# Patient Record
Sex: MALE | Race: WHITE | Employment: OTHER | URBAN - NONMETROPOLITAN AREA
[De-identification: names, ages, dates, MRNs, and addresses within clinical notes are randomized per-mention and may not be internally consistent; named-entity substitution may affect disease eponyms.]

---

## 2017-11-06 ENCOUNTER — HOSPITAL ENCOUNTER (OUTPATIENT)
Dept: CT IMAGING | Age: 67
Discharge: HOME | End: 2017-11-06
Payer: MEDICARE

## 2017-11-06 ENCOUNTER — HOSPITAL ENCOUNTER (OUTPATIENT)
Dept: GENERAL RADIOLOGY | Age: 67
Discharge: HOME | End: 2017-11-06
Payer: MEDICARE

## 2017-11-06 VITALS
TEMPERATURE: 98.1 F | HEIGHT: 65 IN | OXYGEN SATURATION: 97 % | DIASTOLIC BLOOD PRESSURE: 68 MMHG | RESPIRATION RATE: 17 BRPM | WEIGHT: 185 LBS | BODY MASS INDEX: 30.82 KG/M2 | HEART RATE: 59 BPM | SYSTOLIC BLOOD PRESSURE: 121 MMHG

## 2017-11-06 DIAGNOSIS — Z98.890 STATUS POST MYELOGRAM: ICD-10-CM

## 2017-11-06 LAB
APTT: 29 SEC (ref 26–36.2)
INR BLD: 0.99 (ref 0.88–1.18)
PLATELET # BLD: 293 K/UL (ref 130–400)
PROTHROMBIN TIME: 13 SEC (ref 12–14.6)

## 2017-11-06 PROCEDURE — 6360000004 HC RX CONTRAST MEDICATION

## 2017-11-06 PROCEDURE — 72270 MYELOGPHY 2/> SPINE REGIONS: CPT

## 2017-11-06 PROCEDURE — 72131 CT LUMBAR SPINE W/O DYE: CPT

## 2017-11-06 PROCEDURE — 72128 CT CHEST SPINE W/O DYE: CPT

## 2017-11-06 PROCEDURE — 36415 COLL VENOUS BLD VENIPUNCTURE: CPT

## 2017-11-06 PROCEDURE — 85730 THROMBOPLASTIN TIME PARTIAL: CPT

## 2017-11-06 PROCEDURE — 85049 AUTOMATED PLATELET COUNT: CPT

## 2017-11-06 PROCEDURE — 85610 PROTHROMBIN TIME: CPT

## 2017-11-06 RX ORDER — METHADONE HYDROCHLORIDE 10 MG/1
20 TABLET ORAL EVERY 8 HOURS PRN
Status: ON HOLD | COMMUNITY
End: 2022-01-01

## 2017-11-06 RX ORDER — BISOPROLOL FUMARATE 5 MG/1
5 TABLET ORAL DAILY
COMMUNITY
End: 2020-10-27 | Stop reason: SDUPTHER

## 2017-11-06 RX ADMIN — IOPAMIDOL 15 ML: 612 INJECTION, SOLUTION INTRATHECAL at 14:12

## 2017-11-06 ASSESSMENT — PAIN DESCRIPTION - DESCRIPTORS: DESCRIPTORS: CONSTANT;DISCOMFORT

## 2017-11-06 ASSESSMENT — PAIN - FUNCTIONAL ASSESSMENT: PAIN_FUNCTIONAL_ASSESSMENT: 0-10

## 2017-11-06 NOTE — PROGRESS NOTES
Pt was brought back to [facility] 2 while I was attending to another pt, pt gowned. When I arrived to room, POC reviewed and questions answered, pt oriented to area. Lab to draw specimen. Pt denies anticoag/antiplatelets. Meds/history/assessmnet reviewed and noted. Called orthop inst. For order clarification as does not specify areas for myelogram.  Office notes reference thoracic, pt states it is his lower back. Notified pt of possible delay.

## 2017-11-15 ENCOUNTER — TELEPHONE (OUTPATIENT)
Dept: NEUROSURGERY | Age: 67
End: 2017-11-15

## 2017-12-07 ENCOUNTER — OFFICE VISIT (OUTPATIENT)
Dept: NEUROSURGERY | Age: 67
End: 2017-12-07
Payer: MEDICARE

## 2017-12-07 ENCOUNTER — HOSPITAL ENCOUNTER (OUTPATIENT)
Dept: GENERAL RADIOLOGY | Age: 67
Discharge: HOME | End: 2017-12-07
Payer: MEDICARE

## 2017-12-07 VITALS
HEIGHT: 64 IN | BODY MASS INDEX: 31.58 KG/M2 | SYSTOLIC BLOOD PRESSURE: 129 MMHG | WEIGHT: 185 LBS | OXYGEN SATURATION: 95 % | HEART RATE: 63 BPM | DIASTOLIC BLOOD PRESSURE: 78 MMHG

## 2017-12-07 DIAGNOSIS — R20.0 RIGHT LEG NUMBNESS: ICD-10-CM

## 2017-12-07 DIAGNOSIS — M48.061 SPINAL STENOSIS OF LUMBAR REGION WITHOUT NEUROGENIC CLAUDICATION: Primary | ICD-10-CM

## 2017-12-07 DIAGNOSIS — M79.604 RIGHT LEG PAIN: ICD-10-CM

## 2017-12-07 DIAGNOSIS — M48.061 SPINAL STENOSIS OF LUMBAR REGION WITHOUT NEUROGENIC CLAUDICATION: ICD-10-CM

## 2017-12-07 DIAGNOSIS — Z98.1 S/P LUMBAR FUSION: ICD-10-CM

## 2017-12-07 DIAGNOSIS — Z98.1 S/P CERVICAL SPINAL FUSION: ICD-10-CM

## 2017-12-07 PROCEDURE — G8417 CALC BMI ABV UP PARAM F/U: HCPCS | Performed by: NEUROLOGICAL SURGERY

## 2017-12-07 PROCEDURE — 1123F ACP DISCUSS/DSCN MKR DOCD: CPT | Performed by: NEUROLOGICAL SURGERY

## 2017-12-07 PROCEDURE — G8427 DOCREV CUR MEDS BY ELIG CLIN: HCPCS | Performed by: NEUROLOGICAL SURGERY

## 2017-12-07 PROCEDURE — 1036F TOBACCO NON-USER: CPT | Performed by: NEUROLOGICAL SURGERY

## 2017-12-07 PROCEDURE — 3017F COLORECTAL CA SCREEN DOC REV: CPT | Performed by: NEUROLOGICAL SURGERY

## 2017-12-07 PROCEDURE — 4040F PNEUMOC VAC/ADMIN/RCVD: CPT | Performed by: NEUROLOGICAL SURGERY

## 2017-12-07 PROCEDURE — G8484 FLU IMMUNIZE NO ADMIN: HCPCS | Performed by: NEUROLOGICAL SURGERY

## 2017-12-07 PROCEDURE — 99204 OFFICE O/P NEW MOD 45 MIN: CPT | Performed by: NEUROLOGICAL SURGERY

## 2017-12-07 PROCEDURE — 72110 X-RAY EXAM L-2 SPINE 4/>VWS: CPT

## 2017-12-07 RX ORDER — AZITHROMYCIN 250 MG/1
TABLET, FILM COATED ORAL
Refills: 0 | Status: ON HOLD | COMMUNITY
Start: 2017-09-07 | End: 2018-10-09 | Stop reason: ALTCHOICE

## 2017-12-07 RX ORDER — CYCLOBENZAPRINE HCL 10 MG
TABLET ORAL
COMMUNITY
Start: 2017-11-20 | End: 2017-12-27

## 2017-12-07 ASSESSMENT — ENCOUNTER SYMPTOMS
BACK PAIN: 1
RESPIRATORY NEGATIVE: 1
EYES NEGATIVE: 1
GASTROINTESTINAL NEGATIVE: 1

## 2017-12-07 NOTE — PROGRESS NOTES
[facility] Neurosurgery  Office Visit    Patient:   Jackie Agee  MR#:    [id number]      YOB: 1950  Date of Visit:   12/7/2017  Time of Note:                          4:33 PM  Primary/Referring Physician:  Jesus Dwyer MD   Note Author:   Kartik Guzmán DO    Chief Complaint   Patient presents with    Lower Back Pain    Leg Pain     Patient states he is having right Leg pain    Numbness    Tingling       HISTORY OF PRESENT ILLNESS:      Jackie Agee is a 74 y.o. male with an extensive surgical spine history who presents with right leg pain and low back pain for about 3 months. The pain does radiate into right buttock, anterolateral thigh, lateral shin and knee, top of foot. His pain is mostly located in the right leg. The patient complains of an intermittent numbness of the right foot. Surgical spine history:  - Corpectomy C4 and C6 with ACDF of C2-3 by Dr. Geoffrey El in [address] [place] [address] on 5/3/2006; he could not hold his head up very well and he had bilateral hand numbness. - He also had a posterior decompression by Dr. Hoa Gonsales in 2007  - Lumbar laminectomy 2007?  - Fusion of L5-S1 in 2001  Ablation of medial branch nerves at left L3-4, L4-5 on 8/30/2017  Ablation of medial branch nerves at right L3-4, L4-5 on 8/16/2017    His pain is not changed when going from a seated to standing position. His pain is not changed with walking. His pain is not changed when lying flat. Overall, indicative that the patient does not have a mechanical nature to their pain. He states that 20% of his pain is located in the back and 80% is leg pain. The patient states that he can no longer play with his grandchildren or walk up steps which has dramatically affected his quality of life.      The patient has underwent a non-operative treatment course that has included:  NSAIDs  Muscle Relaxers (flexeril)  Gabapentin  Mathodone  Opiates  Oral Steroids  Physical Therapy with core strengthening  Epidural Steroid Neurological: Positive for tingling. Negative for dizziness, tremors, sensory change, speech change, focal weakness, seizures, loss of consciousness, weakness and headaches. Endo/Heme/Allergies: Negative. Psychiatric/Behavioral: Negative. PHYSICAL EXAM:  Vitals:    12/07/17 1045   BP: 129/78   Pulse: 63   SpO2: 95%     Constitutional: appears well-developed and well-nourished. Eyes  conjunctiva normal.  Pupils react to light  Ear, nose, throat -hearing intact to finger rub, No scars, masses, or lesions over external nose or ears, no atrophy of tongue  Neck-symmetric, no masses noted, no jugular vein distension  Respiration- chest wall appears symmetric, good expansion, normal effort without use of accessory muscles  Musculoskeletal  no significant wasting of muscles noted, no bony deformities, gait no gross ataxia  Extremities-no clubbing or cyanosis, + mild RLE edema  Skin  warm, dry, and intact. No rash, erythema, or pallor. Psychiatric  mood, affect, and behavior appear normal.     Neurologic Examinaiton  Awake, Alert and oriented x 3  Normal speech pattern, following commands  Motor 5/5 all extremities  No deficits to light touch or pinprick sensation  Reflexes are 1+ and symmetric bilateral achilles  No clonus or Hoffmans sign  No myofacial tenderness to palpation  Normal Gait pattern        DATA and IMAGING:    Nursing/pcp notes, imaging, labs, and vitals reviewed.      PT,OT and/or speech notes reviewed    Lab Results   Component Value Date    WBC 3.60 (L) 10/09/2015    HGB 9.3 (L) 10/09/2015    HCT 29.4 (L) 10/09/2015    MCV 73.5 (L) 10/09/2015     11/06/2017     Lab Results   Component Value Date     10/09/2015    K 3.4 (L) 10/09/2015     10/09/2015    CO2 21 (L) 10/09/2015    BUN 5 (L) 10/09/2015    CREATININE 0.6 10/09/2015    GLUCOSE 86 10/09/2015    CALCIUM 8.1 (L) 10/09/2015    PROT 6.1 (L) 10/08/2015    LABALBU 3.6 (D) 10/08/2015    ALKPHOS 89 10/08/2015    AST 17 10/08/2015    ALT 9 10/08/2015    LABGLOM 145 10/09/2015     Lab Results   Component Value Date    INR 0.99 11/06/2017    PROTIME 13.0 11/06/2017       I have personally reviewed these images and my interpretation is: There is evidence of a previous L5-S1 open fusion and large laminectomy throughout the lumbar spine  There is retropulsion of L2 on L3  L2-3 moderate central canal stenosis, bilateral foraminal stenosis L>R      ASSESSMENT:  Ally Valdovinos is a 74 y.o. male with an extensive surgical spine history who presents with right leg pain and low back pain for about 3 months. ICD-10-CM ICD-9-CM    1. Spinal stenosis of lumbar region without neurogenic claudication M48.061 724.02 XR Lumbar Spine Ap Lateral Flexion and Extension   2. Right leg pain M79.604 729.5 XR Lumbar Spine Ap Lateral Flexion and Extension   3. Right leg numbness R20.0 782.0 XR Lumbar Spine Ap Lateral Flexion and Extension   4. S/P lumbar fusion Z98.1 V45.4 XR Lumbar Spine Ap Lateral Flexion and Extension   5. S/P cervical spinal fusion Z98.1 V45.4        PLAN:  -We discussed and reviewed the results/findings of the MRI lumbar spine with Mr. Aundrea Corona and his daughter at length. We explained very thoroughly that he has had extensive major spine surgery with no success or improvement in right leg pain. It is unlikely that an additional surgery will likely provide any dramatic improvement. He has a swelling of the right lower extremity that is somewhat concerning for a vascular issues.       Chao De Anda,

## 2017-12-13 ENCOUNTER — TELEPHONE (OUTPATIENT)
Dept: NEUROLOGY | Age: 67
End: 2017-12-13

## 2017-12-13 DIAGNOSIS — M79.89 SWELLING OF RIGHT LOWER EXTREMITY: Primary | ICD-10-CM

## 2017-12-13 DIAGNOSIS — D50.8 OTHER IRON DEFICIENCY ANEMIA: Primary | ICD-10-CM

## 2017-12-15 NOTE — TELEPHONE ENCOUNTER
Called Monika to inform her that Genny Jeronimo completed the referral today to [facility] vascular.  She was very thankful

## 2017-12-18 ENCOUNTER — APPOINTMENT (OUTPATIENT)
Dept: LAB | Facility: HOSPITAL | Age: 67
End: 2017-12-18

## 2017-12-21 DIAGNOSIS — D50.9 IRON DEFICIENCY ANEMIA, UNSPECIFIED IRON DEFICIENCY ANEMIA TYPE: Primary | ICD-10-CM

## 2017-12-26 ENCOUNTER — LAB (OUTPATIENT)
Dept: LAB | Facility: HOSPITAL | Age: 67
End: 2017-12-26

## 2017-12-26 ENCOUNTER — OFFICE VISIT (OUTPATIENT)
Dept: ONCOLOGY | Facility: CLINIC | Age: 67
End: 2017-12-26

## 2017-12-26 VITALS
RESPIRATION RATE: 16 BRPM | TEMPERATURE: 97.2 F | OXYGEN SATURATION: 97 % | DIASTOLIC BLOOD PRESSURE: 72 MMHG | SYSTOLIC BLOOD PRESSURE: 118 MMHG | WEIGHT: 196 LBS | HEART RATE: 68 BPM

## 2017-12-26 DIAGNOSIS — R79.89 OTHER SPECIFIED ABNORMAL FINDINGS OF BLOOD CHEMISTRY: ICD-10-CM

## 2017-12-26 DIAGNOSIS — D50.9 NORMOCYTIC HYPOCHROMIC ANEMIA: Primary | ICD-10-CM

## 2017-12-26 DIAGNOSIS — C20 RECTAL CANCER (HCC): Primary | ICD-10-CM

## 2017-12-26 LAB
ALBUMIN SERPL-MCNC: 4.3 G/DL (ref 3.5–5)
ALBUMIN/GLOB SERPL: 1.4 G/DL (ref 1.1–2.5)
ALP SERPL-CCNC: 74 U/L (ref 24–120)
ALT SERPL W P-5'-P-CCNC: 34 U/L (ref 0–54)
ANION GAP SERPL CALCULATED.3IONS-SCNC: 9 MMOL/L (ref 4–13)
AST SERPL-CCNC: 17 U/L (ref 7–45)
AUTO MIXED CELLS #: 0.5 10*3/MM3 (ref 0.1–2.6)
AUTO MIXED CELLS %: 7 % (ref 0.1–24)
BILIRUB SERPL-MCNC: 0.4 MG/DL (ref 0.1–1)
BUN BLD-MCNC: 13 MG/DL (ref 5–21)
BUN/CREAT SERPL: 13.4
CALCIUM SPEC-SCNC: 9.4 MG/DL (ref 8.4–10.4)
CEA SERPL-MCNC: 1.46 NG/ML (ref 0–5)
CHLORIDE SERPL-SCNC: 100 MMOL/L (ref 98–110)
CO2 SERPL-SCNC: 25 MMOL/L (ref 24–31)
CREAT BLD-MCNC: 0.97 MG/DL (ref 0.5–1.4)
ERYTHROCYTE [DISTWIDTH] IN BLOOD BY AUTOMATED COUNT: 12.6 % (ref 12–15)
FERRITIN SERPL-MCNC: 15.4 NG/ML (ref 17.9–464)
GFR SERPL CREATININE-BSD FRML MDRD: 78 ML/MIN/1.73
GLOBULIN UR ELPH-MCNC: 3.1 GM/DL
GLUCOSE BLD-MCNC: 98 MG/DL (ref 70–100)
HCT VFR BLD AUTO: 35.6 % (ref 40–52)
HGB BLD-MCNC: 12 G/DL (ref 14–18)
HOLD SPECIMEN: NORMAL
IRON 24H UR-MRATE: 84 MCG/DL (ref 42–180)
IRON SATN MFR SERPL: 21 % (ref 20–45)
LYMPHOCYTES # BLD AUTO: 1.1 10*3/MM3 (ref 0.8–7)
LYMPHOCYTES NFR BLD AUTO: 15.8 % (ref 15–45)
MCH RBC QN AUTO: 27.1 PG (ref 28–32)
MCHC RBC AUTO-ENTMCNC: 33.7 G/DL (ref 33–36)
MCV RBC AUTO: 80.4 FL (ref 82–95)
NEUTROPHILS # BLD AUTO: 5.1 10*3/MM3 (ref 1.5–8.3)
NEUTROPHILS NFR BLD AUTO: 77.2 % (ref 39–78)
PLATELET # BLD AUTO: 236 10*3/MM3 (ref 130–400)
PMV BLD AUTO: 9.1 FL (ref 6–12)
POTASSIUM BLD-SCNC: 4.2 MMOL/L (ref 3.5–5.3)
PROT SERPL-MCNC: 7.4 G/DL (ref 6.3–8.7)
RBC # BLD AUTO: 4.43 10*6/MM3 (ref 4.2–5.4)
SODIUM BLD-SCNC: 134 MMOL/L (ref 135–145)
TIBC SERPL-MCNC: 407 MCG/DL (ref 225–420)
WBC NRBC COR # BLD: 6.7 10*3/MM3 (ref 4.8–10.8)

## 2017-12-26 PROCEDURE — 83540 ASSAY OF IRON: CPT | Performed by: INTERNAL MEDICINE

## 2017-12-26 PROCEDURE — 82378 CARCINOEMBRYONIC ANTIGEN: CPT | Performed by: INTERNAL MEDICINE

## 2017-12-26 PROCEDURE — 99213 OFFICE O/P EST LOW 20 MIN: CPT | Performed by: INTERNAL MEDICINE

## 2017-12-26 PROCEDURE — 83550 IRON BINDING TEST: CPT | Performed by: INTERNAL MEDICINE

## 2017-12-26 PROCEDURE — 82728 ASSAY OF FERRITIN: CPT | Performed by: INTERNAL MEDICINE

## 2017-12-26 PROCEDURE — 36415 COLL VENOUS BLD VENIPUNCTURE: CPT

## 2017-12-26 PROCEDURE — 80053 COMPREHEN METABOLIC PANEL: CPT | Performed by: INTERNAL MEDICINE

## 2017-12-26 PROCEDURE — 85025 COMPLETE CBC W/AUTO DIFF WBC: CPT | Performed by: INTERNAL MEDICINE

## 2017-12-26 RX ORDER — OMEPRAZOLE 20 MG/1
20 CAPSULE, DELAYED RELEASE ORAL DAILY
COMMUNITY
Start: 2016-02-05

## 2017-12-26 RX ORDER — INFLUENZA A VIRUS A/MICHIGAN/45/2015 X-275 (H1N1) ANTIGEN (FORMALDEHYDE INACTIVATED), INFLUENZA A VIRUS A/SINGAPORE/INFIMH-16-0019/2016 IVR-186 (H3N2) ANTIGEN (FORMALDEHYDE INACTIVATED), AND INFLUENZA B VIRUS B/MARYLAND/15/2016 BX-69A (A B/COLORADO/6/2017-LIKE VIRUS) ANTIGEN (FORMALDEHYDE INACTIVATED) 60; 60; 60 UG/.5ML; UG/.5ML; UG/.5ML
INJECTION, SUSPENSION INTRAMUSCULAR
Refills: 0 | COMMUNITY
Start: 2017-09-28 | End: 2020-10-05

## 2017-12-26 RX ORDER — BISOPROLOL FUMARATE 5 MG/1
5 TABLET, FILM COATED ORAL DAILY
COMMUNITY
Start: 2017-10-15

## 2017-12-26 RX ORDER — DULOXETIN HYDROCHLORIDE 30 MG/1
30 CAPSULE, DELAYED RELEASE ORAL DAILY
COMMUNITY
Start: 2016-01-25

## 2017-12-26 RX ORDER — CYCLOBENZAPRINE HCL 10 MG
10 TABLET ORAL 3 TIMES DAILY PRN
COMMUNITY
Start: 2017-11-20

## 2017-12-26 RX ORDER — CYCLOBENZAPRINE HCL 10 MG
TABLET ORAL
Refills: 1 | COMMUNITY
Start: 2017-12-12 | End: 2019-01-10 | Stop reason: SDUPTHER

## 2017-12-26 RX ORDER — DULOXETIN HYDROCHLORIDE 30 MG/1
CAPSULE, DELAYED RELEASE ORAL
COMMUNITY
Start: 2017-10-15 | End: 2019-01-10 | Stop reason: SDUPTHER

## 2017-12-26 RX ORDER — GABAPENTIN 800 MG/1
800 TABLET ORAL
COMMUNITY
Start: 2016-02-16 | End: 2019-01-10 | Stop reason: SDUPTHER

## 2017-12-26 RX ORDER — OMEPRAZOLE 20 MG/1
CAPSULE, DELAYED RELEASE ORAL
COMMUNITY
Start: 2017-10-23 | End: 2019-01-10 | Stop reason: SDUPTHER

## 2017-12-26 RX ORDER — GABAPENTIN 800 MG/1
800 TABLET ORAL 4 TIMES DAILY
COMMUNITY
Start: 2017-12-09

## 2017-12-26 RX ORDER — METHADONE HYDROCHLORIDE 10 MG/1
20 TABLET ORAL 3 TIMES DAILY
COMMUNITY

## 2017-12-26 NOTE — PROGRESS NOTES
[facility]  HEMATOLOGY & ONCOLOGY        Subjective     VISIT DIAGNOSIS: No diagnosis found.    REASON FOR VISIT:   No chief complaint on file.       HEMATOLOGY / ONCOLOGY HISTORY: TUMOR HISTORY: Mr. Angel is a 64yearold  male who presents to the office today for evaluation of microcytic hyperchromic anemia.  His history is significant for a partial colectomy in 1991 with approximately 12 inches of his left colon removed with associated  ileostomy. Pathology on this resected specimen is not available. The patient apparently developed rectal carcinoma and had a low  anterior resection with the formation of a pouch with associated diverting ileostomy on 7/15/2009. On 9/9/2009 he underwent ileostomy  takedown. 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 He is on suppression of chronic inflammation in this pouch and has episodes of recurrent need for  antibiotics. The last notes that I have from [place] are on 1/31/2011 after a pouch washout and placement on antibiotics.  3/26/2012 he underwent an upper GI endoscopy showing hiatal hernia but normal esophagus, stomach and duodenum. This was  followed with a colonoscopy which showed the patent surgical anastomosis and no obvious source of blood loss. The patient was noted  to have a J pouch ileoanal anastomosis and the anastomotic line was acceptable. There was healthy appearing mucosa noted.  Hemoglobin, iron studies were performed on 3/20/2012 showing a serum iron of 11, iron saturation of 2%, ferritin of 3, B12 normal at  1330 and folic acid normal. Reticulocyte count was 2.2% which is inappropriately low for his hemoglobin. Repeat blood count on  3/23/2012 showed a hemoglobin of 10.1, hematocrit 31.7, MCV of 69.1, MCHC of 31.9. Platelet and white count were normal.  INTERVAL HISTORY:  Mr Angel is a 63 year old gentleman with history of iron  deficiency anemia. He is here today for follow up and with complaints of  increased of fatigue and weakness.He denies any acute illness or bleeding. 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 His CBC results today are as follows:  WBC=5.5K/uL, RBC=4.22M/UL, HGB=9.4g/dL, HCT=31.1%, MCV=73.74fL, MCHC=30.2g/dL, RDW=16.0%, Mqfb=954Q/uL, MPV=9.1,  Gran#=3.8K/uL, Lymph#=1.2, MONO#=0.5, Gran%=69.3%, Lymph%=21.9%, MONO%=8.8%,MCHC=22.3pg. His last iron studies were  obtained on 03/29/2012 with an iron saturation 7.8% and a Ferritin of 4.6, will repeat these studies today for revaluation and possible  consideration for INFed infusions   No history exists.     [No treatment plan]  Cancer Staging Information:  No matching staging information was found for the patient.      INTERVAL HISTORY  Patient ID: Jelani Angel is a 67 y.o. year old male         Review of Systems         Medications:    No current outpatient prescriptions on file.     No current facility-administered medications for this visit.        ALLERGIES:  Allergies not on file    Objective      @VITALS    No flowsheet data found.    General Appearance: Patient is awake, alert, oriented and in no acute distress. Patient is welldeveloped, wellnourished, and appears stated age.  HEENT: Normocephalic. Sclerae clear, conjunctiva pink, extraocular movements intact, pupils, round, reactive to light and  accommodation. Mouth and throat are clear with moist oral mucosa.  NECK: Supple, no jugular venous distention, thyroid not enlarged.  LYMPH: No cervical, supraclavicular, axillary, or inguinal lymphadenopathy.  CHEST: Equal bilateral expansion, AP  diameter normal, resonant percussion note  LUNGS: Good air movement, no rales, rhonchi, rubs or wheezes with auscultation  CARDIO: Regular sinus rhythm, no murmurs, gallops or rubs.  ABDOMEN: Nondistended, soft, No  tenderness, no guarding, no rebound, No hepatosplenomegaly. No abdominal masses. Bowel sounds positive. No hernia  GENITALIA: Not examined.  BREASTS: Not examined.  MUSKEL: No joint swelling, decreased motion, or inflammation  EXTREMS: No edema, clubbing, cyanosis, No varicose veins.  NEURO: Grossly nonfocal, Gait is coordinated and smooth, Cognition is preserved.  SKIN: No rashes, no ecchymoses, no petechia.  PSYCH: Oriented to time, place and person. Memory is preserved. Mood and affect appear normal      RECENT LABS:  Orders Only on 12/21/2017   Component Date Value Ref Range Status   • WBC 12/26/2017 6.70  4.80 - 10.80 10*3/mm3 Final   • RBC 12/26/2017 4.43  4.20 - 5.40 10*6/mm3 Final   • Hemoglobin 12/26/2017 12.0* 14.0 - 18.0 g/dL Final   • Hematocrit 12/26/2017 35.6* 40.0 - 52.0 % Final   • MCV 12/26/2017 80.4* 82.0 - 95.0 fL Final   • MCH 12/26/2017 27.1* 28.0 - 32.0 pg Final   • MCHC 12/26/2017 33.7  33.0 - 36.0 g/dL Final   • RDW 12/26/2017 12.6  12.0 - 15.0 % Final   • MPV 12/26/2017 9.1  6.0 - 12.0 fL Final   • Platelets 12/26/2017 236  130 - 400 10*3/mm3 Final   • Neutrophil % 12/26/2017 77.2  39.0 - 78.0 % Final   • Lymphocyte % 12/26/2017 15.8  15.0 - 45.0 % Final   • Auto Mixed Cells % 12/26/2017 7.0  0.1 - 24.0 % Final   • Neutrophils, Absolute 12/26/2017 5.10  1.50 - 8.30 10*3/mm3 Final   • Lymphocytes, Absolute 12/26/2017 1.10  0.80 - 7.00 10*3/mm3 Final   • Auto Mixed Cells # 12/26/2017 0.50  0.10 - 2.60 10*3/mm3 Final       RADIOLOGY:  No results found.         Assessment/Plan      Rectal cancer 2009, s/o Chava adjuvant 5FU CIV + XRT thereafter resction APR and pathological complete CR.  NO imaging studies done since Dx. ELIAS on clinical exam.  Anemia stable Hgb 12gm%, Asymptomatic.  Cont OBSERVATION.                Larry Benjamin MD    12/26/2017    11:05 AM

## 2017-12-27 ENCOUNTER — HOSPITAL ENCOUNTER (OUTPATIENT)
Dept: VASCULAR LAB | Age: 67
Discharge: HOME | End: 2017-12-27
Payer: MEDICARE

## 2017-12-27 ENCOUNTER — OFFICE VISIT (OUTPATIENT)
Dept: VASCULAR SURGERY | Age: 67
End: 2017-12-27
Payer: MEDICARE

## 2017-12-27 VITALS
SYSTOLIC BLOOD PRESSURE: 126 MMHG | TEMPERATURE: 98.6 F | RESPIRATION RATE: 18 BRPM | DIASTOLIC BLOOD PRESSURE: 82 MMHG | HEART RATE: 72 BPM

## 2017-12-27 DIAGNOSIS — M79.89 LEG SWELLING: ICD-10-CM

## 2017-12-27 DIAGNOSIS — M79.604 LEG PAIN, RIGHT: Primary | ICD-10-CM

## 2017-12-27 DIAGNOSIS — M79.604 LEG PAIN, RIGHT: ICD-10-CM

## 2017-12-27 PROCEDURE — 1123F ACP DISCUSS/DSCN MKR DOCD: CPT | Performed by: NURSE PRACTITIONER

## 2017-12-27 PROCEDURE — 1036F TOBACCO NON-USER: CPT | Performed by: NURSE PRACTITIONER

## 2017-12-27 PROCEDURE — 3017F COLORECTAL CA SCREEN DOC REV: CPT | Performed by: NURSE PRACTITIONER

## 2017-12-27 PROCEDURE — G8427 DOCREV CUR MEDS BY ELIG CLIN: HCPCS | Performed by: NURSE PRACTITIONER

## 2017-12-27 PROCEDURE — 4040F PNEUMOC VAC/ADMIN/RCVD: CPT | Performed by: NURSE PRACTITIONER

## 2017-12-27 PROCEDURE — G8417 CALC BMI ABV UP PARAM F/U: HCPCS | Performed by: NURSE PRACTITIONER

## 2017-12-27 PROCEDURE — 99202 OFFICE O/P NEW SF 15 MIN: CPT | Performed by: NURSE PRACTITIONER

## 2017-12-27 PROCEDURE — G8484 FLU IMMUNIZE NO ADMIN: HCPCS | Performed by: NURSE PRACTITIONER

## 2017-12-27 PROCEDURE — 93971 EXTREMITY STUDY: CPT

## 2017-12-27 NOTE — PROGRESS NOTES
to palpation bilaterally without bruit. Extremities - Radial and brachial pulses are 2+ to palpation bilaterally. Right femoral pulse: present 2+; Right popliteal pulse: absent Right DP: absent; Right PT absent; Left femoral pulse: present 2+; Left popliteal pulse: absent; Left DP: absent; Left PT: absent  No cyanosis, clubbing, minimal edema in area of right calf. No signs atheroembolic event. Few spider veins  Pulmonary - effort appears normal.  No respiratory distress. Lungs - Breath sounds normal. No wheezes or rales. GI - Abdomen - soft, non tender, bowel sounds X 4 quadrants. No guarding or rebound tenderness. No distension or palpable mass. Genitourinary - deferred. Musculoskeletal - ROM appears normal.  Neurologic - alert and oriented X 3. Physiologic. Skin - warm, dry, and intact. No rash, erythema, or pallor. Psychiatric - mood, affect, and behavior appear normal.  Judgment and thought processes appear normal.    Options have been discussed with the patient including continued medical management. Patient has opted to proceed with continued medical management. Assessment    1. Leg pain, right    2.  Leg swelling          Plan    Support hose  20-30 mmHg compression  Start/Continue ASA EC 81 mg daily  Leg elevation  Good moisturization  Good skin care  Venous scan - negative - no abnormality seen  Recommend follow up with Dr. Dangelo Velasquez

## 2017-12-28 ENCOUNTER — TELEPHONE (OUTPATIENT)
Dept: VASCULAR SURGERY | Age: 67
End: 2017-12-28

## 2018-01-03 ENCOUNTER — HOSPITAL ENCOUNTER (OUTPATIENT)
Dept: CT IMAGING | Age: 68
Discharge: HOME | End: 2018-01-03
Payer: MEDICARE

## 2018-01-03 DIAGNOSIS — R22.41 LOWER LEG MASS, RIGHT: ICD-10-CM

## 2018-01-03 LAB
GFR NON-AFRICAN AMERICAN: >60
PERFORMED ON: NORMAL
POC CREATININE: 1.1 MG/DL (ref 0.3–1.3)
POC SAMPLE TYPE: NORMAL

## 2018-01-03 PROCEDURE — 73701 CT LOWER EXTREMITY W/DYE: CPT

## 2018-01-03 PROCEDURE — 82565 ASSAY OF CREATININE: CPT

## 2018-01-03 PROCEDURE — 6360000004 HC RX CONTRAST MEDICATION: Performed by: INTERNAL MEDICINE

## 2018-01-03 RX ADMIN — IOPAMIDOL 90 ML: 755 INJECTION, SOLUTION INTRAVENOUS at 09:46

## 2018-01-17 ENCOUNTER — OFFICE VISIT (OUTPATIENT)
Dept: VASCULAR SURGERY | Age: 68
End: 2018-01-17
Payer: MEDICARE

## 2018-01-17 VITALS
DIASTOLIC BLOOD PRESSURE: 82 MMHG | HEART RATE: 70 BPM | SYSTOLIC BLOOD PRESSURE: 138 MMHG | RESPIRATION RATE: 18 BRPM | OXYGEN SATURATION: 98 %

## 2018-01-17 DIAGNOSIS — M79.89 LEG SWELLING: ICD-10-CM

## 2018-01-17 PROCEDURE — G8427 DOCREV CUR MEDS BY ELIG CLIN: HCPCS | Performed by: SURGERY

## 2018-01-17 PROCEDURE — G8484 FLU IMMUNIZE NO ADMIN: HCPCS | Performed by: SURGERY

## 2018-01-17 PROCEDURE — 1123F ACP DISCUSS/DSCN MKR DOCD: CPT | Performed by: SURGERY

## 2018-01-17 PROCEDURE — G8417 CALC BMI ABV UP PARAM F/U: HCPCS | Performed by: SURGERY

## 2018-01-17 PROCEDURE — 99212 OFFICE O/P EST SF 10 MIN: CPT | Performed by: SURGERY

## 2018-01-17 PROCEDURE — 1101F PT FALLS ASSESS-DOCD LE1/YR: CPT | Performed by: SURGERY

## 2018-01-17 PROCEDURE — 4040F PNEUMOC VAC/ADMIN/RCVD: CPT | Performed by: SURGERY

## 2018-01-17 PROCEDURE — 1036F TOBACCO NON-USER: CPT | Performed by: SURGERY

## 2018-01-17 PROCEDURE — 3017F COLORECTAL CA SCREEN DOC REV: CPT | Performed by: SURGERY

## 2018-09-15 PROBLEM — M79.89 LEG SWELLING: Status: ACTIVE | Noted: 2018-09-15

## 2018-09-15 NOTE — PROGRESS NOTES
Progress Notes  Encounter Date: 1/17/18       Expand All Collapse All    []Manual[]Template  []Copied        Patient Care Team:  Genesis Malone MD as PCP - General (Cardiology)        He reports that 1 year ago he developed a lump on his right leg. This got better with support hose. He developed pain in this area about 6-8 weeks ago. He has had some spider veins. He has no varicose veins.       Differential diagnosis for leg pain includes but is not limited to: varicose veins, peripheral vascular disease, arthritic pain, DVT, lumbar disc disease, and neurogenic pain.   Seen in f/u- CTS negative       Malena Conteh is a 74 y.o. male with the following history reviewed and recorded in [facility]:       Patient Active Problem List     Diagnosis Date Noted    Primary osteoarthritis of left hip 10/28/2016    Thoracic facet joint syndrome 06/03/2016      Current Facility-Administered Medications          Current Outpatient Prescriptions   Medication Sig Dispense Refill    bisoprolol (ZEBETA) 5 MG tablet Take 5 mg by mouth daily        Diclofenac (ZORVOLEX) 18 MG CAPS Take 18 mg by mouth 3 times daily        methadone (DOLOPHINE) 10 MG tablet Take 10 mg by mouth every 6 hours as needed for Pain .        DULoxetine (CYMBALTA) 30 MG capsule 30 mg daily         gabapentin (NEURONTIN) 800 MG tablet 800 mg 4 times daily         omeprazole (PRILOSEC) 20 MG capsule 20 mg 2 times daily         azithromycin (ZITHROMAX) 250 MG tablet     0      No current facility-administered medications for this visit.          Allergies: Morphine  Past Medical History        Past Medical History:   Diagnosis Date    Arthritis      Chronic back pain      Hypertension           Past Surgical History   Past Surgical History:   Procedure Laterality Date    ABDOMEN SURGERY        BACK SURGERY         two lumbar    HC INJECT OTHER PERPHRL NERV Left 10/28/2016     FLURO GUIDED HIP INJECITON performed by Abel Brush MD at [address] OR    NECK SURGERY         times 2... all levels         Family History         Family History   Problem Relation Age of Onset    High Blood Pressure Mother      High Blood Pressure Father      Colon Cancer Father      Diabetes Father                Social History   Substance Use Topics    Smoking status: Former Smoker    Smokeless tobacco: Never Used    Alcohol use No         Old records obtained from the referring provider. These records have been reviewed and summarized.        Review of Systems     Constitutional  no significant activity change, appetite change, or unexpected weight change. No fever or chills. No diaphoresis or significant fatigue. HENT  no significant rhinorrhea or epistaxis. No tinnitus or significant hearing loss. Eyes  no sudden vision change or amaurosis. Respiratory  no significant shortness of breath, wheezing, or stridor. No apnea, cough, or chest tightness associated with shortness of breath. Cardiovascular  no chest pain, syncope, or significant dizziness. No palpitations. minimal leg swelling. No claudication. Gastrointestinal  no abdominal swelling or pain. No blood in stool. No severe constipation, diarrhea, nausea, or vomiting. Genitourinary  No difficulty urinating, dysuria, frequency, or urgency. No flank pain or hematuria. Musculoskeletal  no back pain, gait disturbance, or myalgia. Skin  no color change, rash, pallor, or new wound. Neurologic  no dizziness, facial asymmetry, or light headedness. No seizures. No speech difficulty or lateralizing weakness. Hematologic  no easy bruising or excessive bleeding. Psychiatric  no severe anxiety or nervousness. No confusion. All other review of systems are negative.     Physical Exam     /82 Comment: right  Pulse 72   Temp 98.6 °F (37 °C)   Resp 18      Constitutional  well developed, well nourished. No diaphoresis or acute distress. HENT  head normocephalic.   Right external ear canal appears normal.  Left external ear canal appears normal.  Septum appears midline. Eyes  conjunctiva normal.  EOMS normal.  No exudate. No icterus. Neck- ROM appears normal, no tracheal deviation. Cardiovascular  Regular rate and rhythm. Heart sounds are normal.  No murmur, rub, or gallop. Carotid pulses are 2+ to palpation bilaterally without bruit. Extremities - Radial and brachial pulses are 2+ to palpation bilaterally. Right femoral pulse: present 2+; Right popliteal pulse: absent Right DP: absent; Right PT absent; Left femoral pulse: present 2+; Left popliteal pulse: absent; Left DP: absent; Left PT: absent  No cyanosis, clubbing, minimal edema in area of right calf. No signs atheroembolic event. Few spider veins  Pulmonary  effort appears normal.  No respiratory distress. Lungs - Breath sounds normal. No wheezes or rales. GI - Abdomen  soft, non tender, bowel sounds X 4 quadrants. No guarding or rebound tenderness. No distension or palpable mass. Genitourinary  deferred. Musculoskeletal  ROM appears normal.  Neurologic  alert and oriented X 3. Physiologic. Skin  warm, dry, and intact. No rash, erythema, or pallor. Psychiatric  mood, affect, and behavior appear normal.  Judgment and thought processes appear normal.     Options have been discussed with the patient including continued medical management. Patient has opted to proceed with continued medical management.     Assessment     1. Leg pain, right    2.  Leg swelling             Plan     Support hose  20-30 mmHg compression  Start/Continue ASA EC 81 mg daily  Leg elevation  Good moisturization  Good skin care  Venous scan - negative - no abnormality seen  Recommend follow up with Dr. Francisco Bucio negative- cont as above- return as needed                               Office Visit on 12/27/2017            Detailed Report           Note shared with patient   Progress Notes Info     Author Note Status

## 2018-10-03 ENCOUNTER — HOSPITAL ENCOUNTER (OUTPATIENT)
Dept: HOSPITAL 58 - CAR | Age: 68
Discharge: HOME | End: 2018-10-03
Attending: INTERNAL MEDICINE
Payer: COMMERCIAL

## 2018-10-03 DIAGNOSIS — I10: ICD-10-CM

## 2018-10-03 DIAGNOSIS — R06.02: ICD-10-CM

## 2018-10-03 DIAGNOSIS — Z01.810: Primary | ICD-10-CM

## 2018-10-03 DIAGNOSIS — E78.5: ICD-10-CM

## 2018-10-03 PROCEDURE — 93010 ELECTROCARDIOGRAM REPORT: CPT

## 2018-10-03 PROCEDURE — 93005 ELECTROCARDIOGRAM TRACING: CPT

## 2018-10-03 NOTE — DI
EXAM:  Chest two view, frontal and lateral views. 

  

HISTORY:  Hypertension.  Preoperative evaluation. 

  

COMPARISON:  03/20/2012. 

  

FINDINGS:  The heart size is normal.  Atherosclerotic calcifications are present within a tortuous th
oracic aorta.  There is no pulmonary vascular congestion.  The lungs are clear.  No pleural effusion 
or pneumothorax is seen.  No acute osseous abnormality identified.  There has been previous ACDF.  De
generative changes noted throughout the spine with old compression deformity of an upper lumbar verte
bral body. Since the prior study, there has been no significant interval change. 

  

--------------------------- 

IMPRESSION: 

No acute cardiopulmonary process.

## 2018-10-03 NOTE — ECHO2D
Date of Exam: 10/3/18   Ordering Physician: DR. CLEOPATRA GALVEZ             Room #:
 OP

Reason for Echo: HTN, SURGICAL CLEARANCE







M-Mode  Normal Adult Results LV Dimensions Normal Adult Results

 

AoV Opening excursions       >1.6  >1.6 LVEDD-base-    3.5-5.8  5.1

 

Ao root dimensions     2.0-3.7  3.6 LVESD-base-    3.1-4.6  

 

L. Atrium dimensions     1.9-3.8  3.6 Post. Wall thickness    0.8-1.1  1.1

 

IV septum (thickness)     0.7-1.2  1.2 Post. Wall excursion    0.72-1.3  NORMAL

 

Septal motion   0.7 Systolic motion   

 

R. Ventricular cavity    1.5-2.0  NORMAL LVEF      60%  51%

 

Paradoxical septal wall motion   NORMAL    



2-D : 2-D M Mode Echocardiogram was performed using apical four chamber and 
left parasternal long and short axis views.  Mitral, tricuspid and aortic 
valves appear to be normal.  Contractility of the left ventricle seems to be 
normal, so is the cavity size.  Left atrial cavity size and aortic root appear 
to be normal.  There is no pericardial effusion.  There is no thrombus noted in 
the left ventricular or left aortic cavity.  No mitral valve prolapse noted. 
Mildly hypokinetic septum.



M-MODE:

MV:  NORMAL

AV:  NORMAL

TV:  NORMAL

PV:  

CHAMBER SIZE:  NORMAL

WALL MOTION:  MILDLY HYPOKINETIC SEPTUM

PERICARDIUM:  NORMAL



INTERPRETATION:  

1. BORDERLINE LEFT VENTRICULAR HYPERTROPHY

2. MILDLY HYPOKINETIC SEPTUM LEFT VENTRICULAR EJECTION FRACTION 50%

3. NORMAL VALVES

MTDD

## 2018-10-05 ENCOUNTER — HOSPITAL ENCOUNTER (OUTPATIENT)
Dept: HOSPITAL 58 - CAR | Age: 68
Discharge: HOME | End: 2018-10-05
Attending: INTERNAL MEDICINE

## 2018-10-05 DIAGNOSIS — R06.02: ICD-10-CM

## 2018-10-05 DIAGNOSIS — Z01.810: Primary | ICD-10-CM

## 2018-10-05 DIAGNOSIS — E78.5: ICD-10-CM

## 2018-10-05 DIAGNOSIS — I10: ICD-10-CM

## 2018-10-05 RX ADMIN — DOBUTAMINE IN DEXTROSE ONE MLS/HR: 200 INJECTION, SOLUTION INTRAVENOUS at 08:06

## 2018-10-05 RX ADMIN — ATROPINE SULFATE ONE: 0.1 INJECTION, SOLUTION ENDOTRACHEAL; INTRAMUSCULAR; INTRAVENOUS; SUBCUTANEOUS at 08:24

## 2018-10-05 NOTE — NM
EXAM: Myocardial perfusion imaging 

  

HISTORY: Presurgical evaluation, hypertension and shortness of breath 

  

COMPARISON: None. 

  

TECHNIQUE: Patient was injected 3.6 mCi of thallium 201 chloride intravenously while at rest.  SPECT 
imaging of the heart was performed.  The patient was stressed using dobutamine protocol and injected 
25.5 mCi of Tc99m Sestamibi intravenously.  Another SPECT imaging of the heart was performed.  Gated 
cardiac study was also acquired. 

  

FINDINGS: Post stress images show normal left ventricular cavity size.  There is a focal area of redu
morena perfusion involving the anterior septal segment.  There is additional focal area of reduced perfu
jessi noted involving inferior apical segment of the left ventricle.  These areas show reperfusion on 
delayed imaging consistent with reversible ischemia.  No other perfusion defect is noted.  Left ventr
icular ejection fraction is 66%.  Wall motion could not be evaluated. 

  

IMPRESSION: 

1.  SPECT myocardial imaging demonstrates foci of  reversible ischemia involving the anteroseptal and
 inferior apical segment of the left ventricle. 

2.  Normal left ventricular ejection fraction of 66%.

## 2018-10-08 NOTE — DOBSTECHST
Ordering Physician: DR. CLEOPATRA GALVEZ    Date of Test: 10/5/18      

Reason for Examination: HYPERTENSION, SOB SURGICAL CLEARANCE   Smoking History: 
NONE   Height: 65" Weight: 180 LBS

Current Medications: DULOXETINE, GABAPENTIN, BISOPROLOL, ZORVOLEX, OMEPRAZOLE, 
METHADONE

Target Heart Rate: 130/154

                                                                           S-T 
Segment                           





Stage

 Time     HR BPM  BP MMHG  Rhythm +/-    Elevation    Depression   Comments/
Symptoms

 

Control Sitting  65 124/84 SR X   NONE

 

Dobutamine 250mg/D5W        

 

5cmg/KG/mn        

 

10cmg/KG/mn 3:00 75 122/80 SR X   NONE

 

15cmg/KG/mn 2:00 85  SR X   NONE

 

20cmg/KG/mn 2:00 90 130/72 SR X   NONE

 

25cmg/KG/mn 2:00 102 122/62 SR X   NONE

 

30cmg/KG/mn 2:00 110  SR X   NONE

 

35cmg/KG/mn 2:00 116 112/74 SR X   NONE

 

40cmg/KG/mn 1:22 123 108/68 SR X   NONE

 

5     MIN POST INFUSION z  101 138/72 SR X   NO COMMENTS

 

10     MIN POST INFUSION

 z  82 130/80 SR X   NO COMMENTS



 

DURATION OF INFUSION 14:22    MAXIMUM HEART RATE REACHED 123 BPM

98% OXYGEN SATURATION ON ROOM AIR WITH DOBUTAMINE INFUSION

Interpretation:  

1. NO EVIDENCE OF ISCHEMIA BY ST-T WAVE (RESTING HEART RATE 65 BPM  BPM 
WITH DOBUTAMINE INFUSION)

2. NO CHEST PAIN OR DISCOMFORT

3. NORMAL LEFT VENTRICULAR CONTRACTILITY--RESTING AND WITH DOBUTAMINE INFUSION

SESTAMIBI TO FOLLOW



MTDD

## 2018-10-08 NOTE — ECHOSTRESS
Date of Exam: 10/5/18   Ordering Physician: DR. CLEOPATRA GALVEZ

Reason for Echo: HYPERTENSION, SOB, SURGICAL CLEARANCE, DOBUTAMINE STRESS--NO 
ISCHEMIA







M-Mode  Normal Adult Results LV Dimensions Normal Adult Results

 

 AoV Opening excursions       >1.6   LVEDD-base-    3.5-5.8  

 

Ao root dimensions     2.0-3.7   LVESD-base-    3.1-4.6  

 

L. Atrium dimensions     1.9-3.8   Post. Wall thickness    0.8-1.1  

 

IV septum (thickness)     0.7-1.2   Post. Wall excursion    0.72-1.3  

 

 Septal motion    Systolic motion   

 

 R. Ventricular cavity    1.5-2.0    LVEF      60%  

 

Paradoxical septal wall motion       



2-D: NORMAL LEFT VENTRICULAR CONTRACTILITY--RESTING AND WITH DOBUTAMINE INFUSION
 





M-MODE:

MV: 

AV:  

TV:  

PV:  

CHAMBER SIZE: 

WALL MOTION: NORMAL LEFT VENTRICULAR CONTRACTILITY--RESTING AND WITH DOBUTAMINE 
INFUSION 



PERICARDIUM:  

_______________________________________________________

INTERPRETATION:  

1.  NORMAL LEFT VENTRICULAR CONTRACTILITY--RESTING AND WITH DOBUTAMINE INFUSION 

MTDD

## 2018-10-09 ENCOUNTER — APPOINTMENT (OUTPATIENT)
Dept: GENERAL RADIOLOGY | Age: 68
End: 2018-10-09
Attending: INTERNAL MEDICINE
Payer: MEDICARE

## 2018-10-09 ENCOUNTER — HOSPITAL ENCOUNTER (OUTPATIENT)
Dept: CARDIAC CATH/INVASIVE PROCEDURES | Age: 68
Setting detail: OBSERVATION
Discharge: HOME | End: 2018-10-10
Attending: INTERNAL MEDICINE | Admitting: INTERNAL MEDICINE
Payer: MEDICARE

## 2018-10-09 PROBLEM — R93.1 ABNORMAL NUCLEAR CARDIAC IMAGING TEST: Status: ACTIVE | Noted: 2018-10-09

## 2018-10-09 LAB
ANION GAP SERPL CALCULATED.3IONS-SCNC: 10 MMOL/L (ref 7–19)
BUN BLDV-MCNC: 12 MG/DL (ref 8–23)
CALCIUM SERPL-MCNC: 9.7 MG/DL (ref 8.8–10.2)
CHLORIDE BLD-SCNC: 100 MMOL/L (ref 98–111)
CHOLESTEROL, TOTAL: 191 MG/DL (ref 160–199)
CO2: 28 MMOL/L (ref 22–29)
CREAT SERPL-MCNC: 1.1 MG/DL (ref 0.5–1.2)
EKG P AXIS: 41 DEGREES
EKG P-R INTERVAL: 194 MS
EKG Q-T INTERVAL: 428 MS
EKG QRS DURATION: 98 MS
EKG QTC CALCULATION (BAZETT): 427 MS
EKG T AXIS: 34 DEGREES
GFR NON-AFRICAN AMERICAN: >60
GLUCOSE BLD-MCNC: 94 MG/DL (ref 74–109)
HCT VFR BLD CALC: 36.9 % (ref 42–52)
HDLC SERPL-MCNC: 48 MG/DL (ref 55–121)
HEMOGLOBIN: 11.8 G/DL (ref 14–18)
LDL CHOLESTEROL CALCULATED: 114 MG/DL
MCH RBC QN AUTO: 27.2 PG (ref 27–31)
MCHC RBC AUTO-ENTMCNC: 32 G/DL (ref 33–37)
MCV RBC AUTO: 85 FL (ref 80–94)
PDW BLD-RTO: 12.6 % (ref 11.5–14.5)
PLATELET # BLD: 257 K/UL (ref 130–400)
PMV BLD AUTO: 10.2 FL (ref 9.4–12.4)
POTASSIUM REFLEX MAGNESIUM: 5 MMOL/L (ref 3.5–5)
RBC # BLD: 4.34 M/UL (ref 4.7–6.1)
SODIUM BLD-SCNC: 138 MMOL/L (ref 136–145)
TRIGL SERPL-MCNC: 145 MG/DL (ref 0–149)
TROPONIN: <0.01 NG/ML (ref 0–0.03)
WBC # BLD: 5.6 K/UL (ref 4.8–10.8)

## 2018-10-09 PROCEDURE — 93458 L HRT ARTERY/VENTRICLE ANGIO: CPT | Performed by: INTERNAL MEDICINE

## 2018-10-09 PROCEDURE — C1887 CATHETER, GUIDING: HCPCS

## 2018-10-09 PROCEDURE — 6370000000 HC RX 637 (ALT 250 FOR IP): Performed by: INTERNAL MEDICINE

## 2018-10-09 PROCEDURE — C1760 CLOSURE DEV, VASC: HCPCS

## 2018-10-09 PROCEDURE — 80061 LIPID PANEL: CPT

## 2018-10-09 PROCEDURE — 2580000003 HC RX 258: Performed by: INTERNAL MEDICINE

## 2018-10-09 PROCEDURE — 36415 COLL VENOUS BLD VENIPUNCTURE: CPT

## 2018-10-09 PROCEDURE — 6370000000 HC RX 637 (ALT 250 FOR IP)

## 2018-10-09 PROCEDURE — 92928 PRQ TCAT PLMT NTRAC ST 1 LES: CPT | Performed by: INTERNAL MEDICINE

## 2018-10-09 PROCEDURE — 85027 COMPLETE CBC AUTOMATED: CPT

## 2018-10-09 PROCEDURE — 84484 ASSAY OF TROPONIN QUANT: CPT

## 2018-10-09 PROCEDURE — 71046 X-RAY EXAM CHEST 2 VIEWS: CPT

## 2018-10-09 PROCEDURE — 2500000003 HC RX 250 WO HCPCS

## 2018-10-09 PROCEDURE — 80048 BASIC METABOLIC PNL TOTAL CA: CPT

## 2018-10-09 PROCEDURE — C1894 INTRO/SHEATH, NON-LASER: HCPCS

## 2018-10-09 PROCEDURE — 93005 ELECTROCARDIOGRAM TRACING: CPT

## 2018-10-09 PROCEDURE — 2709999900 HC NON-CHARGEABLE SUPPLY

## 2018-10-09 PROCEDURE — C1876 STENT, NON-COA/NON-COV W/DEL: HCPCS

## 2018-10-09 PROCEDURE — G0378 HOSPITAL OBSERVATION PER HR: HCPCS

## 2018-10-09 PROCEDURE — 99999 PR OFFICE/OUTPT VISIT,PROCEDURE ONLY: CPT | Performed by: INTERNAL MEDICINE

## 2018-10-09 PROCEDURE — 6360000002 HC RX W HCPCS

## 2018-10-09 PROCEDURE — 6360000002 HC RX W HCPCS: Performed by: INTERNAL MEDICINE

## 2018-10-09 PROCEDURE — C1769 GUIDE WIRE: HCPCS

## 2018-10-09 RX ORDER — METAXALONE 800 MG/1
800 TABLET ORAL 3 TIMES DAILY
Status: DISCONTINUED | OUTPATIENT
Start: 2018-10-09 | End: 2018-10-10 | Stop reason: HOSPADM

## 2018-10-09 RX ORDER — ACETAMINOPHEN 325 MG/1
650 TABLET ORAL EVERY 4 HOURS PRN
Status: DISCONTINUED | OUTPATIENT
Start: 2018-10-09 | End: 2018-10-10 | Stop reason: HOSPADM

## 2018-10-09 RX ORDER — METOPROLOL TARTRATE 50 MG/1
25 TABLET, FILM COATED ORAL 2 TIMES DAILY
Status: DISCONTINUED | OUTPATIENT
Start: 2018-10-09 | End: 2018-10-10 | Stop reason: HOSPADM

## 2018-10-09 RX ORDER — PREGABALIN 150 MG/1
150 CAPSULE ORAL 3 TIMES DAILY PRN
COMMUNITY
Start: 2018-07-24 | End: 2019-09-03

## 2018-10-09 RX ORDER — METAXALONE 800 MG/1
800 TABLET ORAL 3 TIMES DAILY PRN
COMMUNITY
Start: 2018-07-31 | End: 2019-09-03

## 2018-10-09 RX ORDER — ATORVASTATIN CALCIUM 40 MG/1
40 TABLET, FILM COATED ORAL NIGHTLY
Status: DISCONTINUED | OUTPATIENT
Start: 2018-10-09 | End: 2018-10-10 | Stop reason: HOSPADM

## 2018-10-09 RX ORDER — PREGABALIN 150 MG/1
150 CAPSULE ORAL 3 TIMES DAILY
Status: DISCONTINUED | OUTPATIENT
Start: 2018-10-09 | End: 2018-10-10 | Stop reason: HOSPADM

## 2018-10-09 RX ORDER — SODIUM CHLORIDE 9 MG/ML
INJECTION, SOLUTION INTRAVENOUS CONTINUOUS
Status: DISCONTINUED | OUTPATIENT
Start: 2018-10-09 | End: 2018-10-10 | Stop reason: HOSPADM

## 2018-10-09 RX ORDER — SODIUM CHLORIDE 0.9 % (FLUSH) 0.9 %
10 SYRINGE (ML) INJECTION EVERY 12 HOURS SCHEDULED
Status: DISCONTINUED | OUTPATIENT
Start: 2018-10-09 | End: 2018-10-10 | Stop reason: HOSPADM

## 2018-10-09 RX ORDER — METHADONE HYDROCHLORIDE 10 MG/1
10 TABLET ORAL EVERY 6 HOURS PRN
Status: DISCONTINUED | OUTPATIENT
Start: 2018-10-09 | End: 2018-10-10 | Stop reason: HOSPADM

## 2018-10-09 RX ORDER — NORTRIPTYLINE HYDROCHLORIDE 25 MG/1
25 CAPSULE ORAL DAILY
COMMUNITY
Start: 2018-07-31 | End: 2020-02-11

## 2018-10-09 RX ORDER — GABAPENTIN 400 MG/1
800 CAPSULE ORAL 4 TIMES DAILY
Status: DISCONTINUED | OUTPATIENT
Start: 2018-10-09 | End: 2018-10-10 | Stop reason: HOSPADM

## 2018-10-09 RX ORDER — SODIUM CHLORIDE 0.9 % (FLUSH) 0.9 %
10 SYRINGE (ML) INJECTION PRN
Status: DISCONTINUED | OUTPATIENT
Start: 2018-10-09 | End: 2018-10-10 | Stop reason: HOSPADM

## 2018-10-09 RX ORDER — NORTRIPTYLINE HYDROCHLORIDE 25 MG/1
25 CAPSULE ORAL DAILY
Status: DISCONTINUED | OUTPATIENT
Start: 2018-10-10 | End: 2018-10-10 | Stop reason: HOSPADM

## 2018-10-09 RX ORDER — ONDANSETRON 2 MG/ML
4 INJECTION INTRAMUSCULAR; INTRAVENOUS EVERY 6 HOURS PRN
Status: DISCONTINUED | OUTPATIENT
Start: 2018-10-09 | End: 2018-10-10 | Stop reason: HOSPADM

## 2018-10-09 RX ORDER — ASPIRIN 81 MG/1
81 TABLET, CHEWABLE ORAL DAILY
Status: DISCONTINUED | OUTPATIENT
Start: 2018-10-10 | End: 2018-10-10 | Stop reason: HOSPADM

## 2018-10-09 RX ORDER — DULOXETIN HYDROCHLORIDE 30 MG/1
30 CAPSULE, DELAYED RELEASE ORAL DAILY
Status: DISCONTINUED | OUTPATIENT
Start: 2018-10-10 | End: 2018-10-10 | Stop reason: HOSPADM

## 2018-10-09 RX ORDER — PRASUGREL 10 MG/1
10 TABLET, FILM COATED ORAL DAILY
Status: DISCONTINUED | OUTPATIENT
Start: 2018-10-09 | End: 2018-10-10 | Stop reason: HOSPADM

## 2018-10-09 RX ADMIN — METAXALONE 800 MG: 800 TABLET ORAL at 13:29

## 2018-10-09 RX ADMIN — PREGABALIN 150 MG: 150 CAPSULE ORAL at 20:47

## 2018-10-09 RX ADMIN — Medication 10 ML: at 20:47

## 2018-10-09 RX ADMIN — METHADONE HYDROCHLORIDE 10 MG: 10 TABLET ORAL at 13:37

## 2018-10-09 RX ADMIN — GABAPENTIN 800 MG: 400 CAPSULE ORAL at 16:21

## 2018-10-09 RX ADMIN — BIVALIRUDIN 1 MG/KG/HR: 250 INJECTION, POWDER, LYOPHILIZED, FOR SOLUTION INTRAVENOUS at 14:19

## 2018-10-09 RX ADMIN — METOPROLOL TARTRATE 25 MG: 50 TABLET ORAL at 20:46

## 2018-10-09 RX ADMIN — GABAPENTIN 800 MG: 400 CAPSULE ORAL at 13:29

## 2018-10-09 RX ADMIN — ATORVASTATIN CALCIUM 40 MG: 40 TABLET, FILM COATED ORAL at 20:47

## 2018-10-09 RX ADMIN — Medication 10 ML: at 13:30

## 2018-10-09 RX ADMIN — METAXALONE 800 MG: 800 TABLET ORAL at 20:46

## 2018-10-09 RX ADMIN — GABAPENTIN 800 MG: 400 CAPSULE ORAL at 20:46

## 2018-10-09 RX ADMIN — METHADONE HYDROCHLORIDE 10 MG: 10 TABLET ORAL at 20:58

## 2018-10-09 RX ADMIN — PREGABALIN 150 MG: 150 CAPSULE ORAL at 13:29

## 2018-10-09 ASSESSMENT — PAIN SCALES - GENERAL
PAINLEVEL_OUTOF10: 3
PAINLEVEL_OUTOF10: 0
PAINLEVEL_OUTOF10: 2
PAINLEVEL_OUTOF10: 3
PAINLEVEL_OUTOF10: 8
PAINLEVEL_OUTOF10: 5

## 2018-10-09 NOTE — LETTER
[address]  Cardiac Rehab Department  [address] [place] [address], [place] [address]  [phone]  Toll Free [phone]              October 10, 2018    Dear Alex Jaime,    We apologize that a member of the Cardiac Rehab staff was unable to see you prior to your discharge from [facility]. Please find this informational packet that has been put together for you on heart disease and the guidelines you are to follow concerning your present cardiac condition and immediate recovery. We kindly ask that you call us to confirm your receipt of this letter and to discuss any questions that you may presently have. Due to your recent hospitalization and/or intervention your cardiologist, Dr. Heather Barnett, has referred you to Phase II Outpatient Cardiac Rehab. Since you live outside of the immediate [place] area, when you call we will provide contact information for a Cardiac Rehab facility nearer your residence. Furthermore, feel free to reach out to us at anytime and our staff will be more than pleased to assist you. Thank you.     To Your Health,        Cardiac Rehab Staff
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
## 2018-10-09 NOTE — CARE COORDINATION
Groin site checked from heart cath, only a small amount of blood noted on gauze bandage. Will continue to monitor. Tissue is soft without tenderness or notable bruising.

## 2018-10-10 VITALS
WEIGHT: 190.4 LBS | OXYGEN SATURATION: 97 % | HEART RATE: 62 BPM | DIASTOLIC BLOOD PRESSURE: 63 MMHG | SYSTOLIC BLOOD PRESSURE: 104 MMHG | TEMPERATURE: 97.8 F | BODY MASS INDEX: 31.72 KG/M2 | RESPIRATION RATE: 16 BRPM | HEIGHT: 65 IN

## 2018-10-10 LAB
HCT VFR BLD CALC: 32.8 % (ref 42–52)
HEMOGLOBIN: 10.7 G/DL (ref 14–18)
MCH RBC QN AUTO: 27.2 PG (ref 27–31)
MCHC RBC AUTO-ENTMCNC: 32.6 G/DL (ref 33–37)
MCV RBC AUTO: 83.5 FL (ref 80–94)
PDW BLD-RTO: 12.9 % (ref 11.5–14.5)
PLATELET # BLD: 231 K/UL (ref 130–400)
PMV BLD AUTO: 10.3 FL (ref 9.4–12.4)
RBC # BLD: 3.93 M/UL (ref 4.7–6.1)
WBC # BLD: 6.5 K/UL (ref 4.8–10.8)

## 2018-10-10 PROCEDURE — 99217 PR OBSERVATION CARE DISCHARGE MANAGEMENT: CPT | Performed by: INTERNAL MEDICINE

## 2018-10-10 PROCEDURE — 6370000000 HC RX 637 (ALT 250 FOR IP): Performed by: INTERNAL MEDICINE

## 2018-10-10 PROCEDURE — 85027 COMPLETE CBC AUTOMATED: CPT

## 2018-10-10 PROCEDURE — 2580000003 HC RX 258: Performed by: INTERNAL MEDICINE

## 2018-10-10 PROCEDURE — 93005 ELECTROCARDIOGRAM TRACING: CPT

## 2018-10-10 PROCEDURE — G0378 HOSPITAL OBSERVATION PER HR: HCPCS

## 2018-10-10 PROCEDURE — 36415 COLL VENOUS BLD VENIPUNCTURE: CPT

## 2018-10-10 RX ORDER — NITROGLYCERIN 0.4 MG/1
0.4 TABLET SUBLINGUAL EVERY 5 MIN PRN
Status: DISCONTINUED | OUTPATIENT
Start: 2018-10-10 | End: 2018-10-10 | Stop reason: HOSPADM

## 2018-10-10 RX ORDER — ASPIRIN 81 MG/1
81 TABLET, CHEWABLE ORAL DAILY
Qty: 30 TABLET | Refills: 3 | COMMUNITY
Start: 2018-10-11 | End: 2019-08-15

## 2018-10-10 RX ORDER — ATORVASTATIN CALCIUM 40 MG/1
40 TABLET, FILM COATED ORAL NIGHTLY
Qty: 30 TABLET | Refills: 5 | Status: SHIPPED | OUTPATIENT
Start: 2018-10-10 | End: 2019-03-25 | Stop reason: SDUPTHER

## 2018-10-10 RX ORDER — PRASUGREL 10 MG/1
10 TABLET, FILM COATED ORAL DAILY
Qty: 30 TABLET | Refills: 1 | Status: SHIPPED | OUTPATIENT
Start: 2018-10-11 | End: 2019-02-06 | Stop reason: ALTCHOICE

## 2018-10-10 RX ORDER — NITROGLYCERIN 0.4 MG/1
TABLET SUBLINGUAL
Qty: 25 TABLET | Refills: 5 | Status: SHIPPED | OUTPATIENT
Start: 2018-10-10 | End: 2020-02-11

## 2018-10-10 RX ADMIN — PRASUGREL 10 MG: 10 TABLET, FILM COATED ORAL at 08:34

## 2018-10-10 RX ADMIN — METOPROLOL TARTRATE 25 MG: 50 TABLET ORAL at 08:34

## 2018-10-10 RX ADMIN — GABAPENTIN 800 MG: 400 CAPSULE ORAL at 08:34

## 2018-10-10 RX ADMIN — PREGABALIN 150 MG: 150 CAPSULE ORAL at 08:34

## 2018-10-10 RX ADMIN — DULOXETINE HYDROCHLORIDE 30 MG: 30 CAPSULE, DELAYED RELEASE ORAL at 08:34

## 2018-10-10 RX ADMIN — METAXALONE 800 MG: 800 TABLET ORAL at 08:34

## 2018-10-10 RX ADMIN — NORTRIPTYLINE HYDROCHLORIDE 25 MG: 25 CAPSULE ORAL at 08:34

## 2018-10-10 RX ADMIN — ASPIRIN 81 MG 81 MG: 81 TABLET ORAL at 08:34

## 2018-10-10 RX ADMIN — Medication 10 ML: at 08:38

## 2018-10-10 RX ADMIN — METHADONE HYDROCHLORIDE 10 MG: 10 TABLET ORAL at 07:40

## 2018-10-10 ASSESSMENT — PAIN SCALES - GENERAL
PAINLEVEL_OUTOF10: 0
PAINLEVEL_OUTOF10: 0
PAINLEVEL_OUTOF10: 3
PAINLEVEL_OUTOF10: 5
PAINLEVEL_OUTOF10: 3
PAINLEVEL_OUTOF10: 0

## 2018-10-10 ASSESSMENT — PAIN DESCRIPTION - LOCATION
LOCATION: BACK
LOCATION: BACK

## 2018-10-10 ASSESSMENT — PAIN DESCRIPTION - ONSET
ONSET: ON-GOING
ONSET: ON-GOING

## 2018-10-10 ASSESSMENT — PAIN DESCRIPTION - ORIENTATION
ORIENTATION: LOWER
ORIENTATION: LOWER

## 2018-10-10 ASSESSMENT — PAIN DESCRIPTION - PAIN TYPE
TYPE: CHRONIC PAIN
TYPE: CHRONIC PAIN

## 2018-10-10 ASSESSMENT — PAIN DESCRIPTION - FREQUENCY
FREQUENCY: CONTINUOUS
FREQUENCY: CONTINUOUS

## 2018-10-10 NOTE — CONSULTS
Patient was discharged prior to MI/PTCA/STENT teaching being conducted. Educational packet and cover letter sent to the patient's mailing address on record. Information encouraging Cardiac Rehab Phase II included. Patient instructed to call to confirm receipt of mailing and to have any and all questions answered to his satisfaction.

## 2018-10-11 LAB
EKG P AXIS: 4 DEGREES
EKG P-R INTERVAL: 182 MS
EKG Q-T INTERVAL: 438 MS
EKG QRS DURATION: 104 MS
EKG QTC CALCULATION (BAZETT): 439 MS
EKG T AXIS: 4 DEGREES

## 2018-10-31 ENCOUNTER — OFFICE VISIT (OUTPATIENT)
Dept: CARDIOLOGY | Age: 68
End: 2018-10-31
Payer: MEDICARE

## 2018-10-31 ENCOUNTER — TELEPHONE (OUTPATIENT)
Dept: CARDIOLOGY | Age: 68
End: 2018-10-31

## 2018-10-31 VITALS
HEART RATE: 71 BPM | WEIGHT: 195 LBS | HEIGHT: 65 IN | BODY MASS INDEX: 32.49 KG/M2 | SYSTOLIC BLOOD PRESSURE: 122 MMHG | DIASTOLIC BLOOD PRESSURE: 72 MMHG

## 2018-10-31 DIAGNOSIS — Z95.5 S/P BARE METAL CORONARY ARTERY STENT: ICD-10-CM

## 2018-10-31 DIAGNOSIS — I25.10 CORONARY ARTERY DISEASE INVOLVING NATIVE CORONARY ARTERY OF NATIVE HEART WITHOUT ANGINA PECTORIS: Primary | ICD-10-CM

## 2018-10-31 DIAGNOSIS — E78.2 MIXED HYPERLIPIDEMIA: ICD-10-CM

## 2018-10-31 DIAGNOSIS — E78.5 DYSLIPIDEMIA: ICD-10-CM

## 2018-10-31 PROCEDURE — 1101F PT FALLS ASSESS-DOCD LE1/YR: CPT | Performed by: NURSE PRACTITIONER

## 2018-10-31 PROCEDURE — G8484 FLU IMMUNIZE NO ADMIN: HCPCS | Performed by: NURSE PRACTITIONER

## 2018-10-31 PROCEDURE — G8427 DOCREV CUR MEDS BY ELIG CLIN: HCPCS | Performed by: NURSE PRACTITIONER

## 2018-10-31 PROCEDURE — 99214 OFFICE O/P EST MOD 30 MIN: CPT | Performed by: NURSE PRACTITIONER

## 2018-10-31 PROCEDURE — G8417 CALC BMI ABV UP PARAM F/U: HCPCS | Performed by: NURSE PRACTITIONER

## 2018-10-31 PROCEDURE — 3017F COLORECTAL CA SCREEN DOC REV: CPT | Performed by: NURSE PRACTITIONER

## 2018-10-31 NOTE — PROGRESS NOTES
1.2 mg/dL    GFR Non-African American >60 >60    Calcium 9.7 8.8 - 10.2 mg/dL   Troponin    Collection Time: 10/09/18  6:49 AM   Result Value Ref Range    Troponin <0.01 0.00 - 0.03 ng/mL   Lipid panel    Collection Time: 10/09/18  6:49 AM   Result Value Ref Range    Cholesterol, Total 191 160 - 199 mg/dL    Triglycerides 145 0 - 149 mg/dL    HDL 48 (L) 55 - 121 mg/dL    LDL Calculated 114 <100 mg/dL   Electrocardiogram, 12-lead    Collection Time: 10/09/18  7:08 AM   Result Value Ref Range    P-R Interval 194 ms    QRS Duration 98 ms    Q-T Interval 428 ms    QTc Calculation (Bazett) 427 ms    P Axis 41 degrees    T Axis 34 degrees   Troponin    Collection Time: 10/09/18  3:45 PM   Result Value Ref Range    Troponin <0.01 0.00 - 0.03 ng/mL   Troponin    Collection Time: 10/09/18  9:55 PM   Result Value Ref Range    Troponin <0.01 0.00 - 0.03 ng/mL   CBC    Collection Time: 10/10/18  3:27 AM   Result Value Ref Range    WBC 6.5 4.8 - 10.8 K/uL    RBC 3.93 (L) 4.70 - 6.10 M/uL    Hemoglobin 10.7 (L) 14.0 - 18.0 g/dL    Hematocrit 32.8 (L) 42.0 - 52.0 %    MCV 83.5 80.0 - 94.0 fL    MCH 27.2 27.0 - 31.0 pg    MCHC 32.6 (L) 33.0 - 37.0 g/dL    RDW 12.9 11.5 - 14.5 %    Platelets 708 293 - 946 K/uL    MPV 10.3 9.4 - 12.4 fL   EKG 12 lead    Collection Time: 10/10/18  7:15 AM   Result Value Ref Range    P-R Interval 182 ms    QRS Duration 104 ms    Q-T Interval 438 ms    QTc Calculation (Bazett) 439 ms    P Axis 4 degrees    T Axis 4 degrees     Plan  Previous cardiac history and records reviewed. Continue current medications as prescribed. Continue to follow up with primary care provider for non cardiac medical problems. Call the office with any problems, questions or concerns at [phone]. Follow up as scheduled with your cardiologis - Dr. Ramona Alves 2 months. The following educational material has been included in this after visit summary for your review: Life simple 7.   Heart health.     Additional for feeling good and staying healthy for life. 6)  Lose weight - when you shed extra fat an unnecessary pounds, you reduce the burden on your hear, lungs, blood vessels and skeleton. You give yourself the gift of active living, you lower your blood pressure and help yourself feel better. 7) Stop smoking - cigarette smokers have a higher risk of developing cardiovascular disease. If  You smoke, quitting is the best thing you can do for your health. Check American Heart Association on line for more information on Life's Simple 7 and tips for healthy living.      OLGA Mojica

## 2018-10-31 NOTE — PATIENT INSTRUCTIONS
home?  Diet    · Use less salt when you cook and eat. This helps lower your blood pressure. Taste food before salting. Add only a little salt when you think you need it. With time, your taste buds will adjust to less salt.     · Eat fewer snack items, fast foods, canned soups, and other high-salt, high-fat, processed foods.     · Read food labels and try to avoid saturated and trans fats. They increase your risk of heart disease by raising cholesterol levels.     · Limit the amount of solid fat-butter, margarine, and shortening-you eat. Use olive, peanut, or canola oil when you cook. Bake, broil, and steam foods instead of frying them.     · Eating fish can lower your risk for heart disease. Eat at least 2 servings of fish a week. Wallace, mackerel, herring, sardines, and chunk light tuna are very good choices. These fish contain omega-3 fatty acids.     · Eat a variety of fruit and vegetables every day. Dark green, deep orange, red, or yellow fruits and vegetables are especially good for you. Examples include spinach, carrots, peaches, and berries.     · Foods high in fiber can reduce your cholesterol and provide important vitamins and minerals. High-fiber foods include whole-grain cereals and breads, oatmeal, beans, brown rice, citrus fruits, and apples.     · Limit drinks and foods with added sugar. These include candy, desserts, and soda pop.    Lifestyle changes    · If your doctor recommends it, get more exercise. Walking is a good choice. Bit by bit, increase the amount you walk every day. Try for at least 30 minutes on most days of the week. You also may want to swim, bike, or do other activities.     · Do not smoke. If you need help quitting, talk to your doctor about stop-smoking programs and medicines. These can increase your chances of quitting for good. Quitting smoking may be the most important step you can take to protect your heart. It is never too late to quit.  You will get health benefits right

## 2018-12-04 ENCOUNTER — TELEPHONE (OUTPATIENT)
Dept: CARDIOLOGY | Age: 68
End: 2018-12-04

## 2018-12-04 NOTE — TELEPHONE ENCOUNTER
pt having surgery 12/11, needs to resched appt and wants to discuss if they should keep it with Dr. Petar Gant sooner than [facility] available with him or if they should see the nurse, call daughter Tata Linton at [phone]

## 2018-12-26 ENCOUNTER — APPOINTMENT (OUTPATIENT)
Dept: LAB | Facility: HOSPITAL | Age: 68
End: 2018-12-26

## 2019-01-08 DIAGNOSIS — C20 RECTAL CANCER (HCC): Primary | ICD-10-CM

## 2019-01-10 ENCOUNTER — OFFICE VISIT (OUTPATIENT)
Dept: ONCOLOGY | Facility: CLINIC | Age: 69
End: 2019-01-10

## 2019-01-10 ENCOUNTER — LAB (OUTPATIENT)
Dept: LAB | Facility: HOSPITAL | Age: 69
End: 2019-01-10

## 2019-01-10 VITALS
BODY MASS INDEX: 33.62 KG/M2 | SYSTOLIC BLOOD PRESSURE: 110 MMHG | OXYGEN SATURATION: 97 % | WEIGHT: 201.8 LBS | HEIGHT: 65 IN | HEART RATE: 60 BPM | RESPIRATION RATE: 16 BRPM | DIASTOLIC BLOOD PRESSURE: 68 MMHG | TEMPERATURE: 98.1 F

## 2019-01-10 DIAGNOSIS — C20 MALIGNANT NEOPLASM OF RECTUM (HCC): Primary | ICD-10-CM

## 2019-01-10 DIAGNOSIS — D64.9 ANEMIA, UNSPECIFIED TYPE: ICD-10-CM

## 2019-01-10 DIAGNOSIS — D64.9 ANEMIA, UNSPECIFIED TYPE: Primary | ICD-10-CM

## 2019-01-10 DIAGNOSIS — C20 RECTAL CANCER (HCC): ICD-10-CM

## 2019-01-10 LAB
ALBUMIN SERPL-MCNC: 3.9 G/DL (ref 3.5–5)
ALBUMIN/GLOB SERPL: 1.4 G/DL (ref 1.1–2.5)
ALP SERPL-CCNC: 88 U/L (ref 24–120)
ALT SERPL W P-5'-P-CCNC: 21 U/L (ref 0–54)
ANION GAP SERPL CALCULATED.3IONS-SCNC: 12 MMOL/L (ref 4–13)
AST SERPL-CCNC: 22 U/L (ref 7–45)
BASOPHILS # BLD AUTO: 0.06 10*3/MM3 (ref 0–0.2)
BASOPHILS NFR BLD AUTO: 1.1 % (ref 0–2)
BILIRUB SERPL-MCNC: 0.4 MG/DL (ref 0.1–1)
BUN BLD-MCNC: 9 MG/DL (ref 5–21)
BUN/CREAT SERPL: 8.9 (ref 7–25)
CALCIUM SPEC-SCNC: 9 MG/DL (ref 8.4–10.4)
CHLORIDE SERPL-SCNC: 100 MMOL/L (ref 98–110)
CO2 SERPL-SCNC: 27 MMOL/L (ref 24–31)
CREAT BLD-MCNC: 1.01 MG/DL (ref 0.5–1.4)
DEPRECATED RDW RBC AUTO: 38 FL (ref 40–54)
EOSINOPHIL # BLD AUTO: 0.38 10*3/MM3 (ref 0–0.7)
EOSINOPHIL NFR BLD AUTO: 7.1 % (ref 0–4)
ERYTHROCYTE [DISTWIDTH] IN BLOOD BY AUTOMATED COUNT: 12.5 % (ref 12–15)
FERRITIN SERPL-MCNC: 42.2 NG/ML (ref 17.9–464)
FOLATE SERPL-MCNC: >20 NG/ML
GFR SERPL CREATININE-BSD FRML MDRD: 74 ML/MIN/1.73
GLOBULIN UR ELPH-MCNC: 2.8 GM/DL
GLUCOSE BLD-MCNC: 92 MG/DL (ref 70–100)
HCT VFR BLD AUTO: 31.7 % (ref 40–52)
HGB BLD-MCNC: 10.4 G/DL (ref 14–18)
HOLD SPECIMEN: NORMAL
HOLD SPECIMEN: NORMAL
IMM GRANULOCYTES # BLD AUTO: 0.04 10*3/MM3 (ref 0–0.03)
IMM GRANULOCYTES NFR BLD AUTO: 0.7 % (ref 0–5)
IRON 24H UR-MRATE: 77 MCG/DL (ref 42–180)
IRON SATN MFR SERPL: 20 % (ref 20–45)
LYMPHOCYTES # BLD AUTO: 0.81 10*3/MM3 (ref 0.72–4.86)
LYMPHOCYTES NFR BLD AUTO: 15.2 % (ref 15–45)
MCH RBC QN AUTO: 27.2 PG (ref 28–32)
MCHC RBC AUTO-ENTMCNC: 32.8 G/DL (ref 33–36)
MCV RBC AUTO: 82.8 FL (ref 82–95)
MONOCYTES # BLD AUTO: 0.43 10*3/MM3 (ref 0.19–1.3)
MONOCYTES NFR BLD AUTO: 8.1 % (ref 4–12)
NEUTROPHILS # BLD AUTO: 3.62 10*3/MM3 (ref 1.87–8.4)
NEUTROPHILS NFR BLD AUTO: 67.8 % (ref 39–78)
NRBC BLD AUTO-RTO: 0 /100 WBC (ref 0–0)
PLATELET # BLD AUTO: 236 10*3/MM3 (ref 130–400)
PMV BLD AUTO: 10.8 FL (ref 6–12)
POTASSIUM BLD-SCNC: 4.6 MMOL/L (ref 3.5–5.3)
PROT SERPL-MCNC: 6.7 G/DL (ref 6.3–8.7)
RBC # BLD AUTO: 3.83 10*6/MM3 (ref 4.8–5.9)
SODIUM BLD-SCNC: 139 MMOL/L (ref 135–145)
TIBC SERPL-MCNC: 378 MCG/DL (ref 225–420)
VIT B12 BLD-MCNC: 473 PG/ML (ref 239–931)
WBC NRBC COR # BLD: 5.34 10*3/MM3 (ref 4.8–10.8)

## 2019-01-10 PROCEDURE — 83550 IRON BINDING TEST: CPT | Performed by: INTERNAL MEDICINE

## 2019-01-10 PROCEDURE — 82607 VITAMIN B-12: CPT

## 2019-01-10 PROCEDURE — 99214 OFFICE O/P EST MOD 30 MIN: CPT | Performed by: INTERNAL MEDICINE

## 2019-01-10 PROCEDURE — 83540 ASSAY OF IRON: CPT | Performed by: INTERNAL MEDICINE

## 2019-01-10 PROCEDURE — 36415 COLL VENOUS BLD VENIPUNCTURE: CPT

## 2019-01-10 PROCEDURE — 85025 COMPLETE CBC W/AUTO DIFF WBC: CPT

## 2019-01-10 PROCEDURE — 82728 ASSAY OF FERRITIN: CPT | Performed by: INTERNAL MEDICINE

## 2019-01-10 PROCEDURE — 82746 ASSAY OF FOLIC ACID SERUM: CPT

## 2019-01-10 PROCEDURE — 80053 COMPREHEN METABOLIC PANEL: CPT

## 2019-01-10 RX ORDER — PREGABALIN 100 MG/1
100 CAPSULE ORAL 3 TIMES DAILY
COMMUNITY
End: 2020-10-05

## 2019-01-10 NOTE — PROGRESS NOTES
[facility]  HEMATOLOGY & ONCOLOGY    Cancer Staging Information:  Cancer Staging  No matching staging information was found for the patient.      Subjective     VISIT DIAGNOSIS: No diagnosis found.    REASON FOR VISIT:     Chief Complaint   Patient presents with   • Rectal Bleeding     follow up        HEMATOLOGY / ONCOLOGY HISTORY:    No history exists.           INTERVAL HISTORY  Patient ID: Jelani Angel is a 68 y.o. year old male   With h/o rectal cancer in remission since 2009. He is here for f/u he ad back sx in dec 2018. denies brbpr, melena, hematuria, nose bleed, hemoptysis. Denies sob/cp/n/v/abdominal pain. He has chronic diarrhhea since his sx and has an ostomy.    Past Medical History: History reviewed. No pertinent past medical history.  Past Surgical History: No past surgical history on file.  Social History:   Social History     Socioeconomic History   • Marital status:      Spouse name: Not on file   • Number of children: Not on file   • Years of education: Not on file   • Highest education level: Not on file   Social Needs   • Financial resource strain: Not on file   • Food insecurity - worry: Not on file   • Food insecurity - inability: Not on file   • Transportation needs - medical: Not on file   • Transportation needs - non-medical: Not on file   Occupational History   • Not on file   Tobacco Use   • Smoking status: Never Smoker   • Smokeless tobacco: Never Used   Substance and Sexual Activity   • Alcohol use: No   • Drug use: No   • Sexual activity: Defer   Other Topics Concern   • Not on file   Social History Narrative   • Not on file     Family History: History reviewed. No pertinent family history.    Review of Systems   Constitutional: Negative.    HENT: Negative.    Eyes: Negative.    Respiratory: Negative.    Cardiovascular: Negative.    Gastrointestinal: Positive for diarrhea.   Endocrine: Negative.    Genitourinary: Negative.    Musculoskeletal: Positive for  "arthralgias and back pain.   Skin: Negative.    Neurological: Negative.    Hematological: Negative.    Psychiatric/Behavioral: Negative.         Performance Status:  Asymptomatic    Medications:    Current Outpatient Medications   Medication Sig Dispense Refill   • bisoprolol (ZEBeta) 5 MG tablet      • Diclofenac 18 MG capsule Take 18 mg by mouth.     • DULoxetine (CYMBALTA) 30 MG capsule 30 mg.     • FLUZONE HIGH-DOSE 0.5 ML suspension prefilled syringe injection ADM 0.5ML IM UTD  0   • gabapentin (NEURONTIN) 800 MG tablet      • methadone (DOLOPHINE) 10 MG tablet Take 10 mg by mouth,     • omeprazole (priLOSEC) 20 MG capsule 20 mg.     • pregabalin (LYRICA) 100 MG capsule Take 100 mg by mouth 3 (Three) Times a Day.     • cyclobenzaprine (FLEXERIL) 10 MG tablet        No current facility-administered medications for this visit.        ALLERGIES:    Allergies   Allergen Reactions   • Morphine Anxiety       Objective      Vitals:    01/10/19 0958   BP: 110/68   Pulse: 60   Resp: 16   Temp: 98.1 °F (36.7 °C)   TempSrc: Oral   SpO2: 97%   Weight: 91.5 kg (201 lb 12.8 oz)   Height: 165.1 cm (65\")         Current Status 1/10/2019   ECOG score 0         Physical Exam  General Appearance: walks with a cane. Patient is awake, alert, oriented and in no acute distress. Patient is welldeveloped, wellnourished, and appears stated age.  HEENT: Normocephalic. Sclerae clear, conjunctiva pink, extraocular movements intact, pupils, round, reactive to light and  accommodation. Mouth and throat are clear with moist oral mucosa.  NECK: Supple, no jugular venous distention, thyroid not enlarged.  LYMPH: No cervical, supraclavicular, axillary, or inguinal lymphadenopathy.  CHEST: Equal bilateral expansion, AP  diameter normal, resonant percussion note  LUNGS: Good air movement, no rales, rhonchi, rubs or wheezes with auscultation  CARDIO: Regular sinus rhythm, no murmurs, gallops or rubs.  ABDOMEN: Nondistended, soft, No tenderness, no " guarding, no rebound, No hepatosplenomegaly. No abdominal masses. Bowel sounds positive. No hernia  GENITALIA: Not examined.  BREASTS: Not examined.  MUSKEL: No joint swelling, decreased motion, or inflammation  EXTREMS: No edema, clubbing, cyanosis, No varicose veins.  NEURO: Grossly nonfocal, Gait is coordinated and smooth, Cognition is preserved.  SKIN: No rashes, no ecchymoses, no petechia.  PSYCH: Oriented to time, place and person. Memory is preserved. Mood and affect appear normal  RECENT LABS:  Lab on 01/10/2019   Component Date Value Ref Range Status   • WBC 01/10/2019 5.34  4.80 - 10.80 10*3/mm3 Final   • RBC 01/10/2019 3.83* 4.80 - 5.90 10*6/mm3 Final   • Hemoglobin 01/10/2019 10.4* 14.0 - 18.0 g/dL Final   • Hematocrit 01/10/2019 31.7* 40.0 - 52.0 % Final   • MCV 01/10/2019 82.8  82.0 - 95.0 fL Final   • MCH 01/10/2019 27.2* 28.0 - 32.0 pg Final   • MCHC 01/10/2019 32.8* 33.0 - 36.0 g/dL Final   • RDW 01/10/2019 12.5  12.0 - 15.0 % Final   • RDW-SD 01/10/2019 38.0* 40.0 - 54.0 fl Final   • MPV 01/10/2019 10.8  6.0 - 12.0 fL Final   • Platelets 01/10/2019 236  130 - 400 10*3/mm3 Final   • Neutrophil % 01/10/2019 67.8  39.0 - 78.0 % Final   • Lymphocyte % 01/10/2019 15.2  15.0 - 45.0 % Final   • Monocyte % 01/10/2019 8.1  4.0 - 12.0 % Final   • Eosinophil % 01/10/2019 7.1* 0.0 - 4.0 % Final   • Basophil % 01/10/2019 1.1  0.0 - 2.0 % Final   • Immature Grans % 01/10/2019 0.7  0.0 - 5.0 % Final   • Neutrophils, Absolute 01/10/2019 3.62  1.87 - 8.40 10*3/mm3 Final   • Lymphocytes, Absolute 01/10/2019 0.81  0.72 - 4.86 10*3/mm3 Final   • Monocytes, Absolute 01/10/2019 0.43  0.19 - 1.30 10*3/mm3 Final   • Eosinophils, Absolute 01/10/2019 0.38  0.00 - 0.70 10*3/mm3 Final   • Basophils, Absolute 01/10/2019 0.06  0.00 - 0.20 10*3/mm3 Final   • Immature Grans, Absolute 01/10/2019 0.04* 0.00 - 0.03 10*3/mm3 Final   • nRBC 01/10/2019 0.0  0.0 - 0.0 /100 WBC Final       RADIOLOGY:  No results found.          Assessment/Plan  Jelani Angel is a 68 y.o. year old male with h/o of rectal cancer in remission    Patient Active Problem List   Diagnosis   • Iron deficiency anemia   • Rectal cancer (CMS/HCC)          1.  Rectal cancer 2009, s/o Chava adjuvant 5FU CIV + XRT thereafter resction APR and pathological complete CR  --cea pending    2.Anemia: denies brbpr, melena, hematuria, nose bleed, hemoptysis. He had back sx in dec, thinks perhaps he lost some blood. Also has chronic diarrhhea since his colon sx and might not absorb  --check iron profile, ferritin, b12, folate    3. HTN: on bisoprolol    4. 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 Chronic back pain from spinal stenosis: s/p multiple back sx. On methadone, flexeril  6. Gerd: on omeprazole         Raul Mijares MD    1/10/2019    10:13 AM

## 2019-01-28 ENCOUNTER — HOSPITAL ENCOUNTER (OUTPATIENT)
Dept: NON INVASIVE DIAGNOSTICS | Age: 69
Discharge: HOME | End: 2019-01-28
Payer: MEDICARE

## 2019-01-28 PROCEDURE — 93971 EXTREMITY STUDY: CPT

## 2019-02-06 ENCOUNTER — OFFICE VISIT (OUTPATIENT)
Dept: CARDIOLOGY | Age: 69
End: 2019-02-06
Payer: MEDICARE

## 2019-02-06 VITALS
HEIGHT: 65 IN | SYSTOLIC BLOOD PRESSURE: 118 MMHG | HEART RATE: 82 BPM | DIASTOLIC BLOOD PRESSURE: 72 MMHG | WEIGHT: 205 LBS | BODY MASS INDEX: 34.16 KG/M2

## 2019-02-06 DIAGNOSIS — I10 ESSENTIAL HYPERTENSION: Primary | ICD-10-CM

## 2019-02-06 DIAGNOSIS — E78.5 HYPERLIPIDEMIA, UNSPECIFIED HYPERLIPIDEMIA TYPE: ICD-10-CM

## 2019-02-06 DIAGNOSIS — I25.10 CORONARY ARTERY DISEASE INVOLVING NATIVE CORONARY ARTERY OF NATIVE HEART WITHOUT ANGINA PECTORIS: ICD-10-CM

## 2019-02-06 PROCEDURE — G8484 FLU IMMUNIZE NO ADMIN: HCPCS | Performed by: NURSE PRACTITIONER

## 2019-02-06 PROCEDURE — 99213 OFFICE O/P EST LOW 20 MIN: CPT | Performed by: NURSE PRACTITIONER

## 2019-02-06 PROCEDURE — 1101F PT FALLS ASSESS-DOCD LE1/YR: CPT | Performed by: NURSE PRACTITIONER

## 2019-02-06 PROCEDURE — 4040F PNEUMOC VAC/ADMIN/RCVD: CPT | Performed by: NURSE PRACTITIONER

## 2019-02-06 PROCEDURE — G8417 CALC BMI ABV UP PARAM F/U: HCPCS | Performed by: NURSE PRACTITIONER

## 2019-02-06 PROCEDURE — 1123F ACP DISCUSS/DSCN MKR DOCD: CPT | Performed by: NURSE PRACTITIONER

## 2019-02-06 PROCEDURE — G8427 DOCREV CUR MEDS BY ELIG CLIN: HCPCS | Performed by: NURSE PRACTITIONER

## 2019-02-06 PROCEDURE — G8598 ASA/ANTIPLAT THER USED: HCPCS | Performed by: NURSE PRACTITIONER

## 2019-02-06 PROCEDURE — 1036F TOBACCO NON-USER: CPT | Performed by: NURSE PRACTITIONER

## 2019-02-06 PROCEDURE — 3017F COLORECTAL CA SCREEN DOC REV: CPT | Performed by: NURSE PRACTITIONER

## 2019-02-07 ENCOUNTER — TELEPHONE (OUTPATIENT)
Dept: ONCOLOGY | Facility: CLINIC | Age: 69
End: 2019-02-07

## 2019-02-07 DIAGNOSIS — D50.9 IRON DEFICIENCY ANEMIA, UNSPECIFIED IRON DEFICIENCY ANEMIA TYPE: Primary | ICD-10-CM

## 2019-02-07 NOTE — TELEPHONE ENCOUNTER
Had received call from Arlet concerning her dad needing iron infusions.  Labs reviewed with Arlet and iron sat 20.  Not low enough for iron infusion.  Discussed labs with Dr. Mijares and order given to recheck iron studies.  Notified Arlet of this.  Scheduled for 2/11/19 at 10.

## 2019-02-11 ENCOUNTER — LAB (OUTPATIENT)
Dept: LAB | Facility: HOSPITAL | Age: 69
End: 2019-02-11

## 2019-02-11 DIAGNOSIS — D50.9 IRON DEFICIENCY ANEMIA, UNSPECIFIED IRON DEFICIENCY ANEMIA TYPE: ICD-10-CM

## 2019-02-11 DIAGNOSIS — D50.9 IRON DEFICIENCY ANEMIA, UNSPECIFIED IRON DEFICIENCY ANEMIA TYPE: Primary | ICD-10-CM

## 2019-02-11 LAB
ALBUMIN SERPL-MCNC: 4 G/DL (ref 3.5–5)
ALBUMIN/GLOB SERPL: 1.5 G/DL (ref 1.1–2.5)
ALP SERPL-CCNC: 78 U/L (ref 24–120)
ALT SERPL W P-5'-P-CCNC: <15 U/L (ref 0–54)
ANION GAP SERPL CALCULATED.3IONS-SCNC: 13 MMOL/L (ref 4–13)
AST SERPL-CCNC: 19 U/L (ref 7–45)
BASOPHILS # BLD AUTO: 0.07 10*3/MM3 (ref 0–0.2)
BASOPHILS NFR BLD AUTO: 1.4 % (ref 0–2)
BILIRUB SERPL-MCNC: 0.5 MG/DL (ref 0.1–1)
BUN BLD-MCNC: 9 MG/DL (ref 5–21)
BUN/CREAT SERPL: 8.6 (ref 7–25)
CALCIUM SPEC-SCNC: 9.3 MG/DL (ref 8.4–10.4)
CHLORIDE SERPL-SCNC: 101 MMOL/L (ref 98–110)
CO2 SERPL-SCNC: 23 MMOL/L (ref 24–31)
CREAT BLD-MCNC: 1.05 MG/DL (ref 0.5–1.4)
DEPRECATED RDW RBC AUTO: 37.1 FL (ref 40–54)
EOSINOPHIL # BLD AUTO: 0.31 10*3/MM3 (ref 0–0.7)
EOSINOPHIL NFR BLD AUTO: 6.3 % (ref 0–4)
ERYTHROCYTE [DISTWIDTH] IN BLOOD BY AUTOMATED COUNT: 12.7 % (ref 12–15)
FERRITIN SERPL-MCNC: 51.7 NG/ML (ref 17.9–464)
GFR SERPL CREATININE-BSD FRML MDRD: 71 ML/MIN/1.73
GLOBULIN UR ELPH-MCNC: 2.6 GM/DL
GLUCOSE BLD-MCNC: 90 MG/DL (ref 70–100)
HCT VFR BLD AUTO: 30.2 % (ref 40–52)
HGB BLD-MCNC: 9.8 G/DL (ref 14–18)
IMM GRANULOCYTES # BLD AUTO: 0.02 10*3/MM3 (ref 0–0.05)
IMM GRANULOCYTES NFR BLD AUTO: 0.4 % (ref 0–5)
IRON 24H UR-MRATE: 94 MCG/DL (ref 42–180)
IRON SATN MFR SERPL: 24 % (ref 20–45)
LYMPHOCYTES # BLD AUTO: 0.56 10*3/MM3 (ref 0.72–4.86)
LYMPHOCYTES NFR BLD AUTO: 11.4 % (ref 15–45)
MCH RBC QN AUTO: 26.3 PG (ref 28–32)
MCHC RBC AUTO-ENTMCNC: 32.5 G/DL (ref 33–36)
MCV RBC AUTO: 81.2 FL (ref 82–95)
MONOCYTES # BLD AUTO: 0.28 10*3/MM3 (ref 0.19–1.3)
MONOCYTES NFR BLD AUTO: 5.7 % (ref 4–12)
NEUTROPHILS # BLD AUTO: 3.69 10*3/MM3 (ref 1.87–8.4)
NEUTROPHILS NFR BLD AUTO: 74.8 % (ref 39–78)
NRBC BLD AUTO-RTO: 0 /100 WBC (ref 0–0)
PLATELET # BLD AUTO: 309 10*3/MM3 (ref 130–400)
PMV BLD AUTO: 10.2 FL (ref 6–12)
POTASSIUM BLD-SCNC: 4.2 MMOL/L (ref 3.5–5.3)
PROT SERPL-MCNC: 6.6 G/DL (ref 6.3–8.7)
RBC # BLD AUTO: 3.72 10*6/MM3 (ref 4.8–5.9)
SODIUM BLD-SCNC: 137 MMOL/L (ref 135–145)
TIBC SERPL-MCNC: 399 MCG/DL (ref 225–420)
WBC NRBC COR # BLD: 4.93 10*3/MM3 (ref 4.8–10.8)

## 2019-02-11 PROCEDURE — 82728 ASSAY OF FERRITIN: CPT

## 2019-02-11 PROCEDURE — 82525 ASSAY OF COPPER: CPT | Performed by: INTERNAL MEDICINE

## 2019-02-11 PROCEDURE — 80053 COMPREHEN METABOLIC PANEL: CPT | Performed by: INTERNAL MEDICINE

## 2019-02-11 PROCEDURE — 36415 COLL VENOUS BLD VENIPUNCTURE: CPT | Performed by: INTERNAL MEDICINE

## 2019-02-11 PROCEDURE — 83550 IRON BINDING TEST: CPT

## 2019-02-11 PROCEDURE — 85025 COMPLETE CBC W/AUTO DIFF WBC: CPT

## 2019-02-11 PROCEDURE — 83540 ASSAY OF IRON: CPT

## 2019-02-13 LAB — COPPER SERPL-MCNC: 107 UG/DL (ref 72–166)

## 2019-03-25 RX ORDER — ATORVASTATIN CALCIUM 40 MG/1
40 TABLET, FILM COATED ORAL NIGHTLY
Qty: 30 TABLET | Refills: 0 | Status: SHIPPED | OUTPATIENT
Start: 2019-03-25 | End: 2019-05-29 | Stop reason: SDUPTHER

## 2019-04-09 DIAGNOSIS — D50.8 OTHER IRON DEFICIENCY ANEMIA: Primary | ICD-10-CM

## 2019-04-25 ENCOUNTER — TRANSCRIBE ORDERS (OUTPATIENT)
Dept: PHYSICAL THERAPY | Facility: HOSPITAL | Age: 69
End: 2019-04-25

## 2019-04-25 DIAGNOSIS — I89.0 LYMPHEDEMA: Primary | ICD-10-CM

## 2019-04-26 ENCOUNTER — HOSPITAL ENCOUNTER (OUTPATIENT)
Dept: PHYSICAL THERAPY | Facility: HOSPITAL | Age: 69
Setting detail: THERAPIES SERIES
Discharge: HOME | End: 2019-04-26

## 2019-04-26 DIAGNOSIS — I89.0 LYMPHEDEMA OF RIGHT LOWER EXTREMITY: Primary | ICD-10-CM

## 2019-04-26 PROCEDURE — 97163 PT EVAL HIGH COMPLEX 45 MIN: CPT | Performed by: PHYSICAL THERAPIST

## 2019-04-26 NOTE — THERAPY EVALUATION
Physical Therapy Lymphedema Initial Evaluation   [place]     Patient Name: Jelani Angel  : 1950  MRN: [id number]  Today's Date: 2019      Visit Date: 2019    Visit Dx:    ICD-10-CM ICD-9-CM   1. Lymphedema of right lower extremity I89.0 457.1       Patient Active Problem List   Diagnosis   • Iron deficiency anemia   • Rectal cancer (CMS/HCC)        Past Medical History:   Diagnosis Date   • Arthritis    • Cancer (CMS/HCC)     rectal   • Coronary artery disease    • Hyperlipidemia    • Hypertension    • Ileostomy, has currently (CMS/HCC)     total colectomy         Past Surgical History:   Procedure Laterality Date   • ABDOMINAL SURGERY      multiple due to rectal cancer and adhesions   • BACK SURGERY  2018    had 2 previous lumbar fusions. Recent surgery    • CARDIAC CATHETERIZATION  2018    stent placement 2018   • COLON SURGERY     • NECK SURGERY      has had 2 neck surgeries       Visit Dx:    ICD-10-CM ICD-9-CM   1. Lymphedema of right lower extremity I89.0 457.1       Patient History     Row Name 19 0900             History    Chief Complaint  Swelling;Pain  -HR      Type of Pain  Knee pain;Lower Extremity / Leg  -HR      Date Current Problem(s) Began  19  -HR      Brief Description of Current Complaint  His RLE swelled after a fall in January and the knee and thigh continue to stay swollen all the time.   -HR      Patient/Caregiver Goals  Decrease swelling;Relieve pain;Know what to do to help the symptoms  -HR      Hand Dominance  right-handed  -HR      Occupation/sports/leisure activities  retired   -HR         Pain     Pain Location  Knee;Leg  -HR      Pain at Present  3  -HR      Pain at Best  2  -HR      Pain at Worst  7  -HR      Pain Description  Burning;Sharp;Aching;Sore  -HR      Is medication used to assist with sleep?  Yes  -HR      What position do you sleep in?  Supine  -HR         Fall Risk Assessment    Any falls in the past  year:  Yes  -HR      Number of falls reported in the last 12 months  1  -HR      Factors that contributed to the fall:  Fatigue;Uneven surface RLE buckled on step  -HR      Does patient have a fear of falling  Yes (comment)  -HR         Services    Prior Rehab/Home Health Experiences  No  -HR      Are you currently receiving Home Health services  No  -HR      Do you plan to receive Home Health services in the near future  No  -HR         Daily Activities    Primary Language  English  -HR      How does patient learn best?  Listening;Reading  -HR      Teaching needs identified  Management of Condition  -HR      Barriers to learning  None  -HR         Safety    Are you being hurt, hit, or frightened by anyone at home or in your life?  No  -HR      Are you being neglected by a caregiver  No  -HR        User Key  (r) = Recorded By, (t) = Taken By, (c) = Cosigned By    Initials Name Provider Type    HR Marialuisa Ortiz, PT, DPT, CLT-GRECIA Physical Therapist          Lymphedema     Row Name 04/26/19 0900             Subjective Pain    Able to rate subjective pain?  yes  -HR      Pre-Treatment Pain Level  3  -HR      Subjective Pain Comment  His pain gets up to about 7-8 at the worst.   -HR         Subjective Comments    Subjective Comments  His leg started swelling after he fell on it in January.  -HR         Lymphedema Assessment    Lymphedema Classification  RLE:;secondary;stage 1 (Spontaneously Reversible)  -HR      Lymphedema Cancer Related Sx  other (comment) colectomy with ileostomy  -HR      Lymphedema Surgery Comments  2009  -HR      Cancer Comments  He had rectal cancer. Total colectomy with ileostomy was performed. He had neoadjuvant chemo and post-op radiation. Unsure if any nodes were actually removed.   -HR      Chemo Received  yes  -HR      Radiation Therapy Received  yes  -HR      Adverse Radiation Reactions/Complication  diagnosed with radiation proctitis.   -HR      Infections or Cellulitis?  no  -HR       Lymphedema Assessment Comments  Onset was multi-factorial. He was at risk due to treatment for rectal cancer with surgery and radiation. He then developed myelopathy due to spinal stenosis which reduced the muscle pump activation to assist in lymphatic drainage. He had lumbar surgery in December and then had a fall on the R leg in January.   -HR         Posture/Observations    Posture/Observations Comments  He walks slowly with a cane with deliberate lifting of R foot due to weakness.  -HR         General ROM    GENERAL ROM COMMENTS  He has multiple fusions in cervical spine and lumbar which restrict motion. He is not able to actively move the RLE through full range.  -HR         MMT (Manual Muscle Testing)    Rt Lower Ext  Rt Hip Flexion;Rt Hip ABduction & External Rotation;Rt Hip ABduction;Rt Knee Extension;Rt Knee Flexion;Rt Ankle Dorsiflexion;Rt Ankle Plantarflexion  -HR         MMT Right Lower Ext    Rt Hip Flexion MMT, Gross Movement  (2/5) poor  -HR      Rt Hip Flexion ABduction & Ext Rotation MMT, Gross Movement  (2/5) poor  -HR      Rt Hip ABduction MMT, Gross Movement  (2-/5) poor minus  -HR      Rt Knee Extension MMT, Gross Movement  (3-/5) fair minus  -HR      Rt Knee Flexion MMT, Gross Movement  (3-/5) fair minus  -HR      Rt Ankle Plantarflexion MMT, Gross Movement  (3/5) fair  -HR      Rt Ankle Dorsiflexion MMT, Gross Movement  (3/5) fair  -HR      Rt Lower Extremity Comments   RLE weakness due to lumbar stenosis. Surgery in December. Slowly gaining some strength. Has had foot drop as most affected weakness.   -HR         Skin Changes/Observations    Location/Assessment  Lower Extremity  -HR      Lower Extremity Conditions  right:;dry;intact  -HR      Lower Extremity Color/Pigment  right:;hyperpigmented  -HR      Lower Extremity Skin Details  medial ankle shows patch of flaky, darkened skin from being stretched and relaxed  -HR         Lymphedema Sensation    Lymphedema Sensation Reports  numbness   -HR      Lymphedema Sensation Comments  decreased sensation to light touch along the anterior knee.   -HR         Lymphedema Measurements    Measurement Type(s)  Circumferential  -HR      Circumferential Areas  Lower extremities  -HR         BLE Circumferential (cm)    Measurement Location 1  BOT  -HR      Left 1  24.7 cm  -HR      Right 1  24.9 cm  -HR      Measurement Location 2  +10  -HR      Left 2  28.5 cm  -HR      Right 2  26.5 cm  -HR      Measurement Location 3  +10  -HR      Left 3  24.4 cm  -HR      Right 3  26.1 cm  -HR      Measurement Location 4  +10  -HR      Left 4  33.7 cm  -HR      Right 4  36.5 cm  -HR      Measurement Location 5  +10  -HR      Left 5  40.4 cm  -HR      Right 5  40.6 cm  -HR      Measurement Location 6  +10  -HR      Left 6  42.2 cm  -HR      Right 6  44.5 cm  -HR      Measurement Location 7  +10  -HR      Left 7  46 cm  -HR      Right 7  47.2 cm  -HR      Measurement Location 8  +10  -HR      Left 8  53.6 cm  -HR      Right 8  52.5 cm  -HR      LLE Circumferential Total  293.5 cm  -HR      RLE Circumferential Total  298.8 cm  -HR         Manual Lymphatic Drainage    Manual Lymphatic Drainage Comments  Discussed the technique  -HR         Compression/Skin Care    Compression/Skin Care Comments  Discussed using a good, unscented lotion daily and getting compression pants that cross the knee.  -HR        User Key  (r) = Recorded By, (t) = Taken By, (c) = Cosigned By    Initials Name Provider Type    HR Marialuisa Ortiz, PT, DPT, CLT-GRECIA Physical Therapist                          Therapy Education  Education Details: Lymphedema education and CDT  Given: Edema management  Program: New  How Provided: Verbal, Written, Demonstration  Provided to: Patient  Level of Understanding: Verbalized      Exercises     Row Name 04/26/19 0900             Subjective Comments    Subjective Comments  His leg started swelling after he fell on it in January.  -HR         Subjective Pain    Able to  rate subjective pain?  yes  -HR      Pre-Treatment Pain Level  3  -HR      Subjective Pain Comment  His pain gets up to about 7-8 at the worst.   -HR        User Key  (r) = Recorded By, (t) = Taken By, (c) = Cosigned By    Initials Name Provider Type    HR Marialuisa Ortiz, PT, DPT, CLT-GRECIA Physical Therapist                      PT OP Goals     Row Name 04/26/19 0900          PT Short Term Goals    STG Date to Achieve  05/10/19  -HR     STG 1  Patient will have a good basic understanding of the condition of lymphedema and its care.   -HR     STG 1 Progress  New  -HR     STG 2  Patient will be independent with self MLD for RLE.  -HR     STG 2 Progress  New  -HR     STG 3  Patient will be independent with remedial HEP as much as strength will allow.   -HR     STG 3 Progress  New  -HR        Long Term Goals    LTG 1  Patient will have appropriate compression garments that he can use for maintenance of the RLE lymphedema.  -HR     LTG 1 Progress  New  -HR     LTG 2  Patient will have no s/s infection or skin irritation.   -HR     LTG 2 Progress  New  -HR     LTG 3  Patient will report less difficulty progressing the RLE with gait due to reduction in RLE edema.   -HR     LTG 3 Progress  New  -HR        Time Calculation    PT Goal Re-Cert Due Date  05/26/19  -HR       User Key  (r) = Recorded By, (t) = Taken By, (c) = Cosigned By    Initials Name Provider Type    HR Marialuisa Ortiz, PT, DPT, CLT-GRECIA Physical Therapist          PT Assessment/Plan     Row Name 04/26/19 0900          PT Assessment    Functional Limitations  Decreased safety during functional activities;Limitations in functional capacity and performance;Impaired gait  -HR     Impairments  Impaired lymphatic circulation;Edema;Pain;Peripheral nerve integrity;Impaired muscle endurance;Gait  -HR     Assessment Comments  Mr. Angel has developed lymphedema in the RLE over the past 4 months. He has had lumbar stenosis that has caused decreased  strength and coordination in the RLE. The additional weight of the edema is making it even more difficult for him to ambulate and he is at a risk of having another fall. We will use CDT for the RLE lymphedema. He is going to get some compression leggings for the upper leg and knee and we will assess the needs for compression to the lower leg. We will teach him and any family that wants to be trained on the MLD to perform at home. As he improves in the use of the RLE, the muscle pump assistance should continue to improrve lymphatic drainage. We discussed that this may be a lifelong issue that he will need compression for. We will have to see how he progresses. Thank you for the referral.   -HR     Rehab Potential  Excellent  -HR     Patient/caregiver participated in establishment of treatment plan and goals  Yes  -HR     Patient would benefit from skilled therapy intervention  Yes  -HR        PT Plan    PT Frequency  2x/week  -HR     Predicted Duration of Therapy Intervention (Therapy Eval)  6 weeks  -HR     Planned CPT's?  PT THER PROC EA 15 MIN: 82775;PT MANUAL THERAPY EA 15 MIN: 92621  -HR     PT Plan Comments  PT to see 2X/wk X 4-6 weeks for CDT to RLE.  -HR       User Key  (r) = Recorded By, (t) = Taken By, (c) = Cosigned By    Initials Name Provider Type    HR Marialuisa Ortiz PT, FRANKY, BLAKE Physical Therapist                       Time Calculation:       Therapy Charges for Today     Code Description Service Date Service Provider Modifiers Qty    88777510617 HC PT EVAL HIGH COMPLEXITY 4 4/26/2019 Marialuisa Ortiz PT, DPT, BLAKE GP 1                    Marialuisa Ortiz PT, DPDENISE, BLAKE  4/26/2019

## 2019-04-30 ENCOUNTER — HOSPITAL ENCOUNTER (OUTPATIENT)
Dept: PHYSICAL THERAPY | Facility: HOSPITAL | Age: 69
Setting detail: THERAPIES SERIES
Discharge: HOME | End: 2019-04-30

## 2019-04-30 DIAGNOSIS — I89.0 LYMPHEDEMA OF RIGHT LOWER EXTREMITY: Primary | ICD-10-CM

## 2019-04-30 PROCEDURE — 97140 MANUAL THERAPY 1/> REGIONS: CPT | Performed by: PHYSICAL THERAPIST

## 2019-05-02 ENCOUNTER — APPOINTMENT (OUTPATIENT)
Dept: PHYSICAL THERAPY | Facility: HOSPITAL | Age: 69
End: 2019-05-02

## 2019-05-06 ENCOUNTER — HOSPITAL ENCOUNTER (OUTPATIENT)
Dept: PHYSICAL THERAPY | Facility: HOSPITAL | Age: 69
Setting detail: THERAPIES SERIES
Discharge: HOME | End: 2019-05-06

## 2019-05-06 DIAGNOSIS — I89.0 LYMPHEDEMA OF RIGHT LOWER EXTREMITY: Primary | ICD-10-CM

## 2019-05-06 PROCEDURE — 97140 MANUAL THERAPY 1/> REGIONS: CPT | Performed by: PHYSICAL THERAPIST

## 2019-05-06 NOTE — THERAPY TREATMENT NOTE
Outpatient Physical Therapy Lymphedema Treatment Note   [place]     Patient Name: Jelani Angel  : 1950  MRN: [id number]  Today's Date: 2019        Visit Date: 2019    Visit Dx:    ICD-10-CM ICD-9-CM   1. Lymphedema of right lower extremity I89.0 457.1       Patient Active Problem List   Diagnosis   • Iron deficiency anemia   • Rectal cancer (CMS/HCC)        Lymphedema     Row Name 19 0900             Subjective Pain    Able to rate subjective pain?  yes  -HR         Subjective Comments    Subjective Comments  He has been wearing the socks and compression shorts since he was here last week. They did well until last night, his knee swelled more and he had to take them off.   -HR         BLE Circumferential (cm)    Measurement Location 1  BOT  -HR      Right 1  25 cm  -HR      Measurement Location 2  +10  -HR      Right 2  28.4 cm  -HR      Measurement Location 3  +10  -HR      Right 3  25 cm  -HR      Measurement Location 4  +10  -HR      Right 4  34.1 cm  -HR      Measurement Location 5  +10  -HR      Right 5  40.3 cm  -HR      Right 6  43 cm  -HR      Right 7  46.5 cm  -HR      Right 8  52.5 cm  -HR      RLE Circumferential Total  294.8 cm  -HR         Manual Lymphatic Drainage    Manual Lymphatic Drainage  initial sequence;opened regional lymph nodes;opened anastamoses;extremity treatment  -HR      Initial Sequence  short neck;shoulder collectors;diaphragmatic breathing  -HR      Opened Regional Lymph Nodes  axillary;inguinal  -HR      Axillary  right  -HR      Inguinal  left  -HR      Opened Anastamoses  inguino-axillary;anterior inguino-inguinal  -HR      Anterior Inguino-Inguinal  3-4 passes along inferior abdominal avoiding much work on abdomen due to ileostomy bag.   -HR      Extremity Treatment  MLD to full limb  -HR      MLD to Full Limb  RLE  -HR      Manual Lymphatic Drainage Comments  May try to video some of the MLD for his daughter as she has been sick and hadn't been  able to attend any visits with him.  -HR         Compression/Skin Care    Compression/Skin Care Comments  He is wearing the socks and compression rachell length pants. Discussed potentially needing to wear his knee support that he already has to the knee if the knee pain doesn't get any better.   -HR        User Key  (r) = Recorded By, (t) = Taken By, (c) = Cosigned By    Initials Name Provider Type    HR Marialuisa Ortiz, PT, DPT, CLT-GRECIA Physical Therapist                        PT Assessment/Plan     Row Name 05/06/19 0900          PT Assessment    Assessment Comments  He has had a reduction of 5 cm. He has been wearing the compression socks and pants and doing pretty well with it. He was not very active yesterday and by about 7:30 it was digging in at the knee so he had to take them off. This didn't happen any of the other days. His daughter has not been able to attend to watch the MLD sequence so we may try to video some of the technique so she can help him. He is pleased that the measurements are down. We will need to monitor the knee pain he states is worse at night now since wearing the compression and decide if we need to give some reinforcement of compression to the knee.   -HR        PT Plan    PT Plan Comments  Cont POC. May video part of MLD.   -HR       User Key  (r) = Recorded By, (t) = Taken By, (c) = Cosigned By    Initials Name Provider Type    HR Marialuisa Ortiz, PT, DPT, CLSERGIOGRECIA Physical Therapist             Exercises     Row Name 05/06/19 0900             Subjective Comments    Subjective Comments  He has been wearing the socks and compression shorts since he was here last week. They did well until last night, his knee swelled more and he had to take them off.   -HR         Subjective Pain    Able to rate subjective pain?  yes  -HR        User Key  (r) = Recorded By, (t) = Taken By, (c) = Cosigned By    Initials Name Provider Type    Marialuisa Abrams, PT, DPT, CLDENISE-GRECIA  Physical Therapist                      PT OP Goals     Row Name 05/06/19 0900          PT Short Term Goals    STG Date to Achieve  05/10/19  -HR     STG 1  Patient will have a good basic understanding of the condition of lymphedema and its care.   -HR     STG 1 Progress  Ongoing  -HR     STG 2  Patient will be independent with self MLD for RLE.  -HR     STG 2 Progress  Ongoing  -HR     STG 3  Patient will be independent with remedial HEP as much as strength will allow.   -HR     STG 3 Progress  New  -HR        Long Term Goals    LTG 1  Patient will have appropriate compression garments that he can use for maintenance of the RLE lymphedema.  -HR     LTG 1 Progress  Ongoing  -HR     LTG 2  Patient will have no s/s infection or skin irritation.   -HR     LTG 2 Progress  Ongoing  -HR     LTG 2 Progress Comments  no skin concerns  -HR     LTG 3  Patient will report less difficulty progressing the RLE with gait due to reduction in RLE edema.   -HR     LTG 3 Progress  New  -HR        Time Calculation    PT Goal Re-Cert Due Date  05/30/19  -HR       User Key  (r) = Recorded By, (t) = Taken By, (c) = Cosigned By    Initials Name Provider Type    HR Marialuisa Ortiz, PT, DPT, CLPAULETTE Physical Therapist          Therapy Education  Education Details: cont wearing compression. work on MLD.   Given: Edema management  Program: Progressed  How Provided: Verbal  Provided to: Patient  Level of Understanding: Verbalized              Time Calculation:       Therapy Charges for Today     Code Description Service Date Service Provider Modifiers Qty    14085110228 HC PT MANUAL THERAPY EA 15 MIN 5/6/2019 Marialuisa Ortiz PT, DPT, CLPAULETTE GP 4                    Marialuisa Ortiz PT, DPT, BLAKE  5/6/2019

## 2019-05-07 ENCOUNTER — HOSPITAL ENCOUNTER (OUTPATIENT)
Dept: PHYSICAL THERAPY | Facility: HOSPITAL | Age: 69
Setting detail: THERAPIES SERIES
Discharge: HOME | End: 2019-05-07

## 2019-05-07 DIAGNOSIS — I89.0 LYMPHEDEMA OF RIGHT LOWER EXTREMITY: Primary | ICD-10-CM

## 2019-05-07 PROCEDURE — 97140 MANUAL THERAPY 1/> REGIONS: CPT

## 2019-05-10 ENCOUNTER — APPOINTMENT (OUTPATIENT)
Dept: PHYSICAL THERAPY | Facility: HOSPITAL | Age: 69
End: 2019-05-10

## 2019-05-13 ENCOUNTER — APPOINTMENT (OUTPATIENT)
Dept: PHYSICAL THERAPY | Facility: HOSPITAL | Age: 69
End: 2019-05-13

## 2019-05-14 ENCOUNTER — APPOINTMENT (OUTPATIENT)
Dept: PHYSICAL THERAPY | Facility: HOSPITAL | Age: 69
End: 2019-05-14

## 2019-05-14 NOTE — THERAPY TREATMENT NOTE
Outpatient Physical Therapy Lymphedema Treatment Note   [place]     Patient Name: Jelani Angel  : 1950  MRN: [id number]  Today's Date: 2019        Visit Date: 2019    Visit Dx:    ICD-10-CM ICD-9-CM   1. Lymphedema of right lower extremity I89.0 457.1       Patient Active Problem List   Diagnosis   • Iron deficiency anemia   • Rectal cancer (CMS/HCC)                  19 1047   Subjective Pain   Able to rate subjective pain? yes   Pre-Treatment Pain Level 5   Subjective Pain Comment Right shin   Subjective Comments   Subjective Comments He is wearing the compression, he feels like this is helping.     Manual Lymphatic Drainage   Manual Lymphatic Drainage initial sequence;opened regional lymph nodes;opened anastamoses;extremity treatment   Initial Sequence short neck;shoulder collectors;diaphragmatic breathing   Diaphragmatic Breathing X5   Opened Regional Lymph Nodes axillary;inguinal   Axillary right   Inguinal left   Opened Anastamoses inguino-axillary;anterior inguino-inguinal   Anterior Inguino-Inguinal 3-4 passes along inferior abdominal avoiding much work on abdomen due to ileostomy bag.    Extremity Treatment MLD to full limb   MLD to Full Limb RLE   Manual Lymphatic Drainage Comments Instructed on self MLD, also had paitent video me while I was doing the MLD.     Compression/Skin Care   Compression/Skin Care Comments He will wear his compression when he gets home.           19 1047   PT Short Term Goals   STG Date to Achieve 05/10/19   STG 1 Patient will have a good basic understanding of the condition of lymphedema and its care.    STG 1 Progress Ongoing   STG 1 Progress Comments Continue to educate   STG 2 Patient will be independent with self MLD for RLE.   STG 2 Progress Ongoing   STG 2 Progress Comments Instructed on self MLD   STG 3 Patient will be independent with remedial HEP as much as strength will allow.    STG 3 Progress New   Long Term Goals   LTG 1 Patient  will have appropriate compression garments that he can use for maintenance of the RLE lymphedema.   LTG 1 Progress Ongoing   LTG 1 Progress Comments He has compression for both LE's   LTG 2 Patient will have no s/s infection or skin irritation.    LTG 2 Progress Ongoing   LTG 2 Progress Comments No s/s of infection.   LTG 3 Patient will report less difficulty progressing the RLE with gait due to reduction in RLE edema.    LTG 3 Progress New               05/07/19 1047   PT Assessment   Assessment Comments Patient was instructed on the self MLD, he and his family with work on this at home.  He wears his compression when he is home.  He is going to work towards his home maintenance program.  May decrease frequency of treatment is he is doing well and comfortable with the MLD.   PT Plan   PT Plan Comments Continue with CDT.                                  Therapy Education  Education Details: Work on MLD and wear compression.  Given: Edema management  Program: Reinforced  How Provided: Verbal, Demonstration, Other (comment)(Video)  Provided to: Patient  Level of Understanding: Verbalized, Teach back education performed, Demonstrated              Time Calculation:   Start Time: 1047  Stop Time: 1157  Time Calculation (min): 70 min  Total Timed Code Minutes- PT: 70 minute(s)                 Leeann Coffey, PTA, T-GRECIA  5/14/2019

## 2019-05-16 ENCOUNTER — APPOINTMENT (OUTPATIENT)
Dept: PHYSICAL THERAPY | Facility: HOSPITAL | Age: 69
End: 2019-05-16

## 2019-05-20 ENCOUNTER — HOSPITAL ENCOUNTER (OUTPATIENT)
Dept: PHYSICAL THERAPY | Facility: HOSPITAL | Age: 69
Setting detail: THERAPIES SERIES
Discharge: HOME | End: 2019-05-20

## 2019-05-20 DIAGNOSIS — I89.0 LYMPHEDEMA OF RIGHT LOWER EXTREMITY: Primary | ICD-10-CM

## 2019-05-20 PROCEDURE — 97140 MANUAL THERAPY 1/> REGIONS: CPT

## 2019-05-20 NOTE — THERAPY TREATMENT NOTE
Outpatient Physical Therapy Lymphedema Treatment Note  [facility]     Patient Name: Jelani Angel  : 1950  MRN: [id number]  Today's Date: 2019        Visit Date: 2019    Visit Dx:    ICD-10-CM ICD-9-CM   1. Lymphedema of right lower extremity I89.0 457.1       Patient Active Problem List   Diagnosis   • Iron deficiency anemia   • Rectal cancer (CMS/HCC)        Lymphedema     Row Name 19 1040             Subjective Pain    Able to rate subjective pain?  yes  -AL      Pre-Treatment Pain Level  0  -AL         Subjective Comments    Subjective Comments  He has fallen three times since his last appointment.  He wore his compression and this helped.  He slipped going down a couple of steps and he does not have a hand rail yet and he fell forward.  He also fell backwards when getting into the truck.  He has a larger area on the R knee and several small areas on the R shin and foot, as well as on the L shin.    -AL         BLE Circumferential (cm)    Measurement Location 1  BOT  -AL      Right 1  24.7 cm  -AL      Measurement Location 2  +10  -AL      Right 2  31.2 cm  -AL      Measurement Location 3  +10  -AL      Right 3  26.5 cm  -AL      Measurement Location 4  +10  -AL      Right 4  35.2 cm  -AL      Measurement Location 5  +10  -AL      Right 5  41.9 cm  -AL      Measurement Location 6  +10  -AL      Right 6  44 cm  -AL      Measurement Location 7  +10  -AL      Right 7  48.7 cm  -AL      Right 8  53.2 cm  -AL      RLE Circumferential Total  305.4 cm  -AL         Manual Lymphatic Drainage    Manual Lymphatic Drainage  initial sequence;opened regional lymph nodes;opened anastamoses;extremity treatment  -AL      Initial Sequence  short neck;shoulder collectors;diaphragmatic breathing  -AL      Diaphragmatic Breathing  X5  -AL      Opened Regional Lymph Nodes  axillary;inguinal  -AL      Axillary  right  -AL      Inguinal  left  -AL      Opened Anastamoses  inguino-axillary;anterior  inguino-inguinal  -AL      Anterior Inguino-Inguinal  3-4 passes along inferior abdominal avoiding much work on abdomen due to ileostomy bag.   -AL      Extremity Treatment  MLD to full limb  -AL      MLD to Full Limb  RLE  -AL      Manual Lymphatic Drainage Comments  Reviewed self MLD  -AL         Compression/Skin Care    Compression/Skin Care Comments  He will wear his compression and also elevate the R LE.   -AL        User Key  (r) = Recorded By, (t) = Taken By, (c) = Cosigned By    Initials Name Provider Type    Leeann Da Silva PTA, CLT-LANA Physical Therapy Assistant                        PT Assessment/Plan     Row Name 05/20/19 1040          PT Assessment    Assessment Comments  Patient has had an increase in size of the R LE.  He has had three falls recently and has several open areas.  After the first fall his leg started to swell.  He is working on the MLD and HEP.  Will see patient a few more times to work on decreasing the size of the R LE.   -AL        PT Plan    PT Plan Comments  Conitnue with POC.   -AL       User Key  (r) = Recorded By, (t) = Taken By, (c) = Cosigned By    Initials Name Provider Type    Leeann Da Silva PTA, CLT-LANA Physical Therapy Assistant             Exercises     Row Name 05/20/19 1040             Subjective Comments    Subjective Comments  He has fallen three times since his last appointment.  He wore his compression and this helped.  He slipped going down a couple of steps and he does not have a hand rail yet and he fell forward.  He also fell backwards when getting into the truck.  He has a larger area on the R knee and several small areas on the R shin and foot, as well as on the L shin.    -AL         Subjective Pain    Able to rate subjective pain?  yes  -AL      Pre-Treatment Pain Level  0  -AL        User Key  (r) = Recorded By, (t) = Taken By, (c) = Cosigned By    Initials Name Provider Type    Leeann Da Silva PTA, CLT-LANA Physical Therapy Assistant                       PT OP Goals     Row Name 05/20/19 1040          PT Short Term Goals    STG Date to Achieve  05/10/19  -AL     STG 1  Patient will have a good basic understanding of the condition of lymphedema and its care.   -AL     STG 1 Progress  Ongoing  -AL     STG 1 Progress Comments  Continue to educate  -AL     STG 2  Patient will be independent with self MLD for RLE.  -AL     STG 2 Progress  Ongoing  -AL     STG 2 Progress Comments  He is working on the MLD  -AL     STG 3  Patient will be independent with remedial HEP as much as strength will allow.   -AL     STG 3 Progress  New  -AL        Long Term Goals    LTG 1  Patient will have appropriate compression garments that he can use for maintenance of the RLE lymphedema.  -AL     LTG 1 Progress  Ongoing  -AL     LTG 1 Progress Comments  He is wearing his compression   -AL     LTG 2  Patient will have no s/s infection or skin irritation.   -AL     LTG 2 Progress  Ongoing  -AL     LTG 2 Progress Comments  He has several open areas on both   -AL     LTG 3  Patient will report less difficulty progressing the RLE with gait due to reduction in RLE edema.   -AL     LTG 3 Progress  Ongoing  -AL     LTG 3 Progress Comments  He has had 3 falls since his last treatment.   -AL        Time Calculation    PT Goal Re-Cert Due Date  05/30/19  -AL       User Key  (r) = Recorded By, (t) = Taken By, (c) = Cosigned By    Initials Name Provider Type    Leeann Da Silva, RUDY, BLAKE Physical Therapy Assistant          Therapy Education  Education Details: Work on MLD and wear compression  Given: Edema management  Program: Reinforced  How Provided: Verbal  Provided to: Patient  Level of Understanding: Verbalized              Time Calculation:   Start Time: 1040  Stop Time: 1155  Time Calculation (min): 75 min  Total Timed Code Minutes- PT: 75 minute(s)   Therapy Charges for Today     Code Description Service Date Service Provider Modifiers Qty    44772503438  PT MANUAL  THERAPY EA 15 MIN 5/20/2019 Leeann Coffey PTA, BLAKE GP 5                    Leeann RODRIGUEZ. RUDY Coffey CLT-LANA  5/20/2019

## 2019-05-24 ENCOUNTER — HOSPITAL ENCOUNTER (OUTPATIENT)
Dept: PHYSICAL THERAPY | Facility: HOSPITAL | Age: 69
Setting detail: THERAPIES SERIES
Discharge: HOME | End: 2019-05-24

## 2019-05-24 DIAGNOSIS — I89.0 LYMPHEDEMA OF RIGHT LOWER EXTREMITY: Primary | ICD-10-CM

## 2019-05-24 PROCEDURE — 97140 MANUAL THERAPY 1/> REGIONS: CPT

## 2019-05-24 NOTE — THERAPY TREATMENT NOTE
Outpatient Physical Therapy Lymphedema Treatment Note  [facility]     Patient Name: Jelani Angel  : 1950  MRN: [id number]  Today's Date: 2019        Visit Date: 2019    Visit Dx:    ICD-10-CM ICD-9-CM   1. Lymphedema of right lower extremity I89.0 457.1       Patient Active Problem List   Diagnosis   • Iron deficiency anemia   • Rectal cancer (CMS/HCC)        Lymphedema     Row Name 19 1035             Subjective Pain    Able to rate subjective pain?  yes  -AL      Pre-Treatment Pain Level  0  -AL         Subjective Comments    Subjective Comments  On Tuesday he wore his compression and also elevated his leg higher than his heart.  He started having pain so he took the compression off becuase he had pain.  He walked more, and now his leg is better.   -AL         BLE Circumferential (cm)    Measurement Location 1  BOT  -AL      Right 1  24.3 cm  -AL      Measurement Location 2  +10  -AL      Right 2  30.6 cm  -AL      Measurement Location 3  +10  -AL      Right 3  26.3 cm  -AL      Measurement Location 4  +10  -AL      Right 4  35.1 cm  -AL      Measurement Location 5  +10  -AL      Right 5  41.1 cm  -AL      Measurement Location 6  +10  -AL      Right 6  43.7 cm  -AL      Measurement Location 7  +10  -AL      Right 7  47 cm  -AL      Right 8  53.3 cm  -AL      RLE Circumferential Total  301.4 cm  -AL         Manual Lymphatic Drainage    Manual Lymphatic Drainage  initial sequence;opened regional lymph nodes;opened anastamoses;extremity treatment  -AL      Initial Sequence  short neck;shoulder collectors;diaphragmatic breathing  -AL      Diaphragmatic Breathing  X5  -AL      Opened Regional Lymph Nodes  axillary;inguinal  -AL      Axillary  right  -AL      Inguinal  left  -AL      Opened Anastamoses  inguino-axillary;anterior inguino-inguinal  -AL      Anterior Inguino-Inguinal  3-4 passes along inferior abdominal avoiding much work on abdomen due to ileostomy bag.   -AL      Extremity  Treatment  MLD to full limb  -AL      MLD to Full Limb  RLE  -AL         Compression/Skin Care    Compression/Skin Care Comments  Instructed patient to wear the compression.  If he does not have the compresion on, he can elevate the R LE major than the heart.   -AL        User Key  (r) = Recorded By, (t) = Taken By, (c) = Cosigned By    Initials Name Provider Type    Leeann Da Silva PTA, CLPAULETTE Physical Therapy Assistant                        PT Assessment/Plan     Row Name 05/24/19 1035          PT Assessment    Assessment Comments  Patient's R LE has reduced since his last tretment.  Clarified that if he is wearing compression, he should not elevate the R leg above his heart.  If no compression on, he can elevate the R LE above the heart.  He will continue to work on the HEP and MLD.  -AL        PT Plan    PT Plan Comments  Will continue with POC.  -AL       User Key  (r) = Recorded By, (t) = Taken By, (c) = Cosigned By    Initials Name Provider Type    Leeann Da Silva PTA, CLDENISE-GRECIA Physical Therapy Assistant             Exercises     Row Name 05/24/19 1035             Subjective Comments    Subjective Comments  On Tuesday he wore his compression and also elevated his leg higher than his heart.  He started having pain so he took the compression off becuase he had pain.  He walked more, and now his leg is better.   -AL         Subjective Pain    Able to rate subjective pain?  yes  -AL      Pre-Treatment Pain Level  0  -AL        User Key  (r) = Recorded By, (t) = Taken By, (c) = Cosigned By    Initials Name Provider Type    Leeann Da Silva PTA, CLT-GRECIA Physical Therapy Assistant                      PT OP Goals     Row Name 05/24/19 1035          PT Short Term Goals    STG Date to Achieve  05/10/19  -AL     STG 1  Patient will have a good basic understanding of the condition of lymphedema and its care.   -AL     STG 1 Progress  Ongoing  -AL     STG 1 Progress Comments  Continue to educate  -AL     STG  2  Patient will be independent with self MLD for RLE.  -AL     STG 2 Progress  Ongoing  -AL     STG 2 Progress Comments  He is working on the MLD  -AL     STG 3  Patient will be independent with remedial HEP as much as strength will allow.   -AL     STG 3 Progress  Ongoing  -AL     STG 3 Progress Comments  He is working on the HEP  -AL        Long Term Goals    LTG 1  Patient will have appropriate compression garments that he can use for maintenance of the RLE lymphedema.  -AL     LTG 1 Progress  Ongoing  -AL     LTG 1 Progress Comments  He has compression leggings and socks  -AL     LTG 2  Patient will have no s/s infection or skin irritation.   -AL     LTG 2 Progress  Ongoing  -AL     LTG 2 Progress Comments  The small oem areas on the legs are healing.  -AL     LTG 3  Patient will report less difficulty progressing the RLE with gait due to reduction in RLE edema.   -AL     LTG 3 Progress  Ongoing  -AL        Time Calculation    PT Goal Re-Cert Due Date  05/30/19  -AL       User Key  (r) = Recorded By, (t) = Taken By, (c) = Cosigned By    Initials Name Provider Type    Leeann Da Silva PTA, CLT-LANA Physical Therapy Assistant          Therapy Education  Education Details: Work on MLD, HEP, and wear compression.  Given: Edema management  Program: Reinforced  How Provided: Verbal  Provided to: Patient  Level of Understanding: Verbalized              Time Calculation:   Start Time: 1035  Stop Time: 1150  Time Calculation (min): 75 min  Total Timed Code Minutes- PT: 75 minute(s)   Therapy Charges for Today     Code Description Service Date Service Provider Modifiers Qty    24148581716 HC PT MANUAL THERAPY EA 15 MIN 5/24/2019 Leeann Coffey PTA, CLT-LANA GP 5                    Leeann Coffey PTA, CLT-LANA  5/24/2019

## 2019-05-28 ENCOUNTER — HOSPITAL ENCOUNTER (OUTPATIENT)
Dept: PHYSICAL THERAPY | Facility: HOSPITAL | Age: 69
Setting detail: THERAPIES SERIES
Discharge: HOME | End: 2019-05-28

## 2019-05-28 DIAGNOSIS — I89.0 LYMPHEDEMA OF RIGHT LOWER EXTREMITY: Primary | ICD-10-CM

## 2019-05-28 PROCEDURE — 97140 MANUAL THERAPY 1/> REGIONS: CPT | Performed by: PHYSICAL THERAPIST

## 2019-05-28 NOTE — THERAPY PROGRESS REPORT/RE-CERT
"    Outpatient Physical Therapy Lymphedema Progress Note   [facility]     Patient Name: Jelani Angel  : 1950  MRN: [id number]  Today's Date: 2019        Visit Date: 2019    Visit Dx:    ICD-10-CM ICD-9-CM   1. Lymphedema of right lower extremity I89.0 457.1       Patient Active Problem List   Diagnosis   • Iron deficiency anemia   • Rectal cancer (CMS/HCC)        Lymphedema     Row Name 19 1000             Subjective Pain    Able to rate subjective pain?  yes  -HR      Pre-Treatment Pain Level  7  -HR      Subjective Pain Comment  R knee  -HR         Subjective Comments    Subjective Comments  He ate watermelon with salt on it and his leg really swelled up and made his knee hurt so bad.   -HR         BLE Circumferential (cm)    Measurement Location 1  BOT  -HR      Right 1  25 cm  -HR      Measurement Location 2  +10  -HR      Right 2  29.9 cm  -HR      Measurement Location 3  +10  -HR      Right 3  28.2 cm  -HR      Measurement Location 4  +10  -HR      Right 4  37.1 cm  -HR      Measurement Location 5  +10  -HR      Right 5  40.4 cm  -HR      Measurement Location 6  +10  -HR      Right 6  44 cm  -HR      Measurement Location 7  +10  -HR      Right 7  48.2 cm  -HR      Right 8  54.3 cm  -HR      RLE Circumferential Total  307.1 cm  -HR         Manual Lymphatic Drainage    Manual Lymphatic Drainage  initial sequence;opened regional lymph nodes;opened anastamoses;extremity treatment  -HR      Initial Sequence  short neck;shoulder collectors;diaphragmatic breathing  -HR      Opened Regional Lymph Nodes  axillary;inguinal  -HR      Axillary  right  -HR      Inguinal  left  -HR      Opened Anastamoses  inguino-axillary;anterior inguino-inguinal  -HR      Extremity Treatment  MLD to full limb  -HR      MLD to Full Limb  RLE  -HR      Manual Therapy  I placed a \"Y\" strip for quad assist on the RLE and then an \"I\" strip for superior lift on the patella.   -HR         Compression/Skin Care    " Compression/Skin Care Comments  He has been wearing the compression as many hours per day as he can tolerate.   -HR        User Key  (r) = Recorded By, (t) = Taken By, (c) = Cosigned By    Initials Name Provider Type    Marialuisa Abrams, PT, DPT, CLT-GRECIA Physical Therapist                        PT Assessment/Plan     Row Name 05/28/19 1000          PT Assessment    Assessment Comments  He had an increase in the size of the LE today which he reports has caused severe pain in his knee again. He believes this was caused by salt he had eaten on watermelon over the weekend. He had a good decrease in pain after treatment. He walked out with little to no pain in the knee. He is continuing to wear his compression daily, but at times when the knee hurts so badly he has to take it off for a while.    -HR        PT Plan    PT Plan Comments  Cont POC. He has 1 more visit scheduled, but may schedule him a few more over the next few weeks.   -HR       User Key  (r) = Recorded By, (t) = Taken By, (c) = Cosigned By    Initials Name Provider Type    HR Marialuisa Ortiz, PT, DPT, CLT-GRECIA Physical Therapist             Exercises     Row Name 05/28/19 1000             Subjective Comments    Subjective Comments  He ate watermelon with salt on it and his leg really swelled up and made his knee hurt so bad.   -HR         Subjective Pain    Able to rate subjective pain?  yes  -HR      Pre-Treatment Pain Level  7  -HR      Subjective Pain Comment  R knee  -HR        User Key  (r) = Recorded By, (t) = Taken By, (c) = Cosigned By    Initials Name Provider Type    Marialuisa Abrams, PT, DPT, CLT-GRECIA Physical Therapist                      PT OP Goals     Row Name 05/28/19 1000          PT Short Term Goals    STG Date to Achieve  05/10/19  -HR     STG 1  Patient will have a good basic understanding of the condition of lymphedema and its care.   -HR     STG 1 Progress  Ongoing  -HR     STG 2  Patient will be independent  with self MLD for RLE.  -HR     STG 2 Progress  Ongoing  -HR     STG 3  Patient will be independent with remedial HEP as much as strength will allow.   -HR     STG 3 Progress  Ongoing  -HR        Long Term Goals    LTG 1  Patient will have appropriate compression garments that he can use for maintenance of the RLE lymphedema.  -HR     LTG 1 Progress  Ongoing  -HR     LTG 2  Patient will have no s/s infection or skin irritation.   -HR     LTG 2 Progress  Ongoing  -HR     LTG 3  Patient will report less difficulty progressing the RLE with gait due to reduction in RLE edema.   -HR     LTG 3 Progress  Ongoing  -HR        Time Calculation    PT Goal Re-Cert Due Date  06/27/19  -HR       User Key  (r) = Recorded By, (t) = Taken By, (c) = Cosigned By    Initials Name Provider Type    HR Marialuisa Ortiz, PT, DPT, BLAKE Physical Therapist          Therapy Education  Education Details: Cont work on MLD, wear compression, HEP  Given: Edema management  Program: Reinforced  How Provided: Verbal  Provided to: Patient  Level of Understanding: Verbalized              Time Calculation:       Therapy Charges for Today     Code Description Service Date Service Provider Modifiers Qty    78451036342  PT MANUAL THERAPY EA 15 MIN 5/28/2019 Marialuisa Ortiz, PT, DPT, CLPAULETTE GP 5                    Marialuisa Ortiz PT, DPT, BLAKE  5/28/2019

## 2019-05-29 RX ORDER — ATORVASTATIN CALCIUM 40 MG/1
40 TABLET, FILM COATED ORAL NIGHTLY
Qty: 30 TABLET | Refills: 5 | Status: SHIPPED | OUTPATIENT
Start: 2019-05-29 | End: 2019-09-20 | Stop reason: SDUPTHER

## 2019-05-31 ENCOUNTER — HOSPITAL ENCOUNTER (OUTPATIENT)
Dept: PHYSICAL THERAPY | Facility: HOSPITAL | Age: 69
Setting detail: THERAPIES SERIES
Discharge: HOME | End: 2019-05-31

## 2019-05-31 DIAGNOSIS — I89.0 LYMPHEDEMA OF RIGHT LOWER EXTREMITY: Primary | ICD-10-CM

## 2019-05-31 PROCEDURE — 97140 MANUAL THERAPY 1/> REGIONS: CPT

## 2019-07-03 ENCOUNTER — TELEPHONE (OUTPATIENT)
Dept: CARDIOLOGY | Age: 69
End: 2019-07-03

## 2019-07-03 NOTE — TELEPHONE ENCOUNTER
Okay to do letter for risk stratification. I would recommend continuing ASA during the perioperative period if bleeding risks are acceptable.

## 2019-08-15 ENCOUNTER — APPOINTMENT (OUTPATIENT)
Dept: GENERAL RADIOLOGY | Age: 69
DRG: 660 | End: 2019-08-15
Payer: MEDICARE

## 2019-08-15 ENCOUNTER — HOSPITAL ENCOUNTER (INPATIENT)
Age: 69
LOS: 3 days | Discharge: HOME | DRG: 660 | End: 2019-08-20
Attending: EMERGENCY MEDICINE | Admitting: INTERNAL MEDICINE
Payer: MEDICARE

## 2019-08-15 DIAGNOSIS — D72.829 LEUKOCYTOSIS, UNSPECIFIED TYPE: ICD-10-CM

## 2019-08-15 DIAGNOSIS — R60.0 PEDAL EDEMA: ICD-10-CM

## 2019-08-15 DIAGNOSIS — N17.9 AKI (ACUTE KIDNEY INJURY) (HCC): Primary | ICD-10-CM

## 2019-08-15 LAB
ALBUMIN SERPL-MCNC: 4.7 G/DL (ref 3.5–5.2)
ALP BLD-CCNC: 119 U/L (ref 40–130)
ALT SERPL-CCNC: 12 U/L (ref 5–41)
ANION GAP SERPL CALCULATED.3IONS-SCNC: 17 MMOL/L (ref 7–19)
AST SERPL-CCNC: 31 U/L (ref 5–40)
BACTERIA: ABNORMAL /HPF
BASOPHILS ABSOLUTE: 0 K/UL (ref 0–0.2)
BASOPHILS RELATIVE PERCENT: 0.2 % (ref 0–1)
BILIRUB SERPL-MCNC: 0.4 MG/DL (ref 0.2–1.2)
BILIRUBIN URINE: NEGATIVE
BLOOD, URINE: ABNORMAL
BUN BLDV-MCNC: 25 MG/DL (ref 8–23)
CALCIUM SERPL-MCNC: 9.8 MG/DL (ref 8.8–10.2)
CHLORIDE BLD-SCNC: 97 MMOL/L (ref 98–111)
CLARITY: CLEAR
CO2: 20 MMOL/L (ref 22–29)
COLOR: YELLOW
CREAT SERPL-MCNC: 1.7 MG/DL (ref 0.5–1.2)
CREATININE URINE: 34 MG/DL (ref 4.2–622)
EOSINOPHIL,URINE: NORMAL
EOSINOPHILS ABSOLUTE: 0 K/UL (ref 0–0.6)
EOSINOPHILS RELATIVE PERCENT: 0.1 % (ref 0–5)
EPITHELIAL CELLS, UA: 1 /HPF (ref 0–5)
GFR NON-AFRICAN AMERICAN: 40
GLUCOSE BLD-MCNC: 91 MG/DL (ref 74–109)
GLUCOSE URINE: NEGATIVE MG/DL
HCT VFR BLD CALC: 36.1 % (ref 42–52)
HEMOGLOBIN: 11.6 G/DL (ref 14–18)
HYALINE CASTS: 0 /HPF (ref 0–8)
KETONES, URINE: NEGATIVE MG/DL
LACTIC ACID: 1.8 MMOL/L (ref 0.5–1.9)
LEUKOCYTE ESTERASE, URINE: NEGATIVE
LYMPHOCYTES ABSOLUTE: 0.8 K/UL (ref 1.1–4.5)
LYMPHOCYTES RELATIVE PERCENT: 4.5 % (ref 20–40)
MCH RBC QN AUTO: 27.4 PG (ref 27–31)
MCHC RBC AUTO-ENTMCNC: 32.1 G/DL (ref 33–37)
MCV RBC AUTO: 85.3 FL (ref 80–94)
MICROALBUMIN UR-MCNC: 5.3 MG/DL (ref 0–19)
MICROALBUMIN/CREAT UR-RTO: 155.9 MG/G
MONOCYTES ABSOLUTE: 1.2 K/UL (ref 0–0.9)
MONOCYTES RELATIVE PERCENT: 7 % (ref 0–10)
NEUTROPHILS ABSOLUTE: 14.8 K/UL (ref 1.5–7.5)
NEUTROPHILS RELATIVE PERCENT: 87.7 % (ref 50–65)
NITRITE, URINE: NEGATIVE
PDW BLD-RTO: 12.8 % (ref 11.5–14.5)
PH UA: 5.5 (ref 5–8)
PLATELET # BLD: 336 K/UL (ref 130–400)
PMV BLD AUTO: 10.9 FL (ref 9.4–12.4)
POTASSIUM SERPL-SCNC: 4.6 MMOL/L (ref 3.5–5)
PRO-BNP: 302 PG/ML (ref 0–900)
PROTEIN UA: NEGATIVE MG/DL
RBC # BLD: 4.23 M/UL (ref 4.7–6.1)
RBC UA: 12 /HPF (ref 0–4)
SODIUM BLD-SCNC: 134 MMOL/L (ref 136–145)
SODIUM URINE: 26 MMOL/L
SPECIFIC GRAVITY UA: 1.01 (ref 1–1.03)
TOTAL PROTEIN: 8 G/DL (ref 6.6–8.7)
UREA NITROGEN, UR: 144 MG/DL
URINE REFLEX TO CULTURE: ABNORMAL
UROBILINOGEN, URINE: 0.2 E.U./DL
WBC # BLD: 16.8 K/UL (ref 4.8–10.8)
WBC UA: 12 /HPF (ref 0–5)

## 2019-08-15 PROCEDURE — G0378 HOSPITAL OBSERVATION PER HR: HCPCS

## 2019-08-15 PROCEDURE — 96376 TX/PRO/DX INJ SAME DRUG ADON: CPT

## 2019-08-15 PROCEDURE — 71045 X-RAY EXAM CHEST 1 VIEW: CPT

## 2019-08-15 PROCEDURE — 85025 COMPLETE CBC W/AUTO DIFF WBC: CPT

## 2019-08-15 PROCEDURE — 96372 THER/PROPH/DIAG INJ SC/IM: CPT

## 2019-08-15 PROCEDURE — 84300 ASSAY OF URINE SODIUM: CPT

## 2019-08-15 PROCEDURE — 36415 COLL VENOUS BLD VENIPUNCTURE: CPT

## 2019-08-15 PROCEDURE — 83605 ASSAY OF LACTIC ACID: CPT

## 2019-08-15 PROCEDURE — 81001 URINALYSIS AUTO W/SCOPE: CPT

## 2019-08-15 PROCEDURE — 93971 EXTREMITY STUDY: CPT

## 2019-08-15 PROCEDURE — 84540 ASSAY OF URINE/UREA-N: CPT

## 2019-08-15 PROCEDURE — 6360000002 HC RX W HCPCS: Performed by: EMERGENCY MEDICINE

## 2019-08-15 PROCEDURE — 83880 ASSAY OF NATRIURETIC PEPTIDE: CPT

## 2019-08-15 PROCEDURE — 2580000003 HC RX 258: Performed by: INTERNAL MEDICINE

## 2019-08-15 PROCEDURE — 80053 COMPREHEN METABOLIC PANEL: CPT

## 2019-08-15 PROCEDURE — 73590 X-RAY EXAM OF LOWER LEG: CPT

## 2019-08-15 PROCEDURE — 82570 ASSAY OF URINE CREATININE: CPT

## 2019-08-15 PROCEDURE — 87040 BLOOD CULTURE FOR BACTERIA: CPT

## 2019-08-15 PROCEDURE — 6370000000 HC RX 637 (ALT 250 FOR IP): Performed by: INTERNAL MEDICINE

## 2019-08-15 PROCEDURE — 6360000002 HC RX W HCPCS: Performed by: INTERNAL MEDICINE

## 2019-08-15 PROCEDURE — 96374 THER/PROPH/DIAG INJ IV PUSH: CPT

## 2019-08-15 PROCEDURE — 96375 TX/PRO/DX INJ NEW DRUG ADDON: CPT

## 2019-08-15 PROCEDURE — 82043 UR ALBUMIN QUANTITATIVE: CPT

## 2019-08-15 PROCEDURE — 87205 SMEAR GRAM STAIN: CPT

## 2019-08-15 PROCEDURE — 99285 EMERGENCY DEPT VISIT HI MDM: CPT

## 2019-08-15 RX ORDER — NORTRIPTYLINE HYDROCHLORIDE 25 MG/1
25 CAPSULE ORAL DAILY
Status: DISCONTINUED | OUTPATIENT
Start: 2019-08-15 | End: 2019-08-20 | Stop reason: HOSPADM

## 2019-08-15 RX ORDER — SODIUM CHLORIDE 0.9 % (FLUSH) 0.9 %
10 SYRINGE (ML) INJECTION EVERY 12 HOURS SCHEDULED
Status: DISCONTINUED | OUTPATIENT
Start: 2019-08-15 | End: 2019-08-20 | Stop reason: HOSPADM

## 2019-08-15 RX ORDER — HEPARIN SODIUM 5000 [USP'U]/ML
5000 INJECTION, SOLUTION INTRAVENOUS; SUBCUTANEOUS EVERY 8 HOURS SCHEDULED
Status: DISCONTINUED | OUTPATIENT
Start: 2019-08-15 | End: 2019-08-20 | Stop reason: HOSPADM

## 2019-08-15 RX ORDER — GABAPENTIN 400 MG/1
800 CAPSULE ORAL 4 TIMES DAILY
Status: DISCONTINUED | OUTPATIENT
Start: 2019-08-15 | End: 2019-08-20 | Stop reason: HOSPADM

## 2019-08-15 RX ORDER — DULOXETIN HYDROCHLORIDE 30 MG/1
30 CAPSULE, DELAYED RELEASE ORAL DAILY
Status: DISCONTINUED | OUTPATIENT
Start: 2019-08-16 | End: 2019-08-20 | Stop reason: HOSPADM

## 2019-08-15 RX ORDER — SODIUM CHLORIDE 0.9 % (FLUSH) 0.9 %
10 SYRINGE (ML) INJECTION PRN
Status: DISCONTINUED | OUTPATIENT
Start: 2019-08-15 | End: 2019-08-20 | Stop reason: HOSPADM

## 2019-08-15 RX ORDER — METAXALONE 800 MG/1
800 TABLET ORAL 3 TIMES DAILY PRN
Status: DISCONTINUED | OUTPATIENT
Start: 2019-08-15 | End: 2019-08-20 | Stop reason: HOSPADM

## 2019-08-15 RX ORDER — MEMANTINE HYDROCHLORIDE 5 MG/1
10 TABLET ORAL 2 TIMES DAILY
Status: DISCONTINUED | OUTPATIENT
Start: 2019-08-15 | End: 2019-08-20 | Stop reason: HOSPADM

## 2019-08-15 RX ORDER — ATORVASTATIN CALCIUM 40 MG/1
40 TABLET, FILM COATED ORAL NIGHTLY
Status: DISCONTINUED | OUTPATIENT
Start: 2019-08-15 | End: 2019-08-20 | Stop reason: HOSPADM

## 2019-08-15 RX ORDER — MEMANTINE HYDROCHLORIDE 10 MG/1
10 TABLET ORAL 2 TIMES DAILY
COMMUNITY
End: 2019-09-03

## 2019-08-15 RX ORDER — DOCUSATE SODIUM 100 MG/1
100 CAPSULE, LIQUID FILLED ORAL 2 TIMES DAILY
Status: DISCONTINUED | OUTPATIENT
Start: 2019-08-15 | End: 2019-08-20 | Stop reason: HOSPADM

## 2019-08-15 RX ORDER — PREGABALIN 150 MG/1
150 CAPSULE ORAL 3 TIMES DAILY PRN
Status: DISCONTINUED | OUTPATIENT
Start: 2019-08-15 | End: 2019-08-20 | Stop reason: HOSPADM

## 2019-08-15 RX ORDER — METHADONE HYDROCHLORIDE 10 MG/1
10 TABLET ORAL EVERY 6 HOURS PRN
Status: DISCONTINUED | OUTPATIENT
Start: 2019-08-15 | End: 2019-08-20 | Stop reason: HOSPADM

## 2019-08-15 RX ORDER — FUROSEMIDE 10 MG/ML
40 INJECTION INTRAMUSCULAR; INTRAVENOUS ONCE
Status: COMPLETED | OUTPATIENT
Start: 2019-08-15 | End: 2019-08-15

## 2019-08-15 RX ORDER — ONDANSETRON 2 MG/ML
4 INJECTION INTRAMUSCULAR; INTRAVENOUS EVERY 6 HOURS PRN
Status: DISCONTINUED | OUTPATIENT
Start: 2019-08-15 | End: 2019-08-20 | Stop reason: HOSPADM

## 2019-08-15 RX ORDER — ALPRAZOLAM 0.25 MG/1
0.25 TABLET ORAL NIGHTLY PRN
COMMUNITY
End: 2020-02-11 | Stop reason: ALTCHOICE

## 2019-08-15 RX ORDER — ACETAMINOPHEN 325 MG/1
650 TABLET ORAL EVERY 8 HOURS PRN
Status: DISCONTINUED | OUTPATIENT
Start: 2019-08-15 | End: 2019-08-20 | Stop reason: HOSPADM

## 2019-08-15 RX ADMIN — NORTRIPTYLINE HYDROCHLORIDE 25 MG: 25 CAPSULE ORAL at 20:47

## 2019-08-15 RX ADMIN — HYDROMORPHONE HYDROCHLORIDE 0.5 MG: 1 INJECTION, SOLUTION INTRAMUSCULAR; INTRAVENOUS; SUBCUTANEOUS at 20:48

## 2019-08-15 RX ADMIN — Medication 10 ML: at 20:48

## 2019-08-15 RX ADMIN — HYDROMORPHONE HYDROCHLORIDE 1 MG: 1 INJECTION, SOLUTION INTRAMUSCULAR; INTRAVENOUS; SUBCUTANEOUS at 15:14

## 2019-08-15 RX ADMIN — HYDROMORPHONE HYDROCHLORIDE 0.5 MG: 1 INJECTION, SOLUTION INTRAMUSCULAR; INTRAVENOUS; SUBCUTANEOUS at 18:26

## 2019-08-15 RX ADMIN — FUROSEMIDE 40 MG: 10 INJECTION, SOLUTION INTRAMUSCULAR; INTRAVENOUS at 14:06

## 2019-08-15 RX ADMIN — METAXALONE 800 MG: 800 TABLET ORAL at 22:00

## 2019-08-15 RX ADMIN — ATORVASTATIN CALCIUM 40 MG: 40 TABLET, FILM COATED ORAL at 20:48

## 2019-08-15 RX ADMIN — GABAPENTIN 800 MG: 400 CAPSULE ORAL at 20:48

## 2019-08-15 RX ADMIN — HYDROMORPHONE HYDROCHLORIDE 1 MG: 1 INJECTION, SOLUTION INTRAMUSCULAR; INTRAVENOUS; SUBCUTANEOUS at 14:44

## 2019-08-15 RX ADMIN — HEPARIN SODIUM 5000 UNITS: 5000 INJECTION INTRAVENOUS; SUBCUTANEOUS at 20:48

## 2019-08-15 RX ADMIN — HYDROMORPHONE HYDROCHLORIDE 0.5 MG: 1 INJECTION, SOLUTION INTRAMUSCULAR; INTRAVENOUS; SUBCUTANEOUS at 16:17

## 2019-08-15 RX ADMIN — DOCUSATE SODIUM 100 MG: 100 CAPSULE, LIQUID FILLED ORAL at 20:48

## 2019-08-15 RX ADMIN — METOPROLOL TARTRATE 25 MG: 25 TABLET ORAL at 20:47

## 2019-08-15 RX ADMIN — METHADONE HYDROCHLORIDE 10 MG: 10 TABLET ORAL at 22:00

## 2019-08-15 RX ADMIN — MEMANTINE HYDROCHLORIDE 10 MG: 5 TABLET ORAL at 20:47

## 2019-08-15 RX ADMIN — HYDROMORPHONE HYDROCHLORIDE 1 MG: 1 INJECTION, SOLUTION INTRAMUSCULAR; INTRAVENOUS; SUBCUTANEOUS at 14:05

## 2019-08-15 ASSESSMENT — ENCOUNTER SYMPTOMS
SORE THROAT: 0
ABDOMINAL PAIN: 0
VOMITING: 0
BACK PAIN: 0
DIARRHEA: 0
RHINORRHEA: 0
NAUSEA: 0
SHORTNESS OF BREATH: 0

## 2019-08-15 ASSESSMENT — PAIN DESCRIPTION - FREQUENCY
FREQUENCY: CONTINUOUS
FREQUENCY: CONTINUOUS

## 2019-08-15 ASSESSMENT — PAIN DESCRIPTION - PAIN TYPE
TYPE: ACUTE PAIN;CHRONIC PAIN

## 2019-08-15 ASSESSMENT — PAIN DESCRIPTION - ORIENTATION
ORIENTATION: RIGHT

## 2019-08-15 ASSESSMENT — PAIN DESCRIPTION - LOCATION
LOCATION: LEG

## 2019-08-15 ASSESSMENT — PAIN - FUNCTIONAL ASSESSMENT
PAIN_FUNCTIONAL_ASSESSMENT: PREVENTS OR INTERFERES SOME ACTIVE ACTIVITIES AND ADLS
PAIN_FUNCTIONAL_ASSESSMENT: PREVENTS OR INTERFERES WITH MANY ACTIVE NOT PASSIVE ACTIVITIES
PAIN_FUNCTIONAL_ASSESSMENT: PREVENTS OR INTERFERES SOME ACTIVE ACTIVITIES AND ADLS

## 2019-08-15 ASSESSMENT — PAIN DESCRIPTION - PROGRESSION
CLINICAL_PROGRESSION: GRADUALLY IMPROVING
CLINICAL_PROGRESSION: GRADUALLY IMPROVING

## 2019-08-15 ASSESSMENT — PAIN DESCRIPTION - DESCRIPTORS
DESCRIPTORS: ACHING
DESCRIPTORS: ACHING

## 2019-08-15 ASSESSMENT — PAIN SCALES - GENERAL
PAINLEVEL_OUTOF10: 8
PAINLEVEL_OUTOF10: 4
PAINLEVEL_OUTOF10: 5
PAINLEVEL_OUTOF10: 7
PAINLEVEL_OUTOF10: 3
PAINLEVEL_OUTOF10: 3
PAINLEVEL_OUTOF10: 4
PAINLEVEL_OUTOF10: 7
PAINLEVEL_OUTOF10: 5
PAINLEVEL_OUTOF10: 4
PAINLEVEL_OUTOF10: 10
PAINLEVEL_OUTOF10: 6
PAINLEVEL_OUTOF10: 10

## 2019-08-15 ASSESSMENT — PAIN DESCRIPTION - ONSET
ONSET: ON-GOING
ONSET: ON-GOING

## 2019-08-15 ASSESSMENT — PAIN DESCRIPTION - DIRECTION
RADIATING_TOWARDS: FOOT
RADIATING_TOWARDS: FOOT

## 2019-08-15 NOTE — PROGRESS NOTES
4 Eyes Skin Assessment    Mauricio Aaron is being assessed upon: Admission    I agree that I, Amber Avalos, along with Colleen Conroy RN (either 2 RN's or 1 LPN and 1 RN) have performed a thorough Head to Toe Skin Assessment on the patient. ALL assessment sites listed below have been assessed. Areas assessed by both nurses:     [x]   Head, Face, and Ears   [x]   Shoulders, Back, and Chest  [x]   Arms, Elbows, and Hands   [x]   Coccyx, Sacrum, and Ischium  [x]   Legs, Feet, and Heels    Does the Patient have Skin Breakdown?  No    Nhan Prevention initiated: Yes  Wound Care Orders initiated: NA    [facility] nurse consulted for Pressure Injury (Stage 3,4, Unstageable, DTI, NWPT, and Complex wounds) and New or Established Ostomies: NA        Primary Nurse eSignature: Amber Avalos RN on 8/15/2019 at 4:03 PM      Co-Signer eSignature: Electronically signed by Colleen Conroy RN on 8/15/19 at 4:07 PM
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
## 2019-08-15 NOTE — H&P
home statin, beta-blocker    #Right lower extremity swelling  -We will check venous ultrasound      Hosea Mendoza MD  Admitting Hospitalist

## 2019-08-16 ENCOUNTER — APPOINTMENT (OUTPATIENT)
Dept: ULTRASOUND IMAGING | Age: 69
DRG: 660 | End: 2019-08-16
Payer: MEDICARE

## 2019-08-16 PROBLEM — G90.521 COMPLEX REGIONAL PAIN SYNDROME TYPE 1 OF RIGHT LOWER EXTREMITY: Status: ACTIVE | Noted: 2019-08-16

## 2019-08-16 LAB
ALBUMIN SERPL-MCNC: 4.6 G/DL (ref 3.5–5.2)
ALP BLD-CCNC: 125 U/L (ref 40–130)
ALT SERPL-CCNC: 14 U/L (ref 5–41)
ANION GAP SERPL CALCULATED.3IONS-SCNC: 14 MMOL/L (ref 7–19)
AST SERPL-CCNC: 38 U/L (ref 5–40)
BASOPHILS ABSOLUTE: 0.1 K/UL (ref 0–0.2)
BASOPHILS RELATIVE PERCENT: 0.7 % (ref 0–1)
BILIRUB SERPL-MCNC: 0.3 MG/DL (ref 0.2–1.2)
BUN BLDV-MCNC: 25 MG/DL (ref 8–23)
CALCIUM SERPL-MCNC: 9.7 MG/DL (ref 8.8–10.2)
CHLORIDE BLD-SCNC: 97 MMOL/L (ref 98–111)
CO2: 22 MMOL/L (ref 22–29)
CREAT SERPL-MCNC: 1.7 MG/DL (ref 0.5–1.2)
EOSINOPHILS ABSOLUTE: 0.3 K/UL (ref 0–0.6)
EOSINOPHILS RELATIVE PERCENT: 3.7 % (ref 0–5)
GFR NON-AFRICAN AMERICAN: 40
GLUCOSE BLD-MCNC: 135 MG/DL (ref 74–109)
HCT VFR BLD CALC: 34.2 % (ref 42–52)
HEMOGLOBIN: 11.3 G/DL (ref 14–18)
IMMATURE GRANULOCYTES #: 0.1 K/UL
INR BLD: 1.1 (ref 0.88–1.18)
LV EF: 58 %
LVEF MODALITY: NORMAL
LYMPHOCYTES ABSOLUTE: 1.1 K/UL (ref 1.1–4.5)
LYMPHOCYTES RELATIVE PERCENT: 13.6 % (ref 20–40)
MAGNESIUM: 1.8 MG/DL (ref 1.6–2.4)
MCH RBC QN AUTO: 27.3 PG (ref 27–31)
MCHC RBC AUTO-ENTMCNC: 33 G/DL (ref 33–37)
MCV RBC AUTO: 82.6 FL (ref 80–94)
MONOCYTES ABSOLUTE: 0.6 K/UL (ref 0–0.9)
MONOCYTES RELATIVE PERCENT: 7.6 % (ref 0–10)
NEUTROPHILS ABSOLUTE: 6.2 K/UL (ref 1.5–7.5)
NEUTROPHILS RELATIVE PERCENT: 73.8 % (ref 50–65)
PDW BLD-RTO: 12.7 % (ref 11.5–14.5)
PLATELET # BLD: 274 K/UL (ref 130–400)
PMV BLD AUTO: 11.3 FL (ref 9.4–12.4)
POTASSIUM SERPL-SCNC: 4 MMOL/L (ref 3.5–5)
PROTHROMBIN TIME: 13.6 SEC (ref 12–14.6)
RBC # BLD: 4.14 M/UL (ref 4.7–6.1)
SODIUM BLD-SCNC: 133 MMOL/L (ref 136–145)
TOTAL CK: 784 U/L (ref 39–308)
TOTAL PROTEIN: 7.7 G/DL (ref 6.6–8.7)
WBC # BLD: 8.4 K/UL (ref 4.8–10.8)

## 2019-08-16 PROCEDURE — 6360000002 HC RX W HCPCS: Performed by: INTERNAL MEDICINE

## 2019-08-16 PROCEDURE — 85025 COMPLETE CBC W/AUTO DIFF WBC: CPT

## 2019-08-16 PROCEDURE — 6370000000 HC RX 637 (ALT 250 FOR IP): Performed by: INTERNAL MEDICINE

## 2019-08-16 PROCEDURE — 83735 ASSAY OF MAGNESIUM: CPT

## 2019-08-16 PROCEDURE — 96372 THER/PROPH/DIAG INJ SC/IM: CPT

## 2019-08-16 PROCEDURE — 93306 TTE W/DOPPLER COMPLETE: CPT

## 2019-08-16 PROCEDURE — 96376 TX/PRO/DX INJ SAME DRUG ADON: CPT

## 2019-08-16 PROCEDURE — G0378 HOSPITAL OBSERVATION PER HR: HCPCS

## 2019-08-16 PROCEDURE — 2580000003 HC RX 258: Performed by: INTERNAL MEDICINE

## 2019-08-16 PROCEDURE — 82550 ASSAY OF CK (CPK): CPT

## 2019-08-16 PROCEDURE — 80053 COMPREHEN METABOLIC PANEL: CPT

## 2019-08-16 PROCEDURE — 94762 N-INVAS EAR/PLS OXIMTRY CONT: CPT

## 2019-08-16 PROCEDURE — 85610 PROTHROMBIN TIME: CPT

## 2019-08-16 PROCEDURE — 76770 US EXAM ABDO BACK WALL COMP: CPT

## 2019-08-16 RX ORDER — LOPERAMIDE HYDROCHLORIDE 2 MG/1
2 CAPSULE ORAL 4 TIMES DAILY
Status: DISCONTINUED | OUTPATIENT
Start: 2019-08-16 | End: 2019-08-20 | Stop reason: HOSPADM

## 2019-08-16 RX ORDER — SODIUM CHLORIDE 9 MG/ML
INJECTION, SOLUTION INTRAVENOUS CONTINUOUS
Status: DISCONTINUED | OUTPATIENT
Start: 2019-08-16 | End: 2019-08-17

## 2019-08-16 RX ORDER — FUROSEMIDE 10 MG/ML
40 INJECTION INTRAMUSCULAR; INTRAVENOUS 2 TIMES DAILY
Status: DISCONTINUED | OUTPATIENT
Start: 2019-08-16 | End: 2019-08-18

## 2019-08-16 RX ADMIN — METOPROLOL TARTRATE 25 MG: 25 TABLET ORAL at 20:15

## 2019-08-16 RX ADMIN — Medication 10 ML: at 09:00

## 2019-08-16 RX ADMIN — METHADONE HYDROCHLORIDE 10 MG: 10 TABLET ORAL at 20:15

## 2019-08-16 RX ADMIN — FUROSEMIDE 40 MG: 10 INJECTION, SOLUTION INTRAMUSCULAR; INTRAVENOUS at 17:50

## 2019-08-16 RX ADMIN — HEPARIN SODIUM 5000 UNITS: 5000 INJECTION INTRAVENOUS; SUBCUTANEOUS at 14:47

## 2019-08-16 RX ADMIN — Medication 10 ML: at 20:16

## 2019-08-16 RX ADMIN — GABAPENTIN 800 MG: 400 CAPSULE ORAL at 12:32

## 2019-08-16 RX ADMIN — HYDROMORPHONE HYDROCHLORIDE 0.5 MG: 1 INJECTION, SOLUTION INTRAMUSCULAR; INTRAVENOUS; SUBCUTANEOUS at 06:12

## 2019-08-16 RX ADMIN — DULOXETINE 30 MG: 30 CAPSULE, DELAYED RELEASE ORAL at 08:59

## 2019-08-16 RX ADMIN — HEPARIN SODIUM 5000 UNITS: 5000 INJECTION INTRAVENOUS; SUBCUTANEOUS at 06:12

## 2019-08-16 RX ADMIN — GABAPENTIN 800 MG: 400 CAPSULE ORAL at 20:15

## 2019-08-16 RX ADMIN — HYDROMORPHONE HYDROCHLORIDE 0.5 MG: 1 INJECTION, SOLUTION INTRAMUSCULAR; INTRAVENOUS; SUBCUTANEOUS at 01:18

## 2019-08-16 RX ADMIN — HYDROMORPHONE HYDROCHLORIDE 0.5 MG: 1 INJECTION, SOLUTION INTRAMUSCULAR; INTRAVENOUS; SUBCUTANEOUS at 16:38

## 2019-08-16 RX ADMIN — MEMANTINE HYDROCHLORIDE 10 MG: 5 TABLET ORAL at 20:15

## 2019-08-16 RX ADMIN — GABAPENTIN 800 MG: 400 CAPSULE ORAL at 09:00

## 2019-08-16 RX ADMIN — DOCUSATE SODIUM 100 MG: 100 CAPSULE, LIQUID FILLED ORAL at 08:59

## 2019-08-16 RX ADMIN — HYDROMORPHONE HYDROCHLORIDE 0.5 MG: 1 INJECTION, SOLUTION INTRAMUSCULAR; INTRAVENOUS; SUBCUTANEOUS at 03:18

## 2019-08-16 RX ADMIN — LOPERAMIDE HYDROCHLORIDE 2 MG: 2 CAPSULE ORAL at 16:38

## 2019-08-16 RX ADMIN — HEPARIN SODIUM 5000 UNITS: 5000 INJECTION INTRAVENOUS; SUBCUTANEOUS at 22:00

## 2019-08-16 RX ADMIN — METHADONE HYDROCHLORIDE 10 MG: 10 TABLET ORAL at 05:05

## 2019-08-16 RX ADMIN — HYDROMORPHONE HYDROCHLORIDE 0.5 MG: 1 INJECTION, SOLUTION INTRAMUSCULAR; INTRAVENOUS; SUBCUTANEOUS at 14:47

## 2019-08-16 RX ADMIN — METOPROLOL TARTRATE 25 MG: 25 TABLET ORAL at 08:59

## 2019-08-16 RX ADMIN — HYDROMORPHONE HYDROCHLORIDE 0.5 MG: 1 INJECTION, SOLUTION INTRAMUSCULAR; INTRAVENOUS; SUBCUTANEOUS at 18:54

## 2019-08-16 RX ADMIN — METAXALONE 800 MG: 800 TABLET ORAL at 14:47

## 2019-08-16 RX ADMIN — METHADONE HYDROCHLORIDE 10 MG: 10 TABLET ORAL at 12:32

## 2019-08-16 RX ADMIN — ATORVASTATIN CALCIUM 40 MG: 40 TABLET, FILM COATED ORAL at 20:15

## 2019-08-16 RX ADMIN — SODIUM CHLORIDE: 9 INJECTION, SOLUTION INTRAVENOUS at 16:39

## 2019-08-16 RX ADMIN — METAXALONE 800 MG: 800 TABLET ORAL at 23:15

## 2019-08-16 RX ADMIN — LOPERAMIDE HYDROCHLORIDE 2 MG: 2 CAPSULE ORAL at 20:15

## 2019-08-16 RX ADMIN — MEMANTINE HYDROCHLORIDE 10 MG: 5 TABLET ORAL at 08:59

## 2019-08-16 RX ADMIN — HYDROMORPHONE HYDROCHLORIDE 0.5 MG: 1 INJECTION, SOLUTION INTRAMUSCULAR; INTRAVENOUS; SUBCUTANEOUS at 08:59

## 2019-08-16 RX ADMIN — NORTRIPTYLINE HYDROCHLORIDE 25 MG: 25 CAPSULE ORAL at 20:15

## 2019-08-16 RX ADMIN — PREGABALIN 150 MG: 150 CAPSULE ORAL at 23:15

## 2019-08-16 RX ADMIN — GABAPENTIN 800 MG: 400 CAPSULE ORAL at 16:38

## 2019-08-16 ASSESSMENT — PAIN SCALES - GENERAL
PAINLEVEL_OUTOF10: 4
PAINLEVEL_OUTOF10: 2
PAINLEVEL_OUTOF10: 5
PAINLEVEL_OUTOF10: 4
PAINLEVEL_OUTOF10: 5
PAINLEVEL_OUTOF10: 6
PAINLEVEL_OUTOF10: 6
PAINLEVEL_OUTOF10: 2
PAINLEVEL_OUTOF10: 3
PAINLEVEL_OUTOF10: 3
PAINLEVEL_OUTOF10: 7
PAINLEVEL_OUTOF10: 5
PAINLEVEL_OUTOF10: 5
PAINLEVEL_OUTOF10: 7
PAINLEVEL_OUTOF10: 10
PAINLEVEL_OUTOF10: 9
PAINLEVEL_OUTOF10: 7
PAINLEVEL_OUTOF10: 7
PAINLEVEL_OUTOF10: 4
PAINLEVEL_OUTOF10: 7

## 2019-08-16 ASSESSMENT — PAIN DESCRIPTION - LOCATION
LOCATION: LEG

## 2019-08-16 ASSESSMENT — PAIN DESCRIPTION - PAIN TYPE
TYPE: ACUTE PAIN;CHRONIC PAIN
TYPE: ACUTE PAIN;CHRONIC PAIN
TYPE: CHRONIC PAIN

## 2019-08-16 ASSESSMENT — PAIN DESCRIPTION - PROGRESSION
CLINICAL_PROGRESSION: GRADUALLY IMPROVING
CLINICAL_PROGRESSION: NOT CHANGED
CLINICAL_PROGRESSION: GRADUALLY IMPROVING

## 2019-08-16 ASSESSMENT — PAIN DESCRIPTION - DIRECTION
RADIATING_TOWARDS: FOOT
RADIATING_TOWARDS: FOOT

## 2019-08-16 ASSESSMENT — PAIN DESCRIPTION - ORIENTATION
ORIENTATION: RIGHT

## 2019-08-16 ASSESSMENT — PAIN DESCRIPTION - DESCRIPTORS
DESCRIPTORS: ACHING
DESCRIPTORS: ACHING
DESCRIPTORS: ACHING;CONSTANT;DISCOMFORT

## 2019-08-16 ASSESSMENT — PAIN DESCRIPTION - ONSET
ONSET: ON-GOING

## 2019-08-16 ASSESSMENT — PAIN DESCRIPTION - FREQUENCY
FREQUENCY: CONTINUOUS

## 2019-08-16 ASSESSMENT — PAIN - FUNCTIONAL ASSESSMENT
PAIN_FUNCTIONAL_ASSESSMENT: PREVENTS OR INTERFERES SOME ACTIVE ACTIVITIES AND ADLS
PAIN_FUNCTIONAL_ASSESSMENT: PREVENTS OR INTERFERES WITH ALL ACTIVE AND SOME PASSIVE ACTIVITIES
PAIN_FUNCTIONAL_ASSESSMENT: PREVENTS OR INTERFERES SOME ACTIVE ACTIVITIES AND ADLS
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
## 2019-08-16 NOTE — PROGRESS NOTES
(LOPRESSOR) tablet 25 mg  25 mg Oral BID Blanca Nicole MD   25 mg at 08/15/19 2047    DULoxetine (CYMBALTA) extended release capsule 30 mg  30 mg Oral Daily Blanca Nicole MD        gabapentin (NEURONTIN) capsule 800 mg  800 mg Oral 4x daily Blanca Nicole MD   800 mg at 08/15/19 2048    memantine (NAMENDA) tablet 10 mg  10 mg Oral BID Blanca Nicole MD   10 mg at 08/15/19 2047    metaxalone (SKELAXIN) tablet 800 mg  800 mg Oral TID PRN Blanca Nicole MD   800 mg at 08/15/19 2200    methadone (DOLOPHINE) tablet 10 mg  10 mg Oral Q6H PRN Blanca Nicole MD   10 mg at 08/16/19 0505    nortriptyline (PAMELOR) capsule 25 mg  25 mg Oral Daily Blanca Nicole MD   25 mg at 08/15/19 2047    pregabalin (LYRICA) capsule 150 mg  150 mg Oral TID PRN Blanca Nicole MD            Labs:     Recent Labs     08/15/19  1357   WBC 16.8*   RBC 4.23*   HGB 11.6*   HCT 36.1*   MCV 85.3   MCH 27.4   MCHC 32.1*        Recent Labs     08/15/19  1357   *   K 4.6   ANIONGAP 17   CL 97*   CO2 20*   BUN 25*   CREATININE 1.7*   GLUCOSE 91   CALCIUM 9.8       Recent Labs     08/15/19  1357   AST 31   ALT 12   BILITOT 0.4   ALKPHOS 119         Objective:   Vitals: /77   Pulse 65   Temp 97.6 °F (36.4 °C) (Temporal)   Resp 18   Ht 5' 5\" (1.651 m)   Wt 200 lb (90.7 kg)   SpO2 97%   BMI 33.28 kg/m²   24HR INTAKE/OUTPUT:      Intake/Output Summary (Last 24 hours) at 8/16/2019 0842  Last data filed at 8/16/2019 0500  Gross per 24 hour   Intake 10 ml   Output 2700 ml   Net -2690 ml     General appearance: NAD, alert and cooperative with exam  HEENT: atraumatic, PERRLA, EOMI, anicteric, trachea midline  Lungs: no increased work of breathing, CTAB, no wheezing/rhonchi/rales  Heart: RRR, +s1/s2, no rub  Abdomen: soft, nt/nd, +BS, no rebound/gaurding  Extremities: b/l LE edema, R>L.    Neurologic: AAOx3, no focal deficits  Skin: chronic stasis changes b/l LE  Psych: Normal affect      Assessment and Plan:   Principal Problem:    COLLEEN (acute kidney injury) ([facility] - w/ recent NSAID use. Did have good response to diuretic but slightly elevated ck as well. Check ESR/crp. Suspect pt may need fluids. Check 2d echo and renal u/s. Consult to nephrology for assistance. No NSAIDS. Renal adjust meds. Active Problems:    Mixed hyperlipidemia - statin      Coronary artery disease involving native coronary artery of native heart without angina pectoris - had stent over a year ago, no issues, no active chest pain      Complex regional pain syndrome type 1 of right lower extremity - follows with pain management. He is on neurontin, pamelor, skelaxin, methadone. May need to scale back Neurontin dose if renal function worsens.        Advance Directive: Full Code          Electronically signed by Justyna Tellez DO on 8/16/2019 at 8:42 AM

## 2019-08-17 ENCOUNTER — APPOINTMENT (OUTPATIENT)
Dept: CT IMAGING | Age: 69
DRG: 660 | End: 2019-08-17
Payer: MEDICARE

## 2019-08-17 ENCOUNTER — PREP FOR PROCEDURE (OUTPATIENT)
Dept: UROLOGY | Age: 69
End: 2019-08-17

## 2019-08-17 PROBLEM — N13.4 HYDROURETER ON RIGHT: Status: ACTIVE | Noted: 2019-08-17

## 2019-08-17 PROBLEM — N13.30 HYDRONEPHROSIS OF RIGHT KIDNEY: Status: ACTIVE | Noted: 2019-08-17

## 2019-08-17 LAB
ANION GAP SERPL CALCULATED.3IONS-SCNC: 14 MMOL/L (ref 7–19)
BUN BLDV-MCNC: 25 MG/DL (ref 8–23)
C-REACTIVE PROTEIN: 2.65 MG/DL (ref 0–0.5)
CA 19-9: 33 U/ML (ref 0–35)
CALCIUM SERPL-MCNC: 9.2 MG/DL (ref 8.8–10.2)
CEA: 18.1 NG/ML (ref 0–4.7)
CHLORIDE BLD-SCNC: 98 MMOL/L (ref 98–111)
CO2: 24 MMOL/L (ref 22–29)
CREAT SERPL-MCNC: 1.6 MG/DL (ref 0.5–1.2)
GFR NON-AFRICAN AMERICAN: 43
GLUCOSE BLD-MCNC: 99 MG/DL (ref 74–109)
POTASSIUM SERPL-SCNC: 3.7 MMOL/L (ref 3.5–5)
SEDIMENTATION RATE, ERYTHROCYTE: 25 MM/HR (ref 0–15)
SODIUM BLD-SCNC: 136 MMOL/L (ref 136–145)
TOTAL CK: 516 U/L (ref 39–308)
URIC ACID, SERUM: 8.5 MG/DL (ref 3.4–7)

## 2019-08-17 PROCEDURE — 51702 INSERT TEMP BLADDER CATH: CPT

## 2019-08-17 PROCEDURE — 6360000002 HC RX W HCPCS: Performed by: INTERNAL MEDICINE

## 2019-08-17 PROCEDURE — 82378 CARCINOEMBRYONIC ANTIGEN: CPT

## 2019-08-17 PROCEDURE — 74150 CT ABDOMEN W/O CONTRAST: CPT

## 2019-08-17 PROCEDURE — 96376 TX/PRO/DX INJ SAME DRUG ADON: CPT

## 2019-08-17 PROCEDURE — 84550 ASSAY OF BLOOD/URIC ACID: CPT

## 2019-08-17 PROCEDURE — 85652 RBC SED RATE AUTOMATED: CPT

## 2019-08-17 PROCEDURE — 6370000000 HC RX 637 (ALT 250 FOR IP): Performed by: INTERNAL MEDICINE

## 2019-08-17 PROCEDURE — 1210000000 HC MED SURG R&B

## 2019-08-17 PROCEDURE — 36415 COLL VENOUS BLD VENIPUNCTURE: CPT

## 2019-08-17 PROCEDURE — 99222 1ST HOSP IP/OBS MODERATE 55: CPT | Performed by: PHYSICIAN ASSISTANT

## 2019-08-17 PROCEDURE — 82550 ASSAY OF CK (CPK): CPT

## 2019-08-17 PROCEDURE — 84153 ASSAY OF PSA TOTAL: CPT

## 2019-08-17 PROCEDURE — 96372 THER/PROPH/DIAG INJ SC/IM: CPT

## 2019-08-17 PROCEDURE — 86301 IMMUNOASSAY TUMOR CA 19-9: CPT

## 2019-08-17 PROCEDURE — 84154 ASSAY OF PSA FREE: CPT

## 2019-08-17 PROCEDURE — 94762 N-INVAS EAR/PLS OXIMTRY CONT: CPT

## 2019-08-17 PROCEDURE — 2580000003 HC RX 258: Performed by: INTERNAL MEDICINE

## 2019-08-17 PROCEDURE — 80048 BASIC METABOLIC PNL TOTAL CA: CPT

## 2019-08-17 PROCEDURE — 86140 C-REACTIVE PROTEIN: CPT

## 2019-08-17 RX ORDER — ALPRAZOLAM 0.25 MG/1
0.25 TABLET ORAL ONCE
Status: COMPLETED | OUTPATIENT
Start: 2019-08-17 | End: 2019-08-17

## 2019-08-17 RX ADMIN — HEPARIN SODIUM 5000 UNITS: 5000 INJECTION INTRAVENOUS; SUBCUTANEOUS at 06:07

## 2019-08-17 RX ADMIN — METOPROLOL TARTRATE 25 MG: 25 TABLET ORAL at 08:42

## 2019-08-17 RX ADMIN — HYDROMORPHONE HYDROCHLORIDE 0.5 MG: 1 INJECTION, SOLUTION INTRAMUSCULAR; INTRAVENOUS; SUBCUTANEOUS at 22:06

## 2019-08-17 RX ADMIN — MEMANTINE HYDROCHLORIDE 10 MG: 5 TABLET ORAL at 20:02

## 2019-08-17 RX ADMIN — LOPERAMIDE HYDROCHLORIDE 2 MG: 2 CAPSULE ORAL at 16:31

## 2019-08-17 RX ADMIN — GABAPENTIN 800 MG: 400 CAPSULE ORAL at 08:42

## 2019-08-17 RX ADMIN — HYDROMORPHONE HYDROCHLORIDE 0.5 MG: 1 INJECTION, SOLUTION INTRAMUSCULAR; INTRAVENOUS; SUBCUTANEOUS at 01:04

## 2019-08-17 RX ADMIN — ATORVASTATIN CALCIUM 40 MG: 40 TABLET, FILM COATED ORAL at 20:02

## 2019-08-17 RX ADMIN — LOPERAMIDE HYDROCHLORIDE 2 MG: 2 CAPSULE ORAL at 20:03

## 2019-08-17 RX ADMIN — METHADONE HYDROCHLORIDE 10 MG: 10 TABLET ORAL at 08:42

## 2019-08-17 RX ADMIN — LOPERAMIDE HYDROCHLORIDE 2 MG: 2 CAPSULE ORAL at 12:03

## 2019-08-17 RX ADMIN — HYDROMORPHONE HYDROCHLORIDE 0.5 MG: 1 INJECTION, SOLUTION INTRAMUSCULAR; INTRAVENOUS; SUBCUTANEOUS at 12:03

## 2019-08-17 RX ADMIN — FUROSEMIDE 40 MG: 10 INJECTION, SOLUTION INTRAMUSCULAR; INTRAVENOUS at 17:45

## 2019-08-17 RX ADMIN — FUROSEMIDE 40 MG: 10 INJECTION, SOLUTION INTRAMUSCULAR; INTRAVENOUS at 08:42

## 2019-08-17 RX ADMIN — DULOXETINE 30 MG: 30 CAPSULE, DELAYED RELEASE ORAL at 08:42

## 2019-08-17 RX ADMIN — Medication 10 ML: at 20:05

## 2019-08-17 RX ADMIN — GABAPENTIN 800 MG: 400 CAPSULE ORAL at 20:02

## 2019-08-17 RX ADMIN — ALPRAZOLAM 0.25 MG: 0.25 TABLET ORAL at 03:54

## 2019-08-17 RX ADMIN — LOPERAMIDE HYDROCHLORIDE 2 MG: 2 CAPSULE ORAL at 08:41

## 2019-08-17 RX ADMIN — HYDROMORPHONE HYDROCHLORIDE 0.5 MG: 1 INJECTION, SOLUTION INTRAMUSCULAR; INTRAVENOUS; SUBCUTANEOUS at 20:03

## 2019-08-17 RX ADMIN — HYDROMORPHONE HYDROCHLORIDE 0.5 MG: 1 INJECTION, SOLUTION INTRAMUSCULAR; INTRAVENOUS; SUBCUTANEOUS at 06:07

## 2019-08-17 RX ADMIN — METHADONE HYDROCHLORIDE 10 MG: 10 TABLET ORAL at 02:07

## 2019-08-17 RX ADMIN — GABAPENTIN 800 MG: 400 CAPSULE ORAL at 16:31

## 2019-08-17 RX ADMIN — HYDROMORPHONE HYDROCHLORIDE 0.5 MG: 1 INJECTION, SOLUTION INTRAMUSCULAR; INTRAVENOUS; SUBCUTANEOUS at 03:55

## 2019-08-17 RX ADMIN — MEMANTINE HYDROCHLORIDE 10 MG: 5 TABLET ORAL at 08:41

## 2019-08-17 RX ADMIN — Medication 10 ML: at 08:42

## 2019-08-17 RX ADMIN — HYDROMORPHONE HYDROCHLORIDE 0.5 MG: 1 INJECTION, SOLUTION INTRAMUSCULAR; INTRAVENOUS; SUBCUTANEOUS at 16:31

## 2019-08-17 RX ADMIN — HEPARIN SODIUM 5000 UNITS: 5000 INJECTION INTRAVENOUS; SUBCUTANEOUS at 22:07

## 2019-08-17 RX ADMIN — DOCUSATE SODIUM 100 MG: 100 CAPSULE, LIQUID FILLED ORAL at 08:42

## 2019-08-17 RX ADMIN — GABAPENTIN 800 MG: 400 CAPSULE ORAL at 12:03

## 2019-08-17 RX ADMIN — NORTRIPTYLINE HYDROCHLORIDE 25 MG: 25 CAPSULE ORAL at 20:03

## 2019-08-17 RX ADMIN — METHADONE HYDROCHLORIDE 10 MG: 10 TABLET ORAL at 17:48

## 2019-08-17 ASSESSMENT — PAIN DESCRIPTION - FREQUENCY
FREQUENCY: CONTINUOUS
FREQUENCY: CONTINUOUS

## 2019-08-17 ASSESSMENT — PAIN SCALES - GENERAL
PAINLEVEL_OUTOF10: 7
PAINLEVEL_OUTOF10: 5
PAINLEVEL_OUTOF10: 6
PAINLEVEL_OUTOF10: 5
PAINLEVEL_OUTOF10: 2
PAINLEVEL_OUTOF10: 4
PAINLEVEL_OUTOF10: 7
PAINLEVEL_OUTOF10: 5
PAINLEVEL_OUTOF10: 7
PAINLEVEL_OUTOF10: 7
PAINLEVEL_OUTOF10: 4
PAINLEVEL_OUTOF10: 4
PAINLEVEL_OUTOF10: 8
PAINLEVEL_OUTOF10: 5
PAINLEVEL_OUTOF10: 4
PAINLEVEL_OUTOF10: 7

## 2019-08-17 ASSESSMENT — PAIN DESCRIPTION - LOCATION
LOCATION: LEG
LOCATION: LEG

## 2019-08-17 ASSESSMENT — PAIN DESCRIPTION - PROGRESSION
CLINICAL_PROGRESSION: NOT CHANGED
CLINICAL_PROGRESSION: NOT CHANGED

## 2019-08-17 ASSESSMENT — PAIN DESCRIPTION - DESCRIPTORS
DESCRIPTORS: ACHING;CONSTANT;THROBBING
DESCRIPTORS: ACHING;CONSTANT

## 2019-08-17 ASSESSMENT — PAIN DESCRIPTION - ONSET
ONSET: ON-GOING
ONSET: ON-GOING

## 2019-08-17 ASSESSMENT — PAIN DESCRIPTION - ORIENTATION
ORIENTATION: LEFT;RIGHT
ORIENTATION: RIGHT

## 2019-08-17 ASSESSMENT — PAIN DESCRIPTION - PAIN TYPE
TYPE: CHRONIC PAIN
TYPE: CHRONIC PAIN

## 2019-08-17 ASSESSMENT — PAIN - FUNCTIONAL ASSESSMENT
PAIN_FUNCTIONAL_ASSESSMENT: PREVENTS OR INTERFERES SOME ACTIVE ACTIVITIES AND ADLS
PAIN_FUNCTIONAL_ASSESSMENT: PREVENTS OR INTERFERES SOME ACTIVE ACTIVITIES AND ADLS

## 2019-08-17 ASSESSMENT — PAIN DESCRIPTION - DIRECTION: RADIATING_TOWARDS: FEET

## 2019-08-17 NOTE — PROGRESS NOTES
Nephrology ([facility]) Progress Note    Patient:  Nino Guajardo  YOB: 1950  Date of Service: 8/17/2019  MRN: [id number]   Acct: [de-identified]   Primary Care Physician: Rayo Gonzalez MD  Advance Directive: Full Code  Admit Date: 8/15/2019       Hospital Day: 0  Referring Provider: Orlando Yu DO    Patient Seen, Chart, Consults notes, Labs, Radiology studies reviewed. Subjective:     Patient is a 82 yo pleasant man who has had back issues along with LE peripheral neuropathy. He is s/p 5 back surgeries in the past. He continues to suffer from chronic back pain and neuropathic leg pain. He is currently under the care of pain management in [place] and is managed with methadone. He admitted also taking 8 Aleve per day. He has had leg edema in the past (mostly affecting the right leg and that improve with leg elevation). Patient also has a history of colectomy and ileostomy after suffering trauma to his colon and then colon cancer. He is now admitted for severe leg pain. He was found with worsening leg edema (bilaterally) and abnormal renal function. His creatinine was 1.7. He is receiving diuretics. Patient continues to have severe leg pain. Renal service was called to manage his COLLEEN. He denied nausea, vomiting, increasing ileostomy output, recent weight change and dysuria. His Aleve was held. Today, he offered no new complaints. His renal US has shown right moderate to severe hydronephrosis. He is going for right ureteral stent on 8/18.      Allergies:  Morphine    Medicines:  Current Facility-Administered Medications   Medication Dose Route Frequency Provider Last Rate Last Dose    [START ON 8/18/2019] cefTRIAXone (ROCEPHIN) 1 g in sodium chloride (PF) 10 mL IV syringe  1 g Intravenous On Call to Hi Stephenson MD        loperamide (IMODIUM) capsule 2 mg  2 mg Oral 4x daily Dash De Leon DO   2 mg at 08/17/19 0841    0.9 % sodium chloride infusion Relationships    Social connections:     Talks on phone: Not on file     Gets together: Not on file     Attends Sabianist service: Not on file     Active member of club or organization: Not on file     Attends meetings of clubs or organizations: Not on file     Relationship status: Not on file    Intimate partner violence:     Fear of current or ex partner: Not on file     Emotionally abused: Not on file     Physically abused: Not on file     Forced sexual activity: Not on file   Other Topics Concern    Not on file   Social History Narrative    Not on file         Review of Systems:  History obtained from chart review and the patient  General ROS: No fever or chills  Respiratory ROS: No cough, shortness of breath, wheezing  Cardiovascular ROS: no chest pain or dyspnea on exertion  Gastrointestinal ROS: No abdominal pain or melena  Genito-Urinary ROS: No dysuria or hematuria  Musculoskeletal ROS: + joint pain and leg swelling         Objective:  Blood pressure 117/68, pulse 67, temperature 97.8 °F (36.6 °C), temperature source Temporal, resp. rate 16, height 5' 5\" (1.651 m), weight 200 lb (90.7 kg), SpO2 100 %.     Intake/Output Summary (Last 24 hours) at 8/17/2019 1125  Last data filed at 8/17/2019 0926  Gross per 24 hour   Intake 1282.6 ml   Output 3050 ml   Net -1767.4 ml     General: alert and oriented x3   Chest:  clear to auscultation bilaterally without respiratory distress  CVS: regular rate and rhythm  Abdominal: soft, nontender, normal bowel sounds  Extremities: no cyanosis but 1+ leg edema  Skin: warm and dry without rash    Labs:  BMP:   Recent Labs     08/15/19  1357 08/16/19  0940 08/17/19  0451   * 133* 136   K 4.6 4.0 3.7   CL 97* 97* 98   CO2 20* 22 24   BUN 25* 25* 25*   CREATININE 1.7* 1.7* 1.6*   CALCIUM 9.8 9.7 9.2     CBC:   Recent Labs     08/15/19  1357 08/16/19  0940   WBC 16.8* 8.4   HGB 11.6* 11.3*   HCT 36.1* 34.2*   MCV 85.3 82.6    274     LIVER PROFILE:   Recent Labs 08/15/19  1357 08/16/19  0940   AST 31 38   ALT 12 14   BILITOT 0.4 0.3   ALKPHOS 119 125     PT/INR:   Recent Labs     08/16/19  0940   PROTIME 13.6   INR 1.10     APTT: No results for input(s): APTT in the last 72 hours. BNP:  No results for input(s): BNP in the last 72 hours. Ionized Calcium:No results for input(s): IONCA in the last 72 hours. Magnesium:  Recent Labs     08/16/19  0940   MG 1.8     Phosphorus:No results for input(s): PHOS in the last 72 hours. HgbA1C: No results for input(s): LABA1C in the last 72 hours. Hepatic:   Recent Labs     08/15/19  1357 08/16/19  0940   ALKPHOS 119 125   ALT 12 14   AST 31 38   PROT 8.0 7.7   BILITOT 0.4 0.3   LABALBU 4.7 4.6     Lactic Acid:   Recent Labs     08/15/19  2132   LACTA 1.8     Troponin:   Recent Labs     08/16/19  0940 08/17/19  0451   CKTOTAL 784* 516*     ABGs: No results for input(s): PH, PCO2, PO2, HCO3, O2SAT in the last 72 hours. CRP:    Recent Labs     08/17/19  0451   CRP 2.65*     Sed Rate:    Recent Labs     08/17/19  0451   SEDRATE 25*       Cultures:   No results for input(s): CULTURE in the last 72 hours. Radiology reports as per the Radiologist  Radiology: Xr Tibia Fibula Right (2 Views)    Result Date: 8/15/2019  EXAMINATION:  XR TIBIA FIBULA RIGHT (2 VIEWS)  8/15/2019 2:48 PM HISTORY: Right leg swelling. COMPARISON: No comparison study. TECHNIQUE: AP and lateral views were obtained of the tibia and fibula. FINDINGS:  The tibia and fibula are intact. No fracture or bone destruction is seen. There is soft tissue edema throughout the lower leg. There is mild to moderate narrowing of the medial compartment of the right knee. 1. No acute bony abnormality. 2. Soft tissue edema throughout the lower leg.  Signed by Dr Lisandra Granados on 8/15/2019 2:49 PM    Us Renal Complete    Result Date: 8/16/2019  EXAMINATION: US RENAL COMPLETE 8/16/2019 3:51 PM HISTORY: Acute kidney injury COMPARISON: None FINDINGS: Transabdominal sonographic evaluation of the kidneys and urinary bladder was performed in the transverse and longitudinal projections. Right kidney measures 12.3 x 4.9 x 5.1 cm in size. There is severe right hydronephrosis and proximal hydroureter. Renal echogenicity appears normal. No solid renal mass is demonstrated. The left kidney appears normal in size and echogenicity, measuring 11.5 x 5.7 x 5.2 cm in size. The urinary bladder is completely decompressed and therefore not well evaluated. Severe right hydronephrosis and proximal hydroureter. Signed by Dr Chasidy Franklin on 8/16/2019 3:52 PM    Xr Chest Portable    Result Date: 8/15/2019  EXAMINATION: XR CHEST PORTABLE 8/15/2019 2:45 PM HISTORY: Swelling, possible volume overload COMPARISON: 10/9/2018 FINDINGS: Heart is mildly enlarged. Aorta is tortuous. Mildly increased interstitial markings are seen diffusely there is no focal consolidation or effusion. No pneumothorax is appreciated. The pulmonary vasculature appears grossly normal. There is been previous fusion of the cervical and thoracic spine. Mild prominence of the interstitium without overt pulmonary edema. Signed by Dr Chasidy Franklin on 8/15/2019 2:50 PM    Vl Dup Lower Extremity Venous Right    Result Date: 8/15/2019  Vascular Lower Extremities DVT Study Procedure  Demographics   Patient Name     Tere Sam Age                   81   Patient Number   [id number]           Gender                Male   Visit Number     [id number]        Interpreting          Nicolette Ocampo MD                                    Physician   Date of Birth    1950       Referring Physician   Eric Brock   Accession Number [id number]        [address] [facility]  Procedure Type of Study:   Veins:Lower Extremities DVT Study, VL EXTREMITY VENOUS DUPLEX RIGHT. Indications for Study:Swelling. Allergies   - Morphine. Impression   Normal venous duplex study of the right lower extremity(ies).  There is no  evidence of deep or

## 2019-08-17 NOTE — PROGRESS NOTES
hydroureter. Renal echogenicity  appears normal. No solid renal mass is demonstrated. The left kidney appears normal in size and echogenicity, measuring  11.5 x 5.7 x 5.2 cm in size. The urinary bladder is completely decompressed and therefore not well  evaluated.     Impression:       Severe right hydronephrosis and proximal hydroureter. Signed by Dr Sonido Baker on 8/16/2019 3:52 PM     VL DUP LOWER EXTREMITY VENOUS RIGHT [[id number]] Collected: 08/15/19 1656     Order Status: Completed Updated: 08/15/19 5080     Narrative:       Vascular Lower Extremities DVT Study Procedure     Demographics      Patient Name     ARTUR PHILIPPE Age                   81      Patient Number   [id number]           Gender                Male      Visit Number     [id number]        Interpreting          Ok Lofton MD                                     Physician      Date of Birth    1950       Referring Physician   Tameka Bauman      Accession Number [id number]        Sonographer           Sasha Mera     Procedure  Type of Study:      Veins:Lower Extremities DVT Study, VL EXTREMITY VENOUS DUPLEX RIGHT.     Indications for Study:Swelling. Allergies    - Morphine.     Impression      Normal venous duplex study of the right lower extremity(ies). There is no   evidence of deep or superficial venous thrombosis.      Signature      ----------------------------------------------------------------   Electronically signed by Kishor Valero MD(Interpreting   HYXUMALXI) on 08/15/2019 05:40 PM     CT KIDNEY WO CONTRAST [[id number]] Resulted: 08/17/19 1056      Order Status: Completed Updated: 08/17/19 1058     Addenda:         Addendum: Exam is reviewed again and compared to 2015. The soft tissue changes within the presacral station and of the right pelvic wall have notably increased since 2015. Tumor recurrence is strongly favored rather than progression of fibrosis.  PET imaging might be helpful to document increased hypermetabolic uptake. These findings are the cause of the right-sided hydronephrosis. Percutaneous sampling of the soft tissue changes would be difficult as the iliac vessels are difficult to differentiate from the soft tissue changes.   Signed by Dr Onur Tarango on 8/17/2019 10:56 AM  Signed: 08/17/19 1058 by Bashir Castillo MD     Narrative:        ORIGINAL REPORT   CT abdomen and pelvis 8/17/2019 6:45 AM  HISTORY: Severe right hydronephrosis/hydroureter based on ultrasound  8/16/2019  TECHNIQUE: Axial images of the abdomen and pelvis were obtained  without IV contrast. Coronal and sagittal reformatted images are  reconstructed and reviewed. COMPARISON: 5/7/2015. DLP: 932 mGy cm Automated exposure control was utilized to minimize  patient radiation dose. FINDINGS:   There is moderate to severe right-sided hydronephrosis representing a  change from 2015. There is mild thinning of the right renal cortex. There is tortuosity of the dilated right proximal and mid ureter. Right ureter is dilated into its distal portion at the site of  presacral changes. Patient has a history of colon cancer undergoing  previous colectomy with a left sided ileostomy. There are partially  calcified soft tissue changes within the presacral region likely  representing sequelae of pelvic radiation. Increasing  scarring/fibrosis versus tumor recurrence within the presacral changes  considered compared to 2015. This likely represents the site of the  right distal ureteral obstruction. There is no left-sided hydronephrosis. There is a Mcgregor catheter  within the bladder. There is diffuse bladder wall thickening. The nonenhanced liver, spleen, pancreas, and adrenal glands are  unremarkable. The gallbladder is not visualized. There is no evidence  for small bowel obstruction. The abdominal aorta is normal in caliber. There is minimal dependent basilar atelectasis.   There is osteopenia with postoperative changes of the

## 2019-08-17 NOTE — PROGRESS NOTES
Consent for tomorrow's procedure is signed and in the patient's soft chart.      Electronically signed by Belia Olguin RN on 8/17/2019 at 11:18 AM

## 2019-08-17 NOTE — CONSULTS
Coronary artery disease involving native coronary artery of native heart without angina pectoris 10/31/2018    Drop foot gait     RIGHT    Hypertension     Mixed hyperlipidemia 10/31/2018     Past Surgical History:        Procedure Laterality Date    ABDOMEN SURGERY      ABDOMINAL EXPLORATION SURGERY      BACK SURGERY      two lumbar    COLECTOMY      x 2    EYE SURGERY Bilateral     cataract or    HC INJECT OTHER PERPHRL NERV Left 10/28/2016    FLURO GUIDED HIP INJECITON performed by Josiah Spears MD at [facility]      times 2... all levels     Current Medications:   Current Facility-Administered Medications: [START ON 8/18/2019] cefTRIAXone (ROCEPHIN) 1 g in sodium chloride (PF) 10 mL IV syringe, 1 g, Intravenous, On Call to OR  loperamide (IMODIUM) capsule 2 mg, 2 mg, Oral, 4x daily  furosemide (LASIX) injection 40 mg, 40 mg, Intravenous, BID  sodium chloride flush 0.9 % injection 10 mL, 10 mL, Intravenous, 2 times per day  sodium chloride flush 0.9 % injection 10 mL, 10 mL, Intravenous, PRN  docusate sodium (COLACE) capsule 100 mg, 100 mg, Oral, BID  ondansetron (ZOFRAN) injection 4 mg, 4 mg, Intravenous, Q6H PRN  heparin (porcine) injection 5,000 Units, 5,000 Units, Subcutaneous, 3 times per day  acetaminophen (TYLENOL) tablet 650 mg, 650 mg, Oral, Q8H PRN  HYDROmorphone (DILAUDID) injection 0.5 mg, 0.5 mg, Intravenous, Q2H PRN  atorvastatin (LIPITOR) tablet 40 mg, 40 mg, Oral, Nightly  metoprolol tartrate (LOPRESSOR) tablet 25 mg, 25 mg, Oral, BID  DULoxetine (CYMBALTA) extended release capsule 30 mg, 30 mg, Oral, Daily  gabapentin (NEURONTIN) capsule 800 mg, 800 mg, Oral, 4x daily  memantine (NAMENDA) tablet 10 mg, 10 mg, Oral, BID  metaxalone (SKELAXIN) tablet 800 mg, 800 mg, Oral, TID PRN  methadone (DOLOPHINE) tablet 10 mg, 10 mg, Oral, Q6H PRN  nortriptyline (PAMELOR) capsule 25 mg, 25 mg, Oral, Daily  pregabalin (LYRICA) capsule 150 mg, 150 mg,

## 2019-08-18 ENCOUNTER — ANESTHESIA (OUTPATIENT)
Dept: OPERATING ROOM | Age: 69
DRG: 660 | End: 2019-08-18
Payer: MEDICARE

## 2019-08-18 ENCOUNTER — ANESTHESIA EVENT (OUTPATIENT)
Dept: OPERATING ROOM | Age: 69
DRG: 660 | End: 2019-08-18
Payer: MEDICARE

## 2019-08-18 ENCOUNTER — APPOINTMENT (OUTPATIENT)
Dept: GENERAL RADIOLOGY | Age: 69
DRG: 660 | End: 2019-08-18
Payer: MEDICARE

## 2019-08-18 VITALS
RESPIRATION RATE: 12 BRPM | OXYGEN SATURATION: 100 % | DIASTOLIC BLOOD PRESSURE: 92 MMHG | SYSTOLIC BLOOD PRESSURE: 164 MMHG | TEMPERATURE: 98 F

## 2019-08-18 PROBLEM — C67.2 MALIGNANT NEOPLASM OF LATERAL WALL OF URINARY BLADDER (HCC): Status: ACTIVE | Noted: 2019-08-18

## 2019-08-18 PROBLEM — N13.5 EXTRINSIC URETERAL OBSTRUCTION: Status: ACTIVE | Noted: 2019-08-18

## 2019-08-18 LAB
ANION GAP SERPL CALCULATED.3IONS-SCNC: 15 MMOL/L (ref 7–19)
BASOPHILS ABSOLUTE: 0.1 K/UL (ref 0–0.2)
BASOPHILS RELATIVE PERCENT: 0.7 % (ref 0–1)
BUN BLDV-MCNC: 22 MG/DL (ref 8–23)
CALCIUM SERPL-MCNC: 8.9 MG/DL (ref 8.8–10.2)
CHLORIDE BLD-SCNC: 97 MMOL/L (ref 98–111)
CO2: 26 MMOL/L (ref 22–29)
CREAT SERPL-MCNC: 1.6 MG/DL (ref 0.5–1.2)
EOSINOPHILS ABSOLUTE: 0.5 K/UL (ref 0–0.6)
EOSINOPHILS RELATIVE PERCENT: 6.3 % (ref 0–5)
GFR NON-AFRICAN AMERICAN: 43
GLUCOSE BLD-MCNC: 108 MG/DL (ref 74–109)
HCT VFR BLD CALC: 33.3 % (ref 42–52)
HEMOGLOBIN: 10.8 G/DL (ref 14–18)
IMMATURE GRANULOCYTES #: 0.1 K/UL
LYMPHOCYTES ABSOLUTE: 1.1 K/UL (ref 1.1–4.5)
LYMPHOCYTES RELATIVE PERCENT: 15.3 % (ref 20–40)
MCH RBC QN AUTO: 27.3 PG (ref 27–31)
MCHC RBC AUTO-ENTMCNC: 32.4 G/DL (ref 33–37)
MCV RBC AUTO: 84.1 FL (ref 80–94)
MONOCYTES ABSOLUTE: 0.7 K/UL (ref 0–0.9)
MONOCYTES RELATIVE PERCENT: 9 % (ref 0–10)
NEUTROPHILS ABSOLUTE: 4.9 K/UL (ref 1.5–7.5)
NEUTROPHILS RELATIVE PERCENT: 68 % (ref 50–65)
PDW BLD-RTO: 12.8 % (ref 11.5–14.5)
PLATELET # BLD: 273 K/UL (ref 130–400)
PMV BLD AUTO: 10.8 FL (ref 9.4–12.4)
POTASSIUM SERPL-SCNC: 3.2 MMOL/L (ref 3.5–5)
RBC # BLD: 3.96 M/UL (ref 4.7–6.1)
SODIUM BLD-SCNC: 138 MMOL/L (ref 136–145)
TOTAL CK: 184 U/L (ref 39–308)
WBC # BLD: 7.2 K/UL (ref 4.8–10.8)

## 2019-08-18 PROCEDURE — 0T768DZ DILATION OF RIGHT URETER WITH INTRALUMINAL DEVICE, VIA NATURAL OR ARTIFICIAL OPENING ENDOSCOPIC: ICD-10-PCS | Performed by: UROLOGY

## 2019-08-18 PROCEDURE — 0T9B70Z DRAINAGE OF BLADDER WITH DRAINAGE DEVICE, VIA NATURAL OR ARTIFICIAL OPENING: ICD-10-PCS | Performed by: UROLOGY

## 2019-08-18 PROCEDURE — 3700000001 HC ADD 15 MINUTES (ANESTHESIA): Performed by: UROLOGY

## 2019-08-18 PROCEDURE — 2500000003 HC RX 250 WO HCPCS: Performed by: NURSE ANESTHETIST, CERTIFIED REGISTERED

## 2019-08-18 PROCEDURE — 74420 UROGRAPHY RTRGR +-KUB: CPT | Performed by: UROLOGY

## 2019-08-18 PROCEDURE — C1769 GUIDE WIRE: HCPCS | Performed by: UROLOGY

## 2019-08-18 PROCEDURE — 7100000000 HC PACU RECOVERY - FIRST 15 MIN: Performed by: UROLOGY

## 2019-08-18 PROCEDURE — 7100000001 HC PACU RECOVERY - ADDTL 15 MIN: Performed by: UROLOGY

## 2019-08-18 PROCEDURE — 2580000003 HC RX 258: Performed by: NURSE ANESTHETIST, CERTIFIED REGISTERED

## 2019-08-18 PROCEDURE — 3700000000 HC ANESTHESIA ATTENDED CARE: Performed by: UROLOGY

## 2019-08-18 PROCEDURE — C1758 CATHETER, URETERAL: HCPCS | Performed by: UROLOGY

## 2019-08-18 PROCEDURE — 6360000002 HC RX W HCPCS: Performed by: UROLOGY

## 2019-08-18 PROCEDURE — 6370000000 HC RX 637 (ALT 250 FOR IP): Performed by: UROLOGY

## 2019-08-18 PROCEDURE — 2580000003 HC RX 258: Performed by: UROLOGY

## 2019-08-18 PROCEDURE — 2720000010 HC SURG SUPPLY STERILE: Performed by: UROLOGY

## 2019-08-18 PROCEDURE — 0TBB8ZZ EXCISION OF BLADDER, VIA NATURAL OR ARTIFICIAL OPENING ENDOSCOPIC: ICD-10-PCS | Performed by: UROLOGY

## 2019-08-18 PROCEDURE — 6370000000 HC RX 637 (ALT 250 FOR IP): Performed by: INTERNAL MEDICINE

## 2019-08-18 PROCEDURE — 6360000002 HC RX W HCPCS: Performed by: INTERNAL MEDICINE

## 2019-08-18 PROCEDURE — 3600000004 HC SURGERY LEVEL 4 BASE: Performed by: UROLOGY

## 2019-08-18 PROCEDURE — C2617 STENT, NON-COR, TEM W/O DEL: HCPCS | Performed by: UROLOGY

## 2019-08-18 PROCEDURE — 85025 COMPLETE CBC W/AUTO DIFF WBC: CPT

## 2019-08-18 PROCEDURE — BT1D0ZZ FLUOROSCOPY OF RIGHT KIDNEY, URETER AND BLADDER USING HIGH OSMOLAR CONTRAST: ICD-10-PCS | Performed by: UROLOGY

## 2019-08-18 PROCEDURE — 3209999900 FLUORO FOR SURGICAL PROCEDURES

## 2019-08-18 PROCEDURE — 88305 TISSUE EXAM BY PATHOLOGIST: CPT

## 2019-08-18 PROCEDURE — 80048 BASIC METABOLIC PNL TOTAL CA: CPT

## 2019-08-18 PROCEDURE — 1210000000 HC MED SURG R&B

## 2019-08-18 PROCEDURE — 82550 ASSAY OF CK (CPK): CPT

## 2019-08-18 PROCEDURE — 36415 COLL VENOUS BLD VENIPUNCTURE: CPT

## 2019-08-18 PROCEDURE — 3600000014 HC SURGERY LEVEL 4 ADDTL 15MIN: Performed by: UROLOGY

## 2019-08-18 PROCEDURE — 2709999900 HC NON-CHARGEABLE SUPPLY: Performed by: UROLOGY

## 2019-08-18 PROCEDURE — 52240 CYSTOSCOPY AND TREATMENT: CPT | Performed by: UROLOGY

## 2019-08-18 PROCEDURE — 6360000002 HC RX W HCPCS: Performed by: NURSE ANESTHETIST, CERTIFIED REGISTERED

## 2019-08-18 PROCEDURE — 94762 N-INVAS EAR/PLS OXIMTRY CONT: CPT

## 2019-08-18 PROCEDURE — 52332 CYSTOSCOPY AND TREATMENT: CPT | Performed by: UROLOGY

## 2019-08-18 PROCEDURE — 74420 UROGRAPHY RTRGR +-KUB: CPT

## 2019-08-18 DEVICE — URETERAL STENT
Type: IMPLANTABLE DEVICE | Site: URETER | Status: FUNCTIONAL
Brand: PERCUFLEX™ PLUS

## 2019-08-18 RX ORDER — ENALAPRILAT 2.5 MG/2ML
1.25 INJECTION INTRAVENOUS
Status: DISCONTINUED | OUTPATIENT
Start: 2019-08-18 | End: 2019-08-18 | Stop reason: HOSPADM

## 2019-08-18 RX ORDER — SODIUM CHLORIDE 9 MG/ML
INJECTION, SOLUTION INTRAVENOUS CONTINUOUS
Status: DISCONTINUED | OUTPATIENT
Start: 2019-08-18 | End: 2019-08-20 | Stop reason: HOSPADM

## 2019-08-18 RX ORDER — DEXAMETHASONE SODIUM PHOSPHATE 10 MG/ML
INJECTION INTRAMUSCULAR; INTRAVENOUS PRN
Status: DISCONTINUED | OUTPATIENT
Start: 2019-08-18 | End: 2019-08-18 | Stop reason: SDUPTHER

## 2019-08-18 RX ORDER — FENTANYL CITRATE 50 UG/ML
INJECTION, SOLUTION INTRAMUSCULAR; INTRAVENOUS PRN
Status: DISCONTINUED | OUTPATIENT
Start: 2019-08-18 | End: 2019-08-18 | Stop reason: SDUPTHER

## 2019-08-18 RX ORDER — POTASSIUM CHLORIDE 20 MEQ/1
20 TABLET, EXTENDED RELEASE ORAL ONCE
Status: COMPLETED | OUTPATIENT
Start: 2019-08-18 | End: 2019-08-18

## 2019-08-18 RX ORDER — LABETALOL 20 MG/4 ML (5 MG/ML) INTRAVENOUS SYRINGE
5 EVERY 10 MIN PRN
Status: DISCONTINUED | OUTPATIENT
Start: 2019-08-18 | End: 2019-08-18 | Stop reason: HOSPADM

## 2019-08-18 RX ORDER — TAMSULOSIN HYDROCHLORIDE 0.4 MG/1
0.4 CAPSULE ORAL DAILY
Status: DISCONTINUED | OUTPATIENT
Start: 2019-08-18 | End: 2019-08-20 | Stop reason: HOSPADM

## 2019-08-18 RX ORDER — PROPOFOL 10 MG/ML
INJECTION, EMULSION INTRAVENOUS PRN
Status: DISCONTINUED | OUTPATIENT
Start: 2019-08-18 | End: 2019-08-18 | Stop reason: SDUPTHER

## 2019-08-18 RX ORDER — HYDRALAZINE HYDROCHLORIDE 20 MG/ML
5 INJECTION INTRAMUSCULAR; INTRAVENOUS EVERY 10 MIN PRN
Status: DISCONTINUED | OUTPATIENT
Start: 2019-08-18 | End: 2019-08-18 | Stop reason: HOSPADM

## 2019-08-18 RX ORDER — PROMETHAZINE HYDROCHLORIDE 25 MG/ML
6.25 INJECTION, SOLUTION INTRAMUSCULAR; INTRAVENOUS
Status: DISCONTINUED | OUTPATIENT
Start: 2019-08-18 | End: 2019-08-18 | Stop reason: HOSPADM

## 2019-08-18 RX ORDER — ONDANSETRON 2 MG/ML
INJECTION INTRAMUSCULAR; INTRAVENOUS PRN
Status: DISCONTINUED | OUTPATIENT
Start: 2019-08-18 | End: 2019-08-18 | Stop reason: SDUPTHER

## 2019-08-18 RX ORDER — MEPERIDINE HYDROCHLORIDE 50 MG/ML
12.5 INJECTION INTRAMUSCULAR; INTRAVENOUS; SUBCUTANEOUS EVERY 5 MIN PRN
Status: DISCONTINUED | OUTPATIENT
Start: 2019-08-18 | End: 2019-08-18 | Stop reason: HOSPADM

## 2019-08-18 RX ORDER — LIDOCAINE HYDROCHLORIDE 10 MG/ML
INJECTION, SOLUTION INFILTRATION; PERINEURAL PRN
Status: DISCONTINUED | OUTPATIENT
Start: 2019-08-18 | End: 2019-08-18 | Stop reason: SDUPTHER

## 2019-08-18 RX ORDER — SODIUM CHLORIDE, SODIUM LACTATE, POTASSIUM CHLORIDE, CALCIUM CHLORIDE 600; 310; 30; 20 MG/100ML; MG/100ML; MG/100ML; MG/100ML
INJECTION, SOLUTION INTRAVENOUS CONTINUOUS PRN
Status: DISCONTINUED | OUTPATIENT
Start: 2019-08-18 | End: 2019-08-18 | Stop reason: SDUPTHER

## 2019-08-18 RX ORDER — DIPHENHYDRAMINE HYDROCHLORIDE 50 MG/ML
12.5 INJECTION INTRAMUSCULAR; INTRAVENOUS
Status: DISCONTINUED | OUTPATIENT
Start: 2019-08-18 | End: 2019-08-18 | Stop reason: HOSPADM

## 2019-08-18 RX ORDER — ROCURONIUM BROMIDE 10 MG/ML
INJECTION, SOLUTION INTRAVENOUS PRN
Status: DISCONTINUED | OUTPATIENT
Start: 2019-08-18 | End: 2019-08-18 | Stop reason: SDUPTHER

## 2019-08-18 RX ORDER — METOCLOPRAMIDE HYDROCHLORIDE 5 MG/ML
10 INJECTION INTRAMUSCULAR; INTRAVENOUS
Status: DISCONTINUED | OUTPATIENT
Start: 2019-08-18 | End: 2019-08-18 | Stop reason: HOSPADM

## 2019-08-18 RX ORDER — FUROSEMIDE 40 MG/1
40 TABLET ORAL DAILY
Status: DISCONTINUED | OUTPATIENT
Start: 2019-08-18 | End: 2019-08-19

## 2019-08-18 RX ADMIN — ATORVASTATIN CALCIUM 40 MG: 40 TABLET, FILM COATED ORAL at 22:11

## 2019-08-18 RX ADMIN — HYDROMORPHONE HYDROCHLORIDE 0.5 MG: 1 INJECTION, SOLUTION INTRAMUSCULAR; INTRAVENOUS; SUBCUTANEOUS at 14:59

## 2019-08-18 RX ADMIN — GABAPENTIN 800 MG: 400 CAPSULE ORAL at 11:09

## 2019-08-18 RX ADMIN — ROCURONIUM BROMIDE 40 MG: 10 INJECTION INTRAVENOUS at 09:27

## 2019-08-18 RX ADMIN — MEMANTINE HYDROCHLORIDE 10 MG: 5 TABLET ORAL at 22:12

## 2019-08-18 RX ADMIN — GABAPENTIN 800 MG: 400 CAPSULE ORAL at 22:11

## 2019-08-18 RX ADMIN — HYDROMORPHONE HYDROCHLORIDE 0.5 MG: 1 INJECTION, SOLUTION INTRAMUSCULAR; INTRAVENOUS; SUBCUTANEOUS at 22:26

## 2019-08-18 RX ADMIN — GABAPENTIN 800 MG: 400 CAPSULE ORAL at 17:09

## 2019-08-18 RX ADMIN — HEPARIN SODIUM 5000 UNITS: 5000 INJECTION INTRAVENOUS; SUBCUTANEOUS at 22:13

## 2019-08-18 RX ADMIN — METHADONE HYDROCHLORIDE 10 MG: 10 TABLET ORAL at 11:09

## 2019-08-18 RX ADMIN — PROPOFOL 160 MG: 10 INJECTION, EMULSION INTRAVENOUS at 08:54

## 2019-08-18 RX ADMIN — SODIUM CHLORIDE 1 G: 9 INJECTION INTRAMUSCULAR; INTRAVENOUS; SUBCUTANEOUS at 09:06

## 2019-08-18 RX ADMIN — HYOSCYAMINE SULFATE 125 MCG: 0.12 TABLET ORAL; SUBLINGUAL at 12:37

## 2019-08-18 RX ADMIN — LIDOCAINE HYDROCHLORIDE 50 MG: 10 INJECTION, SOLUTION INFILTRATION; PERINEURAL at 08:54

## 2019-08-18 RX ADMIN — SUGAMMADEX 200 MG: 100 INJECTION, SOLUTION INTRAVENOUS at 09:51

## 2019-08-18 RX ADMIN — HYDROMORPHONE HYDROCHLORIDE 0.5 MG: 1 INJECTION, SOLUTION INTRAMUSCULAR; INTRAVENOUS; SUBCUTANEOUS at 10:03

## 2019-08-18 RX ADMIN — SODIUM CHLORIDE, SODIUM LACTATE, POTASSIUM CHLORIDE, AND CALCIUM CHLORIDE: 600; 310; 30; 20 INJECTION, SOLUTION INTRAVENOUS at 08:33

## 2019-08-18 RX ADMIN — POTASSIUM CHLORIDE 20 MEQ: 20 TABLET, EXTENDED RELEASE ORAL at 11:33

## 2019-08-18 RX ADMIN — SODIUM CHLORIDE: 9 INJECTION, SOLUTION INTRAVENOUS at 11:10

## 2019-08-18 RX ADMIN — HYDROMORPHONE HYDROCHLORIDE 0.5 MG: 1 INJECTION, SOLUTION INTRAMUSCULAR; INTRAVENOUS; SUBCUTANEOUS at 09:38

## 2019-08-18 RX ADMIN — TAMSULOSIN HYDROCHLORIDE 0.4 MG: 0.4 CAPSULE ORAL at 11:33

## 2019-08-18 RX ADMIN — ONDANSETRON HYDROCHLORIDE 4 MG: 2 INJECTION, SOLUTION INTRAMUSCULAR; INTRAVENOUS at 08:58

## 2019-08-18 RX ADMIN — DEXAMETHASONE SODIUM PHOSPHATE 10 MG: 10 INJECTION INTRAMUSCULAR; INTRAVENOUS at 08:58

## 2019-08-18 RX ADMIN — LOPERAMIDE HYDROCHLORIDE 2 MG: 2 CAPSULE ORAL at 11:09

## 2019-08-18 RX ADMIN — HYDROMORPHONE HYDROCHLORIDE 0.5 MG: 1 INJECTION, SOLUTION INTRAMUSCULAR; INTRAVENOUS; SUBCUTANEOUS at 05:10

## 2019-08-18 RX ADMIN — FENTANYL CITRATE 25 MCG: 50 INJECTION INTRAMUSCULAR; INTRAVENOUS at 09:12

## 2019-08-18 RX ADMIN — METOPROLOL TARTRATE 25 MG: 25 TABLET ORAL at 11:09

## 2019-08-18 RX ADMIN — MEMANTINE HYDROCHLORIDE 10 MG: 5 TABLET ORAL at 11:08

## 2019-08-18 RX ADMIN — HYOSCYAMINE SULFATE 125 MCG: 0.12 TABLET ORAL; SUBLINGUAL at 17:09

## 2019-08-18 RX ADMIN — METHADONE HYDROCHLORIDE 10 MG: 10 TABLET ORAL at 00:51

## 2019-08-18 RX ADMIN — HYDROMORPHONE HYDROCHLORIDE 0.5 MG: 1 INJECTION, SOLUTION INTRAMUSCULAR; INTRAVENOUS; SUBCUTANEOUS at 12:37

## 2019-08-18 RX ADMIN — FENTANYL CITRATE 25 MCG: 50 INJECTION INTRAMUSCULAR; INTRAVENOUS at 09:04

## 2019-08-18 RX ADMIN — NORTRIPTYLINE HYDROCHLORIDE 25 MG: 25 CAPSULE ORAL at 22:12

## 2019-08-18 RX ADMIN — LOPERAMIDE HYDROCHLORIDE 2 MG: 2 CAPSULE ORAL at 22:11

## 2019-08-18 RX ADMIN — METHADONE HYDROCHLORIDE 10 MG: 10 TABLET ORAL at 19:13

## 2019-08-18 RX ADMIN — FENTANYL CITRATE 25 MCG: 50 INJECTION INTRAMUSCULAR; INTRAVENOUS at 09:00

## 2019-08-18 RX ADMIN — LOPERAMIDE HYDROCHLORIDE 2 MG: 2 CAPSULE ORAL at 17:09

## 2019-08-18 RX ADMIN — FENTANYL CITRATE 25 MCG: 50 INJECTION INTRAMUSCULAR; INTRAVENOUS at 09:20

## 2019-08-18 RX ADMIN — DULOXETINE 30 MG: 30 CAPSULE, DELAYED RELEASE ORAL at 11:10

## 2019-08-18 RX ADMIN — FUROSEMIDE 40 MG: 40 TABLET ORAL at 11:33

## 2019-08-18 ASSESSMENT — PAIN SCALES - GENERAL
PAINLEVEL_OUTOF10: 3
PAINLEVEL_OUTOF10: 7
PAINLEVEL_OUTOF10: 5
PAINLEVEL_OUTOF10: 5
PAINLEVEL_OUTOF10: 4
PAINLEVEL_OUTOF10: 7
PAINLEVEL_OUTOF10: 2
PAINLEVEL_OUTOF10: 5
PAINLEVEL_OUTOF10: 5
PAINLEVEL_OUTOF10: 6
PAINLEVEL_OUTOF10: 7
PAINLEVEL_OUTOF10: 5
PAINLEVEL_OUTOF10: 3
PAINLEVEL_OUTOF10: 2

## 2019-08-18 NOTE — PROGRESS NOTES
[[id number]] Resulted: 08/16/19 1552      Order Status: Completed Updated: 08/16/19 1554     Narrative:       EXAMINATION: US RENAL COMPLETE 8/16/2019 3:51 PM  HISTORY: Acute kidney injury  COMPARISON: None  FINDINGS:  Transabdominal sonographic evaluation of the kidneys and urinary  bladder was performed in the transverse and longitudinal projections. Right kidney measures 12.3 x 4.9 x 5.1 cm in size. There is severe  right hydronephrosis and proximal hydroureter. Renal echogenicity  appears normal. No solid renal mass is demonstrated. The left kidney appears normal in size and echogenicity, measuring  11.5 x 5.7 x 5.2 cm in size. The urinary bladder is completely decompressed and therefore not well  evaluated.     Impression:       Severe right hydronephrosis and proximal hydroureter. Signed by Dr Sonido Baker on 8/16/2019 3:52 PM     VL DUP LOWER EXTREMITY VENOUS RIGHT [[id number]] Collected: 08/15/19 1656     Order Status: Completed Updated: 08/15/19 9260     Narrative:       Vascular Lower Extremities DVT Study Procedure     Demographics      Patient Name     ARTUR PHILIPPE Age                   81      Patient Number   [id number]           Gender                Male      Visit Number     [id number]        Interpreting          Ok Lofton MD                                     Physician      Date of Birth    1950       Referring Physician   Tameka Bauman      Accession Number [id number]        Sonographer           Sasha Mera     Procedure  Type of Study:      Veins:Lower Extremities DVT Study, VL EXTREMITY VENOUS DUPLEX RIGHT.     Indications for Study:Swelling. Allergies    - Morphine.     Impression      Normal venous duplex study of the right lower extremity(ies).  There is no   evidence of deep or superficial venous thrombosis.      Signature      ----------------------------------------------------------------   Electronically signed by Kishor Valero MD(Interpreting   physician) on affect  : Hematuria      Assessment and Plan:   Principal Problem:    COLLEEN (acute kidney injury) ([address] - w/ recent NSAID use. However on renal ultrasound patient found to have right hydronephrosis and hydroureter. A CT kidney protocol was done which showed moderate to severe hydronephrosis and hydroureter down to the pelvis with potential presacral mass concerning for recurrence of malignancy. CEA was performed and elevated. Patient went for cystoscopy today with placement of ureteral stent for extrinsic compression relief and had transurethral resection of a bladder tumor greater than 5 cm. Currently with subsequent hematuria and on bladder irrigation. Formal consult for hematology/oncology placed. Appreciate urology assistance. Repeat BMP tomorrow status post relief of obstruction. Active Problems:  Hematuria -in setting of bladder tumor removal, continue CBI per urology      Mixed hyperlipidemia - statin      Coronary artery disease involving native coronary artery of native heart without angina pectoris - had stent over a year ago, no issues, no active chest pain      Complex regional pain syndrome type 1 of right lower extremity - follows with pain management. He is on neurontin, pamelor, skelaxin, methadone. May need to scale back Neurontin dose if renal function worsens. Advance Directive: Full Code    Potential for discharge home in next 24 to 48 hours pending clinical course and specialist recommendations.       Electronically signed by Stephany Blackburn DO on 8/18/2019 at 12:26 PM

## 2019-08-19 LAB
ANION GAP SERPL CALCULATED.3IONS-SCNC: 16 MMOL/L (ref 7–19)
BASOPHILS ABSOLUTE: 0 K/UL (ref 0–0.2)
BASOPHILS RELATIVE PERCENT: 0.1 % (ref 0–1)
BUN BLDV-MCNC: 22 MG/DL (ref 8–23)
CALCIUM SERPL-MCNC: 8.8 MG/DL (ref 8.8–10.2)
CHLORIDE BLD-SCNC: 98 MMOL/L (ref 98–111)
CO2: 22 MMOL/L (ref 22–29)
CREAT SERPL-MCNC: 1.4 MG/DL (ref 0.5–1.2)
EOSINOPHILS ABSOLUTE: 0 K/UL (ref 0–0.6)
EOSINOPHILS RELATIVE PERCENT: 0.1 % (ref 0–5)
GFR NON-AFRICAN AMERICAN: 50
GLUCOSE BLD-MCNC: 133 MG/DL (ref 74–109)
HCT VFR BLD CALC: 32.3 % (ref 42–52)
HEMOGLOBIN: 10.6 G/DL (ref 14–18)
IMMATURE GRANULOCYTES #: 0.1 K/UL
LYMPHOCYTES ABSOLUTE: 0.6 K/UL (ref 1.1–4.5)
LYMPHOCYTES RELATIVE PERCENT: 8.3 % (ref 20–40)
MAGNESIUM: 1.6 MG/DL (ref 1.6–2.4)
MCH RBC QN AUTO: 27.5 PG (ref 27–31)
MCHC RBC AUTO-ENTMCNC: 32.8 G/DL (ref 33–37)
MCV RBC AUTO: 83.7 FL (ref 80–94)
MONOCYTES ABSOLUTE: 0.5 K/UL (ref 0–0.9)
MONOCYTES RELATIVE PERCENT: 7.2 % (ref 0–10)
NEUTROPHILS ABSOLUTE: 5.7 K/UL (ref 1.5–7.5)
NEUTROPHILS RELATIVE PERCENT: 83.3 % (ref 50–65)
PDW BLD-RTO: 12.7 % (ref 11.5–14.5)
PHOSPHORUS: 2.6 MG/DL (ref 2.5–4.5)
PLATELET # BLD: 286 K/UL (ref 130–400)
PMV BLD AUTO: 11 FL (ref 9.4–12.4)
POTASSIUM SERPL-SCNC: 3.7 MMOL/L (ref 3.5–5)
RBC # BLD: 3.86 M/UL (ref 4.7–6.1)
SODIUM BLD-SCNC: 136 MMOL/L (ref 136–145)
WBC # BLD: 6.8 K/UL (ref 4.8–10.8)

## 2019-08-19 PROCEDURE — 2580000003 HC RX 258: Performed by: UROLOGY

## 2019-08-19 PROCEDURE — 84100 ASSAY OF PHOSPHORUS: CPT

## 2019-08-19 PROCEDURE — 36415 COLL VENOUS BLD VENIPUNCTURE: CPT

## 2019-08-19 PROCEDURE — 80048 BASIC METABOLIC PNL TOTAL CA: CPT

## 2019-08-19 PROCEDURE — 6370000000 HC RX 637 (ALT 250 FOR IP): Performed by: UROLOGY

## 2019-08-19 PROCEDURE — 6360000002 HC RX W HCPCS: Performed by: UROLOGY

## 2019-08-19 PROCEDURE — 1210000000 HC MED SURG R&B

## 2019-08-19 PROCEDURE — 93970 EXTREMITY STUDY: CPT

## 2019-08-19 PROCEDURE — 99232 SBSQ HOSP IP/OBS MODERATE 35: CPT | Performed by: UROLOGY

## 2019-08-19 PROCEDURE — 85025 COMPLETE CBC W/AUTO DIFF WBC: CPT

## 2019-08-19 PROCEDURE — 99222 1ST HOSP IP/OBS MODERATE 55: CPT | Performed by: NURSE PRACTITIONER

## 2019-08-19 PROCEDURE — 83735 ASSAY OF MAGNESIUM: CPT

## 2019-08-19 PROCEDURE — 94762 N-INVAS EAR/PLS OXIMTRY CONT: CPT

## 2019-08-19 PROCEDURE — 99222 1ST HOSP IP/OBS MODERATE 55: CPT | Performed by: INTERNAL MEDICINE

## 2019-08-19 RX ORDER — CHLORHEXIDINE GLUCONATE 4 G/100ML
SOLUTION TOPICAL 2 TIMES DAILY
Status: DISCONTINUED | OUTPATIENT
Start: 2019-08-19 | End: 2019-08-20 | Stop reason: HOSPADM

## 2019-08-19 RX ORDER — TAMSULOSIN HYDROCHLORIDE 0.4 MG/1
0.4 CAPSULE ORAL DAILY
Qty: 30 CAPSULE | Refills: 11 | Status: SHIPPED | OUTPATIENT
Start: 2019-08-19 | End: 2020-02-28

## 2019-08-19 RX ORDER — CEPHALEXIN 500 MG/1
500 CAPSULE ORAL 2 TIMES DAILY
Qty: 14 CAPSULE | Refills: 0 | Status: SHIPPED | OUTPATIENT
Start: 2019-08-19 | End: 2019-08-26

## 2019-08-19 RX ADMIN — LOPERAMIDE HYDROCHLORIDE 2 MG: 2 CAPSULE ORAL at 16:40

## 2019-08-19 RX ADMIN — HEPARIN SODIUM 5000 UNITS: 5000 INJECTION INTRAVENOUS; SUBCUTANEOUS at 16:44

## 2019-08-19 RX ADMIN — TAMSULOSIN HYDROCHLORIDE 0.4 MG: 0.4 CAPSULE ORAL at 11:57

## 2019-08-19 RX ADMIN — ATORVASTATIN CALCIUM 40 MG: 40 TABLET, FILM COATED ORAL at 21:01

## 2019-08-19 RX ADMIN — HYDROMORPHONE HYDROCHLORIDE 0.5 MG: 1 INJECTION, SOLUTION INTRAMUSCULAR; INTRAVENOUS; SUBCUTANEOUS at 05:58

## 2019-08-19 RX ADMIN — GABAPENTIN 800 MG: 400 CAPSULE ORAL at 21:00

## 2019-08-19 RX ADMIN — METHADONE HYDROCHLORIDE 10 MG: 10 TABLET ORAL at 21:39

## 2019-08-19 RX ADMIN — METHADONE HYDROCHLORIDE 10 MG: 10 TABLET ORAL at 12:11

## 2019-08-19 RX ADMIN — HYDROMORPHONE HYDROCHLORIDE 0.5 MG: 1 INJECTION, SOLUTION INTRAMUSCULAR; INTRAVENOUS; SUBCUTANEOUS at 16:44

## 2019-08-19 RX ADMIN — MEMANTINE HYDROCHLORIDE 10 MG: 5 TABLET ORAL at 21:01

## 2019-08-19 RX ADMIN — SODIUM CHLORIDE: 9 INJECTION, SOLUTION INTRAVENOUS at 01:59

## 2019-08-19 RX ADMIN — Medication 1 G: at 11:57

## 2019-08-19 RX ADMIN — HEPARIN SODIUM 5000 UNITS: 5000 INJECTION INTRAVENOUS; SUBCUTANEOUS at 05:49

## 2019-08-19 RX ADMIN — LOPERAMIDE HYDROCHLORIDE 2 MG: 2 CAPSULE ORAL at 21:00

## 2019-08-19 RX ADMIN — LOPERAMIDE HYDROCHLORIDE 2 MG: 2 CAPSULE ORAL at 11:57

## 2019-08-19 RX ADMIN — METAXALONE 800 MG: 800 TABLET ORAL at 21:39

## 2019-08-19 RX ADMIN — METHADONE HYDROCHLORIDE 10 MG: 10 TABLET ORAL at 02:03

## 2019-08-19 RX ADMIN — DULOXETINE 30 MG: 30 CAPSULE, DELAYED RELEASE ORAL at 11:58

## 2019-08-19 RX ADMIN — GABAPENTIN 800 MG: 400 CAPSULE ORAL at 16:40

## 2019-08-19 RX ADMIN — METOPROLOL TARTRATE 25 MG: 25 TABLET ORAL at 11:58

## 2019-08-19 RX ADMIN — HEPARIN SODIUM 5000 UNITS: 5000 INJECTION INTRAVENOUS; SUBCUTANEOUS at 21:02

## 2019-08-19 RX ADMIN — GABAPENTIN 800 MG: 400 CAPSULE ORAL at 11:57

## 2019-08-19 RX ADMIN — NORTRIPTYLINE HYDROCHLORIDE 25 MG: 25 CAPSULE ORAL at 21:01

## 2019-08-19 RX ADMIN — MEMANTINE HYDROCHLORIDE 10 MG: 5 TABLET ORAL at 11:58

## 2019-08-19 ASSESSMENT — PAIN SCALES - GENERAL
PAINLEVEL_OUTOF10: 6
PAINLEVEL_OUTOF10: 2
PAINLEVEL_OUTOF10: 8
PAINLEVEL_OUTOF10: 4
PAINLEVEL_OUTOF10: 2
PAINLEVEL_OUTOF10: 2
PAINLEVEL_OUTOF10: 7
PAINLEVEL_OUTOF10: 5
PAINLEVEL_OUTOF10: 5
PAINLEVEL_OUTOF10: 4

## 2019-08-19 ASSESSMENT — ENCOUNTER SYMPTOMS
GASTROINTESTINAL NEGATIVE: 1
EYES NEGATIVE: 1
BACK PAIN: 1
ALLERGIC/IMMUNOLOGIC NEGATIVE: 1
RESPIRATORY NEGATIVE: 1

## 2019-08-19 NOTE — PROGRESS NOTES
hydronephrosis and hydroureter down to the pelvis with potential presacral mass concerning for recurrence of malignancy. CEA was performed and elevated. Patient went for cystoscopy today with placement of ureteral stent for extrinsic compression relief and had transurethral resection of a bladder tumor greater than 5 cm. CBI now off and hematuria essentially resolved. Renal function starting to improve today post stent placement. Continue IV fluids for now to help with postobstructive diuresis. Plan from heme-onc for outpatient PET scan and potential treatment. We will plan for port placement prior to discharge. Active Problems:  Hematuria -in setting of bladder tumor removal, CBI now off, resolved      Mixed hyperlipidemia - statin      Coronary artery disease involving native coronary artery of native heart without angina pectoris - had stent over a year ago, no issues, no active chest pain      Complex regional pain syndrome type 1 of right lower extremity - follows with pain management. He is on neurontin, pamelor, skelaxin, methadone. May need to scale back Neurontin dose if renal function worsens. Presacral mass -likely recurrence of colon cancer. CEA is elevated. Heme-onc on board. Plan for outpatient PET scan. Due to patient's issues with access will likely need port placement. Has had port in the past.  Will get bilateral upper extremity Dopplers today for mapping. Consult to vascular surgery for port placement. Plan for this tomorrow and then patient can be discharged home afterwards if stable. Advance Directive: Full Code    Potential for discharge home tomorrow after port if stable.       Electronically signed by Abhilash Noriega DO on 8/19/2019 at 12:22 PM

## 2019-08-19 NOTE — PROGRESS NOTES
Urology Progress Note      SUBJECTIVE: Initially had some bladder spasms but now only some mild discomfort in the bladder    OBJECTIVE:      REVIEW OF SYSTEMS:   Review of Systems   Constitutional: Negative. HENT: Negative. Eyes: Negative. Respiratory: Negative. Cardiovascular: Positive for leg swelling. Gastrointestinal: Negative. Endocrine: Negative. Genitourinary: Negative for dysuria, flank pain and hematuria. Musculoskeletal: Positive for back pain. Skin: Negative. Allergic/Immunologic: Negative. Neurological: Positive for weakness. Hematological: Negative. Psychiatric/Behavioral: Negative. Physical  VITALS:  /79   Pulse 88   Temp 98.1 °F (36.7 °C) (Temporal)   Resp 18   Ht 5' 5\" (1.651 m)   Wt 200 lb (90.7 kg)   SpO2 95%   BMI 33.28 kg/m²   TEMPERATURE:  Current - Temp: 98.1 °F (36.7 °C);  Max - Temp  Av.8 °F (36.6 °C)  Min: 97 °F (36.1 °C)  Max: 98.9 °F (37.2 °C)   24 HR I&O     Intake/Output Summary (Last 24 hours) at 2019 0747  Last data filed at 2019 6009  Gross per 24 hour   Intake 2320 ml   Output 1400 ml   Net 920 ml     BACK: No right CVA tenderness  ABD: Ileostomy soft nontender  HEART:  normal rate  CHEST: Normal effort  GENITAL/URINARY: Three-way Mcgregor catheter just slight tinged urine off CBI    Data  Surgical pathology pending     CBC:   Recent Labs     19  0940 19  0505 19  0434   WBC 8.4 7.2 6.8   HGB 11.3* 10.8* 10.6*   HCT 34.2* 33.3* 32.3*    273 286     BMP:    Recent Labs     19  0451 19  0505 19  0434    138 136   K 3.7 3.2* 3.7   CL 98 97* 98   CO2 24 26 22   BUN 25* 22 22   CREATININE 1.6* 1.6* 1.4*   GLUCOSE 99 108 133*       ASSESSMENT AND PLAN    Patient Active Problem List   Diagnosis    Thoracic facet joint syndrome    Primary osteoarthritis of left hip    Leg swelling    Abnormal nuclear cardiac imaging test    Abnormal nuclear cardiac imaging test    Dyslipidemia    Mixed hyperlipidemia    S/p bare metal coronary artery stent    Coronary artery disease involving native coronary artery of native heart without angina pectoris    COLLEEN (acute kidney injury) ([address]    Complex regional pain syndrome type 1 of right lower extremity    Hydronephrosis of right kidney    Hydroureter on right    Malignant neoplasm of lateral wall of urinary bladder ([address]    Extrinsic ureteral obstruction, right     1. Right hydronephrosis secondary to extrinsic right ureteral obstruction from pelvic mass. Status post right stent placement. Patient will need stent change in approximately 3 months. 2.  COLLEEN creatinine improved down to 1.4 after stent placement. Nephrology following    3. Incidentally found bladder tumor probably superficial non-muscle invasive bladder cancer. Do not believe this is related to the right pelvic mass. TURBT performed pathology pending. His urine is clear to slight tinged off CBI okay to dismiss today with Mcgregor catheter and return to the office in 1 week for catheter removal and to go over his path report. 4.  Right pelvic mass with right ureteral obstruction. History of colon cancer and elevated CEA concerning for recurrent colon cancer oncology consulted.     Archana Jacome MD

## 2019-08-19 NOTE — CONSULTS
[facility] Vascular Surgery  Consult Note    Mr. Liz Lock is a 81year old male, with PMHx chronic back pain, colon cancer, who presented with RLE pain and swelling. Work-up in the ER included lower extremity venous scan that was negative for DVT. He was found to have COLLEEN without known underlying CKD. CT abdomen and pelvis demonstrated moderate to severe right hydronephrosis with dilatation of the right ureter into the lower pelvis . Also noted increased soft tissue change in right pelvic wall, suspicious for tissue recurrence. Patient admitted to hospitalist service with nephrology and oncology consultation. Attempt at PICC line was unsuccessful. Patient had recent back surgery at [facility], unable to place IJ central line. Vascular surgery consulted for port placement, in anticipation patient will need chemotherapy.      Lab Results   Component Value Date    CREATININE 1.4 (H) 08/19/2019    BUN 22 08/19/2019     08/19/2019    K 3.7 08/19/2019    CL 98 08/19/2019    CO2 22 08/19/2019       Dallin Christina is a 81 y.o. male with the following history reviewed and recorded in [facility]:  Patient Active Problem List    Diagnosis Date Noted    COLLEEN (acute kidney injury) ([facility] [address] 08/15/2019     Priority: High    Abnormal nuclear cardiac imaging test 10/09/2018     Priority: High     10/5/2018  lexiscan Positive for anteroseptal and inferior apical myocardial ischemia, EF 66%, intermediate risk findings, AUC indication 15, AUC score 4  10/9/18  Cath  80% mid to distal RCA, o/w luminals, 2.5 x 15 BMS, normal LVFX        Abnormal nuclear cardiac imaging test      Priority: High    Malignant neoplasm of lateral wall of urinary bladder ([facility] [address] 08/18/2019     Priority: Medium    Coronary artery disease involving native coronary artery of native heart without angina pectoris 10/31/2018     Priority: Medium    Complex regional pain syndrome type 1 of right lower extremity 08/16/2019     Priority: Low    Mixed hyperlipidemia 10/31/2018     Priority: Low    Extrinsic ureteral obstruction, right 08/18/2019    Hydronephrosis of right kidney 08/17/2019    Hydroureter on right 08/17/2019    S/p bare metal coronary artery stent 10/31/2018     10/9/18 BMS to mid to distal RCA      Leg swelling 09/15/2018    Primary osteoarthritis of left hip 10/28/2016    Thoracic facet joint syndrome 06/03/2016     Current Facility-Administered Medications   Medication Dose Route Frequency Provider Last Rate Last Dose    tamsulosin (FLOMAX) capsule 0.4 mg  0.4 mg Oral Daily Saumya Gutierrez MD   0.4 mg at 08/19/19 1157    cefTRIAXone (ROCEPHIN) 1 g in sodium chloride (PF) 10 mL IV syringe  1 g Intravenous Q24H Saumya Gutierrez MD   1 g at 08/19/19 1157    0.9 % sodium chloride infusion   Intravenous Continuous Miryam Krishnan MD 50 mL/hr at 08/19/19 1042      hyoscyamine (LEVSIN/SL) sublingual tablet 125 mcg  125 mcg Sublingual Q4H PRN Saumya Gutierrez MD   125 mcg at 08/18/19 1709    loperamide (IMODIUM) capsule 2 mg  2 mg Oral 4x daily Saumya Gutierrez MD   2 mg at 08/19/19 1157    sodium chloride flush 0.9 % injection 10 mL  10 mL Intravenous 2 times per day Saumya Gutierrez MD   10 mL at 08/17/19 2005    sodium chloride flush 0.9 % injection 10 mL  10 mL Intravenous PRN Saumya Gutierrez MD        docusate sodium (COLACE) capsule 100 mg  100 mg Oral BID Saumya Gutierrez MD   100 mg at 08/17/19 0842    ondansetron (ZOFRAN) injection 4 mg  4 mg Intravenous Q6H PRN Saumya Gutierrez MD        heparin (porcine) injection 5,000 Units  5,000 Units Subcutaneous 3 times per day Saumya Gutierrez MD   5,000 Units at 08/19/19 0549    acetaminophen (TYLENOL) tablet 650 mg  650 mg Oral Q8H PRN Saumya Gutierrez MD        HYDROmorphone (DILAUDID) injection 0.5 mg  0.5 mg Intravenous Q2H PRN Saumya Gutierrez MD   0.5 mg at 08/19/19 0558    atorvastatin (LIPITOR) tablet 40 mg  40 mg Oral Nightly Saumya Gutierrez MD
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
## 2019-08-20 ENCOUNTER — ANESTHESIA (OUTPATIENT)
Dept: OPERATING ROOM | Age: 69
DRG: 660 | End: 2019-08-20
Payer: MEDICARE

## 2019-08-20 ENCOUNTER — APPOINTMENT (OUTPATIENT)
Dept: INTERVENTIONAL RADIOLOGY/VASCULAR | Age: 69
DRG: 660 | End: 2019-08-20
Payer: MEDICARE

## 2019-08-20 ENCOUNTER — ANESTHESIA EVENT (OUTPATIENT)
Dept: OPERATING ROOM | Age: 69
DRG: 660 | End: 2019-08-20
Payer: MEDICARE

## 2019-08-20 VITALS — OXYGEN SATURATION: 98 % | DIASTOLIC BLOOD PRESSURE: 64 MMHG | SYSTOLIC BLOOD PRESSURE: 128 MMHG

## 2019-08-20 VITALS
DIASTOLIC BLOOD PRESSURE: 76 MMHG | OXYGEN SATURATION: 96 % | HEIGHT: 65 IN | RESPIRATION RATE: 18 BRPM | SYSTOLIC BLOOD PRESSURE: 153 MMHG | WEIGHT: 200 LBS | BODY MASS INDEX: 33.32 KG/M2 | HEART RATE: 74 BPM | TEMPERATURE: 98.2 F

## 2019-08-20 LAB
ALBUMIN SERPL-MCNC: 3.6 G/DL (ref 3.5–5.2)
ALP BLD-CCNC: 96 U/L (ref 40–130)
ALT SERPL-CCNC: 10 U/L (ref 5–41)
ANION GAP SERPL CALCULATED.3IONS-SCNC: 13 MMOL/L (ref 7–19)
AST SERPL-CCNC: 14 U/L (ref 5–40)
BILIRUB SERPL-MCNC: <0.2 MG/DL (ref 0.2–1.2)
BUN BLDV-MCNC: 20 MG/DL (ref 8–23)
CALCIUM SERPL-MCNC: 8.9 MG/DL (ref 8.8–10.2)
CHLORIDE BLD-SCNC: 102 MMOL/L (ref 98–111)
CO2: 26 MMOL/L (ref 22–29)
CREAT SERPL-MCNC: 1.3 MG/DL (ref 0.5–1.2)
FERRITIN: 82.6 NG/ML (ref 30–400)
FOLATE: 15.7 NG/ML (ref 4.5–32.2)
GFR NON-AFRICAN AMERICAN: 55
GLUCOSE BLD-MCNC: 93 MG/DL (ref 74–109)
HCT VFR BLD CALC: 31.5 % (ref 42–52)
HCT VFR BLD CALC: 32 % (ref 42–52)
HEMOGLOBIN: 10 G/DL (ref 14–18)
IRON SATURATION: 29 % (ref 14–50)
IRON: 76 UG/DL (ref 59–158)
LACTATE DEHYDROGENASE: 158 U/L (ref 91–215)
MCH RBC QN AUTO: 27.9 PG (ref 27–31)
MCHC RBC AUTO-ENTMCNC: 31.7 G/DL (ref 33–37)
MCV RBC AUTO: 87.7 FL (ref 80–94)
PDW BLD-RTO: 12.6 % (ref 11.5–14.5)
PLATELET # BLD: 258 K/UL (ref 130–400)
PMV BLD AUTO: 11.1 FL (ref 9.4–12.4)
POTASSIUM SERPL-SCNC: 3.8 MMOL/L (ref 3.5–5)
PROSTATE SPECIFIC ANTIGEN FREE: 0.1 NG/ML
PROSTATE SPECIFIC ANTIGEN PERCENT FREE: 33 %
PROSTATE SPECIFIC ANTIGEN: 0.3 NG/ML (ref 0–4)
RBC # BLD: 3.59 M/UL (ref 4.7–6.1)
RETICULOCYTE ABSOLUTE COUNT: 0.05 M/UL (ref 0.03–0.12)
RETICULOCYTE COUNT PCT: 1.39 % (ref 0.5–1.5)
SODIUM BLD-SCNC: 141 MMOL/L (ref 136–145)
TOTAL IRON BINDING CAPACITY: 261 UG/DL (ref 250–400)
TOTAL PROTEIN: 6.4 G/DL (ref 6.6–8.7)
VITAMIN B-12: 737 PG/ML (ref 211–946)
WBC # BLD: 7 K/UL (ref 4.8–10.8)

## 2019-08-20 PROCEDURE — 76937 US GUIDE VASCULAR ACCESS: CPT

## 2019-08-20 PROCEDURE — 6360000002 HC RX W HCPCS: Performed by: NURSE ANESTHETIST, CERTIFIED REGISTERED

## 2019-08-20 PROCEDURE — 80053 COMPREHEN METABOLIC PANEL: CPT

## 2019-08-20 PROCEDURE — 2500000003 HC RX 250 WO HCPCS: Performed by: SURGERY

## 2019-08-20 PROCEDURE — 2580000003 HC RX 258: Performed by: SURGERY

## 2019-08-20 PROCEDURE — 83550 IRON BINDING TEST: CPT

## 2019-08-20 PROCEDURE — 3700000001 HC ADD 15 MINUTES (ANESTHESIA): Performed by: SURGERY

## 2019-08-20 PROCEDURE — 2580000003 HC RX 258: Performed by: NURSE ANESTHETIST, CERTIFIED REGISTERED

## 2019-08-20 PROCEDURE — 6370000000 HC RX 637 (ALT 250 FOR IP): Performed by: SURGERY

## 2019-08-20 PROCEDURE — C1769 GUIDE WIRE: HCPCS | Performed by: SURGERY

## 2019-08-20 PROCEDURE — 7100000001 HC PACU RECOVERY - ADDTL 15 MIN: Performed by: SURGERY

## 2019-08-20 PROCEDURE — 3700000000 HC ANESTHESIA ATTENDED CARE: Performed by: SURGERY

## 2019-08-20 PROCEDURE — 6370000000 HC RX 637 (ALT 250 FOR IP): Performed by: UROLOGY

## 2019-08-20 PROCEDURE — 2580000003 HC RX 258: Performed by: NURSE PRACTITIONER

## 2019-08-20 PROCEDURE — 83540 ASSAY OF IRON: CPT

## 2019-08-20 PROCEDURE — 82746 ASSAY OF FOLIC ACID SERUM: CPT

## 2019-08-20 PROCEDURE — 02HV33Z INSERTION OF INFUSION DEVICE INTO SUPERIOR VENA CAVA, PERCUTANEOUS APPROACH: ICD-10-PCS | Performed by: SURGERY

## 2019-08-20 PROCEDURE — 82728 ASSAY OF FERRITIN: CPT

## 2019-08-20 PROCEDURE — 2709999900 HC NON-CHARGEABLE SUPPLY: Performed by: SURGERY

## 2019-08-20 PROCEDURE — 6360000002 HC RX W HCPCS: Performed by: UROLOGY

## 2019-08-20 PROCEDURE — 83615 LACTATE (LD) (LDH) ENZYME: CPT

## 2019-08-20 PROCEDURE — 36415 COLL VENOUS BLD VENIPUNCTURE: CPT

## 2019-08-20 PROCEDURE — 7100000000 HC PACU RECOVERY - FIRST 15 MIN: Performed by: SURGERY

## 2019-08-20 PROCEDURE — 6370000000 HC RX 637 (ALT 250 FOR IP): Performed by: NURSE PRACTITIONER

## 2019-08-20 PROCEDURE — 99232 SBSQ HOSP IP/OBS MODERATE 35: CPT | Performed by: UROLOGY

## 2019-08-20 PROCEDURE — 3600000013 HC SURGERY LEVEL 3 ADDTL 15MIN: Performed by: SURGERY

## 2019-08-20 PROCEDURE — 77001 FLUOROGUIDE FOR VEIN DEVICE: CPT

## 2019-08-20 PROCEDURE — 85027 COMPLETE CBC AUTOMATED: CPT

## 2019-08-20 PROCEDURE — C1788 PORT, INDWELLING, IMP: HCPCS | Performed by: SURGERY

## 2019-08-20 PROCEDURE — 2580000003 HC RX 258: Performed by: UROLOGY

## 2019-08-20 PROCEDURE — 82607 VITAMIN B-12: CPT

## 2019-08-20 PROCEDURE — 6360000002 HC RX W HCPCS: Performed by: SURGERY

## 2019-08-20 PROCEDURE — 99231 SBSQ HOSP IP/OBS SF/LOW 25: CPT | Performed by: INTERNAL MEDICINE

## 2019-08-20 PROCEDURE — 6360000002 HC RX W HCPCS: Performed by: NURSE PRACTITIONER

## 2019-08-20 PROCEDURE — 36561 INSERT TUNNELED CV CATH: CPT | Performed by: SURGERY

## 2019-08-20 PROCEDURE — 0JH60WZ INSERTION OF TOTALLY IMPLANTABLE VASCULAR ACCESS DEVICE INTO CHEST SUBCUTANEOUS TISSUE AND FASCIA, OPEN APPROACH: ICD-10-PCS | Performed by: SURGERY

## 2019-08-20 PROCEDURE — 85045 AUTOMATED RETICULOCYTE COUNT: CPT

## 2019-08-20 PROCEDURE — 3600000003 HC SURGERY LEVEL 3 BASE: Performed by: SURGERY

## 2019-08-20 DEVICE — PORT INFUS PLAS SGL LUMN W/ 9.6FR SIL CATH AIRGUARD VLV: Type: IMPLANTABLE DEVICE | Site: CHEST | Status: FUNCTIONAL

## 2019-08-20 RX ORDER — LABETALOL 20 MG/4 ML (5 MG/ML) INTRAVENOUS SYRINGE
5 EVERY 10 MIN PRN
Status: DISCONTINUED | OUTPATIENT
Start: 2019-08-20 | End: 2019-08-20 | Stop reason: HOSPADM

## 2019-08-20 RX ORDER — METOCLOPRAMIDE HYDROCHLORIDE 5 MG/ML
10 INJECTION INTRAMUSCULAR; INTRAVENOUS
Status: DISCONTINUED | OUTPATIENT
Start: 2019-08-20 | End: 2019-08-20 | Stop reason: HOSPADM

## 2019-08-20 RX ORDER — PROMETHAZINE HYDROCHLORIDE 25 MG/ML
6.25 INJECTION, SOLUTION INTRAMUSCULAR; INTRAVENOUS
Status: DISCONTINUED | OUTPATIENT
Start: 2019-08-20 | End: 2019-08-20 | Stop reason: HOSPADM

## 2019-08-20 RX ORDER — FENTANYL CITRATE 50 UG/ML
INJECTION, SOLUTION INTRAMUSCULAR; INTRAVENOUS PRN
Status: DISCONTINUED | OUTPATIENT
Start: 2019-08-20 | End: 2019-08-20 | Stop reason: SDUPTHER

## 2019-08-20 RX ORDER — ENALAPRILAT 2.5 MG/2ML
1.25 INJECTION INTRAVENOUS
Status: DISCONTINUED | OUTPATIENT
Start: 2019-08-20 | End: 2019-08-20 | Stop reason: HOSPADM

## 2019-08-20 RX ORDER — DIPHENHYDRAMINE HYDROCHLORIDE 50 MG/ML
12.5 INJECTION INTRAMUSCULAR; INTRAVENOUS
Status: DISCONTINUED | OUTPATIENT
Start: 2019-08-20 | End: 2019-08-20 | Stop reason: HOSPADM

## 2019-08-20 RX ORDER — ASPIRIN 81 MG/1
81 TABLET, CHEWABLE ORAL DAILY
Qty: 30 TABLET | Refills: 3 | Status: SHIPPED | OUTPATIENT
Start: 2019-08-20 | End: 2019-09-06

## 2019-08-20 RX ORDER — SODIUM CHLORIDE, SODIUM LACTATE, POTASSIUM CHLORIDE, CALCIUM CHLORIDE 600; 310; 30; 20 MG/100ML; MG/100ML; MG/100ML; MG/100ML
INJECTION, SOLUTION INTRAVENOUS CONTINUOUS PRN
Status: DISCONTINUED | OUTPATIENT
Start: 2019-08-20 | End: 2019-08-20 | Stop reason: SDUPTHER

## 2019-08-20 RX ORDER — MIDAZOLAM HYDROCHLORIDE 1 MG/ML
INJECTION INTRAMUSCULAR; INTRAVENOUS PRN
Status: DISCONTINUED | OUTPATIENT
Start: 2019-08-20 | End: 2019-08-20 | Stop reason: SDUPTHER

## 2019-08-20 RX ORDER — MEPERIDINE HYDROCHLORIDE 50 MG/ML
12.5 INJECTION INTRAMUSCULAR; INTRAVENOUS; SUBCUTANEOUS EVERY 5 MIN PRN
Status: DISCONTINUED | OUTPATIENT
Start: 2019-08-20 | End: 2019-08-20 | Stop reason: HOSPADM

## 2019-08-20 RX ORDER — LIDOCAINE HYDROCHLORIDE 20 MG/ML
INJECTION, SOLUTION INFILTRATION; PERINEURAL PRN
Status: DISCONTINUED | OUTPATIENT
Start: 2019-08-20 | End: 2019-08-20 | Stop reason: HOSPADM

## 2019-08-20 RX ORDER — HYDRALAZINE HYDROCHLORIDE 20 MG/ML
5 INJECTION INTRAMUSCULAR; INTRAVENOUS EVERY 10 MIN PRN
Status: DISCONTINUED | OUTPATIENT
Start: 2019-08-20 | End: 2019-08-20 | Stop reason: HOSPADM

## 2019-08-20 RX ADMIN — DULOXETINE 30 MG: 30 CAPSULE, DELAYED RELEASE ORAL at 08:30

## 2019-08-20 RX ADMIN — MIDAZOLAM 2 MG: 1 INJECTION INTRAMUSCULAR; INTRAVENOUS at 13:19

## 2019-08-20 RX ADMIN — LOPERAMIDE HYDROCHLORIDE 2 MG: 2 CAPSULE ORAL at 16:06

## 2019-08-20 RX ADMIN — HYDROMORPHONE HYDROCHLORIDE 0.5 MG: 1 INJECTION, SOLUTION INTRAMUSCULAR; INTRAVENOUS; SUBCUTANEOUS at 02:23

## 2019-08-20 RX ADMIN — GABAPENTIN 800 MG: 400 CAPSULE ORAL at 08:30

## 2019-08-20 RX ADMIN — WATER 1 G: 1 INJECTION INTRAMUSCULAR; INTRAVENOUS; SUBCUTANEOUS at 13:28

## 2019-08-20 RX ADMIN — GABAPENTIN 800 MG: 400 CAPSULE ORAL at 16:06

## 2019-08-20 RX ADMIN — METHADONE HYDROCHLORIDE 10 MG: 10 TABLET ORAL at 16:06

## 2019-08-20 RX ADMIN — METHADONE HYDROCHLORIDE 10 MG: 10 TABLET ORAL at 06:56

## 2019-08-20 RX ADMIN — SODIUM CHLORIDE, SODIUM LACTATE, POTASSIUM CHLORIDE, AND CALCIUM CHLORIDE: 600; 310; 30; 20 INJECTION, SOLUTION INTRAVENOUS at 13:19

## 2019-08-20 RX ADMIN — TAMSULOSIN HYDROCHLORIDE 0.4 MG: 0.4 CAPSULE ORAL at 08:30

## 2019-08-20 RX ADMIN — HYDROMORPHONE HYDROCHLORIDE 0.5 MG: 1 INJECTION, SOLUTION INTRAMUSCULAR; INTRAVENOUS; SUBCUTANEOUS at 10:30

## 2019-08-20 RX ADMIN — Medication 1 G: at 10:31

## 2019-08-20 RX ADMIN — FENTANYL CITRATE 25 MCG: 50 INJECTION INTRAMUSCULAR; INTRAVENOUS at 13:25

## 2019-08-20 RX ADMIN — CHLORHEXIDINE GLUCONATE: 213 SOLUTION TOPICAL at 09:00

## 2019-08-20 RX ADMIN — LOPERAMIDE HYDROCHLORIDE 2 MG: 2 CAPSULE ORAL at 08:30

## 2019-08-20 RX ADMIN — MEMANTINE HYDROCHLORIDE 10 MG: 5 TABLET ORAL at 08:30

## 2019-08-20 RX ADMIN — METOPROLOL TARTRATE 25 MG: 25 TABLET ORAL at 08:30

## 2019-08-20 ASSESSMENT — PAIN SCALES - GENERAL
PAINLEVEL_OUTOF10: 7
PAINLEVEL_OUTOF10: 6
PAINLEVEL_OUTOF10: 0

## 2019-08-20 ASSESSMENT — ENCOUNTER SYMPTOMS
EYE DISCHARGE: 0
VOMITING: 0
SHORTNESS OF BREATH: 0
GASTROINTESTINAL NEGATIVE: 1
BACK PAIN: 0
COUGH: 0
NAUSEA: 0
DIARRHEA: 0
EYE PAIN: 0
CONSTIPATION: 0
BLOOD IN STOOL: 0
RESPIRATORY NEGATIVE: 1
ALLERGIC/IMMUNOLOGIC NEGATIVE: 1
EYE REDNESS: 0
BACK PAIN: 1
WHEEZING: 0
ABDOMINAL PAIN: 0
SORE THROAT: 0
EYES NEGATIVE: 1

## 2019-08-20 NOTE — PROGRESS NOTES
08/18/19  0505 08/19/19  0434 08/20/19  0517 08/20/19  0836   WBC 7.2 6.8 7.0  --    HGB 10.8* 10.6* 10.0*  --    HCT 33.3* 32.3* 31.5* 32.0*   MCV 84.1 83.7 87.7  --     286 258  --      LIVER PROFILE:   Recent Labs     08/20/19  0517   AST 14   ALT 10   BILITOT <0.2   ALKPHOS 96     PT/INR:   No results for input(s): PROTIME, INR in the last 72 hours. APTT: No results for input(s): APTT in the last 72 hours. BNP:  No results for input(s): BNP in the last 72 hours. Ionized Calcium:No results for input(s): IONCA in the last 72 hours. Magnesium:  Recent Labs     08/19/19  0434   MG 1.6     Phosphorus:  Recent Labs     08/19/19  0434   PHOS 2.6     HgbA1C: No results for input(s): LABA1C in the last 72 hours. Hepatic:   Recent Labs     08/20/19  0517   ALKPHOS 96   ALT 10   AST 14   PROT 6.4*   BILITOT <0.2   LABALBU 3.6     Lactic Acid:   No results for input(s): LACTA in the last 72 hours. Troponin:   Recent Labs     08/18/19  0505   CKTOTAL 184     ABGs: No results for input(s): PH, PCO2, PO2, HCO3, O2SAT in the last 72 hours. CRP:    No results for input(s): CRP in the last 72 hours. Sed Rate:    No results for input(s): SEDRATE in the last 72 hours. Cultures:   No results for input(s): CULTURE in the last 72 hours. Radiology reports as per the Radiologist  Radiology: Xr Tibia Fibula Right (2 Views)    Result Date: 8/15/2019  EXAMINATION:  XR TIBIA FIBULA RIGHT (2 VIEWS)  8/15/2019 2:48 PM HISTORY: Right leg swelling. COMPARISON: No comparison study. TECHNIQUE: AP and lateral views were obtained of the tibia and fibula. FINDINGS:  The tibia and fibula are intact. No fracture or bone destruction is seen. There is soft tissue edema throughout the lower leg. There is mild to moderate narrowing of the medial compartment of the right knee. 1. No acute bony abnormality. 2. Soft tissue edema throughout the lower leg.  Signed by Dr Raven Read on 8/15/2019 2:49 PM    Us Renal Complete    Result Study:Swelling. Allergies   - Morphine. Impression   Normal venous duplex study of the right lower extremity(ies). There is no  evidence of deep or superficial venous thrombosis. Signature   ----------------------------------------------------------------  Electronically signed by Telma Swanson MD(Interpreting  physician) on 08/15/2019 05:40 PM  ----------------------------------------------------------------  Velocities are measured in cm/s ; Diameters are measured in mm Right Lower Extremities DVT Study Measurements Right 2D Measurements +------------------------------------+----------+---------------+----------+ ! Location                            ! Visualized! Compressibility! Thrombosis! +------------------------------------+----------+---------------+----------+ ! Sapheno Femoral Junction            ! Yes       ! Yes            ! None      ! +------------------------------------+----------+---------------+----------+ ! Common Femoral                      !Yes       ! Yes            ! None      ! +------------------------------------+----------+---------------+----------+ ! Prox Femoral                        !Yes       ! Yes            ! None      ! +------------------------------------+----------+---------------+----------+ ! Mid Femoral                         !Yes       ! Yes            ! None      ! +------------------------------------+----------+---------------+----------+ ! Dist Femoral                        !Yes       ! Yes            ! None      ! +------------------------------------+----------+---------------+----------+ ! Deep Femoral                        !Yes       ! Yes            ! None      ! +------------------------------------+----------+---------------+----------+ ! Popliteal                           !Yes       ! Yes            ! None      ! +------------------------------------+----------+---------------+----------+ ! SSV                                 ! Yes       ! Yes            ! None      !

## 2019-08-20 NOTE — PROGRESS NOTES
Patient name: Liss Amato  Patient : 1950  2019  7:07 AM  ROOM 316  Patient Active Problem List   Diagnosis Code    Thoracic facet joint syndrome M47.814    Primary osteoarthritis of left hip M16.12    Leg swelling M79.89    Abnormal nuclear cardiac imaging test R93.1    Abnormal nuclear cardiac imaging test R93.1    Mixed hyperlipidemia E78.2    S/p bare metal coronary artery stent Z95.5    Coronary artery disease involving native coronary artery of native heart without angina pectoris I25.10    COLLEEN (acute kidney injury) ([address] N17.9    Complex regional pain syndrome type 1 of right lower extremity G90.521    Hydronephrosis of right kidney N13.30    Hydroureter on right N13.4    Malignant neoplasm of lateral wall of urinary bladder ([address] C67.2    Extrinsic ureteral obstruction, right N13.5       Subjective:     HISTORY OF PRESENT ILLNESS: Mr. Isadora Barrera is a pleasant 28-year-old gentleman with a known history of moderately differentiated rectal carcinoma, diagnosed in . He received neoadjuvant chemotherapy and surgical intervention, denies receiving adjuvant treatment. He is presently admitted with right lower extremity pain and was noted to have acute kidney injury secondary to severe right hydronephrosis. Review of Systems   Constitutional: Positive for fatigue. Negative for chills, diaphoresis and fever. HENT: Negative. Negative for congestion, ear pain, hearing loss, nosebleeds, sore throat and tinnitus. Eyes: Negative. Negative for pain, discharge and redness. Respiratory: Negative. Negative for cough, shortness of breath and wheezing. Cardiovascular: Positive for leg swelling. Negative for chest pain and palpitations. Gastrointestinal: Negative. Negative for abdominal pain, blood in stool, constipation, diarrhea, nausea and vomiting. Endocrine: Negative for polydipsia.    Genitourinary: Negative for dysuria, flank pain, frequency, Cardiovascular: Normal rate, regular rhythm, normal heart sounds and intact distal pulses. Exam reveals no gallop and no friction rub. No murmur heard. Pulmonary/Chest: Effort normal and breath sounds normal. No respiratory distress. He has no wheezes. He has no rales. He exhibits no tenderness. Abdominal: Soft. Bowel sounds are normal. He exhibits no distension and no mass. There is no tenderness. There is no rebound and no guarding. No hernia. Colostomy appliance intact   Genitourinary:   Genitourinary Comments: Mcgregor catheter in place   Musculoskeletal: He exhibits edema (Lower extremities). He exhibits no tenderness or deformity. Range of motion within normal limits x4 extremities   Neurological: He is alert and oriented to person, place, and time. No cranial nerve deficit. Coordination normal.   Skin: Skin is warm. No rash noted. He is not diaphoretic. No erythema. No pallor. Psychiatric: He has a normal mood and affect. His behavior is normal. Thought content normal.   Vitals reviewed. BMP:   Recent Labs     08/19/19 0434 08/20/19  0517    141   K 3.7 3.8   CL 98 102   CO2 22 26   BUN 22 20   CREATININE 1.4* 1.3*   GLUCOSE 133* 93   CALCIUM 8.8 8.9     CBC:   Recent Labs     08/19/19 0434 08/20/19  0517   WBC 6.8 7.0   HGB 10.6* 10.0*   HCT 32.3* 31.5*   MCV 83.7 87.7    258     CMP:    Recent Labs     08/19/19 0434 08/20/19 0517    141   K 3.7 3.8   CL 98 102   CO2 22 26   BUN 22 20   CREATININE 1.4* 1.3*   LABGLOM 50* 55*   GLUCOSE 133* 93   PROT  --  6.4*   LABALBU  --  3.6   CALCIUM 8.8 8.9   BILITOT  --  <0.2   ALKPHOS  --  96   AST  --  14   ALT  --  10       30Day lookback of cultures:    Blood Culture Recent:   Recent Labs     08/15/19  2132   BC No Growth to date. Any change in status will be called. Gram Stain Recent: No results for input(s): LABGRAM in the last 720 hours.   Resp Culture Recent: No results for input(s): CULTRESP in the last 720 hours.  Body Fluid Recent : No results for input(s): BFCX in the last 720 hours. MRSA Recent : No results for input(s): [address] in the last 720 hours. Urine Culture Recent : No results for input(s): LABURIN in the last 720 hours. Organism Recent : No results for input(s): ORG in the last 720 hours. Radiology:   Xr Tibia Fibula Right (2 Views)    Result Date: 8/15/2019  EXAMINATION:  XR TIBIA FIBULA RIGHT (2 VIEWS)  8/15/2019 2:48 PM HISTORY: Right leg swelling. COMPARISON: No comparison study. TECHNIQUE: AP and lateral views were obtained of the tibia and fibula. FINDINGS:  The tibia and fibula are intact. No fracture or bone destruction is seen. There is soft tissue edema throughout the lower leg. There is mild to moderate narrowing of the medial compartment of the right knee. 1. No acute bony abnormality. 2. Soft tissue edema throughout the lower leg. Signed by Dr Tita Louise on 8/15/2019 2:49 PM    Us Renal Complete    Result Date: 8/16/2019  EXAMINATION: US RENAL COMPLETE 8/16/2019 3:51 PM HISTORY: Acute kidney injury COMPARISON: None FINDINGS: Transabdominal sonographic evaluation of the kidneys and urinary bladder was performed in the transverse and longitudinal projections. Right kidney measures 12.3 x 4.9 x 5.1 cm in size. There is severe right hydronephrosis and proximal hydroureter. Renal echogenicity appears normal. No solid renal mass is demonstrated. The left kidney appears normal in size and echogenicity, measuring 11.5 x 5.7 x 5.2 cm in size. The urinary bladder is completely decompressed and therefore not well evaluated. Severe right hydronephrosis and proximal hydroureter. Signed by Dr Kareem De La O on 8/16/2019 3:52 PM    Fl Retrograde Pyelogram W Wo Kub    Result Date: 8/18/2019  History: Right hydronephrosis Retrograde pyelogram: 6 spot views right renal collecting system are obtained intraoperatively. DOSE- 1.9674 mGym2 There is cannulation of the right distal ureter.  A catheter +---------------+-----------------+----------------------+-----------------+ ! Axillary       ! Yes              ! Yes                   ! None             ! +---------------+-----------------+----------------------+-----------------+ Left UE Vein Measurements 2D Measurements +---------------+-----------------+----------------------+-----------------+ ! Location       ! Visualized       ! Compressibility       ! Thrombosis       ! +---------------+-----------------+----------------------+-----------------+ ! IJV            ! Yes              ! Yes                   ! None             ! +---------------+-----------------+----------------------+-----------------+ ! SCV            ! Yes              ! Yes                   ! None             ! +---------------+-----------------+----------------------+-----------------+ ! Axillary       ! Yes              ! Yes                   ! None             ! +---------------+-----------------+----------------------+-----------------+ Velocities are measured in cm/s ; Diameters are measured in mm Cephalic Mapping +--------++----------+-------------+-----++----------+-------------+-------+ ! ! !Right     ! ! Left !!          !             !       ! +--------++----------+-------------+-----++----------+-------------+-------+ ! Location! !AP Diam.  ! Trans Diam   !Depth! !AP Diam.  ! Trans Diam   !Depth  ! +--------++----------+-------------+-----++----------+-------------+-------+ ! IJV     !!          !10.9         !     !!          !11.4         !       ! +--------++----------+-------------+-----++----------+-------------+-------+ ! SCV     !!          !10.4         !     !!          !9.8          !       ! +--------++----------+-------------+-----++----------+-------------+-------+ Basilic Mapping +--------++--------+--------------+-----++---------+--------------+--------+ ! ! !Right   ! ! Left !!         !              !        ! +--------++--------+--------------+-----++---------+--------------+--------+ ! Location! !AP Diam !Trans Diam    !Depth! !AP Diam  !Trans Diam    !Depth   ! +--------++--------+--------------+-----++---------+--------------+--------+ ! Axillary!!        !7.5           !     !!         !8             !        ! +--------++--------+--------------+-----++---------+--------------+--------+    Vl Dup Lower Extremity Venous Right    Result Date: 8/15/2019  Vascular Lower Extremities DVT Study Procedure  Demographics   Patient Name     Tere Sam Age                   81   Patient Number   [id number]           Gender                Male   Visit Number     [id number]        Interpreting          Nicolette Ocampo MD                                    Physician   Date of Birth    1950       Referring Physician   Eric Brock   Accession Number [id number]        [address] [place] [address]  Procedure Type of Study:   Veins:Lower Extremities DVT Study, VL EXTREMITY VENOUS DUPLEX RIGHT. Indications for Study:Swelling. Allergies   - Morphine. Impression   Normal venous duplex study of the right lower extremity(ies). There is no  evidence of deep or superficial venous thrombosis. Signature   ----------------------------------------------------------------  Electronically signed by Nicolette Ocampo MD(Interpreting  physician) on 08/15/2019 05:40 PM  ----------------------------------------------------------------  Velocities are measured in cm/s ; Diameters are measured in mm Right Lower Extremities DVT Study Measurements Right 2D Measurements +------------------------------------+----------+---------------+----------+ ! Location                            ! Visualized! Compressibility! Thrombosis! +------------------------------------+----------+---------------+----------+ ! Sapheno Femoral Junction            ! Yes       ! Yes            ! None      ! normocytic anemia   · Hemoglobin 10  · MCV 87.7      Studies requested 8/20/2019  Ferritin  Iron  Iron saturation  TIBC  Vitamin B12  Folate  Erythropoietin  Reticulocyte count  LDH    Chronic radiation proctitis  · Managed by PCP      Anticipating Port-A-Cath placement today. Anticipating PET scan, CT scan of chest, abdomen and pelvis with contrast as outpatient. Will request medical records from Dr. Delfina Garcia and attempt to obtain medical records from Dr. Ambrosio Gordon  Follow-up appointment on 8/30/2019 at 11:15 AM  Okay from oncology standpoint to discharge when appropriate with others.       OLGA Roque    08/20/19  7:07 AM

## 2019-08-20 NOTE — PROGRESS NOTES
Report called. Port placement complete to right chest dermabond dressing. Mcgregor remains in place. Urine bloody and CBI restarted prior to transfer back to unit. Dr. Ovidio Ferguson is aware. On arrival back patient AO denies any needs at present. Primary RN [facility] notified of patient's return.   VS taken and posted

## 2019-08-20 NOTE — PROGRESS NOTES
Vascular Note:    Patient seen on rounds this am with . Discussed plan for port placement today, Jeana Joseph will use site rite to scan veins while he is in the OR Holding area. Patient stating that he previously had a port in his left chest, it would not draw but did flush and run IV fluids. Patient has been NPO since midnight, has had a hibiclens bath as prep.

## 2019-08-21 LAB
BLOOD CULTURE, ROUTINE: NORMAL
CULTURE, BLOOD 2: NORMAL
SOURCE: NORMAL

## 2019-08-22 ENCOUNTER — TELEPHONE (OUTPATIENT)
Dept: UROLOGY | Age: 69
End: 2019-08-22

## 2019-08-22 DIAGNOSIS — C20 RECTAL CANCER (HCC): Primary | ICD-10-CM

## 2019-08-23 ENCOUNTER — TELEPHONE (OUTPATIENT)
Dept: HEMATOLOGY | Age: 69
End: 2019-08-23

## 2019-08-27 ENCOUNTER — HOSPITAL ENCOUNTER (OUTPATIENT)
Dept: CT IMAGING | Age: 69
Discharge: HOME | End: 2019-08-27
Payer: MEDICARE

## 2019-08-27 ENCOUNTER — HOSPITAL ENCOUNTER (OUTPATIENT)
Dept: NUCLEAR MEDICINE | Age: 69
Discharge: HOME | End: 2019-08-29
Payer: MEDICARE

## 2019-08-27 DIAGNOSIS — C20 RECTAL CANCER (HCC): ICD-10-CM

## 2019-08-27 LAB
GLUCOSE BLD-MCNC: 72 MG/DL (ref 70–99)
PERFORMED ON: NORMAL

## 2019-08-27 PROCEDURE — 71260 CT THORAX DX C+: CPT

## 2019-08-27 PROCEDURE — 78813 PET IMAGE FULL BODY: CPT

## 2019-08-27 PROCEDURE — 3430000000 HC RX DIAGNOSTIC RADIOPHARMACEUTICAL: Performed by: NURSE PRACTITIONER

## 2019-08-27 PROCEDURE — 74177 CT ABD & PELVIS W/CONTRAST: CPT

## 2019-08-27 PROCEDURE — 82948 REAGENT STRIP/BLOOD GLUCOSE: CPT

## 2019-08-27 PROCEDURE — A9552 F18 FDG: HCPCS | Performed by: NURSE PRACTITIONER

## 2019-08-27 PROCEDURE — 6360000004 HC RX CONTRAST MEDICATION: Performed by: NURSE PRACTITIONER

## 2019-08-27 RX ORDER — FLUDEOXYGLUCOSE F 18 200 MCI/ML
10 INJECTION, SOLUTION INTRAVENOUS
Status: COMPLETED | OUTPATIENT
Start: 2019-08-27 | End: 2019-08-27

## 2019-08-27 RX ADMIN — IOPAMIDOL 90 ML: 755 INJECTION, SOLUTION INTRAVENOUS at 14:05

## 2019-08-27 RX ADMIN — FLUDEOXYGLUCOSE F 18 10 MILLICURIE: 200 INJECTION, SOLUTION INTRAVENOUS at 13:41

## 2019-08-29 ENCOUNTER — APPOINTMENT (OUTPATIENT)
Dept: GENERAL RADIOLOGY | Age: 69
End: 2019-08-29
Attending: UROLOGY
Payer: MEDICARE

## 2019-08-29 ENCOUNTER — OFFICE VISIT (OUTPATIENT)
Dept: UROLOGY | Age: 69
End: 2019-08-29
Payer: MEDICARE

## 2019-08-29 ENCOUNTER — ANESTHESIA EVENT (OUTPATIENT)
Dept: OPERATING ROOM | Age: 69
End: 2019-08-29
Payer: MEDICARE

## 2019-08-29 ENCOUNTER — ANESTHESIA (OUTPATIENT)
Dept: OPERATING ROOM | Age: 69
End: 2019-08-29
Payer: MEDICARE

## 2019-08-29 ENCOUNTER — HOSPITAL ENCOUNTER (OUTPATIENT)
Age: 69
Setting detail: OUTPATIENT SURGERY
Discharge: HOME | End: 2019-08-29
Attending: UROLOGY | Admitting: UROLOGY
Payer: MEDICARE

## 2019-08-29 VITALS
OXYGEN SATURATION: 99 % | RESPIRATION RATE: 16 BRPM | TEMPERATURE: 98 F | SYSTOLIC BLOOD PRESSURE: 140 MMHG | HEART RATE: 79 BPM | DIASTOLIC BLOOD PRESSURE: 85 MMHG

## 2019-08-29 VITALS — WEIGHT: 203 LBS | TEMPERATURE: 97.7 F | BODY MASS INDEX: 32.62 KG/M2 | HEIGHT: 66 IN

## 2019-08-29 VITALS
SYSTOLIC BLOOD PRESSURE: 82 MMHG | OXYGEN SATURATION: 100 % | RESPIRATION RATE: 9 BRPM | DIASTOLIC BLOOD PRESSURE: 51 MMHG

## 2019-08-29 DIAGNOSIS — C67.9 MALIGNANT NEOPLASM OF URINARY BLADDER, UNSPECIFIED SITE (HCC): ICD-10-CM

## 2019-08-29 DIAGNOSIS — N13.30 HYDRONEPHROSIS OF LEFT KIDNEY: ICD-10-CM

## 2019-08-29 DIAGNOSIS — R32 URINARY INCONTINENCE, UNSPECIFIED TYPE: Primary | ICD-10-CM

## 2019-08-29 DIAGNOSIS — N13.30 HYDRONEPHROSIS OF RIGHT KIDNEY: ICD-10-CM

## 2019-08-29 DIAGNOSIS — N13.5 EXTRINSIC URETERAL OBSTRUCTION: ICD-10-CM

## 2019-08-29 LAB
ANION GAP SERPL CALCULATED.3IONS-SCNC: 12 MMOL/L (ref 7–19)
BACTERIA URINE, POC: ABNORMAL
BILIRUBIN URINE: 0 MG/DL
BLOOD, URINE: POSITIVE
BUN BLDV-MCNC: 16 MG/DL (ref 8–23)
CALCIUM SERPL-MCNC: 8.8 MG/DL (ref 8.8–10.2)
CASTS URINE, POC: ABNORMAL
CHLORIDE BLD-SCNC: 101 MMOL/L (ref 98–111)
CLARITY: ABNORMAL
CO2: 22 MMOL/L (ref 22–29)
COLOR: YELLOW
CREAT SERPL-MCNC: 1.6 MG/DL (ref 0.5–1.2)
CRYSTALS URINE, POC: ABNORMAL
EPI CELLS URINE, POC: ABNORMAL
GFR NON-AFRICAN AMERICAN: 43
GLUCOSE BLD-MCNC: 84 MG/DL (ref 74–109)
GLUCOSE URINE: ABNORMAL
HCT VFR BLD CALC: 29.4 % (ref 42–52)
HEMOGLOBIN: 9.5 G/DL (ref 14–18)
KETONES, URINE: NEGATIVE
LEUKOCYTE EST, POC: ABNORMAL
MCH RBC QN AUTO: 27.9 PG (ref 27–31)
MCHC RBC AUTO-ENTMCNC: 32.3 G/DL (ref 33–37)
MCV RBC AUTO: 86.2 FL (ref 80–94)
NITRITE, URINE: NEGATIVE
PDW BLD-RTO: 12.6 % (ref 11.5–14.5)
PH UA: 5.5 (ref 4.5–8)
PLATELET # BLD: 249 K/UL (ref 130–400)
PMV BLD AUTO: 10.3 FL (ref 9.4–12.4)
POTASSIUM SERPL-SCNC: 4.4 MMOL/L (ref 3.5–4.9)
PROTEIN UA: POSITIVE
RBC # BLD: 3.41 M/UL (ref 4.7–6.1)
RBC URINE, POC: ABNORMAL
SODIUM BLD-SCNC: 135 MMOL/L (ref 136–145)
SPECIFIC GRAVITY UA: 1.02 (ref 1–1.03)
UROBILINOGEN, URINE: NORMAL
WBC # BLD: 7.1 K/UL (ref 4.8–10.8)
WBC URINE, POC: ABNORMAL
YEAST URINE, POC: ABNORMAL

## 2019-08-29 PROCEDURE — 2580000003 HC RX 258: Performed by: ANESTHESIOLOGY

## 2019-08-29 PROCEDURE — C2617 STENT, NON-COR, TEM W/O DEL: HCPCS | Performed by: UROLOGY

## 2019-08-29 PROCEDURE — 74420 UROGRAPHY RTRGR +-KUB: CPT

## 2019-08-29 PROCEDURE — 6360000002 HC RX W HCPCS: Performed by: UROLOGY

## 2019-08-29 PROCEDURE — 51798 US URINE CAPACITY MEASURE: CPT | Performed by: UROLOGY

## 2019-08-29 PROCEDURE — 52332 CYSTOSCOPY AND TREATMENT: CPT | Performed by: UROLOGY

## 2019-08-29 PROCEDURE — 81001 URINALYSIS AUTO W/SCOPE: CPT | Performed by: UROLOGY

## 2019-08-29 PROCEDURE — 2500000003 HC RX 250 WO HCPCS: Performed by: NURSE ANESTHETIST, CERTIFIED REGISTERED

## 2019-08-29 PROCEDURE — G8598 ASA/ANTIPLAT THER USED: HCPCS | Performed by: UROLOGY

## 2019-08-29 PROCEDURE — 85027 COMPLETE CBC AUTOMATED: CPT

## 2019-08-29 PROCEDURE — 99215 OFFICE O/P EST HI 40 MIN: CPT | Performed by: UROLOGY

## 2019-08-29 PROCEDURE — 1036F TOBACCO NON-USER: CPT | Performed by: UROLOGY

## 2019-08-29 PROCEDURE — C1758 CATHETER, URETERAL: HCPCS | Performed by: UROLOGY

## 2019-08-29 PROCEDURE — C1769 GUIDE WIRE: HCPCS | Performed by: UROLOGY

## 2019-08-29 PROCEDURE — 80048 BASIC METABOLIC PNL TOTAL CA: CPT

## 2019-08-29 PROCEDURE — 4040F PNEUMOC VAC/ADMIN/RCVD: CPT | Performed by: UROLOGY

## 2019-08-29 PROCEDURE — 3700000000 HC ANESTHESIA ATTENDED CARE: Performed by: UROLOGY

## 2019-08-29 PROCEDURE — 7100000001 HC PACU RECOVERY - ADDTL 15 MIN: Performed by: UROLOGY

## 2019-08-29 PROCEDURE — 3700000001 HC ADD 15 MINUTES (ANESTHESIA): Performed by: UROLOGY

## 2019-08-29 PROCEDURE — 6360000002 HC RX W HCPCS: Performed by: NURSE ANESTHETIST, CERTIFIED REGISTERED

## 2019-08-29 PROCEDURE — 1111F DSCHRG MED/CURRENT MED MERGE: CPT | Performed by: UROLOGY

## 2019-08-29 PROCEDURE — 7100000010 HC PHASE II RECOVERY - FIRST 15 MIN: Performed by: UROLOGY

## 2019-08-29 PROCEDURE — 2709999900 HC NON-CHARGEABLE SUPPLY: Performed by: UROLOGY

## 2019-08-29 PROCEDURE — 7100000000 HC PACU RECOVERY - FIRST 15 MIN: Performed by: UROLOGY

## 2019-08-29 PROCEDURE — 74420 UROGRAPHY RTRGR +-KUB: CPT | Performed by: UROLOGY

## 2019-08-29 PROCEDURE — 7100000011 HC PHASE II RECOVERY - ADDTL 15 MIN: Performed by: UROLOGY

## 2019-08-29 PROCEDURE — 3600000014 HC SURGERY LEVEL 4 ADDTL 15MIN: Performed by: UROLOGY

## 2019-08-29 PROCEDURE — 3017F COLORECTAL CA SCREEN DOC REV: CPT | Performed by: UROLOGY

## 2019-08-29 PROCEDURE — 36415 COLL VENOUS BLD VENIPUNCTURE: CPT

## 2019-08-29 PROCEDURE — 1123F ACP DISCUSS/DSCN MKR DOCD: CPT | Performed by: UROLOGY

## 2019-08-29 PROCEDURE — 3600000004 HC SURGERY LEVEL 4 BASE: Performed by: UROLOGY

## 2019-08-29 PROCEDURE — 2580000003 HC RX 258: Performed by: UROLOGY

## 2019-08-29 PROCEDURE — G8427 DOCREV CUR MEDS BY ELIG CLIN: HCPCS | Performed by: UROLOGY

## 2019-08-29 PROCEDURE — G8417 CALC BMI ABV UP PARAM F/U: HCPCS | Performed by: UROLOGY

## 2019-08-29 DEVICE — URETERAL STENT
Type: IMPLANTABLE DEVICE | Site: URETER | Status: FUNCTIONAL
Brand: POLARIS™ ULTRA

## 2019-08-29 RX ORDER — LABETALOL 20 MG/4 ML (5 MG/ML) INTRAVENOUS SYRINGE
5 EVERY 10 MIN PRN
Status: DISCONTINUED | OUTPATIENT
Start: 2019-08-29 | End: 2019-08-29 | Stop reason: HOSPADM

## 2019-08-29 RX ORDER — FENTANYL CITRATE 50 UG/ML
INJECTION, SOLUTION INTRAMUSCULAR; INTRAVENOUS PRN
Status: DISCONTINUED | OUTPATIENT
Start: 2019-08-29 | End: 2019-08-29 | Stop reason: SDUPTHER

## 2019-08-29 RX ORDER — SODIUM CHLORIDE, SODIUM LACTATE, POTASSIUM CHLORIDE, CALCIUM CHLORIDE 600; 310; 30; 20 MG/100ML; MG/100ML; MG/100ML; MG/100ML
INJECTION, SOLUTION INTRAVENOUS CONTINUOUS
Status: DISCONTINUED | OUTPATIENT
Start: 2019-08-29 | End: 2019-08-29 | Stop reason: HOSPADM

## 2019-08-29 RX ORDER — FENTANYL CITRATE 50 UG/ML
50 INJECTION, SOLUTION INTRAMUSCULAR; INTRAVENOUS
Status: DISCONTINUED | OUTPATIENT
Start: 2019-08-29 | End: 2019-08-29 | Stop reason: HOSPADM

## 2019-08-29 RX ORDER — METOCLOPRAMIDE HYDROCHLORIDE 5 MG/ML
10 INJECTION INTRAMUSCULAR; INTRAVENOUS
Status: DISCONTINUED | OUTPATIENT
Start: 2019-08-29 | End: 2019-08-29 | Stop reason: HOSPADM

## 2019-08-29 RX ORDER — CIPROFLOXACIN 500 MG/1
500 TABLET, FILM COATED ORAL DAILY
Qty: 3 TABLET | Refills: 0 | Status: SHIPPED | OUTPATIENT
Start: 2019-08-29 | End: 2019-09-01

## 2019-08-29 RX ORDER — OXYCODONE HYDROCHLORIDE AND ACETAMINOPHEN 5; 325 MG/1; MG/1
2 TABLET ORAL EVERY 4 HOURS PRN
Status: DISCONTINUED | OUTPATIENT
Start: 2019-08-29 | End: 2019-08-29 | Stop reason: HOSPADM

## 2019-08-29 RX ORDER — MIDAZOLAM HYDROCHLORIDE 1 MG/ML
2 INJECTION INTRAMUSCULAR; INTRAVENOUS
Status: DISCONTINUED | OUTPATIENT
Start: 2019-08-29 | End: 2019-08-29 | Stop reason: HOSPADM

## 2019-08-29 RX ORDER — CEPHALEXIN 500 MG/1
500 CAPSULE ORAL 2 TIMES DAILY
Status: ON HOLD | COMMUNITY
End: 2019-08-29 | Stop reason: HOSPADM

## 2019-08-29 RX ORDER — DEXAMETHASONE SODIUM PHOSPHATE 10 MG/ML
INJECTION INTRAMUSCULAR; INTRAVENOUS PRN
Status: DISCONTINUED | OUTPATIENT
Start: 2019-08-29 | End: 2019-08-29 | Stop reason: SDUPTHER

## 2019-08-29 RX ORDER — EPHEDRINE SULFATE 50 MG/ML
INJECTION, SOLUTION INTRAVENOUS PRN
Status: DISCONTINUED | OUTPATIENT
Start: 2019-08-29 | End: 2019-08-29 | Stop reason: SDUPTHER

## 2019-08-29 RX ORDER — MEPERIDINE HYDROCHLORIDE 50 MG/ML
12.5 INJECTION INTRAMUSCULAR; INTRAVENOUS; SUBCUTANEOUS EVERY 5 MIN PRN
Status: DISCONTINUED | OUTPATIENT
Start: 2019-08-29 | End: 2019-08-29 | Stop reason: HOSPADM

## 2019-08-29 RX ORDER — FENTANYL CITRATE 50 UG/ML
25 INJECTION, SOLUTION INTRAMUSCULAR; INTRAVENOUS
Status: DISCONTINUED | OUTPATIENT
Start: 2019-08-29 | End: 2019-08-29 | Stop reason: HOSPADM

## 2019-08-29 RX ORDER — LIDOCAINE HYDROCHLORIDE 10 MG/ML
INJECTION, SOLUTION INFILTRATION; PERINEURAL PRN
Status: DISCONTINUED | OUTPATIENT
Start: 2019-08-29 | End: 2019-08-29 | Stop reason: SDUPTHER

## 2019-08-29 RX ORDER — HEPARIN SODIUM (PORCINE) LOCK FLUSH IV SOLN 100 UNIT/ML 100 UNIT/ML
300 SOLUTION INTRAVENOUS PRN
Status: DISCONTINUED | OUTPATIENT
Start: 2019-08-29 | End: 2019-08-29 | Stop reason: HOSPADM

## 2019-08-29 RX ORDER — SODIUM CHLORIDE 0.9 % (FLUSH) 0.9 %
10 SYRINGE (ML) INJECTION PRN
Status: DISCONTINUED | OUTPATIENT
Start: 2019-08-29 | End: 2019-08-29 | Stop reason: HOSPADM

## 2019-08-29 RX ORDER — PROPOFOL 10 MG/ML
INJECTION, EMULSION INTRAVENOUS PRN
Status: DISCONTINUED | OUTPATIENT
Start: 2019-08-29 | End: 2019-08-29 | Stop reason: SDUPTHER

## 2019-08-29 RX ORDER — LIDOCAINE HYDROCHLORIDE 10 MG/ML
1 INJECTION, SOLUTION EPIDURAL; INFILTRATION; INTRACAUDAL; PERINEURAL
Status: DISCONTINUED | OUTPATIENT
Start: 2019-08-29 | End: 2019-08-29 | Stop reason: HOSPADM

## 2019-08-29 RX ORDER — ONDANSETRON 2 MG/ML
INJECTION INTRAMUSCULAR; INTRAVENOUS PRN
Status: DISCONTINUED | OUTPATIENT
Start: 2019-08-29 | End: 2019-08-29 | Stop reason: SDUPTHER

## 2019-08-29 RX ORDER — ENALAPRILAT 2.5 MG/2ML
1.25 INJECTION INTRAVENOUS
Status: DISCONTINUED | OUTPATIENT
Start: 2019-08-29 | End: 2019-08-29 | Stop reason: HOSPADM

## 2019-08-29 RX ORDER — HYDRALAZINE HYDROCHLORIDE 20 MG/ML
5 INJECTION INTRAMUSCULAR; INTRAVENOUS EVERY 10 MIN PRN
Status: DISCONTINUED | OUTPATIENT
Start: 2019-08-29 | End: 2019-08-29 | Stop reason: HOSPADM

## 2019-08-29 RX ORDER — PROMETHAZINE HYDROCHLORIDE 25 MG/ML
6.25 INJECTION, SOLUTION INTRAMUSCULAR; INTRAVENOUS
Status: DISCONTINUED | OUTPATIENT
Start: 2019-08-29 | End: 2019-08-29 | Stop reason: HOSPADM

## 2019-08-29 RX ORDER — SODIUM CHLORIDE 0.9 % (FLUSH) 0.9 %
10 SYRINGE (ML) INJECTION EVERY 12 HOURS SCHEDULED
Status: DISCONTINUED | OUTPATIENT
Start: 2019-08-29 | End: 2019-08-29 | Stop reason: HOSPADM

## 2019-08-29 RX ORDER — SODIUM CHLORIDE 9 MG/ML
INJECTION, SOLUTION INTRAVENOUS CONTINUOUS
Status: DISCONTINUED | OUTPATIENT
Start: 2019-08-29 | End: 2019-08-29 | Stop reason: HOSPADM

## 2019-08-29 RX ORDER — DIPHENHYDRAMINE HYDROCHLORIDE 50 MG/ML
12.5 INJECTION INTRAMUSCULAR; INTRAVENOUS
Status: DISCONTINUED | OUTPATIENT
Start: 2019-08-29 | End: 2019-08-29 | Stop reason: HOSPADM

## 2019-08-29 RX ORDER — ONDANSETRON 2 MG/ML
4 INJECTION INTRAMUSCULAR; INTRAVENOUS EVERY 4 HOURS PRN
Status: DISCONTINUED | OUTPATIENT
Start: 2019-08-29 | End: 2019-08-29 | Stop reason: HOSPADM

## 2019-08-29 RX ADMIN — CEFTRIAXONE 1 G: 1 INJECTION, POWDER, FOR SOLUTION INTRAMUSCULAR; INTRAVENOUS at 19:18

## 2019-08-29 RX ADMIN — FENTANYL CITRATE 50 MCG: 50 INJECTION INTRAMUSCULAR; INTRAVENOUS at 19:13

## 2019-08-29 RX ADMIN — ONDANSETRON HYDROCHLORIDE 4 MG: 2 INJECTION, SOLUTION INTRAMUSCULAR; INTRAVENOUS at 19:25

## 2019-08-29 RX ADMIN — EPHEDRINE SULFATE 10 MG: 50 INJECTION INTRAMUSCULAR; INTRAVENOUS; SUBCUTANEOUS at 19:24

## 2019-08-29 RX ADMIN — EPHEDRINE SULFATE 10 MG: 50 INJECTION INTRAMUSCULAR; INTRAVENOUS; SUBCUTANEOUS at 19:30

## 2019-08-29 RX ADMIN — LIDOCAINE HYDROCHLORIDE 5 ML: 10 INJECTION, SOLUTION INFILTRATION; PERINEURAL at 19:13

## 2019-08-29 RX ADMIN — SODIUM CHLORIDE, SODIUM LACTATE, POTASSIUM CHLORIDE, AND CALCIUM CHLORIDE: 600; 310; 30; 20 INJECTION, SOLUTION INTRAVENOUS at 16:08

## 2019-08-29 RX ADMIN — PROPOFOL 180 MG: 10 INJECTION, EMULSION INTRAVENOUS at 19:13

## 2019-08-29 RX ADMIN — DEXAMETHASONE SODIUM PHOSPHATE 8 MG: 10 INJECTION INTRAMUSCULAR; INTRAVENOUS at 19:20

## 2019-08-29 ASSESSMENT — PAIN SCALES - GENERAL
PAINLEVEL_OUTOF10: 0
PAINLEVEL_OUTOF10: 0

## 2019-08-29 ASSESSMENT — ENCOUNTER SYMPTOMS
ABDOMINAL PAIN: 0
ABDOMINAL DISTENTION: 0
RHINORRHEA: 0
SORE THROAT: 0
BLOOD IN STOOL: 0
FACIAL SWELLING: 0
COUGH: 0
COLOR CHANGE: 0
SINUS PRESSURE: 0
EYE PAIN: 0
VOMITING: 0
NAUSEA: 0
BACK PAIN: 0
DIARRHEA: 0
WHEEZING: 0
SHORTNESS OF BREATH: 0
CONSTIPATION: 0
EYE DISCHARGE: 0
EYE REDNESS: 0

## 2019-08-29 NOTE — PROGRESS NOTES
Jeffry Russell is a 81 y.o. male who presents today   Chief Complaint   Patient presents with    New Patient     This is my first time to be in the office. Dr Jacobo Ross while I was in the hospital and he put in a stent and did a bladder biopsy     Hydronephrosis:  Patient is here today for hydronephrosis which was first noted on 8/17/2019. Location: right, Severity: severe  This was associated with new right-sided pelvic mass thought to be recurrent lung cancer with elevated CEA. At the time the left side was without hydronephrosis serum creatinine was 1.7. This was managed with placement of right ureteral stent on 8/18/2019 and his creatinine came down to 1.3. Because of the concern for recurrent colorectal cancer he had a PET scan done with associated CT scan of the chest and abdomen and pelvis 8/27/2019 and this shows new onset moderate left hydronephrosis down to a transition point down into the pelvis. This is concerning for new extrinsic obstruction of the left ureter as well. Flank pain? Yes, bilateral mild but he also has chronic back pain  Abdominal pain? None  Hematuria? microscopic    Bladder cancer:  Patient was initially diagnosed with bladder cancer on 8/18/2019 incidentally when he had his right ureteral stent placed. Patient now comes in to discuss management of his bladder cancer. Patient's  Bladder cancer is characterized as Superficial non-muscle invasive. Bladder cancer stage 0a - Ta, N0, M0, high-grade without lymphovascular invasion detrusor muscle seen in the specimen no evidence of detrusor muscle invasion. Prior  treatment Staging TURBT done 8/18/2019. He comes in today discuss pathology management. He is seen a medical oncologist in regards to his colon cancer as well. Last upper tract imaging was CT scan done on August 17, 2019 and retrograde done on 8/18/2019.   His recent CT of the abdomen and pelvis done for the PET scan on 8/27/2019 shows new onset left hydronephrosis as well-developed and well-nourished. No distress. HENT:   Head: Normocephalic and atraumatic. Nose: Nose normal.   Eyes: Pupils are equal, round, and reactive to light. Conjunctivae and EOM are normal. No scleral icterus. Neck: Normal range of motion. Neck supple. No tracheal deviation present. Cardiovascular: Normal rate, regular rhythm and intact distal pulses. Pulmonary/Chest: Effort normal and breath sounds normal. No stridor. He exhibits no tenderness. Abdominal: Soft. Bowel sounds are normal. He exhibits no distension and no mass. There is no tenderness. Musculoskeletal: Normal range of motion. He exhibits no edema or tenderness. Lymphadenopathy:     He has no cervical adenopathy. Neurological: He is alert and oriented to person, place, and time. Skin: Skin is warm and dry. No erythema. Psychiatric: He has a normal mood and affect.  His behavior is normal. Judgment normal.           DATA:  CBC:   Lab Results   Component Value Date    WBC 7.0 08/20/2019    RBC 3.59 08/20/2019    HGB 10.0 08/20/2019    HCT 32.0 08/20/2019    MCV 87.7 08/20/2019    MCH 27.9 08/20/2019    MCHC 31.7 08/20/2019    RDW 12.6 08/20/2019     08/20/2019    MPV 11.1 08/20/2019     CMP:    Lab Results   Component Value Date     08/20/2019    K 3.8 08/20/2019    K 5.0 10/09/2018     08/20/2019    CO2 26 08/20/2019    BUN 20 08/20/2019    CREATININE 1.3 08/20/2019    LABGLOM 55 08/20/2019    GLUCOSE 93 08/20/2019    PROT 6.4 08/20/2019    LABALBU 3.6 08/20/2019    CALCIUM 8.9 08/20/2019    BILITOT <0.2 08/20/2019    ALKPHOS 96 08/20/2019    AST 14 08/20/2019    ALT 10 08/20/2019     Results for orders placed or performed in visit on 08/29/19   POCT Urinalysis Dipstick w/ Micro (Auto)   Result Value Ref Range    Color, UA Yellow     Clarity, UA Cloudy (A) Clear    Glucose, Ur neg     Bilirubin Urine 0 mg/dL    Ketones, Urine Negative     Specific Gravity, UA 1.020 1.005 - 1.030    Blood, Urine Positive RESIDUAL VOLUME BY US,NON-IMAGING     BLADDER SCAN 0        Return for PT to be scheduled for Surgery. EMR Dragon/transcription disclaimer: Much of this documentt is electronic  transcription/translation of spoken language to printed text. The  electronic translation of spoken language may be erroneous, or at times,  nonsensical words or phrases may be inadvertently transcribed.  Although I  have reviewed the document for such errors, some may still exist.
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
## 2019-08-30 ENCOUNTER — HOSPITAL ENCOUNTER (OUTPATIENT)
Dept: INFUSION THERAPY | Age: 69
Discharge: HOME | End: 2019-08-30
Attending: UROLOGY
Payer: MEDICARE

## 2019-08-30 ENCOUNTER — OFFICE VISIT (OUTPATIENT)
Dept: HEMATOLOGY | Age: 69
End: 2019-08-30
Payer: MEDICARE

## 2019-08-30 ENCOUNTER — HOSPITAL ENCOUNTER (OUTPATIENT)
Dept: INFUSION THERAPY | Age: 69
Discharge: HOME | End: 2019-08-30
Payer: MEDICARE

## 2019-08-30 VITALS
HEART RATE: 82 BPM | SYSTOLIC BLOOD PRESSURE: 139 MMHG | OXYGEN SATURATION: 96 % | WEIGHT: 205.2 LBS | BODY MASS INDEX: 32.98 KG/M2 | DIASTOLIC BLOOD PRESSURE: 83 MMHG | HEIGHT: 66 IN

## 2019-08-30 DIAGNOSIS — C68.0 URETHRAL CANCER (HCC): ICD-10-CM

## 2019-08-30 DIAGNOSIS — R91.8 LUNG NODULES: ICD-10-CM

## 2019-08-30 DIAGNOSIS — D49.9: ICD-10-CM

## 2019-08-30 DIAGNOSIS — D63.8 ANEMIA OF CHRONIC DISEASE: ICD-10-CM

## 2019-08-30 DIAGNOSIS — Z85.048 HISTORY OF RECTAL CANCER: ICD-10-CM

## 2019-08-30 DIAGNOSIS — G55: ICD-10-CM

## 2019-08-30 DIAGNOSIS — R19.00 PELVIC MASS: Primary | ICD-10-CM

## 2019-08-30 DIAGNOSIS — C20 RECTAL CANCER (HCC): ICD-10-CM

## 2019-08-30 PROCEDURE — 36415 COLL VENOUS BLD VENIPUNCTURE: CPT

## 2019-08-30 PROCEDURE — 80053 COMPREHEN METABOLIC PANEL: CPT

## 2019-08-30 PROCEDURE — 85025 COMPLETE CBC W/AUTO DIFF WBC: CPT

## 2019-08-30 PROCEDURE — 99214 OFFICE O/P EST MOD 30 MIN: CPT | Performed by: NURSE PRACTITIONER

## 2019-08-30 PROCEDURE — 99211 OFF/OP EST MAY X REQ PHY/QHP: CPT

## 2019-08-30 PROCEDURE — 82378 CARCINOEMBRYONIC ANTIGEN: CPT

## 2019-08-30 NOTE — OP NOTE
[id number]
[id number]
tomorrow. He will follow up to  see me in 2 months, at which point we will schedule routine bilateral  stent changes and bladder surveillance for his bladder cancer.         Reza Garcia MD    D: 08/29/2019 20:47:19      T: 08/29/2019 23:03:56     [email or web address]  Job#: [id number]     Doc#: [id number]    CC:

## 2019-09-03 ENCOUNTER — HOSPITAL ENCOUNTER (OUTPATIENT)
Dept: CT IMAGING | Age: 69
Discharge: HOME | End: 2019-09-03
Payer: MEDICARE

## 2019-09-03 VITALS
BODY MASS INDEX: 33.32 KG/M2 | OXYGEN SATURATION: 98 % | HEIGHT: 65 IN | WEIGHT: 200 LBS | DIASTOLIC BLOOD PRESSURE: 72 MMHG | SYSTOLIC BLOOD PRESSURE: 131 MMHG | HEART RATE: 64 BPM | RESPIRATION RATE: 17 BRPM | TEMPERATURE: 98.5 F

## 2019-09-03 DIAGNOSIS — C20 RECTAL CANCER (HCC): ICD-10-CM

## 2019-09-03 PROCEDURE — 88334 PATH CONSLTJ SURG CYTO XM EA: CPT

## 2019-09-03 PROCEDURE — 2709999900 CT GUIDED NEEDLE PLACEMENT

## 2019-09-03 PROCEDURE — 88329 PATH CONSLTJ DRG SURG: CPT

## 2019-09-03 PROCEDURE — 2580000003 HC RX 258: Performed by: NURSE PRACTITIONER

## 2019-09-03 PROCEDURE — 6360000002 HC RX W HCPCS: Performed by: INTERNAL MEDICINE

## 2019-09-03 PROCEDURE — 88333 PATH CONSLTJ SURG CYTO XM 1: CPT

## 2019-09-03 PROCEDURE — 88305 TISSUE EXAM BY PATHOLOGIST: CPT

## 2019-09-03 PROCEDURE — 6360000002 HC RX W HCPCS: Performed by: RADIOLOGY

## 2019-09-03 RX ORDER — MIDAZOLAM HYDROCHLORIDE 1 MG/ML
INJECTION INTRAMUSCULAR; INTRAVENOUS
Status: COMPLETED | OUTPATIENT
Start: 2019-09-03 | End: 2019-09-03

## 2019-09-03 RX ORDER — OMEPRAZOLE 20 MG/1
20 CAPSULE, DELAYED RELEASE ORAL 2 TIMES DAILY
COMMUNITY
End: 2020-10-27 | Stop reason: SDUPTHER

## 2019-09-03 RX ORDER — SODIUM CHLORIDE 0.9 % (FLUSH) 0.9 %
10 SYRINGE (ML) INJECTION 2 TIMES DAILY
Status: DISCONTINUED | OUTPATIENT
Start: 2019-09-03 | End: 2019-09-03

## 2019-09-03 RX ORDER — MIDAZOLAM HYDROCHLORIDE 1 MG/ML
INJECTION INTRAMUSCULAR; INTRAVENOUS
Status: DISCONTINUED
Start: 2019-09-03 | End: 2019-09-04 | Stop reason: HOSPADM

## 2019-09-03 RX ORDER — FENTANYL CITRATE 50 UG/ML
INJECTION, SOLUTION INTRAMUSCULAR; INTRAVENOUS
Status: COMPLETED | OUTPATIENT
Start: 2019-09-03 | End: 2019-09-03

## 2019-09-03 RX ORDER — FENTANYL CITRATE 50 UG/ML
INJECTION, SOLUTION INTRAMUSCULAR; INTRAVENOUS
Status: DISCONTINUED
Start: 2019-09-03 | End: 2019-09-04 | Stop reason: HOSPADM

## 2019-09-03 RX ORDER — SODIUM CHLORIDE 0.9 % (FLUSH) 0.9 %
20 SYRINGE (ML) INJECTION 2 TIMES DAILY
Status: DISCONTINUED | OUTPATIENT
Start: 2019-09-03 | End: 2019-09-05 | Stop reason: HOSPADM

## 2019-09-03 RX ORDER — NALOXONE HYDROCHLORIDE 0.4 MG/ML
INJECTION, SOLUTION INTRAMUSCULAR; INTRAVENOUS; SUBCUTANEOUS
Status: DISCONTINUED
Start: 2019-09-03 | End: 2019-09-03 | Stop reason: WASHOUT

## 2019-09-03 RX ORDER — LIDOCAINE HYDROCHLORIDE 20 MG/ML
INJECTION, SOLUTION EPIDURAL; INFILTRATION; INTRACAUDAL; PERINEURAL
Status: DISCONTINUED
Start: 2019-09-03 | End: 2019-09-04 | Stop reason: HOSPADM

## 2019-09-03 RX ORDER — HEPARIN SODIUM (PORCINE) LOCK FLUSH IV SOLN 100 UNIT/ML 100 UNIT/ML
500 SOLUTION INTRAVENOUS ONCE
Status: COMPLETED | OUTPATIENT
Start: 2019-09-03 | End: 2019-09-03

## 2019-09-03 RX ADMIN — Medication 25 MCG: at 13:29

## 2019-09-03 RX ADMIN — Medication 25 MCG: at 13:33

## 2019-09-03 RX ADMIN — Medication 20 ML: at 16:05

## 2019-09-03 RX ADMIN — MIDAZOLAM HYDROCHLORIDE 1 MG: 1 INJECTION, SOLUTION INTRAMUSCULAR; INTRAVENOUS at 13:29

## 2019-09-03 RX ADMIN — SODIUM CHLORIDE, PRESERVATIVE FREE 500 UNITS: 5 INJECTION INTRAVENOUS at 16:05

## 2019-09-03 NOTE — FLOWSHEET NOTE
Pt has recovered well, site is soft, Band-Aid changed once-dry now. Pt denies acute pain, no distress noted, no complaints voiced. Pt dressed with assist of daughter, dc instructions reviewed and questions answered. Medi port de-accessed per protocol. Escorted pt and daughter to waiting room and dc home.

## 2019-09-06 ENCOUNTER — OFFICE VISIT (OUTPATIENT)
Dept: CARDIOLOGY | Age: 69
End: 2019-09-06
Payer: MEDICARE

## 2019-09-06 VITALS
DIASTOLIC BLOOD PRESSURE: 58 MMHG | WEIGHT: 201 LBS | SYSTOLIC BLOOD PRESSURE: 118 MMHG | HEIGHT: 65 IN | BODY MASS INDEX: 33.49 KG/M2 | HEART RATE: 87 BPM

## 2019-09-06 DIAGNOSIS — I25.10 CORONARY ARTERY DISEASE INVOLVING NATIVE CORONARY ARTERY OF NATIVE HEART WITHOUT ANGINA PECTORIS: Primary | ICD-10-CM

## 2019-09-06 DIAGNOSIS — Z95.5 S/P BARE METAL CORONARY ARTERY STENT: ICD-10-CM

## 2019-09-06 DIAGNOSIS — E78.2 MIXED HYPERLIPIDEMIA: ICD-10-CM

## 2019-09-06 PROCEDURE — 1111F DSCHRG MED/CURRENT MED MERGE: CPT | Performed by: NURSE PRACTITIONER

## 2019-09-06 PROCEDURE — G8427 DOCREV CUR MEDS BY ELIG CLIN: HCPCS | Performed by: NURSE PRACTITIONER

## 2019-09-06 PROCEDURE — 4040F PNEUMOC VAC/ADMIN/RCVD: CPT | Performed by: NURSE PRACTITIONER

## 2019-09-06 PROCEDURE — G8598 ASA/ANTIPLAT THER USED: HCPCS | Performed by: NURSE PRACTITIONER

## 2019-09-06 PROCEDURE — 1036F TOBACCO NON-USER: CPT | Performed by: NURSE PRACTITIONER

## 2019-09-06 PROCEDURE — 99212 OFFICE O/P EST SF 10 MIN: CPT | Performed by: NURSE PRACTITIONER

## 2019-09-06 PROCEDURE — 3017F COLORECTAL CA SCREEN DOC REV: CPT | Performed by: NURSE PRACTITIONER

## 2019-09-06 PROCEDURE — 1123F ACP DISCUSS/DSCN MKR DOCD: CPT | Performed by: NURSE PRACTITIONER

## 2019-09-06 PROCEDURE — G8417 CALC BMI ABV UP PARAM F/U: HCPCS | Performed by: NURSE PRACTITIONER

## 2019-09-09 PROBLEM — D63.8 ANEMIA OF CHRONIC DISEASE: Status: ACTIVE | Noted: 2019-09-09

## 2019-09-09 PROBLEM — Z85.048 HISTORY OF RECTAL CANCER: Status: ACTIVE | Noted: 2019-09-09

## 2019-09-09 PROBLEM — C68.0 URETHRAL CANCER (HCC): Status: ACTIVE | Noted: 2019-09-09

## 2019-09-09 PROBLEM — D49.9: Status: ACTIVE | Noted: 2019-09-09

## 2019-09-09 PROBLEM — G55: Status: ACTIVE | Noted: 2019-09-09

## 2019-09-09 PROBLEM — R19.00 PELVIC MASS: Status: ACTIVE | Noted: 2019-09-09

## 2019-09-09 PROBLEM — R91.8 LUNG NODULES: Status: ACTIVE | Noted: 2019-09-09

## 2019-09-09 ASSESSMENT — ENCOUNTER SYMPTOMS
VOMITING: 0
WHEEZING: 0
RESPIRATORY NEGATIVE: 1
DIARRHEA: 0
BACK PAIN: 0
ABDOMINAL PAIN: 0
SHORTNESS OF BREATH: 0
NAUSEA: 0
BLOOD IN STOOL: 0
EYE PAIN: 0
GASTROINTESTINAL NEGATIVE: 1
COUGH: 0
EYE REDNESS: 0
CONSTIPATION: 0
EYES NEGATIVE: 1
EYE DISCHARGE: 0
SORE THROAT: 0
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
## 2019-09-12 NOTE — PROGRESS NOTES
400 mg/m² IV day 1 and 5-FU 2400 mg/m² IV continuous infusion over 46 to 48 hours) and then to add Avastin 5 mg/kg IV day 1 with cycle #2. The regimen will be given every 14 days. I reviewed the pathology results and treatment recommendations with Ann Nogueira and his daughter. He is in agreement with current plan of care and is eager to move forward with therapy. I discussed potential side effects to include but not limited to peripheral neuropathy, nausea, vomiting, diarrhea, constipation, increased fatigue, bone marrow suppression, increased risk for infection, and cold sensitivity. Ann Nogueira was also provided with written information on for chemotherapy medications that would be given. His CBC today is stable he has anemia of chronic disease need to monitor his counts closely. ONCOLOGIC HISTORY #3  Ann Nogueira was seen in initial oncology consultation on 8/19/2019 during his hospitalization at [address] [place] [address] after a large pelvic mass was identified which raised concern for recurrent disease.     · 8/17/2019- CEA 18.1  · 8/17/2019- CT scan of the kidney with contrast documented moderate to severe right hydronephrosis with dilation of the right ureter into the lower pelvis the site of the parasacral soft tissue changes. Partially calcified soft tissue changes within the janes-sacral region likely representing sequelae of pelvic radiation. Increasing scarring/fibrosis versus tumor recurrence within the presacral changes, likely represents a site of right distal ureter obstruction. No left-sided hydronephrosis. · 8/18/2019 -Double-J ureter stent placement for right hydronephrosis secondary to extrinsic compression by pelvic mass. · 8/27/2019-CT scan of the chest with contrast documented numerous pulmonary nodules that appear new compared to 11/12/2017, RUL nodule measuring 7 mm and LLL nodule measuring 5 mm. Soft tissue nodule at the left ventral abdominal wall.   Slight increased size of a probable lymph node anterior to the aorta measuring 0.9 cm compared to 0.7 cm. Similar presacral, right pelvic sidewall and right abductor muscular nodular soft tissue density. · 8/27/2019 CT scan of the abdomen and pelvis with contrast identified new moderate left hydronephrosis with moderate right hydronephrosis. Mild stranding around the urinary bladder and thickening of the bilateral ureteral wall. Numerous pulmonary nodules. Soft tissue of the left ventral abdominal wall. Slightly increased size of probable lymph node anterior to the aorta measuring 0.9 cm compared to 0.7 cm. · 8/27/2019-PET scan did not identify any FDG avid pulmonary nodules or airspace opacities. Abnormal increased metabolic activity within the right pelvic wall soft tissue showing SUV of 5.4. Abnormal soft tissue metabolic activity in the right abductor muscle with SUV of 6.4. Focally increased activity to the right of the inferior L5 vertebrae body posterior with SUV of 7.9 with associated sclerotic changes. · 8/29/2019-  Dr. Estephanie Mason completed a cystoscopy with double-J ureter stent in the left ureter for left hydronephrosis  · 9/3/2019- CT-guided right abductor muscle biopsy on 9/3/2019 with pathology identifying metastatic adenocarcinoma consistent with colorectal origin.   Molecular panel has been requested from tissue, unfortunately there is limited tissue so the order of request was KRAS, IHC MMR and then BRAF.       HEMATOLOGY HISTORY #1  Samy Valencia has a significant history of chronic normocytic anemia with iron deficiency dating back 2/2009.       CBC on 8/15/2019 documented a hemoglobin of 11.6 with an MCV of 85.3  CBC on 8/20/2019 documented a hemoglobin of 10 with an MCV of 87.7     Serology studies on 8/20/2019;  ·   · Vitamin B12 737  · Iron 76  · TIBC 261  · Iron saturation 29%  · Absolute reticulocyte count 0.0509  · Folate 15.7  · Ferritin 82.6     ONCOLOGIC HISTORY #1:  Samy Valencia has a history of moderately exhibits no distension and no mass. There is no tenderness. There is no rebound and no guarding. No hernia. Left ileostomy in place   Musculoskeletal: He exhibits edema (2+ pitting RLE). He exhibits no tenderness or deformity. Range of motion within normal limits x4 extremities   Neurological: He is alert and oriented to person, place, and time. No cranial nerve deficit. Coordination normal.   Skin: Skin is warm. No rash noted. He is not diaphoretic. No erythema. No pallor. Psychiatric: He has a normal mood and affect. His behavior is normal. Thought content normal.   Vitals reviewed. Labs:  CBC today: WBC 7.22, ANC 5.4, hemoglobin 10.2, MCV 90.6 and a platelet count of 045,338    CMP on 8/30/2019 documented a creatinine of 1.4, GFR 7  CEA of 14.0 on 8/30/2019    ASSESSMENT/PLAN:      1. Metastatic colorectal adenocarcinoma confirmed in right abductor muscle. Right pelvic wall soft tissue and right inferior L5 vertebrae with metabolic activity on PET scan. Anticipating initiating palliative systemic chemotherapy with modified FOLFOX 7 and the addition of Avastin with cycle #2. CEA 14 on 8/30/2019.    - Miscellaneous Sendout 1; Future Gardent 360 4 molecular studies  - Molecular studies in process from biopsy on 9/3/2019, limited tissue pathology department  - Build treatment regimen    2. Anemia of chronic disease iron deficiency. Iron studies on 8/20/2019 were within acceptable limits. Hemoglobin 10.2 with an MCV of 90.6 stable. - CBC Auto Differential; Future    3. Subcentimeter lung nodules per CT scan of the chest on 8/27/2019 PET scan on 8/27/2019 did not suggest any metabolic activity of pulmonary nodules, suspected due to subcentimeter size.  -We will follow and repeat CT scan of the chest in 3 to 4 months    4. 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 5. Urethral cancer, 8/18/2019.

## 2019-09-13 ENCOUNTER — OFFICE VISIT (OUTPATIENT)
Dept: HEMATOLOGY | Age: 69
End: 2019-09-13
Payer: MEDICARE

## 2019-09-13 ENCOUNTER — HOSPITAL ENCOUNTER (OUTPATIENT)
Dept: INFUSION THERAPY | Age: 69
Discharge: HOME | End: 2019-09-13
Payer: MEDICARE

## 2019-09-13 VITALS
WEIGHT: 201.7 LBS | OXYGEN SATURATION: 96 % | BODY MASS INDEX: 33.61 KG/M2 | SYSTOLIC BLOOD PRESSURE: 120 MMHG | DIASTOLIC BLOOD PRESSURE: 70 MMHG | HEIGHT: 65 IN | HEART RATE: 77 BPM

## 2019-09-13 DIAGNOSIS — C18.9 METASTASIS FROM COLON CANCER (HCC): Primary | ICD-10-CM

## 2019-09-13 DIAGNOSIS — C18.9 METASTASIS FROM COLON CANCER (HCC): ICD-10-CM

## 2019-09-13 DIAGNOSIS — G55: ICD-10-CM

## 2019-09-13 DIAGNOSIS — R91.8 LUNG NODULES: ICD-10-CM

## 2019-09-13 DIAGNOSIS — D49.9: ICD-10-CM

## 2019-09-13 DIAGNOSIS — D63.8 ANEMIA OF CHRONIC DISEASE: ICD-10-CM

## 2019-09-13 DIAGNOSIS — N32.89 BLADDER SPASM: Primary | ICD-10-CM

## 2019-09-13 DIAGNOSIS — C79.9 METASTASIS FROM COLON CANCER (HCC): ICD-10-CM

## 2019-09-13 DIAGNOSIS — C79.9 METASTASIS FROM COLON CANCER (HCC): Primary | ICD-10-CM

## 2019-09-13 DIAGNOSIS — R19.00 PELVIC MASS: ICD-10-CM

## 2019-09-13 PROBLEM — C19 COLORECTAL CANCER (HCC): Status: ACTIVE | Noted: 2019-09-13

## 2019-09-13 PROCEDURE — 1123F ACP DISCUSS/DSCN MKR DOCD: CPT | Performed by: NURSE PRACTITIONER

## 2019-09-13 PROCEDURE — 3017F COLORECTAL CA SCREEN DOC REV: CPT | Performed by: NURSE PRACTITIONER

## 2019-09-13 PROCEDURE — 99214 OFFICE O/P EST MOD 30 MIN: CPT | Performed by: NURSE PRACTITIONER

## 2019-09-13 PROCEDURE — 1036F TOBACCO NON-USER: CPT | Performed by: NURSE PRACTITIONER

## 2019-09-13 PROCEDURE — G8598 ASA/ANTIPLAT THER USED: HCPCS | Performed by: NURSE PRACTITIONER

## 2019-09-13 PROCEDURE — G8417 CALC BMI ABV UP PARAM F/U: HCPCS | Performed by: NURSE PRACTITIONER

## 2019-09-13 PROCEDURE — 1111F DSCHRG MED/CURRENT MED MERGE: CPT | Performed by: NURSE PRACTITIONER

## 2019-09-13 PROCEDURE — 4040F PNEUMOC VAC/ADMIN/RCVD: CPT | Performed by: NURSE PRACTITIONER

## 2019-09-13 PROCEDURE — G8427 DOCREV CUR MEDS BY ELIG CLIN: HCPCS | Performed by: NURSE PRACTITIONER

## 2019-09-13 PROCEDURE — 85025 COMPLETE CBC W/AUTO DIFF WBC: CPT

## 2019-09-13 RX ORDER — ONDANSETRON 4 MG/1
8 TABLET, FILM COATED ORAL EVERY 8 HOURS PRN
Qty: 30 TABLET | Refills: 2 | Status: SHIPPED | OUTPATIENT
Start: 2019-09-13 | End: 2019-12-02 | Stop reason: SDUPTHER

## 2019-09-13 RX ORDER — OXYBUTYNIN CHLORIDE 10 MG/1
10 TABLET, EXTENDED RELEASE ORAL DAILY
Qty: 30 TABLET | Refills: 2 | Status: ON HOLD | OUTPATIENT
Start: 2019-09-13 | End: 2020-02-26

## 2019-09-18 ENCOUNTER — HOSPITAL ENCOUNTER (OUTPATIENT)
Dept: INFUSION THERAPY | Age: 69
Setting detail: INFUSION SERIES
Discharge: HOME | End: 2019-09-18
Payer: MEDICARE

## 2019-09-18 VITALS
DIASTOLIC BLOOD PRESSURE: 70 MMHG | BODY MASS INDEX: 33.49 KG/M2 | WEIGHT: 201 LBS | HEIGHT: 65 IN | RESPIRATION RATE: 17 BRPM | SYSTOLIC BLOOD PRESSURE: 132 MMHG | TEMPERATURE: 98.4 F | HEART RATE: 82 BPM

## 2019-09-18 DIAGNOSIS — C19 COLORECTAL CANCER (HCC): Primary | ICD-10-CM

## 2019-09-18 PROCEDURE — 6360000002 HC RX W HCPCS: Performed by: INTERNAL MEDICINE

## 2019-09-18 PROCEDURE — 96366 THER/PROPH/DIAG IV INF ADDON: CPT

## 2019-09-18 PROCEDURE — 96413 CHEMO IV INFUSION 1 HR: CPT

## 2019-09-18 PROCEDURE — G0498 CHEMO EXTEND IV INFUS W/PUMP: HCPCS

## 2019-09-18 PROCEDURE — 2580000003 HC RX 258: Performed by: INTERNAL MEDICINE

## 2019-09-18 PROCEDURE — 96415 CHEMO IV INFUSION ADDL HR: CPT

## 2019-09-18 RX ORDER — DEXTROSE MONOHYDRATE 50 MG/ML
1000 INJECTION, SOLUTION INTRAVENOUS CONTINUOUS
Status: ACTIVE | OUTPATIENT
Start: 2019-09-18 | End: 2019-09-18

## 2019-09-18 RX ORDER — PALONOSETRON 0.05 MG/ML
0.25 INJECTION, SOLUTION INTRAVENOUS ONCE
Status: COMPLETED | OUTPATIENT
Start: 2019-09-18 | End: 2019-09-18

## 2019-09-18 RX ORDER — METHYLPREDNISOLONE SODIUM SUCCINATE 125 MG/2ML
125 INJECTION, POWDER, LYOPHILIZED, FOR SOLUTION INTRAMUSCULAR; INTRAVENOUS PRN
Status: DISCONTINUED | OUTPATIENT
Start: 2019-09-18 | End: 2019-09-20 | Stop reason: HOSPADM

## 2019-09-18 RX ORDER — DEXAMETHASONE SODIUM PHOSPHATE 10 MG/ML
10 INJECTION, SOLUTION INTRAMUSCULAR; INTRAVENOUS ONCE
Status: COMPLETED | OUTPATIENT
Start: 2019-09-18 | End: 2019-09-18

## 2019-09-18 RX ORDER — DIPHENHYDRAMINE HYDROCHLORIDE 50 MG/ML
50 INJECTION INTRAMUSCULAR; INTRAVENOUS PRN
Status: DISCONTINUED | OUTPATIENT
Start: 2019-09-18 | End: 2019-09-20 | Stop reason: HOSPADM

## 2019-09-18 RX ORDER — EPINEPHRINE 1 MG/ML
0.3 INJECTION, SOLUTION, CONCENTRATE INTRAVENOUS PRN
Status: DISCONTINUED | OUTPATIENT
Start: 2019-09-18 | End: 2019-09-20 | Stop reason: HOSPADM

## 2019-09-18 RX ADMIN — DEXAMETHASONE SODIUM PHOSPHATE 10 MG: 10 INJECTION, SOLUTION INTRAMUSCULAR; INTRAVENOUS at 08:39

## 2019-09-18 RX ADMIN — OXALIPLATIN 170 MG: 5 INJECTION, SOLUTION, CONCENTRATE INTRAVENOUS at 09:25

## 2019-09-18 RX ADMIN — LEUCOVORIN CALCIUM 792 MG: 350 INJECTION, POWDER, LYOPHILIZED, FOR SUSPENSION INTRAMUSCULAR; INTRAVENOUS at 11:32

## 2019-09-18 RX ADMIN — FLUOROURACIL 4750 MG: 50 INJECTION, SOLUTION INTRAVENOUS at 13:56

## 2019-09-18 RX ADMIN — DEXTROSE MONOHYDRATE 500 ML: 50 INJECTION, SOLUTION INTRAVENOUS at 08:39

## 2019-09-18 RX ADMIN — PALONOSETRON HYDROCHLORIDE 0.25 MG: 0.25 INJECTION INTRAVENOUS at 08:39

## 2019-09-20 ENCOUNTER — HOSPITAL ENCOUNTER (OUTPATIENT)
Dept: INFUSION THERAPY | Age: 69
Setting detail: INFUSION SERIES
Discharge: HOME | End: 2019-09-20
Payer: MEDICARE

## 2019-09-20 VITALS
TEMPERATURE: 98.1 F | RESPIRATION RATE: 17 BRPM | SYSTOLIC BLOOD PRESSURE: 86 MMHG | DIASTOLIC BLOOD PRESSURE: 47 MMHG | OXYGEN SATURATION: 95 % | HEART RATE: 72 BPM

## 2019-09-20 DIAGNOSIS — E78.2 MIXED HYPERLIPIDEMIA: Primary | ICD-10-CM

## 2019-09-20 PROBLEM — C79.9 METASTASIS FROM COLON CANCER (HCC): Status: ACTIVE | Noted: 2019-09-20

## 2019-09-20 PROBLEM — C18.9 METASTASIS FROM COLON CANCER (HCC): Status: ACTIVE | Noted: 2019-09-20

## 2019-09-20 PROCEDURE — 6360000002 HC RX W HCPCS

## 2019-09-20 PROCEDURE — 2580000003 HC RX 258: Performed by: INTERNAL MEDICINE

## 2019-09-20 PROCEDURE — 96523 IRRIG DRUG DELIVERY DEVICE: CPT

## 2019-09-20 RX ORDER — ATORVASTATIN CALCIUM 40 MG/1
TABLET, FILM COATED ORAL
Qty: 30 TABLET | Refills: 0 | Status: ON HOLD | OUTPATIENT
Start: 2019-09-20 | End: 2021-01-05 | Stop reason: ALTCHOICE

## 2019-09-20 RX ORDER — SODIUM CHLORIDE 0.9 % (FLUSH) 0.9 %
20 SYRINGE (ML) INJECTION PRN
Status: DISCONTINUED | OUTPATIENT
Start: 2019-09-20 | End: 2019-09-22 | Stop reason: HOSPADM

## 2019-09-20 RX ADMIN — HEPARIN 300 UNITS: 100 SYRINGE at 12:39

## 2019-09-20 RX ADMIN — Medication 20 ML: at 12:39

## 2019-09-20 ASSESSMENT — ENCOUNTER SYMPTOMS
CONSTIPATION: 0
NAUSEA: 0
EYE PAIN: 0
EYES NEGATIVE: 1
ABDOMINAL PAIN: 0
DIARRHEA: 0
SHORTNESS OF BREATH: 0
WHEEZING: 0
RESPIRATORY NEGATIVE: 1
VOMITING: 0
EYE DISCHARGE: 0
GASTROINTESTINAL NEGATIVE: 1
BACK PAIN: 1
BLOOD IN STOOL: 0
COUGH: 0
SORE THROAT: 0
EYE REDNESS: 0

## 2019-10-01 ENCOUNTER — OFFICE VISIT (OUTPATIENT)
Dept: HEMATOLOGY | Age: 69
End: 2019-10-01
Payer: MEDICARE

## 2019-10-01 ENCOUNTER — HOSPITAL ENCOUNTER (OUTPATIENT)
Dept: INFUSION THERAPY | Age: 69
Discharge: HOME | End: 2019-10-01
Payer: MEDICARE

## 2019-10-01 VITALS
DIASTOLIC BLOOD PRESSURE: 78 MMHG | SYSTOLIC BLOOD PRESSURE: 118 MMHG | BODY MASS INDEX: 33.42 KG/M2 | HEIGHT: 65 IN | WEIGHT: 200.6 LBS | HEART RATE: 64 BPM | OXYGEN SATURATION: 94 %

## 2019-10-01 DIAGNOSIS — C18.9 METASTASIS FROM COLON CANCER (HCC): Primary | ICD-10-CM

## 2019-10-01 DIAGNOSIS — C79.9 METASTASIS FROM COLON CANCER (HCC): ICD-10-CM

## 2019-10-01 DIAGNOSIS — G55: ICD-10-CM

## 2019-10-01 DIAGNOSIS — R91.8 LUNG NODULES: ICD-10-CM

## 2019-10-01 DIAGNOSIS — C68.0 URETHRAL CANCER (HCC): ICD-10-CM

## 2019-10-01 DIAGNOSIS — R80.9 PROTEINURIA, UNSPECIFIED TYPE: ICD-10-CM

## 2019-10-01 DIAGNOSIS — D49.9: ICD-10-CM

## 2019-10-01 DIAGNOSIS — C18.9 METASTASIS FROM COLON CANCER (HCC): ICD-10-CM

## 2019-10-01 DIAGNOSIS — C79.9 METASTASIS FROM COLON CANCER (HCC): Primary | ICD-10-CM

## 2019-10-01 DIAGNOSIS — D63.8 ANEMIA OF CHRONIC DISEASE: ICD-10-CM

## 2019-10-01 PROCEDURE — G8484 FLU IMMUNIZE NO ADMIN: HCPCS | Performed by: NURSE PRACTITIONER

## 2019-10-01 PROCEDURE — 99214 OFFICE O/P EST MOD 30 MIN: CPT | Performed by: NURSE PRACTITIONER

## 2019-10-01 PROCEDURE — G8598 ASA/ANTIPLAT THER USED: HCPCS | Performed by: NURSE PRACTITIONER

## 2019-10-01 PROCEDURE — 4040F PNEUMOC VAC/ADMIN/RCVD: CPT | Performed by: NURSE PRACTITIONER

## 2019-10-01 PROCEDURE — 85025 COMPLETE CBC W/AUTO DIFF WBC: CPT

## 2019-10-01 PROCEDURE — 1036F TOBACCO NON-USER: CPT | Performed by: NURSE PRACTITIONER

## 2019-10-01 PROCEDURE — 1123F ACP DISCUSS/DSCN MKR DOCD: CPT | Performed by: NURSE PRACTITIONER

## 2019-10-01 PROCEDURE — G8417 CALC BMI ABV UP PARAM F/U: HCPCS | Performed by: NURSE PRACTITIONER

## 2019-10-01 PROCEDURE — 3017F COLORECTAL CA SCREEN DOC REV: CPT | Performed by: NURSE PRACTITIONER

## 2019-10-01 PROCEDURE — G8427 DOCREV CUR MEDS BY ELIG CLIN: HCPCS | Performed by: NURSE PRACTITIONER

## 2019-10-02 ENCOUNTER — HOSPITAL ENCOUNTER (OUTPATIENT)
Dept: INFUSION THERAPY | Age: 69
Setting detail: INFUSION SERIES
Discharge: HOME | End: 2019-10-02
Payer: MEDICARE

## 2019-10-02 VITALS
SYSTOLIC BLOOD PRESSURE: 97 MMHG | OXYGEN SATURATION: 96 % | DIASTOLIC BLOOD PRESSURE: 62 MMHG | HEART RATE: 73 BPM | TEMPERATURE: 98.2 F | RESPIRATION RATE: 20 BRPM

## 2019-10-02 DIAGNOSIS — C19 COLORECTAL CANCER (HCC): Primary | ICD-10-CM

## 2019-10-02 LAB
ALBUMIN SERPL-MCNC: 4 G/DL (ref 3.5–5.2)
ALP BLD-CCNC: 122 U/L (ref 40–130)
ALT SERPL-CCNC: 16 U/L (ref 5–41)
ANION GAP SERPL CALCULATED.3IONS-SCNC: 16 MMOL/L (ref 7–19)
AST SERPL-CCNC: 24 U/L (ref 5–40)
BILIRUB SERPL-MCNC: 0.3 MG/DL (ref 0.2–1.2)
BUN BLDV-MCNC: 14 MG/DL (ref 8–23)
CALCIUM SERPL-MCNC: 9 MG/DL (ref 8.8–10.2)
CHLORIDE BLD-SCNC: 102 MMOL/L (ref 98–111)
CO2: 21 MMOL/L (ref 22–29)
CREAT SERPL-MCNC: 1.5 MG/DL (ref 0.5–1.2)
GFR NON-AFRICAN AMERICAN: 47
GLUCOSE BLD-MCNC: 130 MG/DL (ref 74–109)
POTASSIUM SERPL-SCNC: 4.1 MMOL/L (ref 3.5–5)
SODIUM BLD-SCNC: 139 MMOL/L (ref 136–145)
TOTAL PROTEIN: 6.7 G/DL (ref 6.6–8.7)

## 2019-10-02 PROCEDURE — 96413 CHEMO IV INFUSION 1 HR: CPT

## 2019-10-02 PROCEDURE — 6360000002 HC RX W HCPCS: Performed by: NURSE PRACTITIONER

## 2019-10-02 PROCEDURE — 2580000003 HC RX 258: Performed by: NURSE PRACTITIONER

## 2019-10-02 PROCEDURE — 96417 CHEMO IV INFUS EACH ADDL SEQ: CPT

## 2019-10-02 PROCEDURE — 36591 DRAW BLOOD OFF VENOUS DEVICE: CPT

## 2019-10-02 PROCEDURE — G0498 CHEMO EXTEND IV INFUS W/PUMP: HCPCS

## 2019-10-02 PROCEDURE — 96415 CHEMO IV INFUSION ADDL HR: CPT

## 2019-10-02 PROCEDURE — 80053 COMPREHEN METABOLIC PANEL: CPT

## 2019-10-02 RX ORDER — DEXAMETHASONE SODIUM PHOSPHATE 10 MG/ML
10 INJECTION, SOLUTION INTRAMUSCULAR; INTRAVENOUS ONCE
Status: COMPLETED | OUTPATIENT
Start: 2019-10-02 | End: 2019-10-02

## 2019-10-02 RX ORDER — SODIUM CHLORIDE 0.9 % (FLUSH) 0.9 %
20 SYRINGE (ML) INJECTION PRN
Status: DISCONTINUED | OUTPATIENT
Start: 2019-10-02 | End: 2019-10-04 | Stop reason: HOSPADM

## 2019-10-02 RX ORDER — PALONOSETRON 0.05 MG/ML
0.25 INJECTION, SOLUTION INTRAVENOUS ONCE
Status: COMPLETED | OUTPATIENT
Start: 2019-10-02 | End: 2019-10-02

## 2019-10-02 RX ORDER — EPINEPHRINE 1 MG/ML
0.3 INJECTION, SOLUTION, CONCENTRATE INTRAVENOUS PRN
Status: DISCONTINUED | OUTPATIENT
Start: 2019-10-02 | End: 2019-10-04 | Stop reason: HOSPADM

## 2019-10-02 RX ORDER — DIPHENHYDRAMINE HYDROCHLORIDE 50 MG/ML
50 INJECTION INTRAMUSCULAR; INTRAVENOUS PRN
Status: DISCONTINUED | OUTPATIENT
Start: 2019-10-02 | End: 2019-10-04 | Stop reason: HOSPADM

## 2019-10-02 RX ORDER — METHYLPREDNISOLONE SODIUM SUCCINATE 125 MG/2ML
125 INJECTION, POWDER, LYOPHILIZED, FOR SOLUTION INTRAMUSCULAR; INTRAVENOUS PRN
Status: DISCONTINUED | OUTPATIENT
Start: 2019-10-02 | End: 2019-10-04 | Stop reason: HOSPADM

## 2019-10-02 RX ORDER — HEPARIN SODIUM (PORCINE) LOCK FLUSH IV SOLN 100 UNIT/ML 100 UNIT/ML
300 SOLUTION INTRAVENOUS PRN
Status: DISCONTINUED | OUTPATIENT
Start: 2019-10-02 | End: 2019-10-04 | Stop reason: HOSPADM

## 2019-10-02 RX ADMIN — OXALIPLATIN 170 MG: 5 INJECTION, SOLUTION INTRAVENOUS at 08:36

## 2019-10-02 RX ADMIN — FLUOROURACIL 4750 MG: 50 INJECTION, SOLUTION INTRAVENOUS at 11:39

## 2019-10-02 RX ADMIN — DEXAMETHASONE SODIUM PHOSPHATE 10 MG: 10 INJECTION, SOLUTION INTRAMUSCULAR; INTRAVENOUS at 08:13

## 2019-10-02 RX ADMIN — PALONOSETRON HYDROCHLORIDE 0.25 MG: 0.25 INJECTION INTRAVENOUS at 08:13

## 2019-10-02 RX ADMIN — LEUCOVORIN CALCIUM 792 MG: 350 INJECTION, POWDER, LYOPHILIZED, FOR SUSPENSION INTRAMUSCULAR; INTRAVENOUS at 08:36

## 2019-10-03 ENCOUNTER — TELEPHONE (OUTPATIENT)
Dept: HEMATOLOGY | Age: 69
End: 2019-10-03

## 2019-10-03 RX ORDER — AMOXICILLIN 875 MG/1
875 TABLET, COATED ORAL 2 TIMES DAILY
Qty: 20 TABLET | Refills: 0 | Status: SHIPPED | OUTPATIENT
Start: 2019-10-03 | End: 2019-10-13

## 2019-10-04 ENCOUNTER — HOSPITAL ENCOUNTER (OUTPATIENT)
Dept: INFUSION THERAPY | Age: 69
Setting detail: INFUSION SERIES
Discharge: HOME | End: 2019-10-04
Payer: MEDICARE

## 2019-10-04 PROCEDURE — 2580000003 HC RX 258: Performed by: INTERNAL MEDICINE

## 2019-10-04 PROCEDURE — 96523 IRRIG DRUG DELIVERY DEVICE: CPT

## 2019-10-04 PROCEDURE — 6360000002 HC RX W HCPCS

## 2019-10-04 RX ORDER — SODIUM CHLORIDE 0.9 % (FLUSH) 0.9 %
20 SYRINGE (ML) INJECTION PRN
Status: DISCONTINUED | OUTPATIENT
Start: 2019-10-04 | End: 2019-10-06 | Stop reason: HOSPADM

## 2019-10-04 RX ADMIN — Medication 20 ML: at 12:53

## 2019-10-04 RX ADMIN — HEPARIN 300 UNITS: 100 SYRINGE at 12:46

## 2019-10-08 RX ORDER — ATORVASTATIN CALCIUM 40 MG/1
TABLET, FILM COATED ORAL
Qty: 90 TABLET | Refills: 3 | Status: SHIPPED | OUTPATIENT
Start: 2019-10-08 | End: 2019-10-31 | Stop reason: SDUPTHER

## 2019-10-15 ENCOUNTER — HOSPITAL ENCOUNTER (OUTPATIENT)
Dept: INFUSION THERAPY | Age: 69
Discharge: HOME | End: 2019-10-15
Payer: MEDICARE

## 2019-10-15 ENCOUNTER — OFFICE VISIT (OUTPATIENT)
Dept: HEMATOLOGY | Age: 69
End: 2019-10-15
Payer: MEDICARE

## 2019-10-15 VITALS
DIASTOLIC BLOOD PRESSURE: 76 MMHG | BODY MASS INDEX: 33.37 KG/M2 | HEART RATE: 79 BPM | WEIGHT: 200.3 LBS | OXYGEN SATURATION: 93 % | HEIGHT: 65 IN | SYSTOLIC BLOOD PRESSURE: 120 MMHG

## 2019-10-15 DIAGNOSIS — N30.00 ACUTE CYSTITIS WITHOUT HEMATURIA: ICD-10-CM

## 2019-10-15 DIAGNOSIS — C18.9 METASTASIS FROM COLON CANCER (HCC): Primary | ICD-10-CM

## 2019-10-15 DIAGNOSIS — R91.8 LUNG NODULES: ICD-10-CM

## 2019-10-15 DIAGNOSIS — Z85.048 HISTORY OF RECTAL CANCER: ICD-10-CM

## 2019-10-15 DIAGNOSIS — C79.9 METASTASIS FROM COLON CANCER (HCC): ICD-10-CM

## 2019-10-15 DIAGNOSIS — G55: ICD-10-CM

## 2019-10-15 DIAGNOSIS — D63.8 ANEMIA OF CHRONIC DISEASE: ICD-10-CM

## 2019-10-15 DIAGNOSIS — D49.9: ICD-10-CM

## 2019-10-15 DIAGNOSIS — C18.9 METASTASIS FROM COLON CANCER (HCC): ICD-10-CM

## 2019-10-15 DIAGNOSIS — C68.0 URETHRAL CANCER (HCC): ICD-10-CM

## 2019-10-15 DIAGNOSIS — R80.9 PROTEINURIA, UNSPECIFIED TYPE: ICD-10-CM

## 2019-10-15 DIAGNOSIS — C79.9 METASTASIS FROM COLON CANCER (HCC): Primary | ICD-10-CM

## 2019-10-15 PROCEDURE — G8427 DOCREV CUR MEDS BY ELIG CLIN: HCPCS | Performed by: NURSE PRACTITIONER

## 2019-10-15 PROCEDURE — G8417 CALC BMI ABV UP PARAM F/U: HCPCS | Performed by: NURSE PRACTITIONER

## 2019-10-15 PROCEDURE — 1036F TOBACCO NON-USER: CPT | Performed by: NURSE PRACTITIONER

## 2019-10-15 PROCEDURE — 1123F ACP DISCUSS/DSCN MKR DOCD: CPT | Performed by: NURSE PRACTITIONER

## 2019-10-15 PROCEDURE — 99214 OFFICE O/P EST MOD 30 MIN: CPT | Performed by: NURSE PRACTITIONER

## 2019-10-15 PROCEDURE — G8484 FLU IMMUNIZE NO ADMIN: HCPCS | Performed by: NURSE PRACTITIONER

## 2019-10-15 PROCEDURE — 85025 COMPLETE CBC W/AUTO DIFF WBC: CPT

## 2019-10-15 PROCEDURE — 3017F COLORECTAL CA SCREEN DOC REV: CPT | Performed by: NURSE PRACTITIONER

## 2019-10-15 PROCEDURE — 4040F PNEUMOC VAC/ADMIN/RCVD: CPT | Performed by: NURSE PRACTITIONER

## 2019-10-15 PROCEDURE — G8598 ASA/ANTIPLAT THER USED: HCPCS | Performed by: NURSE PRACTITIONER

## 2019-10-16 ENCOUNTER — HOSPITAL ENCOUNTER (OUTPATIENT)
Dept: INFUSION THERAPY | Age: 69
Setting detail: INFUSION SERIES
Discharge: HOME | End: 2019-10-16
Payer: MEDICARE

## 2019-10-16 ENCOUNTER — TELEPHONE (OUTPATIENT)
Dept: HEMATOLOGY | Age: 69
End: 2019-10-16

## 2019-10-16 VITALS
TEMPERATURE: 98 F | RESPIRATION RATE: 20 BRPM | OXYGEN SATURATION: 100 % | HEART RATE: 87 BPM | DIASTOLIC BLOOD PRESSURE: 66 MMHG | SYSTOLIC BLOOD PRESSURE: 127 MMHG

## 2019-10-16 DIAGNOSIS — C19 COLORECTAL CANCER (HCC): Primary | ICD-10-CM

## 2019-10-16 LAB
ALBUMIN SERPL-MCNC: 3.7 G/DL (ref 3.5–5.2)
ALP BLD-CCNC: 120 U/L (ref 40–130)
ALT SERPL-CCNC: 15 U/L (ref 5–41)
ANION GAP SERPL CALCULATED.3IONS-SCNC: 16 MMOL/L (ref 7–19)
AST SERPL-CCNC: 25 U/L (ref 5–40)
BILIRUB SERPL-MCNC: 0.4 MG/DL (ref 0.2–1.2)
BUN BLDV-MCNC: 12 MG/DL (ref 8–23)
CALCIUM SERPL-MCNC: 8.9 MG/DL (ref 8.8–10.2)
CHLORIDE BLD-SCNC: 95 MMOL/L (ref 98–111)
CO2: 17 MMOL/L (ref 22–29)
CREAT SERPL-MCNC: 1.6 MG/DL (ref 0.5–1.2)
GFR NON-AFRICAN AMERICAN: 43
GLUCOSE BLD-MCNC: 105 MG/DL (ref 74–109)
POTASSIUM SERPL-SCNC: 3.9 MMOL/L (ref 3.5–5)
SODIUM BLD-SCNC: 128 MMOL/L (ref 136–145)
TOTAL PROTEIN: 6.8 G/DL (ref 6.6–8.7)

## 2019-10-16 PROCEDURE — 96413 CHEMO IV INFUSION 1 HR: CPT

## 2019-10-16 PROCEDURE — 96365 THER/PROPH/DIAG IV INF INIT: CPT

## 2019-10-16 PROCEDURE — 2580000003 HC RX 258: Performed by: NURSE PRACTITIONER

## 2019-10-16 PROCEDURE — 96366 THER/PROPH/DIAG IV INF ADDON: CPT

## 2019-10-16 PROCEDURE — 96415 CHEMO IV INFUSION ADDL HR: CPT

## 2019-10-16 PROCEDURE — 96417 CHEMO IV INFUS EACH ADDL SEQ: CPT

## 2019-10-16 PROCEDURE — 80053 COMPREHEN METABOLIC PANEL: CPT

## 2019-10-16 PROCEDURE — 6360000002 HC RX W HCPCS: Performed by: NURSE PRACTITIONER

## 2019-10-16 PROCEDURE — 36591 DRAW BLOOD OFF VENOUS DEVICE: CPT

## 2019-10-16 PROCEDURE — 96523 IRRIG DRUG DELIVERY DEVICE: CPT

## 2019-10-16 RX ORDER — EPINEPHRINE 1 MG/ML
0.3 INJECTION, SOLUTION, CONCENTRATE INTRAVENOUS PRN
Status: DISCONTINUED | OUTPATIENT
Start: 2019-10-16 | End: 2019-10-18 | Stop reason: HOSPADM

## 2019-10-16 RX ORDER — PALONOSETRON 0.05 MG/ML
0.25 INJECTION, SOLUTION INTRAVENOUS ONCE
Status: COMPLETED | OUTPATIENT
Start: 2019-10-16 | End: 2019-10-16

## 2019-10-16 RX ORDER — DEXAMETHASONE SODIUM PHOSPHATE 10 MG/ML
10 INJECTION, SOLUTION INTRAMUSCULAR; INTRAVENOUS ONCE
Status: COMPLETED | OUTPATIENT
Start: 2019-10-16 | End: 2019-10-16

## 2019-10-16 RX ORDER — METHYLPREDNISOLONE SODIUM SUCCINATE 125 MG/2ML
125 INJECTION, POWDER, LYOPHILIZED, FOR SOLUTION INTRAMUSCULAR; INTRAVENOUS PRN
Status: DISCONTINUED | OUTPATIENT
Start: 2019-10-16 | End: 2019-10-18 | Stop reason: HOSPADM

## 2019-10-16 RX ORDER — DIPHENHYDRAMINE HYDROCHLORIDE 50 MG/ML
50 INJECTION INTRAMUSCULAR; INTRAVENOUS PRN
Status: DISCONTINUED | OUTPATIENT
Start: 2019-10-16 | End: 2019-10-18 | Stop reason: HOSPADM

## 2019-10-16 RX ADMIN — FLUOROURACIL 4750 MG: 50 INJECTION, SOLUTION INTRAVENOUS at 11:39

## 2019-10-16 RX ADMIN — LEUCOVORIN CALCIUM 792 MG: 350 INJECTION, POWDER, LYOPHILIZED, FOR SUSPENSION INTRAMUSCULAR; INTRAVENOUS at 08:45

## 2019-10-16 RX ADMIN — PALONOSETRON 0.25 MG: 0.05 INJECTION, SOLUTION INTRAVENOUS at 08:19

## 2019-10-16 RX ADMIN — DEXAMETHASONE SODIUM PHOSPHATE 10 MG: 10 INJECTION, SOLUTION INTRAMUSCULAR; INTRAVENOUS at 08:19

## 2019-10-16 RX ADMIN — OXALIPLATIN 170 MG: 5 INJECTION, SOLUTION INTRAVENOUS at 08:43

## 2019-10-18 ENCOUNTER — HOSPITAL ENCOUNTER (OUTPATIENT)
Dept: INFUSION THERAPY | Age: 69
Setting detail: INFUSION SERIES
Discharge: HOME | End: 2019-10-18
Payer: MEDICARE

## 2019-10-18 VITALS
HEART RATE: 66 BPM | DIASTOLIC BLOOD PRESSURE: 60 MMHG | TEMPERATURE: 97.2 F | RESPIRATION RATE: 17 BRPM | SYSTOLIC BLOOD PRESSURE: 107 MMHG

## 2019-10-18 PROCEDURE — 96523 IRRIG DRUG DELIVERY DEVICE: CPT

## 2019-10-18 PROCEDURE — 2580000003 HC RX 258: Performed by: INTERNAL MEDICINE

## 2019-10-18 PROCEDURE — 6360000002 HC RX W HCPCS: Performed by: INTERNAL MEDICINE

## 2019-10-18 RX ORDER — SODIUM CHLORIDE 0.9 % (FLUSH) 0.9 %
20 SYRINGE (ML) INJECTION PRN
Status: DISCONTINUED | OUTPATIENT
Start: 2019-10-18 | End: 2019-10-20 | Stop reason: HOSPADM

## 2019-10-18 RX ADMIN — HEPARIN 300 UNITS: 100 SYRINGE at 10:44

## 2019-10-18 RX ADMIN — Medication 20 ML: at 10:44

## 2019-10-19 PROBLEM — R80.9 PROTEINURIA: Status: ACTIVE | Noted: 2019-10-19

## 2019-10-29 ENCOUNTER — HOSPITAL ENCOUNTER (OUTPATIENT)
Dept: INFUSION THERAPY | Age: 69
Discharge: HOME | End: 2019-10-29
Payer: MEDICARE

## 2019-10-29 VITALS
SYSTOLIC BLOOD PRESSURE: 140 MMHG | HEIGHT: 65 IN | WEIGHT: 191.7 LBS | DIASTOLIC BLOOD PRESSURE: 82 MMHG | OXYGEN SATURATION: 97 % | BODY MASS INDEX: 31.94 KG/M2 | HEART RATE: 90 BPM

## 2019-10-29 DIAGNOSIS — C18.9 METASTASIS FROM COLON CANCER (HCC): ICD-10-CM

## 2019-10-29 DIAGNOSIS — C79.9 METASTASIS FROM COLON CANCER (HCC): ICD-10-CM

## 2019-10-29 DIAGNOSIS — C79.9 METASTASIS FROM COLON CANCER (HCC): Primary | ICD-10-CM

## 2019-10-29 DIAGNOSIS — C18.9 METASTASIS FROM COLON CANCER (HCC): Primary | ICD-10-CM

## 2019-10-29 PROCEDURE — 85025 COMPLETE CBC W/AUTO DIFF WBC: CPT

## 2019-10-29 NOTE — PROGRESS NOTES
Pt is here for chemo orders. He has lost 9 lbs since his last treatment 2 weeks ago and states that nothing tastes good. I have discussed small frequent meals and Boost or Ensure. He is also having gross hematuria and is seeing Dr. Alfonso Root Thursday. His 24hr urine protein level is under 2000mg, therefore he is eligible to start Avastin next week if his hematuria has resolved. We will hold chemo today and r/s him to see Ana next week for orders per Ana.

## 2019-10-31 ENCOUNTER — OFFICE VISIT (OUTPATIENT)
Dept: UROLOGY | Age: 69
End: 2019-10-31
Payer: MEDICARE

## 2019-10-31 VITALS — WEIGHT: 194 LBS | TEMPERATURE: 97.8 F | HEIGHT: 66 IN | BODY MASS INDEX: 31.18 KG/M2

## 2019-10-31 DIAGNOSIS — N13.5 BILATERAL URETERAL OBSTRUCTION: ICD-10-CM

## 2019-10-31 DIAGNOSIS — C67.9 MALIGNANT NEOPLASM OF URINARY BLADDER, UNSPECIFIED SITE (HCC): ICD-10-CM

## 2019-10-31 DIAGNOSIS — N13.30 HYDRONEPHROSIS OF LEFT KIDNEY: ICD-10-CM

## 2019-10-31 DIAGNOSIS — N13.30 HYDRONEPHROSIS OF RIGHT KIDNEY: Primary | ICD-10-CM

## 2019-10-31 LAB
BILIRUBIN, POC: NORMAL
BLOOD URINE, POC: NORMAL
CLARITY, POC: CLEAR
COLOR, POC: NORMAL
GLUCOSE URINE, POC: NORMAL
KETONES, POC: NORMAL
LEUKOCYTE EST, POC: NORMAL
NITRITE, POC: NORMAL
PH, POC: 6
PROTEIN, POC: NORMAL
SPECIFIC GRAVITY, POC: 1.02
UROBILINOGEN, POC: 1

## 2019-10-31 PROCEDURE — 1036F TOBACCO NON-USER: CPT | Performed by: UROLOGY

## 2019-10-31 PROCEDURE — G8417 CALC BMI ABV UP PARAM F/U: HCPCS | Performed by: UROLOGY

## 2019-10-31 PROCEDURE — G8484 FLU IMMUNIZE NO ADMIN: HCPCS | Performed by: UROLOGY

## 2019-10-31 PROCEDURE — G8598 ASA/ANTIPLAT THER USED: HCPCS | Performed by: UROLOGY

## 2019-10-31 PROCEDURE — 4040F PNEUMOC VAC/ADMIN/RCVD: CPT | Performed by: UROLOGY

## 2019-10-31 PROCEDURE — 1123F ACP DISCUSS/DSCN MKR DOCD: CPT | Performed by: UROLOGY

## 2019-10-31 PROCEDURE — 99214 OFFICE O/P EST MOD 30 MIN: CPT | Performed by: UROLOGY

## 2019-10-31 PROCEDURE — G8427 DOCREV CUR MEDS BY ELIG CLIN: HCPCS | Performed by: UROLOGY

## 2019-10-31 PROCEDURE — 81003 URINALYSIS AUTO W/O SCOPE: CPT | Performed by: UROLOGY

## 2019-10-31 PROCEDURE — 3017F COLORECTAL CA SCREEN DOC REV: CPT | Performed by: UROLOGY

## 2019-10-31 RX ORDER — METAXALONE 800 MG/1
800 TABLET ORAL 3 TIMES DAILY
Refills: 1 | COMMUNITY
Start: 2019-10-09 | End: 2020-06-15 | Stop reason: ALTCHOICE

## 2019-10-31 RX ORDER — DICLOFENAC 35 MG/1
35 CAPSULE ORAL 3 TIMES DAILY
Refills: 0 | COMMUNITY
Start: 2019-10-28 | End: 2020-02-11

## 2019-10-31 ASSESSMENT — ENCOUNTER SYMPTOMS
EYE REDNESS: 0
WHEEZING: 0
COUGH: 0
EYE PAIN: 0
CONSTIPATION: 0
FACIAL SWELLING: 0
RHINORRHEA: 0
SORE THROAT: 0
ABDOMINAL DISTENTION: 0
SINUS PRESSURE: 0
BLOOD IN STOOL: 0
SHORTNESS OF BREATH: 0
ABDOMINAL PAIN: 0
NAUSEA: 0
VOMITING: 0
COLOR CHANGE: 0
EYE DISCHARGE: 0
DIARRHEA: 0
BACK PAIN: 0

## 2019-11-04 RX ORDER — METHYLPREDNISOLONE SODIUM SUCCINATE 125 MG/2ML
125 INJECTION, POWDER, LYOPHILIZED, FOR SOLUTION INTRAMUSCULAR; INTRAVENOUS PRN
Status: CANCELLED | OUTPATIENT
Start: 2019-11-06

## 2019-11-04 RX ORDER — EPINEPHRINE 1 MG/ML
0.3 INJECTION, SOLUTION, CONCENTRATE INTRAVENOUS PRN
Status: CANCELLED | OUTPATIENT
Start: 2019-11-06

## 2019-11-04 RX ORDER — PALONOSETRON 0.05 MG/ML
0.25 INJECTION, SOLUTION INTRAVENOUS ONCE
Status: CANCELLED | OUTPATIENT
Start: 2019-11-06

## 2019-11-04 RX ORDER — DIPHENHYDRAMINE HYDROCHLORIDE 50 MG/ML
50 INJECTION INTRAMUSCULAR; INTRAVENOUS PRN
Status: CANCELLED | OUTPATIENT
Start: 2019-11-06

## 2019-11-05 ENCOUNTER — OFFICE VISIT (OUTPATIENT)
Dept: HEMATOLOGY | Age: 69
End: 2019-11-05
Payer: MEDICARE

## 2019-11-05 ENCOUNTER — HOSPITAL ENCOUNTER (OUTPATIENT)
Dept: INFUSION THERAPY | Age: 69
Discharge: HOME | End: 2019-11-05
Payer: MEDICARE

## 2019-11-05 VITALS
HEART RATE: 84 BPM | SYSTOLIC BLOOD PRESSURE: 122 MMHG | WEIGHT: 194.7 LBS | HEIGHT: 66 IN | OXYGEN SATURATION: 98 % | BODY MASS INDEX: 31.29 KG/M2 | DIASTOLIC BLOOD PRESSURE: 76 MMHG

## 2019-11-05 DIAGNOSIS — D49.9: ICD-10-CM

## 2019-11-05 DIAGNOSIS — C18.9 METASTASIS FROM COLON CANCER (HCC): Primary | ICD-10-CM

## 2019-11-05 DIAGNOSIS — C79.9 METASTASIS FROM COLON CANCER (HCC): Primary | ICD-10-CM

## 2019-11-05 DIAGNOSIS — R91.8 LUNG NODULES: ICD-10-CM

## 2019-11-05 DIAGNOSIS — D63.8 ANEMIA OF CHRONIC DISEASE: ICD-10-CM

## 2019-11-05 DIAGNOSIS — R80.9 PROTEINURIA, UNSPECIFIED TYPE: ICD-10-CM

## 2019-11-05 DIAGNOSIS — Z51.11 CHEMOTHERAPY MANAGEMENT, ENCOUNTER FOR: ICD-10-CM

## 2019-11-05 DIAGNOSIS — C68.0 URETHRAL CANCER (HCC): ICD-10-CM

## 2019-11-05 DIAGNOSIS — G55: ICD-10-CM

## 2019-11-05 PROCEDURE — 1036F TOBACCO NON-USER: CPT | Performed by: NURSE PRACTITIONER

## 2019-11-05 PROCEDURE — 3017F COLORECTAL CA SCREEN DOC REV: CPT | Performed by: NURSE PRACTITIONER

## 2019-11-05 PROCEDURE — G8598 ASA/ANTIPLAT THER USED: HCPCS | Performed by: NURSE PRACTITIONER

## 2019-11-05 PROCEDURE — 4040F PNEUMOC VAC/ADMIN/RCVD: CPT | Performed by: NURSE PRACTITIONER

## 2019-11-05 PROCEDURE — G8484 FLU IMMUNIZE NO ADMIN: HCPCS | Performed by: NURSE PRACTITIONER

## 2019-11-05 PROCEDURE — 99214 OFFICE O/P EST MOD 30 MIN: CPT | Performed by: NURSE PRACTITIONER

## 2019-11-05 PROCEDURE — 85025 COMPLETE CBC W/AUTO DIFF WBC: CPT

## 2019-11-05 PROCEDURE — 1123F ACP DISCUSS/DSCN MKR DOCD: CPT | Performed by: NURSE PRACTITIONER

## 2019-11-05 PROCEDURE — G8417 CALC BMI ABV UP PARAM F/U: HCPCS | Performed by: NURSE PRACTITIONER

## 2019-11-05 PROCEDURE — G8427 DOCREV CUR MEDS BY ELIG CLIN: HCPCS | Performed by: NURSE PRACTITIONER

## 2019-11-06 ENCOUNTER — HOSPITAL ENCOUNTER (OUTPATIENT)
Dept: INFUSION THERAPY | Age: 69
Setting detail: INFUSION SERIES
Discharge: HOME | End: 2019-11-06
Payer: MEDICARE

## 2019-11-06 VITALS
WEIGHT: 194 LBS | OXYGEN SATURATION: 97 % | BODY MASS INDEX: 31.18 KG/M2 | DIASTOLIC BLOOD PRESSURE: 78 MMHG | HEIGHT: 66 IN | RESPIRATION RATE: 17 BRPM | SYSTOLIC BLOOD PRESSURE: 136 MMHG | TEMPERATURE: 97.5 F | HEART RATE: 90 BPM

## 2019-11-06 DIAGNOSIS — C19 COLORECTAL CANCER (HCC): Primary | ICD-10-CM

## 2019-11-06 LAB
ALBUMIN SERPL-MCNC: 4.3 G/DL (ref 3.5–5.2)
ALP BLD-CCNC: 127 U/L (ref 40–130)
ALT SERPL-CCNC: 15 U/L (ref 5–41)
ANION GAP SERPL CALCULATED.3IONS-SCNC: 17 MMOL/L (ref 7–19)
AST SERPL-CCNC: 22 U/L (ref 5–40)
BILIRUB SERPL-MCNC: 0.3 MG/DL (ref 0.2–1.2)
BUN BLDV-MCNC: 27 MG/DL (ref 8–23)
CALCIUM SERPL-MCNC: 9.8 MG/DL (ref 8.8–10.2)
CEA: 5.6 NG/ML (ref 0–4.7)
CHLORIDE BLD-SCNC: 95 MMOL/L (ref 98–111)
CO2: 20 MMOL/L (ref 22–29)
CREAT SERPL-MCNC: 1.4 MG/DL (ref 0.5–1.2)
GFR NON-AFRICAN AMERICAN: 50
GLUCOSE BLD-MCNC: 151 MG/DL (ref 74–109)
POTASSIUM SERPL-SCNC: 4.6 MMOL/L (ref 3.5–5)
SODIUM BLD-SCNC: 132 MMOL/L (ref 136–145)
TOTAL PROTEIN: 7.7 G/DL (ref 6.6–8.7)

## 2019-11-06 PROCEDURE — 2580000003 HC RX 258: Performed by: INTERNAL MEDICINE

## 2019-11-06 PROCEDURE — 96415 CHEMO IV INFUSION ADDL HR: CPT

## 2019-11-06 PROCEDURE — 80053 COMPREHEN METABOLIC PANEL: CPT

## 2019-11-06 PROCEDURE — 82378 CARCINOEMBRYONIC ANTIGEN: CPT

## 2019-11-06 PROCEDURE — 96366 THER/PROPH/DIAG IV INF ADDON: CPT

## 2019-11-06 PROCEDURE — G0498 CHEMO EXTEND IV INFUS W/PUMP: HCPCS

## 2019-11-06 PROCEDURE — 96365 THER/PROPH/DIAG IV INF INIT: CPT

## 2019-11-06 PROCEDURE — 6360000002 HC RX W HCPCS: Performed by: NURSE PRACTITIONER

## 2019-11-06 PROCEDURE — 2580000003 HC RX 258: Performed by: NURSE PRACTITIONER

## 2019-11-06 PROCEDURE — 96413 CHEMO IV INFUSION 1 HR: CPT

## 2019-11-06 RX ORDER — DEXTROSE MONOHYDRATE 50 MG/ML
INJECTION, SOLUTION INTRAVENOUS CONTINUOUS
Status: DISCONTINUED | OUTPATIENT
Start: 2019-11-06 | End: 2019-11-08 | Stop reason: HOSPADM

## 2019-11-06 RX ORDER — PALONOSETRON 0.05 MG/ML
0.25 INJECTION, SOLUTION INTRAVENOUS ONCE
Status: COMPLETED | OUTPATIENT
Start: 2019-11-06 | End: 2019-11-06

## 2019-11-06 RX ORDER — DEXAMETHASONE SODIUM PHOSPHATE 10 MG/ML
10 INJECTION, SOLUTION INTRAMUSCULAR; INTRAVENOUS ONCE
Status: COMPLETED | OUTPATIENT
Start: 2019-11-06 | End: 2019-11-06

## 2019-11-06 RX ADMIN — DEXAMETHASONE SODIUM PHOSPHATE 10 MG: 10 INJECTION, SOLUTION INTRAMUSCULAR; INTRAVENOUS at 08:23

## 2019-11-06 RX ADMIN — PALONOSETRON HYDROCHLORIDE 0.25 MG: 0.25 INJECTION, SOLUTION INTRAVENOUS at 08:23

## 2019-11-06 RX ADMIN — DEXTROSE MONOHYDRATE: 50 INJECTION, SOLUTION INTRAVENOUS at 08:23

## 2019-11-06 RX ADMIN — OXALIPLATIN 170 MG: 5 INJECTION, SOLUTION INTRAVENOUS at 08:50

## 2019-11-06 RX ADMIN — FLUOROURACIL 4750 MG: 50 INJECTION, SOLUTION INTRAVENOUS at 11:19

## 2019-11-06 RX ADMIN — LEUCOVORIN CALCIUM 792 MG: 350 INJECTION, POWDER, LYOPHILIZED, FOR SUSPENSION INTRAMUSCULAR; INTRAVENOUS at 08:51

## 2019-11-08 ENCOUNTER — HOSPITAL ENCOUNTER (OUTPATIENT)
Dept: INFUSION THERAPY | Age: 69
Setting detail: INFUSION SERIES
Discharge: HOME | End: 2019-11-08
Payer: MEDICARE

## 2019-11-08 VITALS
HEART RATE: 89 BPM | OXYGEN SATURATION: 96 % | DIASTOLIC BLOOD PRESSURE: 70 MMHG | RESPIRATION RATE: 20 BRPM | SYSTOLIC BLOOD PRESSURE: 128 MMHG | TEMPERATURE: 98.4 F

## 2019-11-08 PROCEDURE — 96523 IRRIG DRUG DELIVERY DEVICE: CPT

## 2019-11-08 PROCEDURE — 6360000002 HC RX W HCPCS

## 2019-11-08 RX ORDER — HEPARIN SODIUM (PORCINE) LOCK FLUSH IV SOLN 100 UNIT/ML 100 UNIT/ML
300 SOLUTION INTRAVENOUS PRN
Status: DISCONTINUED | OUTPATIENT
Start: 2019-11-08 | End: 2019-11-10 | Stop reason: HOSPADM

## 2019-11-08 RX ADMIN — HEPARIN SODIUM (PORCINE) LOCK FLUSH IV SOLN 100 UNIT/ML 300 UNITS: 100 SOLUTION at 11:25

## 2019-11-08 RX ADMIN — HEPARIN 300 UNITS: 100 SYRINGE at 11:25

## 2019-11-15 ENCOUNTER — HOSPITAL ENCOUNTER (OUTPATIENT)
Dept: CT IMAGING | Age: 69
Discharge: HOME | End: 2019-11-15
Payer: MEDICARE

## 2019-11-15 DIAGNOSIS — C18.9 METASTASIS FROM COLON CANCER (HCC): ICD-10-CM

## 2019-11-15 DIAGNOSIS — C79.9 METASTASIS FROM COLON CANCER (HCC): ICD-10-CM

## 2019-11-15 LAB
GFR NON-AFRICAN AMERICAN: 50
PERFORMED ON: ABNORMAL
POC CREATININE: 1.4 MG/DL (ref 0.3–1.3)
POC SAMPLE TYPE: ABNORMAL

## 2019-11-15 PROCEDURE — 82565 ASSAY OF CREATININE: CPT

## 2019-11-15 PROCEDURE — 71260 CT THORAX DX C+: CPT

## 2019-11-15 PROCEDURE — 6360000004 HC RX CONTRAST MEDICATION: Performed by: NURSE PRACTITIONER

## 2019-11-15 PROCEDURE — 74177 CT ABD & PELVIS W/CONTRAST: CPT

## 2019-11-15 RX ADMIN — IOPAMIDOL 75 ML: 755 INJECTION, SOLUTION INTRAVENOUS at 08:34

## 2019-11-19 ENCOUNTER — HOSPITAL ENCOUNTER (OUTPATIENT)
Dept: INFUSION THERAPY | Age: 69
Discharge: HOME | End: 2019-11-19
Payer: MEDICARE

## 2019-11-19 ENCOUNTER — OFFICE VISIT (OUTPATIENT)
Dept: HEMATOLOGY | Age: 69
End: 2019-11-19
Payer: MEDICARE

## 2019-11-19 VITALS
SYSTOLIC BLOOD PRESSURE: 120 MMHG | OXYGEN SATURATION: 96 % | HEART RATE: 84 BPM | BODY MASS INDEX: 30.71 KG/M2 | HEIGHT: 66 IN | WEIGHT: 191.1 LBS | DIASTOLIC BLOOD PRESSURE: 80 MMHG

## 2019-11-19 DIAGNOSIS — D49.9: ICD-10-CM

## 2019-11-19 DIAGNOSIS — C79.9 METASTASIS FROM COLON CANCER (HCC): ICD-10-CM

## 2019-11-19 DIAGNOSIS — C67.2 MALIGNANT NEOPLASM OF LATERAL WALL OF URINARY BLADDER (HCC): Primary | ICD-10-CM

## 2019-11-19 DIAGNOSIS — T45.1X5A ANEMIA ASSOCIATED WITH CHEMOTHERAPY: ICD-10-CM

## 2019-11-19 DIAGNOSIS — R53.83 OTHER FATIGUE: ICD-10-CM

## 2019-11-19 DIAGNOSIS — C68.0 URETHRAL CANCER (HCC): ICD-10-CM

## 2019-11-19 DIAGNOSIS — G55: ICD-10-CM

## 2019-11-19 DIAGNOSIS — D64.81 ANEMIA ASSOCIATED WITH CHEMOTHERAPY: ICD-10-CM

## 2019-11-19 DIAGNOSIS — C67.2 MALIGNANT NEOPLASM OF LATERAL WALL OF URINARY BLADDER (HCC): ICD-10-CM

## 2019-11-19 DIAGNOSIS — C18.9 METASTASIS FROM COLON CANCER (HCC): ICD-10-CM

## 2019-11-19 DIAGNOSIS — Z51.11 CHEMOTHERAPY MANAGEMENT, ENCOUNTER FOR: ICD-10-CM

## 2019-11-19 DIAGNOSIS — R91.8 LUNG NODULES: ICD-10-CM

## 2019-11-19 PROCEDURE — 85025 COMPLETE CBC W/AUTO DIFF WBC: CPT

## 2019-11-19 PROCEDURE — 4040F PNEUMOC VAC/ADMIN/RCVD: CPT | Performed by: NURSE PRACTITIONER

## 2019-11-19 PROCEDURE — 3017F COLORECTAL CA SCREEN DOC REV: CPT | Performed by: NURSE PRACTITIONER

## 2019-11-19 PROCEDURE — 99214 OFFICE O/P EST MOD 30 MIN: CPT | Performed by: NURSE PRACTITIONER

## 2019-11-19 PROCEDURE — G8598 ASA/ANTIPLAT THER USED: HCPCS | Performed by: NURSE PRACTITIONER

## 2019-11-19 PROCEDURE — G8417 CALC BMI ABV UP PARAM F/U: HCPCS | Performed by: NURSE PRACTITIONER

## 2019-11-19 PROCEDURE — 1036F TOBACCO NON-USER: CPT | Performed by: NURSE PRACTITIONER

## 2019-11-19 PROCEDURE — G8484 FLU IMMUNIZE NO ADMIN: HCPCS | Performed by: NURSE PRACTITIONER

## 2019-11-19 PROCEDURE — 1123F ACP DISCUSS/DSCN MKR DOCD: CPT | Performed by: NURSE PRACTITIONER

## 2019-11-19 PROCEDURE — G8427 DOCREV CUR MEDS BY ELIG CLIN: HCPCS | Performed by: NURSE PRACTITIONER

## 2019-11-19 RX ORDER — PALONOSETRON 0.05 MG/ML
0.25 INJECTION, SOLUTION INTRAVENOUS ONCE
Status: CANCELLED | OUTPATIENT
Start: 2019-11-20

## 2019-11-19 RX ORDER — EPINEPHRINE 1 MG/ML
0.3 INJECTION, SOLUTION, CONCENTRATE INTRAVENOUS PRN
Status: CANCELLED | OUTPATIENT
Start: 2019-11-20

## 2019-11-19 RX ORDER — METHYLPREDNISOLONE SODIUM SUCCINATE 125 MG/2ML
125 INJECTION, POWDER, LYOPHILIZED, FOR SOLUTION INTRAMUSCULAR; INTRAVENOUS PRN
Status: CANCELLED | OUTPATIENT
Start: 2019-11-20

## 2019-11-19 RX ORDER — DIPHENHYDRAMINE HYDROCHLORIDE 50 MG/ML
50 INJECTION INTRAMUSCULAR; INTRAVENOUS PRN
Status: CANCELLED | OUTPATIENT
Start: 2019-11-20

## 2019-11-20 ENCOUNTER — HOSPITAL ENCOUNTER (OUTPATIENT)
Dept: INFUSION THERAPY | Age: 69
Setting detail: INFUSION SERIES
Discharge: HOME | End: 2019-11-20
Payer: MEDICARE

## 2019-11-20 VITALS
HEART RATE: 85 BPM | TEMPERATURE: 97.2 F | RESPIRATION RATE: 20 BRPM | SYSTOLIC BLOOD PRESSURE: 122 MMHG | OXYGEN SATURATION: 93 % | DIASTOLIC BLOOD PRESSURE: 70 MMHG

## 2019-11-20 DIAGNOSIS — C19 COLORECTAL CANCER (HCC): Primary | ICD-10-CM

## 2019-11-20 LAB
ALBUMIN SERPL-MCNC: 4.1 G/DL (ref 3.5–5.2)
ALP BLD-CCNC: 116 U/L (ref 40–130)
ALT SERPL-CCNC: 15 U/L (ref 5–41)
ANION GAP SERPL CALCULATED.3IONS-SCNC: 17 MMOL/L (ref 7–19)
AST SERPL-CCNC: 22 U/L (ref 5–40)
BILIRUB SERPL-MCNC: 0.3 MG/DL (ref 0.2–1.2)
BUN BLDV-MCNC: 20 MG/DL (ref 8–23)
CALCIUM SERPL-MCNC: 9.5 MG/DL (ref 8.8–10.2)
CHLORIDE BLD-SCNC: 99 MMOL/L (ref 98–111)
CO2: 18 MMOL/L (ref 22–29)
CREAT SERPL-MCNC: 1.3 MG/DL (ref 0.5–1.2)
FERRITIN: 138.1 NG/ML (ref 30–400)
FOLATE: >20 NG/ML (ref 4.5–32.2)
GFR NON-AFRICAN AMERICAN: 55
GLUCOSE BLD-MCNC: 119 MG/DL (ref 74–109)
IRON SATURATION: 14 % (ref 14–50)
IRON: 62 UG/DL (ref 59–158)
POTASSIUM SERPL-SCNC: 3.5 MMOL/L (ref 3.5–5)
SODIUM BLD-SCNC: 134 MMOL/L (ref 136–145)
TOTAL IRON BINDING CAPACITY: 428 UG/DL (ref 250–400)
TOTAL PROTEIN: 7.2 G/DL (ref 6.6–8.7)
TSH SERPL DL<=0.05 MIU/L-ACNC: 2.91 UIU/ML (ref 0.27–4.2)
VITAMIN B-12: 1086 PG/ML (ref 211–946)

## 2019-11-20 PROCEDURE — 82728 ASSAY OF FERRITIN: CPT

## 2019-11-20 PROCEDURE — 83540 ASSAY OF IRON: CPT

## 2019-11-20 PROCEDURE — 80053 COMPREHEN METABOLIC PANEL: CPT

## 2019-11-20 PROCEDURE — 82746 ASSAY OF FOLIC ACID SERUM: CPT

## 2019-11-20 PROCEDURE — 2580000003 HC RX 258: Performed by: NURSE PRACTITIONER

## 2019-11-20 PROCEDURE — 84443 ASSAY THYROID STIM HORMONE: CPT

## 2019-11-20 PROCEDURE — 96366 THER/PROPH/DIAG IV INF ADDON: CPT

## 2019-11-20 PROCEDURE — 6360000002 HC RX W HCPCS: Performed by: NURSE PRACTITIONER

## 2019-11-20 PROCEDURE — 82607 VITAMIN B-12: CPT

## 2019-11-20 PROCEDURE — 96365 THER/PROPH/DIAG IV INF INIT: CPT

## 2019-11-20 PROCEDURE — 83550 IRON BINDING TEST: CPT

## 2019-11-20 PROCEDURE — 96413 CHEMO IV INFUSION 1 HR: CPT

## 2019-11-20 PROCEDURE — 96415 CHEMO IV INFUSION ADDL HR: CPT

## 2019-11-20 RX ORDER — HEPARIN SODIUM (PORCINE) LOCK FLUSH IV SOLN 100 UNIT/ML 100 UNIT/ML
300 SOLUTION INTRAVENOUS PRN
Status: DISCONTINUED | OUTPATIENT
Start: 2019-11-20 | End: 2019-11-22 | Stop reason: HOSPADM

## 2019-11-20 RX ORDER — PALONOSETRON 0.05 MG/ML
0.25 INJECTION, SOLUTION INTRAVENOUS ONCE
Status: COMPLETED | OUTPATIENT
Start: 2019-11-20 | End: 2019-11-20

## 2019-11-20 RX ORDER — DEXAMETHASONE SODIUM PHOSPHATE 10 MG/ML
10 INJECTION, SOLUTION INTRAMUSCULAR; INTRAVENOUS ONCE
Status: COMPLETED | OUTPATIENT
Start: 2019-11-20 | End: 2019-11-20

## 2019-11-20 RX ADMIN — PALONOSETRON HYDROCHLORIDE 0.25 MG: 0.05 INJECTION, SOLUTION INTRAVENOUS at 08:13

## 2019-11-20 RX ADMIN — DEXAMETHASONE SODIUM PHOSPHATE 10 MG: 10 INJECTION, SOLUTION INTRAMUSCULAR; INTRAVENOUS at 08:13

## 2019-11-20 RX ADMIN — LEUCOVORIN CALCIUM 792 MG: 350 INJECTION, POWDER, LYOPHILIZED, FOR SUSPENSION INTRAMUSCULAR; INTRAVENOUS at 08:36

## 2019-11-20 RX ADMIN — FLUOROURACIL 4750 MG: 50 INJECTION, SOLUTION INTRAVENOUS at 11:10

## 2019-11-20 RX ADMIN — OXALIPLATIN 170 MG: 5 INJECTION, SOLUTION INTRAVENOUS at 08:36

## 2019-11-21 PROBLEM — Z51.11 CHEMOTHERAPY MANAGEMENT, ENCOUNTER FOR: Status: ACTIVE | Noted: 2019-11-21

## 2019-11-22 ENCOUNTER — HOSPITAL ENCOUNTER (OUTPATIENT)
Dept: PREADMISSION TESTING | Age: 69
Discharge: HOME | End: 2019-11-26
Payer: MEDICARE

## 2019-11-22 ENCOUNTER — HOSPITAL ENCOUNTER (OUTPATIENT)
Dept: INFUSION THERAPY | Age: 69
Setting detail: INFUSION SERIES
Discharge: HOME | End: 2019-11-22
Payer: MEDICARE

## 2019-11-22 VITALS
HEART RATE: 99 BPM | TEMPERATURE: 97.6 F | SYSTOLIC BLOOD PRESSURE: 125 MMHG | OXYGEN SATURATION: 97 % | DIASTOLIC BLOOD PRESSURE: 79 MMHG | RESPIRATION RATE: 17 BRPM

## 2019-11-22 PROBLEM — R53.83 OTHER FATIGUE: Status: ACTIVE | Noted: 2019-11-22

## 2019-11-22 PROBLEM — T45.1X5A ANEMIA ASSOCIATED WITH CHEMOTHERAPY: Status: ACTIVE | Noted: 2019-11-22

## 2019-11-22 PROBLEM — D64.81 ANEMIA ASSOCIATED WITH CHEMOTHERAPY: Status: ACTIVE | Noted: 2019-11-22

## 2019-11-22 LAB
ANION GAP SERPL CALCULATED.3IONS-SCNC: 17 MMOL/L (ref 7–19)
BASOPHILS ABSOLUTE: 0 K/UL (ref 0–0.2)
BASOPHILS RELATIVE PERCENT: 0.3 % (ref 0–1)
BUN BLDV-MCNC: 26 MG/DL (ref 8–23)
CALCIUM SERPL-MCNC: 9.1 MG/DL (ref 8.8–10.2)
CHLORIDE BLD-SCNC: 97 MMOL/L (ref 98–111)
CO2: 20 MMOL/L (ref 22–29)
CREAT SERPL-MCNC: 1.3 MG/DL (ref 0.5–1.2)
EKG P AXIS: 43 DEGREES
EKG P-R INTERVAL: 186 MS
EKG Q-T INTERVAL: 366 MS
EKG QRS DURATION: 104 MS
EKG QTC CALCULATION (BAZETT): 399 MS
EKG T AXIS: 40 DEGREES
EOSINOPHILS ABSOLUTE: 0 K/UL (ref 0–0.6)
EOSINOPHILS RELATIVE PERCENT: 0.7 % (ref 0–5)
GFR NON-AFRICAN AMERICAN: 55
GLUCOSE BLD-MCNC: 119 MG/DL (ref 74–109)
HCT VFR BLD CALC: 32.2 % (ref 42–52)
HEMOGLOBIN: 10.1 G/DL (ref 14–18)
IMMATURE GRANULOCYTES #: 0 K/UL
LYMPHOCYTES ABSOLUTE: 0.4 K/UL (ref 1.1–4.5)
LYMPHOCYTES RELATIVE PERCENT: 11.6 % (ref 20–40)
MCH RBC QN AUTO: 27.4 PG (ref 27–31)
MCHC RBC AUTO-ENTMCNC: 31.4 G/DL (ref 33–37)
MCV RBC AUTO: 87.5 FL (ref 80–94)
MONOCYTES ABSOLUTE: 0.2 K/UL (ref 0–0.9)
MONOCYTES RELATIVE PERCENT: 6.9 % (ref 0–10)
NEUTROPHILS ABSOLUTE: 2.4 K/UL (ref 1.5–7.5)
NEUTROPHILS RELATIVE PERCENT: 80.2 % (ref 50–65)
PDW BLD-RTO: 16.6 % (ref 11.5–14.5)
PLATELET # BLD: 175 K/UL (ref 130–400)
PMV BLD AUTO: 12.6 FL (ref 9.4–12.4)
POTASSIUM SERPL-SCNC: 3.6 MMOL/L (ref 3.5–5)
RBC # BLD: 3.68 M/UL (ref 4.7–6.1)
SODIUM BLD-SCNC: 134 MMOL/L (ref 136–145)
WBC # BLD: 3 K/UL (ref 4.8–10.8)

## 2019-11-22 PROCEDURE — 93010 ELECTROCARDIOGRAM REPORT: CPT | Performed by: INTERNAL MEDICINE

## 2019-11-22 PROCEDURE — 2580000003 HC RX 258: Performed by: INTERNAL MEDICINE

## 2019-11-22 PROCEDURE — 80048 BASIC METABOLIC PNL TOTAL CA: CPT

## 2019-11-22 PROCEDURE — 6360000002 HC RX W HCPCS: Performed by: INTERNAL MEDICINE

## 2019-11-22 PROCEDURE — 93005 ELECTROCARDIOGRAM TRACING: CPT | Performed by: UROLOGY

## 2019-11-22 PROCEDURE — 85025 COMPLETE CBC W/AUTO DIFF WBC: CPT

## 2019-11-22 PROCEDURE — 96523 IRRIG DRUG DELIVERY DEVICE: CPT

## 2019-11-22 RX ORDER — SODIUM CHLORIDE 0.9 % (FLUSH) 0.9 %
20 SYRINGE (ML) INJECTION PRN
Status: DISCONTINUED | OUTPATIENT
Start: 2019-11-22 | End: 2019-11-24 | Stop reason: HOSPADM

## 2019-11-22 RX ORDER — HEPARIN SODIUM (PORCINE) LOCK FLUSH IV SOLN 100 UNIT/ML 100 UNIT/ML
300 SOLUTION INTRAVENOUS PRN
Status: DISCONTINUED | OUTPATIENT
Start: 2019-11-22 | End: 2019-11-24 | Stop reason: HOSPADM

## 2019-11-22 RX ADMIN — Medication 20 ML: at 11:00

## 2019-11-22 RX ADMIN — HEPARIN 300 UNITS: 100 SYRINGE at 11:00

## 2019-12-02 RX ORDER — ONDANSETRON 4 MG/1
8 TABLET, FILM COATED ORAL EVERY 8 HOURS PRN
Qty: 30 TABLET | Refills: 2 | Status: SHIPPED | OUTPATIENT
Start: 2019-12-02 | End: 2020-04-07

## 2019-12-03 ENCOUNTER — APPOINTMENT (OUTPATIENT)
Dept: GENERAL RADIOLOGY | Age: 69
End: 2019-12-03
Attending: UROLOGY
Payer: MEDICARE

## 2019-12-03 ENCOUNTER — HOSPITAL ENCOUNTER (OUTPATIENT)
Age: 69
Setting detail: OUTPATIENT SURGERY
Discharge: HOME | End: 2019-12-03
Attending: UROLOGY | Admitting: UROLOGY
Payer: MEDICARE

## 2019-12-03 ENCOUNTER — ANESTHESIA (OUTPATIENT)
Dept: OPERATING ROOM | Age: 69
End: 2019-12-03
Payer: MEDICARE

## 2019-12-03 ENCOUNTER — ANESTHESIA EVENT (OUTPATIENT)
Dept: OPERATING ROOM | Age: 69
End: 2019-12-03
Payer: MEDICARE

## 2019-12-03 VITALS
BODY MASS INDEX: 30.16 KG/M2 | WEIGHT: 181 LBS | OXYGEN SATURATION: 93 % | HEART RATE: 79 BPM | TEMPERATURE: 97.4 F | DIASTOLIC BLOOD PRESSURE: 86 MMHG | HEIGHT: 65 IN | RESPIRATION RATE: 16 BRPM | SYSTOLIC BLOOD PRESSURE: 130 MMHG

## 2019-12-03 VITALS
OXYGEN SATURATION: 100 % | TEMPERATURE: 95.9 F | RESPIRATION RATE: 14 BRPM | DIASTOLIC BLOOD PRESSURE: 64 MMHG | SYSTOLIC BLOOD PRESSURE: 106 MMHG

## 2019-12-03 PROBLEM — C67.1 MALIGNANT NEOPLASM OF DOME OF URINARY BLADDER (HCC): Status: ACTIVE | Noted: 2019-08-18

## 2019-12-03 PROCEDURE — 74420 UROGRAPHY RTRGR +-KUB: CPT

## 2019-12-03 PROCEDURE — 52332 CYSTOSCOPY AND TREATMENT: CPT | Performed by: UROLOGY

## 2019-12-03 PROCEDURE — 2580000003 HC RX 258: Performed by: UROLOGY

## 2019-12-03 PROCEDURE — 2720000010 HC SURG SUPPLY STERILE: Performed by: UROLOGY

## 2019-12-03 PROCEDURE — 52234 CYSTOSCOPY AND TREATMENT: CPT | Performed by: UROLOGY

## 2019-12-03 PROCEDURE — 3600000004 HC SURGERY LEVEL 4 BASE: Performed by: UROLOGY

## 2019-12-03 PROCEDURE — 6360000002 HC RX W HCPCS: Performed by: ANESTHESIOLOGY

## 2019-12-03 PROCEDURE — 7100000011 HC PHASE II RECOVERY - ADDTL 15 MIN: Performed by: UROLOGY

## 2019-12-03 PROCEDURE — 7100000010 HC PHASE II RECOVERY - FIRST 15 MIN: Performed by: UROLOGY

## 2019-12-03 PROCEDURE — C1758 CATHETER, URETERAL: HCPCS | Performed by: UROLOGY

## 2019-12-03 PROCEDURE — 88305 TISSUE EXAM BY PATHOLOGIST: CPT

## 2019-12-03 PROCEDURE — 7100000000 HC PACU RECOVERY - FIRST 15 MIN: Performed by: UROLOGY

## 2019-12-03 PROCEDURE — 74420 UROGRAPHY RTRGR +-KUB: CPT | Performed by: UROLOGY

## 2019-12-03 PROCEDURE — C2617 STENT, NON-COR, TEM W/O DEL: HCPCS | Performed by: UROLOGY

## 2019-12-03 PROCEDURE — 3700000000 HC ANESTHESIA ATTENDED CARE: Performed by: UROLOGY

## 2019-12-03 PROCEDURE — 6360000002 HC RX W HCPCS: Performed by: NURSE ANESTHETIST, CERTIFIED REGISTERED

## 2019-12-03 PROCEDURE — 6370000000 HC RX 637 (ALT 250 FOR IP): Performed by: UROLOGY

## 2019-12-03 PROCEDURE — 3209999900 FLUORO FOR SURGICAL PROCEDURES

## 2019-12-03 PROCEDURE — 2500000003 HC RX 250 WO HCPCS: Performed by: NURSE ANESTHETIST, CERTIFIED REGISTERED

## 2019-12-03 PROCEDURE — 6360000002 HC RX W HCPCS: Performed by: UROLOGY

## 2019-12-03 PROCEDURE — 3600000014 HC SURGERY LEVEL 4 ADDTL 15MIN: Performed by: UROLOGY

## 2019-12-03 PROCEDURE — 2580000003 HC RX 258: Performed by: ANESTHESIOLOGY

## 2019-12-03 PROCEDURE — C1769 GUIDE WIRE: HCPCS | Performed by: UROLOGY

## 2019-12-03 PROCEDURE — 7100000001 HC PACU RECOVERY - ADDTL 15 MIN: Performed by: UROLOGY

## 2019-12-03 PROCEDURE — 2709999900 HC NON-CHARGEABLE SUPPLY: Performed by: UROLOGY

## 2019-12-03 PROCEDURE — 3700000001 HC ADD 15 MINUTES (ANESTHESIA): Performed by: UROLOGY

## 2019-12-03 DEVICE — URETERAL STENT
Type: IMPLANTABLE DEVICE | Status: FUNCTIONAL
Brand: POLARIS™ ULTRA

## 2019-12-03 RX ORDER — LIDOCAINE HYDROCHLORIDE 10 MG/ML
1 INJECTION, SOLUTION EPIDURAL; INFILTRATION; INTRACAUDAL; PERINEURAL
Status: DISCONTINUED | OUTPATIENT
Start: 2019-12-03 | End: 2019-12-03 | Stop reason: HOSPADM

## 2019-12-03 RX ORDER — HEPARIN SODIUM (PORCINE) LOCK FLUSH IV SOLN 100 UNIT/ML 100 UNIT/ML
100 SOLUTION INTRAVENOUS PRN
Status: DISCONTINUED | OUTPATIENT
Start: 2019-12-03 | End: 2019-12-03 | Stop reason: HOSPADM

## 2019-12-03 RX ORDER — MIDAZOLAM HYDROCHLORIDE 1 MG/ML
2 INJECTION INTRAMUSCULAR; INTRAVENOUS
Status: DISCONTINUED | OUTPATIENT
Start: 2019-12-03 | End: 2019-12-03 | Stop reason: HOSPADM

## 2019-12-03 RX ORDER — HYDRALAZINE HYDROCHLORIDE 20 MG/ML
5 INJECTION INTRAMUSCULAR; INTRAVENOUS EVERY 10 MIN PRN
Status: DISCONTINUED | OUTPATIENT
Start: 2019-12-03 | End: 2019-12-03 | Stop reason: HOSPADM

## 2019-12-03 RX ORDER — FENTANYL CITRATE 50 UG/ML
25 INJECTION, SOLUTION INTRAMUSCULAR; INTRAVENOUS
Status: DISCONTINUED | OUTPATIENT
Start: 2019-12-03 | End: 2019-12-03 | Stop reason: HOSPADM

## 2019-12-03 RX ORDER — SODIUM CHLORIDE 0.9 % (FLUSH) 0.9 %
10 SYRINGE (ML) INJECTION PRN
Status: DISCONTINUED | OUTPATIENT
Start: 2019-12-03 | End: 2019-12-03 | Stop reason: HOSPADM

## 2019-12-03 RX ORDER — SODIUM CHLORIDE, SODIUM LACTATE, POTASSIUM CHLORIDE, CALCIUM CHLORIDE 600; 310; 30; 20 MG/100ML; MG/100ML; MG/100ML; MG/100ML
INJECTION, SOLUTION INTRAVENOUS CONTINUOUS
Status: DISCONTINUED | OUTPATIENT
Start: 2019-12-03 | End: 2019-12-03 | Stop reason: HOSPADM

## 2019-12-03 RX ORDER — ROCURONIUM BROMIDE 10 MG/ML
INJECTION, SOLUTION INTRAVENOUS PRN
Status: DISCONTINUED | OUTPATIENT
Start: 2019-12-03 | End: 2019-12-03 | Stop reason: SDUPTHER

## 2019-12-03 RX ORDER — LABETALOL 20 MG/4 ML (5 MG/ML) INTRAVENOUS SYRINGE
5 EVERY 10 MIN PRN
Status: DISCONTINUED | OUTPATIENT
Start: 2019-12-03 | End: 2019-12-03 | Stop reason: HOSPADM

## 2019-12-03 RX ORDER — TAMSULOSIN HYDROCHLORIDE 0.4 MG/1
0.4 CAPSULE ORAL ONCE
Status: COMPLETED | OUTPATIENT
Start: 2019-12-03 | End: 2019-12-03

## 2019-12-03 RX ORDER — SODIUM CHLORIDE 9 MG/ML
INJECTION, SOLUTION INTRAVENOUS CONTINUOUS
Status: DISCONTINUED | OUTPATIENT
Start: 2019-12-03 | End: 2019-12-03 | Stop reason: HOSPADM

## 2019-12-03 RX ORDER — KETOROLAC TROMETHAMINE 30 MG/ML
15 INJECTION, SOLUTION INTRAMUSCULAR; INTRAVENOUS ONCE
Status: COMPLETED | OUTPATIENT
Start: 2019-12-03 | End: 2019-12-03

## 2019-12-03 RX ORDER — PROPOFOL 10 MG/ML
INJECTION, EMULSION INTRAVENOUS PRN
Status: DISCONTINUED | OUTPATIENT
Start: 2019-12-03 | End: 2019-12-03 | Stop reason: SDUPTHER

## 2019-12-03 RX ORDER — METOCLOPRAMIDE HYDROCHLORIDE 5 MG/ML
10 INJECTION INTRAMUSCULAR; INTRAVENOUS
Status: DISCONTINUED | OUTPATIENT
Start: 2019-12-03 | End: 2019-12-03 | Stop reason: HOSPADM

## 2019-12-03 RX ORDER — ENALAPRILAT 2.5 MG/2ML
1.25 INJECTION INTRAVENOUS
Status: DISCONTINUED | OUTPATIENT
Start: 2019-12-03 | End: 2019-12-03 | Stop reason: HOSPADM

## 2019-12-03 RX ORDER — ONDANSETRON 2 MG/ML
INJECTION INTRAMUSCULAR; INTRAVENOUS PRN
Status: DISCONTINUED | OUTPATIENT
Start: 2019-12-03 | End: 2019-12-03 | Stop reason: SDUPTHER

## 2019-12-03 RX ORDER — FENTANYL CITRATE 50 UG/ML
50 INJECTION, SOLUTION INTRAMUSCULAR; INTRAVENOUS
Status: DISCONTINUED | OUTPATIENT
Start: 2019-12-03 | End: 2019-12-03 | Stop reason: HOSPADM

## 2019-12-03 RX ORDER — OXYCODONE HYDROCHLORIDE AND ACETAMINOPHEN 5; 325 MG/1; MG/1
2 TABLET ORAL EVERY 4 HOURS PRN
Status: DISCONTINUED | OUTPATIENT
Start: 2019-12-03 | End: 2019-12-03 | Stop reason: HOSPADM

## 2019-12-03 RX ORDER — PROMETHAZINE HYDROCHLORIDE 25 MG/ML
6.25 INJECTION, SOLUTION INTRAMUSCULAR; INTRAVENOUS
Status: DISCONTINUED | OUTPATIENT
Start: 2019-12-03 | End: 2019-12-03 | Stop reason: HOSPADM

## 2019-12-03 RX ORDER — DIPHENHYDRAMINE HYDROCHLORIDE 50 MG/ML
12.5 INJECTION INTRAMUSCULAR; INTRAVENOUS
Status: DISCONTINUED | OUTPATIENT
Start: 2019-12-03 | End: 2019-12-03 | Stop reason: HOSPADM

## 2019-12-03 RX ORDER — EPHEDRINE SULFATE 50 MG/ML
INJECTION, SOLUTION INTRAVENOUS PRN
Status: DISCONTINUED | OUTPATIENT
Start: 2019-12-03 | End: 2019-12-03 | Stop reason: SDUPTHER

## 2019-12-03 RX ORDER — MEPERIDINE HYDROCHLORIDE 50 MG/ML
12.5 INJECTION INTRAMUSCULAR; INTRAVENOUS; SUBCUTANEOUS EVERY 5 MIN PRN
Status: DISCONTINUED | OUTPATIENT
Start: 2019-12-03 | End: 2019-12-03 | Stop reason: HOSPADM

## 2019-12-03 RX ORDER — LIDOCAINE HYDROCHLORIDE 10 MG/ML
INJECTION, SOLUTION EPIDURAL; INFILTRATION; INTRACAUDAL; PERINEURAL PRN
Status: DISCONTINUED | OUTPATIENT
Start: 2019-12-03 | End: 2019-12-03 | Stop reason: SDUPTHER

## 2019-12-03 RX ORDER — SODIUM CHLORIDE 0.9 % (FLUSH) 0.9 %
10 SYRINGE (ML) INJECTION EVERY 12 HOURS SCHEDULED
Status: DISCONTINUED | OUTPATIENT
Start: 2019-12-03 | End: 2019-12-03 | Stop reason: HOSPADM

## 2019-12-03 RX ORDER — FENTANYL CITRATE 50 UG/ML
INJECTION, SOLUTION INTRAMUSCULAR; INTRAVENOUS PRN
Status: DISCONTINUED | OUTPATIENT
Start: 2019-12-03 | End: 2019-12-03 | Stop reason: SDUPTHER

## 2019-12-03 RX ORDER — DEXAMETHASONE SODIUM PHOSPHATE 4 MG/ML
INJECTION, SOLUTION INTRA-ARTICULAR; INTRALESIONAL; INTRAMUSCULAR; INTRAVENOUS; SOFT TISSUE PRN
Status: DISCONTINUED | OUTPATIENT
Start: 2019-12-03 | End: 2019-12-03 | Stop reason: SDUPTHER

## 2019-12-03 RX ORDER — ONDANSETRON 2 MG/ML
4 INJECTION INTRAMUSCULAR; INTRAVENOUS EVERY 4 HOURS PRN
Status: DISCONTINUED | OUTPATIENT
Start: 2019-12-03 | End: 2019-12-03 | Stop reason: HOSPADM

## 2019-12-03 RX ADMIN — ONDANSETRON HYDROCHLORIDE 4 MG: 2 INJECTION, SOLUTION INTRAMUSCULAR; INTRAVENOUS at 14:52

## 2019-12-03 RX ADMIN — DEXAMETHASONE SODIUM PHOSPHATE 10 MG: 4 INJECTION, SOLUTION INTRAMUSCULAR; INTRAVENOUS at 14:28

## 2019-12-03 RX ADMIN — EPHEDRINE SULFATE 10 MG: 50 INJECTION INTRAMUSCULAR; INTRAVENOUS; SUBCUTANEOUS at 14:32

## 2019-12-03 RX ADMIN — LIDOCAINE HYDROCHLORIDE 50 MG: 10 INJECTION, SOLUTION EPIDURAL; INFILTRATION; INTRACAUDAL; PERINEURAL at 14:17

## 2019-12-03 RX ADMIN — TAMSULOSIN HYDROCHLORIDE 0.4 MG: 0.4 CAPSULE ORAL at 15:55

## 2019-12-03 RX ADMIN — FENTANYL CITRATE 100 MCG: 50 INJECTION INTRAMUSCULAR; INTRAVENOUS at 14:17

## 2019-12-03 RX ADMIN — PROPOFOL 10 MG: 10 INJECTION, EMULSION INTRAVENOUS at 14:17

## 2019-12-03 RX ADMIN — EPHEDRINE SULFATE 10 MG: 50 INJECTION INTRAMUSCULAR; INTRAVENOUS; SUBCUTANEOUS at 14:34

## 2019-12-03 RX ADMIN — SUGAMMADEX 160 MG: 100 INJECTION, SOLUTION INTRAVENOUS at 15:12

## 2019-12-03 RX ADMIN — EPHEDRINE SULFATE 10 MG: 50 INJECTION INTRAMUSCULAR; INTRAVENOUS; SUBCUTANEOUS at 14:28

## 2019-12-03 RX ADMIN — KETOROLAC TROMETHAMINE 15 MG: 30 INJECTION, SOLUTION INTRAMUSCULAR at 15:55

## 2019-12-03 RX ADMIN — EPHEDRINE SULFATE 10 MG: 50 INJECTION INTRAMUSCULAR; INTRAVENOUS; SUBCUTANEOUS at 14:30

## 2019-12-03 RX ADMIN — HEPARIN 300 UNITS: 100 SYRINGE at 16:42

## 2019-12-03 RX ADMIN — SODIUM CHLORIDE 1 G: 9 INJECTION INTRAMUSCULAR; INTRAVENOUS; SUBCUTANEOUS at 14:23

## 2019-12-03 RX ADMIN — EPHEDRINE SULFATE 10 MG: 50 INJECTION INTRAMUSCULAR; INTRAVENOUS; SUBCUTANEOUS at 14:38

## 2019-12-03 RX ADMIN — SODIUM CHLORIDE, SODIUM LACTATE, POTASSIUM CHLORIDE, AND CALCIUM CHLORIDE: 600; 310; 30; 20 INJECTION, SOLUTION INTRAVENOUS at 14:08

## 2019-12-03 RX ADMIN — ROCURONIUM BROMIDE 50 MG: 10 SOLUTION INTRAVENOUS at 14:17

## 2019-12-03 ASSESSMENT — PAIN SCALES - GENERAL
PAINLEVEL_OUTOF10: 2
PAINLEVEL_OUTOF10: 0

## 2019-12-03 ASSESSMENT — PAIN DESCRIPTION - DESCRIPTORS: DESCRIPTORS: DISCOMFORT

## 2019-12-03 ASSESSMENT — PAIN DESCRIPTION - PAIN TYPE: TYPE: SURGICAL PAIN

## 2019-12-10 ENCOUNTER — HOSPITAL ENCOUNTER (OUTPATIENT)
Dept: INFUSION THERAPY | Age: 69
Discharge: HOME | End: 2019-12-10
Payer: MEDICARE

## 2019-12-10 ENCOUNTER — OFFICE VISIT (OUTPATIENT)
Dept: HEMATOLOGY | Age: 69
End: 2019-12-10
Payer: MEDICARE

## 2019-12-10 VITALS
BODY MASS INDEX: 32.06 KG/M2 | HEIGHT: 65 IN | SYSTOLIC BLOOD PRESSURE: 160 MMHG | OXYGEN SATURATION: 97 % | HEART RATE: 80 BPM | DIASTOLIC BLOOD PRESSURE: 100 MMHG | WEIGHT: 192.4 LBS

## 2019-12-10 DIAGNOSIS — C67.2 MALIGNANT NEOPLASM OF LATERAL WALL OF URINARY BLADDER (HCC): Primary | ICD-10-CM

## 2019-12-10 DIAGNOSIS — R53.83 OTHER FATIGUE: ICD-10-CM

## 2019-12-10 DIAGNOSIS — C67.1 MALIGNANT NEOPLASM OF DOME OF URINARY BLADDER (HCC): ICD-10-CM

## 2019-12-10 DIAGNOSIS — D49.9: ICD-10-CM

## 2019-12-10 DIAGNOSIS — B37.0 THRUSH: ICD-10-CM

## 2019-12-10 DIAGNOSIS — C67.1 MALIGNANT NEOPLASM OF DOME OF URINARY BLADDER (HCC): Primary | ICD-10-CM

## 2019-12-10 DIAGNOSIS — T45.1X5A ANEMIA ASSOCIATED WITH CHEMOTHERAPY: ICD-10-CM

## 2019-12-10 DIAGNOSIS — D64.81 ANEMIA ASSOCIATED WITH CHEMOTHERAPY: ICD-10-CM

## 2019-12-10 DIAGNOSIS — G55: ICD-10-CM

## 2019-12-10 DIAGNOSIS — C79.9 METASTASIS FROM COLON CANCER (HCC): ICD-10-CM

## 2019-12-10 DIAGNOSIS — R91.8 LUNG NODULES: ICD-10-CM

## 2019-12-10 DIAGNOSIS — C18.9 METASTASIS FROM COLON CANCER (HCC): ICD-10-CM

## 2019-12-10 DIAGNOSIS — C68.0 URETHRAL CANCER (HCC): ICD-10-CM

## 2019-12-10 DIAGNOSIS — Z51.11 CHEMOTHERAPY MANAGEMENT, ENCOUNTER FOR: ICD-10-CM

## 2019-12-10 PROCEDURE — 85025 COMPLETE CBC W/AUTO DIFF WBC: CPT

## 2019-12-10 PROCEDURE — 99214 OFFICE O/P EST MOD 30 MIN: CPT | Performed by: NURSE PRACTITIONER

## 2019-12-10 PROCEDURE — 1123F ACP DISCUSS/DSCN MKR DOCD: CPT | Performed by: NURSE PRACTITIONER

## 2019-12-10 PROCEDURE — G8417 CALC BMI ABV UP PARAM F/U: HCPCS | Performed by: NURSE PRACTITIONER

## 2019-12-10 PROCEDURE — 1036F TOBACCO NON-USER: CPT | Performed by: NURSE PRACTITIONER

## 2019-12-10 PROCEDURE — G8598 ASA/ANTIPLAT THER USED: HCPCS | Performed by: NURSE PRACTITIONER

## 2019-12-10 PROCEDURE — 99211 OFF/OP EST MAY X REQ PHY/QHP: CPT

## 2019-12-10 PROCEDURE — 4040F PNEUMOC VAC/ADMIN/RCVD: CPT | Performed by: NURSE PRACTITIONER

## 2019-12-10 PROCEDURE — 3017F COLORECTAL CA SCREEN DOC REV: CPT | Performed by: NURSE PRACTITIONER

## 2019-12-10 PROCEDURE — G8427 DOCREV CUR MEDS BY ELIG CLIN: HCPCS | Performed by: NURSE PRACTITIONER

## 2019-12-10 PROCEDURE — G8484 FLU IMMUNIZE NO ADMIN: HCPCS | Performed by: NURSE PRACTITIONER

## 2019-12-10 RX ORDER — EPINEPHRINE 1 MG/ML
0.3 INJECTION, SOLUTION, CONCENTRATE INTRAVENOUS PRN
Status: CANCELLED | OUTPATIENT
Start: 2019-12-11

## 2019-12-10 RX ORDER — PALONOSETRON 0.05 MG/ML
0.25 INJECTION, SOLUTION INTRAVENOUS ONCE
Status: CANCELLED | OUTPATIENT
Start: 2019-12-11

## 2019-12-10 RX ORDER — DIPHENHYDRAMINE HYDROCHLORIDE 50 MG/ML
50 INJECTION INTRAMUSCULAR; INTRAVENOUS PRN
Status: CANCELLED | OUTPATIENT
Start: 2019-12-11

## 2019-12-10 RX ORDER — METHYLPREDNISOLONE SODIUM SUCCINATE 125 MG/2ML
125 INJECTION, POWDER, LYOPHILIZED, FOR SOLUTION INTRAMUSCULAR; INTRAVENOUS PRN
Status: CANCELLED | OUTPATIENT
Start: 2019-12-11

## 2019-12-11 ENCOUNTER — HOSPITAL ENCOUNTER (OUTPATIENT)
Dept: INFUSION THERAPY | Age: 69
Setting detail: INFUSION SERIES
Discharge: HOME | End: 2019-12-11
Payer: MEDICARE

## 2019-12-11 VITALS
SYSTOLIC BLOOD PRESSURE: 139 MMHG | RESPIRATION RATE: 17 BRPM | DIASTOLIC BLOOD PRESSURE: 71 MMHG | HEART RATE: 62 BPM | TEMPERATURE: 97.8 F

## 2019-12-11 DIAGNOSIS — C19 COLORECTAL CANCER (HCC): Primary | ICD-10-CM

## 2019-12-11 LAB
ALBUMIN SERPL-MCNC: 3.5 G/DL (ref 3.5–5.2)
ALP BLD-CCNC: 125 U/L (ref 40–130)
ALT SERPL-CCNC: 12 U/L (ref 5–41)
ANION GAP SERPL CALCULATED.3IONS-SCNC: 12 MMOL/L (ref 7–19)
AST SERPL-CCNC: 21 U/L (ref 5–40)
BILIRUB SERPL-MCNC: 0.3 MG/DL (ref 0.2–1.2)
BUN BLDV-MCNC: 7 MG/DL (ref 8–23)
CALCIUM SERPL-MCNC: 8.7 MG/DL (ref 8.8–10.2)
CHLORIDE BLD-SCNC: 101 MMOL/L (ref 98–111)
CO2: 22 MMOL/L (ref 22–29)
CREAT SERPL-MCNC: 1 MG/DL (ref 0.5–1.2)
GFR NON-AFRICAN AMERICAN: >60
GLUCOSE BLD-MCNC: 136 MG/DL (ref 74–109)
POTASSIUM SERPL-SCNC: 3.9 MMOL/L (ref 3.5–5)
SODIUM BLD-SCNC: 135 MMOL/L (ref 136–145)
TOTAL PROTEIN: 6.3 G/DL (ref 6.6–8.7)

## 2019-12-11 PROCEDURE — G0498 CHEMO EXTEND IV INFUS W/PUMP: HCPCS

## 2019-12-11 PROCEDURE — 96413 CHEMO IV INFUSION 1 HR: CPT

## 2019-12-11 PROCEDURE — 2580000003 HC RX 258: Performed by: NURSE PRACTITIONER

## 2019-12-11 PROCEDURE — 80053 COMPREHEN METABOLIC PANEL: CPT

## 2019-12-11 PROCEDURE — 6360000002 HC RX W HCPCS: Performed by: NURSE PRACTITIONER

## 2019-12-11 PROCEDURE — 96415 CHEMO IV INFUSION ADDL HR: CPT

## 2019-12-11 PROCEDURE — 36591 DRAW BLOOD OFF VENOUS DEVICE: CPT

## 2019-12-11 PROCEDURE — 96366 THER/PROPH/DIAG IV INF ADDON: CPT

## 2019-12-11 RX ORDER — PALONOSETRON 0.05 MG/ML
0.25 INJECTION, SOLUTION INTRAVENOUS ONCE
Status: COMPLETED | OUTPATIENT
Start: 2019-12-11 | End: 2019-12-11

## 2019-12-11 RX ORDER — DEXAMETHASONE SODIUM PHOSPHATE 10 MG/ML
10 INJECTION, SOLUTION INTRAMUSCULAR; INTRAVENOUS ONCE
Status: COMPLETED | OUTPATIENT
Start: 2019-12-11 | End: 2019-12-11

## 2019-12-11 RX ORDER — DEXTROSE MONOHYDRATE 50 MG/ML
1000 INJECTION, SOLUTION INTRAVENOUS CONTINUOUS
Status: ACTIVE | OUTPATIENT
Start: 2019-12-11 | End: 2019-12-11

## 2019-12-11 RX ADMIN — FLUOROURACIL 4750 MG: 50 INJECTION, SOLUTION INTRAVENOUS at 10:46

## 2019-12-11 RX ADMIN — OXALIPLATIN 170 MG: 5 INJECTION, SOLUTION INTRAVENOUS at 08:50

## 2019-12-11 RX ADMIN — DEXTROSE MONOHYDRATE 500 ML: 50 INJECTION, SOLUTION INTRAVENOUS at 08:35

## 2019-12-11 RX ADMIN — PALONOSETRON HYDROCHLORIDE 0.25 MG: 0.25 INJECTION, SOLUTION INTRAVENOUS at 08:34

## 2019-12-11 RX ADMIN — LEUCOVORIN CALCIUM 792 MG: 350 INJECTION, POWDER, LYOPHILIZED, FOR SUSPENSION INTRAMUSCULAR; INTRAVENOUS at 08:50

## 2019-12-11 RX ADMIN — DEXAMETHASONE SODIUM PHOSPHATE 10 MG: 10 INJECTION, SOLUTION INTRAMUSCULAR; INTRAVENOUS at 08:34

## 2019-12-13 ENCOUNTER — HOSPITAL ENCOUNTER (OUTPATIENT)
Dept: INFUSION THERAPY | Age: 69
Setting detail: INFUSION SERIES
Discharge: HOME | End: 2019-12-13
Payer: MEDICARE

## 2019-12-13 PROCEDURE — 96523 IRRIG DRUG DELIVERY DEVICE: CPT

## 2019-12-13 PROCEDURE — 6360000002 HC RX W HCPCS: Performed by: INTERNAL MEDICINE

## 2019-12-13 RX ORDER — PHENAZOPYRIDINE HYDROCHLORIDE 100 MG/1
100 TABLET, FILM COATED ORAL 3 TIMES DAILY PRN
Qty: 15 TABLET | Refills: 1 | Status: SHIPPED | OUTPATIENT
Start: 2019-12-13 | End: 2020-02-11

## 2019-12-13 RX ADMIN — HEPARIN 300 UNITS: 100 SYRINGE at 10:56

## 2019-12-21 RX ORDER — PALONOSETRON 0.05 MG/ML
0.25 INJECTION, SOLUTION INTRAVENOUS ONCE
Status: CANCELLED | OUTPATIENT
Start: 2019-12-26

## 2019-12-21 RX ORDER — DIPHENHYDRAMINE HYDROCHLORIDE 50 MG/ML
50 INJECTION INTRAMUSCULAR; INTRAVENOUS PRN
Status: CANCELLED | OUTPATIENT
Start: 2019-12-26

## 2019-12-21 RX ORDER — EPINEPHRINE 1 MG/ML
0.3 INJECTION, SOLUTION, CONCENTRATE INTRAVENOUS PRN
Status: CANCELLED | OUTPATIENT
Start: 2019-12-26

## 2019-12-21 RX ORDER — METHYLPREDNISOLONE SODIUM SUCCINATE 125 MG/2ML
125 INJECTION, POWDER, LYOPHILIZED, FOR SOLUTION INTRAMUSCULAR; INTRAVENOUS PRN
Status: CANCELLED | OUTPATIENT
Start: 2019-12-26

## 2019-12-26 ENCOUNTER — HOSPITAL ENCOUNTER (OUTPATIENT)
Dept: INFUSION THERAPY | Age: 69
Setting detail: INFUSION SERIES
Discharge: HOME | End: 2019-12-26
Payer: MEDICARE

## 2019-12-26 VITALS
SYSTOLIC BLOOD PRESSURE: 115 MMHG | TEMPERATURE: 98.1 F | OXYGEN SATURATION: 95 % | RESPIRATION RATE: 20 BRPM | WEIGHT: 180 LBS | BODY MASS INDEX: 29.95 KG/M2 | HEART RATE: 81 BPM | DIASTOLIC BLOOD PRESSURE: 67 MMHG

## 2019-12-26 DIAGNOSIS — C19 COLORECTAL CANCER (HCC): Primary | ICD-10-CM

## 2019-12-26 LAB
ALBUMIN SERPL-MCNC: 3.6 G/DL (ref 3.5–5.2)
ALP BLD-CCNC: 177 U/L (ref 40–130)
ALT SERPL-CCNC: 12 U/L (ref 5–41)
ANION GAP SERPL CALCULATED.3IONS-SCNC: 16 MMOL/L (ref 7–19)
AST SERPL-CCNC: 23 U/L (ref 5–40)
BASOPHILS ABSOLUTE: 0 K/UL (ref 0–0.2)
BASOPHILS RELATIVE PERCENT: 0.2 % (ref 0–1)
BILIRUB SERPL-MCNC: 0.5 MG/DL (ref 0.2–1.2)
BUN BLDV-MCNC: 9 MG/DL (ref 8–23)
CALCIUM SERPL-MCNC: 9.1 MG/DL (ref 8.8–10.2)
CHLORIDE BLD-SCNC: 98 MMOL/L (ref 98–111)
CO2: 19 MMOL/L (ref 22–29)
CREAT SERPL-MCNC: 1.3 MG/DL (ref 0.5–1.2)
EOSINOPHILS ABSOLUTE: 0.2 K/UL (ref 0–0.6)
EOSINOPHILS RELATIVE PERCENT: 4.2 % (ref 0–5)
GFR NON-AFRICAN AMERICAN: 55
GLUCOSE BLD-MCNC: 114 MG/DL (ref 74–109)
HCT VFR BLD CALC: 30.2 % (ref 42–52)
HEMOGLOBIN: 9.5 G/DL (ref 14–18)
IMMATURE GRANULOCYTES #: 0 K/UL
LYMPHOCYTES ABSOLUTE: 0.4 K/UL (ref 1.1–4.5)
LYMPHOCYTES RELATIVE PERCENT: 8.7 % (ref 20–40)
MCH RBC QN AUTO: 27.4 PG (ref 27–31)
MCHC RBC AUTO-ENTMCNC: 31.5 G/DL (ref 33–37)
MCV RBC AUTO: 87 FL (ref 80–94)
MONOCYTES ABSOLUTE: 0.5 K/UL (ref 0–0.9)
MONOCYTES RELATIVE PERCENT: 12.3 % (ref 0–10)
NEUTROPHILS ABSOLUTE: 3.1 K/UL (ref 1.5–7.5)
NEUTROPHILS RELATIVE PERCENT: 74.1 % (ref 50–65)
PDW BLD-RTO: 15.9 % (ref 11.5–14.5)
PLATELET # BLD: 150 K/UL (ref 130–400)
PMV BLD AUTO: 11.7 FL (ref 9.4–12.4)
POTASSIUM SERPL-SCNC: 4.1 MMOL/L (ref 3.5–5)
RBC # BLD: 3.47 M/UL (ref 4.7–6.1)
SODIUM BLD-SCNC: 133 MMOL/L (ref 136–145)
TOTAL PROTEIN: 6.9 G/DL (ref 6.6–8.7)
WBC # BLD: 4.2 K/UL (ref 4.8–10.8)

## 2019-12-26 PROCEDURE — 85025 COMPLETE CBC W/AUTO DIFF WBC: CPT

## 2019-12-26 PROCEDURE — 2580000003 HC RX 258: Performed by: INTERNAL MEDICINE

## 2019-12-26 PROCEDURE — 80053 COMPREHEN METABOLIC PANEL: CPT

## 2019-12-26 PROCEDURE — 96415 CHEMO IV INFUSION ADDL HR: CPT

## 2019-12-26 PROCEDURE — 96366 THER/PROPH/DIAG IV INF ADDON: CPT

## 2019-12-26 PROCEDURE — 96365 THER/PROPH/DIAG IV INF INIT: CPT

## 2019-12-26 PROCEDURE — 36591 DRAW BLOOD OFF VENOUS DEVICE: CPT

## 2019-12-26 PROCEDURE — 96413 CHEMO IV INFUSION 1 HR: CPT

## 2019-12-26 PROCEDURE — 6360000002 HC RX W HCPCS: Performed by: INTERNAL MEDICINE

## 2019-12-26 RX ORDER — PALONOSETRON 0.05 MG/ML
0.25 INJECTION, SOLUTION INTRAVENOUS ONCE
Status: COMPLETED | OUTPATIENT
Start: 2019-12-26 | End: 2019-12-26

## 2019-12-26 RX ORDER — DEXAMETHASONE SODIUM PHOSPHATE 10 MG/ML
10 INJECTION, SOLUTION INTRAMUSCULAR; INTRAVENOUS ONCE
Status: COMPLETED | OUTPATIENT
Start: 2019-12-26 | End: 2019-12-26

## 2019-12-26 RX ORDER — HEPARIN SODIUM (PORCINE) LOCK FLUSH IV SOLN 100 UNIT/ML 100 UNIT/ML
300 SOLUTION INTRAVENOUS PRN
Status: DISCONTINUED | OUTPATIENT
Start: 2019-12-26 | End: 2019-12-28 | Stop reason: HOSPADM

## 2019-12-26 RX ORDER — SODIUM CHLORIDE 0.9 % (FLUSH) 0.9 %
20 SYRINGE (ML) INJECTION PRN
Status: DISCONTINUED | OUTPATIENT
Start: 2019-12-26 | End: 2019-12-28 | Stop reason: HOSPADM

## 2019-12-26 RX ADMIN — LEUCOVORIN CALCIUM 792 MG: 100 INJECTION, POWDER, LYOPHILIZED, FOR SUSPENSION INTRAMUSCULAR; INTRAVENOUS at 10:07

## 2019-12-26 RX ADMIN — FLUOROURACIL 4750 MG: 5 INJECTION, SOLUTION INTRAVENOUS at 12:16

## 2019-12-26 RX ADMIN — DEXAMETHASONE SODIUM PHOSPHATE 10 MG: 10 INJECTION, SOLUTION INTRAMUSCULAR; INTRAVENOUS at 08:53

## 2019-12-26 RX ADMIN — PALONOSETRON HYDROCHLORIDE 0.25 MG: 0.25 INJECTION, SOLUTION INTRAVENOUS at 08:53

## 2019-12-26 RX ADMIN — OXALIPLATIN 170 MG: 5 INJECTION, SOLUTION INTRAVENOUS at 10:07

## 2019-12-28 PROBLEM — B37.0 THRUSH: Status: ACTIVE | Noted: 2019-12-28

## 2019-12-30 ENCOUNTER — HOSPITAL ENCOUNTER (OUTPATIENT)
Dept: INFUSION THERAPY | Age: 69
Setting detail: INFUSION SERIES
Discharge: HOME | End: 2019-12-30
Payer: MEDICARE

## 2019-12-30 PROCEDURE — 96523 IRRIG DRUG DELIVERY DEVICE: CPT

## 2019-12-30 PROCEDURE — 6360000002 HC RX W HCPCS

## 2019-12-30 RX ADMIN — HEPARIN 300 UNITS: 100 SYRINGE at 15:59

## 2019-12-31 RX ORDER — FLUCONAZOLE 100 MG/1
TABLET ORAL
Qty: 15 TABLET | Refills: 0 | Status: SHIPPED | OUTPATIENT
Start: 2019-12-31 | End: 2020-03-13 | Stop reason: ALTCHOICE

## 2020-01-02 NOTE — TELEPHONE ENCOUNTER
Patients daughter phoned to request appt for patient to have IVF. She states that his stomatits continues despite MW and Nystatin and patient has very little PO intake. Will evaluate in clinic (cbc, cmp) and IVf as indicated.

## 2020-01-03 ENCOUNTER — HOSPITAL ENCOUNTER (OUTPATIENT)
Dept: INFUSION THERAPY | Age: 70
Discharge: HOME | End: 2020-01-03
Payer: MEDICARE

## 2020-01-03 DIAGNOSIS — C67.2 MALIGNANT NEOPLASM OF LATERAL WALL OF URINARY BLADDER (HCC): ICD-10-CM

## 2020-01-03 DIAGNOSIS — C67.1 MALIGNANT NEOPLASM OF DOME OF URINARY BLADDER (HCC): ICD-10-CM

## 2020-01-03 DIAGNOSIS — E86.0 DEHYDRATION: Primary | ICD-10-CM

## 2020-01-03 PROCEDURE — 80053 COMPREHEN METABOLIC PANEL: CPT

## 2020-01-03 PROCEDURE — 96360 HYDRATION IV INFUSION INIT: CPT

## 2020-01-03 PROCEDURE — 85025 COMPLETE CBC W/AUTO DIFF WBC: CPT

## 2020-01-03 PROCEDURE — 2580000003 HC RX 258: Performed by: INTERNAL MEDICINE

## 2020-01-03 PROCEDURE — 36415 COLL VENOUS BLD VENIPUNCTURE: CPT

## 2020-01-03 RX ORDER — SUCRALFATE ORAL 1 G/10ML
1 SUSPENSION ORAL 4 TIMES DAILY
Qty: 1200 ML | Refills: 3 | Status: SHIPPED | OUTPATIENT
Start: 2020-01-03 | End: 2020-01-27 | Stop reason: ALTCHOICE

## 2020-01-03 RX ORDER — 0.9 % SODIUM CHLORIDE 0.9 %
1000 INTRAVENOUS SOLUTION INTRAVENOUS ONCE
Status: CANCELLED | OUTPATIENT
Start: 2020-01-03

## 2020-01-03 RX ORDER — SODIUM CHLORIDE 0.9 % (FLUSH) 0.9 %
5 SYRINGE (ML) INJECTION PRN
Status: CANCELLED | OUTPATIENT
Start: 2020-01-03

## 2020-01-03 RX ORDER — ACYCLOVIR 400 MG/1
400 TABLET ORAL 3 TIMES DAILY
Qty: 30 TABLET | Refills: 0 | Status: SHIPPED | OUTPATIENT
Start: 2020-01-03 | End: 2021-01-01 | Stop reason: SDUPTHER

## 2020-01-03 RX ORDER — HEPARIN SODIUM (PORCINE) LOCK FLUSH IV SOLN 100 UNIT/ML 100 UNIT/ML
500 SOLUTION INTRAVENOUS PRN
Status: CANCELLED | OUTPATIENT
Start: 2020-01-03

## 2020-01-03 RX ORDER — HEPARIN SODIUM (PORCINE) LOCK FLUSH IV SOLN 100 UNIT/ML 100 UNIT/ML
500 SOLUTION INTRAVENOUS PRN
Status: DISCONTINUED | OUTPATIENT
Start: 2020-01-03 | End: 2020-01-04 | Stop reason: HOSPADM

## 2020-01-03 RX ORDER — SODIUM CHLORIDE 0.9 % (FLUSH) 0.9 %
10 SYRINGE (ML) INJECTION PRN
Status: DISCONTINUED | OUTPATIENT
Start: 2020-01-03 | End: 2020-01-04 | Stop reason: HOSPADM

## 2020-01-03 RX ORDER — SODIUM CHLORIDE 0.9 % (FLUSH) 0.9 %
10 SYRINGE (ML) INJECTION PRN
Status: CANCELLED | OUTPATIENT
Start: 2020-01-03

## 2020-01-03 RX ORDER — 0.9 % SODIUM CHLORIDE 0.9 %
1000 INTRAVENOUS SOLUTION INTRAVENOUS ONCE
Status: COMPLETED | OUTPATIENT
Start: 2020-01-03 | End: 2020-01-03

## 2020-01-03 RX ADMIN — SODIUM CHLORIDE 1000 ML: 9 INJECTION, SOLUTION INTRAVENOUS at 11:25

## 2020-01-07 ENCOUNTER — OFFICE VISIT (OUTPATIENT)
Dept: HEMATOLOGY | Age: 70
End: 2020-01-07
Payer: MEDICARE

## 2020-01-07 ENCOUNTER — HOSPITAL ENCOUNTER (OUTPATIENT)
Dept: INFUSION THERAPY | Age: 70
Discharge: HOME | End: 2020-01-07
Payer: MEDICARE

## 2020-01-07 VITALS
HEART RATE: 91 BPM | SYSTOLIC BLOOD PRESSURE: 110 MMHG | DIASTOLIC BLOOD PRESSURE: 72 MMHG | HEIGHT: 65 IN | WEIGHT: 175.1 LBS | BODY MASS INDEX: 29.17 KG/M2 | OXYGEN SATURATION: 98 %

## 2020-01-07 DIAGNOSIS — C67.1 MALIGNANT NEOPLASM OF DOME OF URINARY BLADDER (HCC): ICD-10-CM

## 2020-01-07 DIAGNOSIS — C79.9 METASTASIS FROM COLON CANCER (HCC): ICD-10-CM

## 2020-01-07 DIAGNOSIS — C18.9 METASTASIS FROM COLON CANCER (HCC): ICD-10-CM

## 2020-01-07 PROCEDURE — 99214 OFFICE O/P EST MOD 30 MIN: CPT | Performed by: NURSE PRACTITIONER

## 2020-01-07 PROCEDURE — G8484 FLU IMMUNIZE NO ADMIN: HCPCS | Performed by: NURSE PRACTITIONER

## 2020-01-07 PROCEDURE — G8427 DOCREV CUR MEDS BY ELIG CLIN: HCPCS | Performed by: NURSE PRACTITIONER

## 2020-01-07 PROCEDURE — 4040F PNEUMOC VAC/ADMIN/RCVD: CPT | Performed by: NURSE PRACTITIONER

## 2020-01-07 PROCEDURE — 85025 COMPLETE CBC W/AUTO DIFF WBC: CPT

## 2020-01-07 PROCEDURE — 99211 OFF/OP EST MAY X REQ PHY/QHP: CPT

## 2020-01-07 PROCEDURE — 1123F ACP DISCUSS/DSCN MKR DOCD: CPT | Performed by: NURSE PRACTITIONER

## 2020-01-07 PROCEDURE — G8417 CALC BMI ABV UP PARAM F/U: HCPCS | Performed by: NURSE PRACTITIONER

## 2020-01-07 PROCEDURE — 1036F TOBACCO NON-USER: CPT | Performed by: NURSE PRACTITIONER

## 2020-01-07 PROCEDURE — 3017F COLORECTAL CA SCREEN DOC REV: CPT | Performed by: NURSE PRACTITIONER

## 2020-01-07 NOTE — PROGRESS NOTES
Progress Note      Pt Name: Kenna Taveras  YOB: 1950  MRN: [id number]    Date of evaluation: 1/7/2020  History Obtained From:  patient, electronic medical record    CHIEF COMPLAINT:    Chief Complaint   Patient presents with    Other     Malignant neoplasm of lateral wall of urinary bladder    Colon Cancer     Metastatic    Follow-up    Fatigue    Anemia    Nausea     HISTORY OF PRESENT ILLNESS:    Kenan Taveras is a 81 y.o.  male with significant PMH of moderately differentiated rectal carcinoma in 2009 and, urethral carcinoma 8/18/2019. Metastatic colorectal carcinoma was confirmed from biopsy of right abductor muscle on 9/3/2019. He is presently receiving palliative chemotherapy with modified FOLFOX 7  (Oxaliplatin 85 mg/m² IV day 1, leucovorin 400 mg/m² IV day 1 and 5-FU 2400 mg/m² IV continuous infusion over 46 to 48 hours) with the anticipation of adding Avastin 5 mg/kg day 1 every 14 days. Talisha Briones has been unable to initiate Avastin due to invasive interventions due to increased risk for hemorrhage with Avastin and proteinuria. Talisha Birones returns today in scheduled follow-up and for consideration for orders for cycle #8 of modified FOLFOX 7, he is accompanied by his son-in-law today. He presents today with severe stomatitis, poor appetite with 5 pound weight loss, complaint of increased cold sensitivity and significant fatigue. 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 He is currently using miracle mouthwash, nystatin, Carafate, Valtrex and Diflucan for his stomatitis and reports noting some improvement. Upon evaluation he has healing ulcers to his lower lip and white ulcerations noted bilaterally on inner cheeks. He has experienced difficulty swallowing which is contributing to his weight loss.      Talisha Briones continues to have chronic mild bilateral lower extremity edema that is unchanged from previous pain.  Skin: Negative. Negative for rash. Neurological: Generalized weakness  Hematological: Does not bruise/bleed easily. Psychiatric/Behavioral: Negative. The patient is not nervous/anxious. Objective   PHYSICAL EXAM:  Physical Exam   Constitutional: He is oriented to person, place, and time. No distress. Appears fatigued   ENT:   Head: Normocephalic and atraumatic. Mouth/Throat: Uvula is midline, tongue with patches of yeast, stomatitis with healing lesions to lower lip Eyes: Pupils are equal, round, and reactive to light. Conjunctivae, EOM and lids are normal. Right eye exhibits no discharge. Left eye exhibits no discharge. No scleral icterus. Neck: Trachea normal and normal range of motion. Neck supple. No JVD present. No tracheal deviation present. No thyroid mass and no thyromegaly present. Cardiovascular: Normal rate, regular rhythm, normal heart sounds and intact distal pulses. No murmur heard. Pulmonary/Chest: Respiratory effort is normal.  Lungs are clear to auscultation with diminished bases. Abdominal: Soft. Bowel sounds are normal.  No distention, tenderness or rebound guarding. Left ileostomy functioning with intact appliance   Musculoskeletal: Bilateral lower extremity edema. Range of motion within normal limits x3 extremities. Right lower extremity weakness  Neurological: He is alert and oriented to person, place, and time. No cranial nerve deficit. Coordination normal.   Skin: Skin is warm. No rash noted. He is not diaphoretic. No erythema. No pallor. Psychiatric: He has a normal mood and affect. His behavior is normal. Thought content normal.   Vitals reviewed. Labs:  CBC today: WBC 5.27, ANC 4.04, hemoglobin 10.8, MCV 87.6 and a platelet count of 880,639    ASSESSMENT/PLAN:      1. Metastatic colorectal adenocarcinoma confirmed in right abductor muscle. MSI stable and mutations for BRAF, NRAS, KRAS were not detected.   Receiving palliative systemic chemotherapy with modified FOLFOX 7, completed 7 cycles as of 12/26/2019. Avastin has remained on hold due to proteinuria and anticipating urethral stent removal.    Significant decline in performance status and overall condition we will hold cycle #8 and obtain restaging scans. -CEA  -CMP  -CT scan of the chest, abdomen and pelvis next week with contrast      2. Anemia of chronic disease iron deficiency. Hemoglobin 10.8 with an MCV of 87.6. Continues to deny hematuria or any other bleeding tendencies. Iron substrates 11/20/2019 were within acceptable limits with a TIBC of 428, iron 62 and iron saturation 14% with a ferritin of 138.1. TSH within normal limits at 2.91  - CBC Auto Differential; Future      3. Subcentimeter lung nodules per CT scan of the chest on 8/27/2019 PET scan on 8/27/2019 did not suggest any metabolic activity of pulmonary nodules, suspected due to subcentimeter size. CT scan of the chest on 11/15/2019 showed decreased size and pulmonary nodules with no new nodules identified. Reports mild shortness of air but feels a secondary to increased fatigue level  -CT scan of the chest is planned    4. 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  -Symptoms have significantly improved and are overall stable with persistent right lower extremity edema and discomfort at times. 5. Urethral cancer, 8/18/2019. Pathology revealing noninvasive high-grade papillary urethral carcinoma. Negative for evidence of detrusor muscle invasion, pTa, pNx. Repeat cystoscopy on 12/3/2019 continue to identify no evidence of muscle invasion. No need for adjuvant treatment, routine monitoring warranted at this time.      6.  Side effects of chemo:  · Diarrhea -has remained overall controlled with the use of Imodium   · Nausea -presently overall controlled continue Zofran   · Cold sensitivity secondary to Oxaliplatin-  significantly

## 2020-01-08 ENCOUNTER — HOSPITAL ENCOUNTER (OUTPATIENT)
Dept: INFUSION THERAPY | Age: 70
Setting detail: SPECIMEN
Discharge: HOME | End: 2020-01-08
Payer: MEDICARE

## 2020-01-08 VITALS
HEART RATE: 88 BPM | DIASTOLIC BLOOD PRESSURE: 72 MMHG | SYSTOLIC BLOOD PRESSURE: 115 MMHG | OXYGEN SATURATION: 97 % | TEMPERATURE: 98.1 F | RESPIRATION RATE: 17 BRPM

## 2020-01-08 PROCEDURE — 96361 HYDRATE IV INFUSION ADD-ON: CPT

## 2020-01-08 PROCEDURE — 2580000003 HC RX 258: Performed by: NURSE PRACTITIONER

## 2020-01-08 PROCEDURE — 6360000002 HC RX W HCPCS: Performed by: INTERNAL MEDICINE

## 2020-01-08 PROCEDURE — 96360 HYDRATION IV INFUSION INIT: CPT

## 2020-01-08 PROCEDURE — 2580000003 HC RX 258: Performed by: INTERNAL MEDICINE

## 2020-01-08 RX ORDER — 0.9 % SODIUM CHLORIDE 0.9 %
1000 INTRAVENOUS SOLUTION INTRAVENOUS ONCE
Status: COMPLETED | OUTPATIENT
Start: 2020-01-08 | End: 2020-01-08

## 2020-01-08 RX ORDER — HEPARIN SODIUM (PORCINE) LOCK FLUSH IV SOLN 100 UNIT/ML 100 UNIT/ML
300 SOLUTION INTRAVENOUS PRN
Status: DISCONTINUED | OUTPATIENT
Start: 2020-01-08 | End: 2020-01-10 | Stop reason: HOSPADM

## 2020-01-08 RX ORDER — SODIUM CHLORIDE 0.9 % (FLUSH) 0.9 %
20 SYRINGE (ML) INJECTION PRN
Status: DISCONTINUED | OUTPATIENT
Start: 2020-01-08 | End: 2020-01-10 | Stop reason: HOSPADM

## 2020-01-08 RX ADMIN — SODIUM CHLORIDE 1000 ML: 9 INJECTION, SOLUTION INTRAVENOUS at 14:12

## 2020-01-08 RX ADMIN — SODIUM CHLORIDE, PRESERVATIVE FREE 20 ML: 5 INJECTION INTRAVENOUS at 16:05

## 2020-01-08 RX ADMIN — HEPARIN 300 UNITS: 100 SYRINGE at 16:05

## 2020-01-09 ENCOUNTER — TELEPHONE (OUTPATIENT)
Dept: INFUSION THERAPY | Age: 70
End: 2020-01-09

## 2020-01-09 ENCOUNTER — HOSPITAL ENCOUNTER (OUTPATIENT)
Dept: INFUSION THERAPY | Age: 70
Setting detail: INFUSION SERIES
Discharge: HOME | End: 2020-01-09
Payer: MEDICARE

## 2020-01-09 VITALS
SYSTOLIC BLOOD PRESSURE: 105 MMHG | RESPIRATION RATE: 18 BRPM | DIASTOLIC BLOOD PRESSURE: 61 MMHG | OXYGEN SATURATION: 96 % | HEART RATE: 68 BPM | TEMPERATURE: 97 F

## 2020-01-09 PROCEDURE — 96523 IRRIG DRUG DELIVERY DEVICE: CPT

## 2020-01-09 PROCEDURE — 2580000003 HC RX 258: Performed by: INTERNAL MEDICINE

## 2020-01-09 PROCEDURE — 2580000003 HC RX 258: Performed by: NURSE PRACTITIONER

## 2020-01-09 PROCEDURE — 6360000002 HC RX W HCPCS: Performed by: INTERNAL MEDICINE

## 2020-01-09 RX ORDER — 0.9 % SODIUM CHLORIDE 0.9 %
500 INTRAVENOUS SOLUTION INTRAVENOUS ONCE
Status: DISCONTINUED | OUTPATIENT
Start: 2020-01-09 | End: 2020-01-09

## 2020-01-09 RX ORDER — 0.9 % SODIUM CHLORIDE 0.9 %
1000 INTRAVENOUS SOLUTION INTRAVENOUS ONCE
Status: COMPLETED | OUTPATIENT
Start: 2020-01-09 | End: 2020-01-09

## 2020-01-09 RX ORDER — SODIUM CHLORIDE 0.9 % (FLUSH) 0.9 %
20 SYRINGE (ML) INJECTION PRN
Status: DISCONTINUED | OUTPATIENT
Start: 2020-01-09 | End: 2020-01-11 | Stop reason: HOSPADM

## 2020-01-09 RX ADMIN — HEPARIN 300 UNITS: 100 SYRINGE at 10:27

## 2020-01-09 RX ADMIN — SODIUM CHLORIDE, PRESERVATIVE FREE 20 ML: 5 INJECTION INTRAVENOUS at 10:27

## 2020-01-09 RX ADMIN — SODIUM CHLORIDE 1000 ML: 9 INJECTION, SOLUTION INTRAVENOUS at 09:15

## 2020-01-10 ENCOUNTER — HOSPITAL ENCOUNTER (OUTPATIENT)
Dept: INFUSION THERAPY | Age: 70
Discharge: HOME | End: 2020-01-10
Payer: MEDICARE

## 2020-01-10 DIAGNOSIS — C67.1 MALIGNANT NEOPLASM OF DOME OF URINARY BLADDER (HCC): ICD-10-CM

## 2020-01-10 PROCEDURE — 85025 COMPLETE CBC W/AUTO DIFF WBC: CPT

## 2020-01-10 PROCEDURE — 36415 COLL VENOUS BLD VENIPUNCTURE: CPT

## 2020-01-10 PROCEDURE — 96367 TX/PROPH/DG ADDL SEQ IV INF: CPT

## 2020-01-10 PROCEDURE — 96365 THER/PROPH/DIAG IV INF INIT: CPT

## 2020-01-10 PROCEDURE — 6360000002 HC RX W HCPCS: Performed by: INTERNAL MEDICINE

## 2020-01-10 PROCEDURE — 96360 HYDRATION IV INFUSION INIT: CPT

## 2020-01-10 PROCEDURE — 2580000003 HC RX 258: Performed by: INTERNAL MEDICINE

## 2020-01-10 PROCEDURE — 80053 COMPREHEN METABOLIC PANEL: CPT

## 2020-01-10 PROCEDURE — 96361 HYDRATE IV INFUSION ADD-ON: CPT

## 2020-01-10 RX ORDER — SODIUM CHLORIDE 9 MG/ML
INJECTION, SOLUTION INTRAVENOUS CONTINUOUS
Status: DISCONTINUED | OUTPATIENT
Start: 2020-01-10 | End: 2020-01-12 | Stop reason: HOSPADM

## 2020-01-10 RX ADMIN — ONDANSETRON 16 MG: 2 INJECTION INTRAMUSCULAR; INTRAVENOUS at 11:03

## 2020-01-10 RX ADMIN — SODIUM CHLORIDE: 9 INJECTION, SOLUTION INTRAVENOUS at 10:56

## 2020-01-13 ENCOUNTER — HOSPITAL ENCOUNTER (OUTPATIENT)
Dept: CT IMAGING | Age: 70
Discharge: HOME | End: 2020-01-13
Payer: MEDICARE

## 2020-01-13 LAB
GFR NON-AFRICAN AMERICAN: 40
PERFORMED ON: ABNORMAL
POC CREATININE: 1.7 MG/DL (ref 0.3–1.3)
POC SAMPLE TYPE: ABNORMAL

## 2020-01-13 PROCEDURE — 6360000004 HC RX CONTRAST MEDICATION: Performed by: NURSE PRACTITIONER

## 2020-01-13 PROCEDURE — 71260 CT THORAX DX C+: CPT

## 2020-01-13 PROCEDURE — 82565 ASSAY OF CREATININE: CPT

## 2020-01-13 PROCEDURE — 74177 CT ABD & PELVIS W/CONTRAST: CPT

## 2020-01-13 RX ADMIN — IOPAMIDOL 60 ML: 755 INJECTION, SOLUTION INTRAVENOUS at 13:56

## 2020-01-15 LAB
ANION GAP SERPL CALCULATED.3IONS-SCNC: 18 MMOL/L (ref 7–19)
BUN BLDV-MCNC: 9 MG/DL (ref 8–23)
CALCIUM SERPL-MCNC: 9.4 MG/DL (ref 8.8–10.2)
CHLORIDE BLD-SCNC: 88 MMOL/L (ref 98–111)
CO2: 18 MMOL/L (ref 22–29)
CREAT SERPL-MCNC: 1.5 MG/DL (ref 0.5–1.2)
GFR NON-AFRICAN AMERICAN: 47
GLUCOSE BLD-MCNC: 95 MG/DL (ref 74–109)
POTASSIUM SERPL-SCNC: 4.1 MMOL/L (ref 3.5–5)
SODIUM BLD-SCNC: 124 MMOL/L (ref 136–145)

## 2020-01-17 ENCOUNTER — OFFICE VISIT (OUTPATIENT)
Dept: HEMATOLOGY | Age: 70
End: 2020-01-17
Payer: MEDICARE

## 2020-01-17 ENCOUNTER — HOSPITAL ENCOUNTER (OUTPATIENT)
Dept: INFUSION THERAPY | Age: 70
Discharge: HOME | End: 2020-01-17
Payer: MEDICARE

## 2020-01-17 VITALS
SYSTOLIC BLOOD PRESSURE: 110 MMHG | HEART RATE: 91 BPM | DIASTOLIC BLOOD PRESSURE: 70 MMHG | OXYGEN SATURATION: 97 % | HEIGHT: 65 IN | BODY MASS INDEX: 29.52 KG/M2 | WEIGHT: 177.2 LBS

## 2020-01-17 DIAGNOSIS — C67.1 MALIGNANT NEOPLASM OF DOME OF URINARY BLADDER (HCC): ICD-10-CM

## 2020-01-17 PROCEDURE — 3017F COLORECTAL CA SCREEN DOC REV: CPT | Performed by: NURSE PRACTITIONER

## 2020-01-17 PROCEDURE — G8427 DOCREV CUR MEDS BY ELIG CLIN: HCPCS | Performed by: NURSE PRACTITIONER

## 2020-01-17 PROCEDURE — G8417 CALC BMI ABV UP PARAM F/U: HCPCS | Performed by: NURSE PRACTITIONER

## 2020-01-17 PROCEDURE — 99214 OFFICE O/P EST MOD 30 MIN: CPT | Performed by: NURSE PRACTITIONER

## 2020-01-17 PROCEDURE — 4040F PNEUMOC VAC/ADMIN/RCVD: CPT | Performed by: NURSE PRACTITIONER

## 2020-01-17 PROCEDURE — 1123F ACP DISCUSS/DSCN MKR DOCD: CPT | Performed by: NURSE PRACTITIONER

## 2020-01-17 PROCEDURE — 85025 COMPLETE CBC W/AUTO DIFF WBC: CPT

## 2020-01-17 PROCEDURE — 1036F TOBACCO NON-USER: CPT | Performed by: NURSE PRACTITIONER

## 2020-01-17 PROCEDURE — G8484 FLU IMMUNIZE NO ADMIN: HCPCS | Performed by: NURSE PRACTITIONER

## 2020-01-17 PROCEDURE — 99211 OFF/OP EST MAY X REQ PHY/QHP: CPT
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
## 2020-01-19 ENCOUNTER — CLINICAL DOCUMENTATION (OUTPATIENT)
Dept: HEMATOLOGY | Age: 70
End: 2020-01-19

## 2020-01-20 NOTE — PROGRESS NOTES
to review:  Treatment regimen change to maintenance 5-FU, leucovorin and Avastin    Clinical staff:  Discontinue modified FOLFOX 7 regimen, (completed 7 of 8 cycles.)  Please build maintenance regimen: 5-FU, leucovorin and Avastin first cycle planned for 1/27/2020.      Thanks AB

## 2020-01-27 ENCOUNTER — HOSPITAL ENCOUNTER (OUTPATIENT)
Dept: INFUSION THERAPY | Age: 70
Discharge: HOME | DRG: 389 | End: 2020-01-27
Payer: MEDICARE

## 2020-01-27 ENCOUNTER — CLINICAL DOCUMENTATION (OUTPATIENT)
Dept: HEMATOLOGY | Age: 70
End: 2020-01-27

## 2020-01-27 ENCOUNTER — OFFICE VISIT (OUTPATIENT)
Dept: HEMATOLOGY | Age: 70
End: 2020-01-27
Payer: MEDICARE

## 2020-01-27 VITALS
HEART RATE: 67 BPM | OXYGEN SATURATION: 95 % | DIASTOLIC BLOOD PRESSURE: 68 MMHG | BODY MASS INDEX: 29.76 KG/M2 | HEIGHT: 65 IN | WEIGHT: 178.6 LBS | SYSTOLIC BLOOD PRESSURE: 108 MMHG

## 2020-01-27 DIAGNOSIS — R30.0 BURNING WITH URINATION: ICD-10-CM

## 2020-01-27 DIAGNOSIS — C67.1 MALIGNANT NEOPLASM OF DOME OF URINARY BLADDER (HCC): ICD-10-CM

## 2020-01-27 PROBLEM — R11.0 CHEMOTHERAPY-INDUCED NAUSEA: Status: ACTIVE | Noted: 2020-01-27

## 2020-01-27 PROBLEM — T45.1X5A CHEMOTHERAPY-INDUCED NAUSEA: Status: ACTIVE | Noted: 2020-01-27

## 2020-01-27 PROBLEM — T45.1X5A CHEMOTHERAPY-INDUCED PERIPHERAL NEUROPATHY (HCC): Status: ACTIVE | Noted: 2020-01-27

## 2020-01-27 PROBLEM — G62.0 CHEMOTHERAPY-INDUCED PERIPHERAL NEUROPATHY (HCC): Status: ACTIVE | Noted: 2020-01-27

## 2020-01-27 PROCEDURE — 3017F COLORECTAL CA SCREEN DOC REV: CPT | Performed by: NURSE PRACTITIONER

## 2020-01-27 PROCEDURE — 1036F TOBACCO NON-USER: CPT | Performed by: NURSE PRACTITIONER

## 2020-01-27 PROCEDURE — 99212 OFFICE O/P EST SF 10 MIN: CPT

## 2020-01-27 PROCEDURE — 99214 OFFICE O/P EST MOD 30 MIN: CPT | Performed by: NURSE PRACTITIONER

## 2020-01-27 PROCEDURE — G8484 FLU IMMUNIZE NO ADMIN: HCPCS | Performed by: NURSE PRACTITIONER

## 2020-01-27 PROCEDURE — 4040F PNEUMOC VAC/ADMIN/RCVD: CPT | Performed by: NURSE PRACTITIONER

## 2020-01-27 PROCEDURE — G8417 CALC BMI ABV UP PARAM F/U: HCPCS | Performed by: NURSE PRACTITIONER

## 2020-01-27 PROCEDURE — 85025 COMPLETE CBC W/AUTO DIFF WBC: CPT

## 2020-01-27 PROCEDURE — G8427 DOCREV CUR MEDS BY ELIG CLIN: HCPCS | Performed by: NURSE PRACTITIONER

## 2020-01-27 PROCEDURE — 1123F ACP DISCUSS/DSCN MKR DOCD: CPT | Performed by: NURSE PRACTITIONER

## 2020-01-27 RX ORDER — EPINEPHRINE 1 MG/ML
0.3 INJECTION, SOLUTION, CONCENTRATE INTRAVENOUS PRN
Status: CANCELLED | OUTPATIENT
Start: 2020-01-28

## 2020-01-27 RX ORDER — SODIUM CHLORIDE 0.9 % (FLUSH) 0.9 %
5 SYRINGE (ML) INJECTION PRN
Status: CANCELLED | OUTPATIENT
Start: 2020-01-28

## 2020-01-27 RX ORDER — METHYLPREDNISOLONE SODIUM SUCCINATE 125 MG/2ML
125 INJECTION, POWDER, LYOPHILIZED, FOR SOLUTION INTRAMUSCULAR; INTRAVENOUS ONCE
Status: CANCELLED | OUTPATIENT
Start: 2020-01-28

## 2020-01-27 RX ORDER — FLUOROURACIL 50 MG/ML
400 INJECTION, SOLUTION INTRAVENOUS ONCE
Status: CANCELLED | OUTPATIENT
Start: 2020-01-28

## 2020-01-27 RX ORDER — HEPARIN SODIUM (PORCINE) LOCK FLUSH IV SOLN 100 UNIT/ML 100 UNIT/ML
500 SOLUTION INTRAVENOUS PRN
Status: CANCELLED | OUTPATIENT
Start: 2020-01-28

## 2020-01-27 RX ORDER — SODIUM CHLORIDE 0.9 % (FLUSH) 0.9 %
10 SYRINGE (ML) INJECTION PRN
Status: CANCELLED | OUTPATIENT
Start: 2020-01-28

## 2020-01-27 RX ORDER — DIPHENHYDRAMINE HYDROCHLORIDE 50 MG/ML
50 INJECTION INTRAMUSCULAR; INTRAVENOUS ONCE
Status: CANCELLED | OUTPATIENT
Start: 2020-01-28

## 2020-01-27 RX ORDER — SODIUM CHLORIDE 9 MG/ML
INJECTION, SOLUTION INTRAVENOUS ONCE
Status: CANCELLED | OUTPATIENT
Start: 2020-01-28

## 2020-01-27 RX ORDER — SODIUM CHLORIDE 9 MG/ML
INJECTION, SOLUTION INTRAVENOUS CONTINUOUS
Status: CANCELLED | OUTPATIENT
Start: 2020-01-28

## 2020-01-27 NOTE — PROGRESS NOTES
Discontinue Avastin for maintenance regimen due to every 3 month cystoscopies and stent replacements.     Thanks AB

## 2020-01-27 NOTE — PROGRESS NOTES
Progress Note      Pt Name: Jasmina Wolff  YOB: 1950  MRN: [id number]    Date of evaluation: 1/27/2020  History Obtained From:  patient, electronic medical record    CHIEF COMPLAINT:    Chief Complaint   Patient presents with    Colon Cancer     Metastasis from colon cancer     Treatment    Follow-up    Fatigue    Other     Burning with urination     HISTORY OF PRESENT ILLNESS:    Jasmina Wolff is a 81 y.o.  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 Duong Ceballos completed 7 cycles of palliative chemotherapy with modified FOLFOX 7  (Oxaliplatin 85 mg/m² IV day 1, leucovorin 400 mg/m² IV day 1 and 5-FU 2400 mg/m² IV continuous infusion over 46 to 48 hours) as of 12/26/2019. Avastin was initially anticipated although due to multiple/frequency of invasive interventions and increased risk for hemorrhage with Avastin and proteinuria, Avastin was not given. Restaging CT scans on 1/13/2020 indicated a positive response to treatment with no new findings.  recommended moving forward with palliative maintenance therapy with 5-FU and leucovorin every 2 weeks. Duong Ceballos returns today in follow-up for evaluation of overall condition and consideration to initiate palliative maintenance treatment. He is accompanied by his daughter today. Duong Ceballos presents today indicating that he is feeling much better although he has a new complaint of burning with urination and a sense of urgency. He denies reporting the symptoms to Dr. Mayur Galeana or Dr. Tavo De León. I will obtain a urinalysis with sensitivity and culture today. He also has complaints of increased belching, denies any symptoms of acid reflux, is presently taking Prevacid at bedtime, recommend to take twice a day and see if symptoms improve.   He reports he will follow-up with Dr. Kevin Walter if symptoms continue. He denies any febrile illness or other infectious sequelae. At the last appointment Ramo Oreilly had been experiencing severe stomatitis, poor appetite with weight loss, increased cold sensitivity and severe fatigue. He reports that all of the symptoms have significantly improved although he continues to have fatigue and peripheral neuropathy grade 1 which is secondary to the Oxaliplatin. BMP on 1/15/2020 documented significant improvement creatinine of 1.5 although now hyponatremia with a sodium of 124. Will repeat CMP in the morning with treatment. Ramo Oreilly will go ahead and move forward with maintenance therapy, anticipate cycle 1 to be delivered tomorrow. Reviewed potential side effects to include but not limited to diarrhea, nausea, vomiting, fatigue, and poor appetite. Ramo Oreilly had a cystoscopy on 12/3/2019 with removal and replacement of double-J ureteral stent. Pathology from dome of the bladder/tumor revealed high-grade papillary urethral carcinoma, noninvasive. No muscularis propria present. ONCOLOGIC HISTORY:  1. Moderately differentiated rectal carcinoma, T3N0Mx, diagnosed in 3/9/2009  2. Noninvasive high-grade papillary urethral carcinoma. Negative for evidence of detrusor muscle invasion, pTa, pNx on 8/18/2019   3. Metastatic colorectal carcinoma, 9/3/2019  · MSI stable and mutations for BRAF, NRAS, KRAS were not detected. TREATMENT SUMMARIES:  · 4/9/2009-5/27/2009-received neoadjuvant chemotherapy with 5-FU CIV along with radiation therapy for a total of 5400 cG  · 7/15/2009-rectum resection revealed no residual malignancy, complete pathological response.   · 8/18/2019- transurethral resection of bladder tumor (TURBT)   · 9/18/2019-12/26/2019 palliative chemotherapy with modified FOLFOX 7  (Oxaliplatin 85 mg/m² IV day 1, leucovorin 400 mg/m² IV day 1 and 5-FU 2400 mg/m² IV continuous infusion over 46 to 48 hours for a total of 7 cycles. · 1/28/2020 -palliative maintenance therapy with leucovorin 400 mg/m² IV over 2 hours on day 1, followed by 5-FU bolus 400 mg/m² and then 1200 mg/m²/day x2 days (total 2400 mg meter squared over 46 to 48 hours) continuous infusion. Repeat every 2 weeks. ONCOLOGIC HISTORY #3  Fuentes Lau was seen in initial oncology consultation on 8/19/2019 during his hospitalization at [facility] after a large pelvic mass was identified which raised concern for recurrent disease.     · 8/17/2019- CEA 18.1  · 8/17/2019- CT scan of the kidney with contrast documented moderate to severe right hydronephrosis with dilation of the right ureter into the lower pelvis the site of the parasacral soft tissue changes. Partially calcified soft tissue changes within the janes-sacral region likely representing sequelae of pelvic radiation. Increasing scarring/fibrosis versus tumor recurrence within the presacral changes, likely represents a site of right distal ureter obstruction. No left-sided hydronephrosis. · 8/18/2019 -Double-J ureter stent placement for right hydronephrosis secondary to extrinsic compression by pelvic mass. · 8/27/2019-CT scan of the chest with contrast documented numerous pulmonary nodules that appear new compared to 11/12/2017, RUL nodule measuring 7 mm and LLL nodule measuring 5 mm. Soft tissue nodule at the left ventral abdominal wall. Slight increased size of a probable lymph node anterior to the aorta measuring 0.9 cm compared to 0.7 cm. Similar presacral, right pelvic sidewall and right abductor muscular nodular soft tissue density. · 8/27/2019 CT scan of the abdomen and pelvis with contrast identified new moderate left hydronephrosis with moderate right hydronephrosis. Mild stranding around the urinary bladder and thickening of the bilateral ureteral wall. Numerous pulmonary nodules. Soft tissue of the left ventral abdominal wall.   Slightly increased size of probable lymph node measures 3.4 mm compared to 5.9 mm, VIC nodule measures 4 mm compared to 6 mm. A cluster of small nodules in the RUL anteriorly are barely visible on this study. There is a decrease in size of mediastinal lymph nodes compared to previous exam, right distal paratracheal lymph node measuring 4.5 mm compared to 8.3 mm and lower right peritracheal node measuring 4.5 mm compared to 8.6 mm. · 1/13/2020- CT scan of the abdomen and pelvis with contrast indicated improvement in the right-sided hydronephrosis with a chronic inflammatory process of the ureters suspected due to the moderate thickening, also present on previous study. The small poorly enhancing nodules in the right abductor muscles have decreased in the partially calcified presacral mass and right lateral pelvic wall nodules are stable compared to previous study. Resolution of the subcutaneous abdominal wall nodules. A prominent retroperitoneal lymph node adjacent and anterior to the left common artery is redefined and measures 6 mm, no change from previous exam.   · 1/13/2020 - CT scan of the chest documented a right lower lobe nodule measures 4.3 cm and is unchanged. A right lower lobe nodule measures 2.8 mm compared to 3.4 mm. Nodule in the right upper lobe is barely visible and measures 2.4 mm. Nodule in the left lower lobe measures 4.8 mm and is unchanged. Nodule in left lower lobe posterior measures 2.8 mm and previously measured 4.7 mm. A right lower lobe posterior medially nodule is barely visible measuring 0.2 mm and previously measured 4.5 mm. No new nodules identified. No change in the size of the mediastinal lymph nodes.       HEMATOLOGY HISTORY #1  Anita Contreras has a significant history of chronic normocytic anemia with iron deficiency dating back 2/2009.       CBC on 8/15/2019 documented a hemoglobin of 11.6 with an MCV of 85.3  CBC on 8/20/2019 documented a hemoglobin of 10 with an MCV of 87.7     Serology studies on 8/20/2019;  · LDH 158  · Vitamin B12 737  · Iron 76  · TIBC 261  · Iron saturation 29%  · Absolute reticulocyte count 0.0509  · Folate 15.7  · Ferritin 82.6     ONCOLOGIC HISTORY #1:  nAisha Sykes has a history of moderately differentiated rectal carcinoma, T3N0Mx, diagnosed in 3/9/2009. He received neoadjuvant chemotherapy with radiation and resection with J-pouch. He has been routinely followed at [facility] and was last seen by Dr. Romeo Ortiz on 1/10/2019. The following are pertinent findings related to his diagnosis and treatment. · 3/9/2009- Esophagogastroduodenoscopy with biopsy by Dr. Jazmyne Lane that revealed rectal mass at 8 cm with pathology being consistent with moderately differentiated adenocarcinoma. · 3/18/2019-Dr.Elizabeth Leopold Soles at [facility] performed a flexible sigmoidoscopy and rectal endoscopic ultrasound that revealed the tumor extending 6-7 mm deep through the muscularis propria layer and into the serosa, T3 lesion. · 4/9/2009-5/27/2009-received neoadjuvant chemotherapy with 5-FU CIV along with radiation therapy for a total of 5400 cGy under the direction of Dr. Moe Rueda. · 7/15/2009-rectum resection revealed no residual malignancy. 22 regional nodes were negative for malignancy. The rectum distal doughnut was negative for malignancy. He was documented to have a complete pathological response. · 9/9/2009- Ileostomy excision pathology revealed small bowel wall with changes consistent with ileostomy site. Pathology negative for malignancy     ONCOLOGIC HISTORY #2   Anisha Sykes was diagnosed with noninvasive urethral carcinoma, pTa, pNx on 8/18/2019. Ta tumors are papillary lesions that tend to recur but are relatively benign and generally do not invade the bladder. Adjuvant treatment is not warranted at this time and will be monitored closely.   Biopsy and transurethral resection of bladder tumor (TURBT) on 8/18/2019 by Dr. Kael Chris with pathology revealing noninvasive high-grade REPLACEMENT VENOUS ACCESS CATHETER Right 8/20/2019    INSERTION OF RIGHT INTERNAL JUGULAR SINGLE LUMEN POWER PORT performed by Alli Diego DO at [address]      times 2... all levels    TUNNELED VENOUS PORT PLACEMENT         Current Medications:    Current Outpatient Medications   Medication Sig Dispense Refill    fluconazole (DIFLUCAN) 100 MG tablet Take 2 pills on day 1 and then one pill daily for 14 days total 15 tablet 0    phenazopyridine (PYRIDIUM) 100 MG tablet Take 1 tablet by mouth 3 times daily as needed for Pain 15 tablet 1    ondansetron (ZOFRAN) 4 MG tablet Take 2 tablets by mouth every 8 hours as needed for Nausea or Vomiting 30 tablet 2    ZORVOLEX 35 MG CAPS Take 35 mg by mouth 3 times daily   0    metaxalone (SKELAXIN) 800 MG tablet Take 800 mg by mouth 3 times daily   1    atorvastatin (LIPITOR) 40 MG tablet TAKE 1 TABLET BY MOUTH EVERY NIGHT (Patient taking differently: Take 40 mg by mouth nightly ) 30 tablet 0    oxybutynin (DITROPAN XL) 10 MG extended release tablet Take 1 tablet by mouth daily As needed for bladder spasms 30 tablet 2    omeprazole (PRILOSEC) 20 MG delayed release capsule Take 20 mg by mouth daily      tamsulosin (FLOMAX) 0.4 MG capsule Take 1 capsule by mouth daily 30 capsule 11    ALPRAZolam (XANAX) 0.25 MG tablet Take 0.25 mg by mouth nightly as needed for Sleep.  nitroGLYCERIN (NITROSTAT) 0.4 MG SL tablet up to max of 3 total doses. If no relief after 1 dose, call 911. 25 tablet 5    nortriptyline (PAMELOR) 25 MG capsule Take 25 mg by mouth daily      bisoprolol (ZEBETA) 5 MG tablet Take 5 mg by mouth daily      methadone (DOLOPHINE) 10 MG tablet Take 10 mg by mouth every 6 hours as needed for Pain. q 5 hours      gabapentin (NEURONTIN) 800 MG tablet Take 800 mg by mouth 4 times daily. No current facility-administered medications for this visit. Allergies:    Allergies   Allergen Reactions    Morphine Anxiety Social History:    Social History     Tobacco Use    Smoking status: Former Smoker    Smokeless tobacco: Never Used   Substance Use Topics    Alcohol use: No    Drug use: No       Family History:   Family History   Problem Relation Age of Onset    High Blood Pressure Mother     High Blood Pressure Father     Colon Cancer Father     Diabetes Father        Vitals:  Vitals:    01/27/20 1334   BP: 108/68   Pulse: 67   SpO2: 95%   Weight: 178 lb 9.6 oz (81 kg)   Height: 5' 5\" (1.651 m)        Subjective   REVIEW OF SYSTEMS:   Review of Systems   Constitutional: Fatigue grade 1. Reports cold sensitivity has resolved. HENT: Negative. Negative for congestion, ear pain, hearing loss, nosebleeds, sore throat and tinnitus. Eyes: Negative. Negative for pain, discharge and redness. Respiratory: Negative. Chronic shortness of air with exertion. Cardiovascular:   Denies chest pain or palpitations. Chronic bilateral lower extremity edema, stable with right being greater than left. Gastrointestinal: Negative for abdominal pain, blood in stool, or constipation. Appetite improved and occasional nausea overall controlled with Zofran. Endocrine: Negative for polydipsia. Genitourinary: Complaints of burning with urination and urgency  Musculoskeletal: Chronic back and hip pain. Chronic bilateral lower extremity weakness. Skin: Negative. Negative for rash. Neurological: Generalized weakness  Hematological: Does not bruise/bleed easily. Psychiatric/Behavioral: Negative. The patient is not nervous/anxious. Objective   PHYSICAL EXAM:  Physical Exam   Constitutional: He is oriented to person, place, and time. No distress. ENT:   Head: Normocephalic and atraumatic. Mouth/Throat: Uvula is midline, no oral lesions noted  Eyes: Pupils are equal, round, and reactive to light. Conjunctivae, EOM and lids are normal. Right eye exhibits no discharge. Left eye exhibits no discharge.  No scleral time of 25 minutes in face to face encounter with the patient, out of which more than 50% of the time was spent in counseling patient  and coordination of care. Counseling included but was not limited to time spent reviewing labs, imaging studies/ treatment plan and answering questions.         Electronically signed by OLGA Avila on 1/27/2020 at 6:56 PM

## 2020-01-27 NOTE — PROGRESS NOTES
Clinical staff:  Please build the maintenance regimen of 5-FU/leucovorin:    Leucovorin 400 mg/m² IV over 2 hours on day 1, followed by 5-FU bolus 400 mg/m² and then 1200 mg/m²/day x2 days (total 2400 mg meter squared over 46 to 48 hours) continuous infusion. Repeat every 2 weeks.     Thanks AB

## 2020-01-28 ENCOUNTER — HOSPITAL ENCOUNTER (OUTPATIENT)
Dept: INFUSION THERAPY | Age: 70
Setting detail: INFUSION SERIES
Discharge: HOME | DRG: 389 | End: 2020-01-28
Payer: MEDICARE

## 2020-01-28 VITALS
SYSTOLIC BLOOD PRESSURE: 109 MMHG | DIASTOLIC BLOOD PRESSURE: 59 MMHG | RESPIRATION RATE: 17 BRPM | TEMPERATURE: 98 F | HEART RATE: 63 BPM

## 2020-01-28 DIAGNOSIS — C19 COLORECTAL CANCER (HCC): Primary | ICD-10-CM

## 2020-01-28 LAB
ALBUMIN SERPL-MCNC: 3.8 G/DL (ref 3.5–5.2)
ALP BLD-CCNC: 147 U/L (ref 40–130)
ALT SERPL-CCNC: 10 U/L (ref 5–41)
ANION GAP SERPL CALCULATED.3IONS-SCNC: 15 MMOL/L (ref 7–19)
AST SERPL-CCNC: 23 U/L (ref 5–40)
BILIRUB SERPL-MCNC: 0.4 MG/DL (ref 0.2–1.2)
BUN BLDV-MCNC: 9 MG/DL (ref 8–23)
CALCIUM SERPL-MCNC: 8.8 MG/DL (ref 8.8–10.2)
CHLORIDE BLD-SCNC: 96 MMOL/L (ref 98–111)
CO2: 21 MMOL/L (ref 22–29)
CREAT SERPL-MCNC: 1.4 MG/DL (ref 0.5–1.2)
GFR NON-AFRICAN AMERICAN: 50
GLUCOSE BLD-MCNC: 104 MG/DL (ref 74–109)
POTASSIUM SERPL-SCNC: 4 MMOL/L (ref 3.5–5)
SODIUM BLD-SCNC: 132 MMOL/L (ref 136–145)
TOTAL PROTEIN: 7 G/DL (ref 6.6–8.7)

## 2020-01-28 PROCEDURE — 96409 CHEMO IV PUSH SNGL DRUG: CPT

## 2020-01-28 PROCEDURE — 6360000002 HC RX W HCPCS: Performed by: NURSE PRACTITIONER

## 2020-01-28 PROCEDURE — 2580000003 HC RX 258: Performed by: NURSE PRACTITIONER

## 2020-01-28 PROCEDURE — 80053 COMPREHEN METABOLIC PANEL: CPT

## 2020-01-28 PROCEDURE — G0498 CHEMO EXTEND IV INFUS W/PUMP: HCPCS

## 2020-01-28 PROCEDURE — 96366 THER/PROPH/DIAG IV INF ADDON: CPT

## 2020-01-28 RX ORDER — SODIUM CHLORIDE 0.9 % (FLUSH) 0.9 %
10 SYRINGE (ML) INJECTION PRN
Status: DISCONTINUED | OUTPATIENT
Start: 2020-01-28 | End: 2020-01-29 | Stop reason: HOSPADM

## 2020-01-28 RX ORDER — HEPARIN SODIUM (PORCINE) LOCK FLUSH IV SOLN 100 UNIT/ML 100 UNIT/ML
500 SOLUTION INTRAVENOUS PRN
Status: DISCONTINUED | OUTPATIENT
Start: 2020-01-28 | End: 2020-01-29 | Stop reason: HOSPADM

## 2020-01-28 RX ORDER — EPINEPHRINE 1 MG/ML
0.3 INJECTION, SOLUTION, CONCENTRATE INTRAVENOUS PRN
Status: DISCONTINUED | OUTPATIENT
Start: 2020-01-28 | End: 2020-01-30 | Stop reason: HOSPADM

## 2020-01-28 RX ORDER — SODIUM CHLORIDE 9 MG/ML
INJECTION, SOLUTION INTRAVENOUS ONCE
Status: DISCONTINUED | OUTPATIENT
Start: 2020-01-28 | End: 2020-01-30 | Stop reason: HOSPADM

## 2020-01-28 RX ORDER — DIPHENHYDRAMINE HYDROCHLORIDE 50 MG/ML
50 INJECTION INTRAMUSCULAR; INTRAVENOUS ONCE
Status: DISCONTINUED | OUTPATIENT
Start: 2020-01-28 | End: 2020-01-30 | Stop reason: HOSPADM

## 2020-01-28 RX ORDER — SODIUM CHLORIDE 9 MG/ML
INJECTION, SOLUTION INTRAVENOUS CONTINUOUS
Status: DISCONTINUED | OUTPATIENT
Start: 2020-01-28 | End: 2020-01-30 | Stop reason: HOSPADM

## 2020-01-28 RX ORDER — DEXAMETHASONE SODIUM PHOSPHATE 10 MG/ML
10 INJECTION, SOLUTION INTRAMUSCULAR; INTRAVENOUS ONCE
Status: COMPLETED | OUTPATIENT
Start: 2020-01-28 | End: 2020-01-28

## 2020-01-28 RX ORDER — FLUOROURACIL 50 MG/ML
400 INJECTION, SOLUTION INTRAVENOUS ONCE
Status: COMPLETED | OUTPATIENT
Start: 2020-01-28 | End: 2020-01-28

## 2020-01-28 RX ORDER — SODIUM CHLORIDE 0.9 % (FLUSH) 0.9 %
5 SYRINGE (ML) INJECTION PRN
Status: DISCONTINUED | OUTPATIENT
Start: 2020-01-28 | End: 2020-01-29 | Stop reason: HOSPADM

## 2020-01-28 RX ORDER — AMOXICILLIN 875 MG/1
875 TABLET, COATED ORAL 2 TIMES DAILY
Qty: 20 TABLET | Refills: 0 | Status: SHIPPED | OUTPATIENT
Start: 2020-01-28 | End: 2020-02-07

## 2020-01-28 RX ORDER — METHYLPREDNISOLONE SODIUM SUCCINATE 125 MG/2ML
125 INJECTION, POWDER, LYOPHILIZED, FOR SOLUTION INTRAMUSCULAR; INTRAVENOUS ONCE
Status: DISCONTINUED | OUTPATIENT
Start: 2020-01-28 | End: 2020-01-30 | Stop reason: HOSPADM

## 2020-01-28 RX ADMIN — FLUOROURACIL 770 MG: 50 INJECTION, SOLUTION INTRAVENOUS at 11:37

## 2020-01-28 RX ADMIN — SODIUM CHLORIDE: 9 INJECTION, SOLUTION INTRAVENOUS at 08:40

## 2020-01-28 RX ADMIN — FLUOROURACIL 4630 MG: 50 INJECTION, SOLUTION INTRAVENOUS at 11:37

## 2020-01-28 RX ADMIN — LEUCOVORIN CALCIUM 750 MG: 350 INJECTION, POWDER, LYOPHILIZED, FOR SOLUTION INTRAMUSCULAR; INTRAVENOUS at 09:29

## 2020-01-28 RX ADMIN — ONDANSETRON 16 MG: 2 INJECTION INTRAMUSCULAR; INTRAVENOUS at 08:40

## 2020-01-28 RX ADMIN — DEXAMETHASONE SODIUM PHOSPHATE 10 MG: 10 INJECTION, SOLUTION INTRAMUSCULAR; INTRAVENOUS at 09:20

## 2020-01-30 ENCOUNTER — HOSPITAL ENCOUNTER (OUTPATIENT)
Dept: INFUSION THERAPY | Age: 70
Setting detail: INFUSION SERIES
Discharge: HOME | DRG: 389 | End: 2020-01-30
Payer: MEDICARE

## 2020-01-30 ENCOUNTER — HOSPITAL ENCOUNTER (OUTPATIENT)
Dept: GENERAL RADIOLOGY | Age: 70
Discharge: HOME | DRG: 389 | End: 2020-01-30
Payer: MEDICARE

## 2020-01-30 ENCOUNTER — APPOINTMENT (OUTPATIENT)
Dept: CT IMAGING | Age: 70
DRG: 389 | End: 2020-01-30
Payer: MEDICARE

## 2020-01-30 ENCOUNTER — HOSPITAL ENCOUNTER (INPATIENT)
Age: 70
LOS: 2 days | Discharge: HOME | DRG: 389 | End: 2020-02-01
Attending: EMERGENCY MEDICINE | Admitting: SURGERY
Payer: MEDICARE

## 2020-01-30 PROBLEM — K56.609 SBO (SMALL BOWEL OBSTRUCTION) (HCC): Status: ACTIVE | Noted: 2020-01-30

## 2020-01-30 LAB
ALBUMIN SERPL-MCNC: 3.6 G/DL (ref 3.5–5.2)
ALP BLD-CCNC: 117 U/L (ref 40–130)
ALT SERPL-CCNC: 12 U/L (ref 5–41)
ANION GAP SERPL CALCULATED.3IONS-SCNC: 16 MMOL/L (ref 7–19)
APTT: 25.4 SEC (ref 26–36.2)
AST SERPL-CCNC: 21 U/L (ref 5–40)
BASOPHILS ABSOLUTE: 0 K/UL (ref 0–0.2)
BASOPHILS RELATIVE PERCENT: 0.3 % (ref 0–1)
BILIRUB SERPL-MCNC: 0.3 MG/DL (ref 0.2–1.2)
BUN BLDV-MCNC: 18 MG/DL (ref 8–23)
CALCIUM SERPL-MCNC: 7.9 MG/DL (ref 8.8–10.2)
CHLORIDE BLD-SCNC: 98 MMOL/L (ref 98–111)
CO2: 18 MMOL/L (ref 22–29)
CREAT SERPL-MCNC: 1.3 MG/DL (ref 0.5–1.2)
EOSINOPHILS ABSOLUTE: 0 K/UL (ref 0–0.6)
EOSINOPHILS RELATIVE PERCENT: 0.1 % (ref 0–5)
GFR NON-AFRICAN AMERICAN: 55
GLUCOSE BLD-MCNC: 97 MG/DL (ref 74–109)
HCT VFR BLD CALC: 33.9 % (ref 42–52)
HEMOGLOBIN: 10.5 G/DL (ref 14–18)
IMMATURE GRANULOCYTES #: 0 K/UL
INR BLD: 1.1 (ref 0.88–1.18)
LIPASE: 17 U/L (ref 13–60)
LYMPHOCYTES ABSOLUTE: 0.6 K/UL (ref 1.1–4.5)
LYMPHOCYTES RELATIVE PERCENT: 7.1 % (ref 20–40)
MAGNESIUM: 1 MG/DL (ref 1.6–2.4)
MCH RBC QN AUTO: 28.5 PG (ref 27–31)
MCHC RBC AUTO-ENTMCNC: 31 G/DL (ref 33–37)
MCV RBC AUTO: 91.9 FL (ref 80–94)
MONOCYTES ABSOLUTE: 0.4 K/UL (ref 0–0.9)
MONOCYTES RELATIVE PERCENT: 4.9 % (ref 0–10)
NEUTROPHILS ABSOLUTE: 6.8 K/UL (ref 1.5–7.5)
NEUTROPHILS RELATIVE PERCENT: 87.2 % (ref 50–65)
PDW BLD-RTO: 15.7 % (ref 11.5–14.5)
PLATELET # BLD: 225 K/UL (ref 130–400)
PMV BLD AUTO: 11.1 FL (ref 9.4–12.4)
POTASSIUM REFLEX MAGNESIUM: 3.5 MMOL/L (ref 3.5–5)
PROTHROMBIN TIME: 13.6 SEC (ref 12–14.6)
RBC # BLD: 3.69 M/UL (ref 4.7–6.1)
SODIUM BLD-SCNC: 132 MMOL/L (ref 136–145)
TOTAL PROTEIN: 6.6 G/DL (ref 6.6–8.7)
WBC # BLD: 7.7 K/UL (ref 4.8–10.8)

## 2020-01-30 PROCEDURE — 2580000003 HC RX 258: Performed by: SURGERY

## 2020-01-30 PROCEDURE — 2580000003 HC RX 258: Performed by: INTERNAL MEDICINE

## 2020-01-30 PROCEDURE — 96375 TX/PRO/DX INJ NEW DRUG ADDON: CPT

## 2020-01-30 PROCEDURE — 99285 EMERGENCY DEPT VISIT HI MDM: CPT

## 2020-01-30 PROCEDURE — 85610 PROTHROMBIN TIME: CPT

## 2020-01-30 PROCEDURE — 6360000002 HC RX W HCPCS

## 2020-01-30 PROCEDURE — 74177 CT ABD & PELVIS W/CONTRAST: CPT

## 2020-01-30 PROCEDURE — 1210000000 HC MED SURG R&B

## 2020-01-30 PROCEDURE — 6360000002 HC RX W HCPCS: Performed by: EMERGENCY MEDICINE

## 2020-01-30 PROCEDURE — 36415 COLL VENOUS BLD VENIPUNCTURE: CPT

## 2020-01-30 PROCEDURE — 83690 ASSAY OF LIPASE: CPT

## 2020-01-30 PROCEDURE — 96523 IRRIG DRUG DELIVERY DEVICE: CPT

## 2020-01-30 PROCEDURE — 2580000003 HC RX 258: Performed by: EMERGENCY MEDICINE

## 2020-01-30 PROCEDURE — 83735 ASSAY OF MAGNESIUM: CPT

## 2020-01-30 PROCEDURE — 85025 COMPLETE CBC W/AUTO DIFF WBC: CPT

## 2020-01-30 PROCEDURE — 6360000004 HC RX CONTRAST MEDICATION: Performed by: EMERGENCY MEDICINE

## 2020-01-30 PROCEDURE — 74022 RADEX COMPL AQT ABD SERIES: CPT

## 2020-01-30 PROCEDURE — 6360000002 HC RX W HCPCS: Performed by: SURGERY

## 2020-01-30 PROCEDURE — 80053 COMPREHEN METABOLIC PANEL: CPT

## 2020-01-30 PROCEDURE — 85730 THROMBOPLASTIN TIME PARTIAL: CPT

## 2020-01-30 PROCEDURE — 96365 THER/PROPH/DIAG IV INF INIT: CPT

## 2020-01-30 RX ORDER — MAGNESIUM SULFATE IN WATER 40 MG/ML
2 INJECTION, SOLUTION INTRAVENOUS ONCE
Status: COMPLETED | OUTPATIENT
Start: 2020-01-30 | End: 2020-01-30

## 2020-01-30 RX ORDER — SODIUM CHLORIDE 0.9 % (FLUSH) 0.9 %
10 SYRINGE (ML) INJECTION PRN
Status: DISCONTINUED | OUTPATIENT
Start: 2020-01-30 | End: 2020-02-01 | Stop reason: HOSPADM

## 2020-01-30 RX ORDER — SODIUM CHLORIDE, SODIUM LACTATE, POTASSIUM CHLORIDE, CALCIUM CHLORIDE 600; 310; 30; 20 MG/100ML; MG/100ML; MG/100ML; MG/100ML
1000 INJECTION, SOLUTION INTRAVENOUS ONCE
Status: COMPLETED | OUTPATIENT
Start: 2020-01-30 | End: 2020-01-30

## 2020-01-30 RX ORDER — ONDANSETRON 2 MG/ML
4 INJECTION INTRAMUSCULAR; INTRAVENOUS EVERY 8 HOURS PRN
Status: DISCONTINUED | OUTPATIENT
Start: 2020-01-30 | End: 2020-02-01 | Stop reason: HOSPADM

## 2020-01-30 RX ORDER — SODIUM CHLORIDE 0.9 % (FLUSH) 0.9 %
10 SYRINGE (ML) INJECTION EVERY 12 HOURS SCHEDULED
Status: DISCONTINUED | OUTPATIENT
Start: 2020-01-30 | End: 2020-02-01 | Stop reason: HOSPADM

## 2020-01-30 RX ORDER — FENTANYL CITRATE 50 UG/ML
25 INJECTION, SOLUTION INTRAMUSCULAR; INTRAVENOUS
Status: DISCONTINUED | OUTPATIENT
Start: 2020-01-30 | End: 2020-02-01 | Stop reason: HOSPADM

## 2020-01-30 RX ORDER — ACETAMINOPHEN 325 MG/1
650 TABLET ORAL EVERY 4 HOURS PRN
Status: DISCONTINUED | OUTPATIENT
Start: 2020-01-30 | End: 2020-02-01 | Stop reason: HOSPADM

## 2020-01-30 RX ORDER — FENTANYL CITRATE 50 UG/ML
INJECTION, SOLUTION INTRAMUSCULAR; INTRAVENOUS
Status: COMPLETED
Start: 2020-01-30 | End: 2020-01-30

## 2020-01-30 RX ORDER — SODIUM CHLORIDE 0.9 % (FLUSH) 0.9 %
20 SYRINGE (ML) INJECTION PRN
Status: DISCONTINUED | OUTPATIENT
Start: 2020-01-30 | End: 2020-01-31 | Stop reason: HOSPADM

## 2020-01-30 RX ORDER — HEPARIN SODIUM (PORCINE) LOCK FLUSH IV SOLN 100 UNIT/ML 100 UNIT/ML
300 SOLUTION INTRAVENOUS PRN
Status: DISCONTINUED | OUTPATIENT
Start: 2020-01-30 | End: 2020-01-31 | Stop reason: HOSPADM

## 2020-01-30 RX ORDER — SODIUM CHLORIDE 9 MG/ML
INJECTION, SOLUTION INTRAVENOUS CONTINUOUS
Status: DISCONTINUED | OUTPATIENT
Start: 2020-01-30 | End: 2020-02-01 | Stop reason: HOSPADM

## 2020-01-30 RX ADMIN — SODIUM CHLORIDE, POTASSIUM CHLORIDE, SODIUM LACTATE AND CALCIUM CHLORIDE 1000 ML: 600; 310; 30; 20 INJECTION, SOLUTION INTRAVENOUS at 14:55

## 2020-01-30 RX ADMIN — FENTANYL CITRATE 25 MCG: 50 INJECTION INTRAMUSCULAR; INTRAVENOUS at 14:54

## 2020-01-30 RX ADMIN — ENOXAPARIN SODIUM 40 MG: 40 INJECTION, SOLUTION INTRAVENOUS; SUBCUTANEOUS at 21:50

## 2020-01-30 RX ADMIN — SODIUM CHLORIDE: 9 INJECTION, SOLUTION INTRAVENOUS at 20:10

## 2020-01-30 RX ADMIN — FENTANYL CITRATE 25 MCG: 50 INJECTION, SOLUTION INTRAMUSCULAR; INTRAVENOUS at 14:54

## 2020-01-30 RX ADMIN — SODIUM CHLORIDE, PRESERVATIVE FREE 10 ML: 5 INJECTION INTRAVENOUS at 21:03

## 2020-01-30 RX ADMIN — MAGNESIUM SULFATE HEPTAHYDRATE 2 G: 40 INJECTION, SOLUTION INTRAVENOUS at 17:58

## 2020-01-30 RX ADMIN — IOPAMIDOL 90 ML: 755 INJECTION, SOLUTION INTRAVENOUS at 16:25

## 2020-01-30 RX ADMIN — SODIUM CHLORIDE, PRESERVATIVE FREE 20 ML: 5 INJECTION INTRAVENOUS at 14:25

## 2020-01-30 ASSESSMENT — ENCOUNTER SYMPTOMS
NAUSEA: 0
COUGH: 0
CONSTIPATION: 0
ABDOMINAL DISTENTION: 1
SHORTNESS OF BREATH: 0
ABDOMINAL PAIN: 1
BACK PAIN: 0
RHINORRHEA: 0
TROUBLE SWALLOWING: 0
SORE THROAT: 0
DIARRHEA: 0
BLOOD IN STOOL: 0
VOMITING: 0
CHEST TIGHTNESS: 0

## 2020-01-30 ASSESSMENT — PAIN SCALES - GENERAL
PAINLEVEL_OUTOF10: 7
PAINLEVEL_OUTOF10: 4
PAINLEVEL_OUTOF10: 3
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
## 2020-01-31 ENCOUNTER — APPOINTMENT (OUTPATIENT)
Dept: GENERAL RADIOLOGY | Age: 70
DRG: 389 | End: 2020-01-31
Payer: MEDICARE

## 2020-01-31 LAB
ALBUMIN SERPL-MCNC: 3.2 G/DL (ref 3.5–5.2)
ANION GAP SERPL CALCULATED.3IONS-SCNC: 17 MMOL/L (ref 7–19)
BUN BLDV-MCNC: 15 MG/DL (ref 8–23)
CALCIUM SERPL-MCNC: 8.5 MG/DL (ref 8.8–10.2)
CHLORIDE BLD-SCNC: 100 MMOL/L (ref 98–111)
CO2: 18 MMOL/L (ref 22–29)
CREAT SERPL-MCNC: 1.1 MG/DL (ref 0.5–1.2)
GFR NON-AFRICAN AMERICAN: >60
GLUCOSE BLD-MCNC: 89 MG/DL (ref 74–109)
MAGNESIUM: 1.8 MG/DL (ref 1.6–2.4)
PHOSPHORUS: 4.2 MG/DL (ref 2.5–4.5)
POTASSIUM SERPL-SCNC: 4.5 MMOL/L (ref 3.5–5)
SODIUM BLD-SCNC: 135 MMOL/L (ref 136–145)

## 2020-01-31 PROCEDURE — 6360000002 HC RX W HCPCS: Performed by: SURGERY

## 2020-01-31 PROCEDURE — 74018 RADEX ABDOMEN 1 VIEW: CPT

## 2020-01-31 PROCEDURE — 83735 ASSAY OF MAGNESIUM: CPT

## 2020-01-31 PROCEDURE — 80069 RENAL FUNCTION PANEL: CPT

## 2020-01-31 PROCEDURE — 36415 COLL VENOUS BLD VENIPUNCTURE: CPT

## 2020-01-31 PROCEDURE — 6360000002 HC RX W HCPCS: Performed by: EMERGENCY MEDICINE

## 2020-01-31 PROCEDURE — 2580000003 HC RX 258: Performed by: SURGERY

## 2020-01-31 PROCEDURE — 1210000000 HC MED SURG R&B

## 2020-01-31 PROCEDURE — 99222 1ST HOSP IP/OBS MODERATE 55: CPT | Performed by: SURGERY

## 2020-01-31 PROCEDURE — 6370000000 HC RX 637 (ALT 250 FOR IP): Performed by: SURGERY

## 2020-01-31 RX ORDER — DICLOFENAC 35 MG/1
35 CAPSULE ORAL 3 TIMES DAILY
Status: DISCONTINUED | OUTPATIENT
Start: 2020-01-31 | End: 2020-01-31

## 2020-01-31 RX ORDER — ATORVASTATIN CALCIUM 40 MG/1
40 TABLET, FILM COATED ORAL NIGHTLY
Status: DISCONTINUED | OUTPATIENT
Start: 2020-01-31 | End: 2020-02-01 | Stop reason: HOSPADM

## 2020-01-31 RX ORDER — TAMSULOSIN HYDROCHLORIDE 0.4 MG/1
0.4 CAPSULE ORAL DAILY
Status: DISCONTINUED | OUTPATIENT
Start: 2020-01-31 | End: 2020-02-01 | Stop reason: HOSPADM

## 2020-01-31 RX ORDER — PANTOPRAZOLE SODIUM 40 MG/1
40 TABLET, DELAYED RELEASE ORAL
Status: DISCONTINUED | OUTPATIENT
Start: 2020-02-01 | End: 2020-02-01 | Stop reason: HOSPADM

## 2020-01-31 RX ORDER — ALPRAZOLAM 0.25 MG/1
0.25 TABLET ORAL NIGHTLY PRN
Status: DISCONTINUED | OUTPATIENT
Start: 2020-01-31 | End: 2020-02-01 | Stop reason: HOSPADM

## 2020-01-31 RX ORDER — METAXALONE 800 MG/1
800 TABLET ORAL 3 TIMES DAILY
Status: DISCONTINUED | OUTPATIENT
Start: 2020-01-31 | End: 2020-02-01 | Stop reason: HOSPADM

## 2020-01-31 RX ORDER — MAGNESIUM SULFATE 1 G/100ML
1 INJECTION INTRAVENOUS PRN
Status: DISCONTINUED | OUTPATIENT
Start: 2020-01-31 | End: 2020-02-01 | Stop reason: HOSPADM

## 2020-01-31 RX ORDER — FLUCONAZOLE 100 MG/1
100 TABLET ORAL DAILY
Status: DISCONTINUED | OUTPATIENT
Start: 2020-01-31 | End: 2020-02-01 | Stop reason: HOSPADM

## 2020-01-31 RX ORDER — METHADONE HYDROCHLORIDE 10 MG/1
10 TABLET ORAL EVERY 6 HOURS PRN
Status: DISCONTINUED | OUTPATIENT
Start: 2020-01-31 | End: 2020-02-01 | Stop reason: HOSPADM

## 2020-01-31 RX ORDER — IBUPROFEN 400 MG/1
400 TABLET ORAL
Status: DISCONTINUED | OUTPATIENT
Start: 2020-01-31 | End: 2020-02-01 | Stop reason: HOSPADM

## 2020-01-31 RX ORDER — OXYBUTYNIN CHLORIDE 5 MG/1
10 TABLET, EXTENDED RELEASE ORAL DAILY
Status: DISCONTINUED | OUTPATIENT
Start: 2020-01-31 | End: 2020-02-01 | Stop reason: HOSPADM

## 2020-01-31 RX ORDER — NITROGLYCERIN 0.4 MG/1
0.4 TABLET SUBLINGUAL EVERY 5 MIN PRN
Status: DISCONTINUED | OUTPATIENT
Start: 2020-01-31 | End: 2020-02-01 | Stop reason: HOSPADM

## 2020-01-31 RX ORDER — GABAPENTIN 400 MG/1
800 CAPSULE ORAL 4 TIMES DAILY
Status: DISCONTINUED | OUTPATIENT
Start: 2020-01-31 | End: 2020-02-01 | Stop reason: HOSPADM

## 2020-01-31 RX ORDER — ONDANSETRON 4 MG/1
8 TABLET, FILM COATED ORAL EVERY 8 HOURS PRN
Status: DISCONTINUED | OUTPATIENT
Start: 2020-01-31 | End: 2020-02-01 | Stop reason: HOSPADM

## 2020-01-31 RX ORDER — NORTRIPTYLINE HYDROCHLORIDE 25 MG/1
25 CAPSULE ORAL DAILY
Status: DISCONTINUED | OUTPATIENT
Start: 2020-01-31 | End: 2020-01-31

## 2020-01-31 RX ORDER — PHENAZOPYRIDINE HYDROCHLORIDE 100 MG/1
100 TABLET, FILM COATED ORAL
Status: DISCONTINUED | OUTPATIENT
Start: 2020-01-31 | End: 2020-02-01 | Stop reason: HOSPADM

## 2020-01-31 RX ADMIN — TAMSULOSIN HYDROCHLORIDE 0.4 MG: 0.4 CAPSULE ORAL at 16:00

## 2020-01-31 RX ADMIN — METHADONE HYDROCHLORIDE 10 MG: 10 TABLET ORAL at 16:55

## 2020-01-31 RX ADMIN — METAXALONE 800 MG: 800 TABLET ORAL at 22:02

## 2020-01-31 RX ADMIN — METAXALONE 800 MG: 800 TABLET ORAL at 16:00

## 2020-01-31 RX ADMIN — SODIUM CHLORIDE: 9 INJECTION, SOLUTION INTRAVENOUS at 15:02

## 2020-01-31 RX ADMIN — GABAPENTIN 800 MG: 400 CAPSULE ORAL at 16:01

## 2020-01-31 RX ADMIN — FLUCONAZOLE 100 MG: 100 TABLET ORAL at 16:01

## 2020-01-31 RX ADMIN — GABAPENTIN 800 MG: 400 CAPSULE ORAL at 22:02

## 2020-01-31 RX ADMIN — OXYBUTYNIN CHLORIDE 10 MG: 5 TABLET, EXTENDED RELEASE ORAL at 16:01

## 2020-01-31 RX ADMIN — ENOXAPARIN SODIUM 40 MG: 40 INJECTION, SOLUTION INTRAVENOUS; SUBCUTANEOUS at 22:01

## 2020-01-31 RX ADMIN — PHENOL 1 SPRAY: 1.5 LIQUID ORAL at 10:40

## 2020-01-31 RX ADMIN — METOPROLOL TARTRATE 25 MG: 25 TABLET ORAL at 22:02

## 2020-01-31 RX ADMIN — ATORVASTATIN CALCIUM 40 MG: 40 TABLET, FILM COATED ORAL at 22:02

## 2020-01-31 RX ADMIN — FENTANYL CITRATE 25 MCG: 50 INJECTION INTRAMUSCULAR; INTRAVENOUS at 03:59

## 2020-01-31 RX ADMIN — FENTANYL CITRATE 25 MCG: 50 INJECTION INTRAMUSCULAR; INTRAVENOUS at 10:40

## 2020-01-31 RX ADMIN — IBUPROFEN 400 MG: 400 TABLET, FILM COATED ORAL at 16:55

## 2020-01-31 RX ADMIN — METHADONE HYDROCHLORIDE 10 MG: 10 TABLET ORAL at 22:24

## 2020-01-31 RX ADMIN — SODIUM CHLORIDE, PRESERVATIVE FREE 10 ML: 5 INJECTION INTRAVENOUS at 22:02

## 2020-01-31 ASSESSMENT — ENCOUNTER SYMPTOMS
NAUSEA: 1
ABDOMINAL DISTENTION: 1
BACK PAIN: 0
ABDOMINAL PAIN: 1
SHORTNESS OF BREATH: 0
COUGH: 0
SORE THROAT: 1
EYE PAIN: 0
CONSTIPATION: 1

## 2020-01-31 ASSESSMENT — PAIN DESCRIPTION - DESCRIPTORS
DESCRIPTORS: ACHING
DESCRIPTORS: NUMBNESS;BURNING

## 2020-01-31 ASSESSMENT — PAIN DESCRIPTION - ORIENTATION: ORIENTATION: RIGHT;LEFT

## 2020-01-31 ASSESSMENT — PAIN DESCRIPTION - FREQUENCY
FREQUENCY: INTERMITTENT
FREQUENCY: CONTINUOUS

## 2020-01-31 ASSESSMENT — PAIN DESCRIPTION - PAIN TYPE
TYPE: ACUTE PAIN
TYPE: ACUTE PAIN

## 2020-01-31 ASSESSMENT — PAIN DESCRIPTION - LOCATION
LOCATION: OTHER (COMMENT)
LOCATION: ABDOMEN

## 2020-01-31 ASSESSMENT — PAIN SCALES - GENERAL
PAINLEVEL_OUTOF10: 6
PAINLEVEL_OUTOF10: 0
PAINLEVEL_OUTOF10: 8
PAINLEVEL_OUTOF10: 7
PAINLEVEL_OUTOF10: 6

## 2020-01-31 ASSESSMENT — PAIN DESCRIPTION - PROGRESSION
CLINICAL_PROGRESSION: GRADUALLY WORSENING
CLINICAL_PROGRESSION: GRADUALLY WORSENING

## 2020-01-31 ASSESSMENT — PAIN DESCRIPTION - ONSET
ONSET: ON-GOING
ONSET: SUDDEN

## 2020-01-31 ASSESSMENT — PAIN - FUNCTIONAL ASSESSMENT
PAIN_FUNCTIONAL_ASSESSMENT: ACTIVITIES ARE NOT PREVENTED
PAIN_FUNCTIONAL_ASSESSMENT: PREVENTS OR INTERFERES SOME ACTIVE ACTIVITIES AND ADLS
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
## 2020-02-01 VITALS
TEMPERATURE: 97.6 F | HEART RATE: 69 BPM | HEIGHT: 65 IN | RESPIRATION RATE: 18 BRPM | DIASTOLIC BLOOD PRESSURE: 73 MMHG | BODY MASS INDEX: 29.76 KG/M2 | SYSTOLIC BLOOD PRESSURE: 136 MMHG | OXYGEN SATURATION: 95 % | WEIGHT: 178.6 LBS

## 2020-02-01 PROCEDURE — 6360000002 HC RX W HCPCS: Performed by: SURGERY

## 2020-02-01 PROCEDURE — 6370000000 HC RX 637 (ALT 250 FOR IP): Performed by: SURGERY

## 2020-02-01 PROCEDURE — 99238 HOSP IP/OBS DSCHRG MGMT 30/<: CPT | Performed by: SURGERY

## 2020-02-01 RX ORDER — HEPARIN SODIUM,PORCINE 10 UNIT/ML
3 VIAL (ML) INTRAVENOUS PRN
Status: DISCONTINUED | OUTPATIENT
Start: 2020-02-01 | End: 2020-02-01 | Stop reason: CLARIF

## 2020-02-01 RX ORDER — HEPARIN SODIUM (PORCINE) LOCK FLUSH IV SOLN 100 UNIT/ML 100 UNIT/ML
100 SOLUTION INTRAVENOUS PRN
Status: DISCONTINUED | OUTPATIENT
Start: 2020-02-01 | End: 2020-02-01 | Stop reason: HOSPADM

## 2020-02-01 RX ADMIN — METHADONE HYDROCHLORIDE 10 MG: 10 TABLET ORAL at 06:36

## 2020-02-01 RX ADMIN — GABAPENTIN 800 MG: 400 CAPSULE ORAL at 08:37

## 2020-02-01 RX ADMIN — IBUPROFEN 400 MG: 400 TABLET, FILM COATED ORAL at 08:37

## 2020-02-01 RX ADMIN — METAXALONE 800 MG: 800 TABLET ORAL at 08:37

## 2020-02-01 RX ADMIN — TAMSULOSIN HYDROCHLORIDE 0.4 MG: 0.4 CAPSULE ORAL at 08:37

## 2020-02-01 RX ADMIN — PANTOPRAZOLE SODIUM 40 MG: 40 TABLET, DELAYED RELEASE ORAL at 06:36

## 2020-02-01 RX ADMIN — OXYBUTYNIN CHLORIDE 10 MG: 5 TABLET, EXTENDED RELEASE ORAL at 08:37

## 2020-02-01 RX ADMIN — HEPARIN 100 UNITS: 100 SYRINGE at 11:47

## 2020-02-01 RX ADMIN — IBUPROFEN 400 MG: 400 TABLET, FILM COATED ORAL at 11:47

## 2020-02-01 RX ADMIN — FLUCONAZOLE 100 MG: 100 TABLET ORAL at 08:37

## 2020-02-01 RX ADMIN — GABAPENTIN 800 MG: 400 CAPSULE ORAL at 11:46

## 2020-02-01 ASSESSMENT — PAIN DESCRIPTION - LOCATION: LOCATION: OTHER (COMMENT)

## 2020-02-01 ASSESSMENT — PAIN - FUNCTIONAL ASSESSMENT: PAIN_FUNCTIONAL_ASSESSMENT: ACTIVITIES ARE NOT PREVENTED

## 2020-02-01 ASSESSMENT — PAIN DESCRIPTION - DESCRIPTORS: DESCRIPTORS: NUMBNESS;BURNING

## 2020-02-01 ASSESSMENT — PAIN DESCRIPTION - ONSET: ONSET: ON-GOING

## 2020-02-01 ASSESSMENT — PAIN DESCRIPTION - ORIENTATION: ORIENTATION: RIGHT;LEFT

## 2020-02-01 ASSESSMENT — PAIN SCALES - GENERAL
PAINLEVEL_OUTOF10: 3
PAINLEVEL_OUTOF10: 3
PAINLEVEL_OUTOF10: 6

## 2020-02-01 ASSESSMENT — PAIN DESCRIPTION - PAIN TYPE: TYPE: ACUTE PAIN

## 2020-02-01 ASSESSMENT — PAIN DESCRIPTION - PROGRESSION: CLINICAL_PROGRESSION: GRADUALLY IMPROVING

## 2020-02-01 ASSESSMENT — PAIN DESCRIPTION - FREQUENCY: FREQUENCY: CONTINUOUS

## 2020-02-01 NOTE — DISCHARGE SUMMARY
Physician Discharge Summary     Patient ID:  Orly Stephenson  [id number]  33 y.o. Admit date: 1/30/2020    Discharge date and time: No discharge date for patient encounter. Admitting Physician: Maria Luisa Leon MD     Admission Diagnoses: SBO (small bowel obstruction) ([facility] [address] [K56.609]    Discharge Diagnoses:   Patient Active Problem List   Diagnosis    Thoracic facet joint syndrome    Primary osteoarthritis of left hip    Leg swelling    Abnormal nuclear cardiac imaging test    Abnormal nuclear cardiac imaging test    Mixed hyperlipidemia    S/p bare metal coronary artery stent    Coronary artery disease involving native coronary artery of native heart without angina pectoris    COLLEEN (acute kidney injury) ([facility] [address]    Complex regional pain syndrome type 1 of right lower extremity    Hydronephrosis, bilateral    Hydroureter on right    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)    Extrinsic ureteral obstruction, bilateral    Hydronephrosis of left kidney    History of rectal cancer    Anemia of chronic disease    Pelvic mass    Lung nodules    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 Jesu Jones is a 80 yo male who presented to the ed with decreased ileostomy output. He was imaged and found to have small bowel obstruction. This was treated conservatively with NGT decompression and has resolved. His NGT is removed and he is tolerating full liquid diet with ostomy function at this time. He denies abdominal pain.      Consults: none    Significant Diagnostic Studies: labs:    CBC:  Recent Labs     01/30/20  1450   WBC 7.7   RBC 3.69*   HGB 10.5*   HCT 33.9*   MCV 91.9   MCH 28.5   MCHC 31.0*   RDW 15.7*      MPV 11.1        Imaging as per medical record. Disposition: home    Patient Instructions: Activity: activity as tolerated  Diet: Full liquid diet, advance at home as tolerated. Wound Care: none needed    Follow-up with PCP in 1 week.     Bhargav Plummer DO  7/6/2426  10:04 AM

## 2020-02-01 NOTE — PROGRESS NOTES
[facility] General Surgery Progress Note    Chief Complaint:   Chief Complaint   Patient presents with    Abdominal Pain       SUBJECTIVE:  Mr. Merlin Cox is a 81 y.o. male is seen and examined at bedside. He notes he has ostomy function and is feeling back to his normal self. He is tolerating full liquid diet without any issues. He would like to go home. He denies fever, chills, chest pain, SOB, n/v/d/c.      OBJECTIVE:  /64   Pulse 70   Temp 98.3 °F (36.8 °C) (Temporal)   Resp 18   Ht 5' 5\" (1.651 m)   Wt 178 lb 9.6 oz (81 kg)   SpO2 95%   BMI 29.72 kg/m²   CONSTITUTIONAL: Alert, appropriate, no acute distress  SKIN: warm, dry with no rashes or lesions  HEENT: NCAT, Non icteric, PERR. Trachea Midline. CHEST/LUNGS: CTA bilaterally. Normal respiratory effort. Right port in place and accessed. CARDIOVASCULAR: RRR, No edema. ABDOMEN: soft, ND, appropriately TTP, +BS, ostomy is pink, patent and productive. NEUROLOGIC: CN II-XI grossly intact, no motor or sensory deficits   MUSCULOSKELETAL: No clubbing or cyanosis. PSYCHIATRIC:  Normal Mood and Affect. Alert and Oriented. Labs:  CBC:   Recent Labs     01/30/20  1450   WBC 7.7   HGB 10.5*        BMP:    Recent Labs     01/30/20  1450 01/31/20  0911   * 135*   K 3.5 4.5   CL 98 100   CO2 18* 18*   BUN 18 15   CREATININE 1.3* 1.1   GLUCOSE 97 89       ASSESSMENT:  SBO (small bowel obstruction)--resolved. PLAN:  Will dc to home. He will resume and continue his antibiotic for his urine that he was on prior to his admission. He will follow with his PCP and touch base with his surgeons in [address] [place] [address] as well.        [employer], DO   Electronically Signed on 2/1/2020 at 9:59 AM

## 2020-02-11 ENCOUNTER — OFFICE VISIT (OUTPATIENT)
Dept: HEMATOLOGY | Age: 70
End: 2020-02-11
Payer: MEDICARE

## 2020-02-11 ENCOUNTER — HOSPITAL ENCOUNTER (OUTPATIENT)
Dept: INFUSION THERAPY | Age: 70
Discharge: HOME | End: 2020-02-11
Payer: MEDICARE

## 2020-02-11 VITALS — WEIGHT: 171.8 LBS | HEIGHT: 65 IN | OXYGEN SATURATION: 96 % | HEART RATE: 66 BPM | BODY MASS INDEX: 28.62 KG/M2

## 2020-02-11 DIAGNOSIS — C18.9 METASTASIS FROM COLON CANCER (HCC): ICD-10-CM

## 2020-02-11 DIAGNOSIS — E86.0 DEHYDRATION: Primary | ICD-10-CM

## 2020-02-11 DIAGNOSIS — C79.9 METASTASIS FROM COLON CANCER (HCC): ICD-10-CM

## 2020-02-11 DIAGNOSIS — N30.01 ACUTE CYSTITIS WITH HEMATURIA: ICD-10-CM

## 2020-02-11 DIAGNOSIS — C67.1 MALIGNANT NEOPLASM OF DOME OF URINARY BLADDER (HCC): ICD-10-CM

## 2020-02-11 PROCEDURE — 4040F PNEUMOC VAC/ADMIN/RCVD: CPT | Performed by: NURSE PRACTITIONER

## 2020-02-11 PROCEDURE — 99214 OFFICE O/P EST MOD 30 MIN: CPT | Performed by: NURSE PRACTITIONER

## 2020-02-11 PROCEDURE — 1111F DSCHRG MED/CURRENT MED MERGE: CPT | Performed by: NURSE PRACTITIONER

## 2020-02-11 PROCEDURE — 1036F TOBACCO NON-USER: CPT | Performed by: NURSE PRACTITIONER

## 2020-02-11 PROCEDURE — 96360 HYDRATION IV INFUSION INIT: CPT

## 2020-02-11 PROCEDURE — G8417 CALC BMI ABV UP PARAM F/U: HCPCS | Performed by: NURSE PRACTITIONER

## 2020-02-11 PROCEDURE — 85025 COMPLETE CBC W/AUTO DIFF WBC: CPT

## 2020-02-11 PROCEDURE — 96361 HYDRATE IV INFUSION ADD-ON: CPT

## 2020-02-11 PROCEDURE — G8484 FLU IMMUNIZE NO ADMIN: HCPCS | Performed by: NURSE PRACTITIONER

## 2020-02-11 PROCEDURE — 1123F ACP DISCUSS/DSCN MKR DOCD: CPT | Performed by: NURSE PRACTITIONER

## 2020-02-11 PROCEDURE — G8427 DOCREV CUR MEDS BY ELIG CLIN: HCPCS | Performed by: NURSE PRACTITIONER

## 2020-02-11 PROCEDURE — 2580000003 HC RX 258: Performed by: NURSE PRACTITIONER

## 2020-02-11 PROCEDURE — 3017F COLORECTAL CA SCREEN DOC REV: CPT | Performed by: NURSE PRACTITIONER

## 2020-02-11 RX ORDER — SODIUM CHLORIDE 0.9 % (FLUSH) 0.9 %
5 SYRINGE (ML) INJECTION PRN
Status: CANCELLED | OUTPATIENT
Start: 2020-02-11

## 2020-02-11 RX ORDER — SODIUM CHLORIDE 0.9 % (FLUSH) 0.9 %
5 SYRINGE (ML) INJECTION PRN
Status: DISCONTINUED | OUTPATIENT
Start: 2020-02-11 | End: 2020-02-12 | Stop reason: HOSPADM

## 2020-02-11 RX ORDER — 0.9 % SODIUM CHLORIDE 0.9 %
1000 INTRAVENOUS SOLUTION INTRAVENOUS ONCE
Status: COMPLETED | OUTPATIENT
Start: 2020-02-11 | End: 2020-02-11

## 2020-02-11 RX ADMIN — SODIUM CHLORIDE 1000 ML: 900 INJECTION, SOLUTION INTRAVENOUS at 12:35

## 2020-02-11 NOTE — PROGRESS NOTES
Progress Note      Pt Name: Lynne Tomlinson  YOB: 1950  MRN: [id number]    Date of evaluation: 2/11/2020  History Obtained From:  patient, electronic medical record    CHIEF COMPLAINT:    Chief Complaint   Patient presents with    Colon Cancer     Metastasis from colon cancer ([address]    Fatigue    Anemia    Follow-up    Treatment     HISTORY OF PRESENT ILLNESS:    Lynne Tomlinson is a 81 y.o.  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 Anisha Sykes completed 7 cycles of palliative chemotherapy with modified FOLFOX 7  (Oxaliplatin 85 mg/m² IV day 1, leucovorin 400 mg/m² IV day 1 and 5-FU 2400 mg/m² IV continuous infusion over 46 to 48 hours) as of 12/26/2019. Avastin was initially anticipated although due to multiple/frequency of invasive interventions and increased risk for hemorrhage with Avastin and proteinuria, Avastin was not given. Restaging CT scans on 1/13/2020 indicated a positive response to treatment with no new findings.  recommended moving forward with palliative maintenance therapy with 5-FU and leucovorin every 2 weeks, cycle #1 was delivered on 1/28/2020. Anisha Sykes returns today in scheduled follow-up for monitoring of side effects from chemotherapy, consideration for orders for cycle #2 and evaluation. Anisha Sykes was recently hospitalized at [facility] from 1/30/2020-2/1/2020 for a small bowel obstruction. CT scan of the abdomen and pelvis on 1/30/2020 indicated high-grade small bowel obstruction with transition point in the midline posterior pelvis where a small bowel loop is tethered to a partially calcified presacral soft tissue mass. He was treated conservatively with NG tube decompression and obstruction resolved.   Anisha Sykes indicates that he was seen by Dr. Miranda Kelley in [facility] [place] on 2/13/2020 for second opinion and surgical consideration. He is anticipating returning on 3/10/2020 for exploratory lap, small bowel resection versus bypass. Tia Quinn presents today indicating that he feels dehydrated, oral cavity is dry and he has skin demonstrates tenting in his hands. He continues to have significant fatigue. He reports his bowels are moving and he is tolerating oral intake. He also has complaints of urinary urgency, burning and feeling as though he is not emptying completely. He denies any febrile illness. Since Tia Quinn is potentially scheduled for surgical intervention on 3/10/2020 will need to hold palliative chemotherapy. Will obtain a CEA, CMP, and urinalysis with culture for further evaluation of urinary symptoms. CMP on 1/31/2020 revealed a creatinine of 1.1. In relation to his history of high-grade papillary urethral carcinoma, noninvasive, Tia Quinn continues to follow Dr. Tootie Pack. He is scheduled for a follow-up on 2/24/2020. He last had a cystoscopy on 12/3/2019 with removal and replacement of double-J ureteral stent. ONCOLOGIC HISTORY:    Diagnosis:  1. Moderately differentiated rectal carcinoma, T3N0Mx, diagnosed in 3/9/2009  2. Noninvasive high-grade papillary urethral carcinoma. Negative for evidence of detrusor muscle invasion, pTa, pNx on 8/18/2019   3. Metastatic colorectal carcinoma, 9/3/2019  · MSI stable and mutations for BRAF, NRAS, KRAS were not detected. TREATMENT SUMMARIES:  · 4/9/2009-5/27/2009-received neoadjuvant chemotherapy with 5-FU CIV along with radiation therapy for a total of 5400 cG  · 7/15/2009-rectum resection revealed no residual malignancy, complete pathological response.   · 8/18/2019- transurethral resection of bladder tumor (TURBT)   · 9/18/2019-12/26/2019 palliative chemotherapy with modified FOLFOX 7  (Oxaliplatin 85 mg/m² IV day 1, leucovorin 400 mg/m² IV day 1 and 5-FU 2400 mg/m² IV continuous infusion over 46 to 48 hours for a total of 7 cycles. · 1/28/2020 -palliative maintenance therapy with leucovorin 400 mg/m² IV over 2 hours on day 1, followed by 5-FU bolus 400 mg/m² and then 1200 mg/m²/day x2 days (total 2400 mg meter squared over 46 to 48 hours) continuous infusion. Repeat every 2 weeks. ONCOLOGIC HISTORY #3  Jomar Jakcson was seen in initial oncology consultation on 8/19/2019 during his hospitalization at [address] [place] [address] after a large pelvic mass was identified which raised concern for recurrent disease.     · 8/17/2019- CEA 18.1  · 8/17/2019- CT scan of the kidney with contrast documented moderate to severe right hydronephrosis with dilation of the right ureter into the lower pelvis the site of the parasacral soft tissue changes. Partially calcified soft tissue changes within the janes-sacral region likely representing sequelae of pelvic radiation. Increasing scarring/fibrosis versus tumor recurrence within the presacral changes, likely represents a site of right distal ureter obstruction. No left-sided hydronephrosis. · 8/18/2019 -Double-J ureter stent placement for right hydronephrosis secondary to extrinsic compression by pelvic mass. · 8/27/2019-CT scan of the chest with contrast documented numerous pulmonary nodules that appear new compared to 11/12/2017, RUL nodule measuring 7 mm and LLL nodule measuring 5 mm. Soft tissue nodule at the left ventral abdominal wall. Slight increased size of a probable lymph node anterior to the aorta measuring 0.9 cm compared to 0.7 cm. Similar presacral, right pelvic sidewall and right abductor muscular nodular soft tissue density. · 8/27/2019 CT scan of the abdomen and pelvis with contrast identified new moderate left hydronephrosis with moderate right hydronephrosis. Mild stranding around the urinary bladder and thickening of the bilateral ureteral wall. Numerous pulmonary nodules. Soft tissue of the left ventral abdominal wall.   Slightly increased size of probable lymph node anterior to the aorta measuring 0.9 cm compared to 0.7 cm. · 8/27/2019-PET scan did not identify any FDG avid pulmonary nodules or airspace opacities. Abnormal increased metabolic activity within the right pelvic wall soft tissue showing SUV of 5.4. Abnormal soft tissue metabolic activity in the right abductor muscle with SUV of 6.4. Focally increased activity to the right of the inferior L5 vertebrae body posterior with SUV of 7.9 with associated sclerotic changes. · 8/29/2019-  Dr. Brian Mackay completed a cystoscopy with double-J ureter stent in the left ureter for left hydronephrosis  · 9/3/2019- CT-guided right abductor muscle biopsy on 9/3/2019 with pathology identifying metastatic adenocarcinoma consistent with colorectal origin. Molecular panel from biopsy tissue revealed MSI stable and mutations for BRAF, NRAS, KRAS were not detected. · 9/18/2019 - Palliative chemotherapy with modified FOLFOX 7  (Oxaliplatin 85 mg/m² IV day 1, leucovorin 400 mg/m² IV day 1 and 5-FU 2400 mg/m² IV continuous infusion over 46 to 48 hours) with the anticipation of adding Avastin 5 mg/kg day 1 every 14 days  · 10/15/2019- 24-hour urine for protein with a total protein of 1785 mg per 24-hour. Musa Lane has been evaluated by Dr. Marie Stevenson and he reports no significant concerns related to the protein. · CEA of 5.6 on 11/6/2019 significantly improved compared to CEA of 14.0 on 8/30/2019. · 11/15/2019 -CT scan of the abdomen and pelvis documented no evidence of disease progression with significant decrease in the size of enhancing nodules in the right pelvic abductor musculature, a previous 1.8 cm nodule now measures 5 mm. No new or enlarging retroperitoneal, mesenteric, pelvic or inguinal lymph nodes. Calcified presacral mass unchanged measuring 5 x 3.7 cm.   · 11/15/2019 -CT scan of the chest documented multiple small pulmonary nodules reidentified, largest nodule in the RUL measures 5 mm compared to 7.5 mm, RLL nodule measures 3.4 mm compared to 5.9 mm, VIC nodule measures 4 mm compared to 6 mm. A cluster of small nodules in the RUL anteriorly are barely visible on this study. There is a decrease in size of mediastinal lymph nodes compared to previous exam, right distal paratracheal lymph node measuring 4.5 mm compared to 8.3 mm and lower right peritracheal node measuring 4.5 mm compared to 8.6 mm. · 1/13/2020- CT scan of the abdomen and pelvis with contrast indicated improvement in the right-sided hydronephrosis with a chronic inflammatory process of the ureters suspected due to the moderate thickening, also present on previous study. The small poorly enhancing nodules in the right abductor muscles have decreased in the partially calcified presacral mass and right lateral pelvic wall nodules are stable compared to previous study. Resolution of the subcutaneous abdominal wall nodules. A prominent retroperitoneal lymph node adjacent and anterior to the left common artery is redefined and measures 6 mm, no change from previous exam.   · 1/13/2020 - CT scan of the chest documented a right lower lobe nodule measures 4.3 cm and is unchanged. A right lower lobe nodule measures 2.8 mm compared to 3.4 mm. Nodule in the right upper lobe is barely visible and measures 2.4 mm. Nodule in the left lower lobe measures 4.8 mm and is unchanged. Nodule in left lower lobe posterior measures 2.8 mm and previously measured 4.7 mm. A right lower lobe posterior medially nodule is barely visible measuring 0.2 mm and previously measured 4.5 mm. No new nodules identified. No change in the size of the mediastinal lymph nodes. · 1/30/2020 - CT scan of the abdomen and pelvis indicated high-grade small bowel obstruction with transition point in the midline posterior pelvis where a small bowel loop is tethered to a partially calcified presacral soft tissue mass.       HEMATOLOGY HISTORY #1  Forest Kerns has a significant history of chronic normocytic anemia with iron deficiency dating back 2/2009.       CBC on 8/15/2019 documented a hemoglobin of 11.6 with an MCV of 85.3  CBC on 8/20/2019 documented a hemoglobin of 10 with an MCV of 87.7     Serology studies on 8/20/2019;  ·   · Vitamin B12 737  · Iron 76  · TIBC 261  · Iron saturation 29%  · Absolute reticulocyte count 0.0509  · Folate 15.7  · Ferritin 82.6     ONCOLOGIC HISTORY #1:  Chris Hatch has a history of moderately differentiated rectal carcinoma, T3N0Mx, diagnosed in 3/9/2009. He received neoadjuvant chemotherapy with radiation and resection with J-pouch. He has been routinely followed at [facility] and was last seen by Dr. Amairani Vickers on 1/10/2019. The following are pertinent findings related to his diagnosis and treatment. · 3/9/2009- Esophagogastroduodenoscopy with biopsy by Dr. Walter Henao that revealed rectal mass at 8 cm with pathology being consistent with moderately differentiated adenocarcinoma. · 3/18/2019-Dr.Elizabeth Xavier Ko at [facility] performed a flexible sigmoidoscopy and rectal endoscopic ultrasound that revealed the tumor extending 6-7 mm deep through the muscularis propria layer and into the serosa, T3 lesion. · 4/9/2009-5/27/2009-received neoadjuvant chemotherapy with 5-FU CIV along with radiation therapy for a total of 5400 cGy under the direction of Dr. Rossi Rincon. · 7/15/2009-rectum resection revealed no residual malignancy. 22 regional nodes were negative for malignancy. The rectum distal doughnut was negative for malignancy. He was documented to have a complete pathological response. · 9/9/2009- Ileostomy excision pathology revealed small bowel wall with changes consistent with ileostomy site. Pathology negative for malignancy     ONCOLOGIC HISTORY #2   Chris Hatch was diagnosed with noninvasive urethral carcinoma, pTa, pNx on 8/18/2019.   Ta tumors are papillary lesions that tend to recur but are relatively benign and generally do Carmella Marion MD at [address] Bilateral     cataract or    [facility] INJECT OTHER PERPHRL NERV Left 10/28/2016    FLURO GUIDED HIP INJECITON performed by Elizabeth Taylor MD at [address]  / REMOVAL / [address] Right 8/20/2019    INSERTION OF RIGHT INTERNAL JUGULAR SINGLE LUMEN POWER PORT performed by Chad Low DO at [facility]      times 2... all levels    TUNNELED VENOUS PORT PLACEMENT         Current Medications:    Current Outpatient Medications   Medication Sig Dispense Refill    ondansetron (ZOFRAN) 4 MG tablet Take 2 tablets by mouth every 8 hours as needed for Nausea or Vomiting 30 tablet 2    metaxalone (SKELAXIN) 800 MG tablet Take 800 mg by mouth 3 times daily   1    atorvastatin (LIPITOR) 40 MG tablet TAKE 1 TABLET BY MOUTH EVERY NIGHT (Patient taking differently: Take 40 mg by mouth nightly ) 30 tablet 0    oxybutynin (DITROPAN XL) 10 MG extended release tablet Take 1 tablet by mouth daily As needed for bladder spasms 30 tablet 2    omeprazole (PRILOSEC) 20 MG delayed release capsule Take 20 mg by mouth daily      tamsulosin (FLOMAX) 0.4 MG capsule Take 1 capsule by mouth daily 30 capsule 11    bisoprolol (ZEBETA) 5 MG tablet Take 5 mg by mouth daily      methadone (DOLOPHINE) 10 MG tablet Take 10 mg by mouth every 6 hours as needed for Pain. q 5 hours      gabapentin (NEURONTIN) 800 MG tablet Take 800 mg by mouth 4 times daily.  fluconazole (DIFLUCAN) 100 MG tablet Take 2 pills on day 1 and then one pill daily for 14 days total (Patient not taking: Reported on 2/11/2020) 15 tablet 0     No current facility-administered medications for this visit. Allergies:    Allergies   Allergen Reactions    Morphine Anxiety       Social History:    Social History     Tobacco Use    Smoking status: Former Smoker    Smokeless tobacco: Never Used   Substance Use Topics    Alcohol use: No    Drug use: No       Family History:   Family History   Problem Relation Age of Onset    High Blood Pressure Mother     High Blood Pressure Father     Colon Cancer Father     Diabetes Father        Vitals:  Vitals:    02/11/20 1116   Pulse: 66   SpO2: 96%   Weight: 171 lb 12.8 oz (77.9 kg)   Height: 5' 5\" (1.651 m)        Subjective   REVIEW OF SYSTEMS:   Review of Systems   Constitutional: Continues to have fatigue, grade 1  HENT: Negative. Negative for congestion, ear pain, hearing loss, nosebleeds, sore throat and tinnitus. Eyes: Negative. Negative for pain, discharge and redness. Respiratory: Negative. Chronic shortness of air with exertion, stable. Cardiovascular:   Denies chest pain or palpitations. Chronic bilateral lower extremity edema, stable Gastrointestinal: Negative for abdominal pain, blood in stool, or constipation. Tolerating oral diet   Endocrine: Negative for polydipsia. Genitourinary: Complaints of burning with urination and urgency  Musculoskeletal: Chronic bilateral lower extremity weakness, stable. Chronic back pain, stable. Skin: Negative. Negative for rash. Neurological: generalized weakness  Hematological: Does not bruise/bleed easily. Psychiatric/Behavioral: Negative. The patient is not nervous/anxious. Objective   PHYSICAL EXAM:  Physical Exam   Constitutional: He is oriented to person, place, and time. No distress. ENT: Head: Normocephalic and atraumatic. Mouth/Throat: Uvula is midline, no oral lesions noted  Eyes: Pupils are equal, round, and reactive to light. Conjunctivae, EOM and lids are normal. Right eye exhibits no discharge. Left eye exhibits no discharge. No scleral icterus. Neck: Trachea normal and normal range of motion. Neck supple. No JVD present. No tracheal deviation present. No thyroid mass and no thyromegaly present. Cardiovascular: Normal rate, regular rhythm, normal heart sounds and intact distal pulses. No murmur heard.   Pulmonary/Chest: Iron substrates 11/20/2019 were within acceptable limits with a TIBC of 428, iron 62 and iron saturation 14% with a ferritin of 138.1.      - CBC Auto Differential; Future      3. Subcentimeter lung nodules-CT scan of the chest on 1/6/2020 suggest a positive response to treatment with decrease in size of some nodules, resolution of some nodules and no new nodules. No new respiratory complaints, continues to have shortness of air with exertion.  -Anticipate repeating CT scan of the chest in April 2020    4. 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  -Symptoms have significantly improved and are overall stable with persistent right lower extremity edema and weakness. 5. Urethral cancer, 8/18/2019. Pathology revealing noninvasive high-grade papillary urethral carcinoma. Negative for evidence of detrusor muscle invasion, pTa, pNx. Repeat cystoscopy on 12/3/2019 continue to identify no evidence of muscle invasion. No need for adjuvant treatment, routine monitoring warranted at this time. 6.  Side effects of chemo:  · Diarrhea -presently resolved. .  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 Burning with urination and a sense of urgency  -UA with sensitivity and culture  - Follow-up with Dr. Pedrito Blankenship if symptoms do not resolve       FOLLOW UP:  1. Follow-up appointment given for 4/1/2020, sooner if needed  2. Keep appointment on 3/10/2020 for surgical intervention  3. Continue to follow-up with other medical providers as recommended    Over 50% of the total visit time of 25 minutes in face to face encounter with the patient, out of which more than 50% of the time was spent in counseling patient  and coordination of care. Counseling included but was not limited to time spent reviewing labs, imaging studies/ treatment plan and answering questions. This does not include charting.     Electronically signed by OLGA Keith on 2/13/2020 at 1:50 PM

## 2020-02-13 ENCOUNTER — CLINICAL DOCUMENTATION (OUTPATIENT)
Dept: HEMATOLOGY | Age: 70
End: 2020-02-13

## 2020-02-13 PROBLEM — Z87.19 HISTORY OF SMALL BOWEL OBSTRUCTION: Status: ACTIVE | Noted: 2020-02-13

## 2020-02-13 PROBLEM — R30.0 BURNING WITH URINATION: Status: ACTIVE | Noted: 2020-02-13

## 2020-02-17 ENCOUNTER — OFFICE VISIT (OUTPATIENT)
Dept: UROLOGY | Age: 70
End: 2020-02-17
Payer: MEDICARE

## 2020-02-17 VITALS
HEART RATE: 84 BPM | BODY MASS INDEX: 27.99 KG/M2 | HEIGHT: 65 IN | SYSTOLIC BLOOD PRESSURE: 108 MMHG | DIASTOLIC BLOOD PRESSURE: 57 MMHG | TEMPERATURE: 98.4 F | WEIGHT: 168 LBS

## 2020-02-17 DIAGNOSIS — N13.30 HYDRONEPHROSIS OF RIGHT KIDNEY: ICD-10-CM

## 2020-02-17 LAB
ANION GAP SERPL CALCULATED.3IONS-SCNC: 17 MMOL/L (ref 7–19)
BACTERIA URINE, POC: 0
BILIRUBIN URINE: 1 MG/DL
BLOOD, URINE: POSITIVE
BUN BLDV-MCNC: 13 MG/DL (ref 8–23)
CALCIUM SERPL-MCNC: 8.6 MG/DL (ref 8.8–10.2)
CASTS URINE, POC: 0
CHLORIDE BLD-SCNC: 95 MMOL/L (ref 98–111)
CLARITY: CLEAR
CO2: 18 MMOL/L (ref 22–29)
COLOR: YELLOW
CREAT SERPL-MCNC: 1.4 MG/DL (ref 0.5–1.2)
CRYSTALS URINE, POC: 0
EPI CELLS URINE, POC: 0
GFR NON-AFRICAN AMERICAN: 50
GLUCOSE BLD-MCNC: 119 MG/DL (ref 74–109)
GLUCOSE URINE: ABNORMAL
KETONES, URINE: NEGATIVE
LEUKOCYTE EST, POC: ABNORMAL
NITRITE, URINE: NEGATIVE
PH UA: 6 (ref 4.5–8)
POTASSIUM SERPL-SCNC: 4.7 MMOL/L (ref 3.5–5)
PROTEIN UA: POSITIVE
RBC URINE, POC: 10
SODIUM BLD-SCNC: 130 MMOL/L (ref 136–145)
SPECIFIC GRAVITY UA: 1.03 (ref 1–1.03)
UROBILINOGEN, URINE: NORMAL
WBC URINE, POC: 10
YEAST URINE, POC: 0

## 2020-02-17 PROCEDURE — 81001 URINALYSIS AUTO W/SCOPE: CPT | Performed by: UROLOGY

## 2020-02-17 PROCEDURE — G8484 FLU IMMUNIZE NO ADMIN: HCPCS | Performed by: UROLOGY

## 2020-02-17 PROCEDURE — 1036F TOBACCO NON-USER: CPT | Performed by: UROLOGY

## 2020-02-17 PROCEDURE — 99214 OFFICE O/P EST MOD 30 MIN: CPT | Performed by: UROLOGY

## 2020-02-17 PROCEDURE — G8417 CALC BMI ABV UP PARAM F/U: HCPCS | Performed by: UROLOGY

## 2020-02-17 PROCEDURE — G8427 DOCREV CUR MEDS BY ELIG CLIN: HCPCS | Performed by: UROLOGY

## 2020-02-17 PROCEDURE — 1123F ACP DISCUSS/DSCN MKR DOCD: CPT | Performed by: UROLOGY

## 2020-02-17 PROCEDURE — 4040F PNEUMOC VAC/ADMIN/RCVD: CPT | Performed by: UROLOGY

## 2020-02-17 PROCEDURE — 1111F DSCHRG MED/CURRENT MED MERGE: CPT | Performed by: UROLOGY

## 2020-02-17 PROCEDURE — 3017F COLORECTAL CA SCREEN DOC REV: CPT | Performed by: UROLOGY

## 2020-02-17 RX ORDER — DULOXETIN HYDROCHLORIDE 30 MG/1
CAPSULE, DELAYED RELEASE ORAL
COMMUNITY
Start: 2020-02-06 | End: 2020-04-30

## 2020-02-17 RX ORDER — LOPERAMIDE HYDROCHLORIDE 2 MG/1
6 TABLET ORAL
Status: ON HOLD | COMMUNITY
End: 2022-01-01 | Stop reason: HOSPADM

## 2020-02-17 RX ORDER — ASPIRIN 81 MG/1
81 TABLET, CHEWABLE ORAL
Status: ON HOLD | COMMUNITY
Start: 2018-10-11 | End: 2020-02-26

## 2020-02-17 ASSESSMENT — ENCOUNTER SYMPTOMS
SHORTNESS OF BREATH: 0
BACK PAIN: 0
ABDOMINAL PAIN: 0
EYE DISCHARGE: 0
WHEEZING: 0
EYE REDNESS: 0
COUGH: 0
CONSTIPATION: 0
DIARRHEA: 0
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
## 2020-02-24 ENCOUNTER — HOSPITAL ENCOUNTER (OUTPATIENT)
Dept: INFUSION THERAPY | Age: 70
Discharge: HOME | End: 2020-02-24
Payer: MEDICARE

## 2020-02-24 VITALS
SYSTOLIC BLOOD PRESSURE: 138 MMHG | HEART RATE: 90 BPM | OXYGEN SATURATION: 97 % | HEIGHT: 65 IN | WEIGHT: 169.2 LBS | TEMPERATURE: 98.1 F | BODY MASS INDEX: 28.19 KG/M2 | DIASTOLIC BLOOD PRESSURE: 82 MMHG

## 2020-02-24 DIAGNOSIS — E86.0 DEHYDRATION: Primary | ICD-10-CM

## 2020-02-24 DIAGNOSIS — C67.1 MALIGNANT NEOPLASM OF DOME OF URINARY BLADDER (HCC): ICD-10-CM

## 2020-02-24 PROCEDURE — 2580000003 HC RX 258: Performed by: NURSE PRACTITIONER

## 2020-02-24 PROCEDURE — 96360 HYDRATION IV INFUSION INIT: CPT

## 2020-02-24 PROCEDURE — 96361 HYDRATE IV INFUSION ADD-ON: CPT

## 2020-02-24 PROCEDURE — 85025 COMPLETE CBC W/AUTO DIFF WBC: CPT

## 2020-02-24 PROCEDURE — 36415 COLL VENOUS BLD VENIPUNCTURE: CPT

## 2020-02-24 PROCEDURE — 80053 COMPREHEN METABOLIC PANEL: CPT

## 2020-02-24 RX ORDER — SODIUM CHLORIDE 9 MG/ML
INJECTION, SOLUTION INTRAVENOUS CONTINUOUS
Status: CANCELLED
Start: 2020-02-24

## 2020-02-24 RX ORDER — SODIUM CHLORIDE 9 MG/ML
INJECTION, SOLUTION INTRAVENOUS CONTINUOUS
Status: DISCONTINUED | OUTPATIENT
Start: 2020-02-24 | End: 2020-02-26 | Stop reason: HOSPADM

## 2020-02-24 RX ADMIN — SODIUM CHLORIDE: 9 INJECTION, SOLUTION INTRAVENOUS at 13:38

## 2020-02-26 ENCOUNTER — HOSPITAL ENCOUNTER (OUTPATIENT)
Age: 70
Setting detail: OUTPATIENT SURGERY
Discharge: HOME | End: 2020-02-26
Attending: UROLOGY | Admitting: UROLOGY
Payer: MEDICARE

## 2020-02-26 ENCOUNTER — ANESTHESIA (OUTPATIENT)
Dept: OPERATING ROOM | Age: 70
End: 2020-02-26
Payer: MEDICARE

## 2020-02-26 ENCOUNTER — ANESTHESIA EVENT (OUTPATIENT)
Dept: OPERATING ROOM | Age: 70
End: 2020-02-26
Payer: MEDICARE

## 2020-02-26 ENCOUNTER — APPOINTMENT (OUTPATIENT)
Dept: GENERAL RADIOLOGY | Age: 70
End: 2020-02-26
Attending: UROLOGY
Payer: MEDICARE

## 2020-02-26 VITALS
SYSTOLIC BLOOD PRESSURE: 118 MMHG | BODY MASS INDEX: 27.49 KG/M2 | OXYGEN SATURATION: 98 % | RESPIRATION RATE: 18 BRPM | WEIGHT: 165 LBS | HEIGHT: 65 IN | TEMPERATURE: 97.6 F | HEART RATE: 71 BPM | DIASTOLIC BLOOD PRESSURE: 71 MMHG

## 2020-02-26 VITALS
DIASTOLIC BLOOD PRESSURE: 57 MMHG | OXYGEN SATURATION: 99 % | SYSTOLIC BLOOD PRESSURE: 99 MMHG | RESPIRATION RATE: 10 BRPM | TEMPERATURE: 97.2 F

## 2020-02-26 PROBLEM — C67.8 MALIGNANT NEOPLASM OF OVERLAPPING SITES OF BLADDER (HCC): Status: ACTIVE | Noted: 2019-08-18

## 2020-02-26 PROBLEM — N17.9 AKI (ACUTE KIDNEY INJURY) (HCC): Status: RESOLVED | Noted: 2019-08-15 | Resolved: 2020-02-26

## 2020-02-26 LAB
ANION GAP SERPL CALCULATED.3IONS-SCNC: 16 MMOL/L (ref 7–19)
BUN BLDV-MCNC: 23 MG/DL (ref 8–23)
CALCIUM SERPL-MCNC: 8.7 MG/DL (ref 8.8–10.2)
CHLORIDE BLD-SCNC: 100 MMOL/L (ref 98–111)
CO2: 18 MMOL/L (ref 22–29)
CREAT SERPL-MCNC: 1 MG/DL (ref 0.5–1.2)
GFR NON-AFRICAN AMERICAN: >60
GLUCOSE BLD-MCNC: 89 MG/DL (ref 74–109)
HCT VFR BLD CALC: 34.3 % (ref 42–52)
HEMOGLOBIN: 11.2 G/DL (ref 14–18)
MCH RBC QN AUTO: 28.1 PG (ref 27–31)
MCHC RBC AUTO-ENTMCNC: 32.7 G/DL (ref 33–37)
MCV RBC AUTO: 86.2 FL (ref 80–94)
PDW BLD-RTO: 14.9 % (ref 11.5–14.5)
PLATELET # BLD: 262 K/UL (ref 130–400)
PMV BLD AUTO: 11.2 FL (ref 9.4–12.4)
POTASSIUM SERPL-SCNC: 3.9 MMOL/L (ref 3.5–4.9)
RBC # BLD: 3.98 M/UL (ref 4.7–6.1)
REASON FOR REJECTION: NORMAL
REJECTED TEST: NORMAL
SODIUM BLD-SCNC: 134 MMOL/L (ref 136–145)
WBC # BLD: 6.7 K/UL (ref 4.8–10.8)

## 2020-02-26 PROCEDURE — 52234 CYSTOSCOPY AND TREATMENT: CPT | Performed by: UROLOGY

## 2020-02-26 PROCEDURE — 52351 CYSTOURETERO & OR PYELOSCOPE: CPT | Performed by: UROLOGY

## 2020-02-26 PROCEDURE — 7100000001 HC PACU RECOVERY - ADDTL 15 MIN: Performed by: UROLOGY

## 2020-02-26 PROCEDURE — 6360000002 HC RX W HCPCS

## 2020-02-26 PROCEDURE — 6370000000 HC RX 637 (ALT 250 FOR IP): Performed by: UROLOGY

## 2020-02-26 PROCEDURE — C1758 CATHETER, URETERAL: HCPCS | Performed by: UROLOGY

## 2020-02-26 PROCEDURE — 74420 UROGRAPHY RTRGR +-KUB: CPT

## 2020-02-26 PROCEDURE — 7100000000 HC PACU RECOVERY - FIRST 15 MIN: Performed by: UROLOGY

## 2020-02-26 PROCEDURE — 2580000003 HC RX 258: Performed by: UROLOGY

## 2020-02-26 PROCEDURE — 74420 UROGRAPHY RTRGR +-KUB: CPT | Performed by: UROLOGY

## 2020-02-26 PROCEDURE — 88305 TISSUE EXAM BY PATHOLOGIST: CPT

## 2020-02-26 PROCEDURE — 7100000010 HC PHASE II RECOVERY - FIRST 15 MIN: Performed by: UROLOGY

## 2020-02-26 PROCEDURE — 36415 COLL VENOUS BLD VENIPUNCTURE: CPT

## 2020-02-26 PROCEDURE — 2580000003 HC RX 258: Performed by: ANESTHESIOLOGY

## 2020-02-26 PROCEDURE — 52332 CYSTOSCOPY AND TREATMENT: CPT | Performed by: UROLOGY

## 2020-02-26 PROCEDURE — 6360000002 HC RX W HCPCS: Performed by: UROLOGY

## 2020-02-26 PROCEDURE — 80048 BASIC METABOLIC PNL TOTAL CA: CPT

## 2020-02-26 PROCEDURE — 2709999900 HC NON-CHARGEABLE SUPPLY: Performed by: UROLOGY

## 2020-02-26 PROCEDURE — 3209999900 FLUORO FOR SURGICAL PROCEDURES

## 2020-02-26 PROCEDURE — 3600000004 HC SURGERY LEVEL 4 BASE: Performed by: UROLOGY

## 2020-02-26 PROCEDURE — C2617 STENT, NON-COR, TEM W/O DEL: HCPCS | Performed by: UROLOGY

## 2020-02-26 PROCEDURE — 3600000014 HC SURGERY LEVEL 4 ADDTL 15MIN: Performed by: UROLOGY

## 2020-02-26 PROCEDURE — 3700000001 HC ADD 15 MINUTES (ANESTHESIA): Performed by: UROLOGY

## 2020-02-26 PROCEDURE — C1769 GUIDE WIRE: HCPCS | Performed by: UROLOGY

## 2020-02-26 PROCEDURE — 6370000000 HC RX 637 (ALT 250 FOR IP): Performed by: ANESTHESIOLOGY

## 2020-02-26 PROCEDURE — 85027 COMPLETE CBC AUTOMATED: CPT

## 2020-02-26 PROCEDURE — 3700000000 HC ANESTHESIA ATTENDED CARE: Performed by: UROLOGY

## 2020-02-26 PROCEDURE — 2500000003 HC RX 250 WO HCPCS

## 2020-02-26 DEVICE — URETERAL STENT
Type: IMPLANTABLE DEVICE | Status: FUNCTIONAL
Brand: POLARIS™ ULTRA

## 2020-02-26 RX ORDER — DIPHENHYDRAMINE HYDROCHLORIDE 50 MG/ML
12.5 INJECTION INTRAMUSCULAR; INTRAVENOUS
Status: DISCONTINUED | OUTPATIENT
Start: 2020-02-26 | End: 2020-02-26 | Stop reason: HOSPADM

## 2020-02-26 RX ORDER — CIPROFLOXACIN 500 MG/1
500 TABLET, FILM COATED ORAL 2 TIMES DAILY
Qty: 6 TABLET | Refills: 0 | Status: SHIPPED | OUTPATIENT
Start: 2020-02-26 | End: 2020-02-29

## 2020-02-26 RX ORDER — SODIUM CHLORIDE 0.9 % (FLUSH) 0.9 %
10 SYRINGE (ML) INJECTION PRN
Status: DISCONTINUED | OUTPATIENT
Start: 2020-02-26 | End: 2020-02-26 | Stop reason: HOSPADM

## 2020-02-26 RX ORDER — PROMETHAZINE HYDROCHLORIDE 25 MG/ML
6.25 INJECTION, SOLUTION INTRAMUSCULAR; INTRAVENOUS
Status: DISCONTINUED | OUTPATIENT
Start: 2020-02-26 | End: 2020-02-26 | Stop reason: HOSPADM

## 2020-02-26 RX ORDER — SODIUM CHLORIDE, SODIUM LACTATE, POTASSIUM CHLORIDE, CALCIUM CHLORIDE 600; 310; 30; 20 MG/100ML; MG/100ML; MG/100ML; MG/100ML
INJECTION, SOLUTION INTRAVENOUS CONTINUOUS
Status: DISCONTINUED | OUTPATIENT
Start: 2020-02-26 | End: 2020-02-26 | Stop reason: HOSPADM

## 2020-02-26 RX ORDER — HEPARIN SODIUM (PORCINE) LOCK FLUSH IV SOLN 100 UNIT/ML 100 UNIT/ML
300 SOLUTION INTRAVENOUS ONCE
Status: DISCONTINUED | OUTPATIENT
Start: 2020-02-26 | End: 2020-02-26 | Stop reason: HOSPADM

## 2020-02-26 RX ORDER — FENTANYL CITRATE 50 UG/ML
50 INJECTION, SOLUTION INTRAMUSCULAR; INTRAVENOUS
Status: DISCONTINUED | OUTPATIENT
Start: 2020-02-26 | End: 2020-02-26 | Stop reason: HOSPADM

## 2020-02-26 RX ORDER — MIDAZOLAM HYDROCHLORIDE 1 MG/ML
2 INJECTION INTRAMUSCULAR; INTRAVENOUS
Status: DISCONTINUED | OUTPATIENT
Start: 2020-02-26 | End: 2020-02-26 | Stop reason: HOSPADM

## 2020-02-26 RX ORDER — SCOLOPAMINE TRANSDERMAL SYSTEM 1 MG/1
1 PATCH, EXTENDED RELEASE TRANSDERMAL ONCE
Status: DISCONTINUED | OUTPATIENT
Start: 2020-02-26 | End: 2020-02-26 | Stop reason: HOSPADM

## 2020-02-26 RX ORDER — SODIUM CHLORIDE 0.9 % (FLUSH) 0.9 %
10 SYRINGE (ML) INJECTION EVERY 12 HOURS SCHEDULED
Status: DISCONTINUED | OUTPATIENT
Start: 2020-02-26 | End: 2020-02-26 | Stop reason: HOSPADM

## 2020-02-26 RX ORDER — MEPERIDINE HYDROCHLORIDE 50 MG/ML
12.5 INJECTION INTRAMUSCULAR; INTRAVENOUS; SUBCUTANEOUS EVERY 5 MIN PRN
Status: DISCONTINUED | OUTPATIENT
Start: 2020-02-26 | End: 2020-02-26 | Stop reason: HOSPADM

## 2020-02-26 RX ORDER — ONDANSETRON 2 MG/ML
4 INJECTION INTRAMUSCULAR; INTRAVENOUS EVERY 4 HOURS PRN
Status: DISCONTINUED | OUTPATIENT
Start: 2020-02-26 | End: 2020-02-26 | Stop reason: HOSPADM

## 2020-02-26 RX ORDER — FENTANYL CITRATE 50 UG/ML
INJECTION, SOLUTION INTRAMUSCULAR; INTRAVENOUS PRN
Status: DISCONTINUED | OUTPATIENT
Start: 2020-02-26 | End: 2020-02-26 | Stop reason: SDUPTHER

## 2020-02-26 RX ORDER — LIDOCAINE HYDROCHLORIDE 10 MG/ML
1 INJECTION, SOLUTION EPIDURAL; INFILTRATION; INTRACAUDAL; PERINEURAL
Status: DISCONTINUED | OUTPATIENT
Start: 2020-02-26 | End: 2020-02-26 | Stop reason: HOSPADM

## 2020-02-26 RX ORDER — HYDRALAZINE HYDROCHLORIDE 20 MG/ML
5 INJECTION INTRAMUSCULAR; INTRAVENOUS EVERY 10 MIN PRN
Status: DISCONTINUED | OUTPATIENT
Start: 2020-02-26 | End: 2020-02-26 | Stop reason: HOSPADM

## 2020-02-26 RX ORDER — DEXAMETHASONE SODIUM PHOSPHATE 10 MG/ML
INJECTION, SOLUTION INTRAMUSCULAR; INTRAVENOUS PRN
Status: DISCONTINUED | OUTPATIENT
Start: 2020-02-26 | End: 2020-02-26 | Stop reason: SDUPTHER

## 2020-02-26 RX ORDER — LABETALOL 20 MG/4 ML (5 MG/ML) INTRAVENOUS SYRINGE
5 EVERY 10 MIN PRN
Status: DISCONTINUED | OUTPATIENT
Start: 2020-02-26 | End: 2020-02-26 | Stop reason: HOSPADM

## 2020-02-26 RX ORDER — LIDOCAINE HYDROCHLORIDE 10 MG/ML
INJECTION, SOLUTION EPIDURAL; INFILTRATION; INTRACAUDAL; PERINEURAL PRN
Status: DISCONTINUED | OUTPATIENT
Start: 2020-02-26 | End: 2020-02-26 | Stop reason: SDUPTHER

## 2020-02-26 RX ORDER — PROPOFOL 10 MG/ML
INJECTION, EMULSION INTRAVENOUS PRN
Status: DISCONTINUED | OUTPATIENT
Start: 2020-02-26 | End: 2020-02-26 | Stop reason: SDUPTHER

## 2020-02-26 RX ORDER — ROCURONIUM BROMIDE 10 MG/ML
INJECTION, SOLUTION INTRAVENOUS PRN
Status: DISCONTINUED | OUTPATIENT
Start: 2020-02-26 | End: 2020-02-26 | Stop reason: SDUPTHER

## 2020-02-26 RX ORDER — TAMSULOSIN HYDROCHLORIDE 0.4 MG/1
0.4 CAPSULE ORAL ONCE
Status: COMPLETED | OUTPATIENT
Start: 2020-02-26 | End: 2020-02-26

## 2020-02-26 RX ORDER — MEPERIDINE HYDROCHLORIDE 50 MG/ML
50 INJECTION INTRAMUSCULAR; INTRAVENOUS; SUBCUTANEOUS EVERY 4 HOURS PRN
Status: DISCONTINUED | OUTPATIENT
Start: 2020-02-26 | End: 2020-02-26 | Stop reason: HOSPADM

## 2020-02-26 RX ORDER — ONDANSETRON 2 MG/ML
INJECTION INTRAMUSCULAR; INTRAVENOUS PRN
Status: DISCONTINUED | OUTPATIENT
Start: 2020-02-26 | End: 2020-02-26 | Stop reason: SDUPTHER

## 2020-02-26 RX ORDER — METOCLOPRAMIDE HYDROCHLORIDE 5 MG/ML
10 INJECTION INTRAMUSCULAR; INTRAVENOUS
Status: DISCONTINUED | OUTPATIENT
Start: 2020-02-26 | End: 2020-02-26 | Stop reason: HOSPADM

## 2020-02-26 RX ORDER — SODIUM CHLORIDE 9 MG/ML
INJECTION, SOLUTION INTRAVENOUS CONTINUOUS
Status: DISCONTINUED | OUTPATIENT
Start: 2020-02-26 | End: 2020-02-26 | Stop reason: HOSPADM

## 2020-02-26 RX ORDER — HEPARIN SODIUM (PORCINE) LOCK FLUSH IV SOLN 100 UNIT/ML 100 UNIT/ML
SOLUTION INTRAVENOUS
Status: COMPLETED
Start: 2020-02-26 | End: 2020-02-26

## 2020-02-26 RX ORDER — SUCCINYLCHOLINE CHLORIDE 20 MG/ML
INJECTION INTRAMUSCULAR; INTRAVENOUS PRN
Status: DISCONTINUED | OUTPATIENT
Start: 2020-02-26 | End: 2020-02-26 | Stop reason: SDUPTHER

## 2020-02-26 RX ADMIN — FENTANYL CITRATE 50 MCG: 50 INJECTION INTRAMUSCULAR; INTRAVENOUS at 15:40

## 2020-02-26 RX ADMIN — SUCCINYLCHOLINE CHLORIDE 140 MG: 20 INJECTION, SOLUTION INTRAMUSCULAR; INTRAVENOUS at 15:40

## 2020-02-26 RX ADMIN — LIDOCAINE HYDROCHLORIDE 50 MG: 10 INJECTION, SOLUTION EPIDURAL; INFILTRATION; INTRACAUDAL; PERINEURAL at 15:40

## 2020-02-26 RX ADMIN — TAMSULOSIN HYDROCHLORIDE 0.4 MG: 0.4 CAPSULE ORAL at 17:49

## 2020-02-26 RX ADMIN — DEXAMETHASONE SODIUM PHOSPHATE 10 MG: 10 INJECTION, SOLUTION INTRAMUSCULAR; INTRAVENOUS at 15:48

## 2020-02-26 RX ADMIN — SODIUM CHLORIDE, SODIUM LACTATE, POTASSIUM CHLORIDE, AND CALCIUM CHLORIDE: 600; 310; 30; 20 INJECTION, SOLUTION INTRAVENOUS at 13:38

## 2020-02-26 RX ADMIN — HEPARIN 300 UNITS: 100 SYRINGE at 18:16

## 2020-02-26 RX ADMIN — ROCURONIUM BROMIDE 10 MG: 10 SOLUTION INTRAVENOUS at 15:40

## 2020-02-26 RX ADMIN — ONDANSETRON HYDROCHLORIDE 4 MG: 2 INJECTION, SOLUTION INTRAMUSCULAR; INTRAVENOUS at 15:48

## 2020-02-26 RX ADMIN — SODIUM CHLORIDE 1 G: 9 INJECTION INTRAMUSCULAR; INTRAVENOUS; SUBCUTANEOUS at 15:48

## 2020-02-26 RX ADMIN — SODIUM CHLORIDE, SODIUM LACTATE, POTASSIUM CHLORIDE, AND CALCIUM CHLORIDE: 600; 310; 30; 20 INJECTION, SOLUTION INTRAVENOUS at 16:24

## 2020-02-26 RX ADMIN — PROPOFOL 140 MG: 10 INJECTION, EMULSION INTRAVENOUS at 15:40

## 2020-02-26 RX ADMIN — FENTANYL CITRATE 50 MCG: 50 INJECTION INTRAMUSCULAR; INTRAVENOUS at 16:25

## 2020-02-26 ASSESSMENT — LIFESTYLE VARIABLES: SMOKING_STATUS: 0

## 2020-02-26 ASSESSMENT — PAIN SCALES - GENERAL: PAINLEVEL_OUTOF10: 3

## 2020-02-26 ASSESSMENT — PAIN DESCRIPTION - PAIN TYPE: TYPE: SURGICAL PAIN

## 2020-02-26 ASSESSMENT — ENCOUNTER SYMPTOMS: SHORTNESS OF BREATH: 0
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
## 2020-02-26 NOTE — INTERVAL H&P NOTE
H&P Update    Patient's History and Physical from February 17, 2020 was reviewed. Patient examined. There has been no change.     Electronically signed by Maru Solitario MD on 2/26/20 at 3:00 PM

## 2020-02-27 NOTE — OP NOTE
[facility] [address]                                OPERATIVE REPORT    PATIENT NAME: Kellee Causey                   :        1950  MED REC NO:   [id number]                              ROOM:  ACCOUNT NO:   [de-identified]                           ADMIT DATE: 2020  PROVIDER:     Janet Urias MD    DATE OF PROCEDURE:  2020    TITLE OF OPERATION:  1. Cystoscopy, bilateral ureteral stent removal.  2.  Bilateral ureteral stent replacement, 6 [place] x 22 cm. 3.  Right retrograde pyelogram.  4.  Right ureteroscopy. 5.  Biopsy and fulguration of bladder tumor, 0.5 to 2 cm. PREOPERATIVE DIAGNOSES:  1.  Bilateral ureteral extrinsic obstruction from recurrent colorectal  cancer. 2.  Bilateral hydronephrosis. 3.  Malignant neoplasm at the dome of the urinary bladder. POSTOPERATIVE DIAGNOSES:  1.  Bilateral ureteral extrinsic obstruction from recurrent colorectal  cancer. 2.  Bilateral hydronephrosis. 3.  Malignant neoplasm at the dome of the urinary bladder. 4.  Abnormal-appearing proximal right ureter, on retrograde. ANESTHETIC:  General anesthetic. ATTENDING SURGEON:  Janet Urias MD    HISTORY:  The patient is a 28-year-old gentleman who has recurrent  colorectal cancer. He has developed distal uretal obstruction where the  ureters go over the sacrum. He is under treatment and has done well  with his treatment with improved markers and PET scan; however, he  continues to have bilateral ureteral extrinsic obstruction with  hydronephrosis. These are managed with chronic bilateral indwelling  stents. These were last changed on 2019, and he now presents to  undergo routine bilateral ureteral stent changes. In addition, he also  was found to have superficial bladder cancer, non-muscle invasive.   This  was diagnosed on 2019 and at his last stent change on 2019,  he was found to have procedure was  terminated. He was awakened from his anesthetic and taken to the  recovery room in stable condition.     EBL = Marly Puente MD    D: 02/26/2020 18:25:46      T: 02/26/2020 23:54:45     [email or web address]  Job#: [id number]     Doc#: [id number]    CC:

## 2020-02-28 RX ORDER — TAMSULOSIN HYDROCHLORIDE 0.4 MG/1
0.4 CAPSULE ORAL 2 TIMES DAILY
Qty: 180 CAPSULE | Refills: 3 | Status: SHIPPED | OUTPATIENT
Start: 2020-02-28 | End: 2020-03-03 | Stop reason: SDUPTHER

## 2020-02-28 NOTE — TELEPHONE ENCOUNTER
She was called with the his bladder biopsy pathology report.   We will asked that I renew his prescription at [place] [address] for tamsulosin 0.4 mg twice a day

## 2020-03-03 RX ORDER — TAMSULOSIN HYDROCHLORIDE 0.4 MG/1
0.4 CAPSULE ORAL 2 TIMES DAILY
Qty: 180 CAPSULE | Refills: 3 | Status: SHIPPED | OUTPATIENT
Start: 2020-03-03 | End: 2020-06-15 | Stop reason: ALTCHOICE

## 2020-03-13 ENCOUNTER — APPOINTMENT (OUTPATIENT)
Dept: CT IMAGING | Age: 70
End: 2020-03-13
Payer: MEDICARE

## 2020-03-13 ENCOUNTER — APPOINTMENT (OUTPATIENT)
Dept: GENERAL RADIOLOGY | Age: 70
End: 2020-03-13
Payer: MEDICARE

## 2020-03-13 ENCOUNTER — HOSPITAL ENCOUNTER (EMERGENCY)
Age: 70
Discharge: HOME | End: 2020-03-13
Attending: EMERGENCY MEDICINE
Payer: MEDICARE

## 2020-03-13 VITALS
DIASTOLIC BLOOD PRESSURE: 79 MMHG | WEIGHT: 165 LBS | RESPIRATION RATE: 20 BRPM | BODY MASS INDEX: 27.49 KG/M2 | TEMPERATURE: 98.7 F | OXYGEN SATURATION: 99 % | HEIGHT: 65 IN | HEART RATE: 108 BPM | SYSTOLIC BLOOD PRESSURE: 135 MMHG

## 2020-03-13 LAB
ALBUMIN SERPL-MCNC: 4.2 G/DL (ref 3.5–5.2)
ALP BLD-CCNC: 125 U/L (ref 40–130)
ALT SERPL-CCNC: 12 U/L (ref 5–41)
ANION GAP SERPL CALCULATED.3IONS-SCNC: 16 MMOL/L (ref 7–19)
AST SERPL-CCNC: 23 U/L (ref 5–40)
BILIRUB SERPL-MCNC: 0.3 MG/DL (ref 0.2–1.2)
BUN BLDV-MCNC: 8 MG/DL (ref 8–23)
CALCIUM SERPL-MCNC: 8.6 MG/DL (ref 8.8–10.2)
CHLORIDE BLD-SCNC: 94 MMOL/L (ref 98–111)
CO2: 19 MMOL/L (ref 22–29)
CREAT SERPL-MCNC: 1.1 MG/DL (ref 0.5–1.2)
GFR NON-AFRICAN AMERICAN: >60
GLUCOSE BLD-MCNC: 114 MG/DL (ref 74–109)
HCT VFR BLD CALC: 29.8 % (ref 42–52)
HEMOGLOBIN: 9.5 G/DL (ref 14–18)
MCH RBC QN AUTO: 27.8 PG (ref 27–31)
MCHC RBC AUTO-ENTMCNC: 31.9 G/DL (ref 33–37)
MCV RBC AUTO: 87.1 FL (ref 80–94)
PDW BLD-RTO: 14.2 % (ref 11.5–14.5)
PLATELET # BLD: 312 K/UL (ref 130–400)
PMV BLD AUTO: 10.1 FL (ref 9.4–12.4)
POTASSIUM SERPL-SCNC: 3.9 MMOL/L (ref 3.5–5)
RBC # BLD: 3.42 M/UL (ref 4.7–6.1)
SODIUM BLD-SCNC: 129 MMOL/L (ref 136–145)
TOTAL PROTEIN: 7.5 G/DL (ref 6.6–8.7)
WBC # BLD: 6.1 K/UL (ref 4.8–10.8)

## 2020-03-13 PROCEDURE — 72128 CT CHEST SPINE W/O DYE: CPT

## 2020-03-13 PROCEDURE — 71046 X-RAY EXAM CHEST 2 VIEWS: CPT

## 2020-03-13 PROCEDURE — 80053 COMPREHEN METABOLIC PANEL: CPT

## 2020-03-13 PROCEDURE — 70450 CT HEAD/BRAIN W/O DYE: CPT

## 2020-03-13 PROCEDURE — 85027 COMPLETE CBC AUTOMATED: CPT

## 2020-03-13 PROCEDURE — 96374 THER/PROPH/DIAG INJ IV PUSH: CPT

## 2020-03-13 PROCEDURE — 93005 ELECTROCARDIOGRAM TRACING: CPT | Performed by: EMERGENCY MEDICINE

## 2020-03-13 PROCEDURE — 72131 CT LUMBAR SPINE W/O DYE: CPT

## 2020-03-13 PROCEDURE — 36415 COLL VENOUS BLD VENIPUNCTURE: CPT

## 2020-03-13 PROCEDURE — 6360000002 HC RX W HCPCS: Performed by: EMERGENCY MEDICINE

## 2020-03-13 PROCEDURE — 99284 EMERGENCY DEPT VISIT MOD MDM: CPT

## 2020-03-13 PROCEDURE — 72125 CT NECK SPINE W/O DYE: CPT

## 2020-03-13 RX ADMIN — HYDROMORPHONE HYDROCHLORIDE 0.5 MG: 1 INJECTION, SOLUTION INTRAMUSCULAR; INTRAVENOUS; SUBCUTANEOUS at 11:34

## 2020-03-13 ASSESSMENT — ENCOUNTER SYMPTOMS
BACK PAIN: 1
SHORTNESS OF BREATH: 0
VOMITING: 0
ABDOMINAL PAIN: 0
EYE PAIN: 0
DIARRHEA: 0

## 2020-03-13 ASSESSMENT — PAIN SCALES - GENERAL
PAINLEVEL_OUTOF10: 8
PAINLEVEL_OUTOF10: 6
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
## 2020-03-15 LAB
EKG P AXIS: 35 DEGREES
EKG P-R INTERVAL: 174 MS
EKG Q-T INTERVAL: 352 MS
EKG QRS DURATION: 94 MS
EKG QTC CALCULATION (BAZETT): 413 MS
EKG T AXIS: 24 DEGREES

## 2020-04-07 ENCOUNTER — HOSPITAL ENCOUNTER (OUTPATIENT)
Dept: INFUSION THERAPY | Age: 70
Discharge: HOME | End: 2020-04-07
Payer: MEDICARE

## 2020-04-07 ENCOUNTER — TELEPHONE (OUTPATIENT)
Dept: HEMATOLOGY | Age: 70
End: 2020-04-07

## 2020-04-07 ENCOUNTER — CLINICAL DOCUMENTATION (OUTPATIENT)
Dept: HEMATOLOGY | Age: 70
End: 2020-04-07

## 2020-04-07 ENCOUNTER — OFFICE VISIT (OUTPATIENT)
Dept: HEMATOLOGY | Age: 70
End: 2020-04-07
Payer: MEDICARE

## 2020-04-07 VITALS
HEIGHT: 65 IN | BODY MASS INDEX: 28.84 KG/M2 | WEIGHT: 173.1 LBS | SYSTOLIC BLOOD PRESSURE: 122 MMHG | HEART RATE: 74 BPM | OXYGEN SATURATION: 96 % | DIASTOLIC BLOOD PRESSURE: 64 MMHG | TEMPERATURE: 97.7 F

## 2020-04-07 DIAGNOSIS — Z45.2 ENCOUNTER FOR CENTRAL LINE CARE: Primary | ICD-10-CM

## 2020-04-07 DIAGNOSIS — C67.1 MALIGNANT NEOPLASM OF DOME OF URINARY BLADDER (HCC): ICD-10-CM

## 2020-04-07 DIAGNOSIS — D64.81 ANEMIA ASSOCIATED WITH CHEMOTHERAPY: ICD-10-CM

## 2020-04-07 DIAGNOSIS — C18.9 METASTASIS FROM COLON CANCER (HCC): ICD-10-CM

## 2020-04-07 DIAGNOSIS — C79.9 METASTASIS FROM COLON CANCER (HCC): ICD-10-CM

## 2020-04-07 DIAGNOSIS — T45.1X5A ANEMIA ASSOCIATED WITH CHEMOTHERAPY: ICD-10-CM

## 2020-04-07 PROBLEM — C68.0 URETHRAL CANCER (HCC): Status: ACTIVE | Noted: 2020-04-07

## 2020-04-07 PROCEDURE — 3017F COLORECTAL CA SCREEN DOC REV: CPT | Performed by: NURSE PRACTITIONER

## 2020-04-07 PROCEDURE — 82607 VITAMIN B-12: CPT

## 2020-04-07 PROCEDURE — G8427 DOCREV CUR MEDS BY ELIG CLIN: HCPCS | Performed by: NURSE PRACTITIONER

## 2020-04-07 PROCEDURE — 4040F PNEUMOC VAC/ADMIN/RCVD: CPT | Performed by: NURSE PRACTITIONER

## 2020-04-07 PROCEDURE — 99214 OFFICE O/P EST MOD 30 MIN: CPT | Performed by: NURSE PRACTITIONER

## 2020-04-07 PROCEDURE — 2580000003 HC RX 258: Performed by: NURSE PRACTITIONER

## 2020-04-07 PROCEDURE — 82378 CARCINOEMBRYONIC ANTIGEN: CPT

## 2020-04-07 PROCEDURE — 83550 IRON BINDING TEST: CPT

## 2020-04-07 PROCEDURE — 36591 DRAW BLOOD OFF VENOUS DEVICE: CPT

## 2020-04-07 PROCEDURE — 82728 ASSAY OF FERRITIN: CPT

## 2020-04-07 PROCEDURE — 96523 IRRIG DRUG DELIVERY DEVICE: CPT

## 2020-04-07 PROCEDURE — 1036F TOBACCO NON-USER: CPT | Performed by: NURSE PRACTITIONER

## 2020-04-07 PROCEDURE — 80053 COMPREHEN METABOLIC PANEL: CPT

## 2020-04-07 PROCEDURE — G8417 CALC BMI ABV UP PARAM F/U: HCPCS | Performed by: NURSE PRACTITIONER

## 2020-04-07 PROCEDURE — 82746 ASSAY OF FOLIC ACID SERUM: CPT

## 2020-04-07 PROCEDURE — 85025 COMPLETE CBC W/AUTO DIFF WBC: CPT

## 2020-04-07 PROCEDURE — 83540 ASSAY OF IRON: CPT

## 2020-04-07 PROCEDURE — 6360000002 HC RX W HCPCS: Performed by: NURSE PRACTITIONER

## 2020-04-07 PROCEDURE — 1123F ACP DISCUSS/DSCN MKR DOCD: CPT | Performed by: NURSE PRACTITIONER

## 2020-04-07 RX ORDER — HEPARIN SODIUM (PORCINE) LOCK FLUSH IV SOLN 100 UNIT/ML 100 UNIT/ML
500 SOLUTION INTRAVENOUS PRN
Status: CANCELLED | OUTPATIENT
Start: 2020-04-07

## 2020-04-07 RX ORDER — SODIUM CHLORIDE 0.9 % (FLUSH) 0.9 %
10 SYRINGE (ML) INJECTION PRN
Status: DISCONTINUED | OUTPATIENT
Start: 2020-04-07 | End: 2020-04-08 | Stop reason: HOSPADM

## 2020-04-07 RX ORDER — ONDANSETRON 4 MG/1
8 TABLET, FILM COATED ORAL EVERY 8 HOURS PRN
Qty: 30 TABLET | Refills: 2 | Status: SHIPPED | OUTPATIENT
Start: 2020-04-07 | End: 2020-10-21 | Stop reason: SDUPTHER

## 2020-04-07 RX ORDER — FERROUS SULFATE 325(65) MG
325 TABLET ORAL 2 TIMES DAILY
Qty: 180 TABLET | Refills: 1 | Status: SHIPPED | OUTPATIENT
Start: 2020-04-07 | End: 2020-10-09 | Stop reason: SDUPTHER

## 2020-04-07 RX ORDER — HEPARIN SODIUM (PORCINE) LOCK FLUSH IV SOLN 100 UNIT/ML 100 UNIT/ML
500 SOLUTION INTRAVENOUS PRN
Status: DISCONTINUED | OUTPATIENT
Start: 2020-04-07 | End: 2020-04-08 | Stop reason: HOSPADM

## 2020-04-07 RX ORDER — SODIUM CHLORIDE 0.9 % (FLUSH) 0.9 %
20 SYRINGE (ML) INJECTION PRN
Status: CANCELLED | OUTPATIENT
Start: 2020-04-07

## 2020-04-07 RX ORDER — SODIUM CHLORIDE 0.9 % (FLUSH) 0.9 %
10 SYRINGE (ML) INJECTION PRN
Status: CANCELLED | OUTPATIENT
Start: 2020-04-07

## 2020-04-07 RX ADMIN — SODIUM CHLORIDE, PRESERVATIVE FREE 10 ML: 5 INJECTION INTRAVENOUS at 11:02

## 2020-04-07 RX ADMIN — HEPARIN 500 UNITS: 100 SYRINGE at 11:02

## 2020-04-07 ASSESSMENT — ENCOUNTER SYMPTOMS
BLOOD IN STOOL: 0
SHORTNESS OF BREATH: 0
EYES NEGATIVE: 1
GASTROINTESTINAL NEGATIVE: 1
BACK PAIN: 1
VOMITING: 0
CONSTIPATION: 0
EYE PAIN: 0
SORE THROAT: 0
EYE REDNESS: 0
DIARRHEA: 0
COUGH: 0
WHEEZING: 0
ABDOMINAL PAIN: 0
NAUSEA: 0
EYE DISCHARGE: 0
RESPIRATORY NEGATIVE: 1

## 2020-04-08 ENCOUNTER — APPOINTMENT (OUTPATIENT)
Dept: INFUSION THERAPY | Age: 70
End: 2020-04-08
Payer: MEDICARE

## 2020-04-08 PROBLEM — E61.1 IRON DEFICIENCY: Status: ACTIVE | Noted: 2020-04-08

## 2020-04-15 ENCOUNTER — HOSPITAL ENCOUNTER (OUTPATIENT)
Dept: CT IMAGING | Age: 70
Discharge: HOME | End: 2020-04-15
Payer: MEDICARE

## 2020-04-15 ENCOUNTER — CLINICAL DOCUMENTATION (OUTPATIENT)
Dept: HEMATOLOGY | Age: 70
End: 2020-04-15

## 2020-04-15 LAB
GFR NON-AFRICAN AMERICAN: 47
PERFORMED ON: ABNORMAL
POC CREATININE: 1.5 MG/DL (ref 0.3–1.3)
POC SAMPLE TYPE: ABNORMAL

## 2020-04-15 PROCEDURE — 71260 CT THORAX DX C+: CPT

## 2020-04-15 PROCEDURE — 74177 CT ABD & PELVIS W/CONTRAST: CPT

## 2020-04-15 PROCEDURE — 82565 ASSAY OF CREATININE: CPT

## 2020-04-15 PROCEDURE — 6360000004 HC RX CONTRAST MEDICATION: Performed by: NURSE PRACTITIONER

## 2020-04-15 RX ADMIN — IOPAMIDOL 75 ML: 755 INJECTION, SOLUTION INTRAVENOUS at 10:11

## 2020-04-15 NOTE — PROGRESS NOTES
Spoke with Dr Akbar Briones related to CT scan of the chest results indicating new, unstable, horizontal fracture through the T6 vertebral body. New posterior 11th and 12th rib fractures are also noted. Todd Ortega with Mr. Jacquie Gonzalez related to the CT scan results and the new fracture at T6. He is having back pain and right lower extremity numbness. He reports that he fell in the bathtub earlier this week. Mr. Jacquie Gonzalez request to be seen by neurosurgeon at [place], a referral has been made to Dr. El Guillory.

## 2020-04-16 ENCOUNTER — OFFICE VISIT (OUTPATIENT)
Dept: NEUROSURGERY | Age: 70
End: 2020-04-16
Payer: MEDICARE

## 2020-04-16 VITALS
BODY MASS INDEX: 28.32 KG/M2 | OXYGEN SATURATION: 99 % | HEART RATE: 83 BPM | WEIGHT: 170 LBS | DIASTOLIC BLOOD PRESSURE: 70 MMHG | SYSTOLIC BLOOD PRESSURE: 123 MMHG | HEIGHT: 65 IN

## 2020-04-16 PROCEDURE — G8417 CALC BMI ABV UP PARAM F/U: HCPCS | Performed by: NEUROLOGICAL SURGERY

## 2020-04-16 PROCEDURE — G8428 CUR MEDS NOT DOCUMENT: HCPCS | Performed by: NEUROLOGICAL SURGERY

## 2020-04-16 PROCEDURE — 99214 OFFICE O/P EST MOD 30 MIN: CPT | Performed by: NEUROLOGICAL SURGERY

## 2020-04-16 PROCEDURE — 3017F COLORECTAL CA SCREEN DOC REV: CPT | Performed by: NEUROLOGICAL SURGERY

## 2020-04-16 PROCEDURE — 1036F TOBACCO NON-USER: CPT | Performed by: NEUROLOGICAL SURGERY

## 2020-04-16 PROCEDURE — 1123F ACP DISCUSS/DSCN MKR DOCD: CPT | Performed by: NEUROLOGICAL SURGERY

## 2020-04-16 PROCEDURE — 4040F PNEUMOC VAC/ADMIN/RCVD: CPT | Performed by: NEUROLOGICAL SURGERY

## 2020-04-16 ASSESSMENT — ENCOUNTER SYMPTOMS
GASTROINTESTINAL NEGATIVE: 1
RESPIRATORY NEGATIVE: 1
EYES NEGATIVE: 1
BACK PAIN: 1

## 2020-04-16 NOTE — PROGRESS NOTES
Gastrointestinal: Negative. Genitourinary: Negative. Musculoskeletal: Positive for back pain. Skin: Negative. Neurological: Negative. Endo/Heme/Allergies: Negative. Psychiatric/Behavioral: Negative. PHYSICAL EXAM:  Vitals:    04/16/20 1138   BP: 123/70   Pulse: 83   SpO2: 99%     Constitutional: appears well-developed and well-nourished. Eyes - conjunctiva normal.  Pupils react to light  Ear, nose, throat - hearing intact to finger rub, No scars, masses, or lesions over external nose or ears, no atrophy oftongue  Neck- symmetric, no masses noted, no jugular vein distension  Respiration- chest wall appears symmetric, good expansion, normal effort without use of accessory muscles  Musculoskeletal - no significant wasting of muscles noted, no bony deformities, gait no gross ataxia  Extremities- no clubbing, cyanosis oredema  Skin - warm, dry, and intact. No rash, erythema, or pallor. Psychiatric - mood, affect, and behavior appear normal.     Neurologic Examination  Awake, Alert and oriented x 4  Normal speech pattern, following commands    Motor:  RIGHT:     iliopsoas 1/5    knee flexor 5/5    knee extension 5/5    EHL/dorsiflexion 5/5    plantar flexion 5/5    LEFT:      iliopsoas 5/5    knee flexor 5/5    knee extension 5/5    EHL/dorsiflexion 5/5    plantar flexion 5/5    No deficits to light touch or pinprick sensation  Reflexes are 2+ and symmetric  No myofacial tenderness to palpation  Ambulating with walker        DATA and IMAGING:    Nursing/pcp notes, imaging, labs, and vitals reviewed.      PT,OT and/or speech notes reviewed    Lab Results   Component Value Date    WBC 6.1 03/13/2020    HGB 9.5 (L) 03/13/2020    HCT 29.8 (L) 03/13/2020    MCV 87.1 03/13/2020     03/13/2020     Lab Results   Component Value Date     (L) 03/13/2020    K 3.9 03/13/2020    CL 94 (L) 03/13/2020    CO2 19 (L) 03/13/2020    BUN 8 03/13/2020    CREATININE 1.5 (H) 04/15/2020    GLUCOSE 114 (H) 03/13/2020    CALCIUM 8.6 (L) 03/13/2020    PROT 7.5 03/13/2020    LABALBU 4.2 03/13/2020    BILITOT 0.3 03/13/2020    ALKPHOS 125 03/13/2020    AST 23 03/13/2020    ALT 12 03/13/2020    LABGLOM 47 (A) 04/15/2020    GFRAA 80 02/11/2020    AGRATIO 1.6 02/11/2020    GLOB 2.1 02/11/2020     Lab Results   Component Value Date    INR 1.10 01/30/2020    INR 1.10 08/16/2019    INR 0.99 11/06/2017    PROTIME 13.6 01/30/2020    PROTIME 13.6 08/16/2019    PROTIME 13.0 11/06/2017     Narrative   CT CERVICAL SPINE WO CONTRAST 3/13/2020 10:30 AM   HISTORY: Fall, head injury   COMPARISON: CT scan dated 10/7/2015    DOSE LENGTH PRODUCT: 579 mGy cm   TECHNIQUE: Serial helical tomographic images of the cervical spine   were obtained without the use of intravenous contrast. Additionally,   sagittal and coronal reformatted images were also provided for review. Automated exposure control was also utilized to decrease patient   radiation dose. FINDINGS:    Nondisplaced fracture of the posterior C1 arch just behind the lateral   mass. Fracture line is somewhat irregular, appearing corticated on the   sagittal images. No second fracture identified involving the C1 ring. The cervical spine is fused from C2 through C7 and there is been prior   laminectomy at all of these levels. I do not see an obvious acute   fracture in the cervical spine. Overall alignment is stable. The   paraspinal soft tissues are unremarkable. Lung apices are clear. Partially imaged right IJ catheter. Thyroid gland is homogeneous.       Impression   1. There is a solitary nondisplaced fracture involving the posterior   C1 ring just behind the left lateral mass. This is new from 1 but   is likely to be chronic. The fracture line is nonlinear and is   corticated, which would suggest against an acute fracture. 2. Prior anterior spinal fusion with posterior canal decompression. Overall alignment is stable.    Signed by Dr Rajeev Vickers on 3/13/2020 12:27 PM

## 2020-04-21 NOTE — PROGRESS NOTES
bone metastasis were documented.      In relation to his history of high-grade papillary urothelial carcinoma, noninvasive, Zurdo continues to follow Dr. Patrica Cavanaugh  He had a cystoscopy and bilateral ureteral stent removal/replacement on 2/26/2020 . The operative note by Dr. Jorge Luis Bowers documented bilateral hydronephrosis and obtained biopsy of the bladder in the mid dome and left anterior lateral wall x2. Pathology documented high-grade papillary urethral carcinoma, noninvasive, stage pTaNx.          The patient presents today for further discussion. He had a CT chest abdomen pelvis that showed no gross evidence of metastatic disease likely consistent with grade treatment response. He is here today for further decision regarding reinitiating his maintenance therapy. The patient was also seen by orthopedic surgery with plans for back surgery in the near future. ONCOLOGIC HISTORY:     Diagnosis:  1. Moderately differentiated rectal carcinoma, T3N0Mx, diagnosed in 3/9/2009  2. Noninvasive high-grade papillary urethral carcinoma.  Negative for evidence of detrusor muscle invasion, pTa, pNx on 8/18/2019   3. Metastatic colorectal carcinoma, 9/3/2019  ? MSI stable and mutations for BRAF, NRAS, KRAS were not detected.          TREATMENT SUMMARIES:  · 4/9/2009-5/27/2009-received neoadjuvant chemotherapy with 5-FU CIV along with radiation therapy for a total of 5400 cG  · 7/15/2009-rectum resection revealed no residual malignancy, complete pathological response. · 8/18/2019- transurethral resection of bladder tumor (TURBT)   · 9/18/2019-12/26/2019 palliative chemotherapy with modified FOLFOX 7  (Oxaliplatin 85 mg/m² IV day 1, leucovorin 400 mg/m² IV day 1 and 5-FU 2400 mg/m² IV continuous infusion over 46 to 48 hours for a total of 7 cycles.    · 1/28/2020 -palliative maintenance therapy with leucovorin 400 mg/m² IV over 2 hours on day 1, followed by 5-FU bolus 400 mg/m² and then 1200 mg/m²/day x2 days (total 2400 mg activity within the right pelvic wall soft tissue showing SUV of 5.4.  Abnormal soft tissue metabolic activity in the right abductor muscle with SUV of 6.4.  Focally increased activity to the right of the inferior L5 vertebrae body posterior with SUV of 7.9 with associated sclerotic changes. · 8/29/2019-  Dr. Stella Swain completed a cystoscopy with double-J ureter stent in the left ureter for left hydronephrosis  · 9/3/2019- CT-guided right abductor muscle biopsy on 9/3/2019 with pathology identifying metastatic adenocarcinoma consistent with colorectal origin.  Molecular panel from biopsy tissue revealed MSI stable and mutations for BRAF, NRAS, KRAS were not detected.    · 9/18/2019 - Palliative chemotherapy with modified FOLFOX 7  (Oxaliplatin 85 mg/m² IV day 1, leucovorin 400 mg/m² IV day 1 and 5-FU 2400 mg/m² IV continuous infusion over 46 to 48 hours) with the anticipation of adding Avastin 5 mg/kg day 1 every 14 days  · 10/15/2019- 24-hour urine for protein with a total protein of 1785 mg per 24-hour.  Zurdo has been evaluated by Dr. An Phelps and he reports no significant concerns related to the protein. · CEA of 5.6 on 11/6/2019 significantly improved compared to CEA of 14.0 on 8/30/2019. · 11/15/2019 -CT scan of the abdomen and pelvis documented no evidence of disease progression with significant decrease in the size of enhancing nodules in the right pelvic abductor musculature, a previous 1.8 cm nodule now measures 5 mm.  No new or enlarging retroperitoneal, mesenteric, pelvic or inguinal lymph nodes.  Calcified presacral mass unchanged measuring 5 x 3.7 cm.   · 11/15/2019 -CT scan of the chest documented multiple small pulmonary nodules reidentified, largest nodule in the RUL measures 5 mm compared to 7.5 mm, RLL nodule measures 3.4 mm compared to 5.9 mm, VIC nodule measures 4 mm compared to 6 mm.  A cluster of small nodules in the RUL anteriorly are barely visible on this study. Willim Edu is a decrease in CARDIOVASCULAR: RRR, no murmurs. No lower extremity edema   ABDOMEN: soft non-tender, active bowel sounds, no hepatosplenomegaly. No palpable masses. EXTREMITIES: warm, Full ROM of all fours extremities. No focal weakness. SKIN: warm, dry with no rashes or lesions  LYMPH: No cervical, clavicular, axillary, or inguinal lymphadenopathy  NEUROLOGIC: follows commands, non focal.   PSYCH: mood and affect appropriate. Alert and oriented to time and place and person. Relevant Lab findings/reviewed by me:  JDG:3/34/4030  WBC-5.21  HGB-8.3  PLT-223,000  Neut-3.61    4/7/2020:  Sodium-133  Calcium-7.5  ALTV-8.00  Iron-45  TIBC-288  Sat%-15.6  Ferritin-13.50  Folate-18.00    CEA-20 (our lab), repeated CEA with the hospital lab CEA = 0.9    Relevant Imaging studies/reviewed by me:  Ct Chest W Contrast    Result Date: 4/15/2020  1. Minimal interval increase in size of subcentimeter pulmonary nodules. The largest now measures 6 mm in the medial right lower lobe on axial image 80. 2. There is a new, unstable, horizontal fracture through the T6 vertebral body. Additionally, there are new fractures through the posterior 11th and 12th right ribs. The bones are moderately osteopenic. The finding of an unstable fracture through the T6 vertebral body was discussed with Ana Mercedes    Ct Abdomen Pelvis W Iv Contrast     Result Date: 4/15/2020  1. Patient has undergone interval resection of the distal small bowel, and there is a 2.8 cm fluid collection in the presacral operative bed. This contains a tiny focus of air. This may postoperative or due to infection. Please correlate with the patient's clinical symptoms and laboratory markers. 2. Improved hydronephrosis and hydroureter. 3. Diffuse osteoporosis. 4. Findings in the lower chest are described in a separate dictation.          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 Edema has significantly improved. Although he continues to have significant weakness to his right lower extremity.       Sonya Watt reports that he is been experiencing multiple falls due to weakness in his right lower extremity. He was evaluated in the emergency room at [facility]) on 3/13/2020, CT scan of the cervical, thoracic and lumbar  regions were completed no acute fractures or suggestion of bone metastasis were documented.      Sonya Watt reports that since his fall on 3/13/2020 he has had new numbness and tingling in his left foot, I encouraged Sonya Watt to follow-up with pain management and Dr. Alex Plummer to see if an evaluation by neurosurgery is warranted.     5. Other fatigue, chronic. Grade 1 and stable.      6. Non invasive Urothelial Bladder Cancer ([address] 8/18/2019. Repeat cystoscopy and bilateral ureteral stent removal/replacement on 2/26/2020 . The operative note by Dr. Stella Swain documented bilateral hydronephrosis and obtained biopsy of the bladder in the mid dome and left anterior lateral wall x2. Pathology documented high-grade papillary urethral carcinoma, noninvasive, stage pTaNx. As per Urology       7. History of small bowel obstruction  Sonya Watt underwent exploratory laparotomy, removal of adhesions, small bowel resection with primary anastomosis and partial thickness small bowel repair on 3/5/2020 by Dr. Yaya Diggs at [facility]. In the operative note Dr. Cherie Santiago reported no evidence of carcinomatosis within the abdomen and the liver was unable to be examined due to extent of right upper quadrant adhesions. Pathology from small intestine documented no evidence of malignancy. 8. T6 fracture- as per orthopedic surgery. Planned surgery        FOLLOW UP:  1. Repeat scans Sep 2009.   2. CMP an CEA today  3. RTC 4 weeks  4.  Follow-up with other medical providers as

## 2020-04-22 ENCOUNTER — APPOINTMENT (OUTPATIENT)
Dept: INFUSION THERAPY | Age: 70
End: 2020-04-22
Payer: MEDICARE

## 2020-04-22 ENCOUNTER — HOSPITAL ENCOUNTER (OUTPATIENT)
Dept: INFUSION THERAPY | Age: 70
Discharge: HOME | End: 2020-04-22
Payer: MEDICARE

## 2020-04-22 ENCOUNTER — OFFICE VISIT (OUTPATIENT)
Dept: HEMATOLOGY | Age: 70
End: 2020-04-22
Payer: MEDICARE

## 2020-04-22 ENCOUNTER — HOSPITAL ENCOUNTER (OUTPATIENT)
Dept: INFUSION THERAPY | Age: 70
Setting detail: INFUSION SERIES
Discharge: HOME | End: 2020-04-22
Payer: MEDICARE

## 2020-04-22 VITALS
TEMPERATURE: 98.6 F | OXYGEN SATURATION: 95 % | HEIGHT: 65 IN | WEIGHT: 182.4 LBS | HEART RATE: 70 BPM | SYSTOLIC BLOOD PRESSURE: 122 MMHG | BODY MASS INDEX: 30.39 KG/M2 | DIASTOLIC BLOOD PRESSURE: 80 MMHG

## 2020-04-22 VITALS — RESPIRATION RATE: 17 BRPM | HEART RATE: 66 BPM | SYSTOLIC BLOOD PRESSURE: 123 MMHG | DIASTOLIC BLOOD PRESSURE: 63 MMHG

## 2020-04-22 DIAGNOSIS — C67.1 MALIGNANT NEOPLASM OF DOME OF URINARY BLADDER (HCC): ICD-10-CM

## 2020-04-22 DIAGNOSIS — C18.9 METASTASIS FROM COLON CANCER (HCC): Primary | ICD-10-CM

## 2020-04-22 DIAGNOSIS — E61.1 IRON DEFICIENCY: ICD-10-CM

## 2020-04-22 DIAGNOSIS — C19 COLORECTAL CANCER (HCC): ICD-10-CM

## 2020-04-22 DIAGNOSIS — C79.9 METASTASIS FROM COLON CANCER (HCC): Primary | ICD-10-CM

## 2020-04-22 LAB — CEA: 0.9 NG/ML (ref 0–4.7)

## 2020-04-22 PROCEDURE — 96366 THER/PROPH/DIAG IV INF ADDON: CPT

## 2020-04-22 PROCEDURE — 96409 CHEMO IV PUSH SNGL DRUG: CPT

## 2020-04-22 PROCEDURE — 1036F TOBACCO NON-USER: CPT | Performed by: INTERNAL MEDICINE

## 2020-04-22 PROCEDURE — 99211 OFF/OP EST MAY X REQ PHY/QHP: CPT

## 2020-04-22 PROCEDURE — 96367 TX/PROPH/DG ADDL SEQ IV INF: CPT

## 2020-04-22 PROCEDURE — 85025 COMPLETE CBC W/AUTO DIFF WBC: CPT

## 2020-04-22 PROCEDURE — 6360000002 HC RX W HCPCS: Performed by: INTERNAL MEDICINE

## 2020-04-22 PROCEDURE — 3017F COLORECTAL CA SCREEN DOC REV: CPT | Performed by: INTERNAL MEDICINE

## 2020-04-22 PROCEDURE — 82378 CARCINOEMBRYONIC ANTIGEN: CPT

## 2020-04-22 PROCEDURE — 2580000003 HC RX 258: Performed by: INTERNAL MEDICINE

## 2020-04-22 PROCEDURE — G8427 DOCREV CUR MEDS BY ELIG CLIN: HCPCS | Performed by: INTERNAL MEDICINE

## 2020-04-22 PROCEDURE — 99214 OFFICE O/P EST MOD 30 MIN: CPT | Performed by: INTERNAL MEDICINE

## 2020-04-22 PROCEDURE — 36591 DRAW BLOOD OFF VENOUS DEVICE: CPT

## 2020-04-22 PROCEDURE — G8417 CALC BMI ABV UP PARAM F/U: HCPCS | Performed by: INTERNAL MEDICINE

## 2020-04-22 PROCEDURE — 1123F ACP DISCUSS/DSCN MKR DOCD: CPT | Performed by: INTERNAL MEDICINE

## 2020-04-22 PROCEDURE — G0498 CHEMO EXTEND IV INFUS W/PUMP: HCPCS

## 2020-04-22 PROCEDURE — 4040F PNEUMOC VAC/ADMIN/RCVD: CPT | Performed by: INTERNAL MEDICINE

## 2020-04-22 RX ORDER — HEPARIN SODIUM (PORCINE) LOCK FLUSH IV SOLN 100 UNIT/ML 100 UNIT/ML
500 SOLUTION INTRAVENOUS PRN
Status: CANCELLED | OUTPATIENT
Start: 2020-04-22

## 2020-04-22 RX ORDER — FLUOROURACIL 50 MG/ML
400 INJECTION, SOLUTION INTRAVENOUS ONCE
Status: COMPLETED | OUTPATIENT
Start: 2020-04-22 | End: 2020-04-22

## 2020-04-22 RX ORDER — DIPHENHYDRAMINE HYDROCHLORIDE 50 MG/ML
50 INJECTION INTRAMUSCULAR; INTRAVENOUS ONCE
Status: CANCELLED | OUTPATIENT
Start: 2020-04-29

## 2020-04-22 RX ORDER — SODIUM CHLORIDE 0.9 % (FLUSH) 0.9 %
5 SYRINGE (ML) INJECTION PRN
Status: CANCELLED | OUTPATIENT
Start: 2020-04-22

## 2020-04-22 RX ORDER — SODIUM CHLORIDE 9 MG/ML
INJECTION, SOLUTION INTRAVENOUS CONTINUOUS
Status: CANCELLED | OUTPATIENT
Start: 2020-04-22

## 2020-04-22 RX ORDER — METHYLPREDNISOLONE SODIUM SUCCINATE 125 MG/2ML
125 INJECTION, POWDER, LYOPHILIZED, FOR SOLUTION INTRAMUSCULAR; INTRAVENOUS ONCE
Status: CANCELLED | OUTPATIENT
Start: 2020-04-29

## 2020-04-22 RX ORDER — METHYLPREDNISOLONE SODIUM SUCCINATE 125 MG/2ML
125 INJECTION, POWDER, LYOPHILIZED, FOR SOLUTION INTRAMUSCULAR; INTRAVENOUS ONCE
Status: CANCELLED | OUTPATIENT
Start: 2020-04-22

## 2020-04-22 RX ORDER — DIPHENHYDRAMINE HYDROCHLORIDE 50 MG/ML
50 INJECTION INTRAMUSCULAR; INTRAVENOUS ONCE
Status: CANCELLED | OUTPATIENT
Start: 2020-04-22

## 2020-04-22 RX ORDER — SODIUM CHLORIDE 0.9 % (FLUSH) 0.9 %
10 SYRINGE (ML) INJECTION PRN
Status: CANCELLED | OUTPATIENT
Start: 2020-04-29

## 2020-04-22 RX ORDER — SODIUM CHLORIDE 0.9 % (FLUSH) 0.9 %
10 SYRINGE (ML) INJECTION PRN
Status: CANCELLED | OUTPATIENT
Start: 2020-04-22

## 2020-04-22 RX ORDER — SODIUM CHLORIDE 9 MG/ML
INJECTION, SOLUTION INTRAVENOUS ONCE
Status: CANCELLED | OUTPATIENT
Start: 2020-04-22

## 2020-04-22 RX ORDER — SODIUM CHLORIDE 9 MG/ML
INJECTION, SOLUTION INTRAVENOUS ONCE
Status: COMPLETED | OUTPATIENT
Start: 2020-04-22 | End: 2020-04-22

## 2020-04-22 RX ORDER — HEPARIN SODIUM (PORCINE) LOCK FLUSH IV SOLN 100 UNIT/ML 100 UNIT/ML
500 SOLUTION INTRAVENOUS PRN
Status: DISCONTINUED | OUTPATIENT
Start: 2020-04-22 | End: 2020-04-23 | Stop reason: HOSPADM

## 2020-04-22 RX ORDER — DEXAMETHASONE SODIUM PHOSPHATE 10 MG/ML
10 INJECTION, SOLUTION INTRAMUSCULAR; INTRAVENOUS ONCE
Status: COMPLETED | OUTPATIENT
Start: 2020-04-22 | End: 2020-04-22

## 2020-04-22 RX ORDER — EPINEPHRINE 1 MG/ML
0.3 INJECTION, SOLUTION, CONCENTRATE INTRAVENOUS PRN
Status: CANCELLED | OUTPATIENT
Start: 2020-04-29

## 2020-04-22 RX ORDER — HEPARIN SODIUM (PORCINE) LOCK FLUSH IV SOLN 100 UNIT/ML 100 UNIT/ML
500 SOLUTION INTRAVENOUS PRN
Status: CANCELLED | OUTPATIENT
Start: 2020-04-29

## 2020-04-22 RX ORDER — SODIUM CHLORIDE 9 MG/ML
INJECTION, SOLUTION INTRAVENOUS CONTINUOUS
Status: CANCELLED | OUTPATIENT
Start: 2020-04-29

## 2020-04-22 RX ORDER — SODIUM CHLORIDE 0.9 % (FLUSH) 0.9 %
5 SYRINGE (ML) INJECTION PRN
Status: CANCELLED | OUTPATIENT
Start: 2020-04-29

## 2020-04-22 RX ORDER — SODIUM CHLORIDE 9 MG/ML
INJECTION, SOLUTION INTRAVENOUS CONTINUOUS
Status: ACTIVE | OUTPATIENT
Start: 2020-04-22 | End: 2020-04-22

## 2020-04-22 RX ORDER — EPINEPHRINE 1 MG/ML
0.3 INJECTION, SOLUTION, CONCENTRATE INTRAVENOUS PRN
Status: CANCELLED | OUTPATIENT
Start: 2020-04-22

## 2020-04-22 RX ORDER — SODIUM CHLORIDE 0.9 % (FLUSH) 0.9 %
10 SYRINGE (ML) INJECTION PRN
Status: DISCONTINUED | OUTPATIENT
Start: 2020-04-22 | End: 2020-04-23 | Stop reason: HOSPADM

## 2020-04-22 RX ORDER — FLUOROURACIL 50 MG/ML
400 INJECTION, SOLUTION INTRAVENOUS ONCE
Status: CANCELLED | OUTPATIENT
Start: 2020-04-22

## 2020-04-22 RX ADMIN — SODIUM CHLORIDE: 9 INJECTION, SOLUTION INTRAVENOUS at 12:23

## 2020-04-22 RX ADMIN — SODIUM CHLORIDE: 9 INJECTION, SOLUTION INTRAVENOUS at 12:22

## 2020-04-22 RX ADMIN — DEXAMETHASONE SODIUM PHOSPHATE 10 MG: 10 INJECTION, SOLUTION INTRAMUSCULAR; INTRAVENOUS at 12:22

## 2020-04-22 RX ADMIN — FERUMOXYTOL 510 MG: 510 INJECTION INTRAVENOUS at 11:31

## 2020-04-22 RX ADMIN — FLUOROURACIL 4630 MG: 50 INJECTION, SOLUTION INTRAVENOUS at 14:59

## 2020-04-22 RX ADMIN — FLUOROURACIL 770 MG: 50 INJECTION, SOLUTION INTRAVENOUS at 14:59

## 2020-04-22 RX ADMIN — ONDANSETRON 16 MG: 2 INJECTION INTRAMUSCULAR; INTRAVENOUS at 11:54

## 2020-04-22 RX ADMIN — LEUCOVORIN CALCIUM 750 MG: 350 INJECTION, POWDER, LYOPHILIZED, FOR SUSPENSION INTRAMUSCULAR; INTRAVENOUS at 12:28

## 2020-04-24 ENCOUNTER — HOSPITAL ENCOUNTER (OUTPATIENT)
Dept: INFUSION THERAPY | Age: 70
Setting detail: INFUSION SERIES
Discharge: HOME | End: 2020-04-24
Payer: MEDICARE

## 2020-04-24 DIAGNOSIS — Z45.2 ENCOUNTER FOR CENTRAL LINE CARE: Primary | ICD-10-CM

## 2020-04-24 PROCEDURE — 96523 IRRIG DRUG DELIVERY DEVICE: CPT

## 2020-04-24 PROCEDURE — 2580000003 HC RX 258: Performed by: NURSE PRACTITIONER

## 2020-04-24 PROCEDURE — 6360000002 HC RX W HCPCS: Performed by: NURSE PRACTITIONER

## 2020-04-24 RX ORDER — SODIUM CHLORIDE 0.9 % (FLUSH) 0.9 %
10 SYRINGE (ML) INJECTION PRN
Status: DISCONTINUED | OUTPATIENT
Start: 2020-04-24 | End: 2020-04-25 | Stop reason: HOSPADM

## 2020-04-24 RX ORDER — SODIUM CHLORIDE 0.9 % (FLUSH) 0.9 %
10 SYRINGE (ML) INJECTION PRN
Status: CANCELLED | OUTPATIENT
Start: 2020-04-24

## 2020-04-24 RX ORDER — HEPARIN SODIUM (PORCINE) LOCK FLUSH IV SOLN 100 UNIT/ML 100 UNIT/ML
500 SOLUTION INTRAVENOUS PRN
Status: DISCONTINUED | OUTPATIENT
Start: 2020-04-24 | End: 2020-04-25 | Stop reason: HOSPADM

## 2020-04-24 RX ORDER — HEPARIN SODIUM (PORCINE) LOCK FLUSH IV SOLN 100 UNIT/ML 100 UNIT/ML
500 SOLUTION INTRAVENOUS PRN
Status: CANCELLED | OUTPATIENT
Start: 2020-04-24

## 2020-04-24 RX ORDER — SODIUM CHLORIDE 0.9 % (FLUSH) 0.9 %
20 SYRINGE (ML) INJECTION PRN
Status: CANCELLED | OUTPATIENT
Start: 2020-04-24

## 2020-04-24 RX ADMIN — SODIUM CHLORIDE, PRESERVATIVE FREE 10 ML: 5 INJECTION INTRAVENOUS at 13:34

## 2020-04-24 RX ADMIN — HEPARIN 300 UNITS: 100 SYRINGE at 13:34

## 2020-04-30 ENCOUNTER — HOSPITAL ENCOUNTER (OUTPATIENT)
Dept: GENERAL RADIOLOGY | Age: 70
Discharge: HOME | End: 2020-04-30
Payer: MEDICARE

## 2020-04-30 ENCOUNTER — HOSPITAL ENCOUNTER (OUTPATIENT)
Dept: PREADMISSION TESTING | Age: 70
Discharge: HOME | End: 2020-05-04
Payer: MEDICARE

## 2020-04-30 VITALS — TEMPERATURE: 98.8 F | HEIGHT: 65 IN | WEIGHT: 171 LBS | BODY MASS INDEX: 28.49 KG/M2

## 2020-04-30 LAB
ABO/RH: NORMAL
ALBUMIN SERPL-MCNC: 4.1 G/DL (ref 3.5–5.2)
ALP BLD-CCNC: 131 U/L (ref 40–130)
ALT SERPL-CCNC: 8 U/L (ref 5–41)
ANION GAP SERPL CALCULATED.3IONS-SCNC: 14 MMOL/L (ref 7–19)
ANTIBODY SCREEN: NORMAL
ANTIBODY SCREEN: NORMAL
APTT: 32 SEC (ref 26–36.2)
AST SERPL-CCNC: 13 U/L (ref 5–40)
BACTERIA: NEGATIVE /HPF
BASOPHILS ABSOLUTE: 0 K/UL (ref 0–0.2)
BASOPHILS RELATIVE PERCENT: 0.7 % (ref 0–1)
BILIRUB SERPL-MCNC: 0.5 MG/DL (ref 0.2–1.2)
BILIRUBIN URINE: NEGATIVE
BLOOD, URINE: NEGATIVE
BUN BLDV-MCNC: 9 MG/DL (ref 8–23)
CALCIUM SERPL-MCNC: 9.1 MG/DL (ref 8.8–10.2)
CHLORIDE BLD-SCNC: 96 MMOL/L (ref 98–111)
CLARITY: CLEAR
CO2: 25 MMOL/L (ref 22–29)
COLOR: YELLOW
CREAT SERPL-MCNC: 1.1 MG/DL (ref 0.5–1.2)
EKG P AXIS: 10 DEGREES
EKG P-R INTERVAL: 164 MS
EKG Q-T INTERVAL: 446 MS
EKG QRS DURATION: 100 MS
EKG QTC CALCULATION (BAZETT): 454 MS
EKG T AXIS: 26 DEGREES
EOSINOPHILS ABSOLUTE: 0.2 K/UL (ref 0–0.6)
EOSINOPHILS RELATIVE PERCENT: 4.8 % (ref 0–5)
EPITHELIAL CELLS, UA: 3 /HPF (ref 0–5)
GFR NON-AFRICAN AMERICAN: >60
GLUCOSE BLD-MCNC: 103 MG/DL (ref 74–109)
GLUCOSE URINE: NEGATIVE MG/DL
HCT VFR BLD CALC: 29 % (ref 42–52)
HEMOGLOBIN: 9 G/DL (ref 14–18)
HYALINE CASTS: 1 /HPF (ref 0–8)
IMMATURE GRANULOCYTES #: 0.1 K/UL
INR BLD: 1.09 (ref 0.88–1.18)
KETONES, URINE: NEGATIVE MG/DL
LEUKOCYTE ESTERASE, URINE: ABNORMAL
LYMPHOCYTES ABSOLUTE: 0.5 K/UL (ref 1.1–4.5)
LYMPHOCYTES RELATIVE PERCENT: 11.5 % (ref 20–40)
MCH RBC QN AUTO: 26.9 PG (ref 27–31)
MCHC RBC AUTO-ENTMCNC: 31 G/DL (ref 33–37)
MCV RBC AUTO: 86.6 FL (ref 80–94)
MONOCYTES ABSOLUTE: 0.2 K/UL (ref 0–0.9)
MONOCYTES RELATIVE PERCENT: 5.3 % (ref 0–10)
NEUTROPHILS ABSOLUTE: 3.3 K/UL (ref 1.5–7.5)
NEUTROPHILS RELATIVE PERCENT: 76.5 % (ref 50–65)
NITRITE, URINE: NEGATIVE
PDW BLD-RTO: 14.4 % (ref 11.5–14.5)
PH UA: 6 (ref 5–8)
PLATELET # BLD: 217 K/UL (ref 130–400)
PMV BLD AUTO: 11.3 FL (ref 9.4–12.4)
POTASSIUM SERPL-SCNC: 3.7 MMOL/L (ref 3.5–5)
PROTEIN UA: ABNORMAL MG/DL
PROTHROMBIN TIME: 14.1 SEC (ref 12–14.6)
RBC # BLD: 3.35 M/UL (ref 4.7–6.1)
RBC UA: 0 /HPF (ref 0–4)
SODIUM BLD-SCNC: 135 MMOL/L (ref 136–145)
SPECIFIC GRAVITY UA: 1.01 (ref 1–1.03)
TOTAL PROTEIN: 6.9 G/DL (ref 6.6–8.7)
URINE REFLEX TO CULTURE: YES
UROBILINOGEN, URINE: 0.2 E.U./DL
WBC # BLD: 4.3 K/UL (ref 4.8–10.8)
WBC UA: 26 /HPF (ref 0–5)

## 2020-04-30 PROCEDURE — 80053 COMPREHEN METABOLIC PANEL: CPT

## 2020-04-30 PROCEDURE — 81001 URINALYSIS AUTO W/SCOPE: CPT

## 2020-04-30 PROCEDURE — 85730 THROMBOPLASTIN TIME PARTIAL: CPT

## 2020-04-30 PROCEDURE — 71046 X-RAY EXAM CHEST 2 VIEWS: CPT

## 2020-04-30 PROCEDURE — 86901 BLOOD TYPING SEROLOGIC RH(D): CPT

## 2020-04-30 PROCEDURE — 93005 ELECTROCARDIOGRAM TRACING: CPT | Performed by: ORTHOPAEDIC SURGERY

## 2020-04-30 PROCEDURE — 85025 COMPLETE CBC W/AUTO DIFF WBC: CPT

## 2020-04-30 PROCEDURE — 86850 RBC ANTIBODY SCREEN: CPT

## 2020-04-30 PROCEDURE — 86900 BLOOD TYPING SEROLOGIC ABO: CPT

## 2020-04-30 PROCEDURE — 85610 PROTHROMBIN TIME: CPT

## 2020-04-30 PROCEDURE — 87086 URINE CULTURE/COLONY COUNT: CPT

## 2020-04-30 RX ORDER — DULOXETIN HYDROCHLORIDE 30 MG/1
30 CAPSULE, DELAYED RELEASE ORAL DAILY
COMMUNITY
End: 2020-10-27 | Stop reason: SDUPTHER

## 2020-05-01 ENCOUNTER — OFFICE VISIT (OUTPATIENT)
Dept: UROLOGY | Age: 70
End: 2020-05-01
Payer: MEDICARE

## 2020-05-01 ENCOUNTER — OFFICE VISIT (OUTPATIENT)
Age: 70
End: 2020-05-01

## 2020-05-01 VITALS — TEMPERATURE: 97.5 F | OXYGEN SATURATION: 96 %

## 2020-05-01 VITALS
WEIGHT: 177 LBS | HEART RATE: 73 BPM | BODY MASS INDEX: 29.49 KG/M2 | DIASTOLIC BLOOD PRESSURE: 60 MMHG | SYSTOLIC BLOOD PRESSURE: 120 MMHG | TEMPERATURE: 97.7 F | HEIGHT: 65 IN

## 2020-05-01 PROBLEM — Z85.51 HISTORY OF BLADDER CANCER: Status: ACTIVE | Noted: 2020-05-01

## 2020-05-01 PROBLEM — N13.30 HYDRONEPHROSIS OF RIGHT KIDNEY: Status: ACTIVE | Noted: 2020-05-01

## 2020-05-01 PROBLEM — N13.30 HYDRONEPHROSIS OF LEFT KIDNEY: Status: ACTIVE | Noted: 2020-05-01

## 2020-05-01 PROBLEM — N13.5 BILATERAL URETERAL OBSTRUCTION: Status: ACTIVE | Noted: 2020-05-01

## 2020-05-01 LAB
BACTERIA URINE, POC: 0
BILIRUBIN URINE: 0 MG/DL
BLOOD, URINE: NEGATIVE
CASTS URINE, POC: 0
CLARITY: CLEAR
COLOR: YELLOW
CRYSTALS URINE, POC: 0
EPI CELLS URINE, POC: 0
GLUCOSE URINE: ABNORMAL
KETONES, URINE: NEGATIVE
LEUKOCYTE EST, POC: ABNORMAL
NITRITE, URINE: NEGATIVE
PH UA: 6.5 (ref 4.5–8)
PROTEIN UA: POSITIVE
RBC URINE, POC: 0
SPECIFIC GRAVITY UA: 1.01 (ref 1–1.03)
UROBILINOGEN, URINE: NORMAL
WBC URINE, POC: ABNORMAL
YEAST URINE, POC: 0

## 2020-05-01 PROCEDURE — 1123F ACP DISCUSS/DSCN MKR DOCD: CPT | Performed by: UROLOGY

## 2020-05-01 PROCEDURE — G8417 CALC BMI ABV UP PARAM F/U: HCPCS | Performed by: UROLOGY

## 2020-05-01 PROCEDURE — 99214 OFFICE O/P EST MOD 30 MIN: CPT | Performed by: UROLOGY

## 2020-05-01 PROCEDURE — G8427 DOCREV CUR MEDS BY ELIG CLIN: HCPCS | Performed by: UROLOGY

## 2020-05-01 PROCEDURE — 3017F COLORECTAL CA SCREEN DOC REV: CPT | Performed by: UROLOGY

## 2020-05-01 PROCEDURE — 1036F TOBACCO NON-USER: CPT | Performed by: UROLOGY

## 2020-05-01 PROCEDURE — 4040F PNEUMOC VAC/ADMIN/RCVD: CPT | Performed by: UROLOGY

## 2020-05-01 PROCEDURE — 81001 URINALYSIS AUTO W/SCOPE: CPT | Performed by: UROLOGY

## 2020-05-01 PROCEDURE — 99999 PR OFFICE/OUTPT VISIT,PROCEDURE ONLY: CPT | Performed by: NURSE PRACTITIONER

## 2020-05-01 ASSESSMENT — ENCOUNTER SYMPTOMS
VOMITING: 0
FACIAL SWELLING: 0
BLOOD IN STOOL: 0
NAUSEA: 0
BACK PAIN: 0
COLOR CHANGE: 0
SINUS PRESSURE: 0
SORE THROAT: 0
WHEEZING: 0
CONSTIPATION: 0
COUGH: 0
DIARRHEA: 0
EYE PAIN: 0
RHINORRHEA: 0
EYE DISCHARGE: 0
ABDOMINAL PAIN: 0
EYE REDNESS: 0
ABDOMINAL DISTENTION: 0
SHORTNESS OF BREATH: 0

## 2020-05-01 NOTE — PROGRESS NOTES
Rebecca García is a 78 y.o. male who presents today   Chief Complaint   Patient presents with    Follow-up     I'm following up for a stent change     Hydronephrosis:  Patient is here today for hydronephrosis which was first noted approximately 9 month(s) ago. Location: bilateral, Severity: moderate  Patient has bilateral ureteral obstruction secondary to recurrent colorectal carcinoma. This is managed with chronic indwelling stents. His stents were last changed on February 26, 2020. In the interval he has had wrist surgery with bowel resection at [facility]. He was having quite a bit of urinary frequency and urgency but this has improved since his surgery the surgeon had told him that his bladder had some adhesions which he was able to take down. Since then his urgency has improved. He is on Flomax for some difficulty emptying he has backed off on this to 0.4 mg he occasionally has some urgency advised him today that he could stop the Flomax if he was emptying okay if he started having troubles he could restart it. He is now do routine stent change. Today he denies any dysuria or signs of infection  Flank pain? No, bilateral  Abdominal pain? None  Hematuria? microscopic    Bladder cancer:  Patient was initially diagnosed with bladder cancer approximately 9 month(s) ago August 18, 2019. This was found incidentally at the time of a stent placement. Patient's  Bladder cancer is characterized as Superficial. Bladder cancer stage 0a - Ta, N0, M0, high-grade prior  treatment Cycsto-TURBT or bladder biopsy Last recurence was 2/26/2020. Biopsy showed high-grade papillary urothelial carcinoma noninvasive. Current symptoms include urgency or frequency. Most recent surveillance cystoscopy on very 26 2020 he is now due surveillance cystoscopy. We plan to do this at the time of his stent changes under anesthetic with the idea if we find any thing will be treated at that time. , and upper tracts on 2/26/2020 was [facility] OR    NECK SURGERY      times 2... all levels    TUNNELED VENOUS PORT PLACEMENT         Current Outpatient Medications   Medication Sig Dispense Refill    DULoxetine (CYMBALTA) 30 MG extended release capsule Take 30 mg by mouth daily      ondansetron (ZOFRAN) 4 MG tablet Take 2 tablets by mouth every 8 hours as needed for Nausea or Vomiting 30 tablet 2    ferrous sulfate (IRON 325) 325 (65 Fe) MG tablet Take 1 tablet by mouth 2 times daily 180 tablet 1    tamsulosin (FLOMAX) 0.4 MG capsule Take 1 capsule by mouth 2 times daily (Patient taking differently: Take 0.4 mg by mouth daily ) 180 capsule 3    loperamide (IMODIUM A-D) 2 MG tablet Take 4 mg by mouth      metaxalone (SKELAXIN) 800 MG tablet Take 800 mg by mouth 3 times daily   1    atorvastatin (LIPITOR) 40 MG tablet TAKE 1 TABLET BY MOUTH EVERY NIGHT (Patient taking differently: Take 40 mg by mouth nightly ) 30 tablet 0    omeprazole (PRILOSEC) 20 MG delayed release capsule Take 20 mg by mouth 2 times daily       bisoprolol (ZEBETA) 5 MG tablet Take 5 mg by mouth daily      methadone (DOLOPHINE) 10 MG tablet Take 10 mg by mouth every 6 hours as needed for Pain. q 5 hours      gabapentin (NEURONTIN) 800 MG tablet Take 800 mg by mouth 3 times daily. No current facility-administered medications for this visit.         Allergies   Allergen Reactions    Morphine Anxiety       Social History     Socioeconomic History    Marital status:      Spouse name: None    Number of children: None    Years of education: None    Highest education level: None   Occupational History    None   Social Needs    Financial resource strain: None    Food insecurity     Worry: None     Inability: None    Transportation needs     Medical: None     Non-medical: None   Tobacco Use    Smoking status: Former Smoker     Types: Cigarettes     Last attempt to quit: 1986     Years since quittin.0    Smokeless tobacco: Never Used   Substance and Sexual Activity    Alcohol use: No    Drug use: No    Sexual activity: Yes     Partners: Female   Lifestyle    Physical activity     Days per week: None     Minutes per session: None    Stress: None   Relationships    Social connections     Talks on phone: None     Gets together: None     Attends Holiness service: None     Active member of club or organization: None     Attends meetings of clubs or organizations: None     Relationship status: None    Intimate partner violence     Fear of current or ex partner: None     Emotionally abused: None     Physically abused: None     Forced sexual activity: None   Other Topics Concern    None   Social History Narrative    None       Family History   Problem Relation Age of Onset    High Blood Pressure Mother     High Blood Pressure Father     Colon Cancer Father     Diabetes Father        REVIEW OF SYSTEMS:  Review of Systems   Constitutional: Negative for activity change, chills, fatigue and fever. HENT: Negative for congestion, ear discharge, ear pain, facial swelling, mouth sores, rhinorrhea, sinus pressure and sore throat. Eyes: Negative for pain, discharge and redness. Respiratory: Negative for cough, shortness of breath and wheezing. Cardiovascular: Negative for chest pain, palpitations and leg swelling. Gastrointestinal: Negative for abdominal distention, abdominal pain, blood in stool, constipation, diarrhea, nausea and vomiting. Endocrine: Negative for polydipsia, polyphagia and polyuria. Genitourinary: Negative for decreased urine volume, difficulty urinating, dysuria, enuresis, flank pain, frequency, genital sores, hematuria and urgency. Patient states he's had a little bit of leakage   Musculoskeletal: Negative for back pain, gait problem, joint swelling, neck pain and neck stiffness. Skin: Negative for color change, rash and wound. Allergic/Immunologic: Negative for environmental allergies and immunocompromised state. Neurological: Negative for dizziness, syncope, weakness, light-headedness, numbness and headaches. Psychiatric/Behavioral: Negative for agitation, confusion, dysphoric mood, self-injury, sleep disturbance and suicidal ideas. The patient is not hyperactive. PHYSICAL EXAM:  /60   Pulse 73   Temp 97.7 °F (36.5 °C) (Temporal)   Ht 5' 5\" (1.651 m)   Wt 177 lb (80.3 kg)   BMI 29.45 kg/m²   Physical Exam  Constitutional:       General: He is not in acute distress. Appearance: Normal appearance. He is well-developed. HENT:      Head: Normocephalic and atraumatic. Nose: Nose normal.   Eyes:      General: No scleral icterus. Conjunctiva/sclera: Conjunctivae normal.      Pupils: Pupils are equal, round, and reactive to light. Neck:      Musculoskeletal: Normal range of motion and neck supple. Trachea: No tracheal deviation. Cardiovascular:      Rate and Rhythm: Normal rate and regular rhythm. Pulmonary:      Effort: Pulmonary effort is normal. No respiratory distress. Breath sounds: No stridor. Abdominal:      General: There is no distension. Palpations: Abdomen is soft. There is no mass. Tenderness: There is no abdominal tenderness. Comments: Colostomy. Abdominal laparotomy scars   Musculoskeletal: Normal range of motion. General: No tenderness. Lymphadenopathy:      Cervical: No cervical adenopathy. Skin:     General: Skin is warm and dry. Findings: No erythema. Neurological:      Mental Status: He is alert and oriented to person, place, and time.    Psychiatric:         Behavior: Behavior normal.         Judgment: Judgment normal.             DATA:  CBC:   Lab Results   Component Value Date    WBC 4.3 04/30/2020    RBC 3.35 04/30/2020    HGB 9.0 04/30/2020    HCT 29.0 04/30/2020    MCV 86.6 04/30/2020    MCH 26.9 04/30/2020    MCHC 31.0 04/30/2020    RDW 14.4 04/30/2020     04/30/2020    MPV 11.3 04/30/2020     BMP:    Lab Results   Component Value Date     04/30/2020    K 3.7 04/30/2020    K 3.5 01/30/2020    CL 96 04/30/2020    CO2 25 04/30/2020    BUN 9 04/30/2020    LABALBU 4.1 04/30/2020    CREATININE 1.1 04/30/2020    CALCIUM 9.1 04/30/2020    GFRAA 80 02/11/2020    LABGLOM >60 04/30/2020    GLUCOSE 103 04/30/2020     Results for orders placed or performed in visit on 05/01/20   POCT Urinalysis Dipstick w/ Micro (Auto)   Result Value Ref Range    Color, UA Yellow     Clarity, UA Clear Clear    Glucose, Ur neg     Bilirubin Urine 0 mg/dL    Ketones, Urine Negative     Specific Gravity, UA 1.010 1.005 - 1.030    Blood, Urine Negative     pH, UA 6.5 4.5 - 8.0    Protein, UA Positive (A) Negative    Nitrite, Urine Negative     Leukocytes, UA small     Urobilinogen, Urine Normal     rbc urine, poc 0     wbc urine, poc 2-4     bacteria urine, poc 0     yeast urine, poc 0     casts urine, poc 0     epi cells urine, poc 0     crystals urine, poc 0      Lab Results   Component Value Date    PSA 0.3 08/17/2019     Lab Results   Component Value Date    PSAFREEPCT 33 08/17/2019             IMAGING:  CT scan of the abdomen was reviewed done on 4/15/2020 this shows stents in good position. There is still moderate hydronephrosis on the right side but is reported as improved. In the pelvis there is some presacral changes can extend over to the right side involving the right distal ureter more than the left. Otherwise the stents appear to be in good position    1. Hydronephrosis of right kidney  This is improved but still present moderate severity on the right side he has chronic right indwelling stent. The right side tends to be more involved in this area of the presacral region.  - POCT Urinalysis Dipstick w/ Micro (Auto)    2. Hydronephrosis of left kidney  Improved with chronic indwelling stent. 3. Bilateral ureteral obstruction  Managed with bilateral chronic indwelling stents he is now due bilateral ureteral stent change. Since we do bladder surveillance every 3 months we will continue the polymer plastic stents and changes every 3 months. 4. History of bladder cancer  He is due surveillance cystoscopy will plan is at the time of stent change should we find any lesions we will go ahead and perform biopsy fulguration as indicated. Orders Placed This Encounter   Procedures    POCT Urinalysis Dipstick w/ Micro (Auto)        Return for PT to be scheduled for Surgery. All information inputted into the note by the MA to include chief complaint, past medical history, past surgical history, medications, allergies, social and family history and review of systems has been reviewed and updated as needed by me. EMR Dragon/transcription disclaimer: Much of this documentt is electronic  transcription/translation of spoken language to printed text. The  electronic translation of spoken language may be erroneous, or at times,  nonsensical words or phrases may be inadvertently transcribed.  Although I  have reviewed the document for such errors, some may still exist.

## 2020-05-01 NOTE — PATIENT INSTRUCTIONS
Preventing the Spread of Coronavirus Disease 2019 in Homes and Residential Communities   For the most recent information go to [email or web address]    Prevention steps for People with confirmed or suspected COVID-19 (including persons under investigation) who do not need to be hospitalized  and   People with confirmed COVID-19 who were hospitalized and determined to be medically stable to go home    Your healthcare provider and public health staff will evaluate whether you can be cared for at home. If it is determined that you do not need to be hospitalized and can be isolated at home, you will be monitored by staff from your local or state health department. You should follow the prevention steps below until a healthcare provider or local or state health department says you can return to your normal activities. Stay home except to get medical care  People who are mildly ill with COVID-19 are able to isolate at home during their illness. You should restrict activities outside your home, except for getting medical care. Do not go to work, school, or public areas. Avoid using public transportation, ride-sharing, or taxis. Separate yourself from other people and animals in your home  People: As much as possible, you should stay in a specific room and away from other people in your home. Also, you should use a separate bathroom, if available. Animals: You should restrict contact with pets and other animals while you are sick with COVID-19, just like you would around other people. Although there have not been reports of pets or other animals becoming sick with COVID-19, it is still recommended that people sick with COVID-19 limit contact with animals until more information is known about the virus. When possible, have another member of your household care for your animals while you are sick.  If you are sick with COVID-19, avoid contact with your pet, including be washed thoroughly with soap and water. Clean all high-touch surfaces everyday  High touch surfaces include counters, tabletops, doorknobs, bathroom fixtures, toilets, phones, keyboards, tablets, and bedside tables. Also, clean any surfaces that may have blood, stool, or body fluids on them. Use a household cleaning spray or wipe, according to the label instructions. Labels contain instructions for safe and effective use of the cleaning product including precautions you should take when applying the product, such as wearing gloves and making sure you have good ventilation during use of the product. Monitor your symptoms  Seek prompt medical attention if your illness is worsening (e.g., difficulty breathing). Before seeking care, call your healthcare provider and tell them that you have, or are being evaluated for, COVID-19. Put on a facemask before you enter the facility. These steps will help the healthcare providers office to keep other people in the office or waiting room from getting infected or exposed. Ask your healthcare provider to call the local or [facility] health department. Persons who are placed under active monitoring or facilitated self-monitoring should follow instructions provided by their local health department or occupational health professionals, as appropriate. When working with your local health department check their available hours. If you have a medical emergency and need to call 911, notify the dispatch personnel that you have, or are being evaluated for COVID-19. If possible, put on a facemask before emergency medical services arrive. Discontinuing home isolation  Patients with confirmed COVID-19 should remain under home isolation precautions until the risk of secondary transmission to others is thought to be low.  The decision to discontinue home isolation precautions should be made on a case-by-case basis, in consultation with healthcare providers and state and [facility] health

## 2020-05-02 LAB — URINE CULTURE, ROUTINE: NORMAL

## 2020-05-05 ENCOUNTER — TELEPHONE (OUTPATIENT)
Age: 70
End: 2020-05-05

## 2020-05-05 ENCOUNTER — ANESTHESIA EVENT (OUTPATIENT)
Dept: OPERATING ROOM | Age: 70
DRG: 460 | End: 2020-05-05
Payer: MEDICARE

## 2020-05-05 LAB
REPORT: NORMAL
SARS-COV-2: NOT DETECTED
THIS TEST SENT TO: NORMAL

## 2020-05-06 ENCOUNTER — ANESTHESIA (OUTPATIENT)
Dept: OPERATING ROOM | Age: 70
DRG: 460 | End: 2020-05-06
Payer: MEDICARE

## 2020-05-06 ENCOUNTER — HOSPITAL ENCOUNTER (INPATIENT)
Age: 70
LOS: 1 days | Discharge: HOME | DRG: 460 | End: 2020-05-07
Attending: ORTHOPAEDIC SURGERY | Admitting: ORTHOPAEDIC SURGERY
Payer: MEDICARE

## 2020-05-06 ENCOUNTER — APPOINTMENT (OUTPATIENT)
Dept: GENERAL RADIOLOGY | Age: 70
DRG: 460 | End: 2020-05-06
Attending: ORTHOPAEDIC SURGERY
Payer: MEDICARE

## 2020-05-06 VITALS
DIASTOLIC BLOOD PRESSURE: 74 MMHG | OXYGEN SATURATION: 100 % | TEMPERATURE: 94.8 F | RESPIRATION RATE: 12 BRPM | SYSTOLIC BLOOD PRESSURE: 126 MMHG

## 2020-05-06 PROBLEM — M54.50 LOW BACK PAIN: Status: ACTIVE | Noted: 2020-05-06

## 2020-05-06 LAB
ABO/RH: NORMAL
ANTIBODY SCREEN: NORMAL
ANTIBODY SCREEN: NORMAL
C (LITTLE) ANTIGEN: NORMAL
E (BIG) ANTIGEN: NORMAL

## 2020-05-06 PROCEDURE — 6360000002 HC RX W HCPCS: Performed by: ORTHOPAEDIC SURGERY

## 2020-05-06 PROCEDURE — 97162 PT EVAL MOD COMPLEX 30 MIN: CPT

## 2020-05-06 PROCEDURE — 2580000003 HC RX 258: Performed by: ORTHOPAEDIC SURGERY

## 2020-05-06 PROCEDURE — 0SG10K1 FUSION OF 2 OR MORE LUMBAR VERTEBRAL JOINTS WITH NONAUTOLOGOUS TISSUE SUBSTITUTE, POSTERIOR APPROACH, POSTERIOR COLUMN, OPEN APPROACH: ICD-10-PCS | Performed by: ORTHOPAEDIC SURGERY

## 2020-05-06 PROCEDURE — 0QP004Z REMOVAL OF INTERNAL FIXATION DEVICE FROM LUMBAR VERTEBRA, OPEN APPROACH: ICD-10-PCS | Performed by: ORTHOPAEDIC SURGERY

## 2020-05-06 PROCEDURE — 3600000014 HC SURGERY LEVEL 4 ADDTL 15MIN: Performed by: ORTHOPAEDIC SURGERY

## 2020-05-06 PROCEDURE — 97165 OT EVAL LOW COMPLEX 30 MIN: CPT

## 2020-05-06 PROCEDURE — 1210000000 HC MED SURG R&B

## 2020-05-06 PROCEDURE — 7100000000 HC PACU RECOVERY - FIRST 15 MIN: Performed by: ORTHOPAEDIC SURGERY

## 2020-05-06 PROCEDURE — C9290 INJ, BUPIVACAINE LIPOSOME: HCPCS | Performed by: ORTHOPAEDIC SURGERY

## 2020-05-06 PROCEDURE — C1762 CONN TISS, HUMAN(INC FASCIA): HCPCS | Performed by: ORTHOPAEDIC SURGERY

## 2020-05-06 PROCEDURE — C1713 ANCHOR/SCREW BN/BN,TIS/BN: HCPCS | Performed by: ORTHOPAEDIC SURGERY

## 2020-05-06 PROCEDURE — 2709999900 HC NON-CHARGEABLE SUPPLY: Performed by: ORTHOPAEDIC SURGERY

## 2020-05-06 PROCEDURE — 2500000003 HC RX 250 WO HCPCS: Performed by: ORTHOPAEDIC SURGERY

## 2020-05-06 PROCEDURE — 86901 BLOOD TYPING SEROLOGIC RH(D): CPT

## 2020-05-06 PROCEDURE — 36415 COLL VENOUS BLD VENIPUNCTURE: CPT

## 2020-05-06 PROCEDURE — 7100000001 HC PACU RECOVERY - ADDTL 15 MIN: Performed by: ORTHOPAEDIC SURGERY

## 2020-05-06 PROCEDURE — 3700000000 HC ANESTHESIA ATTENDED CARE: Performed by: ORTHOPAEDIC SURGERY

## 2020-05-06 PROCEDURE — 3209999900 FLUORO FOR SURGICAL PROCEDURES

## 2020-05-06 PROCEDURE — 86905 BLOOD TYPING RBC ANTIGENS: CPT

## 2020-05-06 PROCEDURE — 6370000000 HC RX 637 (ALT 250 FOR IP): Performed by: ORTHOPAEDIC SURGERY

## 2020-05-06 PROCEDURE — 97116 GAIT TRAINING THERAPY: CPT

## 2020-05-06 PROCEDURE — 2780000010 HC IMPLANT OTHER: Performed by: ORTHOPAEDIC SURGERY

## 2020-05-06 PROCEDURE — 6370000000 HC RX 637 (ALT 250 FOR IP): Performed by: PHYSICIAN ASSISTANT

## 2020-05-06 PROCEDURE — 2720000010 HC SURG SUPPLY STERILE: Performed by: ORTHOPAEDIC SURGERY

## 2020-05-06 PROCEDURE — 97535 SELF CARE MNGMENT TRAINING: CPT

## 2020-05-06 PROCEDURE — 2500000003 HC RX 250 WO HCPCS: Performed by: NURSE ANESTHETIST, CERTIFIED REGISTERED

## 2020-05-06 PROCEDURE — 86850 RBC ANTIBODY SCREEN: CPT

## 2020-05-06 PROCEDURE — 6360000002 HC RX W HCPCS: Performed by: NURSE ANESTHETIST, CERTIFIED REGISTERED

## 2020-05-06 PROCEDURE — 3700000001 HC ADD 15 MINUTES (ANESTHESIA): Performed by: ORTHOPAEDIC SURGERY

## 2020-05-06 PROCEDURE — 3600000004 HC SURGERY LEVEL 4 BASE: Performed by: ORTHOPAEDIC SURGERY

## 2020-05-06 PROCEDURE — 86922 COMPATIBILITY TEST ANTIGLOB: CPT

## 2020-05-06 PROCEDURE — 86900 BLOOD TYPING SEROLOGIC ABO: CPT

## 2020-05-06 DEVICE — AGENT HEMSTAT 8ML FLX TIP MTRX + DISP SURGIFLO: Type: IMPLANTABLE DEVICE | Site: SPINE LUMBAR | Status: FUNCTIONAL

## 2020-05-06 DEVICE — GRAFT BONE VOID FIL 20CC NANOSS: Type: IMPLANTABLE DEVICE | Site: SPINE LUMBAR | Status: FUNCTIONAL

## 2020-05-06 DEVICE — GRAFT SOFT TISSUE BURST FLUID BIOLGIC 1 ML: Type: IMPLANTABLE DEVICE | Site: SPINE LUMBAR | Status: FUNCTIONAL

## 2020-05-06 RX ORDER — DIPHENHYDRAMINE HYDROCHLORIDE 50 MG/ML
12.5 INJECTION INTRAMUSCULAR; INTRAVENOUS
Status: DISCONTINUED | OUTPATIENT
Start: 2020-05-06 | End: 2020-05-06 | Stop reason: HOSPADM

## 2020-05-06 RX ORDER — METAXALONE 800 MG/1
800 TABLET ORAL 3 TIMES DAILY
Status: DISCONTINUED | OUTPATIENT
Start: 2020-05-06 | End: 2020-05-07 | Stop reason: HOSPADM

## 2020-05-06 RX ORDER — BUPIVACAINE HYDROCHLORIDE AND EPINEPHRINE 2.5; 5 MG/ML; UG/ML
INJECTION, SOLUTION INFILTRATION; PERINEURAL PRN
Status: DISCONTINUED | OUTPATIENT
Start: 2020-05-06 | End: 2020-05-06 | Stop reason: ALTCHOICE

## 2020-05-06 RX ORDER — GABAPENTIN 400 MG/1
800 CAPSULE ORAL 3 TIMES DAILY
Status: DISCONTINUED | OUTPATIENT
Start: 2020-05-06 | End: 2020-05-07 | Stop reason: HOSPADM

## 2020-05-06 RX ORDER — ONDANSETRON 2 MG/ML
INJECTION INTRAMUSCULAR; INTRAVENOUS PRN
Status: DISCONTINUED | OUTPATIENT
Start: 2020-05-06 | End: 2020-05-06 | Stop reason: SDUPTHER

## 2020-05-06 RX ORDER — SODIUM CHLORIDE 9 MG/ML
INJECTION, SOLUTION INTRAVENOUS CONTINUOUS
Status: DISCONTINUED | OUTPATIENT
Start: 2020-05-06 | End: 2020-05-07 | Stop reason: HOSPADM

## 2020-05-06 RX ORDER — FENTANYL CITRATE 50 UG/ML
50 INJECTION, SOLUTION INTRAMUSCULAR; INTRAVENOUS
Status: DISCONTINUED | OUTPATIENT
Start: 2020-05-06 | End: 2020-05-06 | Stop reason: HOSPADM

## 2020-05-06 RX ORDER — METOCLOPRAMIDE HYDROCHLORIDE 5 MG/ML
10 INJECTION INTRAMUSCULAR; INTRAVENOUS
Status: DISCONTINUED | OUTPATIENT
Start: 2020-05-06 | End: 2020-05-06 | Stop reason: HOSPADM

## 2020-05-06 RX ORDER — LIDOCAINE HYDROCHLORIDE 10 MG/ML
1 INJECTION, SOLUTION EPIDURAL; INFILTRATION; INTRACAUDAL; PERINEURAL
Status: DISCONTINUED | OUTPATIENT
Start: 2020-05-06 | End: 2020-05-06 | Stop reason: HOSPADM

## 2020-05-06 RX ORDER — ERYTHROMYCIN 5 MG/G
OINTMENT OPHTHALMIC 4 TIMES DAILY
Status: DISCONTINUED | OUTPATIENT
Start: 2020-05-06 | End: 2020-05-07 | Stop reason: HOSPADM

## 2020-05-06 RX ORDER — HYDROMORPHONE HYDROCHLORIDE 1 MG/ML
0.25 INJECTION, SOLUTION INTRAMUSCULAR; INTRAVENOUS; SUBCUTANEOUS EVERY 5 MIN PRN
Status: DISCONTINUED | OUTPATIENT
Start: 2020-05-06 | End: 2020-05-06 | Stop reason: HOSPADM

## 2020-05-06 RX ORDER — ONDANSETRON 4 MG/1
8 TABLET, FILM COATED ORAL EVERY 8 HOURS PRN
Status: DISCONTINUED | OUTPATIENT
Start: 2020-05-06 | End: 2020-05-07 | Stop reason: HOSPADM

## 2020-05-06 RX ORDER — PROMETHAZINE HYDROCHLORIDE 25 MG/ML
6.25 INJECTION, SOLUTION INTRAMUSCULAR; INTRAVENOUS
Status: DISCONTINUED | OUTPATIENT
Start: 2020-05-06 | End: 2020-05-06 | Stop reason: HOSPADM

## 2020-05-06 RX ORDER — KETAMINE HYDROCHLORIDE 50 MG/ML
INJECTION, SOLUTION, CONCENTRATE INTRAMUSCULAR; INTRAVENOUS PRN
Status: DISCONTINUED | OUTPATIENT
Start: 2020-05-06 | End: 2020-05-06 | Stop reason: SDUPTHER

## 2020-05-06 RX ORDER — DULOXETIN HYDROCHLORIDE 30 MG/1
30 CAPSULE, DELAYED RELEASE ORAL DAILY
Status: DISCONTINUED | OUTPATIENT
Start: 2020-05-06 | End: 2020-05-07 | Stop reason: HOSPADM

## 2020-05-06 RX ORDER — HYDROMORPHONE HYDROCHLORIDE 1 MG/ML
0.5 INJECTION, SOLUTION INTRAMUSCULAR; INTRAVENOUS; SUBCUTANEOUS EVERY 5 MIN PRN
Status: DISCONTINUED | OUTPATIENT
Start: 2020-05-06 | End: 2020-05-06 | Stop reason: HOSPADM

## 2020-05-06 RX ORDER — SODIUM CHLORIDE 0.9 % (FLUSH) 0.9 %
10 SYRINGE (ML) INJECTION EVERY 12 HOURS SCHEDULED
Status: DISCONTINUED | OUTPATIENT
Start: 2020-05-06 | End: 2020-05-06 | Stop reason: HOSPADM

## 2020-05-06 RX ORDER — ROCURONIUM BROMIDE 10 MG/ML
INJECTION, SOLUTION INTRAVENOUS PRN
Status: DISCONTINUED | OUTPATIENT
Start: 2020-05-06 | End: 2020-05-06 | Stop reason: SDUPTHER

## 2020-05-06 RX ORDER — TAMSULOSIN HYDROCHLORIDE 0.4 MG/1
0.4 CAPSULE ORAL DAILY
Status: DISCONTINUED | OUTPATIENT
Start: 2020-05-06 | End: 2020-05-07 | Stop reason: HOSPADM

## 2020-05-06 RX ORDER — HYDRALAZINE HYDROCHLORIDE 20 MG/ML
5 INJECTION INTRAMUSCULAR; INTRAVENOUS EVERY 10 MIN PRN
Status: DISCONTINUED | OUTPATIENT
Start: 2020-05-06 | End: 2020-05-06 | Stop reason: HOSPADM

## 2020-05-06 RX ORDER — ERYTHROMYCIN 5 MG/G
OINTMENT OPHTHALMIC NIGHTLY
Status: DISCONTINUED | OUTPATIENT
Start: 2020-05-06 | End: 2020-05-06

## 2020-05-06 RX ORDER — LOPERAMIDE HYDROCHLORIDE 2 MG/1
4 CAPSULE ORAL 3 TIMES DAILY PRN
Status: DISCONTINUED | OUTPATIENT
Start: 2020-05-06 | End: 2020-05-07 | Stop reason: HOSPADM

## 2020-05-06 RX ORDER — LIDOCAINE HYDROCHLORIDE 10 MG/ML
INJECTION, SOLUTION INFILTRATION; PERINEURAL PRN
Status: DISCONTINUED | OUTPATIENT
Start: 2020-05-06 | End: 2020-05-06 | Stop reason: SDUPTHER

## 2020-05-06 RX ORDER — PROPOFOL 10 MG/ML
INJECTION, EMULSION INTRAVENOUS PRN
Status: DISCONTINUED | OUTPATIENT
Start: 2020-05-06 | End: 2020-05-06 | Stop reason: SDUPTHER

## 2020-05-06 RX ORDER — KETOROLAC TROMETHAMINE 30 MG/ML
INJECTION, SOLUTION INTRAMUSCULAR; INTRAVENOUS PRN
Status: DISCONTINUED | OUTPATIENT
Start: 2020-05-06 | End: 2020-05-06 | Stop reason: SDUPTHER

## 2020-05-06 RX ORDER — SODIUM CHLORIDE 0.9 % (FLUSH) 0.9 %
10 SYRINGE (ML) INJECTION PRN
Status: DISCONTINUED | OUTPATIENT
Start: 2020-05-06 | End: 2020-05-07 | Stop reason: HOSPADM

## 2020-05-06 RX ORDER — MIDAZOLAM HYDROCHLORIDE 1 MG/ML
2 INJECTION INTRAMUSCULAR; INTRAVENOUS
Status: DISCONTINUED | OUTPATIENT
Start: 2020-05-06 | End: 2020-05-06 | Stop reason: HOSPADM

## 2020-05-06 RX ORDER — SODIUM CHLORIDE 0.9 % (FLUSH) 0.9 %
10 SYRINGE (ML) INJECTION EVERY 12 HOURS SCHEDULED
Status: DISCONTINUED | OUTPATIENT
Start: 2020-05-06 | End: 2020-05-07 | Stop reason: HOSPADM

## 2020-05-06 RX ORDER — SUFENTANIL CITRATE 50 UG/ML
INJECTION EPIDURAL; INTRAVENOUS PRN
Status: DISCONTINUED | OUTPATIENT
Start: 2020-05-06 | End: 2020-05-06 | Stop reason: SDUPTHER

## 2020-05-06 RX ORDER — LABETALOL 20 MG/4 ML (5 MG/ML) INTRAVENOUS SYRINGE
5 EVERY 10 MIN PRN
Status: DISCONTINUED | OUTPATIENT
Start: 2020-05-06 | End: 2020-05-06 | Stop reason: HOSPADM

## 2020-05-06 RX ORDER — OXYCODONE AND ACETAMINOPHEN 10; 325 MG/1; MG/1
1 TABLET ORAL EVERY 4 HOURS PRN
Status: DISCONTINUED | OUTPATIENT
Start: 2020-05-06 | End: 2020-05-07 | Stop reason: HOSPADM

## 2020-05-06 RX ORDER — VANCOMYCIN HYDROCHLORIDE 1 G/20ML
INJECTION, POWDER, LYOPHILIZED, FOR SOLUTION INTRAVENOUS PRN
Status: DISCONTINUED | OUTPATIENT
Start: 2020-05-06 | End: 2020-05-06 | Stop reason: ALTCHOICE

## 2020-05-06 RX ORDER — CARBOXYMETHYLCELLULOSE SODIUM 10 MG/ML
1 GEL OPHTHALMIC 3 TIMES DAILY
Status: DISCONTINUED | OUTPATIENT
Start: 2020-05-06 | End: 2020-05-07 | Stop reason: HOSPADM

## 2020-05-06 RX ORDER — SODIUM CHLORIDE 0.9 % (FLUSH) 0.9 %
10 SYRINGE (ML) INJECTION PRN
Status: DISCONTINUED | OUTPATIENT
Start: 2020-05-06 | End: 2020-05-06 | Stop reason: HOSPADM

## 2020-05-06 RX ORDER — SODIUM CHLORIDE, SODIUM LACTATE, POTASSIUM CHLORIDE, CALCIUM CHLORIDE 600; 310; 30; 20 MG/100ML; MG/100ML; MG/100ML; MG/100ML
INJECTION, SOLUTION INTRAVENOUS CONTINUOUS
Status: DISCONTINUED | OUTPATIENT
Start: 2020-05-06 | End: 2020-05-06

## 2020-05-06 RX ORDER — FERROUS SULFATE 325(65) MG
325 TABLET ORAL 2 TIMES DAILY
Status: DISCONTINUED | OUTPATIENT
Start: 2020-05-06 | End: 2020-05-07 | Stop reason: HOSPADM

## 2020-05-06 RX ORDER — METHADONE HYDROCHLORIDE 10 MG/1
10 TABLET ORAL EVERY 6 HOURS PRN
Status: DISCONTINUED | OUTPATIENT
Start: 2020-05-06 | End: 2020-05-07 | Stop reason: HOSPADM

## 2020-05-06 RX ORDER — MEPERIDINE HYDROCHLORIDE 50 MG/ML
12.5 INJECTION INTRAMUSCULAR; INTRAVENOUS; SUBCUTANEOUS EVERY 5 MIN PRN
Status: DISCONTINUED | OUTPATIENT
Start: 2020-05-06 | End: 2020-05-06 | Stop reason: HOSPADM

## 2020-05-06 RX ORDER — SCOLOPAMINE TRANSDERMAL SYSTEM 1 MG/1
1 PATCH, EXTENDED RELEASE TRANSDERMAL ONCE
Status: DISCONTINUED | OUTPATIENT
Start: 2020-05-06 | End: 2020-05-06 | Stop reason: HOSPADM

## 2020-05-06 RX ORDER — HYDROMORPHONE HYDROCHLORIDE 1 MG/ML
1 INJECTION, SOLUTION INTRAMUSCULAR; INTRAVENOUS; SUBCUTANEOUS EVERY 4 HOURS PRN
Status: DISCONTINUED | OUTPATIENT
Start: 2020-05-06 | End: 2020-05-07 | Stop reason: HOSPADM

## 2020-05-06 RX ORDER — DEXAMETHASONE SODIUM PHOSPHATE 10 MG/ML
INJECTION, SOLUTION INTRAMUSCULAR; INTRAVENOUS PRN
Status: DISCONTINUED | OUTPATIENT
Start: 2020-05-06 | End: 2020-05-06 | Stop reason: SDUPTHER

## 2020-05-06 RX ORDER — ATORVASTATIN CALCIUM 40 MG/1
40 TABLET, FILM COATED ORAL NIGHTLY
Status: DISCONTINUED | OUTPATIENT
Start: 2020-05-06 | End: 2020-05-07 | Stop reason: HOSPADM

## 2020-05-06 RX ADMIN — SODIUM CHLORIDE, SODIUM LACTATE, POTASSIUM CHLORIDE, AND CALCIUM CHLORIDE: 600; 310; 30; 20 INJECTION, SOLUTION INTRAVENOUS at 06:46

## 2020-05-06 RX ADMIN — ROCURONIUM BROMIDE 50 MG: 10 INJECTION, SOLUTION INTRAVENOUS at 07:12

## 2020-05-06 RX ADMIN — SUFENTANIL CITRATE 20 MCG: 50 INJECTION EPIDURAL; INTRAVENOUS at 08:03

## 2020-05-06 RX ADMIN — GABAPENTIN 800 MG: 400 CAPSULE ORAL at 20:19

## 2020-05-06 RX ADMIN — METAXALONE 800 MG: 800 TABLET ORAL at 20:19

## 2020-05-06 RX ADMIN — Medication 40 MG: at 07:55

## 2020-05-06 RX ADMIN — SODIUM CHLORIDE, SODIUM LACTATE, POTASSIUM CHLORIDE, AND CALCIUM CHLORIDE: 600; 310; 30; 20 INJECTION, SOLUTION INTRAVENOUS at 08:54

## 2020-05-06 RX ADMIN — KETOROLAC TROMETHAMINE 30 MG: 30 INJECTION, SOLUTION INTRAMUSCULAR; INTRAVENOUS at 09:15

## 2020-05-06 RX ADMIN — SUFENTANIL CITRATE 10 MCG: 50 INJECTION EPIDURAL; INTRAVENOUS at 07:12

## 2020-05-06 RX ADMIN — METOPROLOL TARTRATE 25 MG: 25 TABLET, FILM COATED ORAL at 20:19

## 2020-05-06 RX ADMIN — Medication 2 G: at 22:29

## 2020-05-06 RX ADMIN — PHENYLEPHRINE HYDROCHLORIDE 100 MCG: 10 INJECTION INTRAVENOUS at 08:05

## 2020-05-06 RX ADMIN — CARBOXYMETHYLCELLULOSE SODIUM 1 DROP: 10 GEL OPHTHALMIC at 22:11

## 2020-05-06 RX ADMIN — Medication 2 G: at 16:05

## 2020-05-06 RX ADMIN — PHENYLEPHRINE HYDROCHLORIDE 100 MCG: 10 INJECTION INTRAVENOUS at 07:17

## 2020-05-06 RX ADMIN — LOPERAMIDE HYDROCHLORIDE 4 MG: 2 CAPSULE ORAL at 13:46

## 2020-05-06 RX ADMIN — CARBOXYMETHYLCELLULOSE SODIUM 1 DROP: 10 GEL OPHTHALMIC at 17:25

## 2020-05-06 RX ADMIN — ONDANSETRON HYDROCHLORIDE 4 MG: 2 INJECTION, SOLUTION INTRAMUSCULAR; INTRAVENOUS at 09:15

## 2020-05-06 RX ADMIN — HYDROMORPHONE HYDROCHLORIDE 0.5 MG: 1 INJECTION, SOLUTION INTRAMUSCULAR; INTRAVENOUS; SUBCUTANEOUS at 09:52

## 2020-05-06 RX ADMIN — METHADONE HYDROCHLORIDE 10 MG: 10 TABLET ORAL at 20:19

## 2020-05-06 RX ADMIN — GABAPENTIN 800 MG: 400 CAPSULE ORAL at 13:39

## 2020-05-06 RX ADMIN — FERROUS SULFATE TAB 325 MG (65 MG ELEMENTAL FE) 325 MG: 325 (65 FE) TAB at 20:19

## 2020-05-06 RX ADMIN — METAXALONE 800 MG: 800 TABLET ORAL at 17:29

## 2020-05-06 RX ADMIN — WATER 2 G: 1 INJECTION INTRAMUSCULAR; INTRAVENOUS; SUBCUTANEOUS at 07:16

## 2020-05-06 RX ADMIN — SUFENTANIL CITRATE 5 MCG: 50 INJECTION EPIDURAL; INTRAVENOUS at 08:55

## 2020-05-06 RX ADMIN — PROPOFOL 120 MG: 10 INJECTION, EMULSION INTRAVENOUS at 07:12

## 2020-05-06 RX ADMIN — ATORVASTATIN CALCIUM 40 MG: 40 TABLET, FILM COATED ORAL at 20:19

## 2020-05-06 RX ADMIN — HYDROMORPHONE HYDROCHLORIDE 0.5 MG: 1 INJECTION, SOLUTION INTRAMUSCULAR; INTRAVENOUS; SUBCUTANEOUS at 10:03

## 2020-05-06 RX ADMIN — ERYTHROMYCIN: 5 OINTMENT OPHTHALMIC at 17:25

## 2020-05-06 RX ADMIN — METHADONE HYDROCHLORIDE 10 MG: 10 TABLET ORAL at 13:45

## 2020-05-06 RX ADMIN — SODIUM CHLORIDE: 9 INJECTION, SOLUTION INTRAVENOUS at 13:42

## 2020-05-06 RX ADMIN — PHENYLEPHRINE HYDROCHLORIDE 100 MCG: 10 INJECTION INTRAVENOUS at 07:54

## 2020-05-06 RX ADMIN — SUGAMMADEX 160 MG: 100 INJECTION, SOLUTION INTRAVENOUS at 09:16

## 2020-05-06 RX ADMIN — LIDOCAINE HYDROCHLORIDE 50 MG: 10 INJECTION, SOLUTION INFILTRATION; PERINEURAL at 07:12

## 2020-05-06 RX ADMIN — ROCURONIUM BROMIDE 20 MG: 10 INJECTION, SOLUTION INTRAVENOUS at 08:05

## 2020-05-06 RX ADMIN — SUFENTANIL CITRATE 5 MCG: 50 INJECTION EPIDURAL; INTRAVENOUS at 09:15

## 2020-05-06 RX ADMIN — Medication 10 MG: at 08:54

## 2020-05-06 RX ADMIN — DEXAMETHASONE SODIUM PHOSPHATE 10 MG: 10 INJECTION, SOLUTION INTRAMUSCULAR; INTRAVENOUS at 07:54

## 2020-05-06 ASSESSMENT — PAIN SCALES - GENERAL
PAINLEVEL_OUTOF10: 4
PAINLEVEL_OUTOF10: 0
PAINLEVEL_OUTOF10: 5
PAINLEVEL_OUTOF10: 5

## 2020-05-06 ASSESSMENT — PAIN DESCRIPTION - DESCRIPTORS: DESCRIPTORS: ACHING

## 2020-05-06 ASSESSMENT — LIFESTYLE VARIABLES: SMOKING_STATUS: 0

## 2020-05-06 ASSESSMENT — ENCOUNTER SYMPTOMS: SHORTNESS OF BREATH: 0

## 2020-05-06 ASSESSMENT — PAIN DESCRIPTION - LOCATION: LOCATION: EYE;BACK

## 2020-05-06 ASSESSMENT — PAIN - FUNCTIONAL ASSESSMENT: PAIN_FUNCTIONAL_ASSESSMENT: 0-10

## 2020-05-06 NOTE — OP NOTE
with the midline incision. The right sided instrumentation however was way off of the midline off to the right side. The previous midline incision spanning the instrumentation from L1-L3 was reopened using a 10 blade scalpel. Dissection was carried through subcutaneous tissues using Bovie cautery. The fascia was divided in line with the incision basically directly over the left-sided instrumentation. The instrumentation was exposed using Bovie cautery. And antitorque wrench in the appropriate star  screwdriver was used to remove the set screws from the left-sided instrumentation. Scar tissue was removed from the pedicle screw saddles. The appropriate pedicle screwdriver was then used to remove the pedicle screws. All screws were loose as expected at L1, L2, and L3 on the left. The pedicle tracts were filled with FloSeal to assist with hemostasis. This wound was then packed while attention was turned to the right sided instrumentation    Given the position of the instrumentation far off to the right, I decided to use the same midline incision but I created a subcutaneous plane over to the fascia. The right L1 screw was easily palpable. I divided the fascia in line with this screw and was able to dissect through the paraspinous musculature down to the screw itself. The same plane was used to expose the right-sided screws at L2 and L3. Again the antitorque wrench and screwdriver were used to remove the set screws. The andres was taken out in a similar fashion and scar tissue was removed from the pedicle screws. The L1 and L2 screws were taken out without difficulty. The L3 screw which was markedly malpositioned medially was then carefully backed out slowly. I did not see any active CSF leakage. I did use a ball-tipped feeler and again saw no obvious CSF leak. After thoroughly irrigating the wound I filled the pedicle track for the L3 screw with Tisseel fibrin glue.     I then thoroughly

## 2020-05-06 NOTE — ANESTHESIA PRE PROCEDURE
Route Frequency Provider Last Rate Last Dose    lactated ringers infusion   Intravenous Continuous ZOHREH Figueroa MD        ceFAZolin (ANCEF) 1 g in sterile water 10 mL IV syringe  1 g Intravenous Once ZOHREH Figueroa MD           Allergies:     Allergies   Allergen Reactions    Morphine Anxiety       Problem List:    Patient Active Problem List   Diagnosis Code    Thoracic facet joint syndrome M47.814    Primary osteoarthritis of left hip M16.12    Leg swelling M79.89    Abnormal nuclear cardiac imaging test R93.1    Abnormal nuclear cardiac imaging test R93.1    Mixed hyperlipidemia E78.2    S/p bare metal coronary artery stent Z95.5    Coronary artery disease involving native coronary artery of native heart without angina pectoris I25.10    Complex regional pain syndrome type 1 of right lower extremity G90.521    Hydronephrosis, bilateral N13.30    Malignant neoplasm of overlapping sites of bladder ([facility] [address] C67.8    Extrinsic ureteral obstruction, bilateral N13.5    History of rectal cancer Z85.048    Anemia of chronic disease D63.8    Pelvic mass R19.00    Lung nodules R91.8    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 G54.9    Colorectal cancer ([facility] [address] C19    Metastasis from colon cancer (HCC) C79.9, C18.9    Proteinuria R80.9    Chemotherapy management, encounter for Z51.11    Anemia associated with chemotherapy D64.81, T45.1X5A    Other fatigue R53.83    Thrush B37.0    Dehydration E86.0    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

## 2020-05-07 VITALS
DIASTOLIC BLOOD PRESSURE: 59 MMHG | RESPIRATION RATE: 15 BRPM | WEIGHT: 170 LBS | TEMPERATURE: 98.2 F | BODY MASS INDEX: 28.32 KG/M2 | HEART RATE: 72 BPM | SYSTOLIC BLOOD PRESSURE: 102 MMHG | OXYGEN SATURATION: 93 % | HEIGHT: 65 IN

## 2020-05-07 LAB
ANION GAP SERPL CALCULATED.3IONS-SCNC: 13 MMOL/L (ref 7–19)
BASOPHILS ABSOLUTE: 0 K/UL (ref 0–0.2)
BASOPHILS RELATIVE PERCENT: 0 % (ref 0–1)
BUN BLDV-MCNC: 22 MG/DL (ref 8–23)
CALCIUM SERPL-MCNC: 8.3 MG/DL (ref 8.8–10.2)
CHLORIDE BLD-SCNC: 100 MMOL/L (ref 98–111)
CO2: 19 MMOL/L (ref 22–29)
CREAT SERPL-MCNC: 1.2 MG/DL (ref 0.5–1.2)
EOSINOPHILS ABSOLUTE: 0 K/UL (ref 0–0.6)
EOSINOPHILS RELATIVE PERCENT: 0 % (ref 0–5)
GFR NON-AFRICAN AMERICAN: >60
GLUCOSE BLD-MCNC: 129 MG/DL (ref 74–109)
HCT VFR BLD CALC: 25 % (ref 42–52)
HEMOGLOBIN: 7.7 G/DL (ref 14–18)
IMMATURE GRANULOCYTES #: 0 K/UL
LYMPHOCYTES ABSOLUTE: 0.4 K/UL (ref 1.1–4.5)
LYMPHOCYTES RELATIVE PERCENT: 6.7 % (ref 20–40)
MCH RBC QN AUTO: 26.7 PG (ref 27–31)
MCHC RBC AUTO-ENTMCNC: 30.8 G/DL (ref 33–37)
MCV RBC AUTO: 86.8 FL (ref 80–94)
MONOCYTES ABSOLUTE: 0.4 K/UL (ref 0–0.9)
MONOCYTES RELATIVE PERCENT: 6.6 % (ref 0–10)
NEUTROPHILS ABSOLUTE: 4.9 K/UL (ref 1.5–7.5)
NEUTROPHILS RELATIVE PERCENT: 86.2 % (ref 50–65)
PDW BLD-RTO: 15.1 % (ref 11.5–14.5)
PLATELET # BLD: 170 K/UL (ref 130–400)
PMV BLD AUTO: 11 FL (ref 9.4–12.4)
POTASSIUM REFLEX MAGNESIUM: 4.8 MMOL/L (ref 3.5–5)
RBC # BLD: 2.88 M/UL (ref 4.7–6.1)
SODIUM BLD-SCNC: 132 MMOL/L (ref 136–145)
WBC # BLD: 5.6 K/UL (ref 4.8–10.8)

## 2020-05-07 PROCEDURE — 97116 GAIT TRAINING THERAPY: CPT

## 2020-05-07 PROCEDURE — 85025 COMPLETE CBC W/AUTO DIFF WBC: CPT

## 2020-05-07 PROCEDURE — 6370000000 HC RX 637 (ALT 250 FOR IP): Performed by: ORTHOPAEDIC SURGERY

## 2020-05-07 PROCEDURE — 97530 THERAPEUTIC ACTIVITIES: CPT

## 2020-05-07 PROCEDURE — 97535 SELF CARE MNGMENT TRAINING: CPT

## 2020-05-07 PROCEDURE — 80048 BASIC METABOLIC PNL TOTAL CA: CPT

## 2020-05-07 PROCEDURE — 36591 DRAW BLOOD OFF VENOUS DEVICE: CPT

## 2020-05-07 PROCEDURE — 6360000002 HC RX W HCPCS: Performed by: ORTHOPAEDIC SURGERY

## 2020-05-07 RX ORDER — HEPARIN SODIUM (PORCINE) LOCK FLUSH IV SOLN 100 UNIT/ML 100 UNIT/ML
300 SOLUTION INTRAVENOUS PRN
Status: DISCONTINUED | OUTPATIENT
Start: 2020-05-07 | End: 2020-05-07 | Stop reason: HOSPADM

## 2020-05-07 RX ADMIN — FERROUS SULFATE TAB 325 MG (65 MG ELEMENTAL FE) 325 MG: 325 (65 FE) TAB at 09:03

## 2020-05-07 RX ADMIN — CARBOXYMETHYLCELLULOSE SODIUM 1 DROP: 10 GEL OPHTHALMIC at 09:03

## 2020-05-07 RX ADMIN — METAXALONE 800 MG: 800 TABLET ORAL at 09:03

## 2020-05-07 RX ADMIN — TAMSULOSIN HYDROCHLORIDE 0.4 MG: 0.4 CAPSULE ORAL at 09:03

## 2020-05-07 RX ADMIN — DULOXETINE 30 MG: 30 CAPSULE, DELAYED RELEASE ORAL at 09:03

## 2020-05-07 RX ADMIN — GABAPENTIN 800 MG: 400 CAPSULE ORAL at 09:03

## 2020-05-07 RX ADMIN — METHADONE HYDROCHLORIDE 10 MG: 10 TABLET ORAL at 02:18

## 2020-05-07 RX ADMIN — HEPARIN 300 UNITS: 100 SYRINGE at 13:08

## 2020-05-07 RX ADMIN — ERYTHROMYCIN: 5 OINTMENT OPHTHALMIC at 09:03

## 2020-05-07 ASSESSMENT — PAIN SCALES - GENERAL
PAINLEVEL_OUTOF10: 4
PAINLEVEL_OUTOF10: 0

## 2020-05-07 NOTE — CARE COORDINATION
Spoke to patient regarding MD orders for home health services. Patient does not want home health. States his wife is an \"agoraphoibic\"? She cannot stand people in her home. I makes her physically sick when outsiders come into their home. He feels it will be too much stress on his wife. He states this is his 6th back surgery and he is aware of post op instructions. His daughter is a nurse anesthetist and his son in law is an ER MD.  He will have plenty of medical people to look at his incision and he knows the post procedure protocol for his activity. States he will be fine and does not want to upset his wife. His nurse was in a patient's room so informed the charge nurse.   Electronically signed by Val Coffman RN on 5/7/20 at 10:09 AM LIANAT

## 2020-05-07 NOTE — PROGRESS NOTES
24 hour chart check completed
Occupational Therapy  Facility/Department: [facility] SURG SERVICES  Daily Treatment Note  NAME: Ahmet Espinal  : 1950  MRN: [id number]    Date of Service: 2020    Discharge Recommendations:  Home with assist PRN       Assessment   Assessment: ADL training. Right knee not buckling--patient controlling factors better. Reports increased AROM and increased sensation  Treatment Diagnosis: Removal of instrumentation, exploration of fusion L1-3, revision PSF, L1-3  Activity Tolerance  Activity Tolerance: Patient Tolerated treatment well  Safety Devices  Safety Devices in place: Not Applicable(assisted to transport chair for discharge)         Patient Diagnosis(es): There were no encounter diagnoses. has a past medical history of COLLEEN (acute kidney injury) ([facility] [address] Arthritis, Burn, Cancer ([facility] [address] Chronic back pain, Complex regional pain syndrome type 1 of right lower extremity, Coronary artery disease involving native coronary artery of native heart without angina pectoris, Drop foot gait, History of blood transfusion, Hypertension, and Mixed hyperlipidemia. has a past surgical history that includes Neck surgery; Abdomen surgery; hc inject other perphrl nerv (Left, 10/28/2016); back surgery; ileostomy or jejunostomy; colectomy; Abdominal exploration surgery; eye surgery (Bilateral); CYSTOSCOPY INSERTION / REMOVAL STENT / STONE (Right, 2019); INSERTION / REMOVAL / REPLACEMENT VENOUS ACCESS CATHETER (Right, 2019); Cystoscopy (Left, 2019); Cystoscopy (Left, 2019); Tunneled venous port placement; Cystoscopy (Bilateral, 12/3/2019); cystoscopy w biopsy of bladder (N/A, 12/3/2019); Cystoscopy (Bilateral, 2020); and lumbar fusion (N/A, 2020).     Restrictions  Restrictions/Precautions  Restrictions/Precautions: Fall Risk  Required Braces or Orthoses?: Yes  Required Braces or Orthoses  Spinal: Lumbar Corset  Spinal Other: LSO  Position Activity Restriction  Spinal Precautions: No Bending, No
cystoscopy w biopsy of bladder (N/A, 12/3/2019); Cystoscopy (Bilateral, 2/26/2020); and lumbar fusion (N/A, 5/6/2020). Restrictions  Restrictions/Precautions  Restrictions/Precautions: Fall Risk  Required Braces or Orthoses?: Yes  Required Braces or Orthoses  Spinal: Lumbar Corset  Spinal Other: LSO  Position Activity Restriction  Spinal Precautions: No Bending, No Lifting, No Twisting  Other position/activity restrictions: HAS AN OSTOMY  Vision/Hearing        Subjective  General  Patient assessed for rehabilitation services?: Yes  Diagnosis: REVISION OF POST FUSION L1-3  Follows Commands: Within Functional Limits  General Comment  Comments: LSO NOT IN ROOM. pt NOTED TO BE UP IN BR WITH PCA TO URINATE. ASSISTED pt BTB  Subjective  Subjective: Pt STATES HE IS HAVING VERY LITTLE PAIN FOLLOWING SX  Pain Screening  Patient Currently in Pain: Yes          Orientation  Orientation  Overall Orientation Status: Within Functional Limits  Social/Functional History  Social/Functional History  Lives With: Spouse  Home Access: Stairs to enter with rails  Home Equipment: Rolling walker  ADL Assistance: Independent  Ambulation Assistance: Independent(USED CANE AT TIMES OF RW)  Transfer Assistance: Independent  Additional Comments: 1.5 year history for RLE dysfunction which progressively became more painful, back surgery, later complicated by colorectal CA with some pain relief from radiation, now with this revision  Cognition        Objective          PROM RLE (degrees)  RLE PROM: WFL  AROM LLE (degrees)  LLE AROM : WFL  Strength RLE  Comment: ACTIVE PF/DF AGAINST GRAVITY BUT NO ACTIVE QUADS OR MOVEMENT OF R HIP.  STATES THIS HAS BEEN PRESENT FOR OVER A YEAR AND HAS CAUSED HIM TO FALL     Sensation  Overall Sensation Status: Impaired(DIMINISHED IN R LE)  Bed mobility  Sit to Supine: Minimal assistance(ASSISTED WITH R LE)  Transfers  Sit to Stand: Contact guard assistance  Stand to sit: Contact guard
Transfers  Toilet Transfer: Contact guard assistance;Minimal assistance  ADL  Feeding: Independent  Grooming: Independent;Setup  UE Bathing: Minimal assistance  LE Bathing: Minimal assistance  UE Dressing: Independent(excludes donning LSO)  LE Dressing: Minimal assistance  Toileting: Minimal assistance;Contact guard assistance        Bed mobility  Supine to Sit: Contact guard assistance  Sit to Supine: Contact guard assistance;Minimal assistance  Transfers  Stand Step Transfers: Contact guard assistance;Minimal assistance     Cognition  Overall Cognitive Status: WNL        Sensation  Overall Sensation Status: Impaired(DIMINISHED IN R LE)        LUE PROM (degrees)  LUE PROM: WNL  LUE AROM (degrees)  LUE AROM : WNL  RUE PROM (degrees)  RUE PROM: WNL  RUE AROM (degrees)  RUE AROM : WNL                   Tx initiated:   Mobility strategies, back protocols (10 mins)     Plan   Plan  Times per week: 4-8x weekly    G-Code     OutComes Score                                                  AM-PAC Score             Goals  Short term goals  Time Frame for Short term goals: 1-2 weeks  Short term goal 1: Modified independent with seated dressing techniques with AE  Short term goal 2: Supervision to modified independent with standing aspects of clothing management for dressing and toileting  Short term goal 3: Supervision to modified independent with transfers   Short term goal 4: Supervision for light ambulatory ADL   Short term goal 5: Consistently utilize safe compensatory strategies for RLE weakness during ADL and mobility       Therapy Time   Individual Concurrent Group Co-treatment   Time In           Time Out           Minutes                   Alvaro Butts OT Electronically signed by Alvaro Butts OT on 5/6/2020 at 4:02 PM

## 2020-05-08 LAB
BLOOD BANK DISPENSE STATUS: NORMAL
BLOOD BANK DISPENSE STATUS: NORMAL
BLOOD BANK PRODUCT CODE: NORMAL
BLOOD BANK PRODUCT CODE: NORMAL
BPU ID: NORMAL
BPU ID: NORMAL
DESCRIPTION BLOOD BANK: NORMAL
DESCRIPTION BLOOD BANK: NORMAL

## 2020-05-15 ENCOUNTER — HOSPITAL ENCOUNTER (OUTPATIENT)
Dept: PREADMISSION TESTING | Age: 70
Discharge: HOME | End: 2020-05-19
Payer: MEDICARE

## 2020-05-15 ENCOUNTER — HOSPITAL ENCOUNTER (OUTPATIENT)
Dept: INFUSION THERAPY | Age: 70
Setting detail: INFUSION SERIES
Discharge: HOME | End: 2020-05-15
Payer: MEDICARE

## 2020-05-15 VITALS
SYSTOLIC BLOOD PRESSURE: 100 MMHG | TEMPERATURE: 98 F | HEART RATE: 65 BPM | OXYGEN SATURATION: 98 % | DIASTOLIC BLOOD PRESSURE: 57 MMHG | RESPIRATION RATE: 20 BRPM

## 2020-05-15 DIAGNOSIS — E61.1 IRON DEFICIENCY: Primary | ICD-10-CM

## 2020-05-15 LAB
ANION GAP SERPL CALCULATED.3IONS-SCNC: 11 MMOL/L (ref 7–19)
BASOPHILS ABSOLUTE: 0.1 K/UL (ref 0–0.2)
BASOPHILS RELATIVE PERCENT: 1.3 % (ref 0–1)
BUN BLDV-MCNC: 10 MG/DL (ref 8–23)
CALCIUM SERPL-MCNC: 9.2 MG/DL (ref 8.8–10.2)
CHLORIDE BLD-SCNC: 96 MMOL/L (ref 98–111)
CO2: 27 MMOL/L (ref 22–29)
CREAT SERPL-MCNC: 1.3 MG/DL (ref 0.5–1.2)
EOSINOPHILS ABSOLUTE: 0.2 K/UL (ref 0–0.6)
EOSINOPHILS RELATIVE PERCENT: 4.4 % (ref 0–5)
GFR NON-AFRICAN AMERICAN: 55
GLUCOSE BLD-MCNC: 96 MG/DL (ref 74–109)
HCT VFR BLD CALC: 30 % (ref 42–52)
HEMOGLOBIN: 9.5 G/DL (ref 14–18)
IMMATURE GRANULOCYTES #: 0.1 K/UL
LYMPHOCYTES ABSOLUTE: 0.7 K/UL (ref 1.1–4.5)
LYMPHOCYTES RELATIVE PERCENT: 13.1 % (ref 20–40)
MCH RBC QN AUTO: 27.6 PG (ref 27–31)
MCHC RBC AUTO-ENTMCNC: 31.7 G/DL (ref 33–37)
MCV RBC AUTO: 87.2 FL (ref 80–94)
MONOCYTES ABSOLUTE: 0.7 K/UL (ref 0–0.9)
MONOCYTES RELATIVE PERCENT: 12.3 % (ref 0–10)
NEUTROPHILS ABSOLUTE: 3.5 K/UL (ref 1.5–7.5)
NEUTROPHILS RELATIVE PERCENT: 67 % (ref 50–65)
PDW BLD-RTO: 14.9 % (ref 11.5–14.5)
PLATELET # BLD: 295 K/UL (ref 130–400)
PMV BLD AUTO: 10.8 FL (ref 9.4–12.4)
POTASSIUM SERPL-SCNC: 4.8 MMOL/L (ref 3.5–5)
RBC # BLD: 3.44 M/UL (ref 4.7–6.1)
SODIUM BLD-SCNC: 134 MMOL/L (ref 136–145)
WBC # BLD: 5.3 K/UL (ref 4.8–10.8)

## 2020-05-15 PROCEDURE — 80048 BASIC METABOLIC PNL TOTAL CA: CPT

## 2020-05-15 PROCEDURE — 2580000003 HC RX 258: Performed by: INTERNAL MEDICINE

## 2020-05-15 PROCEDURE — 85025 COMPLETE CBC W/AUTO DIFF WBC: CPT

## 2020-05-15 PROCEDURE — 6360000002 HC RX W HCPCS: Performed by: INTERNAL MEDICINE

## 2020-05-15 PROCEDURE — 96365 THER/PROPH/DIAG IV INF INIT: CPT

## 2020-05-15 RX ORDER — SODIUM CHLORIDE 9 MG/ML
INJECTION, SOLUTION INTRAVENOUS CONTINUOUS
Status: CANCELLED | OUTPATIENT
Start: 2020-05-15

## 2020-05-15 RX ORDER — SODIUM CHLORIDE 0.9 % (FLUSH) 0.9 %
10 SYRINGE (ML) INJECTION PRN
Status: DISCONTINUED | OUTPATIENT
Start: 2020-05-15 | End: 2020-05-16 | Stop reason: HOSPADM

## 2020-05-15 RX ORDER — DIPHENHYDRAMINE HYDROCHLORIDE 50 MG/ML
50 INJECTION INTRAMUSCULAR; INTRAVENOUS ONCE
Status: CANCELLED | OUTPATIENT
Start: 2020-05-15

## 2020-05-15 RX ORDER — SODIUM CHLORIDE 0.9 % (FLUSH) 0.9 %
10 SYRINGE (ML) INJECTION PRN
Status: CANCELLED | OUTPATIENT
Start: 2020-05-15

## 2020-05-15 RX ORDER — SODIUM CHLORIDE 0.9 % (FLUSH) 0.9 %
5 SYRINGE (ML) INJECTION PRN
Status: CANCELLED | OUTPATIENT
Start: 2020-05-15

## 2020-05-15 RX ORDER — HEPARIN SODIUM (PORCINE) LOCK FLUSH IV SOLN 100 UNIT/ML 100 UNIT/ML
500 SOLUTION INTRAVENOUS PRN
Status: DISCONTINUED | OUTPATIENT
Start: 2020-05-15 | End: 2020-05-16 | Stop reason: HOSPADM

## 2020-05-15 RX ORDER — CIPROFLOXACIN 2 MG/ML
400 INJECTION, SOLUTION INTRAVENOUS ONCE
Status: CANCELLED | OUTPATIENT
Start: 2020-05-28

## 2020-05-15 RX ORDER — METHYLPREDNISOLONE SODIUM SUCCINATE 125 MG/2ML
125 INJECTION, POWDER, LYOPHILIZED, FOR SOLUTION INTRAMUSCULAR; INTRAVENOUS ONCE
Status: CANCELLED | OUTPATIENT
Start: 2020-05-15

## 2020-05-15 RX ORDER — SODIUM CHLORIDE 9 MG/ML
INJECTION, SOLUTION INTRAVENOUS CONTINUOUS
Status: DISCONTINUED | OUTPATIENT
Start: 2020-05-15 | End: 2020-05-16 | Stop reason: HOSPADM

## 2020-05-15 RX ORDER — EPINEPHRINE 1 MG/ML
0.3 INJECTION, SOLUTION, CONCENTRATE INTRAVENOUS PRN
Status: CANCELLED | OUTPATIENT
Start: 2020-05-15

## 2020-05-15 RX ORDER — HEPARIN SODIUM (PORCINE) LOCK FLUSH IV SOLN 100 UNIT/ML 100 UNIT/ML
500 SOLUTION INTRAVENOUS PRN
Status: CANCELLED | OUTPATIENT
Start: 2020-05-15

## 2020-05-15 RX ADMIN — HEPARIN 300 UNITS: 100 SYRINGE at 14:13

## 2020-05-15 RX ADMIN — FERUMOXYTOL 510 MG: 510 INJECTION INTRAVENOUS at 13:24

## 2020-05-15 RX ADMIN — SODIUM CHLORIDE, PRESERVATIVE FREE 10 ML: 5 INJECTION INTRAVENOUS at 14:12

## 2020-05-19 NOTE — PROGRESS NOTES
on day 1, followed by 5-FU bolus 400 mg/m² and then 1200 mg/m²/day x2 days (total 2400 mg meter squared over 46 to 48 hours) continuous infusion.  Repeat every 2 weeks.     ONCOLOGIC HISTORY #3  Scott Olmedo was seen in initial oncology consultation on 8/19/2019 during his hospitalization at [facility] after a large pelvic mass was identified which raised concern for recurrent disease.     · 8/17/2019- CEA 18.1  · 8/17/2019- CT scan of the kidney with contrast documented moderate to severe right hydronephrosis with dilation of the right ureter into the lower pelvis the site of the parasacral soft tissue changes.  Partially calcified soft tissue changes within the janes-sacral region likely representing sequelae of pelvic radiation.  Increasing scarring/fibrosis versus tumor recurrence within the presacral changes, likely represents a site of right distal ureter obstruction.  No left-sided hydronephrosis. · 8/18/2019 -Double-J ureter stent placement for right hydronephrosis secondary to extrinsic compression by pelvic mass.    · 8/27/2019-CT scan of the chest with contrast documented numerous pulmonary nodules that appear new compared to 11/12/2017, RUL nodule measuring 7 mm and LLL nodule measuring 5 mm.  Soft tissue nodule at the left ventral abdominal wall.  Slight increased size of a probable lymph node anterior to the aorta measuring 0.9 cm compared to 0.7 cm.  Similar presacral, right pelvic sidewall and right abductor muscular nodular soft tissue density. · 8/27/2019 CT scan of the abdomen and pelvis with contrast identified new moderate left hydronephrosis with moderate right hydronephrosis.  Mild stranding around the urinary bladder and thickening of the bilateral ureteral wall.  Numerous pulmonary nodules.  Soft tissue of the left ventral abdominal wall.  Slightly increased size of probable lymph node anterior to the aorta measuring 0.9 cm compared to 0.7 cm.   · 8/27/2019-PET scan did not identify nodules in the RUL anteriorly are barely visible on this study. Marine Leer is a decrease in size of mediastinal lymph nodes compared to previous exam, right distal paratracheal lymph node measuring 4.5 mm compared to 8.3 mm and lower right peritracheal node measuring 4.5 mm compared to 8.6 mm.    · 1/13/2020- CT scan of the abdomen and pelvis with contrast indicated improvement in the right-sided hydronephrosis with a chronic inflammatory process of the ureters suspected due to the moderate thickening, also present on previous study.  The small poorly enhancing nodules in the right abductor muscles have decreased in the partially calcified presacral mass and right lateral pelvic wall nodules are stable compared to previous study.  Resolution of the subcutaneous abdominal wall nodules.  A prominent retroperitoneal lymph node adjacent and anterior to the left common artery is redefined and measures 6 mm, no change from previous exam.   · 1/13/2020 - CT scan of the chest documented a right lower lobe nodule measures 4.3 cm and is unchanged.  A right lower lobe nodule measures 2.8 mm compared to 3.4 mm.  Nodule in the right upper lobe is barely visible and measures 2.4 mm.  Nodule in the left lower lobe measures 4.8 mm and is unchanged.  Nodule in left lower lobe posterior measures 2.8 mm and previously measured 4.7 mm.  A right lower lobe posterior medially nodule is barely visible measuring 0.2 mm and previously. measured 4.5 mm.  No new nodules identified.  No change in the size of the mediastinal lymph nodes. · 1/28/2020 -palliative maintenance therapy with leucovorin 400 mg/m² IV over 2 hours on day 1, followed by 5-FU bolus 400 mg/m² and then 1200 mg/m²/day x2 days (total 2400 mg meter squared over 46 to 48 hours) continuous infusion.  Repeat every 2 weeks. Only received 1 cycle, further treatment held due to small bowel obstruction.   · 1/30/2020 - CT scan of the abdomen and pelvis indicated high-grade small bowel

## 2020-05-20 ENCOUNTER — OFFICE VISIT (OUTPATIENT)
Dept: HEMATOLOGY | Age: 70
End: 2020-05-20
Payer: MEDICARE

## 2020-05-20 ENCOUNTER — CLINICAL DOCUMENTATION (OUTPATIENT)
Dept: HEMATOLOGY | Age: 70
End: 2020-05-20

## 2020-05-20 ENCOUNTER — HOSPITAL ENCOUNTER (OUTPATIENT)
Dept: INFUSION THERAPY | Age: 70
Discharge: HOME | End: 2020-05-20
Payer: MEDICARE

## 2020-05-20 ENCOUNTER — HOSPITAL ENCOUNTER (OUTPATIENT)
Dept: INFUSION THERAPY | Age: 70
Setting detail: INFUSION SERIES
End: 2020-05-20
Payer: MEDICARE

## 2020-05-20 VITALS
SYSTOLIC BLOOD PRESSURE: 128 MMHG | TEMPERATURE: 97.6 F | OXYGEN SATURATION: 95 % | HEIGHT: 65 IN | WEIGHT: 174 LBS | DIASTOLIC BLOOD PRESSURE: 60 MMHG | HEART RATE: 77 BPM | BODY MASS INDEX: 28.99 KG/M2

## 2020-05-20 DIAGNOSIS — C67.1 MALIGNANT NEOPLASM OF DOME OF URINARY BLADDER (HCC): ICD-10-CM

## 2020-05-20 LAB
BASOPHILS ABSOLUTE: 0.09 K/UL (ref 0.01–0.08)
BASOPHILS RELATIVE PERCENT: 1 % (ref 0.1–1.2)
EOSINOPHILS ABSOLUTE: 0.21 K/UL (ref 0.04–0.54)
EOSINOPHILS RELATIVE PERCENT: 2.4 % (ref 0.7–7)
HCT VFR BLD CALC: 30.4 % (ref 40.1–51)
HEMOGLOBIN: 10.5 G/DL (ref 13.7–17.5)
LYMPHOCYTES ABSOLUTE: 1.2 K/UL (ref 1.18–3.74)
LYMPHOCYTES RELATIVE PERCENT: 13.5 % (ref 19.3–53.1)
MCH RBC QN AUTO: 33.7 PG (ref 25.7–32.2)
MCHC RBC AUTO-ENTMCNC: 34.5 G/DL (ref 32.3–36.5)
MCV RBC AUTO: 97.4 FL (ref 79–92.2)
MONOCYTES ABSOLUTE: 0.56 K/UL (ref 0.24–0.82)
MONOCYTES RELATIVE PERCENT: 6.3 % (ref 4.7–12.5)
NEUTROPHILS ABSOLUTE: 6.83 K/UL (ref 1.56–6.13)
NEUTROPHILS RELATIVE PERCENT: 76.8 % (ref 34–71.1)
PDW BLD-RTO: 25.9 % (ref 11.6–14.4)
PLATELET # BLD: 63 K/UL (ref 163–337)
PMV BLD AUTO: ABNORMAL FL (ref 7.4–10.4)
RBC # BLD: 3.12 M/UL (ref 4.63–6.08)
WBC # BLD: 8.89 K/UL (ref 4.23–9.07)

## 2020-05-20 PROCEDURE — 4040F PNEUMOC VAC/ADMIN/RCVD: CPT | Performed by: INTERNAL MEDICINE

## 2020-05-20 PROCEDURE — 99214 OFFICE O/P EST MOD 30 MIN: CPT | Performed by: INTERNAL MEDICINE

## 2020-05-20 PROCEDURE — 3017F COLORECTAL CA SCREEN DOC REV: CPT | Performed by: INTERNAL MEDICINE

## 2020-05-20 PROCEDURE — G8417 CALC BMI ABV UP PARAM F/U: HCPCS | Performed by: INTERNAL MEDICINE

## 2020-05-20 PROCEDURE — 36415 COLL VENOUS BLD VENIPUNCTURE: CPT

## 2020-05-20 PROCEDURE — 1036F TOBACCO NON-USER: CPT | Performed by: INTERNAL MEDICINE

## 2020-05-20 PROCEDURE — 99212 OFFICE O/P EST SF 10 MIN: CPT

## 2020-05-20 PROCEDURE — 1123F ACP DISCUSS/DSCN MKR DOCD: CPT | Performed by: INTERNAL MEDICINE

## 2020-05-20 PROCEDURE — 1111F DSCHRG MED/CURRENT MED MERGE: CPT | Performed by: INTERNAL MEDICINE

## 2020-05-20 PROCEDURE — G8427 DOCREV CUR MEDS BY ELIG CLIN: HCPCS | Performed by: INTERNAL MEDICINE

## 2020-05-20 PROCEDURE — 85025 COMPLETE CBC W/AUTO DIFF WBC: CPT

## 2020-05-20 RX ORDER — IBANDRONATE SODIUM 150 MG/1
150 TABLET, FILM COATED ORAL
Qty: 30 TABLET | Refills: 6 | Status: SHIPPED | OUTPATIENT
Start: 2020-05-20 | End: 2020-08-19 | Stop reason: SDUPTHER

## 2020-05-20 NOTE — PROGRESS NOTES
Add cetuximab to 5-FU/leucovorin regimen:        IV: 500 mg/m2 every 2 weeks (initial dose infused over 120 minutes, subsequent doses infused over 60 minutes) in combination with 5FU/leucovorin

## 2020-05-22 ENCOUNTER — TELEPHONE (OUTPATIENT)
Dept: HEMATOLOGY | Age: 70
End: 2020-05-22

## 2020-05-26 ENCOUNTER — OFFICE VISIT (OUTPATIENT)
Age: 70
End: 2020-05-26

## 2020-05-26 VITALS — OXYGEN SATURATION: 94 % | TEMPERATURE: 98 F

## 2020-05-26 NOTE — PATIENT INSTRUCTIONS
departments. [employer] allows you to send messages to your doctor, view your test results, renew your prescriptions, schedule appointments, view visit notes, and more. How Do I Sign Up? 1. In your Internet browser, go to [email or web address]  2. Click on the Sign Up Now link in the Sign In box. You will see the New Member Sign Up page. 3. Enter your [employer] Access Code exactly as it appears below. You will not need to use this code after youve completed the sign-up process. If you do not sign up before the expiration date, you must request a new code. [employer] Access Code: Activation code not generated  Current [employer] Status: Active    4. Enter your Social Security Number ([id number]) and Date of Birth (mm/dd/yyyy) as indicated and click Submit. You will be taken to the next sign-up page. 5. Create a [employer] ID. This will be your [employer] login ID and cannot be changed, so think of one that is secure and easy to remember. 6. Create a [employer] password. You can change your password at any time. 7. Enter your Password Reset Question and Answer. This can be used at a later time if you forget your password. 8. Enter your e-mail address. You will receive e-mail notification when new information is available in [address] 9. Click Sign Up. You can now view your medical record. Additional Information  If you have questions, please contact the physician practice where you receive care. Remember, [employer] is NOT to be used for urgent needs. For medical emergencies, dial 911. For questions regarding your [employer] account call [phone]. If you have a clinical question, please call your doctor's office.

## 2020-05-27 LAB
REPORT: NORMAL
SARS-COV-2: NOT DETECTED
THIS TEST SENT TO: NORMAL

## 2020-05-28 ENCOUNTER — ANESTHESIA (OUTPATIENT)
Dept: OPERATING ROOM | Age: 70
End: 2020-05-28
Payer: MEDICARE

## 2020-05-28 ENCOUNTER — APPOINTMENT (OUTPATIENT)
Dept: GENERAL RADIOLOGY | Age: 70
End: 2020-05-28
Attending: UROLOGY
Payer: MEDICARE

## 2020-05-28 ENCOUNTER — ANESTHESIA EVENT (OUTPATIENT)
Dept: OPERATING ROOM | Age: 70
End: 2020-05-28
Payer: MEDICARE

## 2020-05-28 ENCOUNTER — HOSPITAL ENCOUNTER (OUTPATIENT)
Age: 70
Setting detail: OUTPATIENT SURGERY
Discharge: HOME | End: 2020-05-28
Attending: UROLOGY | Admitting: UROLOGY
Payer: MEDICARE

## 2020-05-28 VITALS
OXYGEN SATURATION: 100 % | SYSTOLIC BLOOD PRESSURE: 122 MMHG | TEMPERATURE: 94.5 F | RESPIRATION RATE: 22 BRPM | DIASTOLIC BLOOD PRESSURE: 69 MMHG

## 2020-05-28 VITALS
HEIGHT: 65 IN | TEMPERATURE: 97.1 F | RESPIRATION RATE: 16 BRPM | WEIGHT: 165 LBS | DIASTOLIC BLOOD PRESSURE: 73 MMHG | OXYGEN SATURATION: 94 % | SYSTOLIC BLOOD PRESSURE: 145 MMHG | HEART RATE: 68 BPM | BODY MASS INDEX: 27.49 KG/M2

## 2020-05-28 LAB
ANION GAP SERPL CALCULATED.3IONS-SCNC: 11 MMOL/L (ref 7–19)
BASOPHILS ABSOLUTE: 0.1 K/UL (ref 0–0.2)
BASOPHILS RELATIVE PERCENT: 1.1 % (ref 0–1)
BUN BLDV-MCNC: 10 MG/DL (ref 8–23)
CALCIUM SERPL-MCNC: 9.5 MG/DL (ref 8.8–10.2)
CHLORIDE BLD-SCNC: 100 MMOL/L (ref 98–111)
CO2: 26 MMOL/L (ref 22–29)
CREAT SERPL-MCNC: 1 MG/DL (ref 0.5–1.2)
EOSINOPHILS ABSOLUTE: 0.1 K/UL (ref 0–0.6)
EOSINOPHILS RELATIVE PERCENT: 2.3 % (ref 0–5)
GFR NON-AFRICAN AMERICAN: >60
GLUCOSE BLD-MCNC: 105 MG/DL (ref 74–109)
HCT VFR BLD CALC: 31.7 % (ref 42–52)
HEMOGLOBIN: 10.2 G/DL (ref 14–18)
IMMATURE GRANULOCYTES #: 0 K/UL
LYMPHOCYTES ABSOLUTE: 0.6 K/UL (ref 1.1–4.5)
LYMPHOCYTES RELATIVE PERCENT: 10.7 % (ref 20–40)
MCH RBC QN AUTO: 27.6 PG (ref 27–31)
MCHC RBC AUTO-ENTMCNC: 32.2 G/DL (ref 33–37)
MCV RBC AUTO: 85.9 FL (ref 80–94)
MONOCYTES ABSOLUTE: 0.5 K/UL (ref 0–0.9)
MONOCYTES RELATIVE PERCENT: 8.6 % (ref 0–10)
NEUTROPHILS ABSOLUTE: 4 K/UL (ref 1.5–7.5)
NEUTROPHILS RELATIVE PERCENT: 76.7 % (ref 50–65)
PDW BLD-RTO: 15.2 % (ref 11.5–14.5)
PLATELET # BLD: 219 K/UL (ref 130–400)
PMV BLD AUTO: 10.7 FL (ref 9.4–12.4)
POTASSIUM SERPL-SCNC: 4.2 MMOL/L (ref 3.5–4.9)
RBC # BLD: 3.69 M/UL (ref 4.7–6.1)
SODIUM BLD-SCNC: 137 MMOL/L (ref 136–145)
WBC # BLD: 5.2 K/UL (ref 4.8–10.8)

## 2020-05-28 PROCEDURE — 52234 CYSTOSCOPY AND TREATMENT: CPT | Performed by: UROLOGY

## 2020-05-28 PROCEDURE — 7100000001 HC PACU RECOVERY - ADDTL 15 MIN: Performed by: UROLOGY

## 2020-05-28 PROCEDURE — 52332 CYSTOSCOPY AND TREATMENT: CPT | Performed by: UROLOGY

## 2020-05-28 PROCEDURE — 3209999900 FLUORO FOR SURGICAL PROCEDURES

## 2020-05-28 PROCEDURE — 6360000002 HC RX W HCPCS: Performed by: UROLOGY

## 2020-05-28 PROCEDURE — 2500000003 HC RX 250 WO HCPCS: Performed by: NURSE ANESTHETIST, CERTIFIED REGISTERED

## 2020-05-28 PROCEDURE — 88305 TISSUE EXAM BY PATHOLOGIST: CPT

## 2020-05-28 PROCEDURE — 80048 BASIC METABOLIC PNL TOTAL CA: CPT

## 2020-05-28 PROCEDURE — 2580000003 HC RX 258: Performed by: NURSE ANESTHETIST, CERTIFIED REGISTERED

## 2020-05-28 PROCEDURE — 6360000002 HC RX W HCPCS: Performed by: NURSE ANESTHETIST, CERTIFIED REGISTERED

## 2020-05-28 PROCEDURE — C1758 CATHETER, URETERAL: HCPCS | Performed by: UROLOGY

## 2020-05-28 PROCEDURE — 7100000011 HC PHASE II RECOVERY - ADDTL 15 MIN: Performed by: UROLOGY

## 2020-05-28 PROCEDURE — 2709999900 HC NON-CHARGEABLE SUPPLY: Performed by: UROLOGY

## 2020-05-28 PROCEDURE — 3600000004 HC SURGERY LEVEL 4 BASE: Performed by: UROLOGY

## 2020-05-28 PROCEDURE — 6370000000 HC RX 637 (ALT 250 FOR IP): Performed by: UROLOGY

## 2020-05-28 PROCEDURE — C2617 STENT, NON-COR, TEM W/O DEL: HCPCS | Performed by: UROLOGY

## 2020-05-28 PROCEDURE — 7100000010 HC PHASE II RECOVERY - FIRST 15 MIN: Performed by: UROLOGY

## 2020-05-28 PROCEDURE — 3700000001 HC ADD 15 MINUTES (ANESTHESIA): Performed by: UROLOGY

## 2020-05-28 PROCEDURE — 7100000000 HC PACU RECOVERY - FIRST 15 MIN: Performed by: UROLOGY

## 2020-05-28 PROCEDURE — 74420 UROGRAPHY RTRGR +-KUB: CPT | Performed by: UROLOGY

## 2020-05-28 PROCEDURE — 3600000014 HC SURGERY LEVEL 4 ADDTL 15MIN: Performed by: UROLOGY

## 2020-05-28 PROCEDURE — C1769 GUIDE WIRE: HCPCS | Performed by: UROLOGY

## 2020-05-28 PROCEDURE — 74420 UROGRAPHY RTRGR +-KUB: CPT

## 2020-05-28 PROCEDURE — 85025 COMPLETE CBC W/AUTO DIFF WBC: CPT

## 2020-05-28 PROCEDURE — 3700000000 HC ANESTHESIA ATTENDED CARE: Performed by: UROLOGY

## 2020-05-28 PROCEDURE — 36415 COLL VENOUS BLD VENIPUNCTURE: CPT

## 2020-05-28 DEVICE — URETERAL STENT
Type: IMPLANTABLE DEVICE | Site: URETER | Status: FUNCTIONAL
Brand: POLARIS™ ULTRA

## 2020-05-28 RX ORDER — HYDROMORPHONE HYDROCHLORIDE 1 MG/ML
0.5 INJECTION, SOLUTION INTRAMUSCULAR; INTRAVENOUS; SUBCUTANEOUS
Status: DISCONTINUED | OUTPATIENT
Start: 2020-05-28 | End: 2020-05-28 | Stop reason: HOSPADM

## 2020-05-28 RX ORDER — DEXAMETHASONE SODIUM PHOSPHATE 4 MG/ML
INJECTION, SOLUTION INTRA-ARTICULAR; INTRALESIONAL; INTRAMUSCULAR; INTRAVENOUS; SOFT TISSUE PRN
Status: DISCONTINUED | OUTPATIENT
Start: 2020-05-28 | End: 2020-05-28 | Stop reason: SDUPTHER

## 2020-05-28 RX ORDER — OXYCODONE HYDROCHLORIDE AND ACETAMINOPHEN 5; 325 MG/1; MG/1
2 TABLET ORAL EVERY 4 HOURS PRN
Status: DISCONTINUED | OUTPATIENT
Start: 2020-05-28 | End: 2020-05-28 | Stop reason: HOSPADM

## 2020-05-28 RX ORDER — HYDROCODONE BITARTRATE AND ACETAMINOPHEN 5; 325 MG/1; MG/1
1 TABLET ORAL EVERY 6 HOURS PRN
Qty: 12 TABLET | Refills: 0 | Status: SHIPPED | OUTPATIENT
Start: 2020-05-28 | End: 2020-05-31

## 2020-05-28 RX ORDER — HEPARIN SODIUM (PORCINE) LOCK FLUSH IV SOLN 100 UNIT/ML 100 UNIT/ML
300 SOLUTION INTRAVENOUS PRN
Status: DISCONTINUED | OUTPATIENT
Start: 2020-05-28 | End: 2020-05-28 | Stop reason: HOSPADM

## 2020-05-28 RX ORDER — MEPERIDINE HYDROCHLORIDE 50 MG/ML
12.5 INJECTION INTRAMUSCULAR; INTRAVENOUS; SUBCUTANEOUS EVERY 5 MIN PRN
Status: DISCONTINUED | OUTPATIENT
Start: 2020-05-28 | End: 2020-05-28 | Stop reason: HOSPADM

## 2020-05-28 RX ORDER — MORPHINE SULFATE 4 MG/ML
2 INJECTION, SOLUTION INTRAMUSCULAR; INTRAVENOUS EVERY 5 MIN PRN
Status: DISCONTINUED | OUTPATIENT
Start: 2020-05-28 | End: 2020-05-28 | Stop reason: HOSPADM

## 2020-05-28 RX ORDER — TAMSULOSIN HYDROCHLORIDE 0.4 MG/1
0.4 CAPSULE ORAL ONCE
Status: COMPLETED | OUTPATIENT
Start: 2020-05-28 | End: 2020-05-28

## 2020-05-28 RX ORDER — CIPROFLOXACIN 2 MG/ML
400 INJECTION, SOLUTION INTRAVENOUS ONCE
Status: COMPLETED | OUTPATIENT
Start: 2020-05-28 | End: 2020-05-28

## 2020-05-28 RX ORDER — ONDANSETRON 2 MG/ML
4 INJECTION INTRAMUSCULAR; INTRAVENOUS EVERY 4 HOURS PRN
Status: DISCONTINUED | OUTPATIENT
Start: 2020-05-28 | End: 2020-05-28 | Stop reason: HOSPADM

## 2020-05-28 RX ORDER — SODIUM CHLORIDE 9 MG/ML
INJECTION, SOLUTION INTRAVENOUS CONTINUOUS
Status: DISCONTINUED | OUTPATIENT
Start: 2020-05-28 | End: 2020-05-28 | Stop reason: HOSPADM

## 2020-05-28 RX ORDER — ONDANSETRON 2 MG/ML
INJECTION INTRAMUSCULAR; INTRAVENOUS PRN
Status: DISCONTINUED | OUTPATIENT
Start: 2020-05-28 | End: 2020-05-28 | Stop reason: SDUPTHER

## 2020-05-28 RX ORDER — LIDOCAINE HYDROCHLORIDE 10 MG/ML
INJECTION, SOLUTION EPIDURAL; INFILTRATION; INTRACAUDAL; PERINEURAL PRN
Status: DISCONTINUED | OUTPATIENT
Start: 2020-05-28 | End: 2020-05-28 | Stop reason: SDUPTHER

## 2020-05-28 RX ORDER — HYDRALAZINE HYDROCHLORIDE 20 MG/ML
5 INJECTION INTRAMUSCULAR; INTRAVENOUS EVERY 10 MIN PRN
Status: DISCONTINUED | OUTPATIENT
Start: 2020-05-28 | End: 2020-05-28 | Stop reason: HOSPADM

## 2020-05-28 RX ORDER — DIPHENHYDRAMINE HYDROCHLORIDE 50 MG/ML
12.5 INJECTION INTRAMUSCULAR; INTRAVENOUS
Status: DISCONTINUED | OUTPATIENT
Start: 2020-05-28 | End: 2020-05-28 | Stop reason: HOSPADM

## 2020-05-28 RX ORDER — SODIUM CHLORIDE, SODIUM LACTATE, POTASSIUM CHLORIDE, CALCIUM CHLORIDE 600; 310; 30; 20 MG/100ML; MG/100ML; MG/100ML; MG/100ML
INJECTION, SOLUTION INTRAVENOUS CONTINUOUS PRN
Status: DISCONTINUED | OUTPATIENT
Start: 2020-05-28 | End: 2020-05-28 | Stop reason: SDUPTHER

## 2020-05-28 RX ORDER — CIPROFLOXACIN 500 MG/1
500 TABLET, FILM COATED ORAL 2 TIMES DAILY
Qty: 6 TABLET | Refills: 0 | Status: SHIPPED | OUTPATIENT
Start: 2020-05-28 | End: 2020-05-31

## 2020-05-28 RX ORDER — LABETALOL HYDROCHLORIDE 5 MG/ML
5 INJECTION, SOLUTION INTRAVENOUS EVERY 10 MIN PRN
Status: DISCONTINUED | OUTPATIENT
Start: 2020-05-28 | End: 2020-05-28 | Stop reason: HOSPADM

## 2020-05-28 RX ORDER — HYDROMORPHONE HYDROCHLORIDE 1 MG/ML
0.25 INJECTION, SOLUTION INTRAMUSCULAR; INTRAVENOUS; SUBCUTANEOUS EVERY 5 MIN PRN
Status: DISCONTINUED | OUTPATIENT
Start: 2020-05-28 | End: 2020-05-28 | Stop reason: HOSPADM

## 2020-05-28 RX ORDER — METOCLOPRAMIDE HYDROCHLORIDE 5 MG/ML
10 INJECTION INTRAMUSCULAR; INTRAVENOUS
Status: DISCONTINUED | OUTPATIENT
Start: 2020-05-28 | End: 2020-05-28 | Stop reason: HOSPADM

## 2020-05-28 RX ORDER — ROCURONIUM BROMIDE 10 MG/ML
INJECTION, SOLUTION INTRAVENOUS PRN
Status: DISCONTINUED | OUTPATIENT
Start: 2020-05-28 | End: 2020-05-28 | Stop reason: SDUPTHER

## 2020-05-28 RX ORDER — PROPOFOL 10 MG/ML
INJECTION, EMULSION INTRAVENOUS PRN
Status: DISCONTINUED | OUTPATIENT
Start: 2020-05-28 | End: 2020-05-28 | Stop reason: SDUPTHER

## 2020-05-28 RX ORDER — PROMETHAZINE HYDROCHLORIDE 25 MG/ML
6.25 INJECTION, SOLUTION INTRAMUSCULAR; INTRAVENOUS
Status: DISCONTINUED | OUTPATIENT
Start: 2020-05-28 | End: 2020-05-28 | Stop reason: HOSPADM

## 2020-05-28 RX ORDER — HYDROMORPHONE HYDROCHLORIDE 1 MG/ML
0.5 INJECTION, SOLUTION INTRAMUSCULAR; INTRAVENOUS; SUBCUTANEOUS EVERY 5 MIN PRN
Status: DISCONTINUED | OUTPATIENT
Start: 2020-05-28 | End: 2020-05-28 | Stop reason: HOSPADM

## 2020-05-28 RX ORDER — ENALAPRILAT 2.5 MG/2ML
1.25 INJECTION INTRAVENOUS
Status: DISCONTINUED | OUTPATIENT
Start: 2020-05-28 | End: 2020-05-28 | Stop reason: HOSPADM

## 2020-05-28 RX ORDER — MORPHINE SULFATE 4 MG/ML
4 INJECTION, SOLUTION INTRAMUSCULAR; INTRAVENOUS EVERY 5 MIN PRN
Status: DISCONTINUED | OUTPATIENT
Start: 2020-05-28 | End: 2020-05-28 | Stop reason: HOSPADM

## 2020-05-28 RX ORDER — FENTANYL CITRATE 50 UG/ML
INJECTION, SOLUTION INTRAMUSCULAR; INTRAVENOUS PRN
Status: DISCONTINUED | OUTPATIENT
Start: 2020-05-28 | End: 2020-05-28 | Stop reason: SDUPTHER

## 2020-05-28 RX ORDER — KETOROLAC TROMETHAMINE 30 MG/ML
15 INJECTION, SOLUTION INTRAMUSCULAR; INTRAVENOUS ONCE
Status: COMPLETED | OUTPATIENT
Start: 2020-05-28 | End: 2020-05-28

## 2020-05-28 RX ORDER — EPHEDRINE SULFATE 50 MG/ML
INJECTION, SOLUTION INTRAVENOUS PRN
Status: DISCONTINUED | OUTPATIENT
Start: 2020-05-28 | End: 2020-05-28 | Stop reason: SDUPTHER

## 2020-05-28 RX ADMIN — SUGAMMADEX 150 MG: 100 INJECTION, SOLUTION INTRAVENOUS at 14:23

## 2020-05-28 RX ADMIN — EPHEDRINE SULFATE 10 MG: 50 INJECTION INTRAMUSCULAR; INTRAVENOUS; SUBCUTANEOUS at 13:42

## 2020-05-28 RX ADMIN — CIPROFLOXACIN 400 MG: 2 INJECTION, SOLUTION INTRAVENOUS at 13:18

## 2020-05-28 RX ADMIN — KETOROLAC TROMETHAMINE 15 MG: 30 INJECTION, SOLUTION INTRAMUSCULAR at 15:25

## 2020-05-28 RX ADMIN — SODIUM CHLORIDE, SODIUM LACTATE, POTASSIUM CHLORIDE, AND CALCIUM CHLORIDE: 600; 310; 30; 20 INJECTION, SOLUTION INTRAVENOUS at 13:09

## 2020-05-28 RX ADMIN — ROCURONIUM BROMIDE 75 MG: 10 INJECTION, SOLUTION INTRAVENOUS at 13:20

## 2020-05-28 RX ADMIN — FENTANYL CITRATE 100 MCG: 50 INJECTION INTRAMUSCULAR; INTRAVENOUS at 13:20

## 2020-05-28 RX ADMIN — EPHEDRINE SULFATE 10 MG: 50 INJECTION INTRAMUSCULAR; INTRAVENOUS; SUBCUTANEOUS at 13:36

## 2020-05-28 RX ADMIN — EPHEDRINE SULFATE 10 MG: 50 INJECTION INTRAMUSCULAR; INTRAVENOUS; SUBCUTANEOUS at 13:30

## 2020-05-28 RX ADMIN — EPHEDRINE SULFATE 10 MG: 50 INJECTION INTRAMUSCULAR; INTRAVENOUS; SUBCUTANEOUS at 13:39

## 2020-05-28 RX ADMIN — PROPOFOL 100 MG: 10 INJECTION, EMULSION INTRAVENOUS at 13:20

## 2020-05-28 RX ADMIN — ONDANSETRON HYDROCHLORIDE 4 MG: 2 INJECTION, SOLUTION INTRAMUSCULAR; INTRAVENOUS at 14:23

## 2020-05-28 RX ADMIN — LIDOCAINE HYDROCHLORIDE 50 MG: 10 INJECTION, SOLUTION EPIDURAL; INFILTRATION; INTRACAUDAL; PERINEURAL at 13:20

## 2020-05-28 RX ADMIN — TAMSULOSIN HYDROCHLORIDE 0.4 MG: 0.4 CAPSULE ORAL at 15:26

## 2020-05-28 RX ADMIN — EPHEDRINE SULFATE 10 MG: 50 INJECTION INTRAMUSCULAR; INTRAVENOUS; SUBCUTANEOUS at 13:33

## 2020-05-28 RX ADMIN — DEXAMETHASONE SODIUM PHOSPHATE 10 MG: 4 INJECTION, SOLUTION INTRAMUSCULAR; INTRAVENOUS at 13:30

## 2020-05-28 ASSESSMENT — ENCOUNTER SYMPTOMS: SHORTNESS OF BREATH: 0

## 2020-05-28 ASSESSMENT — PAIN SCALES - GENERAL
PAINLEVEL_OUTOF10: 0
PAINLEVEL_OUTOF10: 0

## 2020-05-28 ASSESSMENT — PAIN - FUNCTIONAL ASSESSMENT
PAIN_FUNCTIONAL_ASSESSMENT: PREVENTS OR INTERFERES SOME ACTIVE ACTIVITIES AND ADLS
PAIN_FUNCTIONAL_ASSESSMENT: 0-10

## 2020-05-28 ASSESSMENT — PAIN DESCRIPTION - DESCRIPTORS: DESCRIPTORS: ACHING;DULL

## 2020-05-28 ASSESSMENT — LIFESTYLE VARIABLES: SMOKING_STATUS: 0

## 2020-05-28 NOTE — H&P
POWER PORT performed by J Carlos Chung DO at [facility]      times 2... all levels    TUNNELED VENOUS PORT PLACEMENT       Current Facility-Administered Medications          Current Outpatient Medications   Medication Sig Dispense Refill    DULoxetine (CYMBALTA) 30 MG extended release capsule Take 30 mg by mouth daily      ondansetron (ZOFRAN) 4 MG tablet Take 2 tablets by mouth every 8 hours as needed for Nausea or Vomiting 30 tablet 2    ferrous sulfate (IRON 325) 325 (65 Fe) MG tablet Take 1 tablet by mouth 2 times daily 180 tablet 1    tamsulosin (FLOMAX) 0.4 MG capsule Take 1 capsule by mouth 2 times daily (Patient taking differently: Take 0.4 mg by mouth daily ) 180 capsule 3    loperamide (IMODIUM A-D) 2 MG tablet Take 4 mg by mouth      metaxalone (SKELAXIN) 800 MG tablet Take 800 mg by mouth 3 times daily   1    atorvastatin (LIPITOR) 40 MG tablet TAKE 1 TABLET BY MOUTH EVERY NIGHT (Patient taking differently: Take 40 mg by mouth nightly ) 30 tablet 0    omeprazole (PRILOSEC) 20 MG delayed release capsule Take 20 mg by mouth 2 times daily       bisoprolol (ZEBETA) 5 MG tablet Take 5 mg by mouth daily      methadone (DOLOPHINE) 10 MG tablet Take 10 mg by mouth every 6 hours as needed for Pain. q 5 hours      gabapentin (NEURONTIN) 800 MG tablet Take 800 mg by mouth 3 times daily. No current facility-administered medications for this visit.             Allergies   Allergen Reactions    Morphine Anxiety     Social History   Social History         Socioeconomic History    Marital status:      Spouse name: None    Number of children: None    Years of education: None    Highest education level: None   Occupational History    None   Social Needs    Financial resource strain: None    Food insecurity     Worry: None     Inability: None    Transportation needs     Medical: None     Non-medical: None   Tobacco Use    Smoking status: Former Smoker     Types: Cigarettes indwelling stents he is now due bilateral ureteral stent change. Since we do bladder surveillance every 3 months we will continue the polymer plastic stents and changes every 3 months. 4. History of bladder cancer   He is due surveillance cystoscopy will plan is at the time of stent change should we find any lesions we will go ahead and perform biopsy fulguration as indicated. Orders Placed This Encounter   Procedures    POCT Urinalysis Dipstick w/ Micro (Auto)     Return for PT to be scheduled for Surgery. All information inputted into the note by the MA to include chief complaint, past medical history, past surgical history, medications, allergies, social and family history and review of systems has been reviewed and updated as needed by me. EMR Dragon/transcription disclaimer: Much of this documentt is electronic  transcription/translation of spoken language to printed text. The  electronic translation of spoken language may be erroneous, or at times,  nonsensical words or phrases may be inadvertently transcribed.  Although I  have reviewed the document for such errors, some may still exist.
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
## 2020-05-28 NOTE — ANESTHESIA POSTPROCEDURE EVALUATION
Department of Anesthesiology  Postprocedure Note    Patient: Marley Banerjee  MRN: [id number]  YOB: 1950  Date of evaluation: 5/28/2020  Time:  2:44 PM     Procedure Summary     Date:  05/28/20 Room / Location:  [facility] OR [facility]    Anesthesia Start:  5641 Anesthesia Stop:  8895    Procedures:       CYSTOSCOPY, BILATERAL RETROGRADE PYELOGRAMS, BILATERAL URETERAL STENT CHANGES (Bilateral )      BIOPSIES WITH FULGURATION OF BLADDER TUMORS (N/A ) Diagnosis:  (BILATERAL HYDRONEPHOSIS, CHRONIC INDWELLING STENTS, HISTORY OF BLADDER CANCER)    Surgeon:  Jean Pierre Hernandez MD Responsible Provider:  OLGA Kwong CRNA    Anesthesia Type:  general ASA Status:  3          Anesthesia Type: general    Abad Phase I: Abad Score: 10    Abad Phase II:      Last vitals: Reviewed and per EMR flowsheets.        Anesthesia Post Evaluation    Patient location during evaluation: PACU  Patient participation: complete - patient participated  Level of consciousness: responsive to verbal stimuli and sleepy but conscious  Pain score: 0  Airway patency: patent  Nausea & Vomiting: no vomiting and no nausea  Complications: no  Cardiovascular status: hemodynamically stable  Respiratory status: room air and acceptable  Hydration status: stable  Comments: T 97

## 2020-05-29 NOTE — OP NOTE
The risks and complications of the  procedure were discussed with him and his daughter including the need  for stent replacement, bladder perforation, intolerance to the stents,  etc.  He understands and agrees to proceed. DESCRIPTION OF PROCEDURE:  The patient was brought to the operating room  and underwent general anesthetic. He was placed in the lithotomy  position. His genitalia was prepped and draped per routine sterile  fashion. He had a time-out performed. He received a preoperative  antibiotic. A 22-New Zealander cystoscope was inserted into the meatus. This  was advanced under direct vision. The penile and bulbar urethra  appeared to be normal.  On entering into the prostatic urethra, this  also appeared to be normal.  He had some mild prostatic enlargement, no  median lobe, but nonobstructing. When I entered into the patient's  bladder, stents could be seen protruding from both the right and left  ureteral orifice. The left ureteral stent appeared to be a little bit  longer protruding in the bladder at the bladder neck. Using alligator graspers, I then grasped the stent on the right side and  this was extracted, and a guidewire was placed. I then passed a 5 mm  balloon dilator through the working channel of the cystoscope and then  backloaded the guidewire through this. This was then advanced as a unit  and the cystoscope was advanced into the patient's bladder. The  5-New Zealander catheter was then inserted up into the right renal pelvis and a  pullback retrograde was performed by injecting contrast as I pulled back  on the 5-[place] Zealander catheter. This showed some moderate-to-severe right  hydronephrosis, really unchanged from previously. He had a tortuous  proximal ureter. There was some irregularity to the lining of the  ureter, which looked more like stent related, but the ureter was dilated  down to about the distal sacrum in the pelvis.   It was almost like a  string sign where the ureter was
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
## 2020-06-03 ENCOUNTER — OFFICE VISIT (OUTPATIENT)
Dept: HEMATOLOGY | Age: 70
End: 2020-06-03
Payer: MEDICARE

## 2020-06-03 ENCOUNTER — HOSPITAL ENCOUNTER (OUTPATIENT)
Dept: INFUSION THERAPY | Age: 70
Discharge: HOME | End: 2020-06-03
Payer: MEDICARE

## 2020-06-03 ENCOUNTER — HOSPITAL ENCOUNTER (OUTPATIENT)
Dept: INFUSION THERAPY | Age: 70
Setting detail: INFUSION SERIES
Discharge: HOME | End: 2020-06-03
Payer: MEDICARE

## 2020-06-03 VITALS
WEIGHT: 175 LBS | OXYGEN SATURATION: 98 % | HEART RATE: 60 BPM | SYSTOLIC BLOOD PRESSURE: 144 MMHG | RESPIRATION RATE: 17 BRPM | BODY MASS INDEX: 29.16 KG/M2 | HEIGHT: 65 IN | TEMPERATURE: 98.2 F | DIASTOLIC BLOOD PRESSURE: 73 MMHG

## 2020-06-03 VITALS
HEART RATE: 66 BPM | WEIGHT: 174.9 LBS | HEIGHT: 65 IN | TEMPERATURE: 97.7 F | DIASTOLIC BLOOD PRESSURE: 68 MMHG | OXYGEN SATURATION: 97 % | BODY MASS INDEX: 29.14 KG/M2 | SYSTOLIC BLOOD PRESSURE: 122 MMHG

## 2020-06-03 DIAGNOSIS — C19 COLORECTAL CANCER (HCC): Primary | ICD-10-CM

## 2020-06-03 DIAGNOSIS — C18.9 METASTASIS FROM COLON CANCER (HCC): ICD-10-CM

## 2020-06-03 DIAGNOSIS — C79.9 METASTASIS FROM COLON CANCER (HCC): ICD-10-CM

## 2020-06-03 LAB
ALBUMIN SERPL-MCNC: 3.7 G/DL (ref 3.5–5.2)
ALP BLD-CCNC: 109 U/L (ref 40–130)
ALT SERPL-CCNC: 7 U/L (ref 5–41)
ANION GAP SERPL CALCULATED.3IONS-SCNC: 9 MMOL/L (ref 7–19)
AST SERPL-CCNC: 14 U/L (ref 5–40)
BASOPHILS ABSOLUTE: 0.05 K/UL (ref 0.01–0.08)
BASOPHILS RELATIVE PERCENT: 0.7 % (ref 0.1–1.2)
BILIRUB SERPL-MCNC: 0.3 MG/DL (ref 0.2–1.2)
BUN BLDV-MCNC: 12 MG/DL (ref 8–23)
CALCIUM SERPL-MCNC: 9.2 MG/DL (ref 8.8–10.2)
CHLORIDE BLD-SCNC: 99 MMOL/L (ref 98–111)
CO2: 26 MMOL/L (ref 22–29)
CREAT SERPL-MCNC: 1.1 MG/DL (ref 0.5–1.2)
EOSINOPHILS ABSOLUTE: 0.53 K/UL (ref 0.04–0.54)
EOSINOPHILS RELATIVE PERCENT: 7.5 % (ref 0.7–7)
GFR NON-AFRICAN AMERICAN: >60
GLUCOSE BLD-MCNC: 110 MG/DL (ref 74–109)
HCT VFR BLD CALC: 36 % (ref 40.1–51)
HEMOGLOBIN: 11.1 G/DL (ref 13.7–17.5)
LYMPHOCYTES ABSOLUTE: 0.81 K/UL (ref 1.18–3.74)
LYMPHOCYTES RELATIVE PERCENT: 11.5 % (ref 19.3–53.1)
MCH RBC QN AUTO: 28.2 PG (ref 25.7–32.2)
MCHC RBC AUTO-ENTMCNC: 30.8 G/DL (ref 32.3–36.5)
MCV RBC AUTO: 91.6 FL (ref 79–92.2)
MONOCYTES ABSOLUTE: 0.56 K/UL (ref 0.24–0.82)
MONOCYTES RELATIVE PERCENT: 8 % (ref 4.7–12.5)
NEUTROPHILS ABSOLUTE: 5.08 K/UL (ref 1.56–6.13)
NEUTROPHILS RELATIVE PERCENT: 72.3 % (ref 34–71.1)
PDW BLD-RTO: 14.9 % (ref 11.6–14.4)
PLATELET # BLD: 210 K/UL (ref 163–337)
PMV BLD AUTO: 11 FL (ref 7.4–10.4)
POTASSIUM SERPL-SCNC: 4.4 MMOL/L (ref 3.5–5)
RBC # BLD: 3.93 M/UL (ref 4.63–6.08)
SODIUM BLD-SCNC: 134 MMOL/L (ref 136–145)
TOTAL PROTEIN: 6.5 G/DL (ref 6.6–8.7)
WBC # BLD: 7.03 K/UL (ref 4.23–9.07)

## 2020-06-03 PROCEDURE — 80053 COMPREHEN METABOLIC PANEL: CPT

## 2020-06-03 PROCEDURE — 1036F TOBACCO NON-USER: CPT | Performed by: INTERNAL MEDICINE

## 2020-06-03 PROCEDURE — 96365 THER/PROPH/DIAG IV INF INIT: CPT

## 2020-06-03 PROCEDURE — 6370000000 HC RX 637 (ALT 250 FOR IP): Performed by: INTERNAL MEDICINE

## 2020-06-03 PROCEDURE — 96409 CHEMO IV PUSH SNGL DRUG: CPT

## 2020-06-03 PROCEDURE — G8427 DOCREV CUR MEDS BY ELIG CLIN: HCPCS | Performed by: INTERNAL MEDICINE

## 2020-06-03 PROCEDURE — 2580000003 HC RX 258: Performed by: INTERNAL MEDICINE

## 2020-06-03 PROCEDURE — 99212 OFFICE O/P EST SF 10 MIN: CPT

## 2020-06-03 PROCEDURE — G0498 CHEMO EXTEND IV INFUS W/PUMP: HCPCS

## 2020-06-03 PROCEDURE — 6360000002 HC RX W HCPCS: Performed by: INTERNAL MEDICINE

## 2020-06-03 PROCEDURE — 1111F DSCHRG MED/CURRENT MED MERGE: CPT | Performed by: INTERNAL MEDICINE

## 2020-06-03 PROCEDURE — 4040F PNEUMOC VAC/ADMIN/RCVD: CPT | Performed by: INTERNAL MEDICINE

## 2020-06-03 PROCEDURE — 85025 COMPLETE CBC W/AUTO DIFF WBC: CPT

## 2020-06-03 PROCEDURE — 96366 THER/PROPH/DIAG IV INF ADDON: CPT

## 2020-06-03 PROCEDURE — 96413 CHEMO IV INFUSION 1 HR: CPT

## 2020-06-03 PROCEDURE — G8417 CALC BMI ABV UP PARAM F/U: HCPCS | Performed by: INTERNAL MEDICINE

## 2020-06-03 PROCEDURE — 3017F COLORECTAL CA SCREEN DOC REV: CPT | Performed by: INTERNAL MEDICINE

## 2020-06-03 PROCEDURE — 99214 OFFICE O/P EST MOD 30 MIN: CPT | Performed by: INTERNAL MEDICINE

## 2020-06-03 PROCEDURE — 1123F ACP DISCUSS/DSCN MKR DOCD: CPT | Performed by: INTERNAL MEDICINE

## 2020-06-03 RX ORDER — DIPHENHYDRAMINE HYDROCHLORIDE 50 MG/ML
50 INJECTION INTRAMUSCULAR; INTRAVENOUS ONCE
Status: CANCELLED
Start: 2020-06-03

## 2020-06-03 RX ORDER — SODIUM CHLORIDE 0.9 % (FLUSH) 0.9 %
10 SYRINGE (ML) INJECTION PRN
Status: DISCONTINUED | OUTPATIENT
Start: 2020-06-03 | End: 2020-06-04 | Stop reason: HOSPADM

## 2020-06-03 RX ORDER — DIPHENHYDRAMINE HYDROCHLORIDE 50 MG/ML
50 INJECTION INTRAMUSCULAR; INTRAVENOUS ONCE
Status: CANCELLED | OUTPATIENT
Start: 2020-06-03

## 2020-06-03 RX ORDER — FLUOROURACIL 50 MG/ML
400 INJECTION, SOLUTION INTRAVENOUS ONCE
Status: COMPLETED | OUTPATIENT
Start: 2020-06-03 | End: 2020-06-03

## 2020-06-03 RX ORDER — SODIUM CHLORIDE 9 MG/ML
INJECTION, SOLUTION INTRAVENOUS ONCE
Status: COMPLETED | OUTPATIENT
Start: 2020-06-03 | End: 2020-06-03

## 2020-06-03 RX ORDER — SODIUM CHLORIDE 0.9 % (FLUSH) 0.9 %
10 SYRINGE (ML) INJECTION PRN
Status: CANCELLED | OUTPATIENT
Start: 2020-06-03

## 2020-06-03 RX ORDER — SODIUM CHLORIDE 9 MG/ML
INJECTION, SOLUTION INTRAVENOUS ONCE
Status: CANCELLED | OUTPATIENT
Start: 2020-06-03

## 2020-06-03 RX ORDER — DEXAMETHASONE SODIUM PHOSPHATE 10 MG/ML
10 INJECTION, SOLUTION INTRAMUSCULAR; INTRAVENOUS ONCE
Status: COMPLETED | OUTPATIENT
Start: 2020-06-03 | End: 2020-06-03

## 2020-06-03 RX ORDER — FLUOROURACIL 50 MG/ML
400 INJECTION, SOLUTION INTRAVENOUS ONCE
Status: CANCELLED | OUTPATIENT
Start: 2020-06-03

## 2020-06-03 RX ORDER — HEPARIN SODIUM (PORCINE) LOCK FLUSH IV SOLN 100 UNIT/ML 100 UNIT/ML
500 SOLUTION INTRAVENOUS PRN
Status: CANCELLED | OUTPATIENT
Start: 2020-06-03

## 2020-06-03 RX ORDER — ACETAMINOPHEN 325 MG/1
1000 TABLET ORAL ONCE
Status: CANCELLED
Start: 2020-06-03

## 2020-06-03 RX ORDER — SODIUM CHLORIDE 0.9 % (FLUSH) 0.9 %
5 SYRINGE (ML) INJECTION PRN
Status: CANCELLED | OUTPATIENT
Start: 2020-06-03

## 2020-06-03 RX ORDER — ACETAMINOPHEN 500 MG
1000 TABLET ORAL ONCE
Status: COMPLETED | OUTPATIENT
Start: 2020-06-03 | End: 2020-06-03

## 2020-06-03 RX ORDER — SODIUM CHLORIDE 9 MG/ML
INJECTION, SOLUTION INTRAVENOUS CONTINUOUS
Status: CANCELLED | OUTPATIENT
Start: 2020-06-03

## 2020-06-03 RX ORDER — EPINEPHRINE 1 MG/ML
0.3 INJECTION, SOLUTION, CONCENTRATE INTRAVENOUS PRN
Status: CANCELLED | OUTPATIENT
Start: 2020-06-03

## 2020-06-03 RX ORDER — DIPHENHYDRAMINE HYDROCHLORIDE 50 MG/ML
50 INJECTION INTRAMUSCULAR; INTRAVENOUS ONCE
Status: COMPLETED | OUTPATIENT
Start: 2020-06-03 | End: 2020-06-03

## 2020-06-03 RX ORDER — METHYLPREDNISOLONE SODIUM SUCCINATE 125 MG/2ML
125 INJECTION, POWDER, LYOPHILIZED, FOR SOLUTION INTRAMUSCULAR; INTRAVENOUS ONCE
Status: CANCELLED | OUTPATIENT
Start: 2020-06-03

## 2020-06-03 RX ORDER — HEPARIN SODIUM (PORCINE) LOCK FLUSH IV SOLN 100 UNIT/ML 100 UNIT/ML
500 SOLUTION INTRAVENOUS PRN
Status: DISCONTINUED | OUTPATIENT
Start: 2020-06-03 | End: 2020-06-04 | Stop reason: HOSPADM

## 2020-06-03 RX ADMIN — ACETAMINOPHEN 1000 MG: 500 TABLET, FILM COATED ORAL at 11:09

## 2020-06-03 RX ADMIN — DIPHENHYDRAMINE HYDROCHLORIDE 50 MG: 50 INJECTION, SOLUTION INTRAMUSCULAR; INTRAVENOUS at 11:09

## 2020-06-03 RX ADMIN — DEXAMETHASONE SODIUM PHOSPHATE 10 MG: 10 INJECTION, SOLUTION INTRAMUSCULAR; INTRAVENOUS at 11:31

## 2020-06-03 RX ADMIN — FLUOROURACIL 775 MG: 50 INJECTION, SOLUTION INTRAVENOUS at 14:50

## 2020-06-03 RX ADMIN — ONDANSETRON 16 MG: 2 INJECTION INTRAMUSCULAR; INTRAVENOUS at 11:31

## 2020-06-03 RX ADMIN — SODIUM CHLORIDE: 9 INJECTION, SOLUTION INTRAVENOUS at 11:09

## 2020-06-03 RX ADMIN — FLUOROURACIL 4625 MG: 50 INJECTION, SOLUTION INTRAVENOUS at 14:55

## 2020-06-03 RX ADMIN — LEUCOVORIN CALCIUM 750 MG: 350 INJECTION, POWDER, LYOPHILIZED, FOR SUSPENSION INTRAMUSCULAR; INTRAVENOUS at 13:10

## 2020-06-03 RX ADMIN — PANITUMUMAB 486 MG: 400 SOLUTION INTRAVENOUS at 11:58

## 2020-06-03 ASSESSMENT — PAIN SCALES - GENERAL: PAINLEVEL_OUTOF10: 0

## 2020-06-05 ENCOUNTER — HOSPITAL ENCOUNTER (OUTPATIENT)
Dept: INFUSION THERAPY | Age: 70
Setting detail: INFUSION SERIES
Discharge: HOME | End: 2020-06-05
Payer: MEDICARE

## 2020-06-05 PROCEDURE — 96523 IRRIG DRUG DELIVERY DEVICE: CPT

## 2020-06-05 PROCEDURE — 6360000002 HC RX W HCPCS: Performed by: INTERNAL MEDICINE

## 2020-06-05 RX ORDER — HEPARIN SODIUM (PORCINE) LOCK FLUSH IV SOLN 100 UNIT/ML 100 UNIT/ML
500 SOLUTION INTRAVENOUS PRN
Status: DISCONTINUED | OUTPATIENT
Start: 2020-06-05 | End: 2020-06-07 | Stop reason: HOSPADM

## 2020-06-05 RX ADMIN — HEPARIN 500 UNITS: 100 SYRINGE at 14:10

## 2020-06-09 RX ORDER — CLINDAMYCIN PHOSPHATE 10 MG/G
GEL TOPICAL
Qty: 1 TUBE | Refills: 1 | Status: SHIPPED | OUTPATIENT
Start: 2020-06-09 | End: 2020-06-16

## 2020-06-15 ENCOUNTER — OFFICE VISIT (OUTPATIENT)
Dept: UROLOGY | Age: 70
End: 2020-06-15
Payer: MEDICARE

## 2020-06-15 VITALS — WEIGHT: 170 LBS | BODY MASS INDEX: 28.32 KG/M2 | TEMPERATURE: 96.8 F | HEIGHT: 65 IN

## 2020-06-15 LAB
BACTERIA URINE, POC: 0
BILIRUBIN URINE: 0 MG/DL
BLOOD, URINE: POSITIVE
CASTS URINE, POC: 0
CLARITY: ABNORMAL
COLOR: YELLOW
CRYSTALS URINE, POC: 0
EPI CELLS URINE, POC: 0
GLUCOSE URINE: ABNORMAL
KETONES, URINE: NEGATIVE
LEUKOCYTE EST, POC: ABNORMAL
NITRITE, URINE: NEGATIVE
PH UA: 6.5 (ref 4.5–8)
PROTEIN UA: ABNORMAL
RBC URINE, POC: 2
SPECIFIC GRAVITY UA: 1.01 (ref 1–1.03)
UROBILINOGEN, URINE: NORMAL
WBC URINE, POC: 2
YEAST URINE, POC: 0

## 2020-06-15 PROCEDURE — 1123F ACP DISCUSS/DSCN MKR DOCD: CPT | Performed by: UROLOGY

## 2020-06-15 PROCEDURE — 4040F PNEUMOC VAC/ADMIN/RCVD: CPT | Performed by: UROLOGY

## 2020-06-15 PROCEDURE — 81001 URINALYSIS AUTO W/SCOPE: CPT | Performed by: UROLOGY

## 2020-06-15 PROCEDURE — G8427 DOCREV CUR MEDS BY ELIG CLIN: HCPCS | Performed by: UROLOGY

## 2020-06-15 PROCEDURE — 99214 OFFICE O/P EST MOD 30 MIN: CPT | Performed by: UROLOGY

## 2020-06-15 PROCEDURE — 3017F COLORECTAL CA SCREEN DOC REV: CPT | Performed by: UROLOGY

## 2020-06-15 PROCEDURE — G8417 CALC BMI ABV UP PARAM F/U: HCPCS | Performed by: UROLOGY

## 2020-06-15 PROCEDURE — 1036F TOBACCO NON-USER: CPT | Performed by: UROLOGY

## 2020-06-15 RX ORDER — CYCLOBENZAPRINE HCL 10 MG
TABLET ORAL
COMMUNITY
Start: 2020-06-10 | End: 2020-10-12

## 2020-06-15 ASSESSMENT — ENCOUNTER SYMPTOMS
COUGH: 0
SINUS PRESSURE: 0
EYE REDNESS: 0
VOMITING: 0
BACK PAIN: 0
DIARRHEA: 0
SORE THROAT: 0
EYE PAIN: 0
WHEEZING: 0
ABDOMINAL PAIN: 0
SHORTNESS OF BREATH: 0
CONSTIPATION: 0
RHINORRHEA: 0
FACIAL SWELLING: 0
EYE DISCHARGE: 0
COLOR CHANGE: 0
NAUSEA: 0
BLOOD IN STOOL: 0
ABDOMINAL DISTENTION: 0

## 2020-06-15 NOTE — PROGRESS NOTES
Received:      05/29/2020  Attend Phys: Tonny Yadira             Completed:     06/01/2020  Perform Phys: Shaka Mcfadden    FINAL DIAGNOSIS:    Urinary bladder, multiple tumor sites, biopsies:  Noninvasive, high-grade papillary urothelial carcinoma  Muscularis propria is not identified   CLR/CLR          1. Malignant neoplasm of overlapping sites of bladder [facility])  Pathology report again shows high-grade papillary urothelial carcinoma. He has had recurrence every time of scoped him since his initial diagnosis therefore I think he would be acceptable candidate for intravesical treatment. We discussed several options told him the standard would be BCG x6 I am 6 weekly instillations. Briefly discussed the risks particularly given the fact that he is on chemotherapy and he also has stents up of him developing secondary infection or primary BCG doses. We discussed the warning signs including fever lasting more than 48 hours. He does understand that he may experience flulike symptoms after each instillation. I think this point the benefits outweigh the risks I think we should go ahead and proceed I will touch base with Dr. Kenyetta Navarrete make sure he is okay with this. The alternative would be gemcitabine installations but not think this would be as effective. He wants to delay this until they go on family vacation in [place] at the mid to last part of July. Therefore he will return once they get back from their trip to begin BCG installations. Otherwise we will plan for his next surveillance cystoscopy to be scheduled at his next stent change somewhere about 4 to 6 weeks after completion of his BCG    2. Hydronephrosis of right kidney  Status post stent change 5/28/2020. On the right retrograde there was some irregularity of the wall of the ureter this may just be related to the stent but the son need to follow-up retrograde at the neck stent change. - POCT Urinalysis Dipstick w/ Micro (Auto)    3.

## 2020-06-16 NOTE — PROGRESS NOTES
pelvic wall soft tissue showing SUV of 5.4.  Abnormal soft tissue metabolic activity in the right abductor muscle with SUV of 6.4.  Focally increased activity to the right of the inferior L5 vertebrae body posterior with SUV of 7.9 with associated sclerotic changes. · 8/29/2019-  Dr. Lake Fernandes completed a cystoscopy with double-J ureter stent in the left ureter for left hydronephrosis  · 9/3/2019- CT-guided right abductor muscle biopsy on 9/3/2019 with pathology identifying metastatic adenocarcinoma consistent with colorectal origin.  Molecular panel from biopsy tissue revealed MSI stable and mutations for BRAF, NRAS, KRAS were not detected.    · 9/18/2019 - Palliative chemotherapy with modified FOLFOX 7  (Oxaliplatin 85 mg/m² IV day 1, leucovorin 400 mg/m² IV day 1 and 5-FU 2400 mg/m² IV continuous infusion over 46 to 48 hours) with the anticipation of adding Avastin 5 mg/kg day 1 every 14 days  · 10/15/2019- 24-hour urine for protein with a total protein of 1785 mg per 24-hour.  Zurdo has been evaluated by Dr. Lana Galloway and he reports no significant concerns related to the protein. · CEA of 5.6 on 11/6/2019 significantly improved compared to CEA of 14.0 on 8/30/2019. · 11/15/2019 -CT scan of the abdomen and pelvis documented no evidence of disease progression with significant decrease in the size of enhancing nodules in the right pelvic abductor musculature, a previous 1.8 cm nodule now measures 5 mm.  No new or enlarging retroperitoneal, mesenteric, pelvic or inguinal lymph nodes.  Calcified presacral mass unchanged measuring 5 x 3.7 cm.   · 11/15/2019 -CT scan of the chest documented multiple small pulmonary nodules reidentified, largest nodule in the RUL measures 5 mm compared to 7.5 mm, RLL nodule measures 3.4 mm compared to 5.9 mm, VIC nodule measures 4 mm compared to 6 mm.  A cluster of small nodules in the RUL anteriorly are barely visible on this study.  There is a decrease in size of mediastinal lymph Former Smoker     Types: Cigarettes     Last attempt to quit: 1986     Years since quittin.1    Smokeless tobacco: Never Used   Substance and Sexual Activity    Alcohol use: No    Drug use: No    Sexual activity: Yes     Partners: Female   Lifestyle    Physical activity     Days per week: None     Minutes per session: None    Stress: None   Relationships    Social connections     Talks on phone: None     Gets together: None     Attends Jainism service: None     Active member of club or organization: None     Attends meetings of clubs or organizations: None     Relationship status: None    Intimate partner violence     Fear of current or ex partner: None     Emotionally abused: None     Physically abused: None     Forced sexual activity: None   Other Topics Concern    None   Social History Narrative    None       FAMILY HISTORY:  Family History   Problem Relation Age of Onset    High Blood Pressure Mother     High Blood Pressure Father     Colon Cancer Father     Diabetes Father         Current Outpatient Medications   Medication Sig Dispense Refill    cyclobenzaprine (FLEXERIL) 10 MG tablet TK 1 T PO TID      ibandronate (BONIVA) 150 MG tablet Take 1 tablet by mouth every 30 days Take one (1) tablet once per month in the morning with a full glass of water, on an empty stomach, and do not take anything else by mouth or lie down for the next 30 minutes.  30 tablet 6    DULoxetine (CYMBALTA) 30 MG extended release capsule Take 30 mg by mouth daily      ondansetron (ZOFRAN) 4 MG tablet Take 2 tablets by mouth every 8 hours as needed for Nausea or Vomiting 30 tablet 2    ferrous sulfate (IRON 325) 325 (65 Fe) MG tablet Take 1 tablet by mouth 2 times daily 180 tablet 1    loperamide (IMODIUM A-D) 2 MG tablet Take 4 mg by mouth      atorvastatin (LIPITOR) 40 MG tablet TAKE 1 TABLET BY MOUTH EVERY NIGHT (Patient taking differently: Take 40 mg by mouth nightly ) 30 tablet 0    omeprazole edema   ABDOMEN: soft non-tender, active bowel sounds, no hepatosplenomegaly. No palpable masses. EXTREMITIES: warm, Full ROM of all fours extremities. No focal weakness. SKIN: warm, dry with no or lesions, generalized pruritis and mild rash  LYMPH: No cervical, clavicular, axillary, or inguinal lymphadenopathy  NEUROLOGIC: follows commands, non focal.   PSYCH: mood and affect appropriate. Alert and oriented to time and place and person. Relevant Lab findings/reviewed by me:  MIRACLE:4/39/9115  WBC-5.72  HGB-10.8  PLT-190,000  Neut-4.06    6/3/2020  Sodium-134  Total Protein-6.5      Relevant Imaging studies/reviewed by me:      ASSESSMENT    No orders of the defined types were placed in this encounter. Jenifer Moyer was seen today for colon cancer. Diagnoses and all orders for this visit:    Colorectal cancer [facility])    Care plan discussed with patient    Adverse effect of chemotherapy, subsequent encounter    Chemotherapy management, encounter for    Drug-induced skin rash         PLAN:    Metastasis colorectal adenocarcinoma confirmed in right abductor muscle KRAS wild type. . MSI stable and mutations for BRAF, NRAS, KRAS wild type.   Maintenance therapy with 5-FU and leucovorin was initiated on 1/28/2020, anticipate addition of Avastin (held due to urethral stent placement/replacement). Only received 1 cycle of maintenance therapy, treatment has been on hold for surgical interventions (Exploratory laparotomy, removal of adhesions, small bowel resection with primary anastomosis, partial thickness small bowel repair, bilateral urethral stent removal/replacement and cystoscopy). Will repeat restaging scans for evaluation of disease process and then consider resuming maintenance therapy with 5-FU, leucovorin and potentially Avastin. Resume chemotherapy with 5-FU/leucovorin infusional.  We added Panitumumab to last cycle. Patient has a K-sandrine wild-type.     Patient received 1 cycle Panitumumab with complaints of mild facial skin rash. He would like to hold on this until he is back from vacation in [place] next month. He will continue 5-FU/leucovorin only. CEA on 4/22/2020 = 0.9 CEA 6/17/2020-pending    Anemia-combination of anemia of chronic disease to include iron deficiency, chronic kidney disease and chemotherapy.      Injectafer x3 2 doses. First dose on 4/20/2020  Hemoglobin 10.8     Subcentimeter lung nodules -CT scan of the chest on 1/6/2020 suggested positive response to treatment with decrease in the size of some nodules, resolution of some nodules and no new nodules. Denies any respiratory complaints or significant shortness of air.    -Stable CT chest.  Minimal interval increase in size of subcentimeter pulmonary nodules. No intervention at this time. Largest nodule measuring 6 mm.       Non invasive Urothelial Bladder Cancer ([facility] [address] 8/18/2019. Repeat cystoscopy and bilateral ureteral stent removal/replacement on 2/26/2020 . The operative note by Dr. Jorge Luis Bowers documented bilateral hydronephrosis and obtained biopsy of the bladder in the mid dome and left anterior lateral wall x2. Pathology documented high-grade papillary urethral carcinoma, noninvasive, stage pTaNx. As per Urology    5/28/2020-the patient underwent a cystoscopy and resection of bladder tumor on 05/28/2020 with findings consistent with noninvasive, high-grade papillary urothelial carcinoma. Muscularis propria was not identified. T6 fracture- as per orthopedic surgery. S/o surgery    Osteoporosis-Osteoporosis BMD -3.6 April 2020. Continue Vit D, Boniva and Calcium. Follow-up  FOLLOW UP:  1. Repeat CT chest abdomen pelvis August 2020  2. CMP an CEA today  3. RTC 4 weeks MD  4. Follow-up with other medical providers as recommended  5. Boniva monthly  6.  Calcium vitamin D        Over 50% of the total visit time of 25 minutes in face to face encounter with the patient, out of which more than 50% of the time was spent in counseling

## 2020-06-17 ENCOUNTER — HOSPITAL ENCOUNTER (OUTPATIENT)
Dept: INFUSION THERAPY | Age: 70
Setting detail: INFUSION SERIES
Discharge: HOME | End: 2020-06-17
Payer: MEDICARE

## 2020-06-17 ENCOUNTER — OFFICE VISIT (OUTPATIENT)
Dept: HEMATOLOGY | Age: 70
End: 2020-06-17
Payer: MEDICARE

## 2020-06-17 ENCOUNTER — HOSPITAL ENCOUNTER (OUTPATIENT)
Dept: INFUSION THERAPY | Age: 70
Discharge: HOME | End: 2020-06-17
Payer: MEDICARE

## 2020-06-17 VITALS
DIASTOLIC BLOOD PRESSURE: 60 MMHG | BODY MASS INDEX: 29.32 KG/M2 | OXYGEN SATURATION: 94 % | TEMPERATURE: 97.2 F | HEIGHT: 65 IN | HEART RATE: 63 BPM | RESPIRATION RATE: 20 BRPM | SYSTOLIC BLOOD PRESSURE: 117 MMHG | WEIGHT: 176 LBS

## 2020-06-17 VITALS
TEMPERATURE: 99.3 F | BODY MASS INDEX: 28.32 KG/M2 | WEIGHT: 170 LBS | HEIGHT: 65 IN | SYSTOLIC BLOOD PRESSURE: 120 MMHG | OXYGEN SATURATION: 97 % | DIASTOLIC BLOOD PRESSURE: 68 MMHG | HEART RATE: 67 BPM

## 2020-06-17 DIAGNOSIS — C18.9 METASTASIS FROM COLON CANCER (HCC): ICD-10-CM

## 2020-06-17 DIAGNOSIS — C79.9 METASTASIS FROM COLON CANCER (HCC): ICD-10-CM

## 2020-06-17 DIAGNOSIS — C19 COLORECTAL CANCER (HCC): Primary | ICD-10-CM

## 2020-06-17 LAB
ALBUMIN SERPL-MCNC: 3.8 G/DL (ref 3.5–5.2)
ALP BLD-CCNC: 103 U/L (ref 40–130)
ALT SERPL-CCNC: 11 U/L (ref 21–72)
ANION GAP SERPL CALCULATED.3IONS-SCNC: 7 MMOL/L (ref 7–19)
AST SERPL-CCNC: 40 U/L (ref 17–59)
BASOPHILS ABSOLUTE: 0.07 K/UL (ref 0.01–0.08)
BASOPHILS RELATIVE PERCENT: 1.2 % (ref 0.1–1.2)
BILIRUB SERPL-MCNC: 0.5 MG/DL (ref 0.2–1.3)
BUN BLDV-MCNC: 15 MG/DL (ref 9–20)
CALCIUM SERPL-MCNC: 8.4 MG/DL (ref 8.4–10.2)
CEA: 0.9 NG/ML (ref 0–3)
CHLORIDE BLD-SCNC: 99 MMOL/L (ref 98–111)
CO2: 27 MMOL/L (ref 22–29)
CREAT SERPL-MCNC: 1 MG/DL (ref 0.6–1.2)
EOSINOPHILS ABSOLUTE: 0.32 K/UL (ref 0.04–0.54)
EOSINOPHILS RELATIVE PERCENT: 5.6 % (ref 0.7–7)
GFR NON-AFRICAN AMERICAN: >60
GLOBULIN: 2.7 G/DL
GLUCOSE BLD-MCNC: 111 MG/DL (ref 74–106)
HCT VFR BLD CALC: 33.9 % (ref 40.1–51)
HEMOGLOBIN: 10.8 G/DL (ref 13.7–17.5)
LYMPHOCYTES ABSOLUTE: 0.75 K/UL (ref 1.18–3.74)
LYMPHOCYTES RELATIVE PERCENT: 13.1 % (ref 19.3–53.1)
MCH RBC QN AUTO: 28.3 PG (ref 25.7–32.2)
MCHC RBC AUTO-ENTMCNC: 31.9 G/DL (ref 32.3–36.5)
MCV RBC AUTO: 88.7 FL (ref 79–92.2)
MONOCYTES ABSOLUTE: 0.52 K/UL (ref 0.24–0.82)
MONOCYTES RELATIVE PERCENT: 9.1 % (ref 4.7–12.5)
NEUTROPHILS ABSOLUTE: 4.06 K/UL (ref 1.56–6.13)
NEUTROPHILS RELATIVE PERCENT: 71 % (ref 34–71.1)
PDW BLD-RTO: 14.9 % (ref 11.6–14.4)
PLATELET # BLD: 190 K/UL (ref 163–337)
PMV BLD AUTO: 10.7 FL (ref 7.4–10.4)
POTASSIUM SERPL-SCNC: 4.5 MMOL/L (ref 3.5–5.1)
RBC # BLD: 3.82 M/UL (ref 4.63–6.08)
SODIUM BLD-SCNC: 133 MMOL/L (ref 137–145)
TOTAL PROTEIN: 6.5 G/DL (ref 6.3–8.2)
WBC # BLD: 5.72 K/UL (ref 4.23–9.07)

## 2020-06-17 PROCEDURE — 96365 THER/PROPH/DIAG IV INF INIT: CPT

## 2020-06-17 PROCEDURE — 6360000002 HC RX W HCPCS: Performed by: INTERNAL MEDICINE

## 2020-06-17 PROCEDURE — 80053 COMPREHEN METABOLIC PANEL: CPT

## 2020-06-17 PROCEDURE — 3017F COLORECTAL CA SCREEN DOC REV: CPT | Performed by: INTERNAL MEDICINE

## 2020-06-17 PROCEDURE — 6370000000 HC RX 637 (ALT 250 FOR IP): Performed by: INTERNAL MEDICINE

## 2020-06-17 PROCEDURE — 99211 OFF/OP EST MAY X REQ PHY/QHP: CPT

## 2020-06-17 PROCEDURE — 96409 CHEMO IV PUSH SNGL DRUG: CPT

## 2020-06-17 PROCEDURE — G8417 CALC BMI ABV UP PARAM F/U: HCPCS | Performed by: INTERNAL MEDICINE

## 2020-06-17 PROCEDURE — 4040F PNEUMOC VAC/ADMIN/RCVD: CPT | Performed by: INTERNAL MEDICINE

## 2020-06-17 PROCEDURE — G8427 DOCREV CUR MEDS BY ELIG CLIN: HCPCS | Performed by: INTERNAL MEDICINE

## 2020-06-17 PROCEDURE — 82378 CARCINOEMBRYONIC ANTIGEN: CPT

## 2020-06-17 PROCEDURE — 96366 THER/PROPH/DIAG IV INF ADDON: CPT

## 2020-06-17 PROCEDURE — 99214 OFFICE O/P EST MOD 30 MIN: CPT | Performed by: INTERNAL MEDICINE

## 2020-06-17 PROCEDURE — 36415 COLL VENOUS BLD VENIPUNCTURE: CPT | Performed by: INTERNAL MEDICINE

## 2020-06-17 PROCEDURE — 1123F ACP DISCUSS/DSCN MKR DOCD: CPT | Performed by: INTERNAL MEDICINE

## 2020-06-17 PROCEDURE — 2580000003 HC RX 258: Performed by: INTERNAL MEDICINE

## 2020-06-17 PROCEDURE — G0498 CHEMO EXTEND IV INFUS W/PUMP: HCPCS

## 2020-06-17 PROCEDURE — 1036F TOBACCO NON-USER: CPT | Performed by: INTERNAL MEDICINE

## 2020-06-17 PROCEDURE — 85025 COMPLETE CBC W/AUTO DIFF WBC: CPT

## 2020-06-17 RX ORDER — DIPHENHYDRAMINE HYDROCHLORIDE 50 MG/ML
50 INJECTION INTRAMUSCULAR; INTRAVENOUS ONCE
Status: CANCELLED
Start: 2020-06-17

## 2020-06-17 RX ORDER — METHYLPREDNISOLONE SODIUM SUCCINATE 125 MG/2ML
125 INJECTION, POWDER, LYOPHILIZED, FOR SOLUTION INTRAMUSCULAR; INTRAVENOUS ONCE
Status: CANCELLED | OUTPATIENT
Start: 2020-06-17

## 2020-06-17 RX ORDER — FLUOROURACIL 50 MG/ML
400 INJECTION, SOLUTION INTRAVENOUS ONCE
Status: COMPLETED | OUTPATIENT
Start: 2020-06-17 | End: 2020-06-17

## 2020-06-17 RX ORDER — SODIUM CHLORIDE 0.9 % (FLUSH) 0.9 %
5 SYRINGE (ML) INJECTION PRN
Status: CANCELLED | OUTPATIENT
Start: 2020-06-17

## 2020-06-17 RX ORDER — FLUOROURACIL 50 MG/ML
400 INJECTION, SOLUTION INTRAVENOUS ONCE
Status: CANCELLED | OUTPATIENT
Start: 2020-06-17

## 2020-06-17 RX ORDER — ACETAMINOPHEN 325 MG/1
1000 TABLET ORAL ONCE
Status: CANCELLED
Start: 2020-06-17

## 2020-06-17 RX ORDER — SODIUM CHLORIDE 0.9 % (FLUSH) 0.9 %
10 SYRINGE (ML) INJECTION PRN
Status: CANCELLED | OUTPATIENT
Start: 2020-06-17

## 2020-06-17 RX ORDER — HEPARIN SODIUM (PORCINE) LOCK FLUSH IV SOLN 100 UNIT/ML 100 UNIT/ML
500 SOLUTION INTRAVENOUS PRN
Status: CANCELLED | OUTPATIENT
Start: 2020-06-17

## 2020-06-17 RX ORDER — SODIUM CHLORIDE 9 MG/ML
INJECTION, SOLUTION INTRAVENOUS CONTINUOUS
Status: CANCELLED | OUTPATIENT
Start: 2020-06-17

## 2020-06-17 RX ORDER — DIPHENHYDRAMINE HYDROCHLORIDE 50 MG/ML
50 INJECTION INTRAMUSCULAR; INTRAVENOUS ONCE
Status: COMPLETED | OUTPATIENT
Start: 2020-06-17 | End: 2020-06-17

## 2020-06-17 RX ORDER — SODIUM CHLORIDE 9 MG/ML
INJECTION, SOLUTION INTRAVENOUS ONCE
Status: CANCELLED | OUTPATIENT
Start: 2020-06-17

## 2020-06-17 RX ORDER — ACETAMINOPHEN 500 MG
1000 TABLET ORAL ONCE
Status: COMPLETED | OUTPATIENT
Start: 2020-06-17 | End: 2020-06-17

## 2020-06-17 RX ORDER — EPINEPHRINE 1 MG/ML
0.3 INJECTION, SOLUTION, CONCENTRATE INTRAVENOUS PRN
Status: CANCELLED | OUTPATIENT
Start: 2020-06-17

## 2020-06-17 RX ORDER — DEXAMETHASONE SODIUM PHOSPHATE 10 MG/ML
10 INJECTION, SOLUTION INTRAMUSCULAR; INTRAVENOUS ONCE
Status: COMPLETED | OUTPATIENT
Start: 2020-06-17 | End: 2020-06-17

## 2020-06-17 RX ORDER — SODIUM CHLORIDE 0.9 % (FLUSH) 0.9 %
10 SYRINGE (ML) INJECTION PRN
Status: DISCONTINUED | OUTPATIENT
Start: 2020-06-17 | End: 2020-06-18 | Stop reason: HOSPADM

## 2020-06-17 RX ORDER — DIPHENHYDRAMINE HYDROCHLORIDE 50 MG/ML
50 INJECTION INTRAMUSCULAR; INTRAVENOUS ONCE
Status: CANCELLED | OUTPATIENT
Start: 2020-06-17

## 2020-06-17 RX ORDER — SODIUM CHLORIDE 9 MG/ML
INJECTION, SOLUTION INTRAVENOUS ONCE
Status: COMPLETED | OUTPATIENT
Start: 2020-06-17 | End: 2020-06-17

## 2020-06-17 RX ADMIN — SODIUM CHLORIDE: 9 INJECTION, SOLUTION INTRAVENOUS at 11:17

## 2020-06-17 RX ADMIN — DEXAMETHASONE SODIUM PHOSPHATE 10 MG: 10 INJECTION, SOLUTION INTRAMUSCULAR; INTRAVENOUS at 11:17

## 2020-06-17 RX ADMIN — DIPHENHYDRAMINE HYDROCHLORIDE 50 MG: 50 INJECTION, SOLUTION INTRAMUSCULAR; INTRAVENOUS at 11:18

## 2020-06-17 RX ADMIN — ONDANSETRON 16 MG: 2 INJECTION INTRAMUSCULAR; INTRAVENOUS at 11:17

## 2020-06-17 RX ADMIN — FLUOROURACIL 4625 MG: 50 INJECTION, SOLUTION INTRAVENOUS at 13:47

## 2020-06-17 RX ADMIN — ACETAMINOPHEN 1000 MG: 500 TABLET, FILM COATED ORAL at 11:17

## 2020-06-17 RX ADMIN — LEUCOVORIN CALCIUM 750 MG: 350 INJECTION, POWDER, LYOPHILIZED, FOR SUSPENSION INTRAMUSCULAR; INTRAVENOUS at 11:59

## 2020-06-17 RX ADMIN — FLUOROURACIL 775 MG: 50 INJECTION, SOLUTION INTRAVENOUS at 13:42

## 2020-06-17 ASSESSMENT — PAIN SCALES - GENERAL: PAINLEVEL_OUTOF10: 0

## 2020-06-19 ENCOUNTER — HOSPITAL ENCOUNTER (OUTPATIENT)
Dept: INFUSION THERAPY | Age: 70
Setting detail: INFUSION SERIES
Discharge: HOME | End: 2020-06-19
Payer: MEDICARE

## 2020-06-19 PROCEDURE — 6360000002 HC RX W HCPCS: Performed by: NURSE PRACTITIONER

## 2020-06-19 PROCEDURE — 96523 IRRIG DRUG DELIVERY DEVICE: CPT

## 2020-06-19 RX ORDER — HEPARIN SODIUM (PORCINE) LOCK FLUSH IV SOLN 100 UNIT/ML 100 UNIT/ML
500 SOLUTION INTRAVENOUS PRN
Status: DISCONTINUED | OUTPATIENT
Start: 2020-06-19 | End: 2020-06-21 | Stop reason: HOSPADM

## 2020-06-19 RX ADMIN — HEPARIN 300 UNITS: 100 SYRINGE at 13:25

## 2020-07-06 ENCOUNTER — OFFICE VISIT (OUTPATIENT)
Dept: UROLOGY | Age: 70
End: 2020-07-06
Payer: MEDICARE

## 2020-07-06 ENCOUNTER — HOSPITAL ENCOUNTER (OUTPATIENT)
Dept: CT IMAGING | Age: 70
Discharge: HOME | End: 2020-07-06
Payer: MEDICARE

## 2020-07-06 VITALS
SYSTOLIC BLOOD PRESSURE: 133 MMHG | TEMPERATURE: 97.7 F | WEIGHT: 168 LBS | HEIGHT: 65 IN | BODY MASS INDEX: 27.99 KG/M2 | DIASTOLIC BLOOD PRESSURE: 75 MMHG

## 2020-07-06 DIAGNOSIS — N13.5 BILATERAL URETERAL OBSTRUCTION: ICD-10-CM

## 2020-07-06 LAB
BACTERIA URINE, POC: 0
BILIRUBIN URINE: 0 MG/DL
BLOOD, URINE: POSITIVE
CASTS URINE, POC: 0
CLARITY: ABNORMAL
COLOR: YELLOW
CRYSTALS URINE, POC: 0
EPI CELLS URINE, POC: 0
GLUCOSE URINE: ABNORMAL
KETONES, URINE: NEGATIVE
LEUKOCYTE EST, POC: ABNORMAL
NITRITE, URINE: NEGATIVE
PH UA: 5.5 (ref 4.5–8)
PROTEIN UA: POSITIVE
RBC URINE, POC: 200
SPECIFIC GRAVITY UA: 1.01 (ref 1–1.03)
UROBILINOGEN, URINE: NORMAL
WBC URINE, POC: 20
YEAST URINE, POC: 0

## 2020-07-06 PROCEDURE — G8427 DOCREV CUR MEDS BY ELIG CLIN: HCPCS | Performed by: UROLOGY

## 2020-07-06 PROCEDURE — 1123F ACP DISCUSS/DSCN MKR DOCD: CPT | Performed by: UROLOGY

## 2020-07-06 PROCEDURE — 81001 URINALYSIS AUTO W/SCOPE: CPT | Performed by: UROLOGY

## 2020-07-06 PROCEDURE — 99214 OFFICE O/P EST MOD 30 MIN: CPT | Performed by: UROLOGY

## 2020-07-06 PROCEDURE — 6360000004 HC RX CONTRAST MEDICATION: Performed by: UROLOGY

## 2020-07-06 PROCEDURE — 4040F PNEUMOC VAC/ADMIN/RCVD: CPT | Performed by: UROLOGY

## 2020-07-06 PROCEDURE — G8417 CALC BMI ABV UP PARAM F/U: HCPCS | Performed by: UROLOGY

## 2020-07-06 PROCEDURE — 3017F COLORECTAL CA SCREEN DOC REV: CPT | Performed by: UROLOGY

## 2020-07-06 PROCEDURE — 1036F TOBACCO NON-USER: CPT | Performed by: UROLOGY

## 2020-07-06 PROCEDURE — 74178 CT ABD&PLV WO CNTR FLWD CNTR: CPT

## 2020-07-06 RX ORDER — OXYCODONE AND ACETAMINOPHEN 7.5; 325 MG/1; MG/1
TABLET ORAL
COMMUNITY
Start: 2020-06-22 | End: 2020-09-28

## 2020-07-06 RX ORDER — DESONIDE 0.5 MG/G
CREAM TOPICAL
COMMUNITY
Start: 2020-07-03 | End: 2020-10-12

## 2020-07-06 RX ADMIN — IOPAMIDOL 75 ML: 755 INJECTION, SOLUTION INTRAVENOUS at 11:28

## 2020-07-06 ASSESSMENT — ENCOUNTER SYMPTOMS
CONSTIPATION: 0
BACK PAIN: 0
ABDOMINAL PAIN: 0
EYE DISCHARGE: 0
COUGH: 0
EYE REDNESS: 0
SHORTNESS OF BREATH: 0
DIARRHEA: 0
WHEEZING: 0

## 2020-07-06 NOTE — PROGRESS NOTES
STENT / STONE Right 8/18/2019    CYSTOSCOPY RETROGRADE PYELOGRAM RIGHT URETERAL  STENT INSERTION FULGERATION OF BLADDER TUMOR performed by Ion Russo MD at [facility] 83 N/A 12/3/2019    BLADDER BIOPSY AND FULGURATION performed by Ion Russo MD at [facility] 83 N/A 5/28/2020    BIOPSIES WITH FULGURATION OF BLADDER TUMORS performed by Ion Russo MD at [address] Bilateral     cataract or    HC INJECT OTHER PERPHRL NERV Left 10/28/2016    FLURO GUIDED HIP INJECITON performed by Faiza Michael MD at [address] [place] [address] / REMOVAL / REPLACEMENT VENOUS ACCESS CATHETER Right 8/20/2019    INSERTION OF RIGHT INTERNAL JUGULAR SINGLE LUMEN POWER PORT performed by Sabrina Muhammad DO at [facility] [address] N/A 5/6/2020    REMOVAL OF INSTRUMENTATION, EXPLORATION OF FUSION L1-3, REVISION UNINSTRUMENTED POSTERIOR SPINAL FUSION L1-3 performed by Gissel Desai MD at [facility] 86      times 2... all levels    TUNNELED VENOUS PORT PLACEMENT         Current Outpatient Medications   Medication Sig Dispense Refill    oxyCODONE-acetaminophen (PERCOCET) 7.5-325 MG per tablet TK 1 T PO TID PRN P      desonide (DESOWEN) 0.05 % cream       cyclobenzaprine (FLEXERIL) 10 MG tablet TK 1 T PO TID      ibandronate (BONIVA) 150 MG tablet Take 1 tablet by mouth every 30 days Take one (1) tablet once per month in the morning with a full glass of water, on an empty stomach, and do not take anything else by mouth or lie down for the next 30 minutes.  30 tablet 6    DULoxetine (CYMBALTA) 30 MG extended release capsule Take 30 mg by mouth daily      ondansetron (ZOFRAN) 4 MG tablet Take 2 tablets by mouth every 8 hours as needed for Nausea or Vomiting 30 tablet 2    ferrous sulfate (IRON 325) 325 (65 Fe) MG tablet Take 1 tablet by mouth 2 times daily 180 tablet 1    loperamide (IMODIUM A-D) 2 MG tablet Take 4 mg by mouth      atorvastatin (LIPITOR) 40 MG tablet TAKE 1 TABLET BY MOUTH EVERY NIGHT (Patient taking differently: Take 40 mg by mouth nightly ) 30 tablet 0    omeprazole (PRILOSEC) 20 MG delayed release capsule Take 20 mg by mouth 2 times daily       bisoprolol (ZEBETA) 5 MG tablet Take 5 mg by mouth daily      methadone (DOLOPHINE) 10 MG tablet Take 10 mg by mouth every 6 hours as needed for Pain. q 5 hours      gabapentin (NEURONTIN) 800 MG tablet Take 800 mg by mouth 3 times daily. No current facility-administered medications for this visit.         Allergies   Allergen Reactions    Morphine Anxiety       Social History     Socioeconomic History    Marital status:      Spouse name: None    Number of children: None    Years of education: None    Highest education level: None   Occupational History    None   Social Needs    Financial resource strain: None    Food insecurity     Worry: None     Inability: None    Transportation needs     Medical: None     Non-medical: None   Tobacco Use    Smoking status: Former Smoker     Types: Cigarettes     Last attempt to quit: 1986     Years since quittin.2    Smokeless tobacco: Never Used   Substance and Sexual Activity    Alcohol use: No    Drug use: No    Sexual activity: Yes     Partners: Female   Lifestyle    Physical activity     Days per week: None     Minutes per session: None    Stress: None   Relationships    Social connections     Talks on phone: None     Gets together: None     Attends Baptism service: None     Active member of club or organization: None     Attends meetings of clubs or organizations: None     Relationship status: None    Intimate partner violence     Fear of current or ex partner: None     Emotionally abused: None     Physically abused: None     Forced sexual activity: None   Other Topics Concern    None   Social History Narrative    None       Family History   Problem Relation Age of Onset    High Blood Pressure Mother     High Blood Pressure Father     Colon Cancer Father     Diabetes Father        REVIEW OF SYSTEMS:  Review of Systems   Constitutional: Negative for chills and fever. HENT: Negative for congestion and hearing loss. Eyes: Negative for discharge and redness. Respiratory: Negative for cough, shortness of breath and wheezing. Cardiovascular: Negative for leg swelling. Gastrointestinal: Negative for abdominal pain, constipation and diarrhea. Endocrine: Negative for cold intolerance and heat intolerance. Genitourinary: Negative for decreased urine volume, difficulty urinating, dysuria, enuresis, flank pain, frequency, genital sores, hematuria and urgency. Musculoskeletal: Negative for back pain and gait problem. Skin: Negative for rash. Allergic/Immunologic: Negative for environmental allergies. Neurological: Negative for dizziness, weakness and headaches. Hematological: Does not bruise/bleed easily. Psychiatric/Behavioral: Negative for behavioral problems, confusion and sleep disturbance. PHYSICAL EXAM:  /75   Temp 97.7 °F (36.5 °C) (Temporal)   Ht 5' 5\" (1.651 m)   Wt 168 lb (76.2 kg)   BMI 27.96 kg/m²   Physical Exam  Constitutional:       General: He is not in acute distress. Appearance: Normal appearance. He is well-developed. HENT:      Head: Normocephalic and atraumatic. Nose: Nose normal.   Eyes:      General: No scleral icterus. Conjunctiva/sclera: Conjunctivae normal.      Pupils: Pupils are equal, round, and reactive to light. Neck:      Musculoskeletal: Normal range of motion and neck supple. Trachea: No tracheal deviation. Cardiovascular:      Rate and Rhythm: Normal rate and regular rhythm. Pulmonary:      Effort: Pulmonary effort is normal. No respiratory distress. Breath sounds: No stridor. Abdominal:      General: There is no distension. Palpations: Abdomen is soft.  There is no mass.      Tenderness: There is no abdominal tenderness. There is right CVA tenderness. Musculoskeletal: Normal range of motion. General: Tenderness (Right paraspinous muscle) present. Lymphadenopathy:      Cervical: No cervical adenopathy. Skin:     General: Skin is warm and dry. Findings: No erythema. Neurological:      Mental Status: He is alert and oriented to person, place, and time. Psychiatric:         Behavior: Behavior normal.         Judgment: Judgment normal.             DATA:  BMP:    Lab Results   Component Value Date     06/17/2020    K 4.5 06/17/2020    K 4.8 05/07/2020    CL 99 06/17/2020    CO2 27 06/17/2020    BUN 15 06/17/2020    LABALBU 3.8 06/17/2020    CREATININE 1.0 06/17/2020    CALCIUM 8.4 06/17/2020    GFRAA 80 02/11/2020    LABGLOM >60 06/17/2020    GLUCOSE 111 06/17/2020     Results for orders placed or performed in visit on 07/06/20   POCT Urinalysis Dipstick w/ Micro (Auto)   Result Value Ref Range    Color, UA Yellow     Clarity, UA Cloudy (A) Clear    Glucose, Ur neg     Bilirubin Urine 0 mg/dL    Ketones, Urine Negative     Specific Gravity, UA 1.010 1.005 - 1.030    Blood, Urine Positive     pH, UA 5.5 4.5 - 8.0    Protein, UA Positive (A) Negative    Nitrite, Urine Negative     Leukocytes, UA moderate     Urobilinogen, Urine Normal     rbc urine, poc 200     wbc urine, poc 20     bacteria urine, poc 0     yeast urine, poc 0     casts urine, poc 0     epi cells urine, poc 0     crystals urine, poc 0      Lab Results   Component Value Date    PSA 0.3 08/17/2019     Lab Results   Component Value Date    PSAFREEPCT 33 08/17/2019       IMAGING:  CT urogram shows moderate to severe right and moderate left hydronephrosis with bilateral ureteral stents in place the stents are in good position. The hydronephrosis may be slightly more severe than it was on the previous CT scan back in April.   However compared to the most recent retrogrades this looks about stable. 1. Right flank pain  Unclear whether this is from his kidney or whether it is from his back as he recently had an injection at pain management. Be some slight increase hydro-though he says his pain is little better today. I suggested that we just watch this for now. - POCT Urinalysis Dipstick w/ Micro (Auto)    2. Gross hematuria  Multiple possible etiologies this has resolved but he still has some microscopic blood no signs of infection today no bacteria this could just be stent irritation he also recently had procedure done for his bladder cancer and this could have been related to sloughing off tissue from his prior fulguration site. - POCT Urinalysis Dipstick w/ Micro (Auto)    3. Hydronephrosis of right kidney  May be slightly increased but this can vary depending on bladder emptying as well. Still making urine I did offer to change his stents even though he does change him about a 6 weeks ago. 4. Hydronephrosis of left kidney  As above    5. Bilateral ureteral obstruction  We will go ahead and check a serum creatinine to make sure his creatinine is not compromised certainly if this is elevated and I think we have to consider whether we need to change his stent sooner than later  - Basic Metabolic Panel; Future    I will have him follow-up to see me in about 3 weeks prior to starting his BCG. Orders Placed This Encounter   Procedures    Basic Metabolic Panel     Standing Status:   Future     Standing Expiration Date:   7/6/2021    POCT Urinalysis Dipstick w/ Micro (Auto)        Return in about 3 weeks (around 7/27/2020). All information inputted into the note by the MA to include chief complaint, past medical history, past surgical history, medications, allergies, social and family history and review of systems has been reviewed and updated as needed by me.     EMR Dragon/transcription disclaimer: Much of this documentt is electronic  transcription/translation of spoken language to printed text. The  electronic translation of spoken language may be erroneous, or at times,  nonsensical words or phrases may be inadvertently transcribed.  Although I  have reviewed the document for such errors, some may still exist.

## 2020-07-07 ENCOUNTER — HOSPITAL ENCOUNTER (OUTPATIENT)
Dept: INFUSION THERAPY | Age: 70
Setting detail: INFUSION SERIES
Discharge: HOME | End: 2020-07-07
Payer: MEDICARE

## 2020-07-07 VITALS
SYSTOLIC BLOOD PRESSURE: 111 MMHG | RESPIRATION RATE: 17 BRPM | TEMPERATURE: 98.6 F | HEART RATE: 63 BPM | OXYGEN SATURATION: 97 % | HEIGHT: 65 IN | WEIGHT: 168 LBS | BODY MASS INDEX: 27.99 KG/M2 | DIASTOLIC BLOOD PRESSURE: 68 MMHG

## 2020-07-07 DIAGNOSIS — C19 COLORECTAL CANCER (HCC): Primary | ICD-10-CM

## 2020-07-07 LAB
ALBUMIN SERPL-MCNC: 4.1 G/DL (ref 3.5–5.2)
ALP BLD-CCNC: 134 U/L (ref 40–130)
ALT SERPL-CCNC: 10 U/L (ref 5–41)
ANION GAP SERPL CALCULATED.3IONS-SCNC: 11 MMOL/L (ref 7–19)
AST SERPL-CCNC: 16 U/L (ref 5–40)
BASOPHILS ABSOLUTE: 0.1 K/UL (ref 0–0.2)
BASOPHILS RELATIVE PERCENT: 1.1 % (ref 0–1)
BILIRUB SERPL-MCNC: 0.3 MG/DL (ref 0.2–1.2)
BUN BLDV-MCNC: 18 MG/DL (ref 8–23)
CALCIUM SERPL-MCNC: 8.5 MG/DL (ref 8.8–10.2)
CHLORIDE BLD-SCNC: 100 MMOL/L (ref 98–111)
CO2: 21 MMOL/L (ref 22–29)
CREAT SERPL-MCNC: 1.2 MG/DL (ref 0.5–1.2)
EOSINOPHILS ABSOLUTE: 0.2 K/UL (ref 0–0.6)
EOSINOPHILS RELATIVE PERCENT: 4.3 % (ref 0–5)
GFR NON-AFRICAN AMERICAN: >60
GLUCOSE BLD-MCNC: 108 MG/DL (ref 74–109)
HCT VFR BLD CALC: 36.7 % (ref 42–52)
HEMOGLOBIN: 11.4 G/DL (ref 14–18)
IMMATURE GRANULOCYTES #: 0.1 K/UL
LYMPHOCYTES ABSOLUTE: 0.7 K/UL (ref 1.1–4.5)
LYMPHOCYTES RELATIVE PERCENT: 12.2 % (ref 20–40)
MCH RBC QN AUTO: 27.8 PG (ref 27–31)
MCHC RBC AUTO-ENTMCNC: 31.1 G/DL (ref 33–37)
MCV RBC AUTO: 89.5 FL (ref 80–94)
MONOCYTES ABSOLUTE: 0.5 K/UL (ref 0–0.9)
MONOCYTES RELATIVE PERCENT: 9.6 % (ref 0–10)
NEUTROPHILS ABSOLUTE: 3.8 K/UL (ref 1.5–7.5)
NEUTROPHILS RELATIVE PERCENT: 71.7 % (ref 50–65)
PDW BLD-RTO: 16.6 % (ref 11.5–14.5)
PLATELET # BLD: 267 K/UL (ref 130–400)
PMV BLD AUTO: 10.7 FL (ref 9.4–12.4)
POTASSIUM SERPL-SCNC: 4.5 MMOL/L (ref 3.5–5)
RBC # BLD: 4.1 M/UL (ref 4.7–6.1)
SODIUM BLD-SCNC: 132 MMOL/L (ref 136–145)
TOTAL PROTEIN: 6.6 G/DL (ref 6.6–8.7)
WBC # BLD: 5.3 K/UL (ref 4.8–10.8)

## 2020-07-07 PROCEDURE — 96366 THER/PROPH/DIAG IV INF ADDON: CPT

## 2020-07-07 PROCEDURE — 96409 CHEMO IV PUSH SNGL DRUG: CPT

## 2020-07-07 PROCEDURE — 6370000000 HC RX 637 (ALT 250 FOR IP): Performed by: NURSE PRACTITIONER

## 2020-07-07 PROCEDURE — 96365 THER/PROPH/DIAG IV INF INIT: CPT

## 2020-07-07 PROCEDURE — 6360000002 HC RX W HCPCS: Performed by: NURSE PRACTITIONER

## 2020-07-07 PROCEDURE — 85025 COMPLETE CBC W/AUTO DIFF WBC: CPT

## 2020-07-07 PROCEDURE — 80053 COMPREHEN METABOLIC PANEL: CPT

## 2020-07-07 PROCEDURE — 36591 DRAW BLOOD OFF VENOUS DEVICE: CPT

## 2020-07-07 PROCEDURE — 2580000003 HC RX 258: Performed by: NURSE PRACTITIONER

## 2020-07-07 PROCEDURE — G0498 CHEMO EXTEND IV INFUS W/PUMP: HCPCS

## 2020-07-07 RX ORDER — DIPHENHYDRAMINE HYDROCHLORIDE 50 MG/ML
50 INJECTION INTRAMUSCULAR; INTRAVENOUS ONCE
Status: COMPLETED | OUTPATIENT
Start: 2020-07-07 | End: 2020-07-07

## 2020-07-07 RX ORDER — FLUOROURACIL 50 MG/ML
400 INJECTION, SOLUTION INTRAVENOUS ONCE
Status: COMPLETED | OUTPATIENT
Start: 2020-07-07 | End: 2020-07-07

## 2020-07-07 RX ORDER — SODIUM CHLORIDE 9 MG/ML
INJECTION, SOLUTION INTRAVENOUS ONCE
Status: COMPLETED | OUTPATIENT
Start: 2020-07-07 | End: 2020-07-07

## 2020-07-07 RX ORDER — ACETAMINOPHEN 325 MG/1
1000 TABLET ORAL ONCE
Status: CANCELLED
Start: 2020-07-07

## 2020-07-07 RX ORDER — SODIUM CHLORIDE 9 MG/ML
INJECTION, SOLUTION INTRAVENOUS CONTINUOUS
Status: CANCELLED | OUTPATIENT
Start: 2020-07-07

## 2020-07-07 RX ORDER — DIPHENHYDRAMINE HYDROCHLORIDE 50 MG/ML
50 INJECTION INTRAMUSCULAR; INTRAVENOUS ONCE
Status: CANCELLED
Start: 2020-07-07

## 2020-07-07 RX ORDER — HEPARIN SODIUM (PORCINE) LOCK FLUSH IV SOLN 100 UNIT/ML 100 UNIT/ML
500 SOLUTION INTRAVENOUS PRN
Status: CANCELLED | OUTPATIENT
Start: 2020-07-07

## 2020-07-07 RX ORDER — FLUOROURACIL 50 MG/ML
400 INJECTION, SOLUTION INTRAVENOUS ONCE
Status: CANCELLED | OUTPATIENT
Start: 2020-07-07

## 2020-07-07 RX ORDER — ACETAMINOPHEN 500 MG
1000 TABLET ORAL ONCE
Status: COMPLETED | OUTPATIENT
Start: 2020-07-07 | End: 2020-07-07

## 2020-07-07 RX ORDER — DEXAMETHASONE SODIUM PHOSPHATE 10 MG/ML
10 INJECTION, SOLUTION INTRAMUSCULAR; INTRAVENOUS ONCE
Status: COMPLETED | OUTPATIENT
Start: 2020-07-07 | End: 2020-07-07

## 2020-07-07 RX ORDER — SODIUM CHLORIDE 0.9 % (FLUSH) 0.9 %
5 SYRINGE (ML) INJECTION PRN
Status: CANCELLED | OUTPATIENT
Start: 2020-07-07

## 2020-07-07 RX ORDER — METHYLPREDNISOLONE SODIUM SUCCINATE 125 MG/2ML
125 INJECTION, POWDER, LYOPHILIZED, FOR SOLUTION INTRAMUSCULAR; INTRAVENOUS ONCE
Status: CANCELLED | OUTPATIENT
Start: 2020-07-07

## 2020-07-07 RX ORDER — DIPHENHYDRAMINE HYDROCHLORIDE 50 MG/ML
50 INJECTION INTRAMUSCULAR; INTRAVENOUS ONCE
Status: CANCELLED | OUTPATIENT
Start: 2020-07-07

## 2020-07-07 RX ORDER — EPINEPHRINE 1 MG/ML
0.3 INJECTION, SOLUTION, CONCENTRATE INTRAVENOUS PRN
Status: CANCELLED | OUTPATIENT
Start: 2020-07-07

## 2020-07-07 RX ORDER — SODIUM CHLORIDE 9 MG/ML
INJECTION, SOLUTION INTRAVENOUS ONCE
Status: CANCELLED | OUTPATIENT
Start: 2020-07-07

## 2020-07-07 RX ORDER — SODIUM CHLORIDE 0.9 % (FLUSH) 0.9 %
10 SYRINGE (ML) INJECTION PRN
Status: CANCELLED | OUTPATIENT
Start: 2020-07-07

## 2020-07-07 RX ADMIN — FLUOROURACIL 775 MG: 50 INJECTION, SOLUTION INTRAVENOUS at 14:48

## 2020-07-07 RX ADMIN — DEXAMETHASONE SODIUM PHOSPHATE 10 MG: 10 INJECTION, SOLUTION INTRAMUSCULAR; INTRAVENOUS at 12:12

## 2020-07-07 RX ADMIN — SODIUM CHLORIDE: 9 INJECTION, SOLUTION INTRAVENOUS at 12:04

## 2020-07-07 RX ADMIN — LEUCOVORIN CALCIUM 750 MG: 350 INJECTION, POWDER, LYOPHILIZED, FOR SOLUTION INTRAMUSCULAR; INTRAVENOUS at 13:05

## 2020-07-07 RX ADMIN — DIPHENHYDRAMINE HYDROCHLORIDE 50 MG: 50 INJECTION, SOLUTION INTRAMUSCULAR; INTRAVENOUS at 12:04

## 2020-07-07 RX ADMIN — ONDANSETRON 16 MG: 2 INJECTION INTRAMUSCULAR; INTRAVENOUS at 12:12

## 2020-07-07 RX ADMIN — FLUOROURACIL 4630 MG: 50 INJECTION, SOLUTION INTRAVENOUS at 14:48

## 2020-07-07 RX ADMIN — ACETAMINOPHEN 1000 MG: 500 TABLET, FILM COATED ORAL at 12:04

## 2020-07-07 ASSESSMENT — PAIN SCALES - GENERAL: PAINLEVEL_OUTOF10: 0

## 2020-07-09 ENCOUNTER — HOSPITAL ENCOUNTER (OUTPATIENT)
Dept: INFUSION THERAPY | Age: 70
Setting detail: INFUSION SERIES
Discharge: HOME | End: 2020-07-09
Payer: MEDICARE

## 2020-07-09 DIAGNOSIS — Z45.2 ENCOUNTER FOR CENTRAL LINE CARE: Primary | ICD-10-CM

## 2020-07-09 PROCEDURE — 6360000002 HC RX W HCPCS: Performed by: NURSE PRACTITIONER

## 2020-07-09 PROCEDURE — 96523 IRRIG DRUG DELIVERY DEVICE: CPT

## 2020-07-09 RX ORDER — SODIUM CHLORIDE 0.9 % (FLUSH) 0.9 %
20 SYRINGE (ML) INJECTION PRN
Status: CANCELLED | OUTPATIENT
Start: 2020-07-09

## 2020-07-09 RX ORDER — HEPARIN SODIUM (PORCINE) LOCK FLUSH IV SOLN 100 UNIT/ML 100 UNIT/ML
500 SOLUTION INTRAVENOUS PRN
Status: CANCELLED | OUTPATIENT
Start: 2020-07-09

## 2020-07-09 RX ORDER — SODIUM CHLORIDE 0.9 % (FLUSH) 0.9 %
10 SYRINGE (ML) INJECTION PRN
Status: CANCELLED | OUTPATIENT
Start: 2020-07-09

## 2020-07-09 RX ORDER — HEPARIN SODIUM (PORCINE) LOCK FLUSH IV SOLN 100 UNIT/ML 100 UNIT/ML
500 SOLUTION INTRAVENOUS PRN
Status: DISCONTINUED | OUTPATIENT
Start: 2020-07-09 | End: 2020-07-10 | Stop reason: HOSPADM

## 2020-07-09 RX ORDER — SODIUM CHLORIDE 0.9 % (FLUSH) 0.9 %
20 SYRINGE (ML) INJECTION PRN
Status: DISCONTINUED | OUTPATIENT
Start: 2020-07-09 | End: 2020-07-10 | Stop reason: HOSPADM

## 2020-07-09 RX ADMIN — HEPARIN 500 UNITS: 100 SYRINGE at 13:58

## 2020-07-23 ENCOUNTER — TELEPHONE (OUTPATIENT)
Dept: CARDIOLOGY | Age: 70
End: 2020-07-23

## 2020-07-23 NOTE — TELEPHONE ENCOUNTER
Called to reschedule JDT 08/25 apt, left message to return call & reschedule.   CAN ONLY BE RESCHEDULED WITH A DOCTOR

## 2020-07-27 ENCOUNTER — OFFICE VISIT (OUTPATIENT)
Dept: UROLOGY | Age: 70
End: 2020-07-27
Payer: MEDICARE

## 2020-07-27 VITALS — TEMPERATURE: 97.6 F | HEIGHT: 66 IN | BODY MASS INDEX: 27 KG/M2 | WEIGHT: 168 LBS

## 2020-07-27 LAB
BACTERIA URINE, POC: 0
BILIRUBIN URINE: 0 MG/DL
BLOOD, URINE: POSITIVE
CASTS URINE, POC: 0
CLARITY: CLEAR
COLOR: YELLOW
CRYSTALS URINE, POC: 0
EPI CELLS URINE, POC: 0
GLUCOSE URINE: ABNORMAL
KETONES, URINE: NEGATIVE
LEUKOCYTE EST, POC: ABNORMAL
NITRITE, URINE: NEGATIVE
PH UA: 6 (ref 4.5–8)
PROTEIN UA: POSITIVE
RBC URINE, POC: 2
SPECIFIC GRAVITY UA: 1.01 (ref 1–1.03)
UROBILINOGEN, URINE: NORMAL
WBC URINE, POC: 4
YEAST URINE, POC: 0

## 2020-07-27 PROCEDURE — 4040F PNEUMOC VAC/ADMIN/RCVD: CPT | Performed by: UROLOGY

## 2020-07-27 PROCEDURE — 81001 URINALYSIS AUTO W/SCOPE: CPT | Performed by: UROLOGY

## 2020-07-27 PROCEDURE — 3017F COLORECTAL CA SCREEN DOC REV: CPT | Performed by: UROLOGY

## 2020-07-27 PROCEDURE — 99214 OFFICE O/P EST MOD 30 MIN: CPT | Performed by: UROLOGY

## 2020-07-27 PROCEDURE — G8417 CALC BMI ABV UP PARAM F/U: HCPCS | Performed by: UROLOGY

## 2020-07-27 PROCEDURE — 1123F ACP DISCUSS/DSCN MKR DOCD: CPT | Performed by: UROLOGY

## 2020-07-27 PROCEDURE — G8427 DOCREV CUR MEDS BY ELIG CLIN: HCPCS | Performed by: UROLOGY

## 2020-07-27 PROCEDURE — 1036F TOBACCO NON-USER: CPT | Performed by: UROLOGY

## 2020-07-27 ASSESSMENT — ENCOUNTER SYMPTOMS
VOMITING: 0
SHORTNESS OF BREATH: 0
COUGH: 0
WHEEZING: 0
NAUSEA: 0
BLOOD IN STOOL: 0
COLOR CHANGE: 0
SINUS PRESSURE: 0
RHINORRHEA: 0
DIARRHEA: 0
ABDOMINAL PAIN: 0
EYE REDNESS: 0
CONSTIPATION: 0
FACIAL SWELLING: 0
SORE THROAT: 0
EYE DISCHARGE: 0
EYE PAIN: 0
ABDOMINAL DISTENTION: 0
BACK PAIN: 1
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
## 2020-07-28 ENCOUNTER — HOSPITAL ENCOUNTER (OUTPATIENT)
Dept: INFUSION THERAPY | Age: 70
End: 2020-07-28
Payer: MEDICARE

## 2020-08-04 ENCOUNTER — NURSE ONLY (OUTPATIENT)
Dept: UROLOGY | Age: 70
End: 2020-08-04
Payer: MEDICARE

## 2020-08-04 VITALS — TEMPERATURE: 98.1 F | HEIGHT: 65 IN | WEIGHT: 170 LBS | BODY MASS INDEX: 28.32 KG/M2

## 2020-08-04 LAB
BACTERIA URINE, POC: 0
BILIRUBIN URINE: 0 MG/DL
BLOOD, URINE: POSITIVE
CASTS URINE, POC: 0
CLARITY: CLEAR
COLOR: YELLOW
CRYSTALS URINE, POC: 0
EPI CELLS URINE, POC: 0
GLUCOSE URINE: ABNORMAL
KETONES, URINE: NEGATIVE
LEUKOCYTE EST, POC: ABNORMAL
NITRITE, URINE: NEGATIVE
PH UA: 6 (ref 4.5–8)
PROTEIN UA: POSITIVE
RBC URINE, POC: ABNORMAL
SPECIFIC GRAVITY UA: 1.02 (ref 1–1.03)
UROBILINOGEN, URINE: NORMAL
WBC URINE, POC: ABNORMAL
YEAST URINE, POC: 0

## 2020-08-04 PROCEDURE — 81001 URINALYSIS AUTO W/SCOPE: CPT | Performed by: UROLOGY

## 2020-08-04 PROCEDURE — 51720 TREATMENT OF BLADDER LESION: CPT | Performed by: UROLOGY

## 2020-08-04 NOTE — PROGRESS NOTES
BLADDER CANCER  Patient was first diagnosed with bladder cancer 12 month(s) ago. Last Recurrence: 5/28/2020  Stage of bladder cancer at last recurrence: 0a - Ta, N0, M0, Grade: high grade  Hematuria? microscopic  Symptoms since last treatment: none   Fever: absent    The patient is here today for an intravesical BCG treatment. BCG Treatment # 1 of 6:  Patient was prepped and draped in the supine position. Using sterile technique, xylocaine jelly was introduced into the urethra. Under sterile conditions, a #16 Fr. Catheter was gently introduced into the urethra and bladder. All urine was drained from the bladder. A full strength solution of BYRON BCG  Lot # [id number], Exp Date [id number] mixed in 50 mL of sterile, preservative free 0.9% NaCl solution was then instilled under gravity feed through the catheter into the bladder. The catheter was then removed. Post BCG Treatment Instructions:  I discussed the side effects of BCG including burning with urination, increase urinary urgency and frequency, blood in urine, fatigue, fever or chills. The patient was given instruction to hold the BCG in the bladder for at least one hour but no longer than 2 hours after instillation. The patient should use the same toilet for the 1st 6 hours after instillation and clean the toilet with Chlorox bleach each time the toilet is used. The patient should call our office immediately or go to the Emergency Room if he/she experiences fever over 101 and/or has shaking chills. Patient shows no symptoms today. His urine today was positive for moderate blood and small leukocytes. Manjit Rivera examined urine under microscope and found 5-6 rbc, 3-4 wbc and no bacteria.  He gave nursing staff approval to proceed with treatment

## 2020-08-05 ENCOUNTER — HOSPITAL ENCOUNTER (OUTPATIENT)
Dept: INFUSION THERAPY | Age: 70
Discharge: HOME | End: 2020-08-05
Payer: MEDICARE

## 2020-08-05 ENCOUNTER — APPOINTMENT (OUTPATIENT)
Dept: INFUSION THERAPY | Age: 70
End: 2020-08-05
Payer: MEDICARE

## 2020-08-05 ENCOUNTER — HOSPITAL ENCOUNTER (OUTPATIENT)
Dept: INFUSION THERAPY | Age: 70
Setting detail: INFUSION SERIES
Discharge: HOME | End: 2020-08-05
Payer: MEDICARE

## 2020-08-05 VITALS
HEART RATE: 61 BPM | OXYGEN SATURATION: 98 % | DIASTOLIC BLOOD PRESSURE: 64 MMHG | RESPIRATION RATE: 17 BRPM | SYSTOLIC BLOOD PRESSURE: 119 MMHG | TEMPERATURE: 98.5 F

## 2020-08-05 DIAGNOSIS — C18.9 METASTASIS FROM COLON CANCER (HCC): ICD-10-CM

## 2020-08-05 DIAGNOSIS — C79.9 METASTASIS FROM COLON CANCER (HCC): ICD-10-CM

## 2020-08-05 DIAGNOSIS — C19 COLORECTAL CANCER (HCC): Primary | ICD-10-CM

## 2020-08-05 LAB
ALBUMIN SERPL-MCNC: 4 G/DL (ref 3.5–5.2)
ALP BLD-CCNC: 135 U/L (ref 40–130)
ALT SERPL-CCNC: 8 U/L (ref 5–41)
ANION GAP SERPL CALCULATED.3IONS-SCNC: 11 MMOL/L (ref 7–19)
AST SERPL-CCNC: 16 U/L (ref 5–40)
BASOPHILS ABSOLUTE: 0.09 K/UL (ref 0.01–0.08)
BASOPHILS RELATIVE PERCENT: 1.4 % (ref 0.1–1.2)
BILIRUB SERPL-MCNC: 0.4 MG/DL (ref 0.2–1.2)
BUN BLDV-MCNC: 13 MG/DL (ref 8–23)
CALCIUM SERPL-MCNC: 8.9 MG/DL (ref 8.8–10.2)
CHLORIDE BLD-SCNC: 98 MMOL/L (ref 98–111)
CO2: 27 MMOL/L (ref 22–29)
CREAT SERPL-MCNC: 1.4 MG/DL (ref 0.5–1.2)
EOSINOPHILS ABSOLUTE: 0.26 K/UL (ref 0.04–0.54)
EOSINOPHILS RELATIVE PERCENT: 4 % (ref 0.7–7)
GFR AFRICAN AMERICAN: >59
GFR NON-AFRICAN AMERICAN: 50
GLUCOSE BLD-MCNC: 92 MG/DL (ref 74–109)
HCT VFR BLD CALC: 34.7 % (ref 40.1–51)
HEMOGLOBIN: 11.2 G/DL (ref 13.7–17.5)
LYMPHOCYTES ABSOLUTE: 0.73 K/UL (ref 1.18–3.74)
LYMPHOCYTES RELATIVE PERCENT: 11.1 % (ref 19.3–53.1)
MCH RBC QN AUTO: 29.7 PG (ref 25.7–32.2)
MCHC RBC AUTO-ENTMCNC: 32.3 G/DL (ref 32.3–36.5)
MCV RBC AUTO: 92 FL (ref 79–92.2)
MONOCYTES ABSOLUTE: 0.49 K/UL (ref 0.24–0.82)
MONOCYTES RELATIVE PERCENT: 7.4 % (ref 4.7–12.5)
NEUTROPHILS ABSOLUTE: 5.01 K/UL (ref 1.56–6.13)
NEUTROPHILS RELATIVE PERCENT: 76.1 % (ref 34–71.1)
PDW BLD-RTO: 15.4 % (ref 11.6–14.4)
PLATELET # BLD: 180 K/UL (ref 163–337)
PMV BLD AUTO: 10.7 FL (ref 7.4–10.4)
POTASSIUM SERPL-SCNC: 5.2 MMOL/L (ref 3.5–5)
RBC # BLD: 3.77 M/UL (ref 4.63–6.08)
SODIUM BLD-SCNC: 136 MMOL/L (ref 136–145)
TOTAL PROTEIN: 6.8 G/DL (ref 6.6–8.7)
WBC # BLD: 6.58 K/UL (ref 4.23–9.07)

## 2020-08-05 PROCEDURE — 6370000000 HC RX 637 (ALT 250 FOR IP): Performed by: INTERNAL MEDICINE

## 2020-08-05 PROCEDURE — 85025 COMPLETE CBC W/AUTO DIFF WBC: CPT

## 2020-08-05 PROCEDURE — 96366 THER/PROPH/DIAG IV INF ADDON: CPT

## 2020-08-05 PROCEDURE — 6360000002 HC RX W HCPCS: Performed by: INTERNAL MEDICINE

## 2020-08-05 PROCEDURE — 96409 CHEMO IV PUSH SNGL DRUG: CPT

## 2020-08-05 PROCEDURE — 80053 COMPREHEN METABOLIC PANEL: CPT

## 2020-08-05 PROCEDURE — 2580000003 HC RX 258: Performed by: INTERNAL MEDICINE

## 2020-08-05 PROCEDURE — G0498 CHEMO EXTEND IV INFUS W/PUMP: HCPCS

## 2020-08-05 RX ORDER — DIPHENHYDRAMINE HYDROCHLORIDE 50 MG/ML
50 INJECTION INTRAMUSCULAR; INTRAVENOUS ONCE
Status: CANCELLED
Start: 2020-08-05

## 2020-08-05 RX ORDER — ACETAMINOPHEN 500 MG
1000 TABLET ORAL ONCE
Status: COMPLETED | OUTPATIENT
Start: 2020-08-05 | End: 2020-08-05

## 2020-08-05 RX ORDER — HEPARIN SODIUM (PORCINE) LOCK FLUSH IV SOLN 100 UNIT/ML 100 UNIT/ML
500 SOLUTION INTRAVENOUS PRN
Status: CANCELLED | OUTPATIENT
Start: 2020-08-05

## 2020-08-05 RX ORDER — SODIUM CHLORIDE 9 MG/ML
INJECTION, SOLUTION INTRAVENOUS CONTINUOUS
Status: CANCELLED | OUTPATIENT
Start: 2020-08-05

## 2020-08-05 RX ORDER — FLUOROURACIL 50 MG/ML
400 INJECTION, SOLUTION INTRAVENOUS ONCE
Status: CANCELLED | OUTPATIENT
Start: 2020-08-05

## 2020-08-05 RX ORDER — EPINEPHRINE 1 MG/ML
0.3 INJECTION, SOLUTION, CONCENTRATE INTRAVENOUS PRN
Status: CANCELLED | OUTPATIENT
Start: 2020-08-05

## 2020-08-05 RX ORDER — FLUOROURACIL 50 MG/ML
400 INJECTION, SOLUTION INTRAVENOUS ONCE
Status: COMPLETED | OUTPATIENT
Start: 2020-08-05 | End: 2020-08-05

## 2020-08-05 RX ORDER — ACETAMINOPHEN 325 MG/1
1000 TABLET ORAL ONCE
Status: CANCELLED
Start: 2020-08-05

## 2020-08-05 RX ORDER — SODIUM CHLORIDE 0.9 % (FLUSH) 0.9 %
5 SYRINGE (ML) INJECTION PRN
Status: CANCELLED | OUTPATIENT
Start: 2020-08-05

## 2020-08-05 RX ORDER — SODIUM CHLORIDE 9 MG/ML
INJECTION, SOLUTION INTRAVENOUS ONCE
Status: CANCELLED | OUTPATIENT
Start: 2020-08-05

## 2020-08-05 RX ORDER — DIPHENHYDRAMINE HYDROCHLORIDE 50 MG/ML
50 INJECTION INTRAMUSCULAR; INTRAVENOUS ONCE
Status: CANCELLED | OUTPATIENT
Start: 2020-08-05

## 2020-08-05 RX ORDER — SODIUM CHLORIDE 0.9 % (FLUSH) 0.9 %
10 SYRINGE (ML) INJECTION PRN
Status: CANCELLED | OUTPATIENT
Start: 2020-08-05

## 2020-08-05 RX ORDER — SODIUM CHLORIDE 9 MG/ML
INJECTION, SOLUTION INTRAVENOUS ONCE
Status: COMPLETED | OUTPATIENT
Start: 2020-08-05 | End: 2020-08-05

## 2020-08-05 RX ORDER — DIPHENHYDRAMINE HYDROCHLORIDE 50 MG/ML
50 INJECTION INTRAMUSCULAR; INTRAVENOUS ONCE
Status: COMPLETED | OUTPATIENT
Start: 2020-08-05 | End: 2020-08-05

## 2020-08-05 RX ORDER — DEXAMETHASONE SODIUM PHOSPHATE 10 MG/ML
10 INJECTION, SOLUTION INTRAMUSCULAR; INTRAVENOUS ONCE
Status: COMPLETED | OUTPATIENT
Start: 2020-08-05 | End: 2020-08-05

## 2020-08-05 RX ORDER — METHYLPREDNISOLONE SODIUM SUCCINATE 125 MG/2ML
125 INJECTION, POWDER, LYOPHILIZED, FOR SOLUTION INTRAMUSCULAR; INTRAVENOUS ONCE
Status: CANCELLED | OUTPATIENT
Start: 2020-08-05

## 2020-08-05 RX ORDER — HEPARIN SODIUM (PORCINE) LOCK FLUSH IV SOLN 100 UNIT/ML 100 UNIT/ML
500 SOLUTION INTRAVENOUS PRN
Status: DISCONTINUED | OUTPATIENT
Start: 2020-08-05 | End: 2020-08-06 | Stop reason: HOSPADM

## 2020-08-05 RX ADMIN — FLUOROURACIL 4625 MG: 50 INJECTION, SOLUTION INTRAVENOUS at 14:54

## 2020-08-05 RX ADMIN — LEUCOVORIN CALCIUM 750 MG: 200 INJECTION, POWDER, LYOPHILIZED, FOR SUSPENSION INTRAMUSCULAR; INTRAVENOUS at 12:48

## 2020-08-05 RX ADMIN — SODIUM CHLORIDE: 9 INJECTION, SOLUTION INTRAVENOUS at 12:07

## 2020-08-05 RX ADMIN — ACETAMINOPHEN 1000 MG: 500 TABLET, FILM COATED ORAL at 12:26

## 2020-08-05 RX ADMIN — DEXAMETHASONE SODIUM PHOSPHATE 10 MG: 10 INJECTION, SOLUTION INTRAMUSCULAR; INTRAVENOUS at 12:07

## 2020-08-05 RX ADMIN — ONDANSETRON 16 MG: 2 INJECTION INTRAMUSCULAR; INTRAVENOUS at 12:07

## 2020-08-05 RX ADMIN — FLUOROURACIL 775 MG: 50 INJECTION, SOLUTION INTRAVENOUS at 14:53

## 2020-08-05 RX ADMIN — DIPHENHYDRAMINE HYDROCHLORIDE 50 MG: 50 INJECTION, SOLUTION INTRAMUSCULAR; INTRAVENOUS at 12:44

## 2020-08-05 ASSESSMENT — PAIN SCALES - GENERAL: PAINLEVEL_OUTOF10: 0

## 2020-08-07 ENCOUNTER — HOSPITAL ENCOUNTER (OUTPATIENT)
Dept: INFUSION THERAPY | Age: 70
Setting detail: INFUSION SERIES
Discharge: HOME | End: 2020-08-07
Payer: MEDICARE

## 2020-08-07 PROCEDURE — 6360000002 HC RX W HCPCS: Performed by: INTERNAL MEDICINE

## 2020-08-07 PROCEDURE — 2580000003 HC RX 258: Performed by: INTERNAL MEDICINE

## 2020-08-07 PROCEDURE — 96523 IRRIG DRUG DELIVERY DEVICE: CPT

## 2020-08-07 RX ORDER — SODIUM CHLORIDE 0.9 % (FLUSH) 0.9 %
20 SYRINGE (ML) INJECTION PRN
Status: DISCONTINUED | OUTPATIENT
Start: 2020-08-07 | End: 2020-08-09 | Stop reason: HOSPADM

## 2020-08-07 RX ORDER — HEPARIN SODIUM (PORCINE) LOCK FLUSH IV SOLN 100 UNIT/ML 100 UNIT/ML
300 SOLUTION INTRAVENOUS PRN
Status: DISCONTINUED | OUTPATIENT
Start: 2020-08-07 | End: 2020-08-09 | Stop reason: HOSPADM

## 2020-08-07 RX ADMIN — SODIUM CHLORIDE, PRESERVATIVE FREE 20 ML: 5 INJECTION INTRAVENOUS at 13:30

## 2020-08-07 RX ADMIN — HEPARIN 300 UNITS: 100 SYRINGE at 13:30

## 2020-08-11 ENCOUNTER — NURSE ONLY (OUTPATIENT)
Dept: UROLOGY | Age: 70
End: 2020-08-11
Payer: MEDICARE

## 2020-08-11 VITALS — TEMPERATURE: 98.2 F | WEIGHT: 166 LBS | HEIGHT: 65 IN | BODY MASS INDEX: 27.66 KG/M2

## 2020-08-11 PROCEDURE — 81001 URINALYSIS AUTO W/SCOPE: CPT | Performed by: PHYSICIAN ASSISTANT

## 2020-08-11 PROCEDURE — 51720 TREATMENT OF BLADDER LESION: CPT | Performed by: UROLOGY

## 2020-08-19 ENCOUNTER — HOSPITAL ENCOUNTER (OUTPATIENT)
Dept: INFUSION THERAPY | Age: 70
Discharge: HOME | End: 2020-08-19
Payer: MEDICARE

## 2020-08-19 ENCOUNTER — OFFICE VISIT (OUTPATIENT)
Dept: HEMATOLOGY | Age: 70
End: 2020-08-19
Payer: MEDICARE

## 2020-08-19 ENCOUNTER — HOSPITAL ENCOUNTER (OUTPATIENT)
Dept: INFUSION THERAPY | Age: 70
Setting detail: INFUSION SERIES
Discharge: HOME | End: 2020-08-19
Payer: MEDICARE

## 2020-08-19 VITALS
BODY MASS INDEX: 27.66 KG/M2 | DIASTOLIC BLOOD PRESSURE: 62 MMHG | TEMPERATURE: 98.2 F | HEART RATE: 68 BPM | HEIGHT: 65 IN | WEIGHT: 166 LBS | OXYGEN SATURATION: 95 % | RESPIRATION RATE: 17 BRPM | SYSTOLIC BLOOD PRESSURE: 120 MMHG

## 2020-08-19 VITALS
SYSTOLIC BLOOD PRESSURE: 120 MMHG | HEIGHT: 65 IN | BODY MASS INDEX: 27.67 KG/M2 | WEIGHT: 166.1 LBS | TEMPERATURE: 98.2 F | HEART RATE: 72 BPM | OXYGEN SATURATION: 96 % | DIASTOLIC BLOOD PRESSURE: 70 MMHG

## 2020-08-19 DIAGNOSIS — C19 COLORECTAL CANCER (HCC): Primary | ICD-10-CM

## 2020-08-19 DIAGNOSIS — C79.9 METASTASIS FROM COLON CANCER (HCC): ICD-10-CM

## 2020-08-19 DIAGNOSIS — C67.2 MALIGNANT NEOPLASM OF LATERAL WALL OF URINARY BLADDER (HCC): ICD-10-CM

## 2020-08-19 DIAGNOSIS — C18.9 METASTASIS FROM COLON CANCER (HCC): ICD-10-CM

## 2020-08-19 DIAGNOSIS — C19 COLORECTAL CANCER (HCC): ICD-10-CM

## 2020-08-19 LAB
ALBUMIN SERPL-MCNC: 3.8 G/DL (ref 3.5–5.2)
ALP BLD-CCNC: 121 U/L (ref 40–130)
ALT SERPL-CCNC: 12 U/L (ref 21–72)
ANION GAP SERPL CALCULATED.3IONS-SCNC: 13 MMOL/L (ref 7–19)
AST SERPL-CCNC: 20 U/L (ref 17–59)
BASOPHILS ABSOLUTE: 0.08 K/UL (ref 0.01–0.08)
BASOPHILS RELATIVE PERCENT: 1.8 % (ref 0.1–1.2)
BILIRUB SERPL-MCNC: 0.8 MG/DL (ref 0.2–1.3)
BUN BLDV-MCNC: 16 MG/DL (ref 9–20)
CALCIUM SERPL-MCNC: 9 MG/DL (ref 8.4–10.2)
CEA: 1.1 NG/ML (ref 0–3)
CHLORIDE BLD-SCNC: 98 MMOL/L (ref 98–111)
CO2: 25 MMOL/L (ref 22–29)
CREAT SERPL-MCNC: 1.4 MG/DL (ref 0.6–1.2)
EOSINOPHILS ABSOLUTE: 0.28 K/UL (ref 0.04–0.54)
EOSINOPHILS RELATIVE PERCENT: 6.3 % (ref 0.7–7)
GFR NON-AFRICAN AMERICAN: 50
GLUCOSE BLD-MCNC: 107 MG/DL (ref 74–106)
HCT VFR BLD CALC: 32.4 % (ref 40.1–51)
HEMOGLOBIN: 10.9 G/DL (ref 13.7–17.5)
LYMPHOCYTES ABSOLUTE: 0.53 K/UL (ref 1.18–3.74)
LYMPHOCYTES RELATIVE PERCENT: 11.9 % (ref 19.3–53.1)
MCH RBC QN AUTO: 29.9 PG (ref 25.7–32.2)
MCHC RBC AUTO-ENTMCNC: 33.6 G/DL (ref 32.3–36.5)
MCV RBC AUTO: 89 FL (ref 79–92.2)
MONOCYTES ABSOLUTE: 0.46 K/UL (ref 0.24–0.82)
MONOCYTES RELATIVE PERCENT: 10.4 % (ref 4.7–12.5)
NEUTROPHILS ABSOLUTE: 3.09 K/UL (ref 1.56–6.13)
NEUTROPHILS RELATIVE PERCENT: 69.6 % (ref 34–71.1)
PDW BLD-RTO: 14.9 % (ref 11.6–14.4)
PLATELET # BLD: 209 K/UL (ref 163–337)
PMV BLD AUTO: 9.4 FL (ref 7.4–10.4)
POTASSIUM SERPL-SCNC: 4.7 MMOL/L (ref 3.5–5.1)
RBC # BLD: 3.64 M/UL (ref 4.63–6.08)
SODIUM BLD-SCNC: 136 MMOL/L (ref 137–145)
TOTAL PROTEIN: 6.2 G/DL (ref 6.3–8.2)
WBC # BLD: 4.44 K/UL (ref 4.23–9.07)

## 2020-08-19 PROCEDURE — 96365 THER/PROPH/DIAG IV INF INIT: CPT

## 2020-08-19 PROCEDURE — 1123F ACP DISCUSS/DSCN MKR DOCD: CPT | Performed by: INTERNAL MEDICINE

## 2020-08-19 PROCEDURE — 4040F PNEUMOC VAC/ADMIN/RCVD: CPT | Performed by: INTERNAL MEDICINE

## 2020-08-19 PROCEDURE — 99214 OFFICE O/P EST MOD 30 MIN: CPT | Performed by: INTERNAL MEDICINE

## 2020-08-19 PROCEDURE — 2580000003 HC RX 258: Performed by: INTERNAL MEDICINE

## 2020-08-19 PROCEDURE — 96366 THER/PROPH/DIAG IV INF ADDON: CPT

## 2020-08-19 PROCEDURE — G0498 CHEMO EXTEND IV INFUS W/PUMP: HCPCS

## 2020-08-19 PROCEDURE — 1036F TOBACCO NON-USER: CPT | Performed by: INTERNAL MEDICINE

## 2020-08-19 PROCEDURE — 99211 OFF/OP EST MAY X REQ PHY/QHP: CPT

## 2020-08-19 PROCEDURE — 82378 CARCINOEMBRYONIC ANTIGEN: CPT

## 2020-08-19 PROCEDURE — G8428 CUR MEDS NOT DOCUMENT: HCPCS | Performed by: INTERNAL MEDICINE

## 2020-08-19 PROCEDURE — 96409 CHEMO IV PUSH SNGL DRUG: CPT

## 2020-08-19 PROCEDURE — 85025 COMPLETE CBC W/AUTO DIFF WBC: CPT

## 2020-08-19 PROCEDURE — 6370000000 HC RX 637 (ALT 250 FOR IP): Performed by: INTERNAL MEDICINE

## 2020-08-19 PROCEDURE — 3017F COLORECTAL CA SCREEN DOC REV: CPT | Performed by: INTERNAL MEDICINE

## 2020-08-19 PROCEDURE — G8417 CALC BMI ABV UP PARAM F/U: HCPCS | Performed by: INTERNAL MEDICINE

## 2020-08-19 PROCEDURE — 80053 COMPREHEN METABOLIC PANEL: CPT

## 2020-08-19 PROCEDURE — 6360000002 HC RX W HCPCS: Performed by: INTERNAL MEDICINE

## 2020-08-19 RX ORDER — HEPARIN SODIUM (PORCINE) LOCK FLUSH IV SOLN 100 UNIT/ML 100 UNIT/ML
500 SOLUTION INTRAVENOUS PRN
Status: CANCELLED | OUTPATIENT
Start: 2020-08-19

## 2020-08-19 RX ORDER — ACETAMINOPHEN 500 MG
1000 TABLET ORAL ONCE
Status: COMPLETED | OUTPATIENT
Start: 2020-08-19 | End: 2020-08-19

## 2020-08-19 RX ORDER — SODIUM CHLORIDE 9 MG/ML
INJECTION, SOLUTION INTRAVENOUS ONCE
Status: COMPLETED | OUTPATIENT
Start: 2020-08-19 | End: 2020-08-19

## 2020-08-19 RX ORDER — SODIUM CHLORIDE 9 MG/ML
INJECTION, SOLUTION INTRAVENOUS ONCE
Status: CANCELLED | OUTPATIENT
Start: 2020-08-19

## 2020-08-19 RX ORDER — DIPHENHYDRAMINE HYDROCHLORIDE 50 MG/ML
50 INJECTION INTRAMUSCULAR; INTRAVENOUS ONCE
Status: CANCELLED
Start: 2020-08-19

## 2020-08-19 RX ORDER — METHYLPREDNISOLONE SODIUM SUCCINATE 125 MG/2ML
125 INJECTION, POWDER, LYOPHILIZED, FOR SOLUTION INTRAMUSCULAR; INTRAVENOUS ONCE
Status: CANCELLED | OUTPATIENT
Start: 2020-08-19

## 2020-08-19 RX ORDER — DIPHENHYDRAMINE HYDROCHLORIDE 50 MG/ML
50 INJECTION INTRAMUSCULAR; INTRAVENOUS ONCE
Status: COMPLETED | OUTPATIENT
Start: 2020-08-19 | End: 2020-08-19

## 2020-08-19 RX ORDER — SODIUM CHLORIDE 9 MG/ML
INJECTION, SOLUTION INTRAVENOUS CONTINUOUS
Status: CANCELLED | OUTPATIENT
Start: 2020-08-19

## 2020-08-19 RX ORDER — DEXAMETHASONE SODIUM PHOSPHATE 10 MG/ML
10 INJECTION, SOLUTION INTRAMUSCULAR; INTRAVENOUS ONCE
Status: COMPLETED | OUTPATIENT
Start: 2020-08-19 | End: 2020-08-19

## 2020-08-19 RX ORDER — EPINEPHRINE 1 MG/ML
0.3 INJECTION, SOLUTION, CONCENTRATE INTRAVENOUS PRN
Status: CANCELLED | OUTPATIENT
Start: 2020-08-19

## 2020-08-19 RX ORDER — SODIUM CHLORIDE 0.9 % (FLUSH) 0.9 %
5 SYRINGE (ML) INJECTION PRN
Status: CANCELLED | OUTPATIENT
Start: 2020-08-19

## 2020-08-19 RX ORDER — FLUOROURACIL 50 MG/ML
400 INJECTION, SOLUTION INTRAVENOUS ONCE
Status: CANCELLED | OUTPATIENT
Start: 2020-08-19

## 2020-08-19 RX ORDER — IBANDRONATE SODIUM 150 MG/1
150 TABLET, FILM COATED ORAL
Qty: 30 TABLET | Refills: 6 | Status: SHIPPED | OUTPATIENT
Start: 2020-08-19 | End: 2020-12-16 | Stop reason: SDUPTHER

## 2020-08-19 RX ORDER — SODIUM CHLORIDE 0.9 % (FLUSH) 0.9 %
10 SYRINGE (ML) INJECTION PRN
Status: CANCELLED | OUTPATIENT
Start: 2020-08-19

## 2020-08-19 RX ORDER — ACETAMINOPHEN 325 MG/1
1000 TABLET ORAL ONCE
Status: CANCELLED
Start: 2020-08-19

## 2020-08-19 RX ORDER — FLUOROURACIL 50 MG/ML
400 INJECTION, SOLUTION INTRAVENOUS ONCE
Status: COMPLETED | OUTPATIENT
Start: 2020-08-19 | End: 2020-08-19

## 2020-08-19 RX ORDER — DIPHENHYDRAMINE HYDROCHLORIDE 50 MG/ML
50 INJECTION INTRAMUSCULAR; INTRAVENOUS ONCE
Status: CANCELLED | OUTPATIENT
Start: 2020-08-19

## 2020-08-19 RX ADMIN — FLUOROURACIL 770 MG: 50 INJECTION, SOLUTION INTRAVENOUS at 14:20

## 2020-08-19 RX ADMIN — FLUOROURACIL 4625 MG: 50 INJECTION, SOLUTION INTRAVENOUS at 14:26

## 2020-08-19 RX ADMIN — ACETAMINOPHEN 1000 MG: 500 TABLET, FILM COATED ORAL at 11:08

## 2020-08-19 RX ADMIN — SODIUM CHLORIDE: 9 INJECTION, SOLUTION INTRAVENOUS at 11:08

## 2020-08-19 RX ADMIN — ONDANSETRON 16 MG: 2 INJECTION INTRAMUSCULAR; INTRAVENOUS at 11:35

## 2020-08-19 RX ADMIN — LEUCOVORIN CALCIUM 750 MG: 350 INJECTION, POWDER, LYOPHILIZED, FOR SOLUTION INTRAMUSCULAR; INTRAVENOUS at 12:00

## 2020-08-19 RX ADMIN — DIPHENHYDRAMINE HYDROCHLORIDE 50 MG: 50 INJECTION, SOLUTION INTRAMUSCULAR; INTRAVENOUS at 11:08

## 2020-08-19 RX ADMIN — DEXAMETHASONE SODIUM PHOSPHATE 10 MG: 10 INJECTION INTRAMUSCULAR; INTRAVENOUS at 11:35

## 2020-08-19 ASSESSMENT — PAIN SCALES - GENERAL: PAINLEVEL_OUTOF10: 0
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
## 2020-08-20 ENCOUNTER — NURSE ONLY (OUTPATIENT)
Dept: UROLOGY | Age: 70
End: 2020-08-20
Payer: MEDICARE

## 2020-08-20 VITALS — BODY MASS INDEX: 27.49 KG/M2 | TEMPERATURE: 98 F | HEIGHT: 65 IN | WEIGHT: 165 LBS

## 2020-08-20 LAB
BACTERIA URINE, POC: 0
BILIRUBIN URINE: 0 MG/DL
BLOOD, URINE: POSITIVE
CASTS URINE, POC: 0
CLARITY: ABNORMAL
COLOR: YELLOW
CRYSTALS URINE, POC: 0
EPI CELLS URINE, POC: 0
GLUCOSE URINE: ABNORMAL
KETONES, URINE: NEGATIVE
LEUKOCYTE EST, POC: ABNORMAL
NITRITE, URINE: NEGATIVE
PH UA: 6 (ref 4.5–8)
PROTEIN UA: POSITIVE
RBC URINE, POC: 10
SPECIFIC GRAVITY UA: 1.01 (ref 1–1.03)
UROBILINOGEN, URINE: NORMAL
WBC URINE, POC: 4
YEAST URINE, POC: 0

## 2020-08-20 PROCEDURE — 51720 TREATMENT OF BLADDER LESION: CPT | Performed by: UROLOGY

## 2020-08-20 PROCEDURE — 81001 URINALYSIS AUTO W/SCOPE: CPT | Performed by: UROLOGY

## 2020-08-20 NOTE — PROGRESS NOTES
BLADDER CANCER   Patient was first diagnosed with bladder cancer 12 month(s) ago. Last Recurrence: 5/28/2020  Stage of bladder cancer at last recurrence: 0a - Ta, N0, M0, Grade: high grade  Hematuria? microscopic  Symptoms since last treatment: none   Fever: absent    The patient is here today for an intravesical BCG treatment. BCG Treatment # 3 of 6:  Patient was prepped and draped in the supine position. Using sterile technique, xylocaine jelly was introduced into the urethra. Under sterile conditions, a #16 Fr. Catheter was gently introduced into the urethra and bladder. All urine was drained from the bladder. A full strength solution of BYRON BCG  Lot # [id number], Exp Date April 29, 2021 mixed in 50 mL of sterile, preservative free 0.9% NaCl solution was then instilled under gravity feed through the catheter into the bladder. The catheter was then removed. Post BCG Treatment Instructions:  I discussed the side effects of BCG including burning with urination, increase urinary urgency and frequency, blood in urine, fatigue, fever or chills. The patient was given instruction to hold the BCG in the bladder for at least one hour but no longer than 2 hours after instillation. The patient should use the same toilet for the 1st 6 hours after instillation and clean the toilet with Chlorox bleach each time the toilet is used. The patient should call our office immediately or go to the Emergency Room if he/she experiences fever over 101 and/or has shaking chills. The patient's urine was examined today under the microscope by Dr. Julieth Song due to urine testing positive for hematuria and leukocytes. Dr. Julieth Song gave the approval to the nursing staff to give the patient his treatment today.

## 2020-08-21 ENCOUNTER — HOSPITAL ENCOUNTER (OUTPATIENT)
Dept: INFUSION THERAPY | Age: 70
Setting detail: INFUSION SERIES
Discharge: HOME | End: 2020-08-21
Payer: MEDICARE

## 2020-08-21 DIAGNOSIS — Z45.2 ENCOUNTER FOR CENTRAL LINE CARE: Primary | ICD-10-CM

## 2020-08-21 PROCEDURE — 6360000002 HC RX W HCPCS: Performed by: NURSE PRACTITIONER

## 2020-08-21 PROCEDURE — 96523 IRRIG DRUG DELIVERY DEVICE: CPT

## 2020-08-21 PROCEDURE — 2580000003 HC RX 258: Performed by: NURSE PRACTITIONER

## 2020-08-21 RX ORDER — SODIUM CHLORIDE 0.9 % (FLUSH) 0.9 %
10 SYRINGE (ML) INJECTION PRN
Status: CANCELLED | OUTPATIENT
Start: 2020-09-02 | End: 2020-09-03

## 2020-08-21 RX ORDER — HEPARIN SODIUM (PORCINE) LOCK FLUSH IV SOLN 100 UNIT/ML 100 UNIT/ML
500 SOLUTION INTRAVENOUS PRN
Status: CANCELLED | OUTPATIENT
Start: 2020-09-02 | End: 2020-09-03

## 2020-08-21 RX ORDER — SODIUM CHLORIDE 0.9 % (FLUSH) 0.9 %
20 SYRINGE (ML) INJECTION PRN
Status: CANCELLED | OUTPATIENT
Start: 2020-09-02 | End: 2020-09-03

## 2020-08-21 RX ORDER — SODIUM CHLORIDE 0.9 % (FLUSH) 0.9 %
10 SYRINGE (ML) INJECTION PRN
Status: DISCONTINUED | OUTPATIENT
Start: 2020-08-21 | End: 2020-08-22 | Stop reason: HOSPADM

## 2020-08-21 RX ORDER — HEPARIN SODIUM (PORCINE) LOCK FLUSH IV SOLN 100 UNIT/ML 100 UNIT/ML
500 SOLUTION INTRAVENOUS PRN
Status: DISCONTINUED | OUTPATIENT
Start: 2020-08-21 | End: 2020-08-22 | Stop reason: HOSPADM

## 2020-08-21 RX ADMIN — SODIUM CHLORIDE, PRESERVATIVE FREE 10 ML: 5 INJECTION INTRAVENOUS at 13:05

## 2020-08-21 RX ADMIN — HEPARIN 500 UNITS: 100 SYRINGE at 13:05

## 2020-08-24 ENCOUNTER — OFFICE VISIT (OUTPATIENT)
Dept: CARDIOLOGY | Age: 70
End: 2020-08-24
Payer: MEDICARE

## 2020-08-24 VITALS
HEIGHT: 65 IN | DIASTOLIC BLOOD PRESSURE: 68 MMHG | BODY MASS INDEX: 27.49 KG/M2 | WEIGHT: 165 LBS | HEART RATE: 71 BPM | SYSTOLIC BLOOD PRESSURE: 118 MMHG

## 2020-08-24 PROCEDURE — 99213 OFFICE O/P EST LOW 20 MIN: CPT | Performed by: INTERNAL MEDICINE
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
## 2020-08-27 ENCOUNTER — NURSE ONLY (OUTPATIENT)
Dept: UROLOGY | Age: 70
End: 2020-08-27
Payer: MEDICARE

## 2020-08-27 VITALS — TEMPERATURE: 97.9 F

## 2020-08-27 LAB
BACTERIA URINE, POC: 0
BILIRUBIN URINE: 0 MG/DL
BLOOD, URINE: POSITIVE
CASTS URINE, POC: 0
CLARITY: CLEAR
COLOR: YELLOW
CRYSTALS URINE, POC: 0
EPI CELLS URINE, POC: 0
GLUCOSE URINE: ABNORMAL
KETONES, URINE: NEGATIVE
LEUKOCYTE EST, POC: ABNORMAL
NITRITE, URINE: NEGATIVE
PH UA: 7 (ref 4.5–8)
PROTEIN UA: POSITIVE
RBC URINE, POC: ABNORMAL
SPECIFIC GRAVITY UA: 1.01 (ref 1–1.03)
UROBILINOGEN, URINE: NORMAL
WBC URINE, POC: ABNORMAL
YEAST URINE, POC: 0

## 2020-08-27 PROCEDURE — 51720 TREATMENT OF BLADDER LESION: CPT | Performed by: UROLOGY

## 2020-08-27 PROCEDURE — 81001 URINALYSIS AUTO W/SCOPE: CPT | Performed by: UROLOGY

## 2020-08-27 NOTE — PROGRESS NOTES
BLADDER CANCER   Patient was first diagnosed with bladder cancer 12 month(s) ago. Last Recurrence: 5/28/2020  Stage of bladder cancer at last recurrence: 0a - Ta, N0, M0, Grade: high grade  Hematuria? microscopic  Symptoms since last treatment: none   Fever: absent    The patient is here today for an intravesical BCG treatment. BCG Treatment # 4 of 6:  Patient was prepped and draped in the supine position. Using sterile technique, xylocaine jelly was introduced into the urethra. Under sterile conditions, a #16 Fr. Catheter was gently introduced into the urethra and bladder. All urine was drained from the bladder. A full strength solution of BYRON BCG  Lot # [id number], Exp Date May 22, 2021 mixed in 50 mL of sterile, preservative free 0.9% NaCl solution was then instilled under gravity feed through the catheter into the bladder. The catheter was then removed. Post BCG Treatment Instructions:  I discussed the side effects of BCG including burning with urination, increase urinary urgency and frequency, blood in urine, fatigue, fever or chills. The patient was given instruction to hold the BCG in the bladder for at least one hour but no longer than 2 hours after instillation. The patient should use the same toilet for the 1st 6 hours after instillation and clean the toilet with Chlorox bleach each time the toilet is used. The patient should call our office immediately or go to the Emergency Room if he/she experiences fever over 101 and/or has shaking chills. The patient's urine was examined today under the microscope by Tony Mccarty PA-C due to urine testing positive for hematuria and leukocytes. Tony Mccarty found no bacteria and gave the approval to the nursing staff to give the patient his treatment today.

## 2020-09-01 ENCOUNTER — NURSE ONLY (OUTPATIENT)
Dept: UROLOGY | Age: 70
End: 2020-09-01
Payer: MEDICARE

## 2020-09-01 VITALS — TEMPERATURE: 98 F

## 2020-09-01 LAB
BACTERIA URINE, POC: 0
BILIRUBIN URINE: 0 MG/DL
BLOOD, URINE: POSITIVE
CASTS URINE, POC: 0
CLARITY: CLEAR
COLOR: YELLOW
CRYSTALS URINE, POC: 0
EPI CELLS URINE, POC: 0
GLUCOSE URINE: ABNORMAL
KETONES, URINE: NEGATIVE
LEUKOCYTE EST, POC: ABNORMAL
NITRITE, URINE: NEGATIVE
PH UA: 6.5 (ref 4.5–8)
PROTEIN UA: POSITIVE
RBC URINE, POC: ABNORMAL
SPECIFIC GRAVITY UA: 1.02 (ref 1–1.03)
UROBILINOGEN, URINE: NORMAL
WBC URINE, POC: ABNORMAL
YEAST URINE, POC: 0

## 2020-09-01 PROCEDURE — 51720 TREATMENT OF BLADDER LESION: CPT | Performed by: UROLOGY

## 2020-09-01 PROCEDURE — 81001 URINALYSIS AUTO W/SCOPE: CPT | Performed by: UROLOGY

## 2020-09-01 NOTE — PROGRESS NOTES
BLADDER CANCER  Patient was first diagnosed with bladder cancer 12 month(s) ago. Last Recurrence: 5/28/2020  Stage of bladder cancer at last recurrence: 0a- Ta,N0, M0 Grade: high grade  Hematuria? microscopic  Symptoms since last treatment: none   Fever: absent    The patient is here today for an intravesical BCG treatment. BCG Treatment # 5 of 6:  Patient was prepped and draped in the supine position. Using sterile technique, xylocaine jelly was introduced into the urethra. Under sterile conditions, a #16 Fr. Catheter was gently introduced into the urethra and bladder. All urine was drained from the bladder. A full strength solution of BYRON BCG  Lot # [id number], Exp Date [id number] mixed in 50 mL of sterile, preservative free 0.9% NaCl solution was then instilled under gravity feed through the catheter into the bladder. The catheter was then removed. Post BCG Treatment Instructions:  I discussed the side effects of BCG including burning with urination, increase urinary urgency and frequency, blood in urine, fatigue, fever or chills. The patient was given instruction to hold the BCG in the bladder for at least one hour but no longer than 2 hours after instillation. The patient should use the same toilet for the 1st 6 hours after instillation and clean the toilet with Chlorox bleach each time the toilet is used. The patient should call our office immediately or go to the Emergency Room if he/she experiences fever over 101 and/or has shaking chills. Jim Maher examined urine today under the microscope after urine was positive for small blood and small leukocytes. There was 3-5 RBC, 6-8 WBC and 0 bacteria. Poonam Menchaca gave orders to proceed with patient treatment today.

## 2020-09-02 ENCOUNTER — HOSPITAL ENCOUNTER (OUTPATIENT)
Dept: INFUSION THERAPY | Age: 70
Discharge: HOME | End: 2020-09-02
Payer: MEDICARE

## 2020-09-02 ENCOUNTER — HOSPITAL ENCOUNTER (OUTPATIENT)
Dept: INFUSION THERAPY | Age: 70
Setting detail: INFUSION SERIES
Discharge: HOME | End: 2020-09-02
Payer: MEDICARE

## 2020-09-02 VITALS
RESPIRATION RATE: 17 BRPM | WEIGHT: 163.2 LBS | BODY MASS INDEX: 27.19 KG/M2 | DIASTOLIC BLOOD PRESSURE: 58 MMHG | TEMPERATURE: 98 F | HEART RATE: 70 BPM | OXYGEN SATURATION: 99 % | SYSTOLIC BLOOD PRESSURE: 108 MMHG | HEIGHT: 65 IN

## 2020-09-02 VITALS
HEIGHT: 65 IN | BODY MASS INDEX: 27.19 KG/M2 | WEIGHT: 163.2 LBS | HEART RATE: 78 BPM | SYSTOLIC BLOOD PRESSURE: 122 MMHG | DIASTOLIC BLOOD PRESSURE: 70 MMHG | TEMPERATURE: 97.3 F | OXYGEN SATURATION: 96 %

## 2020-09-02 DIAGNOSIS — C18.9 METASTASIS FROM COLON CANCER (HCC): ICD-10-CM

## 2020-09-02 DIAGNOSIS — C79.9 METASTASIS FROM COLON CANCER (HCC): ICD-10-CM

## 2020-09-02 DIAGNOSIS — E86.0 DEHYDRATION: ICD-10-CM

## 2020-09-02 DIAGNOSIS — C19 COLORECTAL CANCER (HCC): Primary | ICD-10-CM

## 2020-09-02 LAB
ALBUMIN SERPL-MCNC: 4.2 G/DL (ref 3.5–5.2)
ALP BLD-CCNC: 132 U/L (ref 40–130)
ALT SERPL-CCNC: 20 U/L (ref 21–72)
ANION GAP SERPL CALCULATED.3IONS-SCNC: 14 MMOL/L (ref 7–19)
AST SERPL-CCNC: 30 U/L (ref 17–59)
BASOPHILS ABSOLUTE: 0.09 K/UL (ref 0.01–0.08)
BASOPHILS RELATIVE PERCENT: 1.1 % (ref 0.1–1.2)
BILIRUB SERPL-MCNC: 1.2 MG/DL (ref 0.2–1.3)
BUN BLDV-MCNC: 12 MG/DL (ref 9–20)
CALCIUM SERPL-MCNC: 9.5 MG/DL (ref 8.4–10.2)
CHLORIDE BLD-SCNC: 94 MMOL/L (ref 98–111)
CO2: 25 MMOL/L (ref 22–29)
CREAT SERPL-MCNC: 1.6 MG/DL (ref 0.6–1.2)
EOSINOPHILS ABSOLUTE: 0.3 K/UL (ref 0.04–0.54)
EOSINOPHILS RELATIVE PERCENT: 3.7 % (ref 0.7–7)
GFR NON-AFRICAN AMERICAN: 43
GLOBULIN: 3.2 G/DL
GLUCOSE BLD-MCNC: 97 MG/DL (ref 74–106)
HCT VFR BLD CALC: 32.4 % (ref 40.1–51)
HEMOGLOBIN: 11.4 G/DL (ref 13.7–17.5)
LYMPHOCYTES ABSOLUTE: 0.66 K/UL (ref 1.18–3.74)
LYMPHOCYTES RELATIVE PERCENT: 8.2 % (ref 19.3–53.1)
MCH RBC QN AUTO: 30.4 PG (ref 25.7–32.2)
MCHC RBC AUTO-ENTMCNC: 35.2 G/DL (ref 32.3–36.5)
MCV RBC AUTO: 86.4 FL (ref 79–92.2)
MONOCYTES ABSOLUTE: 0.7 K/UL (ref 0.24–0.82)
MONOCYTES RELATIVE PERCENT: 8.7 % (ref 4.7–12.5)
NEUTROPHILS ABSOLUTE: 6.26 K/UL (ref 1.56–6.13)
NEUTROPHILS RELATIVE PERCENT: 78.3 % (ref 34–71.1)
PDW BLD-RTO: 14.7 % (ref 11.6–14.4)
PLATELET # BLD: 222 K/UL (ref 163–337)
PMV BLD AUTO: 9.5 FL (ref 7.4–10.4)
POTASSIUM SERPL-SCNC: 4.7 MMOL/L (ref 3.5–5.1)
RBC # BLD: 3.75 M/UL (ref 4.63–6.08)
SODIUM BLD-SCNC: 133 MMOL/L (ref 137–145)
TOTAL PROTEIN: 7.3 G/DL (ref 6.3–8.2)
WBC # BLD: 8.01 K/UL (ref 4.23–9.07)

## 2020-09-02 PROCEDURE — 96361 HYDRATE IV INFUSION ADD-ON: CPT

## 2020-09-02 PROCEDURE — 6370000000 HC RX 637 (ALT 250 FOR IP): Performed by: INTERNAL MEDICINE

## 2020-09-02 PROCEDURE — 96366 THER/PROPH/DIAG IV INF ADDON: CPT

## 2020-09-02 PROCEDURE — 2580000003 HC RX 258: Performed by: NURSE PRACTITIONER

## 2020-09-02 PROCEDURE — 96413 CHEMO IV INFUSION 1 HR: CPT

## 2020-09-02 PROCEDURE — 80053 COMPREHEN METABOLIC PANEL: CPT

## 2020-09-02 PROCEDURE — 96365 THER/PROPH/DIAG IV INF INIT: CPT

## 2020-09-02 PROCEDURE — 2580000003 HC RX 258: Performed by: INTERNAL MEDICINE

## 2020-09-02 PROCEDURE — 85025 COMPLETE CBC W/AUTO DIFF WBC: CPT

## 2020-09-02 PROCEDURE — G0498 CHEMO EXTEND IV INFUS W/PUMP: HCPCS

## 2020-09-02 PROCEDURE — 6360000002 HC RX W HCPCS: Performed by: INTERNAL MEDICINE

## 2020-09-02 PROCEDURE — 96360 HYDRATION IV INFUSION INIT: CPT

## 2020-09-02 PROCEDURE — 96409 CHEMO IV PUSH SNGL DRUG: CPT

## 2020-09-02 RX ORDER — ACETAMINOPHEN 500 MG
1000 TABLET ORAL ONCE
Status: COMPLETED | OUTPATIENT
Start: 2020-09-02 | End: 2020-09-02

## 2020-09-02 RX ORDER — FLUOROURACIL 50 MG/ML
400 INJECTION, SOLUTION INTRAVENOUS ONCE
Status: CANCELLED | OUTPATIENT
Start: 2020-09-02

## 2020-09-02 RX ORDER — HEPARIN SODIUM (PORCINE) LOCK FLUSH IV SOLN 100 UNIT/ML 100 UNIT/ML
500 SOLUTION INTRAVENOUS PRN
Status: CANCELLED | OUTPATIENT
Start: 2020-09-02

## 2020-09-02 RX ORDER — FLUOROURACIL 50 MG/ML
400 INJECTION, SOLUTION INTRAVENOUS ONCE
Status: COMPLETED | OUTPATIENT
Start: 2020-09-02 | End: 2020-09-02

## 2020-09-02 RX ORDER — SODIUM CHLORIDE 0.9 % (FLUSH) 0.9 %
10 SYRINGE (ML) INJECTION PRN
Status: CANCELLED | OUTPATIENT
Start: 2020-09-02

## 2020-09-02 RX ORDER — SODIUM CHLORIDE 9 MG/ML
INJECTION, SOLUTION INTRAVENOUS CONTINUOUS
Status: DISCONTINUED | OUTPATIENT
Start: 2020-09-02 | End: 2020-09-04 | Stop reason: HOSPADM

## 2020-09-02 RX ORDER — DIPHENHYDRAMINE HYDROCHLORIDE 50 MG/ML
50 INJECTION INTRAMUSCULAR; INTRAVENOUS ONCE
Status: COMPLETED | OUTPATIENT
Start: 2020-09-02 | End: 2020-09-02

## 2020-09-02 RX ORDER — METHYLPREDNISOLONE SODIUM SUCCINATE 125 MG/2ML
125 INJECTION, POWDER, LYOPHILIZED, FOR SOLUTION INTRAMUSCULAR; INTRAVENOUS ONCE
Status: CANCELLED | OUTPATIENT
Start: 2020-09-02

## 2020-09-02 RX ORDER — ACETAMINOPHEN 325 MG/1
1000 TABLET ORAL ONCE
Status: CANCELLED
Start: 2020-09-02

## 2020-09-02 RX ORDER — SODIUM CHLORIDE 9 MG/ML
INJECTION, SOLUTION INTRAVENOUS ONCE
Status: CANCELLED | OUTPATIENT
Start: 2020-09-02

## 2020-09-02 RX ORDER — EPINEPHRINE 1 MG/ML
0.3 INJECTION, SOLUTION, CONCENTRATE INTRAVENOUS PRN
Status: CANCELLED | OUTPATIENT
Start: 2020-09-02

## 2020-09-02 RX ORDER — SODIUM CHLORIDE 9 MG/ML
INJECTION, SOLUTION INTRAVENOUS CONTINUOUS
Status: CANCELLED
Start: 2020-09-16

## 2020-09-02 RX ORDER — DIPHENHYDRAMINE HYDROCHLORIDE 50 MG/ML
50 INJECTION INTRAMUSCULAR; INTRAVENOUS ONCE
Status: CANCELLED
Start: 2020-09-02

## 2020-09-02 RX ORDER — SODIUM CHLORIDE 9 MG/ML
INJECTION, SOLUTION INTRAVENOUS ONCE
Status: COMPLETED | OUTPATIENT
Start: 2020-09-02 | End: 2020-09-02

## 2020-09-02 RX ORDER — DEXAMETHASONE SODIUM PHOSPHATE 10 MG/ML
10 INJECTION, SOLUTION INTRAMUSCULAR; INTRAVENOUS ONCE
Status: DISCONTINUED | OUTPATIENT
Start: 2020-09-02 | End: 2020-09-02 | Stop reason: SDUPTHER

## 2020-09-02 RX ORDER — SODIUM CHLORIDE 0.9 % (FLUSH) 0.9 %
5 SYRINGE (ML) INJECTION PRN
Status: CANCELLED | OUTPATIENT
Start: 2020-09-02

## 2020-09-02 RX ORDER — DIPHENHYDRAMINE HYDROCHLORIDE 50 MG/ML
50 INJECTION INTRAMUSCULAR; INTRAVENOUS ONCE
Status: CANCELLED | OUTPATIENT
Start: 2020-09-02

## 2020-09-02 RX ORDER — SODIUM CHLORIDE 9 MG/ML
INJECTION, SOLUTION INTRAVENOUS CONTINUOUS
Status: CANCELLED | OUTPATIENT
Start: 2020-09-02

## 2020-09-02 RX ADMIN — SODIUM CHLORIDE: 9 INJECTION, SOLUTION INTRAVENOUS at 12:26

## 2020-09-02 RX ADMIN — FLUOROURACIL 4630 MG: 50 INJECTION, SOLUTION INTRAVENOUS at 14:28

## 2020-09-02 RX ADMIN — DEXAMETHASONE SODIUM PHOSPHATE: 10 INJECTION, SOLUTION INTRAMUSCULAR; INTRAVENOUS at 10:11

## 2020-09-02 RX ADMIN — FLUOROURACIL 770 MG: 50 INJECTION, SOLUTION INTRAVENOUS at 14:19

## 2020-09-02 RX ADMIN — ACETAMINOPHEN 1000 MG: 500 TABLET, FILM COATED ORAL at 10:11

## 2020-09-02 RX ADMIN — SODIUM CHLORIDE: 9 INJECTION, SOLUTION INTRAVENOUS at 09:51

## 2020-09-02 RX ADMIN — PANITUMUMAB 486 MG: 400 SOLUTION INTRAVENOUS at 10:45

## 2020-09-02 RX ADMIN — DIPHENHYDRAMINE HYDROCHLORIDE 50 MG: 50 INJECTION, SOLUTION INTRAMUSCULAR; INTRAVENOUS at 10:11

## 2020-09-02 RX ADMIN — LEUCOVORIN CALCIUM 750 MG: 350 INJECTION, POWDER, LYOPHILIZED, FOR SOLUTION INTRAMUSCULAR; INTRAVENOUS at 12:03

## 2020-09-02 ASSESSMENT — PAIN SCALES - GENERAL: PAINLEVEL_OUTOF10: 0

## 2020-09-02 NOTE — PROGRESS NOTES
Patient presents for labs/orders and evaluation for C8 5FU/Leucovorin/Vectibix. He is without complaint. Denies having any unmanageable side effects. CMP reveals Creat 1.6. Will add 1 L IVF to tx today and patient encouraged to increase PO intake. He verbalized understanding. He will f/u with Dr Mendoza Pederson with next cycle and plans for reimaging in November.

## 2020-09-04 ENCOUNTER — HOSPITAL ENCOUNTER (OUTPATIENT)
Dept: INFUSION THERAPY | Age: 70
Setting detail: INFUSION SERIES
Discharge: HOME | End: 2020-09-04
Payer: MEDICARE

## 2020-09-04 PROCEDURE — 96523 IRRIG DRUG DELIVERY DEVICE: CPT

## 2020-09-04 PROCEDURE — 2580000003 HC RX 258: Performed by: INTERNAL MEDICINE

## 2020-09-04 PROCEDURE — 6360000002 HC RX W HCPCS: Performed by: INTERNAL MEDICINE

## 2020-09-04 RX ORDER — HEPARIN SODIUM (PORCINE) LOCK FLUSH IV SOLN 100 UNIT/ML 100 UNIT/ML
300 SOLUTION INTRAVENOUS PRN
Status: DISCONTINUED | OUTPATIENT
Start: 2020-09-04 | End: 2020-09-06 | Stop reason: HOSPADM

## 2020-09-04 RX ORDER — SODIUM CHLORIDE 0.9 % (FLUSH) 0.9 %
20 SYRINGE (ML) INJECTION 2 TIMES DAILY
Status: DISCONTINUED | OUTPATIENT
Start: 2020-09-04 | End: 2020-09-06 | Stop reason: HOSPADM

## 2020-09-04 RX ADMIN — HEPARIN 300 UNITS: 100 SYRINGE at 13:02

## 2020-09-04 RX ADMIN — SODIUM CHLORIDE, PRESERVATIVE FREE 20 ML: 5 INJECTION INTRAVENOUS at 13:02

## 2020-09-08 ENCOUNTER — NURSE ONLY (OUTPATIENT)
Dept: UROLOGY | Age: 70
End: 2020-09-08
Payer: MEDICARE

## 2020-09-08 VITALS — BODY MASS INDEX: 26.66 KG/M2 | WEIGHT: 160 LBS | HEIGHT: 65 IN | TEMPERATURE: 97.4 F

## 2020-09-08 LAB
BACTERIA URINE, POC: 0
BILIRUBIN URINE: 0 MG/DL
BLOOD, URINE: POSITIVE
CASTS URINE, POC: 0
CLARITY: CLEAR
COLOR: YELLOW
CRYSTALS URINE, POC: 0
EPI CELLS URINE, POC: 0
GLUCOSE URINE: ABNORMAL
KETONES, URINE: NEGATIVE
LEUKOCYTE EST, POC: ABNORMAL
NITRITE, URINE: NEGATIVE
PH UA: 6.5 (ref 4.5–8)
PROTEIN UA: POSITIVE
RBC URINE, POC: ABNORMAL
SPECIFIC GRAVITY UA: 1.02 (ref 1–1.03)
UROBILINOGEN, URINE: NORMAL
WBC URINE, POC: 0
YEAST URINE, POC: 0

## 2020-09-08 PROCEDURE — 99999 PR OFFICE/OUTPT VISIT,PROCEDURE ONLY: CPT | Performed by: UROLOGY

## 2020-09-08 PROCEDURE — 51720 TREATMENT OF BLADDER LESION: CPT | Performed by: UROLOGY

## 2020-09-08 PROCEDURE — 81001 URINALYSIS AUTO W/SCOPE: CPT | Performed by: UROLOGY

## 2020-09-08 NOTE — PROGRESS NOTES
BLADDER CANCER  Patient was first diagnosed with bladder cancer 12 month(s) ago. Last Recurrence: 5/28/2020  Stage of bladder cancer at last recurrence: 0a- Ta,N0, M0 Grade: high grade  Hematuria? microscopic  Symptoms since last treatment: none   Fever: absent    The patient is here today for an intravesical BCG treatment. BCG Treatment # 6 of 6:  Patient was prepped and draped in the supine position. Using sterile technique, xylocaine jelly was introduced into the urethra. Under sterile conditions, a #16 Fr. Catheter was gently introduced into the urethra and bladder. All urine was drained from the bladder. A full strength solution of BYRON BCG  Lot # [id number], Exp Date [id number] mixed in 50 mL of sterile, preservative free 0.9% NaCl solution was then instilled under gravity feed through the catheter into the bladder. The catheter was then removed. Post BCG Treatment Instructions:  I discussed the side effects of BCG including burning with urination, increase urinary urgency and frequency, blood in urine, fatigue, fever or chills. The patient was given instruction to hold the BCG in the bladder for at least one hour but no longer than 2 hours after instillation. The patient should use the same toilet for the 1st 6 hours after instillation and clean the toilet with Chlorox bleach each time the toilet is used. The patient should call our office immediately or go to the Emergency Room if he/she experiences fever over 101 and/or has shaking chills. The patient's urine today resulted positive for hematuria and leukocytes. The patient does not complain of any symptoms. Marcelo Arce PA-C, was consulted and examined his urine microscopically. He found 8-10 RBC and no bacteria or WBC. Marcelo Arce gave the nursing staff the approval to continue with the patient's treatment.

## 2020-09-16 ENCOUNTER — HOSPITAL ENCOUNTER (OUTPATIENT)
Dept: INFUSION THERAPY | Age: 70
End: 2020-09-16
Payer: MEDICARE

## 2020-09-16 ENCOUNTER — HOSPITAL ENCOUNTER (OUTPATIENT)
Dept: INFUSION THERAPY | Age: 70
Setting detail: INFUSION SERIES
Discharge: HOME | End: 2020-09-16
Payer: MEDICARE
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
## 2020-09-23 ENCOUNTER — HOSPITAL ENCOUNTER (OUTPATIENT)
Dept: INFUSION THERAPY | Age: 70
Setting detail: INFUSION SERIES
Discharge: HOME | End: 2020-09-23
Payer: MEDICARE

## 2020-09-23 ENCOUNTER — OFFICE VISIT (OUTPATIENT)
Dept: HEMATOLOGY | Age: 70
End: 2020-09-23
Payer: MEDICARE

## 2020-09-23 ENCOUNTER — HOSPITAL ENCOUNTER (OUTPATIENT)
Dept: INFUSION THERAPY | Age: 70
Discharge: HOME | End: 2020-09-23
Payer: MEDICARE

## 2020-09-23 VITALS
DIASTOLIC BLOOD PRESSURE: 70 MMHG | HEIGHT: 65 IN | WEIGHT: 159.6 LBS | SYSTOLIC BLOOD PRESSURE: 130 MMHG | TEMPERATURE: 97.7 F | HEART RATE: 89 BPM | OXYGEN SATURATION: 93 % | BODY MASS INDEX: 26.59 KG/M2

## 2020-09-23 VITALS
SYSTOLIC BLOOD PRESSURE: 110 MMHG | OXYGEN SATURATION: 94 % | RESPIRATION RATE: 17 BRPM | HEART RATE: 78 BPM | HEIGHT: 65 IN | BODY MASS INDEX: 26.66 KG/M2 | DIASTOLIC BLOOD PRESSURE: 63 MMHG | WEIGHT: 160 LBS | TEMPERATURE: 97.7 F

## 2020-09-23 DIAGNOSIS — C79.9 METASTASIS FROM COLON CANCER (HCC): ICD-10-CM

## 2020-09-23 DIAGNOSIS — C18.9 METASTASIS FROM COLON CANCER (HCC): ICD-10-CM

## 2020-09-23 DIAGNOSIS — C19 COLORECTAL CANCER (HCC): Primary | ICD-10-CM

## 2020-09-23 LAB
ALBUMIN SERPL-MCNC: 4.3 G/DL (ref 3.5–5.2)
ALP BLD-CCNC: 131 U/L (ref 40–130)
ALT SERPL-CCNC: 18 U/L (ref 21–72)
ANION GAP SERPL CALCULATED.3IONS-SCNC: 13 MMOL/L (ref 7–19)
AST SERPL-CCNC: 25 U/L (ref 17–59)
BASOPHILS ABSOLUTE: 0.15 K/UL (ref 0.01–0.08)
BASOPHILS RELATIVE PERCENT: 2.3 % (ref 0.1–1.2)
BILIRUB SERPL-MCNC: 0.9 MG/DL (ref 0.2–1.3)
BUN BLDV-MCNC: 16 MG/DL (ref 9–20)
CALCIUM SERPL-MCNC: 9.9 MG/DL (ref 8.4–10.2)
CHLORIDE BLD-SCNC: 99 MMOL/L (ref 98–111)
CO2: 23 MMOL/L (ref 22–29)
CREAT SERPL-MCNC: 1.4 MG/DL (ref 0.6–1.2)
EOSINOPHILS ABSOLUTE: 0.42 K/UL (ref 0.04–0.54)
EOSINOPHILS RELATIVE PERCENT: 6.5 % (ref 0.7–7)
GFR NON-AFRICAN AMERICAN: 50
GLOBULIN: 2.5 G/DL
GLUCOSE BLD-MCNC: 98 MG/DL (ref 74–106)
HCT VFR BLD CALC: 34.5 % (ref 40.1–51)
HEMOGLOBIN: 11.8 G/DL (ref 13.7–17.5)
LYMPHOCYTES ABSOLUTE: 0.76 K/UL (ref 1.18–3.74)
LYMPHOCYTES RELATIVE PERCENT: 11.7 % (ref 19.3–53.1)
MCH RBC QN AUTO: 30.4 PG (ref 25.7–32.2)
MCHC RBC AUTO-ENTMCNC: 34.2 G/DL (ref 32.3–36.5)
MCV RBC AUTO: 88.9 FL (ref 79–92.2)
MONOCYTES ABSOLUTE: 0.73 K/UL (ref 0.24–0.82)
MONOCYTES RELATIVE PERCENT: 11.2 % (ref 4.7–12.5)
NEUTROPHILS ABSOLUTE: 4.45 K/UL (ref 1.56–6.13)
NEUTROPHILS RELATIVE PERCENT: 68.3 % (ref 34–71.1)
PDW BLD-RTO: 14.8 % (ref 11.6–14.4)
PLATELET # BLD: 294 K/UL (ref 163–337)
PMV BLD AUTO: 10.5 FL (ref 7.4–10.4)
POTASSIUM SERPL-SCNC: 4.6 MMOL/L (ref 3.5–5.1)
RBC # BLD: 3.88 M/UL (ref 4.63–6.08)
SODIUM BLD-SCNC: 135 MMOL/L (ref 137–145)
TOTAL PROTEIN: 6.7 G/DL (ref 6.3–8.2)
WBC # BLD: 6.51 K/UL (ref 4.23–9.07)

## 2020-09-23 PROCEDURE — 1036F TOBACCO NON-USER: CPT | Performed by: INTERNAL MEDICINE

## 2020-09-23 PROCEDURE — 96413 CHEMO IV INFUSION 1 HR: CPT

## 2020-09-23 PROCEDURE — 99214 OFFICE O/P EST MOD 30 MIN: CPT | Performed by: INTERNAL MEDICINE

## 2020-09-23 PROCEDURE — 4040F PNEUMOC VAC/ADMIN/RCVD: CPT | Performed by: INTERNAL MEDICINE

## 2020-09-23 PROCEDURE — 3017F COLORECTAL CA SCREEN DOC REV: CPT | Performed by: INTERNAL MEDICINE

## 2020-09-23 PROCEDURE — 96409 CHEMO IV PUSH SNGL DRUG: CPT

## 2020-09-23 PROCEDURE — 99211 OFF/OP EST MAY X REQ PHY/QHP: CPT

## 2020-09-23 PROCEDURE — G8427 DOCREV CUR MEDS BY ELIG CLIN: HCPCS | Performed by: INTERNAL MEDICINE

## 2020-09-23 PROCEDURE — 6370000000 HC RX 637 (ALT 250 FOR IP): Performed by: INTERNAL MEDICINE

## 2020-09-23 PROCEDURE — 85025 COMPLETE CBC W/AUTO DIFF WBC: CPT

## 2020-09-23 PROCEDURE — 1123F ACP DISCUSS/DSCN MKR DOCD: CPT | Performed by: INTERNAL MEDICINE

## 2020-09-23 PROCEDURE — 2580000003 HC RX 258: Performed by: INTERNAL MEDICINE

## 2020-09-23 PROCEDURE — G0498 CHEMO EXTEND IV INFUS W/PUMP: HCPCS

## 2020-09-23 PROCEDURE — 96365 THER/PROPH/DIAG IV INF INIT: CPT

## 2020-09-23 PROCEDURE — 6360000002 HC RX W HCPCS: Performed by: INTERNAL MEDICINE

## 2020-09-23 PROCEDURE — 80053 COMPREHEN METABOLIC PANEL: CPT

## 2020-09-23 PROCEDURE — G8417 CALC BMI ABV UP PARAM F/U: HCPCS | Performed by: INTERNAL MEDICINE

## 2020-09-23 PROCEDURE — 96366 THER/PROPH/DIAG IV INF ADDON: CPT

## 2020-09-23 RX ORDER — DIPHENHYDRAMINE HYDROCHLORIDE 50 MG/ML
50 INJECTION INTRAMUSCULAR; INTRAVENOUS ONCE
Status: CANCELLED
Start: 2020-09-23

## 2020-09-23 RX ORDER — METHYLPREDNISOLONE SODIUM SUCCINATE 125 MG/2ML
125 INJECTION, POWDER, LYOPHILIZED, FOR SOLUTION INTRAMUSCULAR; INTRAVENOUS ONCE
Status: CANCELLED | OUTPATIENT
Start: 2020-09-23

## 2020-09-23 RX ORDER — SODIUM CHLORIDE 9 MG/ML
INJECTION, SOLUTION INTRAVENOUS ONCE
Status: COMPLETED | OUTPATIENT
Start: 2020-09-23 | End: 2020-09-23

## 2020-09-23 RX ORDER — SODIUM CHLORIDE 9 MG/ML
INJECTION, SOLUTION INTRAVENOUS ONCE
Status: CANCELLED | OUTPATIENT
Start: 2020-09-23

## 2020-09-23 RX ORDER — DIPHENHYDRAMINE HYDROCHLORIDE 50 MG/ML
50 INJECTION INTRAMUSCULAR; INTRAVENOUS ONCE
Status: CANCELLED | OUTPATIENT
Start: 2020-09-23

## 2020-09-23 RX ORDER — HEPARIN SODIUM (PORCINE) LOCK FLUSH IV SOLN 100 UNIT/ML 100 UNIT/ML
500 SOLUTION INTRAVENOUS PRN
Status: CANCELLED | OUTPATIENT
Start: 2020-09-23

## 2020-09-23 RX ORDER — DIPHENHYDRAMINE HYDROCHLORIDE 50 MG/ML
50 INJECTION INTRAMUSCULAR; INTRAVENOUS ONCE
Status: COMPLETED | OUTPATIENT
Start: 2020-09-23 | End: 2020-09-23

## 2020-09-23 RX ORDER — SODIUM CHLORIDE 9 MG/ML
INJECTION, SOLUTION INTRAVENOUS CONTINUOUS
Status: CANCELLED | OUTPATIENT
Start: 2020-09-23

## 2020-09-23 RX ORDER — MEGESTROL ACETATE 40 MG/ML
400 SUSPENSION ORAL DAILY
Qty: 240 ML | Refills: 5 | Status: ON HOLD | OUTPATIENT
Start: 2020-09-23 | End: 2020-10-15

## 2020-09-23 RX ORDER — ACETAMINOPHEN 500 MG
1000 TABLET ORAL ONCE
Status: COMPLETED | OUTPATIENT
Start: 2020-09-23 | End: 2020-09-23

## 2020-09-23 RX ORDER — EPINEPHRINE 1 MG/ML
0.3 INJECTION, SOLUTION, CONCENTRATE INTRAVENOUS PRN
Status: CANCELLED | OUTPATIENT
Start: 2020-09-23

## 2020-09-23 RX ORDER — ACETAMINOPHEN 325 MG/1
1000 TABLET ORAL ONCE
Status: CANCELLED
Start: 2020-09-23

## 2020-09-23 RX ORDER — DEXAMETHASONE SODIUM PHOSPHATE 10 MG/ML
10 INJECTION, SOLUTION INTRAMUSCULAR; INTRAVENOUS ONCE
Status: COMPLETED | OUTPATIENT
Start: 2020-09-23 | End: 2020-09-23

## 2020-09-23 RX ORDER — SODIUM CHLORIDE 0.9 % (FLUSH) 0.9 %
10 SYRINGE (ML) INJECTION PRN
Status: CANCELLED | OUTPATIENT
Start: 2020-09-23

## 2020-09-23 RX ORDER — CLINDAMYCIN PHOSPHATE 10 MG/G
GEL TOPICAL
Qty: 60 G | Refills: 3 | Status: SHIPPED | OUTPATIENT
Start: 2020-09-23 | End: 2021-01-01

## 2020-09-23 RX ORDER — FLUOROURACIL 50 MG/ML
400 INJECTION, SOLUTION INTRAVENOUS ONCE
Status: COMPLETED | OUTPATIENT
Start: 2020-09-23 | End: 2020-09-23

## 2020-09-23 RX ORDER — FLUOROURACIL 50 MG/ML
400 INJECTION, SOLUTION INTRAVENOUS ONCE
Status: CANCELLED | OUTPATIENT
Start: 2020-09-23

## 2020-09-23 RX ORDER — SODIUM CHLORIDE 0.9 % (FLUSH) 0.9 %
5 SYRINGE (ML) INJECTION PRN
Status: CANCELLED | OUTPATIENT
Start: 2020-09-23

## 2020-09-23 RX ADMIN — ONDANSETRON 16 MG: 2 INJECTION INTRAMUSCULAR; INTRAVENOUS at 11:17

## 2020-09-23 RX ADMIN — LEUCOVORIN CALCIUM 750 MG: 350 INJECTION, POWDER, LYOPHILIZED, FOR SUSPENSION INTRAMUSCULAR; INTRAVENOUS at 12:52

## 2020-09-23 RX ADMIN — PANITUMUMAB 436 MG: 400 SOLUTION INTRAVENOUS at 11:46

## 2020-09-23 RX ADMIN — SODIUM CHLORIDE: 9 INJECTION, SOLUTION INTRAVENOUS at 10:59

## 2020-09-23 RX ADMIN — FLUOROURACIL 775 MG: 50 INJECTION, SOLUTION INTRAVENOUS at 14:30

## 2020-09-23 RX ADMIN — FLUOROURACIL 4625 MG: 50 INJECTION, SOLUTION INTRAVENOUS at 14:35

## 2020-09-23 RX ADMIN — DEXAMETHASONE SODIUM PHOSPHATE 10 MG: 10 INJECTION, SOLUTION INTRAMUSCULAR; INTRAVENOUS at 11:17

## 2020-09-23 RX ADMIN — ACETAMINOPHEN 1000 MG: 500 TABLET, FILM COATED ORAL at 10:59

## 2020-09-23 RX ADMIN — DIPHENHYDRAMINE HYDROCHLORIDE 50 MG: 50 INJECTION, SOLUTION INTRAMUSCULAR; INTRAVENOUS at 10:59

## 2020-09-23 ASSESSMENT — PAIN SCALES - GENERAL: PAINLEVEL_OUTOF10: 0

## 2020-09-24 ENCOUNTER — OFFICE VISIT (OUTPATIENT)
Dept: NEUROSURGERY | Facility: CLINIC | Age: 70
End: 2020-09-24

## 2020-09-24 ENCOUNTER — PREP FOR SURGERY (OUTPATIENT)
Dept: OTHER | Facility: HOSPITAL | Age: 70
End: 2020-09-24

## 2020-09-24 VITALS
HEIGHT: 65 IN | DIASTOLIC BLOOD PRESSURE: 68 MMHG | WEIGHT: 152 LBS | SYSTOLIC BLOOD PRESSURE: 120 MMHG | BODY MASS INDEX: 25.33 KG/M2

## 2020-09-24 DIAGNOSIS — S32.010A CLOSED COMPRESSION FRACTURE OF L1 LUMBAR VERTEBRA, INITIAL ENCOUNTER (HCC): ICD-10-CM

## 2020-09-24 DIAGNOSIS — S32.040A CLOSED COMPRESSION FRACTURE OF L4 LUMBAR VERTEBRA, INITIAL ENCOUNTER (HCC): ICD-10-CM

## 2020-09-24 DIAGNOSIS — S32.020A CLOSED COMPRESSION FRACTURE OF L2 LUMBAR VERTEBRA, INITIAL ENCOUNTER (HCC): Primary | ICD-10-CM

## 2020-09-24 DIAGNOSIS — S22.080A T12 COMPRESSION FRACTURE, INITIAL ENCOUNTER (HCC): ICD-10-CM

## 2020-09-24 DIAGNOSIS — Z78.9 NON-SMOKER: ICD-10-CM

## 2020-09-24 PROCEDURE — 99204 OFFICE O/P NEW MOD 45 MIN: CPT | Performed by: NURSE PRACTITIONER

## 2020-09-24 RX ORDER — BUPIVACAINE HCL/0.9 % NACL/PF 0.1 %
2 PLASTIC BAG, INJECTION (ML) EPIDURAL ONCE
Status: CANCELLED | OUTPATIENT
Start: 2020-09-24 | End: 2020-09-24

## 2020-09-24 NOTE — PROGRESS NOTES
Chief complaint:   Chief Complaint   Patient presents with   • Back Pain     Jelani has been referred here today from Dr. Carrasco with an MRI of the lumbar from the [facility] for follow up for severe back pain, he has been told he has a compression fracture.        Subjective     HPI: This is a 70-year-old male gentleman who was referred to us by Dr. Edwin Carrasco for evaluation of a compression fracture.  He is here in follow-up today.  The patient does have a significant history of colon cancer that was diagnosed in 2008 and underwent radiation and chemotherapy.  The patient was doing well up until August 2019 when he had a recurrence.  Currently he is on a two-week chemo routine.  He was told that he has had metastasis to the lymph nodes and a small area in the lung and concerned about a spine however and a PET scan that was done in the spring there was no evidence of any malignancy in the spine.  He also does have bladder cancer that was diagnosed in 2008 and does have bilateral ureter stents that are changed every 3 months.    The patient does have a longstanding history of back issues.  In 1996 he did have surgery on his back for disc herniations that was causing back and right lower extremity pain.  He then had another surgery and 2004 by Dr. Jorje Mroan where his L5-S1 was fused.  In 2018 the patient was having right lower extremity pain and ended up going for an L 1 through 3 posterior pedicle screw instrumentation and interbody device placed at L1-2.  He did not make any meaningful improvement from that surgery and was having weakness in his right lower extremity as well.  It was found that he had an enlarged lymph node that was responsible for his pain and once he was started back on the chemotherapy and the lymph node shrunk he did have improvement in his leg pain however he still had weakness and loss of sensation.  He went to the operating room in June 2019 for removal of the posterior hardware  which did offer him improvement in his leg sensation but the weakness has not improved.  This last surgery was done in June 2020 by Dr. Edwin Carrasco.  2 weeks after his surgery the patient did sustain a fall and has had worsening back pain since that time.  Currently the pain in his back is worse with activity and better with lying down.  He is not complaining of any lower extremity radicular pain but does have pain radiating into his right groin going into his testicle.  He says this is worse with walking and better with laying down.  The patient does have some difficulty with urinary incontinence however it is felt this may be related more towards his bladder issues.  He does have an ileostomy in place.  Rates his pain on a scale 0-10 at an 8.  He says it does interfere with his actives of daily living.  He is right-hand dominant.  He is currently on methadone for his pain.  He is retired.  He is .  Denies any tobacco, alcohol, or illicit drug use..  He is walking with a walker    Review of Systems   Constitutional: Positive for activity change.   HENT: Positive for hearing loss.    Musculoskeletal: Positive for arthralgias, back pain, gait problem and myalgias.   Allergic/Immunologic: Positive for immunocompromised state.   Neurological: Positive for weakness and numbness.   Psychiatric/Behavioral: Positive for decreased concentration.   All other systems reviewed and are negative.       Past Medical History:   Diagnosis Date   • Arthritis    • Cancer (CMS/HCC) 2009    rectal   • Coronary artery disease    • Hyperlipidemia    • Hypertension    • Ileostomy, has currently (CMS/[facility])     total colectomy      Past Surgical History:   Procedure Laterality Date   • ABDOMINAL SURGERY  2009    multiple due to rectal cancer and adhesions   • BACK SURGERY  12/2018    had 2 previous lumbar fusions. Recent surgery 12/18   • CARDIAC CATHETERIZATION  12/2018    stent placement 12/2018   • COLON SURGERY     • NECK  "SURGERY      has had 2 neck surgeries     Family History   Problem Relation Age of Onset   • Heart disease Mother    • Hypertension Mother    • Cancer Father    • Diabetes Father    • Hypertension Father    • Cancer Sister      Social History     Tobacco Use   • Smoking status: Never Smoker   • Smokeless tobacco: Never Used   Substance Use Topics   • Alcohol use: No   • Drug use: No     (Not in a hospital admission)    Allergies:  Morphine    Objective      Vital Signs  /68 (BP Location: Right arm, Patient Position: Sitting)   Ht 165.1 cm (65\")   Wt 68.9 kg (152 lb)   BMI 25.29 kg/m²     Physical Exam  Constitutional:       Appearance: Normal appearance. He is well-developed.   HENT:      Head: Normocephalic.   Eyes:      General: Lids are normal.      Extraocular Movements: EOM normal.      Conjunctiva/sclera: Conjunctivae normal.      Pupils: Pupils are equal, round, and reactive to light.   Neck:      Musculoskeletal: Normal range of motion.   Cardiovascular:      Rate and Rhythm: Normal rate and regular rhythm.   Pulmonary:      Effort: Pulmonary effort is normal.      Breath sounds: Normal breath sounds.   Musculoskeletal: Normal range of motion.      Lumbar back: He exhibits pain.   Skin:     General: Skin is warm.   Neurological:      Mental Status: He is alert and oriented to person, place, and time.      GCS: GCS eye subscore is 4. GCS verbal subscore is 5. GCS motor subscore is 6.      Cranial Nerves: No cranial nerve deficit.      Sensory: No sensory deficit.      Motor: Weakness present.      Gait: Gait abnormal.      Deep Tendon Reflexes: Reflexes are normal and symmetric. Reflexes normal.   Psychiatric:         Speech: Speech normal.         Behavior: Behavior normal.         Thought Content: Thought content normal.         Neurologic Exam     Mental Status   Oriented to person, place, and time.   Attention: normal. Concentration: normal.   Speech: speech is normal   Level of consciousness: " alert  Normal comprehension.     Cranial Nerves     CN II   Visual fields full to confrontation.     CN III, IV, VI   Pupils are equal, round, and reactive to light.  Extraocular motions are normal.     CN V   Facial sensation intact.     CN VII   Facial expression full, symmetric.     CN VIII   CN VIII normal.     CN IX, X   CN IX normal.   CN X normal.     CN XI   CN XI normal.     CN XII   CN XII normal.     Motor Exam   Muscle bulk: normal    Strength   Strength 5/5 except as noted. Right psoas 2 out of 5    Right quadricep 0 out of     Sensory Exam   Light touch normal.     Gait, Coordination, and Reflexes     Reflexes   Reflexes 2+ except as noted. Abnormal gait due to the weakness in the right psoas and quadriceps muscle, however he is independent       Results Review: MRI of the lumbar spine that was done on August 25, 2020 at [facility] shows the patient shows patient does have a fusion of  L5-S1.  There is also been previous surgery done at L1-2 however it does appear the posterior hardware has been removed.  There is a compression deformity noted at 1,2,904.  Patient does have a kyphotic deformity along with the L1 compression deformity.  There is concern at L1 and L2 that tumor may be involved in the vertebral bodies however the MRI was not done with contrast.        CT scans was also reviewed and does not show a L1 compression fracture in March 2020 but does show an L1 compression fracture post hardware removal in July 2020    Assessment/Plan: Dr. Velasquez did review the imaging and did come in and discussed this with the patient.  We are going to repeat the MRI of the thoracic and lumbar spine with and without contrast in order to get a better evaluation of the patient's spine and to see if there is any further concerns for metastasis.  We will also schedule the patient for an L1 kyphoplasty and biopsy to help treat his pain.  Dr. Velasquez to go over the risks and benefits of the procedure with the patient.   Their questions and concerns were addressed.  The patient will need preoperative clearance from his primary care doctor.  He will also have to undergo COVID testing and if he does test positive this will delay his surgery.  He acknowledged understanding of it.  Their questions and concerns were addressed.  Please see Dr. Velasquez's addendum to this patient  Patient is a nonsmoker  The patient's BMI is Body mass index is 25.29 kg/m². today which shows the patient is Overweight: 25.0-29.9kg/m2 . BMI chart was given to the patient.   Advance Care Planning   ACP discussion was held with the patient during this visit. Patient does not have an advance directive, information provided.      Jelani was seen today for back pain.    Diagnoses and all orders for this visit:    Closed compression fracture of L2 lumbar vertebra, initial encounter (CMS/[facility])  -     MRI Lumbar Spine With & Without Contrast; Future  -     Ambulatory Referral to Family Practice    Closed compression fracture of L4 lumbar vertebra, initial encounter (CMS/[facility])  -     MRI Lumbar Spine With & Without Contrast; Future  -     Ambulatory Referral to Family Practice    Closed compression fracture of L1 lumbar vertebra, initial encounter (CMS/[facility])  -     MRI Lumbar Spine With & Without Contrast; Future  -     Ambulatory Referral to Family Practice    T12 compression fracture, initial encounter (CMS/[facility])  -     MRI Thoracic Spine With & Without Contrast; Future  -     Ambulatory Referral to Family Practice    Non-smoker    Body mass index (BMI) of 25.0 to 25.9 in adult          I discussed the patients findings and my recommendations with patient    Hugo DAREN López, APRN  09/24/20  11:43 CDT  Attending addendum: 70-year-old gentleman with a complicated past medical history including colon cancer and bladder cancer.  He presented after a fall in the summer with worsening low back pain.  He does have a history of chronic low back pain and multiple previous  spine surgery.  He is very clear though that after this fall his back pain was worse.  After examining the imaging available to us since January 2020 including the most recent MRI it appears that he does have an acute progressive compression fracture at L1.  This could be related to his cancer diagnosis would be a pathologic metastatic fracture.  The quality of the MRI is poor and with done without contrast.  I think the logical next up would be MRI of the thoracic and lumbar spines with and without contrast to assess for bony metastatic disease to the spine in addition I think he would benefit from a single level kyphoplasty and biopsy at this level.  We discussed the relative risks and benefits which include but were not limited to infection, bleeding, paralysis, spinal fluid leak, bowel bladder continence, stroke, coma, and death.  He acknowledged understanding of this.  His questions concerns were addressed.

## 2020-09-24 NOTE — PATIENT INSTRUCTIONS
"BMI for Adults    Body mass index (BMI) is a number that is calculated from a person's weight and height. BMI may help to estimate how much of a person's weight is composed of fat. BMI can help identify those who may be at higher risk for certain medical problems.  How is BMI used with adults?  BMI is used as a screening tool to identify possible weight problems. It is used to check whether a person is obese, overweight, healthy weight, or underweight.  How is BMI calculated?  BMI measures your weight and compares it to your height. This can be done either in English (U[place] or metric measurements. Note that charts are available to help you find your BMI quickly and easily without having to do these calculations yourself.  To calculate your BMI in English ([place] measurements, your health care provider will:  1. Measure your weight in pounds (lb).  2. Multiply the number of pounds by 703.  ? For example, for a person who weighs 180 lb, multiply that number by 703, which equals 126,540.  3. Measure your height in inches (in). Then multiply that number by itself to get a measurement called \"inches squared.\"  ? For example, for a person who is 70 in tall, the \"inches squared\" measurement is 70 in x 70 in, which equals 4900 inches squared.  4. Divide the total from Step 2 (number of lb x 703) by the total from Step 3 (inches squared): 126,540 ÷ 4900 = 25.8. This is your BMI.  To calculate your BMI in metric measurements, your health care provider will:  1. Measure your weight in kilograms (kg).  2. Measure your height in meters (m). Then multiply that number by itself to get a measurement called \"meters squared.\"  ? For example, for a person who is 1.75 m tall, the \"meters squared\" measurement is 1.75 m x 1.75 m, which is equal to 3.1 meters squared.  3. Divide the number of kilograms (your weight) by the meters squared number. In this example: 70 ÷ 3.1 = 22.6. This is your BMI.  How is BMI interpreted?  To interpret your " results, your health care provider will use BMI charts to identify whether you are underweight, normal weight, overweight, or obese. The following guidelines will be used:  · Underweight: BMI less than 18.5.  · Normal weight: BMI between 18.5 and 24.9.  · Overweight: BMI between 25 and 29.9.  · Obese: BMI of 30 and above.  Please note:  · Weight includes both fat and muscle, so someone with a muscular build, such as an athlete, may have a BMI that is higher than 24.9. In cases like these, BMI is not an accurate measure of body fat.  · To determine if excess body fat is the cause of a BMI of 25 or higher, further assessments may need to be done by a health care provider.  · BMI is usually interpreted in the same way for men and women.  Why is BMI a useful tool?  BMI is useful in two ways:  · Identifying a weight problem that may be related to a medical condition, or that may increase the risk for medical problems.  · Promoting lifestyle and diet changes in order to reach a healthy weight.  Summary  · Body mass index (BMI) is a number that is calculated from a person's weight and height.  · BMI may help to estimate how much of a person's weight is composed of fat. BMI can help identify those who may be at higher risk for certain medical problems.  · BMI can be measured using English measurements or metric measurements.  · To interpret your results, your health care provider will use BMI charts to identify whether you are underweight, normal weight, overweight, or obese.  This information is not intended to replace advice given to you by your health care provider. Make sure you discuss any questions you have with your health care provider.  Document Released: 08/29/2005 Document Revised: 11/30/2018 Document Reviewed: 10/31/2018  Elsevier Patient Education © 2020 [employer]      Advance Care Planning and Advance Directives     You make decisions on a daily basis - decisions about where you want to live, your career,  your home, your life. Perhaps one of the most important decisions you face is your choice for future medical care. Take time to talk with your family and your healthcare team and start planning today.  Advance Care Planning is a process that can help you:  · Understand possible future healthcare decisions in light of your own experiences  · Reflect on those decision in light of your goals and values  · Discuss your decisions with those closest to you and the healthcare professionals that care for you  · Make a plan by creating a document that reflects your wishes    Surrogate Decision Maker  In the event of a medical emergency, which has left you unable to communicate or to make your own decisions, you would need someone to make decisions for you.  It is important to discuss your preferences for medical treatment with this person while you are in good health.     Qualities of a surrogate decision maker:  • Willing to take on this role and responsibility  • Knows what you want for future medical care  • Willing to follow your wishes even if they don't agree with them  • Able to make difficult medical decisions under stressful circumstances    Advance Directives  These are legal documents you can create that will guide your healthcare team and decision maker(s) when needed. These documents can be stored in the electronic medical record.    · Living Will - a legal document to guide your care if you have a terminal condition or a serious illness and are unable to communicate. States vary by statute in document names/types, but most forms may include one or more of the following:        -  Directions regarding life-prolonging treatments        -  Directions regarding artificially provided nutrition/hydration        -  Choosing a healthcare decision maker        -  Direction regarding organ/tissue donation    · Durable Power of  for Healthcare - this document names an -in-fact to make medical decisions for  you, but it may also allow this person to make personal and financial decisions for you. Please seek the advice of an  if you need this type of document.    **Advance Directives are not required and no one may discriminate against you if you do not sign one.    Medical Orders  Many states allow specific forms/orders signed by your physician to record your wishes for medical treatment in your current state of health. This form, signed in personal communication with your physician, addresses resuscitation and other medical interventions that you may or may not want.      For more information or to schedule a time with a [facility] Advance Care Planning Facilitator contact: [facility].[email or web address] or call [phone] and someone will contact you directly.

## 2020-09-25 ENCOUNTER — HOSPITAL ENCOUNTER (OUTPATIENT)
Dept: INFUSION THERAPY | Age: 70
Setting detail: INFUSION SERIES
Discharge: HOME | End: 2020-09-25
Payer: MEDICARE

## 2020-09-25 PROCEDURE — 96523 IRRIG DRUG DELIVERY DEVICE: CPT

## 2020-09-25 RX ORDER — HEPARIN SODIUM (PORCINE) LOCK FLUSH IV SOLN 100 UNIT/ML 100 UNIT/ML
500 SOLUTION INTRAVENOUS PRN
Status: DISCONTINUED | OUTPATIENT
Start: 2020-09-25 | End: 2020-09-27 | Stop reason: HOSPADM

## 2020-09-28 ENCOUNTER — HOSPITAL ENCOUNTER (OUTPATIENT)
Dept: MRI IMAGING | Facility: HOSPITAL | Age: 70
Discharge: HOME | End: 2020-09-28

## 2020-09-28 ENCOUNTER — OFFICE VISIT (OUTPATIENT)
Dept: UROLOGY | Age: 70
End: 2020-09-28
Payer: MEDICARE

## 2020-09-28 VITALS
WEIGHT: 159 LBS | HEART RATE: 81 BPM | SYSTOLIC BLOOD PRESSURE: 131 MMHG | TEMPERATURE: 97.7 F | HEIGHT: 65 IN | BODY MASS INDEX: 26.49 KG/M2 | DIASTOLIC BLOOD PRESSURE: 74 MMHG

## 2020-09-28 DIAGNOSIS — S22.080A T12 COMPRESSION FRACTURE, INITIAL ENCOUNTER (HCC): ICD-10-CM

## 2020-09-28 DIAGNOSIS — S32.020A CLOSED COMPRESSION FRACTURE OF L2 LUMBAR VERTEBRA, INITIAL ENCOUNTER (HCC): ICD-10-CM

## 2020-09-28 DIAGNOSIS — S32.040A CLOSED COMPRESSION FRACTURE OF L4 LUMBAR VERTEBRA, INITIAL ENCOUNTER (HCC): ICD-10-CM

## 2020-09-28 DIAGNOSIS — S32.010A CLOSED COMPRESSION FRACTURE OF L1 LUMBAR VERTEBRA, INITIAL ENCOUNTER (HCC): ICD-10-CM

## 2020-09-28 LAB
BACTERIA URINE, POC: 0
BILIRUBIN URINE: 0 MG/DL
BLOOD, URINE: POSITIVE
CASTS URINE, POC: 0
CLARITY: CLEAR
COLOR: YELLOW
CRYSTALS URINE, POC: 0
EPI CELLS URINE, POC: 0
GLUCOSE URINE: ABNORMAL
KETONES, URINE: NEGATIVE
LEUKOCYTE EST, POC: ABNORMAL
NITRITE, URINE: NEGATIVE
PH UA: 6 (ref 4.5–8)
PROTEIN UA: POSITIVE
RBC URINE, POC: 25
SPECIFIC GRAVITY UA: 1.02 (ref 1–1.03)
UROBILINOGEN, URINE: NORMAL
WBC URINE, POC: 6
YEAST URINE, POC: 0

## 2020-09-28 PROCEDURE — G8417 CALC BMI ABV UP PARAM F/U: HCPCS | Performed by: UROLOGY

## 2020-09-28 PROCEDURE — G8427 DOCREV CUR MEDS BY ELIG CLIN: HCPCS | Performed by: UROLOGY

## 2020-09-28 PROCEDURE — 72158 MRI LUMBAR SPINE W/O & W/DYE: CPT

## 2020-09-28 PROCEDURE — 1036F TOBACCO NON-USER: CPT | Performed by: UROLOGY

## 2020-09-28 PROCEDURE — 3017F COLORECTAL CA SCREEN DOC REV: CPT | Performed by: UROLOGY

## 2020-09-28 PROCEDURE — 4040F PNEUMOC VAC/ADMIN/RCVD: CPT | Performed by: UROLOGY

## 2020-09-28 PROCEDURE — 1123F ACP DISCUSS/DSCN MKR DOCD: CPT | Performed by: UROLOGY

## 2020-09-28 PROCEDURE — 99214 OFFICE O/P EST MOD 30 MIN: CPT | Performed by: UROLOGY

## 2020-09-28 PROCEDURE — A9577 INJ MULTIHANCE: HCPCS | Performed by: NURSE PRACTITIONER

## 2020-09-28 PROCEDURE — 0 GADOBENATE DIMEGLUMINE 529 MG/ML SOLUTION: Performed by: NURSE PRACTITIONER

## 2020-09-28 PROCEDURE — 81001 URINALYSIS AUTO W/SCOPE: CPT | Performed by: UROLOGY

## 2020-09-28 PROCEDURE — 72157 MRI CHEST SPINE W/O & W/DYE: CPT

## 2020-09-28 RX ADMIN — GADOBENATE DIMEGLUMINE 15 ML: 529 INJECTION, SOLUTION INTRAVENOUS at 17:30

## 2020-09-28 ASSESSMENT — ENCOUNTER SYMPTOMS
SHORTNESS OF BREATH: 0
DIARRHEA: 0
COUGH: 0
BACK PAIN: 0
EYE REDNESS: 0
ABDOMINAL PAIN: 0
EYE DISCHARGE: 0
WHEEZING: 0
CONSTIPATION: 0
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
## 2020-09-29 ENCOUNTER — TELEPHONE (OUTPATIENT)
Dept: NEUROSURGERY | Facility: CLINIC | Age: 70
End: 2020-09-29

## 2020-09-29 PROBLEM — C67.8 MALIGNANT NEOPLASM OF OVERLAPPING SITES OF BLADDER (HCC): Status: RESOLVED | Noted: 2019-08-18 | Resolved: 2020-09-29

## 2020-09-29 NOTE — TELEPHONE ENCOUNTER
Call patient given the results of the MRI.  I did explain to the patient that after review Dr. Velasquez felt it was best to add T12 for a kyphoplasty and biopsy to L1.  He acknowledged understanding of this.  They were told to call us if there are any further problems or questions.

## 2020-09-29 NOTE — TELEPHONE ENCOUNTER
Jelani has returned Hugo's call, he states he missed a call about the results of his MRI.    Please return his call with MRI results.

## 2020-10-05 ENCOUNTER — APPOINTMENT (OUTPATIENT)
Dept: PREADMISSION TESTING | Facility: HOSPITAL | Age: 70
End: 2020-10-05

## 2020-10-05 VITALS
RESPIRATION RATE: 20 BRPM | SYSTOLIC BLOOD PRESSURE: 112 MMHG | HEART RATE: 76 BPM | BODY MASS INDEX: 26.36 KG/M2 | HEIGHT: 65 IN | DIASTOLIC BLOOD PRESSURE: 80 MMHG | WEIGHT: 158.2 LBS | OXYGEN SATURATION: 96 %

## 2020-10-05 DIAGNOSIS — S32.040A CLOSED COMPRESSION FRACTURE OF L4 LUMBAR VERTEBRA, INITIAL ENCOUNTER (HCC): ICD-10-CM

## 2020-10-05 DIAGNOSIS — S32.010A CLOSED COMPRESSION FRACTURE OF L1 LUMBAR VERTEBRA, INITIAL ENCOUNTER (HCC): ICD-10-CM

## 2020-10-05 DIAGNOSIS — S32.020A CLOSED COMPRESSION FRACTURE OF L2 LUMBAR VERTEBRA, INITIAL ENCOUNTER (HCC): ICD-10-CM

## 2020-10-05 DIAGNOSIS — S22.080A T12 COMPRESSION FRACTURE, INITIAL ENCOUNTER (HCC): ICD-10-CM

## 2020-10-05 LAB
ALBUMIN SERPL-MCNC: 4.1 G/DL (ref 3.5–5.2)
ALBUMIN/GLOB SERPL: 1.6 G/DL
ALP SERPL-CCNC: 124 U/L (ref 39–117)
ALT SERPL W P-5'-P-CCNC: 13 U/L (ref 1–41)
ANION GAP SERPL CALCULATED.3IONS-SCNC: 13 MMOL/L (ref 5–15)
APTT PPP: 22 SECONDS (ref 24.1–35)
AST SERPL-CCNC: 19 U/L (ref 1–40)
BACTERIA UR QL AUTO: ABNORMAL /HPF
BASOPHILS # BLD AUTO: 0.04 10*3/MM3 (ref 0–0.2)
BASOPHILS NFR BLD AUTO: 0.8 % (ref 0–1.5)
BILIRUB SERPL-MCNC: 0.7 MG/DL (ref 0–1.2)
BILIRUB UR QL STRIP: NEGATIVE
BUN SERPL-MCNC: 20 MG/DL (ref 8–23)
BUN/CREAT SERPL: 15.2 (ref 7–25)
CALCIUM SPEC-SCNC: 8.9 MG/DL (ref 8.6–10.5)
CHLORIDE SERPL-SCNC: 100 MMOL/L (ref 98–107)
CLARITY UR: ABNORMAL
CO2 SERPL-SCNC: 18 MMOL/L (ref 22–29)
COLOR UR: YELLOW
CREAT SERPL-MCNC: 1.32 MG/DL (ref 0.76–1.27)
DEPRECATED RDW RBC AUTO: 43.4 FL (ref 37–54)
EOSINOPHIL # BLD AUTO: 0.29 10*3/MM3 (ref 0–0.4)
EOSINOPHIL NFR BLD AUTO: 5.8 % (ref 0.3–6.2)
ERYTHROCYTE [DISTWIDTH] IN BLOOD BY AUTOMATED COUNT: 14.3 % (ref 12.3–15.4)
GFR SERPL CREATININE-BSD FRML MDRD: 54 ML/MIN/1.73
GLOBULIN UR ELPH-MCNC: 2.6 GM/DL
GLUCOSE SERPL-MCNC: 106 MG/DL (ref 65–99)
GLUCOSE UR STRIP-MCNC: NEGATIVE MG/DL
HCT VFR BLD AUTO: 34.1 % (ref 37.5–51)
HGB BLD-MCNC: 12 G/DL (ref 13–17.7)
HGB UR QL STRIP.AUTO: ABNORMAL
HYALINE CASTS UR QL AUTO: ABNORMAL /LPF
IMM GRANULOCYTES # BLD AUTO: 0.02 10*3/MM3 (ref 0–0.05)
IMM GRANULOCYTES NFR BLD AUTO: 0.4 % (ref 0–0.5)
INR PPP: 1.14 (ref 0.91–1.09)
KETONES UR QL STRIP: NEGATIVE
LEUKOCYTE ESTERASE UR QL STRIP.AUTO: ABNORMAL
LYMPHOCYTES # BLD AUTO: 0.54 10*3/MM3 (ref 0.7–3.1)
LYMPHOCYTES NFR BLD AUTO: 10.8 % (ref 19.6–45.3)
MCH RBC QN AUTO: 30.2 PG (ref 26.6–33)
MCHC RBC AUTO-ENTMCNC: 35.2 G/DL (ref 31.5–35.7)
MCV RBC AUTO: 85.9 FL (ref 79–97)
MONOCYTES # BLD AUTO: 0.45 10*3/MM3 (ref 0.1–0.9)
MONOCYTES NFR BLD AUTO: 9 % (ref 5–12)
NEUTROPHILS NFR BLD AUTO: 3.65 10*3/MM3 (ref 1.7–7)
NEUTROPHILS NFR BLD AUTO: 73.2 % (ref 42.7–76)
NITRITE UR QL STRIP: NEGATIVE
NRBC BLD AUTO-RTO: 0 /100 WBC (ref 0–0.2)
PH UR STRIP.AUTO: 6 [PH] (ref 5–8)
PLATELET # BLD AUTO: 204 10*3/MM3 (ref 140–450)
PMV BLD AUTO: 11.3 FL (ref 6–12)
POTASSIUM SERPL-SCNC: 4.4 MMOL/L (ref 3.5–5.2)
PROT SERPL-MCNC: 6.7 G/DL (ref 6–8.5)
PROT UR QL STRIP: ABNORMAL
PROTHROMBIN TIME: 14.2 SECONDS (ref 11.9–14.6)
RBC # BLD AUTO: 3.97 10*6/MM3 (ref 4.14–5.8)
RBC # UR: ABNORMAL /HPF
REF LAB TEST METHOD: ABNORMAL
SODIUM SERPL-SCNC: 131 MMOL/L (ref 136–145)
SP GR UR STRIP: 1.02 (ref 1–1.03)
SQUAMOUS #/AREA URNS HPF: ABNORMAL /HPF
UROBILINOGEN UR QL STRIP: ABNORMAL
WBC # BLD AUTO: 4.99 10*3/MM3 (ref 3.4–10.8)
WBC UR QL AUTO: ABNORMAL /HPF

## 2020-10-05 PROCEDURE — 36415 COLL VENOUS BLD VENIPUNCTURE: CPT

## 2020-10-05 PROCEDURE — 81001 URINALYSIS AUTO W/SCOPE: CPT | Performed by: NURSE PRACTITIONER

## 2020-10-05 PROCEDURE — 85730 THROMBOPLASTIN TIME PARTIAL: CPT | Performed by: NURSE PRACTITIONER

## 2020-10-05 PROCEDURE — 87086 URINE CULTURE/COLONY COUNT: CPT | Performed by: NURSE PRACTITIONER

## 2020-10-05 PROCEDURE — 80053 COMPREHEN METABOLIC PANEL: CPT | Performed by: NURSE PRACTITIONER

## 2020-10-05 PROCEDURE — 85025 COMPLETE CBC W/AUTO DIFF WBC: CPT | Performed by: NURSE PRACTITIONER

## 2020-10-05 PROCEDURE — 85610 PROTHROMBIN TIME: CPT | Performed by: NURSE PRACTITIONER

## 2020-10-05 NOTE — DISCHARGE INSTRUCTIONS
DAY OF SURGERY INSTRUCTIONS        YOUR SURGEON: Adrian Velasquez     PROCEDURE: Thoracic 12 and Lumbar 1 Kyphoplasty and Biopsy with 2 C-Arms    DATE OF SURGERY: October 14, 2020     ARRIVAL TIME: AS DIRECTED BY OFFICE    YOU MAY TAKE THE FOLLOWING MEDICATION(S) THE MORNING OF SURGERY WITH A SIP OF WATER: bisoprolol, gabapentin, methadone       ALL OTHER HOME MEDICATION CHECK WITH YOUR PHYSICIAN      DO NOT TAKE ANY ERECTILE DYSFUNCTION MEDICATIONS (EX: CIALIS, VIAGRA) 24 HOURS PRIOR TO SURGERY                      MANAGING PAIN AFTER SURGERY    We know you are probably wondering what your pain will be like after surgery.  Following surgery it is unrealistic to expect you will not have pain.   Pain is how our bodies let us know that something is wrong or cautions us to be careful.  That said, our goal is to make your pain tolerable.    Methods we may use to treat your pain include (oral or IV medications, PCAs, epidurals, nerve blocks, etc.)   While some procedures require IV pain medications for a short time after surgery, transitioning to pain medications by mouth allows for better management of pain.   Your nurse will encourage you to take oral pain medications whenever possible.  IV medications work almost immediately, but only last a short while.  Taking medications by mouth allows for a more constant level of medication in your blood stream for a longer period of time.      Once your pain is out of control it is harder to get back under control.  It is important you are aware when your next dose of pain medication is due.  If you are admitted, your nurse may write the time of your next dose on the white board in your room to help you remember.      We are interested in your pain and encourage you to inform us about aggravating factors during your visit.   Many times a simple repositioning every few hours can make a big difference.    If your physician says it is okay, do not let your pain prevent you from  getting out of bed. Be sure to call your nurse for assistance prior to getting up so you do not fall.      Before surgery, please decide your tolerable pain goal.  These faces help describe the pain ratings we use on a 0-10 scale.   Be prepared to tell us your goal and whether or not you take pain or anxiety medications at home.          BEFORE YOU COME TO THE HOSPITAL  (Pre-op instructions)  • Do not eat, drink, smoke or chew gum after midnight the night before surgery.  This also includes no mints.  • Morning of surgery take only the medicines you have been instructed with a sip of water unless otherwise instructed  by your physician.  • Do not shave, wear makeup or dark nail polish.  • Remove all jewelry including rings.  • Leave anything you consider valuable at home.  • Leave your suitcase in the car until after your surgery.  • Bring the following with you if applicable:  o Picture ID and insurance, Medicare or Medicaid cards  o Co-pay/deductible required by insurance (cash, check, credit card)  o Copy of advance directive, living will or power-of- documents if not brought to PAT  o CPAP or BIPAP mask and tubing  o Relaxation aids ( book, magazine), etc.  o Hearing aids                        ON THE DAY OF SURGERY  · On the day of surgery check in at registration located at the main entrance of the hospital.   ? You will be registered and given a beeper with instructions where to wait in the main lobby.  ? When your beeper lights up and vibrates a member of the Outpatient Surgery staff will meet you at the double doors under the stair steps and escort you to your preoperative room.   · You may have cloth compression devices placed on your legs. These help to prevent blood clots and reduce swelling in your legs.  · An IV may be inserted into one of your veins.  · In the operating room, you may be given one or more of the following:  ? A medicine to help you relax (sedative).  ? A medicine to numb the  "area (local anesthetic).  ? A medicine to make you fall asleep (general anesthetic).  ? A medicine that is injected into an area of your body to numb everything below the injection site (regional anesthetic).  · Your surgical site will be marked or identified.  · You may be given an antibiotic through your IV to help prevent infection.  Contact a health care provider if you:  · Develop a fever of more than 100.4°F (38°C) or other feelings of illness during the 48 hours before your surgery.  · Have symptoms that get worse.  Have questions or concerns about your surgery    General Anesthesia/Surgery, Adult  General anesthesia is the use of medicines to make a person \"go to sleep\" (unconscious) for a medical procedure. General anesthesia must be used for certain procedures, and is often recommended for procedures that:  · Last a long time.  · Require you to be still or in an unusual position.  · Are major and can cause blood loss.  The medicines used for general anesthesia are called general anesthetics. As well as making you unconscious for a certain amount of time, these medicines:  · Prevent pain.  · Control your blood pressure.  · Relax your muscles.  Tell a health care provider about:  · Any allergies you have.  · All medicines you are taking, including vitamins, herbs, eye drops, creams, and over-the-counter medicines.  · Any problems you or family members have had with anesthetic medicines.  · Types of anesthetics you have had in the past.  · Any blood disorders you have.  · Any surgeries you have had.  · Any medical conditions you have.  · Any recent upper respiratory, chest, or ear infections.  · Any history of:  ? Heart or lung conditions, such as heart failure, sleep apnea, asthma, or chronic obstructive pulmonary disease (COPD).  ?  service.  ? Depression or anxiety.  · Any tobacco or drug use, including marijuana or alcohol use.  · Whether you are pregnant or may be pregnant.  What are the " risks?  Generally, this is a safe procedure. However, problems may occur, including:  · Allergic reaction.  · Lung and heart problems.  · Inhaling food or liquid from the stomach into the lungs (aspiration).  · Nerve injury.  · Air in the bloodstream, which can lead to stroke.  · Extreme agitation or confusion (delirium) when you wake up from the anesthetic.  · Waking up during your procedure and being unable to move. This is rare.  These problems are more likely to develop if you are having a major surgery or if you have an advanced or serious medical condition. You can prevent some of these complications by answering all of your health care provider's questions thoroughly and by following all instructions before your procedure.  General anesthesia can cause side effects, including:  · Nausea or vomiting.  · A sore throat from the breathing tube.  · Hoarseness.  · Wheezing or coughing.  · Shaking chills.  · Tiredness.  · Body aches.  · Anxiety.  · Sleepiness or drowsiness.  · Confusion or agitation.  RISKS AND COMPLICATIONS OF SURGERY  Your health care provider will discuss possible risks and complications with you before surgery. Common risks and complications include:    · Problems due to the use of anesthetics.  · Blood loss and replacement (does not apply to minor surgical procedures).  · Temporary increase in pain due to surgery.  · Uncorrected pain or problems that the surgery was meant to correct.  · Infection.  · New damage.    What happens before the procedure?    Medicines  Ask your health care provider about:  · Changing or stopping your regular medicines. This is especially important if you are taking diabetes medicines or blood thinners.  · Taking medicines such as aspirin and ibuprofen. These medicines can thin your blood. Do not take these medicines unless your health care provider tells you to take them.  · Taking over-the-counter medicines, vitamins, herbs, and supplements. Do not take these during  the week before your procedure unless your health care provider approves them.  General instructions  · Starting 3-6 weeks before the procedure, do not use any products that contain nicotine or tobacco, such as cigarettes and e-cigarettes. If you need help quitting, ask your health care provider.  · If you brush your teeth on the morning of the procedure, make sure to spit out all of the toothpaste.  · Tell your health care provider if you become ill or develop a cold, cough, or fever.  · If instructed by your health care provider, bring your sleep apnea device with you on the day of your surgery (if applicable).  · Ask your health care provider if you will be going home the same day, the following day, or after a longer hospital stay.  ? Plan to have someone take you home from the hospital or clinic.  ? Plan to have a responsible adult care for you for at least 24 hours after you leave the hospital or clinic. This is important.  What happens during the procedure?  · You will be given anesthetics through both of the following:  ? A mask placed over your nose and mouth.  ? An IV in one of your veins.  · You may receive a medicine to help you relax (sedative).  · After you are unconscious, a breathing tube may be inserted down your throat to help you breathe. This will be removed before you wake up.  · An anesthesia specialist will stay with you throughout your procedure. He or she will:  ? Keep you comfortable and safe by continuing to give you medicines and adjusting the amount of medicine that you get.  ? Monitor your blood pressure, pulse, and oxygen levels to make sure that the anesthetics do not cause any problems.  The procedure may vary among health care providers and hospitals.  What happens after the procedure?  · Your blood pressure, temperature, heart rate, breathing rate, and blood oxygen level will be monitored until the medicines you were given have worn off.  · You will wake up in a recovery area. You  may wake up slowly.  · If you feel anxious or agitated, you may be given medicine to help you calm down.  · If you will be going home the same day, your health care provider may check to make sure you can walk, drink, and urinate.  · Your health care provider will treat any pain or side effects you have before you go home.  · Do not drive for 24 hours if you were given a sedative.  Summary  · General anesthesia is used to keep you still and prevent pain during a procedure.  · It is important to tell your healthcare provider about your medical history and any surgeries you have had, and previous experience with anesthesia.  · Follow your healthcare provider’s instructions about when to stop eating, drinking, or taking certain medicines before your procedure.  · Plan to have someone take you home from the hospital or clinic.  This information is not intended to replace advice given to you by your health care provider. Make sure you discuss any questions you have with your health care provider.  Document Released: 03/26/2009 Document Revised: 08/03/2018 Document Reviewed: 08/03/2018  [employer] Interactive Patient Education © 2019 [employer]       Fall Prevention in Hospitals, Adult  As a hospital patient, your condition and the treatments you receive can increase your risk for falls. Some additional risk factors for falls in a hospital include:  · Being in an unfamiliar environment.  · Being on bed rest.  · Your surgery.  · Taking certain medicines.  · Your tubing requirements, such as intravenous (IV) therapy or catheters.  It is important that you learn how to decrease fall risks while at the hospital. Below are important tips that can help prevent falls.  SAFETY TIPS FOR PREVENTING FALLS  Talk about your risk of falling.  · Ask your health care provider why you are at risk for falling. Is it your medicine, illness, tubing placement, or something else?  · Make a plan with your health care provider to keep you safe  from falls.  · Ask your health care provider or pharmacist about side effects of your medicines. Some medicines can make you dizzy or affect your coordination.  Ask for help.  · Ask for help before getting out of bed. You may need to press your call button.  · Ask for assistance in getting safely to the toilet.  · Ask for a walker or cane to be put at your bedside. Ask that most of the side rails on your bed be placed up before your health care provider leaves the room.  · Ask family or friends to sit with you.  · Ask for things that are out of your reach, such as your glasses, hearing aids, telephone, bedside table, or call button.  Follow these tips to avoid falling:  · Stay lying or seated, rather than standing, while waiting for help.  · Wear rubber-soled slippers or shoes whenever you walk in the hospital.  · Avoid quick, sudden movements.  ¨ Change positions slowly.  ¨ Sit on the side of your bed before standing.  ¨ Stand up slowly and wait before you start to walk.  · Let your health care provider know if there is a spill on the floor.  · Pay careful attention to the medical equipment, electrical cords, and tubes around you.  · When you need help, use your call button by your bed or in the bathroom. Wait for one of your health care providers to help you.  · If you feel dizzy or unsure of your footing, return to bed and wait for assistance.  · Avoid being distracted by the TV, telephone, or another person in your room.  · Do not lean or support yourself on rolling objects, such as IV poles or bedside tables.     This information is not intended to replace advice given to you by your health care provider. Make sure you discuss any questions you have with your health care provider.     Document Released: 12/15/2001 Document Revised: 01/08/2016 Document Reviewed: 08/25/2013  [employer] Interactive Patient Education ©2016 [employer]       [facility] Patient Instruction Sheet    Chlorhexidine  Before Surgery  Chlorhexidine gluconate (CHG) is a germ-killing (antiseptic) solution that is used to clean the skin. It gets rid of the bacteria that normally live on the skin. Cleaning your skin with CHG before surgery helps lower the risk for infection after surgery.    How to use CHG solution  · You will take 2 showers, one shower the night before surgery, the second shower the morning of surgery before coming to the hospital.  · Use CHG only as told by your health care provider, and follow the instructions on the label.  · Use CHG solution while taking a shower. Follow these steps when using CHG solution (unless your health care provider gives you different instructions):  1. Start the shower.  2. Use your normal soap and shampoo to wash your face and hair.  3. Turn off the shower or move out of the shower stream.  4. Pour the CHG onto a clean washcloth. Do not use any type of brush or rough-edged sponge.  5. Starting at your neck, lather your body down to your toes. Make sure you:  6. Pay special attention to the part of your body where you will be having surgery. Scrub this area for at least 1 minute.  7. Use the full amount of CHG as directed. Usually, this is one half bottle for each shower.  8. Do not use CHG on your head or face. If the solution gets into your ears or eyes, rinse them well with water.  9. Avoid your genital area.  10. Avoid any areas of skin that have broken skin, cuts, or scrapes.  11. Scrub your back and under your arms. Make sure to wash skin folds.  12. Let the lather sit on your skin for 1-2 minutes or as long as told by your health care  provider.  13. Thoroughly rinse your entire body in the shower. Make sure that all body creases and crevices are rinsed well.  14. Dry off with a clean towel. Do not put any substances on your body afterward, such as powder, lotion, or perfume.  15. Put on clean clothes or pajamas.  16. If it is the night before your surgery, sleep in clean  sheets.    What are the risks?  Risks of using CHG include:  · A skin reaction.  · Hearing loss, if CHG gets in your ears.  · Eye injury, if CHG gets in your eyes and is not rinsed out.  · The CHG product catching fire.  Make sure that you avoid smoking and flames after applying CHG to your skin.  Do not use CHG:  · If you have a chlorhexidine allergy or have previously reacted to chlorhexidine.  · On babies younger than 2 months of age.      On the day of surgery, when you are taken to your room in Outpatient Surgery you will be given a CHG prepackaged cloth to wipe the site for your surgery.  How to use CHG prepackaged cloths  · Follow the instructions on the label.  · Use the CHG cloth on clean, dry skin. Follow these steps when using a CHG cloth (unless your health care provider gives you different instructions):  1. Using the CHG cloth, vigorously scrub the part of your body where you will be having surgery. Scrub using a back-and-forth motion for 3 minutes. The area on your body should be completely wet with CHG when you are finished scrubbing.  2. Do not rinse. Discard the cloth and let the area air-dry for 1 minute. Do not put any substances on your body afterward, such as powder, lotion, or perfume.  Contact a health care provider if:  · Your skin gets irritated after scrubbing.  · You have questions about using your solution or cloth.  Get help right away if:  · Your eyes become very red or swollen.  · Your eyes itch badly.  · Your skin itches badly and is red or swollen.  · Your hearing changes.  · You have trouble seeing.  · You have swelling or tingling in your mouth or throat.  · You have trouble breathing.  · You swallow any chlorhexidine.  Summary  · Chlorhexidine gluconate (CHG) is a germ-killing (antiseptic) solution that is used to clean the skin. Cleaning your skin with CHG before surgery helps lower the risk for infection after surgery.  · You may be given CHG to use at home. It may be in a  bottle or in a prepackaged cloth to use on your skin. Carefully follow your health care provider's instructions and the instructions on the product label.  · Do not use CHG if you have a chlorhexidine allergy.  · Contact your health care provider if your skin gets irritated after scrubbing.  This information is not intended to replace advice given to you by your health care provider. Make sure you discuss any questions you have with your health care provider.  Document Released: 09/11/2013 Document Revised: 11/15/2018 Document Reviewed: 11/15/2018  [employer] Interactive Patient Education © 2019 [employer]          PATIENT/FAMILY/RESPONSIBLE PARTY VERBALIZES UNDERSTANDING OF ABOVE EDUCATION.  COPY OF PAIN SCALE GIVEN AND REVIEWED WITH VERBALIZED UNDERSTANDING.

## 2020-10-06 LAB — BACTERIA SPEC AEROBE CULT: NO GROWTH

## 2020-10-07 ENCOUNTER — APPOINTMENT (OUTPATIENT)
Dept: INFUSION THERAPY | Age: 70
End: 2020-10-07
Payer: MEDICARE

## 2020-10-07 ENCOUNTER — APPOINTMENT (OUTPATIENT)
Dept: MRI IMAGING | Facility: HOSPITAL | Age: 70
End: 2020-10-07

## 2020-10-08 ENCOUNTER — TRANSCRIBE ORDERS (OUTPATIENT)
Dept: ADMINISTRATIVE | Facility: HOSPITAL | Age: 70
End: 2020-10-08

## 2020-10-08 DIAGNOSIS — Z01.818 PREOPERATIVE TESTING: Primary | ICD-10-CM

## 2020-10-09 ENCOUNTER — TELEPHONE (OUTPATIENT)
Dept: CARDIOLOGY | Age: 70
End: 2020-10-09

## 2020-10-09 RX ORDER — FERROUS SULFATE 325(65) MG
325 TABLET ORAL 2 TIMES DAILY
Qty: 180 TABLET | Refills: 1 | Status: SHIPPED | OUTPATIENT
Start: 2020-10-09 | End: 2021-04-05

## 2020-10-09 NOTE — TELEPHONE ENCOUNTER
Dr. Екатерина Gonzalez called regarding his father in law (patient). He states Dr. Kymberly Kruse is doing a kyphoplasty on Wednesday. They had sent the clearance to PCP. Advised that our policy is that surgeon sends the clearance to our office for risk stratification. He will contact their office. He voiced understanding.

## 2020-10-12 ENCOUNTER — HOSPITAL ENCOUNTER (OUTPATIENT)
Dept: PREADMISSION TESTING | Age: 70
Discharge: HOME | End: 2020-10-16
Payer: MEDICARE

## 2020-10-12 ENCOUNTER — LAB (OUTPATIENT)
Dept: LAB | Facility: HOSPITAL | Age: 70
End: 2020-10-12

## 2020-10-12 VITALS — BODY MASS INDEX: 25.66 KG/M2 | HEIGHT: 65 IN | WEIGHT: 154 LBS

## 2020-10-12 DIAGNOSIS — Z01.818 PREOPERATIVE TESTING: ICD-10-CM

## 2020-10-12 PROCEDURE — C9803 HOPD COVID-19 SPEC COLLECT: HCPCS

## 2020-10-12 PROCEDURE — 87635 SARS-COV-2 COVID-19 AMP PRB: CPT

## 2020-10-12 RX ORDER — CYCLOBENZAPRINE HCL 10 MG
10 TABLET ORAL 3 TIMES DAILY PRN
COMMUNITY
End: 2020-10-27 | Stop reason: SDUPTHER

## 2020-10-13 ENCOUNTER — TELEPHONE (OUTPATIENT)
Dept: NEUROSURGERY | Facility: CLINIC | Age: 70
End: 2020-10-13

## 2020-10-13 LAB — SARS-COV-2 N GENE RESP QL NAA+PROBE: NOT DETECTED

## 2020-10-13 NOTE — TELEPHONE ENCOUNTER
SPOKE WITH REI DUBON WITH [facility] C[facility] TO CHECK STATUS OF CARDIAC CLEARANCE REQUEST.     SHE HAS DOUBLE CHECKED WITH [facility] AND THEY HAVE SENT DR SMITH A REMINDER THAT WE NEED CLEARANCE FOR TOMORROW'S SURGERY WITH DR SMITH (10/14).      UPDATED ROSE MARIE HERRERA FOR DR SMITH THAT WE ARE STILL WAITING ON INFORMATION.

## 2020-10-14 ENCOUNTER — HOSPITAL ENCOUNTER (OUTPATIENT)
Facility: HOSPITAL | Age: 70
Setting detail: HOSPITAL OUTPATIENT SURGERY
Discharge: HOME | End: 2020-10-14
Attending: NEUROLOGICAL SURGERY | Admitting: NEUROLOGICAL SURGERY

## 2020-10-14 ENCOUNTER — ANESTHESIA (OUTPATIENT)
Dept: PERIOP | Facility: HOSPITAL | Age: 70
End: 2020-10-14

## 2020-10-14 ENCOUNTER — ANESTHESIA EVENT (OUTPATIENT)
Dept: PERIOP | Facility: HOSPITAL | Age: 70
End: 2020-10-14

## 2020-10-14 ENCOUNTER — APPOINTMENT (OUTPATIENT)
Dept: GENERAL RADIOLOGY | Facility: HOSPITAL | Age: 70
End: 2020-10-14

## 2020-10-14 VITALS
OXYGEN SATURATION: 95 % | TEMPERATURE: 97 F | HEART RATE: 74 BPM | DIASTOLIC BLOOD PRESSURE: 66 MMHG | SYSTOLIC BLOOD PRESSURE: 102 MMHG | RESPIRATION RATE: 16 BRPM

## 2020-10-14 DIAGNOSIS — S32.020A CLOSED COMPRESSION FRACTURE OF L2 LUMBAR VERTEBRA, INITIAL ENCOUNTER (HCC): ICD-10-CM

## 2020-10-14 DIAGNOSIS — S22.080A T12 COMPRESSION FRACTURE, INITIAL ENCOUNTER (HCC): ICD-10-CM

## 2020-10-14 DIAGNOSIS — S32.040A CLOSED COMPRESSION FRACTURE OF L4 LUMBAR VERTEBRA, INITIAL ENCOUNTER (HCC): ICD-10-CM

## 2020-10-14 DIAGNOSIS — S32.010A CLOSED COMPRESSION FRACTURE OF L1 LUMBAR VERTEBRA, INITIAL ENCOUNTER (HCC): ICD-10-CM

## 2020-10-14 PROCEDURE — 87176 TISSUE HOMOGENIZATION CULTR: CPT | Performed by: NEUROLOGICAL SURGERY

## 2020-10-14 PROCEDURE — 22515 PERQ VERTEBRAL AUGMENTATION: CPT | Performed by: NEUROLOGICAL SURGERY

## 2020-10-14 PROCEDURE — 88311 DECALCIFY TISSUE: CPT | Performed by: NEUROLOGICAL SURGERY

## 2020-10-14 PROCEDURE — 25010000002 ONDANSETRON PER 1 MG: Performed by: ANESTHESIOLOGY

## 2020-10-14 PROCEDURE — 25010000002 IOPAMIDOL 61 % SOLUTION: Performed by: NEUROLOGICAL SURGERY

## 2020-10-14 PROCEDURE — 25010000002 PROPOFOL 10 MG/ML EMULSION: Performed by: NURSE ANESTHETIST, CERTIFIED REGISTERED

## 2020-10-14 PROCEDURE — 88307 TISSUE EXAM BY PATHOLOGIST: CPT | Performed by: NEUROLOGICAL SURGERY

## 2020-10-14 PROCEDURE — 25010000002 PHENYLEPHRINE HCL 0.8 MG/10ML SOLUTION PREFILLED SYRINGE: Performed by: NURSE ANESTHETIST, CERTIFIED REGISTERED

## 2020-10-14 PROCEDURE — 76000 FLUOROSCOPY <1 HR PHYS/QHP: CPT

## 2020-10-14 PROCEDURE — C1713 ANCHOR/SCREW BN/BN,TIS/BN: HCPCS | Performed by: NEUROLOGICAL SURGERY

## 2020-10-14 PROCEDURE — 87070 CULTURE OTHR SPECIMN AEROBIC: CPT | Performed by: NEUROLOGICAL SURGERY

## 2020-10-14 PROCEDURE — 87205 SMEAR GRAM STAIN: CPT | Performed by: NEUROLOGICAL SURGERY

## 2020-10-14 PROCEDURE — 72100 X-RAY EXAM L-S SPINE 2/3 VWS: CPT

## 2020-10-14 PROCEDURE — 22513 PERQ VERTEBRAL AUGMENTATION: CPT | Performed by: NEUROLOGICAL SURGERY

## 2020-10-14 DEVICE — BONE CEMENT C01B HV-R WITH MIXER  US
Type: IMPLANTABLE DEVICE | Site: SPINE LUMBAR | Status: FUNCTIONAL
Brand: KYPHON® HV-R® BONE CEMENT AND KYPHON® MIXER PACK

## 2020-10-14 RX ORDER — FLUMAZENIL 0.1 MG/ML
0.2 INJECTION INTRAVENOUS AS NEEDED
Status: DISCONTINUED | OUTPATIENT
Start: 2020-10-14 | End: 2020-10-14 | Stop reason: HOSPADM

## 2020-10-14 RX ORDER — OXYCODONE AND ACETAMINOPHEN 10; 325 MG/1; MG/1
1 TABLET ORAL ONCE AS NEEDED
Status: COMPLETED | OUTPATIENT
Start: 2020-10-14 | End: 2020-10-14

## 2020-10-14 RX ORDER — SODIUM CHLORIDE, SODIUM LACTATE, POTASSIUM CHLORIDE, CALCIUM CHLORIDE 600; 310; 30; 20 MG/100ML; MG/100ML; MG/100ML; MG/100ML
1000 INJECTION, SOLUTION INTRAVENOUS CONTINUOUS
Status: DISCONTINUED | OUTPATIENT
Start: 2020-10-14 | End: 2020-10-14 | Stop reason: HOSPADM

## 2020-10-14 RX ORDER — SODIUM CHLORIDE 0.9 % (FLUSH) 0.9 %
3-10 SYRINGE (ML) INJECTION AS NEEDED
Status: DISCONTINUED | OUTPATIENT
Start: 2020-10-14 | End: 2020-10-14 | Stop reason: HOSPADM

## 2020-10-14 RX ORDER — OXYCODONE AND ACETAMINOPHEN 7.5; 325 MG/1; MG/1
2 TABLET ORAL EVERY 4 HOURS PRN
Status: DISCONTINUED | OUTPATIENT
Start: 2020-10-14 | End: 2020-10-14 | Stop reason: HOSPADM

## 2020-10-14 RX ORDER — NEOSTIGMINE METHYLSULFATE 5 MG/5 ML
SYRINGE (ML) INTRAVENOUS AS NEEDED
Status: DISCONTINUED | OUTPATIENT
Start: 2020-10-14 | End: 2020-10-14 | Stop reason: SURG

## 2020-10-14 RX ORDER — LABETALOL HYDROCHLORIDE 5 MG/ML
5 INJECTION, SOLUTION INTRAVENOUS
Status: DISCONTINUED | OUTPATIENT
Start: 2020-10-14 | End: 2020-10-14 | Stop reason: HOSPADM

## 2020-10-14 RX ORDER — EPHEDRINE SULFATE 50 MG/ML
INJECTION, SOLUTION INTRAVENOUS AS NEEDED
Status: DISCONTINUED | OUTPATIENT
Start: 2020-10-14 | End: 2020-10-14 | Stop reason: SURG

## 2020-10-14 RX ORDER — IBUPROFEN 600 MG/1
600 TABLET ORAL ONCE AS NEEDED
Status: DISCONTINUED | OUTPATIENT
Start: 2020-10-14 | End: 2020-10-14 | Stop reason: HOSPADM

## 2020-10-14 RX ORDER — FENTANYL CITRATE 50 UG/ML
25 INJECTION, SOLUTION INTRAMUSCULAR; INTRAVENOUS
Status: DISCONTINUED | OUTPATIENT
Start: 2020-10-14 | End: 2020-10-14 | Stop reason: HOSPADM

## 2020-10-14 RX ORDER — ROCURONIUM BROMIDE 10 MG/ML
INJECTION, SOLUTION INTRAVENOUS AS NEEDED
Status: DISCONTINUED | OUTPATIENT
Start: 2020-10-14 | End: 2020-10-14 | Stop reason: SURG

## 2020-10-14 RX ORDER — PROPOFOL 10 MG/ML
VIAL (ML) INTRAVENOUS AS NEEDED
Status: DISCONTINUED | OUTPATIENT
Start: 2020-10-14 | End: 2020-10-14 | Stop reason: SURG

## 2020-10-14 RX ORDER — SODIUM CHLORIDE 0.9 % (FLUSH) 0.9 %
3 SYRINGE (ML) INJECTION AS NEEDED
Status: DISCONTINUED | OUTPATIENT
Start: 2020-10-14 | End: 2020-10-14 | Stop reason: HOSPADM

## 2020-10-14 RX ORDER — LIDOCAINE HYDROCHLORIDE 10 MG/ML
0.5 INJECTION, SOLUTION EPIDURAL; INFILTRATION; INTRACAUDAL; PERINEURAL ONCE AS NEEDED
Status: DISCONTINUED | OUTPATIENT
Start: 2020-10-14 | End: 2020-10-14 | Stop reason: HOSPADM

## 2020-10-14 RX ORDER — MIDAZOLAM HYDROCHLORIDE 1 MG/ML
1 INJECTION INTRAMUSCULAR; INTRAVENOUS
Status: DISCONTINUED | OUTPATIENT
Start: 2020-10-14 | End: 2020-10-14 | Stop reason: HOSPADM

## 2020-10-14 RX ORDER — PHENYLEPHRINE HCL IN 0.9% NACL 0.8MG/10ML
SYRINGE (ML) INTRAVENOUS AS NEEDED
Status: DISCONTINUED | OUTPATIENT
Start: 2020-10-14 | End: 2020-10-14 | Stop reason: SURG

## 2020-10-14 RX ORDER — ONDANSETRON 2 MG/ML
4 INJECTION INTRAMUSCULAR; INTRAVENOUS ONCE AS NEEDED
Status: COMPLETED | OUTPATIENT
Start: 2020-10-14 | End: 2020-10-14

## 2020-10-14 RX ORDER — BUPIVACAINE HCL/0.9 % NACL/PF 0.1 %
2 PLASTIC BAG, INJECTION (ML) EPIDURAL ONCE
Status: COMPLETED | OUTPATIENT
Start: 2020-10-14 | End: 2020-10-14

## 2020-10-14 RX ORDER — FAMOTIDINE 10 MG/ML
20 INJECTION, SOLUTION INTRAVENOUS
Status: COMPLETED | OUTPATIENT
Start: 2020-10-14 | End: 2020-10-14

## 2020-10-14 RX ORDER — NALOXONE HCL 0.4 MG/ML
0.4 VIAL (ML) INJECTION AS NEEDED
Status: DISCONTINUED | OUTPATIENT
Start: 2020-10-14 | End: 2020-10-14 | Stop reason: HOSPADM

## 2020-10-14 RX ORDER — SODIUM CHLORIDE, SODIUM LACTATE, POTASSIUM CHLORIDE, CALCIUM CHLORIDE 600; 310; 30; 20 MG/100ML; MG/100ML; MG/100ML; MG/100ML
100 INJECTION, SOLUTION INTRAVENOUS CONTINUOUS
Status: DISCONTINUED | OUTPATIENT
Start: 2020-10-14 | End: 2020-10-14 | Stop reason: HOSPADM

## 2020-10-14 RX ORDER — ACETAMINOPHEN 500 MG
1000 TABLET ORAL ONCE
Status: COMPLETED | OUTPATIENT
Start: 2020-10-14 | End: 2020-10-14

## 2020-10-14 RX ORDER — SODIUM CHLORIDE 0.9 % (FLUSH) 0.9 %
3 SYRINGE (ML) INJECTION EVERY 12 HOURS SCHEDULED
Status: DISCONTINUED | OUTPATIENT
Start: 2020-10-14 | End: 2020-10-14 | Stop reason: HOSPADM

## 2020-10-14 RX ADMIN — Medication 3 MG: at 15:30

## 2020-10-14 RX ADMIN — SODIUM CHLORIDE, POTASSIUM CHLORIDE, SODIUM LACTATE AND CALCIUM CHLORIDE 100 ML/HR: 600; 310; 30; 20 INJECTION, SOLUTION INTRAVENOUS at 13:34

## 2020-10-14 RX ADMIN — Medication 2 G: at 14:44

## 2020-10-14 RX ADMIN — LIDOCAINE HYDROCHLORIDE 60 MG: 20 INJECTION, SOLUTION INTRAVENOUS at 14:36

## 2020-10-14 RX ADMIN — ONDANSETRON HYDROCHLORIDE 4 MG: 2 SOLUTION INTRAMUSCULAR; INTRAVENOUS at 16:14

## 2020-10-14 RX ADMIN — Medication 160 MCG: at 14:52

## 2020-10-14 RX ADMIN — FAMOTIDINE 20 MG: 10 INJECTION INTRAVENOUS at 13:52

## 2020-10-14 RX ADMIN — ROCURONIUM BROMIDE 25 MG: 10 INJECTION INTRAVENOUS at 14:36

## 2020-10-14 RX ADMIN — ACETAMINOPHEN 1000 MG: 500 TABLET, FILM COATED ORAL at 13:52

## 2020-10-14 RX ADMIN — PROPOFOL 120 MG: 10 INJECTION, EMULSION INTRAVENOUS at 14:36

## 2020-10-14 RX ADMIN — GLYCOPYRROLATE 0.4 MG: 0.2 INJECTION, SOLUTION INTRAMUSCULAR; INTRAVENOUS at 15:30

## 2020-10-14 RX ADMIN — VASOPRESSIN 0.5 ML: 20 INJECTION INTRAVENOUS at 14:57

## 2020-10-14 RX ADMIN — Medication 80 MCG: at 14:43

## 2020-10-14 RX ADMIN — OXYCODONE HYDROCHLORIDE AND ACETAMINOPHEN 1 TABLET: 10; 325 TABLET ORAL at 16:15

## 2020-10-14 RX ADMIN — EPHEDRINE SULFATE 20 MG: 50 INJECTION INTRAVENOUS at 14:50

## 2020-10-14 NOTE — ANESTHESIA PROCEDURE NOTES
Airway  Urgency: elective    Date/Time: 10/14/2020 2:40 PM  Airway not difficult    General Information and Staff    Patient location during procedure: OR  CRNA: Hakan Rhodes CRNA    Indications and Patient Condition  Indications for airway management: airway protection    Preoxygenated: yes  Mask difficulty assessment: 1 - vent by mask    Final Airway Details  Final airway type: endotracheal airway      Successful airway: ETT  Cuffed: yes   Successful intubation technique: direct laryngoscopy  Facilitating devices/methods: intubating stylet  Endotracheal tube insertion site: oral  Blade: Arroyo  Blade size: 3  ETT size (mm): 7.0  Cormack-Lehane Classification: grade IIa - partial view of glottis  Placement verified by: capnometry   Measured from: lips  Number of attempts at approach: 1  Assessment: lips, teeth, and gum same as pre-op and atraumatic intubation

## 2020-10-14 NOTE — OP NOTE
.   Procedure Note  Preop Diagnosis: Closed compression fracture of L2 lumbar vertebra, initial encounter (CMS/[facility]) [S32.020A]  Closed compression fracture of L4 lumbar vertebra, initial encounter (CMS/[facility]) [S32.040A]  Closed compression fracture of L1 lumbar vertebra, initial encounter (CMS/[facility]) [S32.010A]  T12 compression fracture, initial encounter (CMS/[facility]) [S22.080A]    Post-Op Diagnosis Codes:     * Closed compression fracture of L2 lumbar vertebra, initial encounter (CMS/[facility]) [S32.020A]     * Closed compression fracture of L4 lumbar vertebra, initial encounter (CMS/[facility]) [S32.040A]     * Closed compression fracture of L1 lumbar vertebra, initial encounter (CMS/[facility]) [S32.010A]     * T12 compression fracture, initial encounter (CMS/[facility]) [S22.080A]     Procedure Name: T12 and L1 Kyphoplasty and vertebral body biopsy    Indications:  A MRI of the T12 and L1 revealed findings of compression fracture of T12 and L1.  The patient now presents for T12 and L1 kyphoplasty and vertebral biopsy after discussing therapeutic alternatives.          Surgeon: Adrian Velasquez MD     Assistants: None    Anesthesia: General endotracheal anesthesia    ASA Class: 3    Procedure Details   After obtaining informed consent, having the risks and benefits of the procedure explained including but not limited to infection, bleeding, paralysis, spinal fluid leak, bowel bladder incontinence, extravasation of kyphoplasty cement resulting in neurocompression, pulmonary embolism, stroke, coma, and death, the patient was brought to the operating room.  The patient was given general anesthesia via an endotracheal tube. The patient was flipped prone onto a Jonathan axis table.  Portable fluoroscopy was used to localize level of T12 and L1 . Preplanned incisions of the left and right of midline were then marked with an indelible marker.  The patient was then prepped and draped in a standard sterile fashion.  The preplanned incisions were  infiltrated with Marcaine and epinephrine.  A 10 blade scalpel was used to make an incision at the dermis and epidermis.  Jamshidi needles were then advanced to the pedicles at the identified levels on the left and the right under direct fluoroscopic guidance.  The inner cannula was removed.  Biopsy samples were obtained from the left and the right at each level under direct fluoroscopic guidance. Hand drill was then used to drill channel through the fractured vertebral bodies from the left and the right under direct fluoroscopic guidance.  Kyphoplasty balloons were then inserted from the left side and the right side under direct fluoroscopic guidance into the vertebral bodies.  The balloons were inflated and deflated and then removed.  And then several syringes of kyphoplasty cement were injected into the fractured vertebral bodies under direct fluoroscopic guidance from the left and the right.  The cement was allowed to harden.  The Jamshidi needles were removed.  The wounds were then irrigated with an antibiotic solution and closed with Mastisol and Steri-Strips.  All sponge, needle and instrument counts were correct at the end of the procedure.  The patient was extubated in stable condition and returned to recovery room with about 50 mL of blood loss.        Findings:  Pathologic  compression fractures        Estimated Blood Loss:  50           Drains: none           Total IV Fluids: ml           Specimens:            Implants:   Implant Name Type Inv. Item Serial No.  Lot No. LRB No. Used Action   KT MIXER KYPHON W/CMT BONE KYPHX HV R - [id number] Implant KT MIXER KYPHON W/CMT BONE KYPHX HV R  [employer] [id number] N/A 1 Implanted              Complications: None           Disposition: PACU - hemodynamically stable.           Condition: stable        Adrian Velasquez MD

## 2020-10-14 NOTE — ANESTHESIA PREPROCEDURE EVALUATION
Anesthesia Evaluation     Patient summary reviewed and Nursing notes reviewed   no history of anesthetic complications:  NPO Solid Status: > 8 hours  NPO Liquid Status: > 8 hours           Airway   Mallampati: II  TM distance: >3 FB  Neck ROM: full  No difficulty expected  Dental          Pulmonary    (+) a smoker (quit 1985) Former,   Cardiovascular   Exercise tolerance: poor (<4 METS) (Walks with cane)    Patient on routine beta blocker and Beta blocker given within 24 hours of surgery    (+) hypertension, CAD, cardiac stents (1 stent placed 2018) more than 12 months ago hyperlipidemia,       Neuro/Psych  (-) seizures, TIA, CVA  GI/Hepatic/Renal/Endo    (+)  GERD,  renal disease CRI,     Musculoskeletal     Abdominal    Substance History      OB/GYN          Other   arthritis,    history of cancer (rectal cancer s/p total colectomy) active      Other Comment: H/o rectal cancer s/o surgery and chemo, now with possible pathologic compression fracture, presents for kyphoplasty                Anesthesia Plan    ASA 3     general     intravenous induction     Anesthetic plan, all risks, benefits, and alternatives have been provided, discussed and informed consent has been obtained with: patient.

## 2020-10-14 NOTE — DISCHARGE INSTRUCTIONS

## 2020-10-15 ENCOUNTER — ANESTHESIA (OUTPATIENT)
Dept: OPERATING ROOM | Age: 70
End: 2020-10-15
Payer: MEDICARE

## 2020-10-15 ENCOUNTER — HOSPITAL ENCOUNTER (OUTPATIENT)
Age: 70
Setting detail: OUTPATIENT SURGERY
Discharge: HOME | End: 2020-10-15
Attending: UROLOGY | Admitting: UROLOGY
Payer: MEDICARE

## 2020-10-15 ENCOUNTER — ANESTHESIA EVENT (OUTPATIENT)
Dept: OPERATING ROOM | Age: 70
End: 2020-10-15
Payer: MEDICARE

## 2020-10-15 ENCOUNTER — APPOINTMENT (OUTPATIENT)
Dept: GENERAL RADIOLOGY | Age: 70
End: 2020-10-15
Attending: UROLOGY
Payer: MEDICARE

## 2020-10-15 VITALS
DIASTOLIC BLOOD PRESSURE: 81 MMHG | RESPIRATION RATE: 15 BRPM | SYSTOLIC BLOOD PRESSURE: 134 MMHG | TEMPERATURE: 97.2 F | OXYGEN SATURATION: 100 %

## 2020-10-15 VITALS
OXYGEN SATURATION: 100 % | WEIGHT: 153 LBS | HEIGHT: 65 IN | SYSTOLIC BLOOD PRESSURE: 133 MMHG | RESPIRATION RATE: 18 BRPM | BODY MASS INDEX: 25.49 KG/M2 | HEART RATE: 78 BPM | DIASTOLIC BLOOD PRESSURE: 79 MMHG | TEMPERATURE: 97.3 F

## 2020-10-15 PROCEDURE — 6360000002 HC RX W HCPCS: Performed by: UROLOGY

## 2020-10-15 PROCEDURE — 6360000002 HC RX W HCPCS: Performed by: ANESTHESIOLOGY

## 2020-10-15 PROCEDURE — 7100000001 HC PACU RECOVERY - ADDTL 15 MIN: Performed by: UROLOGY

## 2020-10-15 PROCEDURE — 7100000000 HC PACU RECOVERY - FIRST 15 MIN: Performed by: UROLOGY

## 2020-10-15 PROCEDURE — 2580000003 HC RX 258: Performed by: ANESTHESIOLOGY

## 2020-10-15 PROCEDURE — 2500000003 HC RX 250 WO HCPCS: Performed by: NURSE ANESTHETIST, CERTIFIED REGISTERED

## 2020-10-15 PROCEDURE — 2709999900 HC NON-CHARGEABLE SUPPLY: Performed by: UROLOGY

## 2020-10-15 PROCEDURE — 52224 CYSTOSCOPY AND TREATMENT: CPT | Performed by: UROLOGY

## 2020-10-15 PROCEDURE — 52332 CYSTOSCOPY AND TREATMENT: CPT | Performed by: UROLOGY

## 2020-10-15 PROCEDURE — C1758 CATHETER, URETERAL: HCPCS | Performed by: UROLOGY

## 2020-10-15 PROCEDURE — 6360000002 HC RX W HCPCS: Performed by: NURSE ANESTHETIST, CERTIFIED REGISTERED

## 2020-10-15 PROCEDURE — C2617 STENT, NON-COR, TEM W/O DEL: HCPCS | Performed by: UROLOGY

## 2020-10-15 PROCEDURE — 3600000014 HC SURGERY LEVEL 4 ADDTL 15MIN: Performed by: UROLOGY

## 2020-10-15 PROCEDURE — 7100000010 HC PHASE II RECOVERY - FIRST 15 MIN: Performed by: UROLOGY

## 2020-10-15 PROCEDURE — 2580000003 HC RX 258: Performed by: UROLOGY

## 2020-10-15 PROCEDURE — C1769 GUIDE WIRE: HCPCS | Performed by: UROLOGY

## 2020-10-15 PROCEDURE — 3600000004 HC SURGERY LEVEL 4 BASE: Performed by: UROLOGY

## 2020-10-15 PROCEDURE — 3700000000 HC ANESTHESIA ATTENDED CARE: Performed by: UROLOGY

## 2020-10-15 PROCEDURE — 88305 TISSUE EXAM BY PATHOLOGIST: CPT

## 2020-10-15 PROCEDURE — 3700000001 HC ADD 15 MINUTES (ANESTHESIA): Performed by: UROLOGY

## 2020-10-15 PROCEDURE — 3209999900 FLUORO FOR SURGICAL PROCEDURES

## 2020-10-15 PROCEDURE — 6370000000 HC RX 637 (ALT 250 FOR IP): Performed by: UROLOGY

## 2020-10-15 DEVICE — URETERAL STENT
Type: IMPLANTABLE DEVICE | Site: URETER | Status: FUNCTIONAL
Brand: POLARIS™ ULTRA

## 2020-10-15 RX ORDER — HEPARIN SODIUM (PORCINE) LOCK FLUSH IV SOLN 100 UNIT/ML 100 UNIT/ML
300 SOLUTION INTRAVENOUS PRN
Status: DISCONTINUED | OUTPATIENT
Start: 2020-10-15 | End: 2020-10-15 | Stop reason: HOSPADM

## 2020-10-15 RX ORDER — HYDROMORPHONE HYDROCHLORIDE 1 MG/ML
0.25 INJECTION, SOLUTION INTRAMUSCULAR; INTRAVENOUS; SUBCUTANEOUS EVERY 5 MIN PRN
Status: DISCONTINUED | OUTPATIENT
Start: 2020-10-15 | End: 2020-10-15 | Stop reason: HOSPADM

## 2020-10-15 RX ORDER — LIDOCAINE HYDROCHLORIDE 10 MG/ML
INJECTION, SOLUTION INFILTRATION; PERINEURAL PRN
Status: DISCONTINUED | OUTPATIENT
Start: 2020-10-15 | End: 2020-10-15 | Stop reason: SDUPTHER

## 2020-10-15 RX ORDER — FENTANYL CITRATE 50 UG/ML
INJECTION, SOLUTION INTRAMUSCULAR; INTRAVENOUS PRN
Status: DISCONTINUED | OUTPATIENT
Start: 2020-10-15 | End: 2020-10-15 | Stop reason: SDUPTHER

## 2020-10-15 RX ORDER — MEPERIDINE HYDROCHLORIDE 50 MG/ML
12.5 INJECTION INTRAMUSCULAR; INTRAVENOUS; SUBCUTANEOUS EVERY 5 MIN PRN
Status: DISCONTINUED | OUTPATIENT
Start: 2020-10-15 | End: 2020-10-15 | Stop reason: HOSPADM

## 2020-10-15 RX ORDER — HYDROMORPHONE HYDROCHLORIDE 1 MG/ML
0.5 INJECTION, SOLUTION INTRAMUSCULAR; INTRAVENOUS; SUBCUTANEOUS EVERY 5 MIN PRN
Status: DISCONTINUED | OUTPATIENT
Start: 2020-10-15 | End: 2020-10-15 | Stop reason: HOSPADM

## 2020-10-15 RX ORDER — ENALAPRILAT 2.5 MG/2ML
1.25 INJECTION INTRAVENOUS
Status: DISCONTINUED | OUTPATIENT
Start: 2020-10-15 | End: 2020-10-15 | Stop reason: HOSPADM

## 2020-10-15 RX ORDER — HYDRALAZINE HYDROCHLORIDE 20 MG/ML
5 INJECTION INTRAMUSCULAR; INTRAVENOUS EVERY 10 MIN PRN
Status: DISCONTINUED | OUTPATIENT
Start: 2020-10-15 | End: 2020-10-15 | Stop reason: HOSPADM

## 2020-10-15 RX ORDER — ATROPA BELLADONNA AND OPIUM 16.2; 6 MG/1; MG/1
SUPPOSITORY RECTAL PRN
Status: DISCONTINUED | OUTPATIENT
Start: 2020-10-15 | End: 2020-10-15 | Stop reason: ALTCHOICE

## 2020-10-15 RX ORDER — DEXAMETHASONE SODIUM PHOSPHATE 10 MG/ML
INJECTION, SOLUTION INTRAMUSCULAR; INTRAVENOUS PRN
Status: DISCONTINUED | OUTPATIENT
Start: 2020-10-15 | End: 2020-10-15 | Stop reason: SDUPTHER

## 2020-10-15 RX ORDER — METOCLOPRAMIDE HYDROCHLORIDE 5 MG/ML
10 INJECTION INTRAMUSCULAR; INTRAVENOUS
Status: DISCONTINUED | OUTPATIENT
Start: 2020-10-15 | End: 2020-10-15 | Stop reason: HOSPADM

## 2020-10-15 RX ORDER — OXYCODONE HYDROCHLORIDE AND ACETAMINOPHEN 5; 325 MG/1; MG/1
2 TABLET ORAL EVERY 4 HOURS PRN
Status: DISCONTINUED | OUTPATIENT
Start: 2020-10-15 | End: 2020-10-15 | Stop reason: HOSPADM

## 2020-10-15 RX ORDER — ROCURONIUM BROMIDE 10 MG/ML
INJECTION, SOLUTION INTRAVENOUS PRN
Status: DISCONTINUED | OUTPATIENT
Start: 2020-10-15 | End: 2020-10-15 | Stop reason: SDUPTHER

## 2020-10-15 RX ORDER — ONDANSETRON 2 MG/ML
INJECTION INTRAMUSCULAR; INTRAVENOUS PRN
Status: DISCONTINUED | OUTPATIENT
Start: 2020-10-15 | End: 2020-10-15 | Stop reason: SDUPTHER

## 2020-10-15 RX ORDER — PROPOFOL 10 MG/ML
INJECTION, EMULSION INTRAVENOUS PRN
Status: DISCONTINUED | OUTPATIENT
Start: 2020-10-15 | End: 2020-10-15 | Stop reason: SDUPTHER

## 2020-10-15 RX ORDER — HYDROMORPHONE HYDROCHLORIDE 1 MG/ML
0.5 INJECTION, SOLUTION INTRAMUSCULAR; INTRAVENOUS; SUBCUTANEOUS
Status: DISCONTINUED | OUTPATIENT
Start: 2020-10-15 | End: 2020-10-15 | Stop reason: HOSPADM

## 2020-10-15 RX ORDER — DIPHENHYDRAMINE HYDROCHLORIDE 50 MG/ML
12.5 INJECTION INTRAMUSCULAR; INTRAVENOUS
Status: DISCONTINUED | OUTPATIENT
Start: 2020-10-15 | End: 2020-10-15 | Stop reason: HOSPADM

## 2020-10-15 RX ORDER — ONDANSETRON 2 MG/ML
4 INJECTION INTRAMUSCULAR; INTRAVENOUS EVERY 4 HOURS PRN
Status: DISCONTINUED | OUTPATIENT
Start: 2020-10-15 | End: 2020-10-15 | Stop reason: HOSPADM

## 2020-10-15 RX ORDER — PROMETHAZINE HYDROCHLORIDE 25 MG/ML
6.25 INJECTION, SOLUTION INTRAMUSCULAR; INTRAVENOUS
Status: DISCONTINUED | OUTPATIENT
Start: 2020-10-15 | End: 2020-10-15 | Stop reason: HOSPADM

## 2020-10-15 RX ORDER — LABETALOL HYDROCHLORIDE 5 MG/ML
5 INJECTION, SOLUTION INTRAVENOUS EVERY 10 MIN PRN
Status: DISCONTINUED | OUTPATIENT
Start: 2020-10-15 | End: 2020-10-15 | Stop reason: HOSPADM

## 2020-10-15 RX ORDER — SODIUM CHLORIDE 9 MG/ML
INJECTION, SOLUTION INTRAVENOUS CONTINUOUS
Status: DISCONTINUED | OUTPATIENT
Start: 2020-10-15 | End: 2020-10-15 | Stop reason: HOSPADM

## 2020-10-15 RX ORDER — SODIUM CHLORIDE, SODIUM LACTATE, POTASSIUM CHLORIDE, CALCIUM CHLORIDE 600; 310; 30; 20 MG/100ML; MG/100ML; MG/100ML; MG/100ML
INJECTION, SOLUTION INTRAVENOUS CONTINUOUS
Status: DISCONTINUED | OUTPATIENT
Start: 2020-10-15 | End: 2020-10-15 | Stop reason: HOSPADM

## 2020-10-15 RX ORDER — CIPROFLOXACIN 500 MG/1
500 TABLET, FILM COATED ORAL 2 TIMES DAILY
Qty: 6 TABLET | Refills: 0 | Status: SHIPPED | OUTPATIENT
Start: 2020-10-15 | End: 2020-10-18

## 2020-10-15 RX ADMIN — ONDANSETRON HYDROCHLORIDE 4 MG: 2 INJECTION, SOLUTION INTRAMUSCULAR; INTRAVENOUS at 15:34

## 2020-10-15 RX ADMIN — SUGAMMADEX 150 MG: 100 INJECTION, SOLUTION INTRAVENOUS at 15:33

## 2020-10-15 RX ADMIN — SODIUM CHLORIDE 1 G: 9 INJECTION INTRAMUSCULAR; INTRAVENOUS; SUBCUTANEOUS at 14:45

## 2020-10-15 RX ADMIN — LIDOCAINE HYDROCHLORIDE 50 MG: 10 INJECTION, SOLUTION INFILTRATION; PERINEURAL at 14:45

## 2020-10-15 RX ADMIN — PROPOFOL 150 MG: 10 INJECTION, EMULSION INTRAVENOUS at 14:45

## 2020-10-15 RX ADMIN — SODIUM CHLORIDE, SODIUM LACTATE, POTASSIUM CHLORIDE, AND CALCIUM CHLORIDE: 600; 310; 30; 20 INJECTION, SOLUTION INTRAVENOUS at 13:38

## 2020-10-15 RX ADMIN — HEPARIN 300 UNITS: 100 SYRINGE at 16:42

## 2020-10-15 RX ADMIN — ROCURONIUM BROMIDE 50 MG: 10 INJECTION, SOLUTION INTRAVENOUS at 14:45

## 2020-10-15 RX ADMIN — SODIUM CHLORIDE, SODIUM LACTATE, POTASSIUM CHLORIDE, AND CALCIUM CHLORIDE: 600; 310; 30; 20 INJECTION, SOLUTION INTRAVENOUS at 14:37

## 2020-10-15 RX ADMIN — FENTANYL CITRATE 50 MCG: 50 INJECTION INTRAMUSCULAR; INTRAVENOUS at 14:45

## 2020-10-15 RX ADMIN — DEXAMETHASONE SODIUM PHOSPHATE 10 MG: 10 INJECTION, SOLUTION INTRAMUSCULAR; INTRAVENOUS at 14:49

## 2020-10-15 ASSESSMENT — PAIN SCALES - GENERAL
PAINLEVEL_OUTOF10: 0

## 2020-10-15 ASSESSMENT — ENCOUNTER SYMPTOMS: SHORTNESS OF BREATH: 0

## 2020-10-15 ASSESSMENT — LIFESTYLE VARIABLES: SMOKING_STATUS: 0

## 2020-10-15 NOTE — ADDENDUM NOTE
Addendum  created 10/15/20 1546 by OLGA Kellogg CRNA    Intraprocedure Meds edited, Orders acknowledged in Narrator
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
## 2020-10-15 NOTE — INTERVAL H&P NOTE
Update History & Physical    The patient's History and Physical of September 28, 2020 was reviewed with the patient and I examined the patient. There was no change. The surgical site was confirmed by the patient and me. Plan: The risks, benefits, expected outcome, and alternative to the recommended procedure have been discussed with the patient. Patient understands and wants to proceed with the procedure.      Electronically signed by Keagan Joshi MD on 10/15/2020 at 1:38 PM

## 2020-10-15 NOTE — ANESTHESIA POSTPROCEDURE EVALUATION
Department of Anesthesiology  Postprocedure Note    Patient: Madhav Mendoza  MRN: [id number]  YOB: 1950  Date of evaluation: 10/15/2020  Time:  3:44 PM     Procedure Summary     Date:  10/15/20 Room / Location:  [facility] OR [facility]    Anesthesia Start:  1256 Anesthesia Stop:  1981    Procedures:       CYSTOSCOPY, BILATERAL URETERAL STENT CHANGES (Bilateral )      POSSIBLE BIOPSY FULGURATION/ TURBT  BLADDER TUMOR (N/A ) Diagnosis:  (HYDRONEPHOSIS, HISTORY OF BLADDER CANCER)    Surgeon:  Philippe Jack MD Responsible Provider:  OLGA Garza CRNA    Anesthesia Type:  general ASA Status:  3          Anesthesia Type: general    Abad Phase I:      Abad Phase II:      Last vitals: Reviewed and per EMR flowsheets. Anesthesia Post Evaluation    Patient location during evaluation: PACU  Patient participation: complete - patient participated  Level of consciousness: awake and alert  Pain score: 0  Airway patency: patent  Nausea & Vomiting: no nausea and no vomiting  Complications: no  Cardiovascular status: hemodynamically stable and blood pressure returned to baseline  Respiratory status: acceptable and nasal cannula  Hydration status: stable  Comments: Vital Signs Stable. Exchanging well. PACU RN received care.

## 2020-10-15 NOTE — PROGRESS NOTES
DE CANNULATED PORT FOLLOWING 20 ML NS FLUSH  UNITS HEPARIN FLUSH, NEEDLE INTACT, DRESSING APPLIED, GAUZE AND TEGADERM, SITE WNL, TOLERATED WELL

## 2020-10-16 NOTE — OP NOTE
[facility] [address], [address] [facility]                                OPERATIVE REPORT    PATIENT NAME: Franny Luo                   :        1950  MED REC NO:   [id number]                              ROOM:  ACCOUNT NO:   [de-identified]                           ADMIT DATE: 10/15/2020  PROVIDER:     Ping Kruse MD    DATE OF PROCEDURE:  10/15/2020    TITLE OF OPERATION:  1. Cystoscopy, biopsy and fulguration of bladder lesion, less than 0.5  cm.  2.  Bilateral ureteral stent removal and stent replacement with stent  changes with a 6 Armenian x 22 cm stent placed on the right and a 6 Armenian  x 20 cm placed on the left. PREOPERATIVE DIAGNOSES:  1. History of bladder cancer. 2.  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 POSTOPERATIVE DIAGNOSES:  1. History of bladder cancer. 2.  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 ANESTHETIC:  General anesthetic. ATTENDING SURGEON:  Ping Kruse MD    HISTORY:  The patient is a 71-year-old gentleman who has a history of  bladder cancer. He is due now for a routine surveillance cystoscopy. He just completed induction BCG because he has had multiple recurrences  of high-grade bladder cancer. This was completed on 2020. He  also has bilateral hydronephrosis secondary to bilateral ureteral  extrinsic obstruction from recurrent colorectal cancer and prior pelvic  radiation. This has been managed with chronic indwelling bilateral  ureteral stents. He will do his bilateral stent changes today. The  risks and complications of the procedure were discussed with him and he  agrees to proceed.     DESCRIPTION OF PROCEDURE:  The patient was brought to the operating  room, underwent general anesthetic. He was placed in the lithotomy  position. His genitalia was prepped and draped per routine sterile  fashion. He received a preoperative antibiotic. The time-out was  performed. A 22-Tanzanian cystoscope was inserted into the meatus and this  was advanced under direct vision. Penile and bulbar urethra appeared to  be normal.  Entering the prostatic urethra, he had really no significant  prostatic enlargement, just a small prostate. Bladder neck was widely  open. The stents were seen protruding from the bilateral ureteral  orifices. There was really no significant mucosal edema or irritation  from the stents. I then inspected the bladder with a 30 and a 70-degree  lens. There appeared to be no papillary tumor seen. There was an area  on the dome that showed some sort of raised mucosa. It looked more  inflammatory, maybe BCG change than any sort of papillary-appearing  tumor, but I felt we should go ahead and proceed with biopsy. So, using  cold cup biopsies, I took two small biopsies from this area and this was  then fulgurated with a Bugbee. These were sent for pathologic  examination. The rest of the bladder otherwise showed some  trabeculation and prior scarring from his previous biopsies, but again,  no discrete or obvious papillary tumors were seen. I then turned my attention to the stents. The left ureteral stent was  then grasped with alligator grasper and then extracted out the meatus. I placed a guidewire, a 0.035 through the stent and this was advanced up  into the left kidney. I then backloaded the guidewire through the  cystoscope and this was then advanced into the patient's bladder. A  5-Tanzanian catheter was advanced up into the left renal pelvis and I did  pull back retrograde. This showed moderate left hydronephrosis down to  about the pelvic brim and at this point, there was abrupt cutoff of the  left ureter.   There were no filling defects. The guidewire was  replaced. Over this guidewire, I initially placed a 6 Bahraini x 22 cm  stent because this was what he had in previously, but it was too long. So, I went ahead and removed the stent with a grasper, replaced the  guidewire, and then backloaded this through the scope again, which was  then advanced into the patient's bladder and then I placed a 6 Bahraini x  20 cm stent and this was positioned much better. It was coiled in the  renal pelvis in good position and coiled on the trigone in good  position. Previously, the longer stent was coiled in the prostatic  urethra. I then turned my attention to the right side. The right stent was then  grasped and this was extracted out the meatus. A 0.035 guidewire was  advanced up to the lumbar ureter and the guidewire was sort of deviated  medially and was somewhat tortuous and I was concerned that this was,  maybe, not falling in the course of the ureter, maybe false pass. So, I  went ahead and just left the guidewire in place. I backloaded the  catheter over the guidewire. I advanced the scope over the guidewire  and catheter into the bladder. I then advanced a 5-Bahraini open-ended  ureteral catheter using cystoscopic guidance over the guidewire up to  the ureter. The guidewire was removed and I injected contrast.   Basically, when I had pulled the stent out, the ureter sprung into  basically a tortuous corkscrew-type ureter and deviated laterally from  the medial course related to the stricture in the pelvis. So, this is  why the guidewire appeared to be not following the typical course of the  ureter. I replaced the catheter over the guidewire. Then with  manipulation, I was eventually able to get the guidewire to go up into  the renal pelvis and the catheter was then advanced over the renal  pelvis. Once I had done this, this basically straightened out the  course.   I removed the guidewire and got a nice hydronephrotic drip  confirming I was indeed within the right position and within the lumen. The wire was replaced, the catheter was removed. Over this guidewire,  then a 6 Icelandic x 22 cm right double-J ureteral stent was advanced  without difficulty, coiled in the renal pelvis and coiled in the bladder  in good position. At this point, the procedure was terminated. The  bladder was emptied and the scope was removed. Estimated blood loss was  0. The patient was awakened from his anesthetic and taken to the  recovery room in stable condition. He will need a followup in 2 months  to arrange for his next stent change. We will contact him by phone with  the results of the biopsy.         Mae Valiente MD    D: 10/15/2020 17:08:58      T: 10/15/2020 18:25:18     [email or web address]  Job#: [id number]     Doc#: [id number]    CC:

## 2020-10-17 LAB
BACTERIA SPEC AEROBE CULT: NORMAL
GRAM STN SPEC: NORMAL
GRAM STN SPEC: NORMAL

## 2020-10-20 NOTE — PROGRESS NOTES
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 face to face encounter with the patient, out of which more than 50% of the time was spent in counseling patient or family and coordination of care. Counseling included but was not limited to time spent reviewing labs, imaging studies/ treatment plan and answering questions.

## 2020-10-21 ENCOUNTER — HOSPITAL ENCOUNTER (OUTPATIENT)
Dept: INFUSION THERAPY | Age: 70
Discharge: HOME | End: 2020-10-21
Payer: MEDICARE

## 2020-10-21 ENCOUNTER — HOSPITAL ENCOUNTER (OUTPATIENT)
Dept: INFUSION THERAPY | Age: 70
Setting detail: INFUSION SERIES
Discharge: HOME | End: 2020-10-21
Payer: MEDICARE

## 2020-10-21 ENCOUNTER — OFFICE VISIT (OUTPATIENT)
Dept: HEMATOLOGY | Age: 70
End: 2020-10-21
Payer: MEDICARE

## 2020-10-21 VITALS
WEIGHT: 158 LBS | DIASTOLIC BLOOD PRESSURE: 58 MMHG | HEIGHT: 65 IN | SYSTOLIC BLOOD PRESSURE: 122 MMHG | HEART RATE: 41 BPM | OXYGEN SATURATION: 99 % | BODY MASS INDEX: 26.33 KG/M2 | TEMPERATURE: 97.3 F

## 2020-10-21 VITALS
TEMPERATURE: 97.4 F | SYSTOLIC BLOOD PRESSURE: 94 MMHG | OXYGEN SATURATION: 97 % | RESPIRATION RATE: 18 BRPM | DIASTOLIC BLOOD PRESSURE: 51 MMHG | HEART RATE: 61 BPM

## 2020-10-21 DIAGNOSIS — C18.9 METASTASIS FROM COLON CANCER (HCC): ICD-10-CM

## 2020-10-21 DIAGNOSIS — C79.9 METASTASIS FROM COLON CANCER (HCC): ICD-10-CM

## 2020-10-21 DIAGNOSIS — C19 COLORECTAL CANCER (HCC): ICD-10-CM

## 2020-10-21 DIAGNOSIS — C19 COLORECTAL CANCER (HCC): Primary | ICD-10-CM

## 2020-10-21 LAB
ALBUMIN SERPL-MCNC: 4 G/DL (ref 3.5–5.2)
ALP BLD-CCNC: 138 U/L (ref 40–130)
ALT SERPL-CCNC: 15 U/L (ref 21–72)
ANION GAP SERPL CALCULATED.3IONS-SCNC: 8 MMOL/L (ref 7–19)
AST SERPL-CCNC: 24 U/L (ref 17–59)
BASOPHILS ABSOLUTE: 0.03 K/UL (ref 0.01–0.08)
BASOPHILS RELATIVE PERCENT: 0.4 % (ref 0.1–1.2)
BILIRUB SERPL-MCNC: 0.4 MG/DL (ref 0.2–1.3)
BUN BLDV-MCNC: 14 MG/DL (ref 9–20)
CALCIUM SERPL-MCNC: 9 MG/DL (ref 8.4–10.2)
CEA: 2 NG/ML (ref 0–4.7)
CHLORIDE BLD-SCNC: 104 MMOL/L (ref 98–111)
CO2: 24 MMOL/L (ref 22–29)
CREAT SERPL-MCNC: 1.3 MG/DL (ref 0.6–1.2)
CYTO UR: NORMAL
EOSINOPHILS ABSOLUTE: 0.34 K/UL (ref 0.04–0.54)
EOSINOPHILS RELATIVE PERCENT: 4.6 % (ref 0.7–7)
GFR NON-AFRICAN AMERICAN: 55
GLOBULIN: 2.7 G/DL
GLUCOSE BLD-MCNC: 118 MG/DL (ref 74–106)
HCT VFR BLD CALC: 34.1 % (ref 40.1–51)
HEMOGLOBIN: 11.7 G/DL (ref 13.7–17.5)
LAB AP CASE REPORT: NORMAL
LAB AP CLINICAL INFORMATION: NORMAL
LYMPHOCYTES ABSOLUTE: 0.72 K/UL (ref 1.18–3.74)
LYMPHOCYTES RELATIVE PERCENT: 9.7 % (ref 19.3–53.1)
MCH RBC QN AUTO: 30.4 PG (ref 25.7–32.2)
MCHC RBC AUTO-ENTMCNC: 34.3 G/DL (ref 32.3–36.5)
MCV RBC AUTO: 88.6 FL (ref 79–92.2)
MONOCYTES ABSOLUTE: 0.66 K/UL (ref 0.24–0.82)
MONOCYTES RELATIVE PERCENT: 8.9 % (ref 4.7–12.5)
NEUTROPHILS ABSOLUTE: 5.67 K/UL (ref 1.56–6.13)
NEUTROPHILS RELATIVE PERCENT: 76.4 % (ref 34–71.1)
PATH REPORT.FINAL DX SPEC: NORMAL
PATH REPORT.GROSS SPEC: NORMAL
PDW BLD-RTO: 13.3 % (ref 11.6–14.4)
PLATELET # BLD: 287 K/UL (ref 163–337)
PMV BLD AUTO: 10 FL (ref 7.4–10.4)
POTASSIUM SERPL-SCNC: 4.2 MMOL/L (ref 3.5–5.1)
RBC # BLD: 3.85 M/UL (ref 4.63–6.08)
SODIUM BLD-SCNC: 136 MMOL/L (ref 137–145)
TOTAL PROTEIN: 6.7 G/DL (ref 6.3–8.2)
WBC # BLD: 7.42 K/UL (ref 4.23–9.07)

## 2020-10-21 PROCEDURE — G8417 CALC BMI ABV UP PARAM F/U: HCPCS | Performed by: INTERNAL MEDICINE

## 2020-10-21 PROCEDURE — 2580000003 HC RX 258: Performed by: INTERNAL MEDICINE

## 2020-10-21 PROCEDURE — 6360000002 HC RX W HCPCS: Performed by: INTERNAL MEDICINE

## 2020-10-21 PROCEDURE — 1123F ACP DISCUSS/DSCN MKR DOCD: CPT | Performed by: INTERNAL MEDICINE

## 2020-10-21 PROCEDURE — 36415 COLL VENOUS BLD VENIPUNCTURE: CPT | Performed by: INTERNAL MEDICINE

## 2020-10-21 PROCEDURE — 96413 CHEMO IV INFUSION 1 HR: CPT

## 2020-10-21 PROCEDURE — 96411 CHEMO IV PUSH ADDL DRUG: CPT

## 2020-10-21 PROCEDURE — G8427 DOCREV CUR MEDS BY ELIG CLIN: HCPCS | Performed by: INTERNAL MEDICINE

## 2020-10-21 PROCEDURE — 3017F COLORECTAL CA SCREEN DOC REV: CPT | Performed by: INTERNAL MEDICINE

## 2020-10-21 PROCEDURE — 82378 CARCINOEMBRYONIC ANTIGEN: CPT

## 2020-10-21 PROCEDURE — 1036F TOBACCO NON-USER: CPT | Performed by: INTERNAL MEDICINE

## 2020-10-21 PROCEDURE — 96366 THER/PROPH/DIAG IV INF ADDON: CPT

## 2020-10-21 PROCEDURE — G0498 CHEMO EXTEND IV INFUS W/PUMP: HCPCS

## 2020-10-21 PROCEDURE — 6370000000 HC RX 637 (ALT 250 FOR IP): Performed by: INTERNAL MEDICINE

## 2020-10-21 PROCEDURE — 85025 COMPLETE CBC W/AUTO DIFF WBC: CPT

## 2020-10-21 PROCEDURE — G8484 FLU IMMUNIZE NO ADMIN: HCPCS | Performed by: INTERNAL MEDICINE

## 2020-10-21 PROCEDURE — 80053 COMPREHEN METABOLIC PANEL: CPT

## 2020-10-21 PROCEDURE — 4040F PNEUMOC VAC/ADMIN/RCVD: CPT | Performed by: INTERNAL MEDICINE

## 2020-10-21 PROCEDURE — 99214 OFFICE O/P EST MOD 30 MIN: CPT | Performed by: INTERNAL MEDICINE

## 2020-10-21 RX ORDER — SODIUM CHLORIDE 0.9 % (FLUSH) 0.9 %
10 SYRINGE (ML) INJECTION PRN
Status: CANCELLED | OUTPATIENT
Start: 2020-10-21

## 2020-10-21 RX ORDER — SODIUM CHLORIDE 9 MG/ML
INJECTION, SOLUTION INTRAVENOUS ONCE
Status: CANCELLED | OUTPATIENT
Start: 2020-10-21

## 2020-10-21 RX ORDER — ACETAMINOPHEN 500 MG
1000 TABLET ORAL ONCE
Status: COMPLETED | OUTPATIENT
Start: 2020-10-21 | End: 2020-10-21

## 2020-10-21 RX ORDER — SODIUM CHLORIDE 0.9 % (FLUSH) 0.9 %
5 SYRINGE (ML) INJECTION PRN
Status: CANCELLED | OUTPATIENT
Start: 2020-10-21

## 2020-10-21 RX ORDER — DIPHENHYDRAMINE HYDROCHLORIDE 50 MG/ML
50 INJECTION INTRAMUSCULAR; INTRAVENOUS ONCE
Status: CANCELLED | OUTPATIENT
Start: 2020-10-21

## 2020-10-21 RX ORDER — FLUOROURACIL 50 MG/ML
400 INJECTION, SOLUTION INTRAVENOUS ONCE
Status: CANCELLED | OUTPATIENT
Start: 2020-10-21

## 2020-10-21 RX ORDER — DEXAMETHASONE SODIUM PHOSPHATE 10 MG/ML
10 INJECTION, SOLUTION INTRAMUSCULAR; INTRAVENOUS ONCE
Status: COMPLETED | OUTPATIENT
Start: 2020-10-21 | End: 2020-10-21

## 2020-10-21 RX ORDER — ACETAMINOPHEN 325 MG/1
1000 TABLET ORAL ONCE
Status: CANCELLED
Start: 2020-10-21

## 2020-10-21 RX ORDER — DIPHENHYDRAMINE HYDROCHLORIDE 50 MG/ML
50 INJECTION INTRAMUSCULAR; INTRAVENOUS ONCE
Status: CANCELLED
Start: 2020-10-21

## 2020-10-21 RX ORDER — HEPARIN SODIUM (PORCINE) LOCK FLUSH IV SOLN 100 UNIT/ML 100 UNIT/ML
500 SOLUTION INTRAVENOUS PRN
Status: CANCELLED | OUTPATIENT
Start: 2020-10-21

## 2020-10-21 RX ORDER — SODIUM CHLORIDE 9 MG/ML
INJECTION, SOLUTION INTRAVENOUS CONTINUOUS
Status: CANCELLED | OUTPATIENT
Start: 2020-10-21

## 2020-10-21 RX ORDER — FLUOROURACIL 50 MG/ML
400 INJECTION, SOLUTION INTRAVENOUS ONCE
Status: COMPLETED | OUTPATIENT
Start: 2020-10-21 | End: 2020-10-21

## 2020-10-21 RX ORDER — DIPHENHYDRAMINE HYDROCHLORIDE 50 MG/ML
50 INJECTION INTRAMUSCULAR; INTRAVENOUS ONCE
Status: COMPLETED | OUTPATIENT
Start: 2020-10-21 | End: 2020-10-21

## 2020-10-21 RX ORDER — EPINEPHRINE 1 MG/ML
0.3 INJECTION, SOLUTION, CONCENTRATE INTRAVENOUS PRN
Status: CANCELLED | OUTPATIENT
Start: 2020-10-21

## 2020-10-21 RX ORDER — METHYLPREDNISOLONE SODIUM SUCCINATE 125 MG/2ML
125 INJECTION, POWDER, LYOPHILIZED, FOR SOLUTION INTRAMUSCULAR; INTRAVENOUS ONCE
Status: CANCELLED | OUTPATIENT
Start: 2020-10-21

## 2020-10-21 RX ORDER — SODIUM CHLORIDE 9 MG/ML
INJECTION, SOLUTION INTRAVENOUS ONCE
Status: COMPLETED | OUTPATIENT
Start: 2020-10-21 | End: 2020-10-21

## 2020-10-21 RX ORDER — ONDANSETRON 4 MG/1
8 TABLET, FILM COATED ORAL EVERY 8 HOURS PRN
Qty: 30 TABLET | Refills: 2 | Status: SHIPPED | OUTPATIENT
Start: 2020-10-21 | End: 2020-11-18 | Stop reason: SDUPTHER

## 2020-10-21 RX ADMIN — DIPHENHYDRAMINE HYDROCHLORIDE 50 MG: 50 INJECTION, SOLUTION INTRAMUSCULAR; INTRAVENOUS at 12:11

## 2020-10-21 RX ADMIN — ACETAMINOPHEN 1000 MG: 500 TABLET, FILM COATED ORAL at 12:10

## 2020-10-21 RX ADMIN — FLUOROURACIL 4340 MG: 50 INJECTION, SOLUTION INTRAVENOUS at 15:25

## 2020-10-21 RX ADMIN — DEXAMETHASONE SODIUM PHOSPHATE 10 MG: 10 INJECTION, SOLUTION INTRAMUSCULAR; INTRAVENOUS at 12:10

## 2020-10-21 RX ADMIN — LEUCOVORIN CALCIUM 700 MG: 350 INJECTION, POWDER, LYOPHILIZED, FOR SOLUTION INTRAMUSCULAR; INTRAVENOUS at 13:39

## 2020-10-21 RX ADMIN — PANITUMUMAB 430 MG: 400 SOLUTION INTRAVENOUS at 12:28

## 2020-10-21 RX ADMIN — ONDANSETRON 16 MG: 2 INJECTION INTRAMUSCULAR; INTRAVENOUS at 11:38

## 2020-10-21 RX ADMIN — SODIUM CHLORIDE 250 ML: 9 INJECTION, SOLUTION INTRAVENOUS at 11:33

## 2020-10-21 RX ADMIN — FLUOROURACIL 720 MG: 50 INJECTION, SOLUTION INTRAVENOUS at 15:25

## 2020-10-21 ASSESSMENT — PAIN SCALES - GENERAL: PAINLEVEL_OUTOF10: 0

## 2020-10-23 ENCOUNTER — TELEPHONE (OUTPATIENT)
Dept: UROLOGY | Age: 70
End: 2020-10-23

## 2020-10-23 ENCOUNTER — HOSPITAL ENCOUNTER (OUTPATIENT)
Dept: INFUSION THERAPY | Age: 70
Setting detail: INFUSION SERIES
Discharge: HOME | End: 2020-10-23
Payer: MEDICARE

## 2020-10-23 PROCEDURE — 96523 IRRIG DRUG DELIVERY DEVICE: CPT

## 2020-10-23 PROCEDURE — 6360000002 HC RX W HCPCS: Performed by: NURSE PRACTITIONER

## 2020-10-23 RX ORDER — HEPARIN SODIUM (PORCINE) LOCK FLUSH IV SOLN 100 UNIT/ML 100 UNIT/ML
500 SOLUTION INTRAVENOUS PRN
Status: DISCONTINUED | OUTPATIENT
Start: 2020-10-23 | End: 2020-10-25 | Stop reason: HOSPADM

## 2020-10-23 RX ADMIN — HEPARIN 500 UNITS: 100 SYRINGE at 13:49

## 2020-10-23 NOTE — TELEPHONE ENCOUNTER
PT is questioning his stent change appt in December. Is that correct? He thinks it should be in January.

## 2020-10-27 ENCOUNTER — OFFICE VISIT (OUTPATIENT)
Dept: PRIMARY CARE CLINIC | Age: 70
End: 2020-10-27
Payer: MEDICARE

## 2020-10-27 VITALS
SYSTOLIC BLOOD PRESSURE: 102 MMHG | DIASTOLIC BLOOD PRESSURE: 68 MMHG | WEIGHT: 153 LBS | BODY MASS INDEX: 25.49 KG/M2 | RESPIRATION RATE: 20 BRPM | OXYGEN SATURATION: 98 % | HEIGHT: 65 IN | HEART RATE: 122 BPM | TEMPERATURE: 98.6 F

## 2020-10-27 PROCEDURE — G0009 ADMIN PNEUMOCOCCAL VACCINE: HCPCS | Performed by: STUDENT IN AN ORGANIZED HEALTH CARE EDUCATION/TRAINING PROGRAM

## 2020-10-27 PROCEDURE — 90670 PCV13 VACCINE IM: CPT | Performed by: STUDENT IN AN ORGANIZED HEALTH CARE EDUCATION/TRAINING PROGRAM

## 2020-10-27 PROCEDURE — 99203 OFFICE O/P NEW LOW 30 MIN: CPT | Performed by: STUDENT IN AN ORGANIZED HEALTH CARE EDUCATION/TRAINING PROGRAM

## 2020-10-27 RX ORDER — DULOXETIN HYDROCHLORIDE 30 MG/1
30 CAPSULE, DELAYED RELEASE ORAL DAILY
Qty: 30 CAPSULE | Refills: 3 | Status: SHIPPED | OUTPATIENT
Start: 2020-10-27 | End: 2021-03-10

## 2020-10-27 RX ORDER — BISOPROLOL FUMARATE 5 MG/1
5 TABLET ORAL DAILY
Qty: 90 TABLET | Refills: 2 | Status: SHIPPED | OUTPATIENT
Start: 2020-10-27 | End: 2021-07-13

## 2020-10-27 RX ORDER — CYCLOBENZAPRINE HCL 10 MG
10 TABLET ORAL 3 TIMES DAILY PRN
Qty: 90 TABLET | Refills: 2 | Status: SHIPPED | OUTPATIENT
Start: 2020-10-27

## 2020-10-27 RX ORDER — OMEPRAZOLE 20 MG/1
20 CAPSULE, DELAYED RELEASE ORAL 2 TIMES DAILY
Qty: 90 CAPSULE | Refills: 3 | Status: SHIPPED | OUTPATIENT
Start: 2020-10-27 | End: 2020-11-18 | Stop reason: ALTCHOICE

## 2020-10-27 RX ORDER — GABAPENTIN 800 MG/1
800 TABLET ORAL 3 TIMES DAILY
Qty: 180 TABLET | Refills: 2 | Status: SHIPPED | OUTPATIENT
Start: 2020-10-27 | End: 2021-05-19

## 2020-10-27 RX ORDER — ZOSTER VACCINE RECOMBINANT, ADJUVANTED 50 MCG/0.5
0.5 KIT INTRAMUSCULAR SEE ADMIN INSTRUCTIONS
Qty: 0.5 ML | Refills: 0 | Status: SHIPPED | OUTPATIENT
Start: 2020-10-27 | End: 2021-04-25

## 2020-10-27 ASSESSMENT — PATIENT HEALTH QUESTIONNAIRE - PHQ9
SUM OF ALL RESPONSES TO PHQ9 QUESTIONS 1 & 2: 2
2. FEELING DOWN, DEPRESSED OR HOPELESS: 1
DEPRESSION UNABLE TO ASSESS: FUNCTIONAL CAPACITY MOTIVATION LIMITS ACCURACY
SUM OF ALL RESPONSES TO PHQ QUESTIONS 1-9: 2
SUM OF ALL RESPONSES TO PHQ QUESTIONS 1-9: 2
1. LITTLE INTEREST OR PLEASURE IN DOING THINGS: 1
SUM OF ALL RESPONSES TO PHQ QUESTIONS 1-9: 2
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
## 2020-10-28 ASSESSMENT — ENCOUNTER SYMPTOMS
DIARRHEA: 0
EYE PAIN: 0
EYE DISCHARGE: 0
SORE THROAT: 0
NAUSEA: 0
COUGH: 0
SHORTNESS OF BREATH: 0
RHINORRHEA: 0
ABDOMINAL PAIN: 0

## 2020-10-29 NOTE — TELEPHONE ENCOUNTER
I spoke with patient and let him know that per Dr Vivian Keyes Note he was to follow up in 2 months to schedule his stent change.  Patient voiced understanding

## 2020-11-04 ENCOUNTER — HOSPITAL ENCOUNTER (OUTPATIENT)
Dept: INFUSION THERAPY | Age: 70
End: 2020-11-04
Payer: MEDICARE

## 2020-11-06 ENCOUNTER — APPOINTMENT (OUTPATIENT)
Dept: INFUSION THERAPY | Age: 70
End: 2020-11-06
Payer: MEDICARE

## 2020-11-10 ENCOUNTER — OFFICE VISIT (OUTPATIENT)
Dept: NEUROSURGERY | Facility: CLINIC | Age: 70
End: 2020-11-10

## 2020-11-10 ENCOUNTER — TELEPHONE (OUTPATIENT)
Dept: NEUROSURGERY | Facility: CLINIC | Age: 70
End: 2020-11-10

## 2020-11-10 ENCOUNTER — HOSPITAL ENCOUNTER (OUTPATIENT)
Dept: GENERAL RADIOLOGY | Facility: HOSPITAL | Age: 70
Discharge: HOME | End: 2020-11-10
Admitting: NURSE PRACTITIONER

## 2020-11-10 VITALS
HEIGHT: 65 IN | BODY MASS INDEX: 24.66 KG/M2 | DIASTOLIC BLOOD PRESSURE: 68 MMHG | WEIGHT: 148 LBS | SYSTOLIC BLOOD PRESSURE: 90 MMHG

## 2020-11-10 DIAGNOSIS — Z78.9 NON-SMOKER: ICD-10-CM

## 2020-11-10 DIAGNOSIS — S32.010G CLOSED WEDGE COMPRESSION FRACTURE OF L1 VERTEBRA WITH DELAYED HEALING: ICD-10-CM

## 2020-11-10 DIAGNOSIS — S22.080G CLOSED WEDGE COMPRESSION FRACTURE OF T12 VERTEBRA WITH DELAYED HEALING, SUBSEQUENT ENCOUNTER: ICD-10-CM

## 2020-11-10 DIAGNOSIS — S32.040G: ICD-10-CM

## 2020-11-10 DIAGNOSIS — S32.020G: ICD-10-CM

## 2020-11-10 DIAGNOSIS — S32.020G: Primary | ICD-10-CM

## 2020-11-10 PROCEDURE — 72100 X-RAY EXAM L-S SPINE 2/3 VWS: CPT

## 2020-11-10 PROCEDURE — 99213 OFFICE O/P EST LOW 20 MIN: CPT | Performed by: NURSE PRACTITIONER

## 2020-11-10 PROCEDURE — 72070 X-RAY EXAM THORAC SPINE 2VWS: CPT

## 2020-11-10 RX ORDER — FLUCONAZOLE 100 MG/1
100 TABLET ORAL DAILY
Qty: 14 TABLET | Refills: 0 | Status: SHIPPED | OUTPATIENT
Start: 2020-11-10 | End: 2020-11-18

## 2020-11-10 NOTE — PATIENT INSTRUCTIONS
Advance Care Planning and Advance Directives     You make decisions on a daily basis - decisions about where you want to live, your career, your home, your life. Perhaps one of the most important decisions you face is your choice for future medical care. Take time to talk with your family and your healthcare team and start planning today.  Advance Care Planning is a process that can help you:  · Understand possible future healthcare decisions in light of your own experiences  · Reflect on those decision in light of your goals and values  · Discuss your decisions with those closest to you and the healthcare professionals that care for you  · Make a plan by creating a document that reflects your wishes    Surrogate Decision Maker  In the event of a medical emergency, which has left you unable to communicate or to make your own decisions, you would need someone to make decisions for you.  It is important to discuss your preferences for medical treatment with this person while you are in good health.     Qualities of a surrogate decision maker:  • Willing to take on this role and responsibility  • Knows what you want for future medical care  • Willing to follow your wishes even if they don't agree with them  • Able to make difficult medical decisions under stressful circumstances    Advance Directives  These are legal documents you can create that will guide your healthcare team and decision maker(s) when needed. These documents can be stored in the electronic medical record.    · Living Will - a legal document to guide your care if you have a terminal condition or a serious illness and are unable to communicate. States vary by statute in document names/types, but most forms may include one or more of the following:        -  Directions regarding life-prolonging treatments        -  Directions regarding artificially provided nutrition/hydration        -  Choosing a healthcare decision maker        -  Direction  regarding organ/tissue donation    · Durable Power of  for Healthcare - this document names an -in-fact to make medical decisions for you, but it may also allow this person to make personal and financial decisions for you. Please seek the advice of an  if you need this type of document.    **Advance Directives are not required and no one may discriminate against you if you do not sign one.    Medical Orders  Many states allow specific forms/orders signed by your physician to record your wishes for medical treatment in your current state of health. This form, signed in personal communication with your physician, addresses resuscitation and other medical interventions that you may or may not want.      For more information or to schedule a time with a [facility] Advance Care Planning Facilitator contact: [email or web address] or call [phone] and someone will contact you directly.

## 2020-11-10 NOTE — PROGRESS NOTES
Diflucan po ordered for empiric treatment of candida esophagitis. (patient has hx of this, noted thrush and symptoms).

## 2020-11-10 NOTE — PROGRESS NOTES
"  Chief complaint:   Chief Complaint   Patient presents with   • Back Pain     Jelani is returning after a Kyphoplasty for a compression fracture, he did well for a couple weeks and now feels like his pain has returned.         Subjective     HPI: This is a 70 y.o. male patient who went to the operating room on 10/14/2020 for a T12 and L1 kyphoplasty and biopsy with 2 c-arms. The patient is here in follow up today for postoperative visit.  The patient says that he was doing really good for the first couple of weeks but over the last week he has noticed increased pain in his low back.  He says that he is still doing better than what he was prior to the surgery but he does notice whenever he is walking that he has increased pain in his low back.  Denies any lower extremity pain.  He does still have some numbness and tingling in his right lower extremity but this has been going on for quite some time now.  Rates his pain on a scale 0-10 at a 6.  He is using a cane to help get around and using a wheelchair for long distances        Review of Systems   Musculoskeletal: Positive for back pain.   Neurological: Negative.          Objective      Vital Signs  BP 90/68 (BP Location: Right arm, Patient Position: Sitting)   Ht 165.1 cm (65\")   Wt 67.1 kg (148 lb)   BMI 24.63 kg/m²     Physical Exam  Constitutional:       Appearance: Normal appearance. He is well-developed.   HENT:      Head: Normocephalic.   Eyes:      General: Lids are normal.      Conjunctiva/sclera: Conjunctivae normal.      Pupils: Pupils are equal, round, and reactive to light.   Neck:      Musculoskeletal: Normal range of motion.   Cardiovascular:      Rate and Rhythm: Normal rate and regular rhythm.   Pulmonary:      Effort: Pulmonary effort is normal.      Breath sounds: Normal breath sounds.   Musculoskeletal: Normal range of motion.      Lumbar back: He exhibits pain.   Skin:     General: Skin is warm.   Neurological:      Mental Status: He is " alert and oriented to person, place, and time.      GCS: GCS eye subscore is 4. GCS verbal subscore is 5. GCS motor subscore is 6.      Cranial Nerves: No cranial nerve deficit.      Sensory: No sensory deficit.      Motor: Weakness present.      Gait: Gait abnormal.      Deep Tendon Reflexes: Reflexes are normal and symmetric. Reflexes normal.      Comments: Walks independently with a cane with preference to the right lower extremity   Psychiatric:         Speech: Speech normal.         Behavior: Behavior normal.         Thought Content: Thought content normal.       Incisions clean dry and intact    Neurologic Exam     Mental Status   Oriented to person, place, and time.   Speech: speech is normal     Cranial Nerves     CN III, IV, VI   Pupils are equal, round, and reactive to light.      Results Review: No new imaging          Assessment/Plan: I am going to send the patient for x-rays of the thoracic and lumbar spine to see if there is any new fracture that may have developed after the kyphoplasty.  We will follow-up with him after imaging is completed.  He will see Dr. Velasquez in 8 to 10 weeks unless we need to see him back sooner.    Patient is a nonsmoker  The patient's Body mass index is 24.63 kg/m².. BMI is within normal parameters. No follow-up required.   Advance Care Planning   ACP discussion was held with the patient during this visit. Patient does not have an advance directive, information provided.      Diagnoses and all orders for this visit:    1. Closed wedge compression fracture of L2 vertebra with delayed healing (Primary)  -     XR Spine Thoracic 2 View; Future  -     XR Spine Lumbar AP & Lateral; Future    2. Closed wedge compression fracture of L1 vertebra with delayed healing  -     XR Spine Thoracic 2 View; Future  -     XR Spine Lumbar AP & Lateral; Future    3. Closed wedge compression fracture of L4 vertebra with delayed healing  -     XR Spine Thoracic 2 View; Future  -     XR Spine Lumbar  AP & Lateral; Future    4. Closed wedge compression fracture of T12 vertebra with delayed healing, subsequent encounter  -     XR Spine Thoracic 2 View; Future  -     XR Spine Lumbar AP & Lateral; Future    5. Non-smoker    6. Body mass index (BMI) of 24.0 to 24.9 in adult        I discussed the patients findings and my recommendations with patient  Hugo López, APRN  11/10/20  10:41 CST

## 2020-11-10 NOTE — TELEPHONE ENCOUNTER
There is no new compression fracture.  I can do another MRI to make absolutely sure that there is no other issues going on and make sure that there is no new compression fractures because sometimes x-rays do not show fractures.  If he wants me to order an MRI I will be happy to     Hugo reviewed Jelani's x-ray after his visit today and no new fractures were found, patient was called and I explained that we may order an MRI for him and he does want to go ahead and order the MRI.    I explained we will put the order in and scheduling will call him with an appointment.

## 2020-11-11 ENCOUNTER — HOSPITAL ENCOUNTER (OUTPATIENT)
Dept: CT IMAGING | Age: 70
Discharge: HOME | End: 2020-11-11
Payer: MEDICARE

## 2020-11-11 PROCEDURE — 6360000004 HC RX CONTRAST MEDICATION: Performed by: INTERNAL MEDICINE

## 2020-11-11 PROCEDURE — 74177 CT ABD & PELVIS W/CONTRAST: CPT

## 2020-11-11 PROCEDURE — 71260 CT THORAX DX C+: CPT

## 2020-11-11 RX ADMIN — IOPAMIDOL 90 ML: 755 INJECTION, SOLUTION INTRAVENOUS at 08:46

## 2020-11-17 NOTE — PROGRESS NOTES
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 nodules. Denies any respiratory complaints or significant shortness of air. Non invasive Urothelial Bladder Cancer ([facility] [address] 8/18/2019. Repeat cystoscopy and bilateral ureteral stent removal/replacement on 2/26/2020 . The operative note by Dr. Virginia Haji documented bilateral hydronephrosis and obtained biopsy of the bladder in the mid dome and left anterior lateral wall x2. Pathology documented high-grade papillary urethral carcinoma, noninvasive, stage pTaNx. As per Urology    5/28/2020-the patient underwent a cystoscopy and resection of bladder tumor on 05/28/2020 with findings consistent with noninvasive, high-grade papillary urothelial carcinoma. Muscularis propria was not identified. Currently receiving intravesical BCG. Osteoporosis-Osteoporosis BMD -3.6 April 2020. Continue Vit D, Boniva and Calcium. 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 Also recommended SPF factor XV above. FOLLOW UP:  1. CT chest abdomen pelvis January 2021.  2. CMP, CEA today   3. RTC 6 weeks  4.  Follow-up with other medical

## 2020-11-18 ENCOUNTER — OFFICE VISIT (OUTPATIENT)
Dept: HEMATOLOGY | Age: 70
End: 2020-11-18
Payer: MEDICARE

## 2020-11-18 ENCOUNTER — HOSPITAL ENCOUNTER (OUTPATIENT)
Dept: INFUSION THERAPY | Age: 70
Discharge: HOME | End: 2020-11-18
Payer: MEDICARE

## 2020-11-18 ENCOUNTER — HOSPITAL ENCOUNTER (OUTPATIENT)
Dept: INFUSION THERAPY | Age: 70
Setting detail: INFUSION SERIES
Discharge: HOME | End: 2020-11-18
Payer: MEDICARE

## 2020-11-18 VITALS
WEIGHT: 153 LBS | OXYGEN SATURATION: 96 % | HEART RATE: 92 BPM | TEMPERATURE: 96.9 F | HEIGHT: 65 IN | BODY MASS INDEX: 25.49 KG/M2 | DIASTOLIC BLOOD PRESSURE: 60 MMHG | SYSTOLIC BLOOD PRESSURE: 110 MMHG

## 2020-11-18 VITALS
BODY MASS INDEX: 25.49 KG/M2 | HEART RATE: 63 BPM | RESPIRATION RATE: 17 BRPM | SYSTOLIC BLOOD PRESSURE: 109 MMHG | OXYGEN SATURATION: 99 % | TEMPERATURE: 97.2 F | HEIGHT: 65 IN | WEIGHT: 153 LBS | DIASTOLIC BLOOD PRESSURE: 48 MMHG

## 2020-11-18 DIAGNOSIS — C19 COLORECTAL CANCER (HCC): Primary | ICD-10-CM

## 2020-11-18 DIAGNOSIS — C19 COLORECTAL CANCER (HCC): ICD-10-CM

## 2020-11-18 DIAGNOSIS — C18.9 METASTASIS FROM COLON CANCER (HCC): ICD-10-CM

## 2020-11-18 DIAGNOSIS — C79.9 METASTASIS FROM COLON CANCER (HCC): ICD-10-CM

## 2020-11-18 LAB
ALBUMIN SERPL-MCNC: 4.3 G/DL (ref 3.5–5.2)
ALP BLD-CCNC: 107 U/L (ref 40–130)
ALT SERPL-CCNC: 16 U/L (ref 21–72)
ANION GAP SERPL CALCULATED.3IONS-SCNC: 14 MMOL/L (ref 7–19)
AST SERPL-CCNC: 33 U/L (ref 17–59)
BASOPHILS ABSOLUTE: 0.1 K/UL (ref 0.01–0.08)
BASOPHILS RELATIVE PERCENT: 0.8 % (ref 0.1–1.2)
BILIRUB SERPL-MCNC: <0.2 MG/DL (ref 0.2–1.3)
BUN BLDV-MCNC: 26 MG/DL (ref 9–20)
CALCIUM SERPL-MCNC: 9.6 MG/DL (ref 8.4–10.2)
CEA: 2 NG/ML (ref 0–3)
CHLORIDE BLD-SCNC: 92 MMOL/L (ref 98–111)
CO2: 24 MMOL/L (ref 22–29)
CREAT SERPL-MCNC: 1.8 MG/DL (ref 0.6–1.2)
EOSINOPHILS ABSOLUTE: 0.17 K/UL (ref 0.04–0.54)
EOSINOPHILS RELATIVE PERCENT: 1.3 % (ref 0.7–7)
GFR NON-AFRICAN AMERICAN: 38
GLUCOSE BLD-MCNC: 97 MG/DL (ref 74–106)
HCT VFR BLD CALC: 37.9 % (ref 40.1–51)
HEMOGLOBIN: 12.6 G/DL (ref 13.7–17.5)
LYMPHOCYTES ABSOLUTE: 1.1 K/UL (ref 1.18–3.74)
LYMPHOCYTES RELATIVE PERCENT: 8.3 % (ref 19.3–53.1)
MCH RBC QN AUTO: 29.8 PG (ref 25.7–32.2)
MCHC RBC AUTO-ENTMCNC: 33.2 G/DL (ref 32.3–36.5)
MCV RBC AUTO: 89.6 FL (ref 79–92.2)
MONOCYTES ABSOLUTE: 0.89 K/UL (ref 0.24–0.82)
MONOCYTES RELATIVE PERCENT: 6.7 % (ref 4.7–12.5)
NEUTROPHILS ABSOLUTE: 10.99 K/UL (ref 1.56–6.13)
NEUTROPHILS RELATIVE PERCENT: 82.9 % (ref 34–71.1)
PDW BLD-RTO: 13.4 % (ref 11.6–14.4)
PLATELET # BLD: 292 K/UL (ref 163–337)
PMV BLD AUTO: 10.8 FL (ref 7.4–10.4)
POTASSIUM SERPL-SCNC: 4.9 MMOL/L (ref 3.5–5.1)
RBC # BLD: 4.23 M/UL (ref 4.63–6.08)
SODIUM BLD-SCNC: 130 MMOL/L (ref 137–145)
TOTAL PROTEIN: 7.2 G/DL (ref 6.3–8.2)
WBC # BLD: 13.25 K/UL (ref 4.23–9.07)

## 2020-11-18 PROCEDURE — 99214 OFFICE O/P EST MOD 30 MIN: CPT | Performed by: INTERNAL MEDICINE

## 2020-11-18 PROCEDURE — 96365 THER/PROPH/DIAG IV INF INIT: CPT

## 2020-11-18 PROCEDURE — 36415 COLL VENOUS BLD VENIPUNCTURE: CPT

## 2020-11-18 PROCEDURE — 96416 CHEMO PROLONG INFUSE W/PUMP: CPT

## 2020-11-18 PROCEDURE — 96366 THER/PROPH/DIAG IV INF ADDON: CPT

## 2020-11-18 PROCEDURE — 85025 COMPLETE CBC W/AUTO DIFF WBC: CPT

## 2020-11-18 PROCEDURE — G8427 DOCREV CUR MEDS BY ELIG CLIN: HCPCS | Performed by: INTERNAL MEDICINE

## 2020-11-18 PROCEDURE — 3017F COLORECTAL CA SCREEN DOC REV: CPT | Performed by: INTERNAL MEDICINE

## 2020-11-18 PROCEDURE — 96413 CHEMO IV INFUSION 1 HR: CPT

## 2020-11-18 PROCEDURE — 96409 CHEMO IV PUSH SNGL DRUG: CPT

## 2020-11-18 PROCEDURE — 80053 COMPREHEN METABOLIC PANEL: CPT

## 2020-11-18 PROCEDURE — 82378 CARCINOEMBRYONIC ANTIGEN: CPT

## 2020-11-18 PROCEDURE — 6360000002 HC RX W HCPCS: Performed by: INTERNAL MEDICINE

## 2020-11-18 PROCEDURE — 4040F PNEUMOC VAC/ADMIN/RCVD: CPT | Performed by: INTERNAL MEDICINE

## 2020-11-18 PROCEDURE — 2580000003 HC RX 258: Performed by: INTERNAL MEDICINE

## 2020-11-18 PROCEDURE — G0498 CHEMO EXTEND IV INFUS W/PUMP: HCPCS

## 2020-11-18 PROCEDURE — 99211 OFF/OP EST MAY X REQ PHY/QHP: CPT

## 2020-11-18 PROCEDURE — 96367 TX/PROPH/DG ADDL SEQ IV INF: CPT

## 2020-11-18 PROCEDURE — 96375 TX/PRO/DX INJ NEW DRUG ADDON: CPT

## 2020-11-18 PROCEDURE — 6370000000 HC RX 637 (ALT 250 FOR IP): Performed by: INTERNAL MEDICINE

## 2020-11-18 PROCEDURE — 1123F ACP DISCUSS/DSCN MKR DOCD: CPT | Performed by: INTERNAL MEDICINE

## 2020-11-18 RX ORDER — FLUOROURACIL 50 MG/ML
400 INJECTION, SOLUTION INTRAVENOUS ONCE
Status: COMPLETED | OUTPATIENT
Start: 2020-11-18 | End: 2020-11-18

## 2020-11-18 RX ORDER — DIPHENHYDRAMINE HYDROCHLORIDE 50 MG/ML
50 INJECTION INTRAMUSCULAR; INTRAVENOUS ONCE
Status: CANCELLED | OUTPATIENT
Start: 2020-11-18

## 2020-11-18 RX ORDER — DIPHENHYDRAMINE HYDROCHLORIDE 50 MG/ML
50 INJECTION INTRAMUSCULAR; INTRAVENOUS ONCE
Status: COMPLETED | OUTPATIENT
Start: 2020-11-18 | End: 2020-11-18

## 2020-11-18 RX ORDER — ONDANSETRON 4 MG/1
8 TABLET, FILM COATED ORAL EVERY 8 HOURS PRN
Qty: 30 TABLET | Refills: 2 | Status: SHIPPED | OUTPATIENT
Start: 2020-11-18 | End: 2021-01-01

## 2020-11-18 RX ORDER — NALOXONE HYDROCHLORIDE 4 MG/.1ML
SPRAY NASAL
COMMUNITY
Start: 2020-10-12 | End: 2020-11-18 | Stop reason: ALTCHOICE

## 2020-11-18 RX ORDER — DIPHENHYDRAMINE HYDROCHLORIDE 50 MG/ML
50 INJECTION INTRAMUSCULAR; INTRAVENOUS ONCE
Status: CANCELLED
Start: 2020-11-18

## 2020-11-18 RX ORDER — FLUOROURACIL 50 MG/ML
400 INJECTION, SOLUTION INTRAVENOUS ONCE
Status: CANCELLED | OUTPATIENT
Start: 2020-11-18

## 2020-11-18 RX ORDER — DEXAMETHASONE SODIUM PHOSPHATE 10 MG/ML
10 INJECTION, SOLUTION INTRAMUSCULAR; INTRAVENOUS ONCE
Status: COMPLETED | OUTPATIENT
Start: 2020-11-18 | End: 2020-11-18

## 2020-11-18 RX ORDER — ACETAMINOPHEN 325 MG/1
1000 TABLET ORAL ONCE
Status: CANCELLED
Start: 2020-11-18

## 2020-11-18 RX ORDER — SODIUM CHLORIDE 0.9 % (FLUSH) 0.9 %
10 SYRINGE (ML) INJECTION PRN
Status: CANCELLED | OUTPATIENT
Start: 2020-11-18

## 2020-11-18 RX ORDER — SODIUM CHLORIDE 9 MG/ML
INJECTION, SOLUTION INTRAVENOUS ONCE
Status: CANCELLED | OUTPATIENT
Start: 2020-11-18

## 2020-11-18 RX ORDER — SODIUM CHLORIDE 0.9 % (FLUSH) 0.9 %
5 SYRINGE (ML) INJECTION PRN
Status: CANCELLED | OUTPATIENT
Start: 2020-11-18

## 2020-11-18 RX ORDER — METHYLPREDNISOLONE SODIUM SUCCINATE 125 MG/2ML
125 INJECTION, POWDER, LYOPHILIZED, FOR SOLUTION INTRAMUSCULAR; INTRAVENOUS ONCE
Status: CANCELLED | OUTPATIENT
Start: 2020-11-18

## 2020-11-18 RX ORDER — EPINEPHRINE 1 MG/ML
0.3 INJECTION, SOLUTION, CONCENTRATE INTRAVENOUS PRN
Status: CANCELLED | OUTPATIENT
Start: 2020-11-18

## 2020-11-18 RX ORDER — ACETAMINOPHEN 500 MG
1000 TABLET ORAL ONCE
Status: COMPLETED | OUTPATIENT
Start: 2020-11-18 | End: 2020-11-18

## 2020-11-18 RX ORDER — SODIUM CHLORIDE 9 MG/ML
INJECTION, SOLUTION INTRAVENOUS ONCE
Status: COMPLETED | OUTPATIENT
Start: 2020-11-18 | End: 2020-11-18

## 2020-11-18 RX ORDER — SODIUM CHLORIDE 9 MG/ML
INJECTION, SOLUTION INTRAVENOUS CONTINUOUS
Status: CANCELLED | OUTPATIENT
Start: 2020-11-18

## 2020-11-18 RX ORDER — HEPARIN SODIUM (PORCINE) LOCK FLUSH IV SOLN 100 UNIT/ML 100 UNIT/ML
500 SOLUTION INTRAVENOUS PRN
Status: CANCELLED | OUTPATIENT
Start: 2020-11-18

## 2020-11-18 RX ADMIN — ONDANSETRON 16 MG: 2 INJECTION INTRAMUSCULAR; INTRAVENOUS at 12:33

## 2020-11-18 RX ADMIN — LEUCOVORIN CALCIUM 700 MG: 350 INJECTION, POWDER, LYOPHILIZED, FOR SOLUTION INTRAMUSCULAR; INTRAVENOUS at 14:20

## 2020-11-18 RX ADMIN — DEXAMETHASONE SODIUM PHOSPHATE 10 MG: 10 INJECTION, SOLUTION INTRAMUSCULAR; INTRAVENOUS at 12:33

## 2020-11-18 RX ADMIN — DIPHENHYDRAMINE HYDROCHLORIDE 50 MG: 50 INJECTION, SOLUTION INTRAMUSCULAR; INTRAVENOUS at 12:23

## 2020-11-18 RX ADMIN — SODIUM CHLORIDE: 9 INJECTION, SOLUTION INTRAVENOUS at 12:23

## 2020-11-18 RX ADMIN — FLUOROURACIL 4340 MG: 50 INJECTION, SOLUTION INTRAVENOUS at 16:06

## 2020-11-18 RX ADMIN — PANITUMUMAB 430.2 MG: 400 SOLUTION INTRAVENOUS at 13:08

## 2020-11-18 RX ADMIN — FLUOROURACIL 720 MG: 50 INJECTION, SOLUTION INTRAVENOUS at 16:02

## 2020-11-18 RX ADMIN — ACETAMINOPHEN 1000 MG: 500 TABLET, FILM COATED ORAL at 12:23

## 2020-11-18 ASSESSMENT — PAIN SCALES - GENERAL: PAINLEVEL_OUTOF10: 0

## 2020-11-20 ENCOUNTER — HOSPITAL ENCOUNTER (OUTPATIENT)
Dept: INFUSION THERAPY | Age: 70
Setting detail: INFUSION SERIES
Discharge: HOME | End: 2020-11-20
Payer: MEDICARE

## 2020-11-20 PROCEDURE — 2580000003 HC RX 258: Performed by: INTERNAL MEDICINE

## 2020-11-20 PROCEDURE — 96523 IRRIG DRUG DELIVERY DEVICE: CPT

## 2020-11-20 PROCEDURE — 6360000002 HC RX W HCPCS: Performed by: INTERNAL MEDICINE

## 2020-11-20 RX ORDER — HEPARIN SODIUM (PORCINE) LOCK FLUSH IV SOLN 100 UNIT/ML 100 UNIT/ML
300 SOLUTION INTRAVENOUS PRN
Status: DISCONTINUED | OUTPATIENT
Start: 2020-11-20 | End: 2020-11-22 | Stop reason: HOSPADM

## 2020-11-20 RX ORDER — SODIUM CHLORIDE 0.9 % (FLUSH) 0.9 %
20 SYRINGE (ML) INJECTION PRN
Status: DISCONTINUED | OUTPATIENT
Start: 2020-11-20 | End: 2020-11-22 | Stop reason: HOSPADM

## 2020-11-20 RX ADMIN — SODIUM CHLORIDE, PRESERVATIVE FREE 20 ML: 5 INJECTION INTRAVENOUS at 14:35

## 2020-11-20 RX ADMIN — HEPARIN 300 UNITS: 100 SYRINGE at 14:35

## 2020-11-30 ENCOUNTER — TELEPHONE (OUTPATIENT)
Dept: INFUSION THERAPY | Age: 70
End: 2020-11-30

## 2020-11-30 NOTE — TELEPHONE ENCOUNTER
Pt's daughter, Mila Lackeys called and states that Mr. Romario Christie is having increased hematuria. He was put on Keflex last week after the removal an ingrown toenail, but the blood seems to be getting worse. He is supposed to have chemo Wednesday, but he will come tomorrow for a CBC, CMP and UA.

## 2020-12-01 ENCOUNTER — HOSPITAL ENCOUNTER (OUTPATIENT)
Dept: INFUSION THERAPY | Age: 70
Discharge: HOME | End: 2020-12-01
Payer: MEDICARE

## 2020-12-01 VITALS
DIASTOLIC BLOOD PRESSURE: 82 MMHG | BODY MASS INDEX: 25.72 KG/M2 | HEART RATE: 82 BPM | OXYGEN SATURATION: 95 % | HEIGHT: 65 IN | TEMPERATURE: 97.7 F | SYSTOLIC BLOOD PRESSURE: 126 MMHG | WEIGHT: 154.4 LBS

## 2020-12-01 DIAGNOSIS — C79.9 METASTASIS FROM COLON CANCER (HCC): ICD-10-CM

## 2020-12-01 DIAGNOSIS — C18.9 METASTASIS FROM COLON CANCER (HCC): ICD-10-CM

## 2020-12-01 DIAGNOSIS — C19 COLORECTAL CANCER (HCC): ICD-10-CM

## 2020-12-01 LAB
ALBUMIN SERPL-MCNC: 4.3 G/DL (ref 3.5–5.2)
ALP BLD-CCNC: 97 U/L (ref 40–130)
ALT SERPL-CCNC: 16 U/L (ref 21–72)
ANION GAP SERPL CALCULATED.3IONS-SCNC: 16 MMOL/L (ref 7–19)
AST SERPL-CCNC: 25 U/L (ref 17–59)
BASOPHILS ABSOLUTE: 0.1 K/UL (ref 0.01–0.08)
BASOPHILS RELATIVE PERCENT: 1.8 % (ref 0.1–1.2)
BILIRUB SERPL-MCNC: 0.4 MG/DL (ref 0.2–1.3)
BUN BLDV-MCNC: 22 MG/DL (ref 9–20)
CALCIUM SERPL-MCNC: 9.7 MG/DL (ref 8.4–10.2)
CHLORIDE BLD-SCNC: 97 MMOL/L (ref 98–111)
CO2: 22 MMOL/L (ref 22–29)
CREAT SERPL-MCNC: 1.8 MG/DL (ref 0.6–1.2)
EOSINOPHILS ABSOLUTE: 0.3 K/UL (ref 0.04–0.54)
EOSINOPHILS RELATIVE PERCENT: 5.3 % (ref 0.7–7)
GFR NON-AFRICAN AMERICAN: 38
GLOBULIN: 3 G/DL
GLUCOSE BLD-MCNC: 99 MG/DL (ref 74–106)
HCT VFR BLD CALC: 33.8 % (ref 40.1–51)
HEMOGLOBIN: 11.8 G/DL (ref 13.7–17.5)
LYMPHOCYTES ABSOLUTE: 0.83 K/UL (ref 1.18–3.74)
LYMPHOCYTES RELATIVE PERCENT: 14.7 % (ref 19.3–53.1)
MCH RBC QN AUTO: 30.4 PG (ref 25.7–32.2)
MCHC RBC AUTO-ENTMCNC: 34.9 G/DL (ref 32.3–36.5)
MCV RBC AUTO: 87.1 FL (ref 79–92.2)
MONOCYTES ABSOLUTE: 0.47 K/UL (ref 0.24–0.82)
MONOCYTES RELATIVE PERCENT: 8.3 % (ref 4.7–12.5)
NEUTROPHILS ABSOLUTE: 3.93 K/UL (ref 1.56–6.13)
NEUTROPHILS RELATIVE PERCENT: 69.9 % (ref 34–71.1)
PDW BLD-RTO: 13.6 % (ref 11.6–14.4)
PLATELET # BLD: 255 K/UL (ref 163–337)
PMV BLD AUTO: 9.3 FL (ref 7.4–10.4)
POTASSIUM SERPL-SCNC: 4.8 MMOL/L (ref 3.5–5.1)
RBC # BLD: 3.88 M/UL (ref 4.63–6.08)
SODIUM BLD-SCNC: 135 MMOL/L (ref 137–145)
TOTAL PROTEIN: 7.3 G/DL (ref 6.3–8.2)
WBC # BLD: 5.63 K/UL (ref 4.23–9.07)

## 2020-12-01 PROCEDURE — 85025 COMPLETE CBC W/AUTO DIFF WBC: CPT

## 2020-12-01 PROCEDURE — 80053 COMPREHEN METABOLIC PANEL: CPT

## 2020-12-01 PROCEDURE — 36415 COLL VENOUS BLD VENIPUNCTURE: CPT

## 2020-12-02 ENCOUNTER — TELEPHONE (OUTPATIENT)
Dept: INFUSION THERAPY | Age: 70
End: 2020-12-02

## 2020-12-02 NOTE — TELEPHONE ENCOUNTER
Received a call from Marcelino Cuadra. He wanted to wait on treatment until 12/16/2020. He stated that mouth sores are just now starting to feel better. Dr. Masoud Lopez notified.

## 2020-12-07 ENCOUNTER — HOSPITAL ENCOUNTER (OUTPATIENT)
Dept: MRI IMAGING | Facility: HOSPITAL | Age: 70
Discharge: HOME | End: 2020-12-07
Admitting: NURSE PRACTITIONER

## 2020-12-07 DIAGNOSIS — S32.010G CLOSED WEDGE COMPRESSION FRACTURE OF L1 VERTEBRA WITH DELAYED HEALING: ICD-10-CM

## 2020-12-07 DIAGNOSIS — S22.080G CLOSED WEDGE COMPRESSION FRACTURE OF T12 VERTEBRA WITH DELAYED HEALING, SUBSEQUENT ENCOUNTER: ICD-10-CM

## 2020-12-07 DIAGNOSIS — S32.040G: ICD-10-CM

## 2020-12-07 DIAGNOSIS — S32.020G: ICD-10-CM

## 2020-12-07 PROCEDURE — 72148 MRI LUMBAR SPINE W/O DYE: CPT

## 2020-12-09 ENCOUNTER — TELEPHONE (OUTPATIENT)
Dept: NEUROSURGERY | Facility: CLINIC | Age: 70
End: 2020-12-09

## 2020-12-09 NOTE — TELEPHONE ENCOUNTER
Called patient with results of his MRI after having Dr. Velasquez review it.  At this point there is not a surgery that is recommended for the patient.  I did discuss with the patient about an implantable pain control device and he is going to talk to Dr. Christy about this and see what the next step would be for him to have more pain relief
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
## 2020-12-16 ENCOUNTER — HOSPITAL ENCOUNTER (OUTPATIENT)
Dept: INFUSION THERAPY | Age: 70
Discharge: HOME | End: 2020-12-16
Payer: MEDICARE

## 2020-12-16 ENCOUNTER — HOSPITAL ENCOUNTER (OUTPATIENT)
Dept: INFUSION THERAPY | Age: 70
Setting detail: INFUSION SERIES
Discharge: HOME | End: 2020-12-16
Payer: MEDICARE

## 2020-12-16 ENCOUNTER — OFFICE VISIT (OUTPATIENT)
Dept: HEMATOLOGY | Age: 70
End: 2020-12-16
Payer: MEDICARE

## 2020-12-16 VITALS
SYSTOLIC BLOOD PRESSURE: 122 MMHG | TEMPERATURE: 97.8 F | OXYGEN SATURATION: 94 % | BODY MASS INDEX: 25.96 KG/M2 | WEIGHT: 155.8 LBS | DIASTOLIC BLOOD PRESSURE: 80 MMHG | HEART RATE: 84 BPM | HEIGHT: 65 IN

## 2020-12-16 VITALS
OXYGEN SATURATION: 97 % | HEART RATE: 67 BPM | SYSTOLIC BLOOD PRESSURE: 101 MMHG | DIASTOLIC BLOOD PRESSURE: 58 MMHG | WEIGHT: 155.8 LBS | TEMPERATURE: 97.4 F | RESPIRATION RATE: 18 BRPM | BODY MASS INDEX: 25.93 KG/M2

## 2020-12-16 DIAGNOSIS — I25.10 CORONARY ARTERY DISEASE INVOLVING NATIVE CORONARY ARTERY OF NATIVE HEART WITHOUT ANGINA PECTORIS: ICD-10-CM

## 2020-12-16 DIAGNOSIS — C19 COLORECTAL CANCER (HCC): Primary | ICD-10-CM

## 2020-12-16 DIAGNOSIS — C79.9 METASTASIS FROM COLON CANCER (HCC): ICD-10-CM

## 2020-12-16 DIAGNOSIS — C18.9 METASTASIS FROM COLON CANCER (HCC): ICD-10-CM

## 2020-12-16 DIAGNOSIS — C19 COLORECTAL CANCER (HCC): ICD-10-CM

## 2020-12-16 LAB
ALBUMIN SERPL-MCNC: 3.5 G/DL (ref 3.5–5.2)
ALP BLD-CCNC: 82 U/L (ref 40–130)
ALT SERPL-CCNC: 8 U/L (ref 21–72)
ANION GAP SERPL CALCULATED.3IONS-SCNC: 10 MMOL/L (ref 7–19)
AST SERPL-CCNC: 24 U/L (ref 17–59)
BASOPHILS ABSOLUTE: 0.1 K/UL (ref 0.01–0.08)
BASOPHILS RELATIVE PERCENT: 1.7 % (ref 0.1–1.2)
BILIRUB SERPL-MCNC: 0.5 MG/DL (ref 0.2–1.3)
BUN BLDV-MCNC: 17 MG/DL (ref 9–20)
CALCIUM SERPL-MCNC: 8.7 MG/DL (ref 8.4–10.2)
CEA: 1.8 NG/ML (ref 0–3)
CHLORIDE BLD-SCNC: 104 MMOL/L (ref 98–111)
CO2: 24 MMOL/L (ref 22–29)
CREAT SERPL-MCNC: 1.5 MG/DL (ref 0.6–1.2)
EOSINOPHILS ABSOLUTE: 0.16 K/UL (ref 0.04–0.54)
EOSINOPHILS RELATIVE PERCENT: 2.7 % (ref 0.7–7)
GFR NON-AFRICAN AMERICAN: 46
GLOBULIN: 2.8 G/DL
GLUCOSE BLD-MCNC: 101 MG/DL (ref 74–106)
HCT VFR BLD CALC: 33.9 % (ref 40.1–51)
HEMOGLOBIN: 11.4 G/DL (ref 13.7–17.5)
LYMPHOCYTES ABSOLUTE: 0.56 K/UL (ref 1.18–3.74)
LYMPHOCYTES RELATIVE PERCENT: 9.4 % (ref 19.3–53.1)
MCH RBC QN AUTO: 31.2 PG (ref 25.7–32.2)
MCHC RBC AUTO-ENTMCNC: 33.6 G/DL (ref 32.3–36.5)
MCV RBC AUTO: 92.9 FL (ref 79–92.2)
MONOCYTES ABSOLUTE: 0.57 K/UL (ref 0.24–0.82)
MONOCYTES RELATIVE PERCENT: 9.5 % (ref 4.7–12.5)
NEUTROPHILS ABSOLUTE: 4.58 K/UL (ref 1.56–6.13)
NEUTROPHILS RELATIVE PERCENT: 76.7 % (ref 34–71.1)
PDW BLD-RTO: 14.7 % (ref 11.6–14.4)
PLATELET # BLD: 225 K/UL (ref 163–337)
PMV BLD AUTO: 11 FL (ref 7.4–10.4)
POTASSIUM SERPL-SCNC: 4.5 MMOL/L (ref 3.5–5.1)
RBC # BLD: 3.65 M/UL (ref 4.63–6.08)
SODIUM BLD-SCNC: 138 MMOL/L (ref 137–145)
TOTAL PROTEIN: 6.3 G/DL (ref 6.3–8.2)
WBC # BLD: 5.97 K/UL (ref 4.23–9.07)

## 2020-12-16 PROCEDURE — 1123F ACP DISCUSS/DSCN MKR DOCD: CPT | Performed by: INTERNAL MEDICINE

## 2020-12-16 PROCEDURE — 96365 THER/PROPH/DIAG IV INF INIT: CPT

## 2020-12-16 PROCEDURE — 1036F TOBACCO NON-USER: CPT | Performed by: INTERNAL MEDICINE

## 2020-12-16 PROCEDURE — 2580000003 HC RX 258: Performed by: INTERNAL MEDICINE

## 2020-12-16 PROCEDURE — 96413 CHEMO IV INFUSION 1 HR: CPT

## 2020-12-16 PROCEDURE — 96415 CHEMO IV INFUSION ADDL HR: CPT

## 2020-12-16 PROCEDURE — 96417 CHEMO IV INFUS EACH ADDL SEQ: CPT

## 2020-12-16 PROCEDURE — 6360000002 HC RX W HCPCS: Performed by: INTERNAL MEDICINE

## 2020-12-16 PROCEDURE — G0498 CHEMO EXTEND IV INFUS W/PUMP: HCPCS

## 2020-12-16 PROCEDURE — 4040F PNEUMOC VAC/ADMIN/RCVD: CPT | Performed by: INTERNAL MEDICINE

## 2020-12-16 PROCEDURE — G8427 DOCREV CUR MEDS BY ELIG CLIN: HCPCS | Performed by: INTERNAL MEDICINE

## 2020-12-16 PROCEDURE — 82378 CARCINOEMBRYONIC ANTIGEN: CPT

## 2020-12-16 PROCEDURE — 96374 THER/PROPH/DIAG INJ IV PUSH: CPT

## 2020-12-16 PROCEDURE — 6370000000 HC RX 637 (ALT 250 FOR IP): Performed by: INTERNAL MEDICINE

## 2020-12-16 PROCEDURE — G8417 CALC BMI ABV UP PARAM F/U: HCPCS | Performed by: INTERNAL MEDICINE

## 2020-12-16 PROCEDURE — 99214 OFFICE O/P EST MOD 30 MIN: CPT | Performed by: INTERNAL MEDICINE

## 2020-12-16 PROCEDURE — 80053 COMPREHEN METABOLIC PANEL: CPT

## 2020-12-16 PROCEDURE — 3017F COLORECTAL CA SCREEN DOC REV: CPT | Performed by: INTERNAL MEDICINE

## 2020-12-16 PROCEDURE — 96375 TX/PRO/DX INJ NEW DRUG ADDON: CPT

## 2020-12-16 PROCEDURE — G8484 FLU IMMUNIZE NO ADMIN: HCPCS | Performed by: INTERNAL MEDICINE

## 2020-12-16 PROCEDURE — 85025 COMPLETE CBC W/AUTO DIFF WBC: CPT

## 2020-12-16 RX ORDER — SODIUM CHLORIDE 9 MG/ML
INJECTION, SOLUTION INTRAVENOUS CONTINUOUS
Status: CANCELLED | OUTPATIENT
Start: 2020-12-16

## 2020-12-16 RX ORDER — DIPHENHYDRAMINE HYDROCHLORIDE 50 MG/ML
50 INJECTION INTRAMUSCULAR; INTRAVENOUS ONCE
Status: CANCELLED
Start: 2020-12-16 | End: 2020-12-16

## 2020-12-16 RX ORDER — METHYLPREDNISOLONE SODIUM SUCCINATE 125 MG/2ML
125 INJECTION, POWDER, LYOPHILIZED, FOR SOLUTION INTRAMUSCULAR; INTRAVENOUS ONCE
Status: CANCELLED | OUTPATIENT
Start: 2020-12-16 | End: 2020-12-16

## 2020-12-16 RX ORDER — SODIUM CHLORIDE 0.9 % (FLUSH) 0.9 %
10 SYRINGE (ML) INJECTION PRN
Status: CANCELLED | OUTPATIENT
Start: 2020-12-16

## 2020-12-16 RX ORDER — SODIUM CHLORIDE 9 MG/ML
INJECTION, SOLUTION INTRAVENOUS ONCE
Status: COMPLETED | OUTPATIENT
Start: 2020-12-16 | End: 2020-12-17

## 2020-12-16 RX ORDER — HEPARIN SODIUM (PORCINE) LOCK FLUSH IV SOLN 100 UNIT/ML 100 UNIT/ML
500 SOLUTION INTRAVENOUS PRN
Status: CANCELLED | OUTPATIENT
Start: 2020-12-16

## 2020-12-16 RX ORDER — HEPARIN SODIUM (PORCINE) LOCK FLUSH IV SOLN 100 UNIT/ML 100 UNIT/ML
500 SOLUTION INTRAVENOUS PRN
Status: DISCONTINUED | OUTPATIENT
Start: 2020-12-16 | End: 2020-12-17 | Stop reason: HOSPADM

## 2020-12-16 RX ORDER — SODIUM CHLORIDE 0.9 % (FLUSH) 0.9 %
10 SYRINGE (ML) INJECTION PRN
Status: DISCONTINUED | OUTPATIENT
Start: 2020-12-16 | End: 2020-12-17 | Stop reason: HOSPADM

## 2020-12-16 RX ORDER — IBANDRONATE SODIUM 150 MG/1
150 TABLET, FILM COATED ORAL
Qty: 30 TABLET | Refills: 6 | Status: SHIPPED | OUTPATIENT
Start: 2020-12-16 | End: 2021-05-12 | Stop reason: SDUPTHER

## 2020-12-16 RX ORDER — ACETAMINOPHEN 325 MG/1
1000 TABLET ORAL ONCE
Status: CANCELLED
Start: 2020-12-16 | End: 2020-12-16

## 2020-12-16 RX ORDER — DIPHENHYDRAMINE HYDROCHLORIDE 50 MG/ML
50 INJECTION INTRAMUSCULAR; INTRAVENOUS ONCE
Status: COMPLETED | OUTPATIENT
Start: 2020-12-16 | End: 2020-12-16

## 2020-12-16 RX ORDER — ACETAMINOPHEN 500 MG
1000 TABLET ORAL ONCE
Status: COMPLETED | OUTPATIENT
Start: 2020-12-16 | End: 2020-12-16

## 2020-12-16 RX ORDER — DEXAMETHASONE SODIUM PHOSPHATE 10 MG/ML
10 INJECTION, SOLUTION INTRAMUSCULAR; INTRAVENOUS ONCE
Status: COMPLETED | OUTPATIENT
Start: 2020-12-16 | End: 2020-12-16

## 2020-12-16 RX ORDER — DIPHENHYDRAMINE HYDROCHLORIDE 50 MG/ML
50 INJECTION INTRAMUSCULAR; INTRAVENOUS ONCE
Status: CANCELLED | OUTPATIENT
Start: 2020-12-16 | End: 2020-12-16

## 2020-12-16 RX ORDER — SODIUM CHLORIDE 9 MG/ML
INJECTION, SOLUTION INTRAVENOUS ONCE
Status: CANCELLED | OUTPATIENT
Start: 2020-12-16 | End: 2020-12-16

## 2020-12-16 RX ORDER — EPINEPHRINE 1 MG/ML
0.3 INJECTION, SOLUTION, CONCENTRATE INTRAVENOUS PRN
Status: CANCELLED | OUTPATIENT
Start: 2020-12-16

## 2020-12-16 RX ORDER — SODIUM CHLORIDE 0.9 % (FLUSH) 0.9 %
5 SYRINGE (ML) INJECTION PRN
Status: CANCELLED | OUTPATIENT
Start: 2020-12-16

## 2020-12-16 RX ADMIN — ACETAMINOPHEN 1000 MG: 500 TABLET, FILM COATED ORAL at 10:18

## 2020-12-16 RX ADMIN — PANITUMUMAB 430 MG: 400 SOLUTION INTRAVENOUS at 13:21

## 2020-12-16 RX ADMIN — SODIUM CHLORIDE: 9 INJECTION, SOLUTION INTRAVENOUS at 13:22

## 2020-12-16 RX ADMIN — DEXAMETHASONE SODIUM PHOSPHATE 10 MG: 10 INJECTION, SOLUTION INTRAMUSCULAR; INTRAVENOUS at 10:19

## 2020-12-16 RX ADMIN — LEUCOVORIN CALCIUM 700 MG: 350 INJECTION, POWDER, LYOPHILIZED, FOR SUSPENSION INTRAMUSCULAR; INTRAVENOUS at 10:46

## 2020-12-16 RX ADMIN — ONDANSETRON 16 MG: 2 INJECTION INTRAMUSCULAR; INTRAVENOUS at 10:33

## 2020-12-16 RX ADMIN — DIPHENHYDRAMINE HYDROCHLORIDE 50 MG: 50 INJECTION, SOLUTION INTRAMUSCULAR; INTRAVENOUS at 10:18

## 2020-12-16 RX ADMIN — FLUOROURACIL 4340 MG: 50 INJECTION, SOLUTION INTRAVENOUS at 14:33

## 2020-12-16 ASSESSMENT — PAIN SCALES - GENERAL: PAINLEVEL_OUTOF10: 0

## 2020-12-18 ENCOUNTER — HOSPITAL ENCOUNTER (OUTPATIENT)
Dept: INFUSION THERAPY | Age: 70
Setting detail: INFUSION SERIES
Discharge: HOME | End: 2020-12-18
Payer: MEDICARE

## 2020-12-18 ENCOUNTER — OFFICE VISIT (OUTPATIENT)
Dept: UROLOGY | Age: 70
End: 2020-12-18
Payer: MEDICARE

## 2020-12-18 VITALS
BODY MASS INDEX: 25.99 KG/M2 | TEMPERATURE: 97.5 F | SYSTOLIC BLOOD PRESSURE: 113 MMHG | HEIGHT: 65 IN | WEIGHT: 156 LBS | HEART RATE: 82 BPM | DIASTOLIC BLOOD PRESSURE: 67 MMHG

## 2020-12-18 LAB
BACTERIA URINE, POC: 0
BILIRUBIN URINE: 0 MG/DL
BLOOD, URINE: POSITIVE
CASTS URINE, POC: ABNORMAL
CLARITY: ABNORMAL
COLOR: ABNORMAL
CRYSTALS URINE, POC: ABNORMAL
EPI CELLS URINE, POC: ABNORMAL
GLUCOSE URINE: ABNORMAL
KETONES, URINE: NEGATIVE
LEUKOCYTE EST, POC: POSITIVE
NITRITE, URINE: NEGATIVE
PH UA: 6.5 (ref 4.5–8)
PROTEIN UA: POSITIVE
RBC URINE, POC: ABNORMAL
SPECIFIC GRAVITY UA: 1.02 (ref 1–1.03)
UROBILINOGEN, URINE: NORMAL
WBC URINE, POC: ABNORMAL
YEAST URINE, POC: ABNORMAL

## 2020-12-18 PROCEDURE — 3017F COLORECTAL CA SCREEN DOC REV: CPT | Performed by: UROLOGY

## 2020-12-18 PROCEDURE — 99214 OFFICE O/P EST MOD 30 MIN: CPT | Performed by: UROLOGY

## 2020-12-18 PROCEDURE — 2580000003 HC RX 258: Performed by: INTERNAL MEDICINE

## 2020-12-18 PROCEDURE — G8484 FLU IMMUNIZE NO ADMIN: HCPCS | Performed by: UROLOGY

## 2020-12-18 PROCEDURE — 6360000002 HC RX W HCPCS: Performed by: INTERNAL MEDICINE

## 2020-12-18 PROCEDURE — G8427 DOCREV CUR MEDS BY ELIG CLIN: HCPCS | Performed by: UROLOGY

## 2020-12-18 PROCEDURE — 96523 IRRIG DRUG DELIVERY DEVICE: CPT

## 2020-12-18 PROCEDURE — G8417 CALC BMI ABV UP PARAM F/U: HCPCS | Performed by: UROLOGY

## 2020-12-18 PROCEDURE — 1036F TOBACCO NON-USER: CPT | Performed by: UROLOGY

## 2020-12-18 PROCEDURE — 1123F ACP DISCUSS/DSCN MKR DOCD: CPT | Performed by: UROLOGY

## 2020-12-18 PROCEDURE — 4040F PNEUMOC VAC/ADMIN/RCVD: CPT | Performed by: UROLOGY

## 2020-12-18 PROCEDURE — 81000 URINALYSIS NONAUTO W/SCOPE: CPT | Performed by: UROLOGY

## 2020-12-18 RX ORDER — SODIUM CHLORIDE 0.9 % (FLUSH) 0.9 %
20 SYRINGE (ML) INJECTION ONCE
Status: COMPLETED | OUTPATIENT
Start: 2020-12-18 | End: 2020-12-18

## 2020-12-18 RX ORDER — HEPARIN SODIUM (PORCINE) LOCK FLUSH IV SOLN 100 UNIT/ML 100 UNIT/ML
500 SOLUTION INTRAVENOUS PRN
Status: DISCONTINUED | OUTPATIENT
Start: 2020-12-18 | End: 2020-12-20 | Stop reason: HOSPADM

## 2020-12-18 RX ADMIN — HEPARIN 500 UNITS: 100 SYRINGE at 12:16

## 2020-12-18 RX ADMIN — SODIUM CHLORIDE, PRESERVATIVE FREE 20 ML: 5 INJECTION INTRAVENOUS at 12:16

## 2020-12-18 ASSESSMENT — ENCOUNTER SYMPTOMS
VOMITING: 0
WHEEZING: 0
COUGH: 0
EYE PAIN: 0
NAUSEA: 0
BACK PAIN: 0
SORE THROAT: 0
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
## 2020-12-20 LAB — URINE CULTURE, ROUTINE: NORMAL

## 2020-12-23 ENCOUNTER — TELEPHONE (OUTPATIENT)
Dept: UROLOGY | Age: 70
End: 2020-12-23

## 2020-12-23 NOTE — TELEPHONE ENCOUNTER
Spoke to pt's wife letting her know results were negative.  She also asked when to arrive for his surgery on Tuesday and I let her know that they would call her the day prior to let them know.     ----- Message from Dulce Maria Juan sent at 12/21/2020  8:51 AM CST -----  Please call and let him know his urine culture came back negative for infection  ----- Message -----  From: Bal Marcial Incoming Lab Results From [facility]  Sent: 12/19/2020  11:55 AM CST  To: Wayne Duke MD

## 2021-01-01 ENCOUNTER — HOSPITAL ENCOUNTER (OUTPATIENT)
Dept: NUCLEAR MEDICINE | Age: 71
Discharge: HOME | End: 2021-12-29
Payer: MEDICARE

## 2021-01-01 ENCOUNTER — HOSPITAL ENCOUNTER (OUTPATIENT)
Dept: INFUSION THERAPY | Age: 71
Setting detail: INFUSION SERIES
Discharge: HOME | End: 2021-11-22
Payer: MEDICARE

## 2021-01-01 ENCOUNTER — HOSPITAL ENCOUNTER (OUTPATIENT)
Dept: CT IMAGING | Age: 71
Discharge: HOME | End: 2021-09-03
Payer: MEDICARE

## 2021-01-01 ENCOUNTER — HOSPITAL ENCOUNTER (OUTPATIENT)
Dept: INFUSION THERAPY | Age: 71
Setting detail: INFUSION SERIES
Discharge: HOME | End: 2021-09-03
Payer: MEDICARE

## 2021-01-01 ENCOUNTER — APPOINTMENT (OUTPATIENT)
Dept: GENERAL RADIOLOGY | Age: 71
End: 2021-01-01
Payer: MEDICARE

## 2021-01-01 ENCOUNTER — ANESTHESIA EVENT (OUTPATIENT)
Dept: OPERATING ROOM | Age: 71
End: 2021-01-01
Payer: MEDICARE

## 2021-01-01 ENCOUNTER — HOSPITAL ENCOUNTER (OUTPATIENT)
Dept: PREADMISSION TESTING | Age: 71
Discharge: HOME | End: 2021-09-19
Payer: MEDICARE

## 2021-01-01 ENCOUNTER — HOSPITAL ENCOUNTER (OUTPATIENT)
Dept: INFUSION THERAPY | Age: 71
Setting detail: INFUSION SERIES
Discharge: HOME | End: 2021-11-29
Payer: MEDICARE

## 2021-01-01 ENCOUNTER — HOSPITAL ENCOUNTER (OUTPATIENT)
Age: 71
Setting detail: OUTPATIENT SURGERY
Discharge: HOME | End: 2021-09-17
Attending: UROLOGY | Admitting: UROLOGY
Payer: MEDICARE

## 2021-01-01 ENCOUNTER — HOSPITAL ENCOUNTER (OUTPATIENT)
Dept: INFUSION THERAPY | Age: 71
Setting detail: INFUSION SERIES
Discharge: HOME | End: 2021-11-03
Payer: MEDICARE

## 2021-01-01 ENCOUNTER — HOSPITAL ENCOUNTER (OUTPATIENT)
Dept: INFUSION THERAPY | Age: 71
Setting detail: INFUSION SERIES
Discharge: HOME | End: 2021-11-05
Payer: MEDICARE

## 2021-01-01 ENCOUNTER — ANESTHESIA (OUTPATIENT)
Dept: OPERATING ROOM | Age: 71
End: 2021-01-01
Payer: MEDICARE

## 2021-01-01 ENCOUNTER — HOSPITAL ENCOUNTER (OUTPATIENT)
Dept: INFUSION THERAPY | Age: 71
Setting detail: INFUSION SERIES
Discharge: HOME | End: 2021-09-22
Payer: MEDICARE

## 2021-01-01 ENCOUNTER — APPOINTMENT (OUTPATIENT)
Dept: GENERAL RADIOLOGY | Age: 71
End: 2021-01-01
Attending: UROLOGY
Payer: MEDICARE

## 2021-01-01 ENCOUNTER — OFFICE VISIT (OUTPATIENT)
Dept: HEMATOLOGY | Age: 71
End: 2021-01-01
Payer: MEDICARE

## 2021-01-01 ENCOUNTER — HOSPITAL ENCOUNTER (OUTPATIENT)
Dept: INFUSION THERAPY | Age: 71
Discharge: HOME | End: 2021-10-19
Payer: MEDICARE

## 2021-01-01 ENCOUNTER — HOSPITAL ENCOUNTER (OUTPATIENT)
Dept: INFUSION THERAPY | Age: 71
Setting detail: INFUSION SERIES
Discharge: HOME | End: 2021-09-24
Payer: MEDICARE

## 2021-01-01 ENCOUNTER — HOSPITAL ENCOUNTER (OUTPATIENT)
Dept: INFUSION THERAPY | Age: 71
Discharge: HOME | End: 2021-09-03
Payer: MEDICARE

## 2021-01-01 ENCOUNTER — OFFICE VISIT (OUTPATIENT)
Dept: CARDIOLOGY CLINIC | Age: 71
End: 2021-01-01
Payer: MEDICARE

## 2021-01-01 ENCOUNTER — OFFICE VISIT (OUTPATIENT)
Age: 71
End: 2021-01-01

## 2021-01-01 ENCOUNTER — HOSPITAL ENCOUNTER (OUTPATIENT)
Dept: INFUSION THERAPY | Age: 71
Setting detail: INFUSION SERIES
Discharge: HOME | End: 2021-08-11
Payer: MEDICARE

## 2021-01-01 ENCOUNTER — PROCEDURE VISIT (OUTPATIENT)
Dept: UROLOGY | Age: 71
End: 2021-01-01
Payer: MEDICARE

## 2021-01-01 ENCOUNTER — HOSPITAL ENCOUNTER (OUTPATIENT)
Dept: INFUSION THERAPY | Age: 71
Setting detail: INFUSION SERIES
Discharge: HOME | End: 2021-09-01
Payer: MEDICARE

## 2021-01-01 ENCOUNTER — HOSPITAL ENCOUNTER (OUTPATIENT)
Dept: INFUSION THERAPY | Age: 71
Discharge: HOME | End: 2021-09-22
Payer: MEDICARE

## 2021-01-01 ENCOUNTER — HOSPITAL ENCOUNTER (EMERGENCY)
Age: 71
Discharge: HOME | End: 2021-11-09
Attending: EMERGENCY MEDICINE
Payer: MEDICARE

## 2021-01-01 ENCOUNTER — HOSPITAL ENCOUNTER (EMERGENCY)
Age: 71
Discharge: HOME | End: 2021-10-27
Payer: MEDICARE

## 2021-01-01 ENCOUNTER — HOSPITAL ENCOUNTER (OUTPATIENT)
Dept: INFUSION THERAPY | Age: 71
Setting detail: INFUSION SERIES
Discharge: HOME | End: 2021-10-15
Payer: MEDICARE

## 2021-01-01 ENCOUNTER — HOSPITAL ENCOUNTER (OUTPATIENT)
Dept: INFUSION THERAPY | Age: 71
Discharge: HOME | End: 2021-11-24
Payer: MEDICARE

## 2021-01-01 ENCOUNTER — APPOINTMENT (OUTPATIENT)
Dept: CT IMAGING | Age: 71
End: 2021-01-01
Payer: MEDICARE

## 2021-01-01 ENCOUNTER — HOSPITAL ENCOUNTER (OUTPATIENT)
Dept: INFUSION THERAPY | Age: 71
Discharge: HOME | End: 2021-11-03
Payer: MEDICARE

## 2021-01-01 ENCOUNTER — TELEPHONE (OUTPATIENT)
Dept: NEUROSURGERY | Facility: CLINIC | Age: 71
End: 2021-01-01

## 2021-01-01 ENCOUNTER — HOSPITAL ENCOUNTER (OUTPATIENT)
Dept: INFUSION THERAPY | Age: 71
Setting detail: INFUSION SERIES
Discharge: HOME | End: 2021-08-13
Payer: MEDICARE

## 2021-01-01 ENCOUNTER — HOSPITAL ENCOUNTER (OUTPATIENT)
Dept: INFUSION THERAPY | Age: 71
Setting detail: INFUSION SERIES
Discharge: HOME | End: 2021-10-13
Payer: MEDICARE

## 2021-01-01 ENCOUNTER — HOSPITAL ENCOUNTER (OUTPATIENT)
Dept: INFUSION THERAPY | Age: 71
Setting detail: INFUSION SERIES
Discharge: HOME | End: 2021-12-01
Payer: MEDICARE

## 2021-01-01 VITALS
DIASTOLIC BLOOD PRESSURE: 63 MMHG | RESPIRATION RATE: 17 BRPM | HEART RATE: 70 BPM | WEIGHT: 186 LBS | TEMPERATURE: 97.4 F | OXYGEN SATURATION: 97 % | BODY MASS INDEX: 30.95 KG/M2 | SYSTOLIC BLOOD PRESSURE: 117 MMHG

## 2021-01-01 VITALS
SYSTOLIC BLOOD PRESSURE: 144 MMHG | OXYGEN SATURATION: 97 % | HEIGHT: 65 IN | RESPIRATION RATE: 14 BRPM | WEIGHT: 176 LBS | TEMPERATURE: 97.7 F | HEART RATE: 68 BPM | DIASTOLIC BLOOD PRESSURE: 83 MMHG | BODY MASS INDEX: 29.32 KG/M2

## 2021-01-01 VITALS
OXYGEN SATURATION: 98 % | BODY MASS INDEX: 30.79 KG/M2 | SYSTOLIC BLOOD PRESSURE: 120 MMHG | RESPIRATION RATE: 20 BRPM | TEMPERATURE: 98.9 F | WEIGHT: 185 LBS | DIASTOLIC BLOOD PRESSURE: 79 MMHG | HEART RATE: 69 BPM

## 2021-01-01 VITALS — HEIGHT: 65 IN | BODY MASS INDEX: 29.32 KG/M2 | WEIGHT: 176 LBS

## 2021-01-01 VITALS
OXYGEN SATURATION: 97 % | TEMPERATURE: 98.5 F | HEIGHT: 65 IN | SYSTOLIC BLOOD PRESSURE: 120 MMHG | HEART RATE: 76 BPM | WEIGHT: 180 LBS | RESPIRATION RATE: 18 BRPM | DIASTOLIC BLOOD PRESSURE: 60 MMHG | BODY MASS INDEX: 29.99 KG/M2

## 2021-01-01 VITALS
OXYGEN SATURATION: 94 % | TEMPERATURE: 98 F | BODY MASS INDEX: 30.79 KG/M2 | WEIGHT: 185 LBS | DIASTOLIC BLOOD PRESSURE: 81 MMHG | RESPIRATION RATE: 22 BRPM | SYSTOLIC BLOOD PRESSURE: 146 MMHG | HEART RATE: 68 BPM

## 2021-01-01 VITALS
SYSTOLIC BLOOD PRESSURE: 147 MMHG | BODY MASS INDEX: 30.21 KG/M2 | DIASTOLIC BLOOD PRESSURE: 74 MMHG | RESPIRATION RATE: 17 BRPM | HEART RATE: 63 BPM | WEIGHT: 187.17 LBS | TEMPERATURE: 97.9 F

## 2021-01-01 VITALS
HEART RATE: 83 BPM | HEIGHT: 66 IN | WEIGHT: 187 LBS | DIASTOLIC BLOOD PRESSURE: 68 MMHG | BODY MASS INDEX: 30.05 KG/M2 | SYSTOLIC BLOOD PRESSURE: 128 MMHG | OXYGEN SATURATION: 99 % | TEMPERATURE: 97.8 F | RESPIRATION RATE: 18 BRPM

## 2021-01-01 VITALS
DIASTOLIC BLOOD PRESSURE: 50 MMHG | TEMPERATURE: 97.2 F | WEIGHT: 170 LBS | SYSTOLIC BLOOD PRESSURE: 108 MMHG | OXYGEN SATURATION: 94 % | HEIGHT: 65 IN | BODY MASS INDEX: 28.32 KG/M2 | HEART RATE: 63 BPM | RESPIRATION RATE: 17 BRPM

## 2021-01-01 VITALS
SYSTOLIC BLOOD PRESSURE: 112 MMHG | OXYGEN SATURATION: 91 % | WEIGHT: 187 LBS | HEART RATE: 80 BPM | DIASTOLIC BLOOD PRESSURE: 60 MMHG | BODY MASS INDEX: 30.05 KG/M2 | HEIGHT: 66 IN

## 2021-01-01 VITALS
HEART RATE: 97 BPM | OXYGEN SATURATION: 98 % | DIASTOLIC BLOOD PRESSURE: 70 MMHG | HEIGHT: 65 IN | SYSTOLIC BLOOD PRESSURE: 136 MMHG | BODY MASS INDEX: 29.49 KG/M2 | TEMPERATURE: 98.3 F | WEIGHT: 177 LBS | RESPIRATION RATE: 17 BRPM

## 2021-01-01 VITALS
SYSTOLIC BLOOD PRESSURE: 124 MMHG | BODY MASS INDEX: 30.91 KG/M2 | HEIGHT: 65 IN | WEIGHT: 185.5 LBS | OXYGEN SATURATION: 97 % | DIASTOLIC BLOOD PRESSURE: 70 MMHG | HEART RATE: 72 BPM

## 2021-01-01 VITALS
DIASTOLIC BLOOD PRESSURE: 84 MMHG | SYSTOLIC BLOOD PRESSURE: 120 MMHG | BODY MASS INDEX: 28.32 KG/M2 | HEIGHT: 65 IN | HEART RATE: 80 BPM | WEIGHT: 170 LBS | OXYGEN SATURATION: 92 %

## 2021-01-01 VITALS
SYSTOLIC BLOOD PRESSURE: 115 MMHG | HEART RATE: 76 BPM | RESPIRATION RATE: 16 BRPM | DIASTOLIC BLOOD PRESSURE: 65 MMHG | OXYGEN SATURATION: 96 % | TEMPERATURE: 98.2 F

## 2021-01-01 VITALS
SYSTOLIC BLOOD PRESSURE: 128 MMHG | BODY MASS INDEX: 30.08 KG/M2 | HEART RATE: 73 BPM | DIASTOLIC BLOOD PRESSURE: 71 MMHG | RESPIRATION RATE: 18 BRPM | HEIGHT: 66 IN | WEIGHT: 187.2 LBS

## 2021-01-01 VITALS — OXYGEN SATURATION: 94 % | TEMPERATURE: 95 F | SYSTOLIC BLOOD PRESSURE: 129 MMHG | DIASTOLIC BLOOD PRESSURE: 70 MMHG

## 2021-01-01 DIAGNOSIS — C19 COLORECTAL CANCER (HCC): Primary | ICD-10-CM

## 2021-01-01 DIAGNOSIS — C79.9 METASTASIS FROM COLON CANCER (HCC): ICD-10-CM

## 2021-01-01 DIAGNOSIS — C79.9 METASTASIS FROM COLON CANCER (HCC): Primary | ICD-10-CM

## 2021-01-01 DIAGNOSIS — G62.0 CHEMOTHERAPY-INDUCED PERIPHERAL NEUROPATHY (HCC): ICD-10-CM

## 2021-01-01 DIAGNOSIS — C18.9 METASTASIS FROM COLON CANCER (HCC): ICD-10-CM

## 2021-01-01 DIAGNOSIS — C19 COLORECTAL CANCER (HCC): ICD-10-CM

## 2021-01-01 DIAGNOSIS — T45.1X5A CHEMOTHERAPY-INDUCED PERIPHERAL NEUROPATHY (HCC): ICD-10-CM

## 2021-01-01 DIAGNOSIS — Z85.048 HISTORY OF RECTAL CANCER: Primary | ICD-10-CM

## 2021-01-01 DIAGNOSIS — M53.3 SACRAL PAIN: Primary | ICD-10-CM

## 2021-01-01 DIAGNOSIS — Z45.2 ENCOUNTER FOR CENTRAL LINE CARE: Primary | ICD-10-CM

## 2021-01-01 DIAGNOSIS — M53.3 SACRAL PAIN: ICD-10-CM

## 2021-01-01 DIAGNOSIS — Z51.11 CHEMOTHERAPY MANAGEMENT, ENCOUNTER FOR: ICD-10-CM

## 2021-01-01 DIAGNOSIS — Z85.048 HISTORY OF RECTAL CANCER: ICD-10-CM

## 2021-01-01 DIAGNOSIS — Z85.51 HISTORY OF BLADDER CANCER: ICD-10-CM

## 2021-01-01 DIAGNOSIS — K12.30 MUCOSITIS: ICD-10-CM

## 2021-01-01 DIAGNOSIS — C67.9 MALIGNANT NEOPLASM OF URINARY BLADDER, UNSPECIFIED SITE (HCC): ICD-10-CM

## 2021-01-01 DIAGNOSIS — C18.9 METASTASIS FROM COLON CANCER (HCC): Primary | ICD-10-CM

## 2021-01-01 DIAGNOSIS — S81.011A LACERATION OF RIGHT KNEE, INITIAL ENCOUNTER: Primary | ICD-10-CM

## 2021-01-01 DIAGNOSIS — F41.9 ANXIETY: ICD-10-CM

## 2021-01-01 DIAGNOSIS — D64.9 ANEMIA, UNSPECIFIED TYPE: ICD-10-CM

## 2021-01-01 DIAGNOSIS — T45.1X5D ADVERSE EFFECT OF CHEMOTHERAPY, SUBSEQUENT ENCOUNTER: ICD-10-CM

## 2021-01-01 DIAGNOSIS — R31.0 GROSS HEMATURIA: ICD-10-CM

## 2021-01-01 DIAGNOSIS — Z51.11 CHEMOTHERAPY MANAGEMENT, ENCOUNTER FOR: Primary | ICD-10-CM

## 2021-01-01 DIAGNOSIS — Z51.12 ENCOUNTER FOR ANTINEOPLASTIC IMMUNOTHERAPY: ICD-10-CM

## 2021-01-01 DIAGNOSIS — T45.1X5A CHEMOTHERAPY-INDUCED NAUSEA: ICD-10-CM

## 2021-01-01 DIAGNOSIS — Z71.89 CARE PLAN DISCUSSED WITH PATIENT: ICD-10-CM

## 2021-01-01 DIAGNOSIS — R31.9 HEMATURIA, UNSPECIFIED TYPE: Primary | ICD-10-CM

## 2021-01-01 DIAGNOSIS — C66.9 MALIGNANT NEOPLASM OF URETER, UNSPECIFIED LATERALITY (HCC): ICD-10-CM

## 2021-01-01 DIAGNOSIS — M79.89 LEG SWELLING: Primary | ICD-10-CM

## 2021-01-01 DIAGNOSIS — C66.1 UROTHELIAL CARCINOMA OF RIGHT DISTAL URETER (HCC): ICD-10-CM

## 2021-01-01 DIAGNOSIS — R11.0 CHEMOTHERAPY-INDUCED NAUSEA: ICD-10-CM

## 2021-01-01 DIAGNOSIS — N13.5 EXTRINSIC URETERAL OBSTRUCTION: ICD-10-CM

## 2021-01-01 DIAGNOSIS — I25.10 CORONARY ARTERY DISEASE INVOLVING NATIVE CORONARY ARTERY OF NATIVE HEART WITHOUT ANGINA PECTORIS: Primary | ICD-10-CM

## 2021-01-01 DIAGNOSIS — D63.8 ANEMIA OF CHRONIC DISEASE: ICD-10-CM

## 2021-01-01 DIAGNOSIS — C79.9 METASTATIC MALIGNANT NEOPLASM, UNSPECIFIED SITE (HCC): ICD-10-CM

## 2021-01-01 DIAGNOSIS — Z11.59 SCREENING FOR VIRAL DISEASE: Primary | ICD-10-CM
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
## 2021-01-01 DIAGNOSIS — D49.59 NEOPLASM OF RIGHT URETER: Primary | ICD-10-CM

## 2021-01-01 DIAGNOSIS — C66.1 CANCER OF RIGHT URETER (HCC): Primary | ICD-10-CM

## 2021-01-01 DIAGNOSIS — R60.0 FLUID RETENTION IN LEGS: Primary | ICD-10-CM

## 2021-01-01 DIAGNOSIS — M25.461 KNEE EFFUSION, RIGHT: ICD-10-CM

## 2021-01-01 LAB
ALBUMIN SERPL-MCNC: 3.2 G/DL (ref 3.5–5.2)
ALBUMIN SERPL-MCNC: 3.7 G/DL (ref 3.5–5.2)
ALBUMIN SERPL-MCNC: 3.8 G/DL (ref 3.5–5.2)
ALBUMIN SERPL-MCNC: 3.9 G/DL (ref 3.5–5.2)
ALBUMIN SERPL-MCNC: 3.9 G/DL (ref 3.5–5.2)
ALBUMIN SERPL-MCNC: 4 G/DL (ref 3.5–5.2)
ALP BLD-CCNC: 101 U/L (ref 40–130)
ALP BLD-CCNC: 104 U/L (ref 40–130)
ALP BLD-CCNC: 108 U/L (ref 40–130)
ALP BLD-CCNC: 111 U/L (ref 40–130)
ALP BLD-CCNC: 111 U/L (ref 40–130)
ALP BLD-CCNC: 132 U/L (ref 40–130)
ALP BLD-CCNC: 98 U/L (ref 40–130)
ALT SERPL-CCNC: 10 U/L (ref 5–41)
ALT SERPL-CCNC: 11 U/L (ref 21–72)
ALT SERPL-CCNC: 14 U/L (ref 5–41)
ALT SERPL-CCNC: 15 U/L (ref 21–72)
ALT SERPL-CCNC: 18 U/L (ref 5–41)
ALT SERPL-CCNC: 24 U/L (ref 21–72)
ALT SERPL-CCNC: 6 U/L (ref 5–41)
ALT SERPL-CCNC: 8 U/L (ref 5–41)
ALT SERPL-CCNC: 9 U/L (ref 5–41)
ANION GAP SERPL CALCULATED.3IONS-SCNC: 10 MMOL/L (ref 7–19)
ANION GAP SERPL CALCULATED.3IONS-SCNC: 11 MMOL/L (ref 7–19)
ANION GAP SERPL CALCULATED.3IONS-SCNC: 11 MMOL/L (ref 7–19)
ANION GAP SERPL CALCULATED.3IONS-SCNC: 12 MMOL/L (ref 7–19)
ANION GAP SERPL CALCULATED.3IONS-SCNC: 6 MMOL/L (ref 7–19)
ANION GAP SERPL CALCULATED.3IONS-SCNC: 7 MMOL/L (ref 7–19)
ANION GAP SERPL CALCULATED.3IONS-SCNC: 9 MMOL/L (ref 7–19)
AST SERPL-CCNC: 17 U/L (ref 5–40)
AST SERPL-CCNC: 18 U/L (ref 5–40)
AST SERPL-CCNC: 19 U/L (ref 5–40)
AST SERPL-CCNC: 20 U/L (ref 5–40)
AST SERPL-CCNC: 24 U/L (ref 17–59)
AST SERPL-CCNC: 27 U/L (ref 17–59)
AST SERPL-CCNC: 46 U/L (ref 17–59)
ATYPICAL LYMPHOCYTE RELATIVE PERCENT: 5 % (ref 0–8)
BACTERIA URINE, POC: 0
BACTERIA: NEGATIVE /HPF
BASOPHILS ABSOLUTE: 0 K/UL (ref 0–0.2)
BASOPHILS ABSOLUTE: 0.04 K/UL (ref 0.01–0.08)
BASOPHILS ABSOLUTE: 0.05 K/UL (ref 0.01–0.08)
BASOPHILS ABSOLUTE: 0.06 K/UL (ref 0.01–0.08)
BASOPHILS ABSOLUTE: 0.06 K/UL (ref 0.01–0.08)
BASOPHILS ABSOLUTE: 0.1 K/UL (ref 0–0.2)
BASOPHILS ABSOLUTE: 0.1 K/UL (ref 0–0.2)
BASOPHILS RELATIVE PERCENT: 0.6 % (ref 0–1)
BASOPHILS RELATIVE PERCENT: 0.7 % (ref 0.1–1.2)
BASOPHILS RELATIVE PERCENT: 0.7 % (ref 0–1)
BASOPHILS RELATIVE PERCENT: 0.7 % (ref 0–1)
BASOPHILS RELATIVE PERCENT: 1 % (ref 0–1)
BASOPHILS RELATIVE PERCENT: 1.1 % (ref 0–1)
BASOPHILS RELATIVE PERCENT: 1.1 % (ref 0–1)
BASOPHILS RELATIVE PERCENT: 1.4 % (ref 0.1–1.2)
BASOPHILS RELATIVE PERCENT: 1.4 % (ref 0.1–1.2)
BASOPHILS RELATIVE PERCENT: 4.1 % (ref 0.1–1.2)
BILIRUB SERPL-MCNC: 0.3 MG/DL (ref 0.2–1.2)
BILIRUB SERPL-MCNC: 0.5 MG/DL (ref 0.2–1.2)
BILIRUB SERPL-MCNC: 0.5 MG/DL (ref 0.2–1.3)
BILIRUB SERPL-MCNC: 0.7 MG/DL (ref 0.2–1.3)
BILIRUB SERPL-MCNC: 1 MG/DL (ref 0.2–1.3)
BILIRUB SERPL-MCNC: <0.2 MG/DL (ref 0.2–1.2)
BILIRUB SERPL-MCNC: <0.2 MG/DL (ref 0.2–1.2)
BILIRUBIN URINE: 0 MG/DL
BILIRUBIN URINE: ABNORMAL
BLOOD, URINE: ABNORMAL
BLOOD, URINE: POSITIVE
BUN BLDV-MCNC: 10 MG/DL (ref 8–23)
BUN BLDV-MCNC: 11 MG/DL (ref 8–23)
BUN BLDV-MCNC: 11 MG/DL (ref 8–23)
BUN BLDV-MCNC: 12 MG/DL (ref 8–23)
BUN BLDV-MCNC: 12 MG/DL (ref 8–23)
BUN BLDV-MCNC: 15 MG/DL (ref 9–20)
BUN BLDV-MCNC: 16 MG/DL (ref 9–20)
BUN BLDV-MCNC: 21 MG/DL (ref 9–20)
BUN BLDV-MCNC: 22 MG/DL (ref 8–23)
CALCIUM SERPL-MCNC: 8.1 MG/DL (ref 8.4–10.2)
CALCIUM SERPL-MCNC: 8.7 MG/DL (ref 8.8–10.2)
CALCIUM SERPL-MCNC: 8.7 MG/DL (ref 8.8–10.2)
CALCIUM SERPL-MCNC: 8.9 MG/DL (ref 8.8–10.2)
CALCIUM SERPL-MCNC: 9 MG/DL (ref 8.4–10.2)
CALCIUM SERPL-MCNC: 9.1 MG/DL (ref 8.8–10.2)
CALCIUM SERPL-MCNC: 9.2 MG/DL (ref 8.4–10.2)
CALCIUM SERPL-MCNC: 9.3 MG/DL (ref 8.8–10.2)
CALCIUM SERPL-MCNC: 9.4 MG/DL (ref 8.8–10.2)
CASTS URINE, POC: ABNORMAL
CEA: 0.6 NG/ML (ref 0–3)
CHLORIDE BLD-SCNC: 100 MMOL/L (ref 98–111)
CHLORIDE BLD-SCNC: 101 MMOL/L (ref 98–111)
CHLORIDE BLD-SCNC: 102 MMOL/L (ref 98–111)
CHLORIDE BLD-SCNC: 104 MMOL/L (ref 98–111)
CHLORIDE BLD-SCNC: 105 MMOL/L (ref 98–111)
CLARITY: ABNORMAL
CLARITY: CLEAR
CO2: 23 MMOL/L (ref 22–29)
CO2: 24 MMOL/L (ref 22–29)
CO2: 24 MMOL/L (ref 22–29)
CO2: 25 MMOL/L (ref 22–29)
CO2: 26 MMOL/L (ref 22–29)
CO2: 26 MMOL/L (ref 22–29)
CO2: 27 MMOL/L (ref 22–29)
COLOR: ABNORMAL
COLOR: YELLOW
CREAT SERPL-MCNC: 1.2 MG/DL (ref 0.5–1.2)
CREAT SERPL-MCNC: 1.2 MG/DL (ref 0.6–1.2)
CREAT SERPL-MCNC: 1.2 MG/DL (ref 0.6–1.2)
CREAT SERPL-MCNC: 1.3 MG/DL (ref 0.5–1.2)
CREAT SERPL-MCNC: 1.3 MG/DL (ref 0.5–1.2)
CREAT SERPL-MCNC: 1.4 MG/DL (ref 0.5–1.2)
CREAT SERPL-MCNC: 1.5 MG/DL (ref 0.5–1.2)
CREAT SERPL-MCNC: 1.5 MG/DL (ref 0.5–1.2)
CREAT SERPL-MCNC: 1.5 MG/DL (ref 0.6–1.2)
CRYSTALS URINE, POC: ABNORMAL
CRYSTALS, UA: ABNORMAL /HPF
EKG P AXIS: 10 DEGREES
EKG P-R INTERVAL: 178 MS
EKG Q-T INTERVAL: 402 MS
EKG QRS DURATION: 92 MS
EKG QTC CALCULATION (BAZETT): 409 MS
EKG T AXIS: 11 DEGREES
EOSINOPHILS ABSOLUTE: 0 K/UL (ref 0–0.6)
EOSINOPHILS ABSOLUTE: 0.04 K/UL (ref 0.04–0.54)
EOSINOPHILS ABSOLUTE: 0.08 K/UL (ref 0–0.6)
EOSINOPHILS ABSOLUTE: 0.13 K/UL (ref 0.04–0.54)
EOSINOPHILS ABSOLUTE: 0.16 K/UL (ref 0.04–0.54)
EOSINOPHILS ABSOLUTE: 0.2 K/UL (ref 0–0.6)
EOSINOPHILS ABSOLUTE: 0.3 K/UL (ref 0–0.6)
EOSINOPHILS ABSOLUTE: 0.36 K/UL (ref 0.04–0.54)
EOSINOPHILS ABSOLUTE: 0.4 K/UL (ref 0–0.6)
EOSINOPHILS ABSOLUTE: 0.4 K/UL (ref 0–0.6)
EOSINOPHILS RELATIVE PERCENT: 1.3 % (ref 0–5)
EOSINOPHILS RELATIVE PERCENT: 2.3 % (ref 0.7–7)
EOSINOPHILS RELATIVE PERCENT: 2.7 % (ref 0.7–7)
EOSINOPHILS RELATIVE PERCENT: 4 % (ref 0–5)
EOSINOPHILS RELATIVE PERCENT: 4.7 % (ref 0.7–7)
EOSINOPHILS RELATIVE PERCENT: 5 % (ref 0–5)
EOSINOPHILS RELATIVE PERCENT: 6.1 % (ref 0–5)
EOSINOPHILS RELATIVE PERCENT: 6.9 % (ref 0–5)
EOSINOPHILS RELATIVE PERCENT: 7.8 % (ref 0–5)
EOSINOPHILS RELATIVE PERCENT: 8.1 % (ref 0.7–7)
EPI CELLS URINE, POC: ABNORMAL
EPITHELIAL CELLS, UA: 3 /HPF (ref 0–5)
GFR AFRICAN AMERICAN: 56
GFR AFRICAN AMERICAN: 56
GFR AFRICAN AMERICAN: >59
GFR NON-AFRICAN AMERICAN: 46
GFR NON-AFRICAN AMERICAN: 50
GFR NON-AFRICAN AMERICAN: 55
GFR NON-AFRICAN AMERICAN: 55
GFR NON-AFRICAN AMERICAN: 60
GLOBULIN: 2.4 G/DL
GLOBULIN: 2.5 G/DL
GLOBULIN: 2.6 G/DL
GLUCOSE BLD-MCNC: 102 MG/DL (ref 74–109)
GLUCOSE BLD-MCNC: 103 MG/DL (ref 74–109)
GLUCOSE BLD-MCNC: 107 MG/DL (ref 74–109)
GLUCOSE BLD-MCNC: 110 MG/DL (ref 74–109)
GLUCOSE BLD-MCNC: 111 MG/DL (ref 74–106)
GLUCOSE BLD-MCNC: 114 MG/DL (ref 74–106)
GLUCOSE BLD-MCNC: 119 MG/DL (ref 74–106)
GLUCOSE BLD-MCNC: 131 MG/DL (ref 74–109)
GLUCOSE BLD-MCNC: 90 MG/DL (ref 74–109)
GLUCOSE URINE: ABNORMAL
GLUCOSE URINE: NEGATIVE MG/DL
HCT VFR BLD CALC: 27.4 % (ref 42–52)
HCT VFR BLD CALC: 27.7 % (ref 40.1–51)
HCT VFR BLD CALC: 28.4 % (ref 42–52)
HCT VFR BLD CALC: 30 % (ref 40.1–51)
HCT VFR BLD CALC: 31.7 % (ref 40.1–51)
HCT VFR BLD CALC: 31.7 % (ref 40.1–51)
HCT VFR BLD CALC: 31.7 % (ref 42–52)
HCT VFR BLD CALC: 32.2 % (ref 42–52)
HCT VFR BLD CALC: 32.3 % (ref 42–52)
HCT VFR BLD CALC: 34 % (ref 42–52)
HEMOGLOBIN: 10.2 G/DL (ref 14–18)
HEMOGLOBIN: 10.6 G/DL (ref 14–18)
HEMOGLOBIN: 10.7 G/DL (ref 13.7–17.5)
HEMOGLOBIN: 10.7 G/DL (ref 13.7–17.5)
HEMOGLOBIN: 8.2 G/DL (ref 14–18)
HEMOGLOBIN: 9 G/DL (ref 14–18)
HEMOGLOBIN: 9.2 G/DL (ref 13.7–17.5)
HEMOGLOBIN: 9.7 G/DL (ref 13.7–17.5)
HEMOGLOBIN: 9.8 G/DL (ref 14–18)
HEMOGLOBIN: 9.8 G/DL (ref 14–18)
HYALINE CASTS: 0 /HPF (ref 0–8)
HYPOCHROMIA: ABNORMAL
IMMATURE GRANULOCYTES #: 0 K/UL
IMMATURE GRANULOCYTES #: 0.1 K/UL
IMMATURE GRANULOCYTES #: 0.1 K/UL
INR BLD: 1.04 (ref 0.88–1.18)
KETONES, URINE: NEGATIVE
KETONES, URINE: NEGATIVE MG/DL
LEUKOCYTE EST, POC: ABNORMAL
LEUKOCYTE ESTERASE, URINE: ABNORMAL
LIPASE: 13 U/L (ref 13–60)
LYMPHOCYTES ABSOLUTE: 0.23 K/UL (ref 1.18–3.74)
LYMPHOCYTES ABSOLUTE: 0.38 K/UL (ref 1.18–3.74)
LYMPHOCYTES ABSOLUTE: 0.4 K/UL (ref 1.1–4.5)
LYMPHOCYTES ABSOLUTE: 0.5 K/UL (ref 1.18–3.74)
LYMPHOCYTES ABSOLUTE: 0.5 K/UL (ref 1.1–4.5)
LYMPHOCYTES ABSOLUTE: 0.59 K/UL (ref 1.18–3.74)
LYMPHOCYTES ABSOLUTE: 0.6 K/UL (ref 1.1–4.5)
LYMPHOCYTES ABSOLUTE: 0.8 K/UL (ref 1.1–4.5)
LYMPHOCYTES RELATIVE PERCENT: 11.4 % (ref 20–40)
LYMPHOCYTES RELATIVE PERCENT: 12.1 % (ref 20–40)
LYMPHOCYTES RELATIVE PERCENT: 12.3 % (ref 20–40)
LYMPHOCYTES RELATIVE PERCENT: 13.3 % (ref 19.3–53.1)
LYMPHOCYTES RELATIVE PERCENT: 14.5 % (ref 20–40)
LYMPHOCYTES RELATIVE PERCENT: 20 % (ref 20–40)
LYMPHOCYTES RELATIVE PERCENT: 20.8 % (ref 20–40)
LYMPHOCYTES RELATIVE PERCENT: 26 % (ref 19.3–53.1)
LYMPHOCYTES RELATIVE PERCENT: 7.3 % (ref 19.3–53.1)
LYMPHOCYTES RELATIVE PERCENT: 8.3 % (ref 19.3–53.1)
MCH RBC QN AUTO: 27.5 PG (ref 27–31)
MCH RBC QN AUTO: 27.8 PG (ref 27–31)
MCH RBC QN AUTO: 28 PG (ref 27–31)
MCH RBC QN AUTO: 28.2 PG (ref 27–31)
MCH RBC QN AUTO: 28.7 PG (ref 27–31)
MCH RBC QN AUTO: 28.7 PG (ref 27–31)
MCH RBC QN AUTO: 28.8 PG (ref 25.7–32.2)
MCH RBC QN AUTO: 28.9 PG (ref 25.7–32.2)
MCH RBC QN AUTO: 29.2 PG (ref 25.7–32.2)
MCH RBC QN AUTO: 29.3 PG (ref 25.7–32.2)
MCHC RBC AUTO-ENTMCNC: 29.9 G/DL (ref 33–37)
MCHC RBC AUTO-ENTMCNC: 30.4 G/DL (ref 33–37)
MCHC RBC AUTO-ENTMCNC: 30.9 G/DL (ref 33–37)
MCHC RBC AUTO-ENTMCNC: 31.2 G/DL (ref 33–37)
MCHC RBC AUTO-ENTMCNC: 31.6 G/DL (ref 33–37)
MCHC RBC AUTO-ENTMCNC: 31.7 G/DL (ref 33–37)
MCHC RBC AUTO-ENTMCNC: 32.3 G/DL (ref 32.3–36.5)
MCHC RBC AUTO-ENTMCNC: 33.2 G/DL (ref 32.3–36.5)
MCHC RBC AUTO-ENTMCNC: 33.8 G/DL (ref 32.3–36.5)
MCHC RBC AUTO-ENTMCNC: 33.8 G/DL (ref 32.3–36.5)
MCV RBC AUTO: 86.4 FL (ref 79–92.2)
MCV RBC AUTO: 86.8 FL (ref 79–92.2)
MCV RBC AUTO: 86.8 FL (ref 79–92.2)
MCV RBC AUTO: 89.3 FL (ref 79–92.2)
MCV RBC AUTO: 90.4 FL (ref 80–94)
MCV RBC AUTO: 90.6 FL (ref 80–94)
MCV RBC AUTO: 90.7 FL (ref 80–94)
MCV RBC AUTO: 92.9 FL (ref 80–94)
MONOCYTES ABSOLUTE: 0.1 K/UL (ref 0–0.9)
MONOCYTES ABSOLUTE: 0.2 K/UL (ref 0.24–0.82)
MONOCYTES ABSOLUTE: 0.2 K/UL (ref 0–0.9)
MONOCYTES ABSOLUTE: 0.29 K/UL (ref 0.24–0.82)
MONOCYTES ABSOLUTE: 0.39 K/UL (ref 0.24–0.82)
MONOCYTES ABSOLUTE: 0.4 K/UL (ref 0–0.9)
MONOCYTES ABSOLUTE: 0.41 K/UL (ref 0.24–0.82)
MONOCYTES ABSOLUTE: 0.6 K/UL (ref 0–0.9)
MONOCYTES RELATIVE PERCENT: 10 % (ref 0–10)
MONOCYTES RELATIVE PERCENT: 12.5 % (ref 0–10)
MONOCYTES RELATIVE PERCENT: 26.7 % (ref 4.7–12.5)
MONOCYTES RELATIVE PERCENT: 3 % (ref 0–10)
MONOCYTES RELATIVE PERCENT: 4.3 % (ref 4.7–12.5)
MONOCYTES RELATIVE PERCENT: 7.1 % (ref 0–10)
MONOCYTES RELATIVE PERCENT: 7.2 % (ref 4.7–12.5)
MONOCYTES RELATIVE PERCENT: 8.1 % (ref 0–10)
MONOCYTES RELATIVE PERCENT: 9.3 % (ref 4.7–12.5)
MONOCYTES RELATIVE PERCENT: 9.6 % (ref 0–10)
NEUTROPHILS ABSOLUTE: 0.59 K/UL (ref 1.56–6.13)
NEUTROPHILS ABSOLUTE: 0.9 K/UL (ref 1.5–7.5)
NEUTROPHILS ABSOLUTE: 2.18 K/UL (ref 1.56–6.13)
NEUTROPHILS ABSOLUTE: 2.2 K/UL (ref 1.5–7.5)
NEUTROPHILS ABSOLUTE: 3.01 K/UL (ref 1.56–6.13)
NEUTROPHILS ABSOLUTE: 3.1 K/UL (ref 1.5–7.5)
NEUTROPHILS ABSOLUTE: 3.4 K/UL (ref 1.5–7.5)
NEUTROPHILS ABSOLUTE: 3.4 K/UL (ref 1.5–7.5)
NEUTROPHILS ABSOLUTE: 4 K/UL (ref 1.5–7.5)
NEUTROPHILS ABSOLUTE: 5.82 K/UL (ref 1.56–6.13)
NEUTROPHILS RELATIVE PERCENT: 40.5 % (ref 34–71.1)
NEUTROPHILS RELATIVE PERCENT: 59 % (ref 50–65)
NEUTROPHILS RELATIVE PERCENT: 67.9 % (ref 34–71.1)
NEUTROPHILS RELATIVE PERCENT: 68.8 % (ref 50–65)
NEUTROPHILS RELATIVE PERCENT: 68.9 % (ref 50–65)
NEUTROPHILS RELATIVE PERCENT: 70 % (ref 50–65)
NEUTROPHILS RELATIVE PERCENT: 71.8 % (ref 50–65)
NEUTROPHILS RELATIVE PERCENT: 73.2 % (ref 50–65)
NEUTROPHILS RELATIVE PERCENT: 78.4 % (ref 34–71.1)
NEUTROPHILS RELATIVE PERCENT: 85.4 % (ref 34–71.1)
NITRITE, URINE: NEGATIVE
NITRITE, URINE: POSITIVE
PDW BLD-RTO: 13.2 % (ref 11.6–14.4)
PDW BLD-RTO: 13.5 % (ref 11.5–14.5)
PDW BLD-RTO: 13.5 % (ref 11.6–14.4)
PDW BLD-RTO: 13.7 % (ref 11.5–14.5)
PDW BLD-RTO: 13.7 % (ref 11.5–14.5)
PDW BLD-RTO: 13.8 % (ref 11.5–14.5)
PDW BLD-RTO: 13.8 % (ref 11.6–14.4)
PDW BLD-RTO: 14.4 % (ref 11.5–14.5)
PDW BLD-RTO: 14.8 % (ref 11.6–14.4)
PDW BLD-RTO: 15.3 % (ref 11.5–14.5)
PH UA: 5 (ref 5–8)
PH UA: 7 (ref 4.5–8)
PLATELET # BLD: 160 K/UL (ref 130–400)
PLATELET # BLD: 218 K/UL (ref 163–337)
PLATELET # BLD: 238 K/UL (ref 163–337)
PLATELET # BLD: 239 K/UL (ref 130–400)
PLATELET # BLD: 249 K/UL (ref 130–400)
PLATELET # BLD: 255 K/UL (ref 130–400)
PLATELET # BLD: 260 K/UL (ref 163–337)
PLATELET # BLD: 324 K/UL (ref 130–400)
PLATELET # BLD: 398 K/UL (ref 163–337)
PLATELET # BLD: 405 K/UL (ref 130–400)
PLATELET SLIDE REVIEW: ABNORMAL
PMV BLD AUTO: 10.1 FL (ref 9.4–12.4)
PMV BLD AUTO: 10.2 FL (ref 9.4–12.4)
PMV BLD AUTO: 10.3 FL (ref 9.4–12.4)
PMV BLD AUTO: 10.3 FL (ref 9.4–12.4)
PMV BLD AUTO: 11.1 FL (ref 9.4–12.4)
PMV BLD AUTO: 9.3 FL (ref 7.4–10.4)
PMV BLD AUTO: 9.5 FL (ref 7.4–10.4)
PMV BLD AUTO: 9.8 FL (ref 7.4–10.4)
PMV BLD AUTO: 9.8 FL (ref 9.4–12.4)
PMV BLD AUTO: 9.9 FL (ref 7.4–10.4)
POTASSIUM SERPL-SCNC: 4.4 MMOL/L (ref 3.5–5)
POTASSIUM SERPL-SCNC: 4.5 MMOL/L (ref 3.5–5.1)
POTASSIUM SERPL-SCNC: 4.5 MMOL/L (ref 3.5–5.1)
POTASSIUM SERPL-SCNC: 4.6 MMOL/L (ref 3.5–5.1)
POTASSIUM SERPL-SCNC: 4.7 MMOL/L (ref 3.5–4.9)
POTASSIUM SERPL-SCNC: 4.7 MMOL/L (ref 3.5–5)
POTASSIUM SERPL-SCNC: 5 MMOL/L (ref 3.5–5)
POTASSIUM SERPL-SCNC: 5.2 MMOL/L (ref 3.5–5)
POTASSIUM SERPL-SCNC: 5.4 MMOL/L (ref 3.5–5)
POTASSIUM SERPL-SCNC: 5.7 MMOL/L (ref 3.5–5)
PROTEIN UA: 100 MG/DL
PROTEIN UA: POSITIVE
PROTHROMBIN TIME: 13.8 SEC (ref 12–14.6)
RBC # BLD: 2.95 M/UL (ref 4.7–6.1)
RBC # BLD: 3.14 M/UL (ref 4.7–6.1)
RBC # BLD: 3.19 M/UL (ref 4.63–6.08)
RBC # BLD: 3.36 M/UL (ref 4.63–6.08)
RBC # BLD: 3.5 M/UL (ref 4.7–6.1)
RBC # BLD: 3.56 M/UL (ref 4.7–6.1)
RBC # BLD: 3.56 M/UL (ref 4.7–6.1)
RBC # BLD: 3.65 M/UL (ref 4.63–6.08)
RBC # BLD: 3.67 M/UL (ref 4.63–6.08)
RBC # BLD: 3.76 M/UL (ref 4.7–6.1)
RBC UA: >900 /HPF (ref 0–4)
RBC URINE, POC: 50
SARS-COV-2, PCR: NOT DETECTED
SARS-COV-2, PCR: NOT DETECTED
SODIUM BLD-SCNC: 132 MMOL/L (ref 137–145)
SODIUM BLD-SCNC: 133 MMOL/L (ref 137–145)
SODIUM BLD-SCNC: 134 MMOL/L (ref 136–145)
SODIUM BLD-SCNC: 136 MMOL/L (ref 136–145)
SODIUM BLD-SCNC: 136 MMOL/L (ref 136–145)
SODIUM BLD-SCNC: 136 MMOL/L (ref 137–145)
SODIUM BLD-SCNC: 138 MMOL/L (ref 136–145)
SPECIFIC GRAVITY UA: 1.02 (ref 1–1.03)
SPECIFIC GRAVITY UA: 1.02 (ref 1–1.03)
TOTAL PROTEIN: 5.8 G/DL (ref 6.6–8.7)
TOTAL PROTEIN: 5.9 G/DL (ref 6.6–8.7)
TOTAL PROTEIN: 6.2 G/DL (ref 6.3–8.2)
TOTAL PROTEIN: 6.3 G/DL (ref 6.6–8.7)
TOTAL PROTEIN: 6.5 G/DL (ref 6.3–8.2)
TOTAL PROTEIN: 6.5 G/DL (ref 6.3–8.2)
TOTAL PROTEIN: 6.5 G/DL (ref 6.6–8.7)
TOTAL PROTEIN: 6.5 G/DL (ref 6.6–8.7)
TOTAL PROTEIN: 6.6 G/DL (ref 6.6–8.7)
URINE CULTURE, ROUTINE: NORMAL
UROBILINOGEN, URINE: 0.2 E.U./DL
UROBILINOGEN, URINE: ABNORMAL
WBC # BLD: 1.46 K/UL (ref 4.23–9.07)
WBC # BLD: 1.5 K/UL (ref 4.8–10.8)
WBC # BLD: 2.78 K/UL (ref 4.23–9.07)
WBC # BLD: 3 K/UL (ref 4.8–10.8)
WBC # BLD: 4.43 K/UL (ref 4.23–9.07)
WBC # BLD: 4.5 K/UL (ref 4.8–10.8)
WBC # BLD: 4.7 K/UL (ref 4.8–10.8)
WBC # BLD: 5 K/UL (ref 4.8–10.8)
WBC # BLD: 5.7 K/UL (ref 4.8–10.8)
WBC # BLD: 6.82 K/UL (ref 4.23–9.07)
WBC UA: 56 /HPF (ref 0–5)
WBC URINE, POC: 4
YEAST URINE, POC: ABNORMAL

## 2021-01-01 PROCEDURE — 2709999900 HC NON-CHARGEABLE SUPPLY: Performed by: UROLOGY

## 2021-01-01 PROCEDURE — 80053 COMPREHEN METABOLIC PANEL: CPT

## 2021-01-01 PROCEDURE — 6360000004 HC RX CONTRAST MEDICATION: Performed by: INTERNAL MEDICINE

## 2021-01-01 PROCEDURE — 6360000002 HC RX W HCPCS: Performed by: NURSE PRACTITIONER

## 2021-01-01 PROCEDURE — 3600000004 HC SURGERY LEVEL 4 BASE: Performed by: UROLOGY

## 2021-01-01 PROCEDURE — 1036F TOBACCO NON-USER: CPT | Performed by: INTERNAL MEDICINE

## 2021-01-01 PROCEDURE — 6370000000 HC RX 637 (ALT 250 FOR IP): Performed by: INTERNAL MEDICINE

## 2021-01-01 PROCEDURE — 2500000003 HC RX 250 WO HCPCS: Performed by: EMERGENCY MEDICINE

## 2021-01-01 PROCEDURE — C2617 STENT, NON-COR, TEM W/O DEL: HCPCS | Performed by: UROLOGY

## 2021-01-01 PROCEDURE — 4040F PNEUMOC VAC/ADMIN/RCVD: CPT | Performed by: INTERNAL MEDICINE

## 2021-01-01 PROCEDURE — G8427 DOCREV CUR MEDS BY ELIG CLIN: HCPCS | Performed by: INTERNAL MEDICINE

## 2021-01-01 PROCEDURE — 81001 URINALYSIS AUTO W/SCOPE: CPT | Performed by: UROLOGY

## 2021-01-01 PROCEDURE — 96365 THER/PROPH/DIAG IV INF INIT: CPT

## 2021-01-01 PROCEDURE — 74420 UROGRAPHY RTRGR +-KUB: CPT | Performed by: UROLOGY

## 2021-01-01 PROCEDURE — 96523 IRRIG DRUG DELIVERY DEVICE: CPT

## 2021-01-01 PROCEDURE — 96366 THER/PROPH/DIAG IV INF ADDON: CPT

## 2021-01-01 PROCEDURE — 6360000002 HC RX W HCPCS: Performed by: EMERGENCY MEDICINE

## 2021-01-01 PROCEDURE — 6360000002 HC RX W HCPCS: Performed by: UROLOGY

## 2021-01-01 PROCEDURE — 99283 EMERGENCY DEPT VISIT LOW MDM: CPT

## 2021-01-01 PROCEDURE — 96375 TX/PRO/DX INJ NEW DRUG ADDON: CPT

## 2021-01-01 PROCEDURE — G8417 CALC BMI ABV UP PARAM F/U: HCPCS | Performed by: UROLOGY

## 2021-01-01 PROCEDURE — 81001 URINALYSIS AUTO W/SCOPE: CPT

## 2021-01-01 PROCEDURE — 96416 CHEMO PROLONG INFUSE W/PUMP: CPT

## 2021-01-01 PROCEDURE — 96376 TX/PRO/DX INJ SAME DRUG ADON: CPT

## 2021-01-01 PROCEDURE — 99214 OFFICE O/P EST MOD 30 MIN: CPT | Performed by: INTERNAL MEDICINE

## 2021-01-01 PROCEDURE — 85025 COMPLETE CBC W/AUTO DIFF WBC: CPT

## 2021-01-01 PROCEDURE — 2580000003 HC RX 258: Performed by: NURSE PRACTITIONER

## 2021-01-01 PROCEDURE — 36415 COLL VENOUS BLD VENIPUNCTURE: CPT

## 2021-01-01 PROCEDURE — 71260 CT THORAX DX C+: CPT

## 2021-01-01 PROCEDURE — 1123F ACP DISCUSS/DSCN MKR DOCD: CPT | Performed by: INTERNAL MEDICINE

## 2021-01-01 PROCEDURE — 99214 OFFICE O/P EST MOD 30 MIN: CPT | Performed by: UROLOGY

## 2021-01-01 PROCEDURE — 96374 THER/PROPH/DIAG INJ IV PUSH: CPT

## 2021-01-01 PROCEDURE — 52000 CYSTOURETHROSCOPY: CPT | Performed by: UROLOGY

## 2021-01-01 PROCEDURE — 84132 ASSAY OF SERUM POTASSIUM: CPT

## 2021-01-01 PROCEDURE — 52332 CYSTOSCOPY AND TREATMENT: CPT | Performed by: UROLOGY

## 2021-01-01 PROCEDURE — 3017F COLORECTAL CA SCREEN DOC REV: CPT | Performed by: UROLOGY

## 2021-01-01 PROCEDURE — 74176 CT ABD & PELVIS W/O CONTRAST: CPT

## 2021-01-01 PROCEDURE — 7100000011 HC PHASE II RECOVERY - ADDTL 15 MIN: Performed by: UROLOGY

## 2021-01-01 PROCEDURE — 6360000002 HC RX W HCPCS: Performed by: NURSE ANESTHETIST, CERTIFIED REGISTERED

## 2021-01-01 PROCEDURE — 2580000003 HC RX 258: Performed by: ANESTHESIOLOGY

## 2021-01-01 PROCEDURE — G8417 CALC BMI ABV UP PARAM F/U: HCPCS | Performed by: INTERNAL MEDICINE

## 2021-01-01 PROCEDURE — 4040F PNEUMOC VAC/ADMIN/RCVD: CPT | Performed by: UROLOGY

## 2021-01-01 PROCEDURE — 1036F TOBACCO NON-USER: CPT | Performed by: UROLOGY

## 2021-01-01 PROCEDURE — 6360000002 HC RX W HCPCS: Performed by: INTERNAL MEDICINE

## 2021-01-01 PROCEDURE — A9561 TC99M OXIDRONATE: HCPCS | Performed by: INTERNAL MEDICINE

## 2021-01-01 PROCEDURE — G8427 DOCREV CUR MEDS BY ELIG CLIN: HCPCS | Performed by: UROLOGY

## 2021-01-01 PROCEDURE — 2500000003 HC RX 250 WO HCPCS: Performed by: NURSE PRACTITIONER

## 2021-01-01 PROCEDURE — 74420 UROGRAPHY RTRGR +-KUB: CPT

## 2021-01-01 PROCEDURE — 3017F COLORECTAL CA SCREEN DOC REV: CPT | Performed by: INTERNAL MEDICINE

## 2021-01-01 PROCEDURE — 7100000001 HC PACU RECOVERY - ADDTL 15 MIN: Performed by: UROLOGY

## 2021-01-01 PROCEDURE — 85610 PROTHROMBIN TIME: CPT

## 2021-01-01 PROCEDURE — 2580000003 HC RX 258: Performed by: INTERNAL MEDICINE

## 2021-01-01 PROCEDURE — C1758 CATHETER, URETERAL: HCPCS | Performed by: UROLOGY

## 2021-01-01 PROCEDURE — 99212 OFFICE O/P EST SF 10 MIN: CPT

## 2021-01-01 PROCEDURE — U0003 INFECTIOUS AGENT DETECTION BY NUCLEIC ACID (DNA OR RNA); SEVERE ACUTE RESPIRATORY SYNDROME CORONAVIRUS 2 (SARS-COV-2) (CORONAVIRUS DISEASE [COVID-19]), AMPLIFIED PROBE TECHNIQUE, MAKING USE OF HIGH THROUGHPUT TECHNOLOGIES AS DESCRIBED BY CMS-2020-01-R: HCPCS

## 2021-01-01 PROCEDURE — 52351 CYSTOURETERO & OR PYELOSCOPE: CPT | Performed by: UROLOGY

## 2021-01-01 PROCEDURE — 96413 CHEMO IV INFUSION 1 HR: CPT

## 2021-01-01 PROCEDURE — 3430000000 HC RX DIAGNOSTIC RADIOPHARMACEUTICAL: Performed by: INTERNAL MEDICINE

## 2021-01-01 PROCEDURE — 74177 CT ABD & PELVIS W/CONTRAST: CPT

## 2021-01-01 PROCEDURE — G8484 FLU IMMUNIZE NO ADMIN: HCPCS | Performed by: INTERNAL MEDICINE

## 2021-01-01 PROCEDURE — 90715 TDAP VACCINE 7 YRS/> IM: CPT | Performed by: NURSE PRACTITIONER

## 2021-01-01 PROCEDURE — 87086 URINE CULTURE/COLONY COUNT: CPT

## 2021-01-01 PROCEDURE — 90471 IMMUNIZATION ADMIN: CPT | Performed by: NURSE PRACTITIONER

## 2021-01-01 PROCEDURE — 3600000014 HC SURGERY LEVEL 4 ADDTL 15MIN: Performed by: UROLOGY

## 2021-01-01 PROCEDURE — 73560 X-RAY EXAM OF KNEE 1 OR 2: CPT

## 2021-01-01 PROCEDURE — 99282 EMERGENCY DEPT VISIT SF MDM: CPT

## 2021-01-01 PROCEDURE — 99999 PR OFFICE/OUTPT VISIT,PROCEDURE ONLY: CPT | Performed by: NURSE PRACTITIONER

## 2021-01-01 PROCEDURE — 1123F ACP DISCUSS/DSCN MKR DOCD: CPT | Performed by: UROLOGY

## 2021-01-01 PROCEDURE — 7100000000 HC PACU RECOVERY - FIRST 15 MIN: Performed by: UROLOGY

## 2021-01-01 PROCEDURE — 78306 BONE IMAGING WHOLE BODY: CPT

## 2021-01-01 PROCEDURE — 2580000003 HC RX 258: Performed by: UROLOGY

## 2021-01-01 PROCEDURE — 82378 CARCINOEMBRYONIC ANTIGEN: CPT

## 2021-01-01 PROCEDURE — C1769 GUIDE WIRE: HCPCS | Performed by: UROLOGY

## 2021-01-01 PROCEDURE — 96417 CHEMO IV INFUS EACH ADDL SEQ: CPT

## 2021-01-01 PROCEDURE — 7100000010 HC PHASE II RECOVERY - FIRST 15 MIN: Performed by: UROLOGY

## 2021-01-01 PROCEDURE — 83690 ASSAY OF LIPASE: CPT

## 2021-01-01 PROCEDURE — 6370000000 HC RX 637 (ALT 250 FOR IP): Performed by: NURSE PRACTITIONER

## 2021-01-01 PROCEDURE — 2580000003 HC RX 258: Performed by: EMERGENCY MEDICINE

## 2021-01-01 PROCEDURE — U0005 INFEC AGEN DETEC AMPLI PROBE: HCPCS

## 2021-01-01 PROCEDURE — 3700000001 HC ADD 15 MINUTES (ANESTHESIA): Performed by: UROLOGY

## 2021-01-01 PROCEDURE — 93005 ELECTROCARDIOGRAM TRACING: CPT | Performed by: ANESTHESIOLOGY

## 2021-01-01 PROCEDURE — 2500000003 HC RX 250 WO HCPCS: Performed by: NURSE ANESTHETIST, CERTIFIED REGISTERED

## 2021-01-01 PROCEDURE — 12002 RPR S/N/AX/GEN/TRNK2.6-7.5CM: CPT

## 2021-01-01 PROCEDURE — 3700000000 HC ANESTHESIA ATTENDED CARE: Performed by: UROLOGY

## 2021-01-01 DEVICE — URETERAL STENT
Type: IMPLANTABLE DEVICE | Site: URETER | Status: FUNCTIONAL
Brand: POLARIS™ ULTRA

## 2021-01-01 RX ORDER — DIPHENHYDRAMINE HYDROCHLORIDE 50 MG/ML
50 INJECTION INTRAMUSCULAR; INTRAVENOUS ONCE
Status: CANCELLED
Start: 2021-01-01 | End: 2021-01-01

## 2021-01-01 RX ORDER — DIPHENHYDRAMINE HYDROCHLORIDE 50 MG/ML
50 INJECTION INTRAMUSCULAR; INTRAVENOUS ONCE
Status: CANCELLED | OUTPATIENT
Start: 2021-01-01 | End: 2021-01-01

## 2021-01-01 RX ORDER — ONDANSETRON 2 MG/ML
4 INJECTION INTRAMUSCULAR; INTRAVENOUS EVERY 4 HOURS PRN
Status: DISCONTINUED | OUTPATIENT
Start: 2021-01-01 | End: 2021-01-01 | Stop reason: HOSPADM

## 2021-01-01 RX ORDER — HEPARIN SODIUM (PORCINE) LOCK FLUSH IV SOLN 100 UNIT/ML 100 UNIT/ML
500 SOLUTION INTRAVENOUS PRN
Status: DISCONTINUED | OUTPATIENT
Start: 2021-01-01 | End: 2021-01-01 | Stop reason: HOSPADM

## 2021-01-01 RX ORDER — SODIUM CHLORIDE 9 MG/ML
INJECTION, SOLUTION INTRAVENOUS ONCE
Status: COMPLETED | OUTPATIENT
Start: 2021-01-01 | End: 2021-01-01

## 2021-01-01 RX ORDER — METOCLOPRAMIDE HYDROCHLORIDE 5 MG/ML
10 INJECTION INTRAMUSCULAR; INTRAVENOUS
Status: DISCONTINUED | OUTPATIENT
Start: 2021-01-01 | End: 2021-01-01 | Stop reason: HOSPADM

## 2021-01-01 RX ORDER — METHYLPREDNISOLONE SODIUM SUCCINATE 125 MG/2ML
125 INJECTION, POWDER, LYOPHILIZED, FOR SOLUTION INTRAMUSCULAR; INTRAVENOUS ONCE
Status: CANCELLED | OUTPATIENT
Start: 2021-01-01 | End: 2021-01-01

## 2021-01-01 RX ORDER — ONDANSETRON 2 MG/ML
INJECTION INTRAMUSCULAR; INTRAVENOUS PRN
Status: DISCONTINUED | OUTPATIENT
Start: 2021-01-01 | End: 2021-01-01 | Stop reason: SDUPTHER

## 2021-01-01 RX ORDER — SODIUM CHLORIDE 9 MG/ML
INJECTION, SOLUTION INTRAVENOUS CONTINUOUS
Status: CANCELLED | OUTPATIENT
Start: 2021-01-01

## 2021-01-01 RX ORDER — SODIUM CHLORIDE 0.9 % (FLUSH) 0.9 %
20 SYRINGE (ML) INJECTION PRN
Status: CANCELLED | OUTPATIENT
Start: 2021-01-01

## 2021-01-01 RX ORDER — EPINEPHRINE 1 MG/ML
0.3 INJECTION, SOLUTION, CONCENTRATE INTRAVENOUS PRN
Status: CANCELLED | OUTPATIENT
Start: 2021-01-01

## 2021-01-01 RX ORDER — SODIUM CHLORIDE 0.9 % (FLUSH) 0.9 %
10 SYRINGE (ML) INJECTION PRN
Status: DISCONTINUED | OUTPATIENT
Start: 2021-01-01 | End: 2021-01-01 | Stop reason: HOSPADM

## 2021-01-01 RX ORDER — SODIUM CHLORIDE 0.9 % (FLUSH) 0.9 %
10 SYRINGE (ML) INJECTION PRN
Status: CANCELLED | OUTPATIENT
Start: 2021-01-01

## 2021-01-01 RX ORDER — SODIUM CHLORIDE, SODIUM LACTATE, POTASSIUM CHLORIDE, CALCIUM CHLORIDE 600; 310; 30; 20 MG/100ML; MG/100ML; MG/100ML; MG/100ML
INJECTION, SOLUTION INTRAVENOUS CONTINUOUS
Status: DISCONTINUED | OUTPATIENT
Start: 2021-01-01 | End: 2021-01-01 | Stop reason: HOSPADM

## 2021-01-01 RX ORDER — NYSTATIN 100000 [USP'U]/G
POWDER TOPICAL
Qty: 1 EACH | Refills: 1 | Status: ON HOLD | OUTPATIENT
Start: 2021-01-01 | End: 2022-01-01 | Stop reason: HOSPADM

## 2021-01-01 RX ORDER — SODIUM CHLORIDE 0.9 % (FLUSH) 0.9 %
5-40 SYRINGE (ML) INJECTION EVERY 12 HOURS SCHEDULED
Status: DISCONTINUED | OUTPATIENT
Start: 2021-01-01 | End: 2021-01-01 | Stop reason: HOSPADM

## 2021-01-01 RX ORDER — SODIUM CHLORIDE 0.9 % (FLUSH) 0.9 %
20 SYRINGE (ML) INJECTION ONCE
Status: COMPLETED | OUTPATIENT
Start: 2021-01-01 | End: 2021-01-01

## 2021-01-01 RX ORDER — ACETAMINOPHEN 500 MG
1000 TABLET ORAL ONCE
Status: DISCONTINUED | OUTPATIENT
Start: 2021-01-01 | End: 2021-01-01 | Stop reason: HOSPADM

## 2021-01-01 RX ORDER — DIPHENHYDRAMINE HYDROCHLORIDE 50 MG/ML
12.5 INJECTION INTRAMUSCULAR; INTRAVENOUS
Status: DISCONTINUED | OUTPATIENT
Start: 2021-01-01 | End: 2021-01-01 | Stop reason: HOSPADM

## 2021-01-01 RX ORDER — FENTANYL CITRATE 50 UG/ML
50 INJECTION, SOLUTION INTRAMUSCULAR; INTRAVENOUS
Status: DISCONTINUED | OUTPATIENT
Start: 2021-01-01 | End: 2021-01-01 | Stop reason: HOSPADM

## 2021-01-01 RX ORDER — OMEPRAZOLE 20 MG/1
20 CAPSULE, DELAYED RELEASE ORAL DAILY
Qty: 30 CAPSULE | Refills: 0 | Status: ON HOLD | OUTPATIENT
Start: 2021-01-01 | End: 2022-01-01 | Stop reason: SDUPTHER

## 2021-01-01 RX ORDER — SODIUM CHLORIDE 9 MG/ML
INJECTION, SOLUTION INTRAVENOUS ONCE
Status: CANCELLED | OUTPATIENT
Start: 2021-01-01 | End: 2021-01-01

## 2021-01-01 RX ORDER — HEPARIN SODIUM (PORCINE) LOCK FLUSH IV SOLN 100 UNIT/ML 100 UNIT/ML
500 SOLUTION INTRAVENOUS PRN
Status: CANCELLED | OUTPATIENT
Start: 2021-01-01

## 2021-01-01 RX ORDER — EPINEPHRINE 1 MG/ML
0.3 INJECTION, SOLUTION, CONCENTRATE INTRAVENOUS PRN
Status: DISCONTINUED | OUTPATIENT
Start: 2021-01-01 | End: 2021-01-01 | Stop reason: HOSPADM

## 2021-01-01 RX ORDER — FLUCONAZOLE 100 MG/1
100 TABLET ORAL DAILY
Qty: 14 TABLET | Refills: 0 | Status: SHIPPED | OUTPATIENT
Start: 2021-01-01 | End: 2021-01-01

## 2021-01-01 RX ORDER — HYDROMORPHONE HYDROCHLORIDE 1 MG/ML
1 INJECTION, SOLUTION INTRAMUSCULAR; INTRAVENOUS; SUBCUTANEOUS ONCE
Status: COMPLETED | OUTPATIENT
Start: 2021-01-01 | End: 2021-01-01

## 2021-01-01 RX ORDER — ONDANSETRON 2 MG/ML
4 INJECTION INTRAMUSCULAR; INTRAVENOUS ONCE
Status: COMPLETED | OUTPATIENT
Start: 2021-01-01 | End: 2021-01-01

## 2021-01-01 RX ORDER — LIDOCAINE HYDROCHLORIDE 10 MG/ML
1 INJECTION, SOLUTION EPIDURAL; INFILTRATION; INTRACAUDAL; PERINEURAL
Status: DISCONTINUED | OUTPATIENT
Start: 2021-01-01 | End: 2021-01-01 | Stop reason: HOSPADM

## 2021-01-01 RX ORDER — SCOLOPAMINE TRANSDERMAL SYSTEM 1 MG/1
1 PATCH, EXTENDED RELEASE TRANSDERMAL ONCE
Status: DISCONTINUED | OUTPATIENT
Start: 2021-01-01 | End: 2021-01-01 | Stop reason: HOSPADM

## 2021-01-01 RX ORDER — ACETAMINOPHEN 500 MG
1000 TABLET ORAL ONCE
Status: COMPLETED | OUTPATIENT
Start: 2021-01-01 | End: 2021-01-01

## 2021-01-01 RX ORDER — ACETAMINOPHEN 325 MG/1
1000 TABLET ORAL ONCE
Status: CANCELLED
Start: 2021-01-01 | End: 2021-01-01

## 2021-01-01 RX ORDER — EPHEDRINE SULFATE 50 MG/ML
INJECTION, SOLUTION INTRAVENOUS PRN
Status: DISCONTINUED | OUTPATIENT
Start: 2021-01-01 | End: 2021-01-01 | Stop reason: SDUPTHER

## 2021-01-01 RX ORDER — SODIUM CHLORIDE 0.9 % (FLUSH) 0.9 %
5 SYRINGE (ML) INJECTION PRN
Status: CANCELLED | OUTPATIENT
Start: 2021-01-01

## 2021-01-01 RX ORDER — CEPHALEXIN 500 MG/1
500 CAPSULE ORAL 3 TIMES DAILY
Qty: 30 CAPSULE | Refills: 0 | Status: SHIPPED | OUTPATIENT
Start: 2021-01-01 | End: 2021-01-01

## 2021-01-01 RX ORDER — METHYLPREDNISOLONE SODIUM SUCCINATE 125 MG/2ML
125 INJECTION, POWDER, LYOPHILIZED, FOR SOLUTION INTRAMUSCULAR; INTRAVENOUS ONCE
Status: DISCONTINUED | OUTPATIENT
Start: 2021-01-01 | End: 2021-01-01 | Stop reason: HOSPADM

## 2021-01-01 RX ORDER — SODIUM CHLORIDE 9 MG/ML
25 INJECTION, SOLUTION INTRAVENOUS PRN
Status: DISCONTINUED | OUTPATIENT
Start: 2021-01-01 | End: 2021-01-01 | Stop reason: HOSPADM

## 2021-01-01 RX ORDER — DEXAMETHASONE SODIUM PHOSPHATE 10 MG/ML
10 INJECTION, SOLUTION INTRAMUSCULAR; INTRAVENOUS ONCE
Status: COMPLETED | OUTPATIENT
Start: 2021-01-01 | End: 2021-01-01

## 2021-01-01 RX ORDER — ONDANSETRON 4 MG/1
8 TABLET, FILM COATED ORAL EVERY 8 HOURS PRN
Qty: 30 TABLET | Refills: 2 | Status: SHIPPED | OUTPATIENT
Start: 2021-01-01 | End: 2022-01-01

## 2021-01-01 RX ORDER — PROPOFOL 10 MG/ML
INJECTION, EMULSION INTRAVENOUS PRN
Status: DISCONTINUED | OUTPATIENT
Start: 2021-01-01 | End: 2021-01-01 | Stop reason: SDUPTHER

## 2021-01-01 RX ORDER — SODIUM CHLORIDE 0.9 % (FLUSH) 0.9 %
20 SYRINGE (ML) INJECTION PRN
Status: DISCONTINUED | OUTPATIENT
Start: 2021-01-01 | End: 2021-01-01 | Stop reason: HOSPADM

## 2021-01-01 RX ORDER — CIPROFLOXACIN 500 MG/1
500 TABLET, FILM COATED ORAL DAILY
Qty: 10 TABLET | Refills: 0 | Status: SHIPPED | OUTPATIENT
Start: 2021-01-01 | End: 2021-01-01

## 2021-01-01 RX ORDER — ACYCLOVIR 400 MG/1
400 TABLET ORAL 3 TIMES DAILY
Qty: 30 TABLET | Refills: 0 | Status: SHIPPED | OUTPATIENT
Start: 2021-01-01 | End: 2021-01-01

## 2021-01-01 RX ORDER — SODIUM CHLORIDE 9 MG/ML
INJECTION, SOLUTION INTRAVENOUS CONTINUOUS
Status: DISCONTINUED | OUTPATIENT
Start: 2021-01-01 | End: 2021-01-01 | Stop reason: HOSPADM

## 2021-01-01 RX ORDER — DIPHENHYDRAMINE HYDROCHLORIDE 50 MG/ML
50 INJECTION INTRAMUSCULAR; INTRAVENOUS ONCE
Status: COMPLETED | OUTPATIENT
Start: 2021-01-01 | End: 2021-01-01

## 2021-01-01 RX ORDER — HYDROMORPHONE HYDROCHLORIDE 1 MG/ML
0.5 INJECTION, SOLUTION INTRAMUSCULAR; INTRAVENOUS; SUBCUTANEOUS
Status: DISCONTINUED | OUTPATIENT
Start: 2021-01-01 | End: 2021-01-01 | Stop reason: HOSPADM

## 2021-01-01 RX ORDER — ALPRAZOLAM 0.25 MG/1
TABLET ORAL
Qty: 30 TABLET | OUTPATIENT
Start: 2021-01-01

## 2021-01-01 RX ORDER — FENTANYL CITRATE 50 UG/ML
INJECTION, SOLUTION INTRAMUSCULAR; INTRAVENOUS PRN
Status: DISCONTINUED | OUTPATIENT
Start: 2021-01-01 | End: 2021-01-01 | Stop reason: SDUPTHER

## 2021-01-01 RX ORDER — HYDRALAZINE HYDROCHLORIDE 20 MG/ML
5 INJECTION INTRAMUSCULAR; INTRAVENOUS EVERY 10 MIN PRN
Status: DISCONTINUED | OUTPATIENT
Start: 2021-01-01 | End: 2021-01-01 | Stop reason: HOSPADM

## 2021-01-01 RX ORDER — OXYCODONE AND ACETAMINOPHEN 10; 325 MG/1; MG/1
1 TABLET ORAL EVERY 6 HOURS PRN
Qty: 12 TABLET | Refills: 0 | Status: SHIPPED | OUTPATIENT
Start: 2021-01-01 | End: 2021-01-01

## 2021-01-01 RX ORDER — OXYCODONE HYDROCHLORIDE AND ACETAMINOPHEN 5; 325 MG/1; MG/1
2 TABLET ORAL EVERY 4 HOURS PRN
Status: DISCONTINUED | OUTPATIENT
Start: 2021-01-01 | End: 2021-01-01 | Stop reason: HOSPADM

## 2021-01-01 RX ORDER — DULOXETIN HYDROCHLORIDE 30 MG/1
30 CAPSULE, DELAYED RELEASE ORAL DAILY
Qty: 30 CAPSULE | Refills: 3 | Status: SHIPPED | OUTPATIENT
Start: 2021-01-01 | End: 2022-01-01

## 2021-01-01 RX ORDER — FERROUS SULFATE 325(65) MG
TABLET ORAL
Qty: 180 TABLET | Refills: 1 | Status: SHIPPED | OUTPATIENT
Start: 2021-01-01

## 2021-01-01 RX ORDER — MIDAZOLAM HYDROCHLORIDE 1 MG/ML
2 INJECTION INTRAMUSCULAR; INTRAVENOUS
Status: DISCONTINUED | OUTPATIENT
Start: 2021-01-01 | End: 2021-01-01 | Stop reason: HOSPADM

## 2021-01-01 RX ORDER — LEVOFLOXACIN 750 MG/1
750 TABLET ORAL DAILY
Qty: 7 TABLET | Refills: 0 | Status: SHIPPED | OUTPATIENT
Start: 2021-01-01 | End: 2021-01-01

## 2021-01-01 RX ORDER — BUPIVACAINE HYDROCHLORIDE 5 MG/ML
30 INJECTION, SOLUTION EPIDURAL; INTRACAUDAL ONCE
Status: COMPLETED | OUTPATIENT
Start: 2021-01-01 | End: 2021-01-01

## 2021-01-01 RX ORDER — DEXAMETHASONE SODIUM PHOSPHATE 10 MG/ML
INJECTION, SOLUTION INTRAMUSCULAR; INTRAVENOUS PRN
Status: DISCONTINUED | OUTPATIENT
Start: 2021-01-01 | End: 2021-01-01 | Stop reason: SDUPTHER

## 2021-01-01 RX ORDER — HEPARIN SODIUM (PORCINE) LOCK FLUSH IV SOLN 100 UNIT/ML 100 UNIT/ML
100 SOLUTION INTRAVENOUS ONCE
Status: COMPLETED | OUTPATIENT
Start: 2021-01-01 | End: 2021-01-01

## 2021-01-01 RX ORDER — LIDOCAINE HYDROCHLORIDE 10 MG/ML
INJECTION, SOLUTION INFILTRATION; PERINEURAL PRN
Status: DISCONTINUED | OUTPATIENT
Start: 2021-01-01 | End: 2021-01-01 | Stop reason: SDUPTHER

## 2021-01-01 RX ORDER — DIPHENHYDRAMINE HYDROCHLORIDE 50 MG/ML
50 INJECTION INTRAMUSCULAR; INTRAVENOUS ONCE
Status: DISCONTINUED | OUTPATIENT
Start: 2021-01-01 | End: 2021-01-01 | Stop reason: HOSPADM

## 2021-01-01 RX ORDER — CEFDINIR 300 MG/1
300 CAPSULE ORAL 2 TIMES DAILY
Qty: 20 CAPSULE | Refills: 0 | Status: SHIPPED | OUTPATIENT
Start: 2021-01-01 | End: 2021-01-01

## 2021-01-01 RX ORDER — GABAPENTIN 800 MG/1
TABLET ORAL
Qty: 180 TABLET | Refills: 3 | Status: ON HOLD | OUTPATIENT
Start: 2021-01-01 | End: 2022-01-01 | Stop reason: HOSPADM

## 2021-01-01 RX ORDER — ALPRAZOLAM 0.25 MG/1
0.25 TABLET ORAL NIGHTLY PRN
Qty: 30 TABLET | Refills: 0 | Status: SHIPPED | OUTPATIENT
Start: 2021-01-01 | End: 2022-01-01

## 2021-01-01 RX ORDER — DEXAMETHASONE SODIUM PHOSPHATE 10 MG/ML
10 INJECTION, SOLUTION INTRAMUSCULAR; INTRAVENOUS ONCE
Status: DISCONTINUED | OUTPATIENT
Start: 2021-01-01 | End: 2021-01-01

## 2021-01-01 RX ORDER — ENALAPRILAT 2.5 MG/2ML
1.25 INJECTION INTRAVENOUS
Status: DISCONTINUED | OUTPATIENT
Start: 2021-01-01 | End: 2021-01-01 | Stop reason: HOSPADM

## 2021-01-01 RX ORDER — SODIUM CHLORIDE 0.9 % (FLUSH) 0.9 %
5-40 SYRINGE (ML) INJECTION PRN
Status: DISCONTINUED | OUTPATIENT
Start: 2021-01-01 | End: 2021-01-01 | Stop reason: HOSPADM

## 2021-01-01 RX ORDER — HYDROMORPHONE HYDROCHLORIDE 1 MG/ML
0.5 INJECTION, SOLUTION INTRAMUSCULAR; INTRAVENOUS; SUBCUTANEOUS EVERY 5 MIN PRN
Status: DISCONTINUED | OUTPATIENT
Start: 2021-01-01 | End: 2021-01-01 | Stop reason: HOSPADM

## 2021-01-01 RX ORDER — MEPERIDINE HYDROCHLORIDE 25 MG/ML
12.5 INJECTION INTRAMUSCULAR; INTRAVENOUS; SUBCUTANEOUS EVERY 5 MIN PRN
Status: DISCONTINUED | OUTPATIENT
Start: 2021-01-01 | End: 2021-01-01 | Stop reason: HOSPADM

## 2021-01-01 RX ORDER — FUROSEMIDE 20 MG/1
20 TABLET ORAL DAILY PRN
Qty: 30 TABLET | Refills: 5 | Status: ON HOLD | OUTPATIENT
Start: 2021-01-01 | End: 2022-01-01 | Stop reason: HOSPADM

## 2021-01-01 RX ORDER — FUROSEMIDE 20 MG/1
20 TABLET ORAL DAILY
Qty: 60 TABLET | Refills: 1 | Status: ON HOLD | OUTPATIENT
Start: 2021-01-01 | End: 2022-01-01 | Stop reason: HOSPADM

## 2021-01-01 RX ORDER — PROMETHAZINE HYDROCHLORIDE 25 MG/ML
6.25 INJECTION, SOLUTION INTRAMUSCULAR; INTRAVENOUS
Status: DISCONTINUED | OUTPATIENT
Start: 2021-01-01 | End: 2021-01-01 | Stop reason: HOSPADM

## 2021-01-01 RX ORDER — ROCURONIUM BROMIDE 10 MG/ML
INJECTION, SOLUTION INTRAVENOUS PRN
Status: DISCONTINUED | OUTPATIENT
Start: 2021-01-01 | End: 2021-01-01 | Stop reason: SDUPTHER

## 2021-01-01 RX ORDER — CLINDAMYCIN PHOSPHATE 10 MG/G
GEL TOPICAL
Qty: 60 G | Refills: 3 | Status: ON HOLD | OUTPATIENT
Start: 2021-01-01 | End: 2022-01-01 | Stop reason: HOSPADM

## 2021-01-01 RX ORDER — LABETALOL HYDROCHLORIDE 5 MG/ML
5 INJECTION, SOLUTION INTRAVENOUS EVERY 10 MIN PRN
Status: DISCONTINUED | OUTPATIENT
Start: 2021-01-01 | End: 2021-01-01 | Stop reason: HOSPADM

## 2021-01-01 RX ORDER — HYDROMORPHONE HYDROCHLORIDE 1 MG/ML
0.25 INJECTION, SOLUTION INTRAMUSCULAR; INTRAVENOUS; SUBCUTANEOUS EVERY 5 MIN PRN
Status: DISCONTINUED | OUTPATIENT
Start: 2021-01-01 | End: 2021-01-01 | Stop reason: HOSPADM

## 2021-01-01 RX ADMIN — DEXAMETHASONE SODIUM PHOSPHATE 10 MG: 10 INJECTION, SOLUTION INTRAMUSCULAR; INTRAVENOUS at 12:23

## 2021-01-01 RX ADMIN — DEXAMETHASONE SODIUM PHOSPHATE 10 MG: 10 INJECTION, SOLUTION INTRAMUSCULAR; INTRAVENOUS at 11:04

## 2021-01-01 RX ADMIN — HYDROMORPHONE HYDROCHLORIDE 1 MG: 1 INJECTION, SOLUTION INTRAMUSCULAR; INTRAVENOUS; SUBCUTANEOUS at 16:20

## 2021-01-01 RX ADMIN — SODIUM CHLORIDE, PRESERVATIVE FREE 20 ML: 5 INJECTION INTRAVENOUS at 13:50

## 2021-01-01 RX ADMIN — LEUCOVORIN CALCIUM 700 MG: 350 INJECTION, POWDER, LYOPHILIZED, FOR SUSPENSION INTRAMUSCULAR; INTRAVENOUS at 12:45

## 2021-01-01 RX ADMIN — CEFTRIAXONE 1000 MG: 1 INJECTION, POWDER, FOR SOLUTION INTRAMUSCULAR; INTRAVENOUS at 16:43

## 2021-01-01 RX ADMIN — HYDROMORPHONE HYDROCHLORIDE 1 MG: 1 INJECTION, SOLUTION INTRAMUSCULAR; INTRAVENOUS; SUBCUTANEOUS at 15:15

## 2021-01-01 RX ADMIN — DEXAMETHASONE SODIUM PHOSPHATE 10 MG: 10 INJECTION, SOLUTION INTRAMUSCULAR; INTRAVENOUS at 09:55

## 2021-01-01 RX ADMIN — HEPARIN 500 UNITS: 100 SYRINGE at 12:45

## 2021-01-01 RX ADMIN — PANITUMUMAB 482 MG: 400 SOLUTION INTRAVENOUS at 11:30

## 2021-01-01 RX ADMIN — ONDANSETRON 16 MG: 2 INJECTION INTRAMUSCULAR; INTRAVENOUS at 12:23

## 2021-01-01 RX ADMIN — DEXAMETHASONE SODIUM PHOSPHATE 10 MG: 10 INJECTION, SOLUTION INTRAMUSCULAR; INTRAVENOUS at 10:53

## 2021-01-01 RX ADMIN — SODIUM CHLORIDE: 9 INJECTION, SOLUTION INTRAVENOUS at 10:20

## 2021-01-01 RX ADMIN — BUPIVACAINE HYDROCHLORIDE 150 MG: 5 INJECTION, SOLUTION EPIDURAL; INTRACAUDAL; PERINEURAL at 19:50

## 2021-01-01 RX ADMIN — EPHEDRINE SULFATE 10 MG: 50 INJECTION INTRAMUSCULAR; INTRAVENOUS; SUBCUTANEOUS at 10:14

## 2021-01-01 RX ADMIN — ONDANSETRON 4 MG: 2 INJECTION INTRAMUSCULAR; INTRAVENOUS at 15:15

## 2021-01-01 RX ADMIN — SODIUM CHLORIDE 1000 MG: 9 INJECTION INTRAMUSCULAR; INTRAVENOUS; SUBCUTANEOUS at 09:55

## 2021-01-01 RX ADMIN — FLUOROURACIL 4350 MG: 50 INJECTION, SOLUTION INTRAVENOUS at 12:22

## 2021-01-01 RX ADMIN — SODIUM CHLORIDE: 9 INJECTION, SOLUTION INTRAVENOUS at 12:23

## 2021-01-01 RX ADMIN — LIDOCAINE HYDROCHLORIDE 50 MG: 10 INJECTION, SOLUTION INFILTRATION; PERINEURAL at 09:50

## 2021-01-01 RX ADMIN — ONDANSETRON 16 MG: 2 INJECTION INTRAMUSCULAR; INTRAVENOUS at 10:54

## 2021-01-01 RX ADMIN — TETANUS TOXOID, REDUCED DIPHTHERIA TOXOID AND ACELLULAR PERTUSSIS VACCINE, ADSORBED 0.5 ML: 5; 2.5; 8; 8; 2.5 SUSPENSION INTRAMUSCULAR at 19:46

## 2021-01-01 RX ADMIN — SODIUM CHLORIDE: 9 INJECTION, SOLUTION INTRAVENOUS at 09:52

## 2021-01-01 RX ADMIN — DIPHENHYDRAMINE HYDROCHLORIDE 50 MG: 50 INJECTION, SOLUTION INTRAMUSCULAR; INTRAVENOUS at 10:20

## 2021-01-01 RX ADMIN — HEPARIN 500 UNITS: 100 SYRINGE at 14:30

## 2021-01-01 RX ADMIN — ACETAMINOPHEN 1000 MG: 500 TABLET ORAL at 11:52

## 2021-01-01 RX ADMIN — SUGAMMADEX 200 MG: 100 INJECTION, SOLUTION INTRAVENOUS at 10:54

## 2021-01-01 RX ADMIN — SODIUM CHLORIDE, PRESERVATIVE FREE 10 ML: 5 INJECTION INTRAVENOUS at 11:08

## 2021-01-01 RX ADMIN — SODIUM CHLORIDE, PRESERVATIVE FREE 20 ML: 5 INJECTION INTRAVENOUS at 12:45

## 2021-01-01 RX ADMIN — SODIUM CHLORIDE: 9 INJECTION, SOLUTION INTRAVENOUS at 10:46

## 2021-01-01 RX ADMIN — DIPHENHYDRAMINE HYDROCHLORIDE 50 MG: 50 INJECTION, SOLUTION INTRAMUSCULAR; INTRAVENOUS at 10:31

## 2021-01-01 RX ADMIN — LEUCOVORIN CALCIUM 700 MG: 350 INJECTION, POWDER, LYOPHILIZED, FOR SOLUTION INTRAMUSCULAR; INTRAVENOUS at 11:26

## 2021-01-01 RX ADMIN — HEPARIN 500 UNITS: 100 SYRINGE at 12:00

## 2021-01-01 RX ADMIN — HEPARIN 500 UNITS: 100 SYRINGE at 15:50

## 2021-01-01 RX ADMIN — FLUOROURACIL 4350 MG: 50 INJECTION, SOLUTION INTRAVENOUS at 12:34

## 2021-01-01 RX ADMIN — LEUCOVORIN CALCIUM 700 MG: 350 INJECTION, POWDER, LYOPHILIZED, FOR SOLUTION INTRAMUSCULAR; INTRAVENOUS at 11:13

## 2021-01-01 RX ADMIN — DIPHENHYDRAMINE HYDROCHLORIDE 50 MG: 50 INJECTION, SOLUTION INTRAMUSCULAR; INTRAVENOUS at 11:52

## 2021-01-01 RX ADMIN — ROCURONIUM BROMIDE 50 MG: 10 INJECTION, SOLUTION INTRAVENOUS at 09:50

## 2021-01-01 RX ADMIN — IOPAMIDOL 75 ML: 755 INJECTION, SOLUTION INTRAVENOUS at 15:10

## 2021-01-01 RX ADMIN — LEUCOVORIN CALCIUM 700 MG: 350 INJECTION, POWDER, LYOPHILIZED, FOR SUSPENSION INTRAMUSCULAR; INTRAVENOUS at 12:51

## 2021-01-01 RX ADMIN — FENTANYL CITRATE 50 MCG: 50 INJECTION, SOLUTION INTRAMUSCULAR; INTRAVENOUS at 10:10

## 2021-01-01 RX ADMIN — MICONAZOLE NITRATE: 2 POWDER TOPICAL at 16:40

## 2021-01-01 RX ADMIN — ONDANSETRON 16 MG: 2 INJECTION INTRAMUSCULAR; INTRAVENOUS at 11:04

## 2021-01-01 RX ADMIN — HEPARIN 100 UNITS: 100 SYRINGE at 16:47

## 2021-01-01 RX ADMIN — ACETAMINOPHEN 1000 MG: 500 TABLET ORAL at 10:20

## 2021-01-01 RX ADMIN — DEXAMETHASONE SODIUM PHOSPHATE: 10 INJECTION, SOLUTION INTRAMUSCULAR; INTRAVENOUS at 10:44

## 2021-01-01 RX ADMIN — PANITUMUMAB 430 MG: 400 SOLUTION INTRAVENOUS at 12:37

## 2021-01-01 RX ADMIN — SODIUM CHLORIDE: 9 INJECTION, SOLUTION INTRAVENOUS at 10:31

## 2021-01-01 RX ADMIN — DEXAMETHASONE SODIUM PHOSPHATE 10 MG: 10 INJECTION, SOLUTION INTRAMUSCULAR; INTRAVENOUS at 12:03

## 2021-01-01 RX ADMIN — PROPOFOL 130 MG: 10 INJECTION, EMULSION INTRAVENOUS at 09:50

## 2021-01-01 RX ADMIN — LIDOCAINE HYDROCHLORIDE 10 ML: 10 INJECTION, SOLUTION EPIDURAL; INFILTRATION; INTRACAUDAL; PERINEURAL at 19:58

## 2021-01-01 RX ADMIN — FLUOROURACIL 4350 MG: 50 INJECTION, SOLUTION INTRAVENOUS at 12:39

## 2021-01-01 RX ADMIN — ONDANSETRON HYDROCHLORIDE 4 MG: 2 INJECTION, SOLUTION INTRAMUSCULAR; INTRAVENOUS at 09:58

## 2021-01-01 RX ADMIN — SODIUM CHLORIDE: 9 INJECTION, SOLUTION INTRAVENOUS at 10:55

## 2021-01-01 RX ADMIN — TECHNETIUM TC 99M OXIDRONATE 20 MILLICURIE: 3.15 INJECTION, POWDER, LYOPHILIZED, FOR SOLUTION INTRAVENOUS at 13:06

## 2021-01-01 RX ADMIN — LEUCOVORIN CALCIUM 700 MG: 350 INJECTION, POWDER, LYOPHILIZED, FOR SUSPENSION INTRAMUSCULAR; INTRAVENOUS at 13:49

## 2021-01-01 RX ADMIN — LEUCOVORIN CALCIUM 700 MG: 350 INJECTION, POWDER, LYOPHILIZED, FOR SUSPENSION INTRAMUSCULAR; INTRAVENOUS at 11:20

## 2021-01-01 RX ADMIN — SODIUM CHLORIDE: 9 INJECTION, SOLUTION INTRAVENOUS at 11:52

## 2021-01-01 RX ADMIN — HEPARIN 500 UNITS: 100 SYRINGE at 11:08

## 2021-01-01 RX ADMIN — FLUOROURACIL 4350 MG: 50 INJECTION, SOLUTION INTRAVENOUS at 15:22

## 2021-01-01 RX ADMIN — FLUOROURACIL 4350 MG: 50 INJECTION, SOLUTION INTRAVENOUS at 14:25

## 2021-01-01 RX ADMIN — ONDANSETRON 16 MG: 2 INJECTION INTRAMUSCULAR; INTRAVENOUS at 12:03

## 2021-01-01 RX ADMIN — Medication 20 ML: at 15:50

## 2021-01-01 RX ADMIN — SODIUM CHLORIDE, SODIUM LACTATE, POTASSIUM CHLORIDE, AND CALCIUM CHLORIDE: 600; 310; 30; 20 INJECTION, SOLUTION INTRAVENOUS at 09:47

## 2021-01-01 RX ADMIN — Medication 20 ML: at 14:30

## 2021-01-01 RX ADMIN — FENTANYL CITRATE 50 MCG: 50 INJECTION, SOLUTION INTRAMUSCULAR; INTRAVENOUS at 09:50

## 2021-01-01 ASSESSMENT — PAIN SCALES - GENERAL
PAINLEVEL_OUTOF10: 0
PAINLEVEL_OUTOF10: 0
PAINLEVEL_OUTOF10: 10
PAINLEVEL_OUTOF10: 2
PAINLEVEL_OUTOF10: 0
PAINLEVEL_OUTOF10: 0
PAINLEVEL_OUTOF10: 8
PAINLEVEL_OUTOF10: 0
PAINLEVEL_OUTOF10: 4

## 2021-01-01 ASSESSMENT — ENCOUNTER SYMPTOMS
BACK PAIN: 1
SHORTNESS OF BREATH: 0
FACIAL SWELLING: 0
CHEST TIGHTNESS: 0
VOMITING: 0
SHORTNESS OF BREATH: 0
ABDOMINAL PAIN: 0
TROUBLE SWALLOWING: 0
COLOR CHANGE: 0
NAUSEA: 0
EYE DISCHARGE: 0
VOMITING: 0
COUGH: 0
EYE REDNESS: 0

## 2021-01-01 ASSESSMENT — LIFESTYLE VARIABLES: SMOKING_STATUS: 0

## 2021-01-04 ENCOUNTER — HOSPITAL ENCOUNTER (OUTPATIENT)
Dept: PREADMISSION TESTING | Age: 71
Discharge: HOME | End: 2021-01-08
Payer: MEDICARE

## 2021-01-04 ENCOUNTER — OFFICE VISIT (OUTPATIENT)
Dept: HEMATOLOGY | Age: 71
End: 2021-01-04
Payer: MEDICARE

## 2021-01-04 DIAGNOSIS — Z71.89 CARE PLAN DISCUSSED WITH PATIENT: ICD-10-CM

## 2021-01-04 DIAGNOSIS — L27.0 ACNEIFORM DRUG ERUPTION: ICD-10-CM

## 2021-01-04 DIAGNOSIS — C79.9 METASTASIS FROM COLON CANCER (HCC): ICD-10-CM

## 2021-01-04 DIAGNOSIS — C18.9 METASTASIS FROM COLON CANCER (HCC): ICD-10-CM

## 2021-01-04 DIAGNOSIS — C19 COLORECTAL CANCER (HCC): Primary | ICD-10-CM

## 2021-01-04 DIAGNOSIS — Z51.11 CHEMOTHERAPY MANAGEMENT, ENCOUNTER FOR: ICD-10-CM

## 2021-01-04 DIAGNOSIS — T45.1X5D ADVERSE EFFECT OF CHEMOTHERAPY, SUBSEQUENT ENCOUNTER: ICD-10-CM

## 2021-01-04 LAB
ANION GAP SERPL CALCULATED.3IONS-SCNC: 12 MMOL/L (ref 7–19)
APTT: 31.2 SEC (ref 26–36.2)
BASOPHILS ABSOLUTE: 0.1 K/UL (ref 0–0.2)
BASOPHILS RELATIVE PERCENT: 1.3 % (ref 0–1)
BUN BLDV-MCNC: 17 MG/DL (ref 8–23)
CALCIUM SERPL-MCNC: 9.1 MG/DL (ref 8.8–10.2)
CHLORIDE BLD-SCNC: 105 MMOL/L (ref 98–111)
CO2: 22 MMOL/L (ref 22–29)
CREAT SERPL-MCNC: 1.5 MG/DL (ref 0.5–1.2)
EKG P AXIS: 31 DEGREES
EKG P-R INTERVAL: 176 MS
EKG Q-T INTERVAL: 414 MS
EKG QRS DURATION: 96 MS
EKG QTC CALCULATION (BAZETT): 435 MS
EKG T AXIS: 38 DEGREES
EOSINOPHILS ABSOLUTE: 0.3 K/UL (ref 0–0.6)
EOSINOPHILS RELATIVE PERCENT: 5.6 % (ref 0–5)
GFR AFRICAN AMERICAN: 56
GFR NON-AFRICAN AMERICAN: 46
GLUCOSE BLD-MCNC: 113 MG/DL (ref 74–109)
HCT VFR BLD CALC: 36.9 % (ref 42–52)
HEMOGLOBIN: 11.5 G/DL (ref 14–18)
IMMATURE GRANULOCYTES #: 0.1 K/UL
INR BLD: 1.04 (ref 0.88–1.18)
LYMPHOCYTES ABSOLUTE: 0.8 K/UL (ref 1.1–4.5)
LYMPHOCYTES RELATIVE PERCENT: 14.8 % (ref 20–40)
MCH RBC QN AUTO: 29 PG (ref 27–31)
MCHC RBC AUTO-ENTMCNC: 31.2 G/DL (ref 33–37)
MCV RBC AUTO: 92.9 FL (ref 80–94)
MONOCYTES ABSOLUTE: 0.6 K/UL (ref 0–0.9)
MONOCYTES RELATIVE PERCENT: 10.3 % (ref 0–10)
NEUTROPHILS ABSOLUTE: 3.6 K/UL (ref 1.5–7.5)
NEUTROPHILS RELATIVE PERCENT: 67.1 % (ref 50–65)
PDW BLD-RTO: 14.2 % (ref 11.5–14.5)
PLATELET # BLD: 333 K/UL (ref 130–400)
PMV BLD AUTO: 11 FL (ref 9.4–12.4)
POTASSIUM SERPL-SCNC: 4.7 MMOL/L (ref 3.5–5)
PROTHROMBIN TIME: 13.5 SEC (ref 12–14.6)
RBC # BLD: 3.97 M/UL (ref 4.7–6.1)
SARS-COV-2, PCR: NOT DETECTED
SODIUM BLD-SCNC: 139 MMOL/L (ref 136–145)
WBC # BLD: 5.4 K/UL (ref 4.8–10.8)

## 2021-01-04 PROCEDURE — 3017F COLORECTAL CA SCREEN DOC REV: CPT | Performed by: INTERNAL MEDICINE

## 2021-01-04 PROCEDURE — G8428 CUR MEDS NOT DOCUMENT: HCPCS | Performed by: INTERNAL MEDICINE

## 2021-01-04 PROCEDURE — G8484 FLU IMMUNIZE NO ADMIN: HCPCS | Performed by: INTERNAL MEDICINE

## 2021-01-04 PROCEDURE — 80048 BASIC METABOLIC PNL TOTAL CA: CPT

## 2021-01-04 PROCEDURE — 93010 ELECTROCARDIOGRAM REPORT: CPT | Performed by: INTERNAL MEDICINE

## 2021-01-04 PROCEDURE — 36415 COLL VENOUS BLD VENIPUNCTURE: CPT | Performed by: INTERNAL MEDICINE

## 2021-01-04 PROCEDURE — 85025 COMPLETE CBC W/AUTO DIFF WBC: CPT

## 2021-01-04 PROCEDURE — 1036F TOBACCO NON-USER: CPT | Performed by: INTERNAL MEDICINE

## 2021-01-04 PROCEDURE — U0003 INFECTIOUS AGENT DETECTION BY NUCLEIC ACID (DNA OR RNA); SEVERE ACUTE RESPIRATORY SYNDROME CORONAVIRUS 2 (SARS-COV-2) (CORONAVIRUS DISEASE [COVID-19]), AMPLIFIED PROBE TECHNIQUE, MAKING USE OF HIGH THROUGHPUT TECHNOLOGIES AS DESCRIBED BY CMS-2020-01-R: HCPCS

## 2021-01-04 PROCEDURE — 93005 ELECTROCARDIOGRAM TRACING: CPT | Performed by: UROLOGY

## 2021-01-04 PROCEDURE — 1123F ACP DISCUSS/DSCN MKR DOCD: CPT | Performed by: INTERNAL MEDICINE

## 2021-01-04 PROCEDURE — G8417 CALC BMI ABV UP PARAM F/U: HCPCS | Performed by: INTERNAL MEDICINE

## 2021-01-04 PROCEDURE — 85610 PROTHROMBIN TIME: CPT

## 2021-01-04 PROCEDURE — 99214 OFFICE O/P EST MOD 30 MIN: CPT | Performed by: INTERNAL MEDICINE

## 2021-01-04 PROCEDURE — 85730 THROMBOPLASTIN TIME PARTIAL: CPT

## 2021-01-04 PROCEDURE — 4040F PNEUMOC VAC/ADMIN/RCVD: CPT | Performed by: INTERNAL MEDICINE
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
## 2021-01-05 ENCOUNTER — ANESTHESIA (OUTPATIENT)
Dept: OPERATING ROOM | Age: 71
End: 2021-01-05
Payer: MEDICARE

## 2021-01-05 ENCOUNTER — ANESTHESIA EVENT (OUTPATIENT)
Dept: OPERATING ROOM | Age: 71
End: 2021-01-05
Payer: MEDICARE

## 2021-01-05 ENCOUNTER — APPOINTMENT (OUTPATIENT)
Dept: GENERAL RADIOLOGY | Age: 71
End: 2021-01-05
Attending: UROLOGY
Payer: MEDICARE

## 2021-01-05 ENCOUNTER — HOSPITAL ENCOUNTER (OUTPATIENT)
Age: 71
Setting detail: OUTPATIENT SURGERY
Discharge: HOME | End: 2021-01-05
Attending: UROLOGY | Admitting: UROLOGY
Payer: MEDICARE

## 2021-01-05 VITALS
DIASTOLIC BLOOD PRESSURE: 66 MMHG | SYSTOLIC BLOOD PRESSURE: 127 MMHG | OXYGEN SATURATION: 100 % | TEMPERATURE: 95.9 F | RESPIRATION RATE: 1 BRPM

## 2021-01-05 VITALS
RESPIRATION RATE: 14 BRPM | HEIGHT: 65 IN | TEMPERATURE: 97.1 F | SYSTOLIC BLOOD PRESSURE: 124 MMHG | WEIGHT: 155 LBS | OXYGEN SATURATION: 100 % | HEART RATE: 72 BPM | BODY MASS INDEX: 25.83 KG/M2 | DIASTOLIC BLOOD PRESSURE: 72 MMHG

## 2021-01-05 PROCEDURE — 7100000001 HC PACU RECOVERY - ADDTL 15 MIN: Performed by: UROLOGY

## 2021-01-05 PROCEDURE — 3600000004 HC SURGERY LEVEL 4 BASE: Performed by: UROLOGY

## 2021-01-05 PROCEDURE — 3700000000 HC ANESTHESIA ATTENDED CARE: Performed by: UROLOGY

## 2021-01-05 PROCEDURE — C2617 STENT, NON-COR, TEM W/O DEL: HCPCS | Performed by: UROLOGY

## 2021-01-05 PROCEDURE — 7100000000 HC PACU RECOVERY - FIRST 15 MIN: Performed by: UROLOGY

## 2021-01-05 PROCEDURE — 2580000003 HC RX 258

## 2021-01-05 PROCEDURE — 6360000002 HC RX W HCPCS: Performed by: ANESTHESIOLOGY

## 2021-01-05 PROCEDURE — 2580000003 HC RX 258: Performed by: ANESTHESIOLOGY

## 2021-01-05 PROCEDURE — 7100000010 HC PHASE II RECOVERY - FIRST 15 MIN: Performed by: UROLOGY

## 2021-01-05 PROCEDURE — 2500000003 HC RX 250 WO HCPCS: Performed by: ANESTHESIOLOGY

## 2021-01-05 PROCEDURE — 2709999900 HC NON-CHARGEABLE SUPPLY: Performed by: UROLOGY

## 2021-01-05 PROCEDURE — 3700000001 HC ADD 15 MINUTES (ANESTHESIA): Performed by: UROLOGY

## 2021-01-05 PROCEDURE — 2580000003 HC RX 258: Performed by: UROLOGY

## 2021-01-05 PROCEDURE — 6360000002 HC RX W HCPCS: Performed by: UROLOGY

## 2021-01-05 PROCEDURE — 7100000011 HC PHASE II RECOVERY - ADDTL 15 MIN: Performed by: UROLOGY

## 2021-01-05 PROCEDURE — 2500000003 HC RX 250 WO HCPCS

## 2021-01-05 PROCEDURE — 3600000014 HC SURGERY LEVEL 4 ADDTL 15MIN: Performed by: UROLOGY

## 2021-01-05 PROCEDURE — 74420 UROGRAPHY RTRGR +-KUB: CPT | Performed by: UROLOGY

## 2021-01-05 PROCEDURE — 74420 UROGRAPHY RTRGR +-KUB: CPT

## 2021-01-05 PROCEDURE — C1758 CATHETER, URETERAL: HCPCS | Performed by: UROLOGY

## 2021-01-05 PROCEDURE — 6360000002 HC RX W HCPCS

## 2021-01-05 PROCEDURE — 52332 CYSTOSCOPY AND TREATMENT: CPT | Performed by: UROLOGY

## 2021-01-05 PROCEDURE — C1769 GUIDE WIRE: HCPCS | Performed by: UROLOGY

## 2021-01-05 DEVICE — URETERAL STENT
Type: IMPLANTABLE DEVICE | Status: FUNCTIONAL
Brand: POLARIS™ ULTRA

## 2021-01-05 RX ORDER — MEPERIDINE HYDROCHLORIDE 50 MG/ML
12.5 INJECTION INTRAMUSCULAR; INTRAVENOUS; SUBCUTANEOUS EVERY 5 MIN PRN
Status: DISCONTINUED | OUTPATIENT
Start: 2021-01-05 | End: 2021-01-05 | Stop reason: HOSPADM

## 2021-01-05 RX ORDER — PROMETHAZINE HYDROCHLORIDE 25 MG/ML
6.25 INJECTION, SOLUTION INTRAMUSCULAR; INTRAVENOUS
Status: DISCONTINUED | OUTPATIENT
Start: 2021-01-05 | End: 2021-01-05 | Stop reason: HOSPADM

## 2021-01-05 RX ORDER — HYDRALAZINE HYDROCHLORIDE 20 MG/ML
5 INJECTION INTRAMUSCULAR; INTRAVENOUS EVERY 10 MIN PRN
Status: DISCONTINUED | OUTPATIENT
Start: 2021-01-05 | End: 2021-01-05 | Stop reason: HOSPADM

## 2021-01-05 RX ORDER — OXYCODONE HYDROCHLORIDE AND ACETAMINOPHEN 5; 325 MG/1; MG/1
2 TABLET ORAL EVERY 4 HOURS PRN
Status: DISCONTINUED | OUTPATIENT
Start: 2021-01-05 | End: 2021-01-05 | Stop reason: HOSPADM

## 2021-01-05 RX ORDER — ONDANSETRON 2 MG/ML
4 INJECTION INTRAMUSCULAR; INTRAVENOUS
Status: DISCONTINUED | OUTPATIENT
Start: 2021-01-05 | End: 2021-01-05 | Stop reason: HOSPADM

## 2021-01-05 RX ORDER — DIPHENHYDRAMINE HYDROCHLORIDE 50 MG/ML
12.5 INJECTION INTRAMUSCULAR; INTRAVENOUS
Status: DISCONTINUED | OUTPATIENT
Start: 2021-01-05 | End: 2021-01-05 | Stop reason: HOSPADM

## 2021-01-05 RX ORDER — LABETALOL HYDROCHLORIDE 5 MG/ML
5 INJECTION, SOLUTION INTRAVENOUS EVERY 10 MIN PRN
Status: DISCONTINUED | OUTPATIENT
Start: 2021-01-05 | End: 2021-01-05 | Stop reason: HOSPADM

## 2021-01-05 RX ORDER — FENTANYL CITRATE 50 UG/ML
50 INJECTION, SOLUTION INTRAMUSCULAR; INTRAVENOUS EVERY 5 MIN PRN
Status: DISCONTINUED | OUTPATIENT
Start: 2021-01-05 | End: 2021-01-05 | Stop reason: HOSPADM

## 2021-01-05 RX ORDER — SODIUM CHLORIDE 0.9 % (FLUSH) 0.9 %
10 SYRINGE (ML) INJECTION PRN
Status: DISCONTINUED | OUTPATIENT
Start: 2021-01-05 | End: 2021-01-05 | Stop reason: HOSPADM

## 2021-01-05 RX ORDER — HYDROMORPHONE HYDROCHLORIDE 1 MG/ML
0.25 INJECTION, SOLUTION INTRAMUSCULAR; INTRAVENOUS; SUBCUTANEOUS EVERY 5 MIN PRN
Status: DISCONTINUED | OUTPATIENT
Start: 2021-01-05 | End: 2021-01-05 | Stop reason: HOSPADM

## 2021-01-05 RX ORDER — SODIUM CHLORIDE, SODIUM LACTATE, POTASSIUM CHLORIDE, CALCIUM CHLORIDE 600; 310; 30; 20 MG/100ML; MG/100ML; MG/100ML; MG/100ML
INJECTION, SOLUTION INTRAVENOUS CONTINUOUS PRN
Status: DISCONTINUED | OUTPATIENT
Start: 2021-01-05 | End: 2021-01-05 | Stop reason: SDUPTHER

## 2021-01-05 RX ORDER — SODIUM CHLORIDE 9 MG/ML
INJECTION, SOLUTION INTRAVENOUS CONTINUOUS
Status: DISCONTINUED | OUTPATIENT
Start: 2021-01-05 | End: 2021-01-05 | Stop reason: HOSPADM

## 2021-01-05 RX ORDER — HYDROMORPHONE HYDROCHLORIDE 1 MG/ML
0.5 INJECTION, SOLUTION INTRAMUSCULAR; INTRAVENOUS; SUBCUTANEOUS EVERY 5 MIN PRN
Status: DISCONTINUED | OUTPATIENT
Start: 2021-01-05 | End: 2021-01-05 | Stop reason: HOSPADM

## 2021-01-05 RX ORDER — ENALAPRILAT 2.5 MG/2ML
1.25 INJECTION INTRAVENOUS
Status: DISCONTINUED | OUTPATIENT
Start: 2021-01-05 | End: 2021-01-05 | Stop reason: HOSPADM

## 2021-01-05 RX ORDER — ROCURONIUM BROMIDE 10 MG/ML
INJECTION, SOLUTION INTRAVENOUS PRN
Status: DISCONTINUED | OUTPATIENT
Start: 2021-01-05 | End: 2021-01-05 | Stop reason: SDUPTHER

## 2021-01-05 RX ORDER — FENTANYL CITRATE 50 UG/ML
INJECTION, SOLUTION INTRAMUSCULAR; INTRAVENOUS PRN
Status: DISCONTINUED | OUTPATIENT
Start: 2021-01-05 | End: 2021-01-05 | Stop reason: SDUPTHER

## 2021-01-05 RX ORDER — DEXAMETHASONE SODIUM PHOSPHATE 4 MG/ML
4 INJECTION, SOLUTION INTRA-ARTICULAR; INTRALESIONAL; INTRAMUSCULAR; INTRAVENOUS; SOFT TISSUE ONCE
Status: COMPLETED | OUTPATIENT
Start: 2021-01-05 | End: 2021-01-05

## 2021-01-05 RX ORDER — SODIUM CHLORIDE, SODIUM LACTATE, POTASSIUM CHLORIDE, CALCIUM CHLORIDE 600; 310; 30; 20 MG/100ML; MG/100ML; MG/100ML; MG/100ML
INJECTION, SOLUTION INTRAVENOUS CONTINUOUS
Status: DISCONTINUED | OUTPATIENT
Start: 2021-01-05 | End: 2021-01-05 | Stop reason: HOSPADM

## 2021-01-05 RX ORDER — LIDOCAINE HYDROCHLORIDE 10 MG/ML
INJECTION, SOLUTION EPIDURAL; INFILTRATION; INTRACAUDAL; PERINEURAL PRN
Status: DISCONTINUED | OUTPATIENT
Start: 2021-01-05 | End: 2021-01-05

## 2021-01-05 RX ORDER — HYDROMORPHONE HYDROCHLORIDE 1 MG/ML
0.5 INJECTION, SOLUTION INTRAMUSCULAR; INTRAVENOUS; SUBCUTANEOUS
Status: DISCONTINUED | OUTPATIENT
Start: 2021-01-05 | End: 2021-01-05 | Stop reason: HOSPADM

## 2021-01-05 RX ORDER — SODIUM CHLORIDE 0.9 % (FLUSH) 0.9 %
10 SYRINGE (ML) INJECTION EVERY 12 HOURS SCHEDULED
Status: DISCONTINUED | OUTPATIENT
Start: 2021-01-05 | End: 2021-01-05 | Stop reason: HOSPADM

## 2021-01-05 RX ORDER — ONDANSETRON 2 MG/ML
4 INJECTION INTRAMUSCULAR; INTRAVENOUS EVERY 4 HOURS PRN
Status: DISCONTINUED | OUTPATIENT
Start: 2021-01-05 | End: 2021-01-05 | Stop reason: HOSPADM

## 2021-01-05 RX ORDER — HEPARIN SODIUM (PORCINE) LOCK FLUSH IV SOLN 100 UNIT/ML 100 UNIT/ML
100 SOLUTION INTRAVENOUS PRN
Status: DISCONTINUED | OUTPATIENT
Start: 2021-01-05 | End: 2021-01-05 | Stop reason: HOSPADM

## 2021-01-05 RX ORDER — PROPOFOL 10 MG/ML
INJECTION, EMULSION INTRAVENOUS PRN
Status: DISCONTINUED | OUTPATIENT
Start: 2021-01-05 | End: 2021-01-05 | Stop reason: SDUPTHER

## 2021-01-05 RX ORDER — METOCLOPRAMIDE HYDROCHLORIDE 5 MG/ML
10 INJECTION INTRAMUSCULAR; INTRAVENOUS
Status: DISCONTINUED | OUTPATIENT
Start: 2021-01-05 | End: 2021-01-05 | Stop reason: HOSPADM

## 2021-01-05 RX ORDER — ONDANSETRON 2 MG/ML
INJECTION INTRAMUSCULAR; INTRAVENOUS PRN
Status: DISCONTINUED | OUTPATIENT
Start: 2021-01-05 | End: 2021-01-05 | Stop reason: SDUPTHER

## 2021-01-05 RX ADMIN — FAMOTIDINE 20 MG: 10 INJECTION, SOLUTION INTRAVENOUS at 07:19

## 2021-01-05 RX ADMIN — PHENYLEPHRINE HYDROCHLORIDE 100 MCG: 10 INJECTION INTRAVENOUS at 08:05

## 2021-01-05 RX ADMIN — ROCURONIUM BROMIDE 50 MG: 10 INJECTION, SOLUTION INTRAVENOUS at 07:51

## 2021-01-05 RX ADMIN — ROCURONIUM BROMIDE 20 MG: 10 INJECTION, SOLUTION INTRAVENOUS at 08:11

## 2021-01-05 RX ADMIN — FENTANYL CITRATE 100 MCG: 50 INJECTION, SOLUTION INTRAMUSCULAR; INTRAVENOUS at 07:51

## 2021-01-05 RX ADMIN — PHENYLEPHRINE HYDROCHLORIDE 100 MCG: 10 INJECTION INTRAVENOUS at 07:58

## 2021-01-05 RX ADMIN — PHENYLEPHRINE HYDROCHLORIDE 100 MCG/MIN: 10 INJECTION INTRAVENOUS at 08:15

## 2021-01-05 RX ADMIN — PROPOFOL 150 MG: 10 INJECTION, EMULSION INTRAVENOUS at 07:51

## 2021-01-05 RX ADMIN — SODIUM CHLORIDE, SODIUM LACTATE, POTASSIUM CHLORIDE, AND CALCIUM CHLORIDE: 600; 310; 30; 20 INJECTION, SOLUTION INTRAVENOUS at 06:47

## 2021-01-05 RX ADMIN — SODIUM CHLORIDE, SODIUM LACTATE, POTASSIUM CHLORIDE, AND CALCIUM CHLORIDE: 600; 310; 30; 20 INJECTION, SOLUTION INTRAVENOUS at 07:48

## 2021-01-05 RX ADMIN — DEXAMETHASONE SODIUM PHOSPHATE 4 MG: 4 INJECTION, SOLUTION INTRAMUSCULAR; INTRAVENOUS at 07:19

## 2021-01-05 RX ADMIN — SODIUM CHLORIDE, PRESERVATIVE FREE 100 UNITS: 5 INJECTION INTRAVENOUS at 10:44

## 2021-01-05 RX ADMIN — PHENYLEPHRINE HYDROCHLORIDE 100 MCG: 10 INJECTION INTRAVENOUS at 08:07

## 2021-01-05 RX ADMIN — ONDANSETRON HYDROCHLORIDE 4 MG: 2 INJECTION, SOLUTION INTRAMUSCULAR; INTRAVENOUS at 08:36

## 2021-01-05 RX ADMIN — SODIUM CHLORIDE 1 G: 9 INJECTION INTRAMUSCULAR; INTRAVENOUS; SUBCUTANEOUS at 08:03

## 2021-01-05 RX ADMIN — SUGAMMADEX 300 MG: 100 INJECTION, SOLUTION INTRAVENOUS at 08:36

## 2021-01-05 ASSESSMENT — PAIN SCALES - GENERAL
PAINLEVEL_OUTOF10: 0
PAINLEVEL_OUTOF10: 0

## 2021-01-05 ASSESSMENT — LIFESTYLE VARIABLES: SMOKING_STATUS: 0
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
## 2021-01-05 NOTE — OP NOTE
Brief operative Note      Patient: Janine Graec  YOB: 1950  MRN: [id number]    Date of Procedure: 1/5/2021    Pre-Op Diagnosis: 1. BILATERAL HYDRONEPHOSIS,  2.  bilateral ureteral obstruction,   3. History of BLADDER CANCER    Post-Op Diagnosis: Same       Procedure(s):  CYSTOSCOPY  BILARTERAL URETERAL STENT REMOVAL AND REPLACEMENT BILATERAL BILATERAL URETERAL CATHERIZATION BILATERAL RETROGRADE PYLEOGRAM    Surgeon(s):  Torri Zheng MD    Assistant:   * No surgical staff found *    Anesthesia: General    Estimated Blood Loss (mL): 0    Complications: None    Specimens:   * No specimens in log *    Implants:  Implant Name Type Inv. Item Serial No.  Lot No. LRB No. Used Action   STENT URET L20CM DIA6FR HYDR+ PERCFLX TAPR TIP DBL PGTL  STENT URET L20CM DIA6FR HYDR+ PERCFLX TAPR TIP DBL PGTL  [employer]- [id number] Left 1 Implanted   STENT URET L20CM DIA6FR HYDR+ PERCFLX TAPR TIP DBL PGTL  STENT URET L20CM DIA6FR HYDR+ PERCFLX TAPR TIP DBL PGTL  [employer]- [id number] Right 1 Implanted         Drains:   Ileostomy LLQ (Active)       Findings: cystoscopy  showed no bladder mucosal lesions or no recurrent bladder tumors today. Left retrograde pyelogram shows moderate left hydronephrosis with dilated ureter down to the pelvis with ureteral extrinsic obstruction of distal left ureter as seen previously no significant changes.   Right shows right moderate hydronephrosis proximal right ureter is irregular and tortuous with dilation of the mid ureter down to the pelvis again with extrinsic ureteral obstruction of distal right ureter as seen previous no significant changes    Detailed Description of Procedure:   See dictated report: [id number]    Electronically signed by Dudley Rosen MD on 1/5/2021 at 8:41 AM

## 2021-01-05 NOTE — ANESTHESIA POSTPROCEDURE EVALUATION
Department of Anesthesiology  Postprocedure Note    Patient: Janine Martinez  MRN: [id number]  YOB: 1950  Date of evaluation: 1/5/2021  Time:  8:59 AM     Procedure Summary     Date: 01/05/21 Room / Location: [facility] OR [facility]    Anesthesia Start: 1493 Anesthesia Stop: 3065    Procedure: CYSTOSCOPY  BILARTERAL URETERAL STENT REMOVAL AND REPLACEMENT BILATERAL BILATERAL URETERAL CATHERIZATION BILATERAL RETROGRADE PYLEOGRAM (Bilateral ) Diagnosis:       (BILATERAL HYDRONEPHOSIS)      (BLADDER CANCER)    Surgeons: Sophie Connolly MD Responsible Provider: OLGA Llanos    Anesthesia Type: general ASA Status: 3          Anesthesia Type: general    Abad Phase I: Abad Score: 10    Abad Phase II:      Last vitals: Reviewed and per EMR flowsheets.        Anesthesia Post Evaluation    Patient location during evaluation: PACU  Patient participation: complete - patient participated  Level of consciousness: awake and alert  Pain score: 0  Airway patency: patent  Nausea & Vomiting: no nausea and no vomiting  Complications: no  Cardiovascular status: hemodynamically stable  Respiratory status: acceptable  Hydration status: euvolemic

## 2021-01-05 NOTE — OP NOTE
[place] [employer] [facility] [address] [facility]                                OPERATIVE REPORT    PATIENT NAME: Lana Glass                   :        1950  MED REC NO:   [id number]                              ROOM:  ACCOUNT NO:   [de-identified]                           ADMIT DATE: 2021  PROVIDER:     Santy Pope MD      DATE OF PROCEDURE:  2021    RADIOGRAPHIC INTERPRETATION OF BILATERAL RETROGRADE PYELOGRAMS    1. Left retrograde pyelogram.    INTERPRETATION:  The left ureter is cystoscopically intubated with a  left ureteral catheter. A pullback retrograde is performed as the  catheter is pulled back distally on multiple images. This shows left  moderate-to-severe hydronephrosis with a dilated ureter down to the  sacrum with abrupt cutoff with string sign, distal left ureter  consistent with extrinsic ureteral obstruction from his prior history of  colorectal carcinoma and pelvic radiation. This appears to be unchanged  from previous. 2.  Right retrograde pyelogram.    INTERPRETATION:  The right ureter is cystoscopically intubated with  ureteral catheter. Contrast injected in the ureteral catheter, which  appeared to be in the mid to proximal ureter at the level of L4-L5. There is tortuosity of the lumbar ureter with lateral deviation. There  are several irregularities of the ureter itself. The more proximal  ureter above this level is dilated with a very dilated renal pelvis with  moderate-to-severe hydronephrosis. The ureter distal to this is more  normal in its course but dilated down to again about the level of the  sacrum where there is abrupt change in caliber with a small string sign  distal consistent with right distal ureteral extrinsic obstruction from  his colorectal cancer and pelvic radiation. This again appears  unchanged.         Arlyn Panchal MD D: 01/05/2021 10:01:10      T: 01/05/2021 12:12:44     [email or web address]  Job#: [id number]     Doc#: [id number]    CC:
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
## 2021-01-05 NOTE — OP NOTE
ELLIE Sandglaz OF [facility] [address] [facility]                                OPERATIVE REPORT    PATIENT NAME: Michelle Fontaine                   :        1950  MED REC NO:   [id number]                              ROOM:  ACCOUNT NO:   [de-identified]                           ADMIT DATE: 2021  PROVIDER:     Compa Bennett MD    DATE OF PROCEDURE:  2021    TITLE OF OPERATION:  1. Cystoscopy, bilateral ureteral stent removal.  2.  Bilateral ureteral catheterization. 3.  Bilateral retrograde pyelogram.  4.  Bilateral ureteral stent replacement, 6-Palest[place] x 20 cm. PREOPERATIVE DIAGNOSES:  1.  Bilateral hydronephrosis managed with chronic indwelling ureteral  stents. 2.  Bilateral ureteral obstruction from history of colon cancer and  pelvic radiation. 3.  History of bladder cancer. POSTOPERATIVE DIAGNOSES:  1.  Bilateral hydronephrosis managed with chronic indwelling ureteral  stents. 2.  Bilateral ureteral obstruction from history of colon cancer and  pelvic radiation. 3.  History of bladder cancer. ANESTHESIA:  General anesthetic. ATTENDING SURGEON:  Dr. Compa Bennett    HISTORY:  The patient is a 81-year-old gentleman who has a history of  colorectal carcinoma. He has had a diverting ostomy. He has had pelvic  radiation in the past.  He had a diagnosis with recurrent cancer, but it  has responded to treatment. However, at the time he was found,  approximately two years ago, to have bilateral hydronephrosis secondary  to extrinsic ureteral obstruction from the process in his pelvis. This  has been managed with chronic indwelling ureteral stents. They were  last changed on 10/15/2020. He is now due bilateral ureteral stent  removal and replacement with stent changes. His serum creatinine has  remained stable at 1.5 with a GFR of 46.   He also has a history of superficial bladder cancer that was diagnosed at the time of his first  cystoscopy and stent placement, again approximately 2 years ago. His  last recurrence was on 05/28/2020, he has had multiple recurrences. He  did have a biopsy done on 10/15/2020. This showed benign urothelium  with underlying granulomatous inflammation consistent with prior BCG  treatment. Because he had multiple recurrences with high-grade disease,  he did receive induction BCG. He has not had any maintenance BCG  treatments. His stage has been Ta, noninvasive. He is now to undergo  cystoscopy, bilateral ureteral stent removal and bilateral ureteral  stent replacement. We will plan for a cystoscopy and if we find any  bladder lesions, we will have biopsy fulguration and possible TURBT. This was discussed with him previous, and he understands and agrees to  proceed. The risk and complications have also been discussed. DESCRIPTION OF PROCEDURE:  The patient is brought to the operating room,  underwent general anesthetic. He was placed in the lithotomy position. His genitalia was prepped and draped per routine sterile fashion. Time-out was performed, he received a preoperative antibiotic. A  22-Sami cystoscope was inserted into the meatus, this was advanced  under direct vision. The penile and bulbar and prostatic urethra, all  appeared completely normal.  No significant prostatic enlargement. Entered the patient's bladder, you could see stents protruding from the  right and left ureteral orifices. There was minimal mucosal reaction  related to the stents on the trigone area. The bladder was inspected  throughout with a 30- and a 70-degree lens. There was some  trabeculation. There were some mild inflammatory patches that were  small, less than a centimeter, but I saw no papillary mucosal changes,  no papillary tumors and felt that his bladder mucosa was free of  recurrent bladder cancer at this time. removed the guidewire, injected contrast.  This basically showed that  the right ureter was very tortuous and irregular here. This was a  similar finding as was seen previously on retrograde studies. I  injected the catheter to opacify the ureter and the ureter sort of  deviated laterally and after this tortuosity, the more proximal ureter  was dilated with a dilated renal pelvis, moderate to severe. Contrast  did go around the catheter distally down to dilated ureter again down to  the sacrum and then abrupt caliber change with a small-caliber distal  right ureter consistent with distal right ureteral extrinsic  obstruction. I felt these changes were all secondary from the  obstruction and having the stent in place. With manipulation, I was  able then with the aid of the catheter to get the guidewire to advance  up into the renal pelvis using fluoroscopic guidance. I then advanced  5-[place] open-ended ureteral catheter up into the renal pelvis to make  sure I measured correctly for the stent and I removed the guidewire, got  a nice hydronephrotic drip. The guidewire was replaced and the catheter  was then removed. Over the guidewire then a 6-[place] x 20 cm right double-J ureteral stent  was advanced using cystoscopic and fluoroscopic guidance. This was  coiled in the renal pelvis in good position and coiled in the bladder in  good position. The bladder was inspected one last time with 30-degree  lens, again saw no lesions and then the scope was removed after  decompressing the bladder. Procedure was then terminated. He was  awakened from the anesthetic and taken to the recovery room in stable  condition. He will follow up with me in two months when we will arrange  his next surveillance cystoscopy and his next ureteral stent change.     EBL = Lin Alicia MD    D: 01/05/2021 10:01:10      T: 01/05/2021 12:10:46     [email or web address]  Job#: [id number]     Doc#: [id number]    CC:

## 2021-01-05 NOTE — INTERVAL H&P NOTE
Update History & Physical    The patient's History and Physical of December 12, 2020 was reviewed with the patient and I examined the patient. There was no change. The surgical site was confirmed by the patient and me. Plan: The risks, benefits, expected outcome, and alternative to the recommended procedure have been discussed with the patient. Patient understands and wants to proceed with the procedure.      Electronically signed by Willis Strong MD on 1/5/2021 at 7:37 AM

## 2021-01-06 ENCOUNTER — TELEPHONE (OUTPATIENT)
Dept: NEUROSURGERY | Facility: CLINIC | Age: 71
End: 2021-01-06

## 2021-01-06 ENCOUNTER — HOSPITAL ENCOUNTER (OUTPATIENT)
Dept: INFUSION THERAPY | Age: 71
Setting detail: INFUSION SERIES
Discharge: HOME | End: 2021-01-06
Payer: MEDICARE

## 2021-01-06 VITALS
HEART RATE: 83 BPM | RESPIRATION RATE: 17 BRPM | DIASTOLIC BLOOD PRESSURE: 67 MMHG | SYSTOLIC BLOOD PRESSURE: 132 MMHG | TEMPERATURE: 98.1 F

## 2021-01-06 DIAGNOSIS — C19 COLORECTAL CANCER (HCC): Primary | ICD-10-CM

## 2021-01-06 PROCEDURE — 6370000000 HC RX 637 (ALT 250 FOR IP): Performed by: INTERNAL MEDICINE

## 2021-01-06 PROCEDURE — 96366 THER/PROPH/DIAG IV INF ADDON: CPT

## 2021-01-06 PROCEDURE — G0498 CHEMO EXTEND IV INFUS W/PUMP: HCPCS

## 2021-01-06 PROCEDURE — 2580000003 HC RX 258: Performed by: INTERNAL MEDICINE

## 2021-01-06 PROCEDURE — 96413 CHEMO IV INFUSION 1 HR: CPT

## 2021-01-06 PROCEDURE — 6360000002 HC RX W HCPCS: Performed by: INTERNAL MEDICINE

## 2021-01-06 RX ORDER — DIPHENHYDRAMINE HYDROCHLORIDE 50 MG/ML
50 INJECTION INTRAMUSCULAR; INTRAVENOUS ONCE
Status: CANCELLED | OUTPATIENT
Start: 2021-01-06 | End: 2021-01-06

## 2021-01-06 RX ORDER — EPINEPHRINE 1 MG/ML
0.3 INJECTION, SOLUTION, CONCENTRATE INTRAVENOUS PRN
Status: CANCELLED | OUTPATIENT
Start: 2021-01-06

## 2021-01-06 RX ORDER — DEXAMETHASONE SODIUM PHOSPHATE 10 MG/ML
10 INJECTION, SOLUTION INTRAMUSCULAR; INTRAVENOUS ONCE
Status: DISCONTINUED | OUTPATIENT
Start: 2021-01-06 | End: 2021-01-06 | Stop reason: SDUPTHER

## 2021-01-06 RX ORDER — SODIUM CHLORIDE 9 MG/ML
INJECTION, SOLUTION INTRAVENOUS ONCE
Status: COMPLETED | OUTPATIENT
Start: 2021-01-06 | End: 2021-01-07

## 2021-01-06 RX ORDER — ACETAMINOPHEN 500 MG
1000 TABLET ORAL ONCE
Status: CANCELLED
Start: 2021-01-06 | End: 2021-01-06

## 2021-01-06 RX ORDER — SODIUM CHLORIDE 9 MG/ML
INJECTION, SOLUTION INTRAVENOUS CONTINUOUS
Status: CANCELLED | OUTPATIENT
Start: 2021-01-06

## 2021-01-06 RX ORDER — DIPHENHYDRAMINE HYDROCHLORIDE 50 MG/ML
50 INJECTION INTRAMUSCULAR; INTRAVENOUS ONCE
Status: CANCELLED
Start: 2021-01-06 | End: 2021-01-06

## 2021-01-06 RX ORDER — SODIUM CHLORIDE 0.9 % (FLUSH) 0.9 %
5 SYRINGE (ML) INJECTION PRN
Status: CANCELLED | OUTPATIENT
Start: 2021-01-06

## 2021-01-06 RX ORDER — DIPHENHYDRAMINE HYDROCHLORIDE 50 MG/ML
50 INJECTION INTRAMUSCULAR; INTRAVENOUS ONCE
Status: COMPLETED | OUTPATIENT
Start: 2021-01-06 | End: 2021-01-06

## 2021-01-06 RX ORDER — SODIUM CHLORIDE 9 MG/ML
INJECTION, SOLUTION INTRAVENOUS ONCE
Status: CANCELLED | OUTPATIENT
Start: 2021-01-06 | End: 2021-01-06

## 2021-01-06 RX ORDER — HEPARIN SODIUM (PORCINE) LOCK FLUSH IV SOLN 100 UNIT/ML 100 UNIT/ML
500 SOLUTION INTRAVENOUS PRN
Status: CANCELLED | OUTPATIENT
Start: 2021-01-06

## 2021-01-06 RX ORDER — DEXAMETHASONE SODIUM PHOSPHATE 10 MG/ML
10 INJECTION, SOLUTION INTRAMUSCULAR; INTRAVENOUS ONCE
Status: CANCELLED | OUTPATIENT
Start: 2021-01-06

## 2021-01-06 RX ORDER — ACETAMINOPHEN 500 MG
1000 TABLET ORAL ONCE
Status: COMPLETED | OUTPATIENT
Start: 2021-01-06 | End: 2021-01-06

## 2021-01-06 RX ORDER — SODIUM CHLORIDE 0.9 % (FLUSH) 0.9 %
10 SYRINGE (ML) INJECTION PRN
Status: CANCELLED | OUTPATIENT
Start: 2021-01-06

## 2021-01-06 RX ORDER — METHYLPREDNISOLONE SODIUM SUCCINATE 125 MG/2ML
125 INJECTION, POWDER, LYOPHILIZED, FOR SOLUTION INTRAMUSCULAR; INTRAVENOUS ONCE
Status: CANCELLED | OUTPATIENT
Start: 2021-01-06 | End: 2021-01-06

## 2021-01-06 RX ADMIN — SODIUM CHLORIDE: 9 INJECTION, SOLUTION INTRAVENOUS at 09:23

## 2021-01-06 RX ADMIN — FLUOROURACIL 4340 MG: 50 INJECTION, SOLUTION INTRAVENOUS at 12:52

## 2021-01-06 RX ADMIN — ACETAMINOPHEN 1000 MG: 500 TABLET, FILM COATED ORAL at 09:46

## 2021-01-06 RX ADMIN — PANITUMUMAB 430 MG: 400 SOLUTION INTRAVENOUS at 10:33

## 2021-01-06 RX ADMIN — DEXAMETHASONE SODIUM PHOSPHATE: 10 INJECTION, SOLUTION INTRAMUSCULAR; INTRAVENOUS at 09:23

## 2021-01-06 RX ADMIN — DIPHENHYDRAMINE HYDROCHLORIDE 50 MG: 50 INJECTION, SOLUTION INTRAMUSCULAR; INTRAVENOUS at 09:46

## 2021-01-06 RX ADMIN — LEUCOVORIN CALCIUM 700 MG: 350 INJECTION, POWDER, LYOPHILIZED, FOR SUSPENSION INTRAMUSCULAR; INTRAVENOUS at 11:29

## 2021-01-06 ASSESSMENT — PAIN SCALES - GENERAL: PAINLEVEL_OUTOF10: 0

## 2021-01-06 NOTE — TELEPHONE ENCOUNTER
I called the patient to confirm his appointment for tomorrow but he states Hugo called him & told him the MRI didn't show anything surgical.  He is waiting on a call back from Dr Franklin's office to set up an appointment to discuss pain pump trial.  He will call us back once that has been completed and if he needs to have a pump put in.    tiara leon CMA    appt cx'd

## 2021-01-08 ENCOUNTER — HOSPITAL ENCOUNTER (OUTPATIENT)
Dept: INFUSION THERAPY | Age: 71
Setting detail: INFUSION SERIES
Discharge: HOME | End: 2021-01-08
Payer: MEDICARE

## 2021-01-08 VITALS
OXYGEN SATURATION: 100 % | SYSTOLIC BLOOD PRESSURE: 108 MMHG | RESPIRATION RATE: 20 BRPM | TEMPERATURE: 97.5 F | DIASTOLIC BLOOD PRESSURE: 66 MMHG | HEART RATE: 78 BPM

## 2021-01-08 DIAGNOSIS — Z45.2 ENCOUNTER FOR CENTRAL LINE CARE: Primary | ICD-10-CM

## 2021-01-08 PROCEDURE — 2580000003 HC RX 258: Performed by: NURSE PRACTITIONER

## 2021-01-08 PROCEDURE — 6360000002 HC RX W HCPCS: Performed by: NURSE PRACTITIONER

## 2021-01-08 PROCEDURE — 96523 IRRIG DRUG DELIVERY DEVICE: CPT

## 2021-01-08 RX ORDER — SODIUM CHLORIDE 0.9 % (FLUSH) 0.9 %
20 SYRINGE (ML) INJECTION PRN
Status: DISCONTINUED | OUTPATIENT
Start: 2021-01-08 | End: 2021-01-09 | Stop reason: HOSPADM

## 2021-01-08 RX ORDER — SODIUM CHLORIDE 0.9 % (FLUSH) 0.9 %
10 SYRINGE (ML) INJECTION PRN
Status: CANCELLED | OUTPATIENT
Start: 2021-01-08

## 2021-01-08 RX ORDER — HEPARIN SODIUM (PORCINE) LOCK FLUSH IV SOLN 100 UNIT/ML 100 UNIT/ML
500 SOLUTION INTRAVENOUS PRN
Status: CANCELLED | OUTPATIENT
Start: 2021-01-08

## 2021-01-08 RX ORDER — SODIUM CHLORIDE 0.9 % (FLUSH) 0.9 %
20 SYRINGE (ML) INJECTION PRN
Status: CANCELLED | OUTPATIENT
Start: 2021-01-08

## 2021-01-08 RX ORDER — HEPARIN SODIUM (PORCINE) LOCK FLUSH IV SOLN 100 UNIT/ML 100 UNIT/ML
500 SOLUTION INTRAVENOUS PRN
Status: DISCONTINUED | OUTPATIENT
Start: 2021-01-08 | End: 2021-01-09 | Stop reason: HOSPADM

## 2021-01-08 RX ADMIN — SODIUM CHLORIDE, PRESERVATIVE FREE 20 ML: 5 INJECTION INTRAVENOUS at 12:12

## 2021-01-08 RX ADMIN — HEPARIN 500 UNITS: 100 SYRINGE at 12:12

## 2021-01-19 ENCOUNTER — TELEPHONE (OUTPATIENT)
Dept: INFUSION THERAPY | Age: 71
End: 2021-01-19

## 2021-01-20 ENCOUNTER — HOSPITAL ENCOUNTER (OUTPATIENT)
Dept: INFUSION THERAPY | Age: 71
Setting detail: INFUSION SERIES
Discharge: HOME | End: 2021-01-20
Payer: MEDICARE

## 2021-01-20 ENCOUNTER — IMMUNIZATION (OUTPATIENT)
Age: 71
End: 2021-01-20
Payer: MEDICARE

## 2021-01-20 VITALS
OXYGEN SATURATION: 96 % | RESPIRATION RATE: 18 BRPM | TEMPERATURE: 98 F | HEART RATE: 75 BPM | DIASTOLIC BLOOD PRESSURE: 65 MMHG | SYSTOLIC BLOOD PRESSURE: 111 MMHG

## 2021-01-20 DIAGNOSIS — C19 COLORECTAL CANCER (HCC): Primary | ICD-10-CM

## 2021-01-20 LAB
ALBUMIN SERPL-MCNC: 4 G/DL (ref 3.5–5.2)
ALP BLD-CCNC: 107 U/L (ref 40–130)
ALT SERPL-CCNC: 10 U/L (ref 5–41)
ANION GAP SERPL CALCULATED.3IONS-SCNC: 10 MMOL/L (ref 7–19)
AST SERPL-CCNC: 18 U/L (ref 5–40)
BASOPHILS ABSOLUTE: 0.1 K/UL (ref 0–0.2)
BASOPHILS RELATIVE PERCENT: 1 % (ref 0–1)
BILIRUB SERPL-MCNC: 0.4 MG/DL (ref 0.2–1.2)
BUN BLDV-MCNC: 11 MG/DL (ref 8–23)
CALCIUM SERPL-MCNC: 8.6 MG/DL (ref 8.8–10.2)
CHLORIDE BLD-SCNC: 102 MMOL/L (ref 98–111)
CO2: 25 MMOL/L (ref 22–29)
CREAT SERPL-MCNC: 1.5 MG/DL (ref 0.5–1.2)
EOSINOPHILS ABSOLUTE: 0.2 K/UL (ref 0–0.6)
EOSINOPHILS RELATIVE PERCENT: 3.5 % (ref 0–5)
GFR AFRICAN AMERICAN: 56
GFR NON-AFRICAN AMERICAN: 46
GLUCOSE BLD-MCNC: 102 MG/DL (ref 74–109)
HCT VFR BLD CALC: 35.5 % (ref 42–52)
HEMOGLOBIN: 11.5 G/DL (ref 14–18)
IMMATURE GRANULOCYTES #: 0 K/UL
LYMPHOCYTES ABSOLUTE: 0.5 K/UL (ref 1.1–4.5)
LYMPHOCYTES RELATIVE PERCENT: 8.5 % (ref 20–40)
MCH RBC QN AUTO: 29.1 PG (ref 27–31)
MCHC RBC AUTO-ENTMCNC: 32.4 G/DL (ref 33–37)
MCV RBC AUTO: 89.9 FL (ref 80–94)
MONOCYTES ABSOLUTE: 0.5 K/UL (ref 0–0.9)
MONOCYTES RELATIVE PERCENT: 9 % (ref 0–10)
NEUTROPHILS ABSOLUTE: 4.6 K/UL (ref 1.5–7.5)
NEUTROPHILS RELATIVE PERCENT: 77.5 % (ref 50–65)
PDW BLD-RTO: 14.5 % (ref 11.5–14.5)
PLATELET # BLD: 227 K/UL (ref 130–400)
PMV BLD AUTO: 10.7 FL (ref 9.4–12.4)
POTASSIUM SERPL-SCNC: 4.9 MMOL/L (ref 3.5–5)
RBC # BLD: 3.95 M/UL (ref 4.7–6.1)
SODIUM BLD-SCNC: 137 MMOL/L (ref 136–145)
TOTAL PROTEIN: 5.9 G/DL (ref 6.6–8.7)
WBC # BLD: 6 K/UL (ref 4.8–10.8)

## 2021-01-20 PROCEDURE — 6360000002 HC RX W HCPCS: Performed by: NURSE PRACTITIONER

## 2021-01-20 PROCEDURE — 91300 COVID-19, PFIZER VACCINE 30MCG/0.3ML DOSE: CPT | Performed by: FAMILY MEDICINE

## 2021-01-20 PROCEDURE — 2580000003 HC RX 258: Performed by: NURSE PRACTITIONER

## 2021-01-20 PROCEDURE — 96413 CHEMO IV INFUSION 1 HR: CPT

## 2021-01-20 PROCEDURE — 96415 CHEMO IV INFUSION ADDL HR: CPT

## 2021-01-20 PROCEDURE — 0001A COVID-19, PFIZER VACCINE 30MCG/0.3ML DOSE: CPT | Performed by: FAMILY MEDICINE

## 2021-01-20 PROCEDURE — 80053 COMPREHEN METABOLIC PANEL: CPT

## 2021-01-20 PROCEDURE — 85025 COMPLETE CBC W/AUTO DIFF WBC: CPT

## 2021-01-20 PROCEDURE — 6370000000 HC RX 637 (ALT 250 FOR IP): Performed by: NURSE PRACTITIONER

## 2021-01-20 RX ORDER — HEPARIN SODIUM (PORCINE) LOCK FLUSH IV SOLN 100 UNIT/ML 100 UNIT/ML
500 SOLUTION INTRAVENOUS PRN
Status: DISCONTINUED | OUTPATIENT
Start: 2021-01-20 | End: 2021-01-21 | Stop reason: HOSPADM

## 2021-01-20 RX ORDER — DIPHENHYDRAMINE HYDROCHLORIDE 50 MG/ML
50 INJECTION INTRAMUSCULAR; INTRAVENOUS ONCE
Status: CANCELLED
Start: 2021-01-20 | End: 2021-01-20

## 2021-01-20 RX ORDER — HEPARIN SODIUM (PORCINE) LOCK FLUSH IV SOLN 100 UNIT/ML 100 UNIT/ML
500 SOLUTION INTRAVENOUS PRN
Status: CANCELLED | OUTPATIENT
Start: 2021-01-20

## 2021-01-20 RX ORDER — SODIUM CHLORIDE 9 MG/ML
INJECTION, SOLUTION INTRAVENOUS CONTINUOUS
Status: CANCELLED | OUTPATIENT
Start: 2021-01-20

## 2021-01-20 RX ORDER — SODIUM CHLORIDE 0.9 % (FLUSH) 0.9 %
10 SYRINGE (ML) INJECTION PRN
Status: CANCELLED | OUTPATIENT
Start: 2021-01-20

## 2021-01-20 RX ORDER — DIPHENHYDRAMINE HYDROCHLORIDE 50 MG/ML
50 INJECTION INTRAMUSCULAR; INTRAVENOUS ONCE
Status: CANCELLED | OUTPATIENT
Start: 2021-01-20 | End: 2021-01-20

## 2021-01-20 RX ORDER — DEXAMETHASONE SODIUM PHOSPHATE 10 MG/ML
10 INJECTION, SOLUTION INTRAMUSCULAR; INTRAVENOUS ONCE
Status: COMPLETED | OUTPATIENT
Start: 2021-01-20 | End: 2021-01-20

## 2021-01-20 RX ORDER — SODIUM CHLORIDE 9 MG/ML
INJECTION, SOLUTION INTRAVENOUS ONCE
Status: CANCELLED | OUTPATIENT
Start: 2021-01-20 | End: 2021-01-20

## 2021-01-20 RX ORDER — METHYLPREDNISOLONE SODIUM SUCCINATE 125 MG/2ML
125 INJECTION, POWDER, LYOPHILIZED, FOR SOLUTION INTRAMUSCULAR; INTRAVENOUS ONCE
Status: CANCELLED | OUTPATIENT
Start: 2021-01-20 | End: 2021-01-20

## 2021-01-20 RX ORDER — ACETAMINOPHEN 325 MG/1
1000 TABLET ORAL ONCE
Status: CANCELLED
Start: 2021-01-20 | End: 2021-01-20

## 2021-01-20 RX ORDER — EPINEPHRINE 1 MG/ML
0.3 INJECTION, SOLUTION, CONCENTRATE INTRAVENOUS PRN
Status: CANCELLED | OUTPATIENT
Start: 2021-01-20

## 2021-01-20 RX ORDER — SODIUM CHLORIDE 9 MG/ML
INJECTION, SOLUTION INTRAVENOUS ONCE
Status: COMPLETED | OUTPATIENT
Start: 2021-01-20 | End: 2021-01-21

## 2021-01-20 RX ORDER — DIPHENHYDRAMINE HYDROCHLORIDE 50 MG/ML
50 INJECTION INTRAMUSCULAR; INTRAVENOUS ONCE
Status: COMPLETED | OUTPATIENT
Start: 2021-01-20 | End: 2021-01-20

## 2021-01-20 RX ORDER — ACETAMINOPHEN 500 MG
1000 TABLET ORAL ONCE
Status: COMPLETED | OUTPATIENT
Start: 2021-01-20 | End: 2021-01-20

## 2021-01-20 RX ORDER — SODIUM CHLORIDE 0.9 % (FLUSH) 0.9 %
5 SYRINGE (ML) INJECTION PRN
Status: CANCELLED | OUTPATIENT
Start: 2021-01-20

## 2021-01-20 RX ADMIN — FLUOROURACIL 4350 MG: 50 INJECTION, SOLUTION INTRAVENOUS at 14:01

## 2021-01-20 RX ADMIN — DEXAMETHASONE SODIUM PHOSPHATE 10 MG: 10 INJECTION, SOLUTION INTRAMUSCULAR; INTRAVENOUS at 11:12

## 2021-01-20 RX ADMIN — DIPHENHYDRAMINE HYDROCHLORIDE 50 MG: 50 INJECTION, SOLUTION INTRAMUSCULAR; INTRAVENOUS at 11:11

## 2021-01-20 RX ADMIN — LEUCOVORIN CALCIUM 700 MG: 350 INJECTION, POWDER, LYOPHILIZED, FOR SUSPENSION INTRAMUSCULAR; INTRAVENOUS at 12:16

## 2021-01-20 RX ADMIN — SODIUM CHLORIDE: 9 INJECTION, SOLUTION INTRAVENOUS at 11:28

## 2021-01-20 RX ADMIN — ONDANSETRON 16 MG: 2 INJECTION INTRAMUSCULAR; INTRAVENOUS at 11:12

## 2021-01-20 RX ADMIN — ACETAMINOPHEN 1000 MG: 500 TABLET, FILM COATED ORAL at 11:11

## 2021-01-20 ASSESSMENT — PAIN SCALES - GENERAL: PAINLEVEL_OUTOF10: 0

## 2021-01-22 ENCOUNTER — HOSPITAL ENCOUNTER (OUTPATIENT)
Dept: INFUSION THERAPY | Age: 71
Setting detail: INFUSION SERIES
Discharge: HOME | End: 2021-01-22
Payer: MEDICARE

## 2021-01-22 DIAGNOSIS — Z45.2 ENCOUNTER FOR CENTRAL LINE CARE: Primary | ICD-10-CM

## 2021-01-22 PROCEDURE — 6360000002 HC RX W HCPCS: Performed by: NURSE PRACTITIONER

## 2021-01-22 PROCEDURE — 96523 IRRIG DRUG DELIVERY DEVICE: CPT

## 2021-01-22 PROCEDURE — 2580000003 HC RX 258: Performed by: NURSE PRACTITIONER

## 2021-01-22 RX ORDER — SODIUM CHLORIDE 0.9 % (FLUSH) 0.9 %
20 SYRINGE (ML) INJECTION PRN
Status: CANCELLED | OUTPATIENT
Start: 2021-01-22

## 2021-01-22 RX ORDER — SODIUM CHLORIDE 0.9 % (FLUSH) 0.9 %
20 SYRINGE (ML) INJECTION PRN
Status: DISCONTINUED | OUTPATIENT
Start: 2021-01-22 | End: 2021-01-23 | Stop reason: HOSPADM

## 2021-01-22 RX ORDER — SODIUM CHLORIDE 0.9 % (FLUSH) 0.9 %
10 SYRINGE (ML) INJECTION PRN
Status: CANCELLED | OUTPATIENT
Start: 2021-01-22

## 2021-01-22 RX ORDER — HEPARIN SODIUM (PORCINE) LOCK FLUSH IV SOLN 100 UNIT/ML 100 UNIT/ML
500 SOLUTION INTRAVENOUS PRN
Status: DISCONTINUED | OUTPATIENT
Start: 2021-01-22 | End: 2021-01-23 | Stop reason: HOSPADM

## 2021-01-22 RX ORDER — HEPARIN SODIUM (PORCINE) LOCK FLUSH IV SOLN 100 UNIT/ML 100 UNIT/ML
500 SOLUTION INTRAVENOUS PRN
Status: CANCELLED | OUTPATIENT
Start: 2021-01-22

## 2021-01-22 RX ADMIN — SODIUM CHLORIDE, PRESERVATIVE FREE 20 ML: 5 INJECTION INTRAVENOUS at 12:27

## 2021-01-22 RX ADMIN — HEPARIN 500 UNITS: 100 SYRINGE at 12:27

## 2021-02-03 ENCOUNTER — HOSPITAL ENCOUNTER (OUTPATIENT)
Dept: INFUSION THERAPY | Age: 71
Setting detail: INFUSION SERIES
Discharge: HOME | End: 2021-02-03
Payer: MEDICARE

## 2021-02-03 VITALS
TEMPERATURE: 98.8 F | HEART RATE: 78 BPM | BODY MASS INDEX: 25.79 KG/M2 | OXYGEN SATURATION: 97 % | SYSTOLIC BLOOD PRESSURE: 104 MMHG | WEIGHT: 155 LBS | DIASTOLIC BLOOD PRESSURE: 59 MMHG | RESPIRATION RATE: 17 BRPM

## 2021-02-03 DIAGNOSIS — C19 COLORECTAL CANCER (HCC): Primary | ICD-10-CM

## 2021-02-03 LAB
ALBUMIN SERPL-MCNC: 3.7 G/DL (ref 3.5–5.2)
ALP BLD-CCNC: 94 U/L (ref 40–130)
ALT SERPL-CCNC: 6 U/L (ref 5–41)
ANION GAP SERPL CALCULATED.3IONS-SCNC: 12 MMOL/L (ref 7–19)
AST SERPL-CCNC: 15 U/L (ref 5–40)
BASOPHILS ABSOLUTE: 0.1 K/UL (ref 0–0.2)
BASOPHILS RELATIVE PERCENT: 0.8 % (ref 0–1)
BILIRUB SERPL-MCNC: 0.3 MG/DL (ref 0.2–1.2)
BUN BLDV-MCNC: 15 MG/DL (ref 8–23)
CALCIUM SERPL-MCNC: 8.5 MG/DL (ref 8.8–10.2)
CHLORIDE BLD-SCNC: 101 MMOL/L (ref 98–111)
CO2: 21 MMOL/L (ref 22–29)
CREAT SERPL-MCNC: 1.7 MG/DL (ref 0.5–1.2)
EOSINOPHILS ABSOLUTE: 0.2 K/UL (ref 0–0.6)
EOSINOPHILS RELATIVE PERCENT: 3.6 % (ref 0–5)
GFR AFRICAN AMERICAN: 49
GFR NON-AFRICAN AMERICAN: 40
GLUCOSE BLD-MCNC: 101 MG/DL (ref 74–109)
HCT VFR BLD CALC: 33.2 % (ref 42–52)
HEMOGLOBIN: 10.9 G/DL (ref 14–18)
IMMATURE GRANULOCYTES #: 0 K/UL
LYMPHOCYTES ABSOLUTE: 0.7 K/UL (ref 1.1–4.5)
LYMPHOCYTES RELATIVE PERCENT: 10.2 % (ref 20–40)
MCH RBC QN AUTO: 29.8 PG (ref 27–31)
MCHC RBC AUTO-ENTMCNC: 32.8 G/DL (ref 33–37)
MCV RBC AUTO: 90.7 FL (ref 80–94)
MONOCYTES ABSOLUTE: 0.5 K/UL (ref 0–0.9)
MONOCYTES RELATIVE PERCENT: 7.9 % (ref 0–10)
NEUTROPHILS ABSOLUTE: 5.1 K/UL (ref 1.5–7.5)
NEUTROPHILS RELATIVE PERCENT: 76.9 % (ref 50–65)
PDW BLD-RTO: 14.3 % (ref 11.5–14.5)
PLATELET # BLD: 215 K/UL (ref 130–400)
PMV BLD AUTO: 10.5 FL (ref 9.4–12.4)
POTASSIUM SERPL-SCNC: 4.9 MMOL/L (ref 3.5–5)
RBC # BLD: 3.66 M/UL (ref 4.7–6.1)
SODIUM BLD-SCNC: 134 MMOL/L (ref 136–145)
TOTAL PROTEIN: 6.3 G/DL (ref 6.6–8.7)
WBC # BLD: 6.6 K/UL (ref 4.8–10.8)

## 2021-02-03 PROCEDURE — 2580000003 HC RX 258: Performed by: NURSE PRACTITIONER

## 2021-02-03 PROCEDURE — 85025 COMPLETE CBC W/AUTO DIFF WBC: CPT

## 2021-02-03 PROCEDURE — 96366 THER/PROPH/DIAG IV INF ADDON: CPT

## 2021-02-03 PROCEDURE — 6360000002 HC RX W HCPCS: Performed by: NURSE PRACTITIONER

## 2021-02-03 PROCEDURE — 80053 COMPREHEN METABOLIC PANEL: CPT

## 2021-02-03 PROCEDURE — 6370000000 HC RX 637 (ALT 250 FOR IP): Performed by: NURSE PRACTITIONER

## 2021-02-03 PROCEDURE — 96413 CHEMO IV INFUSION 1 HR: CPT

## 2021-02-03 PROCEDURE — G0498 CHEMO EXTEND IV INFUS W/PUMP: HCPCS

## 2021-02-03 RX ORDER — DIPHENHYDRAMINE HYDROCHLORIDE 50 MG/ML
50 INJECTION INTRAMUSCULAR; INTRAVENOUS ONCE
Status: COMPLETED | OUTPATIENT
Start: 2021-02-03 | End: 2021-02-03

## 2021-02-03 RX ORDER — SODIUM CHLORIDE 9 MG/ML
INJECTION, SOLUTION INTRAVENOUS CONTINUOUS
Status: CANCELLED | OUTPATIENT
Start: 2021-02-03

## 2021-02-03 RX ORDER — DEXAMETHASONE SODIUM PHOSPHATE 10 MG/ML
10 INJECTION, SOLUTION INTRAMUSCULAR; INTRAVENOUS ONCE
Status: COMPLETED | OUTPATIENT
Start: 2021-02-03 | End: 2021-02-03

## 2021-02-03 RX ORDER — SODIUM CHLORIDE 0.9 % (FLUSH) 0.9 %
10 SYRINGE (ML) INJECTION PRN
Status: CANCELLED | OUTPATIENT
Start: 2021-02-03

## 2021-02-03 RX ORDER — SODIUM CHLORIDE 9 MG/ML
INJECTION, SOLUTION INTRAVENOUS ONCE
Status: COMPLETED | OUTPATIENT
Start: 2021-02-03 | End: 2021-02-03

## 2021-02-03 RX ORDER — HEPARIN SODIUM (PORCINE) LOCK FLUSH IV SOLN 100 UNIT/ML 100 UNIT/ML
500 SOLUTION INTRAVENOUS PRN
Status: CANCELLED | OUTPATIENT
Start: 2021-02-03

## 2021-02-03 RX ORDER — EPINEPHRINE 1 MG/ML
0.3 INJECTION, SOLUTION, CONCENTRATE INTRAVENOUS PRN
Status: CANCELLED | OUTPATIENT
Start: 2021-02-03

## 2021-02-03 RX ORDER — SODIUM CHLORIDE 9 MG/ML
INJECTION, SOLUTION INTRAVENOUS ONCE
Status: CANCELLED | OUTPATIENT
Start: 2021-02-03 | End: 2021-02-03

## 2021-02-03 RX ORDER — DIPHENHYDRAMINE HYDROCHLORIDE 50 MG/ML
50 INJECTION INTRAMUSCULAR; INTRAVENOUS ONCE
Status: CANCELLED | OUTPATIENT
Start: 2021-02-03 | End: 2021-02-03

## 2021-02-03 RX ORDER — DIPHENHYDRAMINE HYDROCHLORIDE 50 MG/ML
50 INJECTION INTRAMUSCULAR; INTRAVENOUS ONCE
Status: CANCELLED
Start: 2021-02-03 | End: 2021-02-03

## 2021-02-03 RX ORDER — METHYLPREDNISOLONE SODIUM SUCCINATE 125 MG/2ML
125 INJECTION, POWDER, LYOPHILIZED, FOR SOLUTION INTRAMUSCULAR; INTRAVENOUS ONCE
Status: CANCELLED | OUTPATIENT
Start: 2021-02-03 | End: 2021-02-03

## 2021-02-03 RX ORDER — ACETAMINOPHEN 500 MG
1000 TABLET ORAL ONCE
Status: COMPLETED | OUTPATIENT
Start: 2021-02-03 | End: 2021-02-03

## 2021-02-03 RX ORDER — SODIUM CHLORIDE 0.9 % (FLUSH) 0.9 %
5 SYRINGE (ML) INJECTION PRN
Status: CANCELLED | OUTPATIENT
Start: 2021-02-03

## 2021-02-03 RX ORDER — ACETAMINOPHEN 325 MG/1
1000 TABLET ORAL ONCE
Status: CANCELLED
Start: 2021-02-03 | End: 2021-02-03

## 2021-02-03 RX ADMIN — LEUCOVORIN CALCIUM 700 MG: 350 INJECTION, POWDER, LYOPHILIZED, FOR SUSPENSION INTRAMUSCULAR; INTRAVENOUS at 12:45

## 2021-02-03 RX ADMIN — PANITUMUMAB 430 MG: 400 SOLUTION INTRAVENOUS at 11:33

## 2021-02-03 RX ADMIN — FLUOROURACIL 4350 MG: 50 INJECTION, SOLUTION INTRAVENOUS at 14:06

## 2021-02-03 RX ADMIN — DIPHENHYDRAMINE HYDROCHLORIDE 50 MG: 50 INJECTION, SOLUTION INTRAMUSCULAR; INTRAVENOUS at 11:17

## 2021-02-03 RX ADMIN — SODIUM CHLORIDE: 9 INJECTION, SOLUTION INTRAVENOUS at 10:34

## 2021-02-03 RX ADMIN — DEXAMETHASONE SODIUM PHOSPHATE 10 MG: 10 INJECTION, SOLUTION INTRAMUSCULAR; INTRAVENOUS at 11:11

## 2021-02-03 RX ADMIN — ONDANSETRON 16 MG: 2 INJECTION INTRAMUSCULAR; INTRAVENOUS at 10:34

## 2021-02-03 RX ADMIN — ACETAMINOPHEN 1000 MG: 500 TABLET, FILM COATED ORAL at 11:17

## 2021-02-05 ENCOUNTER — HOSPITAL ENCOUNTER (OUTPATIENT)
Dept: INFUSION THERAPY | Age: 71
Setting detail: INFUSION SERIES
Discharge: HOME | End: 2021-02-05
Payer: MEDICARE

## 2021-02-05 PROCEDURE — 96374 THER/PROPH/DIAG INJ IV PUSH: CPT

## 2021-02-05 PROCEDURE — 6360000002 HC RX W HCPCS: Performed by: INTERNAL MEDICINE

## 2021-02-05 RX ORDER — HEPARIN SODIUM (PORCINE) LOCK FLUSH IV SOLN 100 UNIT/ML 100 UNIT/ML
500 SOLUTION INTRAVENOUS PRN
Status: DISCONTINUED | OUTPATIENT
Start: 2021-02-05 | End: 2021-02-07 | Stop reason: HOSPADM

## 2021-02-05 RX ADMIN — HEPARIN 500 UNITS: 100 SYRINGE at 13:10

## 2021-02-09 ENCOUNTER — HOSPITAL ENCOUNTER (OUTPATIENT)
Dept: CT IMAGING | Age: 71
Discharge: HOME | End: 2021-02-09
Payer: MEDICARE

## 2021-02-09 DIAGNOSIS — C79.9 METASTASIS FROM COLON CANCER (HCC): ICD-10-CM

## 2021-02-09 DIAGNOSIS — C18.9 METASTASIS FROM COLON CANCER (HCC): ICD-10-CM

## 2021-02-09 PROCEDURE — 6360000004 HC RX CONTRAST MEDICATION: Performed by: INTERNAL MEDICINE

## 2021-02-09 PROCEDURE — 74177 CT ABD & PELVIS W/CONTRAST: CPT

## 2021-02-09 PROCEDURE — 71260 CT THORAX DX C+: CPT

## 2021-02-09 RX ADMIN — IOPAMIDOL 50 ML: 755 INJECTION, SOLUTION INTRAVENOUS at 10:39

## 2021-02-15 NOTE — PROGRESS NOTES
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 performed the services described in this documentation as scribed by Teresa Brown, RN in my presence and is both accurate and complete. I have seen, examined and reviewed this patient medication list, appropriate labs and imaging studies. I reviewed relevant medical records and others physicians notes. I discussed the plans of care with the patient. I answered all the questions to the patients satisfaction. I have also reviewed the chief complaint (CC) and part of the history (History of Present Illness (HPI), Past Family Social History [facility]), or Review of Systems (ROS) and made changes when appropriated.        (Please note that portions of this note were completed with a voice recognition program. Efforts were made to edit the dictations but occasionally words are mis-transcribed.)

## 2021-02-17 ENCOUNTER — OFFICE VISIT (OUTPATIENT)
Dept: HEMATOLOGY | Age: 71
End: 2021-02-17
Payer: MEDICARE

## 2021-02-17 ENCOUNTER — HOSPITAL ENCOUNTER (OUTPATIENT)
Dept: INFUSION THERAPY | Age: 71
Setting detail: INFUSION SERIES
Discharge: HOME | End: 2021-02-17
Payer: MEDICARE

## 2021-02-17 ENCOUNTER — HOSPITAL ENCOUNTER (OUTPATIENT)
Dept: INFUSION THERAPY | Age: 71
Discharge: HOME | End: 2021-02-17
Payer: MEDICARE

## 2021-02-17 VITALS
WEIGHT: 159.7 LBS | OXYGEN SATURATION: 97 % | HEART RATE: 85 BPM | TEMPERATURE: 97.8 F | BODY MASS INDEX: 26.58 KG/M2 | DIASTOLIC BLOOD PRESSURE: 65 MMHG | RESPIRATION RATE: 17 BRPM | SYSTOLIC BLOOD PRESSURE: 113 MMHG

## 2021-02-17 VITALS
HEART RATE: 82 BPM | OXYGEN SATURATION: 96 % | BODY MASS INDEX: 26.58 KG/M2 | WEIGHT: 159.7 LBS | SYSTOLIC BLOOD PRESSURE: 130 MMHG | TEMPERATURE: 96.8 F | DIASTOLIC BLOOD PRESSURE: 82 MMHG

## 2021-02-17 DIAGNOSIS — C79.9 METASTASIS FROM COLON CANCER (HCC): Primary | ICD-10-CM

## 2021-02-17 DIAGNOSIS — C79.9 METASTASIS FROM COLON CANCER (HCC): ICD-10-CM

## 2021-02-17 DIAGNOSIS — T45.1X5D ADVERSE EFFECT OF CHEMOTHERAPY, SUBSEQUENT ENCOUNTER: ICD-10-CM

## 2021-02-17 DIAGNOSIS — C18.9 METASTASIS FROM COLON CANCER (HCC): ICD-10-CM

## 2021-02-17 DIAGNOSIS — R21 SKIN RASH: ICD-10-CM

## 2021-02-17 DIAGNOSIS — C19 COLORECTAL CANCER (HCC): Primary | ICD-10-CM

## 2021-02-17 DIAGNOSIS — Z51.11 CHEMOTHERAPY MANAGEMENT, ENCOUNTER FOR: ICD-10-CM

## 2021-02-17 DIAGNOSIS — Z71.89 CARE PLAN DISCUSSED WITH PATIENT: ICD-10-CM

## 2021-02-17 DIAGNOSIS — C19 COLORECTAL CANCER (HCC): ICD-10-CM

## 2021-02-17 DIAGNOSIS — Z51.12 ENCOUNTER FOR ANTINEOPLASTIC IMMUNOTHERAPY: ICD-10-CM

## 2021-02-17 DIAGNOSIS — C18.9 METASTASIS FROM COLON CANCER (HCC): Primary | ICD-10-CM

## 2021-02-17 LAB
ALBUMIN SERPL-MCNC: 3.8 G/DL (ref 3.5–5.2)
ALP BLD-CCNC: 83 U/L (ref 40–130)
ALT SERPL-CCNC: 11 U/L (ref 21–72)
ANION GAP SERPL CALCULATED.3IONS-SCNC: 8 MMOL/L (ref 7–19)
AST SERPL-CCNC: 21 U/L (ref 17–59)
BASOPHILS ABSOLUTE: 0.13 K/UL (ref 0.01–0.08)
BASOPHILS RELATIVE PERCENT: 1.4 % (ref 0.1–1.2)
BILIRUB SERPL-MCNC: 0.4 MG/DL (ref 0.2–1.3)
BUN BLDV-MCNC: 14 MG/DL (ref 9–20)
CALCIUM SERPL-MCNC: 9.3 MG/DL (ref 8.4–10.2)
CEA: 1 NG/ML (ref 0–3)
CHLORIDE BLD-SCNC: 103 MMOL/L (ref 98–111)
CO2: 23 MMOL/L (ref 22–29)
CREAT SERPL-MCNC: 1.5 MG/DL (ref 0.6–1.2)
EOSINOPHILS ABSOLUTE: 0.26 K/UL (ref 0.04–0.54)
EOSINOPHILS RELATIVE PERCENT: 2.9 % (ref 0.7–7)
GFR NON-AFRICAN AMERICAN: 46
GLOBULIN: 2.5 G/DL
GLUCOSE BLD-MCNC: 109 MG/DL (ref 74–106)
HCT VFR BLD CALC: 38 % (ref 40.1–51)
HEMOGLOBIN: 12.5 G/DL (ref 13.7–17.5)
LYMPHOCYTES ABSOLUTE: 0.94 K/UL (ref 1.18–3.74)
LYMPHOCYTES RELATIVE PERCENT: 10.4 % (ref 19.3–53.1)
MCH RBC QN AUTO: 30.3 PG (ref 25.7–32.2)
MCHC RBC AUTO-ENTMCNC: 32.9 G/DL (ref 32.3–36.5)
MCV RBC AUTO: 92.2 FL (ref 79–92.2)
MONOCYTES ABSOLUTE: 0.6 K/UL (ref 0.24–0.82)
MONOCYTES RELATIVE PERCENT: 6.6 % (ref 4.7–12.5)
NEUTROPHILS ABSOLUTE: 7.13 K/UL (ref 1.56–6.13)
NEUTROPHILS RELATIVE PERCENT: 78.7 % (ref 34–71.1)
PDW BLD-RTO: 14.2 % (ref 11.6–14.4)
PLATELET # BLD: 234 K/UL (ref 163–337)
PMV BLD AUTO: 10.9 FL (ref 7.4–10.4)
POTASSIUM SERPL-SCNC: 5.1 MMOL/L (ref 3.5–5.1)
RBC # BLD: 4.12 M/UL (ref 4.63–6.08)
SODIUM BLD-SCNC: 134 MMOL/L (ref 137–145)
TOTAL PROTEIN: 6.3 G/DL (ref 6.3–8.2)
WBC # BLD: 9.06 K/UL (ref 4.23–9.07)

## 2021-02-17 PROCEDURE — 80053 COMPREHEN METABOLIC PANEL: CPT

## 2021-02-17 PROCEDURE — G8417 CALC BMI ABV UP PARAM F/U: HCPCS | Performed by: INTERNAL MEDICINE

## 2021-02-17 PROCEDURE — G8484 FLU IMMUNIZE NO ADMIN: HCPCS | Performed by: INTERNAL MEDICINE

## 2021-02-17 PROCEDURE — 6360000002 HC RX W HCPCS: Performed by: INTERNAL MEDICINE

## 2021-02-17 PROCEDURE — 1123F ACP DISCUSS/DSCN MKR DOCD: CPT | Performed by: INTERNAL MEDICINE

## 2021-02-17 PROCEDURE — 96413 CHEMO IV INFUSION 1 HR: CPT

## 2021-02-17 PROCEDURE — G0498 CHEMO EXTEND IV INFUS W/PUMP: HCPCS

## 2021-02-17 PROCEDURE — 96415 CHEMO IV INFUSION ADDL HR: CPT

## 2021-02-17 PROCEDURE — 96417 CHEMO IV INFUS EACH ADDL SEQ: CPT

## 2021-02-17 PROCEDURE — 96367 TX/PROPH/DG ADDL SEQ IV INF: CPT

## 2021-02-17 PROCEDURE — 96375 TX/PRO/DX INJ NEW DRUG ADDON: CPT

## 2021-02-17 PROCEDURE — 1036F TOBACCO NON-USER: CPT | Performed by: INTERNAL MEDICINE

## 2021-02-17 PROCEDURE — 82378 CARCINOEMBRYONIC ANTIGEN: CPT

## 2021-02-17 PROCEDURE — 4040F PNEUMOC VAC/ADMIN/RCVD: CPT | Performed by: INTERNAL MEDICINE

## 2021-02-17 PROCEDURE — 3017F COLORECTAL CA SCREEN DOC REV: CPT | Performed by: INTERNAL MEDICINE

## 2021-02-17 PROCEDURE — 85025 COMPLETE CBC W/AUTO DIFF WBC: CPT

## 2021-02-17 PROCEDURE — 6370000000 HC RX 637 (ALT 250 FOR IP): Performed by: INTERNAL MEDICINE

## 2021-02-17 PROCEDURE — G8427 DOCREV CUR MEDS BY ELIG CLIN: HCPCS | Performed by: INTERNAL MEDICINE

## 2021-02-17 PROCEDURE — 99215 OFFICE O/P EST HI 40 MIN: CPT | Performed by: INTERNAL MEDICINE

## 2021-02-17 PROCEDURE — 2580000003 HC RX 258: Performed by: INTERNAL MEDICINE

## 2021-02-17 RX ORDER — SODIUM CHLORIDE 9 MG/ML
INJECTION, SOLUTION INTRAVENOUS CONTINUOUS
Status: CANCELLED | OUTPATIENT
Start: 2021-02-17

## 2021-02-17 RX ORDER — HEPARIN SODIUM (PORCINE) LOCK FLUSH IV SOLN 100 UNIT/ML 100 UNIT/ML
500 SOLUTION INTRAVENOUS PRN
Status: CANCELLED | OUTPATIENT
Start: 2021-02-17

## 2021-02-17 RX ORDER — SODIUM CHLORIDE 9 MG/ML
INJECTION, SOLUTION INTRAVENOUS ONCE
Status: COMPLETED | OUTPATIENT
Start: 2021-02-17 | End: 2021-02-18

## 2021-02-17 RX ORDER — DEXAMETHASONE SODIUM PHOSPHATE 10 MG/ML
10 INJECTION, SOLUTION INTRAMUSCULAR; INTRAVENOUS ONCE
Status: COMPLETED | OUTPATIENT
Start: 2021-02-17 | End: 2021-02-17

## 2021-02-17 RX ORDER — SODIUM CHLORIDE 0.9 % (FLUSH) 0.9 %
10 SYRINGE (ML) INJECTION PRN
Status: DISCONTINUED | OUTPATIENT
Start: 2021-02-17 | End: 2021-02-18 | Stop reason: HOSPADM

## 2021-02-17 RX ORDER — SODIUM CHLORIDE 0.9 % (FLUSH) 0.9 %
10 SYRINGE (ML) INJECTION PRN
Status: CANCELLED | OUTPATIENT
Start: 2021-02-17

## 2021-02-17 RX ORDER — ACETAMINOPHEN 325 MG/1
1000 TABLET ORAL ONCE
Status: CANCELLED
Start: 2021-02-17 | End: 2021-02-17

## 2021-02-17 RX ORDER — METHYLPREDNISOLONE SODIUM SUCCINATE 125 MG/2ML
125 INJECTION, POWDER, LYOPHILIZED, FOR SOLUTION INTRAMUSCULAR; INTRAVENOUS ONCE
Status: CANCELLED | OUTPATIENT
Start: 2021-02-17 | End: 2021-02-17

## 2021-02-17 RX ORDER — HEPARIN SODIUM (PORCINE) LOCK FLUSH IV SOLN 100 UNIT/ML 100 UNIT/ML
500 SOLUTION INTRAVENOUS PRN
Status: DISCONTINUED | OUTPATIENT
Start: 2021-02-17 | End: 2021-02-18 | Stop reason: HOSPADM

## 2021-02-17 RX ORDER — DIPHENHYDRAMINE HYDROCHLORIDE 50 MG/ML
50 INJECTION INTRAMUSCULAR; INTRAVENOUS ONCE
Status: COMPLETED | OUTPATIENT
Start: 2021-02-17 | End: 2021-02-17

## 2021-02-17 RX ORDER — SODIUM CHLORIDE 9 MG/ML
INJECTION, SOLUTION INTRAVENOUS ONCE
Status: CANCELLED | OUTPATIENT
Start: 2021-02-17 | End: 2021-02-17

## 2021-02-17 RX ORDER — DIPHENHYDRAMINE HYDROCHLORIDE 50 MG/ML
50 INJECTION INTRAMUSCULAR; INTRAVENOUS ONCE
Status: CANCELLED
Start: 2021-02-17 | End: 2021-02-17

## 2021-02-17 RX ORDER — EPINEPHRINE 1 MG/ML
0.3 INJECTION, SOLUTION, CONCENTRATE INTRAVENOUS PRN
Status: CANCELLED | OUTPATIENT
Start: 2021-02-17

## 2021-02-17 RX ORDER — SODIUM CHLORIDE 0.9 % (FLUSH) 0.9 %
5 SYRINGE (ML) INJECTION PRN
Status: CANCELLED | OUTPATIENT
Start: 2021-02-17

## 2021-02-17 RX ORDER — DIPHENHYDRAMINE HYDROCHLORIDE 50 MG/ML
50 INJECTION INTRAMUSCULAR; INTRAVENOUS ONCE
Status: CANCELLED | OUTPATIENT
Start: 2021-02-17 | End: 2021-02-17

## 2021-02-17 RX ORDER — ACETAMINOPHEN 500 MG
1000 TABLET ORAL ONCE
Status: COMPLETED | OUTPATIENT
Start: 2021-02-17 | End: 2021-02-17

## 2021-02-17 RX ADMIN — ACETAMINOPHEN 1000 MG: 500 TABLET, COATED ORAL at 11:06

## 2021-02-17 RX ADMIN — SODIUM CHLORIDE: 9 INJECTION, SOLUTION INTRAVENOUS at 11:06

## 2021-02-17 RX ADMIN — DEXAMETHASONE SODIUM PHOSPHATE 10 MG: 10 INJECTION, SOLUTION INTRAMUSCULAR; INTRAVENOUS at 11:06

## 2021-02-17 RX ADMIN — FLUOROURACIL 4340 MG: 50 INJECTION, SOLUTION INTRAVENOUS at 14:31

## 2021-02-17 RX ADMIN — LEUCOVORIN CALCIUM 700 MG: 350 INJECTION, POWDER, LYOPHILIZED, FOR SUSPENSION INTRAMUSCULAR; INTRAVENOUS at 12:40

## 2021-02-17 RX ADMIN — ONDANSETRON 16 MG: 2 INJECTION INTRAMUSCULAR; INTRAVENOUS at 11:06

## 2021-02-17 RX ADMIN — DIPHENHYDRAMINE HYDROCHLORIDE 50 MG: 50 INJECTION, SOLUTION INTRAMUSCULAR; INTRAVENOUS at 11:06

## 2021-02-17 RX ADMIN — PANITUMUMAB 430.2 MG: 400 SOLUTION INTRAVENOUS at 11:36

## 2021-02-17 ASSESSMENT — PAIN SCALES - GENERAL: PAINLEVEL_OUTOF10: 0

## 2021-02-19 ENCOUNTER — HOSPITAL ENCOUNTER (OUTPATIENT)
Dept: INFUSION THERAPY | Age: 71
Setting detail: INFUSION SERIES
Discharge: HOME | End: 2021-02-19
Payer: MEDICARE

## 2021-02-19 DIAGNOSIS — Z45.2 ENCOUNTER FOR CENTRAL LINE CARE: Primary | ICD-10-CM

## 2021-02-19 PROCEDURE — 2580000003 HC RX 258: Performed by: NURSE PRACTITIONER

## 2021-02-19 PROCEDURE — 6360000002 HC RX W HCPCS: Performed by: NURSE PRACTITIONER

## 2021-02-19 PROCEDURE — 96523 IRRIG DRUG DELIVERY DEVICE: CPT

## 2021-02-19 RX ORDER — SODIUM CHLORIDE 0.9 % (FLUSH) 0.9 %
20 SYRINGE (ML) INJECTION PRN
Status: CANCELLED | OUTPATIENT
Start: 2021-02-19

## 2021-02-19 RX ORDER — HEPARIN SODIUM (PORCINE) LOCK FLUSH IV SOLN 100 UNIT/ML 100 UNIT/ML
500 SOLUTION INTRAVENOUS PRN
Status: CANCELLED | OUTPATIENT
Start: 2021-02-19

## 2021-02-19 RX ORDER — HEPARIN SODIUM (PORCINE) LOCK FLUSH IV SOLN 100 UNIT/ML 100 UNIT/ML
500 SOLUTION INTRAVENOUS PRN
Status: DISCONTINUED | OUTPATIENT
Start: 2021-02-19 | End: 2021-02-20 | Stop reason: HOSPADM

## 2021-02-19 RX ORDER — SODIUM CHLORIDE 0.9 % (FLUSH) 0.9 %
10 SYRINGE (ML) INJECTION PRN
Status: CANCELLED | OUTPATIENT
Start: 2021-02-19

## 2021-02-19 RX ORDER — SODIUM CHLORIDE 0.9 % (FLUSH) 0.9 %
20 SYRINGE (ML) INJECTION PRN
Status: DISCONTINUED | OUTPATIENT
Start: 2021-02-19 | End: 2021-02-20 | Stop reason: HOSPADM

## 2021-02-19 RX ADMIN — HEPARIN 500 UNITS: 100 SYRINGE at 13:42

## 2021-02-19 RX ADMIN — SODIUM CHLORIDE, PRESERVATIVE FREE 20 ML: 5 INJECTION INTRAVENOUS at 13:42

## 2021-02-22 ENCOUNTER — OFFICE VISIT (OUTPATIENT)
Dept: UROLOGY | Age: 71
End: 2021-02-22
Payer: MEDICARE

## 2021-02-22 VITALS — BODY MASS INDEX: 26.82 KG/M2 | WEIGHT: 161 LBS | HEIGHT: 65 IN | TEMPERATURE: 97.8 F

## 2021-02-22 DIAGNOSIS — N13.30 HYDRONEPHROSIS OF RIGHT KIDNEY: ICD-10-CM

## 2021-02-22 DIAGNOSIS — Z85.51 HISTORY OF BLADDER CANCER: ICD-10-CM

## 2021-02-22 DIAGNOSIS — N13.5 EXTRINSIC URETERAL OBSTRUCTION: Primary | ICD-10-CM

## 2021-02-22 DIAGNOSIS — N13.30 HYDRONEPHROSIS OF LEFT KIDNEY: ICD-10-CM

## 2021-02-22 LAB
BACTERIA URINE, POC: ABNORMAL
BILIRUBIN URINE: 0 MG/DL
BLOOD, URINE: POSITIVE
CASTS URINE, POC: ABNORMAL
CLARITY: ABNORMAL
COLOR: ABNORMAL
CRYSTALS URINE, POC: ABNORMAL
EPI CELLS URINE, POC: ABNORMAL
GLUCOSE URINE: ABNORMAL
KETONES, URINE: NEGATIVE
LEUKOCYTE EST, POC: POSITIVE
NITRITE, URINE: POSITIVE
PH UA: 6.5 (ref 4.5–8)
PROTEIN UA: POSITIVE
RBC URINE, POC: 200
SPECIFIC GRAVITY UA: 1.03 (ref 1–1.03)
UROBILINOGEN, URINE: NORMAL
WBC URINE, POC: ABNORMAL
YEAST URINE, POC: ABNORMAL

## 2021-02-22 PROCEDURE — 99214 OFFICE O/P EST MOD 30 MIN: CPT | Performed by: UROLOGY

## 2021-02-22 PROCEDURE — G8427 DOCREV CUR MEDS BY ELIG CLIN: HCPCS | Performed by: UROLOGY

## 2021-02-22 PROCEDURE — G8484 FLU IMMUNIZE NO ADMIN: HCPCS | Performed by: UROLOGY

## 2021-02-22 PROCEDURE — 3017F COLORECTAL CA SCREEN DOC REV: CPT | Performed by: UROLOGY

## 2021-02-22 PROCEDURE — 4040F PNEUMOC VAC/ADMIN/RCVD: CPT | Performed by: UROLOGY

## 2021-02-22 PROCEDURE — 81000 URINALYSIS NONAUTO W/SCOPE: CPT | Performed by: UROLOGY

## 2021-02-22 PROCEDURE — G8417 CALC BMI ABV UP PARAM F/U: HCPCS | Performed by: UROLOGY

## 2021-02-22 PROCEDURE — 1123F ACP DISCUSS/DSCN MKR DOCD: CPT | Performed by: UROLOGY

## 2021-02-22 PROCEDURE — 1036F TOBACCO NON-USER: CPT | Performed by: UROLOGY

## 2021-02-22 ASSESSMENT — ENCOUNTER SYMPTOMS
NAUSEA: 0
VOMITING: 0
CHEST TIGHTNESS: 0
COLOR CHANGE: 0
SHORTNESS OF BREATH: 0
EYE REDNESS: 0
EYE DISCHARGE: 0
TROUBLE SWALLOWING: 0
FACIAL SWELLING: 0
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
## 2021-03-03 ENCOUNTER — HOSPITAL ENCOUNTER (OUTPATIENT)
Dept: INFUSION THERAPY | Age: 71
Setting detail: INFUSION SERIES
Discharge: HOME | End: 2021-03-03
Payer: MEDICARE

## 2021-03-03 VITALS
RESPIRATION RATE: 17 BRPM | SYSTOLIC BLOOD PRESSURE: 105 MMHG | HEART RATE: 67 BPM | DIASTOLIC BLOOD PRESSURE: 55 MMHG | TEMPERATURE: 97.4 F | OXYGEN SATURATION: 99 %

## 2021-03-03 DIAGNOSIS — C19 COLORECTAL CANCER (HCC): Primary | ICD-10-CM

## 2021-03-03 LAB
ALBUMIN SERPL-MCNC: 3.7 G/DL (ref 3.5–5.2)
ALP BLD-CCNC: 91 U/L (ref 40–130)
ALT SERPL-CCNC: 7 U/L (ref 5–41)
ANION GAP SERPL CALCULATED.3IONS-SCNC: 6 MMOL/L (ref 7–19)
AST SERPL-CCNC: 16 U/L (ref 5–40)
BASOPHILS ABSOLUTE: 0.1 K/UL (ref 0–0.2)
BASOPHILS RELATIVE PERCENT: 1.3 % (ref 0–1)
BILIRUB SERPL-MCNC: 0.3 MG/DL (ref 0.2–1.2)
BUN BLDV-MCNC: 16 MG/DL (ref 8–23)
CALCIUM SERPL-MCNC: 8.7 MG/DL (ref 8.8–10.2)
CHLORIDE BLD-SCNC: 99 MMOL/L (ref 98–111)
CO2: 26 MMOL/L (ref 22–29)
CREAT SERPL-MCNC: 1.5 MG/DL (ref 0.5–1.2)
EOSINOPHILS ABSOLUTE: 0.3 K/UL (ref 0–0.6)
EOSINOPHILS RELATIVE PERCENT: 6.3 % (ref 0–5)
GFR AFRICAN AMERICAN: 56
GFR NON-AFRICAN AMERICAN: 46
GLUCOSE BLD-MCNC: 100 MG/DL (ref 74–109)
HCT VFR BLD CALC: 32.2 % (ref 42–52)
HEMOGLOBIN: 10.6 G/DL (ref 14–18)
IMMATURE GRANULOCYTES #: 0 K/UL
LYMPHOCYTES ABSOLUTE: 0.7 K/UL (ref 1.1–4.5)
LYMPHOCYTES RELATIVE PERCENT: 13.1 % (ref 20–40)
MCH RBC QN AUTO: 29.6 PG (ref 27–31)
MCHC RBC AUTO-ENTMCNC: 32.9 G/DL (ref 33–37)
MCV RBC AUTO: 89.9 FL (ref 80–94)
MONOCYTES ABSOLUTE: 0.4 K/UL (ref 0–0.9)
MONOCYTES RELATIVE PERCENT: 7.3 % (ref 0–10)
NEUTROPHILS ABSOLUTE: 3.7 K/UL (ref 1.5–7.5)
NEUTROPHILS RELATIVE PERCENT: 71.4 % (ref 50–65)
PDW BLD-RTO: 14.3 % (ref 11.5–14.5)
PLATELET # BLD: 207 K/UL (ref 130–400)
PMV BLD AUTO: 10.9 FL (ref 9.4–12.4)
POTASSIUM SERPL-SCNC: 5.1 MMOL/L (ref 3.5–5)
RBC # BLD: 3.58 M/UL (ref 4.7–6.1)
SODIUM BLD-SCNC: 131 MMOL/L (ref 136–145)
TOTAL PROTEIN: 5.9 G/DL (ref 6.6–8.7)
WBC # BLD: 5.2 K/UL (ref 4.8–10.8)

## 2021-03-03 PROCEDURE — 2580000003 HC RX 258: Performed by: INTERNAL MEDICINE

## 2021-03-03 PROCEDURE — 6360000002 HC RX W HCPCS: Performed by: INTERNAL MEDICINE

## 2021-03-03 PROCEDURE — 80053 COMPREHEN METABOLIC PANEL: CPT

## 2021-03-03 PROCEDURE — 85025 COMPLETE CBC W/AUTO DIFF WBC: CPT

## 2021-03-03 PROCEDURE — G0498 CHEMO EXTEND IV INFUS W/PUMP: HCPCS

## 2021-03-03 PROCEDURE — 6370000000 HC RX 637 (ALT 250 FOR IP): Performed by: INTERNAL MEDICINE

## 2021-03-03 PROCEDURE — 96366 THER/PROPH/DIAG IV INF ADDON: CPT

## 2021-03-03 PROCEDURE — 96365 THER/PROPH/DIAG IV INF INIT: CPT

## 2021-03-03 RX ORDER — METHYLPREDNISOLONE SODIUM SUCCINATE 125 MG/2ML
125 INJECTION, POWDER, LYOPHILIZED, FOR SOLUTION INTRAMUSCULAR; INTRAVENOUS ONCE
Status: CANCELLED | OUTPATIENT
Start: 2021-03-03 | End: 2021-03-03

## 2021-03-03 RX ORDER — SODIUM CHLORIDE 9 MG/ML
INJECTION, SOLUTION INTRAVENOUS ONCE
Status: CANCELLED | OUTPATIENT
Start: 2021-03-03 | End: 2021-03-03

## 2021-03-03 RX ORDER — SODIUM CHLORIDE 9 MG/ML
INJECTION, SOLUTION INTRAVENOUS CONTINUOUS
Status: CANCELLED | OUTPATIENT
Start: 2021-03-03

## 2021-03-03 RX ORDER — ACETAMINOPHEN 325 MG/1
1000 TABLET ORAL ONCE
Status: CANCELLED
Start: 2021-03-03 | End: 2021-03-03

## 2021-03-03 RX ORDER — SODIUM CHLORIDE 0.9 % (FLUSH) 0.9 %
10 SYRINGE (ML) INJECTION PRN
Status: CANCELLED | OUTPATIENT
Start: 2021-03-03

## 2021-03-03 RX ORDER — SODIUM CHLORIDE 9 MG/ML
INJECTION, SOLUTION INTRAVENOUS ONCE
Status: COMPLETED | OUTPATIENT
Start: 2021-03-03 | End: 2021-03-03

## 2021-03-03 RX ORDER — DEXAMETHASONE SODIUM PHOSPHATE 10 MG/ML
10 INJECTION, SOLUTION INTRAMUSCULAR; INTRAVENOUS ONCE
Status: COMPLETED | OUTPATIENT
Start: 2021-03-03 | End: 2021-03-03

## 2021-03-03 RX ORDER — DIPHENHYDRAMINE HYDROCHLORIDE 50 MG/ML
50 INJECTION INTRAMUSCULAR; INTRAVENOUS ONCE
Status: CANCELLED
Start: 2021-03-03 | End: 2021-03-03

## 2021-03-03 RX ORDER — HEPARIN SODIUM (PORCINE) LOCK FLUSH IV SOLN 100 UNIT/ML 100 UNIT/ML
500 SOLUTION INTRAVENOUS PRN
Status: DISCONTINUED | OUTPATIENT
Start: 2021-03-03 | End: 2021-03-04 | Stop reason: HOSPADM

## 2021-03-03 RX ORDER — DIPHENHYDRAMINE HYDROCHLORIDE 50 MG/ML
50 INJECTION INTRAMUSCULAR; INTRAVENOUS ONCE
Status: CANCELLED | OUTPATIENT
Start: 2021-03-03 | End: 2021-03-03

## 2021-03-03 RX ORDER — HEPARIN SODIUM (PORCINE) LOCK FLUSH IV SOLN 100 UNIT/ML 100 UNIT/ML
500 SOLUTION INTRAVENOUS PRN
Status: CANCELLED | OUTPATIENT
Start: 2021-03-03

## 2021-03-03 RX ORDER — SODIUM CHLORIDE 0.9 % (FLUSH) 0.9 %
10 SYRINGE (ML) INJECTION PRN
Status: DISCONTINUED | OUTPATIENT
Start: 2021-03-03 | End: 2021-03-04 | Stop reason: HOSPADM

## 2021-03-03 RX ORDER — ACETAMINOPHEN 500 MG
1000 TABLET ORAL ONCE
Status: COMPLETED | OUTPATIENT
Start: 2021-03-03 | End: 2021-03-03

## 2021-03-03 RX ORDER — SODIUM CHLORIDE 0.9 % (FLUSH) 0.9 %
5 SYRINGE (ML) INJECTION PRN
Status: CANCELLED | OUTPATIENT
Start: 2021-03-03

## 2021-03-03 RX ORDER — EPINEPHRINE 1 MG/ML
0.3 INJECTION, SOLUTION, CONCENTRATE INTRAVENOUS PRN
Status: CANCELLED | OUTPATIENT
Start: 2021-03-03

## 2021-03-03 RX ORDER — DIPHENHYDRAMINE HYDROCHLORIDE 50 MG/ML
50 INJECTION INTRAMUSCULAR; INTRAVENOUS ONCE
Status: COMPLETED | OUTPATIENT
Start: 2021-03-03 | End: 2021-03-03

## 2021-03-03 RX ADMIN — FLUOROURACIL 4350 MG: 50 INJECTION, SOLUTION INTRAVENOUS at 14:29

## 2021-03-03 RX ADMIN — ACETAMINOPHEN 1000 MG: 500 TABLET, FILM COATED ORAL at 11:40

## 2021-03-03 RX ADMIN — SODIUM CHLORIDE: 9 INJECTION, SOLUTION INTRAVENOUS at 11:47

## 2021-03-03 RX ADMIN — ONDANSETRON 16 MG: 2 INJECTION INTRAMUSCULAR; INTRAVENOUS at 12:15

## 2021-03-03 RX ADMIN — DIPHENHYDRAMINE HYDROCHLORIDE 50 MG: 50 INJECTION, SOLUTION INTRAMUSCULAR; INTRAVENOUS at 11:41

## 2021-03-03 RX ADMIN — DEXAMETHASONE SODIUM PHOSPHATE 10 MG: 10 INJECTION, SOLUTION INTRAMUSCULAR; INTRAVENOUS at 12:14

## 2021-03-03 RX ADMIN — LEUCOVORIN CALCIUM 700 MG: 200 INJECTION, POWDER, LYOPHILIZED, FOR SUSPENSION INTRAMUSCULAR; INTRAVENOUS at 12:46

## 2021-03-03 ASSESSMENT — PAIN SCALES - GENERAL: PAINLEVEL_OUTOF10: 0

## 2021-03-05 ENCOUNTER — HOSPITAL ENCOUNTER (OUTPATIENT)
Dept: INFUSION THERAPY | Age: 71
Setting detail: INFUSION SERIES
Discharge: HOME | End: 2021-03-05
Payer: MEDICARE

## 2021-03-05 DIAGNOSIS — Z45.2 ENCOUNTER FOR CENTRAL LINE CARE: Primary | ICD-10-CM

## 2021-03-05 PROCEDURE — 6360000002 HC RX W HCPCS: Performed by: NURSE PRACTITIONER

## 2021-03-05 PROCEDURE — 2580000003 HC RX 258: Performed by: NURSE PRACTITIONER

## 2021-03-05 PROCEDURE — 96523 IRRIG DRUG DELIVERY DEVICE: CPT

## 2021-03-05 RX ORDER — SODIUM CHLORIDE 0.9 % (FLUSH) 0.9 %
20 SYRINGE (ML) INJECTION PRN
Status: CANCELLED | OUTPATIENT
Start: 2021-03-05

## 2021-03-05 RX ORDER — HEPARIN SODIUM (PORCINE) LOCK FLUSH IV SOLN 100 UNIT/ML 100 UNIT/ML
500 SOLUTION INTRAVENOUS PRN
Status: CANCELLED | OUTPATIENT
Start: 2021-03-05

## 2021-03-05 RX ORDER — SODIUM CHLORIDE 0.9 % (FLUSH) 0.9 %
20 SYRINGE (ML) INJECTION PRN
Status: DISCONTINUED | OUTPATIENT
Start: 2021-03-05 | End: 2021-03-06 | Stop reason: HOSPADM

## 2021-03-05 RX ORDER — SODIUM CHLORIDE 0.9 % (FLUSH) 0.9 %
10 SYRINGE (ML) INJECTION PRN
Status: CANCELLED | OUTPATIENT
Start: 2021-03-05

## 2021-03-05 RX ORDER — HEPARIN SODIUM (PORCINE) LOCK FLUSH IV SOLN 100 UNIT/ML 100 UNIT/ML
500 SOLUTION INTRAVENOUS PRN
Status: DISCONTINUED | OUTPATIENT
Start: 2021-03-05 | End: 2021-03-06 | Stop reason: HOSPADM

## 2021-03-05 RX ADMIN — HEPARIN 500 UNITS: 100 SYRINGE at 12:37

## 2021-03-05 RX ADMIN — SODIUM CHLORIDE, PRESERVATIVE FREE 20 ML: 5 INJECTION INTRAVENOUS at 12:37

## 2021-03-10 DIAGNOSIS — T45.1X5A CHEMOTHERAPY-INDUCED PERIPHERAL NEUROPATHY (HCC): ICD-10-CM

## 2021-03-10 DIAGNOSIS — G62.0 CHEMOTHERAPY-INDUCED PERIPHERAL NEUROPATHY (HCC): ICD-10-CM

## 2021-03-10 RX ORDER — DULOXETIN HYDROCHLORIDE 30 MG/1
30 CAPSULE, DELAYED RELEASE ORAL DAILY
Qty: 30 CAPSULE | Refills: 3 | Status: SHIPPED | OUTPATIENT
Start: 2021-03-10 | End: 2021-05-07

## 2021-03-12 ENCOUNTER — OFFICE VISIT (OUTPATIENT)
Age: 71
End: 2021-03-12

## 2021-03-12 VITALS — OXYGEN SATURATION: 96 % | HEART RATE: 93 BPM | TEMPERATURE: 98.1 F

## 2021-03-12 DIAGNOSIS — Z11.59 SCREENING FOR VIRAL DISEASE: Primary | ICD-10-CM

## 2021-03-12 LAB — SARS-COV-2, PCR: NOT DETECTED

## 2021-03-12 PROCEDURE — 99999 PR OFFICE/OUTPT VISIT,PROCEDURE ONLY: CPT | Performed by: PHYSICIAN ASSISTANT

## 2021-03-17 ENCOUNTER — HOSPITAL ENCOUNTER (OUTPATIENT)
Dept: INFUSION THERAPY | Age: 71
Setting detail: INFUSION SERIES
Discharge: HOME | End: 2021-03-17
Payer: MEDICARE

## 2021-03-17 VITALS
DIASTOLIC BLOOD PRESSURE: 58 MMHG | RESPIRATION RATE: 18 BRPM | TEMPERATURE: 98 F | OXYGEN SATURATION: 97 % | SYSTOLIC BLOOD PRESSURE: 107 MMHG | HEART RATE: 70 BPM

## 2021-03-17 DIAGNOSIS — C19 COLORECTAL CANCER (HCC): Primary | ICD-10-CM

## 2021-03-17 LAB
ALBUMIN SERPL-MCNC: 3.8 G/DL (ref 3.5–5.2)
ALP BLD-CCNC: 87 U/L (ref 40–130)
ALT SERPL-CCNC: 7 U/L (ref 5–41)
ANION GAP SERPL CALCULATED.3IONS-SCNC: 9 MMOL/L (ref 7–19)
AST SERPL-CCNC: 15 U/L (ref 5–40)
BASOPHILS ABSOLUTE: 0.1 K/UL (ref 0–0.2)
BASOPHILS RELATIVE PERCENT: 1.2 % (ref 0–1)
BILIRUB SERPL-MCNC: 0.3 MG/DL (ref 0.2–1.2)
BUN BLDV-MCNC: 15 MG/DL (ref 8–23)
CALCIUM SERPL-MCNC: 8.7 MG/DL (ref 8.8–10.2)
CHLORIDE BLD-SCNC: 101 MMOL/L (ref 98–111)
CO2: 25 MMOL/L (ref 22–29)
CREAT SERPL-MCNC: 1.7 MG/DL (ref 0.5–1.2)
EOSINOPHILS ABSOLUTE: 0.4 K/UL (ref 0–0.6)
EOSINOPHILS RELATIVE PERCENT: 6.2 % (ref 0–5)
GFR AFRICAN AMERICAN: 49
GFR NON-AFRICAN AMERICAN: 40
GLUCOSE BLD-MCNC: 93 MG/DL (ref 74–109)
HCT VFR BLD CALC: 32.8 % (ref 42–52)
HEMOGLOBIN: 10.6 G/DL (ref 14–18)
IMMATURE GRANULOCYTES #: 0 K/UL
LYMPHOCYTES ABSOLUTE: 0.7 K/UL (ref 1.1–4.5)
LYMPHOCYTES RELATIVE PERCENT: 12.1 % (ref 20–40)
MCH RBC QN AUTO: 29.8 PG (ref 27–31)
MCHC RBC AUTO-ENTMCNC: 32.3 G/DL (ref 33–37)
MCV RBC AUTO: 92.1 FL (ref 80–94)
MONOCYTES ABSOLUTE: 0.5 K/UL (ref 0–0.9)
MONOCYTES RELATIVE PERCENT: 8.3 % (ref 0–10)
NEUTROPHILS ABSOLUTE: 4.1 K/UL (ref 1.5–7.5)
NEUTROPHILS RELATIVE PERCENT: 71.5 % (ref 50–65)
PDW BLD-RTO: 14.4 % (ref 11.5–14.5)
PLATELET # BLD: 226 K/UL (ref 130–400)
PMV BLD AUTO: 10.7 FL (ref 9.4–12.4)
POTASSIUM SERPL-SCNC: 5.4 MMOL/L (ref 3.5–5)
RBC # BLD: 3.56 M/UL (ref 4.7–6.1)
SODIUM BLD-SCNC: 135 MMOL/L (ref 136–145)
TOTAL PROTEIN: 6.1 G/DL (ref 6.6–8.7)
WBC # BLD: 5.8 K/UL (ref 4.8–10.8)

## 2021-03-17 PROCEDURE — 85025 COMPLETE CBC W/AUTO DIFF WBC: CPT

## 2021-03-17 PROCEDURE — 6370000000 HC RX 637 (ALT 250 FOR IP): Performed by: NURSE PRACTITIONER

## 2021-03-17 PROCEDURE — G0498 CHEMO EXTEND IV INFUS W/PUMP: HCPCS

## 2021-03-17 PROCEDURE — 96413 CHEMO IV INFUSION 1 HR: CPT

## 2021-03-17 PROCEDURE — 6360000002 HC RX W HCPCS: Performed by: NURSE PRACTITIONER

## 2021-03-17 PROCEDURE — 96365 THER/PROPH/DIAG IV INF INIT: CPT

## 2021-03-17 PROCEDURE — 96375 TX/PRO/DX INJ NEW DRUG ADDON: CPT

## 2021-03-17 PROCEDURE — 96415 CHEMO IV INFUSION ADDL HR: CPT

## 2021-03-17 PROCEDURE — 2580000003 HC RX 258: Performed by: NURSE PRACTITIONER

## 2021-03-17 PROCEDURE — 96417 CHEMO IV INFUS EACH ADDL SEQ: CPT

## 2021-03-17 PROCEDURE — 80053 COMPREHEN METABOLIC PANEL: CPT

## 2021-03-17 PROCEDURE — 96374 THER/PROPH/DIAG INJ IV PUSH: CPT

## 2021-03-17 RX ORDER — SODIUM CHLORIDE 9 MG/ML
INJECTION, SOLUTION INTRAVENOUS ONCE
Status: COMPLETED | OUTPATIENT
Start: 2021-03-17 | End: 2021-03-17

## 2021-03-17 RX ORDER — DEXAMETHASONE SODIUM PHOSPHATE 10 MG/ML
10 INJECTION, SOLUTION INTRAMUSCULAR; INTRAVENOUS ONCE
Status: COMPLETED | OUTPATIENT
Start: 2021-03-17 | End: 2021-03-17

## 2021-03-17 RX ORDER — DIPHENHYDRAMINE HYDROCHLORIDE 50 MG/ML
50 INJECTION INTRAMUSCULAR; INTRAVENOUS ONCE
Status: CANCELLED | OUTPATIENT
Start: 2021-03-17 | End: 2021-03-17

## 2021-03-17 RX ORDER — ACETAMINOPHEN 325 MG/1
1000 TABLET ORAL ONCE
Status: CANCELLED
Start: 2021-03-17 | End: 2021-03-17

## 2021-03-17 RX ORDER — HEPARIN SODIUM (PORCINE) LOCK FLUSH IV SOLN 100 UNIT/ML 100 UNIT/ML
500 SOLUTION INTRAVENOUS PRN
Status: CANCELLED | OUTPATIENT
Start: 2021-03-17

## 2021-03-17 RX ORDER — SODIUM CHLORIDE 9 MG/ML
INJECTION, SOLUTION INTRAVENOUS CONTINUOUS
Status: CANCELLED | OUTPATIENT
Start: 2021-03-17

## 2021-03-17 RX ORDER — DIPHENHYDRAMINE HYDROCHLORIDE 50 MG/ML
50 INJECTION INTRAMUSCULAR; INTRAVENOUS ONCE
Status: CANCELLED
Start: 2021-03-17 | End: 2021-03-17

## 2021-03-17 RX ORDER — HEPARIN SODIUM (PORCINE) LOCK FLUSH IV SOLN 100 UNIT/ML 100 UNIT/ML
500 SOLUTION INTRAVENOUS PRN
Status: DISCONTINUED | OUTPATIENT
Start: 2021-03-17 | End: 2021-03-18 | Stop reason: HOSPADM

## 2021-03-17 RX ORDER — ACETAMINOPHEN 500 MG
1000 TABLET ORAL ONCE
Status: COMPLETED | OUTPATIENT
Start: 2021-03-17 | End: 2021-03-17

## 2021-03-17 RX ORDER — SODIUM CHLORIDE 0.9 % (FLUSH) 0.9 %
10 SYRINGE (ML) INJECTION PRN
Status: CANCELLED | OUTPATIENT
Start: 2021-03-17

## 2021-03-17 RX ORDER — SODIUM CHLORIDE 0.9 % (FLUSH) 0.9 %
5 SYRINGE (ML) INJECTION PRN
Status: CANCELLED | OUTPATIENT
Start: 2021-03-17

## 2021-03-17 RX ORDER — DIPHENHYDRAMINE HYDROCHLORIDE 50 MG/ML
50 INJECTION INTRAMUSCULAR; INTRAVENOUS ONCE
Status: COMPLETED | OUTPATIENT
Start: 2021-03-17 | End: 2021-03-17

## 2021-03-17 RX ORDER — EPINEPHRINE 1 MG/ML
0.3 INJECTION, SOLUTION, CONCENTRATE INTRAVENOUS PRN
Status: CANCELLED | OUTPATIENT
Start: 2021-03-17

## 2021-03-17 RX ORDER — SODIUM CHLORIDE 0.9 % (FLUSH) 0.9 %
10 SYRINGE (ML) INJECTION PRN
Status: DISCONTINUED | OUTPATIENT
Start: 2021-03-17 | End: 2021-03-18 | Stop reason: HOSPADM

## 2021-03-17 RX ORDER — SODIUM CHLORIDE 9 MG/ML
INJECTION, SOLUTION INTRAVENOUS ONCE
Status: CANCELLED | OUTPATIENT
Start: 2021-03-17 | End: 2021-03-17

## 2021-03-17 RX ORDER — METHYLPREDNISOLONE SODIUM SUCCINATE 125 MG/2ML
125 INJECTION, POWDER, LYOPHILIZED, FOR SOLUTION INTRAMUSCULAR; INTRAVENOUS ONCE
Status: CANCELLED | OUTPATIENT
Start: 2021-03-17 | End: 2021-03-17

## 2021-03-17 RX ADMIN — DIPHENHYDRAMINE HYDROCHLORIDE 50 MG: 50 INJECTION, SOLUTION INTRAMUSCULAR; INTRAVENOUS at 10:19

## 2021-03-17 RX ADMIN — DEXAMETHASONE SODIUM PHOSPHATE 10 MG: 10 INJECTION, SOLUTION INTRAMUSCULAR; INTRAVENOUS at 10:19

## 2021-03-17 RX ADMIN — LEUCOVORIN CALCIUM 700 MG: 350 INJECTION, POWDER, LYOPHILIZED, FOR SUSPENSION INTRAMUSCULAR; INTRAVENOUS at 12:13

## 2021-03-17 RX ADMIN — PANITUMUMAB 430.2 MG: 400 SOLUTION INTRAVENOUS at 10:58

## 2021-03-17 RX ADMIN — ONDANSETRON 16 MG: 2 INJECTION INTRAMUSCULAR; INTRAVENOUS at 10:30

## 2021-03-17 RX ADMIN — FLUOROURACIL 4350 MG: 50 INJECTION, SOLUTION INTRAVENOUS at 15:09

## 2021-03-17 RX ADMIN — SODIUM CHLORIDE: 9 INJECTION, SOLUTION INTRAVENOUS at 10:04

## 2021-03-17 RX ADMIN — ACETAMINOPHEN 1000 MG: 500 TABLET ORAL at 10:05

## 2021-03-17 ASSESSMENT — PAIN SCALES - GENERAL: PAINLEVEL_OUTOF10: 0

## 2021-03-19 ENCOUNTER — HOSPITAL ENCOUNTER (OUTPATIENT)
Dept: INFUSION THERAPY | Age: 71
Setting detail: INFUSION SERIES
Discharge: HOME | End: 2021-03-19
Payer: MEDICARE

## 2021-03-19 DIAGNOSIS — Z45.2 ENCOUNTER FOR CENTRAL LINE CARE: Primary | ICD-10-CM

## 2021-03-19 PROCEDURE — 2580000003 HC RX 258: Performed by: NURSE PRACTITIONER

## 2021-03-19 PROCEDURE — 96523 IRRIG DRUG DELIVERY DEVICE: CPT

## 2021-03-19 PROCEDURE — 6360000002 HC RX W HCPCS: Performed by: NURSE PRACTITIONER

## 2021-03-19 RX ORDER — HEPARIN SODIUM (PORCINE) LOCK FLUSH IV SOLN 100 UNIT/ML 100 UNIT/ML
500 SOLUTION INTRAVENOUS PRN
Status: DISCONTINUED | OUTPATIENT
Start: 2021-03-19 | End: 2021-03-20 | Stop reason: HOSPADM

## 2021-03-19 RX ORDER — SODIUM CHLORIDE 0.9 % (FLUSH) 0.9 %
20 SYRINGE (ML) INJECTION PRN
Status: CANCELLED | OUTPATIENT
Start: 2021-03-19

## 2021-03-19 RX ORDER — SODIUM CHLORIDE 0.9 % (FLUSH) 0.9 %
20 SYRINGE (ML) INJECTION PRN
Status: DISCONTINUED | OUTPATIENT
Start: 2021-03-19 | End: 2021-03-20 | Stop reason: HOSPADM

## 2021-03-19 RX ORDER — HEPARIN SODIUM (PORCINE) LOCK FLUSH IV SOLN 100 UNIT/ML 100 UNIT/ML
500 SOLUTION INTRAVENOUS PRN
Status: CANCELLED | OUTPATIENT
Start: 2021-03-19

## 2021-03-19 RX ORDER — SODIUM CHLORIDE 0.9 % (FLUSH) 0.9 %
10 SYRINGE (ML) INJECTION PRN
Status: CANCELLED | OUTPATIENT
Start: 2021-03-19

## 2021-03-19 RX ADMIN — SODIUM CHLORIDE, PRESERVATIVE FREE 20 ML: 5 INJECTION INTRAVENOUS at 14:09

## 2021-03-19 RX ADMIN — HEPARIN 500 UNITS: 100 SYRINGE at 14:09

## 2021-03-25 ENCOUNTER — HOSPITAL ENCOUNTER (EMERGENCY)
Age: 71
Discharge: HOME | End: 2021-03-25
Attending: EMERGENCY MEDICINE
Payer: MEDICARE

## 2021-03-25 ENCOUNTER — TRANSCRIBE ORDERS (OUTPATIENT)
Dept: ADMINISTRATIVE | Facility: HOSPITAL | Age: 71
End: 2021-03-25

## 2021-03-25 ENCOUNTER — APPOINTMENT (OUTPATIENT)
Dept: CT IMAGING | Age: 71
End: 2021-03-25
Payer: MEDICARE

## 2021-03-25 VITALS
TEMPERATURE: 98.2 F | RESPIRATION RATE: 18 BRPM | OXYGEN SATURATION: 97 % | SYSTOLIC BLOOD PRESSURE: 106 MMHG | DIASTOLIC BLOOD PRESSURE: 67 MMHG | HEART RATE: 66 BPM

## 2021-03-25 DIAGNOSIS — K56.609 INTERMITTENT SMALL BOWEL OBSTRUCTION (HCC): Primary | ICD-10-CM

## 2021-03-25 DIAGNOSIS — Z01.818 PREOP TESTING: Primary | ICD-10-CM

## 2021-03-25 LAB
ALBUMIN SERPL-MCNC: 3.5 G/DL (ref 3.5–5.2)
ALP BLD-CCNC: 97 U/L (ref 40–130)
ALT SERPL-CCNC: 7 U/L (ref 5–41)
ANION GAP SERPL CALCULATED.3IONS-SCNC: 11 MMOL/L (ref 7–19)
AST SERPL-CCNC: 13 U/L (ref 5–40)
BASOPHILS ABSOLUTE: 0 K/UL (ref 0–0.2)
BASOPHILS RELATIVE PERCENT: 0.9 % (ref 0–1)
BILIRUB SERPL-MCNC: 0.3 MG/DL (ref 0.2–1.2)
BUN BLDV-MCNC: 14 MG/DL (ref 8–23)
CALCIUM SERPL-MCNC: 8.9 MG/DL (ref 8.8–10.2)
CHLORIDE BLD-SCNC: 99 MMOL/L (ref 98–111)
CO2: 24 MMOL/L (ref 22–29)
CREAT SERPL-MCNC: 1.2 MG/DL (ref 0.5–1.2)
EOSINOPHILS ABSOLUTE: 0.4 K/UL (ref 0–0.6)
EOSINOPHILS RELATIVE PERCENT: 8.1 % (ref 0–5)
GFR AFRICAN AMERICAN: >59
GFR NON-AFRICAN AMERICAN: >60
GLUCOSE BLD-MCNC: 93 MG/DL (ref 74–109)
HCT VFR BLD CALC: 33 % (ref 42–52)
HEMOGLOBIN: 10.6 G/DL (ref 14–18)
IMMATURE GRANULOCYTES #: 0 K/UL
INR BLD: 1.06 (ref 0.88–1.18)
LIPASE: 13 U/L (ref 13–60)
LYMPHOCYTES ABSOLUTE: 0.7 K/UL (ref 1.1–4.5)
LYMPHOCYTES RELATIVE PERCENT: 14.6 % (ref 20–40)
MCH RBC QN AUTO: 29.9 PG (ref 27–31)
MCHC RBC AUTO-ENTMCNC: 32.1 G/DL (ref 33–37)
MCV RBC AUTO: 93 FL (ref 80–94)
MONOCYTES ABSOLUTE: 0.5 K/UL (ref 0–0.9)
MONOCYTES RELATIVE PERCENT: 9.8 % (ref 0–10)
NEUTROPHILS ABSOLUTE: 3 K/UL (ref 1.5–7.5)
NEUTROPHILS RELATIVE PERCENT: 65.7 % (ref 50–65)
PDW BLD-RTO: 14.2 % (ref 11.5–14.5)
PLATELET # BLD: 239 K/UL (ref 130–400)
PMV BLD AUTO: 10.8 FL (ref 9.4–12.4)
POTASSIUM SERPL-SCNC: 4.1 MMOL/L (ref 3.5–5)
PROTHROMBIN TIME: 13.7 SEC (ref 12–14.6)
RBC # BLD: 3.55 M/UL (ref 4.7–6.1)
SARS-COV-2, NAAT: NOT DETECTED
SODIUM BLD-SCNC: 134 MMOL/L (ref 136–145)
TOTAL PROTEIN: 6.1 G/DL (ref 6.6–8.7)
WBC # BLD: 4.6 K/UL (ref 4.8–10.8)

## 2021-03-25 PROCEDURE — 80053 COMPREHEN METABOLIC PANEL: CPT

## 2021-03-25 PROCEDURE — 85610 PROTHROMBIN TIME: CPT

## 2021-03-25 PROCEDURE — 36415 COLL VENOUS BLD VENIPUNCTURE: CPT

## 2021-03-25 PROCEDURE — 87635 SARS-COV-2 COVID-19 AMP PRB: CPT

## 2021-03-25 PROCEDURE — 74177 CT ABD & PELVIS W/CONTRAST: CPT

## 2021-03-25 PROCEDURE — 85025 COMPLETE CBC W/AUTO DIFF WBC: CPT

## 2021-03-25 PROCEDURE — 2580000003 HC RX 258: Performed by: EMERGENCY MEDICINE

## 2021-03-25 PROCEDURE — 99282 EMERGENCY DEPT VISIT SF MDM: CPT

## 2021-03-25 PROCEDURE — 6360000004 HC RX CONTRAST MEDICATION: Performed by: EMERGENCY MEDICINE

## 2021-03-25 PROCEDURE — 83690 ASSAY OF LIPASE: CPT

## 2021-03-25 RX ORDER — HEPARIN SODIUM (PORCINE) LOCK FLUSH IV SOLN 100 UNIT/ML 100 UNIT/ML
500 SOLUTION INTRAVENOUS PRN
Status: CANCELLED | OUTPATIENT
Start: 2021-03-25

## 2021-03-25 RX ORDER — 0.9 % SODIUM CHLORIDE 0.9 %
1000 INTRAVENOUS SOLUTION INTRAVENOUS ONCE
Status: COMPLETED | OUTPATIENT
Start: 2021-03-25 | End: 2021-03-25

## 2021-03-25 RX ORDER — SODIUM CHLORIDE 0.9 % (FLUSH) 0.9 %
20 SYRINGE (ML) INJECTION PRN
Status: CANCELLED | OUTPATIENT
Start: 2021-03-25

## 2021-03-25 RX ORDER — SODIUM CHLORIDE 0.9 % (FLUSH) 0.9 %
10 SYRINGE (ML) INJECTION PRN
Status: CANCELLED | OUTPATIENT
Start: 2021-03-25

## 2021-03-25 RX ADMIN — IOPAMIDOL 90 ML: 755 INJECTION, SOLUTION INTRAVENOUS at 17:04

## 2021-03-25 RX ADMIN — SODIUM CHLORIDE 1000 ML: 9 INJECTION, SOLUTION INTRAVENOUS at 16:27

## 2021-03-25 ASSESSMENT — ENCOUNTER SYMPTOMS
SORE THROAT: 0
VOICE CHANGE: 0
EYE DISCHARGE: 0
FACIAL SWELLING: 0
CHOKING: 0
VOMITING: 0
CONSTIPATION: 0
BLOOD IN STOOL: 0
NAUSEA: 0
ABDOMINAL PAIN: 0
APNEA: 0
SHORTNESS OF BREATH: 0
SINUS PRESSURE: 0
ABDOMINAL DISTENTION: 0

## 2021-03-25 ASSESSMENT — PAIN SCALES - GENERAL: PAINLEVEL_OUTOF10: 3

## 2021-03-25 NOTE — ED NOTES
Bed: 05  Expected date:   Expected time:   Means of arrival:   Comments:  Yeny Newyb RN  03/25/21 2611
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
## 2021-03-27 ENCOUNTER — LAB (OUTPATIENT)
Dept: LAB | Facility: HOSPITAL | Age: 71
End: 2021-03-27

## 2021-03-27 LAB — SARS-COV-2 ORF1AB RESP QL NAA+PROBE: NOT DETECTED

## 2021-03-27 PROCEDURE — C9803 HOPD COVID-19 SPEC COLLECT: HCPCS | Performed by: ANESTHESIOLOGY

## 2021-03-27 PROCEDURE — U0004 COV-19 TEST NON-CDC HGH THRU: HCPCS | Performed by: ANESTHESIOLOGY

## 2021-03-27 PROCEDURE — U0005 INFEC AGEN DETEC AMPLI PROBE: HCPCS | Performed by: ANESTHESIOLOGY

## 2021-03-30 ENCOUNTER — HOSPITAL ENCOUNTER (OUTPATIENT)
Dept: PREADMISSION TESTING | Age: 71
Discharge: HOME | End: 2021-04-03
Payer: MEDICARE

## 2021-03-30 VITALS — BODY MASS INDEX: 25.99 KG/M2 | WEIGHT: 156 LBS | HEIGHT: 65 IN

## 2021-03-30 LAB
ANION GAP SERPL CALCULATED.3IONS-SCNC: 10 MMOL/L (ref 7–19)
BASOPHILS ABSOLUTE: 0.1 K/UL (ref 0–0.2)
BASOPHILS RELATIVE PERCENT: 0.9 % (ref 0–1)
BUN BLDV-MCNC: 12 MG/DL (ref 8–23)
CALCIUM SERPL-MCNC: 8.7 MG/DL (ref 8.8–10.2)
CHLORIDE BLD-SCNC: 100 MMOL/L (ref 98–111)
CO2: 23 MMOL/L (ref 22–29)
CREAT SERPL-MCNC: 1.5 MG/DL (ref 0.5–1.2)
EOSINOPHILS ABSOLUTE: 0.4 K/UL (ref 0–0.6)
EOSINOPHILS RELATIVE PERCENT: 6.1 % (ref 0–5)
GFR AFRICAN AMERICAN: 56
GFR NON-AFRICAN AMERICAN: 46
GLUCOSE BLD-MCNC: 104 MG/DL (ref 74–109)
HCT VFR BLD CALC: 35.2 % (ref 42–52)
HEMOGLOBIN: 11.4 G/DL (ref 14–18)
IMMATURE GRANULOCYTES #: 0.1 K/UL
LYMPHOCYTES ABSOLUTE: 0.9 K/UL (ref 1.1–4.5)
LYMPHOCYTES RELATIVE PERCENT: 13.4 % (ref 20–40)
MCH RBC QN AUTO: 29.7 PG (ref 27–31)
MCHC RBC AUTO-ENTMCNC: 32.4 G/DL (ref 33–37)
MCV RBC AUTO: 91.7 FL (ref 80–94)
MONOCYTES ABSOLUTE: 0.6 K/UL (ref 0–0.9)
MONOCYTES RELATIVE PERCENT: 8.7 % (ref 0–10)
NEUTROPHILS ABSOLUTE: 4.6 K/UL (ref 1.5–7.5)
NEUTROPHILS RELATIVE PERCENT: 69.3 % (ref 50–65)
PDW BLD-RTO: 14.3 % (ref 11.5–14.5)
PLATELET # BLD: 265 K/UL (ref 130–400)
PMV BLD AUTO: 11.1 FL (ref 9.4–12.4)
POTASSIUM SERPL-SCNC: 4.5 MMOL/L (ref 3.5–5)
RBC # BLD: 3.84 M/UL (ref 4.7–6.1)
SARS-COV-2, PCR: NOT DETECTED
SODIUM BLD-SCNC: 133 MMOL/L (ref 136–145)
WBC # BLD: 6.7 K/UL (ref 4.8–10.8)

## 2021-03-30 PROCEDURE — 85025 COMPLETE CBC W/AUTO DIFF WBC: CPT

## 2021-03-30 PROCEDURE — U0005 INFEC AGEN DETEC AMPLI PROBE: HCPCS

## 2021-03-30 PROCEDURE — U0003 INFECTIOUS AGENT DETECTION BY NUCLEIC ACID (DNA OR RNA); SEVERE ACUTE RESPIRATORY SYNDROME CORONAVIRUS 2 (SARS-COV-2) (CORONAVIRUS DISEASE [COVID-19]), AMPLIFIED PROBE TECHNIQUE, MAKING USE OF HIGH THROUGHPUT TECHNOLOGIES AS DESCRIBED BY CMS-2020-01-R: HCPCS

## 2021-03-30 PROCEDURE — 80048 BASIC METABOLIC PNL TOTAL CA: CPT

## 2021-03-30 RX ORDER — CIPROFLOXACIN 2 MG/ML
400 INJECTION, SOLUTION INTRAVENOUS ONCE
Status: CANCELLED | OUTPATIENT
Start: 2021-04-01

## 2021-04-01 ENCOUNTER — APPOINTMENT (OUTPATIENT)
Dept: GENERAL RADIOLOGY | Age: 71
End: 2021-04-01
Attending: UROLOGY
Payer: MEDICARE

## 2021-04-01 ENCOUNTER — ANESTHESIA (OUTPATIENT)
Dept: OPERATING ROOM | Age: 71
End: 2021-04-01
Payer: MEDICARE

## 2021-04-01 ENCOUNTER — ANESTHESIA EVENT (OUTPATIENT)
Dept: OPERATING ROOM | Age: 71
End: 2021-04-01
Payer: MEDICARE

## 2021-04-01 ENCOUNTER — HOSPITAL ENCOUNTER (OUTPATIENT)
Age: 71
Setting detail: OUTPATIENT SURGERY
Discharge: HOME | End: 2021-04-01
Attending: UROLOGY | Admitting: UROLOGY
Payer: MEDICARE

## 2021-04-01 VITALS — TEMPERATURE: 95.9 F | DIASTOLIC BLOOD PRESSURE: 64 MMHG | OXYGEN SATURATION: 100 % | SYSTOLIC BLOOD PRESSURE: 109 MMHG

## 2021-04-01 VITALS
HEART RATE: 66 BPM | SYSTOLIC BLOOD PRESSURE: 124 MMHG | TEMPERATURE: 97.9 F | DIASTOLIC BLOOD PRESSURE: 80 MMHG | OXYGEN SATURATION: 98 % | RESPIRATION RATE: 14 BRPM

## 2021-04-01 DIAGNOSIS — R52 PAIN: ICD-10-CM

## 2021-04-01 PROCEDURE — 2500000003 HC RX 250 WO HCPCS: Performed by: NURSE ANESTHETIST, CERTIFIED REGISTERED

## 2021-04-01 PROCEDURE — 6360000002 HC RX W HCPCS: Performed by: UROLOGY

## 2021-04-01 PROCEDURE — 3600000014 HC SURGERY LEVEL 4 ADDTL 15MIN: Performed by: UROLOGY

## 2021-04-01 PROCEDURE — C1758 CATHETER, URETERAL: HCPCS | Performed by: UROLOGY

## 2021-04-01 PROCEDURE — 7100000011 HC PHASE II RECOVERY - ADDTL 15 MIN: Performed by: UROLOGY

## 2021-04-01 PROCEDURE — 6370000000 HC RX 637 (ALT 250 FOR IP): Performed by: UROLOGY

## 2021-04-01 PROCEDURE — 2580000003 HC RX 258: Performed by: ANESTHESIOLOGY

## 2021-04-01 PROCEDURE — C1769 GUIDE WIRE: HCPCS | Performed by: UROLOGY

## 2021-04-01 PROCEDURE — 88305 TISSUE EXAM BY PATHOLOGIST: CPT

## 2021-04-01 PROCEDURE — 3600000004 HC SURGERY LEVEL 4 BASE: Performed by: UROLOGY

## 2021-04-01 PROCEDURE — 6370000000 HC RX 637 (ALT 250 FOR IP): Performed by: ANESTHESIOLOGY

## 2021-04-01 PROCEDURE — 3700000000 HC ANESTHESIA ATTENDED CARE: Performed by: UROLOGY

## 2021-04-01 PROCEDURE — C2625 STENT, NON-COR, TEM W/DEL SY: HCPCS | Performed by: UROLOGY

## 2021-04-01 PROCEDURE — 2709999900 HC NON-CHARGEABLE SUPPLY: Performed by: UROLOGY

## 2021-04-01 PROCEDURE — 7100000000 HC PACU RECOVERY - FIRST 15 MIN: Performed by: UROLOGY

## 2021-04-01 PROCEDURE — 7100000001 HC PACU RECOVERY - ADDTL 15 MIN: Performed by: UROLOGY

## 2021-04-01 PROCEDURE — 52224 CYSTOSCOPY AND TREATMENT: CPT | Performed by: UROLOGY

## 2021-04-01 PROCEDURE — 52332 CYSTOSCOPY AND TREATMENT: CPT | Performed by: UROLOGY

## 2021-04-01 PROCEDURE — 6360000002 HC RX W HCPCS: Performed by: NURSE ANESTHETIST, CERTIFIED REGISTERED

## 2021-04-01 PROCEDURE — 3700000001 HC ADD 15 MINUTES (ANESTHESIA): Performed by: UROLOGY

## 2021-04-01 PROCEDURE — 3209999900 FLUORO FOR SURGICAL PROCEDURES

## 2021-04-01 PROCEDURE — 7100000010 HC PHASE II RECOVERY - FIRST 15 MIN: Performed by: UROLOGY

## 2021-04-01 DEVICE — RESONANCE, METALLIC URETERAL STENT AND INTRODUCER
Type: IMPLANTABLE DEVICE | Status: FUNCTIONAL
Brand: RESONANCE

## 2021-04-01 DEVICE — RESONANCE, METALLIC URETERAL STENT AND INTRODUCER
Type: IMPLANTABLE DEVICE | Site: URETER | Status: FUNCTIONAL
Brand: RESONANCE

## 2021-04-01 RX ORDER — CIPROFLOXACIN 500 MG/1
500 TABLET, FILM COATED ORAL 2 TIMES DAILY
Qty: 6 TABLET | Refills: 0 | Status: SHIPPED | OUTPATIENT
Start: 2021-04-01 | End: 2021-04-04

## 2021-04-01 RX ORDER — PROMETHAZINE HYDROCHLORIDE 25 MG/ML
6.25 INJECTION, SOLUTION INTRAMUSCULAR; INTRAVENOUS
Status: DISCONTINUED | OUTPATIENT
Start: 2021-04-01 | End: 2021-04-01 | Stop reason: HOSPADM

## 2021-04-01 RX ORDER — HYDROMORPHONE HYDROCHLORIDE 1 MG/ML
0.25 INJECTION, SOLUTION INTRAMUSCULAR; INTRAVENOUS; SUBCUTANEOUS EVERY 5 MIN PRN
Status: DISCONTINUED | OUTPATIENT
Start: 2021-04-01 | End: 2021-04-01 | Stop reason: HOSPADM

## 2021-04-01 RX ORDER — PHENAZOPYRIDINE HYDROCHLORIDE 100 MG/1
100 TABLET, FILM COATED ORAL ONCE
Status: COMPLETED | OUTPATIENT
Start: 2021-04-01 | End: 2021-04-01

## 2021-04-01 RX ORDER — SODIUM CHLORIDE 9 MG/ML
INJECTION, SOLUTION INTRAVENOUS CONTINUOUS
Status: DISCONTINUED | OUTPATIENT
Start: 2021-04-01 | End: 2021-04-01 | Stop reason: HOSPADM

## 2021-04-01 RX ORDER — SODIUM CHLORIDE, SODIUM LACTATE, POTASSIUM CHLORIDE, CALCIUM CHLORIDE 600; 310; 30; 20 MG/100ML; MG/100ML; MG/100ML; MG/100ML
INJECTION, SOLUTION INTRAVENOUS CONTINUOUS
Status: DISCONTINUED | OUTPATIENT
Start: 2021-04-01 | End: 2021-04-01 | Stop reason: HOSPADM

## 2021-04-01 RX ORDER — MEPERIDINE HYDROCHLORIDE 50 MG/ML
12.5 INJECTION INTRAMUSCULAR; INTRAVENOUS; SUBCUTANEOUS EVERY 5 MIN PRN
Status: DISCONTINUED | OUTPATIENT
Start: 2021-04-01 | End: 2021-04-01 | Stop reason: HOSPADM

## 2021-04-01 RX ORDER — PROPOFOL 10 MG/ML
INJECTION, EMULSION INTRAVENOUS PRN
Status: DISCONTINUED | OUTPATIENT
Start: 2021-04-01 | End: 2021-04-01 | Stop reason: SDUPTHER

## 2021-04-01 RX ORDER — CIPROFLOXACIN 2 MG/ML
400 INJECTION, SOLUTION INTRAVENOUS ONCE
Status: COMPLETED | OUTPATIENT
Start: 2021-04-01 | End: 2021-04-01

## 2021-04-01 RX ORDER — HYDROMORPHONE HYDROCHLORIDE 1 MG/ML
0.5 INJECTION, SOLUTION INTRAMUSCULAR; INTRAVENOUS; SUBCUTANEOUS
Status: DISCONTINUED | OUTPATIENT
Start: 2021-04-01 | End: 2021-04-01 | Stop reason: HOSPADM

## 2021-04-01 RX ORDER — EPHEDRINE SULFATE 50 MG/ML
INJECTION, SOLUTION INTRAVENOUS PRN
Status: DISCONTINUED | OUTPATIENT
Start: 2021-04-01 | End: 2021-04-01 | Stop reason: SDUPTHER

## 2021-04-01 RX ORDER — DIPHENHYDRAMINE HYDROCHLORIDE 50 MG/ML
12.5 INJECTION INTRAMUSCULAR; INTRAVENOUS
Status: DISCONTINUED | OUTPATIENT
Start: 2021-04-01 | End: 2021-04-01 | Stop reason: HOSPADM

## 2021-04-01 RX ORDER — HEPARIN SODIUM (PORCINE) LOCK FLUSH IV SOLN 100 UNIT/ML 100 UNIT/ML
300 SOLUTION INTRAVENOUS PRN
Status: DISCONTINUED | OUTPATIENT
Start: 2021-04-01 | End: 2021-04-01 | Stop reason: HOSPADM

## 2021-04-01 RX ORDER — LIDOCAINE HYDROCHLORIDE 10 MG/ML
1 INJECTION, SOLUTION EPIDURAL; INFILTRATION; INTRACAUDAL; PERINEURAL
Status: DISCONTINUED | OUTPATIENT
Start: 2021-04-01 | End: 2021-04-01 | Stop reason: HOSPADM

## 2021-04-01 RX ORDER — MIDAZOLAM HYDROCHLORIDE 1 MG/ML
2 INJECTION INTRAMUSCULAR; INTRAVENOUS
Status: DISCONTINUED | OUTPATIENT
Start: 2021-04-01 | End: 2021-04-01 | Stop reason: HOSPADM

## 2021-04-01 RX ORDER — SODIUM CHLORIDE 0.9 % (FLUSH) 0.9 %
10 SYRINGE (ML) INJECTION EVERY 12 HOURS SCHEDULED
Status: DISCONTINUED | OUTPATIENT
Start: 2021-04-01 | End: 2021-04-01 | Stop reason: HOSPADM

## 2021-04-01 RX ORDER — DEXAMETHASONE SODIUM PHOSPHATE 10 MG/ML
INJECTION, SOLUTION INTRAMUSCULAR; INTRAVENOUS PRN
Status: DISCONTINUED | OUTPATIENT
Start: 2021-04-01 | End: 2021-04-01 | Stop reason: SDUPTHER

## 2021-04-01 RX ORDER — ONDANSETRON 2 MG/ML
4 INJECTION INTRAMUSCULAR; INTRAVENOUS EVERY 4 HOURS PRN
Status: DISCONTINUED | OUTPATIENT
Start: 2021-04-01 | End: 2021-04-01 | Stop reason: HOSPADM

## 2021-04-01 RX ORDER — LABETALOL HYDROCHLORIDE 5 MG/ML
5 INJECTION, SOLUTION INTRAVENOUS EVERY 10 MIN PRN
Status: DISCONTINUED | OUTPATIENT
Start: 2021-04-01 | End: 2021-04-01 | Stop reason: HOSPADM

## 2021-04-01 RX ORDER — LIDOCAINE HYDROCHLORIDE 10 MG/ML
INJECTION, SOLUTION INFILTRATION; PERINEURAL PRN
Status: DISCONTINUED | OUTPATIENT
Start: 2021-04-01 | End: 2021-04-01 | Stop reason: SDUPTHER

## 2021-04-01 RX ORDER — OXYCODONE HYDROCHLORIDE AND ACETAMINOPHEN 5; 325 MG/1; MG/1
2 TABLET ORAL EVERY 4 HOURS PRN
Status: DISCONTINUED | OUTPATIENT
Start: 2021-04-01 | End: 2021-04-01 | Stop reason: HOSPADM

## 2021-04-01 RX ORDER — SODIUM CHLORIDE 0.9 % (FLUSH) 0.9 %
10 SYRINGE (ML) INJECTION PRN
Status: DISCONTINUED | OUTPATIENT
Start: 2021-04-01 | End: 2021-04-01 | Stop reason: HOSPADM

## 2021-04-01 RX ORDER — FENTANYL CITRATE 50 UG/ML
INJECTION, SOLUTION INTRAMUSCULAR; INTRAVENOUS PRN
Status: DISCONTINUED | OUTPATIENT
Start: 2021-04-01 | End: 2021-04-01 | Stop reason: SDUPTHER

## 2021-04-01 RX ORDER — ROCURONIUM BROMIDE 10 MG/ML
INJECTION, SOLUTION INTRAVENOUS PRN
Status: DISCONTINUED | OUTPATIENT
Start: 2021-04-01 | End: 2021-04-01 | Stop reason: SDUPTHER

## 2021-04-01 RX ORDER — HYDROMORPHONE HYDROCHLORIDE 1 MG/ML
0.5 INJECTION, SOLUTION INTRAMUSCULAR; INTRAVENOUS; SUBCUTANEOUS EVERY 5 MIN PRN
Status: DISCONTINUED | OUTPATIENT
Start: 2021-04-01 | End: 2021-04-01 | Stop reason: HOSPADM

## 2021-04-01 RX ORDER — HYDRALAZINE HYDROCHLORIDE 20 MG/ML
5 INJECTION INTRAMUSCULAR; INTRAVENOUS EVERY 10 MIN PRN
Status: DISCONTINUED | OUTPATIENT
Start: 2021-04-01 | End: 2021-04-01 | Stop reason: HOSPADM

## 2021-04-01 RX ORDER — FENTANYL CITRATE 50 UG/ML
50 INJECTION, SOLUTION INTRAMUSCULAR; INTRAVENOUS
Status: DISCONTINUED | OUTPATIENT
Start: 2021-04-01 | End: 2021-04-01 | Stop reason: HOSPADM

## 2021-04-01 RX ORDER — ONDANSETRON 2 MG/ML
INJECTION INTRAMUSCULAR; INTRAVENOUS PRN
Status: DISCONTINUED | OUTPATIENT
Start: 2021-04-01 | End: 2021-04-01 | Stop reason: SDUPTHER

## 2021-04-01 RX ORDER — SCOLOPAMINE TRANSDERMAL SYSTEM 1 MG/1
1 PATCH, EXTENDED RELEASE TRANSDERMAL ONCE
Status: DISCONTINUED | OUTPATIENT
Start: 2021-04-01 | End: 2021-04-01 | Stop reason: HOSPADM

## 2021-04-01 RX ORDER — METOCLOPRAMIDE HYDROCHLORIDE 5 MG/ML
10 INJECTION INTRAMUSCULAR; INTRAVENOUS
Status: DISCONTINUED | OUTPATIENT
Start: 2021-04-01 | End: 2021-04-01 | Stop reason: HOSPADM

## 2021-04-01 RX ADMIN — PHENYLEPHRINE HYDROCHLORIDE 80 MCG: 10 INJECTION INTRAVENOUS at 13:43

## 2021-04-01 RX ADMIN — EPHEDRINE SULFATE 10 MG: 50 INJECTION INTRAMUSCULAR; INTRAVENOUS; SUBCUTANEOUS at 13:41

## 2021-04-01 RX ADMIN — SUGAMMADEX 150 MG: 100 INJECTION, SOLUTION INTRAVENOUS at 14:18

## 2021-04-01 RX ADMIN — ONDANSETRON HYDROCHLORIDE 4 MG: 2 INJECTION, SOLUTION INTRAMUSCULAR; INTRAVENOUS at 13:38

## 2021-04-01 RX ADMIN — PROPOFOL 150 MG: 10 INJECTION, EMULSION INTRAVENOUS at 13:32

## 2021-04-01 RX ADMIN — LIDOCAINE HYDROCHLORIDE 50 MG: 10 INJECTION, SOLUTION INFILTRATION; PERINEURAL at 13:32

## 2021-04-01 RX ADMIN — FENTANYL CITRATE 100 MCG: 50 INJECTION, SOLUTION INTRAMUSCULAR; INTRAVENOUS at 13:32

## 2021-04-01 RX ADMIN — SODIUM CHLORIDE, SODIUM LACTATE, POTASSIUM CHLORIDE, AND CALCIUM CHLORIDE: 600; 310; 30; 20 INJECTION, SOLUTION INTRAVENOUS at 13:28

## 2021-04-01 RX ADMIN — ROCURONIUM BROMIDE 50 MG: 10 INJECTION, SOLUTION INTRAVENOUS at 13:32

## 2021-04-01 RX ADMIN — PHENAZOPYRIDINE HYDROCHLORIDE 100 MG: 100 TABLET ORAL at 14:52

## 2021-04-01 RX ADMIN — EPHEDRINE SULFATE 10 MG: 50 INJECTION INTRAMUSCULAR; INTRAVENOUS; SUBCUTANEOUS at 13:44

## 2021-04-01 RX ADMIN — CIPROFLOXACIN 400 MG: 2 INJECTION, SOLUTION INTRAVENOUS at 13:37

## 2021-04-01 RX ADMIN — OXYCODONE HYDROCHLORIDE AND ACETAMINOPHEN 2 TABLET: 5; 325 TABLET ORAL at 15:13

## 2021-04-01 RX ADMIN — SODIUM CHLORIDE, SODIUM LACTATE, POTASSIUM CHLORIDE, AND CALCIUM CHLORIDE: 600; 310; 30; 20 INJECTION, SOLUTION INTRAVENOUS at 12:05

## 2021-04-01 RX ADMIN — DEXAMETHASONE SODIUM PHOSPHATE 10 MG: 10 INJECTION, SOLUTION INTRAMUSCULAR; INTRAVENOUS at 13:38

## 2021-04-01 ASSESSMENT — PAIN SCALES - GENERAL: PAINLEVEL_OUTOF10: 5

## 2021-04-01 ASSESSMENT — LIFESTYLE VARIABLES: SMOKING_STATUS: 0

## 2021-04-01 NOTE — ANESTHESIA POSTPROCEDURE EVALUATION
Department of Anesthesiology  Postprocedure Note    Patient: Brayden Jefferson  MRN: [id number]  YOB: 1950  Date of evaluation: 4/1/2021  Time:  2:26 PM     Procedure Summary     Date: 04/01/21 Room / Location: [facility] OR [facility]    Anesthesia Start: 2948 Anesthesia Stop: 9197    Procedure: CYSTOSCOPY, BILATERAL URETERAL STENT REMOVAL AND REPLACEMENT AND FULGERATION OF BLADDER TUMOR AND BLADDER BIOPSY (Bilateral ) Diagnosis: (BILATERAL URETERAL OBSTRUCTION, HISTORY OF BLADDER CANCER)    Surgeons: Magdalene Allen MD Responsible Provider: OLGA Cruz CRNA    Anesthesia Type: general ASA Status: 3          Anesthesia Type: general    Abad Phase I: Abad Score: 10    Abad Phase II:      Last vitals: Reviewed and per EMR flowsheets.        Anesthesia Post Evaluation    Patient location during evaluation: PACU  Patient participation: complete - patient participated  Level of consciousness: awake and alert  Pain score: 0  Airway patency: patent  Nausea & Vomiting: no nausea and no vomiting  Complications: no  Cardiovascular status: hemodynamically stable  Respiratory status: acceptable  Hydration status: euvolemic

## 2021-04-01 NOTE — OP NOTE
Brief Operative Note      Patient: Dwight Burton  YOB: 1950  MRN: [id number]    Date of Procedure: 4/1/2021    Pre-Op Diagnosis: BILATERAL URETERAL OBSTRUCTION, HISTORY OF BLADDER CANCER    Post-Op Diagnosis: Same and Bladder cancer anterior bladder neck       Procedure(s):  CYSTOSCOPY, BILATERAL URETERAL STENT REMOVAL AND REPLACEMENT AND FULGERATION OF BLADDER TUMOR AND BLADDER BIOPSY    Surgeon(s):  Bird Weeks MD    Assistant:   * No surgical staff found *    Anesthesia: General    Estimated Blood Loss (mL): 0    Complications: None    Specimens:   ID Type Source Tests Collected by Time Destination   A : BIOPSY BLADDER NECK TUMOR Tissue Bladder SURGICAL PATHOLOGY Bird Weeks MD 4/1/2021 1356        Implants:  Implant Name Type Inv. Item Serial No.  Lot No. LRB No. Used Action   STENT URET 6FR L22CM MET W/ POS SYS RESONANCE  STENT URET 6FR L22CM MET W/ POS SYS RESONANCE  [employer]- [id number] Right 1 Implanted   STENT URETH 6FR L24CM MET W/ POS SYS RESONANCE  STENT URETH 6FR L24CM MET W/ POS SYS RESONANCE  [employer]- [id number]  1 Implanted         Drains:   Ileostomy LLQ (Active)       Findings: Small less than 0.5 cm papillary bladder tumor anterior bladder neck about 11:00 which was easily removed and fulgurated. Otherwise no other papillary tumor seen. Moderate bilateral hydronephrosis right greater than left. Metal resonance stents placed after removal of Viveros stents.   6 [place] by 22 cm on the right and 6 [place] by 24 cm on the left    Detailed Description of Procedure:   See dictated report: [id number]    Disposition to PACU and op care outpatient patient will need follow-up surveillance cystoscopy in the office in 3 months    Electronically signed by Kushal Marques MD on 4/1/2021 at 2:32 PM
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
## 2021-04-02 NOTE — OP NOTE
[facility] [address], [address] [facility]                                OPERATIVE REPORT    PATIENT NAME: Stevie Choi                   :        1950  MED REC NO:   [id number]                              ROOM:  ACCOUNT NO:   [de-identified]                           ADMIT DATE: 2021  PROVIDER:     Lieutenant Dancer, MD    DATE OF PROCEDURE:  2021    PREOPERATIVE DIAGNOSES:  1.  Bilateral ureteral obstruction from extrinsic compression from  radiation therapy from colorectal cancer. 2.  History of bladder cancer. POSTOPERATIVE DIAGNOSES:  1.  Bilateral ureteral obstruction from extrinsic compression from  radiation therapy from colorectal cancer. 2.  History of bladder cancer. 3.  Recurrent bladder cancer, anterior bladder neck. ANESTHESIA:  General anesthetic. ATTENDING SURGEON:  Lieutenant Dancer, MD    HISTORY:  The patient is a 61-year-old gentleman who has bilateral  ureteral obstruction secondary to recurrent colorectal cancer and pelvic  radiation. This is managed with chronic indwelling bilateral ureteral  stents. He is now due routine stent changes. His last stent change was  done on 2020. He typically uses a 6 [employer] [place] x 22 cm stent and we  have been doing this every 3 months in conjunction with surveillance for  bladder cancer, but now he requests to have the stent changes less  frequently and would like to switch to metal Resonance stents, so they  could be done every 6 months instead of every 3 months. He will undergo  surveillance for superficial bladder cancer that was diagnosed  approximately 20 months ago. His last recurrence was on 2020. His last biopsy was done on 10/15/2020 that showed benign urothelium and  granulomatous inflammatory changes. He had a high-grade Ta disease, so  he did have induction BCG completed in 2020.   His last upper tract  study was CT scan of the abdomen and pelvis graspers to grasp the right stent. This was  brought out the meatus. The guidewire was advanced under fluoroscopic  guidance through the stent and up into the right renal pelvis. The  sheath and introducer for the metal Resonance stent was then advanced up  into the renal pelvis. I injected contrast to opacify the renal pelvis. I then removed the introducer leaving the sheath in place and there was  maroon blood-tinged urine draining from the right renal pelvis. We  selected a 6 x 22 stent on the right. This was placed inside the sheath  and with the introducer, this was advanced until the stent was curled  proximally in the renal pelvis. When I backed out the introducer  leaving the stent up in place, the stent inadvertently got pulled back  down to the ureter, so I had to look back in with a cystoscope, remove  the stent with alligator graspers and then start over. I then looked  back in into the right ureter, this was intubated with a guidewire, the  guidewire was then advanced and I was able to get the guidewire to go up  on the right side without difficulty. I then removed the scope leaving  the guidewire in place. Again, the introducer with sheath was inserted  over the wire and this was advanced and positioned in the proximal renal  pelvis in good position. The introducer was removed leaving the sheath  in place. The stent was then placed in the sheath. I then carefully  advanced the sheath out as I gave pressure on the stent monitoring  fluoroscopically to make sure the stent did not migrate distally again. This was done without migration. The stent remained curled in good  position in the right renal pelvis. The stent was positioned and curled  in the bladder in good position. I did turn my attention to the left side. I looked in cystoscopically. I confirmed that the curl on the right was in good position and the curl  could be seen in the bladder.   The stent was then grasped with alligator  graspers on the left side and this was extracted. I placed a 0.035  sensor tip guidewire through the stent and this was advanced under  fluoroscopic guidance up into the renal pelvis and curled. I did pass  the sheath and introducer over the wire for the metal Resonance stent  and this was advanced up into the left renal pelvis. Contrast was  injected to opacify the renal pelvis. The introducer was removed. The  radiographic marker at the end of the sheath was positioned in the mid  renal pelvis. A 6 x 24 cm stent was advanced. This was because a 22  was not available in stock. The 24 was advanced and this curled in the  upper pole calyx in good position and as I noticed the sheath, the end  of the stent curled somewhat in the prostatic urethra. So, I looked in  back with the cystoscope and used the alligator graspers to push the  stent in further such that the stent was curled in the bladder. There  was still a little bit of the stent pushing up against the bladder neck. At this point, the procedure was terminated, the bladder was emptied and  the scope was removed. He was awakened from anesthetic and taken to the  recovery room in stable condition. The bladder biopsy was sent for  pathologic examination. Estimated blood loss was 0 mL. He will follow  up in 3 months for routine surveillance cystoscopy. He will need his  next stent change in approximately 6 months.         Maria Del Carmen Angeles MD    D: 04/01/2021 15:44:50      T: 04/01/2021 23:46:35     [email or web address]  Job#: [id number]     Doc#: [id number]    CC:

## 2021-04-04 DIAGNOSIS — D63.8 ANEMIA OF CHRONIC DISEASE: ICD-10-CM

## 2021-04-05 RX ORDER — FERROUS SULFATE 325(65) MG
TABLET ORAL
Qty: 180 TABLET | Refills: 1 | Status: SHIPPED | OUTPATIENT
Start: 2021-04-05 | End: 2021-01-01

## 2021-04-09 ENCOUNTER — TELEPHONE (OUTPATIENT)
Dept: UROLOGY | Age: 71
End: 2021-04-09

## 2021-04-09 NOTE — TELEPHONE ENCOUNTER
Patient was called with his pathology report.   He was instructed to keep his follow-up for surveillance cystoscopy as scheduled

## 2021-04-12 NOTE — PROGRESS NOTES
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 Electronically signed by Alberto Rice RN on 4/14/2021 at 5:37 PM CDT. I, Dr Carlos Montesinos, personally performed the services described in this documentation as scribed by Alberto Rice RN in my presence and is both accurate and complete. I have seen, examined and reviewed this patient medication list, appropriate labs and imaging studies. I reviewed relevant medical records and others physicians notes. I discussed the plans of care with the patient. I answered all the questions to the patients satisfaction. I have also reviewed the chief complaint (CC) and part of the history (History of Present Illness (HPI), Past Family Social History [facility]), or Review of Systems (ROS) and made changes when appropriated.        (Please note that portions of this note were completed with a voice recognition program. Efforts were made to edit the dictations but occasionally words are mis-transcribed.)

## 2021-04-14 ENCOUNTER — OFFICE VISIT (OUTPATIENT)
Dept: HEMATOLOGY | Age: 71
End: 2021-04-14
Payer: MEDICARE

## 2021-04-14 ENCOUNTER — HOSPITAL ENCOUNTER (OUTPATIENT)
Dept: INFUSION THERAPY | Age: 71
Setting detail: INFUSION SERIES
Discharge: HOME | End: 2021-04-14
Payer: MEDICARE

## 2021-04-14 ENCOUNTER — HOSPITAL ENCOUNTER (OUTPATIENT)
Dept: INFUSION THERAPY | Age: 71
Discharge: HOME | End: 2021-04-14
Payer: MEDICARE

## 2021-04-14 VITALS
DIASTOLIC BLOOD PRESSURE: 68 MMHG | SYSTOLIC BLOOD PRESSURE: 122 MMHG | OXYGEN SATURATION: 94 % | BODY MASS INDEX: 27.32 KG/M2 | HEART RATE: 75 BPM | TEMPERATURE: 96.9 F | WEIGHT: 164 LBS | HEIGHT: 65 IN

## 2021-04-14 VITALS
DIASTOLIC BLOOD PRESSURE: 60 MMHG | TEMPERATURE: 98.5 F | HEART RATE: 70 BPM | SYSTOLIC BLOOD PRESSURE: 115 MMHG | RESPIRATION RATE: 18 BRPM

## 2021-04-14 DIAGNOSIS — Z71.89 CARE PLAN DISCUSSED WITH PATIENT: ICD-10-CM

## 2021-04-14 DIAGNOSIS — C79.9 METASTASIS FROM COLON CANCER (HCC): ICD-10-CM

## 2021-04-14 DIAGNOSIS — C67.9 MALIGNANT NEOPLASM OF URINARY BLADDER, UNSPECIFIED SITE (HCC): ICD-10-CM

## 2021-04-14 DIAGNOSIS — M81.8 OSTEOPOROSIS DUE TO AROMATASE INHIBITOR: ICD-10-CM

## 2021-04-14 DIAGNOSIS — Z51.11 CHEMOTHERAPY MANAGEMENT, ENCOUNTER FOR: ICD-10-CM

## 2021-04-14 DIAGNOSIS — C19 COLORECTAL CANCER (HCC): Primary | ICD-10-CM

## 2021-04-14 DIAGNOSIS — Z45.2 ENCOUNTER FOR CENTRAL LINE CARE: ICD-10-CM

## 2021-04-14 DIAGNOSIS — T38.6X5A OSTEOPOROSIS DUE TO AROMATASE INHIBITOR: ICD-10-CM

## 2021-04-14 DIAGNOSIS — T45.1X5D ADVERSE EFFECT OF CHEMOTHERAPY, SUBSEQUENT ENCOUNTER: ICD-10-CM

## 2021-04-14 DIAGNOSIS — Z51.12 ENCOUNTER FOR ANTINEOPLASTIC IMMUNOTHERAPY: ICD-10-CM

## 2021-04-14 DIAGNOSIS — C79.9 METASTASIS FROM COLON CANCER (HCC): Primary | ICD-10-CM

## 2021-04-14 DIAGNOSIS — C18.9 METASTASIS FROM COLON CANCER (HCC): Primary | ICD-10-CM

## 2021-04-14 DIAGNOSIS — C18.9 METASTASIS FROM COLON CANCER (HCC): ICD-10-CM

## 2021-04-14 LAB
ALBUMIN SERPL-MCNC: 3.9 G/DL (ref 3.5–5.2)
ALP BLD-CCNC: 80 U/L (ref 40–130)
ALT SERPL-CCNC: 9 U/L (ref 21–72)
ANION GAP SERPL CALCULATED.3IONS-SCNC: 10 MMOL/L (ref 7–19)
AST SERPL-CCNC: 25 U/L (ref 17–59)
BASOPHILS ABSOLUTE: 0.1 K/UL (ref 0.01–0.08)
BASOPHILS RELATIVE PERCENT: 1.7 % (ref 0.1–1.2)
BILIRUB SERPL-MCNC: 0.5 MG/DL (ref 0.2–1.3)
BUN BLDV-MCNC: 9 MG/DL (ref 9–20)
CALCIUM SERPL-MCNC: 8.7 MG/DL (ref 8.4–10.2)
CEA: 1 NG/ML (ref 0–3)
CHLORIDE BLD-SCNC: 102 MMOL/L (ref 98–111)
CO2: 26 MMOL/L (ref 22–29)
CREAT SERPL-MCNC: 1.4 MG/DL (ref 0.6–1.2)
EOSINOPHILS ABSOLUTE: 0.32 K/UL (ref 0.04–0.54)
EOSINOPHILS RELATIVE PERCENT: 5.5 % (ref 0.7–7)
GFR NON-AFRICAN AMERICAN: 50
GLOBULIN: 2.5 G/DL
GLUCOSE BLD-MCNC: 115 MG/DL (ref 74–106)
HCT VFR BLD CALC: 35.4 % (ref 40.1–51)
HEMOGLOBIN: 11.2 G/DL (ref 13.7–17.5)
LYMPHOCYTES ABSOLUTE: 0.7 K/UL (ref 1.18–3.74)
LYMPHOCYTES RELATIVE PERCENT: 12 % (ref 19.3–53.1)
MCH RBC QN AUTO: 29.6 PG (ref 25.7–32.2)
MCHC RBC AUTO-ENTMCNC: 31.6 G/DL (ref 32.3–36.5)
MCV RBC AUTO: 93.7 FL (ref 79–92.2)
MONOCYTES ABSOLUTE: 0.45 K/UL (ref 0.24–0.82)
MONOCYTES RELATIVE PERCENT: 7.7 % (ref 4.7–12.5)
NEUTROPHILS ABSOLUTE: 4.25 K/UL (ref 1.56–6.13)
NEUTROPHILS RELATIVE PERCENT: 73.1 % (ref 34–71.1)
PDW BLD-RTO: 13.5 % (ref 11.6–14.4)
PLATELET # BLD: 196 K/UL (ref 163–337)
PMV BLD AUTO: 11.2 FL (ref 7.4–10.4)
POTASSIUM SERPL-SCNC: 5 MMOL/L (ref 3.5–5.1)
RBC # BLD: 3.78 M/UL (ref 4.63–6.08)
SODIUM BLD-SCNC: 138 MMOL/L (ref 137–145)
TOTAL PROTEIN: 6.4 G/DL (ref 6.3–8.2)
WBC # BLD: 5.82 K/UL (ref 4.23–9.07)

## 2021-04-14 PROCEDURE — 80053 COMPREHEN METABOLIC PANEL: CPT

## 2021-04-14 PROCEDURE — G8417 CALC BMI ABV UP PARAM F/U: HCPCS | Performed by: INTERNAL MEDICINE

## 2021-04-14 PROCEDURE — G8427 DOCREV CUR MEDS BY ELIG CLIN: HCPCS | Performed by: INTERNAL MEDICINE

## 2021-04-14 PROCEDURE — 82378 CARCINOEMBRYONIC ANTIGEN: CPT

## 2021-04-14 PROCEDURE — G0498 CHEMO EXTEND IV INFUS W/PUMP: HCPCS

## 2021-04-14 PROCEDURE — 6360000002 HC RX W HCPCS: Performed by: INTERNAL MEDICINE

## 2021-04-14 PROCEDURE — 4040F PNEUMOC VAC/ADMIN/RCVD: CPT | Performed by: INTERNAL MEDICINE

## 2021-04-14 PROCEDURE — 2580000003 HC RX 258: Performed by: INTERNAL MEDICINE

## 2021-04-14 PROCEDURE — 96413 CHEMO IV INFUSION 1 HR: CPT

## 2021-04-14 PROCEDURE — 96523 IRRIG DRUG DELIVERY DEVICE: CPT

## 2021-04-14 PROCEDURE — 85025 COMPLETE CBC W/AUTO DIFF WBC: CPT

## 2021-04-14 PROCEDURE — 99214 OFFICE O/P EST MOD 30 MIN: CPT | Performed by: INTERNAL MEDICINE

## 2021-04-14 PROCEDURE — 96366 THER/PROPH/DIAG IV INF ADDON: CPT

## 2021-04-14 PROCEDURE — 1123F ACP DISCUSS/DSCN MKR DOCD: CPT | Performed by: INTERNAL MEDICINE

## 2021-04-14 PROCEDURE — 1036F TOBACCO NON-USER: CPT | Performed by: INTERNAL MEDICINE

## 2021-04-14 PROCEDURE — 6370000000 HC RX 637 (ALT 250 FOR IP): Performed by: INTERNAL MEDICINE

## 2021-04-14 PROCEDURE — 3017F COLORECTAL CA SCREEN DOC REV: CPT | Performed by: INTERNAL MEDICINE

## 2021-04-14 RX ORDER — DEXAMETHASONE SODIUM PHOSPHATE 10 MG/ML
10 INJECTION, SOLUTION INTRAMUSCULAR; INTRAVENOUS ONCE
Status: COMPLETED | OUTPATIENT
Start: 2021-04-14 | End: 2021-04-14

## 2021-04-14 RX ORDER — SODIUM CHLORIDE 0.9 % (FLUSH) 0.9 %
10 SYRINGE (ML) INJECTION PRN
Status: CANCELLED | OUTPATIENT
Start: 2021-04-14

## 2021-04-14 RX ORDER — DIPHENHYDRAMINE HYDROCHLORIDE 50 MG/ML
50 INJECTION INTRAMUSCULAR; INTRAVENOUS ONCE
Status: CANCELLED
Start: 2021-04-14 | End: 2021-04-14

## 2021-04-14 RX ORDER — SODIUM CHLORIDE 0.9 % (FLUSH) 0.9 %
5 SYRINGE (ML) INJECTION PRN
Status: CANCELLED | OUTPATIENT
Start: 2021-04-14

## 2021-04-14 RX ORDER — SODIUM CHLORIDE 9 MG/ML
INJECTION, SOLUTION INTRAVENOUS ONCE
Status: COMPLETED | OUTPATIENT
Start: 2021-04-14 | End: 2021-04-14

## 2021-04-14 RX ORDER — SODIUM CHLORIDE 9 MG/ML
INJECTION, SOLUTION INTRAVENOUS ONCE
Status: CANCELLED | OUTPATIENT
Start: 2021-04-14 | End: 2021-04-14

## 2021-04-14 RX ORDER — EPINEPHRINE 1 MG/ML
0.3 INJECTION, SOLUTION, CONCENTRATE INTRAVENOUS PRN
Status: CANCELLED | OUTPATIENT
Start: 2021-04-14

## 2021-04-14 RX ORDER — DIPHENHYDRAMINE HYDROCHLORIDE 50 MG/ML
50 INJECTION INTRAMUSCULAR; INTRAVENOUS ONCE
Status: COMPLETED | OUTPATIENT
Start: 2021-04-14 | End: 2021-04-14

## 2021-04-14 RX ORDER — HEPARIN SODIUM (PORCINE) LOCK FLUSH IV SOLN 100 UNIT/ML 100 UNIT/ML
500 SOLUTION INTRAVENOUS PRN
Status: DISCONTINUED | OUTPATIENT
Start: 2021-04-14 | End: 2021-04-15 | Stop reason: HOSPADM

## 2021-04-14 RX ORDER — DIPHENHYDRAMINE HYDROCHLORIDE 50 MG/ML
50 INJECTION INTRAMUSCULAR; INTRAVENOUS ONCE
Status: CANCELLED | OUTPATIENT
Start: 2021-04-14 | End: 2021-04-14

## 2021-04-14 RX ORDER — HEPARIN SODIUM (PORCINE) LOCK FLUSH IV SOLN 100 UNIT/ML 100 UNIT/ML
500 SOLUTION INTRAVENOUS PRN
Status: CANCELLED | OUTPATIENT
Start: 2021-04-14

## 2021-04-14 RX ORDER — SODIUM CHLORIDE 9 MG/ML
INJECTION, SOLUTION INTRAVENOUS CONTINUOUS
Status: CANCELLED | OUTPATIENT
Start: 2021-04-14

## 2021-04-14 RX ORDER — ACETAMINOPHEN 325 MG/1
1000 TABLET ORAL ONCE
Status: CANCELLED
Start: 2021-04-14 | End: 2021-04-14

## 2021-04-14 RX ORDER — OMEPRAZOLE 20 MG/1
20 CAPSULE, DELAYED RELEASE ORAL DAILY
COMMUNITY
Start: 2021-02-23 | End: 2021-01-01 | Stop reason: SDUPTHER

## 2021-04-14 RX ORDER — ACETAMINOPHEN 500 MG
1000 TABLET ORAL ONCE
Status: COMPLETED | OUTPATIENT
Start: 2021-04-14 | End: 2021-04-14

## 2021-04-14 RX ORDER — METHYLPREDNISOLONE SODIUM SUCCINATE 125 MG/2ML
125 INJECTION, POWDER, LYOPHILIZED, FOR SOLUTION INTRAMUSCULAR; INTRAVENOUS ONCE
Status: CANCELLED | OUTPATIENT
Start: 2021-04-14 | End: 2021-04-14

## 2021-04-14 RX ADMIN — ACETAMINOPHEN 1000 MG: 500 TABLET ORAL at 12:22

## 2021-04-14 RX ADMIN — DEXAMETHASONE SODIUM PHOSPHATE 10 MG: 10 INJECTION, SOLUTION INTRAMUSCULAR; INTRAVENOUS at 12:22

## 2021-04-14 RX ADMIN — DIPHENHYDRAMINE HYDROCHLORIDE 50 MG: 50 INJECTION, SOLUTION INTRAMUSCULAR; INTRAVENOUS at 12:22

## 2021-04-14 RX ADMIN — DEXTROSE MONOHYDRATE 700 MG: 50 INJECTION, SOLUTION INTRAVENOUS at 14:05

## 2021-04-14 RX ADMIN — ONDANSETRON 16 MG: 2 INJECTION INTRAMUSCULAR; INTRAVENOUS at 12:08

## 2021-04-14 RX ADMIN — PANITUMUMAB 430 MG: 400 SOLUTION INTRAVENOUS at 12:56

## 2021-04-14 RX ADMIN — FLUOROURACIL 4350 MG: 50 INJECTION, SOLUTION INTRAVENOUS at 15:23

## 2021-04-14 RX ADMIN — SODIUM CHLORIDE: 9 INJECTION, SOLUTION INTRAVENOUS at 12:08

## 2021-04-16 ENCOUNTER — HOSPITAL ENCOUNTER (OUTPATIENT)
Dept: INFUSION THERAPY | Age: 71
Setting detail: INFUSION SERIES
Discharge: HOME | End: 2021-04-16
Payer: MEDICARE

## 2021-04-16 PROCEDURE — 6360000002 HC RX W HCPCS: Performed by: NURSE PRACTITIONER

## 2021-04-16 PROCEDURE — 96523 IRRIG DRUG DELIVERY DEVICE: CPT

## 2021-04-16 RX ORDER — HEPARIN SODIUM (PORCINE) LOCK FLUSH IV SOLN 100 UNIT/ML 100 UNIT/ML
500 SOLUTION INTRAVENOUS PRN
Status: DISCONTINUED | OUTPATIENT
Start: 2021-04-16 | End: 2021-04-16

## 2021-04-16 RX ORDER — HEPARIN SODIUM (PORCINE) LOCK FLUSH IV SOLN 100 UNIT/ML 100 UNIT/ML
500 SOLUTION INTRAVENOUS ONCE
Status: COMPLETED | OUTPATIENT
Start: 2021-04-16 | End: 2021-04-16

## 2021-04-16 RX ORDER — SODIUM CHLORIDE 0.9 % (FLUSH) 0.9 %
10 SYRINGE (ML) INJECTION PRN
Status: CANCELLED | OUTPATIENT
Start: 2021-04-16

## 2021-04-16 RX ORDER — HEPARIN SODIUM (PORCINE) LOCK FLUSH IV SOLN 100 UNIT/ML 100 UNIT/ML
500 SOLUTION INTRAVENOUS PRN
Status: CANCELLED | OUTPATIENT
Start: 2021-04-16

## 2021-04-16 RX ORDER — SODIUM CHLORIDE 0.9 % (FLUSH) 0.9 %
20 SYRINGE (ML) INJECTION PRN
Status: DISCONTINUED | OUTPATIENT
Start: 2021-04-16 | End: 2021-04-16

## 2021-04-16 RX ORDER — SODIUM CHLORIDE 0.9 % (FLUSH) 0.9 %
20 SYRINGE (ML) INJECTION PRN
Status: CANCELLED | OUTPATIENT
Start: 2021-04-16

## 2021-04-16 RX ADMIN — HEPARIN 500 UNITS: 100 SYRINGE at 15:14

## 2021-04-22 ENCOUNTER — OFFICE VISIT (OUTPATIENT)
Dept: NEUROSURGERY | Facility: CLINIC | Age: 71
End: 2021-04-22

## 2021-04-22 VITALS — BODY MASS INDEX: 27.29 KG/M2 | WEIGHT: 163.8 LBS | HEIGHT: 65 IN

## 2021-04-22 DIAGNOSIS — G89.4 CHRONIC PAIN SYNDROME: Primary | ICD-10-CM

## 2021-04-22 DIAGNOSIS — Z78.9 NON-SMOKER: ICD-10-CM

## 2021-04-22 PROCEDURE — 99213 OFFICE O/P EST LOW 20 MIN: CPT | Performed by: NEUROLOGICAL SURGERY

## 2021-04-22 RX ORDER — IBANDRONATE SODIUM 150 MG/1
150 TABLET, FILM COATED ORAL
COMMUNITY
Start: 2020-12-16

## 2021-04-22 RX ORDER — ONDANSETRON 4 MG/1
8 TABLET, FILM COATED ORAL EVERY 8 HOURS PRN
COMMUNITY
Start: 2020-11-18

## 2021-04-22 RX ORDER — FERROUS SULFATE 325(65) MG
TABLET ORAL
COMMUNITY
Start: 2021-04-05

## 2021-04-22 NOTE — H&P
SUBJECTIVE:  Patient ID: Jelani Angel is a 71 y.o. male is here today for follow-up.    Chief Complaint: Back pain  No chief complaint on file.      HPI  71-year-old gentleman known to the service who underwent a two-level kyphoplasty last year.  He did very well after the kyphoplasty but he has a lot of chronic back pain issues has had several lumbar spine surgeries.  He has been in pain management for several years.  He underwent intrathecal test dose with Dr. Franklin and had a very good result he is here to get an implantable pump device.  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    The following portions of the patient's history were reviewed and updated as appropriate: allergies, current medications, past family history, past medical history, past social history, past surgical history and problem list.    OBJECTIVE:    Review of Systems   Musculoskeletal: Positive for arthralgias and back pain.   All other systems reviewed and are negative.         Physical Exam  Eyes:      Extraocular Movements: EOM normal.      Pupils: Pupils are equal, round, and reactive to light.   Neurological:      Mental Status: He is oriented to person, place, and time.      Coordination: Finger-Nose-Finger Test normal.      Gait: Gait is intact.      Deep Tendon Reflexes: Strength normal.   Psychiatric:         Speech: Speech normal.         Neurologic Exam     Mental Status   Oriented to person, place, and time.   Attention: normal.   Speech: speech is normal   Level of consciousness: alert  Knowledge: good.     Cranial Nerves     CN II   Visual fields full to confrontation.     CN III, IV, VI   Pupils are equal, round, and reactive to light.  Extraocular motions are normal.     CN V   Facial sensation intact.     CN VII   Facial expression full, symmetric.     CN VIII   CN VIII normal.     CN IX, X   CN IX normal.   CN X normal.     CN XI   CN XI normal.     CN XII   CN XII normal.     Motor Exam   Muscle bulk:  normal  Overall muscle tone: normal  Right arm pronator drift: absent  Left arm pronator drift: absent    Strength   Strength 5/5 throughout.     Sensory Exam   Light touch normal.   Pinprick normal.     Gait, Coordination, and Reflexes     Gait  Gait: normal    Coordination   Finger to nose coordination: normal    Tremor   Resting tremor: absent  Intention tremor: absent  Action tremor: absent    Reflexes   Reflexes 2+ except as noted.       Independent Review of Radiographic Studies:       ASSESSMENT/PLAN:  The patient has a long history of chronic back pain and several surgeries.  I think it be very reasonable to implant a permanent intrathecal pain pump with him risks and benefits were discussed at length which included but were not limited to infection, bleeding, paralysis, spinal fluid leak, bowel bladder incontinence, stroke, coma, and death.  Patient knowledge understand this.  His questions and concerns were addressed.      No diagnosis found.    The patient's There is no height or weight on file to calculate BMI.. BMI is within normal parameters. No follow-up required.    No follow-ups on file.      Adrian Velasquez MD

## 2021-04-22 NOTE — PATIENT INSTRUCTIONS
"PATIENT TO CONTINUE TO FOLLOW UP WITH HIS PRIMARY CARE PROVIDER FOR YEARLY PHYSICAL EXAMS TO ENSURE COMPLETE HEALTH MAINTENANCE        BMI for Adults  What is BMI?  Body mass index (BMI) is a number that is calculated from a person's weight and height. BMI can help estimate how much of a person's weight is composed of fat. BMI does not measure body fat directly. Rather, it is an alternative to procedures that directly measure body fat, which can be difficult and expensive.  BMI can help identify people who may be at higher risk for certain medical problems.  What are BMI measurements used for?  BMI is used as a screening tool to identify possible weight problems. It helps determine whether a person is obese, overweight, a healthy weight, or underweight.  BMI is useful for:  · Identifying a weight problem that may be related to a medical condition or may increase the risk for medical problems.  · Promoting changes, such as changes in diet and exercise, to help reach a healthy weight. BMI screening can be repeated to see if these changes are working.  How is BMI calculated?  BMI involves measuring your weight in relation to your height. Both height and weight are measured, and the BMI is calculated from those numbers. This can be done either in English ([place] or metric measurements. Note that charts and online BMI calculators are available to help you find your BMI quickly and easily without having to do these calculations yourself.  To calculate your BMI in English ([place] measurements:    1. Measure your weight in pounds (lb).  2. Multiply the number of pounds by 703.  ? For example, for a person who weighs 180 lb, multiply that number by 703, which equals 126,540.  3. Measure your height in inches. Then multiply that number by itself to get a measurement called \"inches squared.\"  ? For example, for a person who is 70 inches tall, the \"inches squared\" measurement is 70 inches x 70 inches, which equals 4,900 inches " "squared.  4. Divide the total from step 2 (number of lb x 703) by the total from step 3 (inches squared): 126,540 ÷ 4,900 = 25.8. This is your BMI.  To calculate your BMI in metric measurements:  1. Measure your weight in kilograms (kg).  2. Measure your height in meters (m). Then multiply that number by itself to get a measurement called \"meters squared.\"  ? For example, for a person who is 1.75 m tall, the \"meters squared\" measurement is 1.75 m x 1.75 m, which is equal to 3.1 meters squared.  3. Divide the number of kilograms (your weight) by the meters squared number. In this example: 70 ÷ 3.1 = 22.6. This is your BMI.  What do the results mean?  BMI charts are used to identify whether you are underweight, normal weight, overweight, or obese. The following guidelines will be used:  · Underweight: BMI less than 18.5.  · Normal weight: BMI between 18.5 and 24.9.  · Overweight: BMI between 25 and 29.9.  · Obese: BMI of 30 or above.  Keep these notes in mind:  · Weight includes both fat and muscle, so someone with a muscular build, such as an athlete, may have a BMI that is higher than 24.9. In cases like these, BMI is not an accurate measure of body fat.  · To determine if excess body fat is the cause of a BMI of 25 or higher, further assessments may need to be done by a health care provider.  · BMI is usually interpreted in the same way for men and women.  Where to find more information  For more information about BMI, including tools to quickly calculate your BMI, go to these websites:  · Centers for Disease Control and Prevention: www.cdc.gov  · American Heart Association: www.heart.org  · National Heart, Lung, and Blood [place]: www.nhlbi.nih.gov  Summary  · Body mass index (BMI) is a number that is calculated from a person's weight and height.  · BMI may help estimate how much of a person's weight is composed of fat. BMI can help identify those who may be at higher risk for certain medical problems.  · BMI " "can be measured using English measurements or metric measurements.  · BMI charts are used to identify whether you are underweight, normal weight, overweight, or obese.  This information is not intended to replace advice given to you by your health care provider. Make sure you discuss any questions you have with your health care provider.  Document Revised: 09/09/2020 Document Reviewed: 07/17/2020  [employer] Patient Education © 2021 [employer]      https://www.nhlbi.nih.gov/files/docs/public/heart/dash_brief.pdf\">   DASH Eating Plan  DASH stands for Dietary Approaches to Stop Hypertension. The DASH eating plan is a healthy eating plan that has been shown to:  · Reduce high blood pressure (hypertension).  · Reduce your risk for type 2 diabetes, heart disease, and stroke.  · Help with weight loss.  What are tips for following this plan?  Reading food labels  · Check food labels for the amount of salt (sodium) per serving. Choose foods with less than 5 percent of the Daily Value of sodium. Generally, foods with less than 300 milligrams (mg) of sodium per serving fit into this eating plan.  · To find whole grains, look for the word \"whole\" as the first word in the ingredient list.  Shopping  · Buy products labeled as \"low-sodium\" or \"no salt added.\"  · Buy fresh foods. Avoid canned foods and pre-made or frozen meals.  Cooking  · Avoid adding salt when cooking. Use salt-free seasonings or herbs instead of table salt or sea salt. Check with your health care provider or pharmacist before using salt substitutes.  · Do not parker foods. Cook foods using healthy methods such as baking, boiling, grilling, roasting, and broiling instead.  · Cook with heart-healthy oils, such as olive, canola, avocado, soybean, or sunflower oil.  Meal planning    · Eat a balanced diet that includes:  ? 4 or more servings of fruits and 4 or more servings of vegetables each day. Try to fill one-half of your plate with fruits and vegetables.  ? 6-8 " servings of whole grains each day.  ? Less than 6 oz (170 g) of lean meat, poultry, or fish each day. A 3-oz (85-g) serving of meat is about the same size as a deck of cards. One egg equals 1 oz (28 g).  ? 2-3 servings of low-fat dairy each day. One serving is 1 cup (237 mL).  ? 1 serving of nuts, seeds, or beans 5 times each week.  ? 2-3 servings of heart-healthy fats. Healthy fats called omega-3 fatty acids are found in foods such as walnuts, flaxseeds, fortified milks, and eggs. These fats are also found in cold-water fish, such as sardines, salmon, and mackerel.  · Limit how much you eat of:  ? Canned or prepackaged foods.  ? Food that is high in trans fat, such as some fried foods.  ? Food that is high in saturated fat, such as fatty meat.  ? Desserts and other sweets, sugary drinks, and other foods with added sugar.  ? Full-fat dairy products.  · Do not salt foods before eating.  · Do not eat more than 4 egg yolks a week.  · Try to eat at least 2 vegetarian meals a week.  · Eat more home-cooked food and less restaurant, buffet, and fast food.  Lifestyle  · When eating at a restaurant, ask that your food be prepared with less salt or no salt, if possible.  · If you drink alcohol:  ? Limit how much you use to:  § 0-1 drink a day for women who are not pregnant.  § 0-2 drinks a day for men.  ? Be aware of how much alcohol is in your drink. In the [place] one drink equals one 12 oz bottle of beer (355 mL), one 5 oz glass of wine (148 mL), or one 1½ oz glass of hard liquor (44 mL).  General information  · Avoid eating more than 2,300 mg of salt a day. If you have hypertension, you may need to reduce your sodium intake to 1,500 mg a day.  · Work with your health care provider to maintain a healthy body weight or to lose weight. Ask what an ideal weight is for you.  · Get at least 30 minutes of exercise that causes your heart to beat faster (aerobic exercise) most days of the week. Activities may include walking,  swimming, or biking.  · Work with your health care provider or dietitian to adjust your eating plan to your individual calorie needs.  What foods should I eat?  Fruits  All fresh, dried, or frozen fruit. Canned fruit in natural juice (without added sugar).  Vegetables  Fresh or frozen vegetables (raw, steamed, roasted, or grilled). Low-sodium or reduced-sodium tomato and vegetable juice. Low-sodium or reduced-sodium tomato sauce and tomato paste. Low-sodium or reduced-sodium canned vegetables.  Grains  Whole-grain or whole-wheat bread. Whole-grain or whole-wheat pasta. Brown rice. Oatmeal. Quinoa. Bulgur. Whole-grain and low-sodium cereals. Annel bread. Low-fat, low-sodium crackers. Whole-wheat flour tortillas.  Meats and other proteins  Skinless chicken or turkey. Ground chicken or turkey. Pork with fat trimmed off. Fish and seafood. Egg whites. Dried beans, peas, or lentils. Unsalted nuts, nut butters, and seeds. Unsalted canned beans. Lean cuts of beef with fat trimmed off. Low-sodium, lean precooked or cured meat, such as sausages or meat loaves.  Dairy  Low-fat (1%) or fat-free (skim) milk. Reduced-fat, low-fat, or fat-free cheeses. Nonfat, low-sodium ricotta or cottage cheese. Low-fat or nonfat yogurt. Low-fat, low-sodium cheese.  Fats and oils  Soft margarine without trans fats. Vegetable oil. Reduced-fat, low-fat, or light mayonnaise and salad dressings (reduced-sodium). Canola, safflower, olive, avocado, soybean, and sunflower oils. Avocado.  Seasonings and condiments  Herbs. Spices. Seasoning mixes without salt.  Other foods  Unsalted popcorn and pretzels. Fat-free sweets.  The items listed above may not be a complete list of foods and beverages you can eat. Contact a dietitian for more information.  What foods should I avoid?  Fruits  Canned fruit in a light or heavy syrup. Fried fruit. Fruit in cream or butter sauce.  Vegetables  Creamed or fried vegetables. Vegetables in a cheese sauce. Regular canned  vegetables (not low-sodium or reduced-sodium). Regular canned tomato sauce and paste (not low-sodium or reduced-sodium). Regular tomato and vegetable juice (not low-sodium or reduced-sodium). Pickles. Olives.  Grains  Baked goods made with fat, such as croissants, muffins, or some breads. Dry pasta or rice meal packs.  Meats and other proteins  Fatty cuts of meat. Ribs. Fried meat. Bernal. Bologna, salami, and other precooked or cured meats, such as sausages or meat loaves. Fat from the back of a pig (fatback). Bratwurst. Salted nuts and seeds. Canned beans with added salt. Canned or smoked fish. Whole eggs or egg yolks. Chicken or turkey with skin.  Dairy  Whole or 2% milk, cream, and half-and-half. Whole or full-fat cream cheese. Whole-fat or sweetened yogurt. Full-fat cheese. Nondairy creamers. Whipped toppings. Processed cheese and cheese spreads.  Fats and oils  Butter. Stick margarine. Lard. Shortening. Ghee. Bernal fat. Tropical oils, such as coconut, palm kernel, or palm oil.  Seasonings and condiments  Onion salt, garlic salt, seasoned salt, table salt, and sea salt. Worcestershire sauce. Tartar sauce. Barbecue sauce. Teriyaki sauce. Soy sauce, including reduced-sodium. Steak sauce. Canned and packaged gravies. Fish sauce. Oyster sauce. Cocktail sauce. Store-bought horseradish. Ketchup. Mustard. Meat flavorings and tenderizers. Bouillon cubes. Hot sauces. Pre-made or packaged marinades. Pre-made or packaged taco seasonings. Relishes. Regular salad dressings.  Other foods  Salted popcorn and pretzels.  The items listed above may not be a complete list of foods and beverages you should avoid. Contact a dietitian for more information.  Where to find more information  · National Heart, Lung, and Blood [place]: www.nhlbi.nih.gov  · American Heart Association: www.heart.org  · Academy of Nutrition and Dietetics: www.eatright.org  · National Kidney Foundation: www.kidney.org  Summary  · The DASH eating plan is a  healthy eating plan that has been shown to reduce high blood pressure (hypertension). It may also reduce your risk for type 2 diabetes, heart disease, and stroke.  · When on the DASH eating plan, aim to eat more fresh fruits and vegetables, whole grains, lean proteins, low-fat dairy, and heart-healthy fats.  · With the DASH eating plan, you should limit salt (sodium) intake to 2,300 mg a day. If you have hypertension, you may need to reduce your sodium intake to 1,500 mg a day.  · Work with your health care provider or dietitian to adjust your eating plan to your individual calorie needs.  This information is not intended to replace advice given to you by your health care provider. Make sure you discuss any questions you have with your health care provider.  Document Revised: 11/20/2020 Document Reviewed: 11/20/2020  [employer] Patient Education © 2021 [employer]

## 2021-04-28 ENCOUNTER — HOSPITAL ENCOUNTER (OUTPATIENT)
Dept: INFUSION THERAPY | Age: 71
Setting detail: INFUSION SERIES
Discharge: HOME | End: 2021-04-28
Payer: MEDICARE

## 2021-04-28 VITALS
HEART RATE: 73 BPM | DIASTOLIC BLOOD PRESSURE: 65 MMHG | SYSTOLIC BLOOD PRESSURE: 117 MMHG | TEMPERATURE: 97.7 F | OXYGEN SATURATION: 97 %

## 2021-04-28 DIAGNOSIS — C19 COLORECTAL CANCER (HCC): Primary | ICD-10-CM

## 2021-04-28 LAB
ALBUMIN SERPL-MCNC: 3.7 G/DL (ref 3.5–5.2)
ALP BLD-CCNC: 108 U/L (ref 40–130)
ALT SERPL-CCNC: 6 U/L (ref 5–41)
ANION GAP SERPL CALCULATED.3IONS-SCNC: 10 MMOL/L (ref 7–19)
AST SERPL-CCNC: 14 U/L (ref 5–40)
BASOPHILS ABSOLUTE: 0.1 K/UL (ref 0–0.2)
BASOPHILS RELATIVE PERCENT: 0.9 % (ref 0–1)
BILIRUB SERPL-MCNC: 0.4 MG/DL (ref 0.2–1.2)
BUN BLDV-MCNC: 10 MG/DL (ref 8–23)
CALCIUM SERPL-MCNC: 8.2 MG/DL (ref 8.8–10.2)
CHLORIDE BLD-SCNC: 103 MMOL/L (ref 98–111)
CO2: 23 MMOL/L (ref 22–29)
CREAT SERPL-MCNC: 1.3 MG/DL (ref 0.5–1.2)
EOSINOPHILS ABSOLUTE: 0.3 K/UL (ref 0–0.6)
EOSINOPHILS RELATIVE PERCENT: 4.7 % (ref 0–5)
GFR AFRICAN AMERICAN: >59
GFR NON-AFRICAN AMERICAN: 55
GLUCOSE BLD-MCNC: 83 MG/DL (ref 74–109)
HCT VFR BLD CALC: 31.8 % (ref 42–52)
HEMOGLOBIN: 10.5 G/DL (ref 14–18)
IMMATURE GRANULOCYTES #: 0 K/UL
LYMPHOCYTES ABSOLUTE: 0.7 K/UL (ref 1.1–4.5)
LYMPHOCYTES RELATIVE PERCENT: 9.9 % (ref 20–40)
MCH RBC QN AUTO: 29.4 PG (ref 27–31)
MCHC RBC AUTO-ENTMCNC: 33 G/DL (ref 33–37)
MCV RBC AUTO: 89.1 FL (ref 80–94)
MONOCYTES ABSOLUTE: 0.5 K/UL (ref 0–0.9)
MONOCYTES RELATIVE PERCENT: 7.6 % (ref 0–10)
NEUTROPHILS ABSOLUTE: 5 K/UL (ref 1.5–7.5)
NEUTROPHILS RELATIVE PERCENT: 76.4 % (ref 50–65)
PDW BLD-RTO: 13.6 % (ref 11.5–14.5)
PLATELET # BLD: 213 K/UL (ref 130–400)
PMV BLD AUTO: 11.1 FL (ref 9.4–12.4)
POTASSIUM SERPL-SCNC: 4.4 MMOL/L (ref 3.5–5)
RBC # BLD: 3.57 M/UL (ref 4.7–6.1)
SODIUM BLD-SCNC: 136 MMOL/L (ref 136–145)
TOTAL PROTEIN: 5.9 G/DL (ref 6.6–8.7)
WBC # BLD: 6.5 K/UL (ref 4.8–10.8)

## 2021-04-28 PROCEDURE — 96415 CHEMO IV INFUSION ADDL HR: CPT

## 2021-04-28 PROCEDURE — 85025 COMPLETE CBC W/AUTO DIFF WBC: CPT

## 2021-04-28 PROCEDURE — 36591 DRAW BLOOD OFF VENOUS DEVICE: CPT

## 2021-04-28 PROCEDURE — 96413 CHEMO IV INFUSION 1 HR: CPT

## 2021-04-28 PROCEDURE — 6360000002 HC RX W HCPCS: Performed by: INTERNAL MEDICINE

## 2021-04-28 PROCEDURE — 2580000003 HC RX 258: Performed by: INTERNAL MEDICINE

## 2021-04-28 PROCEDURE — 80053 COMPREHEN METABOLIC PANEL: CPT

## 2021-04-28 PROCEDURE — 6370000000 HC RX 637 (ALT 250 FOR IP): Performed by: INTERNAL MEDICINE

## 2021-04-28 RX ORDER — ACETAMINOPHEN 325 MG/1
1000 TABLET ORAL ONCE
Status: CANCELLED
Start: 2021-04-28 | End: 2021-04-28

## 2021-04-28 RX ORDER — METHYLPREDNISOLONE SODIUM SUCCINATE 125 MG/2ML
125 INJECTION, POWDER, LYOPHILIZED, FOR SOLUTION INTRAMUSCULAR; INTRAVENOUS ONCE
Status: CANCELLED | OUTPATIENT
Start: 2021-04-28 | End: 2021-04-28

## 2021-04-28 RX ORDER — SODIUM CHLORIDE 9 MG/ML
INJECTION, SOLUTION INTRAVENOUS CONTINUOUS
Status: CANCELLED | OUTPATIENT
Start: 2021-04-28

## 2021-04-28 RX ORDER — EPINEPHRINE 1 MG/ML
0.3 INJECTION, SOLUTION, CONCENTRATE INTRAVENOUS PRN
Status: CANCELLED | OUTPATIENT
Start: 2021-04-28

## 2021-04-28 RX ORDER — DIPHENHYDRAMINE HYDROCHLORIDE 50 MG/ML
50 INJECTION INTRAMUSCULAR; INTRAVENOUS ONCE
Status: COMPLETED | OUTPATIENT
Start: 2021-04-28 | End: 2021-04-28

## 2021-04-28 RX ORDER — SODIUM CHLORIDE 9 MG/ML
INJECTION, SOLUTION INTRAVENOUS ONCE
Status: CANCELLED | OUTPATIENT
Start: 2021-04-28 | End: 2021-04-28

## 2021-04-28 RX ORDER — ACETAMINOPHEN 500 MG
1000 TABLET ORAL ONCE
Status: COMPLETED | OUTPATIENT
Start: 2021-04-28 | End: 2021-04-28

## 2021-04-28 RX ORDER — DEXAMETHASONE SODIUM PHOSPHATE 10 MG/ML
10 INJECTION, SOLUTION INTRAMUSCULAR; INTRAVENOUS ONCE
Status: COMPLETED | OUTPATIENT
Start: 2021-04-28 | End: 2021-04-28

## 2021-04-28 RX ORDER — SODIUM CHLORIDE 0.9 % (FLUSH) 0.9 %
5 SYRINGE (ML) INJECTION PRN
Status: CANCELLED | OUTPATIENT
Start: 2021-04-28

## 2021-04-28 RX ORDER — HEPARIN SODIUM (PORCINE) LOCK FLUSH IV SOLN 100 UNIT/ML 100 UNIT/ML
500 SOLUTION INTRAVENOUS PRN
Status: CANCELLED | OUTPATIENT
Start: 2021-04-28

## 2021-04-28 RX ORDER — SODIUM CHLORIDE 0.9 % (FLUSH) 0.9 %
10 SYRINGE (ML) INJECTION PRN
Status: CANCELLED | OUTPATIENT
Start: 2021-04-28

## 2021-04-28 RX ORDER — DIPHENHYDRAMINE HYDROCHLORIDE 50 MG/ML
50 INJECTION INTRAMUSCULAR; INTRAVENOUS ONCE
Status: CANCELLED
Start: 2021-04-28 | End: 2021-04-28

## 2021-04-28 RX ORDER — SODIUM CHLORIDE 0.9 % (FLUSH) 0.9 %
10 SYRINGE (ML) INJECTION PRN
Status: DISCONTINUED | OUTPATIENT
Start: 2021-04-28 | End: 2021-04-29 | Stop reason: HOSPADM

## 2021-04-28 RX ORDER — DIPHENHYDRAMINE HYDROCHLORIDE 50 MG/ML
50 INJECTION INTRAMUSCULAR; INTRAVENOUS ONCE
Status: CANCELLED | OUTPATIENT
Start: 2021-04-28 | End: 2021-04-28

## 2021-04-28 RX ORDER — SODIUM CHLORIDE 9 MG/ML
INJECTION, SOLUTION INTRAVENOUS ONCE
Status: COMPLETED | OUTPATIENT
Start: 2021-04-28 | End: 2021-04-29

## 2021-04-28 RX ORDER — HEPARIN SODIUM (PORCINE) LOCK FLUSH IV SOLN 100 UNIT/ML 100 UNIT/ML
500 SOLUTION INTRAVENOUS PRN
Status: DISCONTINUED | OUTPATIENT
Start: 2021-04-28 | End: 2021-04-29 | Stop reason: HOSPADM

## 2021-04-28 RX ADMIN — DIPHENHYDRAMINE HYDROCHLORIDE 50 MG: 50 INJECTION, SOLUTION INTRAMUSCULAR; INTRAVENOUS at 10:43

## 2021-04-28 RX ADMIN — SODIUM CHLORIDE: 9 INJECTION, SOLUTION INTRAVENOUS at 10:44

## 2021-04-28 RX ADMIN — DEXTROSE MONOHYDRATE 700 MG: 50 INJECTION, SOLUTION INTRAVENOUS at 12:34

## 2021-04-28 RX ADMIN — ACETAMINOPHEN 1000 MG: 500 TABLET ORAL at 10:43

## 2021-04-28 RX ADMIN — PANITUMUMAB 430 MG: 400 SOLUTION INTRAVENOUS at 11:13

## 2021-04-28 RX ADMIN — DEXAMETHASONE SODIUM PHOSPHATE 10 MG: 10 INJECTION, SOLUTION INTRAMUSCULAR; INTRAVENOUS at 10:43

## 2021-04-28 RX ADMIN — ONDANSETRON 16 MG: 2 INJECTION INTRAMUSCULAR; INTRAVENOUS at 11:06

## 2021-04-28 RX ADMIN — FLUOROURACIL 4350 MG: 50 INJECTION, SOLUTION INTRAVENOUS at 14:38

## 2021-04-28 ASSESSMENT — PAIN SCALES - GENERAL: PAINLEVEL_OUTOF10: 0

## 2021-04-30 ENCOUNTER — HOSPITAL ENCOUNTER (OUTPATIENT)
Dept: INFUSION THERAPY | Age: 71
Setting detail: INFUSION SERIES
Discharge: HOME | End: 2021-04-30
Payer: MEDICARE

## 2021-04-30 DIAGNOSIS — Z45.2 ENCOUNTER FOR CENTRAL LINE CARE: Primary | ICD-10-CM

## 2021-04-30 PROCEDURE — 6360000002 HC RX W HCPCS: Performed by: NURSE PRACTITIONER

## 2021-04-30 PROCEDURE — 2580000003 HC RX 258: Performed by: NURSE PRACTITIONER

## 2021-04-30 PROCEDURE — 96523 IRRIG DRUG DELIVERY DEVICE: CPT

## 2021-04-30 RX ORDER — SODIUM CHLORIDE 0.9 % (FLUSH) 0.9 %
20 SYRINGE (ML) INJECTION PRN
Status: CANCELLED | OUTPATIENT
Start: 2021-04-30

## 2021-04-30 RX ORDER — SODIUM CHLORIDE 0.9 % (FLUSH) 0.9 %
10 SYRINGE (ML) INJECTION PRN
Status: CANCELLED | OUTPATIENT
Start: 2021-04-30

## 2021-04-30 RX ORDER — HEPARIN SODIUM (PORCINE) LOCK FLUSH IV SOLN 100 UNIT/ML 100 UNIT/ML
500 SOLUTION INTRAVENOUS PRN
Status: CANCELLED | OUTPATIENT
Start: 2021-04-30

## 2021-04-30 RX ORDER — SODIUM CHLORIDE 0.9 % (FLUSH) 0.9 %
10 SYRINGE (ML) INJECTION PRN
Status: DISCONTINUED | OUTPATIENT
Start: 2021-04-30 | End: 2021-05-01 | Stop reason: HOSPADM

## 2021-04-30 RX ORDER — HEPARIN SODIUM (PORCINE) LOCK FLUSH IV SOLN 100 UNIT/ML 100 UNIT/ML
500 SOLUTION INTRAVENOUS PRN
Status: DISCONTINUED | OUTPATIENT
Start: 2021-04-30 | End: 2021-05-01 | Stop reason: HOSPADM

## 2021-04-30 RX ADMIN — HEPARIN 500 UNITS: 100 SYRINGE at 14:25

## 2021-04-30 RX ADMIN — SODIUM CHLORIDE, PRESERVATIVE FREE 10 ML: 5 INJECTION INTRAVENOUS at 14:25

## 2021-05-07 ENCOUNTER — OFFICE VISIT (OUTPATIENT)
Dept: PRIMARY CARE CLINIC | Age: 71
End: 2021-05-07
Payer: MEDICARE

## 2021-05-07 ENCOUNTER — OFFICE VISIT (OUTPATIENT)
Dept: CARDIOLOGY CLINIC | Age: 71
End: 2021-05-07
Payer: MEDICARE

## 2021-05-07 VITALS
SYSTOLIC BLOOD PRESSURE: 120 MMHG | HEART RATE: 66 BPM | WEIGHT: 167 LBS | BODY MASS INDEX: 27.82 KG/M2 | DIASTOLIC BLOOD PRESSURE: 60 MMHG | HEIGHT: 65 IN

## 2021-05-07 VITALS
BODY MASS INDEX: 26.82 KG/M2 | RESPIRATION RATE: 20 BRPM | HEIGHT: 65 IN | DIASTOLIC BLOOD PRESSURE: 72 MMHG | TEMPERATURE: 97.9 F | SYSTOLIC BLOOD PRESSURE: 118 MMHG | OXYGEN SATURATION: 97 % | WEIGHT: 161 LBS | HEART RATE: 68 BPM

## 2021-05-07 DIAGNOSIS — C19 COLORECTAL CANCER (HCC): ICD-10-CM

## 2021-05-07 DIAGNOSIS — G89.29 CHRONIC LOW BACK PAIN, UNSPECIFIED BACK PAIN LATERALITY, UNSPECIFIED WHETHER SCIATICA PRESENT: ICD-10-CM

## 2021-05-07 DIAGNOSIS — Z00.00 ROUTINE GENERAL MEDICAL EXAMINATION AT A HEALTH CARE FACILITY: Primary | ICD-10-CM

## 2021-05-07 DIAGNOSIS — T45.1X5A CHEMOTHERAPY-INDUCED PERIPHERAL NEUROPATHY (HCC): ICD-10-CM

## 2021-05-07 DIAGNOSIS — M54.50 CHRONIC LOW BACK PAIN, UNSPECIFIED BACK PAIN LATERALITY, UNSPECIFIED WHETHER SCIATICA PRESENT: ICD-10-CM

## 2021-05-07 DIAGNOSIS — F41.9 ANXIETY: ICD-10-CM

## 2021-05-07 DIAGNOSIS — I25.10 CORONARY ARTERY DISEASE INVOLVING NATIVE CORONARY ARTERY OF NATIVE HEART WITHOUT ANGINA PECTORIS: Primary | ICD-10-CM

## 2021-05-07 DIAGNOSIS — Z85.51 HISTORY OF BLADDER CANCER: ICD-10-CM

## 2021-05-07 DIAGNOSIS — I25.10 CORONARY ARTERY DISEASE INVOLVING NATIVE CORONARY ARTERY OF NATIVE HEART WITHOUT ANGINA PECTORIS: ICD-10-CM

## 2021-05-07 DIAGNOSIS — G62.0 CHEMOTHERAPY-INDUCED PERIPHERAL NEUROPATHY (HCC): ICD-10-CM

## 2021-05-07 PROCEDURE — 3017F COLORECTAL CA SCREEN DOC REV: CPT | Performed by: STUDENT IN AN ORGANIZED HEALTH CARE EDUCATION/TRAINING PROGRAM

## 2021-05-07 PROCEDURE — 4040F PNEUMOC VAC/ADMIN/RCVD: CPT | Performed by: CLINICAL NURSE SPECIALIST

## 2021-05-07 PROCEDURE — 4040F PNEUMOC VAC/ADMIN/RCVD: CPT | Performed by: STUDENT IN AN ORGANIZED HEALTH CARE EDUCATION/TRAINING PROGRAM

## 2021-05-07 PROCEDURE — G8427 DOCREV CUR MEDS BY ELIG CLIN: HCPCS | Performed by: CLINICAL NURSE SPECIALIST

## 2021-05-07 PROCEDURE — 1123F ACP DISCUSS/DSCN MKR DOCD: CPT | Performed by: STUDENT IN AN ORGANIZED HEALTH CARE EDUCATION/TRAINING PROGRAM

## 2021-05-07 PROCEDURE — 3017F COLORECTAL CA SCREEN DOC REV: CPT | Performed by: CLINICAL NURSE SPECIALIST

## 2021-05-07 PROCEDURE — 1036F TOBACCO NON-USER: CPT | Performed by: CLINICAL NURSE SPECIALIST

## 2021-05-07 PROCEDURE — G8417 CALC BMI ABV UP PARAM F/U: HCPCS | Performed by: CLINICAL NURSE SPECIALIST

## 2021-05-07 PROCEDURE — G0438 PPPS, INITIAL VISIT: HCPCS | Performed by: STUDENT IN AN ORGANIZED HEALTH CARE EDUCATION/TRAINING PROGRAM

## 2021-05-07 PROCEDURE — 1123F ACP DISCUSS/DSCN MKR DOCD: CPT | Performed by: CLINICAL NURSE SPECIALIST

## 2021-05-07 PROCEDURE — 99213 OFFICE O/P EST LOW 20 MIN: CPT | Performed by: CLINICAL NURSE SPECIALIST

## 2021-05-07 RX ORDER — ALPRAZOLAM 0.25 MG/1
0.25 TABLET ORAL NIGHTLY PRN
Qty: 30 TABLET | Refills: 0 | Status: SHIPPED | OUTPATIENT
Start: 2021-05-07 | End: 2021-01-01 | Stop reason: SDUPTHER

## 2021-05-07 RX ORDER — DULOXETIN HYDROCHLORIDE 60 MG/1
60 CAPSULE, DELAYED RELEASE ORAL DAILY
Qty: 30 CAPSULE | Refills: 5 | Status: SHIPPED | OUTPATIENT
Start: 2021-05-07 | End: 2022-01-01

## 2021-05-07 ASSESSMENT — PATIENT HEALTH QUESTIONNAIRE - PHQ9
SUM OF ALL RESPONSES TO PHQ QUESTIONS 1-9: 7
4. FEELING TIRED OR HAVING LITTLE ENERGY: 3
6. FEELING BAD ABOUT YOURSELF - OR THAT YOU ARE A FAILURE OR HAVE LET YOURSELF OR YOUR FAMILY DOWN: 0
10. IF YOU CHECKED OFF ANY PROBLEMS, HOW DIFFICULT HAVE THESE PROBLEMS MADE IT FOR YOU TO DO YOUR WORK, TAKE CARE OF THINGS AT HOME, OR GET ALONG WITH OTHER PEOPLE: 1
SUM OF ALL RESPONSES TO PHQ9 QUESTIONS 1 & 2: 4
6. FEELING BAD ABOUT YOURSELF - OR THAT YOU ARE A FAILURE OR HAVE LET YOURSELF OR YOUR FAMILY DOWN: 0
SUM OF ALL RESPONSES TO PHQ QUESTIONS 1-9: 7
DEPRESSION UNABLE TO ASSESS: FUNCTIONAL CAPACITY MOTIVATION LIMITS ACCURACY
7. TROUBLE CONCENTRATING ON THINGS, SUCH AS READING THE NEWSPAPER OR WATCHING TELEVISION: 0
3. TROUBLE FALLING OR STAYING ASLEEP: 0
DEPRESSION UNABLE TO ASSESS: FUNCTIONAL CAPACITY MOTIVATION LIMITS ACCURACY
3. TROUBLE FALLING OR STAYING ASLEEP: 0
10. IF YOU CHECKED OFF ANY PROBLEMS, HOW DIFFICULT HAVE THESE PROBLEMS MADE IT FOR YOU TO DO YOUR WORK, TAKE CARE OF THINGS AT HOME, OR GET ALONG WITH OTHER PEOPLE: 1
9. THOUGHTS THAT YOU WOULD BE BETTER OFF DEAD, OR OF HURTING YOURSELF: 0

## 2021-05-07 ASSESSMENT — LIFESTYLE VARIABLES: HOW OFTEN DO YOU HAVE A DRINK CONTAINING ALCOHOL: 0

## 2021-05-07 NOTE — PATIENT INSTRUCTIONS
Personalized Preventive Plan for Afshin Weinstein - 5/7/2021  Medicare offers a range of preventive health benefits. Some of the tests and screenings are paid in full while other may be subject to a deductible, co-insurance, and/or copay. Some of these benefits include a comprehensive review of your medical history including lifestyle, illnesses that may run in your family, and various assessments and screenings as appropriate. After reviewing your medical record and screening and assessments performed today your provider may have ordered immunizations, labs, imaging, and/or referrals for you. A list of these orders (if applicable) as well as your Preventive Care list are included within your After Visit Summary for your review. Other Preventive Recommendations:    · A preventive eye exam performed by an eye specialist is recommended every 1-2 years to screen for glaucoma; cataracts, macular degeneration, and other eye disorders. · A preventive dental visit is recommended every 6 months. · Try to get at least 150 minutes of exercise per week or 10,000 steps per day on a pedometer . · Order or download the FREE \"Exercise & Physical Activity: Your Everyday Guide\" from [employer]. Call 3-656.626.2057 or search [employer] online. · You need 1270-9700 mg of calcium and 9991-2058 IU of vitamin D per day. It is possible to meet your calcium requirement with diet alone, but a vitamin D supplement is usually necessary to meet this goal.  · When exposed to the sun, use a sunscreen that protects against both UVA and UVB radiation with an SPF of 30 or greater. Reapply every 2 to 3 hours or after sweating, drying off with a towel, or swimming. · Always wear a seat belt when traveling in a car. Always wear a helmet when riding a bicycle or motorcycle.

## 2021-05-07 NOTE — PATIENT INSTRUCTIONS
Follow up in 6 mos With Dr. Willa Escobedo   Call with any questions or concerns  Follow up with Kenneth López MD for non cardiac problems  Report any new problems  Cardiovascular Fitness-Exercise as tolerated. Strive for 30 minutes of exercise most days of the week. Cardiac / Healthy Diet  Continue current medications as directed  Continue plan of treatment  It is always recommended that you bring your medications bottles with you to each visit - this is for your safety!
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
## 2021-05-11 NOTE — PROGRESS NOTES
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 tablet by mouth every 30 days Take one (1) tablet once per month in the morning with a full glass of water, on an empty stomach, and do not take anything else by mouth or lie down for the next 30 minutes. 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 . MSI stable and negative mutations for BRAF, NRAS, KRAS wild type.     CEA on 4/22/2020 = 0.9   CEA 6/17/20200. 9  CEA 8/19/20=1.1  10/21/2020CEA = 2.0  11/18/2020CEA 2.0  12/16/2020CEA = 1.8  2/17/21 CEA 1.0  4/14/2021- CEA 1.0      Continue palliative intent 5-FU/leucovorin and Panitumumab. Presacral lesion measured 1.1 cm (previously 1.9 cm). Most recent CT scan 22 mm. Not a good candidate for Avastin due to frequent exchange of ureteral stents. 3/25/2001CT abdomen/pelvismild progression size soft tissue mass. However, this is questionable. We will repeat CT abdomen/pelvis.      Normocytic anemiacombination of anemia of chronic disease to include iron deficiency, chronic kidney disease and chemotherapy.      Status post Injectafer x 2 doses in the past.  Hemoglobin 10.5    Non invasive Urothelial Bladder Cancer ([facility] [address] 8/18/2019 and 4/1/2001. Repeat cystoscopy and bilateral ureteral stent removal/replacement on 2/26/2020 . The operative note by Dr. Eliza Parekh documented bilateral hydronephrosis and obtained biopsy of the bladder in the mid dome and left anterior lateral wall x2. Pathology documented high-grade papillary urethral carcinoma, noninvasive, stage pTaNx. As per Urology    5/28/2020the patient underwent a cystoscopy and resection of bladder tumor on 05/28/2020 with findings consistent with noninvasive, high-grade papillary urothelial carcinoma. Muscularis propria was not identified. Currently receiving intravesical BCG.  4/1/2021repeat cystoscopy showed a small bladder lesion. Tumor resection of bladder tumor consistent with noninvasive high-grade urothelial carcinoma. Continue cystoscopic surveillance    Osteoporosis-Osteoporosis BMD -3.6 April 2020. Continue Vit D, Boniva and Calcium. Side effects from treatmentCBC showed mild anemia. CMP showed creatinine 1.5/EGFR 46    Acneform rash secondary to EGFR therapy hydrocortisone cream 2.5% twice daily and clindamycin cream 1%. Also recommended SPF factor XV above. CKD stage IIIcreatinine 1.5/EGFR 46    FOLLOW UP:  1. CMP, CBC, CEA today  2. Follow-up with other medical providers as recommended  3. Continue Hydrocortisone 2.5% and Clindamycin 1.0% as needed  4. Recommended continue ice chips during chemotherapy. 5. Refill Boniva today  6. Repeats CT scans in 2 weeks  7. RTC with MD 3 weeks  8. Treatment today and in 3 weeks  9. Repeat BMD April 2022  10. Continue follow-up for surveillance cystoscopy with Dr. Eliza Parekh  11. Continue Boniva-refills sent     Follow Up:     Return in about 3 weeks (around 6/2/2021) for CBC, CMP , Orders, Appointment with Dr. Praneeth Villeda.    CT c/a/p in 2 weeks     IMarilou am scribing for Colonel Kiara MD. Electronically signed by Marilou Simeon RN on 5/12/2021 at 12:25 PM CDT. I, Dr Ann Rayo, personally performed the services described in this documentation as scribed by César Warner RN in my presence and is both accurate and complete. I have seen, examined and reviewed this patient medication list, appropriate labs and imaging studies. I reviewed relevant medical records and others physicians notes. I discussed the plans of care with the patient. I answered all the questions to the patients satisfaction. I have also reviewed the chief complaint (CC) and part of the history (History of Present Illness (HPI), Past Family Social History [facility]), or Review of Systems (ROS) and made changes when appropriated.        (Please note that portions of this note were completed with a voice recognition program. Efforts were made to edit the dictations but occasionally words are mis-transcribed.)

## 2021-05-12 ENCOUNTER — HOSPITAL ENCOUNTER (OUTPATIENT)
Dept: INFUSION THERAPY | Age: 71
Discharge: HOME | End: 2021-05-12
Payer: MEDICARE

## 2021-05-12 ENCOUNTER — HOSPITAL ENCOUNTER (OUTPATIENT)
Dept: INFUSION THERAPY | Age: 71
Setting detail: INFUSION SERIES
Discharge: HOME | End: 2021-05-12
Payer: MEDICARE

## 2021-05-12 ENCOUNTER — OFFICE VISIT (OUTPATIENT)
Dept: HEMATOLOGY | Age: 71
End: 2021-05-12
Payer: MEDICARE

## 2021-05-12 VITALS
OXYGEN SATURATION: 94 % | HEART RATE: 67 BPM | DIASTOLIC BLOOD PRESSURE: 76 MMHG | BODY MASS INDEX: 27.79 KG/M2 | HEIGHT: 65 IN | TEMPERATURE: 97.1 F | WEIGHT: 166.8 LBS | SYSTOLIC BLOOD PRESSURE: 112 MMHG

## 2021-05-12 VITALS
SYSTOLIC BLOOD PRESSURE: 137 MMHG | TEMPERATURE: 97.2 F | DIASTOLIC BLOOD PRESSURE: 63 MMHG | HEART RATE: 58 BPM | RESPIRATION RATE: 18 BRPM | OXYGEN SATURATION: 100 %

## 2021-05-12 DIAGNOSIS — C79.9 METASTASIS FROM COLON CANCER (HCC): ICD-10-CM

## 2021-05-12 DIAGNOSIS — L27.0 ACNEIFORM DRUG ERUPTION: ICD-10-CM

## 2021-05-12 DIAGNOSIS — C18.9 METASTASIS FROM COLON CANCER (HCC): Primary | ICD-10-CM

## 2021-05-12 DIAGNOSIS — Z51.12 ENCOUNTER FOR ANTINEOPLASTIC IMMUNOTHERAPY: ICD-10-CM

## 2021-05-12 DIAGNOSIS — R21 SKIN RASH: ICD-10-CM

## 2021-05-12 DIAGNOSIS — T45.1X5D ADVERSE EFFECT OF CHEMOTHERAPY, SUBSEQUENT ENCOUNTER: ICD-10-CM

## 2021-05-12 DIAGNOSIS — C79.9 METASTASIS FROM COLON CANCER (HCC): Primary | ICD-10-CM

## 2021-05-12 DIAGNOSIS — G62.0 CHEMOTHERAPY-INDUCED PERIPHERAL NEUROPATHY (HCC): ICD-10-CM

## 2021-05-12 DIAGNOSIS — C19 COLORECTAL CANCER (HCC): ICD-10-CM

## 2021-05-12 DIAGNOSIS — T45.1X5A CHEMOTHERAPY-INDUCED PERIPHERAL NEUROPATHY (HCC): ICD-10-CM

## 2021-05-12 DIAGNOSIS — C18.9 METASTASIS FROM COLON CANCER (HCC): ICD-10-CM

## 2021-05-12 DIAGNOSIS — R91.8 LUNG NODULES: ICD-10-CM

## 2021-05-12 DIAGNOSIS — Z51.11 CHEMOTHERAPY MANAGEMENT, ENCOUNTER FOR: ICD-10-CM

## 2021-05-12 DIAGNOSIS — C19 COLORECTAL CANCER (HCC): Primary | ICD-10-CM

## 2021-05-12 DIAGNOSIS — Z71.89 CARE PLAN DISCUSSED WITH PATIENT: ICD-10-CM

## 2021-05-12 LAB
ALBUMIN SERPL-MCNC: 3.7 G/DL (ref 3.5–5.2)
ALP BLD-CCNC: 84 U/L (ref 40–130)
ALT SERPL-CCNC: 9 U/L (ref 21–72)
ANION GAP SERPL CALCULATED.3IONS-SCNC: 9 MMOL/L (ref 7–19)
AST SERPL-CCNC: 23 U/L (ref 17–59)
BASOPHILS ABSOLUTE: 0.09 K/UL (ref 0.01–0.08)
BASOPHILS RELATIVE PERCENT: 1.5 % (ref 0.1–1.2)
BILIRUB SERPL-MCNC: 0.5 MG/DL (ref 0.2–1.3)
BUN BLDV-MCNC: 12 MG/DL (ref 9–20)
CALCIUM SERPL-MCNC: 8.6 MG/DL (ref 8.4–10.2)
CEA: 0.8 NG/ML (ref 0–3)
CHLORIDE BLD-SCNC: 103 MMOL/L (ref 98–111)
CO2: 26 MMOL/L (ref 22–29)
CREAT SERPL-MCNC: 1.3 MG/DL (ref 0.6–1.2)
EOSINOPHILS ABSOLUTE: 0.24 K/UL (ref 0.04–0.54)
EOSINOPHILS RELATIVE PERCENT: 4.1 % (ref 0.7–7)
GFR NON-AFRICAN AMERICAN: 55
GLOBULIN: 2.6 G/DL
GLUCOSE BLD-MCNC: 110 MG/DL (ref 74–106)
HCT VFR BLD CALC: 32.4 % (ref 40.1–51)
HEMOGLOBIN: 10.5 G/DL (ref 13.7–17.5)
LYMPHOCYTES ABSOLUTE: 0.65 K/UL (ref 1.18–3.74)
LYMPHOCYTES RELATIVE PERCENT: 11.1 % (ref 19.3–53.1)
MCH RBC QN AUTO: 29.2 PG (ref 25.7–32.2)
MCHC RBC AUTO-ENTMCNC: 32.4 G/DL (ref 32.3–36.5)
MCV RBC AUTO: 90 FL (ref 79–92.2)
MONOCYTES ABSOLUTE: 0.41 K/UL (ref 0.24–0.82)
MONOCYTES RELATIVE PERCENT: 7 % (ref 4.7–12.5)
NEUTROPHILS ABSOLUTE: 4.44 K/UL (ref 1.56–6.13)
NEUTROPHILS RELATIVE PERCENT: 76.3 % (ref 34–71.1)
PDW BLD-RTO: 13.7 % (ref 11.6–14.4)
PLATELET # BLD: 220 K/UL (ref 163–337)
PMV BLD AUTO: 10.3 FL (ref 7.4–10.4)
POTASSIUM SERPL-SCNC: 4.3 MMOL/L (ref 3.5–5.1)
RBC # BLD: 3.6 M/UL (ref 4.63–6.08)
SODIUM BLD-SCNC: 138 MMOL/L (ref 137–145)
TOTAL PROTEIN: 6.3 G/DL (ref 6.3–8.2)
WBC # BLD: 5.83 K/UL (ref 4.23–9.07)

## 2021-05-12 PROCEDURE — 96365 THER/PROPH/DIAG IV INF INIT: CPT

## 2021-05-12 PROCEDURE — 96375 TX/PRO/DX INJ NEW DRUG ADDON: CPT

## 2021-05-12 PROCEDURE — 82378 CARCINOEMBRYONIC ANTIGEN: CPT

## 2021-05-12 PROCEDURE — 80053 COMPREHEN METABOLIC PANEL: CPT

## 2021-05-12 PROCEDURE — 1123F ACP DISCUSS/DSCN MKR DOCD: CPT | Performed by: INTERNAL MEDICINE

## 2021-05-12 PROCEDURE — 2580000003 HC RX 258: Performed by: INTERNAL MEDICINE

## 2021-05-12 PROCEDURE — G8417 CALC BMI ABV UP PARAM F/U: HCPCS | Performed by: INTERNAL MEDICINE

## 2021-05-12 PROCEDURE — 96413 CHEMO IV INFUSION 1 HR: CPT

## 2021-05-12 PROCEDURE — 1036F TOBACCO NON-USER: CPT | Performed by: INTERNAL MEDICINE

## 2021-05-12 PROCEDURE — G0498 CHEMO EXTEND IV INFUS W/PUMP: HCPCS

## 2021-05-12 PROCEDURE — 85025 COMPLETE CBC W/AUTO DIFF WBC: CPT

## 2021-05-12 PROCEDURE — 3017F COLORECTAL CA SCREEN DOC REV: CPT | Performed by: INTERNAL MEDICINE

## 2021-05-12 PROCEDURE — 36415 COLL VENOUS BLD VENIPUNCTURE: CPT

## 2021-05-12 PROCEDURE — 96366 THER/PROPH/DIAG IV INF ADDON: CPT

## 2021-05-12 PROCEDURE — 4040F PNEUMOC VAC/ADMIN/RCVD: CPT | Performed by: INTERNAL MEDICINE

## 2021-05-12 PROCEDURE — 6360000002 HC RX W HCPCS: Performed by: INTERNAL MEDICINE

## 2021-05-12 PROCEDURE — 99214 OFFICE O/P EST MOD 30 MIN: CPT | Performed by: INTERNAL MEDICINE

## 2021-05-12 PROCEDURE — 96374 THER/PROPH/DIAG INJ IV PUSH: CPT

## 2021-05-12 PROCEDURE — G8427 DOCREV CUR MEDS BY ELIG CLIN: HCPCS | Performed by: INTERNAL MEDICINE

## 2021-05-12 PROCEDURE — 6370000000 HC RX 637 (ALT 250 FOR IP): Performed by: INTERNAL MEDICINE

## 2021-05-12 RX ORDER — DIPHENHYDRAMINE HYDROCHLORIDE 50 MG/ML
50 INJECTION INTRAMUSCULAR; INTRAVENOUS ONCE
Status: COMPLETED | OUTPATIENT
Start: 2021-05-12 | End: 2021-05-12

## 2021-05-12 RX ORDER — SODIUM CHLORIDE 0.9 % (FLUSH) 0.9 %
10 SYRINGE (ML) INJECTION PRN
Status: CANCELLED | OUTPATIENT
Start: 2021-05-12

## 2021-05-12 RX ORDER — SODIUM CHLORIDE 9 MG/ML
INJECTION, SOLUTION INTRAVENOUS ONCE
Status: COMPLETED | OUTPATIENT
Start: 2021-05-12 | End: 2021-05-12

## 2021-05-12 RX ORDER — METHYLPREDNISOLONE SODIUM SUCCINATE 125 MG/2ML
125 INJECTION, POWDER, LYOPHILIZED, FOR SOLUTION INTRAMUSCULAR; INTRAVENOUS ONCE
Status: CANCELLED | OUTPATIENT
Start: 2021-05-12 | End: 2021-05-12

## 2021-05-12 RX ORDER — DIPHENHYDRAMINE HYDROCHLORIDE 50 MG/ML
50 INJECTION INTRAMUSCULAR; INTRAVENOUS ONCE
Status: CANCELLED | OUTPATIENT
Start: 2021-05-12 | End: 2021-05-12

## 2021-05-12 RX ORDER — EPINEPHRINE 1 MG/ML
0.3 INJECTION, SOLUTION, CONCENTRATE INTRAVENOUS PRN
Status: CANCELLED | OUTPATIENT
Start: 2021-05-12

## 2021-05-12 RX ORDER — DIPHENHYDRAMINE HYDROCHLORIDE 50 MG/ML
50 INJECTION INTRAMUSCULAR; INTRAVENOUS ONCE
Status: CANCELLED
Start: 2021-05-12 | End: 2021-05-12

## 2021-05-12 RX ORDER — IBANDRONATE SODIUM 150 MG/1
150 TABLET, FILM COATED ORAL
Qty: 1 TABLET | Refills: 6 | Status: SHIPPED | OUTPATIENT
Start: 2021-05-12 | End: 2022-01-01 | Stop reason: SDUPTHER

## 2021-05-12 RX ORDER — ACETAMINOPHEN 500 MG
1000 TABLET ORAL ONCE
Status: COMPLETED | OUTPATIENT
Start: 2021-05-12 | End: 2021-05-12

## 2021-05-12 RX ORDER — SODIUM CHLORIDE 9 MG/ML
INJECTION, SOLUTION INTRAVENOUS ONCE
Status: CANCELLED | OUTPATIENT
Start: 2021-05-12 | End: 2021-05-12

## 2021-05-12 RX ORDER — ACETAMINOPHEN 325 MG/1
1000 TABLET ORAL ONCE
Status: CANCELLED
Start: 2021-05-12 | End: 2021-05-12

## 2021-05-12 RX ORDER — SODIUM CHLORIDE 9 MG/ML
INJECTION, SOLUTION INTRAVENOUS CONTINUOUS
Status: CANCELLED | OUTPATIENT
Start: 2021-05-12

## 2021-05-12 RX ORDER — SODIUM CHLORIDE 0.9 % (FLUSH) 0.9 %
5 SYRINGE (ML) INJECTION PRN
Status: CANCELLED | OUTPATIENT
Start: 2021-05-12

## 2021-05-12 RX ORDER — HEPARIN SODIUM (PORCINE) LOCK FLUSH IV SOLN 100 UNIT/ML 100 UNIT/ML
500 SOLUTION INTRAVENOUS PRN
Status: CANCELLED | OUTPATIENT
Start: 2021-05-12

## 2021-05-12 RX ORDER — DEXAMETHASONE SODIUM PHOSPHATE 10 MG/ML
10 INJECTION, SOLUTION INTRAMUSCULAR; INTRAVENOUS ONCE
Status: COMPLETED | OUTPATIENT
Start: 2021-05-12 | End: 2021-05-12

## 2021-05-12 RX ADMIN — LEUCOVORIN CALCIUM 700 MG: 350 INJECTION, POWDER, LYOPHILIZED, FOR SUSPENSION INTRAMUSCULAR; INTRAVENOUS at 12:41

## 2021-05-12 RX ADMIN — DIPHENHYDRAMINE HYDROCHLORIDE 50 MG: 50 INJECTION, SOLUTION INTRAMUSCULAR; INTRAVENOUS at 10:24

## 2021-05-12 RX ADMIN — FLUOROURACIL 4350 MG: 50 INJECTION, SOLUTION INTRAVENOUS at 14:15

## 2021-05-12 RX ADMIN — ACETAMINOPHEN 1000 MG: 500 TABLET ORAL at 10:24

## 2021-05-12 RX ADMIN — SODIUM CHLORIDE: 9 INJECTION, SOLUTION INTRAVENOUS at 10:24

## 2021-05-12 RX ADMIN — ONDANSETRON 16 MG: 2 INJECTION INTRAMUSCULAR; INTRAVENOUS at 10:55

## 2021-05-12 RX ADMIN — PANITUMUMAB 430 MG: 400 SOLUTION INTRAVENOUS at 11:28

## 2021-05-12 RX ADMIN — DEXAMETHASONE SODIUM PHOSPHATE 10 MG: 10 INJECTION, SOLUTION INTRAMUSCULAR; INTRAVENOUS at 10:55

## 2021-05-12 ASSESSMENT — PAIN SCALES - GENERAL: PAINLEVEL_OUTOF10: 0

## 2021-05-14 ENCOUNTER — HOSPITAL ENCOUNTER (OUTPATIENT)
Dept: INFUSION THERAPY | Age: 71
Setting detail: INFUSION SERIES
Discharge: HOME | End: 2021-05-14
Payer: MEDICARE

## 2021-05-14 DIAGNOSIS — Z45.2 ENCOUNTER FOR CENTRAL LINE CARE: Primary | ICD-10-CM

## 2021-05-14 PROCEDURE — 6360000002 HC RX W HCPCS: Performed by: NURSE PRACTITIONER

## 2021-05-14 PROCEDURE — 96523 IRRIG DRUG DELIVERY DEVICE: CPT

## 2021-05-14 RX ORDER — SODIUM CHLORIDE 0.9 % (FLUSH) 0.9 %
20 SYRINGE (ML) INJECTION PRN
Status: CANCELLED | OUTPATIENT
Start: 2021-05-14

## 2021-05-14 RX ORDER — HEPARIN SODIUM (PORCINE) LOCK FLUSH IV SOLN 100 UNIT/ML 100 UNIT/ML
500 SOLUTION INTRAVENOUS PRN
Status: CANCELLED | OUTPATIENT
Start: 2021-05-14

## 2021-05-14 RX ORDER — HEPARIN SODIUM (PORCINE) LOCK FLUSH IV SOLN 100 UNIT/ML 100 UNIT/ML
500 SOLUTION INTRAVENOUS PRN
Status: DISCONTINUED | OUTPATIENT
Start: 2021-05-14 | End: 2021-05-15 | Stop reason: HOSPADM

## 2021-05-14 RX ORDER — SODIUM CHLORIDE 0.9 % (FLUSH) 0.9 %
20 SYRINGE (ML) INJECTION PRN
Status: DISCONTINUED | OUTPATIENT
Start: 2021-05-14 | End: 2021-05-15 | Stop reason: HOSPADM

## 2021-05-14 RX ORDER — SODIUM CHLORIDE 0.9 % (FLUSH) 0.9 %
10 SYRINGE (ML) INJECTION PRN
Status: CANCELLED | OUTPATIENT
Start: 2021-05-14

## 2021-05-14 RX ADMIN — HEPARIN 500 UNITS: 100 SYRINGE at 14:10

## 2021-05-18 DIAGNOSIS — G62.0 CHEMOTHERAPY-INDUCED PERIPHERAL NEUROPATHY (HCC): ICD-10-CM

## 2021-05-18 DIAGNOSIS — T45.1X5A CHEMOTHERAPY-INDUCED PERIPHERAL NEUROPATHY (HCC): ICD-10-CM

## 2021-05-19 RX ORDER — GABAPENTIN 800 MG/1
800 TABLET ORAL 4 TIMES DAILY
Qty: 180 TABLET | Refills: 2 | Status: SHIPPED | OUTPATIENT
Start: 2021-05-19 | End: 2021-01-01

## 2021-05-19 NOTE — TELEPHONE ENCOUNTER
Jonna Charon called to request a refill on his medication. Last office visit : 5/7/2021   Next office visit : 6/2/2021     Last UDS:   Amphetamines   Date Value Ref Range Status   05/01/2013 NEGATIVE  Final     Barbiturates   Date Value Ref Range Status   05/01/2013 NEGATIVE  Final     Benzodiazepines   Date Value Ref Range Status   05/01/2013 NEGATIVE  Final     Opiate Scrn, Ur   Date Value Ref Range Status   04/06/2015 See Final Results Htirsz=087 ng/mL Final     Comment:     Opiate test includes Codeine, Morphine, Hydromorphone, Hydrocodone. Last Teo Irlanda: 01-05-21  Medication Contract: 0   Last Fill: 10-27-20    Requested Prescriptions     Pending Prescriptions Disp Refills    gabapentin (NEURONTIN) 800 MG tablet [Pharmacy Med Name: GABAPENTIN 800MG TABLETS] 180 tablet      Sig: TAKE 1 TABLET BY MOUTH THREE TIMES DAILY         Please approve or refuse this medication.    Lew Duarte MA

## 2021-05-26 ENCOUNTER — HOSPITAL ENCOUNTER (OUTPATIENT)
Dept: CT IMAGING | Age: 71
Discharge: HOME | End: 2021-05-26
Payer: MEDICARE

## 2021-05-26 DIAGNOSIS — R91.8 LUNG NODULES: ICD-10-CM

## 2021-05-26 DIAGNOSIS — C18.9 METASTASIS FROM COLON CANCER (HCC): ICD-10-CM

## 2021-05-26 DIAGNOSIS — C79.9 METASTASIS FROM COLON CANCER (HCC): ICD-10-CM

## 2021-05-26 PROCEDURE — 71260 CT THORAX DX C+: CPT

## 2021-05-26 PROCEDURE — 74177 CT ABD & PELVIS W/CONTRAST: CPT

## 2021-05-26 PROCEDURE — 6360000004 HC RX CONTRAST MEDICATION: Performed by: INTERNAL MEDICINE

## 2021-05-26 RX ADMIN — IOPAMIDOL 75 ML: 755 INJECTION, SOLUTION INTRAVENOUS at 08:15

## 2021-05-28 NOTE — PROGRESS NOTES
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 hepatosplenomegaly. No palpable masses. EXTREMITIES: warm, Full ROM of all fours extremities. No focal weakness. SKIN: Acneform rash face and back   LYMPH: No cervical, clavicular, axillary, or inguinal lymphadenopathy  NEUROLOGIC: follows commands, non focal.   PSYCH: mood and affect appropriate. Alert and oriented to time and place and person.     Relevant Lab findings/reviewed by me:  5/12/21 CEA 0.8  Lab Results   Component Value Date    WBC 4.86 06/01/2021    HGB 11.1 (L) 06/01/2021    HCT 33.2 (L) 06/01/2021    MCV 87.4 06/01/2021     06/01/2021     Lab Results   Component Value Date    NEUTROABS 3.40 06/01/2021     Lab Results   Component Value Date     (L) 06/01/2021    K 5.1 06/01/2021     06/01/2021    CO2 25 06/01/2021    BUN 13 06/01/2021    CREATININE 1.4 (H) 06/01/2021    GLUCOSE 102 06/01/2021    CALCIUM 8.7 06/01/2021    PROT 6.6 06/01/2021    LABALBU 3.9 06/01/2021    BILITOT 0.4 06/01/2021    ALKPHOS 97 06/01/2021    AST 23 06/01/2021    ALT 11 (L) 06/01/2021 LABGLOM 50 (A) 06/01/2021    GFRAA >59 04/28/2021    AGRATIO 1.6 02/11/2020    GLOB 2.6 05/12/2021       Relevant Imaging studies/reviewed by me:  CT CHEST W CONTRAST    Result Date: 5/26/2021  1. No convincing evidence of disease progression is identified, only one of the pulmonary nodules, in the right lower lobe, shows minimal increase in size, from 5.3 mm to 7.3 mm, though this size difference may be due to measuring technique. No new pulmonary nodules are identified. All of the pulmonary nodules are small, measuring 7.3 mm or less and are not amenable to CT-guided biopsy. Average nodule size is 3 mm. 2. No evidence of intrathoracic lymphadenopathy is identified. 3. Chronic compression deformities at the thoracolumbar junction, with previous kyphoplasty at T12 and L1, associated kyphotic deformity and there is grade 1 bordering on grade 2 spondylolisthesis at L2-3. Signed by Dr Shahida Coker. Katherine on 5/26/2021 9:35 AM    CT ABDOMEN PELVIS W IV CONTRAST Additional Contrast? Oral    Result Date: 5/26/2021  Impression: 1. No evidence of disease progression. No change in partially calcified presacral soft tissue mass. 2.  Chronic urinary bladder wall thickening with asymmetric thickening along the RIGHT lateral wall. 3.  Bilateral ureteral stents appear in good position and hydronephrosis has significantly decreased from the prior study. Bilateral urothelial thickening is likely related to chronic stent placement although infectious process is also possible. 4.  Chronic biliary ductal dilatation. Signed by Dr Zenia Waggoner on 5/26/2021 9:34 AM       My assessmentoverall stable pulmonary nodules and a stable presacral lesion. No evidence of disease progression or new metastatic sites. ASSESSMENT    No orders of the defined types were placed in this encounter. Traci Krueger was seen today for follow-up.     Diagnoses and all orders for this visit:    Metastasis from colon cancer [facility])    Care plan discussed with patient    Encounter for antineoplastic immunotherapy    Chemotherapy management, encounter for    Adverse effect of chemotherapy, subsequent encounter    Chemotherapy-induced peripheral neuropathy ([address]    Other orders  -     Magic Mouthwash (MIRACLE MOUTHWASH); Swish and spit 5 mLs 4 times daily as needed for Irritation       ASSESSMENT/PLAN:    Metastasis colorectal adenocarcinoma confirmed in right abductor muscle KRAS wild type. . MSI stable and negative mutations for BRAF, NRAS, KRAS wild type.     CEA on 4/22/2020 = 0.9   CEA 6/17/20200. 9  CEA 8/19/20=1.1  10/21/2020CEA = 2.0  11/18/2020CEA 2.0  12/16/2020CEA = 1.8  2/17/21 CEA 1.0  4/14/2021- CEA 1.0  5/12/21 CEA 0.8    Continue palliative intent 5-FU/leucovorin and Panitumumab. Presacral lesion measured 1.1 cm (previously 1.9 cm). Most recent CT scan 22 mm. Not a good candidate for Avastin due to frequent exchange of ureteral stents. 3/25/2001CT abdomen/pelvismild progression size soft tissue mass. 05/26/21- CT abdomen and pelvis  Partially calcified presacral soft tissue mass appears unchanged. Previously measured soft tissue noncalcified component is unchanged  measuring 2.2 x 3.2 cm on axial image 60      Continue treatment 5-FU with Panitumumab. Normocytic anemiacombination of anemia of chronic disease to include iron deficiency, chronic kidney disease and chemotherapy.      Status post Injectafer x 2 doses in the past.  Hemoglobin 11.1     Non invasive Urothelial Bladder Cancer ([facility] [address] 8/18/2019 and 4/1/2001. Repeat cystoscopy and bilateral ureteral stent removal/replacement on 2/26/2020 . The operative note by Dr. Antwon Mike documented bilateral hydronephrosis and obtained biopsy of the bladder in the mid dome and left anterior lateral wall x2. Pathology documented high-grade papillary urethral carcinoma, noninvasive, stage pTaNx.   As per Urology    5/28/2020the patient underwent a cystoscopy and resection of bladder tumor on 05/28/2020 with findings consistent with noninvasive, high-grade papillary urothelial carcinoma. Muscularis propria was not identified. Currently receiving intravesical BCG.  4/1/2021repeat cystoscopy showed a small bladder lesion. Tumor resection of bladder tumor consistent with noninvasive high-grade urothelial carcinoma. Continue cystoscopic surveillance    Osteoporosis-Osteoporosis BMD -3.6 April 2020. Continue Vit D, Boniva and Calcium. Side effects from treatmentCBC showed mild anemia. CMP showed creatinine 1.4/EGFR 50    Acneform rash secondary to EGFR therapy hydrocortisone cream 2.5% twice daily and clindamycin cream 1%. Also recommended SPF factor XV above. CKD stage IIIcreatinine 1.5/EGFR 46    Mucositis related to 5-FUrecommended ice chips and miracle mouthwash for symptomatic mucositis    FOLLOW UP:  1. CMP, CBC, CEA today  2. Follow-up with other medical providers as recommended  3. Continue Hydrocortisone 2.5% and Clindamycin 1.0% as needed  4. Recommended continue ice chips during chemotherapy. 5. RTC with MD 6 weeks  6. 5FU, Leucovorin, Vectibix tomorrow and in 3 weeks at [address] [facility]  7. Repeat BMD April 2022  8. Continue follow-up for surveillance cystoscopy with Dr. Stefanie Lehman  9. Repeat scans in Sept 2021  10. Miracle mouthwash as needed-script sent  11. Treatment every 3 weeks as per patient request.     Follow Up:     Return in about 6 weeks (around 7/13/2021) for CBC CMP , Orders, Appointment with Dr. Zia Ridley. [place] at [address] [place] [address] every 3 weeks     IKalyan, katia scribing for Erica Torres MD. Electronically signed by Kalyan Beckham RN on 6/1/2021 at 3:19 PM CDT. I, Dr Tete Cerna, personally performed the services described in this documentation as scribed by Kalyan Beckham RN in my presence and is both accurate and complete. I have seen, examined and reviewed this patient medication list, appropriate labs and imaging studies.  I reviewed relevant medical

## 2021-06-01 ENCOUNTER — OFFICE VISIT (OUTPATIENT)
Dept: HEMATOLOGY | Age: 71
End: 2021-06-01
Payer: MEDICARE

## 2021-06-01 ENCOUNTER — PRE-ADMISSION TESTING (OUTPATIENT)
Dept: PREADMISSION TESTING | Facility: HOSPITAL | Age: 71
End: 2021-06-01

## 2021-06-01 ENCOUNTER — OFFICE VISIT (OUTPATIENT)
Dept: PRIMARY CARE CLINIC | Age: 71
End: 2021-06-01
Payer: MEDICARE

## 2021-06-01 ENCOUNTER — HOSPITAL ENCOUNTER (OUTPATIENT)
Dept: INFUSION THERAPY | Age: 71
Discharge: HOME | End: 2021-06-01
Payer: MEDICARE

## 2021-06-01 VITALS
SYSTOLIC BLOOD PRESSURE: 118 MMHG | HEART RATE: 69 BPM | WEIGHT: 167 LBS | DIASTOLIC BLOOD PRESSURE: 72 MMHG | BODY MASS INDEX: 27.82 KG/M2 | HEIGHT: 65 IN | TEMPERATURE: 97.4 F | OXYGEN SATURATION: 99 %

## 2021-06-01 VITALS
WEIGHT: 168.4 LBS | HEIGHT: 65 IN | BODY MASS INDEX: 28.06 KG/M2 | SYSTOLIC BLOOD PRESSURE: 121 MMHG | HEART RATE: 73 BPM | DIASTOLIC BLOOD PRESSURE: 65 MMHG | OXYGEN SATURATION: 98 % | RESPIRATION RATE: 18 BRPM

## 2021-06-01 VITALS
BODY MASS INDEX: 27.67 KG/M2 | DIASTOLIC BLOOD PRESSURE: 76 MMHG | WEIGHT: 166.1 LBS | OXYGEN SATURATION: 95 % | SYSTOLIC BLOOD PRESSURE: 130 MMHG | HEART RATE: 74 BPM | HEIGHT: 65 IN

## 2021-06-01 DIAGNOSIS — G62.0 CHEMOTHERAPY-INDUCED PERIPHERAL NEUROPATHY (HCC): ICD-10-CM

## 2021-06-01 DIAGNOSIS — Z51.11 CHEMOTHERAPY MANAGEMENT, ENCOUNTER FOR: ICD-10-CM

## 2021-06-01 DIAGNOSIS — C79.9 METASTASIS FROM COLON CANCER (HCC): Primary | ICD-10-CM

## 2021-06-01 DIAGNOSIS — K12.30 MUCOSITIS: ICD-10-CM

## 2021-06-01 DIAGNOSIS — T45.1X5A CHEMOTHERAPY-INDUCED PERIPHERAL NEUROPATHY (HCC): ICD-10-CM

## 2021-06-01 DIAGNOSIS — T45.1X5D ADVERSE EFFECT OF CHEMOTHERAPY, SUBSEQUENT ENCOUNTER: ICD-10-CM

## 2021-06-01 DIAGNOSIS — C18.9 METASTASIS FROM COLON CANCER (HCC): Primary | ICD-10-CM

## 2021-06-01 DIAGNOSIS — C79.9 METASTASIS FROM COLON CANCER (HCC): ICD-10-CM

## 2021-06-01 DIAGNOSIS — Z51.12 ENCOUNTER FOR ANTINEOPLASTIC IMMUNOTHERAPY: ICD-10-CM

## 2021-06-01 DIAGNOSIS — G89.4 CHRONIC PAIN SYNDROME: ICD-10-CM

## 2021-06-01 DIAGNOSIS — C18.9 METASTASIS FROM COLON CANCER (HCC): ICD-10-CM

## 2021-06-01 DIAGNOSIS — G89.4 CHRONIC PAIN SYNDROME: Primary | ICD-10-CM

## 2021-06-01 DIAGNOSIS — Z71.89 CARE PLAN DISCUSSED WITH PATIENT: ICD-10-CM

## 2021-06-01 LAB
ALBUMIN SERPL-MCNC: 3.9 G/DL (ref 3.5–5.2)
ALBUMIN SERPL-MCNC: 4.2 G/DL (ref 3.5–5.2)
ALBUMIN/GLOB SERPL: 1.3 G/DL
ALP BLD-CCNC: 97 U/L (ref 40–130)
ALP SERPL-CCNC: 57 U/L (ref 39–117)
ALT SERPL W P-5'-P-CCNC: 11 U/L (ref 1–41)
ALT SERPL-CCNC: 11 U/L (ref 21–72)
ANION GAP SERPL CALCULATED.3IONS-SCNC: 9 MMOL/L (ref 5–15)
ANION GAP SERPL CALCULATED.3IONS-SCNC: 9 MMOL/L (ref 7–19)
AST SERPL-CCNC: 14 U/L (ref 1–40)
AST SERPL-CCNC: 23 U/L (ref 17–59)
BACTERIA UR QL AUTO: ABNORMAL /HPF
BASOPHILS ABSOLUTE: 0.08 K/UL (ref 0.01–0.08)
BASOPHILS RELATIVE PERCENT: 1.6 % (ref 0.1–1.2)
BILIRUB SERPL-MCNC: 0.2 MG/DL (ref 0–1.2)
BILIRUB SERPL-MCNC: 0.4 MG/DL (ref 0.2–1.3)
BILIRUB UR QL STRIP: NEGATIVE
BUN BLDV-MCNC: 13 MG/DL (ref 9–20)
BUN SERPL-MCNC: 21 MG/DL (ref 8–23)
BUN/CREAT SERPL: 26.6 (ref 7–25)
CALCIUM SERPL-MCNC: 8.7 MG/DL (ref 8.4–10.2)
CALCIUM SPEC-SCNC: 9.2 MG/DL (ref 8.6–10.5)
CEA: 0.9 NG/ML (ref 0–3)
CHLORIDE BLD-SCNC: 101 MMOL/L (ref 98–111)
CHLORIDE SERPL-SCNC: 106 MMOL/L (ref 98–107)
CLARITY UR: ABNORMAL
CO2 SERPL-SCNC: 26 MMOL/L (ref 22–29)
CO2: 25 MMOL/L (ref 22–29)
COLOR UR: ABNORMAL
CREAT SERPL-MCNC: 0.79 MG/DL (ref 0.76–1.27)
CREAT SERPL-MCNC: 1.4 MG/DL (ref 0.6–1.2)
EOSINOPHILS ABSOLUTE: 0.25 K/UL (ref 0.04–0.54)
EOSINOPHILS RELATIVE PERCENT: 5.1 % (ref 0.7–7)
GFR NON-AFRICAN AMERICAN: 50
GFR SERPL CREATININE-BSD FRML MDRD: 97 ML/MIN/1.73
GLOBULIN UR ELPH-MCNC: 3.2 GM/DL
GLUCOSE BLD-MCNC: 102 MG/DL (ref 74–106)
GLUCOSE SERPL-MCNC: 76 MG/DL (ref 65–99)
GLUCOSE UR STRIP-MCNC: NEGATIVE MG/DL
HCT VFR BLD CALC: 33.2 % (ref 40.1–51)
HEMOGLOBIN: 11.1 G/DL (ref 13.7–17.5)
HGB UR QL STRIP.AUTO: ABNORMAL
HYALINE CASTS UR QL AUTO: ABNORMAL /LPF
KETONES UR QL STRIP: NEGATIVE
LEUKOCYTE ESTERASE UR QL STRIP.AUTO: ABNORMAL
LYMPHOCYTES ABSOLUTE: 0.7 K/UL (ref 1.18–3.74)
LYMPHOCYTES RELATIVE PERCENT: 14.4 % (ref 19.3–53.1)
MCH RBC QN AUTO: 29.2 PG (ref 25.7–32.2)
MCHC RBC AUTO-ENTMCNC: 33.4 G/DL (ref 32.3–36.5)
MCV RBC AUTO: 87.4 FL (ref 79–92.2)
MONOCYTES ABSOLUTE: 0.43 K/UL (ref 0.24–0.82)
MONOCYTES RELATIVE PERCENT: 8.8 % (ref 4.7–12.5)
NEUTROPHILS ABSOLUTE: 3.4 K/UL (ref 1.56–6.13)
NEUTROPHILS RELATIVE PERCENT: 70.1 % (ref 34–71.1)
NITRITE UR QL STRIP: NEGATIVE
PDW BLD-RTO: 13.4 % (ref 11.6–14.4)
PH UR STRIP.AUTO: 6.5 [PH] (ref 5–8)
PLATELET # BLD: 270 K/UL (ref 163–337)
PMV BLD AUTO: 10.2 FL (ref 7.4–10.4)
POTASSIUM SERPL-SCNC: 4.1 MMOL/L (ref 3.5–5.2)
POTASSIUM SERPL-SCNC: 5.1 MMOL/L (ref 3.5–5.1)
PROT SERPL-MCNC: 7.4 G/DL (ref 6–8.5)
PROT UR QL STRIP: ABNORMAL
RBC # BLD: 3.8 M/UL (ref 4.63–6.08)
RBC # UR: ABNORMAL /HPF
REF LAB TEST METHOD: ABNORMAL
SODIUM BLD-SCNC: 135 MMOL/L (ref 137–145)
SODIUM SERPL-SCNC: 141 MMOL/L (ref 136–145)
SP GR UR STRIP: 1.01 (ref 1–1.03)
SQUAMOUS #/AREA URNS HPF: ABNORMAL /HPF
TOTAL PROTEIN: 6.6 G/DL (ref 6.3–8.2)
UROBILINOGEN UR QL STRIP: ABNORMAL
WBC # BLD: 4.86 K/UL (ref 4.23–9.07)
WBC UR QL AUTO: ABNORMAL /HPF

## 2021-06-01 PROCEDURE — 80053 COMPREHEN METABOLIC PANEL: CPT

## 2021-06-01 PROCEDURE — 99213 OFFICE O/P EST LOW 20 MIN: CPT | Performed by: STUDENT IN AN ORGANIZED HEALTH CARE EDUCATION/TRAINING PROGRAM

## 2021-06-01 PROCEDURE — 85025 COMPLETE CBC W/AUTO DIFF WBC: CPT

## 2021-06-01 PROCEDURE — 1036F TOBACCO NON-USER: CPT | Performed by: STUDENT IN AN ORGANIZED HEALTH CARE EDUCATION/TRAINING PROGRAM

## 2021-06-01 PROCEDURE — G8417 CALC BMI ABV UP PARAM F/U: HCPCS | Performed by: STUDENT IN AN ORGANIZED HEALTH CARE EDUCATION/TRAINING PROGRAM

## 2021-06-01 PROCEDURE — 99215 OFFICE O/P EST HI 40 MIN: CPT | Performed by: INTERNAL MEDICINE

## 2021-06-01 PROCEDURE — G8417 CALC BMI ABV UP PARAM F/U: HCPCS | Performed by: INTERNAL MEDICINE

## 2021-06-01 PROCEDURE — G8427 DOCREV CUR MEDS BY ELIG CLIN: HCPCS | Performed by: INTERNAL MEDICINE

## 2021-06-01 PROCEDURE — 1123F ACP DISCUSS/DSCN MKR DOCD: CPT | Performed by: INTERNAL MEDICINE

## 2021-06-01 PROCEDURE — 93005 ELECTROCARDIOGRAM TRACING: CPT

## 2021-06-01 PROCEDURE — 36415 COLL VENOUS BLD VENIPUNCTURE: CPT

## 2021-06-01 PROCEDURE — 4040F PNEUMOC VAC/ADMIN/RCVD: CPT | Performed by: STUDENT IN AN ORGANIZED HEALTH CARE EDUCATION/TRAINING PROGRAM

## 2021-06-01 PROCEDURE — 4040F PNEUMOC VAC/ADMIN/RCVD: CPT | Performed by: INTERNAL MEDICINE

## 2021-06-01 PROCEDURE — 81001 URINALYSIS AUTO W/SCOPE: CPT

## 2021-06-01 PROCEDURE — 99211 OFF/OP EST MAY X REQ PHY/QHP: CPT

## 2021-06-01 PROCEDURE — 87086 URINE CULTURE/COLONY COUNT: CPT

## 2021-06-01 PROCEDURE — 93010 ELECTROCARDIOGRAM REPORT: CPT | Performed by: INTERNAL MEDICINE

## 2021-06-01 PROCEDURE — 3017F COLORECTAL CA SCREEN DOC REV: CPT | Performed by: STUDENT IN AN ORGANIZED HEALTH CARE EDUCATION/TRAINING PROGRAM

## 2021-06-01 PROCEDURE — G8427 DOCREV CUR MEDS BY ELIG CLIN: HCPCS | Performed by: STUDENT IN AN ORGANIZED HEALTH CARE EDUCATION/TRAINING PROGRAM

## 2021-06-01 PROCEDURE — 82378 CARCINOEMBRYONIC ANTIGEN: CPT

## 2021-06-01 PROCEDURE — 1123F ACP DISCUSS/DSCN MKR DOCD: CPT | Performed by: STUDENT IN AN ORGANIZED HEALTH CARE EDUCATION/TRAINING PROGRAM

## 2021-06-01 PROCEDURE — 3017F COLORECTAL CA SCREEN DOC REV: CPT | Performed by: INTERNAL MEDICINE

## 2021-06-01 PROCEDURE — 1036F TOBACCO NON-USER: CPT | Performed by: INTERNAL MEDICINE

## 2021-06-01 RX ORDER — DIPHENHYDRAMINE HYDROCHLORIDE 50 MG/ML
50 INJECTION INTRAMUSCULAR; INTRAVENOUS ONCE
Status: CANCELLED | OUTPATIENT
Start: 2021-06-02 | End: 2021-06-01

## 2021-06-01 RX ORDER — ACETAMINOPHEN 325 MG/1
1000 TABLET ORAL ONCE
Status: CANCELLED
Start: 2021-06-02 | End: 2021-06-01

## 2021-06-01 RX ORDER — DIPHENHYDRAMINE HYDROCHLORIDE 50 MG/ML
50 INJECTION INTRAMUSCULAR; INTRAVENOUS ONCE
Status: CANCELLED
Start: 2021-06-02 | End: 2021-06-01

## 2021-06-01 RX ORDER — SODIUM CHLORIDE 9 MG/ML
INJECTION, SOLUTION INTRAVENOUS CONTINUOUS
Status: CANCELLED | OUTPATIENT
Start: 2021-06-02

## 2021-06-01 RX ORDER — HEPARIN SODIUM (PORCINE) LOCK FLUSH IV SOLN 100 UNIT/ML 100 UNIT/ML
500 SOLUTION INTRAVENOUS PRN
Status: CANCELLED | OUTPATIENT
Start: 2021-06-02

## 2021-06-01 RX ORDER — METHYLPREDNISOLONE SODIUM SUCCINATE 125 MG/2ML
125 INJECTION, POWDER, LYOPHILIZED, FOR SOLUTION INTRAMUSCULAR; INTRAVENOUS ONCE
Status: CANCELLED | OUTPATIENT
Start: 2021-06-02 | End: 2021-06-01

## 2021-06-01 RX ORDER — SODIUM CHLORIDE 0.9 % (FLUSH) 0.9 %
10 SYRINGE (ML) INJECTION PRN
Status: CANCELLED | OUTPATIENT
Start: 2021-06-02

## 2021-06-01 RX ORDER — SODIUM CHLORIDE 0.9 % (FLUSH) 0.9 %
5 SYRINGE (ML) INJECTION PRN
Status: CANCELLED | OUTPATIENT
Start: 2021-06-02

## 2021-06-01 RX ORDER — EPINEPHRINE 1 MG/ML
0.3 INJECTION, SOLUTION, CONCENTRATE INTRAVENOUS PRN
Status: CANCELLED | OUTPATIENT
Start: 2021-06-02

## 2021-06-01 RX ORDER — SODIUM CHLORIDE 9 MG/ML
INJECTION, SOLUTION INTRAVENOUS ONCE
Status: CANCELLED | OUTPATIENT
Start: 2021-06-02 | End: 2021-06-01

## 2021-06-01 SDOH — ECONOMIC STABILITY: FOOD INSECURITY: WITHIN THE PAST 12 MONTHS, THE FOOD YOU BOUGHT JUST DIDN'T LAST AND YOU DIDN'T HAVE MONEY TO GET MORE.: NEVER TRUE

## 2021-06-01 SDOH — ECONOMIC STABILITY: FOOD INSECURITY: WITHIN THE PAST 12 MONTHS, YOU WORRIED THAT YOUR FOOD WOULD RUN OUT BEFORE YOU GOT MONEY TO BUY MORE.: NEVER TRUE

## 2021-06-01 ASSESSMENT — SOCIAL DETERMINANTS OF HEALTH (SDOH): HOW HARD IS IT FOR YOU TO PAY FOR THE VERY BASICS LIKE FOOD, HOUSING, MEDICAL CARE, AND HEATING?: NOT HARD AT ALL
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
## 2021-06-01 NOTE — DISCHARGE INSTRUCTIONS
DAY OF SURGERY INSTRUCTIONS      YOUR SURGEON: Dr. Velasquez    PROCEDURE: PAIN PUMP INSERTION NEW SYSTEM RIGHT    DATE OF SURGERY: 06-    ARRIVAL TIME: AS DIRECTED BY OFFICE      YOU MAY TAKE THE FOLLOWING MEDICATION(S) THE MORNING OF SURGERY WITH A SIP OF WATER: Bisoprolol (Zebeta) and Gabapnetin (Neurontin)          ALL OTHER HOME MEDICATION CHECK WITH YOUR PHYSICIAN      DO NOT TAKE ANY ERECTILE DYSFUNCTION MEDICATIONS (EX: CIALIS, VIAGRA) 24 HOURS PRIOR TO SURGERY                      MANAGING PAIN AFTER SURGERY    We know you are probably wondering what your pain will be like after surgery.  Following surgery it is unrealistic to expect you will not have pain.   Pain is how our bodies let us know that something is wrong or cautions us to be careful.  That said, our goal is to make your pain tolerable.    Methods we may use to treat your pain include (oral or IV medications, PCAs, epidurals, nerve blocks, etc.)   While some procedures require IV pain medications for a short time after surgery, transitioning to pain medications by mouth allows for better management of pain.   Your nurse will encourage you to take oral pain medications whenever possible.  IV medications work almost immediately, but only last a short while.  Taking medications by mouth allows for a more constant level of medication in your blood stream for a longer period of time.      Once your pain is out of control it is harder to get back under control.  It is important you are aware when your next dose of pain medication is due.  If you are admitted, your nurse may write the time of your next dose on the white board in your room to help you remember.      We are interested in your pain and encourage you to inform us about aggravating factors during your visit.   Many times a simple repositioning every few hours can make a big difference.    If your physician says it is okay, do not let your pain prevent you from getting out of bed. Be  sure to call your nurse for assistance prior to getting up so you do not fall.      Before surgery, please decide your tolerable pain goal.  These faces help describe the pain ratings we use on a 0-10 scale.   Be prepared to tell us your goal and whether or not you take pain or anxiety medications at home.          BEFORE YOU COME TO THE HOSPITAL  (Pre-op instructions)  • Do not eat, drink, smoke or chew gum after midnight the night before surgery.  This also includes no mints.  • Morning of surgery take only the medicines you have been instructed with a sip of water unless otherwise instructed  by your physician.  • Do not shave, wear makeup or dark nail polish.  • Remove all jewelry including rings.  • Leave anything you consider valuable at home.  • Leave your suitcase in the car until after your surgery.  • Bring the following with you if applicable:  o Picture ID and insurance, Medicare or Medicaid cards  o Co-pay/deductible required by insurance (cash, check, credit card)  o Copy of advance directive, living will or power-of- documents if not brought to PAT  o CPAP or BIPAP mask and tubing  o Relaxation aids ( book, magazine), etc.  o Hearing aids                        ON THE DAY OF SURGERY  · On the day of surgery check in at registration located at the main entrance of the hospital.   ? You will be registered and given a beeper with instructions where to wait in the main lobby.  ? When your beeper lights up and vibrates a member of the Outpatient Surgery staff will meet you at the double doors under the stair steps and escort you to your preoperative room.   · You may have cloth compression devices placed on your legs. These help to prevent blood clots and reduce swelling in your legs.  · An IV may be inserted into one of your veins.  · In the operating room, you may be given one or more of the following:  ? A medicine to help you relax (sedative).  ? A medicine to numb the area (local  "anesthetic).  ? A medicine to make you fall asleep (general anesthetic).  ? A medicine that is injected into an area of your body to numb everything below the injection site (regional anesthetic).  · Your surgical site will be marked or identified.  · You may be given an antibiotic through your IV to help prevent infection.  Contact a health care provider if you:  · Develop a fever of more than 100.4°F (38°C) or other feelings of illness during the 48 hours before your surgery.  · Have symptoms that get worse.  Have questions or concerns about your surgery    General Anesthesia/Surgery, Adult  General anesthesia is the use of medicines to make a person \"go to sleep\" (unconscious) for a medical procedure. General anesthesia must be used for certain procedures, and is often recommended for procedures that:  · Last a long time.  · Require you to be still or in an unusual position.  · Are major and can cause blood loss.  The medicines used for general anesthesia are called general anesthetics. As well as making you unconscious for a certain amount of time, these medicines:  · Prevent pain.  · Control your blood pressure.  · Relax your muscles.  Tell a health care provider about:  · Any allergies you have.  · All medicines you are taking, including vitamins, herbs, eye drops, creams, and over-the-counter medicines.  · Any problems you or family members have had with anesthetic medicines.  · Types of anesthetics you have had in the past.  · Any blood disorders you have.  · Any surgeries you have had.  · Any medical conditions you have.  · Any recent upper respiratory, chest, or ear infections.  · Any history of:  ? Heart or lung conditions, such as heart failure, sleep apnea, asthma, or chronic obstructive pulmonary disease (COPD).  ?  service.  ? Depression or anxiety.  · Any tobacco or drug use, including marijuana or alcohol use.  · Whether you are pregnant or may be pregnant.  What are the risks?  Generally, " this is a safe procedure. However, problems may occur, including:  · Allergic reaction.  · Lung and heart problems.  · Inhaling food or liquid from the stomach into the lungs (aspiration).  · Nerve injury.  · Air in the bloodstream, which can lead to stroke.  · Extreme agitation or confusion (delirium) when you wake up from the anesthetic.  · Waking up during your procedure and being unable to move. This is rare.  These problems are more likely to develop if you are having a major surgery or if you have an advanced or serious medical condition. You can prevent some of these complications by answering all of your health care provider's questions thoroughly and by following all instructions before your procedure.  General anesthesia can cause side effects, including:  · Nausea or vomiting.  · A sore throat from the breathing tube.  · Hoarseness.  · Wheezing or coughing.  · Shaking chills.  · Tiredness.  · Body aches.  · Anxiety.  · Sleepiness or drowsiness.  · Confusion or agitation.  RISKS AND COMPLICATIONS OF SURGERY  Your health care provider will discuss possible risks and complications with you before surgery. Common risks and complications include:    · Problems due to the use of anesthetics.  · Blood loss and replacement (does not apply to minor surgical procedures).  · Temporary increase in pain due to surgery.  · Uncorrected pain or problems that the surgery was meant to correct.  · Infection.  · New damage.    What happens before the procedure?    Medicines  Ask your health care provider about:  · Changing or stopping your regular medicines. This is especially important if you are taking diabetes medicines or blood thinners.  · Taking medicines such as aspirin and ibuprofen. These medicines can thin your blood. Do not take these medicines unless your health care provider tells you to take them.  · Taking over-the-counter medicines, vitamins, herbs, and supplements. Do not take these during the week before  your procedure unless your health care provider approves them.  General instructions  · Starting 3-6 weeks before the procedure, do not use any products that contain nicotine or tobacco, such as cigarettes and e-cigarettes. If you need help quitting, ask your health care provider.  · If you brush your teeth on the morning of the procedure, make sure to spit out all of the toothpaste.  · Tell your health care provider if you become ill or develop a cold, cough, or fever.  · If instructed by your health care provider, bring your sleep apnea device with you on the day of your surgery (if applicable).  · Ask your health care provider if you will be going home the same day, the following day, or after a longer hospital stay.  ? Plan to have someone take you home from the hospital or clinic.  ? Plan to have a responsible adult care for you for at least 24 hours after you leave the hospital or clinic. This is important.  What happens during the procedure?  · You will be given anesthetics through both of the following:  ? A mask placed over your nose and mouth.  ? An IV in one of your veins.  · You may receive a medicine to help you relax (sedative).  · After you are unconscious, a breathing tube may be inserted down your throat to help you breathe. This will be removed before you wake up.  · An anesthesia specialist will stay with you throughout your procedure. He or she will:  ? Keep you comfortable and safe by continuing to give you medicines and adjusting the amount of medicine that you get.  ? Monitor your blood pressure, pulse, and oxygen levels to make sure that the anesthetics do not cause any problems.  The procedure may vary among health care providers and hospitals.  What happens after the procedure?  · Your blood pressure, temperature, heart rate, breathing rate, and blood oxygen level will be monitored until the medicines you were given have worn off.  · You will wake up in a recovery area. You may wake up  slowly.  · If you feel anxious or agitated, you may be given medicine to help you calm down.  · If you will be going home the same day, your health care provider may check to make sure you can walk, drink, and urinate.  · Your health care provider will treat any pain or side effects you have before you go home.  · Do not drive for 24 hours if you were given a sedative.  Summary  · General anesthesia is used to keep you still and prevent pain during a procedure.  · It is important to tell your healthcare provider about your medical history and any surgeries you have had, and previous experience with anesthesia.  · Follow your healthcare provider’s instructions about when to stop eating, drinking, or taking certain medicines before your procedure.  · Plan to have someone take you home from the hospital or clinic.  This information is not intended to replace advice given to you by your health care provider. Make sure you discuss any questions you have with your health care provider.  Document Released: 03/26/2009 Document Revised: 08/03/2018 Document Reviewed: 08/03/2018  [employer] Patient Education © 2019 [employer]       Fall Prevention in Hospitals, Adult  As a hospital patient, your condition and the treatments you receive can increase your risk for falls. Some additional risk factors for falls in a hospital include:  · Being in an unfamiliar environment.  · Being on bed rest.  · Your surgery.  · Taking certain medicines.  · Your tubing requirements, such as intravenous (IV) therapy or catheters.  It is important that you learn how to decrease fall risks while at the hospital. Below are important tips that can help prevent falls.  SAFETY TIPS FOR PREVENTING FALLS  Talk about your risk of falling.  · Ask your health care provider why you are at risk for falling. Is it your medicine, illness, tubing placement, or something else?  · Make a plan with your health care provider to keep you safe from  falls.  · Ask your health care provider or pharmacist about side effects of your medicines. Some medicines can make you dizzy or affect your coordination.  Ask for help.  · Ask for help before getting out of bed. You may need to press your call button.  · Ask for assistance in getting safely to the toilet.  · Ask for a walker or cane to be put at your bedside. Ask that most of the side rails on your bed be placed up before your health care provider leaves the room.  · Ask family or friends to sit with you.  · Ask for things that are out of your reach, such as your glasses, hearing aids, telephone, bedside table, or call button.  Follow these tips to avoid falling:  · Stay lying or seated, rather than standing, while waiting for help.  · Wear rubber-soled slippers or shoes whenever you walk in the hospital.  · Avoid quick, sudden movements.  ¨ Change positions slowly.  ¨ Sit on the side of your bed before standing.  ¨ Stand up slowly and wait before you start to walk.  · Let your health care provider know if there is a spill on the floor.  · Pay careful attention to the medical equipment, electrical cords, and tubes around you.  · When you need help, use your call button by your bed or in the bathroom. Wait for one of your health care providers to help you.  · If you feel dizzy or unsure of your footing, return to bed and wait for assistance.  · Avoid being distracted by the TV, telephone, or another person in your room.  · Do not lean or support yourself on rolling objects, such as IV poles or bedside tables.     This information is not intended to replace advice given to you by your health care provider. Make sure you discuss any questions you have with your health care provider.     Document Released: 12/15/2001 Document Revised: 01/08/2016 Document Reviewed: 08/25/2013  [employer] Interactive Patient Education ©2016 [employer]       [facility] 4% Patient Instruction Sheet    Chlorhexidine Before  Surgery  Chlorhexidine gluconate (CHG) is a germ-killing (antiseptic) solution that is used to clean the skin. It gets rid of the bacteria that normally live on the skin. Cleaning your skin with CHG before surgery helps lower the risk for infection after surgery.    How to use CHG solution  · You will take 2 showers, one shower the night before surgery, the second shower the morning of surgery before coming to the hospital.  · Use CHG only as told by your health care provider, and follow the instructions on the label.  · Use CHG solution while taking a shower. Follow these steps when using CHG solution (unless your health care provider gives you different instructions):  1. Start the shower.  2. Use your normal soap and shampoo to wash your face and hair.  3. Turn off the shower or move out of the shower stream.  4. Pour the CHG onto a clean washcloth. Do not use any type of brush or rough-edged sponge.  5. Starting at your neck, lather your body down to your toes. Make sure you:  6. Pay special attention to the part of your body where you will be having surgery. Scrub this area for at least 1 minute.  7. Use the full amount of CHG as directed. Usually, this is one half bottle for each shower.  8. Do not use CHG on your head or face. If the solution gets into your ears or eyes, rinse them well with water.  9. Avoid your genital area.  10. Avoid any areas of skin that have broken skin, cuts, or scrapes.  11. Scrub your back and under your arms. Make sure to wash skin folds.  12. Let the lather sit on your skin for 1-2 minutes or as long as told by your health care  provider.  13. Thoroughly rinse your entire body in the shower. Make sure that all body creases and crevices are rinsed well.  14. Dry off with a clean towel. Do not put any substances on your body afterward, such as powder, lotion, or perfume.  15. Put on clean clothes or pajamas.  16. If it is the night before your surgery, sleep in clean sheets.    What  are the risks?  Risks of using CHG include:  · A skin reaction.  · Hearing loss, if CHG gets in your ears.  · Eye injury, if CHG gets in your eyes and is not rinsed out.  · The CHG product catching fire.  Make sure that you avoid smoking and flames after applying CHG to your skin.  Do not use CHG:  · If you have a chlorhexidine allergy or have previously reacted to chlorhexidine.  · On babies younger than 2 months of age.      On the day of surgery, when you are taken to your room in Outpatient Surgery you will be given a CHG prepackaged cloth to wipe the site for your surgery.  How to use CHG prepackaged cloths  · Follow the instructions on the label.  · Use the CHG cloth on clean, dry skin. Follow these steps when using a CHG cloth (unless your health care provider gives you different instructions):  1. Using the CHG cloth, vigorously scrub the part of your body where you will be having surgery. Scrub using a back-and-forth motion for 3 minutes. The area on your body should be completely wet with CHG when you are finished scrubbing.  2. Do not rinse. Discard the cloth and let the area air-dry for 1 minute. Do not put any substances on your body afterward, such as powder, lotion, or perfume.  Contact a health care provider if:  · Your skin gets irritated after scrubbing.  · You have questions about using your solution or cloth.  Get help right away if:  · Your eyes become very red or swollen.  · Your eyes itch badly.  · Your skin itches badly and is red or swollen.  · Your hearing changes.  · You have trouble seeing.  · You have swelling or tingling in your mouth or throat.  · You have trouble breathing.  · You swallow any chlorhexidine.  Summary  · Chlorhexidine gluconate (CHG) is a germ-killing (antiseptic) solution that is used to clean the skin. Cleaning your skin with CHG before surgery helps lower the risk for infection after surgery.  · You may be given CHG to use at home. It may be in a bottle or in a  prepackaged cloth to use on your skin. Carefully follow your health care provider's instructions and the instructions on the product label.  · Do not use CHG if you have a chlorhexidine allergy.  · Contact your health care provider if your skin gets irritated after scrubbing.  This information is not intended to replace advice given to you by your health care provider. Make sure you discuss any questions you have with your health care provider.  Document Released: 09/11/2013 Document Revised: 11/15/2018 Document Reviewed: 11/15/2018  [employer] Interactive Patient Education © 2019 [employer]

## 2021-06-02 ENCOUNTER — HOSPITAL ENCOUNTER (OUTPATIENT)
Dept: INFUSION THERAPY | Age: 71
Setting detail: INFUSION SERIES
Discharge: HOME | End: 2021-06-02
Payer: MEDICARE

## 2021-06-02 VITALS
OXYGEN SATURATION: 97 % | SYSTOLIC BLOOD PRESSURE: 143 MMHG | RESPIRATION RATE: 17 BRPM | HEART RATE: 71 BPM | DIASTOLIC BLOOD PRESSURE: 81 MMHG | TEMPERATURE: 96.2 F

## 2021-06-02 DIAGNOSIS — C19 COLORECTAL CANCER (HCC): Primary | ICD-10-CM

## 2021-06-02 LAB
BACTERIA SPEC AEROBE CULT: NO GROWTH
QT INTERVAL: 408 MS
QTC INTERVAL: 424 MS

## 2021-06-02 PROCEDURE — 6370000000 HC RX 637 (ALT 250 FOR IP): Performed by: INTERNAL MEDICINE

## 2021-06-02 PROCEDURE — 96366 THER/PROPH/DIAG IV INF ADDON: CPT

## 2021-06-02 PROCEDURE — 96413 CHEMO IV INFUSION 1 HR: CPT

## 2021-06-02 PROCEDURE — 2580000003 HC RX 258: Performed by: INTERNAL MEDICINE

## 2021-06-02 PROCEDURE — 96416 CHEMO PROLONG INFUSE W/PUMP: CPT

## 2021-06-02 PROCEDURE — 6360000002 HC RX W HCPCS: Performed by: INTERNAL MEDICINE

## 2021-06-02 RX ORDER — DEXAMETHASONE SODIUM PHOSPHATE 10 MG/ML
10 INJECTION, SOLUTION INTRAMUSCULAR; INTRAVENOUS ONCE
Status: COMPLETED | OUTPATIENT
Start: 2021-06-02 | End: 2021-06-02

## 2021-06-02 RX ORDER — SODIUM CHLORIDE 9 MG/ML
INJECTION, SOLUTION INTRAVENOUS ONCE
Status: COMPLETED | OUTPATIENT
Start: 2021-06-02 | End: 2021-06-02

## 2021-06-02 RX ORDER — DIPHENHYDRAMINE HYDROCHLORIDE 50 MG/ML
50 INJECTION INTRAMUSCULAR; INTRAVENOUS ONCE
Status: COMPLETED | OUTPATIENT
Start: 2021-06-02 | End: 2021-06-02

## 2021-06-02 RX ORDER — ACETAMINOPHEN 500 MG
1000 TABLET ORAL ONCE
Status: COMPLETED | OUTPATIENT
Start: 2021-06-02 | End: 2021-06-02

## 2021-06-02 RX ADMIN — PANITUMUMAB 430 MG: 400 SOLUTION INTRAVENOUS at 11:23

## 2021-06-02 RX ADMIN — DEXAMETHASONE SODIUM PHOSPHATE 10 MG: 10 INJECTION, SOLUTION INTRAMUSCULAR; INTRAVENOUS at 10:34

## 2021-06-02 RX ADMIN — DIPHENHYDRAMINE HYDROCHLORIDE 50 MG: 50 INJECTION INTRAMUSCULAR; INTRAVENOUS at 11:15

## 2021-06-02 RX ADMIN — ONDANSETRON 16 MG: 2 INJECTION INTRAMUSCULAR; INTRAVENOUS at 10:34

## 2021-06-02 RX ADMIN — FLUOROURACIL 4350 MG: 50 INJECTION, SOLUTION INTRAVENOUS at 14:05

## 2021-06-02 RX ADMIN — ACETAMINOPHEN 1000 MG: 500 TABLET ORAL at 10:56

## 2021-06-02 RX ADMIN — LEUCOVORIN CALCIUM 700 MG: 350 INJECTION, POWDER, LYOPHILIZED, FOR SUSPENSION INTRAMUSCULAR; INTRAVENOUS at 12:28

## 2021-06-02 RX ADMIN — SODIUM CHLORIDE: 9 INJECTION, SOLUTION INTRAVENOUS at 10:34

## 2021-06-02 ASSESSMENT — PAIN SCALES - GENERAL: PAINLEVEL_OUTOF10: 0

## 2021-06-04 ENCOUNTER — HOSPITAL ENCOUNTER (OUTPATIENT)
Dept: INFUSION THERAPY | Age: 71
Setting detail: INFUSION SERIES
Discharge: HOME | End: 2021-06-04
Payer: MEDICARE

## 2021-06-04 DIAGNOSIS — Z45.2 ENCOUNTER FOR CENTRAL LINE CARE: Primary | ICD-10-CM

## 2021-06-04 PROCEDURE — 6360000002 HC RX W HCPCS: Performed by: NURSE PRACTITIONER

## 2021-06-04 RX ORDER — SODIUM CHLORIDE 0.9 % (FLUSH) 0.9 %
10 SYRINGE (ML) INJECTION PRN
Status: CANCELLED | OUTPATIENT
Start: 2021-06-04

## 2021-06-04 RX ORDER — SODIUM CHLORIDE 0.9 % (FLUSH) 0.9 %
20 SYRINGE (ML) INJECTION PRN
Status: CANCELLED | OUTPATIENT
Start: 2021-06-04

## 2021-06-04 RX ORDER — SODIUM CHLORIDE 0.9 % (FLUSH) 0.9 %
20 SYRINGE (ML) INJECTION PRN
Status: DISCONTINUED | OUTPATIENT
Start: 2021-06-04 | End: 2021-06-05 | Stop reason: HOSPADM

## 2021-06-04 RX ORDER — HEPARIN SODIUM (PORCINE) LOCK FLUSH IV SOLN 100 UNIT/ML 100 UNIT/ML
500 SOLUTION INTRAVENOUS PRN
Status: DISCONTINUED | OUTPATIENT
Start: 2021-06-04 | End: 2021-06-05 | Stop reason: HOSPADM

## 2021-06-04 RX ORDER — HEPARIN SODIUM (PORCINE) LOCK FLUSH IV SOLN 100 UNIT/ML 100 UNIT/ML
500 SOLUTION INTRAVENOUS PRN
Status: CANCELLED | OUTPATIENT
Start: 2021-06-04

## 2021-06-04 RX ADMIN — HEPARIN 500 UNITS: 100 SYRINGE at 12:35

## 2021-06-10 ENCOUNTER — TRANSCRIBE ORDERS (OUTPATIENT)
Dept: ADMINISTRATIVE | Facility: HOSPITAL | Age: 71
End: 2021-06-10

## 2021-06-10 DIAGNOSIS — Z11.59 SCREENING FOR VIRAL DISEASE: Primary | ICD-10-CM

## 2021-06-14 ENCOUNTER — LAB (OUTPATIENT)
Dept: LAB | Facility: HOSPITAL | Age: 71
End: 2021-06-14

## 2021-06-14 LAB — SARS-COV-2 ORF1AB RESP QL NAA+PROBE: NOT DETECTED

## 2021-06-14 PROCEDURE — U0004 COV-19 TEST NON-CDC HGH THRU: HCPCS | Performed by: NEUROLOGICAL SURGERY

## 2021-06-14 PROCEDURE — U0005 INFEC AGEN DETEC AMPLI PROBE: HCPCS | Performed by: NEUROLOGICAL SURGERY

## 2021-06-14 PROCEDURE — C9803 HOPD COVID-19 SPEC COLLECT: HCPCS | Performed by: NEUROLOGICAL SURGERY

## 2021-06-16 ENCOUNTER — ANESTHESIA EVENT (OUTPATIENT)
Dept: PERIOP | Facility: HOSPITAL | Age: 71
End: 2021-06-16

## 2021-06-16 ENCOUNTER — ANESTHESIA (OUTPATIENT)
Dept: PERIOP | Facility: HOSPITAL | Age: 71
End: 2021-06-16

## 2021-06-16 ENCOUNTER — HOSPITAL ENCOUNTER (OUTPATIENT)
Facility: HOSPITAL | Age: 71
Setting detail: HOSPITAL OUTPATIENT SURGERY
Discharge: HOME | End: 2021-06-16
Attending: NEUROLOGICAL SURGERY | Admitting: NEUROLOGICAL SURGERY

## 2021-06-16 VITALS
HEART RATE: 66 BPM | TEMPERATURE: 97.8 F | RESPIRATION RATE: 16 BRPM | DIASTOLIC BLOOD PRESSURE: 73 MMHG | OXYGEN SATURATION: 94 % | SYSTOLIC BLOOD PRESSURE: 127 MMHG

## 2021-06-16 DIAGNOSIS — G89.4 CHRONIC PAIN SYNDROME: ICD-10-CM

## 2021-06-16 LAB
ABO GROUP BLD: NORMAL
BLD GP AB SCN SERPL QL: NEGATIVE
RH BLD: POSITIVE
T&S EXPIRATION DATE: NORMAL

## 2021-06-16 PROCEDURE — 25010000003 CEFAZOLIN PER 500 MG: Performed by: NURSE ANESTHETIST, CERTIFIED REGISTERED

## 2021-06-16 PROCEDURE — 86922 COMPATIBILITY TEST ANTIGLOB: CPT

## 2021-06-16 PROCEDURE — 25010000003 CEFAZOLIN PER 500 MG: Performed by: NEUROLOGICAL SURGERY

## 2021-06-16 PROCEDURE — 86920 COMPATIBILITY TEST SPIN: CPT

## 2021-06-16 PROCEDURE — 86902 BLOOD TYPE ANTIGEN DONOR EA: CPT

## 2021-06-16 PROCEDURE — 86900 BLOOD TYPING SEROLOGIC ABO: CPT | Performed by: NEUROLOGICAL SURGERY

## 2021-06-16 PROCEDURE — 25010000002 PROPOFOL 10 MG/ML EMULSION: Performed by: NURSE ANESTHETIST, CERTIFIED REGISTERED

## 2021-06-16 PROCEDURE — S0260 H&P FOR SURGERY: HCPCS | Performed by: NEUROLOGICAL SURGERY

## 2021-06-16 PROCEDURE — 62350 IMPLANT SPINAL CANAL CATH: CPT | Performed by: NEUROLOGICAL SURGERY

## 2021-06-16 PROCEDURE — C1787 PATIENT PROGR, NEUROSTIM: HCPCS | Performed by: NEUROLOGICAL SURGERY

## 2021-06-16 PROCEDURE — 86850 RBC ANTIBODY SCREEN: CPT | Performed by: NEUROLOGICAL SURGERY

## 2021-06-16 PROCEDURE — C1755 CATHETER, INTRASPINAL: HCPCS | Performed by: NEUROLOGICAL SURGERY

## 2021-06-16 PROCEDURE — 86901 BLOOD TYPING SEROLOGIC RH(D): CPT | Performed by: NEUROLOGICAL SURGERY

## 2021-06-16 PROCEDURE — 62362 IMPLANT SPINE INFUSION PUMP: CPT | Performed by: NEUROLOGICAL SURGERY

## 2021-06-16 PROCEDURE — C1772 INFUSION PUMP, PROGRAMMABLE: HCPCS | Performed by: NEUROLOGICAL SURGERY

## 2021-06-16 PROCEDURE — 25010000002 ONDANSETRON PER 1 MG: Performed by: NURSE ANESTHETIST, CERTIFIED REGISTERED

## 2021-06-16 DEVICE — PUMP NEURO PROGRAM SYNCHROMED2 FLTR 20ML: Type: IMPLANTABLE DEVICE | Site: ABDOMEN | Status: FUNCTIONAL

## 2021-06-16 DEVICE — CATH INTRATHCL ASCENDA SHRT 1PC16G114.3: Type: IMPLANTABLE DEVICE | Site: BACK | Status: FUNCTIONAL

## 2021-06-16 RX ORDER — NALOXONE HCL 0.4 MG/ML
0.4 VIAL (ML) INJECTION AS NEEDED
Status: DISCONTINUED | OUTPATIENT
Start: 2021-06-16 | End: 2021-06-16 | Stop reason: HOSPADM

## 2021-06-16 RX ORDER — ONDANSETRON 2 MG/ML
4 INJECTION INTRAMUSCULAR; INTRAVENOUS ONCE AS NEEDED
Status: DISCONTINUED | OUTPATIENT
Start: 2021-06-16 | End: 2021-06-16 | Stop reason: HOSPADM

## 2021-06-16 RX ORDER — SODIUM CHLORIDE 0.9 % (FLUSH) 0.9 %
10 SYRINGE (ML) INJECTION AS NEEDED
Status: DISCONTINUED | OUTPATIENT
Start: 2021-06-16 | End: 2021-06-16 | Stop reason: HOSPADM

## 2021-06-16 RX ORDER — LIDOCAINE HYDROCHLORIDE 10 MG/ML
0.5 INJECTION, SOLUTION EPIDURAL; INFILTRATION; INTRACAUDAL; PERINEURAL ONCE AS NEEDED
Status: DISCONTINUED | OUTPATIENT
Start: 2021-06-16 | End: 2021-06-16 | Stop reason: HOSPADM

## 2021-06-16 RX ORDER — SODIUM CHLORIDE 0.9 % (FLUSH) 0.9 %
10 SYRINGE (ML) INJECTION EVERY 12 HOURS SCHEDULED
Status: DISCONTINUED | OUTPATIENT
Start: 2021-06-16 | End: 2021-06-16 | Stop reason: HOSPADM

## 2021-06-16 RX ORDER — FENTANYL CITRATE 50 UG/ML
25 INJECTION, SOLUTION INTRAMUSCULAR; INTRAVENOUS
Status: DISCONTINUED | OUTPATIENT
Start: 2021-06-16 | End: 2021-06-16 | Stop reason: HOSPADM

## 2021-06-16 RX ORDER — LIDOCAINE HYDROCHLORIDE 20 MG/ML
INJECTION, SOLUTION EPIDURAL; INFILTRATION; INTRACAUDAL; PERINEURAL AS NEEDED
Status: DISCONTINUED | OUTPATIENT
Start: 2021-06-16 | End: 2021-06-16 | Stop reason: SURG

## 2021-06-16 RX ORDER — FLUMAZENIL 0.1 MG/ML
0.2 INJECTION INTRAVENOUS AS NEEDED
Status: DISCONTINUED | OUTPATIENT
Start: 2021-06-16 | End: 2021-06-16 | Stop reason: HOSPADM

## 2021-06-16 RX ORDER — CEFAZOLIN SODIUM 1 G/3ML
INJECTION, POWDER, FOR SOLUTION INTRAMUSCULAR; INTRAVENOUS AS NEEDED
Status: DISCONTINUED | OUTPATIENT
Start: 2021-06-16 | End: 2021-06-16 | Stop reason: SURG

## 2021-06-16 RX ORDER — ROCURONIUM BROMIDE 10 MG/ML
INJECTION, SOLUTION INTRAVENOUS AS NEEDED
Status: DISCONTINUED | OUTPATIENT
Start: 2021-06-16 | End: 2021-06-16 | Stop reason: SURG

## 2021-06-16 RX ORDER — BUPIVACAINE HCL/0.9 % NACL/PF 0.1 %
2 PLASTIC BAG, INJECTION (ML) EPIDURAL ONCE
Status: DISCONTINUED | OUTPATIENT
Start: 2021-06-16 | End: 2021-06-16 | Stop reason: HOSPADM

## 2021-06-16 RX ORDER — PROPOFOL 10 MG/ML
VIAL (ML) INTRAVENOUS AS NEEDED
Status: DISCONTINUED | OUTPATIENT
Start: 2021-06-16 | End: 2021-06-16 | Stop reason: SURG

## 2021-06-16 RX ORDER — DEXTROSE MONOHYDRATE 25 G/50ML
12.5 INJECTION, SOLUTION INTRAVENOUS AS NEEDED
Status: DISCONTINUED | OUTPATIENT
Start: 2021-06-16 | End: 2021-06-16 | Stop reason: HOSPADM

## 2021-06-16 RX ORDER — MAGNESIUM HYDROXIDE 1200 MG/15ML
LIQUID ORAL AS NEEDED
Status: DISCONTINUED | OUTPATIENT
Start: 2021-06-16 | End: 2021-06-16 | Stop reason: HOSPADM

## 2021-06-16 RX ORDER — SODIUM CHLORIDE 0.9 % (FLUSH) 0.9 %
3 SYRINGE (ML) INJECTION AS NEEDED
Status: DISCONTINUED | OUTPATIENT
Start: 2021-06-16 | End: 2021-06-16 | Stop reason: HOSPADM

## 2021-06-16 RX ORDER — LABETALOL HYDROCHLORIDE 5 MG/ML
5 INJECTION, SOLUTION INTRAVENOUS
Status: DISCONTINUED | OUTPATIENT
Start: 2021-06-16 | End: 2021-06-16 | Stop reason: HOSPADM

## 2021-06-16 RX ORDER — OXYCODONE AND ACETAMINOPHEN 10; 325 MG/1; MG/1
1 TABLET ORAL ONCE AS NEEDED
Status: COMPLETED | OUTPATIENT
Start: 2021-06-16 | End: 2021-06-16

## 2021-06-16 RX ORDER — SODIUM CHLORIDE, SODIUM LACTATE, POTASSIUM CHLORIDE, CALCIUM CHLORIDE 600; 310; 30; 20 MG/100ML; MG/100ML; MG/100ML; MG/100ML
1000 INJECTION, SOLUTION INTRAVENOUS CONTINUOUS
Status: DISCONTINUED | OUTPATIENT
Start: 2021-06-16 | End: 2021-06-16 | Stop reason: HOSPADM

## 2021-06-16 RX ORDER — ONDANSETRON 2 MG/ML
INJECTION INTRAMUSCULAR; INTRAVENOUS AS NEEDED
Status: DISCONTINUED | OUTPATIENT
Start: 2021-06-16 | End: 2021-06-16 | Stop reason: SURG

## 2021-06-16 RX ORDER — IBUPROFEN 600 MG/1
600 TABLET ORAL ONCE AS NEEDED
Status: DISCONTINUED | OUTPATIENT
Start: 2021-06-16 | End: 2021-06-16 | Stop reason: HOSPADM

## 2021-06-16 RX ORDER — OXYCODONE AND ACETAMINOPHEN 7.5; 325 MG/1; MG/1
2 TABLET ORAL EVERY 4 HOURS PRN
Status: DISCONTINUED | OUTPATIENT
Start: 2021-06-16 | End: 2021-06-16 | Stop reason: HOSPADM

## 2021-06-16 RX ORDER — SODIUM CHLORIDE, SODIUM LACTATE, POTASSIUM CHLORIDE, CALCIUM CHLORIDE 600; 310; 30; 20 MG/100ML; MG/100ML; MG/100ML; MG/100ML
9 INJECTION, SOLUTION INTRAVENOUS CONTINUOUS
Status: DISCONTINUED | OUTPATIENT
Start: 2021-06-16 | End: 2021-06-16 | Stop reason: HOSPADM

## 2021-06-16 RX ADMIN — OXYCODONE AND ACETAMINOPHEN 1 TABLET: 325; 10 TABLET ORAL at 14:04

## 2021-06-16 RX ADMIN — PROPOFOL 100 MG: 10 INJECTION, EMULSION INTRAVENOUS at 11:58

## 2021-06-16 RX ADMIN — PROPOFOL 150 MG: 10 INJECTION, EMULSION INTRAVENOUS at 11:56

## 2021-06-16 RX ADMIN — PROPOFOL 100 MCG/KG/MIN: 10 INJECTION, EMULSION INTRAVENOUS at 11:55

## 2021-06-16 RX ADMIN — ONDANSETRON 4 MG: 2 INJECTION INTRAMUSCULAR; INTRAVENOUS at 13:06

## 2021-06-16 RX ADMIN — ROCURONIUM BROMIDE 30 MG: 10 INJECTION INTRAVENOUS at 11:59

## 2021-06-16 RX ADMIN — CEFAZOLIN 2 G: 330 INJECTION, POWDER, FOR SOLUTION INTRAMUSCULAR; INTRAVENOUS at 11:59

## 2021-06-16 RX ADMIN — LIDOCAINE HYDROCHLORIDE 100 MG: 20 INJECTION, SOLUTION EPIDURAL; INFILTRATION; INTRACAUDAL; PERINEURAL at 11:55

## 2021-06-16 RX ADMIN — SODIUM CHLORIDE, POTASSIUM CHLORIDE, SODIUM LACTATE AND CALCIUM CHLORIDE 1000 ML: 600; 310; 30; 20 INJECTION, SOLUTION INTRAVENOUS at 10:01

## 2021-06-16 NOTE — ANESTHESIA PREPROCEDURE EVALUATION
Anesthesia Evaluation     Patient summary reviewed   no history of anesthetic complications:  NPO Solid Status: > 8 hours             Airway   Mallampati: II  TM distance: >3 FB  Neck ROM: full  Dental      Pulmonary    (-) COPD, asthma, sleep apnea, not a smoker  Cardiovascular   Exercise tolerance: good (4-7 METS)    (+) hypertension, CAD, cardiac stents (2018)   (-) pacemaker, past MI, angina      Neuro/Psych  (-) seizures, TIA, CVA  GI/Hepatic/Renal/Endo    (+)  GERD,  renal disease CRI,   (-) liver disease, diabetes    Musculoskeletal     (+) back pain,   Abdominal    Substance History       Comment: Methadone for pain     OB/GYN          Other                        Anesthesia Plan    ASA 3     general     intravenous induction     Anesthetic plan, all risks, benefits, and alternatives have been provided, discussed and informed consent has been obtained with: patient.

## 2021-06-16 NOTE — ANESTHESIA POSTPROCEDURE EVALUATION
Patient: Jelani Angel    Procedure Summary     Date: 06/16/21 Room / Location:  [facility] OR  /  [facility]    Anesthesia Start: 1154 Anesthesia Stop: 1328    Procedure: PAIN PUMP INSERTION NEW SYSTEM (Right ) Diagnosis:       Chronic pain syndrome      (Chronic pain syndrome [G89.4])    Surgeons: Adrian Velasquez MD Provider: Ethan Goff CRNA    Anesthesia Type: general ASA Status: 3          Anesthesia Type: general    Vitals  Vitals Value Taken Time   /78 06/16/21 1407   Temp 97.8 °F (36.6 °C) 06/16/21 1405   Pulse 72 06/16/21 1410   Resp 15 06/16/21 1405   SpO2 95 % 06/16/21 1410   Vitals shown include unvalidated device data.        Post Anesthesia Care and Evaluation    PONV Status: none  Comments: Patient d/c from PACU prior to anes eval based on Emy score.  Please see RN notes for details of d/c criteria.    Blood pressure 127/73, pulse 66, temperature 97.8 °F (36.6 °C), temperature source Temporal, resp. rate 16, SpO2 94 %.

## 2021-06-16 NOTE — ANESTHESIA PROCEDURE NOTES
Airway  Airway not difficult    General Information and Staff    Patient location during procedure: OR  CRNA: Ethan Goff CRNA    Indications and Patient Condition  Indications for airway management: airway protection    Preoxygenated: yes  Mask difficulty assessment: 1 - vent by mask    Final Airway Details  Final airway type: endotracheal airway      Successful airway: ETT  Cuffed: yes   Successful intubation technique: video laryngoscopy  Blade: Fercho  Blade size: 3  ETT size (mm): 7.5  Cormack-Lehane Classification: grade IIa - partial view of glottis  Placement verified by: chest auscultation and capnometry   Cuff volume (mL): 8  Measured from: teeth  ETT/EBT  to teeth (cm): 22  Number of attempts at approach: 1  Assessment: lips, teeth, and gum same as pre-op and atraumatic intubation

## 2021-06-16 NOTE — OP NOTE
Procedure Note  Preop Diagnosis: Chronic pain syndrome [G89.4]    Post-Op Diagnosis Codes:     * Chronic pain syndrome [G89.4]     Procedure Name: Implantation of intrathecal pain pump        Indications:   A clinical exam and trial revealed findings of benefit from intrathecal pain medication. The patient now presents for implantation of pump system after discussing therapeutic alternatives.            Surgeon: Adrian Velasquez MD     Assistants: None    Anesthesia: General endotracheal anesthesia    ASA Class: 3     Procedure Details   After obtaining informed consent, having the risks and benefits of the procedure explained including but not limited to infection, bleeding, paralysis, spinal fluid leak, bowel or bladder incontinence, stroke, coma, and death, the patient was brought to the operating room.  The patient was given general anesthesia via an endotracheal tube. The patient was put into the left lateral recumbent position.  The right anterior abdomen and lumbar spine were then prepped and draped in standard sterile fashion.  The midline lumbar area was then identified using fluoroscopy.  A preplanned midline incision was marked with an indelible marker.  The preplanned midline incision was then infiltrated with Marcaine and epinephrine.  A 10 blade scalpel was used to make an incision to the dermis and epidermis.  Bovie cautery was used to extend the incision down to the subcutaneous and soft tissues to the lumbosacral fascia.  Under fluoroscopic guidance a Tuohy needle was then advanced under the lamina of L3 and into the intrathecal space.  There was spontaneous flow of spinal fluid.  An Ethibond suture was used to create a pursestring around the needle.  The intrathecal catheter was then advanced through the Tuohy needle to about the level of  T10. The stylette was removed.  There was spontaneous flow of spinal fluid through the catheter.  The Tuohy needle was then removed.  The butterfly anchoring  device was then deployed over the catheter.  The pursestring was then cinched and knotted.  Two anchoring Ethibond sutures were then used to anchor the butterfly to the lumbosacral fascia.  The left anterior abdominal incision was then infiltrated with Marcaine and epinephrine.  A 10 blade scalpel was used to make an incision at the dermis and epidermis.  Bovie cautery was used to  create a pocket in the subcutaneous tissues.  A tunneling device was then passed from the anterior incision to the lumbar incision.  The catheter was then passed subcutaneously to the right anterior abdominal incision.  The catheter was then trimmed.  It was attached to the extension tubing.  The catheter system was then attached to a new pump system.  The side port was tapped with good flow of spinal fluid.  The pump was then implanted into the pocket.  It was anchored to the surrounding fascia using 2 Ethibond sutures.  Both wounds were then copiously irrigated with antibiotic solution.  They were inspected for hemostasis.  The subcutaneous tissues of both wounds were closed using a series of inverted interrupted 2-0 Vicryl sutures.  And the skin at each wound was closed using running 4-0 Monocryl subcuticular.  All sponge, needle and instrument counts were correct at the end of the procedure.  The patient was extubated in stable condition and returned to recovery room with about 20 mL of blood loss.        Findings:  Implanted intrathecal pain pump    Estimated Blood Loss:  20           Drains: none           Total IV Fluids: ml           Specimens: none           Implants:   Implant Name Type Inv. Item Serial No.  Lot No. LRB No. Used Action   [facility] [id number] - [id number] Implant [facility] [id number]  [employer] [id number] Right 1 Implanted   PUMP NEURO PROGRAM SYNCHROMED2 FLTR 20ML - [id number] Implant PUMP NEURO PROGRAM SYNCHROMED2 FLTR 20ML  [employer] [id number] Right 1 Implanted               Complications:  none           Disposition: PACU - hemodynamically stable.           Condition: stable        Adrian Velasquez MD

## 2021-06-16 NOTE — DISCHARGE INSTRUCTIONS
YOUR NEXT PAIN MEDICATION IS DUE AT______________        Moderate Conscious Sedation, Adult, Care After  Refer to this sheet in the next few weeks. These instructions provide you with information on caring for yourself after your procedure. Your health care provider may also give you more specific instructions. Your treatment has been planned according to current medical practices, but problems sometimes occur. Call your health care provider if you have any problems or questions after your procedure.  WHAT TO EXPECT AFTER THE PROCEDURE    After your procedure:  · You may feel sleepy, clumsy, and have poor balance for several hours.  · Vomiting may occur if you eat too soon after the procedure.  HOME CARE INSTRUCTIONS  · Do not participate in any activities where you could become injured for at least 24 hours. Do not:  ¨ Drive.  ¨ Swim.  ¨ Ride a bicycle.  ¨ Operate heavy machinery.  ¨ Cook.  ¨ Use power tools.  ¨ Climb ladders.  ¨ Work from a high place.  · Do not make important decisions or sign legal documents until you are improved.  · If you vomit, drink water, juice, or soup when you can drink without vomiting. Make sure you have little or no nausea before eating solid foods.  · Only take over-the-counter or prescription medicines for pain, discomfort, or fever as directed by your health care provider.  · Make sure you and your family fully understand everything about the medicines given to you, including what side effects may occur.  · You should not drink alcohol, take sleeping pills, or take medicines that cause drowsiness for at least 24 hours.  · If you smoke, do not smoke without supervision.  · If you are feeling better, you may resume normal activities 24 hours after you were sedated.  · Keep all appointments with your health care provider.  SEEK MEDICAL CARE IF:  · Your skin is pale or bluish in color.  · You continue to feel nauseous or vomit.  · Your pain is getting worse and is not helped by  medicine.  · You have bleeding or swelling.  · You are still sleepy or feeling clumsy after 24 hours.  SEEK IMMEDIATE MEDICAL CARE IF:  · You develop a rash.  · You have difficulty breathing.  · You develop any type of allergic problem.  · You have a fever.  MAKE SURE YOU:  · Understand these instructions.  · Will watch your condition.  · Will get help right away if you are not doing well or get worse.     This information is not intended to replace advice given to you by your health care provider. Make sure you discuss any questions you have with your health care provider.     Document Released: 10/08/2014 Document Revised: 01/08/2016 Document Reviewed: 10/08/2014  [employer] Interactive Patient Education ©2016 [employer]         CALL YOUR PHYSICIAN IF YOU EXPERIENCE  INCREASED PAIN NOT HELPED BY YOUR PAIN MEDICATION.        Fall Prevention in the Home      Falls can cause injuries. They can happen to people of all ages. There are many things you can do to make your home safe and to help prevent falls.    WHAT CAN I DO ON THE OUTSIDE OF MY HOME?  · Regularly fix the edges of walkways and driveways and fix any cracks.  · Remove anything that might make you trip as you walk through a door, such as a raised step or threshold.  · Trim any bushes or trees on the path to your home.  · Use bright outdoor lighting.  · Clear any walking paths of anything that might make someone trip, such as rocks or tools.  · Regularly check to see if handrails are loose or broken. Make sure that both sides of any steps have handrails.  · Any raised decks and porches should have guardrails on the edges.  · Have any leaves, snow, or ice cleared regularly.  · Use sand or salt on walking paths during winter.  · Clean up any spills in your garage right away. This includes oil or grease spills.  WHAT CAN I DO IN THE BATHROOM?    · Use night lights.  · Install grab bars by the toilet and in the tub and shower. Do not use towel bars as grab  bars.  · Use non-skid mats or decals in the tub or shower.  · If you need to sit down in the shower, use a plastic, non-slip stool.  · Keep the floor dry. Clean up any water that spills on the floor as soon as it happens.  · Remove soap buildup in the tub or shower regularly.  · Attach bath mats securely with double-sided non-slip rug tape.  · Do not have throw rugs and other things on the floor that can make you trip.  WHAT CAN I DO IN THE BEDROOM?  · Use night lights.  · Make sure that you have a light by your bed that is easy to reach.  · Do not use any sheets or blankets that are too big for your bed. They should not hang down onto the floor.  · Have a firm chair that has side arms. You can use this for support while you get dressed.  · Do not have throw rugs and other things on the floor that can make you trip.  WHAT CAN I DO IN THE KITCHEN?  · Clean up any spills right away.  · Avoid walking on wet floors.  · Keep items that you use a lot in easy-to-reach places.  · If you need to reach something above you, use a strong step stool that has a grab bar.  · Keep electrical cords out of the way.  · Do not use floor polish or wax that makes floors slippery. If you must use wax, use non-skid floor wax.  · Do not have throw rugs and other things on the floor that can make you trip.  WHAT CAN I DO WITH MY STAIRS?  · Do not leave any items on the stairs.  · Make sure that there are handrails on both sides of the stairs and use them. Fix handrails that are broken or loose. Make sure that handrails are as long as the stairways.  · Check any carpeting to make sure that it is firmly attached to the stairs. Fix any carpet that is loose or worn.  · Avoid having throw rugs at the top or bottom of the stairs. If you do have throw rugs, attach them to the floor with carpet tape.  · Make sure that you have a light switch at the top of the stairs and the bottom of the stairs. If you do not have them, ask someone to add them for  you.  WHAT ELSE CAN I DO TO HELP PREVENT FALLS?  · Wear shoes that:  ¨ Do not have high heels.  ¨ Have rubber bottoms.  ¨ Are comfortable and fit you well.  ¨ Are closed at the toe. Do not wear sandals.  · If you use a stepladder:  ¨ Make sure that it is fully opened. Do not climb a closed stepladder.  ¨ Make sure that both sides of the stepladder are locked into place.  ¨ Ask someone to hold it for you, if possible.  · Clearly nila and make sure that you can see:  ¨ Any grab bars or handrails.  ¨ First and last steps.  ¨ Where the edge of each step is.  · Use tools that help you move around (mobility aids) if they are needed. These include:  ¨ Canes.  ¨ Walkers.  ¨ Scooters.  ¨ Crutches.  · Turn on the lights when you go into a dark area. Replace any light bulbs as soon as they burn out.  · Set up your furniture so you have a clear path. Avoid moving your furniture around.  · If any of your floors are uneven, fix them.  · If there are any pets around you, be aware of where they are.  · Review your medicines with your doctor. Some medicines can make you feel dizzy. This can increase your chance of falling.  Ask your doctor what other things that you can do to help prevent falls.     This information is not intended to replace advice given to you by your health care provider. Make sure you discuss any questions you have with your health care provider.     Document Released: 10/14/2010 Document Revised: 05/03/2016 Document Reviewed: 01/22/2016  Elsevier Interactive Patient Education ©2016 [employer]       YOUR NEXT PAIN MEDICATION IS DUE AT______________        Moderate Conscious Sedation, Adult, Care After  Refer to this sheet in the next few weeks. These instructions provide you with information on caring for yourself after your procedure. Your health care provider may also give you more specific instructions. Your treatment has been planned according to current medical practices, but problems sometimes occur.  Call your health care provider if you have any problems or questions after your procedure.  WHAT TO EXPECT AFTER THE PROCEDURE    After your procedure:  · You may feel sleepy, clumsy, and have poor balance for several hours.  · Vomiting may occur if you eat too soon after the procedure.  HOME CARE INSTRUCTIONS  · Do not participate in any activities where you could become injured for at least 24 hours. Do not:  ¨ Drive.  ¨ Swim.  ¨ Ride a bicycle.  ¨ Operate heavy machinery.  ¨ Cook.  ¨ Use power tools.  ¨ Climb ladders.  ¨ Work from a high place.  · Do not make important decisions or sign legal documents until you are improved.  · If you vomit, drink water, juice, or soup when you can drink without vomiting. Make sure you have little or no nausea before eating solid foods.  · Only take over-the-counter or prescription medicines for pain, discomfort, or fever as directed by your health care provider.  · Make sure you and your family fully understand everything about the medicines given to you, including what side effects may occur.  · You should not drink alcohol, take sleeping pills, or take medicines that cause drowsiness for at least 24 hours.  · If you smoke, do not smoke without supervision.  · If you are feeling better, you may resume normal activities 24 hours after you were sedated.  · Keep all appointments with your health care provider.  SEEK MEDICAL CARE IF:  · Your skin is pale or bluish in color.  · You continue to feel nauseous or vomit.  · Your pain is getting worse and is not helped by medicine.  · You have bleeding or swelling.  · You are still sleepy or feeling clumsy after 24 hours.  SEEK IMMEDIATE MEDICAL CARE IF:  · You develop a rash.  · You have difficulty breathing.  · You develop any type of allergic problem.  · You have a fever.  MAKE SURE YOU:  · Understand these instructions.  · Will watch your condition.  · Will get help right away if you are not doing well or get worse.     This  information is not intended to replace advice given to you by your health care provider. Make sure you discuss any questions you have with your health care provider.     Document Released: 10/08/2014 Document Revised: 01/08/2016 Document Reviewed: 10/08/2014  [employer] Interactive Patient Education ©2016 [employer]         CALL YOUR PHYSICIAN IF YOU EXPERIENCE  INCREASED PAIN NOT HELPED BY YOUR PAIN MEDICATION.        Fall Prevention in the Home      Falls can cause injuries. They can happen to people of all ages. There are many things you can do to make your home safe and to help prevent falls.    WHAT CAN I DO ON THE OUTSIDE OF MY HOME?  · Regularly fix the edges of walkways and driveways and fix any cracks.  · Remove anything that might make you trip as you walk through a door, such as a raised step or threshold.  · Trim any bushes or trees on the path to your home.  · Use bright outdoor lighting.  · Clear any walking paths of anything that might make someone trip, such as rocks or tools.  · Regularly check to see if handrails are loose or broken. Make sure that both sides of any steps have handrails.  · Any raised decks and porches should have guardrails on the edges.  · Have any leaves, snow, or ice cleared regularly.  · Use sand or salt on walking paths during winter.  · Clean up any spills in your garage right away. This includes oil or grease spills.  WHAT CAN I DO IN THE BATHROOM?    · Use night lights.  · Install grab bars by the toilet and in the tub and shower. Do not use towel bars as grab bars.  · Use non-skid mats or decals in the tub or shower.  · If you need to sit down in the shower, use a plastic, non-slip stool.  · Keep the floor dry. Clean up any water that spills on the floor as soon as it happens.  · Remove soap buildup in the tub or shower regularly.  · Attach bath mats securely with double-sided non-slip rug tape.  · Do not have throw rugs and other things on the floor that can make you  trip.  WHAT CAN I DO IN THE BEDROOM?  · Use night lights.  · Make sure that you have a light by your bed that is easy to reach.  · Do not use any sheets or blankets that are too big for your bed. They should not hang down onto the floor.  · Have a firm chair that has side arms. You can use this for support while you get dressed.  · Do not have throw rugs and other things on the floor that can make you trip.  WHAT CAN I DO IN THE KITCHEN?  · Clean up any spills right away.  · Avoid walking on wet floors.  · Keep items that you use a lot in easy-to-reach places.  · If you need to reach something above you, use a strong step stool that has a grab bar.  · Keep electrical cords out of the way.  · Do not use floor polish or wax that makes floors slippery. If you must use wax, use non-skid floor wax.  · Do not have throw rugs and other things on the floor that can make you trip.  WHAT CAN I DO WITH MY STAIRS?  · Do not leave any items on the stairs.  · Make sure that there are handrails on both sides of the stairs and use them. Fix handrails that are broken or loose. Make sure that handrails are as long as the stairways.  · Check any carpeting to make sure that it is firmly attached to the stairs. Fix any carpet that is loose or worn.  · Avoid having throw rugs at the top or bottom of the stairs. If you do have throw rugs, attach them to the floor with carpet tape.  · Make sure that you have a light switch at the top of the stairs and the bottom of the stairs. If you do not have them, ask someone to add them for you.  WHAT ELSE CAN I DO TO HELP PREVENT FALLS?  · Wear shoes that:  ¨ Do not have high heels.  ¨ Have rubber bottoms.  ¨ Are comfortable and fit you well.  ¨ Are closed at the toe. Do not wear sandals.  · If you use a stepladder:  ¨ Make sure that it is fully opened. Do not climb a closed stepladder.  ¨ Make sure that both sides of the stepladder are locked into place.  ¨ Ask someone to hold it for you, if  possible.  · Clearly nila and make sure that you can see:  ¨ Any grab bars or handrails.  ¨ First and last steps.  ¨ Where the edge of each step is.  · Use tools that help you move around (mobility aids) if they are needed. These include:  ¨ Canes.  ¨ Walkers.  ¨ Scooters.  ¨ Crutches.  · Turn on the lights when you go into a dark area. Replace any light bulbs as soon as they burn out.  · Set up your furniture so you have a clear path. Avoid moving your furniture around.  · If any of your floors are uneven, fix them.  · If there are any pets around you, be aware of where they are.  · Review your medicines with your doctor. Some medicines can make you feel dizzy. This can increase your chance of falling.  Ask your doctor what other things that you can do to help prevent falls.     This information is not intended to replace advice given to you by your health care provider. Make sure you discuss any questions you have with your health care provider.     Document Released: 10/14/2010 Document Revised: 05/03/2016 Document Reviewed: 01/22/2016  Elsevier Interactive Patient Education ©2016 [employer].                 .

## 2021-06-17 LAB

## 2021-06-30 ENCOUNTER — HOSPITAL ENCOUNTER (OUTPATIENT)
Dept: INFUSION THERAPY | Age: 71
Setting detail: INFUSION SERIES
Discharge: HOME | End: 2021-06-30
Payer: MEDICARE

## 2021-06-30 VITALS
SYSTOLIC BLOOD PRESSURE: 119 MMHG | OXYGEN SATURATION: 96 % | DIASTOLIC BLOOD PRESSURE: 67 MMHG | RESPIRATION RATE: 18 BRPM | TEMPERATURE: 97.6 F | HEART RATE: 70 BPM

## 2021-06-30 DIAGNOSIS — C19 COLORECTAL CANCER (HCC): Primary | ICD-10-CM

## 2021-06-30 LAB
ALBUMIN SERPL-MCNC: 3.8 G/DL (ref 3.5–5.2)
ALP BLD-CCNC: 128 U/L (ref 40–130)
ALT SERPL-CCNC: 9 U/L (ref 5–41)
ANION GAP SERPL CALCULATED.3IONS-SCNC: 10 MMOL/L (ref 7–19)
AST SERPL-CCNC: 20 U/L (ref 5–40)
BASOPHILS ABSOLUTE: 0.1 K/UL (ref 0–0.2)
BASOPHILS RELATIVE PERCENT: 0.9 % (ref 0–1)
BILIRUB SERPL-MCNC: 0.3 MG/DL (ref 0.2–1.2)
BUN BLDV-MCNC: 10 MG/DL (ref 8–23)
CALCIUM SERPL-MCNC: 8.8 MG/DL (ref 8.8–10.2)
CHLORIDE BLD-SCNC: 99 MMOL/L (ref 98–111)
CO2: 28 MMOL/L (ref 22–29)
CREAT SERPL-MCNC: 1.3 MG/DL (ref 0.5–1.2)
EOSINOPHILS ABSOLUTE: 0.3 K/UL (ref 0–0.6)
EOSINOPHILS RELATIVE PERCENT: 4.6 % (ref 0–5)
GFR AFRICAN AMERICAN: >59
GFR NON-AFRICAN AMERICAN: 55
GLUCOSE BLD-MCNC: 83 MG/DL (ref 74–109)
HCT VFR BLD CALC: 33.3 % (ref 42–52)
HEMOGLOBIN: 10.7 G/DL (ref 14–18)
IMMATURE GRANULOCYTES #: 0.1 K/UL
LYMPHOCYTES ABSOLUTE: 0.8 K/UL (ref 1.1–4.5)
LYMPHOCYTES RELATIVE PERCENT: 12 % (ref 20–40)
MCH RBC QN AUTO: 29.1 PG (ref 27–31)
MCHC RBC AUTO-ENTMCNC: 32.1 G/DL (ref 33–37)
MCV RBC AUTO: 90.5 FL (ref 80–94)
MONOCYTES ABSOLUTE: 0.4 K/UL (ref 0–0.9)
MONOCYTES RELATIVE PERCENT: 6.6 % (ref 0–10)
NEUTROPHILS ABSOLUTE: 4.9 K/UL (ref 1.5–7.5)
NEUTROPHILS RELATIVE PERCENT: 75 % (ref 50–65)
PDW BLD-RTO: 13.3 % (ref 11.5–14.5)
PLATELET # BLD: 260 K/UL (ref 130–400)
PMV BLD AUTO: 10.9 FL (ref 9.4–12.4)
POTASSIUM SERPL-SCNC: 4.9 MMOL/L (ref 3.5–5)
RBC # BLD: 3.68 M/UL (ref 4.7–6.1)
SODIUM BLD-SCNC: 137 MMOL/L (ref 136–145)
TOTAL PROTEIN: 6.6 G/DL (ref 6.6–8.7)
WBC # BLD: 6.5 K/UL (ref 4.8–10.8)

## 2021-06-30 PROCEDURE — 96416 CHEMO PROLONG INFUSE W/PUMP: CPT

## 2021-06-30 PROCEDURE — 96413 CHEMO IV INFUSION 1 HR: CPT

## 2021-06-30 PROCEDURE — 96366 THER/PROPH/DIAG IV INF ADDON: CPT

## 2021-06-30 PROCEDURE — 6370000000 HC RX 637 (ALT 250 FOR IP): Performed by: INTERNAL MEDICINE

## 2021-06-30 PROCEDURE — 85025 COMPLETE CBC W/AUTO DIFF WBC: CPT

## 2021-06-30 PROCEDURE — 6360000002 HC RX W HCPCS: Performed by: INTERNAL MEDICINE

## 2021-06-30 PROCEDURE — 2580000003 HC RX 258: Performed by: INTERNAL MEDICINE

## 2021-06-30 PROCEDURE — 80053 COMPREHEN METABOLIC PANEL: CPT

## 2021-06-30 RX ORDER — DEXAMETHASONE SODIUM PHOSPHATE 10 MG/ML
10 INJECTION, SOLUTION INTRAMUSCULAR; INTRAVENOUS ONCE
Status: COMPLETED | OUTPATIENT
Start: 2021-06-30 | End: 2021-06-30

## 2021-06-30 RX ORDER — DIPHENHYDRAMINE HYDROCHLORIDE 50 MG/ML
50 INJECTION INTRAMUSCULAR; INTRAVENOUS ONCE
Status: COMPLETED | OUTPATIENT
Start: 2021-06-30 | End: 2021-06-30

## 2021-06-30 RX ORDER — DIPHENHYDRAMINE HYDROCHLORIDE 50 MG/ML
50 INJECTION INTRAMUSCULAR; INTRAVENOUS ONCE
Status: CANCELLED | OUTPATIENT
Start: 2021-06-30 | End: 2021-06-30

## 2021-06-30 RX ORDER — SODIUM CHLORIDE 0.9 % (FLUSH) 0.9 %
10 SYRINGE (ML) INJECTION PRN
Status: CANCELLED | OUTPATIENT
Start: 2021-06-30

## 2021-06-30 RX ORDER — ACETAMINOPHEN 500 MG
1000 TABLET ORAL ONCE
Status: COMPLETED | OUTPATIENT
Start: 2021-06-30 | End: 2021-06-30

## 2021-06-30 RX ORDER — ACETAMINOPHEN 325 MG/1
1000 TABLET ORAL ONCE
Status: CANCELLED
Start: 2021-06-30 | End: 2021-06-30

## 2021-06-30 RX ORDER — SODIUM CHLORIDE 9 MG/ML
INJECTION, SOLUTION INTRAVENOUS CONTINUOUS
Status: CANCELLED | OUTPATIENT
Start: 2021-06-30

## 2021-06-30 RX ORDER — HEPARIN SODIUM (PORCINE) LOCK FLUSH IV SOLN 100 UNIT/ML 100 UNIT/ML
500 SOLUTION INTRAVENOUS PRN
Status: CANCELLED | OUTPATIENT
Start: 2021-06-30

## 2021-06-30 RX ORDER — EPINEPHRINE 1 MG/ML
0.3 INJECTION, SOLUTION, CONCENTRATE INTRAVENOUS PRN
Status: CANCELLED | OUTPATIENT
Start: 2021-06-30

## 2021-06-30 RX ORDER — SODIUM CHLORIDE 9 MG/ML
INJECTION, SOLUTION INTRAVENOUS ONCE
Status: CANCELLED | OUTPATIENT
Start: 2021-06-30 | End: 2021-06-30

## 2021-06-30 RX ORDER — METHYLPREDNISOLONE SODIUM SUCCINATE 125 MG/2ML
125 INJECTION, POWDER, LYOPHILIZED, FOR SOLUTION INTRAMUSCULAR; INTRAVENOUS ONCE
Status: CANCELLED | OUTPATIENT
Start: 2021-06-30 | End: 2021-06-30

## 2021-06-30 RX ORDER — DIPHENHYDRAMINE HYDROCHLORIDE 50 MG/ML
50 INJECTION INTRAMUSCULAR; INTRAVENOUS ONCE
Status: CANCELLED
Start: 2021-06-30 | End: 2021-06-30

## 2021-06-30 RX ORDER — SODIUM CHLORIDE 0.9 % (FLUSH) 0.9 %
5 SYRINGE (ML) INJECTION PRN
Status: CANCELLED | OUTPATIENT
Start: 2021-06-30

## 2021-06-30 RX ORDER — SODIUM CHLORIDE 9 MG/ML
INJECTION, SOLUTION INTRAVENOUS ONCE
Status: COMPLETED | OUTPATIENT
Start: 2021-06-30 | End: 2021-06-30

## 2021-06-30 RX ADMIN — LEUCOVORIN CALCIUM 700 MG: 350 INJECTION, POWDER, LYOPHILIZED, FOR SUSPENSION INTRAMUSCULAR; INTRAVENOUS at 13:31

## 2021-06-30 RX ADMIN — ACETAMINOPHEN 1000 MG: 500 TABLET ORAL at 11:20

## 2021-06-30 RX ADMIN — DIPHENHYDRAMINE HYDROCHLORIDE 50 MG: 50 INJECTION INTRAMUSCULAR; INTRAVENOUS at 11:20

## 2021-06-30 RX ADMIN — FLUOROURACIL 4340 MG: 50 INJECTION, SOLUTION INTRAVENOUS at 15:04

## 2021-06-30 RX ADMIN — SODIUM CHLORIDE: 9 INJECTION, SOLUTION INTRAVENOUS at 11:20

## 2021-06-30 RX ADMIN — ONDANSETRON 16 MG: 2 INJECTION INTRAMUSCULAR; INTRAVENOUS at 11:48

## 2021-06-30 RX ADMIN — PANITUMUMAB 430 MG: 400 SOLUTION INTRAVENOUS at 12:27

## 2021-06-30 RX ADMIN — DEXAMETHASONE SODIUM PHOSPHATE 10 MG: 10 INJECTION, SOLUTION INTRAMUSCULAR; INTRAVENOUS at 11:48

## 2021-06-30 ASSESSMENT — PAIN SCALES - GENERAL: PAINLEVEL_OUTOF10: 0

## 2021-07-02 ENCOUNTER — HOSPITAL ENCOUNTER (OUTPATIENT)
Dept: INFUSION THERAPY | Age: 71
Setting detail: INFUSION SERIES
Discharge: HOME | End: 2021-07-02
Payer: MEDICARE

## 2021-07-02 DIAGNOSIS — Z45.2 ENCOUNTER FOR CENTRAL LINE CARE: Primary | ICD-10-CM

## 2021-07-02 PROCEDURE — 2580000003 HC RX 258: Performed by: NURSE PRACTITIONER

## 2021-07-02 PROCEDURE — 96523 IRRIG DRUG DELIVERY DEVICE: CPT

## 2021-07-02 PROCEDURE — 6360000002 HC RX W HCPCS: Performed by: NURSE PRACTITIONER

## 2021-07-02 RX ORDER — SODIUM CHLORIDE 0.9 % (FLUSH) 0.9 %
20 SYRINGE (ML) INJECTION PRN
Status: CANCELLED | OUTPATIENT
Start: 2021-07-02

## 2021-07-02 RX ORDER — HEPARIN SODIUM (PORCINE) LOCK FLUSH IV SOLN 100 UNIT/ML 100 UNIT/ML
500 SOLUTION INTRAVENOUS PRN
Status: CANCELLED | OUTPATIENT
Start: 2021-07-02

## 2021-07-02 RX ORDER — SODIUM CHLORIDE 0.9 % (FLUSH) 0.9 %
20 SYRINGE (ML) INJECTION PRN
Status: DISCONTINUED | OUTPATIENT
Start: 2021-07-02 | End: 2021-07-03 | Stop reason: HOSPADM

## 2021-07-02 RX ORDER — HEPARIN SODIUM (PORCINE) LOCK FLUSH IV SOLN 100 UNIT/ML 100 UNIT/ML
500 SOLUTION INTRAVENOUS PRN
Status: DISCONTINUED | OUTPATIENT
Start: 2021-07-02 | End: 2021-07-03 | Stop reason: HOSPADM

## 2021-07-02 RX ORDER — SODIUM CHLORIDE 0.9 % (FLUSH) 0.9 %
10 SYRINGE (ML) INJECTION PRN
Status: CANCELLED | OUTPATIENT
Start: 2021-07-02

## 2021-07-02 RX ADMIN — SODIUM CHLORIDE, PRESERVATIVE FREE 20 ML: 5 INJECTION INTRAVENOUS at 13:47

## 2021-07-02 RX ADMIN — HEPARIN 500 UNITS: 100 SYRINGE at 13:47

## 2021-07-06 ENCOUNTER — TELEPHONE (OUTPATIENT)
Dept: UROLOGY | Age: 71
End: 2021-07-06

## 2021-07-06 NOTE — TELEPHONE ENCOUNTER
Patient daughter called and needs rescheduled patient Cystoscopy that for 7-7-21 please call daughter [phone]

## 2021-07-07 ENCOUNTER — OFFICE VISIT (OUTPATIENT)
Dept: NEUROSURGERY | Facility: CLINIC | Age: 71
End: 2021-07-07

## 2021-07-07 VITALS
HEIGHT: 65 IN | DIASTOLIC BLOOD PRESSURE: 68 MMHG | BODY MASS INDEX: 27.99 KG/M2 | WEIGHT: 168 LBS | SYSTOLIC BLOOD PRESSURE: 105 MMHG

## 2021-07-07 DIAGNOSIS — Z78.9 NON-SMOKER: ICD-10-CM

## 2021-07-07 DIAGNOSIS — E66.3 OVERWEIGHT WITH BODY MASS INDEX (BMI) OF 28 TO 28.9 IN ADULT: ICD-10-CM

## 2021-07-07 DIAGNOSIS — G89.4 CHRONIC PAIN SYNDROME: Primary | ICD-10-CM

## 2021-07-07 PROCEDURE — 99212 OFFICE O/P EST SF 10 MIN: CPT | Performed by: NURSE PRACTITIONER

## 2021-07-07 NOTE — PROGRESS NOTES
"  Chief complaint:   Chief Complaint   Patient presents with   • Post-op     Jelani if returning for a post op check for his 06/16/2021 pain pump procedure.         Subjective     HPI: This is a 71 y.o. male patient who went to the operating room on 6/16/2021 for a Pain Pump Insertion New System - Right. The patient is here in follow up today for postoperative visit.  He had previously undergone a T12 and L1 kyphoplasty in October 2020.  Patient had multiple back surgeries prior to this.  He says that he is not having any issues from the surgery at this time.  Denies any headaches.  Denies any nausea or vomiting.  He has not been to meet with pain management since the surgery and says that he is going to be making an appointment to go get medication put in the pain pump.  Overall he feels like he is doing well at this time        Review of Systems   Musculoskeletal: Positive for arthralgias, back pain and gait problem.   Neurological: Negative for numbness.   All other systems reviewed and are negative.        Objective      Vital Signs  /68   Ht 164 cm (64.57\")   Wt 76.2 kg (168 lb)   BMI 28.33 kg/m²     Physical Exam  Constitutional:       Appearance: Normal appearance. He is well-developed.   HENT:      Head: Normocephalic.   Eyes:      General: Lids are normal.      Conjunctiva/sclera: Conjunctivae normal.      Pupils: Pupils are equal, round, and reactive to light.   Cardiovascular:      Rate and Rhythm: Normal rate and regular rhythm.   Pulmonary:      Effort: Pulmonary effort is normal.      Breath sounds: Normal breath sounds.   Musculoskeletal:         General: Normal range of motion.      Cervical back: Normal range of motion.   Skin:     General: Skin is warm.   Neurological:      Mental Status: He is alert and oriented to person, place, and time.      GCS: GCS eye subscore is 4. GCS verbal subscore is 5. GCS motor subscore is 6.      Cranial Nerves: No cranial nerve deficit.      Sensory: No " sensory deficit.      Motor: Weakness present.      Gait: Gait abnormal.      Deep Tendon Reflexes: Reflexes are normal and symmetric. Reflexes normal.      Comments: Walks independently with a cane with preference to the right lower extremity   Psychiatric:         Speech: Speech normal.         Behavior: Behavior normal.         Thought Content: Thought content normal.       Incisions clean dry and intact    Neurologic Exam     Mental Status   Oriented to person, place, and time.   Speech: speech is normal     Cranial Nerves     CN III, IV, VI   Pupils are equal, round, and reactive to light.      Imaging review: No new imaging          Assessment/Plan: Patient is doing well from the surgery.  Is currently working with pain management to get his pain under better control.  He was told to call us for any further problems or concerns.  We will need to see him on an as-needed basis    Patient is a nonsmoker  The patient's Body mass index is 28.33 kg/m².. BMI is above normal parameters. Recommendations include: educational material and nutrition counseling   Advance Care Planning   ACP discussion was held with the patient during this visit. Patient does not have an advance directive, information provided.      Diagnoses and all orders for this visit:    1. Chronic pain syndrome (Primary)    2. Non-smoker    3. Overweight with body mass index (BMI) of 28 to 28.9 in adult        I discussed the patients findings and my recommendations with patient  Hugo López, VIGNESH  07/07/21  15:02 CDT

## 2021-07-07 NOTE — PATIENT INSTRUCTIONS

## 2021-07-12 ENCOUNTER — TELEPHONE (OUTPATIENT)
Dept: UROLOGY | Age: 71
End: 2021-07-12

## 2021-07-12 NOTE — TELEPHONE ENCOUNTER
Joaquin Perla called to reschedule a cysto. Please be advised that the best time to call him to accommodate their needs is Anytime. Thank you.

## 2021-07-21 ENCOUNTER — HOSPITAL ENCOUNTER (OUTPATIENT)
Dept: INFUSION THERAPY | Age: 71
Setting detail: INFUSION SERIES
Discharge: HOME | End: 2021-07-21
Payer: MEDICARE

## 2021-07-21 VITALS
HEIGHT: 65 IN | WEIGHT: 170 LBS | DIASTOLIC BLOOD PRESSURE: 73 MMHG | OXYGEN SATURATION: 97 % | TEMPERATURE: 97.9 F | SYSTOLIC BLOOD PRESSURE: 137 MMHG | RESPIRATION RATE: 17 BRPM | BODY MASS INDEX: 28.32 KG/M2 | HEART RATE: 67 BPM

## 2021-07-21 DIAGNOSIS — C19 COLORECTAL CANCER (HCC): Primary | ICD-10-CM

## 2021-07-21 DIAGNOSIS — C18.9 METASTASIS FROM COLON CANCER (HCC): ICD-10-CM

## 2021-07-21 DIAGNOSIS — C79.9 METASTASIS FROM COLON CANCER (HCC): ICD-10-CM

## 2021-07-21 LAB
ALBUMIN SERPL-MCNC: 3.9 G/DL (ref 3.5–5.2)
ALP BLD-CCNC: 116 U/L (ref 40–130)
ALT SERPL-CCNC: 9 U/L (ref 5–41)
ANION GAP SERPL CALCULATED.3IONS-SCNC: 10 MMOL/L (ref 7–19)
AST SERPL-CCNC: 19 U/L (ref 5–40)
BASOPHILS ABSOLUTE: 0.1 K/UL (ref 0–0.2)
BASOPHILS RELATIVE PERCENT: 1.3 % (ref 0–1)
BILIRUB SERPL-MCNC: 0.3 MG/DL (ref 0.2–1.2)
BUN BLDV-MCNC: 10 MG/DL (ref 8–23)
CALCIUM SERPL-MCNC: 8.6 MG/DL (ref 8.8–10.2)
CHLORIDE BLD-SCNC: 103 MMOL/L (ref 98–111)
CO2: 24 MMOL/L (ref 22–29)
CREAT SERPL-MCNC: 1.3 MG/DL (ref 0.5–1.2)
EOSINOPHILS ABSOLUTE: 0.3 K/UL (ref 0–0.6)
EOSINOPHILS RELATIVE PERCENT: 6.5 % (ref 0–5)
GFR AFRICAN AMERICAN: >59
GFR NON-AFRICAN AMERICAN: 55
GLUCOSE BLD-MCNC: 107 MG/DL (ref 74–109)
HCT VFR BLD CALC: 32 % (ref 42–52)
HEMOGLOBIN: 10.3 G/DL (ref 14–18)
IMMATURE GRANULOCYTES #: 0 K/UL
LYMPHOCYTES ABSOLUTE: 0.5 K/UL (ref 1.1–4.5)
LYMPHOCYTES RELATIVE PERCENT: 12.2 % (ref 20–40)
MCH RBC QN AUTO: 28.8 PG (ref 27–31)
MCHC RBC AUTO-ENTMCNC: 32.2 G/DL (ref 33–37)
MCV RBC AUTO: 89.4 FL (ref 80–94)
MONOCYTES ABSOLUTE: 0.3 K/UL (ref 0–0.9)
MONOCYTES RELATIVE PERCENT: 8.5 % (ref 0–10)
NEUTROPHILS ABSOLUTE: 2.7 K/UL (ref 1.5–7.5)
NEUTROPHILS RELATIVE PERCENT: 70.7 % (ref 50–65)
PDW BLD-RTO: 13.8 % (ref 11.5–14.5)
PLATELET # BLD: 254 K/UL (ref 130–400)
PMV BLD AUTO: 10.6 FL (ref 9.4–12.4)
POTASSIUM SERPL-SCNC: 4.6 MMOL/L (ref 3.5–5)
RBC # BLD: 3.58 M/UL (ref 4.7–6.1)
SODIUM BLD-SCNC: 137 MMOL/L (ref 136–145)
TOTAL PROTEIN: 6 G/DL (ref 6.6–8.7)
WBC # BLD: 3.9 K/UL (ref 4.8–10.8)

## 2021-07-21 PROCEDURE — 6370000000 HC RX 637 (ALT 250 FOR IP): Performed by: PHYSICIAN ASSISTANT

## 2021-07-21 PROCEDURE — 85025 COMPLETE CBC W/AUTO DIFF WBC: CPT

## 2021-07-21 PROCEDURE — 96365 THER/PROPH/DIAG IV INF INIT: CPT

## 2021-07-21 PROCEDURE — 96416 CHEMO PROLONG INFUSE W/PUMP: CPT

## 2021-07-21 PROCEDURE — 80053 COMPREHEN METABOLIC PANEL: CPT

## 2021-07-21 PROCEDURE — 96366 THER/PROPH/DIAG IV INF ADDON: CPT

## 2021-07-21 PROCEDURE — 96413 CHEMO IV INFUSION 1 HR: CPT

## 2021-07-21 PROCEDURE — 2580000003 HC RX 258: Performed by: PHYSICIAN ASSISTANT

## 2021-07-21 PROCEDURE — 6360000002 HC RX W HCPCS: Performed by: PHYSICIAN ASSISTANT

## 2021-07-21 RX ORDER — DEXAMETHASONE SODIUM PHOSPHATE 10 MG/ML
10 INJECTION, SOLUTION INTRAMUSCULAR; INTRAVENOUS ONCE
Status: COMPLETED | OUTPATIENT
Start: 2021-07-21 | End: 2021-07-21

## 2021-07-21 RX ORDER — DIPHENHYDRAMINE HYDROCHLORIDE 50 MG/ML
50 INJECTION INTRAMUSCULAR; INTRAVENOUS ONCE
Status: CANCELLED
Start: 2021-07-21 | End: 2021-07-21

## 2021-07-21 RX ORDER — SODIUM CHLORIDE 9 MG/ML
INJECTION, SOLUTION INTRAVENOUS CONTINUOUS
Status: CANCELLED | OUTPATIENT
Start: 2021-07-21

## 2021-07-21 RX ORDER — DIPHENHYDRAMINE HYDROCHLORIDE 50 MG/ML
50 INJECTION INTRAMUSCULAR; INTRAVENOUS ONCE
Status: CANCELLED | OUTPATIENT
Start: 2021-07-21 | End: 2021-07-21

## 2021-07-21 RX ORDER — SODIUM CHLORIDE 0.9 % (FLUSH) 0.9 %
10 SYRINGE (ML) INJECTION PRN
Status: CANCELLED | OUTPATIENT
Start: 2021-07-21

## 2021-07-21 RX ORDER — SODIUM CHLORIDE 0.9 % (FLUSH) 0.9 %
5 SYRINGE (ML) INJECTION PRN
Status: CANCELLED | OUTPATIENT
Start: 2021-07-21

## 2021-07-21 RX ORDER — SODIUM CHLORIDE 9 MG/ML
INJECTION, SOLUTION INTRAVENOUS ONCE
Status: COMPLETED | OUTPATIENT
Start: 2021-07-21 | End: 2021-07-21

## 2021-07-21 RX ORDER — HEPARIN SODIUM (PORCINE) LOCK FLUSH IV SOLN 100 UNIT/ML 100 UNIT/ML
500 SOLUTION INTRAVENOUS PRN
Status: CANCELLED | OUTPATIENT
Start: 2021-07-21

## 2021-07-21 RX ORDER — METHYLPREDNISOLONE SODIUM SUCCINATE 125 MG/2ML
125 INJECTION, POWDER, LYOPHILIZED, FOR SOLUTION INTRAMUSCULAR; INTRAVENOUS ONCE
Status: CANCELLED | OUTPATIENT
Start: 2021-07-21 | End: 2021-07-21

## 2021-07-21 RX ORDER — DIPHENHYDRAMINE HYDROCHLORIDE 50 MG/ML
50 INJECTION INTRAMUSCULAR; INTRAVENOUS ONCE
Status: COMPLETED | OUTPATIENT
Start: 2021-07-21 | End: 2021-07-21

## 2021-07-21 RX ORDER — EPINEPHRINE 1 MG/ML
0.3 INJECTION, SOLUTION, CONCENTRATE INTRAVENOUS PRN
Status: CANCELLED | OUTPATIENT
Start: 2021-07-21

## 2021-07-21 RX ORDER — ACETAMINOPHEN 325 MG/1
1000 TABLET ORAL ONCE
Status: CANCELLED
Start: 2021-07-21 | End: 2021-07-21

## 2021-07-21 RX ORDER — ACETAMINOPHEN 500 MG
1000 TABLET ORAL ONCE
Status: COMPLETED | OUTPATIENT
Start: 2021-07-21 | End: 2021-07-21

## 2021-07-21 RX ORDER — SODIUM CHLORIDE 9 MG/ML
INJECTION, SOLUTION INTRAVENOUS ONCE
Status: CANCELLED | OUTPATIENT
Start: 2021-07-21 | End: 2021-07-21

## 2021-07-21 RX ADMIN — FLUOROURACIL 4350 MG: 50 INJECTION, SOLUTION INTRAVENOUS at 14:18

## 2021-07-21 RX ADMIN — ONDANSETRON 16 MG: 2 INJECTION INTRAMUSCULAR; INTRAVENOUS at 10:26

## 2021-07-21 RX ADMIN — LEUCOVORIN CALCIUM 700 MG: 350 INJECTION, POWDER, LYOPHILIZED, FOR SOLUTION INTRAMUSCULAR; INTRAVENOUS at 12:09

## 2021-07-21 RX ADMIN — SODIUM CHLORIDE: 9 INJECTION, SOLUTION INTRAVENOUS at 10:26

## 2021-07-21 RX ADMIN — DEXAMETHASONE SODIUM PHOSPHATE 10 MG: 10 INJECTION, SOLUTION INTRAMUSCULAR; INTRAVENOUS at 10:26

## 2021-07-21 RX ADMIN — DIPHENHYDRAMINE HYDROCHLORIDE 50 MG: 50 INJECTION, SOLUTION INTRAMUSCULAR; INTRAVENOUS at 10:27

## 2021-07-21 RX ADMIN — PANITUMUMAB 430 MG: 400 SOLUTION INTRAVENOUS at 10:55

## 2021-07-21 RX ADMIN — ACETAMINOPHEN 1000 MG: 500 TABLET ORAL at 10:27

## 2021-07-21 ASSESSMENT — PAIN SCALES - GENERAL: PAINLEVEL_OUTOF10: 0

## 2021-07-23 ENCOUNTER — HOSPITAL ENCOUNTER (OUTPATIENT)
Dept: INFUSION THERAPY | Age: 71
Setting detail: INFUSION SERIES
Discharge: HOME | End: 2021-07-23
Payer: MEDICARE

## 2021-07-23 DIAGNOSIS — Z45.2 ENCOUNTER FOR CENTRAL LINE CARE: Primary | ICD-10-CM

## 2021-07-23 PROCEDURE — 2580000003 HC RX 258: Performed by: NURSE PRACTITIONER

## 2021-07-23 PROCEDURE — 96523 IRRIG DRUG DELIVERY DEVICE: CPT

## 2021-07-23 PROCEDURE — 6360000002 HC RX W HCPCS: Performed by: NURSE PRACTITIONER

## 2021-07-23 RX ORDER — HEPARIN SODIUM (PORCINE) LOCK FLUSH IV SOLN 100 UNIT/ML 100 UNIT/ML
500 SOLUTION INTRAVENOUS PRN
Status: CANCELLED | OUTPATIENT
Start: 2021-07-23

## 2021-07-23 RX ORDER — HEPARIN SODIUM (PORCINE) LOCK FLUSH IV SOLN 100 UNIT/ML 100 UNIT/ML
500 SOLUTION INTRAVENOUS PRN
Status: DISCONTINUED | OUTPATIENT
Start: 2021-07-23 | End: 2021-07-24 | Stop reason: HOSPADM

## 2021-07-23 RX ORDER — SODIUM CHLORIDE 0.9 % (FLUSH) 0.9 %
20 SYRINGE (ML) INJECTION PRN
Status: CANCELLED | OUTPATIENT
Start: 2021-07-23

## 2021-07-23 RX ORDER — SODIUM CHLORIDE 0.9 % (FLUSH) 0.9 %
20 SYRINGE (ML) INJECTION PRN
Status: DISCONTINUED | OUTPATIENT
Start: 2021-07-23 | End: 2021-07-24 | Stop reason: HOSPADM

## 2021-07-23 RX ORDER — SODIUM CHLORIDE 0.9 % (FLUSH) 0.9 %
10 SYRINGE (ML) INJECTION PRN
Status: CANCELLED | OUTPATIENT
Start: 2021-07-23

## 2021-07-23 RX ADMIN — HEPARIN 500 UNITS: 100 SYRINGE at 12:50

## 2021-07-23 RX ADMIN — SODIUM CHLORIDE, PRESERVATIVE FREE 20 ML: 5 INJECTION INTRAVENOUS at 12:50

## 2021-08-09 NOTE — PROGRESS NOTES
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 of Social Gatherings with Friends and Family:     Attends Confucianism Services:     Active Member of Clubs or Organizations:     Attends Club or Organization Meetings:     Marital Status:    Intimate Partner Violence:     Fear of Current or Ex-Partner:     Emotionally Abused:     Physically Abused:     Sexually Abused:        FAMILY HISTORY:  Family History   Problem Relation Age of Onset    High Blood Pressure Mother     High Blood Pressure Father     Colon Cancer Father     Diabetes Father         Current Outpatient Medications   Medication Sig Dispense Refill    bisoprolol (ZEBETA) 5 MG tablet TAKE 1 TABLET BY MOUTH DAILY 90 tablet 1    Magic Mouthwash (MIRACLE MOUTHWASH) Swish and spit 5 mLs 4 times daily as needed for Irritation 240 mL 5    ibandronate (BONIVA) 150 MG tablet Take 1 tablet by mouth every 30 days Take one (1) tablet once per month in the morning with a full glass of water, on an empty stomach, and do not take anything else by mouth or lie down for the next 30 minutes.  1 tablet 6    DULoxetine (CYMBALTA) 60 MG extended release capsule Take 1 capsule by mouth daily (Patient taking differently: Take 30 mg by mouth daily Patient reports decreased 2-3 weeks ago) 30 capsule 5    omeprazole (PRILOSEC) 20 MG delayed release capsule TAKE 1 CAPSULE BY MOUTH TWICE DAILY      FEROSUL 325 (65 Fe) MG tablet TAKE 1 TABLET BY MOUTH TWICE DAILY 180 tablet 1    Calcium Carb-Cholecalciferol (CALCIUM 600 + D PO) Take 800 mg by mouth 3 times daily      ondansetron (ZOFRAN) 4 MG tablet Take 2 tablets by mouth every 8 hours as needed for Nausea or Vomiting 30 tablet 2    cyclobenzaprine (FLEXERIL) 10 MG tablet Take 1 tablet by mouth 3 times daily as needed for Muscle spasms 90 tablet 2    loperamide (IMODIUM A-D) 2 MG tablet Take 4 mg by mouth 4 times daily as needed       methadone (DOLOPHINE) 10 MG tablet Take 20 mg by mouth every 8 hours as needed for Pain. Indications: filled by Dr. [facility]       gabapentin (NEURONTIN) 800 MG tablet Take 1 tablet by mouth 4 times daily for 31 days. 180 tablet 2     No current facility-administered medications for this visit. REVIEW OF SYSTEMS:    Constitutional: no fever, no night sweats, fatigue;   HEENT: no blurring of vision, no double vision, no hearing difficulty, no tinnitus,no ulceration, no dysphagia; Lungs: no cough, no shortness of breath, no wheeze;   CVS: no palpitation, no chest pain, no shortness of breath;  GI: no abdominal pain, no nausea, no vomiting, no constipation;   MICHELLE: no dysuria, frequency and urgency, no kidney stones;   Musculoskeletal: Back pain, no joint pain, swelling , stiffness;   Endocrine: no polyuria, polydypsia, no cold or heat intolerence; Hematology/lymphatic: no easy brusing or bleeding, no hx of clotting disorder; no peripheral adenopathy. Dermatology: improving Acneform rash from EGFR inhibitor face/back, no eczema,  pruritis;   Psychiatry: no depression, no anxiety,no panic attacks, no suicide ideation;    Neurology: no syncope, no seizures,  No numbness or tingling of hands, worsening numbness or tingling of feet, no paresis;     PHYSICAL EXAM:    Vitals signs:  /84   Pulse 80   Ht 5' 5\" (1.651 m)   Wt 170 lb (77.1 kg)   SpO2 92%   BMI 28.29 kg/m²     Pain scale:  Pain Score:   0 - No pain     CONSTITUTIONAL: Alert, appropriate, no acute distress,   EYES: Non icteric, EOM intact, pupils equal round and reactive to light and accommodation. ENT: Oral mucus membranes moist, no oral pharyngeal lesions. External inspection of ears and nose are normal.   NECK: Supple, no masses. No palpable thyroid mass    CHEST/LUNGS: CTA bilaterally, normal respiratory effort   CARDIOVASCULAR: RRR, no murmurs. No lower extremity edema   ABDOMEN: soft non-tender, active bowel sounds, no hepatosplenomegaly. No palpable masses. EXTREMITIES: warm, Full ROM of all fours extremities. No focal weakness. SKIN: Acneform rash face and back   LYMPH: No cervical, clavicular, axillary, or inguinal lymphadenopathy  NEUROLOGIC: follows commands, non focal.   PSYCH: mood and affect appropriate. Alert and oriented to time and place and person.     Relevant Lab findings/reviewed by me:  6/1/21 CEA 0.9  Lab Results   Component Value Date    WBC 4.04 (L) 08/11/2021    HGB 10.6 (L) 08/11/2021    HCT 32.1 (L) 08/11/2021    MCV 86.8 08/11/2021     08/11/2021     Lab Results   Component Value Date    NEUTROABS 2.46 08/11/2021     Lab Results   Component Value Date     (L) 08/11/2021    K 4.7 08/11/2021    CL 99 08/11/2021    CO2 26 08/11/2021    BUN 12 08/11/2021    CREATININE 1.6 (H) 08/11/2021    GLUCOSE 112 (H) 08/11/2021    CALCIUM 9.3 08/11/2021    PROT 6.7 08/11/2021    LABALBU 3.8 08/11/2021    BILITOT 0.8 08/11/2021    ALKPHOS 101 08/11/2021    AST 31 08/11/2021    ALT 15 (L) 08/11/2021    LABGLOM 43 (A) 08/11/2021    GFRAA >59 07/21/2021    AGRATIO 1.6 02/11/2020    GLOB 2.9 08/11/2021         Relevant Imaging studies/reviewed by me: ASSESSMENT    Orders Placed This Encounter   Procedures    CT ABDOMEN PELVIS W IV CONTRAST Additional Contrast? Oral     Standing Status:   Future     Standing Expiration Date:   8/11/2022     Scheduling Instructions:      sched 5 weeks     Order Specific Question:   Additional Contrast?     Answer:   Oral     Order Specific Question:   Reason for exam:     Answer:   4 mo f/u    CT CHEST W CONTRAST     Standing Status:   Future     Standing Expiration Date:   8/11/2022     Scheduling Instructions:      sched 5 weeks     Order Specific Question:   Reason for exam:     Answer:   4 mo f/u    CEA     Standing Status:   Future     Number of Occurrences:   1     Standing Expiration Date:   8/11/2022      Duong Hwang was seen today for follow-up. Diagnoses and all orders for this visit:    Metastasis from colon cancer ([facility] [address]  -     CEA; Future  -     CT ABDOMEN PELVIS W IV CONTRAST Additional Contrast? Oral; Future  -     CT CHEST W CONTRAST; Future    Care plan discussed with patient    Encounter for antineoplastic immunotherapy    Chemotherapy management, encounter for    Adverse effect of chemotherapy, subsequent encounter    Chemotherapy-induced peripheral neuropathy (HCC)    Mucositis       ASSESSMENT/PLAN:    Metastasis colorectal adenocarcinoma confirmed in right abductor muscle KRAS wild type. . MSI stable and negative mutations for BRAF, NRAS, KRAS wild type.     CEA on 4/22/2020 = 0.9   CEA 6/17/2020-0.9  CEA 8/19/20=1.1  10/21/2020-CEA = 2.0  11/18/2020-CEA 2.0  12/16/2020-CEA = 1.8  2/17/21 CEA 1.0  4/14/2021- CEA 1.0  5/12/21 CEA 0.8  6/1/21 CEA 0.9    Continue palliative intent 5-FU/leucovorin and Panitumumab. Not a good candidate for Avastin due to frequent exchange of ureteral stents. 3/25/2021-CT abdomen/pelvis-mild progression size soft tissue mass. 05/26/21- CT abdomen and pelvis  Partially calcified presacral soft tissue mass appears unchanged.   Previously measured soft tissue noncalcified component is unchanged  measuring 2.2 x 3.2 cm on axial image 60      Continue treatment 5-FU with Panitumumab. Normocytic anemia-combination of anemia of chronic disease to include iron deficiency, chronic kidney disease and chemotherapy.      Status post Injectafer x 2 doses in the past.  Hemoglobin 10.6     Non invasive Urothelial Bladder Cancer ([facility] [address] 8/18/2019 and 4/1/2001. Repeat cystoscopy and bilateral ureteral stent removal/replacement on 2/26/2020 . The operative note by Dr. Evelyn Howell documented bilateral hydronephrosis and obtained biopsy of the bladder in the mid dome and left anterior lateral wall x2. Pathology documented high-grade papillary urethral carcinoma, noninvasive, stage pTaNx. As per Urology    5/28/2020-the patient underwent a cystoscopy and resection of bladder tumor on 05/28/2020 with findings consistent with noninvasive, high-grade papillary urothelial carcinoma. Muscularis propria was not identified. Currently receiving intravesical BCG.  4/1/2021-repeat cystoscopy showed a small bladder lesion. Tumor resection of bladder tumor consistent with noninvasive high-grade urothelial carcinoma. Continue cystoscopic surveillance    Osteoporosis-Osteoporosis BMD -3.6 April 2020. Continue Vit D, Boniva and Calcium. Side effects from treatment-CBC showed mild anemia. CMP showed creatinine 1.6/EGFR 50    Acneform rash secondary to EGFR therapy- hydrocortisone cream 2.5% twice daily and clindamycin cream 1%. Also recommended SPF factor XV above. CKD stage III-creatinine 1.5/EGFR 46    Mucositis related to 5-FU-recommended ice chips and miracle mouthwash for symptomatic mucositis    FOLLOW UP:  · CMP, CBC, CEA today  · Follow-up with other medical providers as recommended  · Continue Hydrocortisone 2.5% and Clindamycin 1.0% as needed  · Recommended continue ice chips during chemotherapy.   · RTC with MD 6 weeks  · 5FU, Leucovorin, Vectibix tomorrow and in 3 weeks at [facility]  · Repeat BMD April 2022  · Continue follow-up for surveillance cystoscopy with Dr. Paul Tracy  · Repeat scans in Sept 2021  · Miracle mouthwash as needed  · Treatment every 3 weeks as per patient request.     Follow Up:     Return in about 6 weeks (around 9/22/2021) for CBC CMP CEA , Orders, Appointment with Dr. Eliezer Gitelman. CT c/a/p 5 weeks     I, Geri Carlton, am scribing for Samm Gonzalez MD. Electronically signed by Geri Carlton RN on 8/11/2021 at 7:32 AM CDT. I, Dr Mark Layne, personally performed the services described in this documentation as scribed by Geri Carlton RN in my presence and is both accurate and complete. I have seen, examined and reviewed this patient medication list, appropriate labs and imaging studies. I reviewed relevant medical records and others physicians notes. I discussed the plans of care with the patient. I answered all the questions to the patients satisfaction. I have also reviewed the chief complaint (CC) and part of the history (History of Present Illness (HPI), Past Family Social History [facility]), or Review of Systems (ROS) and made changes when appropriated.        (Please note that portions of this note were completed with a voice recognition program. Efforts were made to edit the dictations but occasionally words are mis-transcribed.)

## 2021-09-17 PROBLEM — C66.1 UROTHELIAL CARCINOMA OF RIGHT DISTAL URETER (HCC): Status: ACTIVE | Noted: 2021-01-01

## 2021-09-17 NOTE — PROGRESS NOTES
Referral made for pt to be seen at [facility] in [address] due to cancer in R ureter. [address] [facility] staff made aware of referral and that scheduling needs to be done asap.

## 2021-09-17 NOTE — OP NOTE
Brief operative Note      Patient: Abena Kelley  YOB: 1950  MRN: [id number]    Date of Procedure: 9/17/2021    Pre-Op Diagnosis: 1. Right ureter urothelial papillary carcinoma 2. Bilateral hydronephrosis 3. Bilateral ureteral radiation stricture 4. Retained bilateral ureteral stents 5. History of bladder cancer 6. History of colorectal carcinoma status post pelvic radiation. With recurrence    Post-Op Diagnosis: Same       Procedure(s):  CYSTOSCOPY: BILATERAL STENT REMOVAL BILATERAL URTERAL CATHERATIONIZATION; BIATERAL RETROGRADE PYELOGRAM ; RIIGHT URETEROSCOPY; BILATERAL URETERTAL STENT INSERTION REPLACEMENT [address] [place] [address] by 22 cm    Surgeon(s):  Vivi Wright MD    Assistant:   * No surgical staff found *    Anesthesia: General    Estimated Blood Loss (mL): 0    Complications: None    Specimens:   * No specimens in log *    Implants:  Implant Name Type Inv. Item Serial No.  Lot No. LRB No. Used Action   STENT URET 6FR L22CM PERCFLX HYDR+ DBL PGTL THRD 2  STENT URET 6FR L22CM PERCFLX HYDR+ DBL PGTL THRD 2  [employer] [id number] Right 1 Implanted   STENT URET 6FR L22CM PERCFLX HYDR+ DBL PGTL THRD 2  STENT URET 6FR L22CM PERCFLX HYDR+ DBL PGTL THRD 2  [employer] [id number] Left 1 Implanted         Drains:   Ileostomy LLQ (Active)       Findings: Bladder without evidence of recurrent papillary tumors. Right urothelial papillary carcinoma from the right ureteral orifice up to the sacrum mid ureter approximately 4 cm overall length. Bilateral radiation stricture at the level of the sacrum. Bilateral proximal ureteral hydronephrosis. Some irregular appearing mucosa without obvious papillary change involving more proximal right renal pelvis and right ureter cannot rule out neoplastic change versus irritative change from chronic stent. Removal and replacement of bilateral ureteral stents 6 x 22 polymer stents.     Detailed Description of Procedure:   See dictated report: [id number]    Disposition to PACU then to op care outpatient follow-up in 2 weeks    Electronically signed by Tr Masden MD on 9/17/2021 at 11:03 AM
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
## 2021-09-17 NOTE — ANESTHESIA POSTPROCEDURE EVALUATION
Department of Anesthesiology  Postprocedure Note    Patient: Nikhil Fuentes  MRN: [id number]  YOB: 1950  Date of evaluation: 9/17/2021  Time:  11:09 AM     Procedure Summary     Date: 09/17/21 Room / Location: [facility]    Anesthesia Start: 0953 Anesthesia Stop: 1109    Procedure: CYSTOSCOPY: BILATERAL STENT REMOVAL BILATERAL Read Sachs; [address] ; RIIGHT URTEROSCOPY; BILATERAL UTERTAL STENT INSERTION REPLACEMENT (N/A ) Diagnosis: (...)    Surgeons: Jeanette Moreno MD Responsible Provider: OLGA David CRNA    Anesthesia Type: general ASA Status: 3          Anesthesia Type: general    Abad Phase I:      Abad Phase II:      Last vitals: Reviewed and per EMR flowsheets.        Anesthesia Post Evaluation    Patient location during evaluation: PACU  Patient participation: complete - patient participated  Level of consciousness: awake  Pain score: 0  Airway patency: patent  Nausea & Vomiting: no nausea and no vomiting  Complications: no  Cardiovascular status: hemodynamically stable  Respiratory status: acceptable, spontaneous ventilation and room air  Hydration status: euvolemic

## 2021-09-18 NOTE — OP NOTE
[facility] [employer] [facility] [address], [facility]                                OPERATIVE REPORT    PATIENT NAME: Timur Dale                   :        1950  MED REC NO:   [id number]                              ROOM:  ACCOUNT NO:   [de-identified]                           ADMIT DATE: 2021  PROVIDER:     Tashia Mcmahon MD    DATE OF PROCEDURE:  2021    RADIOGRAPHIC INTERPRETATION OF BILATERAL RETROGRADE PYELOGRAMS    1. Right retrograde pyelogram.    INTERPRETATION:  The right ureter is narrowed with irregularity from the  orifice all the way up to the mid ureter at the sacrum. There is abrupt  change consistent with known ureteral stricture. Above this, the ureter  is dilated with moderate-to-severe right ureteral hydronephrosis and  dilation of the renal pelvis; however, there are no filling defects  proximal to this fairly smooth-walled ureter. CONCLUSION:  Irregular distal ureter from the level of the sacrum down  to the bladder with stricture formation and abrupt proximal  hydroureteronephrosis. 2.  Left retrograde pyelogram.    INTERPRETATION:  The left ureter also appears narrow from the distal  segment from the bladder up to the mid ureter at the level of the sacrum  and there is stricture formation. There is abrupt change in caliber to  a moderate-to-severe dilated left ureter with left  hydroureteronephrosis; however, there are no filling defects in the more  proximal left collecting system. CONCLUSION:  Mid left ureteral stricture with narrow distal ureter and  proximal left hydroureteronephrosis without filling defects.         Kristal Lakhani MD    D: 2021 12:32:58      T: 2021 14:30:48     [id number]  Job#: [id number]     Doc#: [id number]    CC:

## 2021-09-18 NOTE — OP NOTE
[facility] EHR OF [facility] [address], [address] [facility]                                OPERATIVE REPORT    PATIENT NAME: Mariza Durant                   :        1950  MED REC NO:   [id number]                              ROOM:  ACCOUNT NO:   [de-identified]                           ADMIT DATE: 2021  PROVIDER:     Martita Spencer MD    DATE OF PROCEDURE:  2021    TITLE OF OPERATION:  1. Cystoscopy, bilateral ureteral stent removal.  2.  Bilateral ureteral catheterization; bilateral retrograde pyelograms. 3.  Right ureteroscopy. 4.  Bilateral ureteral stent insertion/replacement, 6-Greek x 22 cm  polymer stents. PREOPERATIVE DIAGNOSES:  1. Right urothelial papillary carcinoma. 2.  Bilateral hydronephrosis. 3.  Bilateral ureteral radiation stricture, mid ureter. 4.  Retained bilateral ureteral stents. 5.  History of bladder cancer. 6.  History of colorectal carcinoma status post pelvic radiation with  recurrence. POSTOPERATIVE DIAGNOSES:  1. Right urothelial papillary carcinoma. 2.  Bilateral hydronephrosis. 3.  Bilateral ureteral radiation stricture, mid ureter. 4.  Retained bilateral ureteral stents. 5.  History of bladder cancer. 6.  History of colorectal carcinoma status post pelvic radiation with  recurrence. ANESTHESIA:  General anesthetic. ATTENDING SURGEON:  Martita Spencer MD    HISTORY:  The patient is a 53-year-old gentleman who I had followed now  for approximately 2 years. He has colorectal carcinoma. He developed a  pelvic recurrence that he continues on maintenance treatment for without  progression. He has had prior pelvic radiation therapy. He developed  bilateral hydronephrosis secondary to radiation strictures at the level  of the mid ureters where this recurrence was. This has been managed  with chronic indwelling bilateral ureteral stents.   He currently has  metal Resonance stents that were placed on 04/01/2021. At that time of  his initial stent placement, he was found to have bladder cancer and had  papillary urothelial carcinoma involving the bladder. This has been  high grade. He did receive induction BCG x6 weekly treatments. Most  recent upper tract imaging with CT scan of the abdomen and pelvis on  09/03/2021 shows no significant change or progression of his colorectal  cancer. He still has bilateral hydronephrosis with stents in place in  good position. There is no pelvic or retroperitoneal adenopathy. I saw  him in the office for surveillance cystoscopy regarding his bladder  cancer on 09/13/2021. At that time, cystoscopy revealed no evidence of  urothelial carcinoma recurrence in the bladder; however, there was  papillary tumor protruding from the right ureteral orifice around the  stent. Therefore, he now comes to the operating room for further  diagnostic evaluation to see the extent of this involvement of the  distal right ureter. At the same time, we will plan to survey his  bladder again to make sure there is no recurrences and plan to remove  and replace his stents and bilateral retrograde pyelograms. This was  discussed with him and his daughter and they understood. The risk and  complications were discussed and they agreed to proceed. DESCRIPTION OF PROCEDURE:  The patient was brought to the operating  room, underwent general anesthetic. He was placed in the lithotomy  position. His genitalia was prepped and draped per routine sterile  fashion, he received a preoperative antibiotic. A 22-Cymraes cystoscope  was inserted into the meatus and this was advanced under direct vision. The penile and bulbar urethra appeared to be normal.  Entering the  prostatic urethra, he had some mild lateral lobe enlargement, but really  had normal-appearing prostate, a wide open bladder neck and no median  lobe.   I then entered the patient's bladder and the bladder was  inspected with a I was able to advance  the ureteroscope all the way up into the renal pelvis. I did have to  aspirate some urine from the right renal pelvis because it was slightly  blood tinged to allow for better visualization. I then carefully  examined the upper, middle and lower pole calyces and the renal pelvis. No obvious papillary tumors were seen; however, the mucosa was little  irregular, almost sort of cobblestone-looking and cannot rule out  neoplastic change versus just inflammatory or irritative changes from  having the chronic stent in place. I then withdrew the ureteroscope and  examined the ureter as I was through the scope. The ureter had similar  appearances where there were areas that would look more like edema and  reactionary mucosa, not true papillary carcinoma; however, cannot rule  out any possible neoplastic change. The dilated ureter was examined  until I got down to again the distal ureter about the level of the  sacrum and again, saw obvious papillary urothelial carcinoma involving  this distal segment from the sacrum all the way down to the orifice. The ureteroscope was removed. I then went ahead and backloaded the  guidewire to the cystoscope, which was advanced over the guidewire into  the patient's bladder. A 5-Croatian catheter was then inserted and I did  sort of a pullback retrograde again showing this irregularity involving  the distal 4 cm of the right ureter up to the level of the sacrum, but  the ureter above this again was dilated, hydronephrotic, and without  obvious filling defects in the more proximal ureter above the sacrum. At this point, I elected to place a polymer stent with the idea that  this may better suit with anticipation that maybe needed a  nephroureterectomy in the near future. So, I removed the catheter over  the guidewire, then I passed a 6-Croatian x 22 cm right double-J ureteral  stent. This was advanced under fluoroscopic guidance and cystoscopic  guidance. This was coiled in the renal pelvis and coiled in the bladder  in good position. I felt that attempts at laser ablation of this distal  ureter in my hands could have been accomplished; however, given the long  segment, it is likely we would not get any effective long-term control  and I would be worried about, particularly with his stricture, upper  tract surveillance, and upper tract recurrence. Therefore, I felt most  likely nephroureterectomy would be his best choice. We will discuss  this with him and his family members postop. I then turned my attention to the left side. The left ureteral stent  was then grasped with the alligator graspers and then extracted. Guidewire was then inserted. The 5-Swiss open-ended ureteral catheter  was inserted in the distal ureter, contrast injected retrograde for  retrograde. This showed a narrowing of the distal ureter with some  slight irregularity where the stricture was. Again, mid ureter at the  level of the sacrum, above this ureter, was dilated. I passed the  guidewire up to the renal pelvis and had passed the catheter up more  proximally to opacify the upper part. Again, above the sacrum, the  ureter was dilated with moderate hydronephrosis and dilation  hydronephrosis of the renal pelvis, but there was not any irregularity  or filling defects noted. The guidewire was re-inserted, the catheter  was removed and then a 6-Swiss x 22 cm double-J ureteral stent was  advanced on the left side. This was coiled in the bladder and in the  renal pelvis in good position on the left. At this point, the procedure  was terminated. He was awakened, the bladder was emptied, the scope was  removed. Estimated blood loss was 0 mL. He was awakened from his  anesthetic and taken to recovery room in stable condition. He will  follow up to see me in 2 weeks to discuss further management options.    My recommendation would be referral to tertiary care center for right  nephroureterectomy.         Tyrone Short MD    D: 09/17/2021 12:22:26      T: 09/17/2021 13:33:08     [id number]  Job#: [id number]     Doc#: [id number]    CC:

## 2021-09-20 NOTE — PROGRESS NOTES
MEDICAL ONCOLOGY PROGRESS NOTE      Chema Gaxiola   1950  9/22/2021     Chief Complaint   Patient presents with    Follow-up     Metastasis from colon cancer ([address]     INTERVAL HISTORY/HISTORY OF PRESENT ILLNESS:  Reason for MD visit: Toxicity/disease management  The patient has stage IV colonic adenocarcinoma. He has likely a small pulmonary nodule which may represent metastatic disease and also a small pelvic soft tissue nodule. He is currently receiving palliative treatment with 5-FU, leucovorin and Panitumumab. He has been tolerating treatment well except for acneiform rash involving his face and neck that is well controlled. He has complaints of occasional hematuria after his chemotherapy. He still has a ureteral stent in place. ONCOLOGIC HISTORY:   Diagnosis:  · Moderately differentiated rectal carcinoma, T3N0Mx, diagnosed in 3/9/2009  · Noninvasive high-grade papillary urethral carcinoma.  Negative for evidence of detrusor muscle invasion, pTa, pNx on 8/18/2019. · Metastatic colorectal carcinoma, 9/3/2019  · MSI stable and mutations for BRAF, NRAS, KRAS were not detected.    · Bladder cancer- superficial         TREATMENT SUMMARIES:  · 4/9/2009-5/27/2009-received neoadjuvant chemotherapy with 5-FU CIV along with radiation therapy for a total of 5400 cG  · 7/15/2009-rectum resection revealed no residual malignancy, complete pathological response. · 8/18/2019- transurethral resection of bladder tumor (TURBT)   · 9/18/2019-12/26/2019 palliative chemotherapy with modified FOLFOX 7  (Oxaliplatin 85 mg/m² IV day 1, leucovorin 400 mg/m² IV day 1 and 5-FU 2400 mg/m² IV continuous infusion over 46 to 48 hours for a total of 7 cycles.    · 1/28/2020 -palliative maintenance therapy with leucovorin 400 mg/m² IV over 2 hours on day 1, followed by 5-FU bolus 400 mg/m² and then 1200 mg/m²/day x2 days (total 2400 mg meter squared over 46 to 48 hours) continuous infusion.  Repeat every 2 irving.     ONCOLOGIC HISTORY #1:  Maren Garcia has a history of moderately differentiated rectal carcinoma, T3N0Mx, diagnosed in 3/9/2009.  He received neoadjuvant chemotherapy with radiation and resection with Myrle Ridges has been routinely followed at [facility] and was last seen by Dr. Javier Villafuerte on 1/10/2019. Max Mejia following are pertinent findings related to his diagnosis and treatment. · 3/9/2009- Esophagogastroduodenoscopy with biopsy by Dr. Mark Gurrola that revealed rectal mass at 8 cm with pathology being consistent with moderately differentiated adenocarcinoma. · 3/18/2019-Dr.Elizabeth Tomeka Lucero at [facility] performed a flexible sigmoidoscopy and rectal endoscopic ultrasound that revealed the tumor extending 6-7 mm deep through the muscularis propria layer and into the serosa, T3 lesion. · 4/9/2009-5/27/2009-received neoadjuvant chemotherapy with 5-FU CIV along with radiation therapy for a total of 5400 cGy under the direction of Dr. Manjeet Pedroza.    · 7/15/2009-rectum resection revealed no residual malignancy.  22 regional nodes were negative for malignancy.  The rectum distal doughnut was negative for malignancy.  He was documented to have a complete pathological response. · 9/9/2009- Ileostomy excision pathology revealed small bowel wall with changes consistent with ileostomy site.  Pathology negative for malignancy  · 9/22/21 Decrease Vectibix to every other treatment     ONCOLOGIC HISTORY #2   Maren Garcia was diagnosed with noninvasive urethral carcinoma, pTa, pNx on 8/18/2019.  Ta tumors are papillary lesions that tend to recur but are relatively benign and generally do not invade the bladder.  Adjuvant treatment is not warranted at this time and will be monitored closely.   Biopsy and transurethral resection of bladder tumor (TURBT) on 8/18/2019 by Dr. Myles Dick with pathology revealing noninvasive high-grade papillary urethral carcinoma.  Negative for evidence of detrusor muscle invasion, pTa, pNx.     · 12/3/2019- cystoscopy with removal and replacement of double-J urethral stent.  Pathology from dome of the bladder/tumor revealed high-grade papillary urethral carcinoma, noninvasive.  No muscularis propria present.    · 2/26/2020 - cystoscopy and bilateral ureteral stent removal and replacement. The operative note by Dr. Yoselyn Cowan documented bilateral hydronephrosis and obtained biopsy of the bladder in the mid dome and left anterior lateral wall x2. Pathology documented high-grade papillary urethral carcinoma, noninvasive, stage pTaNx. · 5/28/2020-the patient underwent a cystoscopy and resection of bladder tumor on 05/28/2020 with findings consistent with noninvasive, high-grade papillary urothelial carcinoma. Muscularis propria was not identified. The patient will receive intravesical BCG therapy. · 7/6/2020-CT Abdomen/ Pelvis-Moderate severe right and mild left hydronephrosis with bilateral ureteral stents, which have an adequate radiographic position. Right kidney with cortical thickening and somewhat asymmetrically decreased enhancement which can be seen with obstructive uropathy. Postoperative changes of colectomy. Left lower quadrant ostomy. Slightly decreased size of presacral low density compared to4/15/2020. Similar intrahepatic and extrahepatic bile duct dilation compared to 4/15/2020. Correlate with clinical symptoms and laboratory studies if clinically indicated. Similar chronic bony findings. · 3/25/2021-CT abdomen showed severe hydronephrosis. Cystoscopy was performed by urology. · 4/1/21 Bladder neck tumor, biopsies: High-grade papillary urothelial carcinoma, noninvasive. Muscularis propria is not present. AJCC pathologic stage:  pTa Nx   · 4/14/2021-reviewed results of pathology. No evidence of invasive disease. Continue surveillance cystoscopy with urology.   · 5/26/21 CT CHEST W CONTRAST No convincing evidence of disease progression is identified, only one of the pulmonary nodules, in the right lower lobe, shows minimal increase in size, from 5.3 mm to 7.3 mm, though this size difference may be due to measuring technique. No new pulmonary nodules are identified. All of the pulmonary nodules are small, measuring 7.3 mm or less and are not amenable to CT-guided biopsy. Average nodule size is 3 mm. No evidence of intrathoracic lymphadenopathy is identified. 3. Chronic compression deformities at the thoracolumbar junction, with previous kyphoplasty at T12 and L1, associated kyphotic deformity and there is grade 1 bordering on grade 2 spondylolisthesis at L2-3.   · 5/26/21 CT ABDOMEN PELVIS W IV CONTRAST  No evidence of disease progression. No change in partially calcified presacral soft tissue mass. Chronic urinary bladder wall thickening with asymmetric thickening along the RIGHT lateral wall. Bilateral ureteral stents appear in good position and hydronephrosis has significantly decreased from the prior study. Bilateral urothelial thickening is likely related to chronic stent placement although infectious process is also possible. Chronic biliary ductal dilatation. · 06/01/23- reviewed scans. Stable disease. Continue treatment every 3 weeks as per patient request. Continue infusional 5-FU, Panitumumab and leucovorin. No 5-FU bolus due to severe mucositis. · 9/13/2021-he was seen by urology. Findings of ureteral high-grade urothelial carcinoma. Noninvasive. The patient is being referred to [facility] in [place]. ONCOLOGIC HISTORY #3  Cynthia Fuller was seen in initial oncology consultation on 8/19/2019 during his hospitalization at [facility] after a large pelvic mass was identified which raised concern for recurrent disease.   · 8/17/2019- CEA 18.1  · 8/17/2019- CT scan of the kidney with contrast documented moderate to severe right hydronephrosis with dilation of the right ureter into the lower pelvis the site of the parasacral soft tissue changes.  Exon calcified soft tissue changes within the janes-sacral region likely representing sequelae of pelvic radiation.  Increasing scarring/fibrosis versus tumor recurrence within the presacral changes, likely represents a site of right distal ureter obstruction.  No left-sided hydronephrosis. · 8/18/2019 -Double-J ureter stent placement for right hydronephrosis secondary to extrinsic compression by pelvic mass.    · 8/27/2019-CT scan of the chest with contrast documented numerous pulmonary nodules that appear new compared to 11/12/2017, RUL nodule measuring 7 mm and LLL nodule measuring 5 mm.  Soft tissue nodule at the left ventral abdominal wall.  Slight increased size of a probable lymph node anterior to the aorta measuring 0.9 cm compared to 0.7 cm.  Similar presacral, right pelvic sidewall and right abductor muscular nodular soft tissue density. · 8/27/2019 CT scan of the abdomen and pelvis with contrast identified new moderate left hydronephrosis with moderate right hydronephrosis.  Mild stranding around the urinary bladder and thickening of the bilateral ureteral wall.  Numerous pulmonary nodules.  Soft tissue of the left ventral abdominal wall.  Slightly increased size of probable lymph node anterior to the aorta measuring 0.9 cm compared to 0.7 cm. · 8/27/2019-PET scan did not identify any FDG avid pulmonary nodules or airspace opacities.  Abnormal increased metabolic activity within the right pelvic wall soft tissue showing SUV of 5.4.  Abnormal soft tissue metabolic activity in the right abductor muscle with SUV of 6.4.  Focally increased activity to the right of the inferior L5 vertebrae body posterior with SUV of 7.9 with associated sclerotic changes.   · 8/29/2019-  Dr. Fely Cherry completed a cystoscopy with double-J ureter stent in the left ureter for left hydronephrosis  · 9/3/2019- CT-guided right abductor muscle biopsy on 9/3/2019 with pathology identifying metastatic adenocarcinoma consistent with colorectal origin.  Molecular panel from biopsy tissue revealed MSI stable and mutations for BRAF, NRAS, KRAS were not detected.    · 9/18/2019 - Palliative chemotherapy with modified FOLFOX 7  (Oxaliplatin 85 mg/m² IV day 1, leucovorin 400 mg/m² IV day 1 and 5-FU 2400 mg/m² IV continuous infusion over 46 to 48 hours) with the anticipation of adding Avastin 5 mg/kg day 1 every 14 days  · 10/15/2019- 24-hour urine for protein with a total protein of 1785 mg per 24-hour.  Zurdo has been evaluated by Dr. Florinda Torres and he reports no significant concerns related to the protein. · 11/6/19 CEA 5.6 significantly improved compared to CEA of 14.0 on 8/30/2019. · 11/15/2019 -CT scan of the abdomen and pelvis documented no evidence of disease progression with significant decrease in the size of enhancing nodules in the right pelvic abductor musculature, a previous 1.8 cm nodule now measures 5 mm.  No new or enlarging retroperitoneal, mesenteric, pelvic or inguinal lymph nodes.  Calcified presacral mass unchanged measuring 5 x 3.7 cm.   · 11/15/2019 -CT scan of the chest documented multiple small pulmonary nodules reidentified, largest nodule in the RUL measures 5 mm compared to 7.5 mm, RLL nodule measures 3.4 mm compared to 5.9 mm, VIC nodule measures 4 mm compared to 6 mm.  A cluster of small nodules in the RUL anteriorly are barely visible on this study.  There is a decrease in size of mediastinal lymph nodes compared to previous exam, right distal paratracheal lymph node measuring 4.5 mm compared to 8.3 mm and lower right peritracheal node measuring 4.5 mm compared to 8.6 mm.    · 1/13/2020- CT scan of the abdomen and pelvis with contrast indicated improvement in the right-sided hydronephrosis with a chronic inflammatory process of the ureters suspected due to the moderate thickening, also present on previous study.  The small poorly enhancing nodules in the right abductor muscles have decreased in the partially calcified presacral mass and right lateral pelvic wall nodules are stable compared to previous study.  Resolution of the subcutaneous abdominal wall nodules.  A prominent retroperitoneal lymph node adjacent and anterior to the left common artery is redefined and measures 6 mm, no change from previous exam.   · 1/13/2020 - CT scan of the chest documented a right lower lobe nodule measures 4.3 cm and is unchanged.  A right lower lobe nodule measures 2.8 mm compared to 3.4 mm.  Nodule in the right upper lobe is barely visible and measures 2.4 mm.  Nodule in the left lower lobe measures 4.8 mm and is unchanged.  Nodule in left lower lobe posterior measures 2.8 mm and previously measured 4.7 mm.  A right lower lobe posterior medially nodule is barely visible measuring 0.2 mm and previously. measured 4.5 mm.  No new nodules identified.  No change in the size of the mediastinal lymph nodes. · 1/28/2020 -palliative maintenance therapy with leucovorin 400 mg/m² IV over 2 hours on day 1, followed by 5-FU bolus 400 mg/m² and then 1200 mg/m²/day x2 days (total 2400 mg meter squared over 46 to 48 hours) continuous infusion.  Repeat every 2 weeks. Only received 1 cycle, further treatment held due to small bowel obstruction. · 1/30/2020 - CT scan of the abdomen and pelvis indicated high-grade small bowel obstruction with transition point in the midline posterior pelvis where a small bowel loop is tethered to a partially calcified presacral soft tissue mass. · 2/11/2020-CEA 1.4  · 3/5/2020-  Exploratory laparotomy, removal of adhesions, small bowel resection with primary anastomosis and partial thickness small bowel repair by Dr. Bjorn Hernandez at [facility]. In the operative note Dr. Shyann Gibson reported no evidence of carcinomatosis within the abdomen and the liver was unable to be examined due to extent of right upper quadrant adhesions. Pathology from small intestine documented no evidence of malignancy.   · 4/15/2020 Ct Chest W Contrast Minimal interval increase in size of subcentimeter pulmonary nodules. The largest now measures 6 mm in the medial right lower lobe on axial image 80. There is a new, unstable, horizontal fracture through the T6 vertebral body. Additionally, there are new fractures through the posterior 11th and 12th right ribs. The bones are moderately osteopenic. The finding of an unstable fracture through the T6 vertebral body was discussed with Ana Mercedes at 10:45 AM on 4/15/2020. · 4/15/2020 Ct Abdomen Pelvis W Iv Contrast  Patient has undergone interval resection of the distal small bowel, and there is a 2.8 cm fluid collection in the presacral operative bed. This contains a tiny focus of air. This may postoperative or due to infection. Please correlate with the patient's clinical symptoms and laboratory markers. Improved hydronephrosis and hydroureter. Diffuse osteoporosis. Findings in the lower chest are described in a separate dictation. · 4/22/2020-CEA 0.9  · 6/2/2020-resumed chemotherapy with 5-FU/leucovorin and Panitumumab. Okay to do 1 today then CMP CEA   · 8/19/20 CEA-1.1  · 10/21/2020- CEA 2.0  · 11/11/2020- Ct Chest W Contrast Multiple, too numerous to count, small noncalcified lung nodules bilaterally. The referenced nodules appears to have decreased in size the previous study. No new nodules. · 11/11/2020- Ct Abdomen Pelvis W Contrast Unchanged bilateral hydronephrosis, more on the right side. Bilateral ureteral stents in place. Moderate asymmetrical thickening of the incompletely distended urinary bladder. This may partly be due to incomplete distention. Possibility of chronic cystitis and or chronic partial outlet obstruction may not be excluded. A functioning left lower abdominal ostomy. A small parastomal small bowel herniation without obstruction. A partially calcified presacral mass. The soft tissue component have increased in size in the previous study. The osseous changes are described above.  Any superimposed metastatic disease is not excluded and would be hard to evaluate due to extensive postsurgical changes. · 11/18/2020-essentially, overall stable disease. Improvement of the lung nodules with decreased in the size of the target lesions. The pelvic lesion is is likely worse by 25%. However, CEA is is still within the normal limits. Therefore likely mixed response. We will continue current treatment and repeat CT scans in about 3 months. · 12/16/2020-discontinue 5-FU bolus from his regimen. · 2/9/2021- Ct Chest W Contrast No evidence of disease progression. Stable pulmonary nodules. Stable intrahepatic and extrahepatic bile duct dilation compared to prior 11/11/2020. Postoperative, posttraumatic and degenerative changes in the spine as described above. Old right-sided rib fractures. · 2/9/2021- Ct Abdomen Pelvis W Contrast showed evidence of response to therapy including decreased presacral mass/thickening now measuring 1.1 cm, previously 1.9 cm on 11/11/2020. Stable intrahepatic and extra hepatic bile duct dilation with cholecystectomy clips. See separately dictated CT chest of the same day regarding pulmonary nodules. Bilateral ureteral stents. Decreased bilateral hydronephrosis. Urinary bladder wall is thickened, which could be seen with post treatment changes or cystitis. Correlate with symptoms. Chronic bony findings as above  · 2/17/2021-reviewed CT chest abdomen pelvis. Essentially consistent with disease response to therapy. Continue current therapy.    · 2/17/21 CEA 1.0  · 3/25/21 Ct Abdomen Pelvis W Iv Contrast  The stomach is distended, however no small bowel dilatation identified. Contrast identified within the left ileostomy bag. The distal stomach is under distended which may be secondary to peristalsis. Gastroparesis considered.  Bilateral ureteral stents remain appropriate in position, however there is new moderate to severe right-sided hydronephrosis and mild left-sided hydronephrosis when compared to the 2/9/2021 exam. Mild increase considered within the partially calcified presacral pelvic mass. Stable partially calcified right pelvic soft tissue. Similar abnormal wall thickening of the bladder most notable superiorly. Similar prominence of the intra and extra hepatic bile ducts down to the level of the ampulla. Findings may represent a reservoir effect, however correlation with liver function tests recommended. Therefore. Stable noncalcified left greater than right pulmonary nodules with asymmetrical interstitial changes of the right lung base concerning for pneumonitis. Osteopenia with postoperative changes of the lumbar spine. Chronic compression deformities of the thoracolumbar vertebra as described above. · 4/14/2021-I reviewed notes from urology and also CT abdomen/pelvis that showed mild interval increase in the size of the soft tissue nodule in the pelvis. However, this is still very small and therefore will have a short follow-up CT scan and of May 2021. I personally reviewed the CT scans. Really hard to state that there is clear-cut disease progression. Again, short follow-up recommended. Continue current treatment. · 5/12/21 CEA 0.8  · 5/26/2001-CT chest abdomen pelvis showed Partially calcified presacral soft tissue mass appears unchanged. Previously measured soft tissue noncalcified component is unchanged measuring 2.2 x 3.2 cm on axial image 60. However, this is questionable. CT chest showed a stable pulmonary nodules. Subcentimeter nodules. Largest 7.5 mm. · 6/1/21 CEA 0.9  · 06/01/23- reviewed scans. Stable disease. Continue treatment every 3 weeks as per patient request. Continue infusional 5-FU, Panitumumab and leucovorin. No 5-FU bolus due to severe mucositis. · 8/11/2021 CEA . 9  · 9/03/2021 CT Chest w/Contrast Slight interval increase in the size of pulmonary nodules, the largest in the medial right lung base.  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 Layton Colindres (pain pump)    Ureteral tumor           PAST SURGICAL HISTORY:  Past Surgical History:   Procedure Laterality Date    ABDOMEN SURGERY      ABDOMINAL EXPLORATION SURGERY      BACK SURGERY      two lumbar    BACLOFEN PUMP IMPLANTATION      Not Baclofen (Alisa Carcamo) pain mgmt    BLADDER SURGERY N/A 9/17/2021    CYSTOSCOPY: BILATERAL STENT REMOVAL BILATERAL [place]; [address] ; RIIGHT URTEROSCOPY; BILATERAL UTERTAL STENT INSERTION REPLACEMENT performed by Kolton Howell MD at [facility] 13      x 2   [facility] 24      per dr. Zenia See Left 8/29/2019    CYSTOSCOPY LEFT  RETROGRADE PYELOGRAM performed by Kolton Howell MD at [facility] Left 8/29/2019    LEFT URETERAL STENT PLACEMENT performed by Kolton Howell MD at [facility] Bilateral 12/3/2019    CYSTOSCOPY BILATERAL URETERAL STENT CHANGES performed by Kolton Howell MD at [facility] Bilateral 2/26/2020    CYSTOSCOPY BILATERAL URETERAL STENT CHANGES INDICATED PROCEDURE performed by Kolton Howell MD at [facility] Bilateral 5/28/2020    CYSTOSCOPY, BILATERAL RETROGRADE PYELOGRAMS, BILATERAL URETERAL STENT CHANGES performed by Kolton Howell MD at [facility] Bilateral 10/15/2020    CYSTOSCOPY, BILATERAL URETERAL STENT CHANGES performed by Kolton Howell MD at [address] [place] [address] 10/15/2020    POSSIBLE BIOPSY FULGURATION/ TURBT BLADDER TUMOR performed by Ritchie Mao MD at [facility] Bilateral 4/1/2021    CYSTOSCOPY, BILATERAL URETERAL STENT REMOVAL AND REPLACEMENT AND FULGERATION OF BLADDER TUMOR AND BLADDER BIOPSY performed by Ritchie Mao MD at [address] / [address] [facility] / [place] Right 8/18/2019    CYSTOSCOPY RETROGRADE PYELOGRAM RIGHT URETERAL  STENT INSERTION FULGERATION OF BLADDER TUMOR performed by Ritchie Mao MD at [facility] / [address] [facility] / [place] Bilateral 1/5/2021    CYSTOSCOPY  BILARTERAL URETERAL STENT REMOVAL AND REPLACEMENT BILATERAL BILATERAL URETERAL CATHERIZATION BILATERAL RETROGRADE PYLEOGRAM performed by Ritchie Mao MD at [address] [place] [address] N/A 12/3/2019    BLADDER BIOPSY AND FULGURATION performed by Ritchie Mao MD at [address] [place] [address] N/A 5/28/2020    BIOPSIES WITH FULGURATION OF BLADDER TUMORS performed by Ritchie Mao MD at [facility] [address] Bilateral     cataract or    HC INJECT OTHER PERPHRL NERV Left 10/28/2016    FLURO GUIDED HIP INJECITON performed by China Luz MD at [address] [place] [address] / REMOVAL / REPLACEMENT VENOUS ACCESS CATHETER Right 8/20/2019    INSERTION OF RIGHT INTERNAL JUGULAR SINGLE LUMEN POWER PORT performed by Sari Holm DO at [facility] [address] N/A 5/6/2020    REMOVAL OF INSTRUMENTATION, EXPLORATION OF FUSION L1-3, REVISION UNINSTRUMENTED POSTERIOR SPINAL FUSION L1-3 performed by Tracey Sol MD at [facility] 86      times 2... all levels    SPINE SURGERY      yesterday    TUNNELED VENOUS PORT PLACEMENT          SOCIAL HISTORY:  Social History     Socioeconomic History    Marital status:      Spouse name: None    Number of children: None    Years of education: None    Highest education level: None   Occupational History    None   Tobacco Use    Smoking status: Former Smoker     Packs/day: 2.00     Years: 15.00     Pack years: 30.00     Types: Cigarettes     Quit date: 1986     Years since quittin.4    Smokeless tobacco: Never Used   Vaping Use    Vaping Use: Never used   Substance and Sexual Activity    Alcohol use: No    Drug use: No    Sexual activity: Yes     Partners: Female   Other Topics Concern    None   Social History Narrative    None     Social Determinants of Health     Financial Resource Strain: Low Risk     Difficulty of Paying Living Expenses: Not hard at all   Food Insecurity: No Food Insecurity    Worried About Running Out of Food in the Last Year: Never true    Azam of Food in the Last Year: Never true   Transportation Needs:     Lack of Transportation (Medical):      Lack of Transportation (Non-Medical):    Physical Activity:     Days of Exercise per Week:     Minutes of Exercise per Session:    Stress:     Feeling of Stress :    Social Connections:     Frequency of Communication with Friends and Family:     Frequency of Social Gatherings with Friends and Family:     Attends Mosque Services:     Active Member of Clubs or Organizations:     Attends Club or Organization Meetings:     Marital Status:    Intimate Partner Violence:     Fear of Current or Ex-Partner:     Emotionally Abused:     Physically Abused:     Sexually Abused:        FAMILY HISTORY:  Family History   Problem Relation Age of Onset    High Blood Pressure Mother     High Blood Pressure Father     Colon Cancer Father     Diabetes Father         Current Outpatient Medications   Medication Sig Dispense Refill    ciprofloxacin (CIPRO) 500 MG tablet Take 1 tablet by mouth daily for 10 days 10 tablet 0    bisoprolol (ZEBETA) 5 MG tablet TAKE 1 TABLET BY MOUTH DAILY 90 tablet 1    Magic Mouthwash (MIRACLE MOUTHWASH) Swish and spit 5 mLs 4 times daily as needed for Irritation 240 mL 5    ibandronate (BONIVA) 150 MG tablet Take 1 tablet by mouth every 30 days Take one (1) tablet once per month in the morning with a full glass of water, on an empty stomach, and do not take anything else by mouth or lie down for the next 30 minutes. 1 tablet 6    DULoxetine (CYMBALTA) 60 MG extended release capsule Take 1 capsule by mouth daily (Patient taking differently: Take 30 mg by mouth daily Patient reports decreased 2-3 weeks ago) 30 capsule 5    omeprazole (PRILOSEC) 20 MG delayed release capsule Take 20 mg by mouth Daily       FEROSUL 325 (65 Fe) MG tablet TAKE 1 TABLET BY MOUTH TWICE DAILY (Patient taking differently: Take 325 mg by mouth 2 times daily ) 180 tablet 1    Calcium Carb-Cholecalciferol (CALCIUM 600 + D PO) Take 800 mg by mouth 3 times daily      ondansetron (ZOFRAN) 4 MG tablet Take 2 tablets by mouth every 8 hours as needed for Nausea or Vomiting 30 tablet 2    cyclobenzaprine (FLEXERIL) 10 MG tablet Take 1 tablet by mouth 3 times daily as needed for Muscle spasms 90 tablet 2    loperamide (IMODIUM A-D) 2 MG tablet Take 4 mg by mouth 4 times daily as needed       methadone (DOLOPHINE) 10 MG tablet Take 20 mg by mouth every 8 hours as needed for Pain (Dr. Brad Randolph). Indications: filled by Dr. Lauren Peck Pain mgt       gabapentin (NEURONTIN) 800 MG tablet Take 1 tablet by mouth 4 times daily for 31 days. 180 tablet 2     No current facility-administered medications for this visit. REVIEW OF SYSTEMS:    Constitutional: no fever, no night sweats, fatigue;   HEENT: no blurring of vision, no double vision, no hearing difficulty, no tinnitus,no ulceration, no dysphagia;    Lungs: no cough, no shortness of breath, no wheeze;   CVS: no palpitation, no chest pain, no shortness of breath;  GI: no abdominal pain, no nausea, no vomiting, no constipation;   MICHELLE: no dysuria, frequency and urgency, no kidney stones;   Musculoskeletal: Back pain, no joint pain, swelling , stiffness;   Endocrine: no polyuria, polydypsia, no cold or heat intolerence; Hematology/lymphatic: no easy brusing or bleeding, no hx of clotting disorder; no peripheral adenopathy. Dermatology: improving Acneform rash from EGFR inhibitor face/back, no eczema,  pruritis;   Psychiatry: no depression, no anxiety,no panic attacks, no suicide ideation; Neurology: no syncope, no seizures,  No numbness or tingling of hands, worsening numbness or tingling of feet, no paresis;     PHYSICAL EXAM:    Vitals signs:  /70   Pulse 72   Ht 5' 5\" (1.651 m)   Wt 185 lb 8 oz (84.1 kg)   SpO2 97%   BMI 30.87 kg/m²     Pain scale:        CONSTITUTIONAL: Alert, appropriate, no acute distress,   EYES: Non icteric, EOM intact, pupils equal round and reactive to light and accommodation. ENT: Oral mucus membranes moist, no oral pharyngeal lesions. External inspection of ears and nose are normal.   NECK: Supple, no masses. No palpable thyroid mass    CHEST/LUNGS: CTA bilaterally, normal respiratory effort   CARDIOVASCULAR: RRR, no murmurs. No lower extremity edema   ABDOMEN: soft non-tender, active bowel sounds, no hepatosplenomegaly. No palpable masses. EXTREMITIES: warm, Full ROM of all fours extremities. No focal weakness. SKIN: Acneform rash face and back   LYMPH: No cervical, clavicular, axillary, or inguinal lymphadenopathy  NEUROLOGIC: follows commands, non focal.   PSYCH: mood and affect appropriate. Alert and oriented to time and place and person. Relevant Lab findings/reviewed by me:  8/11/2021 CEA . 9  Lab Results   Component Value Date    WBC 4.43 09/22/2021    HGB 10.7 (L) 09/22/2021    HCT 31.7 (L) 09/22/2021    MCV 86.8 09/22/2021     09/22/2021     Lab Results   Component Value Date    NEUTROABS 3.01 09/22/2021     Lab Results   Component Value Date     (L) 09/22/2021    K 4.5 09/22/2021     09/22/2021    CO2 25 09/22/2021    BUN 16 09/22/2021    CREATININE 1.2 09/22/2021    GLUCOSE 114 (H) 09/22/2021    CALCIUM 8.1 (L) 09/22/2021    PROT 6.2 (L) 09/22/2021 LABALBU 3.8 09/22/2021    BILITOT 0.7 09/22/2021    ALKPHOS 98 09/22/2021    AST 24 09/22/2021    ALT 15 (L) 09/22/2021    LABGLOM 60 (A) 09/22/2021    GFRAA >59 09/15/2021    AGRATIO 1.6 02/11/2020    GLOB 2.4 09/22/2021       Relevant Imaging studies/reviewed by me:  9/03/2021 CT Chest w/Contrast Slight interval increase in the size of pulmonary nodules, the largest in the medial right lung base. Findings are concerning for metastatic disease. Atherosclerosis of the aorta and coronary arteries. Sclerotic rib lesions favored for osseous metastases. Old rib fractures also evident. 9/03/2021 CT Abd/Pelvis w/ IV Contrast (Oral) A persistent partially calcified mass in the presacral region with encasement of the distal ureters bilaterally and resultant bilateral hydronephrosis. Bilateral ureteral stents in place. There is increasing right-sided hydronephrosis since the previous study. Right renal atrophy similar to the previous study. Moderate asymmetrical thickening of the incompletely distended urinary bladder is similar to the previous study. This may partly be due to incomplete distention. An inflammatory process or a neoplastic process is not excluded. Moderate intrahepatic biliary dilatation is similar to the previous  study and probably due to a prior cholecystectomy. Moderate dilatation of the contrast filled small bowel loops may represent an ileus or partial distal small bowel obstruction. There is left lower abdominal ileostomy which appears patent. The stable compression fractures of the lower thoracic and proximal  lumbar vertebrae and hardware fusion similar to the previous study. ASSESSMENT    No orders of the defined types were placed in this encounter. Lambertfrances Wood was seen today for follow-up.     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 . MSI stable and negative mutations for BRAF, NRAS, KRAS wild type.     CEA on 4/22/2020 = 0.9   CEA 6/17/2020-0.9  CEA 8/19/20=1.1  10/21/2020-CEA = 2.0  11/18/2020-CEA 2.0  12/16/2020-CEA = 1.8  2/17/21 CEA 1.0  4/14/2021- CEA 1.0  5/12/21 CEA 0.8  6/1/21 CEA 0.9  8/11/21 CEA 0.9    Continue palliative intent 5-FU/leucovorin and Panitumumab. Not a good candidate for Avastin due to frequent exchange of ureteral stents. 3/25/2021-CT abdomen/pelvis-mild progression size soft tissue mass. 05/26/21- CT abdomen and pelvis  Partially calcified presacral soft tissue mass appears unchanged. Previously measured soft tissue noncalcified component is unchanged  measuring 2.2 x 3.2 cm on axial image 60      Continue treatment 5-FU with Panitumumab. Normocytic anemia-combination of anemia of chronic disease to include iron deficiency, chronic kidney disease and chemotherapy.      Status post Injectafer x 2 doses in the past.  Hemoglobin 10.6     Non invasive Urothelial Bladder Cancer ([facility] [address] 8/18/2019 and 4/1/2001. Repeat cystoscopy and bilateral ureteral stent removal/replacement on 2/26/2020 . The operative note by Dr. Armani Elliott documented bilateral hydronephrosis and obtained biopsy of the bladder in the mid dome and left anterior lateral wall x2. Pathology documented high-grade papillary urethral carcinoma, noninvasive, stage pTaNx. As per Urology    5/28/2020-the patient underwent a cystoscopy and resection of bladder tumor on 05/28/2020 with findings consistent with noninvasive, high-grade papillary urothelial carcinoma. Muscularis propria was not identified. Currently receiving intravesical BCG.  4/1/2021-repeat cystoscopy showed a small bladder lesion.   Tumor resection of bladder tumor consistent with noninvasive high-grade urothelial carcinoma. 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 Referred to [facility]    Osteoporosis-Osteoporosis BMD -3.6 April 2020. Continue Vit D, Boniva and Calcium. Side effects from treatment-CBC showed mild anemia. CMP showed creatinine 1.6/EGFR 50    Acneform rash secondary to EGFR therapy- hydrocortisone cream 2.5% twice daily and clindamycin cream 1%. Also recommended SPF factor XV above. CKD stage III-creatinine 1.5/EGFR 46    Mucositis related to 5-FU-recommended ice chips and miracle mouthwash for symptomatic mucositis    FOLLOW UP:  · CMP, CBC, CEA today  · Proceed with urology referral in [facility]  · Continue Hydrocortisone 2.5% and Clindamycin 1.0% as needed for rash  · Recommended continue ice chips during chemotherapy  · RTC with MD 6 weeks  · 5FU, Leucovorin, Vectibix every other cycle   · Will decrease Vectibix to every other treatment  · Repeat CT scans in 3 months, Dec 2021  · Repeat BMD April 2022  · Continue follow-up for surveillance cystoscopy with Dr. Jana Sprague  · Miracle mouthwash as needed  · Treatment every 3 weeks as per patient request.     Follow Up:     Return in about 6 weeks (around 11/3/2021) for CBC CMP , Orders, Appointment with Dr. Maria G Rowan. [place] every 3 weeks at [address] [place] [address], Tulio Yun am scribing for Owen Perdomo MD. Electronically signed by Tuilo Yun RN on 9/22/2021 at 7:32 AM CDT. I, Dr Juliana Diaz, personally performed the services described in this documentation as scribed by Tulio Yun RN in my presence and is both accurate and complete. I have seen, examined and reviewed this patient medication list, appropriate labs and imaging studies. I reviewed relevant medical records and others physicians notes. I discussed the plans of care with the patient. I answered all the questions to the patients satisfaction.  I have also reviewed the chief complaint (CC) and part of the history (History of Present Illness (HPI), Past Family Social History ([address] [place] [address] Nw), or Review of Systems (ROS) and made changes when appropriated.        (Please note that portions of this note were completed with a voice recognition program. Efforts were made to edit the dictations but occasionally words are mis-transcribed.)

## 2021-09-24 NOTE — PROGRESS NOTES
Chemo pump removed  Excellent blood return noted  Flushed with 20cc n/s and 500 units of heparin  No distress noted  Pt alert/cooperative

## 2021-10-13 NOTE — PROGRESS NOTES
Dr office notified of elevated K+ and creatinine and pt wanting to hold Vectibix this week and orders received.

## 2021-10-26 NOTE — TELEPHONE ENCOUNTER
Vanessawardtimo Yeni called to request a refill on his medication. Last office visit : 6/1/2021   Next office visit : 11/9/2021     Last UDS:   Amphetamines   Date Value Ref Range Status   05/01/2013 NEGATIVE  Final     Barbiturates   Date Value Ref Range Status   05/01/2013 NEGATIVE  Final     Benzodiazepines   Date Value Ref Range Status   05/01/2013 NEGATIVE  Final     Opiate Scrn, Ur   Date Value Ref Range Status   04/06/2015 See Final Results Rbjvxi=223 ng/mL Final     Comment:     Opiate test includes Codeine, Morphine, Hydromorphone, Hydrocodone. Last Fide Mort: 01-05-21  Medication Contract:     Last Fill: 05-19-21    Requested Prescriptions     Pending Prescriptions Disp Refills    gabapentin (NEURONTIN) 800 MG tablet [Pharmacy Med Name: GABAPENTIN 800MG TABLETS] 180 tablet      Sig: TAKE 1 TABLET BY MOUTH FOUR TIMES DAILY         Please approve or refuse this medication.    Cristian Grimaldo MA
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
## 2021-11-02 NOTE — PROGRESS NOTES
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 fever, no night sweats, fatigue;  HEENT: no blurring of vision, no double vision, no hearing difficulty, no tinnitus, no ulceration, no dysplasia, no epistaxis;   LUNGS: no cough, no hemoptysis, no wheeze,  no shortness of breath;  CARDIOVASCULAR: no palpitation, no chest pain, no shortness of breath;  GI: no abdominal pain, no nausea, no vomiting, no diarrhea, no constipation;  MICHELLE: no dysuria, hematuria-from procedure, no frequency or urgency, no nephrolithiasis;  MUSCULOSKELETAL: no joint pain, no swelling, no stiffness;  ENDOCRINE: no polyuria, no polydipsia, no cold or heat intolerance;  HEMATOLOGY: no easy bruising or bleeding, no history of clotting disorder;  DERMATOLOGY: no skin rash, no eczema, no pruritus;  PSYCHIATRY: no depression, no anxiety, no panic attacks, no suicidal ideation, no homicidal ideation;  NEUROLOGY: no syncope, no seizures, no numbness or tingling of hands, no numbness or tingling of feet, no paresis;      Vitals signs:  /60 Pulse 80   Ht 5' 6\" (1.676 m)   Wt 187 lb (84.8 kg)   SpO2 91%   BMI 30.18 kg/m²   Pain scale:  Pain Score:   0 - No pain     PHYSICAL EXAM:  CONSTITUTIONAL: Alert, appropriate, no acute distress,   EYES: Non icteric, EOM intact, pupils equal round and reactive to light and accommodation. ENT: Oral mucus membranes moist, no oral pharyngeal lesions. External inspection of ears and nose are normal.   NECK: Supple, no masses. No palpable thyroid mass    CHEST/LUNGS: CTA bilaterally, normal respiratory effort   CARDIOVASCULAR: RRR, no murmurs. No lower extremity edema   ABDOMEN: soft non-tender, active bowel sounds, no hepatosplenomegaly. No palpable masses. EXTREMITIES: warm, Full ROM of all fours extremities. No focal weakness. SKIN: Acneform rash face and back   LYMPH: No cervical, clavicular, axillary, or inguinal lymphadenopathy  NEUROLOGIC: follows commands, non focal.   PSYCH: mood and affect appropriate. Alert and oriented to time and place and person. Relevant Lab findings/reviewed by me:  11/3/21 CBC  WBC 2.78  HGB 9.2  HCT 27.7    Neut 2.18    Relevant Imaging studies/reviewed by me:  None    ASSESSMENT    Orders Placed This Encounter   Procedures    CT ABDOMEN PELVIS W IV CONTRAST Additional Contrast? Oral     Standing Status:   Future     Standing Expiration Date:   11/3/2022     Order Specific Question:   Additional Contrast?     Answer:   Oral     Order Specific Question:   Reason for exam:     Answer:   assess response to treatment    CT CHEST W CONTRAST     Standing Status:   Future     Standing Expiration Date:   11/3/2022     Order Specific Question:   Reason for exam:     Answer:   Assess response to treatment    NM BONE SCAN WHOLE BODY     Standing Status:   Future     Standing Expiration Date:   11/3/2022     Order Specific Question:   Reason for exam:     Answer:   Carlos Booker was seen today for follow-up.     Diagnoses and all orders for this visit:    Hematuria, unspecified type    Metastasis from colon cancer ([facility] [address]  -     Comprehensive Metabolic Panel  -     CT ABDOMEN PELVIS W IV CONTRAST Additional Contrast? Oral; Future  -     CT CHEST W CONTRAST; Future    History of rectal cancer  -     Comprehensive Metabolic Panel    Chemotherapy management, encounter for  -     Comprehensive Metabolic Panel  -     NM BONE SCAN WHOLE BODY; Future    Malignant neoplasm of urinary bladder, unspecified site [facility])    Malignant neoplasm of ureter, unspecified laterality (HCC)       ASSESSMENT/PLAN:    Metastasis colorectal adenocarcinoma confirmed in right abductor muscle KRAS wild type. . MSI stable and negative mutations for BRAF, NRAS, KRAS wild type.     09/22/21- CEA 0.6  Continue palliative intent 5-FU/leucovorin every 3 weeks as per patient request. Panitumumab on hold as per patient request (significant toxicity with the skin toxicity and mouth sores). Not a good candidate for Avastin due to frequent exchange of ureteral stents and hematuria. Repeat CT scans chest/abdomen pelvis and bone scan December 2021    Normocytic anemia-combination of anemia of chronic disease to include iron deficiency, chronic kidney disease and chemotherapy.      Status post Injectafer x 2 doses in the past.  Hemoglobin 9.2 (previously 10.7)  Continue to monitor. Non invasive Urothelial Bladder Cancer ([facility] [address] 8/18/2019 and 4/1/2001. Repeat cystoscopy and bilateral ureteral stent removal/replacement on 2/26/2020 . The operative note by Dr. Bernard Varner documented bilateral hydronephrosis and obtained biopsy of the bladder in the mid dome and left anterior lateral wall x2. Pathology documented high-grade papillary urethral carcinoma, noninvasive, stage pTaNx. As per Urology    5/28/2020-the patient underwent a cystoscopy and resection of bladder tumor on 05/28/2020 with findings consistent with noninvasive, high-grade papillary urothelial carcinoma.   Muscularis propria was not identified. Currently receiving intravesical BCG.  4/1/2021-repeat cystoscopy showed a small bladder lesion. Tumor resection of bladder tumor consistent with noninvasive high-grade urothelial carcinoma. 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 Referred to [employer]  10/14/2021-urology at [facility]. Urine cytology consistent with atypical urothelial cells. Osteoporosis-Osteoporosis BMD -3.6 April 2020. Continue Vit D, Boniva and Calcium. Concerns findings of bone metastasis in the past -biopsy of T12/L1 in the past showed no evidence of bone metastasis but evidence of osteopenia. Bone density has been consistent with osteoporosis in April 2020. Side effects from treatment-CBC showed mild anemia. CMP showed creatinine 1.6/EGFR 50    Acneform rash secondary to EGFR therapy- prn hydrocortisone cream 2.5% twice daily and clindamycin cream 1%. Also recommended SPF factor XV above. CKD stage III-creatinine 1.5/EGFR 46    Mucositis related to 5-FU-recommended ice chips and miracle mouthwash for symptomatic mucositis. Holding Panitumumab    FOLLOW UP:  · CMP, CBC, CEA today  · Continue follow-up with urology in  [facility]  · Continue Hydrocortisone 2.5% and Clindamycin 1.0% as needed for rash  · Continue ice chips during chemotherapy  · RTC with MD 6 weeks after scans  · 5FU, Leucovorin, Vectibix every other cycle   · Will decrease Vectibix to every other treatment  · Repeat CT scans in 12/10/2021  · Recommend Bone Scan 12/10/2021  · Continue follow-up for surveillance cystoscopy with Dr. Chris Fontaine  · Miracle mouthwash as needed  · Treatment every 3 weeks as per patient request.      Follow Up:     Return in 6 weeks (on 12/15/2021) for CBC CMP , Orders, Appointment with Dr. Mariaelena Monreal.    Schedule CT c/a/p 12/10/2021  Bone Scan 12/10/2021     Tosin SAL am scribing for Vivian Valdez MD. Electronically signed by Tosni Rosenberg RN on 11/3/2021 at 10:41 AM CDT. I, Dr Kushal Worthy, personally performed the services described in this documentation as scribed by Laura Angeles RN in my presence and is both accurate and complete. I have seen, examined and reviewed this patient medication list, appropriate labs and imaging studies. I reviewed relevant medical records and others physicians notes. I discussed the plans of care with the patient. I answered all the questions to the patients satisfaction. I have also reviewed the chief complaint (CC) and part of the history (History of Present Illness (HPI), Past Family Social History [facility]), or Review of Systems (ROS) and made changes when appropriated.        (Please note that portions of this note were completed with a voice recognition program. Efforts were made to edit the dictations but occasionally words are mis-transcribed.)    Electronically signed by Armani Mcfadden MD on 11/3/2021 at 4:26 PM

## 2021-11-09 NOTE — TELEPHONE ENCOUNTER
We had scheduled Jelani for an appointment with Hugo on 11/11/2021 for a possible compression fracture follow up, I had requested his CT of the abdomen images from  for review.  Then patient left message requesting to cancel the appointment, I followed up with him to see why, no answer and I have left message for a call back.
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
## 2021-11-09 NOTE — ED NOTES
Bed: 02-A  Expected date:   Expected time:   Means of arrival:   Comments:     Juan Armando RN  11/09/21 [id number]

## 2021-11-09 NOTE — TELEPHONE ENCOUNTER
Madelyn Ritter called to request a refill on his medication. Last office visit : 6/1/2021   Next office visit : 11/9/2021     Last UDS:   Amphetamines   Date Value Ref Range Status   05/01/2013 NEGATIVE  Final     Barbiturates   Date Value Ref Range Status   05/01/2013 NEGATIVE  Final     Benzodiazepines   Date Value Ref Range Status   05/01/2013 NEGATIVE  Final     Opiate Scrn, Ur   Date Value Ref Range Status   04/06/2015 See Final Results Xvrzvw=272 ng/mL Final     Comment:     Opiate test includes Codeine, Morphine, Hydromorphone, Hydrocodone. Last Odalys Platts: 11-  Medication Contract: does not have one on file   Last Fill: 05-    Requested Prescriptions     Pending Prescriptions Disp Refills    ALPRAZolam (XANAX) 0.25 MG tablet 30 tablet 0     Sig: Take 1 tablet by mouth nightly as needed for Anxiety for up to 30 days. Please approve or refuse this medication.    Shama Anderson MA

## 2021-11-10 NOTE — TELEPHONE ENCOUNTER
Tried to reach out to patient again today, still no answer, have left another message for call back.  I have cancelled patient's appointment per his request, not sure of the reason.
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
## 2021-11-22 NOTE — PROGRESS NOTES
Received call from [facility] with Dr Juanito Serrano office, orders to Hold treatment today.

## 2022-01-01 ENCOUNTER — HOSPITAL ENCOUNTER (OUTPATIENT)
Dept: INFUSION THERAPY | Age: 72
Setting detail: INFUSION SERIES
Discharge: HOME | End: 2022-06-03
Payer: MEDICARE

## 2022-01-01 ENCOUNTER — HOSPITAL ENCOUNTER (OUTPATIENT)
Dept: INFUSION THERAPY | Age: 72
Setting detail: INFUSION SERIES
End: 2022-01-01

## 2022-01-01 ENCOUNTER — CLINICAL DOCUMENTATION (OUTPATIENT)
Dept: HEMATOLOGY | Age: 72
End: 2022-01-01

## 2022-01-01 ENCOUNTER — HOSPITAL ENCOUNTER (INPATIENT)
Age: 72
LOS: 1 days | Discharge: HOME | DRG: 394 | End: 2022-06-30
Attending: EMERGENCY MEDICINE | Admitting: INTERNAL MEDICINE
Payer: MEDICARE

## 2022-01-01 ENCOUNTER — TELEPHONE (OUTPATIENT)
Dept: INTERNAL MEDICINE CLINIC | Age: 72
End: 2022-01-01

## 2022-01-01 ENCOUNTER — APPOINTMENT (OUTPATIENT)
Dept: MRI IMAGING | Age: 72
DRG: 543 | End: 2022-01-01
Payer: MEDICARE

## 2022-01-01 ENCOUNTER — TELEPHONE (OUTPATIENT)
Dept: INTERNAL MEDICINE | Age: 72
End: 2022-01-01

## 2022-01-01 ENCOUNTER — HOSPITAL ENCOUNTER (OUTPATIENT)
Dept: INFUSION THERAPY | Age: 72
Setting detail: INFUSION SERIES
Discharge: HOME | DRG: 314 | End: 2022-07-05
Payer: MEDICARE

## 2022-01-01 ENCOUNTER — TELEPHONE (OUTPATIENT)
Dept: INFUSION THERAPY | Age: 72
End: 2022-01-01

## 2022-01-01 ENCOUNTER — APPOINTMENT (OUTPATIENT)
Dept: CT IMAGING | Age: 72
DRG: 659 | End: 2022-01-01
Payer: MEDICARE

## 2022-01-01 ENCOUNTER — APPOINTMENT (OUTPATIENT)
Dept: GENERAL RADIOLOGY | Age: 72
DRG: 394 | End: 2022-01-01
Payer: MEDICARE

## 2022-01-01 ENCOUNTER — HOSPITAL ENCOUNTER (OUTPATIENT)
Dept: INFUSION THERAPY | Age: 72
Discharge: HOME | End: 2022-05-11
Payer: MEDICARE

## 2022-01-01 ENCOUNTER — APPOINTMENT (OUTPATIENT)
Dept: GENERAL RADIOLOGY | Age: 72
DRG: 314 | End: 2022-01-01
Payer: MEDICARE

## 2022-01-01 ENCOUNTER — APPOINTMENT (OUTPATIENT)
Dept: GENERAL RADIOLOGY | Age: 72
DRG: 659 | End: 2022-01-01
Payer: MEDICARE

## 2022-01-01 ENCOUNTER — APPOINTMENT (OUTPATIENT)
Dept: CT IMAGING | Age: 72
DRG: 543 | End: 2022-01-01
Payer: MEDICARE

## 2022-01-01 ENCOUNTER — TELEPHONE (OUTPATIENT)
Dept: UROLOGY | Age: 72
End: 2022-01-01

## 2022-01-01 ENCOUNTER — APPOINTMENT (OUTPATIENT)
Dept: ULTRASOUND IMAGING | Age: 72
DRG: 659 | End: 2022-01-01
Payer: MEDICARE

## 2022-01-01 ENCOUNTER — HOSPITAL ENCOUNTER (OUTPATIENT)
Dept: GENERAL RADIOLOGY | Age: 72
Discharge: HOME | End: 2022-05-25
Payer: MEDICARE

## 2022-01-01 ENCOUNTER — HOSPITAL ENCOUNTER (OUTPATIENT)
Dept: INFUSION THERAPY | Age: 72
Setting detail: INFUSION SERIES
Discharge: HOME | End: 2022-05-18
Payer: MEDICARE

## 2022-01-01 ENCOUNTER — TELEPHONE (OUTPATIENT)
Dept: PRIMARY CARE CLINIC | Age: 72
End: 2022-01-01

## 2022-01-01 ENCOUNTER — HOSPITAL ENCOUNTER (OUTPATIENT)
Age: 72
Setting detail: OUTPATIENT SURGERY
Discharge: HOME | End: 2022-07-12
Attending: UROLOGY | Admitting: UROLOGY
Payer: MEDICARE

## 2022-01-01 ENCOUNTER — APPOINTMENT (OUTPATIENT)
Dept: CT IMAGING | Age: 72
DRG: 394 | End: 2022-01-01
Payer: MEDICARE

## 2022-01-01 ENCOUNTER — OFFICE VISIT (OUTPATIENT)
Dept: UROLOGY | Age: 72
End: 2022-01-01
Payer: MEDICARE

## 2022-01-01 ENCOUNTER — HOSPITAL ENCOUNTER (INPATIENT)
Age: 72
LOS: 9 days | Discharge: HOME HEALTH | DRG: 949 | End: 2022-04-29
Attending: PSYCHIATRY & NEUROLOGY | Admitting: PSYCHIATRY & NEUROLOGY
Payer: MEDICARE

## 2022-01-01 ENCOUNTER — HOSPITAL ENCOUNTER (OUTPATIENT)
Dept: INFUSION THERAPY | Age: 72
Setting detail: INFUSION SERIES
Discharge: HOME | End: 2022-06-01
Payer: MEDICARE

## 2022-01-01 ENCOUNTER — TELEPHONE (OUTPATIENT)
Dept: HEMATOLOGY | Age: 72
End: 2022-01-01

## 2022-01-01 ENCOUNTER — ANESTHESIA (OUTPATIENT)
Dept: OPERATING ROOM | Age: 72
End: 2022-01-01
Payer: MEDICARE

## 2022-01-01 ENCOUNTER — ANESTHESIA (OUTPATIENT)
Dept: OPERATING ROOM | Age: 72
DRG: 659 | End: 2022-01-01
Payer: MEDICARE

## 2022-01-01 ENCOUNTER — APPOINTMENT (OUTPATIENT)
Dept: GENERAL RADIOLOGY | Age: 72
End: 2022-01-01
Attending: UROLOGY
Payer: MEDICARE

## 2022-01-01 ENCOUNTER — OFFICE VISIT (OUTPATIENT)
Dept: HEMATOLOGY | Age: 72
End: 2022-01-01
Payer: MEDICARE

## 2022-01-01 ENCOUNTER — HOSPITAL ENCOUNTER (INPATIENT)
Age: 72
LOS: 6 days | Discharge: HOME HEALTH | DRG: 314 | End: 2022-07-11
Attending: EMERGENCY MEDICINE
Payer: MEDICARE

## 2022-01-01 ENCOUNTER — HOSPITAL ENCOUNTER (OUTPATIENT)
Dept: INFUSION THERAPY | Age: 72
Setting detail: INFUSION SERIES
Discharge: HOME | End: 2022-05-11
Payer: MEDICARE

## 2022-01-01 ENCOUNTER — HOSPITAL ENCOUNTER (OUTPATIENT)
Dept: INFUSION THERAPY | Age: 72
Discharge: HOME | End: 2022-06-15
Payer: MEDICARE

## 2022-01-01 ENCOUNTER — HOSPITAL ENCOUNTER (OUTPATIENT)
Dept: INFUSION THERAPY | Age: 72
Setting detail: INFUSION SERIES
Discharge: HOME | End: 2022-05-12
Payer: MEDICARE

## 2022-01-01 ENCOUNTER — HOSPITAL ENCOUNTER (OUTPATIENT)
Dept: INFUSION THERAPY | Age: 72
Discharge: HOME | End: 2022-05-25
Payer: MEDICARE

## 2022-01-01 ENCOUNTER — ANESTHESIA EVENT (OUTPATIENT)
Dept: OPERATING ROOM | Age: 72
DRG: 659 | End: 2022-01-01
Payer: MEDICARE

## 2022-01-01 ENCOUNTER — HOSPITAL ENCOUNTER (OUTPATIENT)
Dept: INFUSION THERAPY | Age: 72
Setting detail: INFUSION SERIES
Discharge: HOME | End: 2022-05-16
Payer: MEDICARE

## 2022-01-01 ENCOUNTER — APPOINTMENT (OUTPATIENT)
Dept: INFUSION THERAPY | Age: 72
End: 2022-01-01
Payer: MEDICARE

## 2022-01-01 ENCOUNTER — APPOINTMENT (OUTPATIENT)
Dept: CT IMAGING | Age: 72
DRG: 314 | End: 2022-01-01
Payer: MEDICARE

## 2022-01-01 ENCOUNTER — OFFICE VISIT (OUTPATIENT)
Dept: SURGERY | Age: 72
End: 2022-01-01
Payer: MEDICARE

## 2022-01-01 ENCOUNTER — ANESTHESIA EVENT (OUTPATIENT)
Dept: OPERATING ROOM | Age: 72
End: 2022-01-01
Payer: MEDICARE

## 2022-01-01 ENCOUNTER — TELEPHONE (OUTPATIENT)
Dept: CARDIOLOGY CLINIC | Age: 72
End: 2022-01-01

## 2022-01-01 ENCOUNTER — OFFICE VISIT (OUTPATIENT)
Dept: PRIMARY CARE CLINIC | Age: 72
End: 2022-01-01
Payer: MEDICARE

## 2022-01-01 ENCOUNTER — HOSPITAL ENCOUNTER (INPATIENT)
Age: 72
LOS: 10 days | Discharge: NURSING, REHAB OR LONG-TERM CARE | DRG: 659 | End: 2022-04-20
Attending: EMERGENCY MEDICINE | Admitting: STUDENT IN AN ORGANIZED HEALTH CARE EDUCATION/TRAINING PROGRAM
Payer: MEDICARE

## 2022-01-01 ENCOUNTER — HOSPITAL ENCOUNTER (OUTPATIENT)
Dept: INFUSION THERAPY | Age: 72
Discharge: HOME | End: 2022-06-21

## 2022-01-01 ENCOUNTER — HOSPITAL ENCOUNTER (INPATIENT)
Age: 72
LOS: 3 days | Discharge: HOSPICE | DRG: 543 | End: 2022-07-30
Attending: EMERGENCY MEDICINE | Admitting: INTERNAL MEDICINE
Payer: MEDICARE

## 2022-01-01 ENCOUNTER — HOSPITAL ENCOUNTER (OUTPATIENT)
Dept: INFUSION THERAPY | Age: 72
Setting detail: INFUSION SERIES
Discharge: HOME | End: 2022-06-22
Payer: MEDICARE

## 2022-01-01 VITALS
RESPIRATION RATE: 18 BRPM | DIASTOLIC BLOOD PRESSURE: 69 MMHG | HEART RATE: 81 BPM | TEMPERATURE: 98 F | OXYGEN SATURATION: 97 % | SYSTOLIC BLOOD PRESSURE: 116 MMHG

## 2022-01-01 VITALS
OXYGEN SATURATION: 96 % | HEIGHT: 65 IN | TEMPERATURE: 98.3 F | HEART RATE: 98 BPM | SYSTOLIC BLOOD PRESSURE: 136 MMHG | DIASTOLIC BLOOD PRESSURE: 78 MMHG | WEIGHT: 162 LBS | BODY MASS INDEX: 26.99 KG/M2

## 2022-01-01 VITALS
HEART RATE: 115 BPM | WEIGHT: 170 LBS | TEMPERATURE: 97.5 F | RESPIRATION RATE: 16 BRPM | BODY MASS INDEX: 28.32 KG/M2 | HEIGHT: 65 IN | OXYGEN SATURATION: 96 % | SYSTOLIC BLOOD PRESSURE: 141 MMHG | DIASTOLIC BLOOD PRESSURE: 106 MMHG

## 2022-01-01 VITALS
SYSTOLIC BLOOD PRESSURE: 146 MMHG | WEIGHT: 150 LBS | HEART RATE: 89 BPM | HEIGHT: 65 IN | OXYGEN SATURATION: 97 % | BODY MASS INDEX: 24.99 KG/M2 | TEMPERATURE: 97.8 F | DIASTOLIC BLOOD PRESSURE: 90 MMHG | RESPIRATION RATE: 16 BRPM

## 2022-01-01 VITALS
HEIGHT: 65 IN | RESPIRATION RATE: 16 BRPM | DIASTOLIC BLOOD PRESSURE: 94 MMHG | HEART RATE: 135 BPM | WEIGHT: 160.56 LBS | OXYGEN SATURATION: 96 % | TEMPERATURE: 97.4 F | SYSTOLIC BLOOD PRESSURE: 126 MMHG | BODY MASS INDEX: 26.75 KG/M2

## 2022-01-01 VITALS
DIASTOLIC BLOOD PRESSURE: 86 MMHG | OXYGEN SATURATION: 91 % | HEART RATE: 108 BPM | WEIGHT: 170 LBS | TEMPERATURE: 98.2 F | SYSTOLIC BLOOD PRESSURE: 147 MMHG | RESPIRATION RATE: 16 BRPM | HEIGHT: 65 IN | BODY MASS INDEX: 28.32 KG/M2

## 2022-01-01 VITALS
WEIGHT: 143.8 LBS | OXYGEN SATURATION: 95 % | HEART RATE: 126 BPM | DIASTOLIC BLOOD PRESSURE: 98 MMHG | RESPIRATION RATE: 18 BRPM | BODY MASS INDEX: 23.93 KG/M2 | TEMPERATURE: 96.8 F | SYSTOLIC BLOOD PRESSURE: 140 MMHG

## 2022-01-01 VITALS
DIASTOLIC BLOOD PRESSURE: 66 MMHG | TEMPERATURE: 98 F | HEART RATE: 93 BPM | SYSTOLIC BLOOD PRESSURE: 115 MMHG | RESPIRATION RATE: 20 BRPM

## 2022-01-01 VITALS
HEIGHT: 65 IN | DIASTOLIC BLOOD PRESSURE: 70 MMHG | SYSTOLIC BLOOD PRESSURE: 120 MMHG | TEMPERATURE: 97.8 F | BODY MASS INDEX: 27.82 KG/M2 | WEIGHT: 167 LBS

## 2022-01-01 VITALS
TEMPERATURE: 98.4 F | HEART RATE: 86 BPM | RESPIRATION RATE: 18 BRPM | SYSTOLIC BLOOD PRESSURE: 122 MMHG | BODY MASS INDEX: 26.66 KG/M2 | OXYGEN SATURATION: 97 % | WEIGHT: 160 LBS | DIASTOLIC BLOOD PRESSURE: 86 MMHG | HEIGHT: 65 IN

## 2022-01-01 VITALS
DIASTOLIC BLOOD PRESSURE: 52 MMHG | TEMPERATURE: 98.1 F | RESPIRATION RATE: 18 BRPM | SYSTOLIC BLOOD PRESSURE: 89 MMHG | HEART RATE: 106 BPM | OXYGEN SATURATION: 97 %

## 2022-01-01 VITALS
WEIGHT: 162 LBS | SYSTOLIC BLOOD PRESSURE: 114 MMHG | TEMPERATURE: 97 F | RESPIRATION RATE: 17 BRPM | BODY MASS INDEX: 26.96 KG/M2 | HEART RATE: 72 BPM | DIASTOLIC BLOOD PRESSURE: 62 MMHG | OXYGEN SATURATION: 97 %

## 2022-01-01 VITALS
DIASTOLIC BLOOD PRESSURE: 76 MMHG | SYSTOLIC BLOOD PRESSURE: 110 MMHG | OXYGEN SATURATION: 96 % | WEIGHT: 165 LBS | BODY MASS INDEX: 27.46 KG/M2 | HEART RATE: 81 BPM

## 2022-01-01 VITALS
OXYGEN SATURATION: 95 % | TEMPERATURE: 98.1 F | SYSTOLIC BLOOD PRESSURE: 102 MMHG | DIASTOLIC BLOOD PRESSURE: 63 MMHG | RESPIRATION RATE: 18 BRPM | HEART RATE: 84 BPM

## 2022-01-01 VITALS
RESPIRATION RATE: 17 BRPM | BODY MASS INDEX: 28.66 KG/M2 | OXYGEN SATURATION: 97 % | DIASTOLIC BLOOD PRESSURE: 90 MMHG | TEMPERATURE: 97.1 F | SYSTOLIC BLOOD PRESSURE: 141 MMHG | WEIGHT: 172 LBS | HEIGHT: 65 IN | HEART RATE: 118 BPM

## 2022-01-01 VITALS — BODY MASS INDEX: 27.49 KG/M2 | WEIGHT: 165 LBS | HEIGHT: 65 IN | TEMPERATURE: 98.2 F

## 2022-01-01 VITALS
HEART RATE: 95 BPM | WEIGHT: 165 LBS | OXYGEN SATURATION: 95 % | DIASTOLIC BLOOD PRESSURE: 82 MMHG | RESPIRATION RATE: 18 BRPM | BODY MASS INDEX: 27.46 KG/M2 | SYSTOLIC BLOOD PRESSURE: 129 MMHG | TEMPERATURE: 98 F

## 2022-01-01 VITALS
SYSTOLIC BLOOD PRESSURE: 123 MMHG | TEMPERATURE: 98.2 F | RESPIRATION RATE: 18 BRPM | WEIGHT: 165 LBS | HEIGHT: 65 IN | OXYGEN SATURATION: 99 % | BODY MASS INDEX: 27.49 KG/M2 | HEART RATE: 80 BPM | DIASTOLIC BLOOD PRESSURE: 69 MMHG

## 2022-01-01 VITALS — OXYGEN SATURATION: 100 % | SYSTOLIC BLOOD PRESSURE: 155 MMHG | DIASTOLIC BLOOD PRESSURE: 88 MMHG | TEMPERATURE: 97.3 F

## 2022-01-01 VITALS
OXYGEN SATURATION: 97 % | RESPIRATION RATE: 17 BRPM | TEMPERATURE: 97.7 F | DIASTOLIC BLOOD PRESSURE: 64 MMHG | SYSTOLIC BLOOD PRESSURE: 121 MMHG | HEART RATE: 61 BPM

## 2022-01-01 DIAGNOSIS — N18.9 ACUTE KIDNEY INJURY SUPERIMPOSED ON CKD (HCC): ICD-10-CM

## 2022-01-01 DIAGNOSIS — C18.9 COLON CARCINOMA METASTATIC TO LUNG (HCC): Primary | ICD-10-CM

## 2022-01-01 DIAGNOSIS — C79.9 METASTASIS FROM COLON CANCER (HCC): Primary | ICD-10-CM

## 2022-01-01 DIAGNOSIS — R06.02 SHORTNESS OF BREATH: ICD-10-CM

## 2022-01-01 DIAGNOSIS — N18.4 STAGE 4 CHRONIC KIDNEY DISEASE (HCC): Primary | ICD-10-CM

## 2022-01-01 DIAGNOSIS — C18.9 METASTASIS FROM COLON CANCER (HCC): ICD-10-CM

## 2022-01-01 DIAGNOSIS — T83.122A DISPLACEMENT OF URETERAL STENT, INITIAL ENCOUNTER (HCC): ICD-10-CM

## 2022-01-01 DIAGNOSIS — G89.4 CHRONIC PAIN SYNDROME: ICD-10-CM

## 2022-01-01 DIAGNOSIS — M17.11 PRIMARY OSTEOARTHRITIS OF RIGHT KNEE: Primary | ICD-10-CM

## 2022-01-01 DIAGNOSIS — F41.9 ANXIETY: ICD-10-CM

## 2022-01-01 DIAGNOSIS — E83.51 HYPOCALCEMIA: ICD-10-CM

## 2022-01-01 DIAGNOSIS — C79.9 METASTASIS FROM COLON CANCER (HCC): ICD-10-CM

## 2022-01-01 DIAGNOSIS — I25.10 CORONARY ARTERY DISEASE INVOLVING NATIVE CORONARY ARTERY OF NATIVE HEART WITHOUT ANGINA PECTORIS: ICD-10-CM

## 2022-01-01 DIAGNOSIS — T45.1X5A CHEMOTHERAPY-INDUCED NAUSEA: ICD-10-CM

## 2022-01-01 DIAGNOSIS — C18.9 METASTASIS FROM COLON CANCER (HCC): Primary | ICD-10-CM

## 2022-01-01 DIAGNOSIS — Z97.8 INDWELLING FOLEY CATHETER PRESENT: ICD-10-CM

## 2022-01-01 DIAGNOSIS — I50.9 HEART FAILURE, UNSPECIFIED HF CHRONICITY, UNSPECIFIED HEART FAILURE TYPE (HCC): Primary | ICD-10-CM

## 2022-01-01 DIAGNOSIS — T45.1X5A CHEMOTHERAPY-INDUCED PERIPHERAL NEUROPATHY (HCC): ICD-10-CM

## 2022-01-01 DIAGNOSIS — C78.00 COLON CARCINOMA METASTATIC TO LUNG (HCC): Primary | ICD-10-CM

## 2022-01-01 DIAGNOSIS — Z51.11 CHEMOTHERAPY MANAGEMENT, ENCOUNTER FOR: ICD-10-CM

## 2022-01-01 DIAGNOSIS — Z85.048 HISTORY OF RECTAL CANCER: ICD-10-CM

## 2022-01-01 DIAGNOSIS — M81.0 OSTEOPOROSIS, UNSPECIFIED OSTEOPOROSIS TYPE, UNSPECIFIED PATHOLOGICAL FRACTURE PRESENCE: ICD-10-CM

## 2022-01-01 DIAGNOSIS — N30.00 ACUTE CYSTITIS WITHOUT HEMATURIA: ICD-10-CM

## 2022-01-01 DIAGNOSIS — B37.49 CANDIDA UTI: Primary | ICD-10-CM

## 2022-01-01 DIAGNOSIS — N17.9 ACUTE KIDNEY INJURY SUPERIMPOSED ON CKD (HCC): ICD-10-CM

## 2022-01-01 DIAGNOSIS — C19 COLORECTAL CANCER (HCC): ICD-10-CM

## 2022-01-01 DIAGNOSIS — E86.0 DEHYDRATION: Primary | ICD-10-CM

## 2022-01-01 DIAGNOSIS — Z71.89 CARE PLAN DISCUSSED WITH PATIENT: ICD-10-CM

## 2022-01-01 DIAGNOSIS — C66.1 UROTHELIAL CARCINOMA OF RIGHT DISTAL URETER (HCC): ICD-10-CM

## 2022-01-01 DIAGNOSIS — C78.00 COLON CARCINOMA METASTATIC TO LUNG (HCC): ICD-10-CM

## 2022-01-01 DIAGNOSIS — R82.90 ABNORMAL URINALYSIS: ICD-10-CM

## 2022-01-01 DIAGNOSIS — R52 INTRACTABLE PAIN: Primary | ICD-10-CM

## 2022-01-01 DIAGNOSIS — R82.90 CLOUDY URINE: Primary | ICD-10-CM

## 2022-01-01 DIAGNOSIS — A41.9 SEPSIS WITH ACUTE RENAL FAILURE WITHOUT SEPTIC SHOCK, DUE TO UNSPECIFIED ORGANISM, UNSPECIFIED ACUTE RENAL FAILURE TYPE (HCC): ICD-10-CM

## 2022-01-01 DIAGNOSIS — R39.15 URGENCY OF URINATION: Primary | ICD-10-CM

## 2022-01-01 DIAGNOSIS — C18.9 COLON CARCINOMA METASTATIC TO LUNG (HCC): ICD-10-CM

## 2022-01-01 DIAGNOSIS — C79.9 METASTATIC ADENOCARCINOMA (HCC): ICD-10-CM

## 2022-01-01 DIAGNOSIS — R35.0 URINARY FREQUENCY: ICD-10-CM

## 2022-01-01 DIAGNOSIS — E83.42 HYPOMAGNESEMIA: ICD-10-CM

## 2022-01-01 DIAGNOSIS — Z11.59 SCREENING FOR VIRAL DISEASE: Primary | ICD-10-CM

## 2022-01-01 DIAGNOSIS — Z09 HOSPITAL DISCHARGE FOLLOW-UP: Primary | ICD-10-CM

## 2022-01-01 DIAGNOSIS — T45.1X5D ADVERSE EFFECT OF CHEMOTHERAPY, SUBSEQUENT ENCOUNTER: ICD-10-CM

## 2022-01-01 DIAGNOSIS — C18.9 MALIGNANT NEOPLASM OF COLON, UNSPECIFIED PART OF COLON (HCC): Primary | ICD-10-CM

## 2022-01-01 DIAGNOSIS — B37.41 CANDIDA CYSTITIS: Primary | ICD-10-CM

## 2022-01-01 DIAGNOSIS — N13.5 URETERAL STRICTURE, LEFT: ICD-10-CM

## 2022-01-01 DIAGNOSIS — N18.9 CHRONIC KIDNEY DISEASE, UNSPECIFIED CKD STAGE: ICD-10-CM

## 2022-01-01 DIAGNOSIS — C18.9: ICD-10-CM

## 2022-01-01 DIAGNOSIS — G62.0 CHEMOTHERAPY-INDUCED PERIPHERAL NEUROPATHY (HCC): ICD-10-CM

## 2022-01-01 DIAGNOSIS — R53.81 PHYSICAL DECONDITIONING: ICD-10-CM

## 2022-01-01 DIAGNOSIS — E61.1 IRON DEFICIENCY: ICD-10-CM

## 2022-01-01 DIAGNOSIS — R30.0 DYSURIA: ICD-10-CM

## 2022-01-01 DIAGNOSIS — Z93.9 HISTORY OF CREATION OF OSTOMY (HCC): ICD-10-CM

## 2022-01-01 DIAGNOSIS — G90.521 COMPLEX REGIONAL PAIN SYNDROME TYPE 1 OF RIGHT LOWER EXTREMITY: ICD-10-CM

## 2022-01-01 DIAGNOSIS — R11.0 CHEMOTHERAPY-INDUCED NAUSEA: ICD-10-CM

## 2022-01-01 DIAGNOSIS — R82.90 CLOUDY URINE: ICD-10-CM

## 2022-01-01 DIAGNOSIS — K56.609 SMALL BOWEL OBSTRUCTION (HCC): Primary | ICD-10-CM

## 2022-01-01 DIAGNOSIS — C79.9 METASTATIC MALIGNANT NEOPLASM, UNSPECIFIED SITE (HCC): ICD-10-CM

## 2022-01-01 DIAGNOSIS — Z01.812 ENCOUNTER FOR PREOPERATIVE SCREENING LABORATORY TESTING FOR COVID-19 VIRUS: Primary | ICD-10-CM

## 2022-01-01 DIAGNOSIS — A41.9 SEPTICEMIA (HCC): Primary | ICD-10-CM

## 2022-01-01 DIAGNOSIS — Z51.12 ENCOUNTER FOR ANTINEOPLASTIC IMMUNOTHERAPY: ICD-10-CM

## 2022-01-01 DIAGNOSIS — R35.0 INCREASED URINARY FREQUENCY: ICD-10-CM

## 2022-01-01 DIAGNOSIS — A41.9 SEPSIS, DUE TO UNSPECIFIED ORGANISM, UNSPECIFIED WHETHER ACUTE ORGAN DYSFUNCTION PRESENT (HCC): Primary | ICD-10-CM

## 2022-01-01 DIAGNOSIS — N17.9 AKI (ACUTE KIDNEY INJURY) (HCC): ICD-10-CM

## 2022-01-01 DIAGNOSIS — R93.7 ABNORMAL CT OF THORACIC SPINE: ICD-10-CM

## 2022-01-01 DIAGNOSIS — N13.30 HYDRONEPHROSIS OF LEFT KIDNEY: ICD-10-CM

## 2022-01-01 DIAGNOSIS — E83.42 HYPOMAGNESEMIA: Primary | ICD-10-CM

## 2022-01-01 DIAGNOSIS — N13.39 OTHER HYDRONEPHROSIS: ICD-10-CM

## 2022-01-01 DIAGNOSIS — C79.51 BONE METASTASES (HCC): ICD-10-CM

## 2022-01-01 DIAGNOSIS — N28.9 RENAL IMPAIRMENT: ICD-10-CM

## 2022-01-01 DIAGNOSIS — N17.9 SEPSIS WITH ACUTE RENAL FAILURE WITHOUT SEPTIC SHOCK, DUE TO UNSPECIFIED ORGANISM, UNSPECIFIED ACUTE RENAL FAILURE TYPE (HCC): ICD-10-CM

## 2022-01-01 DIAGNOSIS — I95.9 HYPOTENSION, UNSPECIFIED HYPOTENSION TYPE: ICD-10-CM

## 2022-01-01 DIAGNOSIS — I50.9 HEART FAILURE, UNSPECIFIED HF CHRONICITY, UNSPECIFIED HEART FAILURE TYPE (HCC): ICD-10-CM

## 2022-01-01 DIAGNOSIS — C67.0 MALIGNANT NEOPLASM OF TRIGONE OF URINARY BLADDER (HCC): Primary | ICD-10-CM

## 2022-01-01 DIAGNOSIS — D64.81 ANTINEOPLASTIC CHEMOTHERAPY INDUCED ANEMIA: ICD-10-CM

## 2022-01-01 DIAGNOSIS — R65.20 SEPSIS WITH ACUTE RENAL FAILURE WITHOUT SEPTIC SHOCK, DUE TO UNSPECIFIED ORGANISM, UNSPECIFIED ACUTE RENAL FAILURE TYPE (HCC): ICD-10-CM

## 2022-01-01 DIAGNOSIS — R30.0 DYSURIA: Primary | ICD-10-CM

## 2022-01-01 DIAGNOSIS — Z20.822 ENCOUNTER FOR PREOPERATIVE SCREENING LABORATORY TESTING FOR COVID-19 VIRUS: Primary | ICD-10-CM

## 2022-01-01 DIAGNOSIS — B37.49 CANDIDA UTI: ICD-10-CM

## 2022-01-01 DIAGNOSIS — T45.1X5A ANTINEOPLASTIC CHEMOTHERAPY INDUCED ANEMIA: ICD-10-CM

## 2022-01-01 LAB
ACINETOBACTER CALCOAC BAUMANNII COMPLEX BY PCR: NOT DETECTED
ACINETOBACTER CALCOAC BAUMANNII COMPLEX BY PCR: NOT DETECTED
ADENOVIRUS BY PCR: NOT DETECTED
ADENOVIRUS BY PCR: NOT DETECTED
ADENOVIRUS F 40 41 PCR: NOT DETECTED
ALBUMIN SERPL-MCNC: 2.6 G/DL (ref 3.5–5.2)
ALBUMIN SERPL-MCNC: 2.8 G/DL (ref 3.5–5.2)
ALBUMIN SERPL-MCNC: 3 G/DL (ref 3.5–5.2)
ALBUMIN SERPL-MCNC: 3.1 G/DL (ref 3.5–5.2)
ALBUMIN SERPL-MCNC: 3.2 G/DL (ref 3.5–5.2)
ALBUMIN SERPL-MCNC: 3.2 G/DL (ref 3.5–5.2)
ALBUMIN SERPL-MCNC: 3.3 G/DL (ref 3.5–5.2)
ALBUMIN SERPL-MCNC: 3.3 G/DL (ref 3.5–5.2)
ALBUMIN SERPL-MCNC: 3.5 G/DL (ref 3.5–5.2)
ALBUMIN SERPL-MCNC: 3.6 G/DL (ref 3.5–5.2)
ALBUMIN SERPL-MCNC: 3.7 G/DL (ref 3.5–5.2)
ALBUMIN SERPL-MCNC: 3.8 G/DL (ref 3.5–5.2)
ALBUMIN SERPL-MCNC: 4.3 G/DL (ref 3.5–5.2)
ALP BLD-CCNC: 121 U/L (ref 40–130)
ALP BLD-CCNC: 123 U/L (ref 40–130)
ALP BLD-CCNC: 125 U/L (ref 40–130)
ALP BLD-CCNC: 134 U/L (ref 40–130)
ALP BLD-CCNC: 137 U/L (ref 40–130)
ALP BLD-CCNC: 146 U/L (ref 40–130)
ALP BLD-CCNC: 149 U/L (ref 40–130)
ALP BLD-CCNC: 149 U/L (ref 40–130)
ALP BLD-CCNC: 150 U/L (ref 40–130)
ALP BLD-CCNC: 150 U/L (ref 40–130)
ALP BLD-CCNC: 155 U/L (ref 40–130)
ALP BLD-CCNC: 159 U/L (ref 40–130)
ALP BLD-CCNC: 160 U/L (ref 40–130)
ALP BLD-CCNC: 160 U/L (ref 40–130)
ALP BLD-CCNC: 163 U/L (ref 40–130)
ALP BLD-CCNC: 166 U/L (ref 40–130)
ALP BLD-CCNC: 168 U/L (ref 40–130)
ALP BLD-CCNC: 181 U/L (ref 40–130)
ALP BLD-CCNC: 184 U/L (ref 40–130)
ALP BLD-CCNC: 230 U/L (ref 40–130)
ALT SERPL-CCNC: 10 U/L (ref 5–41)
ALT SERPL-CCNC: 11 U/L (ref 5–41)
ALT SERPL-CCNC: 12 U/L (ref 5–41)
ALT SERPL-CCNC: 13 U/L (ref 5–41)
ALT SERPL-CCNC: 14 U/L (ref 5–41)
ALT SERPL-CCNC: 16 U/L (ref 5–41)
ALT SERPL-CCNC: 17 U/L (ref 5–41)
ALT SERPL-CCNC: 24 U/L (ref 5–41)
ALT SERPL-CCNC: 25 U/L (ref 5–41)
ALT SERPL-CCNC: 36 U/L (ref 5–41)
ALT SERPL-CCNC: 7 U/L (ref 5–41)
ALT SERPL-CCNC: 7 U/L (ref 5–41)
ALT SERPL-CCNC: 8 U/L (ref 5–41)
ALT SERPL-CCNC: 8 U/L (ref 5–41)
ALT SERPL-CCNC: 9 U/L (ref 5–41)
ALT SERPL-CCNC: 9 U/L (ref 5–41)
ALT SERPL-CCNC: <5 U/L (ref 5–41)
ALT SERPL-CCNC: <5 U/L (ref 5–41)
AMYLASE: 26 U/L (ref 28–100)
ANAEROBIC CULTURE: ABNORMAL
ANION GAP SERPL CALCULATED.3IONS-SCNC: 10 MMOL/L (ref 7–19)
ANION GAP SERPL CALCULATED.3IONS-SCNC: 11 MMOL/L (ref 7–19)
ANION GAP SERPL CALCULATED.3IONS-SCNC: 12 MMOL/L (ref 7–19)
ANION GAP SERPL CALCULATED.3IONS-SCNC: 13 MMOL/L (ref 7–19)
ANION GAP SERPL CALCULATED.3IONS-SCNC: 13 MMOL/L (ref 7–19)
ANION GAP SERPL CALCULATED.3IONS-SCNC: 14 MMOL/L (ref 7–19)
ANION GAP SERPL CALCULATED.3IONS-SCNC: 15 MMOL/L (ref 7–19)
ANION GAP SERPL CALCULATED.3IONS-SCNC: 16 MMOL/L (ref 7–19)
ANION GAP SERPL CALCULATED.3IONS-SCNC: 17 MMOL/L (ref 7–19)
ANION GAP SERPL CALCULATED.3IONS-SCNC: 18 MMOL/L (ref 7–19)
ANION GAP SERPL CALCULATED.3IONS-SCNC: 19 MMOL/L (ref 7–19)
ANION GAP SERPL CALCULATED.3IONS-SCNC: 20 MMOL/L (ref 7–19)
ANION GAP SERPL CALCULATED.3IONS-SCNC: 8 MMOL/L (ref 7–19)
ANION GAP SERPL CALCULATED.3IONS-SCNC: 9 MMOL/L (ref 7–19)
ANISOCYTOSIS: ABNORMAL
APTT: 33.2 SEC (ref 26–36.2)
AST SERPL-CCNC: 11 U/L (ref 5–40)
AST SERPL-CCNC: 11 U/L (ref 5–40)
AST SERPL-CCNC: 14 U/L (ref 5–40)
AST SERPL-CCNC: 15 U/L (ref 5–40)
AST SERPL-CCNC: 18 U/L (ref 5–40)
AST SERPL-CCNC: 19 U/L (ref 5–40)
AST SERPL-CCNC: 22 U/L (ref 5–40)
AST SERPL-CCNC: 24 U/L (ref 5–40)
AST SERPL-CCNC: 24 U/L (ref 5–40)
AST SERPL-CCNC: 26 U/L (ref 5–40)
AST SERPL-CCNC: 27 U/L (ref 5–40)
AST SERPL-CCNC: 28 U/L (ref 5–40)
AST SERPL-CCNC: 32 U/L (ref 5–40)
AST SERPL-CCNC: 36 U/L (ref 5–40)
AST SERPL-CCNC: 43 U/L (ref 5–40)
ASTROVIRUS PCR: NOT DETECTED
BACTERIA URINE, POC: 0
BACTERIA: ABNORMAL /HPF
BACTERIA: NEGATIVE /HPF
BACTEROIDES FRAGILIS BY PCR: NOT DETECTED
BACTEROIDES FRAGILIS BY PCR: NOT DETECTED
BANDED NEUTROPHILS RELATIVE PERCENT: 20 % (ref 0–5)
BASOPHILS ABSOLUTE: 0 K/UL (ref 0–0.2)
BASOPHILS ABSOLUTE: 0.06 K/UL (ref 0.01–0.08)
BASOPHILS ABSOLUTE: 0.08 K/UL (ref 0.01–0.08)
BASOPHILS ABSOLUTE: 0.1 K/UL (ref 0–0.2)
BASOPHILS RELATIVE PERCENT: 0 % (ref 0–1)
BASOPHILS RELATIVE PERCENT: 0.1 % (ref 0–1)
BASOPHILS RELATIVE PERCENT: 0.1 % (ref 0–1)
BASOPHILS RELATIVE PERCENT: 0.2 % (ref 0–1)
BASOPHILS RELATIVE PERCENT: 0.3 % (ref 0–1)
BASOPHILS RELATIVE PERCENT: 0.3 % (ref 0–1)
BASOPHILS RELATIVE PERCENT: 0.4 % (ref 0–1)
BASOPHILS RELATIVE PERCENT: 0.5 % (ref 0–1)
BASOPHILS RELATIVE PERCENT: 0.6 % (ref 0–1)
BASOPHILS RELATIVE PERCENT: 0.7 % (ref 0.1–1.2)
BASOPHILS RELATIVE PERCENT: 0.7 % (ref 0.1–1.2)
BASOPHILS RELATIVE PERCENT: 0.7 % (ref 0–1)
BASOPHILS RELATIVE PERCENT: 0.8 % (ref 0–1)
BASOPHILS RELATIVE PERCENT: 0.9 % (ref 0–1)
BASOPHILS RELATIVE PERCENT: 0.9 % (ref 0–1)
BASOPHILS RELATIVE PERCENT: 1 % (ref 0–1)
BASOPHILS RELATIVE PERCENT: 1 % (ref 0–1)
BILIRUB SERPL-MCNC: 0.3 MG/DL (ref 0.2–1.2)
BILIRUB SERPL-MCNC: <0.2 MG/DL (ref 0.2–1.2)
BILIRUBIN DIRECT: 0.1 MG/DL (ref 0–0.3)
BILIRUBIN DIRECT: 0.1 MG/DL (ref 0–0.3)
BILIRUBIN DIRECT: 0.2 MG/DL (ref 0–0.3)
BILIRUBIN DIRECT: 0.2 MG/DL (ref 0–0.3)
BILIRUBIN URINE: 0 MG/DL
BILIRUBIN URINE: NEGATIVE
BILIRUBIN, INDIRECT: 0 MG/DL (ref 0.1–1)
BILIRUBIN, INDIRECT: 0.1 MG/DL (ref 0.1–1)
BILIRUBIN, INDIRECT: 0.2 MG/DL (ref 0.1–1)
BILIRUBIN, INDIRECT: 0.2 MG/DL (ref 0.1–1)
BLOOD CULTURE, ROUTINE: ABNORMAL
BLOOD CULTURE, ROUTINE: NORMAL
BLOOD, URINE: ABNORMAL
BLOOD, URINE: NEGATIVE
BLOOD, URINE: POSITIVE
BORDETELLA PARAPERTUSSIS BY PCR: NOT DETECTED
BORDETELLA PARAPERTUSSIS BY PCR: NOT DETECTED
BORDETELLA PERTUSSIS BY PCR: NOT DETECTED
BORDETELLA PERTUSSIS BY PCR: NOT DETECTED
BUN BLDV-MCNC: 11 MG/DL (ref 8–23)
BUN BLDV-MCNC: 12 MG/DL (ref 8–23)
BUN BLDV-MCNC: 14 MG/DL (ref 8–23)
BUN BLDV-MCNC: 15 MG/DL (ref 8–23)
BUN BLDV-MCNC: 16 MG/DL (ref 8–23)
BUN BLDV-MCNC: 16 MG/DL (ref 8–23)
BUN BLDV-MCNC: 17 MG/DL (ref 8–23)
BUN BLDV-MCNC: 18 MG/DL (ref 8–23)
BUN BLDV-MCNC: 20 MG/DL (ref 8–23)
BUN BLDV-MCNC: 20 MG/DL (ref 8–23)
BUN BLDV-MCNC: 22 MG/DL (ref 8–23)
BUN BLDV-MCNC: 22 MG/DL (ref 8–23)
BUN BLDV-MCNC: 23 MG/DL (ref 8–23)
BUN BLDV-MCNC: 23 MG/DL (ref 8–23)
BUN BLDV-MCNC: 24 MG/DL (ref 8–23)
BUN BLDV-MCNC: 25 MG/DL (ref 8–23)
BUN BLDV-MCNC: 25 MG/DL (ref 8–23)
BUN BLDV-MCNC: 27 MG/DL (ref 8–23)
BUN BLDV-MCNC: 28 MG/DL (ref 8–23)
BUN BLDV-MCNC: 29 MG/DL (ref 8–23)
BUN BLDV-MCNC: 29 MG/DL (ref 8–23)
BUN BLDV-MCNC: 30 MG/DL (ref 8–23)
BUN BLDV-MCNC: 30 MG/DL (ref 8–23)
BUN BLDV-MCNC: 31 MG/DL (ref 8–23)
BUN BLDV-MCNC: 32 MG/DL (ref 8–23)
BUN BLDV-MCNC: 34 MG/DL (ref 8–23)
BUN BLDV-MCNC: 40 MG/DL (ref 8–23)
BUN BLDV-MCNC: 42 MG/DL (ref 8–23)
BUN BLDV-MCNC: 43 MG/DL (ref 8–23)
BUN BLDV-MCNC: 44 MG/DL (ref 8–23)
BUN BLDV-MCNC: 45 MG/DL (ref 8–23)
BUN BLDV-MCNC: 7 MG/DL (ref 8–23)
BUN BLDV-MCNC: 7 MG/DL (ref 8–23)
BUN BLDV-MCNC: 9 MG/DL (ref 8–23)
C-REACTIVE PROTEIN: 2.09 MG/DL (ref 0–0.5)
C-REACTIVE PROTEIN: 3.28 MG/DL (ref 0–0.5)
CALCIUM SERPL-MCNC: 10.2 MG/DL (ref 8.8–10.2)
CALCIUM SERPL-MCNC: 10.2 MG/DL (ref 8.8–10.2)
CALCIUM SERPL-MCNC: 10.4 MG/DL (ref 8.8–10.2)
CALCIUM SERPL-MCNC: 6.3 MG/DL (ref 8.8–10.2)
CALCIUM SERPL-MCNC: 6.6 MG/DL (ref 8.8–10.2)
CALCIUM SERPL-MCNC: 6.7 MG/DL (ref 8.8–10.2)
CALCIUM SERPL-MCNC: 6.8 MG/DL (ref 8.8–10.2)
CALCIUM SERPL-MCNC: 7.4 MG/DL (ref 8.8–10.2)
CALCIUM SERPL-MCNC: 7.5 MG/DL (ref 8.8–10.2)
CALCIUM SERPL-MCNC: 7.6 MG/DL (ref 8.8–10.2)
CALCIUM SERPL-MCNC: 8 MG/DL (ref 8.8–10.2)
CALCIUM SERPL-MCNC: 8.2 MG/DL (ref 8.8–10.2)
CALCIUM SERPL-MCNC: 8.2 MG/DL (ref 8.8–10.2)
CALCIUM SERPL-MCNC: 8.3 MG/DL (ref 8.8–10.2)
CALCIUM SERPL-MCNC: 8.3 MG/DL (ref 8.8–10.2)
CALCIUM SERPL-MCNC: 8.4 MG/DL (ref 8.8–10.2)
CALCIUM SERPL-MCNC: 8.5 MG/DL (ref 8.8–10.2)
CALCIUM SERPL-MCNC: 8.7 MG/DL (ref 8.8–10.2)
CALCIUM SERPL-MCNC: 8.8 MG/DL (ref 8.8–10.2)
CALCIUM SERPL-MCNC: 8.9 MG/DL (ref 8.8–10.2)
CALCIUM SERPL-MCNC: 8.9 MG/DL (ref 8.8–10.2)
CALCIUM SERPL-MCNC: 9 MG/DL (ref 8.8–10.2)
CALCIUM SERPL-MCNC: 9.1 MG/DL (ref 8.8–10.2)
CALCIUM SERPL-MCNC: 9.2 MG/DL (ref 8.8–10.2)
CALCIUM SERPL-MCNC: 9.3 MG/DL (ref 8.8–10.2)
CALCIUM SERPL-MCNC: 9.4 MG/DL (ref 8.8–10.2)
CALCIUM SERPL-MCNC: 9.5 MG/DL (ref 8.8–10.2)
CALCIUM SERPL-MCNC: 9.6 MG/DL (ref 8.8–10.2)
CAMPYLOBACTER PCR: NOT DETECTED
CANDIDA ALBICANS BY PCR: NOT DETECTED
CANDIDA ALBICANS BY PCR: NOT DETECTED
CANDIDA AURIS BY PCR: NOT DETECTED
CANDIDA AURIS BY PCR: NOT DETECTED
CANDIDA GLABRATA BY PCR: NOT DETECTED
CANDIDA GLABRATA BY PCR: NOT DETECTED
CANDIDA KRUSEI BY PCR: NOT DETECTED
CANDIDA KRUSEI BY PCR: NOT DETECTED
CANDIDA PARAPSILOSIS BY PCR: NOT DETECTED
CANDIDA PARAPSILOSIS BY PCR: NOT DETECTED
CANDIDA TROPICALIS BY PCR: NOT DETECTED
CANDIDA TROPICALIS BY PCR: NOT DETECTED
CARBAPENEM RESISTANCE IMP GENE BY PCR: NOT DETECTED
CARBAPENEM RESISTANCE KPC BY PCR: DETECTED
CARBAPENEM RESISTANCE NDM GENE BY PCR: NOT DETECTED
CARBAPENEM RESISTANCE OXA-48 GENE BY PCR: NOT DETECTED
CARBAPENEM RESISTANCE VIM GENE BY PCR: NOT DETECTED
CASTS URINE, POC: 0
CEA: 2.8 NG/ML (ref 0–4.7)
CEPHALOSPORIN RESISTANCE CTX-M GENE BY PCR: NOT DETECTED
CHLAMYDOPHILIA PNEUMONIAE BY PCR: NOT DETECTED
CHLAMYDOPHILIA PNEUMONIAE BY PCR: NOT DETECTED
CHLORIDE BLD-SCNC: 100 MMOL/L (ref 98–111)
CHLORIDE BLD-SCNC: 101 MMOL/L (ref 98–111)
CHLORIDE BLD-SCNC: 102 MMOL/L (ref 98–111)
CHLORIDE BLD-SCNC: 102 MMOL/L (ref 98–111)
CHLORIDE BLD-SCNC: 103 MMOL/L (ref 98–111)
CHLORIDE BLD-SCNC: 104 MMOL/L (ref 98–111)
CHLORIDE BLD-SCNC: 90 MMOL/L (ref 98–111)
CHLORIDE BLD-SCNC: 91 MMOL/L (ref 98–111)
CHLORIDE BLD-SCNC: 91 MMOL/L (ref 98–111)
CHLORIDE BLD-SCNC: 94 MMOL/L (ref 98–111)
CHLORIDE BLD-SCNC: 94 MMOL/L (ref 98–111)
CHLORIDE BLD-SCNC: 95 MMOL/L (ref 98–111)
CHLORIDE BLD-SCNC: 96 MMOL/L (ref 98–111)
CHLORIDE BLD-SCNC: 97 MMOL/L (ref 98–111)
CHLORIDE BLD-SCNC: 97 MMOL/L (ref 98–111)
CHLORIDE BLD-SCNC: 98 MMOL/L (ref 98–111)
CHLORIDE BLD-SCNC: 99 MMOL/L (ref 98–111)
CLARITY: ABNORMAL
CLARITY: CLEAR
CLOSTRIDIUM DIFFICILE, PCR: NOT DETECTED
CO2: 13 MMOL/L (ref 22–29)
CO2: 14 MMOL/L (ref 22–29)
CO2: 14 MMOL/L (ref 22–29)
CO2: 15 MMOL/L (ref 22–29)
CO2: 15 MMOL/L (ref 22–29)
CO2: 17 MMOL/L (ref 22–29)
CO2: 17 MMOL/L (ref 22–29)
CO2: 18 MMOL/L (ref 22–29)
CO2: 18 MMOL/L (ref 22–29)
CO2: 19 MMOL/L (ref 22–29)
CO2: 20 MMOL/L (ref 22–29)
CO2: 20 MMOL/L (ref 22–29)
CO2: 21 MMOL/L (ref 22–29)
CO2: 22 MMOL/L (ref 22–29)
CO2: 23 MMOL/L (ref 22–29)
CO2: 24 MMOL/L (ref 22–29)
CO2: 25 MMOL/L (ref 22–29)
CO2: 26 MMOL/L (ref 22–29)
CO2: 27 MMOL/L (ref 22–29)
CO2: 27 MMOL/L (ref 22–29)
CO2: 29 MMOL/L (ref 22–29)
CO2: 31 MMOL/L (ref 22–29)
COLOR: ABNORMAL
COLOR: YELLOW
COMMENT UA: ABNORMAL
CORONAVIRUS 229E BY PCR: NOT DETECTED
CORONAVIRUS 229E BY PCR: NOT DETECTED
CORONAVIRUS HKU1 BY PCR: NOT DETECTED
CORONAVIRUS HKU1 BY PCR: NOT DETECTED
CORONAVIRUS NL63 BY PCR: NOT DETECTED
CORONAVIRUS NL63 BY PCR: NOT DETECTED
CORONAVIRUS OC43 BY PCR: NOT DETECTED
CORONAVIRUS OC43 BY PCR: NOT DETECTED
CREAT SERPL-MCNC: 0.9 MG/DL (ref 0.5–1.2)
CREAT SERPL-MCNC: 0.9 MG/DL (ref 0.5–1.2)
CREAT SERPL-MCNC: 1 MG/DL (ref 0.5–1.2)
CREAT SERPL-MCNC: 1.1 MG/DL (ref 0.5–1.2)
CREAT SERPL-MCNC: 1.2 MG/DL (ref 0.5–1.2)
CREAT SERPL-MCNC: 1.3 MG/DL (ref 0.5–1.2)
CREAT SERPL-MCNC: 1.4 MG/DL (ref 0.5–1.2)
CREAT SERPL-MCNC: 1.5 MG/DL (ref 0.5–1.2)
CREAT SERPL-MCNC: 1.5 MG/DL (ref 0.5–1.2)
CREAT SERPL-MCNC: 1.6 MG/DL (ref 0.5–1.2)
CREAT SERPL-MCNC: 1.6 MG/DL (ref 0.5–1.2)
CREAT SERPL-MCNC: 1.7 MG/DL (ref 0.5–1.2)
CREAT SERPL-MCNC: 1.8 MG/DL (ref 0.5–1.2)
CREAT SERPL-MCNC: 2 MG/DL (ref 0.5–1.2)
CREAT SERPL-MCNC: 2.1 MG/DL (ref 0.5–1.2)
CREAT SERPL-MCNC: 2.1 MG/DL (ref 0.5–1.2)
CREAT SERPL-MCNC: 2.3 MG/DL (ref 0.5–1.2)
CREAT SERPL-MCNC: 2.3 MG/DL (ref 0.5–1.2)
CREAT SERPL-MCNC: 2.4 MG/DL (ref 0.5–1.2)
CREAT SERPL-MCNC: 2.4 MG/DL (ref 0.5–1.2)
CREAT SERPL-MCNC: 2.5 MG/DL (ref 0.5–1.2)
CREAT SERPL-MCNC: 2.5 MG/DL (ref 0.5–1.2)
CREAT SERPL-MCNC: 2.7 MG/DL (ref 0.5–1.2)
CREATININE URINE: 115.5 MG/DL (ref 4.2–622)
CREATININE URINE: 118.5 MG/DL (ref 4.2–622)
CRYPTOCOCCUS NEOFORMANS/GATTII BY PCR: NOT DETECTED
CRYPTOCOCCUS NEOFORMANS/GATTII BY PCR: NOT DETECTED
CRYPTOSPORIDIUM PCR: NOT DETECTED
CRYSTALS URINE, POC: 0
CRYSTALS, UA: ABNORMAL /HPF
CULTURE CATHETER TIP: ABNORMAL
CULTURE, BLOOD 2: ABNORMAL
CULTURE, BLOOD 2: NORMAL
CYCLOSPORA CAYETANENSIS PCR: NOT DETECTED
E COLI ENTEROAGGREGATIVE PCR: NOT DETECTED
E COLI ENTEROPATHOGENIC PCR: NOT DETECTED
E COLI ENTEROTOXIGENIC PCR: NOT DETECTED
E COLI SHIGELLA/ENTEROINVASIVE PCR: NOT DETECTED
EKG P AXIS: 21 DEGREES
EKG P AXIS: 47 DEGREES
EKG P AXIS: 58 DEGREES
EKG P-R INTERVAL: 168 MS
EKG P-R INTERVAL: 170 MS
EKG P-R INTERVAL: 176 MS
EKG Q-T INTERVAL: 324 MS
EKG Q-T INTERVAL: 342 MS
EKG Q-T INTERVAL: 358 MS
EKG QRS DURATION: 102 MS
EKG QRS DURATION: 94 MS
EKG QRS DURATION: 94 MS
EKG QTC CALCULATION (BAZETT): 412 MS
EKG QTC CALCULATION (BAZETT): 413 MS
EKG QTC CALCULATION (BAZETT): 434 MS
EKG T AXIS: 26 DEGREES
EKG T AXIS: 47 DEGREES
EKG T AXIS: 65 DEGREES
ENTAMOEBA HISTOLYTICA PCR: NOT DETECTED
ENTEROBACTER CLOACAE COMPLEX BY PCR: NOT DETECTED
ENTEROBACTER CLOACAE COMPLEX BY PCR: NOT DETECTED
ENTEROBACTERALES BY PCR: DETECTED
ENTEROBACTERALES BY PCR: NOT DETECTED
ENTEROCOCCUS FAECALIS BY PCR: NOT DETECTED
ENTEROCOCCUS FAECALIS BY PCR: NOT DETECTED
ENTEROCOCCUS FAECIUM BY PCR: NOT DETECTED
ENTEROCOCCUS FAECIUM BY PCR: NOT DETECTED
EOSINOPHIL,URINE: NORMAL
EOSINOPHIL,URINE: NORMAL
EOSINOPHILS ABSOLUTE: 0 K/UL (ref 0–0.6)
EOSINOPHILS ABSOLUTE: 0.08 K/UL (ref 0.04–0.54)
EOSINOPHILS ABSOLUTE: 0.1 K/UL (ref 0–0.6)
EOSINOPHILS ABSOLUTE: 0.2 K/UL (ref 0–0.6)
EOSINOPHILS ABSOLUTE: 0.24 K/UL (ref 0.04–0.54)
EOSINOPHILS ABSOLUTE: 0.3 K/UL (ref 0–0.6)
EOSINOPHILS ABSOLUTE: 0.4 K/UL (ref 0–0.6)
EOSINOPHILS RELATIVE PERCENT: 0 % (ref 0–5)
EOSINOPHILS RELATIVE PERCENT: 0.1 % (ref 0–5)
EOSINOPHILS RELATIVE PERCENT: 0.2 % (ref 0–5)
EOSINOPHILS RELATIVE PERCENT: 0.5 % (ref 0–5)
EOSINOPHILS RELATIVE PERCENT: 0.7 % (ref 0–5)
EOSINOPHILS RELATIVE PERCENT: 0.7 % (ref 0–5)
EOSINOPHILS RELATIVE PERCENT: 0.8 % (ref 0–5)
EOSINOPHILS RELATIVE PERCENT: 0.8 % (ref 0–5)
EOSINOPHILS RELATIVE PERCENT: 0.9 % (ref 0.7–7)
EOSINOPHILS RELATIVE PERCENT: 0.9 % (ref 0–5)
EOSINOPHILS RELATIVE PERCENT: 1.1 % (ref 0–5)
EOSINOPHILS RELATIVE PERCENT: 1.3 % (ref 0–5)
EOSINOPHILS RELATIVE PERCENT: 1.3 % (ref 0–5)
EOSINOPHILS RELATIVE PERCENT: 1.4 % (ref 0–5)
EOSINOPHILS RELATIVE PERCENT: 1.5 % (ref 0–5)
EOSINOPHILS RELATIVE PERCENT: 1.5 % (ref 0–5)
EOSINOPHILS RELATIVE PERCENT: 1.6 % (ref 0–5)
EOSINOPHILS RELATIVE PERCENT: 1.7 % (ref 0–5)
EOSINOPHILS RELATIVE PERCENT: 1.8 % (ref 0–5)
EOSINOPHILS RELATIVE PERCENT: 2 % (ref 0.7–7)
EOSINOPHILS RELATIVE PERCENT: 2 % (ref 0–5)
EOSINOPHILS RELATIVE PERCENT: 2.1 % (ref 0–5)
EOSINOPHILS RELATIVE PERCENT: 2.2 % (ref 0–5)
EOSINOPHILS RELATIVE PERCENT: 2.4 % (ref 0–5)
EOSINOPHILS RELATIVE PERCENT: 2.4 % (ref 0–5)
EOSINOPHILS RELATIVE PERCENT: 2.5 % (ref 0–5)
EOSINOPHILS RELATIVE PERCENT: 2.5 % (ref 0–5)
EOSINOPHILS RELATIVE PERCENT: 3.6 % (ref 0–5)
EOSINOPHILS RELATIVE PERCENT: 4 % (ref 0–5)
EPI CELLS URINE, POC: 0
EPITHELIAL CELLS, UA: 3 /HPF (ref 0–5)
EPITHELIAL CELLS, UA: ABNORMAL /HPF
ESCHERICHIA COLI BY PCR: NOT DETECTED
ESCHERICHIA COLI BY PCR: NOT DETECTED
FERRITIN: 589.5 NG/ML (ref 30–400)
FOLATE: 13.8 NG/ML (ref 4.5–32.2)
GFR AFRICAN AMERICAN: 28
GFR AFRICAN AMERICAN: 31
GFR AFRICAN AMERICAN: 31
GFR AFRICAN AMERICAN: 33
GFR AFRICAN AMERICAN: 33
GFR AFRICAN AMERICAN: 34
GFR AFRICAN AMERICAN: 34
GFR AFRICAN AMERICAN: 38
GFR AFRICAN AMERICAN: 38
GFR AFRICAN AMERICAN: 40
GFR AFRICAN AMERICAN: 45
GFR AFRICAN AMERICAN: 48
GFR AFRICAN AMERICAN: 52
GFR AFRICAN AMERICAN: 52
GFR AFRICAN AMERICAN: 56
GFR AFRICAN AMERICAN: 56
GFR AFRICAN AMERICAN: >59
GFR NON-AFRICAN AMERICAN: 23
GFR NON-AFRICAN AMERICAN: 26
GFR NON-AFRICAN AMERICAN: 26
GFR NON-AFRICAN AMERICAN: 27
GFR NON-AFRICAN AMERICAN: 27
GFR NON-AFRICAN AMERICAN: 28
GFR NON-AFRICAN AMERICAN: 28
GFR NON-AFRICAN AMERICAN: 31
GFR NON-AFRICAN AMERICAN: 31
GFR NON-AFRICAN AMERICAN: 33
GFR NON-AFRICAN AMERICAN: 37
GFR NON-AFRICAN AMERICAN: 40
GFR NON-AFRICAN AMERICAN: 43
GFR NON-AFRICAN AMERICAN: 43
GFR NON-AFRICAN AMERICAN: 46
GFR NON-AFRICAN AMERICAN: 46
GFR NON-AFRICAN AMERICAN: 50
GFR NON-AFRICAN AMERICAN: 55
GFR NON-AFRICAN AMERICAN: 60
GFR NON-AFRICAN AMERICAN: >60
GIARDIA LAMBLIA PCR: NOT DETECTED
GLUCOSE BLD-MCNC: 101 MG/DL (ref 74–109)
GLUCOSE BLD-MCNC: 102 MG/DL (ref 74–109)
GLUCOSE BLD-MCNC: 102 MG/DL (ref 74–109)
GLUCOSE BLD-MCNC: 103 MG/DL (ref 74–109)
GLUCOSE BLD-MCNC: 106 MG/DL (ref 74–109)
GLUCOSE BLD-MCNC: 108 MG/DL (ref 70–99)
GLUCOSE BLD-MCNC: 108 MG/DL (ref 74–109)
GLUCOSE BLD-MCNC: 108 MG/DL (ref 74–109)
GLUCOSE BLD-MCNC: 110 MG/DL (ref 70–99)
GLUCOSE BLD-MCNC: 110 MG/DL (ref 74–109)
GLUCOSE BLD-MCNC: 111 MG/DL (ref 74–109)
GLUCOSE BLD-MCNC: 113 MG/DL (ref 74–109)
GLUCOSE BLD-MCNC: 115 MG/DL (ref 70–99)
GLUCOSE BLD-MCNC: 116 MG/DL (ref 74–109)
GLUCOSE BLD-MCNC: 116 MG/DL (ref 74–109)
GLUCOSE BLD-MCNC: 119 MG/DL (ref 74–109)
GLUCOSE BLD-MCNC: 121 MG/DL (ref 70–99)
GLUCOSE BLD-MCNC: 125 MG/DL (ref 74–109)
GLUCOSE BLD-MCNC: 126 MG/DL (ref 74–109)
GLUCOSE BLD-MCNC: 129 MG/DL (ref 74–109)
GLUCOSE BLD-MCNC: 136 MG/DL (ref 74–109)
GLUCOSE BLD-MCNC: 141 MG/DL (ref 74–109)
GLUCOSE BLD-MCNC: 69 MG/DL (ref 74–109)
GLUCOSE BLD-MCNC: 81 MG/DL (ref 74–109)
GLUCOSE BLD-MCNC: 82 MG/DL (ref 74–109)
GLUCOSE BLD-MCNC: 84 MG/DL (ref 74–109)
GLUCOSE BLD-MCNC: 88 MG/DL (ref 74–109)
GLUCOSE BLD-MCNC: 89 MG/DL (ref 74–109)
GLUCOSE BLD-MCNC: 89 MG/DL (ref 74–109)
GLUCOSE BLD-MCNC: 91 MG/DL (ref 74–109)
GLUCOSE BLD-MCNC: 92 MG/DL (ref 74–109)
GLUCOSE BLD-MCNC: 92 MG/DL (ref 74–109)
GLUCOSE BLD-MCNC: 93 MG/DL (ref 70–99)
GLUCOSE BLD-MCNC: 94 MG/DL (ref 70–99)
GLUCOSE BLD-MCNC: 94 MG/DL (ref 74–109)
GLUCOSE BLD-MCNC: 95 MG/DL (ref 70–99)
GLUCOSE BLD-MCNC: 96 MG/DL (ref 74–109)
GLUCOSE BLD-MCNC: 96 MG/DL (ref 74–109)
GLUCOSE BLD-MCNC: 98 MG/DL (ref 70–99)
GLUCOSE BLD-MCNC: 99 MG/DL (ref 74–109)
GLUCOSE URINE: ABNORMAL
GLUCOSE URINE: NEGATIVE MG/DL
HAEMOPHILUS INFLUENZAE BY PCR: NOT DETECTED
HAEMOPHILUS INFLUENZAE BY PCR: NOT DETECTED
HCT VFR BLD CALC: 23.3 % (ref 42–52)
HCT VFR BLD CALC: 25.3 % (ref 42–52)
HCT VFR BLD CALC: 25.4 % (ref 42–52)
HCT VFR BLD CALC: 25.8 % (ref 42–52)
HCT VFR BLD CALC: 26.4 % (ref 42–52)
HCT VFR BLD CALC: 27.1 % (ref 42–52)
HCT VFR BLD CALC: 27.7 % (ref 42–52)
HCT VFR BLD CALC: 27.8 % (ref 42–52)
HCT VFR BLD CALC: 28.3 % (ref 42–52)
HCT VFR BLD CALC: 28.6 % (ref 40.1–51)
HCT VFR BLD CALC: 28.7 % (ref 42–52)
HCT VFR BLD CALC: 29 % (ref 42–52)
HCT VFR BLD CALC: 29 % (ref 42–52)
HCT VFR BLD CALC: 29.3 % (ref 42–52)
HCT VFR BLD CALC: 29.5 % (ref 42–52)
HCT VFR BLD CALC: 29.5 % (ref 42–52)
HCT VFR BLD CALC: 30.3 % (ref 42–52)
HCT VFR BLD CALC: 30.6 % (ref 42–52)
HCT VFR BLD CALC: 31 % (ref 42–52)
HCT VFR BLD CALC: 31.2 % (ref 40.1–51)
HCT VFR BLD CALC: 31.2 % (ref 42–52)
HCT VFR BLD CALC: 31.3 % (ref 42–52)
HCT VFR BLD CALC: 32 % (ref 42–52)
HCT VFR BLD CALC: 32.2 % (ref 42–52)
HCT VFR BLD CALC: 32.6 % (ref 42–52)
HCT VFR BLD CALC: 32.8 % (ref 42–52)
HCT VFR BLD CALC: 32.8 % (ref 42–52)
HCT VFR BLD CALC: 33.3 % (ref 42–52)
HCT VFR BLD CALC: 33.5 % (ref 40.1–51)
HCT VFR BLD CALC: 34.3 % (ref 42–52)
HCT VFR BLD CALC: 34.6 % (ref 42–52)
HCT VFR BLD CALC: 35.4 % (ref 42–52)
HCT VFR BLD CALC: 35.6 % (ref 42–52)
HCT VFR BLD CALC: 36.1 % (ref 42–52)
HCT VFR BLD CALC: 36.4 % (ref 42–52)
HCT VFR BLD CALC: 42.3 % (ref 42–52)
HEMOGLOBIN: 10 G/DL (ref 14–18)
HEMOGLOBIN: 10 G/DL (ref 14–18)
HEMOGLOBIN: 10.1 G/DL (ref 14–18)
HEMOGLOBIN: 10.2 G/DL (ref 13.7–17.5)
HEMOGLOBIN: 10.2 G/DL (ref 14–18)
HEMOGLOBIN: 10.2 G/DL (ref 14–18)
HEMOGLOBIN: 10.3 G/DL (ref 14–18)
HEMOGLOBIN: 10.3 G/DL (ref 14–18)
HEMOGLOBIN: 10.7 G/DL (ref 14–18)
HEMOGLOBIN: 10.8 G/DL (ref 14–18)
HEMOGLOBIN: 10.8 G/DL (ref 14–18)
HEMOGLOBIN: 11.1 G/DL (ref 14–18)
HEMOGLOBIN: 11.2 G/DL (ref 14–18)
HEMOGLOBIN: 11.4 G/DL (ref 14–18)
HEMOGLOBIN: 11.4 G/DL (ref 14–18)
HEMOGLOBIN: 11.5 G/DL (ref 14–18)
HEMOGLOBIN: 13.7 G/DL (ref 14–18)
HEMOGLOBIN: 7.1 G/DL (ref 14–18)
HEMOGLOBIN: 7.7 G/DL (ref 14–18)
HEMOGLOBIN: 7.8 G/DL (ref 14–18)
HEMOGLOBIN: 7.9 G/DL (ref 14–18)
HEMOGLOBIN: 8 G/DL (ref 14–18)
HEMOGLOBIN: 8.2 G/DL (ref 14–18)
HEMOGLOBIN: 8.4 G/DL (ref 14–18)
HEMOGLOBIN: 8.6 G/DL (ref 14–18)
HEMOGLOBIN: 8.6 G/DL (ref 14–18)
HEMOGLOBIN: 9 G/DL (ref 14–18)
HEMOGLOBIN: 9 G/DL (ref 14–18)
HEMOGLOBIN: 9.2 G/DL (ref 13.7–17.5)
HEMOGLOBIN: 9.3 G/DL (ref 14–18)
HEMOGLOBIN: 9.4 G/DL (ref 14–18)
HEMOGLOBIN: 9.5 G/DL (ref 13.7–17.5)
HEMOGLOBIN: 9.5 G/DL (ref 14–18)
HEMOGLOBIN: 9.6 G/DL (ref 14–18)
HEMOGLOBIN: 9.7 G/DL (ref 14–18)
HEMOGLOBIN: 9.9 G/DL (ref 14–18)
HUMAN METAPNEUMOVIRUS BY PCR: NOT DETECTED
HUMAN METAPNEUMOVIRUS BY PCR: NOT DETECTED
HUMAN RHINOVIRUS/ENTEROVIRUS BY PCR: NOT DETECTED
HUMAN RHINOVIRUS/ENTEROVIRUS BY PCR: NOT DETECTED
HYALINE CASTS: 1 /HPF (ref 0–8)
IMMATURE GRANULOCYTES #: 0 K/UL
IMMATURE GRANULOCYTES #: 0.1 K/UL
IMMATURE GRANULOCYTES #: 0.2 K/UL
IMMATURE GRANULOCYTES #: 0.3 K/UL
IMMATURE GRANULOCYTES #: 0.4 K/UL
IMMATURE GRANULOCYTES #: 0.5 K/UL
IMMATURE GRANULOCYTES #: 0.5 K/UL
IMMATURE GRANULOCYTES #: 0.7 K/UL
IMMATURE GRANULOCYTES #: 0.9 K/UL
IMMATURE GRANULOCYTES #: 4.4 K/UL
INFLUENZA A BY PCR: NOT DETECTED
INFLUENZA A BY PCR: NOT DETECTED
INFLUENZA B BY PCR: NOT DETECTED
INFLUENZA B BY PCR: NOT DETECTED
INR BLD: 1.02 (ref 0.88–1.18)
INR BLD: 1.06 (ref 0.88–1.18)
IRON SATURATION: 33 % (ref 14–50)
IRON: 77 UG/DL (ref 59–158)
KETONES, URINE: ABNORMAL MG/DL
KETONES, URINE: NEGATIVE
KETONES, URINE: NEGATIVE MG/DL
KLEBSIELLA AEROGENES BY PCR: NOT DETECTED
KLEBSIELLA AEROGENES BY PCR: NOT DETECTED
KLEBSIELLA OXYTOCA BY PCR: NOT DETECTED
KLEBSIELLA OXYTOCA BY PCR: NOT DETECTED
KLEBSIELLA PNEUMONIAE GROUP BY PCR: NOT DETECTED
KLEBSIELLA PNEUMONIAE GROUP BY PCR: NOT DETECTED
LACTIC ACID, SEPSIS: 0.8 MG/DL (ref 0.5–1.9)
LACTIC ACID, SEPSIS: 1.2 MG/DL (ref 0.5–1.9)
LACTIC ACID, SEPSIS: 4.8 MG/DL (ref 0.5–1.9)
LACTIC ACID: 1.1 MMOL/L (ref 0.5–1.9)
LACTIC ACID: 1.6 MMOL/L (ref 0.5–1.9)
LACTIC ACID: 2 MMOL/L (ref 0.5–1.9)
LACTIC ACID: 2.7 MMOL/L (ref 0.5–1.9)
LACTIC ACID: 5.6 MMOL/L (ref 0.5–1.9)
LEUKOCYTE EST, POC: ABNORMAL
LEUKOCYTE ESTERASE, URINE: ABNORMAL
LIPASE: 11 U/L (ref 13–60)
LIPASE: 12 U/L (ref 13–60)
LIPASE: 26 U/L (ref 13–60)
LISTERIA MONOCYTOGENES BY PCR: NOT DETECTED
LISTERIA MONOCYTOGENES BY PCR: NOT DETECTED
LYMPHOCYTES ABSOLUTE: 0 K/UL (ref 1.1–4.5)
LYMPHOCYTES ABSOLUTE: 0.2 K/UL (ref 1.1–4.5)
LYMPHOCYTES ABSOLUTE: 0.6 K/UL (ref 1.1–4.5)
LYMPHOCYTES ABSOLUTE: 0.7 K/UL (ref 1.1–4.5)
LYMPHOCYTES ABSOLUTE: 0.77 K/UL (ref 1.18–3.74)
LYMPHOCYTES ABSOLUTE: 0.8 K/UL (ref 1.1–4.5)
LYMPHOCYTES ABSOLUTE: 0.8 K/UL (ref 1.1–4.5)
LYMPHOCYTES ABSOLUTE: 0.9 K/UL (ref 1.1–4.5)
LYMPHOCYTES ABSOLUTE: 0.9 K/UL (ref 1.1–4.5)
LYMPHOCYTES ABSOLUTE: 1 K/UL (ref 1.18–3.74)
LYMPHOCYTES ABSOLUTE: 1 K/UL (ref 1.1–4.5)
LYMPHOCYTES ABSOLUTE: 1.1 K/UL (ref 1.1–4.5)
LYMPHOCYTES ABSOLUTE: 1.2 K/UL (ref 1.1–4.5)
LYMPHOCYTES ABSOLUTE: 1.2 K/UL (ref 1.1–4.5)
LYMPHOCYTES ABSOLUTE: 1.3 K/UL (ref 1.1–4.5)
LYMPHOCYTES ABSOLUTE: 1.3 K/UL (ref 1.1–4.5)
LYMPHOCYTES ABSOLUTE: 1.4 K/UL (ref 1.1–4.5)
LYMPHOCYTES ABSOLUTE: 1.5 K/UL (ref 1.1–4.5)
LYMPHOCYTES ABSOLUTE: 1.6 K/UL (ref 1.1–4.5)
LYMPHOCYTES ABSOLUTE: 1.8 K/UL (ref 1.1–4.5)
LYMPHOCYTES ABSOLUTE: 1.89 K/UL (ref 1.18–3.74)
LYMPHOCYTES RELATIVE PERCENT: 0 % (ref 20–40)
LYMPHOCYTES RELATIVE PERCENT: 0.5 % (ref 20–40)
LYMPHOCYTES RELATIVE PERCENT: 10.6 % (ref 20–40)
LYMPHOCYTES RELATIVE PERCENT: 10.8 % (ref 20–40)
LYMPHOCYTES RELATIVE PERCENT: 10.9 % (ref 20–40)
LYMPHOCYTES RELATIVE PERCENT: 11 % (ref 20–40)
LYMPHOCYTES RELATIVE PERCENT: 11.1 % (ref 19.3–53.1)
LYMPHOCYTES RELATIVE PERCENT: 11.3 % (ref 20–40)
LYMPHOCYTES RELATIVE PERCENT: 11.6 % (ref 20–40)
LYMPHOCYTES RELATIVE PERCENT: 11.7 % (ref 20–40)
LYMPHOCYTES RELATIVE PERCENT: 11.9 % (ref 20–40)
LYMPHOCYTES RELATIVE PERCENT: 12.3 % (ref 20–40)
LYMPHOCYTES RELATIVE PERCENT: 12.4 % (ref 20–40)
LYMPHOCYTES RELATIVE PERCENT: 12.9 % (ref 20–40)
LYMPHOCYTES RELATIVE PERCENT: 12.9 % (ref 20–40)
LYMPHOCYTES RELATIVE PERCENT: 13 % (ref 20–40)
LYMPHOCYTES RELATIVE PERCENT: 13.2 % (ref 20–40)
LYMPHOCYTES RELATIVE PERCENT: 13.3 % (ref 20–40)
LYMPHOCYTES RELATIVE PERCENT: 13.4 % (ref 20–40)
LYMPHOCYTES RELATIVE PERCENT: 14.4 % (ref 20–40)
LYMPHOCYTES RELATIVE PERCENT: 14.5 % (ref 20–40)
LYMPHOCYTES RELATIVE PERCENT: 14.6 % (ref 20–40)
LYMPHOCYTES RELATIVE PERCENT: 14.8 % (ref 20–40)
LYMPHOCYTES RELATIVE PERCENT: 15.5 % (ref 19.3–53.1)
LYMPHOCYTES RELATIVE PERCENT: 15.6 % (ref 20–40)
LYMPHOCYTES RELATIVE PERCENT: 15.8 % (ref 20–40)
LYMPHOCYTES RELATIVE PERCENT: 16.8 % (ref 20–40)
LYMPHOCYTES RELATIVE PERCENT: 17.1 % (ref 20–40)
LYMPHOCYTES RELATIVE PERCENT: 17.8 % (ref 20–40)
LYMPHOCYTES RELATIVE PERCENT: 20 % (ref 20–40)
LYMPHOCYTES RELATIVE PERCENT: 3.6 % (ref 20–40)
LYMPHOCYTES RELATIVE PERCENT: 3.8 % (ref 20–40)
LYMPHOCYTES RELATIVE PERCENT: 5.3 % (ref 20–40)
LYMPHOCYTES RELATIVE PERCENT: 7.2 % (ref 20–40)
LYMPHOCYTES RELATIVE PERCENT: 7.6 % (ref 20–40)
LYMPHOCYTES RELATIVE PERCENT: 8.8 % (ref 20–40)
LYMPHOCYTES RELATIVE PERCENT: 9.3 % (ref 19.3–53.1)
LYMPHOCYTES RELATIVE PERCENT: 9.9 % (ref 20–40)
MAGNESIUM: 0.7 MG/DL (ref 1.6–2.4)
MAGNESIUM: 1 MG/DL (ref 1.6–2.4)
MAGNESIUM: 1.1 MG/DL (ref 1.6–2.4)
MAGNESIUM: 1.2 MG/DL (ref 1.6–2.4)
MAGNESIUM: 1.3 MG/DL (ref 1.6–2.4)
MAGNESIUM: 1.4 MG/DL (ref 1.6–2.4)
MAGNESIUM: 1.5 MG/DL (ref 1.6–2.4)
MAGNESIUM: 1.5 MG/DL (ref 1.6–2.4)
MAGNESIUM: 1.6 MG/DL (ref 1.6–2.4)
MAGNESIUM: 1.7 MG/DL (ref 1.6–2.4)
MAGNESIUM: 1.8 MG/DL (ref 1.6–2.4)
MAGNESIUM: 1.9 MG/DL (ref 1.6–2.4)
MAGNESIUM: 2 MG/DL (ref 1.6–2.4)
MAGNESIUM: 2 MG/DL (ref 1.6–2.4)
MAGNESIUM: 2.4 MG/DL (ref 1.6–2.4)
MCH RBC QN AUTO: 27 PG (ref 27–31)
MCH RBC QN AUTO: 27.1 PG (ref 27–31)
MCH RBC QN AUTO: 27.2 PG (ref 27–31)
MCH RBC QN AUTO: 27.3 PG (ref 27–31)
MCH RBC QN AUTO: 27.3 PG (ref 27–31)
MCH RBC QN AUTO: 27.4 PG (ref 27–31)
MCH RBC QN AUTO: 27.5 PG (ref 27–31)
MCH RBC QN AUTO: 27.6 PG (ref 27–31)
MCH RBC QN AUTO: 27.7 PG (ref 27–31)
MCH RBC QN AUTO: 27.9 PG (ref 25.7–32.2)
MCH RBC QN AUTO: 27.9 PG (ref 27–31)
MCH RBC QN AUTO: 28 PG (ref 27–31)
MCH RBC QN AUTO: 28.4 PG (ref 25.7–32.2)
MCH RBC QN AUTO: 28.4 PG (ref 27–31)
MCH RBC QN AUTO: 28.5 PG (ref 27–31)
MCH RBC QN AUTO: 28.6 PG (ref 27–31)
MCH RBC QN AUTO: 28.7 PG (ref 27–31)
MCH RBC QN AUTO: 28.8 PG (ref 27–31)
MCH RBC QN AUTO: 28.9 PG (ref 27–31)
MCH RBC QN AUTO: 28.9 PG (ref 27–31)
MCH RBC QN AUTO: 29.1 PG (ref 25.7–32.2)
MCH RBC QN AUTO: 29.2 PG (ref 27–31)
MCH RBC QN AUTO: 29.3 PG (ref 27–31)
MCH RBC QN AUTO: 29.3 PG (ref 27–31)
MCH RBC QN AUTO: 29.4 PG (ref 27–31)
MCH RBC QN AUTO: 29.4 PG (ref 27–31)
MCHC RBC AUTO-ENTMCNC: 30 G/DL (ref 33–37)
MCHC RBC AUTO-ENTMCNC: 30.2 G/DL (ref 33–37)
MCHC RBC AUTO-ENTMCNC: 30.3 G/DL (ref 33–37)
MCHC RBC AUTO-ENTMCNC: 30.4 G/DL (ref 32.3–36.5)
MCHC RBC AUTO-ENTMCNC: 30.4 G/DL (ref 32.3–36.5)
MCHC RBC AUTO-ENTMCNC: 30.4 G/DL (ref 33–37)
MCHC RBC AUTO-ENTMCNC: 30.5 G/DL (ref 33–37)
MCHC RBC AUTO-ENTMCNC: 30.6 G/DL (ref 33–37)
MCHC RBC AUTO-ENTMCNC: 30.8 G/DL (ref 33–37)
MCHC RBC AUTO-ENTMCNC: 30.9 G/DL (ref 33–37)
MCHC RBC AUTO-ENTMCNC: 31 G/DL (ref 33–37)
MCHC RBC AUTO-ENTMCNC: 31 G/DL (ref 33–37)
MCHC RBC AUTO-ENTMCNC: 31.1 G/DL (ref 33–37)
MCHC RBC AUTO-ENTMCNC: 31.2 G/DL (ref 33–37)
MCHC RBC AUTO-ENTMCNC: 31.3 G/DL (ref 33–37)
MCHC RBC AUTO-ENTMCNC: 31.5 G/DL (ref 33–37)
MCHC RBC AUTO-ENTMCNC: 31.6 G/DL (ref 33–37)
MCHC RBC AUTO-ENTMCNC: 31.6 G/DL (ref 33–37)
MCHC RBC AUTO-ENTMCNC: 31.8 G/DL (ref 33–37)
MCHC RBC AUTO-ENTMCNC: 31.9 G/DL (ref 33–37)
MCHC RBC AUTO-ENTMCNC: 32 G/DL (ref 33–37)
MCHC RBC AUTO-ENTMCNC: 32.1 G/DL (ref 33–37)
MCHC RBC AUTO-ENTMCNC: 32.2 G/DL (ref 32.3–36.5)
MCHC RBC AUTO-ENTMCNC: 32.3 G/DL (ref 33–37)
MCHC RBC AUTO-ENTMCNC: 32.4 G/DL (ref 33–37)
MCHC RBC AUTO-ENTMCNC: 32.5 G/DL (ref 33–37)
MCHC RBC AUTO-ENTMCNC: 32.8 G/DL (ref 33–37)
MCHC RBC AUTO-ENTMCNC: 32.9 G/DL (ref 33–37)
MCHC RBC AUTO-ENTMCNC: 33 G/DL (ref 33–37)
MCHC RBC AUTO-ENTMCNC: 33.1 G/DL (ref 33–37)
MCHC RBC AUTO-ENTMCNC: 33.1 G/DL (ref 33–37)
MCHC RBC AUTO-ENTMCNC: 34 G/DL (ref 33–37)
MCV RBC AUTO: 86.3 FL (ref 80–94)
MCV RBC AUTO: 86.7 FL (ref 80–94)
MCV RBC AUTO: 87.2 FL (ref 80–94)
MCV RBC AUTO: 87.3 FL (ref 80–94)
MCV RBC AUTO: 87.7 FL (ref 80–94)
MCV RBC AUTO: 87.8 FL (ref 80–94)
MCV RBC AUTO: 87.9 FL (ref 80–94)
MCV RBC AUTO: 87.9 FL (ref 80–94)
MCV RBC AUTO: 88.1 FL (ref 80–94)
MCV RBC AUTO: 88.3 FL (ref 79–92.2)
MCV RBC AUTO: 88.3 FL (ref 80–94)
MCV RBC AUTO: 88.6 FL (ref 80–94)
MCV RBC AUTO: 88.7 FL (ref 80–94)
MCV RBC AUTO: 88.8 FL (ref 80–94)
MCV RBC AUTO: 88.8 FL (ref 80–94)
MCV RBC AUTO: 88.9 FL (ref 80–94)
MCV RBC AUTO: 89.1 FL (ref 80–94)
MCV RBC AUTO: 89.3 FL (ref 80–94)
MCV RBC AUTO: 89.6 FL (ref 80–94)
MCV RBC AUTO: 89.7 FL (ref 80–94)
MCV RBC AUTO: 89.9 FL (ref 80–94)
MCV RBC AUTO: 90.2 FL (ref 80–94)
MCV RBC AUTO: 90.3 FL (ref 80–94)
MCV RBC AUTO: 90.4 FL (ref 80–94)
MCV RBC AUTO: 90.4 FL (ref 80–94)
MCV RBC AUTO: 90.8 FL (ref 80–94)
MCV RBC AUTO: 91 FL (ref 80–94)
MCV RBC AUTO: 91.2 FL (ref 80–94)
MCV RBC AUTO: 91.7 FL (ref 80–94)
MCV RBC AUTO: 91.8 FL (ref 79–92.2)
MCV RBC AUTO: 92 FL (ref 80–94)
MCV RBC AUTO: 92 FL (ref 80–94)
MCV RBC AUTO: 92.1 FL (ref 80–94)
MCV RBC AUTO: 92.2 FL (ref 80–94)
MCV RBC AUTO: 92.4 FL (ref 80–94)
MCV RBC AUTO: 95.4 FL (ref 79–92.2)
METAMYELOCYTES RELATIVE PERCENT: 1 %
METHICILLIN RESISTANCE MECA/C  BY PCR: DETECTED
MONOCYTES ABSOLUTE: 0.2 K/UL (ref 0–0.9)
MONOCYTES ABSOLUTE: 0.36 K/UL (ref 0.24–0.82)
MONOCYTES ABSOLUTE: 0.5 K/UL (ref 0–0.9)
MONOCYTES ABSOLUTE: 0.5 K/UL (ref 0–0.9)
MONOCYTES ABSOLUTE: 0.6 K/UL (ref 0–0.9)
MONOCYTES ABSOLUTE: 0.7 K/UL (ref 0–0.9)
MONOCYTES ABSOLUTE: 0.8 K/UL (ref 0–0.9)
MONOCYTES ABSOLUTE: 0.9 K/UL (ref 0–0.9)
MONOCYTES ABSOLUTE: 0.96 K/UL (ref 0.24–0.82)
MONOCYTES ABSOLUTE: 1 K/UL (ref 0–0.9)
MONOCYTES ABSOLUTE: 1 K/UL (ref 0–0.9)
MONOCYTES ABSOLUTE: 1.09 K/UL (ref 0.24–0.82)
MONOCYTES ABSOLUTE: 1.1 K/UL (ref 0–0.9)
MONOCYTES ABSOLUTE: 1.3 K/UL (ref 0–0.9)
MONOCYTES ABSOLUTE: 2.2 K/UL (ref 0–0.9)
MONOCYTES RELATIVE PERCENT: 1.3 % (ref 0–10)
MONOCYTES RELATIVE PERCENT: 10.6 % (ref 0–10)
MONOCYTES RELATIVE PERCENT: 10.7 % (ref 4.7–12.5)
MONOCYTES RELATIVE PERCENT: 10.8 % (ref 0–10)
MONOCYTES RELATIVE PERCENT: 18.7 % (ref 0–10)
MONOCYTES RELATIVE PERCENT: 2.7 % (ref 0–10)
MONOCYTES RELATIVE PERCENT: 4 % (ref 0–10)
MONOCYTES RELATIVE PERCENT: 4.3 % (ref 4.7–12.5)
MONOCYTES RELATIVE PERCENT: 4.8 % (ref 0–10)
MONOCYTES RELATIVE PERCENT: 5.2 % (ref 0–10)
MONOCYTES RELATIVE PERCENT: 5.2 % (ref 0–10)
MONOCYTES RELATIVE PERCENT: 6.1 % (ref 0–10)
MONOCYTES RELATIVE PERCENT: 6.3 % (ref 0–10)
MONOCYTES RELATIVE PERCENT: 6.4 % (ref 0–10)
MONOCYTES RELATIVE PERCENT: 6.5 % (ref 0–10)
MONOCYTES RELATIVE PERCENT: 6.9 % (ref 0–10)
MONOCYTES RELATIVE PERCENT: 7.1 % (ref 0–10)
MONOCYTES RELATIVE PERCENT: 7.2 % (ref 0–10)
MONOCYTES RELATIVE PERCENT: 7.2 % (ref 0–10)
MONOCYTES RELATIVE PERCENT: 7.4 % (ref 0–10)
MONOCYTES RELATIVE PERCENT: 7.5 % (ref 0–10)
MONOCYTES RELATIVE PERCENT: 7.5 % (ref 0–10)
MONOCYTES RELATIVE PERCENT: 7.6 % (ref 0–10)
MONOCYTES RELATIVE PERCENT: 7.6 % (ref 0–10)
MONOCYTES RELATIVE PERCENT: 7.7 % (ref 0–10)
MONOCYTES RELATIVE PERCENT: 7.8 % (ref 0–10)
MONOCYTES RELATIVE PERCENT: 7.9 % (ref 0–10)
MONOCYTES RELATIVE PERCENT: 8.2 % (ref 0–10)
MONOCYTES RELATIVE PERCENT: 8.3 % (ref 0–10)
MONOCYTES RELATIVE PERCENT: 8.4 % (ref 0–10)
MONOCYTES RELATIVE PERCENT: 8.5 % (ref 0–10)
MONOCYTES RELATIVE PERCENT: 8.6 % (ref 0–10)
MONOCYTES RELATIVE PERCENT: 8.9 % (ref 0–10)
MONOCYTES RELATIVE PERCENT: 8.9 % (ref 4.7–12.5)
MYCOPLASMA PNEUMONIAE BY PCR: NOT DETECTED
MYCOPLASMA PNEUMONIAE BY PCR: NOT DETECTED
NEISSERIA MENINGITIDIS BY PCR: NOT DETECTED
NEISSERIA MENINGITIDIS BY PCR: NOT DETECTED
NEUTROPHILS ABSOLUTE: 10.4 K/UL (ref 1.5–7.5)
NEUTROPHILS ABSOLUTE: 13.7 K/UL (ref 1.5–7.5)
NEUTROPHILS ABSOLUTE: 17.2 K/UL (ref 1.5–7.5)
NEUTROPHILS ABSOLUTE: 17.4 K/UL (ref 1.5–7.5)
NEUTROPHILS ABSOLUTE: 19.5 K/UL (ref 1.5–7.5)
NEUTROPHILS ABSOLUTE: 3.6 K/UL (ref 1.5–7.5)
NEUTROPHILS ABSOLUTE: 39.2 K/UL (ref 1.5–7.5)
NEUTROPHILS ABSOLUTE: 4.8 K/UL (ref 1.5–7.5)
NEUTROPHILS ABSOLUTE: 5.4 K/UL (ref 1.5–7.5)
NEUTROPHILS ABSOLUTE: 5.7 K/UL (ref 1.5–7.5)
NEUTROPHILS ABSOLUTE: 5.8 K/UL (ref 1.5–7.5)
NEUTROPHILS ABSOLUTE: 5.9 K/UL (ref 1.5–7.5)
NEUTROPHILS ABSOLUTE: 53.4 K/UL (ref 1.5–7.5)
NEUTROPHILS ABSOLUTE: 6.1 K/UL (ref 1.5–7.5)
NEUTROPHILS ABSOLUTE: 6.2 K/UL (ref 1.5–7.5)
NEUTROPHILS ABSOLUTE: 6.2 K/UL (ref 1.5–7.5)
NEUTROPHILS ABSOLUTE: 6.5 K/UL (ref 1.5–7.5)
NEUTROPHILS ABSOLUTE: 6.6 K/UL (ref 1.5–7.5)
NEUTROPHILS ABSOLUTE: 6.6 K/UL (ref 1.5–7.5)
NEUTROPHILS ABSOLUTE: 6.85 K/UL (ref 1.56–6.13)
NEUTROPHILS ABSOLUTE: 6.9 K/UL (ref 1.5–7.5)
NEUTROPHILS ABSOLUTE: 7 K/UL (ref 1.5–7.5)
NEUTROPHILS ABSOLUTE: 7.02 K/UL (ref 1.56–6.13)
NEUTROPHILS ABSOLUTE: 7.1 K/UL (ref 1.5–7.5)
NEUTROPHILS ABSOLUTE: 7.4 K/UL (ref 1.5–7.5)
NEUTROPHILS ABSOLUTE: 8 K/UL (ref 1.5–7.5)
NEUTROPHILS ABSOLUTE: 8.1 K/UL (ref 1.5–7.5)
NEUTROPHILS ABSOLUTE: 8.3 K/UL (ref 1.5–7.5)
NEUTROPHILS ABSOLUTE: 8.55 K/UL (ref 1.56–6.13)
NEUTROPHILS ABSOLUTE: 8.7 K/UL (ref 1.5–7.5)
NEUTROPHILS ABSOLUTE: 8.7 K/UL (ref 1.5–7.5)
NEUTROPHILS ABSOLUTE: 8.8 K/UL (ref 1.5–7.5)
NEUTROPHILS ABSOLUTE: 8.9 K/UL (ref 1.5–7.5)
NEUTROPHILS ABSOLUTE: 9 K/UL (ref 1.5–7.5)
NEUTROPHILS RELATIVE PERCENT: 59.9 % (ref 50–65)
NEUTROPHILS RELATIVE PERCENT: 67.4 % (ref 50–65)
NEUTROPHILS RELATIVE PERCENT: 68 % (ref 50–65)
NEUTROPHILS RELATIVE PERCENT: 69.9 % (ref 34–71.1)
NEUTROPHILS RELATIVE PERCENT: 70.8 % (ref 50–65)
NEUTROPHILS RELATIVE PERCENT: 71 % (ref 50–65)
NEUTROPHILS RELATIVE PERCENT: 72.2 % (ref 50–65)
NEUTROPHILS RELATIVE PERCENT: 72.9 % (ref 50–65)
NEUTROPHILS RELATIVE PERCENT: 73.6 % (ref 50–65)
NEUTROPHILS RELATIVE PERCENT: 74.6 % (ref 50–65)
NEUTROPHILS RELATIVE PERCENT: 74.7 % (ref 50–65)
NEUTROPHILS RELATIVE PERCENT: 74.9 % (ref 50–65)
NEUTROPHILS RELATIVE PERCENT: 75 % (ref 50–65)
NEUTROPHILS RELATIVE PERCENT: 75 % (ref 50–65)
NEUTROPHILS RELATIVE PERCENT: 75.3 % (ref 50–65)
NEUTROPHILS RELATIVE PERCENT: 75.9 % (ref 50–65)
NEUTROPHILS RELATIVE PERCENT: 76 % (ref 50–65)
NEUTROPHILS RELATIVE PERCENT: 76.2 % (ref 34–71.1)
NEUTROPHILS RELATIVE PERCENT: 76.2 % (ref 50–65)
NEUTROPHILS RELATIVE PERCENT: 76.6 % (ref 50–65)
NEUTROPHILS RELATIVE PERCENT: 77 % (ref 50–65)
NEUTROPHILS RELATIVE PERCENT: 77 % (ref 50–65)
NEUTROPHILS RELATIVE PERCENT: 77.1 % (ref 50–65)
NEUTROPHILS RELATIVE PERCENT: 77.7 % (ref 50–65)
NEUTROPHILS RELATIVE PERCENT: 77.9 % (ref 50–65)
NEUTROPHILS RELATIVE PERCENT: 78.2 % (ref 50–65)
NEUTROPHILS RELATIVE PERCENT: 78.8 % (ref 50–65)
NEUTROPHILS RELATIVE PERCENT: 79.4 % (ref 50–65)
NEUTROPHILS RELATIVE PERCENT: 80 % (ref 50–65)
NEUTROPHILS RELATIVE PERCENT: 81.3 % (ref 50–65)
NEUTROPHILS RELATIVE PERCENT: 81.9 % (ref 50–65)
NEUTROPHILS RELATIVE PERCENT: 82 % (ref 50–65)
NEUTROPHILS RELATIVE PERCENT: 84.3 % (ref 34–71.1)
NEUTROPHILS RELATIVE PERCENT: 84.6 % (ref 50–65)
NEUTROPHILS RELATIVE PERCENT: 86.3 % (ref 50–65)
NEUTROPHILS RELATIVE PERCENT: 86.7 % (ref 50–65)
NEUTROPHILS RELATIVE PERCENT: 88.3 % (ref 50–65)
NEUTROPHILS RELATIVE PERCENT: 96.3 % (ref 50–65)
NITRITE, URINE: NEGATIVE
NITRITE, URINE: POSITIVE
NITRITE, URINE: POSITIVE
NOROVIRUS GI GII PCR: NOT DETECTED
ORGANISM: ABNORMAL
OSMOLALITY URINE: 307 MOSM/KG (ref 250–1200)
OSMOLALITY URINE: 540 MOSM/KG (ref 250–1200)
PARAINFLUENZA VIRUS 1 BY PCR: NOT DETECTED
PARAINFLUENZA VIRUS 1 BY PCR: NOT DETECTED
PARAINFLUENZA VIRUS 2 BY PCR: NOT DETECTED
PARAINFLUENZA VIRUS 2 BY PCR: NOT DETECTED
PARAINFLUENZA VIRUS 3 BY PCR: NOT DETECTED
PARAINFLUENZA VIRUS 3 BY PCR: NOT DETECTED
PARAINFLUENZA VIRUS 4 BY PCR: NOT DETECTED
PARAINFLUENZA VIRUS 4 BY PCR: NOT DETECTED
PARATHYROID HORMONE INTACT: 217.8 PG/ML (ref 15–65)
PARATHYROID HORMONE INTACT: 8.7 PG/ML (ref 15–65)
PDW BLD-RTO: 13.3 % (ref 11.5–14.5)
PDW BLD-RTO: 13.4 % (ref 11.6–14.4)
PDW BLD-RTO: 13.4 % (ref 11.6–14.4)
PDW BLD-RTO: 13.6 % (ref 11.5–14.5)
PDW BLD-RTO: 13.7 % (ref 11.5–14.5)
PDW BLD-RTO: 13.8 % (ref 11.5–14.5)
PDW BLD-RTO: 13.9 % (ref 11.5–14.5)
PDW BLD-RTO: 14 % (ref 11.5–14.5)
PDW BLD-RTO: 14 % (ref 11.5–14.5)
PDW BLD-RTO: 14.2 % (ref 11.5–14.5)
PDW BLD-RTO: 14.3 % (ref 11.5–14.5)
PDW BLD-RTO: 14.4 % (ref 11.5–14.5)
PDW BLD-RTO: 14.4 % (ref 11.6–14.4)
PDW BLD-RTO: 14.5 % (ref 11.5–14.5)
PDW BLD-RTO: 14.6 % (ref 11.5–14.5)
PDW BLD-RTO: 14.8 % (ref 11.5–14.5)
PDW BLD-RTO: 14.8 % (ref 11.5–14.5)
PDW BLD-RTO: 14.9 % (ref 11.5–14.5)
PDW BLD-RTO: 15 % (ref 11.5–14.5)
PDW BLD-RTO: 15.1 % (ref 11.5–14.5)
PDW BLD-RTO: 15.2 % (ref 11.5–14.5)
PDW BLD-RTO: 15.3 % (ref 11.5–14.5)
PDW BLD-RTO: 15.5 % (ref 11.5–14.5)
PERFORMED ON: ABNORMAL
PERFORMED ON: NORMAL
PH UA: 5.5 (ref 5–8)
PH UA: 6 (ref 4.5–8)
PH UA: 6 (ref 5–8)
PH UA: 8 (ref 5–8)
PHOSPHORUS: 1.9 MG/DL (ref 2.5–4.5)
PHOSPHORUS: 2.7 MG/DL (ref 2.5–4.5)
PHOSPHORUS: 3 MG/DL (ref 2.5–4.5)
PHOSPHORUS: 3.1 MG/DL (ref 2.5–4.5)
PHOSPHORUS: 3.6 MG/DL (ref 2.5–4.5)
PHOSPHORUS: 3.8 MG/DL (ref 2.5–4.5)
PHOSPHORUS: 3.9 MG/DL (ref 2.5–4.5)
PLATELET # BLD: 185 K/UL (ref 130–400)
PLATELET # BLD: 226 K/UL (ref 130–400)
PLATELET # BLD: 244 K/UL (ref 163–337)
PLATELET # BLD: 256 K/UL (ref 130–400)
PLATELET # BLD: 257 K/UL (ref 130–400)
PLATELET # BLD: 298 K/UL (ref 130–400)
PLATELET # BLD: 302 K/UL (ref 163–337)
PLATELET # BLD: 303 K/UL (ref 130–400)
PLATELET # BLD: 305 K/UL (ref 130–400)
PLATELET # BLD: 312 K/UL (ref 130–400)
PLATELET # BLD: 313 K/UL (ref 130–400)
PLATELET # BLD: 315 K/UL (ref 130–400)
PLATELET # BLD: 319 K/UL (ref 130–400)
PLATELET # BLD: 321 K/UL (ref 130–400)
PLATELET # BLD: 323 K/UL (ref 130–400)
PLATELET # BLD: 330 K/UL (ref 130–400)
PLATELET # BLD: 334 K/UL (ref 130–400)
PLATELET # BLD: 338 K/UL (ref 130–400)
PLATELET # BLD: 343 K/UL (ref 130–400)
PLATELET # BLD: 344 K/UL (ref 130–400)
PLATELET # BLD: 344 K/UL (ref 130–400)
PLATELET # BLD: 357 K/UL (ref 130–400)
PLATELET # BLD: 359 K/UL (ref 130–400)
PLATELET # BLD: 360 K/UL (ref 163–337)
PLATELET # BLD: 366 K/UL (ref 130–400)
PLATELET # BLD: 367 K/UL (ref 130–400)
PLATELET # BLD: 369 K/UL (ref 130–400)
PLATELET # BLD: 370 K/UL (ref 130–400)
PLATELET # BLD: 376 K/UL (ref 130–400)
PLATELET # BLD: 383 K/UL (ref 130–400)
PLATELET # BLD: 387 K/UL (ref 130–400)
PLATELET # BLD: 390 K/UL (ref 130–400)
PLATELET # BLD: 403 K/UL (ref 130–400)
PLATELET # BLD: 405 K/UL (ref 130–400)
PLATELET # BLD: 427 K/UL (ref 130–400)
PLATELET # BLD: 499 K/UL (ref 130–400)
PLATELET # BLD: 536 K/UL (ref 130–400)
PLATELET # BLD: 637 K/UL (ref 130–400)
PLATELET SLIDE REVIEW: ADEQUATE
PLATELET SLIDE REVIEW: ADEQUATE
PLESIOMONAS SHIGELLOIDES PCR: NOT DETECTED
PMV BLD AUTO: 10 FL (ref 7.4–10.4)
PMV BLD AUTO: 10 FL (ref 9.4–12.4)
PMV BLD AUTO: 10 FL (ref 9.4–12.4)
PMV BLD AUTO: 10.1 FL (ref 9.4–12.4)
PMV BLD AUTO: 10.3 FL (ref 9.4–12.4)
PMV BLD AUTO: 10.3 FL (ref 9.4–12.4)
PMV BLD AUTO: 10.4 FL (ref 9.4–12.4)
PMV BLD AUTO: 10.4 FL (ref 9.4–12.4)
PMV BLD AUTO: 10.5 FL (ref 9.4–12.4)
PMV BLD AUTO: 10.6 FL (ref 9.4–12.4)
PMV BLD AUTO: 10.7 FL (ref 9.4–12.4)
PMV BLD AUTO: 10.7 FL (ref 9.4–12.4)
PMV BLD AUTO: 10.8 FL (ref 9.4–12.4)
PMV BLD AUTO: 10.8 FL (ref 9.4–12.4)
PMV BLD AUTO: 10.9 FL (ref 7.4–10.4)
PMV BLD AUTO: 11 FL (ref 9.4–12.4)
PMV BLD AUTO: 11 FL (ref 9.4–12.4)
PMV BLD AUTO: 11.2 FL (ref 9.4–12.4)
PMV BLD AUTO: 11.3 FL (ref 9.4–12.4)
PMV BLD AUTO: 11.4 FL (ref 9.4–12.4)
PMV BLD AUTO: 11.5 FL (ref 9.4–12.4)
PMV BLD AUTO: 11.6 FL (ref 7.4–10.4)
PMV BLD AUTO: 11.6 FL (ref 9.4–12.4)
PMV BLD AUTO: 9.4 FL (ref 9.4–12.4)
PMV BLD AUTO: 9.5 FL (ref 9.4–12.4)
PMV BLD AUTO: 9.6 FL (ref 9.4–12.4)
PMV BLD AUTO: 9.7 FL (ref 9.4–12.4)
PMV BLD AUTO: 9.7 FL (ref 9.4–12.4)
PMV BLD AUTO: 9.8 FL (ref 9.4–12.4)
PMV BLD AUTO: 9.9 FL (ref 9.4–12.4)
POTASSIUM REFLEX MAGNESIUM: 3.1 MMOL/L (ref 3.5–5)
POTASSIUM REFLEX MAGNESIUM: 3.2 MMOL/L (ref 3.5–5)
POTASSIUM REFLEX MAGNESIUM: 3.6 MMOL/L (ref 3.5–5)
POTASSIUM REFLEX MAGNESIUM: 3.7 MMOL/L (ref 3.5–5)
POTASSIUM REFLEX MAGNESIUM: 3.8 MMOL/L (ref 3.5–5)
POTASSIUM REFLEX MAGNESIUM: 3.9 MMOL/L (ref 3.5–5)
POTASSIUM REFLEX MAGNESIUM: 4.1 MMOL/L (ref 3.5–5)
POTASSIUM REFLEX MAGNESIUM: 4.1 MMOL/L (ref 3.5–5)
POTASSIUM REFLEX MAGNESIUM: 4.2 MMOL/L (ref 3.5–5)
POTASSIUM REFLEX MAGNESIUM: 4.2 MMOL/L (ref 3.5–5)
POTASSIUM REFLEX MAGNESIUM: 4.3 MMOL/L (ref 3.5–5)
POTASSIUM REFLEX MAGNESIUM: 4.3 MMOL/L (ref 3.5–5)
POTASSIUM REFLEX MAGNESIUM: 4.4 MMOL/L (ref 3.5–5)
POTASSIUM REFLEX MAGNESIUM: 4.5 MMOL/L (ref 3.5–5)
POTASSIUM REFLEX MAGNESIUM: 4.6 MMOL/L (ref 3.5–5)
POTASSIUM REFLEX MAGNESIUM: 4.7 MMOL/L (ref 3.5–5)
POTASSIUM REFLEX MAGNESIUM: 4.9 MMOL/L (ref 3.5–5)
POTASSIUM SERPL-SCNC: 3.9 MMOL/L (ref 3.5–5)
POTASSIUM SERPL-SCNC: 4.1 MMOL/L (ref 3.5–5)
POTASSIUM SERPL-SCNC: 4.1 MMOL/L (ref 3.5–5)
POTASSIUM SERPL-SCNC: 4.2 MMOL/L (ref 3.5–5)
POTASSIUM SERPL-SCNC: 4.2 MMOL/L (ref 3.5–5)
POTASSIUM SERPL-SCNC: 4.3 MMOL/L (ref 3.5–5)
POTASSIUM SERPL-SCNC: 4.4 MMOL/L (ref 3.5–5)
POTASSIUM SERPL-SCNC: 4.5 MMOL/L (ref 3.5–5)
POTASSIUM SERPL-SCNC: 4.5 MMOL/L (ref 3.5–5)
POTASSIUM SERPL-SCNC: 4.6 MMOL/L (ref 3.5–5)
POTASSIUM SERPL-SCNC: 4.8 MMOL/L (ref 3.5–5)
POTASSIUM SERPL-SCNC: 5 MMOL/L (ref 3.5–5)
PREALBUMIN: 21 MG/DL (ref 20–40)
PRO-BNP: 848 PG/ML (ref 0–900)
PROTEIN PROTEIN: 260 MG/DL (ref 15–45)
PROTEIN PROTEIN: 584 MG/DL (ref 15–45)
PROTEIN UA: 100 MG/DL
PROTEIN UA: 30 MG/DL
PROTEIN UA: 30 MG/DL
PROTEIN UA: 300 MG/DL
PROTEIN UA: POSITIVE
PROTEUS SPECIES BY PCR: NOT DETECTED
PROTEUS SPECIES BY PCR: NOT DETECTED
PROTHROMBIN TIME: 13.3 SEC (ref 12–14.6)
PROTHROMBIN TIME: 13.7 SEC (ref 12–14.6)
PSEUDOMONAS AERUGINOSA BY PCR: NOT DETECTED
PSEUDOMONAS AERUGINOSA BY PCR: NOT DETECTED
RBC # BLD: 2.58 M/UL (ref 4.7–6.1)
RBC # BLD: 2.8 M/UL (ref 4.7–6.1)
RBC # BLD: 2.85 M/UL (ref 4.7–6.1)
RBC # BLD: 2.85 M/UL (ref 4.7–6.1)
RBC # BLD: 2.92 M/UL (ref 4.7–6.1)
RBC # BLD: 3 M/UL (ref 4.7–6.1)
RBC # BLD: 3.09 M/UL (ref 4.7–6.1)
RBC # BLD: 3.1 M/UL (ref 4.7–6.1)
RBC # BLD: 3.12 M/UL (ref 4.7–6.1)
RBC # BLD: 3.13 M/UL (ref 4.7–6.1)
RBC # BLD: 3.24 M/UL (ref 4.63–6.08)
RBC # BLD: 3.25 M/UL (ref 4.7–6.1)
RBC # BLD: 3.3 M/UL (ref 4.7–6.1)
RBC # BLD: 3.32 M/UL (ref 4.7–6.1)
RBC # BLD: 3.35 M/UL (ref 4.7–6.1)
RBC # BLD: 3.36 M/UL (ref 4.7–6.1)
RBC # BLD: 3.38 M/UL (ref 4.7–6.1)
RBC # BLD: 3.4 M/UL (ref 4.63–6.08)
RBC # BLD: 3.43 M/UL (ref 4.7–6.1)
RBC # BLD: 3.44 M/UL (ref 4.7–6.1)
RBC # BLD: 3.47 M/UL (ref 4.7–6.1)
RBC # BLD: 3.48 M/UL (ref 4.7–6.1)
RBC # BLD: 3.49 M/UL (ref 4.7–6.1)
RBC # BLD: 3.51 M/UL (ref 4.63–6.08)
RBC # BLD: 3.51 M/UL (ref 4.7–6.1)
RBC # BLD: 3.55 M/UL (ref 4.7–6.1)
RBC # BLD: 3.73 M/UL (ref 4.7–6.1)
RBC # BLD: 3.73 M/UL (ref 4.7–6.1)
RBC # BLD: 3.74 M/UL (ref 4.7–6.1)
RBC # BLD: 3.74 M/UL (ref 4.7–6.1)
RBC # BLD: 3.85 M/UL (ref 4.7–6.1)
RBC # BLD: 3.92 M/UL (ref 4.7–6.1)
RBC # BLD: 3.99 M/UL (ref 4.7–6.1)
RBC # BLD: 4.02 M/UL (ref 4.7–6.1)
RBC # BLD: 4.11 M/UL (ref 4.7–6.1)
RBC # BLD: 4.77 M/UL (ref 4.7–6.1)
RBC UA: 4 /HPF (ref 0–4)
RBC UA: ABNORMAL /HPF (ref 0–2)
RBC URINE, POC: 7
REASON FOR REJECTION: NORMAL
REJECTED TEST: NORMAL
RESPIRATORY SYNCYTIAL VIRUS BY PCR: NOT DETECTED
RESPIRATORY SYNCYTIAL VIRUS BY PCR: NOT DETECTED
ROTAVIRUS A PCR: NOT DETECTED
SALMONELLA PCR: NOT DETECTED
SALMONELLA SPECIES BY PCR: NOT DETECTED
SALMONELLA SPECIES BY PCR: NOT DETECTED
SAPOVIRUS PCR: NOT DETECTED
SARS-COV-2, NAAT: NOT DETECTED
SARS-COV-2, PCR: NOT DETECTED
SERRATIA MARCESCENS BY PCR: DETECTED
SERRATIA MARCESCENS BY PCR: NOT DETECTED
SHIGA-LIKE TOXIN-PRODUCING E. COLI (STEC) STX1/STX2: NOT DETECTED
SODIUM BLD-SCNC: 126 MMOL/L (ref 136–145)
SODIUM BLD-SCNC: 127 MMOL/L (ref 136–145)
SODIUM BLD-SCNC: 130 MMOL/L (ref 136–145)
SODIUM BLD-SCNC: 131 MMOL/L (ref 136–145)
SODIUM BLD-SCNC: 132 MMOL/L (ref 136–145)
SODIUM BLD-SCNC: 133 MMOL/L (ref 136–145)
SODIUM BLD-SCNC: 134 MMOL/L (ref 136–145)
SODIUM BLD-SCNC: 134 MMOL/L (ref 136–145)
SODIUM BLD-SCNC: 135 MMOL/L (ref 136–145)
SODIUM BLD-SCNC: 136 MMOL/L (ref 136–145)
SODIUM BLD-SCNC: 137 MMOL/L (ref 136–145)
SODIUM BLD-SCNC: 138 MMOL/L (ref 136–145)
SODIUM BLD-SCNC: 138 MMOL/L (ref 136–145)
SODIUM BLD-SCNC: 139 MMOL/L (ref 136–145)
SODIUM BLD-SCNC: 139 MMOL/L (ref 136–145)
SODIUM BLD-SCNC: 140 MMOL/L (ref 136–145)
SODIUM BLD-SCNC: 141 MMOL/L (ref 136–145)
SODIUM BLD-SCNC: 141 MMOL/L (ref 136–145)
SODIUM URINE: 24 MMOL/L
SODIUM URINE: 67 MMOL/L
SPECIFIC GRAVITY UA: 1.01 (ref 1–1.03)
SPECIFIC GRAVITY UA: 1.02 (ref 1–1.03)
SPECIFIC GRAVITY UA: 1.03 (ref 1–1.03)
STAPHYLOCOCCUS AUREUS BY PCR: NOT DETECTED
STAPHYLOCOCCUS AUREUS BY PCR: NOT DETECTED
STAPHYLOCOCCUS EPIDERMIDIS BY PCR: DETECTED
STAPHYLOCOCCUS EPIDERMIDIS BY PCR: NOT DETECTED
STAPHYLOCOCCUS LUGDUNENSIS BY PCR: NOT DETECTED
STAPHYLOCOCCUS LUGDUNENSIS BY PCR: NOT DETECTED
STAPHYLOCOCCUS SPECIES BY PCR: DETECTED
STAPHYLOCOCCUS SPECIES BY PCR: NOT DETECTED
STENOTROPHOMONAS MALTOPHILIA BY PCR: NOT DETECTED
STENOTROPHOMONAS MALTOPHILIA BY PCR: NOT DETECTED
STREPTOCOCCUS AGALACTIAE BY PCR: NOT DETECTED
STREPTOCOCCUS AGALACTIAE BY PCR: NOT DETECTED
STREPTOCOCCUS PNEUMONIAE BY PCR: NOT DETECTED
STREPTOCOCCUS PNEUMONIAE BY PCR: NOT DETECTED
STREPTOCOCCUS PYOGENES  BY PCR: NOT DETECTED
STREPTOCOCCUS PYOGENES  BY PCR: NOT DETECTED
STREPTOCOCCUS SPECIES BY PCR: NOT DETECTED
STREPTOCOCCUS SPECIES BY PCR: NOT DETECTED
TOTAL CK: 10 U/L (ref 39–308)
TOTAL CK: 10 U/L (ref 39–308)
TOTAL CK: 7 U/L (ref 39–308)
TOTAL CK: 9 U/L (ref 39–308)
TOTAL IRON BINDING CAPACITY: 233 UG/DL (ref 250–400)
TOTAL PROTEIN: 5.5 G/DL (ref 6.6–8.7)
TOTAL PROTEIN: 6 G/DL (ref 6.6–8.7)
TOTAL PROTEIN: 6 G/DL (ref 6.6–8.7)
TOTAL PROTEIN: 6.3 G/DL (ref 6.6–8.7)
TOTAL PROTEIN: 6.5 G/DL (ref 6.6–8.7)
TOTAL PROTEIN: 6.6 G/DL (ref 6.6–8.7)
TOTAL PROTEIN: 6.7 G/DL (ref 6.6–8.7)
TOTAL PROTEIN: 6.9 G/DL (ref 6.6–8.7)
TOTAL PROTEIN: 7 G/DL (ref 6.6–8.7)
TOTAL PROTEIN: 7 G/DL (ref 6.6–8.7)
TOTAL PROTEIN: 7.1 G/DL (ref 6.6–8.7)
TOTAL PROTEIN: 7.2 G/DL (ref 6.6–8.7)
TOTAL PROTEIN: 7.3 G/DL (ref 6.6–8.7)
TOTAL PROTEIN: 7.4 G/DL (ref 6.6–8.7)
TOTAL PROTEIN: 7.5 G/DL (ref 6.6–8.7)
TOTAL PROTEIN: 7.5 G/DL (ref 6.6–8.7)
TOTAL PROTEIN: 7.7 G/DL (ref 6.6–8.7)
TOTAL PROTEIN: 7.8 G/DL (ref 6.6–8.7)
TOTAL PROTEIN: 8.4 G/DL (ref 6.6–8.7)
TOTAL PROTEIN: 8.6 G/DL (ref 6.6–8.7)
TROPONIN: 0.04 NG/ML (ref 0–0.03)
TROPONIN: 0.05 NG/ML (ref 0–0.03)
TROPONIN: 0.05 NG/ML (ref 0–0.03)
TROPONIN: 0.06 NG/ML (ref 0–0.03)
TSH SERPL DL<=0.05 MIU/L-ACNC: 1.6 UIU/ML (ref 0.27–4.2)
UREA NITROGEN, UR: 874 MG/DL
URINE CULTURE, ROUTINE: ABNORMAL
URINE CULTURE, ROUTINE: NORMAL
URINE CULTURE, ROUTINE: NORMAL
UROBILINOGEN, URINE: 0.2 E.U./DL
UROBILINOGEN, URINE: 1 E.U./DL
UROBILINOGEN, URINE: NORMAL
VACUOLATED NEUTROPHILS: ABNORMAL
VIBRIO CHOLERAE PCR: NOT DETECTED
VIBRIO PCR: NOT DETECTED
VITAMIN B-12: 308 PG/ML (ref 211–946)
VITAMIN D 25-HYDROXY: 30.7 NG/ML
VITAMIN D 25-HYDROXY: 36.7 NG/ML
VITAMIN D 25-HYDROXY: 36.8 NG/ML
WBC # BLD: 10 K/UL (ref 4.8–10.8)
WBC # BLD: 10.1 K/UL (ref 4.8–10.8)
WBC # BLD: 10.7 K/UL (ref 4.8–10.8)
WBC # BLD: 11 K/UL (ref 4.8–10.8)
WBC # BLD: 11 K/UL (ref 4.8–10.8)
WBC # BLD: 11.2 K/UL (ref 4.8–10.8)
WBC # BLD: 11.3 K/UL (ref 4.8–10.8)
WBC # BLD: 11.3 K/UL (ref 4.8–10.8)
WBC # BLD: 11.5 K/UL (ref 4.8–10.8)
WBC # BLD: 12.22 K/UL (ref 4.23–9.07)
WBC # BLD: 12.7 K/UL (ref 4.8–10.8)
WBC # BLD: 16.8 K/UL (ref 4.8–10.8)
WBC # BLD: 19.5 K/UL (ref 4.8–10.8)
WBC # BLD: 20.6 K/UL (ref 4.8–10.8)
WBC # BLD: 22.5 K/UL (ref 4.8–10.8)
WBC # BLD: 40.7 K/UL (ref 4.8–10.8)
WBC # BLD: 55.6 K/UL (ref 4.8–10.8)
WBC # BLD: 6 K/UL (ref 4.8–10.8)
WBC # BLD: 7.1 K/UL (ref 4.8–10.8)
WBC # BLD: 7.5 K/UL (ref 4.8–10.8)
WBC # BLD: 7.6 K/UL (ref 4.8–10.8)
WBC # BLD: 7.7 K/UL (ref 4.8–10.8)
WBC # BLD: 7.8 K/UL (ref 4.8–10.8)
WBC # BLD: 7.9 K/UL (ref 4.8–10.8)
WBC # BLD: 7.9 K/UL (ref 4.8–10.8)
WBC # BLD: 8 K/UL (ref 4.8–10.8)
WBC # BLD: 8.1 K/UL (ref 4.8–10.8)
WBC # BLD: 8.32 K/UL (ref 4.23–9.07)
WBC # BLD: 8.5 K/UL (ref 4.8–10.8)
WBC # BLD: 8.6 K/UL (ref 4.8–10.8)
WBC # BLD: 8.7 K/UL (ref 4.8–10.8)
WBC # BLD: 8.7 K/UL (ref 4.8–10.8)
WBC # BLD: 8.99 K/UL (ref 4.23–9.07)
WBC # BLD: 9.2 K/UL (ref 4.8–10.8)
WBC # BLD: 9.4 K/UL (ref 4.8–10.8)
WBC # BLD: 9.5 K/UL (ref 4.8–10.8)
WBC UA: 20 /HPF (ref 0–5)
WBC UA: ABNORMAL /HPF (ref 0–5)
WBC URINE, POC: 1
YEAST URINE, POC: 0
YEAST: PRESENT /HPF
YERSINIA ENTEROCOLITICA PCR: NOT DETECTED

## 2022-01-01 PROCEDURE — 82570 ASSAY OF URINE CREATININE: CPT

## 2022-01-01 PROCEDURE — 6370000000 HC RX 637 (ALT 250 FOR IP): Performed by: NURSE PRACTITIONER

## 2022-01-01 PROCEDURE — 80048 BASIC METABOLIC PNL TOTAL CA: CPT

## 2022-01-01 PROCEDURE — 2000000000 HC ICU R&B

## 2022-01-01 PROCEDURE — 36415 COLL VENOUS BLD VENIPUNCTURE: CPT

## 2022-01-01 PROCEDURE — 99212 OFFICE O/P EST SF 10 MIN: CPT | Performed by: SURGERY

## 2022-01-01 PROCEDURE — 97530 THERAPEUTIC ACTIVITIES: CPT

## 2022-01-01 PROCEDURE — 2580000003 HC RX 258: Performed by: EMERGENCY MEDICINE

## 2022-01-01 PROCEDURE — 36592 COLLECT BLOOD FROM PICC: CPT

## 2022-01-01 PROCEDURE — 93010 ELECTROCARDIOGRAM REPORT: CPT | Performed by: INTERNAL MEDICINE

## 2022-01-01 PROCEDURE — 99232 SBSQ HOSP IP/OBS MODERATE 35: CPT | Performed by: UROLOGY

## 2022-01-01 PROCEDURE — 99232 SBSQ HOSP IP/OBS MODERATE 35: CPT | Performed by: INTERNAL MEDICINE

## 2022-01-01 PROCEDURE — 6370000000 HC RX 637 (ALT 250 FOR IP): Performed by: PHYSICIAN ASSISTANT

## 2022-01-01 PROCEDURE — 76937 US GUIDE VASCULAR ACCESS: CPT

## 2022-01-01 PROCEDURE — 2500000003 HC RX 250 WO HCPCS: Performed by: HOSPITALIST

## 2022-01-01 PROCEDURE — 99233 SBSQ HOSP IP/OBS HIGH 50: CPT | Performed by: PSYCHIATRY & NEUROLOGY

## 2022-01-01 PROCEDURE — 1210000000 HC MED SURG R&B

## 2022-01-01 PROCEDURE — A4216 STERILE WATER/SALINE, 10 ML: HCPCS | Performed by: HOSPITALIST

## 2022-01-01 PROCEDURE — G8417 CALC BMI ABV UP PARAM F/U: HCPCS | Performed by: INTERNAL MEDICINE

## 2022-01-01 PROCEDURE — 97110 THERAPEUTIC EXERCISES: CPT

## 2022-01-01 PROCEDURE — 6360000002 HC RX W HCPCS: Performed by: STUDENT IN AN ORGANIZED HEALTH CARE EDUCATION/TRAINING PROGRAM

## 2022-01-01 PROCEDURE — 3017F COLORECTAL CA SCREEN DOC REV: CPT | Performed by: SURGERY

## 2022-01-01 PROCEDURE — 6360000002 HC RX W HCPCS: Performed by: UROLOGY

## 2022-01-01 PROCEDURE — 2500000003 HC RX 250 WO HCPCS: Performed by: EMERGENCY MEDICINE

## 2022-01-01 PROCEDURE — 96415 CHEMO IV INFUSION ADDL HR: CPT

## 2022-01-01 PROCEDURE — 83970 ASSAY OF PARATHORMONE: CPT

## 2022-01-01 PROCEDURE — 72158 MRI LUMBAR SPINE W/O & W/DYE: CPT | Performed by: RADIOLOGY

## 2022-01-01 PROCEDURE — 87086 URINE CULTURE/COLONY COUNT: CPT

## 2022-01-01 PROCEDURE — 6370000000 HC RX 637 (ALT 250 FOR IP): Performed by: UROLOGY

## 2022-01-01 PROCEDURE — 6370000000 HC RX 637 (ALT 250 FOR IP): Performed by: INTERNAL MEDICINE

## 2022-01-01 PROCEDURE — 99231 SBSQ HOSP IP/OBS SF/LOW 25: CPT | Performed by: NEUROLOGICAL SURGERY

## 2022-01-01 PROCEDURE — 83735 ASSAY OF MAGNESIUM: CPT

## 2022-01-01 PROCEDURE — 99232 SBSQ HOSP IP/OBS MODERATE 35: CPT | Performed by: NURSE PRACTITIONER

## 2022-01-01 PROCEDURE — 1180000000 HC REHAB R&B

## 2022-01-01 PROCEDURE — 82947 ASSAY GLUCOSE BLOOD QUANT: CPT

## 2022-01-01 PROCEDURE — 2580000003 HC RX 258: Performed by: INTERNAL MEDICINE

## 2022-01-01 PROCEDURE — 6360000002 HC RX W HCPCS: Performed by: INTERNAL MEDICINE

## 2022-01-01 PROCEDURE — 2580000003 HC RX 258: Performed by: HOSPITALIST

## 2022-01-01 PROCEDURE — 80053 COMPREHEN METABOLIC PANEL: CPT

## 2022-01-01 PROCEDURE — 51702 INSERT TEMP BLADDER CATH: CPT

## 2022-01-01 PROCEDURE — 6360000002 HC RX W HCPCS: Performed by: PHYSICIAN ASSISTANT

## 2022-01-01 PROCEDURE — 3700000001 HC ADD 15 MINUTES (ANESTHESIA): Performed by: UROLOGY

## 2022-01-01 PROCEDURE — 6370000000 HC RX 637 (ALT 250 FOR IP): Performed by: PSYCHIATRY & NEUROLOGY

## 2022-01-01 PROCEDURE — 71250 CT THORAX DX C-: CPT

## 2022-01-01 PROCEDURE — 85025 COMPLETE CBC W/AUTO DIFF WBC: CPT

## 2022-01-01 PROCEDURE — 99231 SBSQ HOSP IP/OBS SF/LOW 25: CPT | Performed by: SURGERY

## 2022-01-01 PROCEDURE — 96368 THER/DIAG CONCURRENT INF: CPT

## 2022-01-01 PROCEDURE — 97116 GAIT TRAINING THERAPY: CPT

## 2022-01-01 PROCEDURE — 88305 TISSUE EXAM BY PATHOLOGIST: CPT

## 2022-01-01 PROCEDURE — 82306 VITAMIN D 25 HYDROXY: CPT

## 2022-01-01 PROCEDURE — 1111F DSCHRG MED/CURRENT MED MERGE: CPT | Performed by: INTERNAL MEDICINE

## 2022-01-01 PROCEDURE — 99222 1ST HOSP IP/OBS MODERATE 55: CPT | Performed by: NURSE PRACTITIONER

## 2022-01-01 PROCEDURE — 85610 PROTHROMBIN TIME: CPT

## 2022-01-01 PROCEDURE — 87040 BLOOD CULTURE FOR BACTERIA: CPT

## 2022-01-01 PROCEDURE — 2500000003 HC RX 250 WO HCPCS: Performed by: INTERNAL MEDICINE

## 2022-01-01 PROCEDURE — 99231 SBSQ HOSP IP/OBS SF/LOW 25: CPT | Performed by: INTERNAL MEDICINE

## 2022-01-01 PROCEDURE — 99232 SBSQ HOSP IP/OBS MODERATE 35: CPT | Performed by: SURGERY

## 2022-01-01 PROCEDURE — 96365 THER/PROPH/DIAG IV INF INIT: CPT

## 2022-01-01 PROCEDURE — 6370000000 HC RX 637 (ALT 250 FOR IP): Performed by: HOSPITALIST

## 2022-01-01 PROCEDURE — 99212 OFFICE O/P EST SF 10 MIN: CPT

## 2022-01-01 PROCEDURE — 74018 RADEX ABDOMEN 1 VIEW: CPT

## 2022-01-01 PROCEDURE — 6360000002 HC RX W HCPCS: Performed by: RADIOLOGY

## 2022-01-01 PROCEDURE — 86140 C-REACTIVE PROTEIN: CPT

## 2022-01-01 PROCEDURE — 87077 CULTURE AEROBIC IDENTIFY: CPT

## 2022-01-01 PROCEDURE — 99233 SBSQ HOSP IP/OBS HIGH 50: CPT | Performed by: INTERNAL MEDICINE

## 2022-01-01 PROCEDURE — 74176 CT ABD & PELVIS W/O CONTRAST: CPT

## 2022-01-01 PROCEDURE — C9113 INJ PANTOPRAZOLE SODIUM, VIA: HCPCS | Performed by: HOSPITALIST

## 2022-01-01 PROCEDURE — 82746 ASSAY OF FOLIC ACID SERUM: CPT

## 2022-01-01 PROCEDURE — 84100 ASSAY OF PHOSPHORUS: CPT

## 2022-01-01 PROCEDURE — 99285 EMERGENCY DEPT VISIT HI MDM: CPT

## 2022-01-01 PROCEDURE — 6360000002 HC RX W HCPCS: Performed by: NURSE PRACTITIONER

## 2022-01-01 PROCEDURE — 99231 SBSQ HOSP IP/OBS SF/LOW 25: CPT | Performed by: UROLOGY

## 2022-01-01 PROCEDURE — 99222 1ST HOSP IP/OBS MODERATE 55: CPT | Performed by: INTERNAL MEDICINE

## 2022-01-01 PROCEDURE — 6370000000 HC RX 637 (ALT 250 FOR IP)

## 2022-01-01 PROCEDURE — 6370000000 HC RX 637 (ALT 250 FOR IP): Performed by: STUDENT IN AN ORGANIZED HEALTH CARE EDUCATION/TRAINING PROGRAM

## 2022-01-01 PROCEDURE — 99233 SBSQ HOSP IP/OBS HIGH 50: CPT | Performed by: PHYSICIAN ASSISTANT

## 2022-01-01 PROCEDURE — 6360000002 HC RX W HCPCS

## 2022-01-01 PROCEDURE — 72128 CT CHEST SPINE W/O DYE: CPT

## 2022-01-01 PROCEDURE — 2709999900 HC NON-CHARGEABLE SUPPLY: Performed by: UROLOGY

## 2022-01-01 PROCEDURE — 2580000003 HC RX 258: Performed by: NURSE PRACTITIONER

## 2022-01-01 PROCEDURE — 3700000000 HC ANESTHESIA ATTENDED CARE: Performed by: UROLOGY

## 2022-01-01 PROCEDURE — 4040F PNEUMOC VAC/ADMIN/RCVD: CPT | Performed by: NURSE PRACTITIONER

## 2022-01-01 PROCEDURE — 3600000004 HC SURGERY LEVEL 4 BASE: Performed by: UROLOGY

## 2022-01-01 PROCEDURE — 72157 MRI CHEST SPINE W/O & W/DYE: CPT | Performed by: RADIOLOGY

## 2022-01-01 PROCEDURE — G8417 CALC BMI ABV UP PARAM F/U: HCPCS | Performed by: SURGERY

## 2022-01-01 PROCEDURE — 84300 ASSAY OF URINE SODIUM: CPT

## 2022-01-01 PROCEDURE — 96375 TX/PRO/DX INJ NEW DRUG ADDON: CPT

## 2022-01-01 PROCEDURE — 6360000002 HC RX W HCPCS: Performed by: EMERGENCY MEDICINE

## 2022-01-01 PROCEDURE — 6360000002 HC RX W HCPCS: Performed by: HOSPITALIST

## 2022-01-01 PROCEDURE — 96523 IRRIG DRUG DELIVERY DEVICE: CPT

## 2022-01-01 PROCEDURE — 1111F DSCHRG MED/CURRENT MED MERGE: CPT | Performed by: STUDENT IN AN ORGANIZED HEALTH CARE EDUCATION/TRAINING PROGRAM

## 2022-01-01 PROCEDURE — 88342 IMHCHEM/IMCYTCHM 1ST ANTB: CPT

## 2022-01-01 PROCEDURE — 87205 SMEAR GRAM STAIN: CPT

## 2022-01-01 PROCEDURE — 2709999900 HC NON-CHARGEABLE SUPPLY

## 2022-01-01 PROCEDURE — 71045 X-RAY EXAM CHEST 1 VIEW: CPT

## 2022-01-01 PROCEDURE — 84134 ASSAY OF PREALBUMIN: CPT

## 2022-01-01 PROCEDURE — 97165 OT EVAL LOW COMPLEX 30 MIN: CPT

## 2022-01-01 PROCEDURE — 88334 PATH CONSLTJ SURG CYTO XM EA: CPT

## 2022-01-01 PROCEDURE — 96374 THER/PROPH/DIAG INJ IV PUSH: CPT

## 2022-01-01 PROCEDURE — 87150 DNA/RNA AMPLIFIED PROBE: CPT

## 2022-01-01 PROCEDURE — 97535 SELF CARE MNGMENT TRAINING: CPT

## 2022-01-01 PROCEDURE — 96416 CHEMO PROLONG INFUSE W/PUMP: CPT

## 2022-01-01 PROCEDURE — 2580000003 HC RX 258: Performed by: PSYCHIATRY & NEUROLOGY

## 2022-01-01 PROCEDURE — 81001 URINALYSIS AUTO W/SCOPE: CPT

## 2022-01-01 PROCEDURE — C1758 CATHETER, URETERAL: HCPCS | Performed by: UROLOGY

## 2022-01-01 PROCEDURE — 6370000000 HC RX 637 (ALT 250 FOR IP): Performed by: SURGERY

## 2022-01-01 PROCEDURE — 52240 CYSTOSCOPY AND TREATMENT: CPT | Performed by: UROLOGY

## 2022-01-01 PROCEDURE — 71046 X-RAY EXAM CHEST 2 VIEWS: CPT

## 2022-01-01 PROCEDURE — 52332 CYSTOSCOPY AND TREATMENT: CPT | Performed by: UROLOGY

## 2022-01-01 PROCEDURE — 1123F ACP DISCUSS/DSCN MKR DOCD: CPT | Performed by: INTERNAL MEDICINE

## 2022-01-01 PROCEDURE — 0TBB8ZX EXCISION OF BLADDER, VIA NATURAL OR ARTIFICIAL OPENING ENDOSCOPIC, DIAGNOSTIC: ICD-10-PCS | Performed by: UROLOGY

## 2022-01-01 PROCEDURE — 96366 THER/PROPH/DIAG IV INF ADDON: CPT

## 2022-01-01 PROCEDURE — 3017F COLORECTAL CA SCREEN DOC REV: CPT | Performed by: INTERNAL MEDICINE

## 2022-01-01 PROCEDURE — 87186 SC STD MICRODIL/AGAR DIL: CPT

## 2022-01-01 PROCEDURE — 99214 OFFICE O/P EST MOD 30 MIN: CPT | Performed by: INTERNAL MEDICINE

## 2022-01-01 PROCEDURE — 7100000010 HC PHASE II RECOVERY - FIRST 15 MIN: Performed by: UROLOGY

## 2022-01-01 PROCEDURE — 96413 CHEMO IV INFUSION 1 HR: CPT

## 2022-01-01 PROCEDURE — 84156 ASSAY OF PROTEIN URINE: CPT

## 2022-01-01 PROCEDURE — 93005 ELECTROCARDIOGRAM TRACING: CPT | Performed by: EMERGENCY MEDICINE

## 2022-01-01 PROCEDURE — 99496 TRANSJ CARE MGMT HIGH F2F 7D: CPT | Performed by: STUDENT IN AN ORGANIZED HEALTH CARE EDUCATION/TRAINING PROGRAM

## 2022-01-01 PROCEDURE — 96361 HYDRATE IV INFUSION ADD-ON: CPT

## 2022-01-01 PROCEDURE — 0TP98DZ REMOVAL OF INTRALUMINAL DEVICE FROM URETER, VIA NATURAL OR ARTIFICIAL OPENING ENDOSCOPIC: ICD-10-PCS | Performed by: UROLOGY

## 2022-01-01 PROCEDURE — 83540 ASSAY OF IRON: CPT

## 2022-01-01 PROCEDURE — 82378 CARCINOEMBRYONIC ANTIGEN: CPT

## 2022-01-01 PROCEDURE — G8427 DOCREV CUR MEDS BY ELIG CLIN: HCPCS | Performed by: NURSE PRACTITIONER

## 2022-01-01 PROCEDURE — 99223 1ST HOSP IP/OBS HIGH 75: CPT | Performed by: PHYSICIAN ASSISTANT

## 2022-01-01 PROCEDURE — 7100000001 HC PACU RECOVERY - ADDTL 15 MIN: Performed by: UROLOGY

## 2022-01-01 PROCEDURE — C2617 STENT, NON-COR, TEM W/O DEL: HCPCS | Performed by: UROLOGY

## 2022-01-01 PROCEDURE — 2500000003 HC RX 250 WO HCPCS

## 2022-01-01 PROCEDURE — 84540 ASSAY OF URINE/UREA-N: CPT

## 2022-01-01 PROCEDURE — 83935 ASSAY OF URINE OSMOLALITY: CPT

## 2022-01-01 PROCEDURE — 99223 1ST HOSP IP/OBS HIGH 75: CPT | Performed by: PSYCHIATRY & NEUROLOGY

## 2022-01-01 PROCEDURE — 96360 HYDRATION IV INFUSION INIT: CPT

## 2022-01-01 PROCEDURE — 74176 CT ABD & PELVIS W/O CONTRAST: CPT | Performed by: RADIOLOGY

## 2022-01-01 PROCEDURE — 72158 MRI LUMBAR SPINE W/O & W/DYE: CPT

## 2022-01-01 PROCEDURE — G8427 DOCREV CUR MEDS BY ELIG CLIN: HCPCS | Performed by: INTERNAL MEDICINE

## 2022-01-01 PROCEDURE — 2580000003 HC RX 258: Performed by: STUDENT IN AN ORGANIZED HEALTH CARE EDUCATION/TRAINING PROGRAM

## 2022-01-01 PROCEDURE — 6370000000 HC RX 637 (ALT 250 FOR IP): Performed by: EMERGENCY MEDICINE

## 2022-01-01 PROCEDURE — 52224 CYSTOSCOPY AND TREATMENT: CPT | Performed by: UROLOGY

## 2022-01-01 PROCEDURE — 99232 SBSQ HOSP IP/OBS MODERATE 35: CPT | Performed by: PSYCHIATRY & NEUROLOGY

## 2022-01-01 PROCEDURE — 88333 PATH CONSLTJ SURG CYTO XM 1: CPT

## 2022-01-01 PROCEDURE — 99232 SBSQ HOSP IP/OBS MODERATE 35: CPT | Performed by: NEUROLOGICAL SURGERY

## 2022-01-01 PROCEDURE — 4040F PNEUMOC VAC/ADMIN/RCVD: CPT | Performed by: INTERNAL MEDICINE

## 2022-01-01 PROCEDURE — 88341 IMHCHEM/IMCYTCHM EA ADD ANTB: CPT

## 2022-01-01 PROCEDURE — 43752 NASAL/OROGASTRIC W/TUBE PLMT: CPT

## 2022-01-01 PROCEDURE — 1036F TOBACCO NON-USER: CPT | Performed by: INTERNAL MEDICINE

## 2022-01-01 PROCEDURE — 51798 US URINE CAPACITY MEASURE: CPT

## 2022-01-01 PROCEDURE — 99223 1ST HOSP IP/OBS HIGH 75: CPT | Performed by: INTERNAL MEDICINE

## 2022-01-01 PROCEDURE — 83690 ASSAY OF LIPASE: CPT

## 2022-01-01 PROCEDURE — 88307 TISSUE EXAM BY PATHOLOGIST: CPT

## 2022-01-01 PROCEDURE — 1123F ACP DISCUSS/DSCN MKR DOCD: CPT | Performed by: NURSE PRACTITIONER

## 2022-01-01 PROCEDURE — 83605 ASSAY OF LACTIC ACID: CPT

## 2022-01-01 PROCEDURE — 3600000014 HC SURGERY LEVEL 4 ADDTL 15MIN: Performed by: UROLOGY

## 2022-01-01 PROCEDURE — 6360000002 HC RX W HCPCS: Performed by: REGISTERED NURSE

## 2022-01-01 PROCEDURE — 36410 VNPNXR 3YR/> PHY/QHP DX/THER: CPT

## 2022-01-01 PROCEDURE — 87070 CULTURE OTHR SPECIMN AEROBIC: CPT

## 2022-01-01 PROCEDURE — 0S9C3ZZ DRAINAGE OF RIGHT KNEE JOINT, PERCUTANEOUS APPROACH: ICD-10-PCS | Performed by: PHYSICIAN ASSISTANT

## 2022-01-01 PROCEDURE — 7100000000 HC PACU RECOVERY - FIRST 15 MIN: Performed by: UROLOGY

## 2022-01-01 PROCEDURE — 99221 1ST HOSP IP/OBS SF/LOW 40: CPT | Performed by: SURGERY

## 2022-01-01 PROCEDURE — 2580000003 HC RX 258: Performed by: UROLOGY

## 2022-01-01 PROCEDURE — 96417 CHEMO IV INFUS EACH ADDL SEQ: CPT

## 2022-01-01 PROCEDURE — 99221 1ST HOSP IP/OBS SF/LOW 40: CPT | Performed by: NEUROLOGICAL SURGERY

## 2022-01-01 PROCEDURE — 87507 IADNA-DNA/RNA PROBE TQ 12-25: CPT

## 2022-01-01 PROCEDURE — 93005 ELECTROCARDIOGRAM TRACING: CPT | Performed by: HOSPITALIST

## 2022-01-01 PROCEDURE — C1751 CATH, INF, PER/CENT/MIDLINE: HCPCS

## 2022-01-01 PROCEDURE — A9577 INJ MULTIHANCE: HCPCS | Performed by: STUDENT IN AN ORGANIZED HEALTH CARE EDUCATION/TRAINING PROGRAM

## 2022-01-01 PROCEDURE — C1769 GUIDE WIRE: HCPCS | Performed by: UROLOGY

## 2022-01-01 PROCEDURE — 72128 CT CHEST SPINE W/O DYE: CPT | Performed by: RADIOLOGY

## 2022-01-01 PROCEDURE — 99222 1ST HOSP IP/OBS MODERATE 55: CPT | Performed by: UROLOGY

## 2022-01-01 PROCEDURE — G8428 CUR MEDS NOT DOCUMENT: HCPCS | Performed by: SURGERY

## 2022-01-01 PROCEDURE — 84443 ASSAY THYROID STIM HORMONE: CPT

## 2022-01-01 PROCEDURE — 84484 ASSAY OF TROPONIN QUANT: CPT

## 2022-01-01 PROCEDURE — 88329 PATH CONSLTJ DRG SURG: CPT

## 2022-01-01 PROCEDURE — 3209999900 FLUORO FOR SURGICAL PROCEDURES

## 2022-01-01 PROCEDURE — 87075 CULTR BACTERIA EXCEPT BLOOD: CPT

## 2022-01-01 PROCEDURE — 2580000003 HC RX 258: Performed by: ANESTHESIOLOGY

## 2022-01-01 PROCEDURE — 82607 VITAMIN B-12: CPT

## 2022-01-01 PROCEDURE — 6370000000 HC RX 637 (ALT 250 FOR IP): Performed by: NEUROLOGICAL SURGERY

## 2022-01-01 PROCEDURE — 72157 MRI CHEST SPINE W/O & W/DYE: CPT

## 2022-01-01 PROCEDURE — 74420 UROGRAPHY RTRGR +-KUB: CPT | Performed by: UROLOGY

## 2022-01-01 PROCEDURE — 7100000011 HC PHASE II RECOVERY - ADDTL 15 MIN: Performed by: UROLOGY

## 2022-01-01 PROCEDURE — 99214 OFFICE O/P EST MOD 30 MIN: CPT | Performed by: NURSE PRACTITIONER

## 2022-01-01 PROCEDURE — 72131 CT LUMBAR SPINE W/O DYE: CPT | Performed by: RADIOLOGY

## 2022-01-01 PROCEDURE — 6360000004 HC RX CONTRAST MEDICATION: Performed by: STUDENT IN AN ORGANIZED HEALTH CARE EDUCATION/TRAINING PROGRAM

## 2022-01-01 PROCEDURE — 2500000003 HC RX 250 WO HCPCS: Performed by: NURSE PRACTITIONER

## 2022-01-01 PROCEDURE — 72131 CT LUMBAR SPINE W/O DYE: CPT

## 2022-01-01 PROCEDURE — 2720000010 HC SURG SUPPLY STERILE: Performed by: UROLOGY

## 2022-01-01 PROCEDURE — 10009 FNA BX W/CT GDN 1ST LES: CPT

## 2022-01-01 PROCEDURE — 1111F DSCHRG MED/CURRENT MED MERGE: CPT | Performed by: NURSE PRACTITIONER

## 2022-01-01 PROCEDURE — 02HV33Z INSERTION OF INFUSION DEVICE INTO SUPERIOR VENA CAVA, PERCUTANEOUS APPROACH: ICD-10-PCS | Performed by: INTERNAL MEDICINE

## 2022-01-01 PROCEDURE — 0202U NFCT DS 22 TRGT SARS-COV-2: CPT

## 2022-01-01 PROCEDURE — 85730 THROMBOPLASTIN TIME PARTIAL: CPT

## 2022-01-01 PROCEDURE — 76770 US EXAM ABDO BACK WALL COMP: CPT

## 2022-01-01 PROCEDURE — 6360000002 HC RX W HCPCS: Performed by: SURGERY

## 2022-01-01 PROCEDURE — 87635 SARS-COV-2 COVID-19 AMP PRB: CPT

## 2022-01-01 PROCEDURE — 71045 X-RAY EXAM CHEST 1 VIEW: CPT | Performed by: RADIOLOGY

## 2022-01-01 PROCEDURE — 96376 TX/PRO/DX INJ SAME DRUG ADON: CPT

## 2022-01-01 PROCEDURE — 71250 CT THORAX DX C-: CPT | Performed by: RADIOLOGY

## 2022-01-01 PROCEDURE — 1036F TOBACCO NON-USER: CPT | Performed by: NURSE PRACTITIONER

## 2022-01-01 PROCEDURE — 3017F COLORECTAL CA SCREEN DOC REV: CPT | Performed by: NURSE PRACTITIONER

## 2022-01-01 PROCEDURE — 0T778DZ DILATION OF LEFT URETER WITH INTRALUMINAL DEVICE, VIA NATURAL OR ARTIFICIAL OPENING ENDOSCOPIC: ICD-10-PCS | Performed by: UROLOGY

## 2022-01-01 PROCEDURE — 74420 UROGRAPHY RTRGR +-KUB: CPT

## 2022-01-01 PROCEDURE — 81003 URINALYSIS AUTO W/O SCOPE: CPT

## 2022-01-01 PROCEDURE — 1036F TOBACCO NON-USER: CPT | Performed by: SURGERY

## 2022-01-01 PROCEDURE — 82728 ASSAY OF FERRITIN: CPT

## 2022-01-01 PROCEDURE — 97161 PT EVAL LOW COMPLEX 20 MIN: CPT

## 2022-01-01 PROCEDURE — 1111F DSCHRG MED/CURRENT MED MERGE: CPT | Performed by: SURGERY

## 2022-01-01 PROCEDURE — 83550 IRON BINDING TEST: CPT

## 2022-01-01 PROCEDURE — 36415 COLL VENOUS BLD VENIPUNCTURE: CPT | Performed by: INTERNAL MEDICINE

## 2022-01-01 PROCEDURE — 0JPT0WZ REMOVAL OF TOTALLY IMPLANTABLE VASCULAR ACCESS DEVICE FROM TRUNK SUBCUTANEOUS TISSUE AND FASCIA, OPEN APPROACH: ICD-10-PCS | Performed by: INTERNAL MEDICINE

## 2022-01-01 PROCEDURE — 2500000003 HC RX 250 WO HCPCS: Performed by: REGISTERED NURSE

## 2022-01-01 PROCEDURE — 81001 URINALYSIS AUTO W/SCOPE: CPT | Performed by: NURSE PRACTITIONER

## 2022-01-01 PROCEDURE — G8417 CALC BMI ABV UP PARAM F/U: HCPCS | Performed by: NURSE PRACTITIONER

## 2022-01-01 PROCEDURE — 1123F ACP DISCUSS/DSCN MKR DOCD: CPT | Performed by: SURGERY

## 2022-01-01 DEVICE — URETERAL STENT
Type: IMPLANTABLE DEVICE | Status: FUNCTIONAL
Brand: POLARIS™ ULTRA

## 2022-01-01 DEVICE — UNIVERSA FIRM URETERAL STENT AND POSITIONER WITH HYDROPHILIC COATING
Type: IMPLANTABLE DEVICE | Site: URETER | Status: FUNCTIONAL
Brand: UNIVERSA

## 2022-01-01 RX ORDER — FENTANYL 100 UG/H
1 PATCH TRANSDERMAL
Status: DISCONTINUED | OUTPATIENT
Start: 2022-01-01 | End: 2022-01-01 | Stop reason: HOSPADM

## 2022-01-01 RX ORDER — DULOXETIN HYDROCHLORIDE 30 MG/1
30 CAPSULE, DELAYED RELEASE ORAL DAILY
Status: CANCELLED | OUTPATIENT
Start: 2022-01-01

## 2022-01-01 RX ORDER — PANTOPRAZOLE SODIUM 40 MG/1
40 TABLET, DELAYED RELEASE ORAL
Status: DISCONTINUED | OUTPATIENT
Start: 2022-01-01 | End: 2022-01-01 | Stop reason: HOSPADM

## 2022-01-01 RX ORDER — DULOXETIN HYDROCHLORIDE 30 MG/1
30 CAPSULE, DELAYED RELEASE ORAL DAILY
Qty: 30 CAPSULE | Refills: 3 | Status: SHIPPED | OUTPATIENT
Start: 2022-01-01

## 2022-01-01 RX ORDER — MEROPENEM AND SODIUM CHLORIDE 1 G/50ML
1000 INJECTION, SOLUTION INTRAVENOUS
Status: DISPENSED | OUTPATIENT
Start: 2022-01-01 | End: 2022-01-01

## 2022-01-01 RX ORDER — SODIUM BICARBONATE 650 MG/1
650 TABLET ORAL 4 TIMES DAILY
Status: CANCELLED | OUTPATIENT
Start: 2022-01-01

## 2022-01-01 RX ORDER — SODIUM CHLORIDE 0.9 % (FLUSH) 0.9 %
5-40 SYRINGE (ML) INJECTION PRN
Status: DISCONTINUED | OUTPATIENT
Start: 2022-01-01 | End: 2022-01-01 | Stop reason: HOSPADM

## 2022-01-01 RX ORDER — ONDANSETRON 2 MG/ML
4 INJECTION INTRAMUSCULAR; INTRAVENOUS EVERY 30 MIN PRN
Status: COMPLETED | OUTPATIENT
Start: 2022-01-01 | End: 2022-01-01

## 2022-01-01 RX ORDER — SODIUM CHLORIDE 0.9 % (FLUSH) 0.9 %
5-40 SYRINGE (ML) INJECTION PRN
Status: CANCELLED | OUTPATIENT
Start: 2022-01-01

## 2022-01-01 RX ORDER — SODIUM CHLORIDE 9 MG/ML
INJECTION, SOLUTION INTRAVENOUS CONTINUOUS
Status: DISCONTINUED | OUTPATIENT
Start: 2022-01-01 | End: 2022-01-01

## 2022-01-01 RX ORDER — SODIUM BICARBONATE 650 MG/1
650 TABLET ORAL 4 TIMES DAILY
Status: DISCONTINUED | OUTPATIENT
Start: 2022-01-01 | End: 2022-01-01 | Stop reason: HOSPADM

## 2022-01-01 RX ORDER — EPINEPHRINE 1 MG/ML
0.3 INJECTION, SOLUTION, CONCENTRATE INTRAVENOUS PRN
Status: CANCELLED | OUTPATIENT
Start: 2022-01-01

## 2022-01-01 RX ORDER — ONDANSETRON 2 MG/ML
4 INJECTION INTRAMUSCULAR; INTRAVENOUS ONCE
Status: COMPLETED | OUTPATIENT
Start: 2022-01-01 | End: 2022-01-01

## 2022-01-01 RX ORDER — DEXAMETHASONE SODIUM PHOSPHATE 10 MG/ML
INJECTION, SOLUTION INTRAMUSCULAR; INTRAVENOUS PRN
Status: DISCONTINUED | OUTPATIENT
Start: 2022-01-01 | End: 2022-01-01 | Stop reason: SDUPTHER

## 2022-01-01 RX ORDER — IPRATROPIUM BROMIDE AND ALBUTEROL SULFATE 2.5; .5 MG/3ML; MG/3ML
1 SOLUTION RESPIRATORY (INHALATION) EVERY 4 HOURS PRN
Status: DISCONTINUED | OUTPATIENT
Start: 2022-01-01 | End: 2022-01-01 | Stop reason: CLARIF

## 2022-01-01 RX ORDER — DEXTROSE MONOHYDRATE 50 MG/ML
INJECTION, SOLUTION INTRAVENOUS ONCE
Status: CANCELLED | OUTPATIENT
Start: 2022-01-01 | End: 2022-01-01

## 2022-01-01 RX ORDER — ENOXAPARIN SODIUM 100 MG/ML
40 INJECTION SUBCUTANEOUS DAILY
Status: DISCONTINUED | OUTPATIENT
Start: 2022-01-01 | End: 2022-01-01 | Stop reason: HOSPADM

## 2022-01-01 RX ORDER — MAGNESIUM SULFATE IN WATER 40 MG/ML
4000 INJECTION, SOLUTION INTRAVENOUS ONCE
Status: COMPLETED | OUTPATIENT
Start: 2022-01-01 | End: 2022-01-01

## 2022-01-01 RX ORDER — HYDROMORPHONE HYDROCHLORIDE 2 MG/1
2 TABLET ORAL EVERY 4 HOURS PRN
Status: DISCONTINUED | OUTPATIENT
Start: 2022-01-01 | End: 2022-01-01

## 2022-01-01 RX ORDER — SODIUM CHLORIDE 0.9 % (FLUSH) 0.9 %
5-40 SYRINGE (ML) INJECTION EVERY 12 HOURS SCHEDULED
Status: DISCONTINUED | OUTPATIENT
Start: 2022-01-01 | End: 2022-01-01 | Stop reason: HOSPADM

## 2022-01-01 RX ORDER — SODIUM CHLORIDE 9 MG/ML
INJECTION, SOLUTION INTRAVENOUS CONTINUOUS
Status: DISCONTINUED | OUTPATIENT
Start: 2022-01-01 | End: 2022-01-01 | Stop reason: HOSPADM

## 2022-01-01 RX ORDER — HYDROCODONE BITARTRATE AND ACETAMINOPHEN 7.5; 325 MG/1; MG/1
1 TABLET ORAL EVERY 6 HOURS PRN
Status: DISCONTINUED | OUTPATIENT
Start: 2022-01-01 | End: 2022-01-01 | Stop reason: ALTCHOICE

## 2022-01-01 RX ORDER — SODIUM CHLORIDE 9 MG/ML
5-250 INJECTION, SOLUTION INTRAVENOUS PRN
Status: CANCELLED | OUTPATIENT
Start: 2022-01-01

## 2022-01-01 RX ORDER — ALPRAZOLAM 0.5 MG/1
0.5 TABLET ORAL EVERY 6 HOURS PRN
Status: DISCONTINUED | OUTPATIENT
Start: 2022-01-01 | End: 2022-01-01

## 2022-01-01 RX ORDER — HEPARIN SODIUM (PORCINE) LOCK FLUSH IV SOLN 100 UNIT/ML 100 UNIT/ML
500 SOLUTION INTRAVENOUS PRN
Status: CANCELLED | OUTPATIENT
Start: 2022-01-01

## 2022-01-01 RX ORDER — HYDROMORPHONE HYDROCHLORIDE 1 MG/ML
0.5 INJECTION, SOLUTION INTRAMUSCULAR; INTRAVENOUS; SUBCUTANEOUS ONCE
Status: COMPLETED | OUTPATIENT
Start: 2022-01-01 | End: 2022-01-01

## 2022-01-01 RX ORDER — SODIUM CHLORIDE 9 MG/ML
INJECTION, SOLUTION INTRAVENOUS CONTINUOUS
Status: CANCELLED | OUTPATIENT
Start: 2022-01-01

## 2022-01-01 RX ORDER — FENTANYL CITRATE 50 UG/ML
25 INJECTION, SOLUTION INTRAMUSCULAR; INTRAVENOUS
Status: DISCONTINUED | OUTPATIENT
Start: 2022-01-01 | End: 2022-01-01 | Stop reason: HOSPADM

## 2022-01-01 RX ORDER — IBANDRONATE SODIUM 150 MG/1
TABLET, FILM COATED ORAL
Qty: 3 TABLET | Refills: 1 | Status: SHIPPED | OUTPATIENT
Start: 2022-01-01

## 2022-01-01 RX ORDER — MECOBALAMIN 5000 MCG
5 TABLET,DISINTEGRATING ORAL NIGHTLY PRN
Status: CANCELLED | OUTPATIENT
Start: 2022-01-01

## 2022-01-01 RX ORDER — MAGNESIUM SULFATE IN WATER 40 MG/ML
4000 INJECTION, SOLUTION INTRAVENOUS ONCE
Status: DISCONTINUED | OUTPATIENT
Start: 2022-01-01 | End: 2022-01-01

## 2022-01-01 RX ORDER — ACETAMINOPHEN 325 MG/1
650 TABLET ORAL
Status: CANCELLED | OUTPATIENT
Start: 2022-01-01

## 2022-01-01 RX ORDER — POLYETHYLENE GLYCOL 3350 17 G/17G
17 POWDER, FOR SOLUTION ORAL DAILY
Status: DISCONTINUED | OUTPATIENT
Start: 2022-01-01 | End: 2022-01-01

## 2022-01-01 RX ORDER — ALBUTEROL SULFATE 90 UG/1
4 AEROSOL, METERED RESPIRATORY (INHALATION) PRN
Status: CANCELLED | OUTPATIENT
Start: 2022-01-01

## 2022-01-01 RX ORDER — DEXAMETHASONE SODIUM PHOSPHATE 4 MG/ML
INJECTION, SOLUTION INTRA-ARTICULAR; INTRALESIONAL; INTRAMUSCULAR; INTRAVENOUS; SOFT TISSUE PRN
Status: DISCONTINUED | OUTPATIENT
Start: 2022-01-01 | End: 2022-01-01 | Stop reason: SDUPTHER

## 2022-01-01 RX ORDER — ALPRAZOLAM 0.5 MG/1
0.5 TABLET ORAL ONCE
Status: COMPLETED | OUTPATIENT
Start: 2022-01-01 | End: 2022-01-01

## 2022-01-01 RX ORDER — HYDROMORPHONE HYDROCHLORIDE 1 MG/ML
1 INJECTION, SOLUTION INTRAMUSCULAR; INTRAVENOUS; SUBCUTANEOUS ONCE
Status: COMPLETED | OUTPATIENT
Start: 2022-01-01 | End: 2022-01-01

## 2022-01-01 RX ORDER — 0.9 % SODIUM CHLORIDE 0.9 %
1000 INTRAVENOUS SOLUTION INTRAVENOUS ONCE
Status: COMPLETED | OUTPATIENT
Start: 2022-01-01 | End: 2022-01-01

## 2022-01-01 RX ORDER — FERROUS SULFATE 325(65) MG
325 TABLET ORAL
Status: DISCONTINUED | OUTPATIENT
Start: 2022-01-01 | End: 2022-01-01 | Stop reason: HOSPADM

## 2022-01-01 RX ORDER — CALCIUM GLUCONATE 20 MG/ML
1000 INJECTION, SOLUTION INTRAVENOUS ONCE
Status: COMPLETED | OUTPATIENT
Start: 2022-01-01 | End: 2022-01-01

## 2022-01-01 RX ORDER — ACETAMINOPHEN 650 MG/1
650 SUPPOSITORY RECTAL EVERY 6 HOURS PRN
Status: CANCELLED | OUTPATIENT
Start: 2022-01-01

## 2022-01-01 RX ORDER — LANOLIN ALCOHOL/MO/W.PET/CERES
400 CREAM (GRAM) TOPICAL DAILY
Status: DISCONTINUED | OUTPATIENT
Start: 2022-01-01 | End: 2022-01-01 | Stop reason: HOSPADM

## 2022-01-01 RX ORDER — SODIUM CHLORIDE, SODIUM LACTATE, POTASSIUM CHLORIDE, AND CALCIUM CHLORIDE .6; .31; .03; .02 G/100ML; G/100ML; G/100ML; G/100ML
1000 INJECTION, SOLUTION INTRAVENOUS ONCE
Status: COMPLETED | OUTPATIENT
Start: 2022-01-01 | End: 2022-01-01

## 2022-01-01 RX ORDER — DIPHENHYDRAMINE HYDROCHLORIDE 50 MG/ML
50 INJECTION INTRAMUSCULAR; INTRAVENOUS
Status: CANCELLED | OUTPATIENT
Start: 2022-01-01

## 2022-01-01 RX ORDER — MAGNESIUM SULFATE IN WATER 40 MG/ML
2000 INJECTION, SOLUTION INTRAVENOUS ONCE
Status: COMPLETED | OUTPATIENT
Start: 2022-01-01 | End: 2022-01-01

## 2022-01-01 RX ORDER — PROMETHAZINE HYDROCHLORIDE 25 MG/ML
12.5 INJECTION, SOLUTION INTRAMUSCULAR; INTRAVENOUS EVERY 4 HOURS PRN
Status: DISCONTINUED | OUTPATIENT
Start: 2022-01-01 | End: 2022-01-01 | Stop reason: HOSPADM

## 2022-01-01 RX ORDER — HEPARIN SODIUM (PORCINE) LOCK FLUSH IV SOLN 100 UNIT/ML 100 UNIT/ML
300 SOLUTION INTRAVENOUS PRN
Status: DISCONTINUED | OUTPATIENT
Start: 2022-01-01 | End: 2022-01-01 | Stop reason: HOSPADM

## 2022-01-01 RX ORDER — ALPRAZOLAM 0.5 MG/1
0.5 TABLET ORAL EVERY 8 HOURS PRN
Status: CANCELLED | OUTPATIENT
Start: 2022-01-01

## 2022-01-01 RX ORDER — SODIUM CHLORIDE 0.9 % (FLUSH) 0.9 %
5-40 SYRINGE (ML) INJECTION 2 TIMES DAILY
Status: DISCONTINUED | OUTPATIENT
Start: 2022-01-01 | End: 2022-01-01 | Stop reason: HOSPADM

## 2022-01-01 RX ORDER — CYCLOBENZAPRINE HCL 10 MG
10 TABLET ORAL 2 TIMES DAILY PRN
Status: DISCONTINUED | OUTPATIENT
Start: 2022-01-01 | End: 2022-01-01

## 2022-01-01 RX ORDER — FAMOTIDINE 20 MG/1
20 TABLET, FILM COATED ORAL DAILY
Status: DISCONTINUED | OUTPATIENT
Start: 2022-01-01 | End: 2022-01-01

## 2022-01-01 RX ORDER — OMEPRAZOLE 20 MG/1
20 CAPSULE, DELAYED RELEASE ORAL DAILY
Qty: 30 CAPSULE | Refills: 0 | Status: SHIPPED | OUTPATIENT
Start: 2022-01-01

## 2022-01-01 RX ORDER — METOPROLOL SUCCINATE 50 MG/1
50 TABLET, EXTENDED RELEASE ORAL DAILY
Status: CANCELLED | OUTPATIENT
Start: 2022-01-01

## 2022-01-01 RX ORDER — ONDANSETRON 2 MG/ML
4 INJECTION INTRAMUSCULAR; INTRAVENOUS EVERY 6 HOURS PRN
Status: DISCONTINUED | OUTPATIENT
Start: 2022-01-01 | End: 2022-01-01 | Stop reason: HOSPADM

## 2022-01-01 RX ORDER — ACETAMINOPHEN 650 MG/1
650 SUPPOSITORY RECTAL EVERY 6 HOURS PRN
Status: DISCONTINUED | OUTPATIENT
Start: 2022-01-01 | End: 2022-01-01 | Stop reason: HOSPADM

## 2022-01-01 RX ORDER — ATROPINE SULFATE 1 MG/ML
0.5 INJECTION, SOLUTION INTRAMUSCULAR; INTRAVENOUS; SUBCUTANEOUS
Status: CANCELLED
Start: 2022-01-01

## 2022-01-01 RX ORDER — HYDROMORPHONE HYDROCHLORIDE 2 MG/1
2 TABLET ORAL
Status: DISCONTINUED | OUTPATIENT
Start: 2022-01-01 | End: 2022-01-01

## 2022-01-01 RX ORDER — LACTOBACILLUS RHAMNOSUS GG 10B CELL
1 CAPSULE ORAL DAILY
Status: DISCONTINUED | OUTPATIENT
Start: 2022-01-01 | End: 2022-01-01 | Stop reason: HOSPADM

## 2022-01-01 RX ORDER — ALPRAZOLAM 0.25 MG/1
0.5 TABLET ORAL NIGHTLY PRN
COMMUNITY

## 2022-01-01 RX ORDER — SODIUM CHLORIDE, SODIUM LACTATE, POTASSIUM CHLORIDE, CALCIUM CHLORIDE 600; 310; 30; 20 MG/100ML; MG/100ML; MG/100ML; MG/100ML
INJECTION, SOLUTION INTRAVENOUS CONTINUOUS
Status: DISCONTINUED | OUTPATIENT
Start: 2022-01-01 | End: 2022-01-01

## 2022-01-01 RX ORDER — POLYETHYLENE GLYCOL 3350 17 G/17G
17 POWDER, FOR SOLUTION ORAL DAILY PRN
Status: DISCONTINUED | OUTPATIENT
Start: 2022-01-01 | End: 2022-01-01 | Stop reason: HOSPADM

## 2022-01-01 RX ORDER — MAGNESIUM SULFATE IN WATER 40 MG/ML
2000 INJECTION, SOLUTION INTRAVENOUS PRN
Status: COMPLETED | OUTPATIENT
Start: 2022-01-01 | End: 2022-01-01

## 2022-01-01 RX ORDER — CYCLOBENZAPRINE HCL 10 MG
10 TABLET ORAL ONCE
Status: COMPLETED | OUTPATIENT
Start: 2022-01-01 | End: 2022-01-01

## 2022-01-01 RX ORDER — HYDROMORPHONE HYDROCHLORIDE 2 MG/1
2 TABLET ORAL ONCE
Status: COMPLETED | OUTPATIENT
Start: 2022-01-01 | End: 2022-01-01

## 2022-01-01 RX ORDER — ALPRAZOLAM 0.5 MG/1
0.5 TABLET ORAL 2 TIMES DAILY PRN
Status: DISCONTINUED | OUTPATIENT
Start: 2022-01-01 | End: 2022-01-01

## 2022-01-01 RX ORDER — CYCLOBENZAPRINE HCL 10 MG
10 TABLET ORAL 4 TIMES DAILY PRN
Status: DISCONTINUED | OUTPATIENT
Start: 2022-01-01 | End: 2022-01-01 | Stop reason: HOSPADM

## 2022-01-01 RX ORDER — TRAMADOL HYDROCHLORIDE 50 MG/1
50 TABLET ORAL EVERY 6 HOURS PRN
Status: DISCONTINUED | OUTPATIENT
Start: 2022-01-01 | End: 2022-01-01

## 2022-01-01 RX ORDER — FAMOTIDINE 10 MG/ML
20 INJECTION, SOLUTION INTRAVENOUS
Status: CANCELLED | OUTPATIENT
Start: 2022-01-01

## 2022-01-01 RX ORDER — FLUCONAZOLE 100 MG/1
200 TABLET ORAL DAILY
Qty: 14 TABLET | Refills: 0 | Status: SHIPPED | OUTPATIENT
Start: 2022-01-01 | End: 2022-01-01

## 2022-01-01 RX ORDER — METOPROLOL SUCCINATE 25 MG/1
25 TABLET, EXTENDED RELEASE ORAL DAILY
Status: DISCONTINUED | OUTPATIENT
Start: 2022-01-01 | End: 2022-01-01 | Stop reason: HOSPADM

## 2022-01-01 RX ORDER — MIDAZOLAM HYDROCHLORIDE 1 MG/ML
INJECTION INTRAMUSCULAR; INTRAVENOUS
Status: COMPLETED | OUTPATIENT
Start: 2022-01-01 | End: 2022-01-01

## 2022-01-01 RX ORDER — SODIUM CHLORIDE, SODIUM LACTATE, POTASSIUM CHLORIDE, CALCIUM CHLORIDE 600; 310; 30; 20 MG/100ML; MG/100ML; MG/100ML; MG/100ML
INJECTION, SOLUTION INTRAVENOUS CONTINUOUS
Status: DISCONTINUED | OUTPATIENT
Start: 2022-01-01 | End: 2022-01-01 | Stop reason: HOSPADM

## 2022-01-01 RX ORDER — DEXAMETHASONE SODIUM PHOSPHATE 10 MG/ML
10 INJECTION, SOLUTION INTRAMUSCULAR; INTRAVENOUS ONCE
Status: COMPLETED | OUTPATIENT
Start: 2022-01-01 | End: 2022-01-01

## 2022-01-01 RX ORDER — SENNA AND DOCUSATE SODIUM 50; 8.6 MG/1; MG/1
2 TABLET, FILM COATED ORAL DAILY PRN
Status: CANCELLED | OUTPATIENT
Start: 2022-01-01

## 2022-01-01 RX ORDER — MECOBALAMIN 5000 MCG
5 TABLET,DISINTEGRATING ORAL ONCE
Status: COMPLETED | OUTPATIENT
Start: 2022-01-01 | End: 2022-01-01

## 2022-01-01 RX ORDER — LANOLIN ALCOHOL/MO/W.PET/CERES
400 CREAM (GRAM) TOPICAL DAILY
Status: CANCELLED | OUTPATIENT
Start: 2022-01-01

## 2022-01-01 RX ORDER — NALOXONE HYDROCHLORIDE 0.4 MG/ML
0.4 INJECTION, SOLUTION INTRAMUSCULAR; INTRAVENOUS; SUBCUTANEOUS PRN
Status: CANCELLED | OUTPATIENT
Start: 2022-01-01

## 2022-01-01 RX ORDER — HYDROMORPHONE HYDROCHLORIDE 2 MG/1
4 TABLET ORAL
Status: CANCELLED | OUTPATIENT
Start: 2022-01-01

## 2022-01-01 RX ORDER — ONDANSETRON 2 MG/ML
8 INJECTION INTRAMUSCULAR; INTRAVENOUS
Status: CANCELLED | OUTPATIENT
Start: 2022-01-01

## 2022-01-01 RX ORDER — FUROSEMIDE 20 MG/1
20 TABLET ORAL DAILY
Status: DISCONTINUED | OUTPATIENT
Start: 2022-01-01 | End: 2022-01-01

## 2022-01-01 RX ORDER — SODIUM CHLORIDE 9 MG/ML
5-250 INJECTION, SOLUTION INTRAVENOUS ONCE
Status: DISCONTINUED | OUTPATIENT
Start: 2022-01-01 | End: 2022-01-01 | Stop reason: HOSPADM

## 2022-01-01 RX ORDER — DIAZEPAM 5 MG/1
10 TABLET ORAL ONCE
Status: COMPLETED | OUTPATIENT
Start: 2022-01-01 | End: 2022-01-01

## 2022-01-01 RX ORDER — HYDROMORPHONE HYDROCHLORIDE 1 MG/ML
0.5 INJECTION, SOLUTION INTRAMUSCULAR; INTRAVENOUS; SUBCUTANEOUS
Status: DISCONTINUED | OUTPATIENT
Start: 2022-01-01 | End: 2022-01-01

## 2022-01-01 RX ORDER — DEXTROSE MONOHYDRATE 50 MG/ML
INJECTION, SOLUTION INTRAVENOUS ONCE
Status: DISCONTINUED | OUTPATIENT
Start: 2022-01-01 | End: 2022-01-01 | Stop reason: HOSPADM

## 2022-01-01 RX ORDER — MIDAZOLAM HYDROCHLORIDE 1 MG/ML
INJECTION INTRAMUSCULAR; INTRAVENOUS
Status: DISPENSED
Start: 2022-01-01 | End: 2022-01-01

## 2022-01-01 RX ORDER — CIPROFLOXACIN 2 MG/ML
400 INJECTION, SOLUTION INTRAVENOUS EVERY 24 HOURS
Status: COMPLETED | OUTPATIENT
Start: 2022-01-01 | End: 2022-01-01

## 2022-01-01 RX ORDER — FENTANYL CITRATE 50 UG/ML
50 INJECTION, SOLUTION INTRAMUSCULAR; INTRAVENOUS EVERY 5 MIN PRN
Status: DISCONTINUED | OUTPATIENT
Start: 2022-01-01 | End: 2022-01-01 | Stop reason: HOSPADM

## 2022-01-01 RX ORDER — LEVOFLOXACIN 5 MG/ML
500 INJECTION, SOLUTION INTRAVENOUS
Status: COMPLETED | OUTPATIENT
Start: 2022-01-01 | End: 2022-01-01

## 2022-01-01 RX ORDER — SODIUM CHLORIDE 0.9 % (FLUSH) 0.9 %
5-40 SYRINGE (ML) INJECTION EVERY 12 HOURS SCHEDULED
Status: CANCELLED | OUTPATIENT
Start: 2022-01-01

## 2022-01-01 RX ORDER — ACETAMINOPHEN 325 MG/1
650 TABLET ORAL EVERY 4 HOURS PRN
Status: DISCONTINUED | OUTPATIENT
Start: 2022-01-01 | End: 2022-01-01 | Stop reason: HOSPADM

## 2022-01-01 RX ORDER — FENTANYL CITRATE 50 UG/ML
25 INJECTION, SOLUTION INTRAMUSCULAR; INTRAVENOUS EVERY 5 MIN PRN
Status: DISCONTINUED | OUTPATIENT
Start: 2022-01-01 | End: 2022-01-01 | Stop reason: HOSPADM

## 2022-01-01 RX ORDER — CALCITRIOL 0.25 UG/1
0.25 CAPSULE, LIQUID FILLED ORAL DAILY
Status: DISCONTINUED | OUTPATIENT
Start: 2022-01-01 | End: 2022-01-01 | Stop reason: HOSPADM

## 2022-01-01 RX ORDER — FERROUS SULFATE 325(65) MG
325 TABLET ORAL
Status: CANCELLED | OUTPATIENT
Start: 2022-01-01

## 2022-01-01 RX ORDER — SODIUM CHLORIDE 9 MG/ML
5-40 INJECTION INTRAVENOUS PRN
Status: CANCELLED | OUTPATIENT
Start: 2022-01-01

## 2022-01-01 RX ORDER — OXYCODONE HYDROCHLORIDE 15 MG/1
15 TABLET ORAL EVERY 6 HOURS PRN
Qty: 56 TABLET | Refills: 0 | Status: SHIPPED | OUTPATIENT
Start: 2022-01-01 | End: 2022-01-01 | Stop reason: ALTCHOICE

## 2022-01-01 RX ORDER — HYDROMORPHONE HYDROCHLORIDE 1 MG/ML
1 INJECTION, SOLUTION INTRAMUSCULAR; INTRAVENOUS; SUBCUTANEOUS
Status: DISCONTINUED | OUTPATIENT
Start: 2022-01-01 | End: 2022-01-01

## 2022-01-01 RX ORDER — HYDROMORPHONE HYDROCHLORIDE 2 MG/1
4 TABLET ORAL
Status: DISCONTINUED | OUTPATIENT
Start: 2022-01-01 | End: 2022-01-01 | Stop reason: HOSPADM

## 2022-01-01 RX ORDER — IBUPROFEN 800 MG/1
800 TABLET ORAL NIGHTLY
Status: ON HOLD | COMMUNITY
End: 2022-01-01 | Stop reason: HOSPADM

## 2022-01-01 RX ORDER — OXYCODONE HCL 10 MG/1
20 TABLET, FILM COATED, EXTENDED RELEASE ORAL EVERY 12 HOURS SCHEDULED
Status: DISCONTINUED | OUTPATIENT
Start: 2022-01-01 | End: 2022-01-01 | Stop reason: HOSPADM

## 2022-01-01 RX ORDER — SODIUM CHLORIDE 9 MG/ML
INJECTION, SOLUTION INTRAVENOUS PRN
Status: DISCONTINUED | OUTPATIENT
Start: 2022-01-01 | End: 2022-01-01 | Stop reason: HOSPADM

## 2022-01-01 RX ORDER — POLYETHYLENE GLYCOL 3350 17 G/17G
17 POWDER, FOR SOLUTION ORAL DAILY PRN
Status: CANCELLED | OUTPATIENT
Start: 2022-01-01

## 2022-01-01 RX ORDER — CALCIUM CARBONATE 200(500)MG
500 TABLET,CHEWABLE ORAL 3 TIMES DAILY PRN
Status: CANCELLED | OUTPATIENT
Start: 2022-01-01

## 2022-01-01 RX ORDER — SODIUM BICARBONATE 325 MG/1
650 TABLET ORAL 4 TIMES DAILY
Status: DISCONTINUED | OUTPATIENT
Start: 2022-01-01 | End: 2022-01-01 | Stop reason: HOSPADM

## 2022-01-01 RX ORDER — SENNA AND DOCUSATE SODIUM 50; 8.6 MG/1; MG/1
1 TABLET, FILM COATED ORAL 2 TIMES DAILY
Status: DISCONTINUED | OUTPATIENT
Start: 2022-01-01 | End: 2022-01-01

## 2022-01-01 RX ORDER — HYDROMORPHONE HYDROCHLORIDE 2 MG/1
1 TABLET ORAL EVERY 4 HOURS PRN
Status: DISCONTINUED | OUTPATIENT
Start: 2022-01-01 | End: 2022-01-01

## 2022-01-01 RX ORDER — SODIUM CHLORIDE 9 MG/ML
5-250 INJECTION, SOLUTION INTRAVENOUS ONCE
Status: CANCELLED | OUTPATIENT
Start: 2022-01-01 | End: 2022-01-01

## 2022-01-01 RX ORDER — ENOXAPARIN SODIUM 100 MG/ML
30 INJECTION SUBCUTANEOUS DAILY
Status: DISCONTINUED | OUTPATIENT
Start: 2022-01-01 | End: 2022-01-01

## 2022-01-01 RX ORDER — MEROPENEM AND SODIUM CHLORIDE 1 G/50ML
1000 INJECTION, SOLUTION INTRAVENOUS ONCE
Status: COMPLETED | OUTPATIENT
Start: 2022-01-01 | End: 2022-01-01

## 2022-01-01 RX ORDER — PROMETHAZINE HYDROCHLORIDE 12.5 MG/1
12.5 TABLET ORAL 3 TIMES DAILY PRN
Qty: 90 TABLET | Refills: 5 | Status: SHIPPED | OUTPATIENT
Start: 2022-01-01 | End: 2022-01-01

## 2022-01-01 RX ORDER — BISOPROLOL FUMARATE 5 MG/1
5 TABLET ORAL DAILY
Qty: 90 TABLET | Refills: 0 | Status: ON HOLD | OUTPATIENT
Start: 2022-01-01 | End: 2022-01-01 | Stop reason: SDUPTHER

## 2022-01-01 RX ORDER — OMEPRAZOLE 20 MG/1
20 CAPSULE, DELAYED RELEASE ORAL DAILY
Qty: 90 CAPSULE | OUTPATIENT
Start: 2022-01-01

## 2022-01-01 RX ORDER — HEPARIN SODIUM (PORCINE) LOCK FLUSH IV SOLN 100 UNIT/ML 100 UNIT/ML
500 SOLUTION INTRAVENOUS PRN
Status: DISCONTINUED | OUTPATIENT
Start: 2022-01-01 | End: 2022-01-01 | Stop reason: HOSPADM

## 2022-01-01 RX ORDER — LEVOFLOXACIN 500 MG/1
500 TABLET, FILM COATED ORAL DAILY
Status: DISCONTINUED | OUTPATIENT
Start: 2022-01-01 | End: 2022-01-01 | Stop reason: HOSPADM

## 2022-01-01 RX ORDER — SENNA AND DOCUSATE SODIUM 50; 8.6 MG/1; MG/1
2 TABLET, FILM COATED ORAL DAILY PRN
Status: DISCONTINUED | OUTPATIENT
Start: 2022-01-01 | End: 2022-01-01 | Stop reason: HOSPADM

## 2022-01-01 RX ORDER — MEPERIDINE HYDROCHLORIDE 50 MG/ML
12.5 INJECTION INTRAMUSCULAR; INTRAVENOUS; SUBCUTANEOUS PRN
Status: CANCELLED | OUTPATIENT
Start: 2022-01-01

## 2022-01-01 RX ORDER — SODIUM CHLORIDE 9 MG/ML
INJECTION, SOLUTION INTRAVENOUS ONCE
Status: CANCELLED | OUTPATIENT
Start: 2022-01-01 | End: 2022-01-01

## 2022-01-01 RX ORDER — METOPROLOL TARTRATE 5 MG/5ML
5 INJECTION INTRAVENOUS EVERY 8 HOURS
Status: DISCONTINUED | OUTPATIENT
Start: 2022-01-01 | End: 2022-01-01

## 2022-01-01 RX ORDER — ONDANSETRON 2 MG/ML
4 INJECTION INTRAMUSCULAR; INTRAVENOUS EVERY 6 HOURS PRN
Status: CANCELLED | OUTPATIENT
Start: 2022-01-01

## 2022-01-01 RX ORDER — CALCIUM CARBONATE 200(500)MG
500 TABLET,CHEWABLE ORAL 3 TIMES DAILY PRN
Status: DISCONTINUED | OUTPATIENT
Start: 2022-01-01 | End: 2022-01-01 | Stop reason: HOSPADM

## 2022-01-01 RX ORDER — CYCLOBENZAPRINE HCL 10 MG
10 TABLET ORAL 4 TIMES DAILY PRN
Status: CANCELLED | OUTPATIENT
Start: 2022-01-01

## 2022-01-01 RX ORDER — CYCLOBENZAPRINE HCL 5 MG
10 TABLET ORAL 3 TIMES DAILY PRN
Status: DISCONTINUED | OUTPATIENT
Start: 2022-01-01 | End: 2022-01-01 | Stop reason: HOSPADM

## 2022-01-01 RX ORDER — LIDOCAINE HYDROCHLORIDE 10 MG/ML
INJECTION, SOLUTION EPIDURAL; INFILTRATION; INTRACAUDAL; PERINEURAL PRN
Status: DISCONTINUED | OUTPATIENT
Start: 2022-01-01 | End: 2022-01-01 | Stop reason: SDUPTHER

## 2022-01-01 RX ORDER — POTASSIUM CHLORIDE 29.8 MG/ML
20 INJECTION INTRAVENOUS ONCE
Status: DISCONTINUED | OUTPATIENT
Start: 2022-01-01 | End: 2022-01-01

## 2022-01-01 RX ORDER — METOPROLOL SUCCINATE 50 MG/1
50 TABLET, EXTENDED RELEASE ORAL DAILY
Status: DISCONTINUED | OUTPATIENT
Start: 2022-01-01 | End: 2022-01-01 | Stop reason: HOSPADM

## 2022-01-01 RX ORDER — IODINE/SODIUM IODIDE 2 %
TINCTURE TOPICAL PRN
Status: DISCONTINUED | OUTPATIENT
Start: 2022-01-01 | End: 2022-01-01 | Stop reason: HOSPADM

## 2022-01-01 RX ORDER — HYDROMORPHONE HYDROCHLORIDE 1 MG/ML
1 INJECTION, SOLUTION INTRAMUSCULAR; INTRAVENOUS; SUBCUTANEOUS
Status: DISCONTINUED | OUTPATIENT
Start: 2022-01-01 | End: 2022-01-01 | Stop reason: HOSPADM

## 2022-01-01 RX ORDER — FLUCONAZOLE 100 MG/1
200 TABLET ORAL DAILY
Qty: 14 TABLET | Refills: 0 | Status: SHIPPED | OUTPATIENT
Start: 2022-01-01 | End: 2022-01-01 | Stop reason: SDUPTHER

## 2022-01-01 RX ORDER — PROPOFOL 10 MG/ML
INJECTION, EMULSION INTRAVENOUS PRN
Status: DISCONTINUED | OUTPATIENT
Start: 2022-01-01 | End: 2022-01-01 | Stop reason: SDUPTHER

## 2022-01-01 RX ORDER — MAGNESIUM SULFATE IN WATER 40 MG/ML
4000 INJECTION, SOLUTION INTRAVENOUS ONCE
Status: CANCELLED | OUTPATIENT
Start: 2022-01-01

## 2022-01-01 RX ORDER — HYDROMORPHONE HYDROCHLORIDE 1 MG/ML
1 INJECTION, SOLUTION INTRAMUSCULAR; INTRAVENOUS; SUBCUTANEOUS
Status: CANCELLED | OUTPATIENT
Start: 2022-01-01

## 2022-01-01 RX ORDER — CALCITRIOL 0.25 UG/1
0.25 CAPSULE, LIQUID FILLED ORAL DAILY
Qty: 30 CAPSULE | Refills: 0 | Status: SHIPPED | OUTPATIENT
Start: 2022-01-01

## 2022-01-01 RX ORDER — SODIUM BICARBONATE 650 MG/1
650 TABLET ORAL 2 TIMES DAILY
Status: DISCONTINUED | OUTPATIENT
Start: 2022-01-01 | End: 2022-01-01

## 2022-01-01 RX ORDER — SUCCINYLCHOLINE CHLORIDE 20 MG/ML
INJECTION INTRAMUSCULAR; INTRAVENOUS PRN
Status: DISCONTINUED | OUTPATIENT
Start: 2022-01-01 | End: 2022-01-01 | Stop reason: SDUPTHER

## 2022-01-01 RX ORDER — FUROSEMIDE 20 MG/1
20 TABLET ORAL ONCE
Status: COMPLETED | OUTPATIENT
Start: 2022-01-01 | End: 2022-01-01

## 2022-01-01 RX ORDER — METOPROLOL SUCCINATE 50 MG/1
50 TABLET, EXTENDED RELEASE ORAL DAILY
Refills: 0 | Status: DISCONTINUED | OUTPATIENT
Start: 2022-01-01 | End: 2022-01-01

## 2022-01-01 RX ORDER — PANTOPRAZOLE SODIUM 40 MG/1
40 TABLET, DELAYED RELEASE ORAL
Status: CANCELLED | OUTPATIENT
Start: 2022-01-01

## 2022-01-01 RX ORDER — TROSPIUM CHLORIDE 20 MG/1
20 TABLET, FILM COATED ORAL
Status: DISCONTINUED | OUTPATIENT
Start: 2022-01-01 | End: 2022-01-01

## 2022-01-01 RX ORDER — ACETAMINOPHEN 325 MG/1
650 TABLET ORAL EVERY 6 HOURS PRN
Status: DISCONTINUED | OUTPATIENT
Start: 2022-01-01 | End: 2022-01-01 | Stop reason: HOSPADM

## 2022-01-01 RX ORDER — IBANDRONATE SODIUM 150 MG/1
150 TABLET, FILM COATED ORAL
Qty: 1 TABLET | Refills: 6 | Status: SHIPPED | OUTPATIENT
Start: 2022-01-01 | End: 2022-01-01

## 2022-01-01 RX ORDER — SODIUM BICARBONATE 650 MG/1
650 TABLET ORAL 4 TIMES DAILY
Status: DISCONTINUED | OUTPATIENT
Start: 2022-01-01 | End: 2022-01-01 | Stop reason: SDUPTHER

## 2022-01-01 RX ORDER — FENTANYL CITRATE 50 UG/ML
INJECTION, SOLUTION INTRAMUSCULAR; INTRAVENOUS
Status: DISCONTINUED
Start: 2022-01-01 | End: 2022-01-01 | Stop reason: WASHOUT

## 2022-01-01 RX ORDER — ATROPINE SULFATE 0.4 MG/ML
0.5 AMPUL (ML) INJECTION
Status: COMPLETED | OUTPATIENT
Start: 2022-01-01 | End: 2022-01-01

## 2022-01-01 RX ORDER — MEROPENEM AND SODIUM CHLORIDE 500 MG/50ML
500 INJECTION, SOLUTION INTRAVENOUS EVERY 12 HOURS
Status: DISCONTINUED | OUTPATIENT
Start: 2022-01-01 | End: 2022-01-01

## 2022-01-01 RX ORDER — MIDAZOLAM HYDROCHLORIDE 1 MG/ML
2 INJECTION INTRAMUSCULAR; INTRAVENOUS
Status: DISCONTINUED | OUTPATIENT
Start: 2022-01-01 | End: 2022-01-01 | Stop reason: HOSPADM

## 2022-01-01 RX ORDER — SODIUM CHLORIDE 0.9 % (FLUSH) 0.9 %
5-40 SYRINGE (ML) INJECTION EVERY 12 HOURS SCHEDULED
Status: DISCONTINUED | OUTPATIENT
Start: 2022-01-01 | End: 2022-01-01 | Stop reason: SDUPTHER

## 2022-01-01 RX ORDER — CALCIUM GLUCONATE 20 MG/ML
2000 INJECTION, SOLUTION INTRAVENOUS ONCE
Status: COMPLETED | OUTPATIENT
Start: 2022-01-01 | End: 2022-01-01

## 2022-01-01 RX ORDER — PALONOSETRON 0.05 MG/ML
0.25 INJECTION, SOLUTION INTRAVENOUS ONCE
Status: COMPLETED | OUTPATIENT
Start: 2022-01-01 | End: 2022-01-01

## 2022-01-01 RX ORDER — NALOXONE HYDROCHLORIDE 0.4 MG/ML
0.4 INJECTION, SOLUTION INTRAMUSCULAR; INTRAVENOUS; SUBCUTANEOUS PRN
Status: DISCONTINUED | OUTPATIENT
Start: 2022-01-01 | End: 2022-01-01 | Stop reason: HOSPADM

## 2022-01-01 RX ORDER — 0.9 % SODIUM CHLORIDE 0.9 %
500 INTRAVENOUS SOLUTION INTRAVENOUS ONCE
Status: COMPLETED | OUTPATIENT
Start: 2022-01-01 | End: 2022-01-01

## 2022-01-01 RX ORDER — POTASSIUM CHLORIDE AND SODIUM CHLORIDE 450; 150 MG/100ML; MG/100ML
INJECTION, SOLUTION INTRAVENOUS CONTINUOUS
Status: DISCONTINUED | OUTPATIENT
Start: 2022-01-01 | End: 2022-01-01

## 2022-01-01 RX ORDER — OXYCODONE AND ACETAMINOPHEN 7.5; 325 MG/1; MG/1
1 TABLET ORAL EVERY 4 HOURS PRN
Status: DISCONTINUED | OUTPATIENT
Start: 2022-01-01 | End: 2022-01-01 | Stop reason: HOSPADM

## 2022-01-01 RX ORDER — ATROPINE SULFATE 1 MG/ML
0.5 INJECTION, SOLUTION INTRAMUSCULAR; INTRAVENOUS; SUBCUTANEOUS
Status: DISPENSED | OUTPATIENT
Start: 2022-01-01 | End: 2022-01-01

## 2022-01-01 RX ORDER — ONDANSETRON 2 MG/ML
INJECTION INTRAMUSCULAR; INTRAVENOUS
Status: COMPLETED
Start: 2022-01-01 | End: 2022-01-01

## 2022-01-01 RX ORDER — DEXTROSE MONOHYDRATE 50 MG/ML
INJECTION, SOLUTION INTRAVENOUS ONCE
Status: COMPLETED | OUTPATIENT
Start: 2022-01-01 | End: 2022-01-01

## 2022-01-01 RX ORDER — SENNOSIDES 8.6 MG
650 CAPSULE ORAL EVERY 8 HOURS PRN
COMMUNITY

## 2022-01-01 RX ORDER — LIDOCAINE HYDROCHLORIDE 20 MG/ML
JELLY TOPICAL ONCE
Status: COMPLETED | OUTPATIENT
Start: 2022-01-01 | End: 2022-01-01

## 2022-01-01 RX ORDER — DULOXETIN HYDROCHLORIDE 30 MG/1
30 CAPSULE, DELAYED RELEASE ORAL DAILY
Status: DISCONTINUED | OUTPATIENT
Start: 2022-01-01 | End: 2022-01-01 | Stop reason: HOSPADM

## 2022-01-01 RX ORDER — SODIUM BICARBONATE 650 MG/1
650 TABLET ORAL 4 TIMES DAILY
Qty: 120 TABLET | Refills: 0 | Status: SHIPPED | OUTPATIENT
Start: 2022-01-01 | End: 2022-01-01 | Stop reason: SDUPTHER

## 2022-01-01 RX ORDER — SENNA AND DOCUSATE SODIUM 50; 8.6 MG/1; MG/1
2 TABLET, FILM COATED ORAL 2 TIMES DAILY
Status: DISCONTINUED | OUTPATIENT
Start: 2022-01-01 | End: 2022-01-01

## 2022-01-01 RX ORDER — SODIUM BICARBONATE 650 MG/1
650 TABLET ORAL 4 TIMES DAILY
Qty: 120 TABLET | Refills: 5 | Status: SHIPPED | OUTPATIENT
Start: 2022-01-01

## 2022-01-01 RX ORDER — SODIUM CHLORIDE 9 MG/ML
25 INJECTION, SOLUTION INTRAVENOUS PRN
Status: DISCONTINUED | OUTPATIENT
Start: 2022-01-01 | End: 2022-01-01 | Stop reason: HOSPADM

## 2022-01-01 RX ORDER — MEROPENEM AND SODIUM CHLORIDE 1 G/50ML
1000 INJECTION, SOLUTION INTRAVENOUS EVERY 12 HOURS
Status: DISCONTINUED | OUTPATIENT
Start: 2022-01-01 | End: 2022-01-01 | Stop reason: DRUGHIGH

## 2022-01-01 RX ORDER — FUROSEMIDE 20 MG/1
20 TABLET ORAL 2 TIMES DAILY
Status: COMPLETED | OUTPATIENT
Start: 2022-01-01 | End: 2022-01-01

## 2022-01-01 RX ORDER — LEVOFLOXACIN 500 MG/1
500 TABLET, FILM COATED ORAL DAILY
Status: COMPLETED | OUTPATIENT
Start: 2022-01-01 | End: 2022-01-01

## 2022-01-01 RX ORDER — PROCHLORPERAZINE MALEATE 5 MG/1
5 TABLET ORAL EVERY 6 HOURS PRN
Qty: 120 TABLET | Refills: 5 | Status: SHIPPED | OUTPATIENT
Start: 2022-01-01 | End: 2022-01-01

## 2022-01-01 RX ORDER — DEXAMETHASONE SODIUM PHOSPHATE 10 MG/ML
10 INJECTION INTRAMUSCULAR; INTRAVENOUS ONCE
Status: CANCELLED
Start: 2022-01-01 | End: 2022-01-01

## 2022-01-01 RX ORDER — SODIUM CHLORIDE 9 MG/ML
INJECTION, SOLUTION INTRAVENOUS PRN
Status: DISCONTINUED | OUTPATIENT
Start: 2022-01-01 | End: 2022-01-01 | Stop reason: SDUPTHER

## 2022-01-01 RX ORDER — ONDANSETRON 4 MG/1
4 TABLET, FILM COATED ORAL ONCE
Status: COMPLETED | OUTPATIENT
Start: 2022-01-01 | End: 2022-01-01

## 2022-01-01 RX ORDER — ONDANSETRON 2 MG/ML
4 INJECTION INTRAMUSCULAR; INTRAVENOUS EVERY 6 HOURS PRN
Status: DISCONTINUED | OUTPATIENT
Start: 2022-01-01 | End: 2022-01-01 | Stop reason: CLARIF

## 2022-01-01 RX ORDER — MAGNESIUM SULFATE IN WATER 40 MG/ML
4000 INJECTION, SOLUTION INTRAVENOUS ONCE
Status: CANCELLED
Start: 2022-01-01 | End: 2022-01-01

## 2022-01-01 RX ORDER — SODIUM CHLORIDE 0.9 % (FLUSH) 0.9 %
5-40 SYRINGE (ML) INJECTION PRN
Status: DISCONTINUED | OUTPATIENT
Start: 2022-01-01 | End: 2022-01-01 | Stop reason: SDUPTHER

## 2022-01-01 RX ORDER — LIDOCAINE HYDROCHLORIDE AND EPINEPHRINE 10; 10 MG/ML; UG/ML
20 INJECTION, SOLUTION INFILTRATION; PERINEURAL ONCE
Status: COMPLETED | OUTPATIENT
Start: 2022-01-01 | End: 2022-01-01

## 2022-01-01 RX ORDER — TRAMADOL HYDROCHLORIDE 50 MG/1
25 TABLET ORAL EVERY 6 HOURS PRN
Status: DISCONTINUED | OUTPATIENT
Start: 2022-01-01 | End: 2022-01-01

## 2022-01-01 RX ORDER — LACTOBACILLUS RHAMNOSUS GG 10B CELL
1 CAPSULE ORAL DAILY
Status: CANCELLED | OUTPATIENT
Start: 2022-01-01

## 2022-01-01 RX ORDER — ATROPINE SULFATE 0.4 MG/ML
0.4 AMPUL (ML) INJECTION
Status: DISPENSED | OUTPATIENT
Start: 2022-01-01 | End: 2022-01-01

## 2022-01-01 RX ORDER — ONDANSETRON 4 MG/1
8 TABLET, FILM COATED ORAL EVERY 8 HOURS PRN
Qty: 30 TABLET | Refills: 5 | Status: SHIPPED | OUTPATIENT
Start: 2022-01-01

## 2022-01-01 RX ORDER — AMOXICILLIN 500 MG/1
500 CAPSULE ORAL 3 TIMES DAILY
Qty: 30 CAPSULE | Refills: 0 | Status: SHIPPED | OUTPATIENT
Start: 2022-01-01 | End: 2022-01-01

## 2022-01-01 RX ORDER — METOPROLOL SUCCINATE 25 MG/1
25 TABLET, EXTENDED RELEASE ORAL DAILY
Status: CANCELLED | OUTPATIENT
Start: 2022-01-01

## 2022-01-01 RX ORDER — ONDANSETRON 2 MG/ML
8 INJECTION INTRAMUSCULAR; INTRAVENOUS
Status: COMPLETED | OUTPATIENT
Start: 2022-01-01 | End: 2022-01-01

## 2022-01-01 RX ORDER — DEXAMETHASONE SODIUM PHOSPHATE 10 MG/ML
10 INJECTION INTRAMUSCULAR; INTRAVENOUS ONCE
Status: DISCONTINUED | OUTPATIENT
Start: 2022-01-01 | End: 2022-01-01 | Stop reason: SDUPTHER

## 2022-01-01 RX ORDER — CALCITRIOL 0.25 UG/1
0.25 CAPSULE, LIQUID FILLED ORAL DAILY
Status: CANCELLED | OUTPATIENT
Start: 2022-01-01

## 2022-01-01 RX ORDER — PROMETHAZINE HYDROCHLORIDE 25 MG/1
25 TABLET ORAL ONCE
Status: COMPLETED | OUTPATIENT
Start: 2022-01-01 | End: 2022-01-01

## 2022-01-01 RX ORDER — DEXTROSE MONOHYDRATE 50 MG/ML
INJECTION, SOLUTION INTRAVENOUS ONCE
Status: CANCELLED
Start: 2022-01-01 | End: 2022-01-01

## 2022-01-01 RX ORDER — DIPHENHYDRAMINE HYDROCHLORIDE 50 MG/ML
12.5 INJECTION INTRAMUSCULAR; INTRAVENOUS
Status: DISCONTINUED | OUTPATIENT
Start: 2022-01-01 | End: 2022-01-01 | Stop reason: HOSPADM

## 2022-01-01 RX ORDER — OXYCODONE HCL 20 MG/1
20 TABLET, FILM COATED, EXTENDED RELEASE ORAL EVERY 12 HOURS SCHEDULED
Qty: 28 EACH | Refills: 0 | Status: SHIPPED | OUTPATIENT
Start: 2022-01-01 | End: 2022-01-01

## 2022-01-01 RX ORDER — LORAZEPAM 2 MG/ML
0.5 INJECTION INTRAMUSCULAR ONCE
Status: COMPLETED | OUTPATIENT
Start: 2022-01-01 | End: 2022-01-01

## 2022-01-01 RX ORDER — ROCURONIUM BROMIDE 10 MG/ML
INJECTION, SOLUTION INTRAVENOUS PRN
Status: DISCONTINUED | OUTPATIENT
Start: 2022-01-01 | End: 2022-01-01 | Stop reason: SDUPTHER

## 2022-01-01 RX ORDER — SULFAMETHOXAZOLE AND TRIMETHOPRIM 800; 160 MG/1; MG/1
1 TABLET ORAL 2 TIMES DAILY
Qty: 20 TABLET | Refills: 0 | Status: SHIPPED | OUTPATIENT
Start: 2022-01-01 | End: 2022-01-01

## 2022-01-01 RX ORDER — ATROPINE SULFATE 1 MG/ML
0.5 INJECTION, SOLUTION INTRAMUSCULAR; INTRAVENOUS; SUBCUTANEOUS ONCE
Status: CANCELLED
Start: 2022-01-01 | End: 2022-01-01

## 2022-01-01 RX ORDER — TROSPIUM CHLORIDE 20 MG/1
20 TABLET, FILM COATED ORAL
Status: DISCONTINUED | OUTPATIENT
Start: 2022-01-01 | End: 2022-01-01 | Stop reason: HOSPADM

## 2022-01-01 RX ORDER — ACETAMINOPHEN 325 MG/1
650 TABLET ORAL EVERY 6 HOURS PRN
Status: CANCELLED | OUTPATIENT
Start: 2022-01-01

## 2022-01-01 RX ORDER — NOREPINEPHRINE BIT/0.9 % NACL 16MG/250ML
2-100 INFUSION BOTTLE (ML) INTRAVENOUS CONTINUOUS
Status: DISCONTINUED | OUTPATIENT
Start: 2022-01-01 | End: 2022-01-01

## 2022-01-01 RX ORDER — SODIUM CHLORIDE AND POTASSIUM CHLORIDE .9; .15 G/100ML; G/100ML
SOLUTION INTRAVENOUS CONTINUOUS
Status: DISCONTINUED | OUTPATIENT
Start: 2022-01-01 | End: 2022-01-01

## 2022-01-01 RX ORDER — HYDROMORPHONE HYDROCHLORIDE 1 MG/ML
0.5 INJECTION, SOLUTION INTRAMUSCULAR; INTRAVENOUS; SUBCUTANEOUS
Status: DISCONTINUED | OUTPATIENT
Start: 2022-01-01 | End: 2022-01-01 | Stop reason: ALTCHOICE

## 2022-01-01 RX ORDER — ONDANSETRON 4 MG/1
4 TABLET, ORALLY DISINTEGRATING ORAL EVERY 8 HOURS PRN
Status: DISCONTINUED | OUTPATIENT
Start: 2022-01-01 | End: 2022-01-01 | Stop reason: HOSPADM

## 2022-01-01 RX ORDER — OXYMETAZOLINE HYDROCHLORIDE 0.05 G/100ML
2 SPRAY NASAL ONCE
Status: COMPLETED | OUTPATIENT
Start: 2022-01-01 | End: 2022-01-01

## 2022-01-01 RX ORDER — ACETAMINOPHEN 325 MG/1
650 TABLET ORAL EVERY 6 HOURS PRN
Status: DISCONTINUED | OUTPATIENT
Start: 2022-01-01 | End: 2022-01-01 | Stop reason: SDUPTHER

## 2022-01-01 RX ORDER — ONDANSETRON 2 MG/ML
4 INJECTION INTRAMUSCULAR; INTRAVENOUS EVERY 4 HOURS PRN
Status: DISCONTINUED | OUTPATIENT
Start: 2022-01-01 | End: 2022-01-01 | Stop reason: HOSPADM

## 2022-01-01 RX ORDER — SULFAMETHOXAZOLE AND TRIMETHOPRIM 800; 160 MG/1; MG/1
1 TABLET ORAL 2 TIMES DAILY
Qty: 20 TABLET | Refills: 0 | Status: SHIPPED | OUTPATIENT
Start: 2022-01-01 | End: 2022-01-01 | Stop reason: SDUPTHER

## 2022-01-01 RX ORDER — OXYCODONE HYDROCHLORIDE AND ACETAMINOPHEN 5; 325 MG/1; MG/1
2 TABLET ORAL EVERY 4 HOURS PRN
Status: DISCONTINUED | OUTPATIENT
Start: 2022-01-01 | End: 2022-01-01 | Stop reason: HOSPADM

## 2022-01-01 RX ORDER — CYCLOBENZAPRINE HCL 10 MG
20 TABLET ORAL NIGHTLY
Status: DISCONTINUED | OUTPATIENT
Start: 2022-01-01 | End: 2022-01-01

## 2022-01-01 RX ORDER — METOPROLOL TARTRATE 5 MG/5ML
2.5 INJECTION INTRAVENOUS EVERY 8 HOURS
Status: DISCONTINUED | OUTPATIENT
Start: 2022-01-01 | End: 2022-01-01

## 2022-01-01 RX ORDER — ACETAMINOPHEN 650 MG/1
650 SUPPOSITORY RECTAL EVERY 6 HOURS PRN
Status: DISCONTINUED | OUTPATIENT
Start: 2022-01-01 | End: 2022-01-01 | Stop reason: SDUPTHER

## 2022-01-01 RX ORDER — LOPERAMIDE HYDROCHLORIDE 2 MG/1
4 CAPSULE ORAL 3 TIMES DAILY
COMMUNITY

## 2022-01-01 RX ORDER — HYDROMORPHONE HYDROCHLORIDE 1 MG/ML
0.5 INJECTION, SOLUTION INTRAMUSCULAR; INTRAVENOUS; SUBCUTANEOUS EVERY 5 MIN PRN
Status: DISCONTINUED | OUTPATIENT
Start: 2022-01-01 | End: 2022-01-01 | Stop reason: HOSPADM

## 2022-01-01 RX ORDER — FENTANYL CITRATE 50 UG/ML
50 INJECTION, SOLUTION INTRAMUSCULAR; INTRAVENOUS
Status: DISCONTINUED | OUTPATIENT
Start: 2022-01-01 | End: 2022-01-01 | Stop reason: HOSPADM

## 2022-01-01 RX ORDER — FENTANYL CITRATE 50 UG/ML
INJECTION, SOLUTION INTRAMUSCULAR; INTRAVENOUS PRN
Status: DISCONTINUED | OUTPATIENT
Start: 2022-01-01 | End: 2022-01-01 | Stop reason: SDUPTHER

## 2022-01-01 RX ORDER — MEROPENEM AND SODIUM CHLORIDE 1 G/50ML
1000 INJECTION, SOLUTION INTRAVENOUS EVERY 8 HOURS
Status: DISCONTINUED | OUTPATIENT
Start: 2022-01-01 | End: 2022-01-01

## 2022-01-01 RX ORDER — LACTOBACILLUS RHAMNOSUS GG 10B CELL
1 CAPSULE ORAL DAILY
Qty: 30 CAPSULE | Refills: 0 | Status: SHIPPED | OUTPATIENT
Start: 2022-01-01 | End: 2022-01-01 | Stop reason: SDUPTHER

## 2022-01-01 RX ORDER — PALONOSETRON 0.05 MG/ML
0.25 INJECTION, SOLUTION INTRAVENOUS ONCE
Status: CANCELLED | OUTPATIENT
Start: 2022-01-01 | End: 2022-01-01

## 2022-01-01 RX ORDER — FUROSEMIDE 40 MG/1
40 TABLET ORAL DAILY
Status: DISCONTINUED | OUTPATIENT
Start: 2022-01-01 | End: 2022-01-01 | Stop reason: HOSPADM

## 2022-01-01 RX ORDER — MAGNESIUM OXIDE 400 MG/1
400 TABLET ORAL DAILY
Qty: 30 TABLET | Refills: 5 | Status: SHIPPED | OUTPATIENT
Start: 2022-01-01

## 2022-01-01 RX ORDER — CYCLOBENZAPRINE HCL 10 MG
5 TABLET ORAL 2 TIMES DAILY PRN
Status: CANCELLED | OUTPATIENT
Start: 2022-01-01

## 2022-01-01 RX ORDER — IODINE/SODIUM IODIDE 2 %
TINCTURE TOPICAL PRN
Status: CANCELLED | OUTPATIENT
Start: 2022-01-01

## 2022-01-01 RX ORDER — FENTANYL 50 UG/H
1 PATCH TRANSDERMAL
Status: DISCONTINUED | OUTPATIENT
Start: 2022-01-01 | End: 2022-01-01

## 2022-01-01 RX ORDER — TROSPIUM CHLORIDE 20 MG/1
20 TABLET, FILM COATED ORAL NIGHTLY
Status: CANCELLED | OUTPATIENT
Start: 2022-01-01

## 2022-01-01 RX ORDER — OXYCODONE HYDROCHLORIDE AND ACETAMINOPHEN 5; 325 MG/1; MG/1
1 TABLET ORAL EVERY 6 HOURS PRN
Qty: 20 TABLET | Refills: 0 | Status: SHIPPED | OUTPATIENT
Start: 2022-01-01 | End: 2022-01-01

## 2022-01-01 RX ORDER — DIPHENHYDRAMINE HCL 25 MG
25 TABLET ORAL 2 TIMES DAILY PRN
Status: CANCELLED | OUTPATIENT
Start: 2022-01-01

## 2022-01-01 RX ORDER — HYDROCHLOROTHIAZIDE 25 MG/1
6.25 TABLET ORAL DAILY
Status: DISCONTINUED | OUTPATIENT
Start: 2022-01-01 | End: 2022-01-01 | Stop reason: HOSPADM

## 2022-01-01 RX ORDER — CYCLOBENZAPRINE HCL 10 MG
10 TABLET ORAL NIGHTLY
Status: DISCONTINUED | OUTPATIENT
Start: 2022-01-01 | End: 2022-01-01 | Stop reason: HOSPADM

## 2022-01-01 RX ORDER — BISACODYL 10 MG
10 SUPPOSITORY, RECTAL RECTAL DAILY PRN
Status: DISCONTINUED | OUTPATIENT
Start: 2022-01-01 | End: 2022-01-01 | Stop reason: HOSPADM

## 2022-01-01 RX ORDER — ATROPINE SULFATE 0.4 MG/ML
0.5 AMPUL (ML) INJECTION
Status: DISPENSED | OUTPATIENT
Start: 2022-01-01 | End: 2022-01-01

## 2022-01-01 RX ORDER — PROMETHAZINE HYDROCHLORIDE 12.5 MG/1
12.5 TABLET ORAL EVERY 6 HOURS PRN
Status: DISCONTINUED | OUTPATIENT
Start: 2022-01-01 | End: 2022-01-01

## 2022-01-01 RX ORDER — MIDAZOLAM HYDROCHLORIDE 1 MG/ML
0.5 INJECTION INTRAMUSCULAR; INTRAVENOUS ONCE
Status: COMPLETED | OUTPATIENT
Start: 2022-01-01 | End: 2022-01-01

## 2022-01-01 RX ORDER — METOPROLOL SUCCINATE 50 MG/1
50 TABLET, EXTENDED RELEASE ORAL 2 TIMES DAILY
Status: DISCONTINUED | OUTPATIENT
Start: 2022-01-01 | End: 2022-01-01

## 2022-01-01 RX ORDER — 0.9 % SODIUM CHLORIDE 0.9 %
1500 INTRAVENOUS SOLUTION INTRAVENOUS ONCE
Status: CANCELLED | OUTPATIENT
Start: 2022-01-01 | End: 2022-01-01

## 2022-01-01 RX ORDER — PROCHLORPERAZINE EDISYLATE 5 MG/ML
5 INJECTION INTRAMUSCULAR; INTRAVENOUS
Status: DISCONTINUED | OUTPATIENT
Start: 2022-01-01 | End: 2022-01-01 | Stop reason: HOSPADM

## 2022-01-01 RX ORDER — HYDROMORPHONE HYDROCHLORIDE 2 MG/1
4 TABLET ORAL EVERY 4 HOURS PRN
Status: DISCONTINUED | OUTPATIENT
Start: 2022-01-01 | End: 2022-01-01

## 2022-01-01 RX ORDER — HEPARIN SODIUM 5000 [USP'U]/ML
5000 INJECTION, SOLUTION INTRAVENOUS; SUBCUTANEOUS EVERY 8 HOURS SCHEDULED
Status: DISCONTINUED | OUTPATIENT
Start: 2022-01-01 | End: 2022-01-01 | Stop reason: HOSPADM

## 2022-01-01 RX ORDER — DEXTROSE AND SODIUM CHLORIDE 5; .45 G/100ML; G/100ML
INJECTION, SOLUTION INTRAVENOUS CONTINUOUS
Status: DISCONTINUED | OUTPATIENT
Start: 2022-01-01 | End: 2022-01-01

## 2022-01-01 RX ORDER — FUROSEMIDE 40 MG/1
40 TABLET ORAL DAILY
Qty: 30 TABLET | Refills: 0 | Status: SHIPPED | OUTPATIENT
Start: 2022-01-01 | End: 2022-08-11

## 2022-01-01 RX ORDER — HYDROCHLOROTHIAZIDE 25 MG/1
6.25 TABLET ORAL DAILY
Status: CANCELLED | OUTPATIENT
Start: 2022-01-01

## 2022-01-01 RX ORDER — SODIUM CHLORIDE 0.9 % (FLUSH) 0.9 %
20 SYRINGE (ML) INJECTION PRN
Status: DISCONTINUED | OUTPATIENT
Start: 2022-01-01 | End: 2022-01-01 | Stop reason: HOSPADM

## 2022-01-01 RX ORDER — FLUCONAZOLE 100 MG/1
200 TABLET ORAL ONCE
Status: COMPLETED | OUTPATIENT
Start: 2022-01-01 | End: 2022-01-01

## 2022-01-01 RX ORDER — LIDOCAINE HYDROCHLORIDE 10 MG/ML
5 INJECTION, SOLUTION EPIDURAL; INFILTRATION; INTRACAUDAL; PERINEURAL ONCE
Status: DISCONTINUED | OUTPATIENT
Start: 2022-01-01 | End: 2022-01-01 | Stop reason: HOSPADM

## 2022-01-01 RX ORDER — ONDANSETRON 4 MG/1
4 TABLET, ORALLY DISINTEGRATING ORAL EVERY 8 HOURS PRN
Status: CANCELLED | OUTPATIENT
Start: 2022-01-01

## 2022-01-01 RX ORDER — ALPRAZOLAM 0.25 MG/1
TABLET ORAL
Qty: 30 TABLET | Refills: 0 | Status: SHIPPED | OUTPATIENT
Start: 2022-01-01 | End: 2022-01-01

## 2022-01-01 RX ORDER — FUROSEMIDE 20 MG/1
20 TABLET ORAL 2 TIMES DAILY
Status: DISCONTINUED | OUTPATIENT
Start: 2022-01-01 | End: 2022-01-01

## 2022-01-01 RX ORDER — POTASSIUM CHLORIDE 20 MEQ/1
20 TABLET, EXTENDED RELEASE ORAL 2 TIMES DAILY
Status: COMPLETED | OUTPATIENT
Start: 2022-01-01 | End: 2022-01-01

## 2022-01-01 RX ORDER — PROMETHAZINE HYDROCHLORIDE 6.25 MG/5ML
6.25 SYRUP ORAL 4 TIMES DAILY PRN
Qty: 240 ML | Refills: 0 | Status: SHIPPED | OUTPATIENT
Start: 2022-01-01 | End: 2022-01-01

## 2022-01-01 RX ORDER — MAGNESIUM SULFATE 1 G/100ML
1000 INJECTION INTRAVENOUS ONCE
Status: COMPLETED | OUTPATIENT
Start: 2022-01-01 | End: 2022-01-01

## 2022-01-01 RX ORDER — BISOPROLOL FUMARATE 5 MG/1
5 TABLET ORAL DAILY
Qty: 90 TABLET | Refills: 0 | Status: SHIPPED | OUTPATIENT
Start: 2022-01-01

## 2022-01-01 RX ORDER — 0.9 % SODIUM CHLORIDE 0.9 %
1500 INTRAVENOUS SOLUTION INTRAVENOUS ONCE
Status: COMPLETED | OUTPATIENT
Start: 2022-01-01 | End: 2022-01-01

## 2022-01-01 RX ORDER — 0.9 % SODIUM CHLORIDE 0.9 %
1000 INTRAVENOUS SOLUTION INTRAVENOUS ONCE
Status: CANCELLED | OUTPATIENT
Start: 2022-01-01 | End: 2022-01-01

## 2022-01-01 RX ORDER — MECOBALAMIN 5000 MCG
5 TABLET,DISINTEGRATING ORAL NIGHTLY PRN
Status: DISCONTINUED | OUTPATIENT
Start: 2022-01-01 | End: 2022-01-01 | Stop reason: HOSPADM

## 2022-01-01 RX ORDER — ONDANSETRON 2 MG/ML
4 INJECTION INTRAMUSCULAR; INTRAVENOUS
Status: DISCONTINUED | OUTPATIENT
Start: 2022-01-01 | End: 2022-01-01 | Stop reason: HOSPADM

## 2022-01-01 RX ORDER — HYDROMORPHONE HYDROCHLORIDE 1 MG/ML
0.25 INJECTION, SOLUTION INTRAMUSCULAR; INTRAVENOUS; SUBCUTANEOUS EVERY 5 MIN PRN
Status: DISCONTINUED | OUTPATIENT
Start: 2022-01-01 | End: 2022-01-01 | Stop reason: HOSPADM

## 2022-01-01 RX ORDER — ATROPINE SULFATE 0.4 MG/ML
0.4 AMPUL (ML) INJECTION
Status: CANCELLED | OUTPATIENT
Start: 2022-01-01

## 2022-01-01 RX ORDER — EPHEDRINE SULFATE 50 MG/ML
INJECTION, SOLUTION INTRAVENOUS PRN
Status: DISCONTINUED | OUTPATIENT
Start: 2022-01-01 | End: 2022-01-01 | Stop reason: SDUPTHER

## 2022-01-01 RX ORDER — LIDOCAINE HYDROCHLORIDE 10 MG/ML
1 INJECTION, SOLUTION EPIDURAL; INFILTRATION; INTRACAUDAL; PERINEURAL
Status: DISCONTINUED | OUTPATIENT
Start: 2022-01-01 | End: 2022-01-01 | Stop reason: HOSPADM

## 2022-01-01 RX ORDER — MIDAZOLAM HYDROCHLORIDE 1 MG/ML
INJECTION INTRAMUSCULAR; INTRAVENOUS
Status: COMPLETED
Start: 2022-01-01 | End: 2022-01-01

## 2022-01-01 RX ORDER — PROMETHAZINE HYDROCHLORIDE 25 MG/ML
12.5 INJECTION, SOLUTION INTRAMUSCULAR; INTRAVENOUS EVERY 4 HOURS PRN
Status: CANCELLED | OUTPATIENT
Start: 2022-01-01

## 2022-01-01 RX ORDER — IBANDRONATE SODIUM 150 MG/1
150 TABLET, FILM COATED ORAL
Status: DISCONTINUED | OUTPATIENT
Start: 2022-01-01 | End: 2022-01-01

## 2022-01-01 RX ORDER — FENTANYL CITRATE 50 UG/ML
INJECTION, SOLUTION INTRAMUSCULAR; INTRAVENOUS
Status: COMPLETED | OUTPATIENT
Start: 2022-01-01 | End: 2022-01-01

## 2022-01-01 RX ORDER — ONDANSETRON 2 MG/ML
INJECTION INTRAMUSCULAR; INTRAVENOUS PRN
Status: DISCONTINUED | OUTPATIENT
Start: 2022-01-01 | End: 2022-01-01 | Stop reason: SDUPTHER

## 2022-01-01 RX ORDER — MAGNESIUM SULFATE IN WATER 40 MG/ML
4000 INJECTION, SOLUTION INTRAVENOUS ONCE
Status: DISCONTINUED | OUTPATIENT
Start: 2022-01-01 | End: 2022-01-01 | Stop reason: HOSPADM

## 2022-01-01 RX ORDER — HYDROMORPHONE HYDROCHLORIDE 1 MG/ML
0.5 INJECTION, SOLUTION INTRAMUSCULAR; INTRAVENOUS; SUBCUTANEOUS
Status: DISCONTINUED | OUTPATIENT
Start: 2022-01-01 | End: 2022-01-01 | Stop reason: HOSPADM

## 2022-01-01 RX ORDER — HYDROCODONE BITARTRATE AND ACETAMINOPHEN 7.5; 325 MG/1; MG/1
1 TABLET ORAL EVERY 6 HOURS PRN
Status: DISCONTINUED | OUTPATIENT
Start: 2022-01-01 | End: 2022-01-01 | Stop reason: HOSPADM

## 2022-01-01 RX ORDER — CARVEDILOL 6.25 MG/1
6.25 TABLET ORAL 2 TIMES DAILY WITH MEALS
Status: DISCONTINUED | OUTPATIENT
Start: 2022-01-01 | End: 2022-01-01 | Stop reason: HOSPADM

## 2022-01-01 RX ORDER — SODIUM CHLORIDE 9 MG/ML
INJECTION, SOLUTION INTRAVENOUS ONCE
Status: COMPLETED | OUTPATIENT
Start: 2022-01-01 | End: 2022-01-01

## 2022-01-01 RX ORDER — CYCLOBENZAPRINE HCL 10 MG
10 TABLET ORAL 3 TIMES DAILY PRN
Status: DISCONTINUED | OUTPATIENT
Start: 2022-01-01 | End: 2022-01-01

## 2022-01-01 RX ORDER — POTASSIUM CHLORIDE 14.9 MG/ML
20 INJECTION INTRAVENOUS ONCE
Status: DISCONTINUED | OUTPATIENT
Start: 2022-01-01 | End: 2022-01-01

## 2022-01-01 RX ORDER — OXYCODONE AND ACETAMINOPHEN 10; 325 MG/1; MG/1
1 TABLET ORAL EVERY 6 HOURS PRN
Qty: 12 TABLET | Refills: 0 | Status: SHIPPED | OUTPATIENT
Start: 2022-01-01 | End: 2022-01-01

## 2022-01-01 RX ORDER — HYDROMORPHONE HYDROCHLORIDE 1 MG/ML
1 INJECTION, SOLUTION INTRAMUSCULAR; INTRAVENOUS; SUBCUTANEOUS EVERY 4 HOURS PRN
Status: DISCONTINUED | OUTPATIENT
Start: 2022-01-01 | End: 2022-01-01

## 2022-01-01 RX ORDER — DIPHENHYDRAMINE HCL 25 MG
25 TABLET ORAL 2 TIMES DAILY PRN
Status: DISCONTINUED | OUTPATIENT
Start: 2022-01-01 | End: 2022-01-01 | Stop reason: HOSPADM

## 2022-01-01 RX ORDER — FENTANYL 25 UG/H
1 PATCH TRANSDERMAL
Status: DISCONTINUED | OUTPATIENT
Start: 2022-01-01 | End: 2022-01-01

## 2022-01-01 RX ORDER — HYDROCODONE BITARTRATE AND ACETAMINOPHEN 7.5; 325 MG/1; MG/1
1 TABLET ORAL EVERY 6 HOURS PRN
Status: CANCELLED | OUTPATIENT
Start: 2022-01-01

## 2022-01-01 RX ORDER — HYDROMORPHONE HYDROCHLORIDE 1 MG/ML
0.5 INJECTION, SOLUTION INTRAMUSCULAR; INTRAVENOUS; SUBCUTANEOUS
Status: DISCONTINUED | OUTPATIENT
Start: 2022-01-01 | End: 2022-01-01 | Stop reason: SDUPTHER

## 2022-01-01 RX ORDER — LORAZEPAM 2 MG/ML
0.5 INJECTION INTRAMUSCULAR NIGHTLY PRN
Status: DISCONTINUED | OUTPATIENT
Start: 2022-01-01 | End: 2022-01-01 | Stop reason: HOSPADM

## 2022-01-01 RX ORDER — HYDROMORPHONE HYDROCHLORIDE 1 MG/ML
1 INJECTION, SOLUTION INTRAMUSCULAR; INTRAVENOUS; SUBCUTANEOUS ONCE
Status: CANCELLED | OUTPATIENT
Start: 2022-01-01 | End: 2022-01-01

## 2022-01-01 RX ORDER — LEVOFLOXACIN 500 MG/1
500 TABLET, FILM COATED ORAL DAILY
Qty: 4 TABLET | Refills: 0 | Status: SHIPPED | OUTPATIENT
Start: 2022-01-01 | End: 2022-01-01

## 2022-01-01 RX ORDER — FENTANYL CITRATE 50 UG/ML
INJECTION, SOLUTION INTRAMUSCULAR; INTRAVENOUS
Status: DISPENSED
Start: 2022-01-01 | End: 2022-01-01

## 2022-01-01 RX ORDER — MIRABEGRON 25 MG/1
TABLET, FILM COATED, EXTENDED RELEASE ORAL
COMMUNITY
Start: 2022-01-01 | End: 2022-01-01

## 2022-01-01 RX ORDER — ORPHENADRINE CITRATE 30 MG/ML
30 INJECTION INTRAMUSCULAR; INTRAVENOUS ONCE
Status: COMPLETED | OUTPATIENT
Start: 2022-01-01 | End: 2022-01-01

## 2022-01-01 RX ORDER — OXYCODONE HCL 10 MG/1
10 TABLET, FILM COATED, EXTENDED RELEASE ORAL EVERY 12 HOURS SCHEDULED
Status: DISCONTINUED | OUTPATIENT
Start: 2022-01-01 | End: 2022-01-01

## 2022-01-01 RX ORDER — FENTANYL 100 UG/H
1 PATCH TRANSDERMAL
Status: CANCELLED | OUTPATIENT
Start: 2022-01-01

## 2022-01-01 RX ORDER — LACTOBACILLUS RHAMNOSUS GG 10B CELL
1 CAPSULE ORAL DAILY
Qty: 30 CAPSULE | Refills: 5 | Status: SHIPPED | OUTPATIENT
Start: 2022-01-01

## 2022-01-01 RX ORDER — HEPARIN SODIUM (PORCINE) LOCK FLUSH IV SOLN 100 UNIT/ML 100 UNIT/ML
SOLUTION INTRAVENOUS
Status: DISCONTINUED
Start: 2022-01-01 | End: 2022-01-01 | Stop reason: HOSPADM

## 2022-01-01 RX ORDER — ATROPINE SULFATE 1 MG/ML
0.5 INJECTION, SOLUTION INTRAMUSCULAR; INTRAVENOUS; SUBCUTANEOUS ONCE
Status: DISCONTINUED | OUTPATIENT
Start: 2022-01-01 | End: 2022-01-01 | Stop reason: HOSPADM

## 2022-01-01 RX ORDER — POLYETHYLENE GLYCOL 3350 17 G/17G
17 POWDER, FOR SOLUTION ORAL 2 TIMES DAILY
Status: DISCONTINUED | OUTPATIENT
Start: 2022-01-01 | End: 2022-01-01

## 2022-01-01 RX ORDER — BISOPROLOL FUMARATE AND HYDROCHLOROTHIAZIDE 5; 6.25 MG/1; MG/1
1 TABLET ORAL DAILY
Status: DISCONTINUED | OUTPATIENT
Start: 2022-01-01 | End: 2022-01-01

## 2022-01-01 RX ORDER — SODIUM BICARBONATE 650 MG/1
TABLET ORAL
Qty: 360 TABLET | Refills: 0 | OUTPATIENT
Start: 2022-01-01

## 2022-01-01 RX ORDER — 0.9 % SODIUM CHLORIDE 0.9 %
1000 INTRAVENOUS SOLUTION INTRAVENOUS ONCE
Status: DISCONTINUED | OUTPATIENT
Start: 2022-01-01 | End: 2022-01-01 | Stop reason: HOSPADM

## 2022-01-01 RX ORDER — OXYCODONE HYDROCHLORIDE 5 MG/1
15 TABLET ORAL EVERY 4 HOURS PRN
Status: DISCONTINUED | OUTPATIENT
Start: 2022-01-01 | End: 2022-01-01 | Stop reason: HOSPADM

## 2022-01-01 RX ORDER — MEPERIDINE HYDROCHLORIDE 25 MG/ML
12.5 INJECTION INTRAMUSCULAR; INTRAVENOUS; SUBCUTANEOUS EVERY 5 MIN PRN
Status: DISCONTINUED | OUTPATIENT
Start: 2022-01-01 | End: 2022-01-01 | Stop reason: HOSPADM

## 2022-01-01 RX ORDER — ONDANSETRON 4 MG/1
4 TABLET, FILM COATED ORAL EVERY 8 HOURS PRN
Status: DISCONTINUED | OUTPATIENT
Start: 2022-01-01 | End: 2022-01-01 | Stop reason: HOSPADM

## 2022-01-01 RX ORDER — ALPRAZOLAM 0.5 MG/1
0.5 TABLET ORAL NIGHTLY PRN
Status: DISCONTINUED | OUTPATIENT
Start: 2022-01-01 | End: 2022-01-01

## 2022-01-01 RX ORDER — ALPRAZOLAM 0.5 MG/1
0.5 TABLET ORAL EVERY 4 HOURS PRN
Status: DISCONTINUED | OUTPATIENT
Start: 2022-01-01 | End: 2022-01-01 | Stop reason: HOSPADM

## 2022-01-01 RX ORDER — SODIUM CHLORIDE, SODIUM LACTATE, POTASSIUM CHLORIDE, AND CALCIUM CHLORIDE .6; .31; .03; .02 G/100ML; G/100ML; G/100ML; G/100ML
30 INJECTION, SOLUTION INTRAVENOUS ONCE
Status: DISCONTINUED | OUTPATIENT
Start: 2022-01-01 | End: 2022-01-01 | Stop reason: HOSPADM

## 2022-01-01 RX ORDER — MIDAZOLAM HYDROCHLORIDE 1 MG/ML
INJECTION INTRAMUSCULAR; INTRAVENOUS
Status: DISCONTINUED
Start: 2022-01-01 | End: 2022-01-01 | Stop reason: WASHOUT

## 2022-01-01 RX ORDER — LOPERAMIDE HYDROCHLORIDE 2 MG/1
2 CAPSULE ORAL EVERY 8 HOURS
Status: DISCONTINUED | OUTPATIENT
Start: 2022-01-01 | End: 2022-01-01

## 2022-01-01 RX ORDER — ONDANSETRON 4 MG/1
8 TABLET, FILM COATED ORAL EVERY 8 HOURS PRN
Qty: 30 TABLET | Refills: 2 | Status: SHIPPED | OUTPATIENT
Start: 2022-01-01 | End: 2022-01-01 | Stop reason: SDUPTHER

## 2022-01-01 RX ORDER — CYCLOBENZAPRINE HCL 10 MG
5 TABLET ORAL 2 TIMES DAILY PRN
Status: DISCONTINUED | OUTPATIENT
Start: 2022-01-01 | End: 2022-01-01 | Stop reason: HOSPADM

## 2022-01-01 RX ORDER — ALPRAZOLAM 0.5 MG/1
0.5 TABLET ORAL EVERY 8 HOURS PRN
Status: DISCONTINUED | OUTPATIENT
Start: 2022-01-01 | End: 2022-01-01 | Stop reason: HOSPADM

## 2022-01-01 RX ORDER — TROSPIUM CHLORIDE 20 MG/1
20 TABLET, FILM COATED ORAL NIGHTLY
Status: DISCONTINUED | OUTPATIENT
Start: 2022-01-01 | End: 2022-01-01 | Stop reason: HOSPADM

## 2022-01-01 RX ORDER — IBUPROFEN 400 MG/1
800 TABLET ORAL NIGHTLY
Status: DISCONTINUED | OUTPATIENT
Start: 2022-01-01 | End: 2022-01-01

## 2022-01-01 RX ADMIN — SODIUM BICARBONATE 650 MG: 650 TABLET ORAL at 17:02

## 2022-01-01 RX ADMIN — SODIUM CHLORIDE, PRESERVATIVE FREE 10 ML: 5 INJECTION INTRAVENOUS at 19:52

## 2022-01-01 RX ADMIN — SODIUM BICARBONATE: 84 INJECTION, SOLUTION INTRAVENOUS at 22:58

## 2022-01-01 RX ADMIN — OXYCODONE 15 MG: 5 TABLET ORAL at 12:24

## 2022-01-01 RX ADMIN — ENOXAPARIN SODIUM 40 MG: 100 INJECTION SUBCUTANEOUS at 09:36

## 2022-01-01 RX ADMIN — SODIUM BICARBONATE 650 MG: 650 TABLET ORAL at 09:16

## 2022-01-01 RX ADMIN — OXYCODONE 15 MG: 5 TABLET ORAL at 10:29

## 2022-01-01 RX ADMIN — SODIUM BICARBONATE 650 MG: 650 TABLET ORAL at 20:51

## 2022-01-01 RX ADMIN — ACETAMINOPHEN 650 MG: 325 TABLET ORAL at 04:40

## 2022-01-01 RX ADMIN — FUROSEMIDE 40 MG: 40 TABLET ORAL at 12:16

## 2022-01-01 RX ADMIN — OXYCODONE 15 MG: 5 TABLET ORAL at 00:21

## 2022-01-01 RX ADMIN — TROSPIUM CHLORIDE 20 MG: 20 TABLET, FILM COATED ORAL at 05:08

## 2022-01-01 RX ADMIN — CYCLOBENZAPRINE 5 MG: 10 TABLET, FILM COATED ORAL at 11:03

## 2022-01-01 RX ADMIN — HYDROMORPHONE HYDROCHLORIDE 4 MG: 2 TABLET ORAL at 21:19

## 2022-01-01 RX ADMIN — SODIUM CHLORIDE, PRESERVATIVE FREE 40 MG: 5 INJECTION INTRAVENOUS at 20:54

## 2022-01-01 RX ADMIN — HYDROMORPHONE HYDROCHLORIDE 1 MG: 1 INJECTION, SOLUTION INTRAMUSCULAR; INTRAVENOUS; SUBCUTANEOUS at 01:47

## 2022-01-01 RX ADMIN — ONDANSETRON HYDROCHLORIDE 4 MG: 2 SOLUTION INTRAMUSCULAR; INTRAVENOUS at 15:57

## 2022-01-01 RX ADMIN — ACETAMINOPHEN 650 MG: 325 TABLET ORAL at 12:33

## 2022-01-01 RX ADMIN — POLYETHYLENE GLYCOL 3350 17 G: 17 POWDER, FOR SOLUTION ORAL at 08:58

## 2022-01-01 RX ADMIN — SODIUM CHLORIDE, PRESERVATIVE FREE 10 ML: 5 INJECTION INTRAVENOUS at 08:18

## 2022-01-01 RX ADMIN — SODIUM CHLORIDE, PRESERVATIVE FREE 10 ML: 5 INJECTION INTRAVENOUS at 08:57

## 2022-01-01 RX ADMIN — ONDANSETRON HYDROCHLORIDE 4 MG: 2 SOLUTION INTRAMUSCULAR; INTRAVENOUS at 13:41

## 2022-01-01 RX ADMIN — HYDROMORPHONE HYDROCHLORIDE 1 MG: 1 INJECTION, SOLUTION INTRAMUSCULAR; INTRAVENOUS; SUBCUTANEOUS at 00:25

## 2022-01-01 RX ADMIN — METOPROLOL TARTRATE 2.5 MG: 5 INJECTION INTRAVENOUS at 08:59

## 2022-01-01 RX ADMIN — SODIUM BICARBONATE 650 MG: 650 TABLET ORAL at 14:19

## 2022-01-01 RX ADMIN — GADOBENATE DIMEGLUMINE 15 ML: 529 INJECTION, SOLUTION INTRAVENOUS at 13:07

## 2022-01-01 RX ADMIN — OXYCODONE HYDROCHLORIDE AND ACETAMINOPHEN 1 TABLET: 7.5; 325 TABLET ORAL at 03:17

## 2022-01-01 RX ADMIN — PANTOPRAZOLE SODIUM 40 MG: 40 TABLET, DELAYED RELEASE ORAL at 06:18

## 2022-01-01 RX ADMIN — PANTOPRAZOLE SODIUM 40 MG: 40 TABLET, DELAYED RELEASE ORAL at 05:38

## 2022-01-01 RX ADMIN — SODIUM BICARBONATE 650 MG: 650 TABLET ORAL at 19:38

## 2022-01-01 RX ADMIN — CEFTAZIDIME, AVIBACTAM 0.94 G: 2; .5 POWDER, FOR SOLUTION INTRAVENOUS at 07:39

## 2022-01-01 RX ADMIN — SODIUM BICARBONATE 650 MG: 650 TABLET ORAL at 21:08

## 2022-01-01 RX ADMIN — CARVEDILOL 6.25 MG: 6.25 TABLET, FILM COATED ORAL at 18:33

## 2022-01-01 RX ADMIN — CYCLOBENZAPRINE 10 MG: 10 TABLET, FILM COATED ORAL at 02:53

## 2022-01-01 RX ADMIN — FLUCONAZOLE 200 MG: 100 TABLET ORAL at 10:59

## 2022-01-01 RX ADMIN — Medication 1 CAPSULE: at 11:42

## 2022-01-01 RX ADMIN — OXYCODONE HYDROCHLORIDE AND ACETAMINOPHEN 1 TABLET: 7.5; 325 TABLET ORAL at 00:38

## 2022-01-01 RX ADMIN — SODIUM BICARBONATE 650 MG: 650 TABLET ORAL at 16:37

## 2022-01-01 RX ADMIN — TRAMADOL HYDROCHLORIDE 25 MG: 50 TABLET, COATED ORAL at 06:56

## 2022-01-01 RX ADMIN — HYDROMORPHONE HYDROCHLORIDE 1 MG: 1 INJECTION, SOLUTION INTRAMUSCULAR; INTRAVENOUS; SUBCUTANEOUS at 03:30

## 2022-01-01 RX ADMIN — SODIUM BICARBONATE 650 MG: 650 TABLET ORAL at 08:15

## 2022-01-01 RX ADMIN — CYCLOBENZAPRINE 5 MG: 10 TABLET, FILM COATED ORAL at 08:55

## 2022-01-01 RX ADMIN — PHENYLEPHRINE HYDROCHLORIDE 80 MCG: 10 INJECTION INTRAVENOUS at 08:51

## 2022-01-01 RX ADMIN — PROPOFOL 130 MG: 10 INJECTION, EMULSION INTRAVENOUS at 08:04

## 2022-01-01 RX ADMIN — OXYCODONE HYDROCHLORIDE AND ACETAMINOPHEN 1 TABLET: 7.5; 325 TABLET ORAL at 20:45

## 2022-01-01 RX ADMIN — METOPROLOL SUCCINATE 50 MG: 50 TABLET, EXTENDED RELEASE ORAL at 08:03

## 2022-01-01 RX ADMIN — CYCLOBENZAPRINE 10 MG: 10 TABLET, FILM COATED ORAL at 21:44

## 2022-01-01 RX ADMIN — FUROSEMIDE 20 MG: 20 TABLET ORAL at 18:10

## 2022-01-01 RX ADMIN — ACETAMINOPHEN 650 MG: 325 TABLET ORAL at 00:46

## 2022-01-01 RX ADMIN — OXYCODONE HYDROCHLORIDE AND ACETAMINOPHEN 1 TABLET: 7.5; 325 TABLET ORAL at 15:48

## 2022-01-01 RX ADMIN — CARVEDILOL 6.25 MG: 6.25 TABLET, FILM COATED ORAL at 08:02

## 2022-01-01 RX ADMIN — HYDROMORPHONE HYDROCHLORIDE 4 MG: 2 TABLET ORAL at 23:00

## 2022-01-01 RX ADMIN — SODIUM CHLORIDE, PRESERVATIVE FREE 5 ML: 5 INJECTION INTRAVENOUS at 09:00

## 2022-01-01 RX ADMIN — HYDROMORPHONE HYDROCHLORIDE 4 MG: 2 TABLET ORAL at 00:21

## 2022-01-01 RX ADMIN — SODIUM CHLORIDE, PRESERVATIVE FREE 10 ML: 5 INJECTION INTRAVENOUS at 08:03

## 2022-01-01 RX ADMIN — OXYCODONE 15 MG: 5 TABLET ORAL at 07:27

## 2022-01-01 RX ADMIN — Medication 1 CAPSULE: at 14:57

## 2022-01-01 RX ADMIN — SENNOSIDES AND DOCUSATE SODIUM 2 TABLET: 50; 8.6 TABLET ORAL at 20:51

## 2022-01-01 RX ADMIN — ONDANSETRON 4 MG: 2 INJECTION INTRAMUSCULAR; INTRAVENOUS at 09:30

## 2022-01-01 RX ADMIN — HYDROCHLOROTHIAZIDE 6.25 MG: 25 TABLET ORAL at 14:19

## 2022-01-01 RX ADMIN — PROPOFOL 50 MG: 10 INJECTION, EMULSION INTRAVENOUS at 08:36

## 2022-01-01 RX ADMIN — FLUOROURACIL 3680 MG: 50 INJECTION, SOLUTION INTRAVENOUS at 15:35

## 2022-01-01 RX ADMIN — OXYCODONE 15 MG: 5 TABLET ORAL at 12:13

## 2022-01-01 RX ADMIN — OXYCODONE HYDROCHLORIDE AND ACETAMINOPHEN 1 TABLET: 7.5; 325 TABLET ORAL at 01:53

## 2022-01-01 RX ADMIN — HYDROMORPHONE HYDROCHLORIDE 4 MG: 2 TABLET ORAL at 04:35

## 2022-01-01 RX ADMIN — PANTOPRAZOLE SODIUM 40 MG: 40 TABLET, DELAYED RELEASE ORAL at 06:41

## 2022-01-01 RX ADMIN — METOPROLOL SUCCINATE 25 MG: 25 TABLET, EXTENDED RELEASE ORAL at 08:11

## 2022-01-01 RX ADMIN — CYCLOBENZAPRINE 5 MG: 10 TABLET, FILM COATED ORAL at 11:38

## 2022-01-01 RX ADMIN — HYDROMORPHONE HYDROCHLORIDE 4 MG: 2 TABLET ORAL at 08:04

## 2022-01-01 RX ADMIN — SODIUM CHLORIDE, PRESERVATIVE FREE 40 MG: 5 INJECTION INTRAVENOUS at 23:17

## 2022-01-01 RX ADMIN — SUGAMMADEX 400 MG: 100 INJECTION, SOLUTION INTRAVENOUS at 09:22

## 2022-01-01 RX ADMIN — SODIUM BICARBONATE: 84 INJECTION, SOLUTION INTRAVENOUS at 05:08

## 2022-01-01 RX ADMIN — SODIUM CHLORIDE, PRESERVATIVE FREE 10 ML: 5 INJECTION INTRAVENOUS at 10:56

## 2022-01-01 RX ADMIN — SODIUM CHLORIDE: 9 INJECTION, SOLUTION INTRAVENOUS at 07:51

## 2022-01-01 RX ADMIN — SODIUM BICARBONATE 650 MG: 650 TABLET ORAL at 08:41

## 2022-01-01 RX ADMIN — HYDROMORPHONE HYDROCHLORIDE 4 MG: 2 TABLET ORAL at 08:43

## 2022-01-01 RX ADMIN — MAGNESIUM SULFATE HEPTAHYDRATE 1000 MG: 10 INJECTION, SOLUTION INTRAVENOUS at 21:15

## 2022-01-01 RX ADMIN — FAMOTIDINE 20 MG: 20 TABLET ORAL at 08:40

## 2022-01-01 RX ADMIN — METOPROLOL SUCCINATE 25 MG: 25 TABLET, EXTENDED RELEASE ORAL at 10:27

## 2022-01-01 RX ADMIN — FLUCONAZOLE IN SODIUM CHLORIDE 600 MG: 2 INJECTION, SOLUTION INTRAVENOUS at 16:03

## 2022-01-01 RX ADMIN — HYDROMORPHONE HYDROCHLORIDE 0.5 MG: 1 INJECTION, SOLUTION INTRAMUSCULAR; INTRAVENOUS; SUBCUTANEOUS at 07:47

## 2022-01-01 RX ADMIN — DEXTROSE MONOHYDRATE 100 MG: 50 INJECTION, SOLUTION INTRAVENOUS at 10:04

## 2022-01-01 RX ADMIN — SODIUM CHLORIDE 1000 ML: 9 INJECTION, SOLUTION INTRAVENOUS at 09:35

## 2022-01-01 RX ADMIN — DEXTROSE MONOHYDRATE 100 MG: 50 INJECTION, SOLUTION INTRAVENOUS at 14:42

## 2022-01-01 RX ADMIN — PANITUMUMAB 444 MG: 400 SOLUTION INTRAVENOUS at 12:34

## 2022-01-01 RX ADMIN — ACETAMINOPHEN 650 MG: 325 TABLET ORAL at 04:42

## 2022-01-01 RX ADMIN — CEFTAZIDIME, AVIBACTAM 0.94 G: 2; .5 POWDER, FOR SOLUTION INTRAVENOUS at 07:22

## 2022-01-01 RX ADMIN — ALPRAZOLAM 0.5 MG: 0.5 TABLET ORAL at 04:45

## 2022-01-01 RX ADMIN — FUROSEMIDE 20 MG: 20 TABLET ORAL at 07:55

## 2022-01-01 RX ADMIN — OXYCODONE 15 MG: 5 TABLET ORAL at 10:07

## 2022-01-01 RX ADMIN — FENTANYL CITRATE 100 MCG: 50 INJECTION, SOLUTION INTRAMUSCULAR; INTRAVENOUS at 08:32

## 2022-01-01 RX ADMIN — ACETAMINOPHEN 650 MG: 325 TABLET ORAL at 23:59

## 2022-01-01 RX ADMIN — HYDROCHLOROTHIAZIDE 6.25 MG: 25 TABLET ORAL at 08:14

## 2022-01-01 RX ADMIN — DULOXETINE HYDROCHLORIDE 30 MG: 30 CAPSULE, DELAYED RELEASE ORAL at 08:54

## 2022-01-01 RX ADMIN — PANTOPRAZOLE SODIUM 40 MG: 40 TABLET, DELAYED RELEASE ORAL at 05:25

## 2022-01-01 RX ADMIN — ALPRAZOLAM 0.5 MG: 0.5 TABLET ORAL at 19:57

## 2022-01-01 RX ADMIN — TRAMADOL HYDROCHLORIDE 25 MG: 50 TABLET, COATED ORAL at 00:45

## 2022-01-01 RX ADMIN — CEFTRIAXONE SODIUM 1000 MG: 1 INJECTION, POWDER, FOR SOLUTION INTRAMUSCULAR; INTRAVENOUS at 08:13

## 2022-01-01 RX ADMIN — ACETAMINOPHEN 650 MG: 325 TABLET ORAL at 17:44

## 2022-01-01 RX ADMIN — CYCLOBENZAPRINE 10 MG: 10 TABLET, FILM COATED ORAL at 19:57

## 2022-01-01 RX ADMIN — HYDROMORPHONE HYDROCHLORIDE 1 MG: 1 INJECTION, SOLUTION INTRAMUSCULAR; INTRAVENOUS; SUBCUTANEOUS at 15:15

## 2022-01-01 RX ADMIN — HYDROMORPHONE HYDROCHLORIDE 1 MG: 1 INJECTION, SOLUTION INTRAMUSCULAR; INTRAVENOUS; SUBCUTANEOUS at 10:47

## 2022-01-01 RX ADMIN — ACETAMINOPHEN 650 MG: 325 TABLET ORAL at 08:04

## 2022-01-01 RX ADMIN — ALPRAZOLAM 0.5 MG: 0.5 TABLET ORAL at 18:02

## 2022-01-01 RX ADMIN — SODIUM BICARBONATE 650 MG: 650 TABLET ORAL at 12:40

## 2022-01-01 RX ADMIN — OXYCODONE 15 MG: 5 TABLET ORAL at 23:58

## 2022-01-01 RX ADMIN — MAGNESIUM SULFATE HEPTAHYDRATE 2000 MG: 40 INJECTION, SOLUTION INTRAVENOUS at 13:19

## 2022-01-01 RX ADMIN — ACETAMINOPHEN 650 MG: 325 TABLET ORAL at 01:36

## 2022-01-01 RX ADMIN — HYDROMORPHONE HYDROCHLORIDE 2 MG: 2 TABLET ORAL at 08:16

## 2022-01-01 RX ADMIN — DULOXETINE HYDROCHLORIDE 30 MG: 30 CAPSULE, DELAYED RELEASE ORAL at 08:04

## 2022-01-01 RX ADMIN — ALPRAZOLAM 0.5 MG: 0.5 TABLET ORAL at 09:24

## 2022-01-01 RX ADMIN — SODIUM CHLORIDE, PRESERVATIVE FREE 40 MG: 5 INJECTION INTRAVENOUS at 08:03

## 2022-01-01 RX ADMIN — SODIUM BICARBONATE 650 MG: 650 TABLET ORAL at 09:37

## 2022-01-01 RX ADMIN — OXYCODONE 15 MG: 5 TABLET ORAL at 08:21

## 2022-01-01 RX ADMIN — ALPRAZOLAM 0.5 MG: 0.5 TABLET ORAL at 06:41

## 2022-01-01 RX ADMIN — CYCLOBENZAPRINE 5 MG: 10 TABLET, FILM COATED ORAL at 09:14

## 2022-01-01 RX ADMIN — OXYCODONE HYDROCHLORIDE AND ACETAMINOPHEN 1 TABLET: 7.5; 325 TABLET ORAL at 05:02

## 2022-01-01 RX ADMIN — SODIUM BICARBONATE 650 MG: 650 TABLET ORAL at 10:27

## 2022-01-01 RX ADMIN — OXYCODONE 15 MG: 5 TABLET ORAL at 01:54

## 2022-01-01 RX ADMIN — PROMETHAZINE HYDROCHLORIDE 12.5 MG: 25 INJECTION INTRAMUSCULAR; INTRAVENOUS at 18:35

## 2022-01-01 RX ADMIN — ONDANSETRON HYDROCHLORIDE 4 MG: 2 SOLUTION INTRAMUSCULAR; INTRAVENOUS at 07:23

## 2022-01-01 RX ADMIN — PIPERACILLIN AND TAZOBACTAM 3375 MG: 3; .375 INJECTION, POWDER, LYOPHILIZED, FOR SOLUTION INTRAVENOUS at 15:00

## 2022-01-01 RX ADMIN — CALCITRIOL CAPSULES 0.25 MCG 0.25 MCG: 0.25 CAPSULE ORAL at 08:15

## 2022-01-01 RX ADMIN — OXYCODONE HYDROCHLORIDE AND ACETAMINOPHEN 1 TABLET: 7.5; 325 TABLET ORAL at 07:33

## 2022-01-01 RX ADMIN — METOPROLOL SUCCINATE 25 MG: 25 TABLET, EXTENDED RELEASE ORAL at 14:39

## 2022-01-01 RX ADMIN — SODIUM BICARBONATE 650 MG: 650 TABLET ORAL at 12:24

## 2022-01-01 RX ADMIN — SODIUM BICARBONATE 650 MG: 650 TABLET ORAL at 10:44

## 2022-01-01 RX ADMIN — HYDROCHLOROTHIAZIDE 6.25 MG: 25 TABLET ORAL at 08:11

## 2022-01-01 RX ADMIN — HYDROMORPHONE HYDROCHLORIDE 0.5 MG: 1 INJECTION, SOLUTION INTRAMUSCULAR; INTRAVENOUS; SUBCUTANEOUS at 14:04

## 2022-01-01 RX ADMIN — WATER 1000 MG: 1 INJECTION INTRAMUSCULAR; INTRAVENOUS; SUBCUTANEOUS at 13:35

## 2022-01-01 RX ADMIN — TRAMADOL HYDROCHLORIDE 50 MG: 50 TABLET, COATED ORAL at 14:29

## 2022-01-01 RX ADMIN — TROSPIUM CHLORIDE 20 MG: 20 TABLET, FILM COATED ORAL at 16:02

## 2022-01-01 RX ADMIN — CYCLOBENZAPRINE 5 MG: 10 TABLET, FILM COATED ORAL at 21:05

## 2022-01-01 RX ADMIN — SODIUM BICARBONATE 650 MG: 650 TABLET ORAL at 13:27

## 2022-01-01 RX ADMIN — SENNOSIDES AND DOCUSATE SODIUM 2 TABLET: 50; 8.6 TABLET ORAL at 19:51

## 2022-01-01 RX ADMIN — OXYCODONE 15 MG: 5 TABLET ORAL at 20:15

## 2022-01-01 RX ADMIN — DULOXETINE HYDROCHLORIDE 30 MG: 30 CAPSULE, DELAYED RELEASE ORAL at 09:37

## 2022-01-01 RX ADMIN — SODIUM CHLORIDE, PRESERVATIVE FREE 10 ML: 5 INJECTION INTRAVENOUS at 08:09

## 2022-01-01 RX ADMIN — ONDANSETRON HYDROCHLORIDE 4 MG: 2 SOLUTION INTRAMUSCULAR; INTRAVENOUS at 08:48

## 2022-01-01 RX ADMIN — SODIUM BICARBONATE 650 MG: 650 TABLET ORAL at 08:14

## 2022-01-01 RX ADMIN — LIDOCAINE HYDROCHLORIDE: 20 JELLY TOPICAL at 09:05

## 2022-01-01 RX ADMIN — MAGNESIUM SULFATE HEPTAHYDRATE 1000 MG: 1 INJECTION, SOLUTION INTRAVENOUS at 14:36

## 2022-01-01 RX ADMIN — DIPHENHYDRAMINE HYDROCHLORIDE 25 MG: 25 TABLET ORAL at 13:28

## 2022-01-01 RX ADMIN — ONDANSETRON 4 MG: 2 INJECTION INTRAMUSCULAR; INTRAVENOUS at 20:05

## 2022-01-01 RX ADMIN — CARVEDILOL 6.25 MG: 6.25 TABLET, FILM COATED ORAL at 08:34

## 2022-01-01 RX ADMIN — HYDROMORPHONE HYDROCHLORIDE 1 MG: 1 INJECTION, SOLUTION INTRAMUSCULAR; INTRAVENOUS; SUBCUTANEOUS at 01:40

## 2022-01-01 RX ADMIN — PHENYLEPHRINE HYDROCHLORIDE 50 MCG: 10 INJECTION INTRAVENOUS at 08:35

## 2022-01-01 RX ADMIN — OXYCODONE HYDROCHLORIDE 20 MG: 10 TABLET, FILM COATED, EXTENDED RELEASE ORAL at 21:00

## 2022-01-01 RX ADMIN — DULOXETINE HYDROCHLORIDE 30 MG: 30 CAPSULE, DELAYED RELEASE ORAL at 11:04

## 2022-01-01 RX ADMIN — CEFTRIAXONE 1000 MG: 1 INJECTION, POWDER, FOR SOLUTION INTRAMUSCULAR; INTRAVENOUS at 15:06

## 2022-01-01 RX ADMIN — LEVOFLOXACIN 500 MG: 500 TABLET, FILM COATED ORAL at 09:16

## 2022-01-01 RX ADMIN — DIPHENHYDRAMINE HYDROCHLORIDE 25 MG: 25 TABLET ORAL at 04:34

## 2022-01-01 RX ADMIN — Medication 5 MG: at 01:33

## 2022-01-01 RX ADMIN — ACETAMINOPHEN 650 MG: 325 TABLET ORAL at 12:08

## 2022-01-01 RX ADMIN — ALPRAZOLAM 0.5 MG: 0.5 TABLET ORAL at 03:04

## 2022-01-01 RX ADMIN — SODIUM BICARBONATE 650 MG: 650 TABLET ORAL at 13:29

## 2022-01-01 RX ADMIN — SODIUM CHLORIDE 500 ML: 9 INJECTION, SOLUTION INTRAVENOUS at 09:34

## 2022-01-01 RX ADMIN — ENOXAPARIN SODIUM 30 MG: 100 INJECTION SUBCUTANEOUS at 07:34

## 2022-01-01 RX ADMIN — SODIUM CHLORIDE: 9 INJECTION, SOLUTION INTRAVENOUS at 20:35

## 2022-01-01 RX ADMIN — MAGNESIUM SULFATE HEPTAHYDRATE 4000 MG: 40 INJECTION, SOLUTION INTRAVENOUS at 08:52

## 2022-01-01 RX ADMIN — HYDROMORPHONE HYDROCHLORIDE 1 MG: 1 INJECTION, SOLUTION INTRAMUSCULAR; INTRAVENOUS; SUBCUTANEOUS at 12:30

## 2022-01-01 RX ADMIN — ACETAMINOPHEN 650 MG: 325 TABLET ORAL at 03:22

## 2022-01-01 RX ADMIN — CYCLOBENZAPRINE 10 MG: 10 TABLET, FILM COATED ORAL at 05:43

## 2022-01-01 RX ADMIN — OXYCODONE HYDROCHLORIDE 10 MG: 10 TABLET, FILM COATED, EXTENDED RELEASE ORAL at 10:21

## 2022-01-01 RX ADMIN — OXYCODONE HYDROCHLORIDE AND ACETAMINOPHEN 1 TABLET: 7.5; 325 TABLET ORAL at 18:31

## 2022-01-01 RX ADMIN — CYCLOBENZAPRINE 5 MG: 10 TABLET, FILM COATED ORAL at 00:13

## 2022-01-01 RX ADMIN — FUROSEMIDE 20 MG: 20 TABLET ORAL at 10:02

## 2022-01-01 RX ADMIN — ACETAMINOPHEN 650 MG: 325 TABLET ORAL at 22:03

## 2022-01-01 RX ADMIN — SODIUM CHLORIDE, PRESERVATIVE FREE 40 MG: 5 INJECTION INTRAVENOUS at 19:38

## 2022-01-01 RX ADMIN — TRAMADOL HYDROCHLORIDE 50 MG: 50 TABLET, COATED ORAL at 07:35

## 2022-01-01 RX ADMIN — SODIUM CHLORIDE, PRESERVATIVE FREE 5 ML: 5 INJECTION INTRAVENOUS at 09:15

## 2022-01-01 RX ADMIN — METOPROLOL SUCCINATE 50 MG: 50 TABLET, EXTENDED RELEASE ORAL at 10:44

## 2022-01-01 RX ADMIN — OXYCODONE 15 MG: 5 TABLET ORAL at 06:33

## 2022-01-01 RX ADMIN — Medication 400 MG: at 21:19

## 2022-01-01 RX ADMIN — FUROSEMIDE 20 MG: 20 TABLET ORAL at 12:34

## 2022-01-01 RX ADMIN — MEROPENEM AND SODIUM CHLORIDE 500 MG: 500 INJECTION, SOLUTION INTRAVENOUS at 06:41

## 2022-01-01 RX ADMIN — Medication 1 CAPSULE: at 08:54

## 2022-01-01 RX ADMIN — CEFTAZIDIME, AVIBACTAM 0.94 G: 2; .5 POWDER, FOR SOLUTION INTRAVENOUS at 19:31

## 2022-01-01 RX ADMIN — ONDANSETRON 4 MG: 2 INJECTION INTRAMUSCULAR; INTRAVENOUS at 06:32

## 2022-01-01 RX ADMIN — OXYCODONE 15 MG: 5 TABLET ORAL at 02:56

## 2022-01-01 RX ADMIN — CALCITRIOL CAPSULES 0.25 MCG 0.25 MCG: 0.25 CAPSULE ORAL at 08:03

## 2022-01-01 RX ADMIN — OXYCODONE HYDROCHLORIDE 20 MG: 10 TABLET, FILM COATED, EXTENDED RELEASE ORAL at 09:51

## 2022-01-01 RX ADMIN — SODIUM CHLORIDE, PRESERVATIVE FREE 10 ML: 5 INJECTION INTRAVENOUS at 11:10

## 2022-01-01 RX ADMIN — HYDROMORPHONE HYDROCHLORIDE 0.5 MG: 1 INJECTION, SOLUTION INTRAMUSCULAR; INTRAVENOUS; SUBCUTANEOUS at 11:42

## 2022-01-01 RX ADMIN — Medication 1 TABLET: at 08:04

## 2022-01-01 RX ADMIN — SODIUM CHLORIDE, PRESERVATIVE FREE 20 MG: 5 INJECTION INTRAVENOUS at 09:52

## 2022-01-01 RX ADMIN — OXYCODONE 15 MG: 5 TABLET ORAL at 19:48

## 2022-01-01 RX ADMIN — HYDROMORPHONE HYDROCHLORIDE 1 MG: 1 INJECTION, SOLUTION INTRAMUSCULAR; INTRAVENOUS; SUBCUTANEOUS at 17:02

## 2022-01-01 RX ADMIN — SODIUM BICARBONATE 650 MG: 650 TABLET ORAL at 17:22

## 2022-01-01 RX ADMIN — PANTOPRAZOLE SODIUM 40 MG: 40 TABLET, DELAYED RELEASE ORAL at 06:32

## 2022-01-01 RX ADMIN — LORAZEPAM 0.5 MG: 2 INJECTION INTRAMUSCULAR; INTRAVENOUS at 20:30

## 2022-01-01 RX ADMIN — ROCURONIUM BROMIDE 20 MG: 10 INJECTION, SOLUTION INTRAVENOUS at 09:03

## 2022-01-01 RX ADMIN — OXYCODONE 15 MG: 5 TABLET ORAL at 22:25

## 2022-01-01 RX ADMIN — DEXTROSE MONOHYDRATE 100 MG: 50 INJECTION, SOLUTION INTRAVENOUS at 11:11

## 2022-01-01 RX ADMIN — FOSAPREPITANT 150 MG: 150 INJECTION, POWDER, LYOPHILIZED, FOR SOLUTION INTRAVENOUS at 10:39

## 2022-01-01 RX ADMIN — OXYCODONE HYDROCHLORIDE 20 MG: 10 TABLET, FILM COATED, EXTENDED RELEASE ORAL at 20:58

## 2022-01-01 RX ADMIN — HYDROMORPHONE HYDROCHLORIDE 1 MG: 1 INJECTION, SOLUTION INTRAMUSCULAR; INTRAVENOUS; SUBCUTANEOUS at 05:07

## 2022-01-01 RX ADMIN — ALPRAZOLAM 0.5 MG: 0.5 TABLET ORAL at 05:44

## 2022-01-01 RX ADMIN — DEXAMETHASONE SODIUM PHOSPHATE 10 MG: 10 INJECTION, SOLUTION INTRAMUSCULAR; INTRAVENOUS at 11:11

## 2022-01-01 RX ADMIN — METOPROLOL TARTRATE 2.5 MG: 5 INJECTION INTRAVENOUS at 16:37

## 2022-01-01 RX ADMIN — CYCLOBENZAPRINE 10 MG: 10 TABLET, FILM COATED ORAL at 21:43

## 2022-01-01 RX ADMIN — ROCURONIUM BROMIDE 30 MG: 10 INJECTION, SOLUTION INTRAVENOUS at 08:32

## 2022-01-01 RX ADMIN — ONDANSETRON HYDROCHLORIDE 4 MG: 2 SOLUTION INTRAMUSCULAR; INTRAVENOUS at 20:19

## 2022-01-01 RX ADMIN — DEXTROSE MONOHYDRATE 100 MG: 50 INJECTION, SOLUTION INTRAVENOUS at 09:06

## 2022-01-01 RX ADMIN — ACETAMINOPHEN 650 MG: 325 TABLET ORAL at 21:43

## 2022-01-01 RX ADMIN — SODIUM BICARBONATE 650 MG: 650 TABLET ORAL at 19:58

## 2022-01-01 RX ADMIN — Medication 1 CAPSULE: at 08:05

## 2022-01-01 RX ADMIN — OXYCODONE 15 MG: 5 TABLET ORAL at 05:35

## 2022-01-01 RX ADMIN — SODIUM BICARBONATE 650 MG: 650 TABLET ORAL at 08:04

## 2022-01-01 RX ADMIN — HYDROMORPHONE HYDROCHLORIDE 4 MG: 2 TABLET ORAL at 17:41

## 2022-01-01 RX ADMIN — DULOXETINE HYDROCHLORIDE 30 MG: 30 CAPSULE, DELAYED RELEASE ORAL at 08:11

## 2022-01-01 RX ADMIN — ONDANSETRON HYDROCHLORIDE 8 MG: 2 SOLUTION INTRAMUSCULAR; INTRAVENOUS at 16:29

## 2022-01-01 RX ADMIN — DULOXETINE HYDROCHLORIDE 30 MG: 30 CAPSULE, DELAYED RELEASE ORAL at 10:26

## 2022-01-01 RX ADMIN — SODIUM BICARBONATE 650 MG: 650 TABLET ORAL at 08:21

## 2022-01-01 RX ADMIN — DEXAMETHASONE SODIUM PHOSPHATE 10 MG: 4 INJECTION, SOLUTION INTRAMUSCULAR; INTRAVENOUS at 08:40

## 2022-01-01 RX ADMIN — Medication 1 CAPSULE: at 08:11

## 2022-01-01 RX ADMIN — OXYCODONE 15 MG: 5 TABLET ORAL at 04:12

## 2022-01-01 RX ADMIN — FENTANYL CITRATE 100 MCG: 50 INJECTION, SOLUTION INTRAMUSCULAR; INTRAVENOUS at 08:04

## 2022-01-01 RX ADMIN — POTASSIUM CHLORIDE 20 MEQ: 20 TABLET, EXTENDED RELEASE ORAL at 11:03

## 2022-01-01 RX ADMIN — IRINOTECAN HYDROCHLORIDE 280 MG: 20 INJECTION, SOLUTION INTRAVENOUS at 13:33

## 2022-01-01 RX ADMIN — ACETAMINOPHEN 650 MG: 325 TABLET ORAL at 08:53

## 2022-01-01 RX ADMIN — TRAMADOL HYDROCHLORIDE 25 MG: 50 TABLET, COATED ORAL at 07:22

## 2022-01-01 RX ADMIN — Medication 5 MG: at 21:20

## 2022-01-01 RX ADMIN — SODIUM CHLORIDE, PRESERVATIVE FREE 20 ML: 5 INJECTION INTRAVENOUS at 15:20

## 2022-01-01 RX ADMIN — Medication 1 CAPSULE: at 10:44

## 2022-01-01 RX ADMIN — LEVOFLOXACIN 500 MG: 500 TABLET, FILM COATED ORAL at 11:02

## 2022-01-01 RX ADMIN — ALPRAZOLAM 0.5 MG: 0.5 TABLET ORAL at 17:02

## 2022-01-01 RX ADMIN — TROSPIUM CHLORIDE 20 MG: 20 TABLET, FILM COATED ORAL at 23:31

## 2022-01-01 RX ADMIN — PIPERACILLIN SODIUM AND TAZOBACTAM SODIUM 4500 MG: 4; .5 INJECTION, POWDER, LYOPHILIZED, FOR SOLUTION INTRAVENOUS at 17:34

## 2022-01-01 RX ADMIN — ONDANSETRON HYDROCHLORIDE 4 MG: 2 SOLUTION INTRAMUSCULAR; INTRAVENOUS at 05:12

## 2022-01-01 RX ADMIN — ALPRAZOLAM 0.5 MG: 0.5 TABLET ORAL at 20:27

## 2022-01-01 RX ADMIN — TRAMADOL HYDROCHLORIDE 25 MG: 50 TABLET, COATED ORAL at 19:59

## 2022-01-01 RX ADMIN — DEXAMETHASONE SODIUM PHOSPHATE 10 MG: 10 INJECTION, SOLUTION INTRAMUSCULAR; INTRAVENOUS at 08:23

## 2022-01-01 RX ADMIN — HYDROCODONE BITARTRATE AND ACETAMINOPHEN 1 TABLET: 7.5; 325 TABLET ORAL at 15:24

## 2022-01-01 RX ADMIN — SODIUM CHLORIDE 1000 ML: 9 INJECTION, SOLUTION INTRAVENOUS at 13:29

## 2022-01-01 RX ADMIN — ENOXAPARIN SODIUM 40 MG: 40 INJECTION SUBCUTANEOUS at 15:01

## 2022-01-01 RX ADMIN — HYDROMORPHONE HYDROCHLORIDE 1 MG: 1 INJECTION, SOLUTION INTRAMUSCULAR; INTRAVENOUS; SUBCUTANEOUS at 12:03

## 2022-01-01 RX ADMIN — Medication 1 CAPSULE: at 08:22

## 2022-01-01 RX ADMIN — ROCURONIUM BROMIDE 10 MG: 10 INJECTION, SOLUTION INTRAVENOUS at 08:25

## 2022-01-01 RX ADMIN — ENOXAPARIN SODIUM 30 MG: 100 INJECTION SUBCUTANEOUS at 14:26

## 2022-01-01 RX ADMIN — ALPRAZOLAM 0.5 MG: 0.5 TABLET ORAL at 02:44

## 2022-01-01 RX ADMIN — HYDROMORPHONE HYDROCHLORIDE 4 MG: 2 TABLET ORAL at 14:59

## 2022-01-01 RX ADMIN — HYDROMORPHONE HYDROCHLORIDE 2 MG: 2 TABLET ORAL at 03:59

## 2022-01-01 RX ADMIN — ENOXAPARIN SODIUM 40 MG: 100 INJECTION SUBCUTANEOUS at 08:09

## 2022-01-01 RX ADMIN — SODIUM CHLORIDE, PRESERVATIVE FREE 10 ML: 5 INJECTION INTRAVENOUS at 19:47

## 2022-01-01 RX ADMIN — Medication 1 TABLET: at 21:27

## 2022-01-01 RX ADMIN — MIDAZOLAM HYDROCHLORIDE 0.5 MG: 1 INJECTION, SOLUTION INTRAMUSCULAR; INTRAVENOUS at 17:41

## 2022-01-01 RX ADMIN — LEVOFLOXACIN 500 MG: 500 TABLET, FILM COATED ORAL at 08:54

## 2022-01-01 RX ADMIN — CYCLOBENZAPRINE 10 MG: 10 TABLET, FILM COATED ORAL at 14:04

## 2022-01-01 RX ADMIN — DULOXETINE HYDROCHLORIDE 30 MG: 30 CAPSULE, DELAYED RELEASE ORAL at 08:21

## 2022-01-01 RX ADMIN — DEXTROSE MONOHYDRATE 100 MG: 50 INJECTION, SOLUTION INTRAVENOUS at 10:02

## 2022-01-01 RX ADMIN — SODIUM BICARBONATE 650 MG: 650 TABLET ORAL at 11:03

## 2022-01-01 RX ADMIN — SODIUM CHLORIDE, PRESERVATIVE FREE 10 ML: 5 INJECTION INTRAVENOUS at 22:49

## 2022-01-01 RX ADMIN — HYDROMORPHONE HYDROCHLORIDE 2 MG: 2 TABLET ORAL at 09:00

## 2022-01-01 RX ADMIN — HYDROMORPHONE HYDROCHLORIDE 1 MG: 1 INJECTION, SOLUTION INTRAMUSCULAR; INTRAVENOUS; SUBCUTANEOUS at 21:28

## 2022-01-01 RX ADMIN — PANTOPRAZOLE SODIUM 40 MG: 40 TABLET, DELAYED RELEASE ORAL at 14:59

## 2022-01-01 RX ADMIN — PROMETHAZINE HYDROCHLORIDE 25 MG: 25 TABLET ORAL at 14:40

## 2022-01-01 RX ADMIN — SODIUM BICARBONATE 650 MG: 650 TABLET ORAL at 07:55

## 2022-01-01 RX ADMIN — DULOXETINE HYDROCHLORIDE 30 MG: 30 CAPSULE, DELAYED RELEASE ORAL at 08:09

## 2022-01-01 RX ADMIN — SODIUM BICARBONATE 650 MG: 650 TABLET ORAL at 14:39

## 2022-01-01 RX ADMIN — PALONOSETRON HYDROCHLORIDE 0.25 MG: 0.25 INJECTION, SOLUTION INTRAVENOUS at 10:50

## 2022-01-01 RX ADMIN — DOCUSATE SODIUM 50 MG AND SENNOSIDES 8.6 MG 1 TABLET: 8.6; 5 TABLET, FILM COATED ORAL at 21:07

## 2022-01-01 RX ADMIN — METOPROLOL SUCCINATE 25 MG: 25 TABLET, EXTENDED RELEASE ORAL at 09:16

## 2022-01-01 RX ADMIN — CYCLOBENZAPRINE 5 MG: 10 TABLET, FILM COATED ORAL at 00:32

## 2022-01-01 RX ADMIN — PANTOPRAZOLE SODIUM 40 MG: 40 TABLET, DELAYED RELEASE ORAL at 07:54

## 2022-01-01 RX ADMIN — ONDANSETRON HYDROCHLORIDE 4 MG: 2 SOLUTION INTRAMUSCULAR; INTRAVENOUS at 09:03

## 2022-01-01 RX ADMIN — PANTOPRAZOLE SODIUM 40 MG: 40 TABLET, DELAYED RELEASE ORAL at 05:44

## 2022-01-01 RX ADMIN — ROCURONIUM BROMIDE 10 MG: 10 INJECTION, SOLUTION INTRAVENOUS at 08:04

## 2022-01-01 RX ADMIN — ACETAMINOPHEN 650 MG: 325 TABLET ORAL at 22:35

## 2022-01-01 RX ADMIN — DEXTROSE MONOHYDRATE 100 MG: 50 INJECTION, SOLUTION INTRAVENOUS at 09:40

## 2022-01-01 RX ADMIN — MIDAZOLAM HYDROCHLORIDE 0.5 MG: 1 INJECTION INTRAMUSCULAR; INTRAVENOUS at 17:41

## 2022-01-01 RX ADMIN — ACETAMINOPHEN 650 MG: 325 TABLET ORAL at 08:48

## 2022-01-01 RX ADMIN — SODIUM BICARBONATE 650 MG: 650 TABLET ORAL at 16:55

## 2022-01-01 RX ADMIN — SODIUM CHLORIDE, PRESERVATIVE FREE 10 ML: 5 INJECTION INTRAVENOUS at 20:11

## 2022-01-01 RX ADMIN — Medication 1 CAPSULE: at 11:03

## 2022-01-01 RX ADMIN — Medication 1 CAPSULE: at 08:55

## 2022-01-01 RX ADMIN — LEUCOVORIN CALCIUM 750 MG: 350 INJECTION, POWDER, LYOPHILIZED, FOR SUSPENSION INTRAMUSCULAR; INTRAVENOUS at 12:59

## 2022-01-01 RX ADMIN — OXYCODONE HYDROCHLORIDE AND ACETAMINOPHEN 1 TABLET: 7.5; 325 TABLET ORAL at 21:57

## 2022-01-01 RX ADMIN — OXYCODONE 15 MG: 5 TABLET ORAL at 16:02

## 2022-01-01 RX ADMIN — HYDROMORPHONE HYDROCHLORIDE 1 MG: 1 INJECTION, SOLUTION INTRAMUSCULAR; INTRAVENOUS; SUBCUTANEOUS at 15:05

## 2022-01-01 RX ADMIN — OXYCODONE HYDROCHLORIDE AND ACETAMINOPHEN 1 TABLET: 7.5; 325 TABLET ORAL at 03:05

## 2022-01-01 RX ADMIN — HYDROMORPHONE HYDROCHLORIDE 4 MG: 2 TABLET ORAL at 05:44

## 2022-01-01 RX ADMIN — SODIUM BICARBONATE 650 MG: 650 TABLET ORAL at 12:44

## 2022-01-01 RX ADMIN — ACETAMINOPHEN 650 MG: 325 TABLET ORAL at 16:55

## 2022-01-01 RX ADMIN — SODIUM BICARBONATE 650 MG: 650 TABLET ORAL at 20:09

## 2022-01-01 RX ADMIN — SODIUM CHLORIDE, PRESERVATIVE FREE 10 ML: 5 INJECTION INTRAVENOUS at 20:10

## 2022-01-01 RX ADMIN — OXYCODONE 15 MG: 5 TABLET ORAL at 07:55

## 2022-01-01 RX ADMIN — EPHEDRINE SULFATE 10 MG: 50 INJECTION INTRAMUSCULAR; INTRAVENOUS; SUBCUTANEOUS at 08:22

## 2022-01-01 RX ADMIN — HYDROCHLOROTHIAZIDE 6.25 MG: 25 TABLET ORAL at 09:17

## 2022-01-01 RX ADMIN — HYDROMORPHONE HYDROCHLORIDE 4 MG: 2 TABLET ORAL at 13:40

## 2022-01-01 RX ADMIN — SODIUM BICARBONATE 650 MG: 650 TABLET ORAL at 08:56

## 2022-01-01 RX ADMIN — ACETAMINOPHEN 650 MG: 325 TABLET ORAL at 05:10

## 2022-01-01 RX ADMIN — SODIUM CHLORIDE, PRESERVATIVE FREE 40 MG: 5 INJECTION INTRAVENOUS at 19:51

## 2022-01-01 RX ADMIN — ACETAMINOPHEN 650 MG: 325 TABLET ORAL at 20:26

## 2022-01-01 RX ADMIN — FERRIC OXIDE RED: 8; 8 LOTION TOPICAL at 08:11

## 2022-01-01 RX ADMIN — HYDROCHLOROTHIAZIDE 6.25 MG: 25 TABLET ORAL at 08:04

## 2022-01-01 RX ADMIN — METOPROLOL SUCCINATE 25 MG: 25 TABLET, EXTENDED RELEASE ORAL at 09:37

## 2022-01-01 RX ADMIN — PANTOPRAZOLE SODIUM 40 MG: 40 TABLET, DELAYED RELEASE ORAL at 05:02

## 2022-01-01 RX ADMIN — DULOXETINE 30 MG: 30 CAPSULE, DELAYED RELEASE ORAL at 08:43

## 2022-01-01 RX ADMIN — METOPROLOL SUCCINATE 50 MG: 50 TABLET, EXTENDED RELEASE ORAL at 08:04

## 2022-01-01 RX ADMIN — LEVOFLOXACIN 500 MG: 500 TABLET, FILM COATED ORAL at 08:02

## 2022-01-01 RX ADMIN — TRAMADOL HYDROCHLORIDE 25 MG: 50 TABLET, COATED ORAL at 11:38

## 2022-01-01 RX ADMIN — DULOXETINE HYDROCHLORIDE 30 MG: 30 CAPSULE, DELAYED RELEASE ORAL at 08:56

## 2022-01-01 RX ADMIN — Medication 1 TABLET: at 19:57

## 2022-01-01 RX ADMIN — Medication 1 TABLET: at 23:31

## 2022-01-01 RX ADMIN — SODIUM BICARBONATE 25 MEQ: 84 INJECTION INTRAVENOUS at 20:11

## 2022-01-01 RX ADMIN — ONDANSETRON HYDROCHLORIDE 4 MG: 2 SOLUTION INTRAMUSCULAR; INTRAVENOUS at 09:32

## 2022-01-01 RX ADMIN — ONDANSETRON 4 MG: 2 INJECTION INTRAMUSCULAR; INTRAVENOUS at 08:40

## 2022-01-01 RX ADMIN — SENNOSIDES AND DOCUSATE SODIUM 2 TABLET: 50; 8.6 TABLET ORAL at 09:16

## 2022-01-01 RX ADMIN — LEVOFLOXACIN 500 MG: 500 TABLET, FILM COATED ORAL at 07:54

## 2022-01-01 RX ADMIN — HYDROCHLOROTHIAZIDE 6.25 MG: 25 TABLET ORAL at 08:22

## 2022-01-01 RX ADMIN — IRINOTECAN HYDROCHLORIDE 280 MG: 20 INJECTION INTRAVENOUS at 12:59

## 2022-01-01 RX ADMIN — SODIUM BICARBONATE 650 MG: 650 TABLET ORAL at 19:57

## 2022-01-01 RX ADMIN — CIPROFLOXACIN 400 MG: 2 INJECTION, SOLUTION INTRAVENOUS at 09:51

## 2022-01-01 RX ADMIN — PANTOPRAZOLE SODIUM 40 MG: 40 TABLET, DELAYED RELEASE ORAL at 17:02

## 2022-01-01 RX ADMIN — OXYCODONE 15 MG: 5 TABLET ORAL at 20:08

## 2022-01-01 RX ADMIN — TROSPIUM CHLORIDE 20 MG: 20 TABLET, FILM COATED ORAL at 21:20

## 2022-01-01 RX ADMIN — DEXTROSE MONOHYDRATE 100 MG: 50 INJECTION, SOLUTION INTRAVENOUS at 08:54

## 2022-01-01 RX ADMIN — METOPROLOL SUCCINATE 25 MG: 25 TABLET, EXTENDED RELEASE ORAL at 11:03

## 2022-01-01 RX ADMIN — ONDANSETRON 4 MG: 2 INJECTION INTRAMUSCULAR; INTRAVENOUS at 11:03

## 2022-01-01 RX ADMIN — LEVOFLOXACIN 500 MG: 500 TABLET, FILM COATED ORAL at 08:32

## 2022-01-01 RX ADMIN — CYCLOBENZAPRINE 5 MG: 10 TABLET, FILM COATED ORAL at 21:06

## 2022-01-01 RX ADMIN — FUROSEMIDE 20 MG: 20 TABLET ORAL at 17:25

## 2022-01-01 RX ADMIN — POTASSIUM CHLORIDE 20 MEQ: 20 TABLET, EXTENDED RELEASE ORAL at 19:51

## 2022-01-01 RX ADMIN — Medication 400 MG: at 09:08

## 2022-01-01 RX ADMIN — CYCLOBENZAPRINE 10 MG: 10 TABLET, FILM COATED ORAL at 10:31

## 2022-01-01 RX ADMIN — ONDANSETRON HYDROCHLORIDE 4 MG: 2 SOLUTION INTRAMUSCULAR; INTRAVENOUS at 02:53

## 2022-01-01 RX ADMIN — SODIUM BICARBONATE 650 MG: 650 TABLET ORAL at 12:34

## 2022-01-01 RX ADMIN — SODIUM CHLORIDE, SODIUM LACTATE, POTASSIUM CHLORIDE, AND CALCIUM CHLORIDE 1000 ML: 600; 310; 30; 20 INJECTION, SOLUTION INTRAVENOUS at 11:33

## 2022-01-01 RX ADMIN — PANTOPRAZOLE SODIUM 40 MG: 40 TABLET, DELAYED RELEASE ORAL at 05:39

## 2022-01-01 RX ADMIN — PROMETHAZINE HYDROCHLORIDE 12.5 MG: 12.5 TABLET ORAL at 18:45

## 2022-01-01 RX ADMIN — SODIUM CHLORIDE, POTASSIUM CHLORIDE, SODIUM LACTATE AND CALCIUM CHLORIDE 1000 ML: 600; 310; 30; 20 INJECTION, SOLUTION INTRAVENOUS at 10:17

## 2022-01-01 RX ADMIN — SODIUM CHLORIDE, PRESERVATIVE FREE 10 ML: 5 INJECTION INTRAVENOUS at 20:42

## 2022-01-01 RX ADMIN — OXYCODONE HYDROCHLORIDE AND ACETAMINOPHEN 1 TABLET: 7.5; 325 TABLET ORAL at 04:45

## 2022-01-01 RX ADMIN — HYDROCHLOROTHIAZIDE 6.25 MG: 25 TABLET ORAL at 07:36

## 2022-01-01 RX ADMIN — DEXTROSE MONOHYDRATE 100 MG: 50 INJECTION, SOLUTION INTRAVENOUS at 11:19

## 2022-01-01 RX ADMIN — CALCITRIOL CAPSULES 0.25 MCG 0.25 MCG: 0.25 CAPSULE ORAL at 11:42

## 2022-01-01 RX ADMIN — SODIUM CHLORIDE, PRESERVATIVE FREE 10 ML: 5 INJECTION INTRAVENOUS at 07:37

## 2022-01-01 RX ADMIN — LOPERAMIDE HYDROCHLORIDE 2 MG: 2 CAPSULE ORAL at 08:41

## 2022-01-01 RX ADMIN — ALPRAZOLAM 0.5 MG: 0.5 TABLET ORAL at 20:42

## 2022-01-01 RX ADMIN — HYDROCODONE BITARTRATE AND ACETAMINOPHEN 1 TABLET: 7.5; 325 TABLET ORAL at 12:31

## 2022-01-01 RX ADMIN — SODIUM BICARBONATE 650 MG: 650 TABLET ORAL at 08:55

## 2022-01-01 RX ADMIN — ACETAMINOPHEN 650 MG: 325 TABLET ORAL at 14:28

## 2022-01-01 RX ADMIN — ENOXAPARIN SODIUM 30 MG: 100 INJECTION SUBCUTANEOUS at 09:35

## 2022-01-01 RX ADMIN — ACETAMINOPHEN 650 MG: 325 TABLET ORAL at 22:54

## 2022-01-01 RX ADMIN — TRAMADOL HYDROCHLORIDE 25 MG: 50 TABLET, COATED ORAL at 12:30

## 2022-01-01 RX ADMIN — SODIUM BICARBONATE 650 MG: 650 TABLET ORAL at 13:40

## 2022-01-01 RX ADMIN — CALCITRIOL CAPSULES 0.25 MCG 0.25 MCG: 0.25 CAPSULE ORAL at 07:34

## 2022-01-01 RX ADMIN — DULOXETINE HYDROCHLORIDE 30 MG: 30 CAPSULE, DELAYED RELEASE ORAL at 10:44

## 2022-01-01 RX ADMIN — OXYCODONE 15 MG: 5 TABLET ORAL at 08:56

## 2022-01-01 RX ADMIN — HEPARIN SODIUM 5000 UNITS: 5000 INJECTION INTRAVENOUS; SUBCUTANEOUS at 15:36

## 2022-01-01 RX ADMIN — SENNOSIDES AND DOCUSATE SODIUM 2 TABLET: 50; 8.6 TABLET ORAL at 08:04

## 2022-01-01 RX ADMIN — OXYCODONE 15 MG: 5 TABLET ORAL at 21:52

## 2022-01-01 RX ADMIN — ENOXAPARIN SODIUM 40 MG: 100 INJECTION SUBCUTANEOUS at 08:44

## 2022-01-01 RX ADMIN — MAGNESIUM SULFATE HEPTAHYDRATE 4000 MG: 40 INJECTION, SOLUTION INTRAVENOUS at 15:31

## 2022-01-01 RX ADMIN — OXYCODONE 15 MG: 5 TABLET ORAL at 04:02

## 2022-01-01 RX ADMIN — SENNOSIDES AND DOCUSATE SODIUM 2 TABLET: 50; 8.6 TABLET ORAL at 08:55

## 2022-01-01 RX ADMIN — ALPRAZOLAM 0.5 MG: 0.5 TABLET ORAL at 22:48

## 2022-01-01 RX ADMIN — HYDROCHLOROTHIAZIDE 6.25 MG: 25 TABLET ORAL at 11:02

## 2022-01-01 RX ADMIN — FLUOROURACIL 3680 MG: 50 INJECTION, SOLUTION INTRAVENOUS at 15:43

## 2022-01-01 RX ADMIN — ACETAMINOPHEN 650 MG: 325 TABLET ORAL at 00:22

## 2022-01-01 RX ADMIN — OXYCODONE 15 MG: 5 TABLET ORAL at 12:58

## 2022-01-01 RX ADMIN — ONDANSETRON HYDROCHLORIDE 4 MG: 2 SOLUTION INTRAMUSCULAR; INTRAVENOUS at 01:53

## 2022-01-01 RX ADMIN — Medication 1 CAPSULE: at 07:36

## 2022-01-01 RX ADMIN — METOPROLOL SUCCINATE 25 MG: 25 TABLET, EXTENDED RELEASE ORAL at 08:14

## 2022-01-01 RX ADMIN — ALPRAZOLAM 0.5 MG: 0.5 TABLET ORAL at 02:54

## 2022-01-01 RX ADMIN — CEFTAZIDIME, AVIBACTAM 0.94 G: 2; .5 POWDER, FOR SOLUTION INTRAVENOUS at 09:34

## 2022-01-01 RX ADMIN — SODIUM BICARBONATE 650 MG: 650 TABLET ORAL at 18:44

## 2022-01-01 RX ADMIN — DULOXETINE 30 MG: 30 CAPSULE, DELAYED RELEASE ORAL at 08:15

## 2022-01-01 RX ADMIN — ALPRAZOLAM 0.5 MG: 0.5 TABLET ORAL at 07:42

## 2022-01-01 RX ADMIN — HYDROMORPHONE HYDROCHLORIDE 0.5 MG: 1 INJECTION, SOLUTION INTRAMUSCULAR; INTRAVENOUS; SUBCUTANEOUS at 15:47

## 2022-01-01 RX ADMIN — METOPROLOL SUCCINATE 50 MG: 50 TABLET, EXTENDED RELEASE ORAL at 08:40

## 2022-01-01 RX ADMIN — HYDROMORPHONE HYDROCHLORIDE 1 MG: 1 INJECTION, SOLUTION INTRAMUSCULAR; INTRAVENOUS; SUBCUTANEOUS at 07:56

## 2022-01-01 RX ADMIN — METOPROLOL SUCCINATE 50 MG: 50 TABLET, EXTENDED RELEASE ORAL at 07:23

## 2022-01-01 RX ADMIN — SODIUM CHLORIDE, SODIUM LACTATE, POTASSIUM CHLORIDE, AND CALCIUM CHLORIDE: 600; 310; 30; 20 INJECTION, SOLUTION INTRAVENOUS at 07:13

## 2022-01-01 RX ADMIN — CYCLOBENZAPRINE 5 MG: 10 TABLET, FILM COATED ORAL at 22:03

## 2022-01-01 RX ADMIN — LEVOFLOXACIN 500 MG: 5 INJECTION, SOLUTION INTRAVENOUS at 08:41

## 2022-01-01 RX ADMIN — ACETAMINOPHEN 650 MG: 325 TABLET ORAL at 00:13

## 2022-01-01 RX ADMIN — ALPRAZOLAM 0.5 MG: 0.5 TABLET ORAL at 11:55

## 2022-01-01 RX ADMIN — SODIUM CHLORIDE, PRESERVATIVE FREE 40 MG: 5 INJECTION INTRAVENOUS at 08:55

## 2022-01-01 RX ADMIN — DEXTROSE MONOHYDRATE 200 MG: 50 INJECTION, SOLUTION INTRAVENOUS at 14:53

## 2022-01-01 RX ADMIN — OXYCODONE HYDROCHLORIDE AND ACETAMINOPHEN 1 TABLET: 7.5; 325 TABLET ORAL at 20:42

## 2022-01-01 RX ADMIN — SUCCINYLCHOLINE CHLORIDE 100 MG: 20 INJECTION, SOLUTION INTRAMUSCULAR; INTRAVENOUS at 08:04

## 2022-01-01 RX ADMIN — CALCITRIOL CAPSULES 0.25 MCG 0.25 MCG: 0.25 CAPSULE ORAL at 08:32

## 2022-01-01 RX ADMIN — ENOXAPARIN SODIUM 40 MG: 100 INJECTION SUBCUTANEOUS at 08:32

## 2022-01-01 RX ADMIN — ACETAMINOPHEN 650 MG: 325 TABLET ORAL at 01:48

## 2022-01-01 RX ADMIN — CYCLOBENZAPRINE 5 MG: 10 TABLET, FILM COATED ORAL at 01:53

## 2022-01-01 RX ADMIN — DEXTROSE MONOHYDRATE 100 MG: 50 INJECTION, SOLUTION INTRAVENOUS at 11:14

## 2022-01-01 RX ADMIN — ATROPINE SULFATE 0.5 MG: 0.4 INJECTION, SOLUTION INTRAMUSCULAR; INTRAVENOUS; SUBCUTANEOUS at 12:50

## 2022-01-01 RX ADMIN — Medication 400 MG: at 08:05

## 2022-01-01 RX ADMIN — METOPROLOL SUCCINATE 25 MG: 25 TABLET, EXTENDED RELEASE ORAL at 08:55

## 2022-01-01 RX ADMIN — ACETAMINOPHEN 650 MG: 325 TABLET ORAL at 06:07

## 2022-01-01 RX ADMIN — OXYCODONE HYDROCHLORIDE AND ACETAMINOPHEN 1 TABLET: 7.5; 325 TABLET ORAL at 22:48

## 2022-01-01 RX ADMIN — PALONOSETRON HYDROCHLORIDE 0.25 MG: 0.25 INJECTION, SOLUTION INTRAVENOUS at 11:11

## 2022-01-01 RX ADMIN — DEXTROSE MONOHYDRATE 100 MG: 50 INJECTION, SOLUTION INTRAVENOUS at 10:24

## 2022-01-01 RX ADMIN — ACETAMINOPHEN 650 MG: 325 TABLET ORAL at 16:39

## 2022-01-01 RX ADMIN — Medication 1 CAPSULE: at 07:54

## 2022-01-01 RX ADMIN — HYDROMORPHONE HYDROCHLORIDE 1 MG: 1 INJECTION, SOLUTION INTRAMUSCULAR; INTRAVENOUS; SUBCUTANEOUS at 13:04

## 2022-01-01 RX ADMIN — ACETAMINOPHEN 650 MG: 325 TABLET ORAL at 14:39

## 2022-01-01 RX ADMIN — POLYETHYLENE GLYCOL 3350 17 G: 17 POWDER, FOR SOLUTION ORAL at 20:23

## 2022-01-01 RX ADMIN — ONDANSETRON HYDROCHLORIDE 4 MG: 4 TABLET, FILM COATED ORAL at 21:42

## 2022-01-01 RX ADMIN — OXYCODONE 15 MG: 5 TABLET ORAL at 00:31

## 2022-01-01 RX ADMIN — OXYCODONE HYDROCHLORIDE AND ACETAMINOPHEN 1 TABLET: 7.5; 325 TABLET ORAL at 18:11

## 2022-01-01 RX ADMIN — Medication 1 CAPSULE: at 08:15

## 2022-01-01 RX ADMIN — ACETAMINOPHEN 650 MG: 325 TABLET, FILM COATED ORAL at 02:54

## 2022-01-01 RX ADMIN — HYDROCODONE BITARTRATE AND ACETAMINOPHEN 1 TABLET: 7.5; 325 TABLET ORAL at 03:33

## 2022-01-01 RX ADMIN — ALPRAZOLAM 0.5 MG: 0.5 TABLET ORAL at 23:29

## 2022-01-01 RX ADMIN — LEVOFLOXACIN 500 MG: 500 TABLET, FILM COATED ORAL at 08:56

## 2022-01-01 RX ADMIN — HYDROMORPHONE HYDROCHLORIDE 1 MG: 1 INJECTION, SOLUTION INTRAMUSCULAR; INTRAVENOUS; SUBCUTANEOUS at 10:41

## 2022-01-01 RX ADMIN — Medication 1 CAPSULE: at 08:14

## 2022-01-01 RX ADMIN — DEXTROSE MONOHYDRATE: 50 INJECTION, SOLUTION INTRAVENOUS at 13:31

## 2022-01-01 RX ADMIN — SODIUM BICARBONATE 650 MG: 650 TABLET ORAL at 16:24

## 2022-01-01 RX ADMIN — DULOXETINE HYDROCHLORIDE 30 MG: 30 CAPSULE, DELAYED RELEASE ORAL at 09:16

## 2022-01-01 RX ADMIN — PROMETHAZINE HYDROCHLORIDE 12.5 MG: 12.5 TABLET ORAL at 23:59

## 2022-01-01 RX ADMIN — SODIUM BICARBONATE 650 MG: 650 TABLET ORAL at 08:43

## 2022-01-01 RX ADMIN — Medication 1 TABLET: at 11:42

## 2022-01-01 RX ADMIN — PANTOPRAZOLE SODIUM 40 MG: 40 TABLET, DELAYED RELEASE ORAL at 06:33

## 2022-01-01 RX ADMIN — ALPRAZOLAM 0.5 MG: 0.5 TABLET ORAL at 14:33

## 2022-01-01 RX ADMIN — METOPROLOL SUCCINATE 25 MG: 25 TABLET, EXTENDED RELEASE ORAL at 07:36

## 2022-01-01 RX ADMIN — LORAZEPAM 0.5 MG: 2 INJECTION INTRAMUSCULAR; INTRAVENOUS at 04:25

## 2022-01-01 RX ADMIN — PANTOPRAZOLE SODIUM 40 MG: 40 TABLET, DELAYED RELEASE ORAL at 05:10

## 2022-01-01 RX ADMIN — DEXTROSE MONOHYDRATE 100 MG: 50 INJECTION, SOLUTION INTRAVENOUS at 09:28

## 2022-01-01 RX ADMIN — HYDROCODONE BITARTRATE AND ACETAMINOPHEN 1 TABLET: 7.5; 325 TABLET ORAL at 02:47

## 2022-01-01 RX ADMIN — SODIUM BICARBONATE 650 MG: 650 TABLET ORAL at 13:47

## 2022-01-01 RX ADMIN — SODIUM BICARBONATE 650 MG: 650 TABLET ORAL at 08:54

## 2022-01-01 RX ADMIN — ENOXAPARIN SODIUM 30 MG: 100 INJECTION SUBCUTANEOUS at 07:55

## 2022-01-01 RX ADMIN — MIDAZOLAM 1 MG: 1 INJECTION INTRAMUSCULAR; INTRAVENOUS at 09:21

## 2022-01-01 RX ADMIN — HYDROMORPHONE HYDROCHLORIDE 1 MG: 1 INJECTION, SOLUTION INTRAMUSCULAR; INTRAVENOUS; SUBCUTANEOUS at 16:19

## 2022-01-01 RX ADMIN — ENOXAPARIN SODIUM 30 MG: 100 INJECTION SUBCUTANEOUS at 18:32

## 2022-01-01 RX ADMIN — OXYMETAZOLINE HCL 2 SPRAY: 0.05 SPRAY NASAL at 08:38

## 2022-01-01 RX ADMIN — POTASSIUM CHLORIDE AND SODIUM CHLORIDE: 900; 150 INJECTION, SOLUTION INTRAVENOUS at 15:32

## 2022-01-01 RX ADMIN — PANTOPRAZOLE SODIUM 40 MG: 40 TABLET, DELAYED RELEASE ORAL at 20:45

## 2022-01-01 RX ADMIN — MAGNESIUM SULFATE HEPTAHYDRATE 4000 MG: 40 INJECTION, SOLUTION INTRAVENOUS at 09:23

## 2022-01-01 RX ADMIN — FLUCONAZOLE IN SODIUM CHLORIDE 200 MG: 2 INJECTION, SOLUTION INTRAVENOUS at 15:54

## 2022-01-01 RX ADMIN — DEXTROSE MONOHYDRATE 100 MG: 50 INJECTION, SOLUTION INTRAVENOUS at 12:33

## 2022-01-01 RX ADMIN — PROMETHAZINE HYDROCHLORIDE 12.5 MG: 12.5 TABLET ORAL at 06:56

## 2022-01-01 RX ADMIN — DEXAMETHASONE SODIUM PHOSPHATE 10 MG: 10 INJECTION, SOLUTION INTRAMUSCULAR; INTRAVENOUS at 10:49

## 2022-01-01 RX ADMIN — SODIUM CHLORIDE, PRESERVATIVE FREE 10 ML: 5 INJECTION INTRAVENOUS at 20:54

## 2022-01-01 RX ADMIN — SODIUM CHLORIDE, PRESERVATIVE FREE 10 ML: 5 INJECTION INTRAVENOUS at 07:23

## 2022-01-01 RX ADMIN — SODIUM CHLORIDE 1000 ML: 9 INJECTION, SOLUTION INTRAVENOUS at 08:50

## 2022-01-01 RX ADMIN — WATER 2.2 ML: 1 INJECTION INTRAMUSCULAR; INTRAVENOUS; SUBCUTANEOUS at 10:52

## 2022-01-01 RX ADMIN — SODIUM BICARBONATE 650 MG: 650 TABLET ORAL at 07:36

## 2022-01-01 RX ADMIN — METOPROLOL SUCCINATE 50 MG: 50 TABLET, EXTENDED RELEASE ORAL at 08:44

## 2022-01-01 RX ADMIN — MEROPENEM AND SODIUM CHLORIDE 1000 MG: 1 INJECTION, SOLUTION INTRAVENOUS at 18:20

## 2022-01-01 RX ADMIN — SODIUM CHLORIDE, PRESERVATIVE FREE 40 MG: 5 INJECTION INTRAVENOUS at 20:51

## 2022-01-01 RX ADMIN — SODIUM CHLORIDE, PRESERVATIVE FREE 10 ML: 5 INJECTION INTRAVENOUS at 08:55

## 2022-01-01 RX ADMIN — SODIUM BICARBONATE 650 MG: 650 TABLET ORAL at 11:42

## 2022-01-01 RX ADMIN — MAGNESIUM SULFATE HEPTAHYDRATE 2000 MG: 40 INJECTION, SOLUTION INTRAVENOUS at 16:30

## 2022-01-01 RX ADMIN — PROMETHAZINE HYDROCHLORIDE 12.5 MG: 12.5 TABLET ORAL at 12:30

## 2022-01-01 RX ADMIN — PROPOFOL 150 MG: 10 INJECTION, EMULSION INTRAVENOUS at 08:32

## 2022-01-01 RX ADMIN — SODIUM CHLORIDE, PRESERVATIVE FREE 10 ML: 5 INJECTION INTRAVENOUS at 20:08

## 2022-01-01 RX ADMIN — Medication 1 CAPSULE: at 14:38

## 2022-01-01 RX ADMIN — PROMETHAZINE HYDROCHLORIDE 12.5 MG: 12.5 TABLET ORAL at 06:00

## 2022-01-01 RX ADMIN — SODIUM CHLORIDE, PRESERVATIVE FREE 10 ML: 5 INJECTION INTRAVENOUS at 23:57

## 2022-01-01 RX ADMIN — PIPERACILLIN AND TAZOBACTAM 3375 MG: 3; .375 INJECTION, POWDER, LYOPHILIZED, FOR SOLUTION INTRAVENOUS at 06:32

## 2022-01-01 RX ADMIN — DEXTROSE MONOHYDRATE 200 MG: 50 INJECTION, SOLUTION INTRAVENOUS at 10:59

## 2022-01-01 RX ADMIN — SODIUM BICARBONATE: 84 INJECTION, SOLUTION INTRAVENOUS at 11:51

## 2022-01-01 RX ADMIN — SODIUM CHLORIDE, PRESERVATIVE FREE 40 MG: 5 INJECTION INTRAVENOUS at 20:09

## 2022-01-01 RX ADMIN — METOPROLOL SUCCINATE 25 MG: 25 TABLET, EXTENDED RELEASE ORAL at 08:04

## 2022-01-01 RX ADMIN — MEROPENEM AND SODIUM CHLORIDE 1000 MG: 1 INJECTION, SOLUTION INTRAVENOUS at 12:32

## 2022-01-01 RX ADMIN — HYDROMORPHONE HYDROCHLORIDE 4 MG: 2 TABLET ORAL at 02:22

## 2022-01-01 RX ADMIN — OXYCODONE 15 MG: 5 TABLET ORAL at 22:53

## 2022-01-01 RX ADMIN — ACETAMINOPHEN 650 MG: 325 TABLET ORAL at 19:52

## 2022-01-01 RX ADMIN — SODIUM CHLORIDE, PRESERVATIVE FREE 10 ML: 5 INJECTION INTRAVENOUS at 08:32

## 2022-01-01 RX ADMIN — ACETAMINOPHEN 650 MG: 325 TABLET ORAL at 04:45

## 2022-01-01 RX ADMIN — DULOXETINE 30 MG: 30 CAPSULE, DELAYED RELEASE ORAL at 08:04

## 2022-01-01 RX ADMIN — PANTOPRAZOLE SODIUM 40 MG: 40 TABLET, DELAYED RELEASE ORAL at 05:57

## 2022-01-01 RX ADMIN — SODIUM CHLORIDE, PRESERVATIVE FREE 10 ML: 5 INJECTION INTRAVENOUS at 07:52

## 2022-01-01 RX ADMIN — CALCIUM GLUCONATE 2000 MG: 20 INJECTION, SOLUTION INTRAVENOUS at 02:21

## 2022-01-01 RX ADMIN — ALPRAZOLAM 0.5 MG: 0.5 TABLET ORAL at 00:38

## 2022-01-01 RX ADMIN — ONDANSETRON HYDROCHLORIDE 4 MG: 2 SOLUTION INTRAMUSCULAR; INTRAVENOUS at 12:27

## 2022-01-01 RX ADMIN — SODIUM CHLORIDE: 9 INJECTION, SOLUTION INTRAVENOUS at 10:35

## 2022-01-01 RX ADMIN — OXYCODONE HYDROCHLORIDE AND ACETAMINOPHEN 1 TABLET: 7.5; 325 TABLET ORAL at 22:54

## 2022-01-01 RX ADMIN — ENOXAPARIN SODIUM 40 MG: 100 INJECTION SUBCUTANEOUS at 08:14

## 2022-01-01 RX ADMIN — CYCLOBENZAPRINE 10 MG: 10 TABLET, FILM COATED ORAL at 15:15

## 2022-01-01 RX ADMIN — OXYCODONE 15 MG: 5 TABLET ORAL at 14:57

## 2022-01-01 RX ADMIN — HYDROMORPHONE HYDROCHLORIDE 1 MG: 1 INJECTION, SOLUTION INTRAMUSCULAR; INTRAVENOUS; SUBCUTANEOUS at 10:20

## 2022-01-01 RX ADMIN — SODIUM CHLORIDE, PRESERVATIVE FREE 40 MG: 5 INJECTION INTRAVENOUS at 10:47

## 2022-01-01 RX ADMIN — MAGNESIUM SULFATE HEPTAHYDRATE 2000 MG: 40 INJECTION, SOLUTION INTRAVENOUS at 20:12

## 2022-01-01 RX ADMIN — CALCIUM GLUCONATE 2000 MG: 20 INJECTION, SOLUTION INTRAVENOUS at 08:55

## 2022-01-01 RX ADMIN — DEXTROSE MONOHYDRATE: 50 INJECTION, SOLUTION INTRAVENOUS at 12:49

## 2022-01-01 RX ADMIN — LIDOCAINE HYDROCHLORIDE 50 MG: 10 INJECTION, SOLUTION EPIDURAL; INFILTRATION; INTRACAUDAL; PERINEURAL at 08:32

## 2022-01-01 RX ADMIN — ONDANSETRON 8 MG: 2 INJECTION INTRAMUSCULAR; INTRAVENOUS at 14:42

## 2022-01-01 RX ADMIN — METOPROLOL SUCCINATE 50 MG: 50 TABLET, EXTENDED RELEASE ORAL at 16:40

## 2022-01-01 RX ADMIN — SENNOSIDES AND DOCUSATE SODIUM 2 TABLET: 50; 8.6 TABLET ORAL at 22:03

## 2022-01-01 RX ADMIN — SENNOSIDES AND DOCUSATE SODIUM 2 TABLET: 50; 8.6 TABLET ORAL at 11:02

## 2022-01-01 RX ADMIN — SODIUM BICARBONATE 650 MG: 650 TABLET ORAL at 17:21

## 2022-01-01 RX ADMIN — ONDANSETRON HYDROCHLORIDE 4 MG: 2 SOLUTION INTRAMUSCULAR; INTRAVENOUS at 03:48

## 2022-01-01 RX ADMIN — HYDROMORPHONE HYDROCHLORIDE 1 MG: 1 INJECTION, SOLUTION INTRAMUSCULAR; INTRAVENOUS; SUBCUTANEOUS at 17:30

## 2022-01-01 RX ADMIN — HEPARIN 500 UNITS: 100 SYRINGE at 15:20

## 2022-01-01 RX ADMIN — ENOXAPARIN SODIUM 30 MG: 100 INJECTION SUBCUTANEOUS at 16:40

## 2022-01-01 RX ADMIN — ACETAMINOPHEN 650 MG: 325 TABLET ORAL at 18:46

## 2022-01-01 RX ADMIN — ACETAMINOPHEN 650 MG: 325 TABLET ORAL at 13:05

## 2022-01-01 RX ADMIN — TRAMADOL HYDROCHLORIDE 25 MG: 50 TABLET, COATED ORAL at 06:00

## 2022-01-01 RX ADMIN — CYCLOBENZAPRINE 10 MG: 10 TABLET, FILM COATED ORAL at 13:39

## 2022-01-01 RX ADMIN — SODIUM CHLORIDE, PRESERVATIVE FREE 20 ML: 5 INJECTION INTRAVENOUS at 13:05

## 2022-01-01 RX ADMIN — DEXTROSE MONOHYDRATE 100 MG: 50 INJECTION, SOLUTION INTRAVENOUS at 10:41

## 2022-01-01 RX ADMIN — DULOXETINE HYDROCHLORIDE 30 MG: 30 CAPSULE, DELAYED RELEASE ORAL at 07:36

## 2022-01-01 RX ADMIN — METOPROLOL SUCCINATE 25 MG: 25 TABLET, EXTENDED RELEASE ORAL at 07:55

## 2022-01-01 RX ADMIN — Medication 1 TABLET: at 08:43

## 2022-01-01 RX ADMIN — SODIUM CHLORIDE: 9 INJECTION, SOLUTION INTRAVENOUS at 03:06

## 2022-01-01 RX ADMIN — SODIUM CHLORIDE 1000 ML: 9 INJECTION, SOLUTION INTRAVENOUS at 13:06

## 2022-01-01 RX ADMIN — OXYCODONE 15 MG: 5 TABLET ORAL at 06:22

## 2022-01-01 RX ADMIN — CEFTRIAXONE 1000 MG: 1 INJECTION, POWDER, FOR SOLUTION INTRAMUSCULAR; INTRAVENOUS at 16:41

## 2022-01-01 RX ADMIN — TROSPIUM CHLORIDE 20 MG: 20 TABLET, FILM COATED ORAL at 19:58

## 2022-01-01 RX ADMIN — ENOXAPARIN SODIUM 30 MG: 100 INJECTION SUBCUTANEOUS at 15:00

## 2022-01-01 RX ADMIN — MAGNESIUM SULFATE HEPTAHYDRATE 4000 MG: 4 INJECTION, SOLUTION INTRAVENOUS at 11:59

## 2022-01-01 RX ADMIN — ACETAMINOPHEN 650 MG: 325 TABLET ORAL at 01:53

## 2022-01-01 RX ADMIN — SODIUM BICARBONATE 650 MG: 650 TABLET ORAL at 12:35

## 2022-01-01 RX ADMIN — SODIUM CHLORIDE, PRESERVATIVE FREE 40 MG: 5 INJECTION INTRAVENOUS at 11:11

## 2022-01-01 RX ADMIN — HYDROCHLOROTHIAZIDE 6.25 MG: 25 TABLET ORAL at 10:28

## 2022-01-01 RX ADMIN — CARVEDILOL 6.25 MG: 6.25 TABLET, FILM COATED ORAL at 19:04

## 2022-01-01 RX ADMIN — Medication 1 CAPSULE: at 10:27

## 2022-01-01 RX ADMIN — SUGAMMADEX 200 MG: 100 INJECTION, SOLUTION INTRAVENOUS at 08:40

## 2022-01-01 RX ADMIN — SODIUM CHLORIDE, PRESERVATIVE FREE 10 ML: 5 INJECTION INTRAVENOUS at 09:43

## 2022-01-01 RX ADMIN — LOPERAMIDE HYDROCHLORIDE 2 MG: 2 CAPSULE ORAL at 23:59

## 2022-01-01 RX ADMIN — SODIUM CHLORIDE, PRESERVATIVE FREE 10 ML: 5 INJECTION INTRAVENOUS at 20:39

## 2022-01-01 RX ADMIN — SODIUM BICARBONATE 650 MG: 650 TABLET ORAL at 20:15

## 2022-01-01 RX ADMIN — OXYCODONE 15 MG: 5 TABLET ORAL at 17:19

## 2022-01-01 RX ADMIN — SODIUM BICARBONATE 650 MG: 650 TABLET ORAL at 16:56

## 2022-01-01 RX ADMIN — OXYCODONE 15 MG: 5 TABLET ORAL at 10:27

## 2022-01-01 RX ADMIN — ACETAMINOPHEN 650 MG: 325 TABLET ORAL at 05:57

## 2022-01-01 RX ADMIN — OXYCODONE HYDROCHLORIDE AND ACETAMINOPHEN 1 TABLET: 7.5; 325 TABLET ORAL at 07:59

## 2022-01-01 RX ADMIN — SODIUM BICARBONATE 650 MG: 650 TABLET ORAL at 17:18

## 2022-01-01 RX ADMIN — SODIUM BICARBONATE 650 MG: 650 TABLET ORAL at 18:30

## 2022-01-01 RX ADMIN — LEUCOVORIN CALCIUM 750 MG: 500 INJECTION, POWDER, LYOPHILIZED, FOR SOLUTION INTRAMUSCULAR; INTRAVENOUS at 13:33

## 2022-01-01 RX ADMIN — ACETAMINOPHEN 650 MG: 325 TABLET ORAL at 20:39

## 2022-01-01 RX ADMIN — SODIUM CHLORIDE, PRESERVATIVE FREE 10 ML: 5 INJECTION INTRAVENOUS at 21:08

## 2022-01-01 RX ADMIN — FOSAPREPITANT 150 MG: 150 INJECTION, POWDER, LYOPHILIZED, FOR SOLUTION INTRAVENOUS at 11:09

## 2022-01-01 RX ADMIN — SODIUM CHLORIDE: 9 INJECTION, SOLUTION INTRAVENOUS at 16:18

## 2022-01-01 RX ADMIN — SODIUM BICARBONATE 650 MG: 650 TABLET ORAL at 20:29

## 2022-01-01 RX ADMIN — HYDROMORPHONE HYDROCHLORIDE 2 MG: 2 TABLET ORAL at 18:06

## 2022-01-01 RX ADMIN — TRAMADOL HYDROCHLORIDE 50 MG: 50 TABLET, COATED ORAL at 10:27

## 2022-01-01 RX ADMIN — ALPRAZOLAM 0.5 MG: 0.5 TABLET ORAL at 07:55

## 2022-01-01 RX ADMIN — ACETAMINOPHEN 650 MG: 325 TABLET ORAL at 16:45

## 2022-01-01 RX ADMIN — HYDROCHLOROTHIAZIDE 6.25 MG: 25 TABLET ORAL at 07:55

## 2022-01-01 RX ADMIN — CYCLOBENZAPRINE 10 MG: 10 TABLET, FILM COATED ORAL at 01:32

## 2022-01-01 RX ADMIN — SODIUM BICARBONATE 650 MG: 650 TABLET ORAL at 20:08

## 2022-01-01 RX ADMIN — HYDROMORPHONE HYDROCHLORIDE 4 MG: 2 TABLET ORAL at 19:58

## 2022-01-01 RX ADMIN — DIAZEPAM 10 MG: 5 TABLET ORAL at 10:41

## 2022-01-01 RX ADMIN — HYDROCHLOROTHIAZIDE 6.25 MG: 25 TABLET ORAL at 09:38

## 2022-01-01 RX ADMIN — ALPRAZOLAM 0.5 MG: 0.5 TABLET ORAL at 17:30

## 2022-01-01 RX ADMIN — ACETAMINOPHEN 650 MG: 325 TABLET ORAL at 17:23

## 2022-01-01 RX ADMIN — OXYCODONE HYDROCHLORIDE 20 MG: 10 TABLET, FILM COATED, EXTENDED RELEASE ORAL at 09:38

## 2022-01-01 RX ADMIN — MAGNESIUM SULFATE HEPTAHYDRATE 2000 MG: 40 INJECTION, SOLUTION INTRAVENOUS at 03:08

## 2022-01-01 RX ADMIN — ONDANSETRON HYDROCHLORIDE 4 MG: 2 SOLUTION INTRAMUSCULAR; INTRAVENOUS at 20:51

## 2022-01-01 RX ADMIN — HYDROMORPHONE HYDROCHLORIDE 4 MG: 2 TABLET ORAL at 11:06

## 2022-01-01 RX ADMIN — PANTOPRAZOLE SODIUM 40 MG: 40 TABLET, DELAYED RELEASE ORAL at 07:16

## 2022-01-01 RX ADMIN — ENOXAPARIN SODIUM 30 MG: 100 INJECTION SUBCUTANEOUS at 07:16

## 2022-01-01 RX ADMIN — ACETAMINOPHEN 650 MG: 325 TABLET ORAL at 22:04

## 2022-01-01 RX ADMIN — PROMETHAZINE HYDROCHLORIDE 12.5 MG: 12.5 TABLET ORAL at 17:21

## 2022-01-01 RX ADMIN — OXYCODONE HYDROCHLORIDE AND ACETAMINOPHEN 1 TABLET: 7.5; 325 TABLET ORAL at 15:04

## 2022-01-01 RX ADMIN — ACETAMINOPHEN 650 MG: 325 TABLET ORAL at 02:51

## 2022-01-01 RX ADMIN — SODIUM BICARBONATE 650 MG: 650 TABLET ORAL at 08:08

## 2022-01-01 RX ADMIN — DULOXETINE HYDROCHLORIDE 30 MG: 30 CAPSULE, DELAYED RELEASE ORAL at 14:39

## 2022-01-01 RX ADMIN — Medication 1 CAPSULE: at 08:03

## 2022-01-01 RX ADMIN — SODIUM CHLORIDE, PRESERVATIVE FREE 40 MG: 5 INJECTION INTRAVENOUS at 09:19

## 2022-01-01 RX ADMIN — OXYCODONE 15 MG: 5 TABLET ORAL at 14:36

## 2022-01-01 RX ADMIN — FLUCONAZOLE IN SODIUM CHLORIDE 200 MG: 2 INJECTION, SOLUTION INTRAVENOUS at 19:02

## 2022-01-01 RX ADMIN — ROCURONIUM BROMIDE 30 MG: 10 INJECTION, SOLUTION INTRAVENOUS at 08:12

## 2022-01-01 RX ADMIN — ENOXAPARIN SODIUM 40 MG: 100 INJECTION SUBCUTANEOUS at 08:02

## 2022-01-01 RX ADMIN — HYDROMORPHONE HYDROCHLORIDE 1 MG: 1 INJECTION, SOLUTION INTRAMUSCULAR; INTRAVENOUS; SUBCUTANEOUS at 20:10

## 2022-01-01 RX ADMIN — HEPARIN 500 UNITS: 100 SYRINGE at 13:13

## 2022-01-01 RX ADMIN — OXYCODONE 15 MG: 5 TABLET ORAL at 08:15

## 2022-01-01 RX ADMIN — ONDANSETRON 4 MG: 2 INJECTION INTRAMUSCULAR; INTRAVENOUS at 08:23

## 2022-01-01 RX ADMIN — ONDANSETRON HYDROCHLORIDE 4 MG: 2 SOLUTION INTRAMUSCULAR; INTRAVENOUS at 12:09

## 2022-01-01 RX ADMIN — SODIUM CHLORIDE, PRESERVATIVE FREE 10 ML: 5 INJECTION INTRAVENOUS at 10:44

## 2022-01-01 RX ADMIN — HYDROMORPHONE HYDROCHLORIDE 0.5 MG: 1 INJECTION, SOLUTION INTRAMUSCULAR; INTRAVENOUS; SUBCUTANEOUS at 15:35

## 2022-01-01 RX ADMIN — PANTOPRAZOLE SODIUM 40 MG: 40 TABLET, DELAYED RELEASE ORAL at 06:13

## 2022-01-01 RX ADMIN — SODIUM CHLORIDE, PRESERVATIVE FREE 10 ML: 5 INJECTION INTRAVENOUS at 21:00

## 2022-01-01 RX ADMIN — ACETAMINOPHEN 650 MG: 325 TABLET ORAL at 14:23

## 2022-01-01 RX ADMIN — SODIUM CHLORIDE, PRESERVATIVE FREE 40 MG: 5 INJECTION INTRAVENOUS at 22:02

## 2022-01-01 RX ADMIN — ACETAMINOPHEN 650 MG: 325 TABLET ORAL at 23:46

## 2022-01-01 RX ADMIN — CYCLOBENZAPRINE 10 MG: 10 TABLET, FILM COATED ORAL at 22:11

## 2022-01-01 RX ADMIN — CALCITRIOL CAPSULES 0.25 MCG 0.25 MCG: 0.25 CAPSULE ORAL at 08:43

## 2022-01-01 RX ADMIN — HYDROMORPHONE HYDROCHLORIDE 1 MG: 1 INJECTION, SOLUTION INTRAMUSCULAR; INTRAVENOUS; SUBCUTANEOUS at 09:35

## 2022-01-01 RX ADMIN — ACETAMINOPHEN 650 MG: 325 TABLET ORAL at 04:05

## 2022-01-01 RX ADMIN — SODIUM CHLORIDE 1000 ML: 9 INJECTION, SOLUTION INTRAVENOUS at 11:45

## 2022-01-01 RX ADMIN — SODIUM BICARBONATE 650 MG: 650 TABLET ORAL at 20:23

## 2022-01-01 RX ADMIN — HYDROMORPHONE HYDROCHLORIDE 1 MG: 1 INJECTION, SOLUTION INTRAMUSCULAR; INTRAVENOUS; SUBCUTANEOUS at 15:03

## 2022-01-01 RX ADMIN — Medication 1 CAPSULE: at 08:43

## 2022-01-01 RX ADMIN — LEVOFLOXACIN 500 MG: 500 TABLET, FILM COATED ORAL at 14:39

## 2022-01-01 RX ADMIN — HYDROCHLOROTHIAZIDE 6.25 MG: 25 TABLET ORAL at 08:08

## 2022-01-01 RX ADMIN — Medication 1 CAPSULE: at 09:16

## 2022-01-01 RX ADMIN — DEXTROSE MONOHYDRATE 100 MG: 50 INJECTION, SOLUTION INTRAVENOUS at 10:58

## 2022-01-01 RX ADMIN — HEPARIN SODIUM 5000 UNITS: 5000 INJECTION INTRAVENOUS; SUBCUTANEOUS at 06:39

## 2022-01-01 RX ADMIN — SODIUM CHLORIDE, PRESERVATIVE FREE 10 ML: 5 INJECTION INTRAVENOUS at 20:34

## 2022-01-01 RX ADMIN — FUROSEMIDE 40 MG: 40 TABLET ORAL at 08:32

## 2022-01-01 RX ADMIN — PROMETHAZINE HYDROCHLORIDE 12.5 MG: 25 INJECTION INTRAMUSCULAR; INTRAVENOUS at 18:31

## 2022-01-01 RX ADMIN — CALCITRIOL CAPSULES 0.25 MCG 0.25 MCG: 0.25 CAPSULE ORAL at 07:55

## 2022-01-01 RX ADMIN — ACETAMINOPHEN 650 MG: 325 TABLET ORAL at 12:30

## 2022-01-01 RX ADMIN — HYDROCHLOROTHIAZIDE 6.25 MG: 25 TABLET ORAL at 08:54

## 2022-01-01 RX ADMIN — TRAMADOL HYDROCHLORIDE 50 MG: 50 TABLET, COATED ORAL at 02:48

## 2022-01-01 RX ADMIN — HYDROMORPHONE HYDROCHLORIDE 1 MG: 1 INJECTION, SOLUTION INTRAMUSCULAR; INTRAVENOUS; SUBCUTANEOUS at 20:07

## 2022-01-01 RX ADMIN — SODIUM CHLORIDE, PRESERVATIVE FREE 10 ML: 5 INJECTION INTRAVENOUS at 23:12

## 2022-01-01 RX ADMIN — Medication 2 MCG/MIN: at 18:28

## 2022-01-01 RX ADMIN — SODIUM BICARBONATE 650 MG: 650 TABLET ORAL at 17:30

## 2022-01-01 RX ADMIN — HYDROCODONE BITARTRATE AND ACETAMINOPHEN 1 TABLET: 7.5; 325 TABLET ORAL at 19:19

## 2022-01-01 RX ADMIN — SODIUM CHLORIDE, PRESERVATIVE FREE 10 ML: 5 INJECTION INTRAVENOUS at 22:03

## 2022-01-01 RX ADMIN — PANITUMUMAB 444 MG: 400 SOLUTION INTRAVENOUS at 11:38

## 2022-01-01 RX ADMIN — HYDROMORPHONE HYDROCHLORIDE 1 MG: 1 INJECTION, SOLUTION INTRAMUSCULAR; INTRAVENOUS; SUBCUTANEOUS at 23:19

## 2022-01-01 RX ADMIN — OXYCODONE 15 MG: 5 TABLET ORAL at 16:24

## 2022-01-01 RX ADMIN — SODIUM BICARBONATE 650 MG: 650 TABLET ORAL at 20:54

## 2022-01-01 RX ADMIN — LIDOCAINE HYDROCHLORIDE AND EPINEPHRINE 20 ML: 10; 10 INJECTION, SOLUTION INFILTRATION; PERINEURAL at 17:46

## 2022-01-01 RX ADMIN — FERROUS SULFATE TAB 325 MG (65 MG ELEMENTAL FE) 325 MG: 325 (65 FE) TAB at 17:30

## 2022-01-01 RX ADMIN — FUROSEMIDE 20 MG: 20 TABLET ORAL at 14:56

## 2022-01-01 RX ADMIN — SODIUM CHLORIDE, PRESERVATIVE FREE 10 ML: 5 INJECTION INTRAVENOUS at 09:30

## 2022-01-01 RX ADMIN — DEXTROSE AND SODIUM CHLORIDE: 5; 450 INJECTION, SOLUTION INTRAVENOUS at 14:51

## 2022-01-01 RX ADMIN — SODIUM CHLORIDE, PRESERVATIVE FREE 40 MG: 5 INJECTION INTRAVENOUS at 09:00

## 2022-01-01 RX ADMIN — OXYCODONE 15 MG: 5 TABLET ORAL at 14:23

## 2022-01-01 RX ADMIN — LEVOFLOXACIN 500 MG: 500 TABLET, FILM COATED ORAL at 10:13

## 2022-01-01 RX ADMIN — HYDROMORPHONE HYDROCHLORIDE 1 MG: 2 TABLET ORAL at 13:03

## 2022-01-01 RX ADMIN — CEFTAZIDIME, AVIBACTAM 0.94 G: 2; .5 POWDER, FOR SOLUTION INTRAVENOUS at 20:45

## 2022-01-01 RX ADMIN — MAGNESIUM SULFATE HEPTAHYDRATE 2000 MG: 40 INJECTION, SOLUTION INTRAVENOUS at 01:59

## 2022-01-01 RX ADMIN — FUROSEMIDE 20 MG: 20 TABLET ORAL at 19:04

## 2022-01-01 RX ADMIN — SODIUM BICARBONATE 650 MG: 650 TABLET ORAL at 07:23

## 2022-01-01 RX ADMIN — METOPROLOL TARTRATE 2.5 MG: 5 INJECTION INTRAVENOUS at 01:48

## 2022-01-01 RX ADMIN — FENTANYL CITRATE 50 MCG: 50 INJECTION, SOLUTION INTRAMUSCULAR; INTRAVENOUS at 08:15

## 2022-01-01 RX ADMIN — Medication 30 ML: at 19:00

## 2022-01-01 RX ADMIN — SODIUM BICARBONATE 650 MG: 650 TABLET ORAL at 16:29

## 2022-01-01 RX ADMIN — SODIUM BICARBONATE 650 MG: 650 TABLET ORAL at 15:01

## 2022-01-01 RX ADMIN — SODIUM BICARBONATE 650 MG: 650 TABLET ORAL at 23:12

## 2022-01-01 RX ADMIN — PIPERACILLIN AND TAZOBACTAM 3375 MG: 3; .375 INJECTION, POWDER, LYOPHILIZED, FOR SOLUTION INTRAVENOUS at 22:33

## 2022-01-01 RX ADMIN — ONDANSETRON HYDROCHLORIDE 4 MG: 2 SOLUTION INTRAMUSCULAR; INTRAVENOUS at 13:11

## 2022-01-01 RX ADMIN — Medication 1 CAPSULE: at 09:37

## 2022-01-01 RX ADMIN — HYDROMORPHONE HYDROCHLORIDE 4 MG: 2 TABLET ORAL at 11:53

## 2022-01-01 RX ADMIN — CYCLOBENZAPRINE 5 MG: 10 TABLET, FILM COATED ORAL at 19:51

## 2022-01-01 RX ADMIN — HYDROMORPHONE HYDROCHLORIDE 2 MG: 2 TABLET ORAL at 22:11

## 2022-01-01 RX ADMIN — PANTOPRAZOLE SODIUM 40 MG: 40 TABLET, DELAYED RELEASE ORAL at 05:35

## 2022-01-01 RX ADMIN — SODIUM BICARBONATE 650 MG: 650 TABLET ORAL at 20:38

## 2022-01-01 RX ADMIN — CARVEDILOL 6.25 MG: 6.25 TABLET, FILM COATED ORAL at 19:00

## 2022-01-01 RX ADMIN — POLYETHYLENE GLYCOL 3350 17 G: 17 POWDER, FOR SOLUTION ORAL at 08:02

## 2022-01-01 RX ADMIN — CYCLOBENZAPRINE 10 MG: 10 TABLET, FILM COATED ORAL at 21:19

## 2022-01-01 RX ADMIN — Medication 1 TABLET: at 08:15

## 2022-01-01 RX ADMIN — DULOXETINE HYDROCHLORIDE 30 MG: 30 CAPSULE, DELAYED RELEASE ORAL at 07:54

## 2022-01-01 RX ADMIN — OXYCODONE 15 MG: 5 TABLET ORAL at 18:11

## 2022-01-01 RX ADMIN — OXYCODONE 15 MG: 5 TABLET ORAL at 00:08

## 2022-01-01 RX ADMIN — SODIUM CHLORIDE, SODIUM LACTATE, POTASSIUM CHLORIDE, AND CALCIUM CHLORIDE: 600; 310; 30; 20 INJECTION, SOLUTION INTRAVENOUS at 07:58

## 2022-01-01 RX ADMIN — METOPROLOL SUCCINATE 50 MG: 50 TABLET, EXTENDED RELEASE ORAL at 08:15

## 2022-01-01 RX ADMIN — OXYCODONE HYDROCHLORIDE AND ACETAMINOPHEN 1 TABLET: 7.5; 325 TABLET ORAL at 15:12

## 2022-01-01 RX ADMIN — PHENYLEPHRINE HYDROCHLORIDE 10 MCG/MIN: 10 INJECTION INTRAVENOUS at 00:15

## 2022-01-01 RX ADMIN — CARVEDILOL 6.25 MG: 6.25 TABLET, FILM COATED ORAL at 07:55

## 2022-01-01 RX ADMIN — TRAMADOL HYDROCHLORIDE 25 MG: 50 TABLET, COATED ORAL at 18:45

## 2022-01-01 RX ADMIN — SODIUM BICARBONATE 650 MG: 650 TABLET ORAL at 17:10

## 2022-01-01 RX ADMIN — TRAMADOL HYDROCHLORIDE 50 MG: 50 TABLET, COATED ORAL at 20:53

## 2022-01-01 RX ADMIN — SODIUM PHOSPHATE, MONOBASIC, MONOHYDRATE 15 MMOL: 276; 142 INJECTION, SOLUTION INTRAVENOUS at 13:20

## 2022-01-01 RX ADMIN — DULOXETINE HYDROCHLORIDE 30 MG: 30 CAPSULE, DELAYED RELEASE ORAL at 08:03

## 2022-01-01 RX ADMIN — METOPROLOL SUCCINATE 25 MG: 25 TABLET, EXTENDED RELEASE ORAL at 08:21

## 2022-01-01 RX ADMIN — SODIUM BICARBONATE: 84 INJECTION, SOLUTION INTRAVENOUS at 15:17

## 2022-01-01 RX ADMIN — OXYCODONE 15 MG: 5 TABLET ORAL at 18:28

## 2022-01-01 RX ADMIN — ALTEPLASE 1 MG: 2.2 INJECTION, POWDER, LYOPHILIZED, FOR SOLUTION INTRAVENOUS at 10:52

## 2022-01-01 RX ADMIN — OXYCODONE 15 MG: 5 TABLET ORAL at 11:48

## 2022-01-01 RX ADMIN — SODIUM BICARBONATE 650 MG: 650 TABLET ORAL at 16:30

## 2022-01-01 RX ADMIN — HYDROMORPHONE HYDROCHLORIDE 1 MG: 1 INJECTION, SOLUTION INTRAMUSCULAR; INTRAVENOUS; SUBCUTANEOUS at 23:30

## 2022-01-01 RX ADMIN — TRAMADOL HYDROCHLORIDE 25 MG: 50 TABLET, COATED ORAL at 12:37

## 2022-01-01 RX ADMIN — SODIUM BICARBONATE 650 MG: 650 TABLET ORAL at 19:51

## 2022-01-01 RX ADMIN — ALPRAZOLAM 0.5 MG: 0.5 TABLET ORAL at 22:11

## 2022-01-01 RX ADMIN — CALCIUM GLUCONATE 1000 MG: 20 INJECTION, SOLUTION INTRAVENOUS at 05:37

## 2022-01-01 RX ADMIN — ENOXAPARIN SODIUM 30 MG: 100 INJECTION SUBCUTANEOUS at 14:39

## 2022-01-01 RX ADMIN — HYDROCODONE BITARTRATE AND ACETAMINOPHEN 1 TABLET: 7.5; 325 TABLET ORAL at 21:33

## 2022-01-01 RX ADMIN — ONDANSETRON HYDROCHLORIDE 4 MG: 2 SOLUTION INTRAMUSCULAR; INTRAVENOUS at 08:27

## 2022-01-01 RX ADMIN — CEFTRIAXONE 1000 MG: 1 INJECTION, POWDER, FOR SOLUTION INTRAMUSCULAR; INTRAVENOUS at 14:55

## 2022-01-01 RX ADMIN — CYCLOBENZAPRINE 5 MG: 10 TABLET, FILM COATED ORAL at 04:09

## 2022-01-01 RX ADMIN — SODIUM BICARBONATE 650 MG: 650 TABLET ORAL at 12:33

## 2022-01-01 RX ADMIN — SODIUM CHLORIDE, PRESERVATIVE FREE 20 ML: 5 INJECTION INTRAVENOUS at 13:13

## 2022-01-01 RX ADMIN — HEPARIN SODIUM 5000 UNITS: 5000 INJECTION INTRAVENOUS; SUBCUTANEOUS at 22:28

## 2022-01-01 RX ADMIN — OXYCODONE 15 MG: 5 TABLET ORAL at 15:54

## 2022-01-01 RX ADMIN — MAGNESIUM SULFATE HEPTAHYDRATE 2000 MG: 40 INJECTION, SOLUTION INTRAVENOUS at 15:00

## 2022-01-01 RX ADMIN — SODIUM CHLORIDE, PRESERVATIVE FREE 10 ML: 5 INJECTION INTRAVENOUS at 06:32

## 2022-01-01 RX ADMIN — FENTANYL CITRATE 25 MCG: 50 INJECTION, SOLUTION INTRAMUSCULAR; INTRAVENOUS at 09:23

## 2022-01-01 RX ADMIN — OXYCODONE 15 MG: 5 TABLET ORAL at 02:49

## 2022-01-01 RX ADMIN — ACETAMINOPHEN 650 MG: 325 TABLET ORAL at 19:37

## 2022-01-01 RX ADMIN — Medication 1 CAPSULE: at 08:08

## 2022-01-01 RX ADMIN — PANTOPRAZOLE SODIUM 40 MG: 40 TABLET, DELAYED RELEASE ORAL at 06:03

## 2022-01-01 RX ADMIN — HYDROMORPHONE HYDROCHLORIDE 1 MG: 1 INJECTION, SOLUTION INTRAMUSCULAR; INTRAVENOUS; SUBCUTANEOUS at 16:30

## 2022-01-01 RX ADMIN — CALCITRIOL CAPSULES 0.25 MCG 0.25 MCG: 0.25 CAPSULE ORAL at 10:42

## 2022-01-01 RX ADMIN — ACETAMINOPHEN 650 MG: 325 TABLET ORAL at 14:59

## 2022-01-01 RX ADMIN — SODIUM BICARBONATE 650 MG: 650 TABLET ORAL at 16:02

## 2022-01-01 RX ADMIN — TRAMADOL HYDROCHLORIDE 25 MG: 50 TABLET, COATED ORAL at 22:17

## 2022-01-01 RX ADMIN — OXYCODONE 15 MG: 5 TABLET ORAL at 20:29

## 2022-01-01 RX ADMIN — ACETAMINOPHEN 650 MG: 325 TABLET, FILM COATED ORAL at 19:57

## 2022-01-01 RX ADMIN — SODIUM CHLORIDE: 9 INJECTION, SOLUTION INTRAVENOUS at 15:03

## 2022-01-01 RX ADMIN — SODIUM BICARBONATE 650 MG: 650 TABLET ORAL at 13:04

## 2022-01-01 RX ADMIN — SODIUM CHLORIDE, SODIUM LACTATE, POTASSIUM CHLORIDE, AND CALCIUM CHLORIDE: 600; 310; 30; 20 INJECTION, SOLUTION INTRAVENOUS at 11:11

## 2022-01-01 RX ADMIN — ONDANSETRON 4 MG: 2 INJECTION INTRAMUSCULAR; INTRAVENOUS at 06:44

## 2022-01-01 RX ADMIN — ACETAMINOPHEN 650 MG: 325 TABLET ORAL at 13:28

## 2022-01-01 RX ADMIN — TRAMADOL HYDROCHLORIDE 25 MG: 50 TABLET, COATED ORAL at 05:08

## 2022-01-01 RX ADMIN — SODIUM CHLORIDE, PRESERVATIVE FREE 10 ML: 5 INJECTION INTRAVENOUS at 19:59

## 2022-01-01 RX ADMIN — METOPROLOL SUCCINATE 25 MG: 25 TABLET, EXTENDED RELEASE ORAL at 08:08

## 2022-01-01 RX ADMIN — CIPROFLOXACIN 400 MG: 2 INJECTION, SOLUTION INTRAVENOUS at 11:31

## 2022-01-01 RX ADMIN — HYDROCODONE BITARTRATE AND ACETAMINOPHEN 1 TABLET: 7.5; 325 TABLET ORAL at 09:09

## 2022-01-01 RX ADMIN — CIPROFLOXACIN 400 MG: 2 INJECTION, SOLUTION INTRAVENOUS at 10:10

## 2022-01-01 RX ADMIN — ONDANSETRON 4 MG: 2 INJECTION INTRAMUSCULAR; INTRAVENOUS at 09:32

## 2022-01-01 RX ADMIN — Medication 5 MG: at 19:57

## 2022-01-01 RX ADMIN — SODIUM CHLORIDE, POTASSIUM CHLORIDE, SODIUM LACTATE AND CALCIUM CHLORIDE 1000 ML: 600; 310; 30; 20 INJECTION, SOLUTION INTRAVENOUS at 07:48

## 2022-01-01 RX ADMIN — SODIUM BICARBONATE 650 MG: 650 TABLET ORAL at 20:34

## 2022-01-01 RX ADMIN — ACETAMINOPHEN 650 MG: 325 TABLET ORAL at 20:08

## 2022-01-01 RX ADMIN — ACETAMINOPHEN 650 MG: 325 TABLET ORAL at 21:08

## 2022-01-01 RX ADMIN — PROMETHAZINE HYDROCHLORIDE 12.5 MG: 12.5 TABLET ORAL at 00:46

## 2022-01-01 RX ADMIN — SODIUM BICARBONATE 650 MG: 650 TABLET ORAL at 16:39

## 2022-01-01 RX ADMIN — SODIUM CHLORIDE, PRESERVATIVE FREE 10 ML: 5 INJECTION INTRAVENOUS at 13:43

## 2022-01-01 RX ADMIN — DULOXETINE 30 MG: 30 CAPSULE, DELAYED RELEASE ORAL at 11:42

## 2022-01-01 RX ADMIN — ACETAMINOPHEN 650 MG: 325 TABLET ORAL at 19:59

## 2022-01-01 RX ADMIN — PIPERACILLIN AND TAZOBACTAM 3375 MG: 3; .375 INJECTION, POWDER, LYOPHILIZED, FOR SOLUTION INTRAVENOUS at 05:43

## 2022-01-01 RX ADMIN — SODIUM BICARBONATE 650 MG: 650 TABLET ORAL at 08:11

## 2022-01-01 RX ADMIN — ALPRAZOLAM 0.5 MG: 0.5 TABLET ORAL at 18:35

## 2022-01-01 RX ADMIN — ALPRAZOLAM 0.5 MG: 0.5 TABLET ORAL at 05:02

## 2022-01-01 RX ADMIN — POLYETHYLENE GLYCOL 3350 17 G: 17 POWDER, FOR SOLUTION ORAL at 20:51

## 2022-01-01 RX ADMIN — OXYCODONE HYDROCHLORIDE AND ACETAMINOPHEN 1 TABLET: 7.5; 325 TABLET ORAL at 09:24

## 2022-01-01 RX ADMIN — HYDROMORPHONE HYDROCHLORIDE 1 MG: 1 INJECTION, SOLUTION INTRAMUSCULAR; INTRAVENOUS; SUBCUTANEOUS at 03:19

## 2022-01-01 RX ADMIN — HYDROMORPHONE HYDROCHLORIDE 0.5 MG: 1 INJECTION, SOLUTION INTRAMUSCULAR; INTRAVENOUS; SUBCUTANEOUS at 06:10

## 2022-01-01 RX ADMIN — ANTACID TABLETS 500 MG: 500 TABLET, CHEWABLE ORAL at 12:39

## 2022-01-01 RX ADMIN — CEFTAZIDIME, AVIBACTAM 0.94 G: 2; .5 POWDER, FOR SOLUTION INTRAVENOUS at 20:49

## 2022-01-01 RX ADMIN — OXYCODONE HYDROCHLORIDE 10 MG: 10 TABLET, FILM COATED, EXTENDED RELEASE ORAL at 21:34

## 2022-01-01 RX ADMIN — OXYCODONE HYDROCHLORIDE 10 MG: 10 TABLET, FILM COATED, EXTENDED RELEASE ORAL at 08:14

## 2022-01-01 RX ADMIN — SODIUM BICARBONATE 650 MG: 650 TABLET ORAL at 16:53

## 2022-01-01 RX ADMIN — SODIUM BICARBONATE 650 MG: 650 TABLET ORAL at 22:03

## 2022-01-01 RX ADMIN — CYCLOBENZAPRINE 5 MG: 10 TABLET, FILM COATED ORAL at 20:51

## 2022-01-01 RX ADMIN — POTASSIUM CHLORIDE AND SODIUM CHLORIDE: 900; 150 INJECTION, SOLUTION INTRAVENOUS at 04:31

## 2022-01-01 RX ADMIN — SODIUM BICARBONATE 650 MG: 650 TABLET ORAL at 20:58

## 2022-01-01 RX ADMIN — SODIUM BICARBONATE 650 MG: 650 TABLET ORAL at 21:19

## 2022-01-01 RX ADMIN — ENOXAPARIN SODIUM 30 MG: 100 INJECTION SUBCUTANEOUS at 14:36

## 2022-01-01 RX ADMIN — LIDOCAINE HYDROCHLORIDE 80 MG: 10 INJECTION, SOLUTION EPIDURAL; INFILTRATION; INTRACAUDAL; PERINEURAL at 08:04

## 2022-01-01 RX ADMIN — SODIUM CHLORIDE, PRESERVATIVE FREE 10 ML: 5 INJECTION INTRAVENOUS at 20:16

## 2022-01-01 RX ADMIN — ACETAMINOPHEN 650 MG: 325 TABLET ORAL at 00:32

## 2022-01-01 RX ADMIN — ORPHENADRINE CITRATE 30 MG: 30 INJECTION INTRAMUSCULAR; INTRAVENOUS at 06:10

## 2022-01-01 RX ADMIN — ACETAMINOPHEN 650 MG: 325 TABLET ORAL at 15:32

## 2022-01-01 RX ADMIN — SODIUM CHLORIDE 25 ML: 9 INJECTION, SOLUTION INTRAVENOUS at 09:27

## 2022-01-01 RX ADMIN — ANTACID TABLETS 500 MG: 500 TABLET, CHEWABLE ORAL at 08:41

## 2022-01-01 RX ADMIN — HYDROCHLOROTHIAZIDE 6.25 MG: 25 TABLET ORAL at 14:39

## 2022-01-01 RX ADMIN — SODIUM CHLORIDE, PRESERVATIVE FREE 40 MG: 5 INJECTION INTRAVENOUS at 14:57

## 2022-01-01 RX ADMIN — PANTOPRAZOLE SODIUM 40 MG: 40 TABLET, DELAYED RELEASE ORAL at 15:06

## 2022-01-01 RX ADMIN — SENNOSIDES AND DOCUSATE SODIUM 2 TABLET: 50; 8.6 TABLET ORAL at 20:23

## 2022-01-01 RX ADMIN — METOPROLOL SUCCINATE 50 MG: 50 TABLET, EXTENDED RELEASE ORAL at 11:42

## 2022-01-01 RX ADMIN — ALPRAZOLAM 0.5 MG: 0.5 TABLET ORAL at 15:12

## 2022-01-01 RX ADMIN — ACETAMINOPHEN 650 MG: 325 TABLET ORAL at 10:45

## 2022-01-01 RX ADMIN — ALPRAZOLAM 0.5 MG: 0.5 TABLET ORAL at 21:57

## 2022-01-01 RX ADMIN — HYDROMORPHONE HYDROCHLORIDE 1 MG: 1 INJECTION, SOLUTION INTRAMUSCULAR; INTRAVENOUS; SUBCUTANEOUS at 13:35

## 2022-01-01 RX ADMIN — ONDANSETRON 4 MG: 2 INJECTION INTRAMUSCULAR; INTRAVENOUS at 23:56

## 2022-01-01 RX ADMIN — CYCLOBENZAPRINE 5 MG: 10 TABLET, FILM COATED ORAL at 22:54

## 2022-01-01 RX ADMIN — HYDROMORPHONE HYDROCHLORIDE 1 MG: 1 INJECTION, SOLUTION INTRAMUSCULAR; INTRAVENOUS; SUBCUTANEOUS at 00:40

## 2022-01-01 RX ADMIN — SODIUM CHLORIDE, PRESERVATIVE FREE 10 ML: 5 INJECTION INTRAVENOUS at 20:30

## 2022-01-01 RX ADMIN — POLYETHYLENE GLYCOL 3350 17 G: 17 POWDER, FOR SOLUTION ORAL at 22:02

## 2022-01-01 RX ADMIN — SODIUM CHLORIDE, PRESERVATIVE FREE 10 ML: 5 INJECTION INTRAVENOUS at 11:47

## 2022-01-01 RX ADMIN — ONDANSETRON HYDROCHLORIDE 4 MG: 2 SOLUTION INTRAMUSCULAR; INTRAVENOUS at 07:47

## 2022-01-01 RX ADMIN — TRAMADOL HYDROCHLORIDE 25 MG: 50 TABLET, COATED ORAL at 19:38

## 2022-01-01 RX ADMIN — OXYCODONE HYDROCHLORIDE AND ACETAMINOPHEN 1 TABLET: 7.5; 325 TABLET ORAL at 22:11

## 2022-01-01 RX ADMIN — CYCLOBENZAPRINE 5 MG: 10 TABLET, FILM COATED ORAL at 19:39

## 2022-01-01 RX ADMIN — SODIUM CHLORIDE, PRESERVATIVE FREE 40 MG: 5 INJECTION INTRAVENOUS at 23:02

## 2022-01-01 RX ADMIN — DAPTOMYCIN 350 MG: 500 INJECTION, POWDER, LYOPHILIZED, FOR SOLUTION INTRAVENOUS at 18:26

## 2022-01-01 RX ADMIN — PIPERACILLIN AND TAZOBACTAM 3375 MG: 3; .375 INJECTION, POWDER, LYOPHILIZED, FOR SOLUTION INTRAVENOUS at 23:01

## 2022-01-01 RX ADMIN — CEFTRIAXONE 1000 MG: 1 INJECTION, POWDER, FOR SOLUTION INTRAMUSCULAR; INTRAVENOUS at 14:46

## 2022-01-01 RX ADMIN — VANCOMYCIN HYDROCHLORIDE 1000 MG: 10 INJECTION, POWDER, LYOPHILIZED, FOR SOLUTION INTRAVENOUS at 14:04

## 2022-01-01 RX ADMIN — HYDROMORPHONE HYDROCHLORIDE 4 MG: 2 TABLET ORAL at 18:39

## 2022-01-01 RX ADMIN — PROMETHAZINE HYDROCHLORIDE 12.5 MG: 25 INJECTION INTRAMUSCULAR; INTRAVENOUS at 04:06

## 2022-01-01 RX ADMIN — ACETAMINOPHEN 650 MG: 325 TABLET ORAL at 08:54

## 2022-01-01 RX ADMIN — OXYCODONE HYDROCHLORIDE AND ACETAMINOPHEN 1 TABLET: 7.5; 325 TABLET ORAL at 10:23

## 2022-01-01 RX ADMIN — CYCLOBENZAPRINE 5 MG: 10 TABLET, FILM COATED ORAL at 20:23

## 2022-01-01 RX ADMIN — ONDANSETRON 4 MG: 2 INJECTION INTRAMUSCULAR; INTRAVENOUS at 03:29

## 2022-01-01 RX ADMIN — PHENYLEPHRINE HYDROCHLORIDE 80 MCG: 10 INJECTION INTRAVENOUS at 08:46

## 2022-01-01 RX ADMIN — DEXTROSE MONOHYDRATE 100 MG: 50 INJECTION, SOLUTION INTRAVENOUS at 11:39

## 2022-01-01 RX ADMIN — TRAMADOL HYDROCHLORIDE 25 MG: 50 TABLET, COATED ORAL at 22:53

## 2022-01-01 RX ADMIN — DOCUSATE SODIUM 50 MG AND SENNOSIDES 8.6 MG 1 TABLET: 8.6; 5 TABLET, FILM COATED ORAL at 21:57

## 2022-01-01 RX ADMIN — DULOXETINE HYDROCHLORIDE 30 MG: 30 CAPSULE, DELAYED RELEASE ORAL at 08:13

## 2022-01-01 RX ADMIN — SODIUM CHLORIDE, POTASSIUM CHLORIDE, SODIUM LACTATE AND CALCIUM CHLORIDE: 600; 310; 30; 20 INJECTION, SOLUTION INTRAVENOUS at 09:17

## 2022-01-01 RX ADMIN — METOPROLOL SUCCINATE 25 MG: 25 TABLET, EXTENDED RELEASE ORAL at 14:19

## 2022-01-01 RX ADMIN — CYCLOBENZAPRINE 5 MG: 10 TABLET, FILM COATED ORAL at 00:20

## 2022-01-01 RX ADMIN — ACETAMINOPHEN 650 MG: 325 TABLET ORAL at 06:56

## 2022-01-01 RX ADMIN — TRAMADOL HYDROCHLORIDE 25 MG: 50 TABLET, COATED ORAL at 00:21

## 2022-01-01 RX ADMIN — HYDROMORPHONE HYDROCHLORIDE 1 MG: 1 INJECTION, SOLUTION INTRAMUSCULAR; INTRAVENOUS; SUBCUTANEOUS at 06:38

## 2022-01-01 RX ADMIN — SODIUM BICARBONATE 650 MG: 650 TABLET ORAL at 08:03

## 2022-01-01 RX ADMIN — SODIUM BICARBONATE: 84 INJECTION, SOLUTION INTRAVENOUS at 00:00

## 2022-01-01 RX ADMIN — SODIUM BICARBONATE 650 MG: 650 TABLET ORAL at 23:31

## 2022-01-01 RX ADMIN — HEPARIN 500 UNITS: 100 SYRINGE at 13:05

## 2022-01-01 RX ADMIN — PANTOPRAZOLE SODIUM 40 MG: 40 TABLET, DELAYED RELEASE ORAL at 07:34

## 2022-01-01 RX ADMIN — DEXTROSE MONOHYDRATE 100 MG: 50 INJECTION, SOLUTION INTRAVENOUS at 10:00

## 2022-01-01 RX ADMIN — SODIUM BICARBONATE 650 MG: 650 TABLET ORAL at 12:30

## 2022-01-01 RX ADMIN — HYDROMORPHONE HYDROCHLORIDE 4 MG: 2 TABLET ORAL at 13:28

## 2022-01-01 RX ADMIN — SODIUM BICARBONATE: 84 INJECTION, SOLUTION INTRAVENOUS at 14:33

## 2022-01-01 ASSESSMENT — ENCOUNTER SYMPTOMS
COUGH: 0
NAUSEA: 0
DIARRHEA: 0
CONSTIPATION: 0
VOMITING: 0
WHEEZING: 0
VOMITING: 0
CONSTIPATION: 0
BACK PAIN: 1
ABDOMINAL PAIN: 0
WHEEZING: 0
BACK PAIN: 0
BACK PAIN: 0
RESPIRATORY NEGATIVE: 1
SHORTNESS OF BREATH: 0
NAUSEA: 0
SHORTNESS OF BREATH: 0
ABDOMINAL PAIN: 0
COLOR CHANGE: 0
COUGH: 0
ABDOMINAL PAIN: 0
WHEEZING: 0
DIARRHEA: 0
ABDOMINAL DISTENTION: 0
SHORTNESS OF BREATH: 0
VOICE CHANGE: 0
SHORTNESS OF BREATH: 0
COUGH: 0
EYE REDNESS: 0
EYE PAIN: 0
VOMITING: 0
BACK PAIN: 0
VOICE CHANGE: 0
BACK PAIN: 1
DIARRHEA: 0
BLOOD IN STOOL: 0
BACK PAIN: 1
COUGH: 0
GASTROINTESTINAL NEGATIVE: 1
SHORTNESS OF BREATH: 0
COLOR CHANGE: 0
EYES NEGATIVE: 1
BACK PAIN: 0
ABDOMINAL PAIN: 0
VOMITING: 0
DIARRHEA: 0
EYE PAIN: 0
SHORTNESS OF BREATH: 1
BLOOD IN STOOL: 0
ALLERGIC/IMMUNOLOGIC NEGATIVE: 1
EYE REDNESS: 0
CONSTIPATION: 0
CONSTIPATION: 0
ABDOMINAL PAIN: 1
COUGH: 0
NAUSEA: 1
EYE REDNESS: 0
NAUSEA: 1
SHORTNESS OF BREATH: 0
VOMITING: 0
ABDOMINAL DISTENTION: 0
EYES NEGATIVE: 1
GASTROINTESTINAL NEGATIVE: 1
RHINORRHEA: 0
VOICE CHANGE: 0
BLOOD IN STOOL: 0
VOICE CHANGE: 0
WHEEZING: 0
DIARRHEA: 0
EYE PAIN: 0
VOMITING: 0
CHOKING: 0
ABDOMINAL DISTENTION: 0
BACK PAIN: 0
NAUSEA: 0
ALLERGIC/IMMUNOLOGIC NEGATIVE: 1
NAUSEA: 1
NAUSEA: 1
COLOR CHANGE: 0
VOMITING: 0
BACK PAIN: 0
COLOR CHANGE: 0
CONSTIPATION: 1
SHORTNESS OF BREATH: 0
BACK PAIN: 1
RESPIRATORY NEGATIVE: 1
VOMITING: 1
FACIAL SWELLING: 0
COLOR CHANGE: 0
EYES NEGATIVE: 1
VOICE CHANGE: 0
CONSTIPATION: 0
COLOR CHANGE: 0
COLOR CHANGE: 0
VOICE CHANGE: 0
VOMITING: 1
BACK PAIN: 0
VOICE CHANGE: 0
SORE THROAT: 0
BLOOD IN STOOL: 0
COUGH: 0
ABDOMINAL DISTENTION: 1
EYES NEGATIVE: 1
NAUSEA: 0
EYE PAIN: 0
VOICE CHANGE: 0
SHORTNESS OF BREATH: 0
CONSTIPATION: 0
RESPIRATORY NEGATIVE: 1
DIARRHEA: 0
NAUSEA: 0
DIARRHEA: 0
VOMITING: 0
EYES NEGATIVE: 1
ABDOMINAL DISTENTION: 0
GASTROINTESTINAL NEGATIVE: 1
ABDOMINAL PAIN: 0
RHINORRHEA: 0
EYES NEGATIVE: 1
VOMITING: 0
RESPIRATORY NEGATIVE: 1
EYE REDNESS: 0
ABDOMINAL DISTENTION: 0
DIARRHEA: 0
NAUSEA: 0
CONSTIPATION: 0
RESPIRATORY NEGATIVE: 1
NAUSEA: 0
NAUSEA: 0
VOMITING: 0
DIARRHEA: 0
RESPIRATORY NEGATIVE: 1
RESPIRATORY NEGATIVE: 1
APNEA: 0
CONSTIPATION: 0
ABDOMINAL PAIN: 0
CONSTIPATION: 0
SHORTNESS OF BREATH: 0
ABDOMINAL PAIN: 0
RESPIRATORY NEGATIVE: 1
COLOR CHANGE: 0
ABDOMINAL PAIN: 0
SINUS PRESSURE: 0
ABDOMINAL DISTENTION: 1
ABDOMINAL PAIN: 1
SORE THROAT: 0
CONSTIPATION: 0
EYES NEGATIVE: 1
DIARRHEA: 0
EYE DISCHARGE: 0
SHORTNESS OF BREATH: 1
ABDOMINAL PAIN: 0
VOMITING: 0
COLOR CHANGE: 0
NAUSEA: 0

## 2022-01-01 ASSESSMENT — PAIN DESCRIPTION - ORIENTATION
ORIENTATION: LEFT
ORIENTATION: RIGHT
ORIENTATION: ANTERIOR
ORIENTATION: RIGHT
ORIENTATION: RIGHT
ORIENTATION: LOWER
ORIENTATION: RIGHT
ORIENTATION: LOWER
ORIENTATION: RIGHT
ORIENTATION: LOWER
ORIENTATION: MID
ORIENTATION: RIGHT
ORIENTATION: RIGHT
ORIENTATION: MID
ORIENTATION: RIGHT
ORIENTATION: PROXIMAL
ORIENTATION: LOWER;RIGHT
ORIENTATION: RIGHT
ORIENTATION: MID
ORIENTATION: RIGHT
ORIENTATION: LOWER
ORIENTATION: LOWER
ORIENTATION: RIGHT
ORIENTATION: MID
ORIENTATION: RIGHT;LEFT
ORIENTATION: RIGHT
ORIENTATION: MID
ORIENTATION: RIGHT
ORIENTATION: MID
ORIENTATION: RIGHT
ORIENTATION: RIGHT
ORIENTATION: MID
ORIENTATION: MID
ORIENTATION: RIGHT
ORIENTATION: MID;LOWER
ORIENTATION: RIGHT
ORIENTATION: LOWER
ORIENTATION: RIGHT
ORIENTATION: RIGHT
ORIENTATION: MID
ORIENTATION: RIGHT
ORIENTATION: RIGHT
ORIENTATION: MID
ORIENTATION: RIGHT;LOWER
ORIENTATION: RIGHT
ORIENTATION: RIGHT
ORIENTATION: MID
ORIENTATION: RIGHT
ORIENTATION: RIGHT
ORIENTATION: RIGHT;LOWER
ORIENTATION: RIGHT
ORIENTATION: LOWER
ORIENTATION: RIGHT
ORIENTATION: MID
ORIENTATION: LOWER
ORIENTATION: RIGHT
ORIENTATION: LEFT;RIGHT
ORIENTATION: MID
ORIENTATION: RIGHT
ORIENTATION: MID
ORIENTATION: RIGHT
ORIENTATION: RIGHT
ORIENTATION: MID
ORIENTATION: MID
ORIENTATION: RIGHT
ORIENTATION: RIGHT
ORIENTATION: MID
ORIENTATION: RIGHT
ORIENTATION: MID
ORIENTATION: RIGHT
ORIENTATION: RIGHT
ORIENTATION: MID
ORIENTATION: RIGHT
ORIENTATION: LOWER
ORIENTATION: RIGHT
ORIENTATION: RIGHT
ORIENTATION: LOWER
ORIENTATION: RIGHT
ORIENTATION: LOWER
ORIENTATION: LOWER
ORIENTATION: RIGHT
ORIENTATION: MID
ORIENTATION: RIGHT
ORIENTATION: RIGHT
ORIENTATION: LOWER
ORIENTATION: RIGHT
ORIENTATION: MID
ORIENTATION: RIGHT
ORIENTATION: MID
ORIENTATION: RIGHT
ORIENTATION: MID

## 2022-01-01 ASSESSMENT — PAIN SCALES - WONG BAKER
WONGBAKER_NUMERICALRESPONSE: 0
WONGBAKER_NUMERICALRESPONSE: 2
WONGBAKER_NUMERICALRESPONSE: 0
WONGBAKER_NUMERICALRESPONSE: 4
WONGBAKER_NUMERICALRESPONSE: 0

## 2022-01-01 ASSESSMENT — PAIN DESCRIPTION - LOCATION
LOCATION: BACK
LOCATION: KNEE
LOCATION: BACK
LOCATION: BACK
LOCATION: BACK;KNEE
LOCATION: KNEE
LOCATION: BACK;KNEE
LOCATION: KNEE
LOCATION: BACK
LOCATION: KNEE
LOCATION: KNEE
LOCATION: BACK
LOCATION: BACK
LOCATION: KNEE
LOCATION: ABDOMEN
LOCATION: BACK
LOCATION: KNEE
LOCATION: BACK
LOCATION: KNEE
LOCATION: BACK
LOCATION: KNEE
LOCATION: BACK
LOCATION: KNEE
LOCATION: GENERALIZED
LOCATION: KNEE
LOCATION: BACK;KNEE
LOCATION: KNEE
LOCATION: BACK
LOCATION: KNEE
LOCATION: BACK
LOCATION: GENERALIZED
LOCATION: LEG
LOCATION: BACK
LOCATION: KNEE
LOCATION: GENERALIZED
LOCATION: BACK
LOCATION: ABDOMEN
LOCATION: KNEE
LOCATION: KNEE;BACK
LOCATION: BACK
LOCATION: KNEE
LOCATION: BACK
LOCATION: BACK
LOCATION: KNEE
LOCATION: KNEE
LOCATION: BACK
LOCATION: KNEE
LOCATION: KNEE
LOCATION: BACK;KNEE
LOCATION: BACK
LOCATION: KNEE
LOCATION: KNEE;BACK
LOCATION: ABDOMEN
LOCATION: BACK
LOCATION: KNEE
LOCATION: BACK;KNEE
LOCATION: BACK;KNEE
LOCATION: KNEE
LOCATION: BACK
LOCATION: KNEE
LOCATION: BACK
LOCATION: BACK
LOCATION: KNEE
LOCATION: BACK
LOCATION: KNEE
LOCATION: KNEE
LOCATION: BACK
LOCATION: ABDOMEN;BACK
LOCATION: KNEE
LOCATION: BACK;KNEE
LOCATION: KNEE
LOCATION: BACK
LOCATION: BACK;KNEE
LOCATION: BACK
LOCATION: ABDOMEN
LOCATION: KNEE
LOCATION: KNEE
LOCATION: BACK
LOCATION: KNEE
LOCATION: BACK
LOCATION: KNEE
LOCATION: GENERALIZED
LOCATION: BACK
LOCATION: BACK
LOCATION: KNEE
LOCATION: BACK
LOCATION: KNEE
LOCATION: BACK
LOCATION: KNEE
LOCATION: KNEE
LOCATION: BACK
LOCATION: BACK
LOCATION: KNEE
LOCATION: KNEE
LOCATION: BACK
LOCATION: KNEE
LOCATION: BACK
LOCATION: KNEE
LOCATION: KNEE
LOCATION: BACK
LOCATION: KNEE
LOCATION: BACK
LOCATION: BACK
LOCATION: ABDOMEN
LOCATION: KNEE
LOCATION: KNEE
LOCATION: BACK;KNEE
LOCATION: KNEE
LOCATION: KNEE;BACK
LOCATION: BACK
LOCATION: KNEE

## 2022-01-01 ASSESSMENT — PAIN - FUNCTIONAL ASSESSMENT
PAIN_FUNCTIONAL_ASSESSMENT: ACTIVITIES ARE NOT PREVENTED
PAIN_FUNCTIONAL_ASSESSMENT: PREVENTS OR INTERFERES SOME ACTIVE ACTIVITIES AND ADLS
PAIN_FUNCTIONAL_ASSESSMENT: PREVENTS OR INTERFERES SOME ACTIVE ACTIVITIES AND ADLS
PAIN_FUNCTIONAL_ASSESSMENT: PREVENTS OR INTERFERES WITH MANY ACTIVE NOT PASSIVE ACTIVITIES
PAIN_FUNCTIONAL_ASSESSMENT: PREVENTS OR INTERFERES SOME ACTIVE ACTIVITIES AND ADLS
PAIN_FUNCTIONAL_ASSESSMENT: ACTIVITIES ARE NOT PREVENTED
PAIN_FUNCTIONAL_ASSESSMENT: PREVENTS OR INTERFERES SOME ACTIVE ACTIVITIES AND ADLS
PAIN_FUNCTIONAL_ASSESSMENT: ACTIVITIES ARE NOT PREVENTED
PAIN_FUNCTIONAL_ASSESSMENT: NONE - DENIES PAIN
PAIN_FUNCTIONAL_ASSESSMENT: PREVENTS OR INTERFERES SOME ACTIVE ACTIVITIES AND ADLS
PAIN_FUNCTIONAL_ASSESSMENT: ACTIVITIES ARE NOT PREVENTED
PAIN_FUNCTIONAL_ASSESSMENT: PREVENTS OR INTERFERES SOME ACTIVE ACTIVITIES AND ADLS
PAIN_FUNCTIONAL_ASSESSMENT: ACTIVITIES ARE NOT PREVENTED
PAIN_FUNCTIONAL_ASSESSMENT: PREVENTS OR INTERFERES SOME ACTIVE ACTIVITIES AND ADLS
PAIN_FUNCTIONAL_ASSESSMENT: PREVENTS OR INTERFERES SOME ACTIVE ACTIVITIES AND ADLS
PAIN_FUNCTIONAL_ASSESSMENT: ACTIVITIES ARE NOT PREVENTED
PAIN_FUNCTIONAL_ASSESSMENT: 0-10
PAIN_FUNCTIONAL_ASSESSMENT: ACTIVITIES ARE NOT PREVENTED
PAIN_FUNCTIONAL_ASSESSMENT: ACTIVITIES ARE NOT PREVENTED
PAIN_FUNCTIONAL_ASSESSMENT: PREVENTS OR INTERFERES SOME ACTIVE ACTIVITIES AND ADLS
PAIN_FUNCTIONAL_ASSESSMENT: ACTIVITIES ARE NOT PREVENTED
PAIN_FUNCTIONAL_ASSESSMENT: PREVENTS OR INTERFERES SOME ACTIVE ACTIVITIES AND ADLS
PAIN_FUNCTIONAL_ASSESSMENT: ACTIVITIES ARE NOT PREVENTED
PAIN_FUNCTIONAL_ASSESSMENT: PREVENTS OR INTERFERES SOME ACTIVE ACTIVITIES AND ADLS
PAIN_FUNCTIONAL_ASSESSMENT: ACTIVITIES ARE NOT PREVENTED
PAIN_FUNCTIONAL_ASSESSMENT: PREVENTS OR INTERFERES SOME ACTIVE ACTIVITIES AND ADLS
PAIN_FUNCTIONAL_ASSESSMENT: 0-10
PAIN_FUNCTIONAL_ASSESSMENT: ACTIVITIES ARE NOT PREVENTED
PAIN_FUNCTIONAL_ASSESSMENT: ACTIVITIES ARE NOT PREVENTED
PAIN_FUNCTIONAL_ASSESSMENT: PREVENTS OR INTERFERES SOME ACTIVE ACTIVITIES AND ADLS
PAIN_FUNCTIONAL_ASSESSMENT: ACTIVITIES ARE NOT PREVENTED
PAIN_FUNCTIONAL_ASSESSMENT: PREVENTS OR INTERFERES SOME ACTIVE ACTIVITIES AND ADLS
PAIN_FUNCTIONAL_ASSESSMENT: ACTIVITIES ARE NOT PREVENTED
PAIN_FUNCTIONAL_ASSESSMENT: PREVENTS OR INTERFERES SOME ACTIVE ACTIVITIES AND ADLS
PAIN_FUNCTIONAL_ASSESSMENT: PREVENTS OR INTERFERES SOME ACTIVE ACTIVITIES AND ADLS
PAIN_FUNCTIONAL_ASSESSMENT: ACTIVITIES ARE NOT PREVENTED
PAIN_FUNCTIONAL_ASSESSMENT: PREVENTS OR INTERFERES SOME ACTIVE ACTIVITIES AND ADLS
PAIN_FUNCTIONAL_ASSESSMENT: PREVENTS OR INTERFERES SOME ACTIVE ACTIVITIES AND ADLS
PAIN_FUNCTIONAL_ASSESSMENT: ACTIVITIES ARE NOT PREVENTED
PAIN_FUNCTIONAL_ASSESSMENT: PREVENTS OR INTERFERES SOME ACTIVE ACTIVITIES AND ADLS
PAIN_FUNCTIONAL_ASSESSMENT: ACTIVITIES ARE NOT PREVENTED

## 2022-01-01 ASSESSMENT — PAIN DESCRIPTION - ONSET
ONSET: ON-GOING

## 2022-01-01 ASSESSMENT — PAIN DESCRIPTION - DESCRIPTORS
DESCRIPTORS: ACHING;SORE
DESCRIPTORS: SHARP
DESCRIPTORS: CONSTANT
DESCRIPTORS: ACHING;SORE
DESCRIPTORS: ACHING
DESCRIPTORS: ACHING
DESCRIPTORS: SHARP;DULL
DESCRIPTORS: ACHING
DESCRIPTORS: ACHING;SORE
DESCRIPTORS: ACHING
DESCRIPTORS: ACHING;DISCOMFORT
DESCRIPTORS: ACHING;DISCOMFORT
DESCRIPTORS: BURNING;ACHING;SORE
DESCRIPTORS: SHARP
DESCRIPTORS: SHARP
DESCRIPTORS: ACHING
DESCRIPTORS: ACHING
DESCRIPTORS: ACHING;DISCOMFORT
DESCRIPTORS: DULL;SHARP
DESCRIPTORS: ACHING
DESCRIPTORS: DULL;SHARP
DESCRIPTORS: ACHING
DESCRIPTORS: ACHING
DESCRIPTORS: ACHING;SORE
DESCRIPTORS: ACHING
DESCRIPTORS: ACHING;DISCOMFORT
DESCRIPTORS: SHARP
DESCRIPTORS: SORE
DESCRIPTORS: ACHING
DESCRIPTORS: DISCOMFORT
DESCRIPTORS: ACHING;SHARP
DESCRIPTORS: SHARP
DESCRIPTORS: ACHING
DESCRIPTORS: ACHING
DESCRIPTORS: SHARP
DESCRIPTORS: ACHING;SHARP
DESCRIPTORS: DISCOMFORT;ACHING
DESCRIPTORS: ACHING
DESCRIPTORS: ACHING;SORE
DESCRIPTORS: ACHING
DESCRIPTORS: ACHING;SORE
DESCRIPTORS: ACHING;SORE
DESCRIPTORS: ACHING
DESCRIPTORS: ACHING;SORE
DESCRIPTORS: ACHING;SORE
DESCRIPTORS: ACHING
DESCRIPTORS: ACHING;CRAMPING
DESCRIPTORS: ACHING;SHARP
DESCRIPTORS: ACHING
DESCRIPTORS: ACHING
DESCRIPTORS: ACHING;BURNING
DESCRIPTORS: ACHING
DESCRIPTORS: ACHING
DESCRIPTORS: DISCOMFORT
DESCRIPTORS: SHARP
DESCRIPTORS: ACHING
DESCRIPTORS: SORE
DESCRIPTORS: ACHING
DESCRIPTORS: DISCOMFORT
DESCRIPTORS: ACHING
DESCRIPTORS: ACHING
DESCRIPTORS: SHARP
DESCRIPTORS: ACHING;SORE
DESCRIPTORS: ACHING;SORE
DESCRIPTORS: ACHING;SHARP
DESCRIPTORS: ACHING;SORE
DESCRIPTORS: ACHING
DESCRIPTORS: ACHING;SORE
DESCRIPTORS: ACHING;SORE
DESCRIPTORS: ACHING
DESCRIPTORS: ACHING;SORE
DESCRIPTORS: ACHING;THROBBING
DESCRIPTORS: CONSTANT
DESCRIPTORS: ACHING
DESCRIPTORS: SHARP
DESCRIPTORS: ACHING;DISCOMFORT
DESCRIPTORS: ACHING
DESCRIPTORS: ACHING;SHARP
DESCRIPTORS: ACHING
DESCRIPTORS: ACHING
DESCRIPTORS: ACHING;BURNING
DESCRIPTORS: ACHING
DESCRIPTORS: ACHING;SORE
DESCRIPTORS: ACHING
DESCRIPTORS: ACHING;BURNING
DESCRIPTORS: ACHING
DESCRIPTORS: ACHING
DESCRIPTORS: DULL;ACHING
DESCRIPTORS: ACHING
DESCRIPTORS: SHARP
DESCRIPTORS: ACHING;SORE
DESCRIPTORS: ACHING;DISCOMFORT
DESCRIPTORS: ACHING
DESCRIPTORS: ACHING;DISCOMFORT
DESCRIPTORS: ACHING
DESCRIPTORS: ACHING
DESCRIPTORS: SHARP
DESCRIPTORS: ACHING;BURNING
DESCRIPTORS: ACHING
DESCRIPTORS: ACHING;DISCOMFORT
DESCRIPTORS: ACHING;SORE
DESCRIPTORS: DULL;ACHING;SORE
DESCRIPTORS: SHARP
DESCRIPTORS: CONSTANT;SHARP
DESCRIPTORS: ACHING
DESCRIPTORS: ACHING;SORE
DESCRIPTORS: SHARP
DESCRIPTORS: SHARP
DESCRIPTORS: ACHING
DESCRIPTORS: ACHING;SPASM
DESCRIPTORS: ACHING

## 2022-01-01 ASSESSMENT — PAIN DESCRIPTION - PAIN TYPE
TYPE: CHRONIC PAIN
TYPE: CHRONIC PAIN
TYPE: ACUTE PAIN
TYPE: ACUTE PAIN
TYPE: CHRONIC PAIN
TYPE: ACUTE PAIN
TYPE: ACUTE PAIN
TYPE: CHRONIC PAIN
TYPE: CHRONIC PAIN
TYPE: ACUTE PAIN
TYPE: CHRONIC PAIN
TYPE: ACUTE PAIN
TYPE: CHRONIC PAIN
TYPE: ACUTE PAIN
TYPE: CHRONIC PAIN
TYPE: ACUTE PAIN
TYPE: ACUTE PAIN
TYPE: CHRONIC PAIN
TYPE: CHRONIC PAIN
TYPE: ACUTE PAIN
TYPE: ACUTE PAIN
TYPE: CHRONIC PAIN
TYPE: ACUTE PAIN

## 2022-01-01 ASSESSMENT — PAIN SCALES - GENERAL
PAINLEVEL_OUTOF10: 8
PAINLEVEL_OUTOF10: 9
PAINLEVEL_OUTOF10: 0
PAINLEVEL_OUTOF10: 0
PAINLEVEL_OUTOF10: 6
PAINLEVEL_OUTOF10: 6
PAINLEVEL_OUTOF10: 4
PAINLEVEL_OUTOF10: 6
PAINLEVEL_OUTOF10: 0
PAINLEVEL_OUTOF10: 0
PAINLEVEL_OUTOF10: 6
PAINLEVEL_OUTOF10: 3
PAINLEVEL_OUTOF10: 8
PAINLEVEL_OUTOF10: 0
PAINLEVEL_OUTOF10: 10
PAINLEVEL_OUTOF10: 0
PAINLEVEL_OUTOF10: 5
PAINLEVEL_OUTOF10: 6
PAINLEVEL_OUTOF10: 7
PAINLEVEL_OUTOF10: 6
PAINLEVEL_OUTOF10: 3
PAINLEVEL_OUTOF10: 0
PAINLEVEL_OUTOF10: 5
PAINLEVEL_OUTOF10: 0
PAINLEVEL_OUTOF10: 8
PAINLEVEL_OUTOF10: 7
PAINLEVEL_OUTOF10: 5
PAINLEVEL_OUTOF10: 8
PAINLEVEL_OUTOF10: 5
PAINLEVEL_OUTOF10: 4
PAINLEVEL_OUTOF10: 2
PAINLEVEL_OUTOF10: 6
PAINLEVEL_OUTOF10: 7
PAINLEVEL_OUTOF10: 7
PAINLEVEL_OUTOF10: 5
PAINLEVEL_OUTOF10: 4
PAINLEVEL_OUTOF10: 6
PAINLEVEL_OUTOF10: 6
PAINLEVEL_OUTOF10: 5
PAINLEVEL_OUTOF10: 6
PAINLEVEL_OUTOF10: 6
PAINLEVEL_OUTOF10: 7
PAINLEVEL_OUTOF10: 6
PAINLEVEL_OUTOF10: 6
PAINLEVEL_OUTOF10: 7
PAINLEVEL_OUTOF10: 0
PAINLEVEL_OUTOF10: 7
PAINLEVEL_OUTOF10: 6
PAINLEVEL_OUTOF10: 5
PAINLEVEL_OUTOF10: 0
PAINLEVEL_OUTOF10: 9
PAINLEVEL_OUTOF10: 5
PAINLEVEL_OUTOF10: 8
PAINLEVEL_OUTOF10: 5
PAINLEVEL_OUTOF10: 0
PAINLEVEL_OUTOF10: 5
PAINLEVEL_OUTOF10: 8
PAINLEVEL_OUTOF10: 3
PAINLEVEL_OUTOF10: 5
PAINLEVEL_OUTOF10: 0
PAINLEVEL_OUTOF10: 4
PAINLEVEL_OUTOF10: 8
PAINLEVEL_OUTOF10: 3
PAINLEVEL_OUTOF10: 7
PAINLEVEL_OUTOF10: 8
PAINLEVEL_OUTOF10: 0
PAINLEVEL_OUTOF10: 7
PAINLEVEL_OUTOF10: 4
PAINLEVEL_OUTOF10: 0
PAINLEVEL_OUTOF10: 6
PAINLEVEL_OUTOF10: 6
PAINLEVEL_OUTOF10: 7
PAINLEVEL_OUTOF10: 5
PAINLEVEL_OUTOF10: 5
PAINLEVEL_OUTOF10: 6
PAINLEVEL_OUTOF10: 0
PAINLEVEL_OUTOF10: 6
PAINLEVEL_OUTOF10: 6
PAINLEVEL_OUTOF10: 0
PAINLEVEL_OUTOF10: 6
PAINLEVEL_OUTOF10: 8
PAINLEVEL_OUTOF10: 7
PAINLEVEL_OUTOF10: 3
PAINLEVEL_OUTOF10: 4
PAINLEVEL_OUTOF10: 7
PAINLEVEL_OUTOF10: 6
PAINLEVEL_OUTOF10: 6
PAINLEVEL_OUTOF10: 5
PAINLEVEL_OUTOF10: 6
PAINLEVEL_OUTOF10: 6
PAINLEVEL_OUTOF10: 0
PAINLEVEL_OUTOF10: 0
PAINLEVEL_OUTOF10: 5
PAINLEVEL_OUTOF10: 7
PAINLEVEL_OUTOF10: 7
PAINLEVEL_OUTOF10: 8
PAINLEVEL_OUTOF10: 6
PAINLEVEL_OUTOF10: 9
PAINLEVEL_OUTOF10: 0
PAINLEVEL_OUTOF10: 2
PAINLEVEL_OUTOF10: 0
PAINLEVEL_OUTOF10: 0
PAINLEVEL_OUTOF10: 8
PAINLEVEL_OUTOF10: 4
PAINLEVEL_OUTOF10: 0
PAINLEVEL_OUTOF10: 4
PAINLEVEL_OUTOF10: 0
PAINLEVEL_OUTOF10: 4
PAINLEVEL_OUTOF10: 3
PAINLEVEL_OUTOF10: 7
PAINLEVEL_OUTOF10: 6
PAINLEVEL_OUTOF10: 3
PAINLEVEL_OUTOF10: 7
PAINLEVEL_OUTOF10: 7
PAINLEVEL_OUTOF10: 5
PAINLEVEL_OUTOF10: 3
PAINLEVEL_OUTOF10: 6
PAINLEVEL_OUTOF10: 3
PAINLEVEL_OUTOF10: 7
PAINLEVEL_OUTOF10: 0
PAINLEVEL_OUTOF10: 6
PAINLEVEL_OUTOF10: 2
PAINLEVEL_OUTOF10: 7
PAINLEVEL_OUTOF10: 7
PAINLEVEL_OUTOF10: 3
PAINLEVEL_OUTOF10: 5
PAINLEVEL_OUTOF10: 7
PAINLEVEL_OUTOF10: 8
PAINLEVEL_OUTOF10: 7
PAINLEVEL_OUTOF10: 7
PAINLEVEL_OUTOF10: 5
PAINLEVEL_OUTOF10: 6
PAINLEVEL_OUTOF10: 9
PAINLEVEL_OUTOF10: 0
PAINLEVEL_OUTOF10: 0
PAINLEVEL_OUTOF10: 6
PAINLEVEL_OUTOF10: 4
PAINLEVEL_OUTOF10: 0
PAINLEVEL_OUTOF10: 8
PAINLEVEL_OUTOF10: 5
PAINLEVEL_OUTOF10: 8
PAINLEVEL_OUTOF10: 0
PAINLEVEL_OUTOF10: 0
PAINLEVEL_OUTOF10: 6
PAINLEVEL_OUTOF10: 6
PAINLEVEL_OUTOF10: 5
PAINLEVEL_OUTOF10: 10
PAINLEVEL_OUTOF10: 0
PAINLEVEL_OUTOF10: 3
PAINLEVEL_OUTOF10: 8
PAINLEVEL_OUTOF10: 6
PAINLEVEL_OUTOF10: 8
PAINLEVEL_OUTOF10: 6
PAINLEVEL_OUTOF10: 7
PAINLEVEL_OUTOF10: 5
PAINLEVEL_OUTOF10: 8
PAINLEVEL_OUTOF10: 5
PAINLEVEL_OUTOF10: 3
PAINLEVEL_OUTOF10: 7
PAINLEVEL_OUTOF10: 6
PAINLEVEL_OUTOF10: 8
PAINLEVEL_OUTOF10: 5
PAINLEVEL_OUTOF10: 8
PAINLEVEL_OUTOF10: 6
PAINLEVEL_OUTOF10: 9
PAINLEVEL_OUTOF10: 3
PAINLEVEL_OUTOF10: 3
PAINLEVEL_OUTOF10: 6
PAINLEVEL_OUTOF10: 0
PAINLEVEL_OUTOF10: 6
PAINLEVEL_OUTOF10: 6
PAINLEVEL_OUTOF10: 8
PAINLEVEL_OUTOF10: 7
PAINLEVEL_OUTOF10: 8
PAINLEVEL_OUTOF10: 5
PAINLEVEL_OUTOF10: 4
PAINLEVEL_OUTOF10: 9
PAINLEVEL_OUTOF10: 5
PAINLEVEL_OUTOF10: 5
PAINLEVEL_OUTOF10: 6
PAINLEVEL_OUTOF10: 6
PAINLEVEL_OUTOF10: 5
PAINLEVEL_OUTOF10: 6
PAINLEVEL_OUTOF10: 6
PAINLEVEL_OUTOF10: 5
PAINLEVEL_OUTOF10: 5
PAINLEVEL_OUTOF10: 6
PAINLEVEL_OUTOF10: 2
PAINLEVEL_OUTOF10: 5
PAINLEVEL_OUTOF10: 6
PAINLEVEL_OUTOF10: 6
PAINLEVEL_OUTOF10: 7
PAINLEVEL_OUTOF10: 4
PAINLEVEL_OUTOF10: 7
PAINLEVEL_OUTOF10: 2
PAINLEVEL_OUTOF10: 6
PAINLEVEL_OUTOF10: 5
PAINLEVEL_OUTOF10: 7
PAINLEVEL_OUTOF10: 4
PAINLEVEL_OUTOF10: 6
PAINLEVEL_OUTOF10: 4
PAINLEVEL_OUTOF10: 7
PAINLEVEL_OUTOF10: 0
PAINLEVEL_OUTOF10: 5
PAINLEVEL_OUTOF10: 6
PAINLEVEL_OUTOF10: 5
PAINLEVEL_OUTOF10: 6
PAINLEVEL_OUTOF10: 5
PAINLEVEL_OUTOF10: 5
PAINLEVEL_OUTOF10: 7
PAINLEVEL_OUTOF10: 5
PAINLEVEL_OUTOF10: 4
PAINLEVEL_OUTOF10: 6
PAINLEVEL_OUTOF10: 7
PAINLEVEL_OUTOF10: 0
PAINLEVEL_OUTOF10: 8
PAINLEVEL_OUTOF10: 6
PAINLEVEL_OUTOF10: 8
PAINLEVEL_OUTOF10: 6
PAINLEVEL_OUTOF10: 6
PAINLEVEL_OUTOF10: 7
PAINLEVEL_OUTOF10: 5
PAINLEVEL_OUTOF10: 7
PAINLEVEL_OUTOF10: 2
PAINLEVEL_OUTOF10: 5
PAINLEVEL_OUTOF10: 0
PAINLEVEL_OUTOF10: 6
PAINLEVEL_OUTOF10: 5
PAINLEVEL_OUTOF10: 0
PAINLEVEL_OUTOF10: 0
PAINLEVEL_OUTOF10: 7
PAINLEVEL_OUTOF10: 4
PAINLEVEL_OUTOF10: 0
PAINLEVEL_OUTOF10: 7
PAINLEVEL_OUTOF10: 2
PAINLEVEL_OUTOF10: 5
PAINLEVEL_OUTOF10: 5
PAINLEVEL_OUTOF10: 6
PAINLEVEL_OUTOF10: 6
PAINLEVEL_OUTOF10: 3
PAINLEVEL_OUTOF10: 7
PAINLEVEL_OUTOF10: 3
PAINLEVEL_OUTOF10: 8
PAINLEVEL_OUTOF10: 3
PAINLEVEL_OUTOF10: 7
PAINLEVEL_OUTOF10: 5
PAINLEVEL_OUTOF10: 4
PAINLEVEL_OUTOF10: 6
PAINLEVEL_OUTOF10: 5
PAINLEVEL_OUTOF10: 6
PAINLEVEL_OUTOF10: 2
PAINLEVEL_OUTOF10: 5
PAINLEVEL_OUTOF10: 4
PAINLEVEL_OUTOF10: 7
PAINLEVEL_OUTOF10: 3
PAINLEVEL_OUTOF10: 3
PAINLEVEL_OUTOF10: 8
PAINLEVEL_OUTOF10: 6
PAINLEVEL_OUTOF10: 7
PAINLEVEL_OUTOF10: 6
PAINLEVEL_OUTOF10: 0
PAINLEVEL_OUTOF10: 8
PAINLEVEL_OUTOF10: 8
PAINLEVEL_OUTOF10: 6
PAINLEVEL_OUTOF10: 6
PAINLEVEL_OUTOF10: 5
PAINLEVEL_OUTOF10: 5
PAINLEVEL_OUTOF10: 8
PAINLEVEL_OUTOF10: 0
PAINLEVEL_OUTOF10: 6
PAINLEVEL_OUTOF10: 5
PAINLEVEL_OUTOF10: 6
PAINLEVEL_OUTOF10: 0
PAINLEVEL_OUTOF10: 10
PAINLEVEL_OUTOF10: 0
PAINLEVEL_OUTOF10: 6
PAINLEVEL_OUTOF10: 6
PAINLEVEL_OUTOF10: 2
PAINLEVEL_OUTOF10: 4
PAINLEVEL_OUTOF10: 3
PAINLEVEL_OUTOF10: 3
PAINLEVEL_OUTOF10: 8

## 2022-01-01 ASSESSMENT — PAIN DESCRIPTION - FREQUENCY
FREQUENCY: CONTINUOUS
FREQUENCY: INTERMITTENT
FREQUENCY: CONTINUOUS

## 2022-01-01 ASSESSMENT — PATIENT HEALTH QUESTIONNAIRE - PHQ9
SUM OF ALL RESPONSES TO PHQ QUESTIONS 1-9: 0
1. LITTLE INTEREST OR PLEASURE IN DOING THINGS: 0
SUM OF ALL RESPONSES TO PHQ QUESTIONS 1-9: 0
SUM OF ALL RESPONSES TO PHQ QUESTIONS 1-9: 0
2. FEELING DOWN, DEPRESSED OR HOPELESS: 0
SUM OF ALL RESPONSES TO PHQ QUESTIONS 1-9: 0
SUM OF ALL RESPONSES TO PHQ9 QUESTIONS 1 & 2: 0

## 2022-01-01 ASSESSMENT — PAIN DESCRIPTION - PROGRESSION
CLINICAL_PROGRESSION: NOT CHANGED

## 2022-01-01 ASSESSMENT — LIFESTYLE VARIABLES
SMOKING_STATUS: 0
SMOKING_STATUS: 0

## 2022-01-01 ASSESSMENT — PAIN DESCRIPTION - DIRECTION
RADIATING_TOWARDS: KNEE

## 2022-01-19 NOTE — ANESTHESIA POSTPROCEDURE EVALUATION
Patient: Jelani Angel    Procedure Summary     Date: 10/14/20 Room / Location:  [facility] OR [address] [place] [address] [facility] OR    Anesthesia Start: 1433 Anesthesia Stop: 1545    Procedure: T12 and L1 kyphoplasty and biopsy with 2 c-arms (N/A Spine Lumbar) Diagnosis:       Closed compression fracture of L2 lumbar vertebra, initial encounter (CMS/HCC)      Closed compression fracture of L4 lumbar vertebra, initial encounter (CMS/HCC)      Closed compression fracture of L1 lumbar vertebra, initial encounter (CMS/HCC)      T12 compression fracture, initial encounter (CMS/HCC)      (Closed compression fracture of L2 lumbar vertebra, initial encounter (CMS/HCC) [S32.020A])      (Closed compression fracture of L4 lumbar vertebra, initial encounter (CMS/HCC) [S32.040A])      (Closed compression fracture of L1 lumbar vertebra, initial encounter (CMS/[facility]) [S32.010A])      (T12 compression fracture, initial encounter (CMS/[facility]) [S22.080A])    Surgeon: Adrian Velasquez MD Provider: Hakan Rhodes CRNA    Anesthesia Type: general ASA Status: 3          Anesthesia Type: general    Vitals  Vitals Value Taken Time   BP 99/54 10/14/20 1615   Temp 97 °F (36.1 °C) 10/14/20 1615   Pulse 74 10/14/20 1620   Resp 16 10/14/20 1615   SpO2 97 % 10/14/20 1620           Post Anesthesia Care and Evaluation    Patient location during evaluation: PACU  Patient participation: complete - patient participated  Level of consciousness: awake and alert  Pain management: adequate  Airway patency: patent  Anesthetic complications: No anesthetic complications    Cardiovascular status: acceptable  Respiratory status: acceptable  Hydration status: acceptable    Comments: Blood pressure 99/54, pulse 74, temperature 97 °F (36.1 °C), temperature source Temporal, resp. rate 16, SpO2 97 %.    Pt discharged from PACU based on akyy score >8       Valtrex Counseling: I discussed with the patient the risks of valacyclovir including but not limited to kidney damage, nausea, vomiting and severe allergy.  The patient understands that if the infection seems to be worsening or is not improving, they are to call.

## 2022-02-24 NOTE — TELEPHONE ENCOUNTER
Spoke with pt's daughter, Shellie Shepherd, who states pt is being discharged from [address] [place] [address] to her home with home health. She reports that he will have wound vac, TPN, among other things, which the GI doctor there will manage. She asks that if pt needs labs or other orders, would Dr. Irma Phelps be willing to manage that. This nurse spoke with Dr. Irma Phelps, who agrees to manage this portion of the home health care. Relayed this to Shellie Shepherd, and advised her to call the office if labs or other orders are needed. She verbalizes understanding.

## 2022-03-03 NOTE — TELEPHONE ENCOUNTER
Guerline with [facility] called to notify us that home health will be seeing pt three times per week, as Dr. Karlene Arguello has agreed to manage labs & orders outside surgical, wound care & TPN.

## 2022-03-14 NOTE — TELEPHONE ENCOUNTER
Spoke to patients wife to notify patient to increase fluid intake per  due to creatinine being high.

## 2022-03-22 NOTE — TELEPHONE ENCOUNTER
Pt's daughter, Ayad Patrick, called about urinalysis. Dr. Hi Stephenson has reviewed. Ayad Patrick denies 'catching' pt with a fever, but states he does get hot & cold frequently, and has also been having lower pelvic pain that is similar to previous UTI's. Per Dr. Hi Stephenson, Rx for Bactrim DS 1 tab BID x10 days, pending urine culture. Notified daughter, Ayad Patrick, of this; she verbalizes understanding.

## 2022-03-23 NOTE — PROGRESS NOTES
Urinary tract infection, Candida  Prescribed fluconazole 200 mg p.o. daily x14 days  -Recommended early discontinuation of Mcgregor catheter.  -Patient denies any fever. Denies any bladder pain. Denies any dysuria.

## 2022-03-25 NOTE — TELEPHONE ENCOUNTER
Msgg left for Monika patients daughter. Msg left that OK per Dr. Champ Closs to get scans. Order placed and [facility] will call with apt instructed to hydrate good before scans.

## 2022-03-29 NOTE — TELEPHONE ENCOUNTER
Called and left v/m for patient that [facility] is leaving the practice and will no longer be seeing patients, so appt will need to be r/s. Can be r/s with any NP and choose a new cardiologist from there.

## 2022-04-10 PROBLEM — N18.9 ACUTE KIDNEY INJURY SUPERIMPOSED ON CKD (HCC): Status: ACTIVE | Noted: 2022-01-01

## 2022-04-10 PROBLEM — N39.0 SEPSIS SECONDARY TO UTI (HCC): Status: ACTIVE | Noted: 2022-01-01

## 2022-04-10 PROBLEM — A41.9 SEPSIS SECONDARY TO UTI (HCC): Status: ACTIVE | Noted: 2022-01-01

## 2022-04-10 PROBLEM — N17.9 ACUTE KIDNEY INJURY SUPERIMPOSED ON CKD (HCC): Status: ACTIVE | Noted: 2022-01-01
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
## 2022-04-11 NOTE — CONSULTS
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    BIOPSIES WITH FULGURATION OF BLADDER TUMORS performed by Paulie Day MD at [facility] [address] Bilateral     cataract or    HC INJECT OTHER PERPHRL NERV Left 10/28/2016    FLURO GUIDED HIP INJECITON performed by Lindajean Denver, MD at [address] [place] [address] / REMOVAL / REPLACEMENT VENOUS ACCESS CATHETER Right 8/20/2019    INSERTION OF RIGHT INTERNAL JUGULAR SINGLE LUMEN POWER PORT performed by Arnol Blanco DO at [facility] . N/A 5/6/2020    REMOVAL OF INSTRUMENTATION, EXPLORATION OF FUSION L1-3, REVISION UNINSTRUMENTED POSTERIOR SPINAL FUSION L1-3 performed by Meli Torres MD at [facility] 86      times 2... all levels    SPINE SURGERY      yesterday    TUNNELED VENOUS PORT PLACEMENT         Social History:    Smoking status: Former smoker. Quit 35 years ago.   30 pack year smoking history  ETOH status: No  [facility] [address] [place] [address] History:   Family History   Problem Relation Age of Onset    High Blood Pressure Mother     High Blood Pressure Father     Colon Cancer Father     Diabetes Father        Current Hospital Medications:    Current Facility-Administered Medications   Medication Dose Route Frequency Provider Last Rate Last Admin    sodium chloride flush 0.9 % injection 5-40 mL  5-40 mL IntraVENous 2 times per day Demarcus Alvine, APRN - CNP   10 mL at 04/10/22 2034    sodium chloride flush 0.9 % injection 5-40 mL  5-40 mL IntraVENous PRN Demarcus Alvine, APRN - CNP        0.9 % sodium chloride infusion   IntraVENous PRN Demarcus Alvine, APRN - CNP        enoxaparin (LOVENOX) injection 30 mg  30 mg SubCUTAneous Q24H Demarcus Alvine, APRN - CNP   30 mg at 04/10/22 1640    acetaminophen (TYLENOL) tablet 650 mg  650 mg Oral Q6H PRN Demarcus Alvine, APRN - CNP   650 mg at 04/11/22 0442    Or    acetaminophen (TYLENOL) suppository 650 mg  650 mg Rectal Q6H PRN Demarcus Alvine, APRN - CNP        fluconazole (DIFLUCAN) 200 mg IVPB  200 mg IntraVENous Q24H Demarcus Alvine, APRN -  mL/hr at 04/10/22 1902 200 mg at 04/10/22 1902    0.9 % sodium chloride infusion   IntraVENous Continuous Demarcus Alvine, APRN -  mL/hr at 04/10/22 1618 New Bag at 04/10/22 1618    cefTRIAXone (ROCEPHIN) 1000 mg IVPB in 50 mL D5W minibag  1,000 mg IntraVENous Q24H Demarcus Alvine, APRN - CNP   Stopped at 04/10/22 1725    metoprolol succinate (TOPROL XL) extended release tablet 50 mg  50 mg Oral Daily Demarcus Alvine, APRN - CNP   50 mg at 04/10/22 1640    [Held by provider] DULoxetine (CYMBALTA) extended release capsule 30 mg  30 mg Oral Daily Demarcus Alvine, APRN - CNP        [Held by provider] ibuprofen (ADVIL;MOTRIN) tablet 800 mg  800 mg Oral Nightly Demarcus Alvine, APRN - CNP        sodium bicarbonate tablet 650 mg  650 mg Oral BID Demarcus Alvine, APRN - CNP   650 mg at 04/10/22 2034    traMADol (ULTRAM) tablet 25 mg 25 mg Oral Q6H PRN Glen Magallanes, APRN - CNP        calcium carbonate (TUMS) chewable tablet 500 mg  500 mg Oral TID PRN Kiana Sosa, APRN - CNP        ondansetron [facility] injection 4 mg  4 mg IntraVENous Q6H PRN Kiana SosaOLGA - CNP        cyclobenzaprine (FLEXERIL) tablet 10 mg  10 mg Oral BID PRN Kiana SosaOLGA - CNP   10 mg at 04/10/22 9770       Allergies: Allergies   Allergen Reactions    Morphine Anxiety         Subjective   REVIEW OF SYSTEMS:   Constitutional: Positive for fatigue and fever. Negative for chills and diaphoresis. HENT: Negative for congestion and ear pain. Eyes: Negative for visual disturbance. Respiratory: Negative for cough, shortness of breath and wheezing. Cardiovascular: Negative for chest pain, palpitations and leg swelling. Gastrointestinal: Positive for nausea. Negative for abdominal distention, abdominal pain, blood in stool, constipation, diarrhea and vomiting. Endocrine: Negative for cold intolerance and heat intolerance. Genitourinary: Positive for decreased urine volume and hematuria. Negative for difficulty urinating, flank pain, frequency and urgency. Musculoskeletal: Negative for arthralgias and myalgias. Skin: Negative for color change and wound. Neurological: Positive for weakness. Negative for dizziness, syncope, light-headedness, numbness and headaches. Hematological: Does not bruise/bleed easily. Psychiatric/Behavioral: Negative for agitation, confusion and dysphoric mood. Objective   /72   Pulse 95   Temp 97.5 °F (36.4 °C) (Temporal)   Resp 17   Ht 5' 5\" (1.651 m)   Wt 170 lb (77.1 kg)   SpO2 96%   BMI 28.29 kg/m²     PHYSICAL EXAM:  CONSTITUTIONAL: Alert, appropriate, no acute distress  EYES: Non icteric, EOM intact, pupils equal round   ENT: Mucus membranes moist,external inspection of ears and nose are normal  NECK: Supple, no masses.   No palpable thyroid mass  CHEST/LUNGS: CTA bilaterally, normal respiratory effort   CARDIOVASCULAR: RRR, no murmurs. No lower extremity edema  ABDOMEN: soft non-tender, active bowel sounds, no HSM. No palpable masses  EXTREMITIES: warm, full ROM in all 4 extremities, no focal weakness. SKIN: warm, dry with no rashes or lesions  LYMPH: No cervical, clavicular, axillary, or inguinal lymphadenopathy  NEUROLOGIC: follows commands, non focal   PSYCH: mood and affect appropriate. Alert and oriented to time, place, person        LABORATORY RESULTS REVIEWED/ANALYZED BY ME:  Recent Labs     04/11/22  0425 04/10/22  0958 03/28/22  1000   WBC 11.2* 16.8* 7.5   HGB 10.8* 13.7* 11.2*   HCT 32.8* 42.3 35.4*   MCV 87.7 88.7 90.3    403* 383       Lab Results   Component Value Date     (L) 04/11/2022    K 4.3 04/11/2022    CL 99 04/11/2022    CO2 13 (L) 04/11/2022    BUN 34 (H) 04/11/2022    CREATININE 1.7 (H) 04/11/2022    GLUCOSE 92 04/11/2022    CALCIUM 9.1 04/11/2022    PROT 8.6 04/10/2022    LABALBU 4.3 04/10/2022    BILITOT <0.2 04/10/2022    ALKPHOS 166 (H) 04/10/2022    AST 19 04/10/2022    ALT 17 04/10/2022    LABGLOM 40 (A) 04/11/2022    GFRAA 48 (L) 04/11/2022    AGRATIO 1.9 11/24/2021    GLOB 2.2 11/24/2021       Lab Results   Component Value Date    INR 1.04 11/09/2021    INR 1.06 03/25/2021    INR 1.04 01/04/2021    PROTIME 13.8 11/09/2021    PROTIME 13.7 03/25/2021    PROTIME 13.5 01/04/2021       RADIOLOGY STUDIES REPORT/REVIEWED AND INTERPRETED BY ME:    4/10/2022-CT abdomen/pelvis without contrast showed evidence of metastatic disease to the lung bases. Moderate size hiatal hernia with gastroesophageal reflux. Status post partial colectomy. Left lower quadrant ostomy is present. No evidence obstruction. Irregular soft tissue mass with some calcification the pelvis. This demonstrated interval increase was potential involvement with the right posterior lateral urinary bladder wall. The mass measures 8 x 2.9 cm.   Left-sided double J intraureteral stent is in place with stent well positioned. 4/10/2022-CT chest without contrast showed extensive metastatic disease is noted to the lungs showing progression from the previous examination with multiple new nodules present as well as interval increase in size of previously documented nodules. Reference nodule within the superior segment of the right lower lobe previously measured at 5 mm in size now measures 13 mm in size. A lesion within the medial right lower lobe now measuring 1.6 cm in long axis previously measured 1.1 cm. There is no evidence of acute consolidative pneumonia. Essentially, findings consistent with progressive metastatic disease to the lungs. There are too numerous to count pulmonary nodules the majority of which have increased in size or which are new from the previous study. 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 XR CHEST PORTABLE    Result Date: 4/10/2022  EXAMINATION: Chest one view 4/10/2022 HISTORY: Fever and fatigue. FINDINGS: Today's exam is compared to previous study of 4/30/2020. The patient's undergone previous fusion of the mid and lower cervical spine. A tunneled infusion catheter is in place via right-sided approach with the tip in the right atrium. There are suspected pulmonary nodules within the lung bases. . Bibasilar atelectasis is present. No evidence of consolidative pneumonia or effusion. There are what appear to be some endovascular coils projecting over the lateral right heart border. These were not present on previous chest radiograph of 4/30/2020 and should be correlated clinically. 1.. Question developing nodules within the lower lobes. Metastatic disease should be considered and follow-up with outpatient CT imaging of the chest could BE obtained. There is bibasilar atelectasis. No evidence of lobar pneumonia. 2. There are what appear to be some metallic coils injecting over the lateral right heart border.  These were not present on previous exams and should be correlated clinically. An infusion catheter is in place via right-sided approach with the tip in the right atrium. Signed by Dr Charles Winn    My interpretation: Bilateral pulmonary nodules      ASSESSMENT:  Metastasis colorectal adenocarcinoma confirmed in right abductor muscle KRAS wild type. Heddy  MSI stable and negative mutations for BRAF, NRAS, KRAS wild type.     09/22/21- CEA 0.6  Continue palliative intent 5-FU/leucovorin every 3 weeks as per patient request. Panitumumab on hold as per patient request (significant toxicity with the skin toxicity and mouth sores). Not a good candidate for Avastin due to frequent exchange of ureteral stents and hematuria. 4/10/2022-CT abdomen/pelvis without contrast showed evidence of metastatic disease to the lung bases. Moderate size hiatal hernia with gastroesophageal reflux. Status post partial colectomy. Left lower quadrant ostomy is present. No evidence obstruction. Irregular soft tissue mass with some calcification the pelvis. This demonstrated interval increase was potential involvement with the right posterior lateral urinary bladder wall. The mass measures 8 x 2.9 cm. Left-sided double J intraureteral stent is in place with stent well positioned. 4/10/2022-CT chest without contrast showed extensive metastatic disease is noted to the lungs showing progression from the previous examination with multiple new nodules present as well as interval increase in size of previously documented nodules. Reference nodule within the superior segment of the right lower lobe previously measured at 5 mm in size now measures 13 mm in size. A lesion within the medial right lower lobe now measuring 1.6 cm in long axis previously measured 1.1 cm. There is no evidence of acute consolidative pneumonia. Essentially, findings consistent with progressive metastatic disease to the lungs.  There are too numerous to count pulmonary nodules the majority of which have increased in size or which are new from the previous study. 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 -Repeat CEA    Non invasive Urothelial Bladder Cancer ([facility] [address] 8/18/2019 and 4/1/2001. Repeat cystoscopy and bilateral ureteral stent removal/replacement on 2/26/2020 . The operative note by Dr. Cynthia Soria documented bilateral hydronephrosis and obtained biopsy of the bladder in the mid dome and left anterior lateral wall x2. Pathology documented high-grade papillary urethral carcinoma, noninvasive, stage pTaNx. As per Urology    5/28/2020-the patient underwent a cystoscopy and resection of bladder tumor on 05/28/2020 with findings consistent with noninvasive, high-grade papillary urothelial carcinoma. Muscularis propria was not identified. Currently receiving intravesical BCG.  4/1/2021-repeat cystoscopy showed a small bladder lesion. Tumor resection of bladder tumor consistent with noninvasive high-grade urothelial carcinoma. 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 Referred to [employer] [facility]  10/14/2021-urology at [facility]. Urine cytology consistent with atypical urothelial cells. Since the patient was last seen by me November 2021 he underwent a major resection of the high-grade urothelial carcinoma of the ureter at [facility].  His postoperative course was complicated and he was hospitalized for many weeks. He eventually was discharged and is currently receiving home care at home. He is still very debilitated. -CT findings from 4/10/2022 concerning for metastatic disease.  -Last CEA September 2021 0.6  4/10/2022-CT abdomen/pelvis without contrast showed evidence of metastatic disease to the lung bases. Moderate size hiatal hernia with gastroesophageal reflux. Status post partial colectomy. Left lower quadrant ostomy is present. No evidence obstruction.   Irregular soft tissue mass with some calcification the pelvis. This demonstrated interval increase was potential involvement with the right posterior lateral urinary bladder wall. The mass measures 8 x 2.9 cm. Left-sided double J intraureteral stent is in place with stent well positioned. 4/10/2022-CT chest without contrast showed extensive metastatic disease is noted to the lungs showing progression from the previous examination with multiple new nodules present as well as interval increase in size of previously documented nodules. Reference nodule within the superior segment of the right lower lobe previously measured at 5 mm in size now measures 13 mm in size. A lesion within the medial right lower lobe now measuring 1.6 cm in long axis previously measured 1.1 cm. There is no evidence of acute consolidative pneumonia. Essentially, findings consistent with progressive metastatic disease to the lungs. There are too numerous to count pulmonary nodules the majority of which have increased in size or which are new from the previous study. 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 4/11/2022-CEA 2.8    Osteoporosis-Osteoporosis BMD -3.6 April 2020. Continue Vit D, Boniva and Calcium. Acute kidney injury/CKD stage III-  -creatinine 2.4 ->1.7    Hyponatremia a Dr. Hong CarbineStarted 127->126     PLAN:  Repeat CEA  Continue current supportive care  Discussed with radiology regarding biopsy of pulmonary nodule        I have seen, examined and reviewed this patient medication list, appropriate labs and imaging studies. I reviewed relevant medical records and others physicians notes. I discussed the plans of care with the patient. I answered all the questions to the patients satisfaction. I have also reviewed the chief complaint (CC) and part of the history (History of Present Illness (HPI), Past Family Social History [facility]), or Review of Systems (ROS) and made changes when appropriated. (Please note that portions of this note were completed with a voice recognition program. Efforts were made to edit the dictations but occasionally words are mis-transcribed.)      Tim Lundberg MD    04/11/22  6:19 AM

## 2022-04-11 NOTE — PROGRESS NOTES
[facility] Hospitalists      Patient:  Abena Kelley  YOB: 1950  Date of Service: 4/11/2022  MRN: [id number]   Acct: [de-identified]   Primary Care Physician: Charo Limon MD  Advance Directive: Full Code  Admit Date: 4/10/2022       Hospital Day: 1    CHIEF COMPLAINT generalized weakness with fatigue and fever    SUBJECTIVE: Patient reports improvement but states he has pain in his right knee. No fever since admission. CUMULATIVE HOSPITAL COURSE:  81 y.o. male with complicated past medical history of recent right nephrectomy, recent ureteral stents, metastatic colonic cancer, CKD, CAD, hypertension, and hyperlipidemia who presented to [facility] ED complaining of multiple health concerns. Mr. Isis Cam reported developing fever last night. He reported he has been having generalized malaise for past several days. Reported low grade fever and nausea. Reported recent ureteral stent placement. Reported having blood in urine recently and was placed on Bactrim for presumed recurrent UTI. Reported decreased urine output over the past few days. Denies problems with ostomy output. Reported decreased appetite for the past few days. Workup in ED revealed Na 127, CO2 14, BUN 44, Cr 2.4, GFR 27, Lactic 2.0, WBC 16.8, Hgb 13.7, Large Leukocytes, positive nitrites, and Large blood noted on urinalysis, Abnormal Cxray. CT Chest consistent with progressive metastatic disease to the lungs. CT abdomen showed moderate size hiatal hernia with gastroesophageal reflux, irregular soft tissue mass with some calcification within the pelvis, extends to and is inseparable from the right posterior lateral urinary bladder wall with potential secondary involvement, increased in size from previous study, status post right nephrectomy. Patient received 2 L LR bolus in ED.  Patient was admitted to hospital medicine for sepsis with secondary to urinary tract infection with oncology and ID consultations.     Review of Systems:       Constitutional: No fevers, chills, nausea, vomiting,    Lungs:   No hemoptysis, pleurisy, cough, SOB   Heart:  No chest pressure with exertion, palpitations,    Abdomen:   No new masses, no bright red blood per rectum   Extremities: No acute pain while ambulating, no new lesions   Neurologic: No new motor or sensory changes     14 point review of systems is negative except as specifically addressed above. Objective:   VITALS:  /72   Pulse 91   Temp 97.3 °F (36.3 °C) (Temporal)   Resp 18   Ht 5' 5\" (1.651 m)   Wt 170 lb (77.1 kg)   SpO2 96%   BMI 28.29 kg/m²   24HR INTAKE/OUTPUT:    Intake/Output Summary (Last 24 hours) at 4/11/2022 1211  Last data filed at 4/11/2022 0932  Gross per 24 hour   Intake 450 ml   Output 1475 ml   Net -1025 ml       PHYSICAL  EXAMINATION:    JAUN:  Awake, alert, oriented x 3, patient appears tired and fatigued   Head/Eyes:  Normocephalic, atraumatic, EOMI and PERRLA bilaterally   Respiratory:   Bilateral fair air entry in both lung fields, no wheezing, rales or crackles bilaterally, symmetric expansion of chest   Cardiovascular:  Regular rate and rhythm, S1+S2+0, no murmurs/rubs   Abdomen:    : Soft, non-tender, bowel sounds +ve, no organomegaly.   Colostomy in place  Mcgregor catheter in place   Extremities: Moves all, full range of motion, no edema   Neurologic: Awake, alert, oriented x 3, cranial nerves II-XII intact, no focal neurological deficits, sensory system intact   Psychiatric: Normal mood, non-suicidal           Medications:      sodium chloride      sodium chloride 150 mL/hr at 04/10/22 1618      sodium chloride flush  5-40 mL IntraVENous 2 times per day    enoxaparin  30 mg SubCUTAneous Q24H    fluconazole  200 mg IntraVENous Q24H    cefTRIAXone (ROCEPHIN) IV  1,000 mg IntraVENous Q24H    metoprolol succinate  50 mg Oral Daily    [Held by provider] DULoxetine  30 mg Oral Daily    [Held by provider] ibuprofen  800 mg Oral Nightly    sodium bicarbonate  650 mg Oral BID sodium chloride flush, sodium chloride, acetaminophen **OR** acetaminophen, traMADol, calcium carbonate, ondansetron, cyclobenzaprine  ADULT DIET; Regular     Lab and other Data:     Recent Labs     04/10/22  0958 04/11/22  0425   WBC 16.8* 11.2*   HGB 13.7* 10.8*   * 323     Recent Labs     04/10/22  0958 04/11/22  0425   * 126*   K 4.2 4.3   CL 96* 99   CO2 14* 13*   BUN 44* 34*   CREATININE 2.4* 1.7*   GLUCOSE 119* 92     Recent Labs     04/10/22  0958   AST 19   ALT 17   BILITOT <0.2   ALKPHOS 166*     Troponin T: No results for input(s): TROPONINI in the last 72 hours. Pro-BNP: No results for input(s): BNP in the last 72 hours. INR: No results for input(s): INR in the last 72 hours. UA:  Recent Labs     04/10/22  1007   COLORU DARK YELLOW*   PHUR 6.0   WBCUA TNTC*   RBCUA TNTC*   YEAST Present*   BACTERIA None Seen*   CLARITYU TURBID*   SPECGRAV 1.025   LEUKOCYTESUR LARGE*   UROBILINOGEN 0.2   BILIRUBINUR Negative   BLOODU LARGE*   GLUCOSEU Negative     A1C: No results for input(s): LABA1C in the last 72 hours. ABG:No results for input(s): PHART, MZK3QWA, PO2ART, KUR4YWJ, BEART, HGBAE, Q6SLSWIS, CARBOXHGBART in the last 72 hours. RAD:   CT ABDOMEN PELVIS WO CONTRAST Additional Contrast? Oral    Result Date: 4/10/2022  1. Metastatic disease to the lung bases showing worsening from the previous study. 2. Moderate size hiatal hernia with gastroesophageal reflux. 3. The patient is status post at least partial colectomy. Left lower quadrant ostomy is present. There is no evidence of obstruction. 4. There is an irregular soft tissue mass with some calcification within the pelvis. This extends to and is inseparable from the right posterior lateral urinary bladder wall with potential secondary involvement. This extends into the presacral space. When compared to the previous exam I feel this is increased in size.  The urinary bladder cannot be fully assessed as it is decompressed with a Mcgregor catheter. 5. Postoperative changes of the mid lower lumbar spine. There is an accentuated lumbar lordosis with grade 1-2 anterolisthesis of L5 on S1. Compression deformities at T12, L1 and L2 are present with previous kyphoplasty T12 and L1. . 6. Status post right nephrectomy. There is mild dilatation of the upper tracts of the left kidney and left ureter with a double-J intraureteral stent well-positioned. The degree of distention of the upper tracts of the left kidney is improved from the previous exam of November of last year. There is mild urothelial thickening. Signed by Dr Kyle Westfall    Result Date: 4/10/2022  1. Progressive metastatic disease to the lungs. There are too numerous to count pulmonary nodules the majority of which have increased in size or which are new from the previous study. No evidence of acute consolidative pneumonia. 2. 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 Patient's undergone previous kyphoplasty at the thoracolumbar juncture for compression deformities. There is an accentuated thoracic kyphosis. . Signed by Dr Kerri Titus RENAL COMPLETE    Result Date: 4/11/2022  1. Prior right nephrectomy. 2. The cortical thickness and echogenicity of the left kidney are normal. The left kidney is normal in size. 3. There is mild to moderate dilatation of the left renal collecting system predominantly involving the lower pole. The patient reportedly has a double-J ureteral stent in place. The stent is not well seen on ultrasound. Signed by Dr Lesa Gasca    XR CHEST PORTABLE    Result Date: 4/10/2022  1. . Question developing nodules within the lower lobes. Metastatic disease should be considered and follow-up with outpatient CT imaging of the chest could BE obtained. There is bibasilar atelectasis. No evidence of lobar pneumonia. 2. There are what appear to be some metallic coils injecting over the lateral right heart border. These were not present on previous exams and should be correlated clinically. An infusion catheter is in place via right-sided approach with the tip in the right atrium. Signed by Dr Mac Monreal         Assessment/Plan   Principal Problem:    Sepsis secondary to UTI [facility])  Active Problems:    Lung nodules    Metastasis from colon cancer ([facility]    Acute kidney injury superimposed on CKD ([facility]  Resolved Problems:    * No resolved hospital problems. *    Principal Problem:    Sepsis secondary to UTI [facility])  Active Problems:    Lung nodules    Metastasis from colon cancer ([facility]    Acute kidney injury superimposed on CKD ([facility]  Resolved Problems:    * No resolved hospital problems. *        Principal Problem:    Sepsis secondary to UTI ([facility]  - Early goal-directed therapy  - Follow Blood Culture and urine culture, no growth to date. - ID consulted for evaluation. Meanwhile continue with Rocephin. \"Adjust or de-escalate antibiotics depending on culture and sensitivity results. - Respiratory and GI panel were unremarkable. - Candidiasis, continue with fluconazole.        Active Problems:    Lung nodules/ Metastasis from colon cancer [facility])-               - Oncology consultation    - Patient follows up with oncology on outpatient basis.       Acute kidney injury superimposed on CKD (HCC)/ Hyponatremia-               - Nephrology consultation and recommendations appreciated              - Creatinine 2.4-->1.7 today               -Continue with IV fluids for hydration with sodium bicarbonate. To follow-up with nephrology for further guidance. Continue to fluids, will re-evaluate further need for IV fluids in the next 24 to 48 hours. Continue to monitor BMP.             - Monitor I's and O's closely              - Avoid nephrotoxic agents      Right knee pain with history of arthritis  Patient states that he has had steroid injections as outpatient. Orthopedic surgery consulted for evaluation.  Pain control and consider rheumatology evaluation if no improvement.                  DVT Prophylaxis:  Lovenox      Antibiotic: Rocephin    GI prophylaxis: Pepcid    Plan of care discussed with patient verbalized understanding and agrees. Plan of care to be adjusted pending response to current treatment evaluation by nephrology and infectious disease.     Porsche Nance MD, 4/11/2022 12:11 PM
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
## 2022-04-12 NOTE — PROGRESS NOTES
[facility] MEDICINE  - PROGRESS NOTE    Admit Date: 4/10/2022         CC: fever,nausea,decreased urine output     Subjective: no fever, still nausea, no vomiting, no abd pain, no diarrhea, no chest pain, no palpitations, no dyspnea, no wheezing, no dysuria, no hematuria    Objective: mild distress, deconditioned     Diet: ADULT DIET; Regular  Diet NPO  Pain is:None  Nausea: Moderate  Bowel Movement/Flatus no    Data:   Scheduled Meds: Reviewed  Current Facility-Administered Medications   Medication Dose Route Frequency Provider Last Rate Last Admin    lactobacillus (CULTURELLE) capsule 1 capsule  1 capsule Oral Daily Kiesha Sanchez MD   1 capsule at 04/12/22 1457    pantoprazole (PROTONIX) 40 mg in sodium chloride (PF) 10 mL injection  40 mg IntraVENous BID Kiesha Sanchez MD   40 mg at 04/12/22 1457    cyclobenzaprine (FLEXERIL) tablet 5 mg  5 mg Oral BID PRN Kiesha Sanchez MD        dextrose 5 % and 0.45 % sodium chloride infusion   IntraVENous Continuous Kiesha Sanchez  mL/hr at 04/12/22 1520 Rate Change at 04/12/22 1520    magnesium sulfate 2000 mg in 50 mL IVPB premix  2,000 mg IntraVENous Once Kiesha Sanchez MD        promethazine (PHENERGAN) tablet 12.5 mg  12.5 mg Oral Q6H PRN Alexei Bartholomew MD   12.5 mg at 04/12/22 1230    sodium chloride flush 0.9 % injection 5-40 mL  5-40 mL IntraVENous 2 times per day Kenna Maravilla APRN - CNP   10 mL at 04/11/22 1959    sodium chloride flush 0.9 % injection 5-40 mL  5-40 mL IntraVENous PRN Kenna Maravilla, APRN - CNP        0.9 % sodium chloride infusion   IntraVENous PRN Kenna aMravilla, APRN - CNP        [Held by provider] enoxaparin (LOVENOX) injection 30 mg  30 mg SubCUTAneous Q24H Kenna Maravilla, APRN - CNP   30 mg at 04/11/22 1439    acetaminophen (TYLENOL) tablet 650 mg  650 mg Oral Q6H PRN Kenna Maravilla, APRN - CNP   650 mg at 04/12/22 1230 Or    acetaminophen (TYLENOL) suppository 650 mg  650 mg Rectal Q6H PRN Henry Blanco APRN - ALIS        cefTRIAXone (ROCEPHIN) 1000 mg IVPB in 50 mL D5W minibag  1,000 mg IntraVENous Q24H Henry Blanco, APRN - CNP   Stopped at 04/12/22 1525    metoprolol succinate (TOPROL XL) extended release tablet 50 mg  50 mg Oral Daily Henry Blanco, APRN - CNP   50 mg at 04/12/22 0840    DULoxetine (CYMBALTA) extended release capsule 30 mg  30 mg Oral Daily Henry Blanco, APRN - CNP        sodium bicarbonate tablet 650 mg  650 mg Oral BID Henry Blanco, APRN - CNP   650 mg at 04/12/22 0841    traMADol (ULTRAM) tablet 25 mg  25 mg Oral Q6H PRN Henry Blanco, APRN - CNP   25 mg at 04/12/22 1230    calcium carbonate (TUMS) chewable tablet 500 mg  500 mg Oral TID PRN Temitope Naqvi APRN - CNP        ondansetron [facility]) injection 4 mg  4 mg IntraVENous Q6H PRN Viadharmesh Naqvi, APRN - CNP   4 mg at 04/11/22 1557     DVT Prophylaxis: Lovenox 40 mg sq daily    Continuous Infusions:   dextrose 5 % and 0.45 % NaCl 100 mL/hr at 04/12/22 1520    sodium chloride         Intake/Output Summary (Last 24 hours) at 4/12/2022 1814  Last data filed at 4/12/2022 1720  Gross per 24 hour   Intake 1490 ml   Output 1250 ml   Net 240 ml     CBC:   Recent Labs     04/10/22  0958 04/11/22 0425 04/12/22  0209   WBC 16.8* 11.2* 10.7   HGB 13.7* 10.8* 10.8*   * 323 313     BMP:  Recent Labs     04/10/22  0958 04/10/22  0958 04/11/22 0425 04/12/22  0209   *  --  126* 133*   K 4.2   < > 4.3 4.3  4.3   CL 96*  --  99 104   CO2 14*  --  13* 15*   BUN 44*  --  34* 29*   CREATININE 2.4*  --  1.7* 1.4*   GLUCOSE 119*  --  92 99    < > = values in this interval not displayed. ABGs: No results found for: PHART, PO2ART, GJL8EFX  INR: No results for input(s): INR in the last 72 hours.       Objective:   Vitals: /79   Pulse 83   Temp 97.3 °F (36.3 °C) (Temporal)   Resp 16   Ht 5' 5\" (1.651 m)   Wt 170 lb (77.1 kg)   SpO2 95%   BMI 28.29 kg/m²   General appearance: alert, appears stated age and cooperative  Skin: Skin color, texture, turgor normal.   HEENT: Head: Normocephalic, no lesions, without obvious abnormality.   Neck: no adenopathy, no carotid bruit, no JVD and supple, symmetrical, trachea midline  Lungs: clear to auscultation bilaterally  Heart: regular rate and rhythm, S1, S2 normal, no murmur, click, rub or gallop  Abdomen: soft, non-tender; bowel sounds normal; no masses,  no organomegaly  Extremities: extremities normal, atraumatic, no cyanosis or edema  Lymphatic: No significant lymph node enlargement papable  Neurologic: Mental status: Alert, oriented        Assessment & Plan:    · Non invasive urothelial bladder cancer - tx per oncology  · Pulmonary nodules - plan for biopsy am   · Hypomagnesemia - replace - recheck  · Gram neg andres UTI with leukocytosis and lactic acidosis in pt with prior ureteral stents cont IVF and IV ab- follow cultures- consult urology   · Candidosis- continue fluconazole   · Hyponatremia - improved   · COLLEEN- improving with IVF  · Anemia of chronic disease  · Small bowel distention - daily KUB     Patient Active Problem List:     S/p bare metal coronary artery stent     Coronary artery disease involving native coronary artery of native heart without angina pectoris     History of rectal cancer     Metastasis from colon cancer (HCC)     Chemotherapy-induced peripheral neuropathy (HCC)     Chemotherapy-induced nausea         See orders   Disposition: BRIE Baptiste MD

## 2022-04-12 NOTE — PROGRESS NOTES
Infectious Diseases Progress Note    Patient:  Ratna Mckeon  YOB: 1950  MRN: [id number]   Admit date: 4/10/2022   Admitting Physician: Abimbola Arnold, *  Primary Care Physician: Layne Jones MD    Chief Complaint/Interval History: He is without fever. He is comfortable at present. He has had right knee pain but in talking with him that sounds fairly chronic. He has not noticed swelling. He has not noticed erythema involving either lower extremity. He does not report abdominal pain. He does not report nausea or vomiting. He does not report any suprapubic or flank tenderness. He has ostomy output. He indicates when he is on Imodium there is partial formed to the stool. He indicates stool is partially formed at present. In/Out    Intake/Output Summary (Last 24 hours) at 4/12/2022 0610  Last data filed at 4/11/2022 1915  Gross per 24 hour   Intake 930 ml   Output 800 ml   Net 130 ml     Allergies:    Allergies   Allergen Reactions    Morphine Anxiety     Current Meds: famotidine (PEPCID) tablet 20 mg, Daily  loperamide (IMODIUM) capsule 2 mg, Q8H  promethazine (PHENERGAN) tablet 12.5 mg, Q6H PRN  fluconazole (DIFLUCAN) 600 mg IVPB, Q24H  sodium chloride flush 0.9 % injection 5-40 mL, 2 times per day  sodium chloride flush 0.9 % injection 5-40 mL, PRN  0.9 % sodium chloride infusion, PRN  enoxaparin (LOVENOX) injection 30 mg, Q24H  acetaminophen (TYLENOL) tablet 650 mg, Q6H PRN   Or  acetaminophen (TYLENOL) suppository 650 mg, Q6H PRN  0.9 % sodium chloride infusion, Continuous  cefTRIAXone (ROCEPHIN) 1000 mg IVPB in 50 mL D5W minibag, Q24H  metoprolol succinate (TOPROL XL) extended release tablet 50 mg, Daily  [Held by provider] DULoxetine (CYMBALTA) extended release capsule 30 mg, Daily  [Held by provider] ibuprofen (ADVIL;MOTRIN) tablet 800 mg, Nightly  sodium bicarbonate tablet 650 mg, BID  traMADol (ULTRAM) tablet 25 mg, Q6H PRN  calcium carbonate (TUMS) chewable tablet 500 mg, TID PRN  ondansetron (ZOFRAN) injection 4 mg, Q6H PRN  cyclobenzaprine (FLEXERIL) tablet 10 mg, BID PRN      Review of Systems    VitalSigns:  /73   Pulse 91   Temp 97.2 °F (36.2 °C) (Temporal)   Resp 16   Ht 5' 5\" (1.651 m)   Wt 170 lb (77.1 kg)   SpO2 98%   BMI 28.29 kg/m²      Physical Exam  Line/IV site: No erythema, warmth, induration, or tenderness. General alert, pleasant, no distress  Comfortable appearing at this time  Lungs clear to auscultation without crackles  Heart without murmur  Abdomen is soft. No guarding or rebound. Ileostomy in place left mid/lower quadrant area. Ostomy pink and functioning. Stool in the ostomy bag has partial form. No liquid stool. Extremities without signs of cellulitis  Right knee without significant effusion. There is really no warmth to touch involving either knee. No tenderness to palpation involving either knee. Lab Results:  CBC:   Recent Labs     04/10/22  0958 04/11/22  0425 04/12/22  0209   WBC 16.8* 11.2* 10.7   HGB 13.7* 10.8* 10.8*   * 323 313     BMP:  Recent Labs     04/10/22  0958 04/10/22  0958 04/11/22 0425 04/12/22  0209   *  --  126* 133*   K 4.2   < > 4.3 4.3  4.3   CL 96*  --  99 104   CO2 14*  --  13* 15*   BUN 44*  --  34* 29*   CREATININE 2.4*  --  1.7* 1.4*   GLUCOSE 119*  --  92 99    < > = values in this interval not displayed. CultureResults:  Urine culture from April 10, 2022:  Candida glabrata-have contacted micro lab and asked them to do susceptibility testing  Gram-negative andres-rare growth. Additional isolation in progress. Radiology: None    Additional Studies Reviewed:  None    Impression:  1. Low-grade fever-seems to be resolved. Suspect urinary source. Candida glabrata and rare growth of gram-negative andres on initial culture. 2.  Right knee pain was a chief complaint when I evaluated him last night-on exam today does not appear to have significant effusion or warmth involving the right knee. Suspect osteoarthritis is the primary cause for his knee discomfort. 3.  Acute renal insufficiency-improving-creatinine on admission 2.4 and now improved to 1.4  4. Leukocytosis-suspect urinary source  5. Fairly recent diagnosis of colon cancer with prior abdominal surgeries    Recommendations: We will continue treatment with higher dose fluconazole  Have contacted microbiology laboratory to have them report susceptibility of Candida glabrata to antifungal treatment  Await ID of gram-negative andres in urine-he seems to be responding to treatment with no recurrence of fever and with improvement in white blood cell count.   Continue fluconazole and ceftriaxone at present  Follow clinical course await culture  Will review history with family    Mirella Pittman MD
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
## 2022-04-12 NOTE — PROGRESS NOTES
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 RETROGRADE PYLEOGRAM performed by Wayne Duke MD at [address] [place] [address] N/A 12/3/2019    BLADDER BIOPSY AND FULGURATION performed by Wayne Duke MD at [address] [place] [address] N/A 5/28/2020    BIOPSIES WITH FULGURATION OF BLADDER TUMORS performed by Wayne Duke MD at [facility] [address] Bilateral     cataract or    HC INJECT OTHER PERPHRL NERV Left 10/28/2016    FLURO GUIDED HIP INJECITON performed by Tay Hsu MD at [address] [place] [address] / REMOVAL / REPLACEMENT VENOUS ACCESS CATHETER Right 8/20/2019    INSERTION OF RIGHT INTERNAL JUGULAR SINGLE LUMEN POWER PORT performed by Rahul Huang DO at [facility] [address] N/A 5/6/2020    REMOVAL OF INSTRUMENTATION, EXPLORATION OF FUSION L1-3, REVISION UNINSTRUMENTED POSTERIOR SPINAL FUSION L1-3 performed by Richar Kaufman MD at [facility]      times 2... all levels    SPINE SURGERY      yesterday    TUNNELED VENOUS PORT PLACEMENT         Social History:    Smoking status: Former smoker. Quit 35 years ago.   30 pack year smoking history  ETOH status: No  Resides: [address] [place] [address]    Family History:   Family History   Problem Relation Age of Onset    High Blood Pressure Mother     High Blood Pressure Father     Colon Cancer Father     Diabetes Father        Current Hospital Medications:    Current Facility-Administered Medications   Medication Dose Route Frequency Provider Last Rate Last Admin    famotidine (PEPCID) tablet 20 mg  20 mg Oral Daily Jose Ramos MD        loperamide (IMODIUM) capsule 2 mg  2 mg Oral Q8H Jose Ramos MD   2 mg at 04/11/22 2359    promethazine (PHENERGAN) tablet 12.5 mg  12.5 mg Oral Q6H PRN Jose Ramos MD   12.5 mg at 04/12/22 0600    fluconazole (DIFLUCAN) 600 mg IVPB  600 mg IntraVENous Q24H Pia Macario MD        sodium chloride flush 0.9 % injection 5-40 mL  5-40 mL IntraVENous 2 times per day [facility], APRN - CNP   10 mL at 04/11/22 1959    sodium chloride flush 0.9 % injection 5-40 mL  5-40 mL IntraVENous PRN [facility], APRN - CNP        0.9 % sodium chloride infusion   IntraVENous PRN [facility], APRN - CNP        enoxaparin (LOVENOX) injection 30 mg  30 mg SubCUTAneous Q24H [facility], APRN - CNP   30 mg at 04/11/22 1439    acetaminophen (TYLENOL) tablet 650 mg  650 mg Oral Q6H PRN [facility], APRN - CNP   650 mg at 04/12/22 2635    Or    acetaminophen (TYLENOL) suppository 650 mg  650 mg Rectal Q6H PRN [facility], APRN - CNP        0.9 % sodium chloride infusion   IntraVENous Continuous Jose Ramos  mL/hr at 04/10/22 1618 New Bag at 04/10/22 1618    cefTRIAXone (ROCEPHIN) 1000 mg IVPB in 50 mL D5W minibag  1,000 mg IntraVENous Q24H [facility], APRN - CNP   Stopped at 04/11/22 1552    metoprolol succinate (TOPROL XL) extended release tablet 50 mg  50 mg Oral Daily [facility], APRN - CNP   50 mg at 04/11/22 0723    [Held by provider] DULoxetine (CYMBALTA) extended release capsule 30 mg  30 mg Oral Daily Mallorie Greene APRN - CNP        [Held by provider] ibuprofen (ADVIL;MOTRIN) tablet 800 mg  800 mg Oral Nightly Brejimmya Greene, APRN - CNP        sodium bicarbonate tablet 650 mg  650 mg Oral BID Bretta Greene, APRN - CNP   650 mg at 04/11/22 1958    traMADol (ULTRAM) tablet 25 mg  25 mg Oral Q6H PRN Bretta Greene, APRN - CNP   25 mg at 04/12/22 0600    calcium carbonate (TUMS) chewable tablet 500 mg  500 mg Oral TID PRN Chestine NIRMAL ByrdN - ALIS        ondansetron [facility]) injection 4 mg  4 mg IntraVENous Q6H PRN Chestine Byrd, APRN - CNP   4 mg at 04/11/22 1557    cyclobenzaprine (FLEXERIL) tablet 10 mg  10 mg Oral BID PRN Chestine Rochelle, APRN - CNP   10 mg at 04/11/22 2144       Allergies: Allergies   Allergen Reactions    Morphine Anxiety             Objective   /73   Pulse 91   Temp 97.2 °F (36.2 °C) (Temporal)   Resp 16   Ht 5' 5\" (1.651 m)   Wt 170 lb (77.1 kg)   SpO2 98%   BMI 28.29 kg/m²     PHYSICAL EXAM:  CONSTITUTIONAL: Alert, appropriate, no acute distress  EYES: Non icteric, EOM intact, pupils equal round   ENT: Mucus membranes moist,external inspection of ears and nose are normal  NECK: Supple, no masses. No palpable thyroid mass  CHEST/LUNGS: CTA bilaterally, normal respiratory effort   CARDIOVASCULAR: RRR, no murmurs. No lower extremity edema  ABDOMEN: soft non-tender, active bowel sounds, no HSM. No palpable masses  EXTREMITIES: warm, full ROM in all 4 extremities, no focal weakness. SKIN: warm, dry with no rashes or lesions  LYMPH: No cervical, clavicular, axillary, or inguinal lymphadenopathy  NEUROLOGIC: follows commands, non focal   PSYCH: mood and affect appropriate.  Alert and oriented to time, place, person        LABORATORY RESULTS REVIEWED/ANALYZED BY ME:  Recent Labs     04/12/22  0209 04/11/22  0425 04/10/22  0958   WBC 10.7 11.2* 16.8* HGB 10.8* 10.8* 13.7*   HCT 32.6* 32.8* 42.3   MCV 87.2 87.7 88.7    323 403*       Lab Results   Component Value Date     (L) 04/12/2022    K 4.3 04/12/2022    K 4.3 04/12/2022     04/12/2022    CO2 15 (L) 04/12/2022    BUN 29 (H) 04/12/2022    CREATININE 1.4 (H) 04/12/2022    GLUCOSE 99 04/12/2022    CALCIUM 9.3 04/12/2022    PROT 6.9 04/12/2022    LABALBU 3.1 (L) 04/12/2022    BILITOT <0.2 04/12/2022    ALKPHOS 123 04/12/2022    AST 14 04/12/2022    ALT 10 04/12/2022    LABGLOM 50 (A) 04/12/2022    GFRAA >59 04/12/2022    AGRATIO 1.9 11/24/2021    GLOB 2.2 11/24/2021       Lab Results   Component Value Date    INR 1.04 11/09/2021    INR 1.06 03/25/2021    INR 1.04 01/04/2021    PROTIME 13.8 11/09/2021    PROTIME 13.7 03/25/2021    PROTIME 13.5 01/04/2021       RADIOLOGY STUDIES REPORT/REVIEWED AND INTERPRETED BY ME:    4/10/2022-CT abdomen/pelvis without contrast showed evidence of metastatic disease to the lung bases. Moderate size hiatal hernia with gastroesophageal reflux. Status post partial colectomy. Left lower quadrant ostomy is present. No evidence obstruction. Irregular soft tissue mass with some calcification the pelvis. This demonstrated interval increase was potential involvement with the right posterior lateral urinary bladder wall. The mass measures 8 x 2.9 cm. Left-sided double J intraureteral stent is in place with stent well positioned. 4/10/2022-CT chest without contrast showed extensive metastatic disease is noted to the lungs showing progression from the previous examination with multiple new nodules present as well as interval increase in size of previously documented nodules. Reference nodule within the superior segment of the right lower lobe previously measured at 5 mm in size now measures 13 mm in size. A lesion within the medial right lower lobe now measuring 1.6 cm in long axis previously measured 1.1 cm.  There is no evidence of acute consolidative pneumonia. Essentially, findings consistent with progressive metastatic disease to the lungs. There are too numerous to count pulmonary nodules the majority of which have increased in size or which are new from the previous study. 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 XR CHEST PORTABLE    Result Date: 4/10/2022  EXAMINATION: Chest one view 4/10/2022 HISTORY: Fever and fatigue. FINDINGS: Today's exam is compared to previous study of 4/30/2020. The patient's undergone previous fusion of the mid and lower cervical spine. A tunneled infusion catheter is in place via right-sided approach with the tip in the right atrium. There are suspected pulmonary nodules within the lung bases. . Bibasilar atelectasis is present. No evidence of consolidative pneumonia or effusion. There are what appear to be some endovascular coils projecting over the lateral right heart border. These were not present on previous chest radiograph of 4/30/2020 and should be correlated clinically. 1.. Question developing nodules within the lower lobes. Metastatic disease should be considered and follow-up with outpatient CT imaging of the chest could BE obtained. There is bibasilar atelectasis. No evidence of lobar pneumonia. 2. There are what appear to be some metallic coils injecting over the lateral right heart border. These were not present on previous exams and should be correlated clinically. An infusion catheter is in place via right-sided approach with the tip in the right atrium. Signed by Dr Susan Molina    My interpretation: Bilateral pulmonary nodules      ASSESSMENT:  Metastasis colorectal adenocarcinoma confirmed in right abductor muscle KRAS wild type. Kami Collado  MSI stable and negative mutations for BRAF, NRAS, KRAS wild type.     09/22/21- CEA 0.6  Continue palliative intent 5-FU/leucovorin every 3 weeks as per patient request. Panitumumab on hold as per patient request (significant toxicity with the skin toxicity and mouth sores). Not a good candidate for Avastin due to frequent exchange of ureteral stents and hematuria. 4/10/2022-CT abdomen/pelvis without contrast showed evidence of metastatic disease to the lung bases. Moderate size hiatal hernia with gastroesophageal reflux. Status post partial colectomy. Left lower quadrant ostomy is present. No evidence obstruction. Irregular soft tissue mass with some calcification the pelvis. This demonstrated interval increase was potential involvement with the right posterior lateral urinary bladder wall. The mass measures 8 x 2.9 cm. Left-sided double J intraureteral stent is in place with stent well positioned. 4/10/2022-CT chest without contrast showed extensive metastatic disease is noted to the lungs showing progression from the previous examination with multiple new nodules present as well as interval increase in size of previously documented nodules. Reference nodule within the superior segment of the right lower lobe previously measured at 5 mm in size now measures 13 mm in size. A lesion within the medial right lower lobe now measuring 1.6 cm in long axis previously measured 1.1 cm. There is no evidence of acute consolidative pneumonia. Essentially, findings consistent with progressive metastatic disease to the lungs. There are too numerous to count pulmonary nodules the majority of which have increased in size or which are new from the previous study. 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 -Repeat CEA = 2.8    Non invasive Urothelial Bladder Cancer ([facility] [address] 8/18/2019 and 4/1/2001. Repeat cystoscopy and bilateral ureteral stent removal/replacement on 2/26/2020 . The operative note by Dr. Renata Su documented bilateral hydronephrosis and obtained biopsy of the bladder in the mid dome and left anterior lateral wall x2.   Pathology documented high-grade papillary urethral carcinoma, noninvasive, stage pTaNx. As per Urology    5/28/2020-the patient underwent a cystoscopy and resection of bladder tumor on 05/28/2020 with findings consistent with noninvasive, high-grade papillary urothelial carcinoma. Muscularis propria was not identified. Currently receiving intravesical BCG.  4/1/2021-repeat cystoscopy showed a small bladder lesion. Tumor resection of bladder tumor consistent with noninvasive high-grade urothelial carcinoma. 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 Referred to [facility]  10/14/2021-urology at [facility]. Urine cytology consistent with atypical urothelial cells. Since the patient was last seen by me November 2021 he underwent a major resection of the high-grade urothelial carcinoma of the ureter at [facility].  His postoperative course was complicated and he was hospitalized for many weeks. He eventually was discharged and is currently receiving home care at home. He is still very debilitated. -CT findings from 4/10/2022 concerning for metastatic disease.  -Last CEA September 2021 0.6  4/10/2022-CT abdomen/pelvis without contrast showed evidence of metastatic disease to the lung bases. Moderate size hiatal hernia with gastroesophageal reflux. Status post partial colectomy. Left lower quadrant ostomy is present. No evidence obstruction. Irregular soft tissue mass with some calcification the pelvis. This demonstrated interval increase was potential involvement with the right posterior lateral urinary bladder wall. The mass measures 8 x 2.9 cm. Left-sided double J intraureteral stent is in place with stent well positioned. 4/10/2022-CT chest without contrast showed extensive metastatic disease is noted to the lungs showing progression from the previous examination with multiple new nodules present as well as interval increase in size of previously documented nodules.  Reference nodule within the superior segment of the right lower lobe previously measured at 5 mm in size now measures 13 mm in size. A lesion within the medial right lower lobe now measuring 1.6 cm in long axis previously measured 1.1 cm. There is no evidence of acute consolidative pneumonia. Essentially, findings consistent with progressive metastatic disease to the lungs. There are too numerous to count pulmonary nodules the majority of which have increased in size or which are new from the previous study. 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 4/11/2022-CEA 2.8    Pulmonary nodules-likely metastatic disease  He has a normal CEA. He also has a large mass in the presacral region that has been stable, may be slightly larger. The differential diagnosis could be metastatic high-grade urothelial carcinoma versus metastatic colon cancer. Therefore, tissue biopsy is paramount for further decision regarding treatment.  -Discussed with radiology. They may be able to biopsy one of the pulmonary nodules. Osteoporosis-Osteoporosis BMD -3.6 April 2020. Continue Vit D, Boniva and Calcium. Acute kidney injury/CKD stage III-  -creatinine 2.4 ->1.7->1.4 (baseline)    Hyponatremia a Dr. Drew Smithtarted 127->126->133    UTI- as per ID/hospitalist     PLAN:  Continue current supportive care  Discussed with radiology regarding biopsy of pulmonary nodule  We will attempt CT-guided biopsy of pulmonary nodule tomorrow  Lovenox on hold since 4/11/2022  Ibuprofen has been held.     (Please note that portions of this note were completed with a voice recognition program. Efforts were made to edit the dictations but occasionally words are mis-transcribed.)      Sujit Allen MD    04/12/22  6:56 AM

## 2022-04-12 NOTE — PROGRESS NOTES
Comprehensive Nutrition Assessment    Type and Reason for Visit:  Initial,Positive Nutrition Screen    Nutrition Recommendations/Plan: Trial Ensure Enlive ONS BID to promote stable wt status. Nutrition Assessment:  Following pt for positive nutrition screen r/t wt loss and poor po intake/appetite. Po intake is adequate, >75%. Pt is at risk for nutrition compromise AEB 15 lb (9%) in 6 months. Pt has a colostomy and right nephrectomy. Loose stools from the colostomy noted. Pt c/o nausea, Phenergan given. Will order Ensure Enlive ONS to promote stable weight. Will continue to monitor nutrition status. Malnutrition Assessment:  Malnutrition Status: At risk for malnutrition (Comment)    Context:  Chronic Illness     Findings of the 6 clinical characteristics of malnutrition:  Energy Intake:  No significant decrease in energy intake  Weight Loss:  No significant weight loss (15 lbs (9%) in 6 mo)     Body Fat Loss:  No significant body fat loss     Muscle Mass Loss:  No significant muscle mass loss    Fluid Accumulation:  No significant fluid accumulation     Strength:  Not Performed    Estimated Daily Nutrient Needs:  Energy (kcal):  1094-8736 kcal/kg (20-25 kcal/kg); Weight Used for Energy Requirements:  Current     Protein (g):   g/d (1.3-2 g/kg IBW); Weight Used for Protein Requirements:  Current        Fluid (ml/day):  2558-0886 ml/d; Method Used for Fluid Requirements:  1 ml/kcal      Nutrition Related Findings:  Colostomy, right nephrectomy      Current Nutrition Therapies:    ADULT DIET; Regular    Anthropometric Measures:  · Height: 5' 5\" (165.1 cm)  · Current Body Weight: 170 lb (77.1 kg)   · Admission Body Weight: 170 lb (77.1 kg)    · Usual Body Weight: 185 lb (83.9 kg) (10/13/2021)     · Ideal Body Weight: 136 lbs; % Ideal Body Weight 125 %   · BMI: 28.3  · BMI Categories: Overweight (BMI 25.0-29. 9)       Nutrition Diagnosis:   · Increased nutrient needs related to catabolic illness,altered GI structure as evidenced by Daved Sandra abnormality    Nutrition Interventions:   Food and/or Nutrient Delivery:  Continue Current Diet,Start Oral Nutrition Supplement  Nutrition Education/Counseling:  No recommendation at this time   Coordination of Nutrition Care:  Continue to monitor while inpatient    Goals:  PO intake >75% meals and ONS       Nutrition Monitoring and Evaluation:   Food/Nutrient Intake Outcomes:  Food and Nutrient Intake,Supplement Intake  Physical Signs/Symptoms Outcomes:  Biochemical Data,Diarrhea,GI Status,Nausea or Vomiting,Fluid Status or Edema,Hemodynamic Status,Nutrition Focused Physical Findings,Weight     Electronically signed by Dawna Purdy on 4/12/22 at 3:26 PM CDT    Contact: [phone]

## 2022-04-12 NOTE — PLAN OF CARE
Nutrition Problem #1: Increased nutrient needs  Intervention: Food and/or Nutrient Delivery: Continue Current Diet,Start Oral Nutrition Supplement  Nutritional Goals: PO intake >75% meals and ONS    Problem: Nutrition  Goal: Optimal nutrition therapy  Outcome: Ongoing

## 2022-04-12 NOTE — PROGRESS NOTES
Physician Progress Note      Lynn Garcias  [facility] #:                  [id number]  :                       1950  ADMIT DATE:       4/10/2022 9:40 AM  DISCH DATE:  RESPONDING  PROVIDER #:        Sybil Prather MD          QUERY TEXT:    Pt admitted with UTI. Pt noted to have chronic indwelling urinary catheter. If possible, please document in the progress notes and discharge summary if   you are evaluating and/or treating any of the following: The medical record reflects the following:  Risk Factors: UTI, bladder CA, Sepsis, COLLEEN,  Clinical Indicators: ER indicates indwelling Mcgregor catheter present,  ID   consult - He had prior urine culture MRSA grew Candida glabrata. Current   urine culture no growth  Treatment:   ID consult, Rocephin, Diflucan,  IVF 75 ml/hr    Thank you    Tara Freedman RN, BSN, [facility]  [phone]  Options provided:  -- UTI due to chronic indwelling urinary catheter  -- UTI not due to indwelling urinary catheter  -- Other - I will add my own diagnosis  -- Disagree - Not applicable / Not valid  -- Disagree - Clinically unable to determine / Unknown  -- Refer to Clinical Documentation Reviewer    PROVIDER RESPONSE TEXT:    UTI is due to the chronic indwelling urinary catheter.     Query created by: Hannah Milan on 2022 11:38 AM      Electronically signed by:  Sybil Prather MD 2022 12:11 PM

## 2022-04-12 NOTE — CARE COORDINATION
The [address] at [facility]  Notification of Admission Decision      [] Patient has been accepted for admit to [facility] on :       Please write discharge orders and summary prior to discharge. [x] Patient acceptance to Rehab pending the following : medical work-up being completed. [] Eval in progress       [] Patient determined to be ineligible for services at [facility] because : We recommend you consider        Thank you for your referral, we appreciate you. If you have any questions, please feel   free to contact me at [phone].     Electronically Signed by Angelica Forrester, Admissions Coordinator 4/12/2022 12:55 PM

## 2022-04-12 NOTE — PROGRESS NOTES
04/12/22 1501   General Comment   Comments Attempt patient in X-RAY.   NSG reports he has had a shower today and has been OOB in chair   Physical Therapy    Electronically signed by Escobar Shelton PTA on 4/12/2022 at 3:11 PM

## 2022-04-13 NOTE — PROGRESS NOTES
Progress Note    Patient name: Tess Lai  Patient : 1950  MR #[id number]  Room: 5    Portions of this note have been copied forward, however, changed to reflect the most current clinical status of this patient. Subjective: Complains of mild nausea. States he has a lot of gas. Anticipating CT-guided FNA lung nodule biopsy today. Has been n.p.o.    HISTORY OF PRESENT ILLNESS:  Rhonda Olson is a very pleasant 81years old male who has a diagnosis of stage IV colonic adenocarcinoma. He was receiving palliative chemotherapy after 2021. He was found to have high-grade urothelial carcinoma involving the ureter. He underwent surgery, nephroureterectomy at [facility].  He had a complicated postoperative course. He eventually recovered and his current receiving home care needs at home. He has also several other comorbidities include CAD, hypertension, hyperlipidemia and CKD stage III. The patient presented ER department with complaints of generalized malaise for the last several days, low-grade fever, nausea. Decreased urine output. Patient has a ostomy in place. Work-up in the emergent showed moderate hyponatremia sodium 127, mild elevated lactic acid, elevated creatinine 2.4 (baseline's 1.5-1.7). He also had neutrophil leukocytosis and positive nitrates and large blood on the urine analysis. Chest x-ray was abnormal and therefore CT chest was performed that showed evidence of metastatic disease to the lungs. Patient received IV fluid resuscitation the emergency. He was started on broad-spectrum antibiotics. He was admitted with consultation from me and also ID.  4/10/2022-CT abdomen/pelvis without contrast showed evidence of metastatic disease to the lung bases. Moderate size hiatal hernia with gastroesophageal reflux. Status post partial colectomy. Left lower quadrant ostomy is present. No evidence obstruction.   Irregular soft tissue mass with some calcification the pelvis. This demonstrated interval increase was potential involvement with the right posterior lateral urinary bladder wall. The mass measures 8 x 2.9 cm. Left-sided double J intraureteral stent is in place with stent well positioned. 4/10/2022-CT chest without contrast showed extensive metastatic disease is noted to the lungs showing progression from the previous examination with multiple new nodules present as well as interval increase in size of previously documented nodules. Reference nodule within the superior segment of the right lower lobe previously measured at 5 mm in size now measures 13 mm in size. A lesion within the medial right lower lobe now measuring 1.6 cm in long axis previously measured 1.1 cm. There is no evidence of acute consolidative pneumonia. Essentially, findings consistent with progressive metastatic disease to the lungs. There are too numerous to count pulmonary nodules the majority of which have increased in size or which are new from the previous study. 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      Prior oncological history  ONCOLOGIC HISTORY:   Diagnosis:  · Moderately differentiated rectal carcinoma, T3N0Mx, diagnosed in 3/9/2009  · Noninvasive high-grade papillary urethral carcinoma.  Negative for evidence of detrusor muscle invasion, pTa, pNx on 8/18/2019.    · Metastatic colorectal carcinoma, 9/3/2019  · MSI stable and mutations for BRAF, NRAS, KRAS were not detected.    · Recurrent bladder cancer- superficial   · Urothelial carcinoma of the ureter     TREATMENT SUMMARIES:  · 4/9/2009-5/27/2009-received neoadjuvant chemotherapy with 5-FU CIV along with radiation therapy for a total of 5400 cG  · 7/15/2009-rectum resection revealed no residual malignancy, complete pathological response.   · 8/18/2019- transurethral resection of bladder tumor (TURBT)   · 9/18/2019-12/26/2019 palliative chemotherapy with modified FOLFOX 7  (Oxaliplatin 85 mg/m² IV day 1, leucovorin 400 mg/m² IV day 1 and 5-FU 2400 mg/m² IV continuous infusion over 46 to 48 hours for a total of 7 cycles. · 1/28/2020 -palliative maintenance therapy with leucovorin 400 mg/m² IV over 2 hours on day 1, followed by 5-FU bolus 400 mg/m² and then 1200 mg/m²/day x2 days (total 2400 mg meter squared over 46 to 48 hours) continuous infusion.  Repeat every 2 weeks.     ONCOLOGIC HISTORY # NMIBC_Bladder cancer. Joaquin Perla was diagnosed with noninvasive urethral carcinoma, pTa, pNx on 8/18/2019.  Ta tumors are papillary lesions that tend to recur but are relatively benign and generally do not invade the bladder.  Adjuvant treatment is not warranted at this time and will be monitored closely. Biopsy and transurethral resection of bladder tumor (TURBT) on 8/18/2019 by Dr. Benjamin Vazquez with pathology revealing noninvasive high-grade papillary urethral carcinoma.  Negative for evidence of detrusor muscle invasion, pTa, pNx.   · 12/3/2019- cystoscopy with removal and replacement of double-J urethral stent.  Pathology from dome of the bladder/tumor revealed high-grade papillary urethral carcinoma, noninvasive.  No muscularis propria present.    · 2/26/2020 - cystoscopy and bilateral ureteral stent removal and replacement.  The operative note by Dr. Paul Tracy documented bilateral hydronephrosis and obtained biopsy of the bladder in the mid dome and left anterior lateral wall x2.  Pathology documented high-grade papillary urethral carcinoma, noninvasive, stage pTaNx. · 5/28/2020-the patient underwent a cystoscopy and resection of bladder tumor on 05/28/2020 with findings consistent with noninvasive, high-grade papillary urothelial carcinoma.  Muscularis propria was not identified.  The patient will receive intravesical BCG therapy.   · 7/6/2020-CT Abdomen/ Pelvis-Moderate severe right and mild left hydronephrosis with bilateral ureteral stents, which have an adequate radiographic position. Right kidney with cortical thickening and somewhat asymmetrically decreased enhancement which can be seen with obstructive uropathy. Postoperative changes of colectomy. Left lower quadrant ostomy. Slightly decreased size of presacral low density compared to4/15/2020. Similar intrahepatic and extrahepatic bile duct dilation compared to 4/15/2020. Correlate with clinical symptoms and laboratory studies if clinically indicated. Similar chronic bony findings. · 3/25/2021-CT abdomen showed severe hydronephrosis.  Cystoscopy was performed by urology. · 4/1/21 Bladder neck tumor, biopsies: High-grade papillary urothelial carcinoma, noninvasive. Muscularis propria is not present. AJCC pathologic stage:  pTa Nx   · 4/14/2021-reviewed results of pathology.  No evidence of invasive disease.  Continue surveillance cystoscopy with urology. · 9/13/2021-he was seen by urology.  Findings of ureteral high-grade urothelial carcinoma.  Noninvasive.  The patient is being referred to [facility] in [address]. · 10/11/2021-he was seen by [facility] and recommended cystoscopy with biopsy and possible laser ablation and bilateral leg stent exchange at that time. · 4/10/2022-CT abdomen/pelvis without contrast showed evidence of metastatic disease to the lung bases. Moderate size hiatal hernia with gastroesophageal reflux. Status post partial colectomy. Left lower quadrant ostomy is present. No evidence obstruction. Irregular soft tissue mass with some calcification the pelvis. This demonstrated interval increase was potential involvement with the right posterior lateral urinary bladder wall. The mass measures 8 x 2.9 cm. Left-sided double J intraureteral stent is in place with stent well positioned.   · 4/10/2022-CT chest without contrast showed extensive metastatic disease is noted to the lungs showing progression from the previous examination with multiple new nodules present as well as interval increase in size of previously documented nodules. Reference nodule within the superior segment of the right lower lobe previously measured at 5 mm in size now measures 13 mm in size. A lesion within the medial right lower lobe now measuring 1.6 cm in long axis previously measured 1.1 cm. There is no evidence of acute consolidative pneumonia. Essentially, findings consistent with progressive metastatic disease to the lungs. There are too numerous to count pulmonary nodules the majority of which have increased in size or which are new from the previous study. 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         ONCOLOGIC HISTORY #3  Sharron Fowler was seen in initial oncology consultation on 8/19/2019 during his hospitalization at [address] [place] [address] after a large pelvic mass was identified which raised concern for recurrent disease.  Further pathology consistent with recurrent rectal cancer  · 8/17/2019- CEA 18.1  · 8/17/2019- CT scan of the kidney with contrast documented moderate to severe right hydronephrosis with dilation of the right ureter into the lower pelvis the site of the parasacral soft tissue changes.  Partially calcified soft tissue changes within the janes-sacral region likely representing sequelae of pelvic radiation.  Increasing scarring/fibrosis versus tumor recurrence within the presacral changes, likely represents a site of right distal ureter obstruction.  No left-sided hydronephrosis.    · 8/18/2019 -Double-J ureter stent placement for right hydronephrosis secondary to extrinsic compression by pelvic mass.    · 8/27/2019-CT scan of the chest with contrast documented numerous pulmonary nodules that appear new compared to 11/12/2017, RUL nodule measuring 7 mm and LLL nodule measuring 5 mm.  Soft tissue nodule at the left ventral abdominal wall.  Slight increased size of a probable lymph node anterior to the aorta measuring 0.9 cm compared to 0.7 cm.  Similar presacral, right pelvic sidewall and right abductor muscular nodular soft tissue density. · 8/27/2019 CT scan of the abdomen and pelvis with contrast identified new moderate left hydronephrosis with moderate right hydronephrosis.  Mild stranding around the urinary bladder and thickening of the bilateral ureteral wall.  Numerous pulmonary nodules.  Soft tissue of the left ventral abdominal wall.  Slightly increased size of probable lymph node anterior to the aorta measuring 0.9 cm compared to 0.7 cm. · 8/27/2019-PET scan did not identify any FDG avid pulmonary nodules or airspace opacities.  Abnormal increased metabolic activity within the right pelvic wall soft tissue showing SUV of 5.4.  Abnormal soft tissue metabolic activity in the right abductor muscle with SUV of 6.4.  Focally increased activity to the right of the inferior L5 vertebrae body posterior with SUV of 7.9 with associated sclerotic changes. · 8/29/2019-  Dr. Dee Dee Forrest completed a cystoscopy with double-J ureter stent in the left ureter for left hydronephrosis  · 9/3/2019- CT-guided right abductor muscle biopsy on 9/3/2019 with pathology identifying metastatic adenocarcinoma consistent with colorectal origin.  Molecular panel from biopsy tissue revealed MSI stable and mutations for BRAF, NRAS, KRAS were not detected.    · 9/18/2019 - Palliative chemotherapy with modified FOLFOX 7  (Oxaliplatin 85 mg/m² IV day 1, leucovorin 400 mg/m² IV day 1 and 5-FU 2400 mg/m² IV continuous infusion over 46 to 48 hours) with the anticipation of adding Avastin 5 mg/kg day 1 every 14 days  · 10/15/2019- 24-hour urine for protein with a total protein of 1785 mg per 24-hour.  Zurdo has been evaluated by Dr. Maged Plata and he reports no significant concerns related to the protein. · 11/6/19 CEA 5.6 significantly improved compared to CEA of 14.0 on 8/30/2019.   · 11/15/2019 -CT scan of the abdomen and pelvis documented no evidence of disease progression with significant decrease in the size of enhancing nodules in the right pelvic abductor musculature, a previous 1.8 cm nodule now measures 5 mm.  No new or enlarging retroperitoneal, mesenteric, pelvic or inguinal lymph nodes.  Calcified presacral mass unchanged measuring 5 x 3.7 cm.   · 11/15/2019 -CT scan of the chest documented multiple small pulmonary nodules reidentified, largest nodule in the RUL measures 5 mm compared to 7.5 mm, RLL nodule measures 3.4 mm compared to 5.9 mm, VIC nodule measures 4 mm compared to 6 mm.  A cluster of small nodules in the RUL anteriorly are barely visible on this study.  There is a decrease in size of mediastinal lymph nodes compared to previous exam, right distal paratracheal lymph node measuring 4.5 mm compared to 8.3 mm and lower right peritracheal node measuring 4.5 mm compared to 8.6 mm.    · 1/13/2020- CT scan of the abdomen and pelvis with contrast indicated improvement in the right-sided hydronephrosis with a chronic inflammatory process of the ureters suspected due to the moderate thickening, also present on previous study.  The small poorly enhancing nodules in the right abductor muscles have decreased in the partially calcified presacral mass and right lateral pelvic wall nodules are stable compared to previous study.  Resolution of the subcutaneous abdominal wall nodules.  A prominent retroperitoneal lymph node adjacent and anterior to the left common artery is redefined and measures 6 mm, no change from previous exam.   · 1/13/2020 - CT scan of the chest documented a right lower lobe nodule measures 4.3 cm and is unchanged.  A right lower lobe nodule measures 2.8 mm compared to 3.4 mm.  Nodule in the right upper lobe is barely visible and measures 2.4 mm.  Nodule in the left lower lobe measures 4.8 mm and is unchanged.  Nodule in left lower lobe posterior measures 2.8 mm and previously measured 4.7 mm.  A right lower lobe posterior medially nodule is barely visible measuring 0.2 mm and previously. measured 4. 5 mm.  No new nodules identified.  No change in the size of the mediastinal lymph nodes. · 1/28/2020 -palliative maintenance therapy with leucovorin 400 mg/m² IV over 2 hours on day 1, followed by 5-FU bolus 400 mg/m² and then 1200 mg/m²/day x2 days (total 2400 mg meter squared over 46 to 48 hours) continuous infusion.  Repeat every 2 weeks.  Only received 1 cycle, further treatment held due to small bowel obstruction. · 1/30/2020 - CT scan of the abdomen and pelvis indicated high-grade small bowel obstruction with transition point in the midline posterior pelvis where a small bowel loop is tethered to a partially calcified presacral soft tissue mass. · 2/11/2020-CEA 1.4  · 3/5/2020-  Exploratory laparotomy, removal of adhesions, small bowel resection with primary anastomosis and partial thickness small bowel repair by Dr. Kiara Gonzalez the operative note Dr. Papi Desouza reported no evidence of carcinomatosis within the abdomen and the liver was unable to be examined due to extent of right upper quadrant adhesions.  Pathology from small intestine documented no evidence of malignancy. · 4/15/2020 Ct Chest W Contrast Minimal interval increase in size of subcentimeter pulmonary nodules. The largest now measures 6 mm in the medial right lower lobe on axial image 80. There is a new, unstable, horizontal fracture through the T6 vertebral body. Additionally, there are new fractures through the posterior 11th and 12th right ribs. The bones are moderately osteopenic. The finding of an unstable fracture through the T6 vertebral body was discussed with Ana Mercedes at 10:45 AM on 4/15/2020. · 4/15/2020 Ct Abdomen Pelvis W Iv Contrast  Patient has undergone interval resection of the distal small bowel, and there is a 2.8 cm fluid collection in the presacral operative bed. This contains a tiny focus of air. This may postoperative or due to infection.  Please correlate with the patient's clinical symptoms and laboratory markers. Improved hydronephrosis and hydroureter. Diffuse osteoporosis. Findings in the lower chest are described in a separate dictation. · 4/22/2020-CEA 0.9  · 6/2/2020-resumed chemotherapy with 5-FU/leucovorin and Panitumumab.  Okay to do 1 today then CMP CEA   · 8/19/20 CEA-1.1  · 10/21/2020- CEA 2.0  · 11/11/2020- Ct Chest W Contrast Multiple, too numerous to count, small noncalcified lung nodules bilaterally. The referenced nodules appears to have decreased in size the previous study. No new nodules. · 11/11/2020- Ct Abdomen Pelvis W Contrast Unchanged bilateral hydronephrosis, more on the right side. Bilateral ureteral stents in place. Moderate asymmetrical thickening of the incompletely distended urinary bladder. This may partly be due to incomplete distention. Possibility of chronic cystitis and or chronic partial outlet obstruction may not be excluded. A functioning left lower abdominal ostomy. A small parastomal small bowel herniation without obstruction. A partially calcified presacral mass. The soft tissue component have increased in size in the previous study. The osseous changes are described above. Any superimposed metastatic disease is not excluded and would be hard to evaluate due to extensive postsurgical changes. · 11/18/2020-essentially, overall stable disease.  Improvement of the lung nodules with decreased in the size of the target lesions.  The pelvic lesion is is likely worse by 25%.  However, CEA is is still within the normal limits.  Therefore likely mixed response.  We will continue current treatment and repeat CT scans in about 3 months. · 12/16/2020-discontinue 5-FU bolus from his regimen. · 2/9/2021- Ct Chest W Contrast No evidence of disease progression. Stable pulmonary nodules. Stable intrahepatic and extrahepatic bile duct dilation compared to prior 11/11/2020. Postoperative, posttraumatic and degenerative changes in the spine as described above.  Old right-sided rib fractures. · 2/9/2021- Ct Abdomen Pelvis W Contrast showed evidence of response to therapy including decreased presacral mass/thickening now measuring 1.1 cm, previously 1.9 cm on 11/11/2020. Stable intrahepatic and extra hepatic bile duct dilation with cholecystectomy clips. See separately dictated CT chest of the same day regarding pulmonary nodules.  Bilateral ureteral stents. Decreased bilateral hydronephrosis. Urinary bladder wall is thickened, which could be seen with post treatment changes or cystitis. Correlate with symptoms.  Chronic bony findings as above  · 2/17/2021-reviewed CT chest abdomen pelvis.  Essentially consistent with disease response to therapy.  Continue current therapy.    · 2/17/21 CEA 1.0  · 3/25/21 Ct Abdomen Pelvis W Iv Contrast  The stomach is distended, however no small bowel dilatation identified. Contrast identified within the left ileostomy bag. The distal stomach is under distended which may be secondary to peristalsis. Gastroparesis considered. Bilateral ureteral stents remain appropriate in position, however there is new moderate to severe right-sided hydronephrosis and mild left-sided hydronephrosis when compared to the 2/9/2021 exam. Mild increase considered within the partially calcified presacral pelvic mass. Stable partially calcified right pelvic soft tissue. Similar abnormal wall thickening of the bladder most notable superiorly. Similar prominence of the intra and extra hepatic bile ducts down to the level of the ampulla. Findings may represent a reservoir effect, however correlation with liver function tests recommended. Therefore. Stable noncalcified left greater than right pulmonary nodules with asymmetrical interstitial changes of the right lung base concerning for pneumonitis. Osteopenia with postoperative changes of the lumbar spine. Chronic compression deformities of the thoracolumbar vertebra as described above.    · 4/14/2021-I reviewed notes from urology and also CT abdomen/pelvis that showed mild interval increase in the size of the soft tissue nodule in the pelvis.  However, this is still very small and therefore will have a short follow-up CT scan and of May 2021.  I personally reviewed the CT scans.  Really hard to state that there is clear-cut disease progression.  Again, short follow-up recommended.  Continue current treatment. · 5/12/21 CEA 0.8  · 5/26/2001-CT chest abdomen pelvis showed Partially calcified presacral soft tissue mass appears unchanged. Previously measured soft tissue noncalcified component is unchanged measuring 2.2 x 3.2 cm on axial image 60. However, this is questionable.  CT chest showed a stable pulmonary nodules.  Subcentimeter nodules.  Largest 7.5 mm. · 6/1/21 CEA 0.9  · 06/01/23- reviewed scans. Stable disease.  Continue treatment every 3 weeks as per patient request. Continue infusional 5-FU, Panitumumab and leucovorin. No 5-FU bolus due to severe mucositis. · 8/11/2021 CEA . 9  · 9/03/2021 CT Chest w/Contrast Slight interval increase in the size of pulmonary nodules, the largest in the medial right lung base. Findings are concerning for metastatic disease. Atherosclerosis of the aorta and coronary arteries. Sclerotic rib lesions favored for osseous metastases. Old rib fractures also evident. · 9/03/2021 CT Abd/Pelvis w/ IV Contrast (Oral) A persistent partially calcified mass in the presacral region with encasement of the distal ureters bilaterally and resultant bilateral hydronephrosis. Bilateral ureteral stents in place. There is increasing right-sided hydronephrosis since the previous study. Right renal atrophy similar to the previous study. Moderate asymmetrical thickening of the incompletely distended urinary bladder is similar to the previous study. This may partly be due to incomplete distention. An inflammatory process or a neoplastic process is not excluded. Moderate intrahepatic biliary dilatation is similar to the previous study and probably due to a prior cholecystectomy. Moderate dilatation of the contrast filled small bowel loops may represent an ileus or partial distal small bowel obstruction. There is left lower abdominal ileostomy which appears patent. The stable compression fractures of the lower thoracic and proximal lumbar vertebrae and hardware fusion similar to the previous study. · 9/22/21- Decrease Vectibix to every other treatment.  He is currently receiving chemotherapy every 3 weeks as per patient request      ONCOLOGIC HISTORY # Rectal carcinoma:  Reid Jesus has a history of moderately differentiated rectal carcinoma, T3N0Mx, diagnosed in 3/9/2009.  He received neoadjuvant chemotherapy with radiation and resection with Kiara De has been routinely followed at [facility] and was last seen by Dr. Massimo Meza on 1/10/2019. Virginia Yvonne following are pertinent findings related to his diagnosis and treatment. · 3/9/2009- Esophagogastroduodenoscopy with biopsy by Dr. Tisha Muir that revealed rectal mass at 8 cm with pathology being consistent with moderately differentiated adenocarcinoma. · 3/18/2019-Dr.Elizabeth Mark Eid at [place] performed a flexible sigmoidoscopy and rectal endoscopic ultrasound that revealed the tumor extending 6-7 mm deep through the muscularis propria layer and into the serosa, T3 lesion. · 4/9/2009-5/27/2009-received neoadjuvant chemotherapy with 5-FU CIV along with radiation therapy for a total of 5400 cGy under the direction of Dr. Tom Bishop.    · 7/15/2009-rectum resection revealed no residual malignancy.  22 regional nodes were negative for malignancy.  The rectum distal doughnut was negative for malignancy.  He was documented to have a complete pathological response.   · 9/9/2009- Ileostomy excision pathology revealed small bowel wall with changes consistent with ileostomy site.  Pathology negative for malignancy    Objective   PHYSICAL EXAM:  CONSTITUTIONAL: Alert, appropriate, no acute distress  EYES: Non icteric, EOM intact, pupils equal round   ENT: Mucus membranes moist,external inspection of ears and nose are normal  NECK: Supple, no masses.  No JVD  CHEST/LUNGS: CTA bilaterally, normal respiratory effort   CARDIOVASCULAR: RRR. No lower extremity edema  ABDOMEN: soft non-tender, active bowel sounds. Left abdominal ileostomy noted  EXTREMITIES: warm, moves extremities. Gait not observed. SKIN: warm, dry with no rashes or lesions  LYMPH: No cervical, clavicular, or axillary lymphadenopathy  NEUROLOGIC: follows commands, non focal   PSYCH: mood and affect appropriate. Alert and oriented to time, place, person    Vital Signs  /70   Pulse 89   Temp 98 °F (36.7 °C) (Temporal)   Resp 16   Ht 5' 5\" (1.651 m)   Wt 170 lb (77.1 kg)   SpO2 95%   BMI 28.29 kg/m²     Intake/Output Summary (Last 24 hours) at 4/13/2022 0640  Last data filed at 4/13/2022 0500  Gross per 24 hour   Intake 1250 ml   Output 1400 ml   Net -150 ml     Labs:  CBC:   Recent Labs     04/11/22  0425 04/12/22  0209 04/13/22  0331   WBC 11.2* 10.7 9.2   HGB 10.8* 10.8* 10.1*    313 338     CMP:   Recent Labs     04/10/22  0958 04/10/22  0958 04/11/22  0425 04/12/22  0209 04/13/22  0331   GLUCOSE 119*   < > 92 99 141*   BUN 44*   < > 34* 29* 27*   CREATININE 2.4*   < > 1.7* 1.4* 1.1   CO2 14*   < > 13* 15* 14*   CALCIUM 9.4   < > 9.1 9.3 9.2   ALKPHOS 166*  --   --  123  --    AST 19  --   --  14  --    ALT 17  --   --  10  --     < > = values in this interval not displayed. Hepatic:   Recent Labs     04/10/22  0958 04/12/22  0209   AST 19 14   ALT 17 10   BILITOT <0.2 <0.2   ALKPHOS 166* 123     Troponin: No results for input(s): TROPONINI in the last 72 hours. BNP: No results for input(s): BNP in the last 72 hours. INR:   Recent Labs     04/13/22  0331   INR 1.06     ABG: No results for input(s): PHART, QIT2KDK, PO2ART, ZXU2MQU, K8LQTDLB, BEART in the last 72 hours.     30 Day lookback of cultures:    Blood Culture Recent:   Recent Labs     04/10/22  0958   BC No Growth to date. Any change in status will be called. Gram Stain Recent: No results for input(s): LABGRAM in the last 720 hours. Resp Culture Recent: No results for input(s): CULTRESP in the last 720 hours. Body Fluid Recent : No results for input(s): BFCX in the last 720 hours. MRSA Recent : No results for input(s): [facility] in the last 720 hours. Urine Culture Recent :   Recent Labs     04/10/22  1007   LABURIN >50,000 CFU/ml*  Moderate growth  Isolation in progress  Dr. Higinio Elliott Sensitivity    Rare growth  Isolation in progress       Organism Recent :   Recent Labs     04/10/22  1007   ORG Candida glabrata*  Gram negative andres*        ASSESSMENT:  Nikko Echevarria confirmed in right abductor muscle KRAS wild type.   MSI stable and negative mutations for BRAF, NRAS, KRAS wild type.     09/22/21- CEA 0.6  Continue palliative intent 5-FU/leucovorin every 3 weeks as per patient request. Panitumumab on hold as per patient request (significant toxicity with the skin toxicity and mouth sores). Not a good candidate for Avastin due to frequent exchange of ureteral stents and hematuria. 4/10/2022-CT abdomen/pelvis without contrast showed evidence of metastatic disease to the lung bases. Moderate size hiatal hernia with gastroesophageal reflux. Status post partial colectomy. Left lower quadrant ostomy is present. No evidence obstruction. Irregular soft tissue mass with some calcification the pelvis. This demonstrated interval increase was potential involvement with the right posterior lateral urinary bladder wall. The mass measures 8 x 2.9 cm. Left-sided double J intraureteral stent is in place with stent well positioned.   4/10/2022-CT chest without contrast showed extensive metastatic disease is noted to the lungs showing progression from the previous examination with multiple new nodules present as well as interval increase in size of previously documented nodules. Reference nodule within the superior segment of the right lower lobe previously measured at 5 mm in size now measures 13 mm in size. A lesion within the medial right lower lobe now measuring 1.6 cm in long axis previously measured 1.1 cm. There is no evidence of acute consolidative pneumonia. Essentially, findings consistent with progressive metastatic disease to the lungs. There are too numerous to count pulmonary nodules the majority of which have increased in size or which are new from the previous study. 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 -CEA repeated 4/11/2022: 2.8  -Anticipate CT-guided FNA lung nodule biopsy today, 4/13/2022     Non invasive Urothelial Bladder Cancer ([facility] [address] 8/18/2019 and 4/1/2021. Repeat cystoscopy and bilateral ureteral stent removal/replacement on 2/26/2020 .  The operative note by Dr. Kelly Villeda documented bilateral hydronephrosis and obtained biopsy of the bladder in the mid dome and left anterior lateral wall x2. Pathology documented high-grade papillary urethral carcinoma, noninvasive, stage pTaNx.  As per Urology    5/28/2020-the patient underwent a cystoscopy and resection of bladder tumor on 05/28/2020 with findings consistent with noninvasive, high-grade papillary urothelial carcinoma.  Muscularis propria was not identified. Currently receiving intravesical BCG.  4/1/2021-repeat cystoscopy showed a small bladder lesion.  Tumor resection of bladder tumor consistent with noninvasive high-grade urothelial carcinoma. 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  Referred to [facility]  10/14/2021-urology at [facility] cytology consistent with atypical urothelial cells.    Since the patient was last seen by Dr. Ney Mckinney November 2021 he underwent a major resection of the high-grade urothelial carcinoma of elements of all parts of the encounter have been performed by me. I agree with the assessment and plan as outlined by the ARNP/PA. Subjective-he had no new complaints this morning. However, later on he had apparent complaints of not passing gases/stool through his colostomy. I discussed the findings with Neal Torres, radiology. FNA biopsy was postponed. Objective-as above  Assessment/plan:  Small bowel obstruction versus ileus. As per hospitalist service. Pulmonary nodules-FNA biopsy will be postponed. History of colon cancer-last CEA normal.  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 Biopsy was postponed today due to development of ileus. 4/13/2022-CT abdomen/pelvis showed  When compared to the previous exam of 3 days earlier there is now   noted to be moderate small bowel distention. I would favor a adynamic   ileus. A distal obstruction at the site of the patient's ileostomy is   also in the differential but felt less likely. There is mild   distention of the stomach. A moderate size hiatal hernia is present. 2. Mild to moderate dilatation of the upper tracts of the left kidney. A left-sided double-J ureteral stent is in place. There is mild   urothelial thickening. I do not see evidence of a discrete ureteral   stone. The stent is well-positioned. 3. Partially calcified pelvic mass. This is inseparable from the right   posterior lateral wall of the urinary bladder along its lower margin. A Mcgregor catheter is in place within the bladder. 4. 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 5. There is a small amount of free fluid within the subhepatic space   and Morison space. A small right-sided effusion is present with   multiple pulmonary nodules within the lower lobes consistent with   metastatic disease to the lungs.  There is a moderate size hiatal   hernia present. Domo Marcial

## 2022-04-13 NOTE — PROGRESS NOTES
Urology Progress Note      SUBJECTIVE: Has some nausea. He feels gassy but denies abdominal pain    OBJECTIVE:      REVIEW OF SYSTEMS:   Review of Systems      Physical  VITALS:  /70   Pulse 89   Temp 98 °F (36.7 °C) (Temporal)   Resp 16   Ht 5' 5\" (1.651 m)   Wt 170 lb (77.1 kg)   SpO2 95%   BMI 28.29 kg/m²   TEMPERATURE:  Current - Temp: 98 °F (36.7 °C); Max - Temp  Av.6 °F (36.4 °C)  Min: 97.3 °F (36.3 °C)  Max: 98 °F (36.7 °C)   24 HR I&O   Intake/Output Summary (Last 24 hours) at 2022 0728  Last data filed at 2022 0500  Gross per 24 hour   Intake 1250 ml   Output 1400 ml   Net -150 ml     BACK: no tenderness in spine or flanks  ABDOMEN:  scars noted large midline scar, firm, distended and non-tender  HEART:  normal rate and regular rhythm  CHEST: Normal respiratory effort  GENITAL/URINARY: Mcgregor catheter in draining clear urine normal otherwise    Data       CBC:   Recent Labs     22  0425 22  0209 22  0331   WBC 11.2* 10.7 9.2   HGB 10.8* 10.8* 10.1*   HCT 32.8* 32.6* 31.2*    313 338     BMP:    Recent Labs     22  0425 22  0425 22  0209 22  0331   *  --  133* 133*   K 4.3   < > 4.3  4.3 4.9   CL 99  --  104 104   CO2 13*  --  15* 14*   BUN 34*  --  29* 27*   CREATININE 1.7*  --  1.4* 1.1   GLUCOSE 92  --  99 141*    < > = values in this interval not displayed. Recent Labs     04/10/22  1007   LABURIN >50,000 CFU/ml*  Moderate growth  Isolation in progress  Dr. Lorene Kirby    Rare growth  Isolation in progress       Recent Labs     04/10/22  0958   BC No Growth to date. Any change in status will be called. Recent Labs     04/10/22  1210   BLOODCULT2 No Growth to date. Any change in status will be called. Radiology:      Imaging studies: KUB shows stent in good position.   He has some dilated loops of small bowel left upper quadrant    ASSESSMENT AND PLAN    Patient Active Problem List Diagnosis    Thoracic facet joint syndrome    Primary osteoarthritis of left hip    Leg swelling    Abnormal nuclear cardiac imaging test    Abnormal nuclear cardiac imaging test    S/p bare metal coronary artery stent    Coronary artery disease involving native coronary artery of native heart without angina pectoris    Complex regional pain syndrome type 1 of right lower extremity    Hydronephrosis, bilateral    Extrinsic ureteral obstruction, bilateral    History of rectal cancer    Anemia of chronic disease    Pelvic mass    Lung nodules    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    Colorectal cancer ([address]    Metastasis from colon cancer (HCC)    Proteinuria    Chemotherapy management, encounter for    Anemia associated with chemotherapy    Other fatigue    Thrush    Dehydration    Chemotherapy-induced peripheral neuropathy (HCC)    Chemotherapy-induced nausea    SBO (small bowel obstruction) (HCC)    Burning with urination    History of small bowel obstruction    Encounter for central line care    Iron deficiency    History of bladder cancer    Hydronephrosis of left kidney    Hydronephrosis of right kidney    Low back pain    Urothelial carcinoma of right distal ureter ([address]    Sepsis secondary to UTI ([address]    Acute kidney injury superimposed on CKD ([address]     Please see my consult note done yesterday evening. No new recommendations have copied them below. Otherwise continue antibiotics antifungal per Dr. Manisha Garcia. Cystoscopy for bladder cancer surveillance and stent change to be done in [address] his urologist there. Urine remains clear. KUB shows dilated loops of small bowel left upper quadrant. Keep Mcgregor catheter for now. 1.  Left ureteral stricture secondary to prior pelvic radiation with retained left ureteral stent. He has regular stent changes.   Last done mid January 2022.  he is due to have his stent changed  by the urologist at [facility] in mid May. I see no urgent need to do this at this time as long as he continues to do well I discussed this with Dr. Smitha Mejia and patient's daughter.     2. Catheter acquired UTI present on admission. Not surprising he would grow out Candida and gram-negative rods treatment as per infectious disease plan.     3. Lower urinary tract symptoms. Is hard to get a handle on whether he has retention versus just small capacity bladder with incontinence. Once he gets on rehab I would recommend removing the Mcgregor catheter and do scheduled In-N-Out catheterizations check postvoid residuals. Now that he is more ambulatory he may do well with just wearing absorbent undergarments. Further recommendations per voiding trial and voiding diary with catheterization.     4.  Nonmuscle invasive bladder cancer. He needs bladder cancer surveillance ongoing. Again he is due to have this done by Dr. Stefany Miramontes up at [facility] in mid May. I did speak with the daughter if he has recurrent episodes of  hematuria  they should  to try to get him in to be seen sooner.     5.  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 Unfortunately now has evidence of pulmonary nodules. His oncologist is seeing him and FNA is planned.     6.  CT scan showing right pelvic collection. This is most likely postoperative change given his history and complication associated with his right nephroureterectomy. This was documented on outside CT by report February 27, 2022. By description it seems to be stable. Does not appear to be infected or abscess at this time.   No intervention at this time but will need to be followed.       Kyara Eason MD

## 2022-04-13 NOTE — ACP (ADVANCE CARE PLANNING)
Advance Care Planning   Healthcare Decision Maker:    Primary Decision Maker: Esther Castillo - [phone]    Secondary Decision Maker: Dayron [address] - Child - [phone]    Note:  Patient said \"Have documents\" and  promised to send copies; he expressed understanding about the state law re: legal next of kin. The above HCDMs are based on the patient's statement.   .      Electronically signed by Natalie Celaya  [employer] on 4/12/2022 at 10:50 AM.

## 2022-04-13 NOTE — PROGRESS NOTES
HOSPITAL MEDICINE  - PROGRESS NOTE    Admit Date: 4/10/2022         CC: fever,nausea,decreased urine output     Subjective: no fever, today stated vomiting, has abd pain, constipated, no chest pain, no palpitations, no dyspnea, no wheezing, no dysuria, no hematuria    Objective: severe distress, deconditioned     Diet: Diet NPO  Pain is:None  Nausea: yes   Bowel Movement/Flatus no    Data:   Scheduled Meds: Reviewed  Current Facility-Administered Medications   Medication Dose Route Frequency Provider Last Rate Last Admin    0.9 % sodium chloride infusion   IntraVENous Continuous Maira Arambula  mL/hr at 04/13/22 1503 New Bag at 04/13/22 1503    sodium bicarbonate tablet 650 mg  650 mg Oral 4x Daily Maira Arambula MD        sennosides-docusate sodium (SENOKOT-S) 8.6-50 MG tablet 2 tablet  2 tablet Oral BID Maira Arambula MD        polyethylene glycol [facility]) packet 17 g  17 g Oral BID Kathie Bacon MD        [facility]) injection 12.5 mg  12.5 mg IntraMUSCular Q4H PRN Maira Arambula MD        lactobacillus (CULTURELLE) capsule 1 capsule  1 capsule Oral Daily Maira Arambula MD   1 capsule at 04/13/22 1044    pantoprazole (PROTONIX) 40 mg in sodium chloride (PF) 10 mL injection  40 mg IntraVENous BID Maira Arambula MD   40 mg at 04/13/22 1047    cyclobenzaprine (FLEXERIL) tablet 5 mg  5 mg Oral BID PRN Maira Arambula MD   5 mg at 04/12/22 2106    sodium chloride flush 0.9 % injection 5-40 mL  5-40 mL IntraVENous 2 times per day Hina Flax, APRN - CNP   10 mL at 04/13/22 1044    sodium chloride flush 0.9 % injection 5-40 mL  5-40 mL IntraVENous PRN Hina Flax, APRN - CNP        0.9 % sodium chloride infusion   IntraVENous PRN Hina Flax, APRN - CNP        enoxaparin (LOVENOX) injection 30 mg  30 mg SubCUTAneous Q24H Hina Flax, APRN - CNP   30 mg at 04/13/22 1500    acetaminophen (TYLENOL) tablet 650 mg  650 mg Oral Q6H PRN Lev Jyotsna, APRN - CNP   650 mg at 04/13/22 1459    Or    acetaminophen (TYLENOL) suppository 650 mg  650 mg Rectal Q6H PRN Lev Goyal, APRN - CNP        cefTRIAXone (ROCEPHIN) 1000 mg IVPB in 50 mL D5W minibag  1,000 mg IntraVENous Q24H Lev Goyal APRN - CNP   Stopped at 04/13/22 1536    metoprolol succinate (TOPROL XL) extended release tablet 50 mg  50 mg Oral Daily Lev Jyotsna, APRN - CNP   50 mg at 04/13/22 1044    DULoxetine (CYMBALTA) extended release capsule 30 mg  30 mg Oral Daily Lev Jyotsna, APRN - CNP   30 mg at 04/13/22 1044    traMADol (ULTRAM) tablet 25 mg  25 mg Oral Q6H PRN Lev Goyal, APRN - CNP   25 mg at 04/13/22 0656    calcium carbonate (TUMS) chewable tablet 500 mg  500 mg Oral TID PRN OLGA Stephens - CNP        ondansetron [facility]) injection 4 mg  4 mg IntraVENous Q6H PRN OLGA Stephens CNP   4 mg at 04/13/22 0827     DVT Prophylaxis: Lovenox 40 mg sq daily    Continuous Infusions:   sodium chloride 125 mL/hr at 04/13/22 1503    sodium chloride         Intake/Output Summary (Last 24 hours) at 4/13/2022 1759  Last data filed at 4/13/2022 1200  Gross per 24 hour   Intake 0 ml   Output 500 ml   Net -500 ml     CBC:   Recent Labs     04/11/22 0425 04/12/22  0209 04/13/22  0331   WBC 11.2* 10.7 9.2   HGB 10.8* 10.8* 10.1*    313 338     BMP:  Recent Labs     04/11/22  0425 04/11/22  0425 04/12/22  0209 04/13/22  0331   *  --  133* 133*   K 4.3   < > 4.3  4.3 4.9   CL 99  --  104 104   CO2 13*  --  15* 14*   BUN 34*  --  29* 27*   CREATININE 1.7*  --  1.4* 1.1   GLUCOSE 92  --  99 141*    < > = values in this interval not displayed.      ABGs: No results found for: PHART, PO2ART, [id number]  INR:   Recent Labs     04/13/22  0331   INR 1.06         Objective:   Vitals: BP (!) 131/91   Pulse 93   Temp 96.8 °F (36 °C) (Temporal)   Resp 18   Ht 5' 5\" (1.651 m)   Wt 170 lb (77.1 kg)   SpO2 96%   BMI 28.29 kg/m²   General appearance: alert, appears stated age and cooperative  Skin: Skin color, texture, turgor normal.   HEENT: Head: Normocephalic, no lesions, without obvious abnormality.   Neck: no adenopathy, no carotid bruit, no JVD and supple, symmetrical, trachea midline  Lungs: clear to auscultation bilaterally  Heart: regular rate and rhythm, S1, S2 normal, no murmur, click, rub or gallop  Abdomen: tender, rigid, no bs, no rebound, no guarding  Extremities: extremities normal, atraumatic, no cyanosis or edema  Lymphatic: No significant lymph node enlargement papable  Neurologic: Mental status: Alert, oriented times 3        Assessment & Plan:    · Non invasive urothelial bladder cancer - tx per oncology  · Pulmonary nodules - plan for biopsy when medically stable per radiology   · Constipation with ileus and vomiting- general surgery on board  · Hypomagnesemia - replaced  · Gram neg andres UTI with leukocytosis and lactic acidosis in pt with prior ureteral stents cont IVF and IV ab- ID on board - urology evaluated   · Candidosis- treated with fluconazole   · Hyponatremia - improved   · COLLEEN- improving with IVF  · Anemia of chronic disease  · Small bowel distention     Patient Active Problem List:     S/p bare metal coronary artery stent     Coronary artery disease involving native coronary artery of native heart without angina pectoris     History of rectal cancer     Metastasis from colon cancer (HCC)     Chemotherapy-induced peripheral neuropathy (HCC)     Chemotherapy-induced nausea         See orders   Disposition: BRIE Nielson MD
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
## 2022-04-13 NOTE — PROGRESS NOTES
Physical Therapy  Name: Juan Pablo Leung  MRN:  [id number]  Date of service:  4/13/2022 04/13/22 1312   Restrictions/Precautions   Restrictions/Precautions Fall Risk   Position Activity Restriction   Other position/activity restrictions has a colostomy   Subjective   Subjective Patient is sitting up in chair and agrees to go for a walk. Pain Screening   Patient Currently in Pain Denies   Transfers   Sit to Stand Contact guard assistance;Minimal Assistance   Stand to sit Contact guard assistance   Ambulation   Ambulation? Yes   Ambulation 1   Surface level tile   Device Rolling Walker   Assistance Contact guard assistance   Quality of Gait FLEXED POSTURE THAT ACTUALLY PROVIDES STABLILITY TO THE KNEE TO DECREASE  [place]. NO LOB NOTED OR PATH DEVIATION   Gait Deviations Decreased step length;Decreased step height   Distance 50'   Short term goals   Time Frame for Short term goals 2 WKS   Short term goal 1  FT WITH RW   Conditions Requiring Skilled Therapeutic Intervention   Body structures, Functions, Activity limitations Decreased functional mobility ; Decreased endurance;Decreased strength;Decreased posture; Increased pain   Assessment Patient able to ambulate further this morning before his R knee started bothering him. He had no LOB and seemed pretty steady overall. Cues needed for technique and walker safety during transition back to chair. He was positioned back in his recliner to comfort with all needs in reach. Activity Tolerance   Activity Tolerance Patient Tolerated treatment well   Safety Devices   Type of devices Gait belt;Call light within reach; Left in chair         Electronically signed by Selma Tanner PTA on 4/13/2022 at 1:16 PM

## 2022-04-14 NOTE — PROGRESS NOTES
Attempted to placed NGT per order. Attempted right and left nare but unable to advance NGT due to meeting resistance.  notified. Radiologist to attempt to place NGT.
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
## 2022-04-14 NOTE — PROGRESS NOTES
Urology Progress Note      SUBJECTIVE: Patient states that not doing too good today. Still having emesis    OBJECTIVE: No stool small amount of flatus. Urine output okay no fever    REVIEW OF SYSTEMS:   Review of Systems   Constitutional: Positive for appetite change and fatigue. Negative for fever. HENT: Negative. Eyes: Negative. Respiratory: Negative. Cardiovascular: Negative. Gastrointestinal: Positive for abdominal distention, abdominal pain, nausea and vomiting. Genitourinary: Positive for difficulty urinating. Negative for flank pain and hematuria. Psychiatric/Behavioral:        Depressed mood  his wife passed yesterday         Physical  VITALS:  BP (!) 152/92   Pulse 110   Temp 96.8 °F (36 °C) (Temporal)   Resp 18   Ht 5' 5\" (1.651 m)   Wt 170 lb (77.1 kg)   SpO2 98%   BMI 28.29 kg/m²   TEMPERATURE:  Current - Temp: 96.8 °F (36 °C); Max - Temp  Av °F (36.1 °C)  Min: 96.8 °F (36 °C)  Max: 97.2 °F (36.2 °C)   24 HR I&O   Intake/Output Summary (Last 24 hours) at 2022 0804  Last data filed at 2022 1200  Gross per 24 hour   Intake 0 ml   Output --   Net 0 ml     BACK: no tenderness in spine or flanks  ABDOMEN:  firm, distended and tenderness noted mid abdomen. No stool in ileostomy  HEART:  normal rate and regular rhythm  CHEST: Normal respiratory effort  GENITAL/URINARY: Mcgregor catheter in place draining clear urine    Data       CBC:   Recent Labs     22  0209 22  0331 22  0236   WBC 10.7 9.2 11.3*   HGB 10.8* 10.1* 10.7*   HCT 32.6* 31.2* 34.3*    338 343     BMP:    Recent Labs     22  02022  0331 22  0236   * 133* 134*   K 4.3  4.3 4.9 4.4    104 103   CO2 15* 14* 15*   BUN 29* 27* 22   CREATININE 1.4* 1.1 1.0   GLUCOSE 99 141* 84       No results for input(s): LABURIN in the last 72 hours. No results for input(s): BC in the last 72 hours.   No results for input(s): Kely Zimmerman in the last 72 hours. Component 4/10/22 1007   Urine Culture, Routine >50,000 CFU/ml Abnormal     Organism Candida glabrata Abnormal     Urine Culture, Routine Moderate growth    Organism Achromobacter species Abnormal  VC    Urine Culture, Routine Rare growth   Achromobacter xylosoxidans    Resulting Agency [facility]          Susceptibility      Candida glabrata (1)    Antibiotic Interpretation Microscan  Method Status    Micafungin Sensitive <=0.06 mcg/mL BACTERIAL SUSCEPTIBILITY PANEL BY ANA LUISA     Voriconazole Resistant >=8 mcg/mL BACTERIAL SUSCEPTIBILITY PANEL BY ANA LUISA       Achromobacter species (2)    Antibiotic Interpretation Microscan  Method Status    aztreonam Resistant >=64 mcg/mL BACTERIAL SUSCEPTIBILITY PANEL BY ANA LUISA     cefepime Resistant >=64 mcg/mL BACTERIAL SUSCEPTIBILITY PANEL BY ANA LUISA     cefTRIAXone Resistant >=64 mcg/mL BACTERIAL SUSCEPTIBILITY PANEL BY ANA LUISA     gentamicin Resistant >=16 mcg/mL BACTERIAL SUSCEPTIBILITY PANEL BY ANA LUISA     levofloxacin Sensitive 2 mcg/mL BACTERIAL SUSCEPTIBILITY PANEL BY ANA LUISA     meropenem Intermediate 8 mcg/mL BACTERIAL SUSCEPTIBILITY PANEL BY ANA LUISA     piperacillin-tazobactam Resistant >=128 mcg/mL BACTERIAL SUSCEPTIBILITY PANEL BY ANA LUISA     tobramycin Intermediate 8 mcg/mL BACTERIAL SUSCEPTIBILITY PANEL BY ANA LUISA     trimethoprim-sulfamethoxazole Sensitive <=20 mcg/mL BACTERIAL SUSCEPTIBILITY PANEL BY ANA LUISA      Condensed View        Narrative  Performed by: [facility]  ORDER#: [id number]                          ORDERED BY: Mark Banks   SOURCE: Urine Clean Catch                  COLLECTED:  04/10/22 10:07   ANTIBIOTICS AT [facility]. :                      RECEIVED :  04/10/22 10:12      Specimen Collected: 04/10/22 10:07               Radiology:      Imaging studies:    Narrative   CT ABDOMEN PELVIS WO CONTRAST 4/13/2022 9:01 AM   HISTORY: Abdominal pain and nausea   COMPARISON: 4/10/2022    DLP: 497 mGy cm.  All CT scans are performed using dose optimization techniques as appropriate to the performed exam and include at least   one of the following: Automated exposure control, adjustment of the mA   and/or kV according to size, and the use of the iterative   reconstruction technique. TECHNIQUE: Noncontrast enhanced images of the abdomen and pelvis   obtained without oral contrast.    FINDINGS:    Multiple pulmonary nodules are noted within both lung bases consistent   with metastatic disease. The largest nodule is in the medial right   lower lobe. There is a trace right-sided pleural effusion with right   basilar atelectasis. A moderate size hiatal hernia is present. There   is moderate cardiomegaly. No evidence of pericardial effusion. [place] LIVER: No discrete hepatic lesion is present. There is a small amount   of free fluid within the subhepatic space and Morison space. [place] BILIARY SYSTEM: The gallbladder is surgically absent. There is no   biliary dilatation present. [place] PANCREAS: No focal pancreatic lesion. SPLEEN: Splenic granulomas are present. No evidence of splenomegaly. [place] KIDNEYS AND ADRENALS: The patient has undergone right nephrectomy. A   left-sided double-J intraureteral stent is in place with mild to   moderate dilatation of the upper tracts of the left kidney and left   ureter. I would question whether there may be dysfunction of the   stent. Correlation is recommended with the patient's white count and   clinical presentation. The catheter is well-positioned. Ayse Montelongo evidence   of a discrete ureteral calculus. [place] RETROPERITONEUM: An IVC filter is in place. There is no evidence of   retroperitoneal hematoma or adenopathy. [place] GI TRACT: The patient is status post colectomy. There has now been   development of moderate small bowel distention to the level of the   patient's ileostomy. There is fecalization of the contents within the   distal small bowel. Air-fluid levels are present.  Radiographically I   would favor that this represents a adynamic ileus given the   significant change from the previous exam of just 3 days earlier. A   distal obstruction at the created ileostomy would also be in the   differential but felt less likely. . The appendix is surgically   absent. .   OTHER: There is no mesenteric mass, lymphadenopathy or fluid   collection. Extensive postoperative changes are noted at the   thoracolumbar juncture. There is anterolisthesis of L5 on S1 with   previous fusion at the L5-S1 level. A mild compression deformity   involving the upper endplate of L4 as well as a moderate compression   deformity at L2 are present. Previous kyphoplasty at T12 and L1 with a   gibbus deformity is present. . No free fluid is present. PELVIS: Mcgregor catheter in place within the bladder. There is a pelvic   mass inseparable from the posterior lateral wall of the urinary   bladder. Mcgregor catheter is in place within the bladder. . The pelvic   mass is unchanged from the previous exam..       Impression   1. When compared to the previous exam of 3 days earlier there is now   noted to be moderate small bowel distention. I would favor a adynamic   ileus. A distal obstruction at the site of the patient's ileostomy is   also in the differential but felt less likely. There is mild   distention of the stomach. A moderate size hiatal hernia is present. 2. Mild to moderate dilatation of the upper tracts of the left kidney. A left-sided double-J ureteral stent is in place. There is mild   urothelial thickening. I do not see evidence of a discrete ureteral   stone. The stent is well-positioned. 3. Partially calcified pelvic mass. This is inseparable from the right   posterior lateral wall of the urinary bladder along its lower margin. A Mcgregor catheter is in place within the bladder. 4. 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    5. There is a small amount of free fluid within the subhepatic space   and Morison space. A small right-sided effusion is present with   multiple pulmonary nodules within the lower lobes consistent with   metastatic disease to the lungs. There is a moderate size hiatal   hernia present. .    Signed by Dr Bj Do    Patient Active Problem List   Diagnosis    Thoracic facet joint syndrome    Primary osteoarthritis of left hip    Leg swelling    Abnormal nuclear cardiac imaging test    Abnormal nuclear cardiac imaging test    S/p bare metal coronary artery stent    Coronary artery disease involving native coronary artery of native heart without angina pectoris    Complex regional pain syndrome type 1 of right lower extremity    Hydronephrosis, bilateral    Extrinsic ureteral obstruction, bilateral    History of rectal cancer    Anemia of chronic disease    Pelvic mass    Lung nodules    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    Colorectal cancer ([address]    Metastasis from colon cancer ([address]    Proteinuria    Chemotherapy management, encounter for    Anemia associated with chemotherapy    Other fatigue    Thrush    Dehydration    Chemotherapy-induced peripheral neuropathy (HCC)    Chemotherapy-induced nausea    SBO (small bowel obstruction) (HCC)    Burning with urination    History of small bowel obstruction    Encounter for central line care    Iron deficiency    History of bladder cancer    Hydronephrosis of left kidney    Hydronephrosis of right kidney    Low back pain    Urothelial carcinoma of right distal ureter ([address]    Sepsis secondary to UTI ([address]    Acute kidney injury superimposed on CKD ([address]       1. Catheter acquired UTI present on admission. Culture results and with sensitivities noted. Antifungal antibiotic recommendations per infectious disease. 2.  Ileus versus bowel obstruction being followed by general surgery.     3.  Lower urinary tract symptoms.   See consultation note continue Mcgregor for now.       4.  Nonmuscle invasive bladder cancer.  He needs bladder cancer surveillance ongoing. Watson Carrel he is due to have this done by  [facility] [place] [facility] at [facility] in mid May.  I did speak with the daughter if he has recurrent episodes of  hematuria  they should  to try to get him in to be seen sooner.     5.  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  Unfortunately now has evidence of pulmonary nodules on chest CT. [facility] oncologist is seeing him and FNA is planned.     6.  CT scan showing right pelvic collection.  This is most likely postoperative change given his history and complication associated with his right nephroureterectomy.  This was documented on outside CT by report February 27, 2022.  By description it seems to be stable.  Does not appear to be infected or abscess at this time.           Agustín Brennan MD

## 2022-04-14 NOTE — PROGRESS NOTES
Patient tearful and requesting medication for anxiety. Secure message sent to Dr. Heidi Phelps. Awaiting return call.

## 2022-04-14 NOTE — PROGRESS NOTES
HOSPITAL MEDICINE  - PROGRESS NOTE    Admit Date: 4/10/2022         CC: fever,nausea,decreased urine output     Subjective: no fever, nausea, vomiting, abd pain, constipated, no chest pain, no palpitations, no dyspnea, no wheezing, no dysuria, no hematuria    Objective: severe distress, deconditioned     Diet: Diet NPO Exceptions are: Sips of Water with Meds  Pain is:yes  Nausea: yes   Bowel Movement/Flatus no    Data:   Scheduled Meds: Reviewed  Current Facility-Administered Medications   Medication Dose Route Frequency Provider Last Rate Last Admin    [START ON 4/15/2022] anidulafungin (ERAXIS) 100 mg in dextrose 5 % 130 mL IVPB  100 mg IntraVENous Q24H Hawa Low MD        levoFLOXacin [facility]) tablet 500 mg  500 mg Oral Daily Hawa Low MD   500 mg at 04/14/22 1013    [Held by provider] metoprolol succinate (TOPROL XL) extended release tablet 50 mg  50 mg Oral BID Khushi Yancey MD        metoprolol (LOPRESSOR) injection 2.5 mg  2.5 mg IntraVENous Norah Clemens MD   2.5 mg at 04/14/22 1637    0.9 % sodium chloride infusion   IntraVENous Continuous Khushi Yancey MD 75 mL/hr at 04/14/22 1122 Rate Change at 04/14/22 1122    sodium bicarbonate tablet 650 mg  650 mg Oral 4x Daily Khushi Yancey MD   650 mg at 04/14/22 1637    sennosides-docusate sodium (SENOKOT-S) 8.6-50 MG tablet 2 tablet  2 tablet Oral BID Khushi Yancey MD   2 tablet at 04/14/22 0804    polyethylene glycol (GLYCOLAX) packet 17 g  17 g Oral BID Michi De La Cruz MD   17 g at 04/14/22 0802    promethazine (PHENERGAN) injection 12.5 mg  12.5 mg IntraMUSCular Q4H PRN Khushi Yancey MD   12.5 mg at 04/14/22 0406    lactobacillus (CULTURELLE) capsule 1 capsule  1 capsule Oral Daily Khushi Yancey MD   1 capsule at 04/14/22 0803    pantoprazole (PROTONIX) 40 mg in sodium chloride (PF) 10 mL injection  40 mg IntraVENous BID Dk Dawson MD   40 mg at 04/14/22 0803    cyclobenzaprine (FLEXERIL) tablet 5 mg  5 mg Oral BID PRN Dk Dawson MD   5 mg at 04/13/22 2023    sodium chloride flush 0.9 % injection 5-40 mL  5-40 mL IntraVENous 2 times per day OLGA Platt - CNP   10 mL at 04/14/22 3920    sodium chloride flush 0.9 % injection 5-40 mL  5-40 mL IntraVENous PRN OLGA Platt - CNP        0.9 % sodium chloride infusion   IntraVENous PRN Glen Magallanes APRN - CNP        enoxaparin (LOVENOX) injection 30 mg  30 mg SubCUTAneous Q24H Glen Magallanes APRN - CNP   30 mg at 04/14/22 1436    acetaminophen (TYLENOL) tablet 650 mg  650 mg Oral Q6H PRN Glen Magallanes APRN - CNP   650 mg at 04/14/22 2569    Or    acetaminophen (TYLENOL) suppository 650 mg  650 mg Rectal Q6H PRN Glen Magallanes APRN - CNP        DULoxetine (CYMBALTA) extended release capsule 30 mg  30 mg Oral Daily OLGA Platt - CNP   30 mg at 04/14/22 0803    traMADol (ULTRAM) tablet 25 mg  25 mg Oral Q6H PRN Glen Magallanes APRN - CNP   25 mg at 04/13/22 0656    calcium carbonate (TUMS) chewable tablet 500 mg  500 mg Oral TID PRN Kiana Sosa APRN - CNP        ondansetron [facility]) injection 4 mg  4 mg IntraVENous Q6H PRN Kiana Sosa APRN - CNP   4 mg at 04/14/22 1311     DVT Prophylaxis: Lovenox 40 mg sq daily    Continuous Infusions:   sodium chloride 75 mL/hr at 04/14/22 1122    sodium chloride         Intake/Output Summary (Last 24 hours) at 4/14/2022 1821  Last data filed at 4/14/2022 1641  Gross per 24 hour   Intake 913 ml   Output 1850 ml   Net -937 ml     CBC:   Recent Labs     04/12/22  0209 04/13/22  0331 04/14/22  0236   WBC 10.7 9.2 11.3*   HGB 10.8* 10.1* 10.7*    338 343     BMP:  Recent Labs     04/12/22  0209 04/13/22  0331 04/14/22  0236   * 133* 134*   K 4.3  4.3 4.9 4.4    104 103   CO2 15* 14* 15*   BUN 29* 27* 22   CREATININE 1.4* 1.1 1.0 GLUCOSE 99 141* 84     ABGs: No results found for: PHART, PO2ART, LOE5IZV  INR:   Recent Labs     04/13/22  0331   INR 1.06         Objective:   Vitals: BP (!) 139/99   Pulse 112   Temp 97.3 °F (36.3 °C) (Temporal)   Resp 18   Ht 5' 5\" (1.651 m)   Wt 170 lb (77.1 kg)   SpO2 98%   BMI 28.29 kg/m²   General appearance: alert, appears stated age and cooperative  Skin: Skin color, texture, turgor normal.   HEENT: Head: Normocephalic, no lesions, without obvious abnormality.   Neck: no adenopathy, no carotid bruit, no JVD and supple, symmetrical, trachea midline  Lungs: clear to auscultation bilaterally  Heart: regular rate and rhythm, S1, S2 normal, no murmur, click, rub or gallop  Abdomen: tender, rigid, no bs, no rebound, no guarding  Extremities: extremities normal, atraumatic, no cyanosis or edema  Lymphatic: No significant lymph node enlargement papable  Neurologic: Mental status: Alert, oriented times 3        Assessment & Plan:    · Non invasive urothelial bladder cancer - tx per oncology  · Pulmonary nodules - plan for biopsy when medically stable  · Constipation with ileus/obstruction and vomiting- general surgery on board- NGT placed today  · Hypomagnesemia - replace  · Gram neg andres UTI with leukocytosis and lactic acidosis in pt with prior ureteral stents - cont IVF, pt was on Rocpehin- growing Achromobacter- AB changed per ID - urology evaluated   · Candidosis- treated with fluconazole- growing candida glabrata- ab changed per ID   · Hyponatremia - improved   · COLLEEN- resolve with IVF  · Anemia of chronic disease  · Lactic acidosis and leukocytosis- IVF and change ab per cultures     Patient Active Problem List:     S/p bare metal coronary artery stent     Coronary artery disease involving native coronary artery of native heart without angina pectoris     History of rectal cancer     Metastasis from colon cancer ([address]     Chemotherapy-induced peripheral neuropathy ([address]     Chemotherapy-induced nausea         See orders   Disposition: TBMIKE Negron MD

## 2022-04-14 NOTE — PROGRESS NOTES
Infectious Diseases Progress Note    Patient:  Manisha Haddad  YOB: 1950  MRN: [id number]   Admit date: 4/10/2022   Admitting Physician: Rima Godinez, *  Primary Care Physician: Supriya Simon MD    Chief Complaint/Interval History:  He has developed some abdominal symptoms. He had CT evidence of possible ileus versus obstruction. Unable to get lung biopsy yesterday due to his GI complaints. Reviewed his CT with radiology. There is bowel loop distention consistent with either ileus or obstruction. The pulmonary nodules that he has currently are slightly larger and more number than they were on scans dating back to August and September of last year. Urine culture reviewed-his Candida glabrata seems to be voriconazole resistant. His gram-negative andres is also resistant to ceftriaxone. Spoke with the patient, son-in-law, and daughter on the phone at the same time. He went to get his much of their care here in [facility] as possible and avoid going back to [address] [place] [address] if possible unless major abdominal surgery becomes necessary. Reviewed culture results and antimicrobial in stent/Mcgregor change recommendations with patient and family. In/Out    Intake/Output Summary (Last 24 hours) at 4/14/2022 0936  Last data filed at 4/14/2022 0810  Gross per 24 hour   Intake 0 ml   Output 800 ml   Net -800 ml     Allergies:    Allergies   Allergen Reactions    Morphine Anxiety     Current Meds: 0.9 % sodium chloride infusion, Continuous  sodium bicarbonate tablet 650 mg, 4x Daily  sennosides-docusate sodium (SENOKOT-S) 8.6-50 MG tablet 2 tablet, BID  polyethylene glycol (GLYCOLAX) packet 17 g, BID  promethazine (PHENERGAN) injection 12.5 mg, Q4H PRN  lactobacillus (CULTURELLE) capsule 1 capsule, Daily  pantoprazole (PROTONIX) 40 mg in sodium chloride (PF) 10 mL injection, BID  cyclobenzaprine (FLEXERIL) tablet 5 mg, BID PRN  sodium chloride flush 0.9 % injection 5-40 mL, 2 times per day  sodium chloride flush 0.9 % injection 5-40 mL, PRN  0.9 % sodium chloride infusion, PRN  enoxaparin (LOVENOX) injection 30 mg, Q24H  acetaminophen (TYLENOL) tablet 650 mg, Q6H PRN   Or  acetaminophen (TYLENOL) suppository 650 mg, Q6H PRN  cefTRIAXone (ROCEPHIN) 1000 mg IVPB in 50 mL D5W minibag, Q24H  metoprolol succinate (TOPROL XL) extended release tablet 50 mg, Daily  DULoxetine (CYMBALTA) extended release capsule 30 mg, Daily  traMADol (ULTRAM) tablet 25 mg, Q6H PRN  calcium carbonate (TUMS) chewable tablet 500 mg, TID PRN  ondansetron (ZOFRAN) injection 4 mg, Q6H PRN      Review of Systems No cough or shortness of breath. Mcgregor catheter remains in place. VitalSigns:  BP (!) 152/92   Pulse 110   Temp 96.8 °F (36 °C) (Temporal)   Resp 18   Ht 5' 5\" (1.651 m)   Wt 170 lb (77.1 kg)   SpO2 98%   BMI 28.29 kg/m²      Physical Exam  Line/IV site: No erythema, warmth, induration, or tenderness. Lungs clear without crackles  Heart without murmur  Abdomen is soft with minimal tenderness. No rebound. Ostomy pink but not much gas or stool in ostomy bag.     Lab Results:  CBC:   Recent Labs     04/12/22  0209 04/13/22  0331 04/14/22  0236   WBC 10.7 9.2 11.3*   HGB 10.8* 10.1* 10.7*    338 343     BMP:  Recent Labs     04/12/22  0209 04/13/22  0331 04/14/22  0236   * 133* 134*   K 4.3  4.3 4.9 4.4    104 103   CO2 15* 14* 15*   BUN 29* 27* 22   CREATININE 1.4* 1.1 1.0   GLUCOSE 99 141* 84     CultureResults:  Component 4/10/22 1007   Urine Culture, Routine >50,000 CFU/ml Abnormal     Organism Candida glabrata Abnormal     Urine Culture, Routine Moderate growth    Organism Achromobacter species Abnormal  VC    Urine Culture, Routine Rare growth   Achromobacter xylosoxidans      Susceptibility      Candida glabrata (1)    Antibiotic Interpretation Microscan  Method Status    Micafungin Sensitive <=0.06 mcg/mL BACTERIAL SUSCEPTIBILITY PANEL BY NAA LUISA     Voriconazole Resistant >=8 mcg/mL BACTERIAL SUSCEPTIBILITY PANEL BY ANA LUISA       Achromobacter species (2)    Antibiotic Interpretation Microscan  Method Status    aztreonam Resistant >=64 mcg/mL BACTERIAL SUSCEPTIBILITY PANEL BY ANA LUISA     cefepime Resistant >=64 mcg/mL BACTERIAL SUSCEPTIBILITY PANEL BY ANA LUISA     cefTRIAXone Resistant >=64 mcg/mL BACTERIAL SUSCEPTIBILITY PANEL BY ANA LUISA     gentamicin Resistant >=16 mcg/mL BACTERIAL SUSCEPTIBILITY PANEL BY ANA LUISA     levofloxacin Sensitive 2 mcg/mL BACTERIAL SUSCEPTIBILITY PANEL BY ANA LUISA     meropenem Intermediate 8 mcg/mL BACTERIAL SUSCEPTIBILITY PANEL BY ANA LUISA     piperacillin-tazobactam Resistant >=128 mcg/mL BACTERIAL SUSCEPTIBILITY PANEL BY ANA LUISA     tobramycin Intermediate 8 mcg/mL BACTERIAL SUSCEPTIBILITY PANEL BY ANA LUISA     trimethoprim-sulfamethoxazole Sensitive <=20 mcg/mL BACTERIAL SUSCEPTIBILITY PANEL BY ANA LUISA           Radiology:   Reviewed CT of the chest with radiology. Recent size and number of pulmonary nodules. Reviewed CT of the abdomen and pelvis with radiology. Possible ileus versus obstruction. Possible slight enlargement of soft tissue mass in the pelvis. Additional Studies Reviewed:  None    Impression:  1. Low-grade fever-resolved  2. Candida glabrata and Achromobacter in urine-catheter/stent related to UTI. Unfortunately the glabrata is fluconazole resistant. 3.  Acute renal insufficiency-improving  4. Ileus versus obstruction  5. History of colorectal cancer  6.   History of urothelial cancer    Recommendations:  Discontinue ceftriaxone  Discontinue fluconazole  Begin anidulafungin  Begin levofloxacin  May need NG tube if no improvement in ileus/obstruction  Supportive care  When stable would recommend stent change and Mcgregor catheter change or trial without Mcgregor with intermittent catheterization    Myra Cat MD

## 2022-04-14 NOTE — PROGRESS NOTES
Progress Note    Patient name: Nikhil Fuentes  Patient : 1950  MR #[id number]  Room: 5    Portions of this note have been copied forward, however, changed to reflect the most current clinical status of this patient. Subjective:  CT-guided FNA lung nodule biopsy scheduled 2022 delayed r/t suspected ileus. Drinking MiraLAX yesterday. Emesis this AM.  Little gas through Ileostomy, no stool. HISTORY OF PRESENT ILLNESS:  Zehra Bundy is a very pleasant 81years old male who has a diagnosis of stage IV colonic adenocarcinoma. He was receiving palliative chemotherapy after 2021. He was found to have high-grade urothelial carcinoma involving the ureter. He underwent surgery, nephroureterectomy at [facility].  He had a complicated postoperative course. He eventually recovered and his current receiving home care needs at home. He has also several other comorbidities include CAD, hypertension, hyperlipidemia and CKD stage III. The patient presented ER department with complaints of generalized malaise for the last several days, low-grade fever, nausea. Decreased urine output. Patient has a ostomy in place. Work-up in the emergent showed moderate hyponatremia sodium 127, mild elevated lactic acid, elevated creatinine 2.4 (baseline's 1.5-1.7). He also had neutrophil leukocytosis and positive nitrates and large blood on the urine analysis. Chest x-ray was abnormal and therefore CT chest was performed that showed evidence of metastatic disease to the lungs. Patient received IV fluid resuscitation the emergency. He was started on broad-spectrum antibiotics. He was admitted with consultation from me and also ID.  4/10/2022-CT abdomen/pelvis without contrast showed evidence of metastatic disease to the lung bases. Moderate size hiatal hernia with gastroesophageal reflux. Status post partial colectomy. Left lower quadrant ostomy is present. No evidence obstruction. Irregular soft tissue mass with some calcification the pelvis. This demonstrated interval increase was potential involvement with the right posterior lateral urinary bladder wall. The mass measures 8 x 2.9 cm. Left-sided double J intraureteral stent is in place with stent well positioned. 4/10/2022-CT chest without contrast showed extensive metastatic disease is noted to the lungs showing progression from the previous examination with multiple new nodules present as well as interval increase in size of previously documented nodules. Reference nodule within the superior segment of the right lower lobe previously measured at 5 mm in size now measures 13 mm in size. A lesion within the medial right lower lobe now measuring 1.6 cm in long axis previously measured 1.1 cm. There is no evidence of acute consolidative pneumonia. Essentially, findings consistent with progressive metastatic disease to the lungs. There are too numerous to count pulmonary nodules the majority of which have increased in size or which are new from the previous study. 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      Prior oncological history  ONCOLOGIC HISTORY:   Diagnosis:  · Moderately differentiated rectal carcinoma, T3N0Mx, diagnosed in 3/9/2009  · Noninvasive high-grade papillary urethral carcinoma.  Negative for evidence of detrusor muscle invasion, pTa, pNx on 8/18/2019.    · Metastatic colorectal carcinoma, 9/3/2019  · MSI stable and mutations for BRAF, NRAS, KRAS were not detected.    · Recurrent bladder cancer- superficial   · Urothelial carcinoma of the ureter     TREATMENT SUMMARIES:  · 4/9/2009-5/27/2009-received neoadjuvant chemotherapy with 5-FU CIV along with radiation therapy for a total of 5400 cG  · 7/15/2009-rectum resection revealed no residual malignancy, complete pathological response.   · 8/18/2019- transurethral resection of bladder tumor (TURBT)   · 9/18/2019-12/26/2019 palliative chemotherapy with modified FOLFOX 7  (Oxaliplatin 85 mg/m² IV day 1, leucovorin 400 mg/m² IV day 1 and 5-FU 2400 mg/m² IV continuous infusion over 46 to 48 hours for a total of 7 cycles. · 1/28/2020 -palliative maintenance therapy with leucovorin 400 mg/m² IV over 2 hours on day 1, followed by 5-FU bolus 400 mg/m² and then 1200 mg/m²/day x2 days (total 2400 mg meter squared over 46 to 48 hours) continuous infusion.  Repeat every 2 weeks.     ONCOLOGIC HISTORY # NMIBC_Bladder cancer. Harsha Nascimento was diagnosed with noninvasive urethral carcinoma, pTa, pNx on 8/18/2019.  Ta tumors are papillary lesions that tend to recur but are relatively benign and generally do not invade the bladder.  Adjuvant treatment is not warranted at this time and will be monitored closely. Biopsy and transurethral resection of bladder tumor (TURBT) on 8/18/2019 by Dr. Merry Presley with pathology revealing noninvasive high-grade papillary urethral carcinoma.  Negative for evidence of detrusor muscle invasion, pTa, pNx.   · 12/3/2019- cystoscopy with removal and replacement of double-J urethral stent.  Pathology from dome of the bladder/tumor revealed high-grade papillary urethral carcinoma, noninvasive.  No muscularis propria present.    · 2/26/2020 - cystoscopy and bilateral ureteral stent removal and replacement.  The operative note by Dr. Armani Elliott documented bilateral hydronephrosis and obtained biopsy of the bladder in the mid dome and left anterior lateral wall x2.  Pathology documented high-grade papillary urethral carcinoma, noninvasive, stage pTaNx. · 5/28/2020-the patient underwent a cystoscopy and resection of bladder tumor on 05/28/2020 with findings consistent with noninvasive, high-grade papillary urothelial carcinoma.  Muscularis propria was not identified.  The patient will receive intravesical BCG therapy.   · 7/6/2020-CT Abdomen/ Pelvis-Moderate severe right and mild left hydronephrosis with bilateral ureteral stents, which have an adequate radiographic position. Right kidney with cortical thickening and somewhat asymmetrically decreased enhancement which can be seen with obstructive uropathy. Postoperative changes of colectomy. Left lower quadrant ostomy. Slightly decreased size of presacral low density compared to4/15/2020. Similar intrahepatic and extrahepatic bile duct dilation compared to 4/15/2020. Correlate with clinical symptoms and laboratory studies if clinically indicated. Similar chronic bony findings. · 3/25/2021-CT abdomen showed severe hydronephrosis.  Cystoscopy was performed by urology. · 4/1/21 Bladder neck tumor, biopsies: High-grade papillary urothelial carcinoma, noninvasive. Muscularis propria is not present. AJCC pathologic stage:  pTa Nx   · 4/14/2021-reviewed results of pathology.  No evidence of invasive disease.  Continue surveillance cystoscopy with urology. · 9/13/2021-he was seen by urology.  Findings of ureteral high-grade urothelial carcinoma.  Noninvasive.  The patient is being referred to [facility] in [facility]. · 10/11/2021-he was seen by [facility] and recommended cystoscopy with biopsy and possible laser ablation and bilateral leg stent exchange at that time. · 4/10/2022-CT abdomen/pelvis without contrast showed evidence of metastatic disease to the lung bases. Moderate size hiatal hernia with gastroesophageal reflux. Status post partial colectomy. Left lower quadrant ostomy is present. No evidence obstruction. Irregular soft tissue mass with some calcification the pelvis. This demonstrated interval increase was potential involvement with the right posterior lateral urinary bladder wall. The mass measures 8 x 2.9 cm. Left-sided double J intraureteral stent is in place with stent well positioned.   · 4/10/2022-CT chest without contrast showed extensive metastatic disease is noted to the lungs showing progression from the previous examination with multiple new nodules present as well as interval increase in size of previously documented nodules. Reference nodule within the superior segment of the right lower lobe previously measured at 5 mm in size now measures 13 mm in size. A lesion within the medial right lower lobe now measuring 1.6 cm in long axis previously measured 1.1 cm. There is no evidence of acute consolidative pneumonia. Essentially, findings consistent with progressive metastatic disease to the lungs. There are too numerous to count pulmonary nodules the majority of which have increased in size or which are new from the previous study. 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         ONCOLOGIC HISTORY #3  Jose Ojeda was seen in initial oncology consultation on 8/19/2019 during his hospitalization at [facility] after a large pelvic mass was identified which raised concern for recurrent disease.  Further pathology consistent with recurrent rectal cancer  · 8/17/2019- CEA 18.1  · 8/17/2019- CT scan of the kidney with contrast documented moderate to severe right hydronephrosis with dilation of the right ureter into the lower pelvis the site of the parasacral soft tissue changes.  Partially calcified soft tissue changes within the janes-sacral region likely representing sequelae of pelvic radiation.  Increasing scarring/fibrosis versus tumor recurrence within the presacral changes, likely represents a site of right distal ureter obstruction.  No left-sided hydronephrosis.    · 8/18/2019 -Double-J ureter stent placement for right hydronephrosis secondary to extrinsic compression by pelvic mass.    · 8/27/2019-CT scan of the chest with contrast documented numerous pulmonary nodules that appear new compared to 11/12/2017, RUL nodule measuring 7 mm and LLL nodule measuring 5 mm.  Soft tissue nodule at the left ventral abdominal wall.  Slight increased size of a probable lymph node anterior to the aorta measuring 0.9 cm compared to 0.7 cm.  Similar presacral, right pelvic sidewall and right abductor muscular nodular soft tissue density. · 8/27/2019 CT scan of the abdomen and pelvis with contrast identified new moderate left hydronephrosis with moderate right hydronephrosis.  Mild stranding around the urinary bladder and thickening of the bilateral ureteral wall.  Numerous pulmonary nodules.  Soft tissue of the left ventral abdominal wall.  Slightly increased size of probable lymph node anterior to the aorta measuring 0.9 cm compared to 0.7 cm. · 8/27/2019-PET scan did not identify any FDG avid pulmonary nodules or airspace opacities.  Abnormal increased metabolic activity within the right pelvic wall soft tissue showing SUV of 5.4.  Abnormal soft tissue metabolic activity in the right abductor muscle with SUV of 6.4.  Focally increased activity to the right of the inferior L5 vertebrae body posterior with SUV of 7.9 with associated sclerotic changes. · 8/29/2019-  Dr. Fabiola Barber completed a cystoscopy with double-J ureter stent in the left ureter for left hydronephrosis  · 9/3/2019- CT-guided right abductor muscle biopsy on 9/3/2019 with pathology identifying metastatic adenocarcinoma consistent with colorectal origin.  Molecular panel from biopsy tissue revealed MSI stable and mutations for BRAF, NRAS, KRAS were not detected.    · 9/18/2019 - Palliative chemotherapy with modified FOLFOX 7  (Oxaliplatin 85 mg/m² IV day 1, leucovorin 400 mg/m² IV day 1 and 5-FU 2400 mg/m² IV continuous infusion over 46 to 48 hours) with the anticipation of adding Avastin 5 mg/kg day 1 every 14 days  · 10/15/2019- 24-hour urine for protein with a total protein of 1785 mg per 24-hour.  Zurdo has been evaluated by Dr. Florinda Torres and he reports no significant concerns related to the protein. · 11/6/19 CEA 5.6 significantly improved compared to CEA of 14.0 on 8/30/2019.   · 11/15/2019 -CT scan of the abdomen and pelvis documented no evidence of disease progression with significant decrease in the size of enhancing nodules in the right pelvic abductor musculature, a previous 1.8 cm nodule now measures 5 mm.  No new or enlarging retroperitoneal, mesenteric, pelvic or inguinal lymph nodes.  Calcified presacral mass unchanged measuring 5 x 3.7 cm.   · 11/15/2019 -CT scan of the chest documented multiple small pulmonary nodules reidentified, largest nodule in the RUL measures 5 mm compared to 7.5 mm, RLL nodule measures 3.4 mm compared to 5.9 mm, VIC nodule measures 4 mm compared to 6 mm.  A cluster of small nodules in the RUL anteriorly are barely visible on this study.  There is a decrease in size of mediastinal lymph nodes compared to previous exam, right distal paratracheal lymph node measuring 4.5 mm compared to 8.3 mm and lower right peritracheal node measuring 4.5 mm compared to 8.6 mm.    · 1/13/2020- CT scan of the abdomen and pelvis with contrast indicated improvement in the right-sided hydronephrosis with a chronic inflammatory process of the ureters suspected due to the moderate thickening, also present on previous study.  The small poorly enhancing nodules in the right abductor muscles have decreased in the partially calcified presacral mass and right lateral pelvic wall nodules are stable compared to previous study.  Resolution of the subcutaneous abdominal wall nodules.  A prominent retroperitoneal lymph node adjacent and anterior to the left common artery is redefined and measures 6 mm, no change from previous exam.   · 1/13/2020 - CT scan of the chest documented a right lower lobe nodule measures 4.3 cm and is unchanged.  A right lower lobe nodule measures 2.8 mm compared to 3.4 mm.  Nodule in the right upper lobe is barely visible and measures 2.4 mm.  Nodule in the left lower lobe measures 4.8 mm and is unchanged.  Nodule in left lower lobe posterior measures 2.8 mm and previously measured 4.7 mm.  A right lower lobe posterior medially nodule is barely visible measuring 0.2 mm and previously. measured 4.5 mm.  No new nodules identified.  No change in the size of the mediastinal lymph nodes. · 1/28/2020 -palliative maintenance therapy with leucovorin 400 mg/m² IV over 2 hours on day 1, followed by 5-FU bolus 400 mg/m² and then 1200 mg/m²/day x2 days (total 2400 mg meter squared over 46 to 48 hours) continuous infusion.  Repeat every 2 weeks.  Only received 1 cycle, further treatment held due to small bowel obstruction. · 1/30/2020 - CT scan of the abdomen and pelvis indicated high-grade small bowel obstruction with transition point in the midline posterior pelvis where a small bowel loop is tethered to a partially calcified presacral soft tissue mass. · 2/11/2020-CEA 1.4  · 3/5/2020-  Exploratory laparotomy, removal of adhesions, small bowel resection with primary anastomosis and partial thickness small bowel repair by Dr. Rafita Connors the operative note Dr. Mary Rodriguez reported no evidence of carcinomatosis within the abdomen and the liver was unable to be examined due to extent of right upper quadrant adhesions.  Pathology from small intestine documented no evidence of malignancy. · 4/15/2020 Ct Chest W Contrast Minimal interval increase in size of subcentimeter pulmonary nodules. The largest now measures 6 mm in the medial right lower lobe on axial image 80. There is a new, unstable, horizontal fracture through the T6 vertebral body. Additionally, there are new fractures through the posterior 11th and 12th right ribs. The bones are moderately osteopenic. The finding of an unstable fracture through the T6 vertebral body was discussed with Ana Mercedes at 10:45 AM on 4/15/2020. · 4/15/2020 Ct Abdomen Pelvis W Iv Contrast  Patient has undergone interval resection of the distal small bowel, and there is a 2.8 cm fluid collection in the presacral operative bed. This contains a tiny focus of air. This may postoperative or due to infection.  Please correlate with the patient's clinical symptoms and laboratory markers. Improved hydronephrosis and hydroureter. Diffuse osteoporosis. Findings in the lower chest are described in a separate dictation. · 4/22/2020-CEA 0.9  · 6/2/2020-resumed chemotherapy with 5-FU/leucovorin and Panitumumab.  Okay to do 1 today then CMP CEA   · 8/19/20 CEA-1.1  · 10/21/2020- CEA 2.0  · 11/11/2020- Ct Chest W Contrast Multiple, too numerous to count, small noncalcified lung nodules bilaterally. The referenced nodules appears to have decreased in size the previous study. No new nodules. · 11/11/2020- Ct Abdomen Pelvis W Contrast Unchanged bilateral hydronephrosis, more on the right side. Bilateral ureteral stents in place. Moderate asymmetrical thickening of the incompletely distended urinary bladder. This may partly be due to incomplete distention. Possibility of chronic cystitis and or chronic partial outlet obstruction may not be excluded. A functioning left lower abdominal ostomy. A small parastomal small bowel herniation without obstruction. A partially calcified presacral mass. The soft tissue component have increased in size in the previous study. The osseous changes are described above. Any superimposed metastatic disease is not excluded and would be hard to evaluate due to extensive postsurgical changes. · 11/18/2020-essentially, overall stable disease.  Improvement of the lung nodules with decreased in the size of the target lesions.  The pelvic lesion is is likely worse by 25%.  However, CEA is is still within the normal limits.  Therefore likely mixed response.  We will continue current treatment and repeat CT scans in about 3 months. · 12/16/2020-discontinue 5-FU bolus from his regimen. · 2/9/2021- Ct Chest W Contrast No evidence of disease progression. Stable pulmonary nodules. Stable intrahepatic and extrahepatic bile duct dilation compared to prior 11/11/2020.  Postoperative, posttraumatic and degenerative changes in the spine as described above. Old right-sided rib fractures. · 2/9/2021- Ct Abdomen Pelvis W Contrast showed evidence of response to therapy including decreased presacral mass/thickening now measuring 1.1 cm, previously 1.9 cm on 11/11/2020. Stable intrahepatic and extra hepatic bile duct dilation with cholecystectomy clips. See separately dictated CT chest of the same day regarding pulmonary nodules.  Bilateral ureteral stents. Decreased bilateral hydronephrosis. Urinary bladder wall is thickened, which could be seen with post treatment changes or cystitis. Correlate with symptoms.  Chronic bony findings as above  · 2/17/2021-reviewed CT chest abdomen pelvis.  Essentially consistent with disease response to therapy.  Continue current therapy.    · 2/17/21 CEA 1.0  · 3/25/21 Ct Abdomen Pelvis W Iv Contrast  The stomach is distended, however no small bowel dilatation identified. Contrast identified within the left ileostomy bag. The distal stomach is under distended which may be secondary to peristalsis. Gastroparesis considered. Bilateral ureteral stents remain appropriate in position, however there is new moderate to severe right-sided hydronephrosis and mild left-sided hydronephrosis when compared to the 2/9/2021 exam. Mild increase considered within the partially calcified presacral pelvic mass. Stable partially calcified right pelvic soft tissue. Similar abnormal wall thickening of the bladder most notable superiorly. Similar prominence of the intra and extra hepatic bile ducts down to the level of the ampulla. Findings may represent a reservoir effect, however correlation with liver function tests recommended. Therefore. Stable noncalcified left greater than right pulmonary nodules with asymmetrical interstitial changes of the right lung base concerning for pneumonitis. Osteopenia with postoperative changes of the lumbar spine. Chronic compression deformities of the thoracolumbar vertebra as described above. · 4/14/2021-I reviewed notes from urology and also CT abdomen/pelvis that showed mild interval increase in the size of the soft tissue nodule in the pelvis.  However, this is still very small and therefore will have a short follow-up CT scan and of May 2021.  I personally reviewed the CT scans.  Really hard to state that there is clear-cut disease progression.  Again, short follow-up recommended.  Continue current treatment. · 5/12/21 CEA 0.8  · 5/26/2001-CT chest abdomen pelvis showed Partially calcified presacral soft tissue mass appears unchanged. Previously measured soft tissue noncalcified component is unchanged measuring 2.2 x 3.2 cm on axial image 60. However, this is questionable.  CT chest showed a stable pulmonary nodules.  Subcentimeter nodules.  Largest 7.5 mm. · 6/1/21 CEA 0.9  · 06/01/23- reviewed scans. Stable disease.  Continue treatment every 3 weeks as per patient request. Continue infusional 5-FU, Panitumumab and leucovorin. No 5-FU bolus due to severe mucositis. · 8/11/2021 CEA . 9  · 9/03/2021 CT Chest w/Contrast Slight interval increase in the size of pulmonary nodules, the largest in the medial right lung base. Findings are concerning for metastatic disease. Atherosclerosis of the aorta and coronary arteries. Sclerotic rib lesions favored for osseous metastases. Old rib fractures also evident. · 9/03/2021 CT Abd/Pelvis w/ IV Contrast (Oral) A persistent partially calcified mass in the presacral region with encasement of the distal ureters bilaterally and resultant bilateral hydronephrosis. Bilateral ureteral stents in place. There is increasing right-sided hydronephrosis since the previous study. Right renal atrophy similar to the previous study. Moderate asymmetrical thickening of the incompletely distended urinary bladder is similar to the previous study. This may partly be due to incomplete distention. An inflammatory process or a neoplastic process is not excluded. Moderate intrahepatic biliary dilatation is similar to the previous study and probably due to a prior cholecystectomy. Moderate dilatation of the contrast filled small bowel loops may represent an ileus or partial distal small bowel obstruction. There is left lower abdominal ileostomy which appears patent. The stable compression fractures of the lower thoracic and proximal lumbar vertebrae and hardware fusion similar to the previous study. · 9/22/21- Decrease Vectibix to every other treatment.  He is currently receiving chemotherapy every 3 weeks as per patient request      ONCOLOGIC HISTORY # Rectal carcinoma:  Pattie Mcdonald has a history of moderately differentiated rectal carcinoma, T3N0Mx, diagnosed in 3/9/2009.  He received neoadjuvant chemotherapy with radiation and resection with Geena Hobbs has been routinely followed at [facility] and was last seen by Dr. Dana Callaway on 1/10/2019. Milla Verma following are pertinent findings related to his diagnosis and treatment. · 3/9/2009- Esophagogastroduodenoscopy with biopsy by Dr. Rudolph Lim that revealed rectal mass at 8 cm with pathology being consistent with moderately differentiated adenocarcinoma. · 3/18/2019-Dr.Elizabeth Jesika Christianson at [place] performed a flexible sigmoidoscopy and rectal endoscopic ultrasound that revealed the tumor extending 6-7 mm deep through the muscularis propria layer and into the serosa, T3 lesion. · 4/9/2009-5/27/2009-received neoadjuvant chemotherapy with 5-FU CIV along with radiation therapy for a total of 5400 cGy under the direction of Dr. Rigo Olmedo.    · 7/15/2009-rectum resection revealed no residual malignancy.  22 regional nodes were negative for malignancy.  The rectum distal doughnut was negative for malignancy.  He was documented to have a complete pathological response.   · 9/9/2009- Ileostomy excision pathology revealed small bowel wall with changes consistent with ileostomy site.  Pathology negative for malignancy    Objective   PHYSICAL EXAM:  CONSTITUTIONAL: Alert, appropriate, no acute distress, appears chronically ill  EYES: Non icteric, EOM intact, pupils equal round   ENT: Mucus membranes moist, external inspection of ears and nose are normal  NECK: Supple, no masses.  No JVD  CHEST/LUNGS: CTA bilaterally, normal respiratory effort   CARDIOVASCULAR: RRR. No lower extremity edema  ABDOMEN: soft non-tender, active bowel sounds. Left abdominal ileostomy noted  EXTREMITIES: warm, moves extremities. Gait not observed. SKIN: warm, dry with no rashes or lesions  LYMPH: No cervical, clavicular, or axillary lymphadenopathy  NEUROLOGIC: follows commands, non focal   PSYCH: mood and affect appropriate. Alert and oriented to time, place, person    Vital Signs  BP (!) 145/95   Pulse 94   Temp 97.2 °F (36.2 °C) (Temporal)   Resp 16   Ht 5' 5\" (1.651 m)   Wt 170 lb (77.1 kg)   SpO2 98%   BMI 28.29 kg/m²     Intake/Output Summary (Last 24 hours) at 4/14/2022 0644  Last data filed at 4/13/2022 1200  Gross per 24 hour   Intake 0 ml   Output --   Net 0 ml     Labs:  CBC:   Recent Labs     04/12/22 0209 04/13/22 0331 04/14/22  0236   WBC 10.7 9.2 11.3*   HGB 10.8* 10.1* 10.7*    338 343     CMP:   Recent Labs     04/12/22 0209 04/13/22  0331 04/14/22  0236   GLUCOSE 99 141* 84   BUN 29* 27* 22   CREATININE 1.4* 1.1 1.0   CO2 15* 14* 15*   CALCIUM 9.3 9.2 9.2   ALKPHOS 123  --   --    AST 14  --   --    ALT 10  --   --      Hepatic:   Recent Labs     04/12/22 0209   AST 14   ALT 10   BILITOT <0.2   ALKPHOS 123     Troponin: No results for input(s): TROPONINI in the last 72 hours. BNP: No results for input(s): BNP in the last 72 hours. INR:   Recent Labs     04/13/22 0331   INR 1.06     ABG: No results for input(s): PHART, JJC4MTB, PO2ART, GMI5DKW, B5KJXTEY, BEART in the last 72 hours. 30 Day lookback of cultures:    Blood Culture Recent:   Recent Labs     04/10/22  0958   BC No Growth to date. Any change in status will be called. Gram Stain Recent: No results for input(s): LABGRAM in the last 720 hours. Resp Culture Recent: No results for input(s): CULTRESP in the last 720 hours. Body Fluid Recent : No results for input(s): BFCX in the last 720 hours. MRSA Recent : No results for input(s): [facility] in the last 720 hours. Urine Culture Recent :   Recent Labs     04/10/22  1007   LABURIN >50,000 CFU/ml*  Moderate growth  Rare growth  Achromobacter xylosoxidans       Organism Recent :   Recent Labs     04/10/22  1007   ORG Candida glabrata*  Achromobacter species*        ASSESSMENT:  Romayne Matt confirmed in right abductor muscle KRAS wild type.   MSI stable and negative mutations for BRAF, NRAS, KRAS wild type.     09/22/21- CEA 0.6  Continue palliative intent 5-FU/leucovorin every 3 weeks as per patient request. Panitumumab on hold as per patient request (significant toxicity with the skin toxicity and mouth sores). Not a good candidate for Avastin due to frequent exchange of ureteral stents and hematuria. 4/10/2022-CT abdomen/pelvis without contrast showed evidence of metastatic disease to the lung bases. Moderate size hiatal hernia with gastroesophageal reflux. Status post partial colectomy. Left lower quadrant ostomy is present. No evidence obstruction. Irregular soft tissue mass with some calcification the pelvis. This demonstrated interval increase was potential involvement with the right posterior lateral urinary bladder wall. The mass measures 8 x 2.9 cm. Left-sided double J intraureteral stent is in place with stent well positioned. 4/10/2022-CT chest without contrast showed extensive metastatic disease is noted to the lungs showing progression from the previous examination with multiple new nodules present as well as interval increase in size of previously documented nodules.  Reference nodule within the superior segment of the right lower lobe previously measured at 5 mm in size now measures 13 mm in size. A lesion within the medial right lower lobe now measuring 1.6 cm in long axis previously measured 1.1 cm. There is no evidence of acute consolidative pneumonia. Essentially, findings consistent with progressive metastatic disease to the lungs. There are too numerous to count pulmonary nodules the majority of which have increased in size or which are new from the previous study. 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 -CEA repeated 4/11/2022: 2.8  -CT-guided FNA lung nodule biopsy originally scheduled 4/13/2022, post-poned     Non invasive Urothelial Bladder Cancer ([facility] [address] 8/18/2019 and 4/1/2021. Repeat cystoscopy and bilateral ureteral stent removal/replacement on 2/26/2020 .  The operative note by Dr. Shalom Goss documented bilateral hydronephrosis and obtained biopsy of the bladder in the mid dome and left anterior lateral wall x2. Pathology documented high-grade papillary urethral carcinoma, noninvasive, stage pTaNx.  As per Urology    5/28/2020-the patient underwent a cystoscopy and resection of bladder tumor on 05/28/2020 with findings consistent with noninvasive, high-grade papillary urothelial carcinoma.  Muscularis propria was not identified. Currently receiving intravesical BCG.  4/1/2021-repeat cystoscopy showed a small bladder lesion.  Tumor resection of bladder tumor consistent with noninvasive high-grade urothelial carcinoma. 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  Referred to [facility]  10/14/2021-urology at [facility] cytology consistent with atypical urothelial cells. Since the patient was last seen by Dr. Karen Waggoner November 2021 he underwent a major resection of the high-grade urothelial carcinoma of the ureter at [facility].  His postoperative course was complicated and he was hospitalized for many weeks.   He eventually was discharged and is currently receiving home care at home. He is still very debilitated. -CT findings from 4/10/2022 concerning for metastatic disease, as noted above  -Urology, Dr. Penny Mercedes following    Ileus  4/13/2022-CT abdomen/pelvis showed  · When compared to the previous exam of 3 days earlier there is now  noted to be moderate small bowel distention. I would favor a adynamic ileus. A distal obstruction at the site of the patient's ileostomy is also in the differential but felt less likely. There is mild distention of the stomach. A moderate size hiatal hernia is present. · Mild to moderate dilatation of the upper tracts of the left kidney. A left-sided double-J ureteral stent is in place. There is mild urothelial thickening. I do not see evidence of a discrete ureteral stone. The stent is well-positioned. · Partially calcified pelvic mass. This is inseparable from the right posterior lateral wall of the urinary bladder along its lower margin. A Mcgregor catheter is in place within the bladder. 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 · There is a small amount of free fluid within the subhepatic space and Morison space. A small right-sided effusion is present with multiple pulmonary nodules within the lower lobes consistent with metastatic disease to the lungs. There is a moderate size hiatal hernia present. Followed and managed by general surgery    Normocytic anemia-stable  Hgb 10.7, MCV 92.0    Labs 4/12/2022:  · Iron: 77, TIBC 233, iron saturation 33%, ferritin 589  · Vitamin B12: 308  · Folate: 13.89    Osteoporosis-Osteoporosis BMD -3.6 April 2020.  Continue Vit D, Boniva and Calcium.      Acute kidney injury/CKD stage III-improved      Hyponatremia-improved      UTI  Urine culture 4/10/2022: Candida glabrata - Achromobacter species  Received Diflucan 600 mg IV x1 on 4/12/2022  Receiving Rocephin 1 g IV every 24 hours  To be addressed by ID        PLAN:  CEA on 4/11/2022: 2.8  Continue current supportive care  UTI - antifungal/antibiotic therapy as per ID  CT guided FNA of pulmonary nodule scheduled 4/13/2022, delayed due to possible ileus  Ileus, managed by GS      (Please note that portions of this note were completed with a voice recognition program. Efforts were made to edit the dictations but occasionally words are mis-transcribed.)    David Casanova, OLGA  04/14/22  6:44 AM      Small bowel obstruction versus ileus. As per hospitalist service. Pulmonary nodules-FNA biopsy will be postponed. Physician's attestation/substantial contribution:  I, Dr Yaritza Villarreal, independently performed an evaluation on Malini Lopez. I have reviewed relevant medical information/data to include but not limited to medication list, relevant appropriate labs and imaging when applicable. I reviewed other physician's notes, ancillary services and nurses assessment. I have reviewed the above documentation completed by the Nurse Practitioner or Physician Assistant. Please see my additional contributions to the history of present illness, physical examination, and assessment/medical decision-making that reflect my findings and impressions. I have seen and examined the patient and the key elements of all parts of the encounter have been performed by me. I agree with the assessment and plan as outlined by the ARNP/PA. Subjective-still complains of mild nausea this morning. He passed gas through the ostomy. No bowel movement so far.   Objective-chronically appearing  Assessment/plan:  Pulmonary nodules -hopefully biopsy tomorrow

## 2022-04-14 NOTE — PROGRESS NOTES
Subjective:  Patient still having some abdominal pain and nausea. Had emesis this AM. Has been sipping some miralax. Unfortunately received bad news yesterday as his wife passed away, he is obviously upset by this but doing as well as can be expected. Objective:  BP (!) 152/92   Pulse 110   Temp 96.8 °F (36 °C) (Temporal)   Resp 18   Ht 5' 5\" (1.651 m)   Wt 170 lb (77.1 kg)   SpO2 98%   BMI 28.29 kg/m²   General: NAD, AAO  Chest: Regular, non-labored  Abdomen: Soft, moderate distention, mild diffuse TTP, ostomy with no output at this point    CBC:   Lab Results   Component Value Date    WBC 11.3 04/14/2022    RBC 3.73 04/14/2022    HGB 10.7 04/14/2022    HCT 34.3 04/14/2022    MCV 92.0 04/14/2022    MCH 28.7 04/14/2022    MCHC 31.2 04/14/2022    RDW 14.6 04/14/2022     04/14/2022    MPV 9.8 04/14/2022     BMP:    Lab Results   Component Value Date     04/14/2022    K 4.4 04/14/2022     04/14/2022    CO2 15 04/14/2022    BUN 22 04/14/2022    LABALBU 3.1 04/12/2022    CREATININE 1.0 04/14/2022    CALCIUM 9.2 04/14/2022    GFRAA >59 04/14/2022    LABGLOM >60 04/14/2022    GLUCOSE 84 04/14/2022     Assessment and plan:  81year old male with extensive abdominal surgical history, admitted with urinary tract infection  1) Abdominal pain and N/V- ileus vs obstipation or obstruction. Will go ahead and order oral gastrograffin and check KUB after this. Will also consult wound and ostomy nurse to do some irrigation with a red juan to help loosen stool.   2) Continue treatment of UTI and other cares per the medicine team and urology

## 2022-04-14 NOTE — PROGRESS NOTES
200 ml gastrograffin oral given to patient per order. Radiology notified for KUB to completed 2 hours later at 1500. Patient tolerated gastrografin without complaints at this time. Will continue to monitor.

## 2022-04-15 NOTE — PROGRESS NOTES
1           Progress Note    Patient name: Brigid Piedra  Patient : 1950  MR #[id number]  Room: 5    Portions of this note have been copied forward, however, changed to reflect the most current clinical status of this patient. Subjective: No new complaints this morning. Has not had any bowel movement through colostomy. General surgery following. NG tube in place. HISTORY OF PRESENT ILLNESS:  Carolyn Joseph is a very pleasant 81years old male who has a diagnosis of stage IV colonic adenocarcinoma. He was receiving palliative chemotherapy after 2021. He was found to have high-grade urothelial carcinoma involving the ureter. He underwent surgery, nephroureterectomy at [facility].  He had a complicated postoperative course. He eventually recovered and his current receiving home care needs at home. He has also several other comorbidities include CAD, hypertension, hyperlipidemia and CKD stage III. The patient presented ER department with complaints of generalized malaise for the last several days, low-grade fever, nausea. Decreased urine output. Patient has a ostomy in place. Work-up in the emergent showed moderate hyponatremia sodium 127, mild elevated lactic acid, elevated creatinine 2.4 (baseline's 1.5-1.7). He also had neutrophil leukocytosis and positive nitrates and large blood on the urine analysis. Chest x-ray was abnormal and therefore CT chest was performed that showed evidence of metastatic disease to the lungs. Patient received IV fluid resuscitation the emergency. He was started on broad-spectrum antibiotics. He was admitted with consultation from me and also ID.  4/10/2022-CT abdomen/pelvis without contrast showed evidence of metastatic disease to the lung bases. Moderate size hiatal hernia with gastroesophageal reflux. Status post partial colectomy. Left lower quadrant ostomy is present. No evidence obstruction.   Irregular soft tissue mass with some calcification the pelvis. This demonstrated interval increase was potential involvement with the right posterior lateral urinary bladder wall. The mass measures 8 x 2.9 cm. Left-sided double J intraureteral stent is in place with stent well positioned. 4/10/2022-CT chest without contrast showed extensive metastatic disease is noted to the lungs showing progression from the previous examination with multiple new nodules present as well as interval increase in size of previously documented nodules. Reference nodule within the superior segment of the right lower lobe previously measured at 5 mm in size now measures 13 mm in size. A lesion within the medial right lower lobe now measuring 1.6 cm in long axis previously measured 1.1 cm. There is no evidence of acute consolidative pneumonia. Essentially, findings consistent with progressive metastatic disease to the lungs. There are too numerous to count pulmonary nodules the majority of which have increased in size or which are new from the previous study. 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      Prior oncological history  ONCOLOGIC HISTORY:   Diagnosis:  · Moderately differentiated rectal carcinoma, T3N0Mx, diagnosed in 3/9/2009  · Noninvasive high-grade papillary urethral carcinoma.  Negative for evidence of detrusor muscle invasion, pTa, pNx on 8/18/2019.    · Metastatic colorectal carcinoma, 9/3/2019  · MSI stable and mutations for BRAF, NRAS, KRAS were not detected.    · Recurrent bladder cancer- superficial   · Urothelial carcinoma of the ureter     TREATMENT SUMMARIES:  · 4/9/2009-5/27/2009-received neoadjuvant chemotherapy with 5-FU CIV along with radiation therapy for a total of 5400 cG  · 7/15/2009-rectum resection revealed no residual malignancy, complete pathological response.   · 8/18/2019- transurethral resection of bladder tumor (TURBT)   · 9/18/2019-12/26/2019 palliative chemotherapy with modified FOLFOX 7  (Oxaliplatin 85 mg/m² IV day 1, leucovorin 400 mg/m² IV day 1 and 5-FU 2400 mg/m² IV continuous infusion over 46 to 48 hours for a total of 7 cycles. · 1/28/2020 -palliative maintenance therapy with leucovorin 400 mg/m² IV over 2 hours on day 1, followed by 5-FU bolus 400 mg/m² and then 1200 mg/m²/day x2 days (total 2400 mg meter squared over 46 to 48 hours) continuous infusion.  Repeat every 2 weeks.     ONCOLOGIC HISTORY # NMIBC_Bladder cancer. Lizett Gomes was diagnosed with noninvasive urethral carcinoma, pTa, pNx on 8/18/2019.  Ta tumors are papillary lesions that tend to recur but are relatively benign and generally do not invade the bladder.  Adjuvant treatment is not warranted at this time and will be monitored closely. Biopsy and transurethral resection of bladder tumor (TURBT) on 8/18/2019 by Dr. Chris Naylor with pathology revealing noninvasive high-grade papillary urethral carcinoma.  Negative for evidence of detrusor muscle invasion, pTa, pNx.   · 12/3/2019- cystoscopy with removal and replacement of double-J urethral stent.  Pathology from dome of the bladder/tumor revealed high-grade papillary urethral carcinoma, noninvasive.  No muscularis propria present.    · 2/26/2020 - cystoscopy and bilateral ureteral stent removal and replacement.  The operative note by Dr. Gil Howe documented bilateral hydronephrosis and obtained biopsy of the bladder in the mid dome and left anterior lateral wall x2.  Pathology documented high-grade papillary urethral carcinoma, noninvasive, stage pTaNx. · 5/28/2020-the patient underwent a cystoscopy and resection of bladder tumor on 05/28/2020 with findings consistent with noninvasive, high-grade papillary urothelial carcinoma.  Muscularis propria was not identified.  The patient will receive intravesical BCG therapy.   · 7/6/2020-CT Abdomen/ Pelvis-Moderate severe right and mild left hydronephrosis with bilateral ureteral stents, which have an adequate radiographic position. Right kidney with cortical thickening and somewhat asymmetrically decreased enhancement which can be seen with obstructive uropathy. Postoperative changes of colectomy. Left lower quadrant ostomy. Slightly decreased size of presacral low density compared to4/15/2020. Similar intrahepatic and extrahepatic bile duct dilation compared to 4/15/2020. Correlate with clinical symptoms and laboratory studies if clinically indicated. Similar chronic bony findings. · 3/25/2021-CT abdomen showed severe hydronephrosis.  Cystoscopy was performed by urology. · 4/1/21 Bladder neck tumor, biopsies: High-grade papillary urothelial carcinoma, noninvasive. Muscularis propria is not present. AJCC pathologic stage:  pTa Nx   · 4/14/2021-reviewed results of pathology.  No evidence of invasive disease.  Continue surveillance cystoscopy with urology. · 9/13/2021-he was seen by urology.  Findings of ureteral high-grade urothelial carcinoma.  Noninvasive.  The patient is being referred to [facility] in [address]. · 10/11/2021-he was seen by [facility] and recommended cystoscopy with biopsy and possible laser ablation and bilateral leg stent exchange at that time. · 4/10/2022-CT abdomen/pelvis without contrast showed evidence of metastatic disease to the lung bases. Moderate size hiatal hernia with gastroesophageal reflux. Status post partial colectomy. Left lower quadrant ostomy is present. No evidence obstruction. Irregular soft tissue mass with some calcification the pelvis. This demonstrated interval increase was potential involvement with the right posterior lateral urinary bladder wall. The mass measures 8 x 2.9 cm. Left-sided double J intraureteral stent is in place with stent well positioned.   · 4/10/2022-CT chest without contrast showed extensive metastatic disease is noted to the lungs showing progression from the previous examination with multiple new nodules present as well as interval increase in size of previously documented nodules. Reference nodule within the superior segment of the right lower lobe previously measured at 5 mm in size now measures 13 mm in size. A lesion within the medial right lower lobe now measuring 1.6 cm in long axis previously measured 1.1 cm. There is no evidence of acute consolidative pneumonia. Essentially, findings consistent with progressive metastatic disease to the lungs. There are too numerous to count pulmonary nodules the majority of which have increased in size or which are new from the previous study. 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         ONCOLOGIC HISTORY #3  Elder Ramos was seen in initial oncology consultation on 8/19/2019 during his hospitalization at [facility] after a large pelvic mass was identified which raised concern for recurrent disease.  Further pathology consistent with recurrent rectal cancer  · 8/17/2019- CEA 18.1  · 8/17/2019- CT scan of the kidney with contrast documented moderate to severe right hydronephrosis with dilation of the right ureter into the lower pelvis the site of the parasacral soft tissue changes.  Partially calcified soft tissue changes within the janes-sacral region likely representing sequelae of pelvic radiation.  Increasing scarring/fibrosis versus tumor recurrence within the presacral changes, likely represents a site of right distal ureter obstruction.  No left-sided hydronephrosis.    · 8/18/2019 -Double-J ureter stent placement for right hydronephrosis secondary to extrinsic compression by pelvic mass.    · 8/27/2019-CT scan of the chest with contrast documented numerous pulmonary nodules that appear new compared to 11/12/2017, RUL nodule measuring 7 mm and LLL nodule measuring 5 mm.  Soft tissue nodule at the left ventral abdominal wall.  Slight increased size of a probable lymph node anterior to the aorta measuring 0.9 cm compared to 0.7 cm.  Similar presacral, right pelvic sidewall and right abductor muscular nodular soft tissue density. · 8/27/2019 CT scan of the abdomen and pelvis with contrast identified new moderate left hydronephrosis with moderate right hydronephrosis.  Mild stranding around the urinary bladder and thickening of the bilateral ureteral wall.  Numerous pulmonary nodules.  Soft tissue of the left ventral abdominal wall.  Slightly increased size of probable lymph node anterior to the aorta measuring 0.9 cm compared to 0.7 cm. · 8/27/2019-PET scan did not identify any FDG avid pulmonary nodules or airspace opacities.  Abnormal increased metabolic activity within the right pelvic wall soft tissue showing SUV of 5.4.  Abnormal soft tissue metabolic activity in the right abductor muscle with SUV of 6.4.  Focally increased activity to the right of the inferior L5 vertebrae body posterior with SUV of 7.9 with associated sclerotic changes. · 8/29/2019-  Dr. Sushant Julien completed a cystoscopy with double-J ureter stent in the left ureter for left hydronephrosis  · 9/3/2019- CT-guided right abductor muscle biopsy on 9/3/2019 with pathology identifying metastatic adenocarcinoma consistent with colorectal origin.  Molecular panel from biopsy tissue revealed MSI stable and mutations for BRAF, NRAS, KRAS were not detected.    · 9/18/2019 - Palliative chemotherapy with modified FOLFOX 7  (Oxaliplatin 85 mg/m² IV day 1, leucovorin 400 mg/m² IV day 1 and 5-FU 2400 mg/m² IV continuous infusion over 46 to 48 hours) with the anticipation of adding Avastin 5 mg/kg day 1 every 14 days  · 10/15/2019- 24-hour urine for protein with a total protein of 1785 mg per 24-hour.  Zurdo has been evaluated by Dr. Higinio Rosario and he reports no significant concerns related to the protein. · 11/6/19 CEA 5.6 significantly improved compared to CEA of 14.0 on 8/30/2019.   · 11/15/2019 -CT scan of the abdomen and pelvis documented no evidence of disease progression with significant decrease in the size of enhancing nodules in the right pelvic abductor musculature, a previous 1.8 cm nodule now measures 5 mm.  No new or enlarging retroperitoneal, mesenteric, pelvic or inguinal lymph nodes.  Calcified presacral mass unchanged measuring 5 x 3.7 cm.   · 11/15/2019 -CT scan of the chest documented multiple small pulmonary nodules reidentified, largest nodule in the RUL measures 5 mm compared to 7.5 mm, RLL nodule measures 3.4 mm compared to 5.9 mm, VIC nodule measures 4 mm compared to 6 mm.  A cluster of small nodules in the RUL anteriorly are barely visible on this study.  There is a decrease in size of mediastinal lymph nodes compared to previous exam, right distal paratracheal lymph node measuring 4.5 mm compared to 8.3 mm and lower right peritracheal node measuring 4.5 mm compared to 8.6 mm.    · 1/13/2020- CT scan of the abdomen and pelvis with contrast indicated improvement in the right-sided hydronephrosis with a chronic inflammatory process of the ureters suspected due to the moderate thickening, also present on previous study.  The small poorly enhancing nodules in the right abductor muscles have decreased in the partially calcified presacral mass and right lateral pelvic wall nodules are stable compared to previous study.  Resolution of the subcutaneous abdominal wall nodules.  A prominent retroperitoneal lymph node adjacent and anterior to the left common artery is redefined and measures 6 mm, no change from previous exam.   · 1/13/2020 - CT scan of the chest documented a right lower lobe nodule measures 4.3 cm and is unchanged.  A right lower lobe nodule measures 2.8 mm compared to 3.4 mm.  Nodule in the right upper lobe is barely visible and measures 2.4 mm.  Nodule in the left lower lobe measures 4.8 mm and is unchanged.  Nodule in left lower lobe posterior measures 2.8 mm and previously measured 4.7 mm.  A right lower lobe posterior medially nodule is barely visible measuring 0.2 mm and previously. measured 4. 5 mm.  No new nodules identified.  No change in the size of the mediastinal lymph nodes. · 1/28/2020 -palliative maintenance therapy with leucovorin 400 mg/m² IV over 2 hours on day 1, followed by 5-FU bolus 400 mg/m² and then 1200 mg/m²/day x2 days (total 2400 mg meter squared over 46 to 48 hours) continuous infusion.  Repeat every 2 weeks.  Only received 1 cycle, further treatment held due to small bowel obstruction. · 1/30/2020 - CT scan of the abdomen and pelvis indicated high-grade small bowel obstruction with transition point in the midline posterior pelvis where a small bowel loop is tethered to a partially calcified presacral soft tissue mass. · 2/11/2020-CEA 1.4  · 3/5/2020-  Exploratory laparotomy, removal of adhesions, small bowel resection with primary anastomosis and partial thickness small bowel repair by Dr. Neftali Moralez the operative note Dr. Alexandre Marsh reported no evidence of carcinomatosis within the abdomen and the liver was unable to be examined due to extent of right upper quadrant adhesions.  Pathology from small intestine documented no evidence of malignancy. · 4/15/2020 Ct Chest W Contrast Minimal interval increase in size of subcentimeter pulmonary nodules. The largest now measures 6 mm in the medial right lower lobe on axial image 80. There is a new, unstable, horizontal fracture through the T6 vertebral body. Additionally, there are new fractures through the posterior 11th and 12th right ribs. The bones are moderately osteopenic. The finding of an unstable fracture through the T6 vertebral body was discussed with Ana Mercedes at 10:45 AM on 4/15/2020. · 4/15/2020 Ct Abdomen Pelvis W Iv Contrast  Patient has undergone interval resection of the distal small bowel, and there is a 2.8 cm fluid collection in the presacral operative bed. This contains a tiny focus of air. This may postoperative or due to infection.  Please correlate with the patient's clinical symptoms and laboratory markers. Improved hydronephrosis and hydroureter. Diffuse osteoporosis. Findings in the lower chest are described in a separate dictation. · 4/22/2020-CEA 0.9  · 6/2/2020-resumed chemotherapy with 5-FU/leucovorin and Panitumumab.  Okay to do 1 today then CMP CEA   · 8/19/20 CEA-1.1  · 10/21/2020- CEA 2.0  · 11/11/2020- Ct Chest W Contrast Multiple, too numerous to count, small noncalcified lung nodules bilaterally. The referenced nodules appears to have decreased in size the previous study. No new nodules. · 11/11/2020- Ct Abdomen Pelvis W Contrast Unchanged bilateral hydronephrosis, more on the right side. Bilateral ureteral stents in place. Moderate asymmetrical thickening of the incompletely distended urinary bladder. This may partly be due to incomplete distention. Possibility of chronic cystitis and or chronic partial outlet obstruction may not be excluded. A functioning left lower abdominal ostomy. A small parastomal small bowel herniation without obstruction. A partially calcified presacral mass. The soft tissue component have increased in size in the previous study. The osseous changes are described above. Any superimposed metastatic disease is not excluded and would be hard to evaluate due to extensive postsurgical changes. · 11/18/2020-essentially, overall stable disease.  Improvement of the lung nodules with decreased in the size of the target lesions.  The pelvic lesion is is likely worse by 25%.  However, CEA is is still within the normal limits.  Therefore likely mixed response.  We will continue current treatment and repeat CT scans in about 3 months. · 12/16/2020-discontinue 5-FU bolus from his regimen. · 2/9/2021- Ct Chest W Contrast No evidence of disease progression. Stable pulmonary nodules. Stable intrahepatic and extrahepatic bile duct dilation compared to prior 11/11/2020. Postoperative, posttraumatic and degenerative changes in the spine as described above.  Old right-sided rib fractures. · 2/9/2021- Ct Abdomen Pelvis W Contrast showed evidence of response to therapy including decreased presacral mass/thickening now measuring 1.1 cm, previously 1.9 cm on 11/11/2020. Stable intrahepatic and extra hepatic bile duct dilation with cholecystectomy clips. See separately dictated CT chest of the same day regarding pulmonary nodules.  Bilateral ureteral stents. Decreased bilateral hydronephrosis. Urinary bladder wall is thickened, which could be seen with post treatment changes or cystitis. Correlate with symptoms.  Chronic bony findings as above  · 2/17/2021-reviewed CT chest abdomen pelvis.  Essentially consistent with disease response to therapy.  Continue current therapy.    · 2/17/21 CEA 1.0  · 3/25/21 Ct Abdomen Pelvis W Iv Contrast  The stomach is distended, however no small bowel dilatation identified. Contrast identified within the left ileostomy bag. The distal stomach is under distended which may be secondary to peristalsis. Gastroparesis considered. Bilateral ureteral stents remain appropriate in position, however there is new moderate to severe right-sided hydronephrosis and mild left-sided hydronephrosis when compared to the 2/9/2021 exam. Mild increase considered within the partially calcified presacral pelvic mass. Stable partially calcified right pelvic soft tissue. Similar abnormal wall thickening of the bladder most notable superiorly. Similar prominence of the intra and extra hepatic bile ducts down to the level of the ampulla. Findings may represent a reservoir effect, however correlation with liver function tests recommended. Therefore. Stable noncalcified left greater than right pulmonary nodules with asymmetrical interstitial changes of the right lung base concerning for pneumonitis. Osteopenia with postoperative changes of the lumbar spine. Chronic compression deformities of the thoracolumbar vertebra as described above.    · 4/14/2021-I reviewed notes from urology and also CT abdomen/pelvis that showed mild interval increase in the size of the soft tissue nodule in the pelvis.  However, this is still very small and therefore will have a short follow-up CT scan and of May 2021.  I personally reviewed the CT scans.  Really hard to state that there is clear-cut disease progression.  Again, short follow-up recommended.  Continue current treatment. · 5/12/21 CEA 0.8  · 5/26/2001-CT chest abdomen pelvis showed Partially calcified presacral soft tissue mass appears unchanged. Previously measured soft tissue noncalcified component is unchanged measuring 2.2 x 3.2 cm on axial image 60. However, this is questionable.  CT chest showed a stable pulmonary nodules.  Subcentimeter nodules.  Largest 7.5 mm. · 6/1/21 CEA 0.9  · 06/01/23- reviewed scans. Stable disease.  Continue treatment every 3 weeks as per patient request. Continue infusional 5-FU, Panitumumab and leucovorin. No 5-FU bolus due to severe mucositis. · 8/11/2021 CEA . 9  · 9/03/2021 CT Chest w/Contrast Slight interval increase in the size of pulmonary nodules, the largest in the medial right lung base. Findings are concerning for metastatic disease. Atherosclerosis of the aorta and coronary arteries. Sclerotic rib lesions favored for osseous metastases. Old rib fractures also evident. · 9/03/2021 CT Abd/Pelvis w/ IV Contrast (Oral) A persistent partially calcified mass in the presacral region with encasement of the distal ureters bilaterally and resultant bilateral hydronephrosis. Bilateral ureteral stents in place. There is increasing right-sided hydronephrosis since the previous study. Right renal atrophy similar to the previous study. Moderate asymmetrical thickening of the incompletely distended urinary bladder is similar to the previous study. This may partly be due to incomplete distention. An inflammatory process or a neoplastic process is not excluded. Moderate intrahepatic biliary dilatation is similar to the previous study and probably due to a prior cholecystectomy. Moderate dilatation of the contrast filled small bowel loops may represent an ileus or partial distal small bowel obstruction. There is left lower abdominal ileostomy which appears patent. The stable compression fractures of the lower thoracic and proximal lumbar vertebrae and hardware fusion similar to the previous study. · 9/22/21- Decrease Vectibix to every other treatment.  He is currently receiving chemotherapy every 3 weeks as per patient request      ONCOLOGIC HISTORY # Rectal carcinoma:  Bailey Sabillon has a history of moderately differentiated rectal carcinoma, T3N0Mx, diagnosed in 3/9/2009.  He received neoadjuvant chemotherapy with radiation and resection with  Quirk has been routinely followed at [facility] and was last seen by Dr. Jeannine Morris on 1/10/2019. Mary Jane Hernandez following are pertinent findings related to his diagnosis and treatment. · 3/9/2009- Esophagogastroduodenoscopy with biopsy by Dr. Rebeca Parisi that revealed rectal mass at 8 cm with pathology being consistent with moderately differentiated adenocarcinoma. · 3/18/2019-Dr.Elizabeth Hair Nava at [facility] performed a flexible sigmoidoscopy and rectal endoscopic ultrasound that revealed the tumor extending 6-7 mm deep through the muscularis propria layer and into the serosa, T3 lesion. · 4/9/2009-5/27/2009-received neoadjuvant chemotherapy with 5-FU CIV along with radiation therapy for a total of 5400 cGy under the direction of Dr. Kelsy Batista.    · 7/15/2009-rectum resection revealed no residual malignancy.  22 regional nodes were negative for malignancy.  The rectum distal doughnut was negative for malignancy.  He was documented to have a complete pathological response.   · 9/9/2009- Ileostomy excision pathology revealed small bowel wall with changes consistent with ileostomy site.  Pathology negative for malignancy    Objective   PHYSICAL EXAM:  CONSTITUTIONAL: Alert, appropriate, no acute distress, appears chronically ill  EYES: Non icteric, EOM intact, pupils equal round   ENT: NG tube in place, mucus membranes moist, external inspection of ears and nose are normal  NECK: Supple, no masses.  No JVD  CHEST/LUNGS: CTA bilaterally, normal respiratory effort   CARDIOVASCULAR: RRR. No lower extremity edema  ABDOMEN: soft non-tender, active bowel sounds. Left abdominal ileostomy noted  EXTREMITIES: warm, moves extremities. Gait not observed. SKIN: warm, dry with no rashes or lesions  LYMPH: No cervical, clavicular, or axillary lymphadenopathy  NEUROLOGIC: follows commands, non focal   PSYCH: mood and affect appropriate. Alert and oriented to time, place, person    Vital Signs  /89   Pulse 115   Temp 97.5 °F (36.4 °C) (Temporal)   Resp 17   Ht 5' 5\" (1.651 m)   Wt 170 lb (77.1 kg)   SpO2 96%   BMI 28.29 kg/m²     Intake/Output Summary (Last 24 hours) at 4/15/2022 0603  Last data filed at 4/15/2022 0509  Gross per 24 hour   Intake 1835 ml   Output 3925 ml   Net -2090 ml     Labs:  CBC:   Recent Labs     04/13/22  0331 04/14/22  0236 04/15/22  0224   WBC 9.2 11.3* 10.1   HGB 10.1* 10.7* 11.5*    343 359     CMP:   Recent Labs     04/13/22  0331 04/14/22  0236 04/15/22  0224   GLUCOSE 141* 84 89   BUN 27* 22 17   CREATININE 1.1 1.0 1.0   CO2 14* 15* 17*   CALCIUM 9.2 9.2 9.5       Blood Culture Recent:   Recent Labs     04/10/22  0958   BC No Growth to date. Any change in status will be called. ASSESSMENT:  Abena Coop confirmed in right abductor muscle KRAS wild type.   MSI stable and negative mutations for BRAF, NRAS, KRAS wild type.     09/22/21- CEA 0.6  Continue palliative intent 5-FU/leucovorin every 3 weeks as per patient request. Panitumumab on hold as per patient request (significant toxicity with the skin toxicity and mouth sores).   Not a good candidate for Avastin due to frequent exchange of ureteral stents and hematuria. 4/10/2022-CT abdomen/pelvis without contrast showed evidence of metastatic disease to the lung bases. Moderate size hiatal hernia with gastroesophageal reflux. Status post partial colectomy. Left lower quadrant ostomy is present. No evidence obstruction. Irregular soft tissue mass with some calcification the pelvis. This demonstrated interval increase was potential involvement with the right posterior lateral urinary bladder wall. The mass measures 8 x 2.9 cm. Left-sided double J intraureteral stent is in place with stent well positioned. 4/10/2022-CT chest without contrast showed extensive metastatic disease is noted to the lungs showing progression from the previous examination with multiple new nodules present as well as interval increase in size of previously documented nodules. Reference nodule within the superior segment of the right lower lobe previously measured at 5 mm in size now measures 13 mm in size. A lesion within the medial right lower lobe now measuring 1.6 cm in long axis previously measured 1.1 cm. There is no evidence of acute consolidative pneumonia. Essentially, findings consistent with progressive metastatic disease to the lungs. There are too numerous to count pulmonary nodules the majority of which have increased in size or which are new from the previous study. 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  The operative note by Dr. Reyes Wadsworth documented bilateral hydronephrosis and obtained biopsy of the bladder in the mid dome and left anterior lateral wall x2.  Pathology documented high-grade papillary urethral carcinoma, noninvasive, stage pTaNx.  As per Urology    5/28/2020-the patient underwent a cystoscopy and resection of bladder tumor on 05/28/2020 with findings consistent with noninvasive, high-grade papillary urothelial carcinoma.  Muscularis propria was not identified. Currently receiving intravesical BCG.  4/1/2021-repeat cystoscopy showed a small bladder lesion.  Tumor resection of bladder tumor consistent with noninvasive high-grade urothelial carcinoma. 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  Referred to [facility]  10/14/2021-urology at [facility] cytology consistent with atypical urothelial cells. Since the patient was last seen by Dr. Eliezer Gitelman November 2021 he underwent a major resection of the high-grade urothelial carcinoma of the ureter at [facility].  His postoperative course was complicated and he was hospitalized for many weeks. He eventually was discharged and is currently receiving home care at home. He is still very debilitated. -CT findings from 4/10/2022 concerning for metastatic disease, as noted above  -Urology, Dr. Paul Tracy following    Ileus  4/13/2022-CT abdomen/pelvis showed  · When compared to the previous exam of 3 days earlier there is now  noted to be moderate small bowel distention. I would favor a adynamic ileus. A distal obstruction at the site of the patient's ileostomy is also in the differential but felt less likely. There is mild distention of the stomach. A moderate size hiatal hernia is present. · Mild to moderate dilatation of the upper tracts of the left kidney. A left-sided double-J ureteral stent is in place. There is mild urothelial thickening. I do not see evidence of a discrete ureteral stone. The stent is well-positioned. · Partially calcified pelvic mass.  This is inseparable from the right posterior lateral wall of the urinary bladder along its lower margin. A Mcgregor catheter is in place within the bladder. 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 · There is a small amount of free fluid within the subhepatic space and Morison space. A small right-sided effusion is present with multiple pulmonary nodules within the lower lobes consistent with metastatic disease to the lungs. There is a moderate size hiatal hernia present. · There is a small volume of retained contrast within stomach and dilated proximal small bowel loops. There are dilated mid to distal small bowel loops without contrast.   Followed and managed by general surgery  -NG tube in place  Normocytic anemia-stable  Hgb 11.5,MCV 88    Labs 4/12/2022:  · Iron: 77, TIBC 233, iron saturation 33%, ferritin 589  · Vitamin B12: 308  · Folate: 13.89    Osteoporosis-Osteoporosis BMD -3.6 April 2020.   -Continue Vit D, Boniva and Calcium.      Acute kidney injury/CKD stage III-improved  Cr 1.0    Hyponatremia-improved      UTI  -Currently on Anidulafungin/Levaquin by mouth    PLAN:  Continue current supportive care  UTI - anidulafungin/Levofloxacin therapy as per ID  CT guided FNA of pulmonary nodule scheduled 4/13/2022, delayed due to possible ileus  Ileus, managed by GS      (Please note that portions of this note were completed with a voice recognition program. Efforts were made to edit the dictations but occasionally words are mis-transcribed.)    Georgia Lay MD  04/15/22  6:03 AM      Small bowel obstruction versus ileus. As per hospitalist service. Pulmonary nodules-FNA biopsy will be postponed.

## 2022-04-15 NOTE — PROGRESS NOTES
Occupational Therapy     04/15/22 1244   Restrictions/Precautions   Restrictions/Precautions Fall Risk   Position Activity Restriction   Other position/activity restrictions has a colostomy   Vision   Vision Impaired   Vision Exceptions Wears glasses at all times   Hearing   Hearing [facility]   General   Chart Reviewed Yes   Patient assessed for rehabilitation services? Yes   Response to previous treatment Patient with no complaints from previous session   Family / Caregiver Present No   Subjective   Subjective Pt in bed upon arrival for therapy. Pt agreeable to participate but required nursing to unhook nasal tube first.    Pain Assessment   Patient Currently in Pain No   Orientation   Overall Orientation Status WNL   ADL   Feeding Independent   Grooming Independent;Setup   UE Bathing Independent;Setup   LE Bathing Minimal assistance   UE Dressing Independent;Setup   LE Dressing Minimal assistance   Toileting Minimal assistance  (ostomy management. )   Balance   Sitting Balance Independent   Standing Balance Contact guard assistance   Functional Mobility   Functional - Mobility Device Rolling Walker   Activity Other  (20-30 ft)   Assist Level Contact guard assistance   Bed mobility   Supine to Sit Modified independent   Scooting Modified independent   Transfers   Sit to stand Contact guard assistance   Stand to sit Contact guard assistance   Assessment   Performance deficits / Impairments Decreased functional mobility ; Decreased ADL status; Decreased strength;Decreased endurance;Decreased balance;Decreased high-level IADLs   Assessment Pt progressing as tolerated. Treatment Diagnosis Sepsis, decreased ADL, Generalized weakness   Prognosis Good   History colon CA with mets including ureter and lungs, arthrocentesis 4/12 for right knee pain, CKD, back surgery   REQUIRES OT FOLLOW UP Yes   Treatment Initiated  Tx focused on functional mobility and getting to the recliner to sit up for lunch this date.     Discharge Recommendations Patient would benefit from continued therapy after discharge   Activity Tolerance   Activity Tolerance Patient Tolerated treatment well   Safety Devices   Safety Devices in place Yes   Type of devices Chair alarm in place;Call light within reach; Left in chair;Nurse notified   Plan   Times per week 3-5   Times per day Daily   Electronically signed by Delcia Goldmann, OTA on 4/15/2022 at 12:49 PM

## 2022-04-15 NOTE — PROGRESS NOTES
Comprehensive Nutrition Assessment    Type and Reason for Visit:  Reassess    Nutrition Assessment:  Pt is currently NPO. NGT to LIS. Good ostomy output. Surgery hopeful to avoid PICC/TPN and start introducing oral intake. Will continue to monitor clinical course and implement nutrition intervention as needed. Malnutrition Assessment:  Malnutrition Status: At risk for malnutrition (Comment)      Wounds:  Surgical Incision       Current Nutrition Therapies:    Diet NPO Exceptions are: Sips of Water with Meds    Anthropometric Measures:  · Height: 5' 5\" (165.1 cm)  · Current Body Weight: 170 lb (77.1 kg)      · Ideal Body Weight: 136 lbs  · BMI: 28.3   · BMI Categories: Overweight (BMI 25.0-29. 9)       Nutrition Diagnosis:   · Increased nutrient needs related to catabolic illness,altered GI structure as evidenced by diarrhea,weight loss,GI abnormality    Nutrition Interventions:   Food and/or Nutrient Delivery:  Continue NPO  Coordination of Nutrition Care:  Continue to monitor while inpatient    Goals:  New Goal: Pt will progress to an oral diet or nutritional needs will be met with an alternate source of nutrition       Nutrition Monitoring and Evaluation:   Food/Nutrient Intake Outcomes:  Diet Advancement/Tolerance  Physical Signs/Symptoms Outcomes:  Biochemical Data,Diarrhea,GI Status,Nausea or Vomiting,Fluid Status or Edema,Hemodynamic Status,Nutrition Focused Physical Findings,Weight     Electronically signed by Carmina Holman, MS, RD, LD on 4/15/22 at 1:01 PM CDT    Contact: [phone]

## 2022-04-15 NOTE — PLAN OF CARE
Nutrition Problem #1: Increased nutrient needs  Intervention: Food and/or Nutrient Delivery: Continue NPO  Nutritional Goals: New Goal: Pt will progress to an oral diet or nutritional needs will be met with an alternate source of nutrition

## 2022-04-15 NOTE — PROGRESS NOTES
Physical Therapy  Name: Pravin Millan  MRN:  [id number]  Date of service:  4/15/2022       04/15/22 1149   Restrictions/Precautions   Restrictions/Precautions Fall Risk   Position Activity Restriction   Other position/activity restrictions has a colostomy   Subjective   Subjective Pt agreed to therapy. Pain Screening   Patient Currently in Pain No   Transfers   Sit to Stand Contact guard assistance   Stand to sit Contact guard assistance   Ambulation   Ambulation? Yes   Ambulation 1   Surface level tile   Device Rolling Walker   Assistance Contact guard assistance   Quality of Gait FLEXED POSTURE THAT ACTUALLY PROVIDES STABLILITY TO THE KNEE TO DECREASE  BUCKLING. NO LOB NOTED OR PATH DEVIATION   Gait Deviations Decreased step length;Decreased step height   Distance 40'   Short term goals   Time Frame for Short term goals 2 WKS   Short term goal 1  FT WITH RW   Conditions Requiring Skilled Therapeutic Intervention   Body structures, Functions, Activity limitations Decreased functional mobility ; Decreased endurance;Decreased strength;Decreased posture; Increased pain   Assessment Pt able to amb with no LOB. Pt reports he is able to somewhat control the knee buckling with RW but did not have instance of major buckling this time. Pt assisted to chair with all needs in reach. Activity Tolerance   Activity Tolerance Patient Tolerated treatment well   Safety Devices   Type of devices Call light within reach; Left in chair     Electronically signed by Kely Regalado PTA on 4/15/2022 at 11:55 AM

## 2022-04-15 NOTE — PROGRESS NOTES
HOSPITAL MEDICINE  - PROGRESS NOTE    Admit Date: 4/10/2022         CC: fever,nausea,decreased urine output     Subjective: no fever, no more nausea or vomiting, no abd pain, constipated resolved, no chest pain, no palpitations, no dyspnea, no wheezing, no dysuria, no hematuria    Objective: no distress, deconditioned - needs rehab      81 y.o. male with recent right nephrectomy, recent ureteral stents, metastatic colonic cancer presented with fever and nausea decreased urine output. On the admission denied problems with ostomy output. CT Chest consistent with progressive metastatic disease to the lungs. CT abdomen irregular soft tissue mass with some calcification within the pelvis. He was scheduled for pulmonary nodule biopsy by radiology but radiologist was concerned with small bowel distention per KUB, repeat CT abdomen showed ileus, pt did not have any ostomy output, he developed N/V/abd pain- NGT was placed. Today pt had BM. Per gen surgery continue NGT for now. Urine cultures grow candida and achromobacter - ID manages AB- urology on board. Pt accepted to acute rehab when cleared by all specialists.             Diet: Diet NPO Exceptions are: Sips of Water with Meds  Pain is: no   Nausea: no   Bowel Movement/Flatus yes    Data:   Scheduled Meds: Reviewed  Current Facility-Administered Medications   Medication Dose Route Frequency Provider Last Rate Last Admin    sodium bicarbonate 50 mEq in sodium chloride 0.9 % 1,000 mL infusion   IntraVENous Continuous Armen Auguste  mL/hr at 04/15/22 1151 New Bag at 04/15/22 1151    metoprolol succinate (TOPROL XL) extended release tablet 25 mg  25 mg Oral Daily Armen Auguste MD   25 mg at 04/15/22 1419    And    hydroCHLOROthiazide (HYDRODIURIL) tablet 6.25 mg  6.25 mg Oral Daily Armen Auguste MD   6.25 mg at 04/15/22 1419    [START ON 4/16/2022] enoxaparin (LOVENOX) injection 40 mg  40 mg SubCUTAneous Q24H Samantha Costa MD        anidulafungin (ERAXIS) 100 mg in dextrose 5 % 130 mL IVPB  100 mg IntraVENous Q24H Prudence Anne MD   Stopped at 04/15/22 1108    levoFLOXacin (LEVAQUIN) tablet 500 mg  500 mg Oral Daily Prudence Anne MD   500 mg at 04/15/22 0856    sodium bicarbonate tablet 650 mg  650 mg Oral 4x Daily Samantha Costa MD   650 mg at 04/15/22 1419    sennosides-docusate sodium (SENOKOT-S) 8.6-50 MG tablet 2 tablet  2 tablet Oral BID Samantha Costa MD   2 tablet at 04/15/22 0855    polyethylene glycol (GLYCOLAX) packet 17 g  17 g Oral BID Danilo Guzmán MD   17 g at 04/15/22 0858    promethazine (PHENERGAN) injection 12.5 mg  12.5 mg IntraMUSCular Q4H PRN Samantha Costa MD   12.5 mg at 04/14/22 1831    lactobacillus (CULTURELLE) capsule 1 capsule  1 capsule Oral Daily Samantha Costa MD   1 capsule at 04/15/22 0855    pantoprazole (PROTONIX) 40 mg in sodium chloride (PF) 10 mL injection  40 mg IntraVENous BID Samantha Costa MD   40 mg at 04/15/22 0900    cyclobenzaprine (FLEXERIL) tablet 5 mg  5 mg Oral BID PRN Samantha Costa MD   5 mg at 04/13/22 2023    sodium chloride flush 0.9 % injection 5-40 mL  5-40 mL IntraVENous 2 times per day Woo Brewer, APRN - CNP   5 mL at 04/15/22 0900    sodium chloride flush 0.9 % injection 5-40 mL  5-40 mL IntraVENous PRN Woo Brewer, APRN - CNP        0.9 % sodium chloride infusion   IntraVENous PRN Woo Brewer, APRN - CNP        acetaminophen (TYLENOL) tablet 650 mg  650 mg Oral Q6H PRN Woo Brewer, APRN - CNP   650 mg at 04/15/22 1423    Or    acetaminophen (TYLENOL) suppository 650 mg  650 mg Rectal Q6H PRN Woo Brewer, APRN - CNP        DULoxetine (CYMBALTA) extended release capsule 30 mg  30 mg Oral Daily OLGA Fatima - CNP   30 mg at 04/15/22 0856    traMADol (ULTRAM) tablet 25 mg  25 mg Oral Q6H PRN Claudia GRULLON Kaveh Martel APRN - CNP   25 mg at 04/13/22 0656    calcium carbonate (TUMS) chewable tablet 500 mg  500 mg Oral TID PRN Loura Millet, APRN - CNP        ondansetron [facility]) injection 4 mg  4 mg IntraVENous Q6H PRN Loura Millet, APRN - CNP   4 mg at 04/14/22 1311     DVT Prophylaxis: Lovenox 40 mg sq daily    Continuous Infusions:   IV infusion builder 125 mL/hr at 04/15/22 1151    sodium chloride         Intake/Output Summary (Last 24 hours) at 4/15/2022 1701  Last data filed at 4/15/2022 1315  Gross per 24 hour   Intake 922 ml   Output 2375 ml   Net -1453 ml     CBC:   Recent Labs     04/13/22  0331 04/14/22  0236 04/15/22  0224   WBC 9.2 11.3* 10.1   HGB 10.1* 10.7* 11.5*    343 359     BMP:  Recent Labs     04/13/22  0331 04/14/22  0236 04/15/22  0224   * 134* 137   K 4.9 4.4 3.9    103 103   CO2 14* 15* 17*   BUN 27* 22 17   CREATININE 1.1 1.0 1.0   GLUCOSE 141* 84 89     ABGs: No results found for: PHART, PO2ART, EHF3WEY  INR:   Recent Labs     04/13/22 0331   INR 1.06         Objective:   Vitals: /88   Pulse 113   Temp 98.2 °F (36.8 °C) (Temporal)   Resp 16   Ht 5' 5\" (1.651 m)   Wt 170 lb (77.1 kg)   SpO2 97%   BMI 28.29 kg/m²   General appearance: alert, appears stated age and cooperative  Skin: Skin color, texture, turgor normal.   HEENT: Head: Normocephalic, no lesions, without obvious abnormality.   Neck: no adenopathy, no carotid bruit, no JVD and supple, symmetrical, trachea midline  Lungs: clear to auscultation bilaterally  Heart: regular rate and rhythm, S1, S2 normal, no murmur, click, rub or gallop  Abdomen:soft, NT, BS present, no rebound, no guarding  Extremities: extremities normal, atraumatic, no cyanosis or edema  Lymphatic: No significant lymph node enlargement papable  Neurologic: Mental status: Alert, oriented times 3        Assessment & Plan:    · Non invasive urothelial bladder cancer - tx per oncology  · Pulmonary nodules - plan for biopsy · Constipation with ileus/obstruction and vomiting- general surgery following- had BM today   · Hypomagnesemia - replaced  · Gram neg andres UTI with leukocytosis and lactic acidosis in pt with prior ureteral stents - cont IVF, pt was on Rocpehin- growing Achromobacter- AB changed per ID - urology following   · Candidosis- treated with fluconazole- growing candida glabrata- ab changed per ID   · Hyponatremia - resolved   · COLLEEN- resolved  · Anemia of chronic disease  · Leukocytosis- resolved     Patient Active Problem List:     S/p bare metal coronary artery stent     Coronary artery disease involving native coronary artery of native heart without angina pectoris     History of rectal cancer     Metastasis from colon cancer ([address]     Chemotherapy-induced peripheral neuropathy ([address]     Chemotherapy-induced nausea         See orders   Disposition: to acute rehab when cleared by ID, oncology, urology and general surgery     Deanna Mead MD

## 2022-04-15 NOTE — PROGRESS NOTES
Infectious Diseases Progress Note    Patient:  Ashlyn Pro  YOB: 1950  MRN: [id number]   Admit date: 4/10/2022   Admitting Physician: Ya Dhaliwal, *  Primary Care Physician: Collette Baller, MD    Chief Complaint/Interval History: Patient was seen and examined this morning. He is feeling a little bit better. There was a little bit of liquid stool in his ostomy. NG tube in place. Minimal abdominal distention. I contacted and discussed with Dr. Chris Oh this morning. In/Out    Intake/Output Summary (Last 24 hours) at 4/15/2022 1436  Last data filed at 4/15/2022 1315  Gross per 24 hour   Intake 1182 ml   Output 2950 ml   Net -1768 ml     Allergies:    Allergies   Allergen Reactions    Morphine Anxiety     Current Meds: sodium bicarbonate 50 mEq in sodium chloride 0.9 % 1,000 mL infusion, Continuous  metoprolol succinate (TOPROL XL) extended release tablet 25 mg, Daily   And  hydroCHLOROthiazide (HYDRODIURIL) tablet 6.25 mg, Daily  anidulafungin (ERAXIS) 100 mg in dextrose 5 % 130 mL IVPB, Q24H  levoFLOXacin (LEVAQUIN) tablet 500 mg, Daily  sodium bicarbonate tablet 650 mg, 4x Daily  sennosides-docusate sodium (SENOKOT-S) 8.6-50 MG tablet 2 tablet, BID  polyethylene glycol (GLYCOLAX) packet 17 g, BID  promethazine (PHENERGAN) injection 12.5 mg, Q4H PRN  lactobacillus (CULTURELLE) capsule 1 capsule, Daily  pantoprazole (PROTONIX) 40 mg in sodium chloride (PF) 10 mL injection, BID  cyclobenzaprine (FLEXERIL) tablet 5 mg, BID PRN  sodium chloride flush 0.9 % injection 5-40 mL, 2 times per day  sodium chloride flush 0.9 % injection 5-40 mL, PRN  0.9 % sodium chloride infusion, PRN  enoxaparin (LOVENOX) injection 30 mg, Q24H  acetaminophen (TYLENOL) tablet 650 mg, Q6H PRN   Or  acetaminophen (TYLENOL) suppository 650 mg, Q6H PRN  DULoxetine (CYMBALTA) extended release capsule 30 mg, Daily  traMADol (ULTRAM) tablet 25 mg, Q6H PRN  calcium carbonate (TUMS) chewable tablet 500 mg, TID PRN  ondansetron (ZOFRAN) injection 4 mg, Q6H PRN      Review of Systems no cardiopulmonary symptoms. VitalSigns:  BP (!) 130/90   Pulse 115   Temp 98.6 °F (37 °C) (Temporal)   Resp 18   Ht 5' 5\" (1.651 m)   Wt 170 lb (77.1 kg)   SpO2 98%   BMI 28.29 kg/m²      Physical Exam  Line/IV site: No erythema, warmth, induration, or tenderness. Abdomen minimal tenderness. No rebound. Ostomy with a small amount of liquid stool. Appears to be starting to function.     Lab Results:  CBC:   Recent Labs     04/13/22  0331 04/14/22  0236 04/15/22  0224   WBC 9.2 11.3* 10.1   HGB 10.1* 10.7* 11.5*    343 359     BMP:  Recent Labs     04/13/22  0331 04/14/22  0236 04/15/22  0224   * 134* 137   K 4.9 4.4 3.9    103 103   CO2 14* 15* 17*   BUN 27* 22 17   CREATININE 1.1 1.0 1.0   GLUCOSE 141* 84 89     CultureResults:    Component 4/10/22 1007   Urine Culture, Routine >50,000 CFU/ml Abnormal     Organism Candida glabrata Abnormal     Urine Culture, Routine Moderate growth    Organism Achromobacter species Abnormal  VC    Urine Culture, Routine Rare growth   Achromobacter xylosoxidans       Susceptibility     Candida glabrata (1)     Antibiotic Interpretation Microscan   Method Status     Micafungin Sensitive <=0.06 mcg/mL BACTERIAL SUSCEPTIBILITY PANEL BY ANA LUISA       Voriconazole Resistant >=8 mcg/mL BACTERIAL SUSCEPTIBILITY PANEL BY ANA LUISA          Achromobacter species (2)     Antibiotic Interpretation Microscan   Method Status     aztreonam Resistant >=64 mcg/mL BACTERIAL SUSCEPTIBILITY PANEL BY ANA LUISA       cefepime Resistant >=64 mcg/mL BACTERIAL SUSCEPTIBILITY PANEL BY ANA LUISA       cefTRIAXone Resistant >=64 mcg/mL BACTERIAL SUSCEPTIBILITY PANEL BY ANA LUISA       gentamicin Resistant >=16 mcg/mL BACTERIAL SUSCEPTIBILITY PANEL BY ANA LUISA       levofloxacin Sensitive 2 mcg/mL BACTERIAL SUSCEPTIBILITY PANEL BY ANA LUISA       meropenem Intermediate 8 mcg/mL BACTERIAL SUSCEPTIBILITY PANEL BY ANA LUISA       piperacillin-tazobactam Resistant >=128 mcg/mL BACTERIAL SUSCEPTIBILITY PANEL BY ANA LUISA       tobramycin Intermediate 8 mcg/mL BACTERIAL SUSCEPTIBILITY PANEL BY ANA LUISA       trimethoprim-sulfamethoxazole Sensitive <=20 mcg/mL BACTERIAL SUSCEPTIBILITY PANEL BY ANA LUISA       Radiology: None    Additional Studies Reviewed:  None    Impression:  1. Low-grade fever-resolved  2. Candida glabrata and Achromobacter in urine-under treatment with anidulafungin and levofloxacin  3. Acute renal insufficiency-resolved  4. Ileus versus obstruction-he appears to be responding to NG tube. He is starting to have some ostomy output. Hopefully this was just ileus related to medication. 5.  History colorectal cancer  6.   History urothelial cancer    Recommendations:  Continue current antimicrobial treatment  Discussed with Dr. Tess Dela Cruz  Would recommend Mcgregor change and stent change given his culture results (not emergent)  Continue to follow    Paulina Dejesus MD
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
## 2022-04-15 NOTE — PROGRESS NOTES
Subjective:  Mr. Сергей Heller is feeling some better today. He just recently started having some good ostomy output. He is still having a decent amount of NGT output as well. Objective:  BP (!) 130/90   Pulse 115   Temp 98.6 °F (37 °C) (Temporal)   Resp 18   Ht 5' 5\" (1.651 m)   Wt 170 lb (77.1 kg)   SpO2 98%   BMI 28.29 kg/m²   General: NAD, AAO  Chest: regular, non-labored  Abdomen: Soft, mild distention, NT, stoma appliance with liquids green stool    CBC:   Lab Results   Component Value Date    WBC 10.1 04/15/2022    RBC 4.02 04/15/2022    HGB 11.5 04/15/2022    HCT 35.6 04/15/2022    MCV 88.6 04/15/2022    MCH 28.6 04/15/2022    MCHC 32.3 04/15/2022    RDW 14.2 04/15/2022     04/15/2022    MPV 9.8 04/15/2022     BMP:    Lab Results   Component Value Date     04/15/2022    K 3.9 04/15/2022     04/15/2022    CO2 17 04/15/2022    BUN 17 04/15/2022    LABALBU 3.1 04/12/2022    CREATININE 1.0 04/15/2022    CALCIUM 9.5 04/15/2022    GFRAA >59 04/15/2022    LABGLOM >60 04/15/2022    GLUCOSE 89 04/15/2022     Assessment and plan:  81year old male with extensive medical and surgical history  1) SBO vs ileus vs obstipation: appears to be starting to move after contrast and irrigation of stoma. Having NGT output quite a bit as well, so will leave this for now, monitor output and recheck in am. With amount that came out as well as difficulty placing, would recommend not being in a hurry to remove. Dr. Shirley Garzon will be rounding over the weekend. 2) Other cares per medicine and urology  3) Had discussed earlier with Dr. Belinda Morales the possibility of PICC line and TPN- but with amount of output and feeling better, will hopefully be able to start PO soon.

## 2022-04-15 NOTE — PROGRESS NOTES
[facility]    Chief Complaint      Chief Complaint   Patient presents with    Annual Exam     Annual exam- pt also states she has a rash all over trunk of body- itchy, says her head itches; started yesterday- has taken benadryl       History of Present Illness      Neyda Claire is a 63 y.o. female with no chronic conditions who presents today for Annual Wellness visit and c/o a rash that began yesterday. She also reports chronic neck pain with limited ROM and numbness down her arms.     Past Medical History:  History reviewed. No pertinent past medical history.    Past Surgical History:   has no past surgical history on file.    Social History:  Social History     Tobacco Use    Smoking status: Current Every Day Smoker     Types: Vaping with nicotine    Smokeless tobacco: Never Used   Substance Use Topics    Drug use: Never       I personally reviewed all past medical, surgical, and social.     Review of Systems   Constitutional: Negative for chills, fatigue, fever and unexpected weight change.   HENT: Negative for sore throat and trouble swallowing.    Respiratory: Negative for shortness of breath.    Cardiovascular: Negative.    Gastrointestinal: Negative for abdominal pain, blood in stool, diarrhea, nausea and vomiting.   Genitourinary: Negative for dyspareunia and dysuria.   Musculoskeletal: Positive for neck pain and neck stiffness.   Skin: Positive for rash.   Neurological: Positive for numbness. Negative for headaches.        Medications:  Outpatient Encounter Medications as of 3/29/2022   Medication Sig Dispense Refill    albuterol (VENTOLIN HFA) 90 mcg/actuation inhaler Inhale 2 puffs into the lungs every 6 (six) hours as needed for Wheezing. Rescue 18 g 1    [DISCONTINUED] benzonatate (TESSALON) 100 MG capsule Take 1 capsule (100 mg total) by mouth 3 (three) times daily as needed for Cough. 30 capsule 0    [DISCONTINUED] naproxen sodium (ANAPROX) 550 MG tablet Take 1 tablet (550 mg total)  Urology Progress Note      SUBJECTIVE: Patient nausea and vomiting improved after NG tube placement    OBJECTIVE:      REVIEW OF SYSTEMS:   Review of Systems      Physical  VITALS:  BP (!) 130/90   Pulse 115   Temp 98.6 °F (37 °C) (Temporal)   Resp 18   Ht 5' 5\" (1.651 m)   Wt 170 lb (77.1 kg)   SpO2 98%   BMI 28.29 kg/m²   TEMPERATURE:  Current - Temp: 98.6 °F (37 °C); Max - Temp  Av.5 °F (36.4 °C)  Min: 96.6 °F (35.9 °C)  Max: 98.6 °F (37 °C)   24 HR I&O   Intake/Output Summary (Last 24 hours) at 4/15/2022 1158  Last data filed at 4/15/2022 0932  Gross per 24 hour   Intake 1460 ml   Output 2925 ml   Net -1465 ml     Urine output 1675    BACK: no tenderness in spine or flanks  ABDOMEN:  firm and distended  HEART:  normal rate and regular rhythm  CHEST: Normal respiratory effort  GENITAL/URINARY: Normal Mcgregor catheter placed    Data       CBC:   Recent Labs     22  0331 22  0236 04/15/22  0224   WBC 9.2 11.3* 10.1   HGB 10.1* 10.7* 11.5*   HCT 31.2* 34.3* 35.6*    343 359     BMP:    Recent Labs     22  0331 22  0236 04/15/22  0224   * 134* 137   K 4.9 4.4 3.9    103 103   CO2 14* 15* 17*   BUN 27* 22 17   CREATININE 1.1 1.0 1.0   GLUCOSE 141* 84 89       No results for input(s): LABURIN in the last 72 hours. No results for input(s): BC in the last 72 hours. No results for input(s): [place] in the last 72 hours. Radiology:      Imaging studies:      Narrative   EXAMINATION: XR ABDOMEN (KUB) (SINGLE AP VIEW) 4/15/2022 12:43 PM   HISTORY: Ileus versus small bowel obstruction. Abdominal pain. Report: Supine views of the abdomen were obtained. COMPARISON: KUB from 2022. The NG tube appears slightly pulled back, with a loop in the hiatal   hernia and the tip in the proximal cardia region.  There are persistent   distended gas-filled bowel loops mostly in the left upper quadrant in   a pattern suggestive persistent ileus, versus less likely "by mouth 2 (two) times daily with meals. 30 tablet 0    [DISCONTINUED] ondansetron (ZOFRAN) 4 MG tablet Take 1 tablet (4 mg total) by mouth every 6 (six) hours as needed for Nausea. (Patient not taking: Reported on 2022) 30 tablet 0    [DISCONTINUED] predniSONE (DELTASONE) 20 MG tablet Take 1 tablet (20 mg total) by mouth once daily. 5 tablet 0    [] dexamethasone injection 4 mg        No facility-administered encounter medications on file as of 3/29/2022.       Allergies:  Review of patient's allergies indicates:   Allergen Reactions    Sulfa (sulfonamide antibiotics)        Health Maintenance:  Immunization History   Administered Date(s) Administered    COVID-19, MRNA, LN-S, PF (MODERNA FULL 0.5 ML DOSE) 2021, 2021      Health Maintenance   Topic Date Due    Hepatitis C Screening  Never done    TETANUS VACCINE  Never done    Mammogram  Never done    Lipid Panel  2027        Physical Exam      Vital Signs  Temp: 98.1 °F (36.7 °C)  Temp src: Oral  Pulse: 76  SpO2: 97 %  BP: 124/82  BP Location: Left arm  Patient Position: Sitting  Height and Weight  Height: 5' 5" (165.1 cm)  Weight: 40.4 kg (89 lb)  BSA (Calculated - sq m): 1.36 sq meters  BMI (Calculated): 14.8  Weight in (lb) to have BMI = 25: 149.9]    Physical Exam  Vitals and nursing note reviewed.   Constitutional:       General: She is not in acute distress.     Appearance: She is well-developed.      Comments: Low BMI- 14.81   HENT:      Head: Normocephalic.      Right Ear: Hearing normal.      Left Ear: Hearing normal.      Nose: Nose normal.   Eyes:      General: Lids are normal.      Extraocular Movements: Extraocular movements intact.      Conjunctiva/sclera: Conjunctivae normal.      Pupils: Pupils are equal, round, and reactive to light.   Cardiovascular:      Rate and Rhythm: Normal rate and regular rhythm.      Heart sounds: Normal heart sounds.   Pulmonary:      Effort: Pulmonary effort is normal.      Breath " mechanical   obstruction. No free air seen on the supine images. An IVC filter is   present. There is a left-sided ureteral stent in good position. The   Gastrografin contrast symmetrically yesterday is too dilute to   adequately evaluate transit within the bowel. There coarse   calcifications in the pelvis which was shown on recent CT to be within   the presacral fat region, probably treatment related.       Impression   Slight retraction of the NG tube, the tip appears to be in   the proximal stomach, with a loop in the hiatal hernia. Continued   bowel dilation and probable adynamic ileus, versus mechanical small   bowel obstruction. No free air is seen on the supine images. No other   change. Signed by Dr Anika Stevenson.  Katherine           ASSESSMENT AND PLAN    Patient Active Problem List   Diagnosis    Thoracic facet joint syndrome    Primary osteoarthritis of left hip    Leg swelling    Abnormal nuclear cardiac imaging test    Abnormal nuclear cardiac imaging test    S/p bare metal coronary artery stent    Coronary artery disease involving native coronary artery of native heart without angina pectoris    Complex regional pain syndrome type 1 of right lower extremity    Hydronephrosis, bilateral    Extrinsic ureteral obstruction, bilateral    History of rectal cancer    Anemia of chronic disease    Pelvic mass    Lung nodules    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    Colorectal cancer ([address]    Metastasis from colon cancer ([address]    Proteinuria    Chemotherapy management, encounter for    Anemia associated with chemotherapy    Other fatigue    Thrush    Dehydration    Chemotherapy-induced peripheral neuropathy (HCC)    Chemotherapy-induced nausea    SBO (small bowel obstruction) (HCC)    Burning with urination    History of small bowel obstruction    Encounter for central line care    Iron deficiency    History of bladder cancer    Hydronephrosis of left kidney    Hydronephrosis of right kidney    Low back pain    Urothelial carcinoma of right distal ureter ([address]    Sepsis secondary to UTI ([address]    Acute kidney injury superimposed on CKD ([address]       1. Cath acquired UTI present on admission. Antibiotics antifungal tailored per Dr. Daylin Melendez with infectious disease. Discussed with him he felt that given the resistance profile of Candida glabrata it  may be difficult to clear because of low  antifungal levels in the urine without removing or changing foreign bodies in the urinary tract. We discussed Mcgregor catheter removal and changing his left ureteral stent. I think this is  Colonization and changing his stent is not likely to have a significant impact on whether he remains colonized with Candida as he will still require the stent. his stent seems to be functioning okay right now and this is low priority. I will discussed this with the patient and his family specifically his daughter Mumtaz to see if they want to do this sooner than all ready scheduled with the urologist at [facility]. 2.  Ileus versus bowel obstruction versus obstipation followed by general surgery. Discussed with Dr. Flex Ortiz hopefully will open up soon. 3.  Lower urinary tract symptoms and incontinence would leave Mcgregor catheter for now given his clinical situation. 4.  Nonmuscle invasive bladder cancer. He will need surveillance cystoscopy when clinical course allows. He is scheduled to have this done as outpatient with Dr. Korey Junior at [facility].    5.  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 Pulmonary nodules ? Mets? .  Patient is followed by oncology. He will need an FNA.     Kiara Muhammad MD sounds: Normal breath sounds.   Musculoskeletal:         General: Tenderness present.      Cervical back: Neck supple. Tenderness present. Decreased range of motion.   Skin:     General: Skin is warm and dry.      Findings: Rash present. Rash is macular.   Neurological:      Mental Status: She is alert and oriented to person, place, and time.      Gait: Gait is intact.   Psychiatric:         Behavior: Behavior is cooperative.        EXAMINATION:  XR CERVICAL SPINE AP LATERAL     CLINICAL HISTORY:  Cervicalgia     COMPARISON:  None.     FINDINGS:  There is an abnormal cervical curvature with mild kyphosis at the C5 through C7 levels.  Degenerative disc changes are present at C4-5 and C6-7 the manifested by anterior osteophytes.  Degenerative facet changes are present from C3 through C6 on the left and from C3 through C5 on the right.  No acute fractures are seen.     Impression:     There is an abnormal cervical curvature with degenerative changes at multiple levels.  MRI is recommended if there is concern for nerve root impingement or spinal stenosis.     [facility]        Electronically signed by: Aracely Parnell  Date:                                            03/29/2022  Time:                                           12:14  Laboratory:  CBC:      CMP:        Invalid input(s): CREATININ  LIPIDS:  Recent Labs   Lab 03/29/22  0919   HDL Cholesterol 71 H   Cholesterol 174   Triglycerides 60   LDL Calculated 91   Cholesterol/HDL Ratio (Risk Factor) 2.5   Non-     TSH:      A1C:        Assessment/Plan     Neyda Claire is a 63 y.o.female with:     1. Well adult exam    2. Screening for lipoid disorders  - Lipid Panel; Future  - Lipid Panel    3. Screening for diabetes mellitus  - Glucose, Fasting; Future  - Glucose, Fasting    4. Neck pain  - X-Ray Cervical Spine AP And Lateral; Future    5. Urticaria  - dexamethasone injection 4 mg    Patient is underweight- Discussed need to  increase Calorie intake daily by 500- 1000 cals/day; Recommend drinking Boost with meals  Recommend taking Zyrtec and Pepcid daily x5 days  Encourage to quit smoking         Total time spent face-to-face and non-face-to-face coordinating care for this encounter was: 30 min    Chronic conditions status updated as per HPI.  Other than changes above, cont current medications and maintain follow up with specialists.  Return to clinic 1 year.    SHEILA Hodges  [facility]

## 2022-04-15 NOTE — PROGRESS NOTES
Dr. sIela Salazar, the patients son-in-law has spoken with me and has asked if the patients HR has been elevated for very long. I checked the patients vitals and it looks like his HR has been elevated since 0800 yesterday morning. I have notified Dr. Ronn Eagle and she has ordered an EKG for the patient as well as restarted the patients home BP medications.

## 2022-04-16 NOTE — PROGRESS NOTES
Urology Progress Note      SUBJECTIVE: Patient feels better sitting up in chair. He did have some nausea recall quiring some Zofran but now having ileostomy output. His abdominal pain has improved. He actually was up walking with assistance in the longoria    OBJECTIVE:      REVIEW OF SYSTEMS:   Review of Systems      Physical  VITALS:  BP (!) 151/91   Pulse 115   Temp 97.7 °F (36.5 °C) (Temporal)   Resp 18   Ht 5' 5\" (1.651 m)   Wt 170 lb (77.1 kg)   SpO2 98%   BMI 28.29 kg/m²   TEMPERATURE:  Current - Temp: 97.7 °F (36.5 °C); Max - Temp  Av.7 °F (36.5 °C)  Min: 97.3 °F (36.3 °C)  Max: 98.2 °F (36.8 °C)   24 HR I&O   Intake/Output Summary (Last 24 hours) at 2022 1316  Last data filed at 2022 1000  Gross per 24 hour   Intake 1534 ml   Output 3400 ml   Net -1866 ml     BACK: no tenderness in spine or flanks  ABDOMEN:  firm, distended and non-tender  HEART:  normal rate and regular rhythm  CHEST: Normal respiratory effort  GENITAL/URINARY: Normal urine draining clear    Data       CBC:   Recent Labs     22  0236 04/15/22  0224 22  0429   WBC 11.3* 10.1 8.5   HGB 10.7* 11.5* 10.0*   HCT 34.3* 35.6* 31.0*    359 330     BMP:    Recent Labs     22  0236 04/15/22  0224 22  0429   * 137 140   K 4.4 3.9 3.7    103 104   CO2 15* 17* 18*   BUN 22 17 18   CREATININE 1.0 1.0 1.0   GLUCOSE 84 89 69*       No results for input(s): LABURIN in the last 72 hours. No results for input(s): BC in the last 72 hours. No results for input(s): Juan Jose Amanday in the last 72 hours.       ASSESSMENT AND PLAN    Patient Active Problem List   Diagnosis    Thoracic facet joint syndrome    Primary osteoarthritis of left hip    Leg swelling    Abnormal nuclear cardiac imaging test    Abnormal nuclear cardiac imaging test    S/p bare metal coronary artery stent    Coronary artery disease involving native coronary artery of native heart without angina pectoris    Complex regional pain syndrome type 1 of right lower extremity    Hydronephrosis, bilateral    Extrinsic ureteral obstruction, bilateral    History of rectal cancer    Anemia of chronic disease    Pelvic mass    Lung nodules    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    Colorectal cancer ([address]    Metastasis from colon cancer ([address]    Proteinuria    Chemotherapy management, encounter for    Anemia associated with chemotherapy    Other fatigue    Thrush    Dehydration    Chemotherapy-induced peripheral neuropathy (HCC)    Chemotherapy-induced nausea    SBO (small bowel obstruction) (HCC)    Burning with urination    History of small bowel obstruction    Encounter for central line care    Iron deficiency    History of bladder cancer    Hydronephrosis of left kidney    Hydronephrosis of right kidney    Low back pain    Urothelial carcinoma of right distal ureter ([address]    Sepsis secondary to UTI ([address]    Acute kidney injury superimposed on CKD ([address]       1. Cath acquired UTI present on admission. Antibiotic/antifungal per infectious disease. We will plan to remove Mcgregor catheter in a.m. We will see how the patient does over the next few days if continues to progress positively can arrange for stent change. 2.  Ileus versus obstipation versus bowel obstruction. He now is having ileostomy output with less abdominal pain still some nausea but is taking some p.o.? Nutrition? Followed by general surgery    3. Lower urinary tract symptoms and incontinence. Since he seems to have resolution of his GI issue and he is up out of bed now we will go ahead and remove Mcgregor catheter. Protective undergarment as needed close monitoring of skin integrity. 4.  Nonmuscle invasive bladder cancer.   We will plan surveillance cystoscopy and any intervention needed as clinical course allows when we schedule him for stent change otherwise he is scheduled to have this done in mid May by Dr. Jorge Schilder at [facility].  Discussed with patient son-in-law. If he progresses positively and continues to do well and his condition improves we can plan to do this sooner. 5.  High-grade invasive T2 urothelial carcinoma right ureter status post right nephroureterectomy. Pulmonary nodules questionable metastasis patient is followed by oncology.   The plan at some point to proceed with FNA pulmonary nodule    Brentford Brochure MD

## 2022-04-16 NOTE — PROGRESS NOTES
Subjective:  Mr. Nancy Cardona is feeling some better today. Significant amount of ostomy output overnight and this morning. Is now feeling the bag regularly. Denies nausea or vomiting or pain. Objective:  BP (!) 151/91   Pulse 115   Temp 97.7 °F (36.5 °C) (Temporal)   Resp 18   Ht 5' 5\" (1.651 m)   Wt 170 lb (77.1 kg)   SpO2 98%   BMI 28.29 kg/m²   General: NAD, AAO  Chest: regular, non-labored  Abdomen: Soft, NT, stoma appliance filled with liquids green stool    CBC:   Lab Results   Component Value Date    WBC 8.5 04/16/2022    RBC 3.49 04/16/2022    HGB 10.0 04/16/2022    HCT 31.0 04/16/2022    MCV 88.8 04/16/2022    MCH 28.7 04/16/2022    MCHC 32.3 04/16/2022    RDW 14.0 04/16/2022     04/16/2022    MPV 9.7 04/16/2022     BMP:    Lab Results   Component Value Date     04/16/2022    K 3.7 04/16/2022     04/16/2022    CO2 18 04/16/2022    BUN 18 04/16/2022    LABALBU 3.1 04/12/2022    CREATININE 1.0 04/16/2022    CALCIUM 9.0 04/16/2022    GFRAA >59 04/16/2022    LABGLOM >60 04/16/2022    GLUCOSE 69 04/16/2022     Assessment and plan:  81year old male with extensive medical and surgical history  1) SBO vs ileus vs obstipation: Better this AM.  Large amounts of output throughout the night. Denies nausea or vomiting. NG tube removed this a.m. and clear liquid diet started.   2) Other cares per medicine and urology

## 2022-04-16 NOTE — PROGRESS NOTES
1           Progress Note    Patient name: Ratna Mckeon  Patient : 1950  MR #[id number]  Room: 5    Portions of this note have been copied forward, however, changed to reflect the most current clinical status of this patient. Subjective: Dr. Kole Rousseau at bedside and removed NG tube. Tolerating liquids without difficulty. Ostomy producing output well. Continues to have significant pain to right knee. HISTORY OF PRESENT ILLNESS:  Stephanie Patel is a very pleasant 81years old male who has a diagnosis of stage IV colonic adenocarcinoma. He was receiving palliative chemotherapy after 2021. He was found to have high-grade urothelial carcinoma involving the ureter. He underwent surgery, nephroureterectomy at [facility].  He had a complicated postoperative course. He eventually recovered and his current receiving home care needs at home. He has also several other comorbidities include CAD, hypertension, hyperlipidemia and CKD stage III. The patient presented ER department with complaints of generalized malaise for the last several days, low-grade fever, nausea. Decreased urine output. Patient has a ostomy in place. Work-up in the emergent showed moderate hyponatremia sodium 127, mild elevated lactic acid, elevated creatinine 2.4 (baseline's 1.5-1.7). He also had neutrophil leukocytosis and positive nitrates and large blood on the urine analysis. Chest x-ray was abnormal and therefore CT chest was performed that showed evidence of metastatic disease to the lungs. Patient received IV fluid resuscitation the emergency. He was started on broad-spectrum antibiotics. He was admitted with consultation from me and also ID.  4/10/2022-CT abdomen/pelvis without contrast showed evidence of metastatic disease to the lung bases. Moderate size hiatal hernia with gastroesophageal reflux. Status post partial colectomy. Left lower quadrant ostomy is present. No evidence obstruction. Irregular soft tissue mass with some calcification the pelvis. This demonstrated interval increase was potential involvement with the right posterior lateral urinary bladder wall. The mass measures 8 x 2.9 cm. Left-sided double J intraureteral stent is in place with stent well positioned. 4/10/2022-CT chest without contrast showed extensive metastatic disease is noted to the lungs showing progression from the previous examination with multiple new nodules present as well as interval increase in size of previously documented nodules. Reference nodule within the superior segment of the right lower lobe previously measured at 5 mm in size now measures 13 mm in size. A lesion within the medial right lower lobe now measuring 1.6 cm in long axis previously measured 1.1 cm. There is no evidence of acute consolidative pneumonia. Essentially, findings consistent with progressive metastatic disease to the lungs. There are too numerous to count pulmonary nodules the majority of which have increased in size or which are new from the previous study. 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 Prior oncological history  ONCOLOGIC HISTORY:   Diagnosis:  Moderately differentiated rectal carcinoma, T3N0Mx, diagnosed in 3/9/2009  Noninvasive high-grade papillary urethral carcinoma. Negative for evidence of detrusor muscle invasion, pTa, pNx on 8/18/2019. Metastatic colorectal carcinoma, 9/3/2019  MSI stable and mutations for BRAF, NRAS, KRAS were not detected. Recurrent bladder cancer- superficial   Urothelial carcinoma of the ureter     TREATMENT SUMMARIES:  4/9/2009-5/27/2009-received neoadjuvant chemotherapy with 5-FU CIV along with radiation therapy for a total of 5400 cG  7/15/2009-rectum resection revealed no residual malignancy, complete pathological response.   8/18/2019- transurethral resection of bladder tumor (TURBT)   9/18/2019-12/26/2019 palliative chemotherapy with modified FOLFOX 7  (Oxaliplatin 85 mg/m² IV day 1, leucovorin 400 mg/m² IV day 1 and 5-FU 2400 mg/m² IV continuous infusion over 46 to 48 hours for a total of 7 cycles. 1/28/2020 -palliative maintenance therapy with leucovorin 400 mg/m² IV over 2 hours on day 1, followed by 5-FU bolus 400 mg/m² and then 1200 mg/m²/day x2 days (total 2400 mg meter squared over 46 to 48 hours) continuous infusion. Repeat every 2 weeks. ONCOLOGIC HISTORY # NMIBC_Bladder cancer. Harsha Nascimento was diagnosed with noninvasive urethral carcinoma, pTa, pNx on 8/18/2019. Ta tumors are papillary lesions that tend to recur but are relatively benign and generally do not invade the bladder. Adjuvant treatment is not warranted at this time and will be monitored closely. Biopsy and transurethral resection of bladder tumor (TURBT) on 8/18/2019 by Dr. Merry Presley with pathology revealing noninvasive high-grade papillary urethral carcinoma. Negative for evidence of detrusor muscle invasion, pTa, pNx.   12/3/2019- cystoscopy with removal and replacement of double-J urethral stent. Pathology from dome of the bladder/tumor revealed high-grade papillary urethral carcinoma, noninvasive. No muscularis propria present. 2/26/2020 - cystoscopy and bilateral ureteral stent removal and replacement. The operative note by Dr. Armani Elliott documented bilateral hydronephrosis and obtained biopsy of the bladder in the mid dome and left anterior lateral wall x2. Pathology documented high-grade papillary urethral carcinoma, noninvasive, stage pTaNx.  5/28/2020-the patient underwent a cystoscopy and resection of bladder tumor on 05/28/2020 with findings consistent with noninvasive, high-grade papillary urothelial carcinoma. Muscularis propria was not identified. The patient will receive intravesical BCG therapy.   7/6/2020-CT Abdomen/ Pelvis-Moderate severe right and mild left hydronephrosis with bilateral ureteral stents, which have an adequate radiographic position. Right kidney with cortical thickening and somewhat asymmetrically decreased enhancement which can be seen with obstructive uropathy. Postoperative changes of colectomy. Left lower quadrant ostomy. Slightly decreased size of presacral low density compared to4/15/2020. Similar intrahepatic and extrahepatic bile duct dilation compared to 4/15/2020. Correlate with clinical symptoms and laboratory studies if clinically indicated. Similar chronic bony findings. 3/25/2021-CT abdomen showed severe hydronephrosis. Cystoscopy was performed by urology. 4/1/21 Bladder neck tumor, biopsies: High-grade papillary urothelial carcinoma, noninvasive. Muscularis propria is not present. AJCC pathologic stage:  pTa Nx   4/14/2021-reviewed results of pathology. No evidence of invasive disease. Continue surveillance cystoscopy with urology. 9/13/2021-he was seen by urology. Findings of ureteral high-grade urothelial carcinoma. Noninvasive. The [facility] is [facility] in [place]. 10/11/2021-he was seen by [facility] and recommended cystoscopy with biopsy and possible laser ablation and bilateral leg stent exchange at that time. 4/10/2022-CT abdomen/pelvis without contrast showed evidence of metastatic disease to the lung bases. Moderate size hiatal hernia with gastroesophageal reflux. Status post partial colectomy. Left lower quadrant ostomy is present. No evidence obstruction. Irregular soft tissue mass with some calcification the pelvis. This demonstrated interval increase was potential involvement with the right posterior lateral urinary bladder wall. The mass measures 8 x 2.9 cm. Left-sided double J intraureteral stent is in place with stent well positioned.   4/10/2022-CT chest without contrast showed extensive metastatic disease is noted to the lungs showing progression from the previous examination with multiple new nodules present as well as interval increase in size of previously documented nodules. Reference nodule within the superior segment of the right lower lobe previously measured at 5 mm in size now measures 13 mm in size. A lesion within the medial right lower lobe now measuring 1.6 cm in long axis previously measured 1.1 cm. There is no evidence of acute consolidative pneumonia. Essentially, findings consistent with progressive metastatic disease to the lungs. There are too numerous to count pulmonary nodules the majority of which have increased in size or which are new from the previous study. 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 ONCOLOGIC HISTORY #3  Vito Driver was seen in initial oncology consultation on 8/19/2019 during his hospitalization at [facility] [address] [facility] after a large pelvic mass was identified which raised concern for recurrent disease. Further pathology consistent with recurrent rectal cancer  8/17/2019- CEA 18.1  8/17/2019- CT scan of the kidney with contrast documented moderate to severe right hydronephrosis with dilation of the right ureter into the lower pelvis the site of the parasacral soft tissue changes. Partially calcified soft tissue changes within the janes-sacral region likely representing sequelae of pelvic radiation. Increasing scarring/fibrosis versus tumor recurrence within the presacral changes, likely represents a site of right distal ureter obstruction. No left-sided hydronephrosis. 8/18/2019 -Double-J ureter stent placement for right hydronephrosis secondary to extrinsic compression by pelvic mass. 8/27/2019-CT scan of the chest with contrast documented numerous pulmonary nodules that appear new compared to 11/12/2017, RUL nodule measuring 7 mm and LLL nodule measuring 5 mm. Soft tissue nodule at the left ventral abdominal wall. Slight increased size of a probable lymph node anterior to the aorta measuring 0.9 cm compared to 0.7 cm.   Similar presacral, right pelvic sidewall and right abductor muscular nodular soft tissue density. 8/27/2019 CT scan of the abdomen and pelvis with contrast identified new moderate left hydronephrosis with moderate right hydronephrosis. Mild stranding around the urinary bladder and thickening of the bilateral ureteral wall. Numerous pulmonary nodules. Soft tissue of the left ventral abdominal wall. Slightly increased size of probable lymph node anterior to the aorta measuring 0.9 cm compared to 0.7 cm.  8/27/2019-PET scan did not identify any FDG avid pulmonary nodules or airspace opacities. Abnormal increased metabolic activity within the right pelvic wall soft tissue showing SUV of 5.4. Abnormal soft tissue metabolic activity in the right abductor muscle with SUV of 6.4. Focally increased activity to the right of the inferior L5 vertebrae body posterior with SUV of 7.9 with associated sclerotic changes. 8/29/2019-  Dr. May Zavala completed a cystoscopy with double-J ureter stent in the left ureter for left hydronephrosis  9/3/2019- CT-guided right abductor muscle biopsy on 9/3/2019 with pathology identifying metastatic adenocarcinoma consistent with colorectal origin. Molecular panel from biopsy tissue revealed MSI stable and mutations for BRAF, NRAS, KRAS were not detected. 9/18/2019 - Palliative chemotherapy with modified FOLFOX 7  (Oxaliplatin 85 mg/m² IV day 1, leucovorin 400 mg/m² IV day 1 and 5-FU 2400 mg/m² IV continuous infusion over 46 to 48 hours) with the anticipation of adding Avastin 5 mg/kg day 1 every 14 days  10/15/2019- 24-hour urine for protein with a total protein of 1785 mg per 24-hour. Suri Jayeshs has been evaluated by Dr. Elizabeth Todd and he reports no significant concerns related to the protein.   11/6/19 CEA 5.6 significantly improved compared to CEA of 14.0 on 8/30/2019.  11/15/2019 -CT scan of the abdomen and pelvis documented no evidence of disease progression with significant decrease in the size of enhancing nodules in the right pelvic abductor musculature, a previous 1.8 cm nodule now measures 5 mm. No new or enlarging retroperitoneal, mesenteric, pelvic or inguinal lymph nodes. Calcified presacral mass unchanged measuring 5 x 3.7 cm.  11/15/2019 -CT scan of the chest documented multiple small pulmonary nodules reidentified, largest nodule in the RUL measures 5 mm compared to 7.5 mm, RLL nodule measures 3.4 mm compared to 5.9 mm, VIC nodule measures 4 mm compared to 6 mm. A cluster of small nodules in the RUL anteriorly are barely visible on this study. There is a decrease in size of mediastinal lymph nodes compared to previous exam, right distal paratracheal lymph node measuring 4.5 mm compared to 8.3 mm and lower right peritracheal node measuring 4.5 mm compared to 8.6 mm.    1/13/2020- CT scan of the abdomen and pelvis with contrast indicated improvement in the right-sided hydronephrosis with a chronic inflammatory process of the ureters suspected due to the moderate thickening, also present on previous study. The small poorly enhancing nodules in the right abductor muscles have decreased in the partially calcified presacral mass and right lateral pelvic wall nodules are stable compared to previous study. Resolution of the subcutaneous abdominal wall nodules. A prominent retroperitoneal lymph node adjacent and anterior to the left common artery is redefined and measures 6 mm, no change from previous exam.   1/13/2020 - CT scan of the chest documented a right lower lobe nodule measures 4.3 cm and is unchanged. A right lower lobe nodule measures 2.8 mm compared to 3.4 mm. Nodule in the right upper lobe is barely visible and measures 2.4 mm. Nodule in the left lower lobe measures 4.8 mm and is unchanged. Nodule in left lower lobe posterior measures 2.8 mm and previously measured 4.7 mm. A right lower lobe posterior medially nodule is barely visible measuring 0.2 mm and previously. measured 4.5 mm.   No new nodules identified. No change in the size of the mediastinal lymph nodes. 1/28/2020 -palliative maintenance therapy with leucovorin 400 mg/m² IV over 2 hours on day 1, followed by 5-FU bolus 400 mg/m² and then 1200 mg/m²/day x2 days (total 2400 mg meter squared over 46 to 48 hours) continuous infusion. Repeat every 2 weeks. Only received 1 cycle, further treatment held due to small bowel obstruction. 1/30/2020 - CT scan of the abdomen and pelvis indicated high-grade small bowel obstruction with transition point in the midline posterior pelvis where a small bowel loop is tethered to a partially calcified presacral soft tissue mass. 2/11/2020-CEA 1.4  3/5/2020-  Exploratory laparotomy, removal of adhesions, small bowel resection with primary anastomosis and partial thickness small bowel repair by Dr. Christopher Perkins at [facility]. In the operative note Dr. Dutch Renee reported no evidence of carcinomatosis within the abdomen and the liver was unable to be examined due to extent of right upper quadrant adhesions. Pathology from small intestine documented no evidence of malignancy. 4/15/2020 Ct Chest W Contrast Minimal interval increase in size of subcentimeter pulmonary nodules. The largest now measures 6 mm in the medial right lower lobe on axial image 80. There is a new, unstable, horizontal fracture through the T6 vertebral body. Additionally, there are new fractures through the posterior 11th and 12th right ribs. The bones are moderately osteopenic. The finding of an unstable fracture through the T6 vertebral body was discussed with Ana Mercedes at 10:45 AM on 4/15/2020.   4/15/2020 Ct Abdomen Pelvis W Iv Contrast  Patient has undergone interval resection of the distal small bowel, and there is a 2.8 cm fluid collection in the presacral operative bed. This contains a tiny focus of air. This may postoperative or due to infection. Please correlate with the patient's clinical symptoms and laboratory markers.  Improved hydronephrosis and hydroureter. Diffuse osteoporosis. Findings in the lower chest are described in a separate dictation. 4/22/2020-CEA 0.9  6/2/2020-resumed chemotherapy with 5-FU/leucovorin and Panitumumab. Okay to do 1 today then CMP CEA   8/19/20 CEA-1.1  10/21/2020- CEA 2.0  11/11/2020- Ct Chest W Contrast Multiple, too numerous to count, small noncalcified lung nodules bilaterally. The referenced nodules appears to have decreased in size the previous study. No new nodules. 11/11/2020- Ct Abdomen Pelvis W Contrast Unchanged bilateral hydronephrosis, more on the right side. Bilateral ureteral stents in place. Moderate asymmetrical thickening of the incompletely distended urinary bladder. This may partly be due to incomplete distention. Possibility of chronic cystitis and or chronic partial outlet obstruction may not be excluded. A functioning left lower abdominal ostomy. A small parastomal small bowel herniation without obstruction. A partially calcified presacral mass. The soft tissue component have increased in size in the previous study. The osseous changes are described above. Any superimposed metastatic disease is not excluded and would be hard to evaluate due to extensive postsurgical changes. 11/18/2020-essentially, overall stable disease. Improvement of the lung nodules with decreased in the size of the target lesions. The pelvic lesion is is likely worse by 25%. However, CEA is is still within the normal limits. Therefore likely mixed response. We will continue current treatment and repeat CT scans in about 3 months. 12/16/2020-discontinue 5-FU bolus from his regimen. 2/9/2021- Ct Chest W Contrast No evidence of disease progression. Stable pulmonary nodules. Stable intrahepatic and extrahepatic bile duct dilation compared to prior 11/11/2020. Postoperative, posttraumatic and degenerative changes in the spine as described above. Old right-sided rib fractures.    2/9/2021- Ct Abdomen Pelvis W Contrast showed evidence of response to therapy including decreased presacral mass/thickening now measuring 1.1 cm, previously 1.9 cm on 11/11/2020. Stable intrahepatic and extra hepatic bile duct dilation with cholecystectomy clips. See separately dictated CT chest of the same day regarding pulmonary nodules. Bilateral ureteral stents. Decreased bilateral hydronephrosis. Urinary bladder wall is thickened, which could be seen with post treatment changes or cystitis. Correlate with symptoms. Chronic bony findings as above  2/17/2021-reviewed CT chest abdomen pelvis. Essentially consistent with disease response to therapy. Continue current therapy. 2/17/21 CEA 1.0  3/25/21 Ct Abdomen Pelvis W Iv Contrast  The stomach is distended, however no small bowel dilatation identified. Contrast identified within the left ileostomy bag. The distal stomach is under distended which may be secondary to peristalsis. Gastroparesis considered. Bilateral ureteral stents remain appropriate in position, however there is new moderate to severe right-sided hydronephrosis and mild left-sided hydronephrosis when compared to the 2/9/2021 exam. Mild increase considered within the partially calcified presacral pelvic mass. Stable partially calcified right pelvic soft tissue. Similar abnormal wall thickening of the bladder most notable superiorly. Similar prominence of the intra and extra hepatic bile ducts down to the level of the ampulla. Findings may represent a reservoir effect, however correlation with liver function tests recommended. Therefore. Stable noncalcified left greater than right pulmonary nodules with asymmetrical interstitial changes of the right lung base concerning for pneumonitis. Osteopenia with postoperative changes of the lumbar spine. Chronic compression deformities of the thoracolumbar vertebra as described above.    4/14/2021-I reviewed notes from urology and also CT abdomen/pelvis that showed mild interval increase in the size of the soft tissue nodule in the pelvis. However, this is still very small and therefore will have a short follow-up CT scan and of May 2021. I personally reviewed the CT scans. Really hard to state that there is clear-cut disease progression. Again, short follow-up recommended. Continue current treatment. 5/12/21 CEA 0.8  5/26/2001-CT chest abdomen pelvis showed Partially calcified presacral soft tissue mass appears unchanged. Previously measured soft tissue noncalcified component is unchanged measuring 2.2 x 3.2 cm on axial image 60. However, this is questionable. CT chest showed a stable pulmonary nodules. Subcentimeter nodules. Largest 7.5 mm.  6/1/21 CEA 0.9  06/01/23- reviewed scans. Stable disease. Continue treatment every 3 weeks as per patient request. Continue infusional 5-FU, Panitumumab and leucovorin. No 5-FU bolus due to severe mucositis. 8/11/2021 CEA .9  9/03/2021 CT Chest w/Contrast Slight interval increase in the size of pulmonary nodules, the largest in the medial right lung base. Findings are concerning for metastatic disease. Atherosclerosis of the aorta and coronary arteries. Sclerotic rib lesions favored for osseous metastases. Old rib fractures also evident. 9/03/2021 CT Abd/Pelvis w/ IV Contrast (Oral) A persistent partially calcified mass in the presacral region with encasement of the distal ureters bilaterally and resultant bilateral hydronephrosis. Bilateral ureteral stents in place. There is increasing right-sided hydronephrosis since the previous study. Right renal atrophy similar to the previous study. Moderate asymmetrical thickening of the incompletely distended urinary bladder is similar to the previous study. This may partly be due to incomplete distention. An inflammatory process or a neoplastic process is not excluded. Moderate intrahepatic biliary dilatation is similar to the previous study and probably due to a prior cholecystectomy.  Moderate dilatation of the contrast filled small bowel loops may represent an ileus or partial distal small bowel obstruction. There is left lower abdominal ileostomy which appears patent. The stable compression fractures of the lower thoracic and proximal lumbar vertebrae and hardware fusion similar to the previous study. 9/22/21- Decrease Vectibix to every other treatment. He is currently receiving chemotherapy every 3 weeks as per patient request      ONCOLOGIC HISTORY # Rectal carcinoma:  Yamila Jones has a history of moderately differentiated rectal carcinoma, T3N0Mx, diagnosed in 3/9/2009. He received neoadjuvant chemotherapy with radiation and resection with J-pouch. He has been routinely followed at [facility] and was last seen by Dr. Joaquim Childers on 1/10/2019. The following are pertinent findings related to his diagnosis and treatment. 3/9/2009- Esophagogastroduodenoscopy with biopsy by Dr. Luisa Bianchi that revealed rectal mass at 8 cm with pathology being consistent with moderately differentiated adenocarcinoma. 3/18/2019-Dr.Elizabeth Dre Lerma at [facility] performed a flexible sigmoidoscopy and rectal endoscopic ultrasound that revealed the tumor extending 6-7 mm deep through the muscularis propria layer and into the serosa, T3 lesion. 4/9/2009-5/27/2009-received neoadjuvant chemotherapy with 5-FU CIV along with radiation therapy for a total of 5400 cGy under the direction of Dr. Woodrow Brito. 7/15/2009-rectum resection revealed no residual malignancy. 22 regional nodes were negative for malignancy. The rectum distal doughnut was negative for malignancy. He was documented to have a complete pathological response. 9/9/2009- Ileostomy excision pathology revealed small bowel wall with changes consistent with ileostomy site.   Pathology negative for malignancy    Objective   PHYSICAL EXAM:  CONSTITUTIONAL: Alert, appropriate, no acute distress, appears chronically ill  EYES: Non icteric, EOM intact, pupils equal round   ENT: Mucus membranes moist, external inspection of ears and nose are normal  NECK: Supple, no masses. No JVD  CHEST/LUNGS: CTA bilaterally, normal respiratory effort   CARDIOVASCULAR: RRR. No lower extremity edema  ABDOMEN: soft non-tender, active bowel sounds. Left abdominal ileostomy with great output  EXTREMITIES: warm, moves extremities. Gait not observed. SKIN: warm, dry with no rashes or lesions  LYMPH: No cervical, clavicular, or axillary lymphadenopathy  NEUROLOGIC: follows commands, non focal   PSYCH: mood and affect appropriate. Alert and oriented to time, place, person    Vital Signs  /86   Pulse 116   Temp 97.3 °F (36.3 °C) (Temporal)   Resp 17   Ht 5' 5\" (1.651 m)   Wt 170 lb (77.1 kg)   SpO2 98%   BMI 28.29 kg/m²     Intake/Output Summary (Last 24 hours) at 4/16/2022 0751  Last data filed at 4/16/2022 0327  Gross per 24 hour   Intake 1534 ml   Output 3500 ml   Net -1966 ml     Labs:  CBC:   Recent Labs     04/14/22  0236 04/15/22  0224 04/16/22  0429   WBC 11.3* 10.1 8.5   HGB 10.7* 11.5* 10.0*    359 330     CMP:   Recent Labs     04/14/22  0236 04/15/22  0224 04/16/22  0429   GLUCOSE 84 89 69*   BUN 22 17 18   CREATININE 1.0 1.0 1.0   CO2 15* 17* 18*   CALCIUM 9.2 9.5 9.0       Blood Culture Recent:   Recent Labs     04/10/22  0958   BC No growth after 5 days of incubation. 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 MSI stable and negative mutations for BRAF, NRAS, KRAS wild type.     09/22/21- CEA 0.6  Continue palliative intent 5-FU/leucovorin every 3 weeks as per patient request. Panitumumab on hold as per patient request (significant toxicity with the skin toxicity and mouth sores). Not a good candidate for Avastin due to frequent exchange of ureteral stents and hematuria. 4/10/2022-CT abdomen/pelvis without contrast showed evidence of metastatic disease to the lung bases.   Moderate size hiatal hernia with gastroesophageal reflux. Status post partial colectomy. Left lower quadrant ostomy is present. No evidence obstruction. Irregular soft tissue mass with some calcification the pelvis. This demonstrated interval increase was potential involvement with the right posterior lateral urinary bladder wall. The mass measures 8 x 2.9 cm. Left-sided double J intraureteral stent is in place with stent well positioned. 4/10/2022-CT chest without contrast showed extensive metastatic disease is noted to the lungs showing progression from the previous examination with multiple new nodules present as well as interval increase in size of previously documented nodules. Reference nodule within the superior segment of the right lower lobe previously measured at 5 mm in size now measures 13 mm in size. A lesion within the medial right lower lobe now measuring 1.6 cm in long axis previously measured 1.1 cm. There is no evidence of acute consolidative pneumonia. Essentially, findings consistent with progressive metastatic disease to the lungs. There are too numerous to count pulmonary nodules the majority of which have increased in size or which are new from the previous study. 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 The operative note by Dr. Belinda Morales documented bilateral hydronephrosis and obtained biopsy of the bladder in the mid dome and left anterior lateral wall x2. Pathology documented high-grade papillary urethral carcinoma, noninvasive, stage pTaNx.   As per Urology    5/28/2020-the patient underwent a cystoscopy and resection of bladder tumor on 05/28/2020 with findings consistent with noninvasive, high-grade papillary urothelial carcinoma. Muscularis propria was not identified. Currently receiving intravesical BCG.  4/1/2021-repeat cystoscopy showed a small bladder lesion. Tumor resection of bladder tumor consistent with noninvasive high-grade urothelial carcinoma. 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 [facility]  10/14/2021-urology at [facility]. Urine cytology consistent with atypical urothelial cells. Since the patient was last seen by Dr. Sarabjit Mcfarland November 2021 he underwent a major resection of the high-grade urothelial carcinoma of the ureter at [facility].  His postoperative course was complicated and he was hospitalized for many weeks. He eventually was discharged and is currently receiving home care at home. He is still very debilitated. -CT findings from 4/10/2022 concerning for metastatic disease, as noted above  -Urology, Dr. Virginia Haji following    Ileus  4/13/2022-CT abdomen/pelvis showed  When compared to the previous exam of 3 days earlier there is now  noted to be moderate small bowel distention. I would favor a adynamic ileus. A distal obstruction at the site of the patient's ileostomy is also in the differential but felt less likely. There is mild distention of the stomach. A moderate size hiatal hernia is present. Mild to moderate dilatation of the upper tracts of the left kidney. A left-sided double-J ureteral stent is in place. There is mild urothelial thickening. I do not see evidence of a discrete ureteral stone. The stent is well-positioned. Partially calcified pelvic mass. This is inseparable from the right posterior lateral wall of the urinary bladder along its lower margin. A Mcgregor catheter is in place within the bladder.    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 There is a small amount of free fluid within the subhepatic space and Morison space. A small right-sided effusion is present with multiple pulmonary nodules within the lower lobes consistent with metastatic disease to the lungs. There is a moderate size hiatal hernia present. There is a small volume of retained contrast within stomach and dilated proximal small bowel loops. There are dilated mid to distal small bowel loops without contrast.   Followed and managed by general surgery  -NG removed this morning, 4/16/2022 and tolerating clear liquids    Normocytic anemia-stable  Hgb 10.0,MCV 88.8    Labs 4/12/2022:  Iron: 77, TIBC 233, iron saturation 33%, ferritin 589  Vitamin B12: 308  Folate: 13.89    Osteoporosis-Osteoporosis BMD -3.6 April 2020.   -Continue Vit D, Boniva and Calcium. Acute kidney injury/CKD stage III-improved  Cr 1.0    Hyponatremia-Resolved      UTI  -Currently on Anidulafungin/Levaquin by mouth    PLAN:  Continue current supportive care  UTI - anidulafungin/Levofloxacin therapy as per ID  CT guided FNA of pulmonary nodule previously scheduled, delayed due to possible ileus  Ileus peers to be resolving, tolerating clears and anticipate advancing diet as tolerated, managed by GS      (Please note that portions of this note were completed with a voice recognition program. Efforts were made to edit the dictations but occasionally words are mis-transcribed.)    Kely Armando, OLGA  04/16/22  7:51 AM  Physician's attestation/substantial contribution:  I, Dr Maryjo Barth, independently performed an evaluation on Elio Leyva. I have reviewed relevant medical information/data to include but not limited to medication list, relevant appropriate labs and imaging when applicable. I reviewed other physician's notes, ancillary services and nurses assessment.  I have reviewed the above documentation completed by the Nurse Practitioner or Physician Assistant. Please see my additional contributions to the history of present illness, physical examination, and assessment/medical decision-making that reflect my findings and impressions. I have seen and examined the patient and the key elements of all parts of the encounter have been performed by me. I agree with the assessment and plan as outlined by the ARNP/PA.   Subjective-new complaints  Objective-no change  Assessment/plan:  CT-guided pulmonary nodules next Monday    Orpha Kanner, MD
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
## 2022-04-16 NOTE — PROGRESS NOTES
The patients blood glucose level is 69. The patient is now on a clear liquid diet. He has been given apple juice and has 4 times daily POCT glucose checks. The patient remains in his chair in his room with his call light in reach.

## 2022-04-16 NOTE — PROGRESS NOTES
HOSPITAL MEDICINE  - PROGRESS NOTE    Admit Date: 4/10/2022         CC: fever,nausea,decreased urine output       Subjective: Symptoms improved, noted increase in ostomy output. No nausea or vomiting. NGT removed today            81 y.o. male with recent right nephrectomy, recent ureteral stents, metastatic urothelial carcinoma, history of colorectal cancer with ostomy in place, presented with fever and nausea decreased urine output. On the admission denied problems with ostomy output. He was found to have UTI with culture speciated Candida glabrata and Achromobacter, ID consulted, treated with Eraxis and Levaquin. CT Chest consistent with progressive metastatic disease to the lungs. CT abdomen irregular soft tissue mass with some calcification within the pelvis. Followed by oncology and urology during hospital course. He was scheduled for pulmonary nodule biopsy by radiology but radiologist was concerned with small bowel distention per KUB, repeat CT abdomen showed ileus, pt did not have any ostomy output, he developed N/V/abd pain- NGT was placed. Evaluated by general surgery. Ileus subsequently resolved with removal of NG tube 4/16. Patient worked with PT and approved for [facility] inpatient rehab pending medical stability.       ROS  14 point review of systems completed and is negative except as otherwise noted      Data:   Scheduled Meds: Reviewed  Current Facility-Administered Medications   Medication Dose Route Frequency Provider Last Rate Last Admin    dextrose bolus (hypoglycemia) 10% 125 mL  125 mL IntraVENous PRN Patience Holly, DO        Or    dextrose bolus (hypoglycemia) 10% 250 mL  250 mL IntraVENous PRN Patience Holly, DO        sodium bicarbonate 50 mEq in sodium chloride 0.9 % 1,000 mL infusion   IntraVENous Continuous Khushi Yancey MD 75 mL/hr at 04/16/22 1517 New Bag at 04/16/22 1517    metoprolol succinate (TOPROL XL) extended release tablet 25 mg  25 mg Oral Daily Dk Dawson MD   25 mg at 04/16/22 9139    And    hydroCHLOROthiazide (HYDRODIURIL) tablet 6.25 mg  6.25 mg Oral Daily Dk Dawson MD   6.25 mg at 04/16/22 0917    enoxaparin (LOVENOX) injection 40 mg  40 mg SubCUTAneous Q24H Dk Dawson MD   40 mg at 04/16/22 1501    anidulafungin (ERAXIS) 100 mg in dextrose 5 % 130 mL IVPB  100 mg IntraVENous Q24H Yue Abebe MD   Stopped at 04/16/22 1207    levoFLOXacin (LEVAQUIN) tablet 500 mg  500 mg Oral Daily Yue Abebe MD   500 mg at 04/16/22 0916    sodium bicarbonate tablet 650 mg  650 mg Oral 4x Daily Dk Dawson MD   650 mg at 04/16/22 1501    sennosides-docusate sodium (SENOKOT-S) 8.6-50 MG tablet 2 tablet  2 tablet Oral BID Dk Dawson MD   2 tablet at 04/16/22 0916    polyethylene glycol (GLYCOLAX) packet 17 g  17 g Oral BID Junito Araiza MD   17 g at 04/15/22 2202    promethazine (PHENERGAN) injection 12.5 mg  12.5 mg IntraMUSCular Q4H PRN Dk Dawson MD   12.5 mg at 04/14/22 1831    lactobacillus (CULTURELLE) capsule 1 capsule  1 capsule Oral Daily Dk Dawson MD   1 capsule at 04/16/22 0916    pantoprazole (PROTONIX) 40 mg in sodium chloride (PF) 10 mL injection  40 mg IntraVENous BID Dk Dawson MD   40 mg at 04/16/22 0919    cyclobenzaprine (FLEXERIL) tablet 5 mg  5 mg Oral BID PRN Dk Dawson MD   5 mg at 04/16/22 0914    sodium chloride flush 0.9 % injection 5-40 mL  5-40 mL IntraVENous 2 times per day OLGA Platt - CNP   5 mL at 04/16/22 0915    sodium chloride flush 0.9 % injection 5-40 mL  5-40 mL IntraVENous PRN OLGA Platt - CNP        0.9 % sodium chloride infusion   IntraVENous PRN OLGA Platt - ALIS        acetaminophen (TYLENOL) tablet 650 mg  650 mg Oral Q6H PRN OLGA lPatt - CNP   650 mg at 04/16/22 1655    Or    acetaminophen (TYLENOL) suppository 650 mg  650 mg Rectal Q6H PRN Shai García APRN - CNP        DULoxetine (CYMBALTA) extended release capsule 30 mg  30 mg Oral Daily Shai García APRN - CNP   30 mg at 04/16/22 0916    traMADol (ULTRAM) tablet 25 mg  25 mg Oral Q6H PRN Shai García, APRN - CNP   25 mg at 04/16/22 1237    calcium carbonate (TUMS) chewable tablet 500 mg  500 mg Oral TID PRN Kari Noriega APRN - CNP        ondansetron [facility]) injection 4 mg  4 mg IntraVENous Q6H PRN Kari Noriega APRN - CNP   4 mg at 04/16/22 1227       Continuous Infusions:   IV infusion builder 75 mL/hr at 04/16/22 1517    sodium chloride         Intake/Output Summary (Last 24 hours) at 4/16/2022 1744  Last data filed at 4/16/2022 1200  Gross per 24 hour   Intake 1774 ml   Output 3400 ml   Net -1626 ml     CBC:   Recent Labs     04/14/22 0236 04/15/22  0224 04/16/22  0429   WBC 11.3* 10.1 8.5   HGB 10.7* 11.5* 10.0*    359 330     BMP:  Recent Labs     04/14/22  0236 04/15/22  0224 04/16/22  0429   * 137 140   K 4.4 3.9 3.7    103 104   CO2 15* 17* 18*   BUN 22 17 18   CREATININE 1.0 1.0 1.0   GLUCOSE 84 89 69*         Objective:   Vitals: /89   Pulse 115   Temp 97.7 °F (36.5 °C) (Temporal)   Resp 18   Ht 5' 5\" (1.651 m)   Wt 170 lb (77.1 kg)   SpO2 97%   BMI 28.29 kg/m²   Physical exam    General: Fatigued appearing, no acute distress  Psych: Calm and cooperative  HEENT: NC/AT, no scleral icterus  Cardiovascular: Normal rate, regular rhythm  Respiratory: No intercostal retractions, no wheezes  Abdomen: Nontender, bowel sounds present, ostomy in place  Neurologic: Alert, follows commands, normal speech  Musculoskeletal: No clubbing or cyanosis  Skin: Warm and dry      Assessment & Plan:    Principal Problem:    Sepsis secondary to UTI [facility])  Active Problems:    Lung nodules    Metastasis from colon cancer (HCC)    Acute kidney injury superimposed on CKD ([facility] [address]  Resolved Problems:    * No resolved hospital problems.  *    Metastatic colorectal adenocarcinoma  Urothelial bladder cancer  Pulmonary nodules, concern for metastatic disease  --- Tentative plan for CT-guided biopsy on Monday    UTI with Achromobacter and Candida glabrata from culture, immunocompromise status, previous ureteral stents  -Antibiotics per ID, Eraxis and Levaquin    Ileus-resolved  Constipation-aggressive bowel regimen  NG tube removed, appreciate general surgery assistance and recommendations    COLLEEN--improved  Nephrology following, avoid nephrotoxic agent    Monitor and replete electrolytes as needed      Disposition: Discharge to [facility] inpatient rehab pending clinical course/medical stability      Arya Carey MD

## 2022-04-16 NOTE — PROGRESS NOTES
Physical Therapy  Name: Ethan Bliss  MRN:  [id number]  Date of service:  4/16/2022 04/16/22 0949   Restrictions/Precautions   Restrictions/Precautions Fall Risk   Position Activity Restriction   Other position/activity restrictions has a colostomy   Subjective   Subjective Pt ready to work with therapy. Pain Screening   Patient Currently in Pain Denies   Bed Mobility   Supine to Sit Supervision   Transfers   Sit to Stand Contact guard assistance   Stand to sit Contact guard assistance   Ambulation   Ambulation? Yes   Ambulation 1   Surface level tile   Device Rolling Walker   Assistance Contact guard assistance   Quality of Gait FLEXED POSTURE THAT ACTUALLY PROVIDES STABLILITY TO THE KNEE TO DECREASE  BUCKLING. NO LOB NOTED OR PATH DEVIATION   Gait Deviations Decreased step length;Decreased step height   Distance 100'   Short term goals   Time Frame for Short term goals 2 WKS   Short term goal 1  FT WITH RW   Conditions Requiring Skilled Therapeutic Intervention   Body structures, Functions, Activity limitations Decreased functional mobility ; Decreased endurance;Decreased strength;Decreased posture; Increased pain   Assessment Pt tolerated increased amb distance in hallway well, no instance of knee buckling or LOB at this time. Will cont to progress as tolerated.    Activity Tolerance   Activity Tolerance Patient Tolerated treatment well   Safety Devices   Type of devices Call light within reach;Gait belt;Left in chair         Electronically signed by Jada Khanna PTA on 4/16/2022 at 10:08 AM

## 2022-04-17 NOTE — PROGRESS NOTES
DR Yoselyn Cowan called back and see new orders for myrbetriq, also recommended giving flexiril that would help the bladder spasms as well. New orders placed.

## 2022-04-17 NOTE — PROGRESS NOTES
Subjective:  Mr. Stepan Ceballos is feeling some better today. Denies nausea or vomiting. States his initial abdominal pain is still resolved. Objective:  BP (!) 133/98   Pulse 117   Temp 97.8 °F (36.6 °C) (Temporal)   Resp 18   Ht 5' 5\" (1.651 m)   Wt 170 lb (77.1 kg)   SpO2 95%   BMI 28.29 kg/m²   General: NAD, AAO  Chest: regular, non-labored  Abdomen: Soft, NT, stoma appliance filled with liquids green stool    CBC:   Lab Results   Component Value Date    WBC 7.9 04/17/2022    RBC 3.43 04/17/2022    HGB 10.0 04/17/2022    HCT 30.3 04/17/2022    MCV 88.3 04/17/2022    MCH 29.2 04/17/2022    MCHC 33.0 04/17/2022    RDW 13.8 04/17/2022     04/17/2022    MPV 9.4 04/17/2022     BMP:    Lab Results   Component Value Date     04/17/2022    K 3.2 04/17/2022     04/17/2022    CO2 23 04/17/2022    BUN 12 04/17/2022    LABALBU 3.1 04/12/2022    CREATININE 0.9 04/17/2022    CALCIUM 8.3 04/17/2022    GFRAA >59 04/17/2022    LABGLOM >60 04/17/2022    GLUCOSE 111 04/17/2022     Assessment and plan:  81year old male with extensive medical and surgical history  1) SBO vs ileus vs obstipation: Appears resolved. Having significant ostomy output. Tolerated clear liquid diet yesterday. Advance to full liquids.   2) Other cares per medicine and urology

## 2022-04-17 NOTE — PROGRESS NOTES
Patient complaining of bladder spasms and wants DR. Eliison called for something, writer placed call to on call answering service and awaiting call back from Dr Beryle Dresser at this time

## 2022-04-17 NOTE — PROGRESS NOTES
Infectious Diseases Progress Note    Patient:  Ally Harvey  YOB: 1950  MRN: [id number]   Admit date: 4/10/2022   Admitting Physician: Redd Romero MD  Primary Care Physician: Escobar Duong MD    Chief Complaint/Interval History:  He is feeling better. Ostomy has been functioning. He has liquid and some partially formed stool in ostomy bag. He indicates he is going on pass for a little while today for [facility]. In/Out    Intake/Output Summary (Last 24 hours) at 4/17/2022 0947  Last data filed at 4/17/2022 0514  Gross per 24 hour   Intake 2285 ml   Output 2350 ml   Net -65 ml     Allergies:    Allergies   Allergen Reactions    Morphine Anxiety     Current Meds: dextrose bolus (hypoglycemia) 10% 125 mL, PRN   Or  dextrose bolus (hypoglycemia) 10% 250 mL, PRN  sodium bicarbonate 50 mEq in sodium chloride 0.9 % 1,000 mL infusion, Continuous  metoprolol succinate (TOPROL XL) extended release tablet 25 mg, Daily   And  hydroCHLOROthiazide (HYDRODIURIL) tablet 6.25 mg, Daily  enoxaparin (LOVENOX) injection 40 mg, Q24H  anidulafungin (ERAXIS) 100 mg in dextrose 5 % 130 mL IVPB, Q24H  levoFLOXacin (LEVAQUIN) tablet 500 mg, Daily  sodium bicarbonate tablet 650 mg, 4x Daily  sennosides-docusate sodium (SENOKOT-S) 8.6-50 MG tablet 2 tablet, BID  polyethylene glycol (GLYCOLAX) packet 17 g, BID  promethazine (PHENERGAN) injection 12.5 mg, Q4H PRN  lactobacillus (CULTURELLE) capsule 1 capsule, Daily  pantoprazole (PROTONIX) 40 mg in sodium chloride (PF) 10 mL injection, BID  cyclobenzaprine (FLEXERIL) tablet 5 mg, BID PRN  sodium chloride flush 0.9 % injection 5-40 mL, 2 times per day  sodium chloride flush 0.9 % injection 5-40 mL, PRN  0.9 % sodium chloride infusion, PRN  acetaminophen (TYLENOL) tablet 650 mg, Q6H PRN   Or  acetaminophen (TYLENOL) suppository 650 mg, Q6H PRN  DULoxetine (CYMBALTA) extended release capsule 30 mg, Daily  traMADol (ULTRAM) tablet 25 mg, Q6H PRN  calcium carbonate (TUMS) chewable tablet 500 mg, TID PRN  ondansetron (ZOFRAN) injection 4 mg, Q6H PRN      Review of Systems See HPI    VitalSigns:  BP (!) 133/98   Pulse 117   Temp 97.8 °F (36.6 °C) (Temporal)   Resp 18   Ht 5' 5\" (1.651 m)   Wt 170 lb (77.1 kg)   SpO2 95%   BMI 28.29 kg/m²      Physical Exam  Line/IV site: No erythema, warmth, induration, or tenderness. Ostomy with liquid stool and some partially formed stool in bag  Abdomen without significant tenderness  Lower extremities without cellulitis    Lab Results:  CBC:   Recent Labs     04/15/22  0224 04/16/22  0429 04/17/22  0512   WBC 10.1 8.5 7.9   HGB 11.5* 10.0* 10.0*    330 312     BMP:  Recent Labs     04/15/22  0224 04/16/22  0429 04/17/22  0512    140 139   K 3.9 3.7 3.2*    104 101   CO2 17* 18* 23   BUN 17 18 12   CREATININE 1.0 1.0 0.9   GLUCOSE 89 69* 111*     Radiology: None    Additional Studies Reviewed:  None    Impression:  1. Fever-resolved  2. Catheter related/stent related UTI-Candida glabrata and Achromobacter-stable on antimicrobial treatment  3. Ileus-resolved  4. History of colorectal cancer  5.   History of urothelial cancer    Recommendations:  Continue anidulafungin  Continue levofloxacin  Agree with trial with Mcgregor catheter out and stent change when the able per urology  Continue to follow    Elaine Choudhary MD

## 2022-04-17 NOTE — PROGRESS NOTES
HOSPITAL MEDICINE  - PROGRESS NOTE    Admit Date: 4/10/2022         CC: fever,nausea,decreased urine output       Subjective: Symptoms improved, still with good output from ostomy. No nausea or vomiting, abdominal pain improved. No fevers or chills overnight. No chest pain or shortness of breath.         ROS  14 point review of systems completed and is negative except as otherwise noted      Data:   Scheduled Meds: Reviewed  Current Facility-Administered Medications   Medication Dose Route Frequency Provider Last Rate Last Admin    trospium (SANCTURA) tablet 20 mg  20 mg Oral BID AC Ritchie Mao MD        lactated ringers infusion   IntraVENous Continuous Uma Hickey MD 30 mL/hr at 04/17/22 1111 New Bag at 04/17/22 1111    magnesium sulfate 2000 mg in 50 mL IVPB premix  2,000 mg IntraVENous Once Uma Hickey MD        potassium chloride (KLOR-CON M) extended release tablet 20 mEq  20 mEq Oral BID Uma Hickey MD   20 mEq at 04/17/22 1103    dextrose bolus (hypoglycemia) 10% 125 mL  125 mL IntraVENous PRN Patience Holly, DO        Or    dextrose bolus (hypoglycemia) 10% 250 mL  250 mL IntraVENous PRN Patience Holly, DO        metoprolol succinate (TOPROL XL) extended release tablet 25 mg  25 mg Oral Daily Jimmy Lujan MD   25 mg at 04/17/22 1103    And    hydroCHLOROthiazide (HYDRODIURIL) tablet 6.25 mg  6.25 mg Oral Daily Jimmy Lujan MD   6.25 mg at 04/17/22 1102    enoxaparin (LOVENOX) injection 40 mg  40 mg SubCUTAneous Q24H Jimmy Lujan MD   40 mg at 04/16/22 1501    anidulafungin (ERAXIS) 100 mg in dextrose 5 % 130 mL IVPB  100 mg IntraVENous Q24H Chan Schmitz MD   Stopped at 04/17/22 1328    levoFLOXacin (LEVAQUIN) tablet 500 mg  500 mg Oral Daily Chan Schmitz MD   500 mg at 04/17/22 1102    sodium bicarbonate tablet 650 mg  650 mg Oral 4x Daily Jimmy Lujan MD   650 mg at 04/17/22 1103    sennosides-docusate sodium (SENOKOT-S) 8.6-50 MG tablet 2 tablet  2 tablet Oral BID Rebeca Baeza MD   2 tablet at 04/17/22 1102    polyethylene glycol (GLYCOLAX) packet 17 g  17 g Oral BID John Menjivar MD   17 g at 04/16/22 2051    promethazine (PHENERGAN) injection 12.5 mg  12.5 mg IntraMUSCular Q4H PRN Rebeca Baeza MD   12.5 mg at 04/14/22 1831    lactobacillus (CULTURELLE) capsule 1 capsule  1 capsule Oral Daily Rebeca Baeza MD   1 capsule at 04/17/22 1103    pantoprazole (PROTONIX) 40 mg in sodium chloride (PF) 10 mL injection  40 mg IntraVENous BID Rebeca Baeza MD   40 mg at 04/17/22 1111    cyclobenzaprine (FLEXERIL) tablet 5 mg  5 mg Oral BID PRN Rebcea Baeza MD   5 mg at 04/17/22 1103    sodium chloride flush 0.9 % injection 5-40 mL  5-40 mL IntraVENous 2 times per day Lev Goyal, APRN - CNP   10 mL at 04/17/22 1110    sodium chloride flush 0.9 % injection 5-40 mL  5-40 mL IntraVENous PRN Lev Goyal, APRN - CNP        0.9 % sodium chloride infusion   IntraVENous PRN Lev Goyal, APRN - CNP        acetaminophen (TYLENOL) tablet 650 mg  650 mg Oral Q6H PRN Lev Goyal, APRN - CNP   650 mg at 04/17/22 1208    Or    acetaminophen (TYLENOL) suppository 650 mg  650 mg Rectal Q6H PRN Lev Goyal, APRN - CNP        DULoxetine (CYMBALTA) extended release capsule 30 mg  30 mg Oral Daily Lev Jyotsna, APRN - CNP   30 mg at 04/17/22 1104    traMADol (ULTRAM) tablet 25 mg  25 mg Oral Q6H PRN Lev Goyal, APRN - CNP   25 mg at 04/17/22 4302    calcium carbonate (TUMS) chewable tablet 500 mg  500 mg Oral TID PRN Ankush Panchal APRN - CNP        ondansetron [facility]) injection 4 mg  4 mg IntraVENous Q6H PRN Ankush Panchal OLGA - CNP   4 mg at 04/17/22 1209       Continuous Infusions:   lactated ringers 30 mL/hr at 04/17/22 1111    sodium chloride         Intake/Output Summary (Last 24 hours) at 4/17/2022 1541  Last data filed at 4/17/2022 1200  Gross per 24 hour   Intake 2615 ml   Output 2150 ml   Net 465 ml     CBC:   Recent Labs     04/15/22  0224 04/16/22 0429 04/17/22  0512   WBC 10.1 8.5 7.9   HGB 11.5* 10.0* 10.0*    330 312     BMP:  Recent Labs     04/15/22  0224 04/16/22  0429 04/17/22  0512    140 139   K 3.9 3.7 3.2*    104 101   CO2 17* 18* 23   BUN 17 18 12   CREATININE 1.0 1.0 0.9   GLUCOSE 89 69* 111*         Objective:   Vitals: BP (!) 133/98   Pulse 117   Temp 97.8 °F (36.6 °C) (Temporal)   Resp 18   Ht 5' 5\" (1.651 m)   Wt 170 lb (77.1 kg)   SpO2 95%   BMI 28.29 kg/m²   Physical exam     General: no acute distress  Psych: Calm and cooperative  HEENT: NC/AT, no scleral icterus  Cardiovascular: Normal rate, regular rhythm  Respiratory: No intercostal retractions, no wheezes  Abdomen: Nontender, bowel sounds present, ostomy present  Neurologic: Alert, follows commands, normal speech  Musculoskeletal: No clubbing or cyanosis  Skin: Warm and dry      Assessment & Plan:    Principal Problem:    Sepsis secondary to UTI [facility])  Active Problems:    Lung nodules    Metastasis from colon cancer (HCC)    Acute kidney injury superimposed on CKD ([facility] [address]  Resolved Problems:    * No resolved hospital problems. *      81 y.o. male with recent right nephrectomy, recent ureteral stents, metastatic urothelial carcinoma, history of colorectal cancer with ostomy in place, presented with fever and nausea decreased urine output. On the admission denied problems with ostomy output. He was found to have UTI with culture speciated Candida glabrata and Achromobacter, ID consulted, treated with Eraxis and Levaquin. CT Chest consistent with progressive metastatic disease to the lungs. CT abdomen irregular soft tissue mass with some calcification within the pelvis. Followed by oncology and urology during hospital course.   He was scheduled for pulmonary nodule biopsy by radiology

## 2022-04-18 NOTE — TELEPHONE ENCOUNTER
[facility] CALLED FOR PT PRESCRIPT MYRBETRIQ 25MG. THEY NEED AN ACTUAL WRITTEN SCRIPT FOR THIS MEDICATION, SHE SAID IF NURSE OR   HAD ANY QUESTIONS TO CALL HER BACK -[phone]

## 2022-04-18 NOTE — PROGRESS NOTES
Mcgregor catheter removed per Dr Lisa Knight orders, urinal placed at bedside, patient assisted with personal briefs.   Electronically signed by Antonia De RN on 4/18/2022 at 7:52 AM

## 2022-04-18 NOTE — PROGRESS NOTES
1           Progress Note    Patient name: Kisha Pimentel  Patient : 1950  MR #[id number]  Room: 5    Portions of this note have been copied forward, however, changed to reflect the most current clinical status of this patient. Subjective: Anticipating CT-guided biopsy lung today. HISTORY OF PRESENT ILLNESS:  Mikala Andre is a very pleasant 81years old male who has a diagnosis of stage IV colonic adenocarcinoma. He was receiving palliative chemotherapy after 2021. He was found to have high-grade urothelial carcinoma involving the ureter. He underwent surgery, nephroureterectomy at [facility].  He had a complicated postoperative course. He eventually recovered and his current receiving home care needs at home. He has also several other comorbidities include CAD, hypertension, hyperlipidemia and CKD stage III. The patient presented ER department with complaints of generalized malaise for the last several days, low-grade fever, nausea. Decreased urine output. Patient has a ostomy in place. Work-up in the emergent showed moderate hyponatremia sodium 127, mild elevated lactic acid, elevated creatinine 2.4 (baseline's 1.5-1.7). He also had neutrophil leukocytosis and positive nitrates and large blood on the urine analysis. Chest x-ray was abnormal and therefore CT chest was performed that showed evidence of metastatic disease to the lungs. Patient received IV fluid resuscitation the emergency. He was started on broad-spectrum antibiotics. He was admitted with consultation from me and also ID.  4/10/2022-CT abdomen/pelvis without contrast showed evidence of metastatic disease to the lung bases. Moderate size hiatal hernia with gastroesophageal reflux. Status post partial colectomy. Left lower quadrant ostomy is present. No evidence obstruction. Irregular soft tissue mass with some calcification the pelvis.   This demonstrated interval increase was potential involvement with the right posterior lateral urinary bladder wall. The mass measures 8 x 2.9 cm. Left-sided double J intraureteral stent is in place with stent well positioned. 4/10/2022-CT chest without contrast showed extensive metastatic disease is noted to the lungs showing progression from the previous examination with multiple new nodules present as well as interval increase in size of previously documented nodules. Reference nodule within the superior segment of the right lower lobe previously measured at 5 mm in size now measures 13 mm in size. A lesion within the medial right lower lobe now measuring 1.6 cm in long axis previously measured 1.1 cm. There is no evidence of acute consolidative pneumonia. Essentially, findings consistent with progressive metastatic disease to the lungs. There are too numerous to count pulmonary nodules the majority of which have increased in size or which are new from the previous study. 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 Prior oncological history  ONCOLOGIC HISTORY:   Diagnosis:  · Moderately differentiated rectal carcinoma, T3N0Mx, diagnosed in 3/9/2009  · Noninvasive high-grade papillary urethral carcinoma. Negative for evidence of detrusor muscle invasion, pTa, pNx on 8/18/2019. · Metastatic colorectal carcinoma, 9/3/2019  · MSI stable and mutations for BRAF, NRAS, KRAS were not detected. · Recurrent bladder cancer- superficial   · Urothelial carcinoma of the ureter     TREATMENT SUMMARIES:  · 4/9/2009-5/27/2009-received neoadjuvant chemotherapy with 5-FU CIV along with radiation therapy for a total of 5400 cG  · 7/15/2009-rectum resection revealed no residual malignancy, complete pathological response.   · 8/18/2019- transurethral resection of bladder tumor (TURBT)   · 9/18/2019-12/26/2019 palliative chemotherapy with modified FOLFOX 7  (Oxaliplatin 85 mg/m² IV day 1, leucovorin 400 mg/m² IV day 1 and 5-FU 2400 mg/m² IV continuous infusion over 46 to 48 hours for a total of 7 cycles. · 1/28/2020 -palliative maintenance therapy with leucovorin 400 mg/m² IV over 2 hours on day 1, followed by 5-FU bolus 400 mg/m² and then 1200 mg/m²/day x2 days (total 2400 mg meter squared over 46 to 48 hours) continuous infusion. Repeat every 2 weeks. ONCOLOGIC HISTORY # NMIBC_Bladder cancer. Maren Garcia was diagnosed with noninvasive urethral carcinoma, pTa, pNx on 8/18/2019. Ta tumors are papillary lesions that tend to recur but are relatively benign and generally do not invade the bladder. Adjuvant treatment is not warranted at this time and will be monitored closely. Biopsy and transurethral resection of bladder tumor (TURBT) on 8/18/2019 by Dr. Myles Dick with pathology revealing noninvasive high-grade papillary urethral carcinoma. Negative for evidence of detrusor muscle invasion, pTa, pNx. · 12/3/2019- cystoscopy with removal and replacement of double-J urethral stent. Pathology from dome of the bladder/tumor revealed high-grade papillary urethral carcinoma, noninvasive. No muscularis propria present. · 2/26/2020 - cystoscopy and bilateral ureteral stent removal and replacement. The operative note by Dr. Delvis Siddiqi documented bilateral hydronephrosis and obtained biopsy of the bladder in the mid dome and left anterior lateral wall x2. Pathology documented high-grade papillary urethral carcinoma, noninvasive, stage pTaNx. · 5/28/2020-the patient underwent a cystoscopy and resection of bladder tumor on 05/28/2020 with findings consistent with noninvasive, high-grade papillary urothelial carcinoma. Muscularis propria was not identified. The patient will receive intravesical BCG therapy. · 7/6/2020-CT Abdomen/ Pelvis-Moderate severe right and mild left hydronephrosis with bilateral ureteral stents, which have an adequate radiographic position.  Right kidney with cortical thickening and somewhat asymmetrically decreased enhancement which can be seen with obstructive uropathy. Postoperative changes of colectomy. Left lower quadrant ostomy. Slightly decreased size of presacral low density compared to4/15/2020. Similar intrahepatic and extrahepatic bile duct dilation compared to 4/15/2020. Correlate with clinical symptoms and laboratory studies if clinically indicated. Similar chronic bony findings. · 3/25/2021-CT abdomen showed severe hydronephrosis. Cystoscopy was performed by urology. · 4/1/21 Bladder neck tumor, biopsies: High-grade papillary urothelial carcinoma, noninvasive. Muscularis propria is not present. AJCC pathologic stage:  pTa Nx   · 4/14/2021-reviewed results of pathology. No evidence of invasive disease. Continue surveillance cystoscopy with urology. · 9/13/2021-he was seen by urology. Findings of ureteral high-grade urothelial carcinoma. Noninvasive. The patient is being referred to [facility] in [place]. · 10/11/2021-he was seen by [facility] and recommended cystoscopy with biopsy and possible laser ablation and bilateral leg stent exchange at that time. · 4/10/2022-CT abdomen/pelvis without contrast showed evidence of metastatic disease to the lung bases. Moderate size hiatal hernia with gastroesophageal reflux. Status post partial colectomy. Left lower quadrant ostomy is present. No evidence obstruction. Irregular soft tissue mass with some calcification the pelvis. This demonstrated interval increase was potential involvement with the right posterior lateral urinary bladder wall. The mass measures 8 x 2.9 cm. Left-sided double J intraureteral stent is in place with stent well positioned. · 4/10/2022-CT chest without contrast showed extensive metastatic disease is noted to the lungs showing progression from the previous examination with multiple new nodules present as well as interval increase in size of previously documented nodules.  Reference nodule within the superior segment of the right lower lobe previously measured at 5 mm in size now measures 13 mm in size. A lesion within the medial right lower lobe now measuring 1.6 cm in long axis previously measured 1.1 cm. There is no evidence of acute consolidative pneumonia. Essentially, findings consistent with progressive metastatic disease to the lungs. There are too numerous to count pulmonary nodules the majority of which have increased in size or which are new from the previous study. 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 ONCOLOGIC HISTORY #3  [place] was seen in initial oncology consultation on 8/19/2019 during his hospitalization at [facility] after a large pelvic mass was identified which raised concern for recurrent disease. Further pathology consistent with recurrent rectal cancer  · 8/17/2019- CEA 18.1  · 8/17/2019- CT scan of the kidney with contrast documented moderate to severe right hydronephrosis with dilation of the right ureter into the lower pelvis the site of the parasacral soft tissue changes. Partially calcified soft tissue changes within the janes-sacral region likely representing sequelae of pelvic radiation. Increasing scarring/fibrosis versus tumor recurrence within the presacral changes, likely represents a site of right distal ureter obstruction. No left-sided hydronephrosis. · 8/18/2019 -Double-J ureter stent placement for right hydronephrosis secondary to extrinsic compression by pelvic mass. · 8/27/2019-CT scan of the chest with contrast documented numerous pulmonary nodules that appear new compared to 11/12/2017, RUL nodule measuring 7 mm and LLL nodule measuring 5 mm. Soft tissue nodule at the left ventral abdominal wall. Slight increased size of a probable lymph node anterior to the aorta measuring 0.9 cm compared to 0.7 cm.   Similar presacral, right pelvic sidewall and right abductor muscular nodular soft tissue density. · 8/27/2019 CT scan of the abdomen and pelvis with contrast identified new moderate left hydronephrosis with moderate right hydronephrosis. Mild stranding around the urinary bladder and thickening of the bilateral ureteral wall. Numerous pulmonary nodules. Soft tissue of the left ventral abdominal wall. Slightly increased size of probable lymph node anterior to the aorta measuring 0.9 cm compared to 0.7 cm. · 8/27/2019-PET scan did not identify any FDG avid pulmonary nodules or airspace opacities. Abnormal increased metabolic activity within the right pelvic wall soft tissue showing SUV of 5.4. Abnormal soft tissue metabolic activity in the right abductor muscle with SUV of 6.4. Focally increased activity to the right of the inferior L5 vertebrae body posterior with SUV of 7.9 with associated sclerotic changes. · 8/29/2019-  Dr. Kelly Villeda completed a cystoscopy with double-J ureter stent in the left ureter for left hydronephrosis  · 9/3/2019- CT-guided right abductor muscle biopsy on 9/3/2019 with pathology identifying metastatic adenocarcinoma consistent with colorectal origin. Molecular panel from biopsy tissue revealed MSI stable and mutations for BRAF, NRAS, KRAS were not detected. · 9/18/2019 - Palliative chemotherapy with modified FOLFOX 7  (Oxaliplatin 85 mg/m² IV day 1, leucovorin 400 mg/m² IV day 1 and 5-FU 2400 mg/m² IV continuous infusion over 46 to 48 hours) with the anticipation of adding Avastin 5 mg/kg day 1 every 14 days  · 10/15/2019- 24-hour urine for protein with a total protein of 1785 mg per 24-hour. [place] has been evaluated by Dr. Enrique Wilder and he reports no significant concerns related to the protein. · 11/6/19 CEA 5.6 significantly improved compared to CEA of 14.0 on 8/30/2019.   · 11/15/2019 -CT scan of the abdomen and pelvis documented no evidence of disease progression with significant decrease in the size of enhancing nodules in the right pelvic abductor musculature, a previous 1.8 cm nodule now measures 5 mm. No new or enlarging retroperitoneal, mesenteric, pelvic or inguinal lymph nodes. Calcified presacral mass unchanged measuring 5 x 3.7 cm. · 11/15/2019 -CT scan of the chest documented multiple small pulmonary nodules reidentified, largest nodule in the RUL measures 5 mm compared to 7.5 mm, RLL nodule measures 3.4 mm compared to 5.9 mm, VIC nodule measures 4 mm compared to 6 mm. A cluster of small nodules in the RUL anteriorly are barely visible on this study. There is a decrease in size of mediastinal lymph nodes compared to previous exam, right distal paratracheal lymph node measuring 4.5 mm compared to 8.3 mm and lower right peritracheal node measuring 4.5 mm compared to 8.6 mm. · 1/13/2020- CT scan of the abdomen and pelvis with contrast indicated improvement in the right-sided hydronephrosis with a chronic inflammatory process of the ureters suspected due to the moderate thickening, also present on previous study. The small poorly enhancing nodules in the right abductor muscles have decreased in the partially calcified presacral mass and right lateral pelvic wall nodules are stable compared to previous study. Resolution of the subcutaneous abdominal wall nodules. A prominent retroperitoneal lymph node adjacent and anterior to the left common artery is redefined and measures 6 mm, no change from previous exam.   · 1/13/2020 - CT scan of the chest documented a right lower lobe nodule measures 4.3 cm and is unchanged. A right lower lobe nodule measures 2.8 mm compared to 3.4 mm. Nodule in the right upper lobe is barely visible and measures 2.4 mm. Nodule in the left lower lobe measures 4.8 mm and is unchanged. Nodule in left lower lobe posterior measures 2.8 mm and previously measured 4.7 mm. A right lower lobe posterior medially nodule is barely visible measuring 0.2 mm and previously. measured 4.5 mm. No new nodules identified.   No change in the size of the mediastinal lymph nodes. · 1/28/2020 -palliative maintenance therapy with leucovorin 400 mg/m² IV over 2 hours on day 1, followed by 5-FU bolus 400 mg/m² and then 1200 mg/m²/day x2 days (total 2400 mg meter squared over 46 to 48 hours) continuous infusion. Repeat every 2 weeks. Only received 1 cycle, further treatment held due to small bowel obstruction. · 1/30/2020 - CT scan of the abdomen and pelvis indicated high-grade small bowel obstruction with transition point in the midline posterior pelvis where a small bowel loop is tethered to a partially calcified presacral soft tissue mass. · 2/11/2020-CEA 1.4  · 3/5/2020-  Exploratory laparotomy, removal of adhesions, small bowel resection with primary anastomosis and partial thickness small bowel repair by Dr. Abimbola Enrique at [address] [place] [address]. In the operative note Dr. Flora Ojeda reported no evidence of carcinomatosis within the abdomen and the liver was unable to be examined due to extent of right upper quadrant adhesions. Pathology from small intestine documented no evidence of malignancy. · 4/15/2020 Ct Chest W Contrast Minimal interval increase in size of subcentimeter pulmonary nodules. The largest now measures 6 mm in the medial right lower lobe on axial image 80. There is a new, unstable, horizontal fracture through the T6 vertebral body. Additionally, there are new fractures through the posterior 11th and 12th right ribs. The bones are moderately osteopenic. The finding of an unstable fracture through the T6 vertebral body was discussed with Ana Mercedes at 10:45 AM on 4/15/2020. · 4/15/2020 Ct Abdomen Pelvis W Iv Contrast  Patient has undergone interval resection of the distal small bowel, and there is a 2.8 cm fluid collection in the presacral operative bed. This contains a tiny focus of air. This may postoperative or due to infection. Please correlate with the patient's clinical symptoms and laboratory markers.  Improved hydronephrosis and hydroureter. Diffuse osteoporosis. Findings in the lower chest are described in a separate dictation. · 4/22/2020-CEA 0.9  · 6/2/2020-resumed chemotherapy with 5-FU/leucovorin and Panitumumab. Okay to do 1 today then CMP CEA   · 8/19/20 CEA-1.1  · 10/21/2020- CEA 2.0  · 11/11/2020- Ct Chest W Contrast Multiple, too numerous to count, small noncalcified lung nodules bilaterally. The referenced nodules appears to have decreased in size the previous study. No new nodules. · 11/11/2020- Ct Abdomen Pelvis W Contrast Unchanged bilateral hydronephrosis, more on the right side. Bilateral ureteral stents in place. Moderate asymmetrical thickening of the incompletely distended urinary bladder. This may partly be due to incomplete distention. Possibility of chronic cystitis and or chronic partial outlet obstruction may not be excluded. A functioning left lower abdominal ostomy. A small parastomal small bowel herniation without obstruction. A partially calcified presacral mass. The soft tissue component have increased in size in the previous study. The osseous changes are described above. Any superimposed metastatic disease is not excluded and would be hard to evaluate due to extensive postsurgical changes. · 11/18/2020-essentially, overall stable disease. Improvement of the lung nodules with decreased in the size of the target lesions. The pelvic lesion is is likely worse by 25%. However, CEA is is still within the normal limits. Therefore likely mixed response. We will continue current treatment and repeat CT scans in about 3 months. · 12/16/2020-discontinue 5-FU bolus from his regimen. · 2/9/2021- Ct Chest W Contrast No evidence of disease progression. Stable pulmonary nodules. Stable intrahepatic and extrahepatic bile duct dilation compared to prior 11/11/2020. Postoperative, posttraumatic and degenerative changes in the spine as described above. Old right-sided rib fractures.    · 2/9/2021- Ct Abdomen Pelvis W Contrast showed evidence of response to therapy including decreased presacral mass/thickening now measuring 1.1 cm, previously 1.9 cm on 11/11/2020. Stable intrahepatic and extra hepatic bile duct dilation with cholecystectomy clips. See separately dictated CT chest of the same day regarding pulmonary nodules. Bilateral ureteral stents. Decreased bilateral hydronephrosis. Urinary bladder wall is thickened, which could be seen with post treatment changes or cystitis. Correlate with symptoms. Chronic bony findings as above  · 2/17/2021-reviewed CT chest abdomen pelvis. Essentially consistent with disease response to therapy. Continue current therapy. · 2/17/21 CEA 1.0  · 3/25/21 Ct Abdomen Pelvis W Iv Contrast  The stomach is distended, however no small bowel dilatation identified. Contrast identified within the left ileostomy bag. The distal stomach is under distended which may be secondary to peristalsis. Gastroparesis considered. Bilateral ureteral stents remain appropriate in position, however there is new moderate to severe right-sided hydronephrosis and mild left-sided hydronephrosis when compared to the 2/9/2021 exam. Mild increase considered within the partially calcified presacral pelvic mass. Stable partially calcified right pelvic soft tissue. Similar abnormal wall thickening of the bladder most notable superiorly. Similar prominence of the intra and extra hepatic bile ducts down to the level of the ampulla. Findings may represent a reservoir effect, however correlation with liver function tests recommended. Therefore. Stable noncalcified left greater than right pulmonary nodules with asymmetrical interstitial changes of the right lung base concerning for pneumonitis. Osteopenia with postoperative changes of the lumbar spine. Chronic compression deformities of the thoracolumbar vertebra as described above.    · 4/14/2021-I reviewed notes from urology and also CT abdomen/pelvis that showed mild interval increase in the size of the soft tissue nodule in the pelvis. However, this is still very small and therefore will have a short follow-up CT scan and of May 2021. I personally reviewed the CT scans. Really hard to state that there is clear-cut disease progression. Again, short follow-up recommended. Continue current treatment. · 5/12/21 CEA 0.8  · 5/26/2001-CT chest abdomen pelvis showed Partially calcified presacral soft tissue mass appears unchanged. Previously measured soft tissue noncalcified component is unchanged measuring 2.2 x 3.2 cm on axial image 60. However, this is questionable. CT chest showed a stable pulmonary nodules. Subcentimeter nodules. Largest 7.5 mm. · 6/1/21 CEA 0.9  · 06/01/23- reviewed scans. Stable disease. Continue treatment every 3 weeks as per patient request. Continue infusional 5-FU, Panitumumab and leucovorin. No 5-FU bolus due to severe mucositis. · 8/11/2021 CEA . 9  · 9/03/2021 CT Chest w/Contrast Slight interval increase in the size of pulmonary nodules, the largest in the medial right lung base. Findings are concerning for metastatic disease. Atherosclerosis of the aorta and coronary arteries. Sclerotic rib lesions favored for osseous metastases. Old rib fractures also evident. · 9/03/2021 CT Abd/Pelvis w/ IV Contrast (Oral) A persistent partially calcified mass in the presacral region with encasement of the distal ureters bilaterally and resultant bilateral hydronephrosis. Bilateral ureteral stents in place. There is increasing right-sided hydronephrosis since the previous study. Right renal atrophy similar to the previous study. Moderate asymmetrical thickening of the incompletely distended urinary bladder is similar to the previous study. This may partly be due to incomplete distention. An inflammatory process or a neoplastic process is not excluded. Moderate intrahepatic biliary dilatation is similar to the previous study and probably due to a prior cholecystectomy. Moderate dilatation of the contrast filled small bowel loops may represent an ileus or partial distal small bowel obstruction. There is left lower abdominal ileostomy which appears patent. The stable compression fractures of the lower thoracic and proximal lumbar vertebrae and hardware fusion similar to the previous study. · 9/22/21- Decrease Vectibix to every other treatment. He is currently receiving chemotherapy every 3 weeks as per patient request      ONCOLOGIC HISTORY # Rectal carcinoma:  Melony Cooks has a history of moderately differentiated rectal carcinoma, T3N0Mx, diagnosed in 3/9/2009. He received neoadjuvant chemotherapy with radiation and resection with J-pouch. He has been routinely followed at [facility] and was last seen by Dr. Jada Graf on 1/10/2019. The following are pertinent findings related to his diagnosis and treatment. · 3/9/2009- Esophagogastroduodenoscopy with biopsy by Dr. Acacia Lua that revealed rectal mass at 8 cm with pathology being consistent with moderately differentiated adenocarcinoma. · 3/18/2019-Dr.Elizabeth Irene Friedman at [facility] performed a flexible sigmoidoscopy and rectal endoscopic ultrasound that revealed the tumor extending 6-7 mm deep through the muscularis propria layer and into the serosa, T3 lesion. · 4/9/2009-5/27/2009-received neoadjuvant chemotherapy with 5-FU CIV along with radiation therapy for a total of 5400 cGy under the direction of Dr. Lucy Manzanares. · 7/15/2009-rectum resection revealed no residual malignancy. 22 regional nodes were negative for malignancy. The rectum distal doughnut was negative for malignancy. He was documented to have a complete pathological response. · 9/9/2009- Ileostomy excision pathology revealed small bowel wall with changes consistent with ileostomy site.   Pathology negative for malignancy    Objective   PHYSICAL EXAM:  CONSTITUTIONAL: Alert, appropriate, no acute distress, appears chronically ill  EYES: Non icteric, EOM intact, pupils equal round   ENT: Mucus membranes moist, external inspection of ears and nose are normal  NECK: Supple, no masses. No JVD  CHEST/LUNGS: CTA bilaterally, normal respiratory effort   CARDIOVASCULAR: RRR. No lower extremity edema  ABDOMEN: soft non-tender, active bowel sounds. Left abdominal ileostomy with great output  EXTREMITIES: warm, moves extremities. Gait not observed. SKIN: warm, dry with no rashes or lesions  LYMPH: No cervical, clavicular, or axillary lymphadenopathy  NEUROLOGIC: follows commands, non focal   PSYCH: mood and affect appropriate. Alert and oriented to time, place, person    Vital Signs  BP (!) 153/91   Pulse 118   Temp 97.9 °F (36.6 °C) (Temporal)   Resp 16   Ht 5' 5\" (1.651 m)   Wt 170 lb (77.1 kg)   SpO2 96%   BMI 28.29 kg/m²     Intake/Output Summary (Last 24 hours) at 4/18/2022 0638  Last data filed at 4/18/2022 0512  Gross per 24 hour   Intake 1190 ml   Output 875 ml   Net 315 ml     Labs:  CBC:   Recent Labs     04/16/22  0429 04/17/22  0512 04/18/22  0343   WBC 8.5 7.9 8.0   HGB 10.0* 10.0* 9.6*    312 305     CMP:   Recent Labs     04/16/22  0429 04/17/22  0512 04/18/22  0343   GLUCOSE 69* 111* 106   BUN 18 12 9   CREATININE 1.0 0.9 0.9   CO2 18* 23 25   CALCIUM 9.0 8.3* 8.3*       Blood Culture Recent:   Recent Labs     04/10/22  0958   BC No growth after 5 days of incubation. 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 MSI stable and negative mutations for BRAF, NRAS, KRAS wild type.     09/22/21- CEA 0.6  Continue palliative intent 5-FU/leucovorin every 3 weeks as per patient request. Panitumumab on hold as per patient request (significant toxicity with the skin toxicity and mouth sores). Not a good candidate for Avastin due to frequent exchange of ureteral stents and hematuria.     4/10/2022-CT abdomen/pelvis without contrast showed evidence of metastatic disease to the lung bases. Moderate size hiatal hernia with gastroesophageal reflux. Status post partial colectomy. Left lower quadrant ostomy is present. No evidence obstruction. Irregular soft tissue mass with some calcification the pelvis. This demonstrated interval increase was potential involvement with the right posterior lateral urinary bladder wall. The mass measures 8 x 2.9 cm. Left-sided double J intraureteral stent is in place with stent well positioned. 4/10/2022-CT chest without contrast showed extensive metastatic disease is noted to the lungs showing progression from the previous examination with multiple new nodules present as well as interval increase in size of previously documented nodules. Reference nodule within the superior segment of the right lower lobe previously measured at 5 mm in size now measures 13 mm in size. A lesion within the medial right lower lobe now measuring 1.6 cm in long axis previously measured 1.1 cm. There is no evidence of acute consolidative pneumonia. Essentially, findings consistent with progressive metastatic disease to the lungs. There are too numerous to count pulmonary nodules the majority of which have increased in size or which are new from the previous study. 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 The operative note by Dr. See Erickson documented bilateral hydronephrosis and obtained biopsy of the bladder in the mid dome and left anterior lateral wall x2. Pathology documented high-grade papillary urethral carcinoma, noninvasive, stage pTaNx.   As per Urology    5/28/2020-the patient underwent a cystoscopy and resection of bladder tumor on 05/28/2020 with findings consistent with noninvasive, high-grade papillary urothelial carcinoma. Muscularis propria was not identified. Currently receiving intravesical BCG.  4/1/2021-repeat cystoscopy showed a small bladder lesion. Tumor resection of bladder tumor consistent with noninvasive high-grade urothelial carcinoma. 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 Referred to [facility]  10/14/2021-urology at [facility]. Urine cytology consistent with atypical urothelial cells. Since the patient was last seen by Dr. Hank Prajapati November 2021 he underwent a major resection of the high-grade urothelial carcinoma of the ureter at [facility].  His postoperative course was complicated and he was hospitalized for many weeks. He eventually was discharged and is currently receiving home care at home. He is still very debilitated. -CT findings from 4/10/2022 concerning for metastatic disease, as noted above    CT guided FNA of pulmonary nodule today  Last dose prophylactic Lovenox was 4/16/2022-held yesterday for biopsy today  -Urology, Dr. Karin York following    Ileus  4/13/2022-CT abdomen/pelvis showed  · When compared to the previous exam of 3 days earlier there is now  noted to be moderate small bowel distention. I would favor a adynamic ileus. A distal obstruction at the site of the patient's ileostomy is also in the differential but felt less likely. There is mild distention of the stomach. A moderate size hiatal hernia is present. · Mild to moderate dilatation of the upper tracts of the left kidney. A left-sided double-J ureteral stent is in place. There is mild urothelial thickening. I do not see evidence of a discrete ureteral stone. The stent is well-positioned. · Partially calcified pelvic mass.  This is inseparable from the right posterior lateral wall of the urinary bladder along its lower margin. A Mcgregor catheter is in place within the bladder. 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 · There is a small amount of free fluid within the subhepatic space and Morison space. A small right-sided effusion is present with multiple pulmonary nodules within the lower lobes consistent with metastatic disease to the lungs. There is a moderate size hiatal hernia present. · There is a small volume of retained contrast within stomach and dilated proximal small bowel loops. There are dilated mid to distal small bowel loops without contrast.   Followed and managed by general surgery    -NG removed 4/16/2022  -Tolerating diet  -N.p.o. today for CT-guided needle biopsy    Normocytic anemia-stable  Serology 4/12/2022:  · Iron: 77, TIBC 233, iron saturation 33%, ferritin 589  · Vitamin B12: 308  · Folate: 13.89          Osteoporosis-Osteoporosis BMD -3.6 April 2020.     -Continue Vit D, Boniva and Calcium. Acute kidney injury/CKD stage III-improved  Cr 1.0    Hyponatremia-Resolved      Candida glabrata/AchrmobacterUTI  -Currently on Anidulafungin/Levaquin by mouth    PLAN:  Continue current supportive care  UTI - anidulafungin/Levofloxacin therapy as per ID  CT guided FNA of pulmonary nodule today    (Please note that portions of this note were completed with a voice recognition program. Efforts were made to edit the dictations but occasionally words are mis-transcribed.)    Ash Sunshine PA-C  04/18/22  6:38 AM  Physician's attestation/substantial contribution:  I, Dr Lynnette Holt, independently performed an evaluation on Kisha Pimentel. I have reviewed relevant medical information/data to include but not limited to medication list, relevant appropriate labs and imaging when applicable.  I reviewed other physician's notes, ancillary services and nurses assessment. I have reviewed the above documentation completed by the Nurse Practitioner or Physician Assistant. Please see my additional contributions to the history of present illness, physical examination, and assessment/medical decision-making that reflect my findings and impressions. I have seen and examined the patient and the key elements of all parts of the encounter have been performed by me. I agree with the assessment and plan as outlined by the ARNP/PA. Subjective-no new complaints today  Objective- as above  Assessment/plan:  FNA biopsy pulmonary nodules today.     Juany Witt MD

## 2022-04-18 NOTE — PROGRESS NOTES
HOSPITAL MEDICINE  - PROGRESS NOTE    Admit Date: 4/10/2022         CC: fever,nausea,decreased urine output       Subjective: Symptoms improved, still with good output from ostomy. He has had bladder spasms. No nausea or vomiting, abdominal pain improved. No fevers or chills overnight. No chest pain or shortness of breath. Resting comfortably without new complaints. Tolerating diet.         ROS  14 point review of systems completed and is negative except as otherwise noted      Data:   Scheduled Meds: Reviewed  Current Facility-Administered Medications   Medication Dose Route Frequency Provider Last Rate Last Admin    mirabegron (MYRBETRIQ) extended release tablet 25 mg  25 mg Oral Daily Rissa Christy MD        polyethylene glycol [facility]) packet 17 g  17 g Oral Daily PRN Edwin Song MD        sennosides-docusate sodium (SENOKOT-S) 8.6-50 MG tablet 2 tablet  2 tablet Oral Daily PRN Edwin Song MD        lactated ringers infusion   IntraVENous Continuous Tim Cueto MD 30 mL/hr at 04/17/22 1111 New Bag at 04/17/22 1111    dextrose bolus (hypoglycemia) 10% 125 mL  125 mL IntraVENous PRN Patience Holly, DO        Or    dextrose bolus (hypoglycemia) 10% 250 mL  250 mL IntraVENous PRN Patience Holly, DO        metoprolol succinate (TOPROL XL) extended release tablet 25 mg  25 mg Oral Daily Gordon Jones MD   25 mg at 04/18/22 0855    And    hydroCHLOROthiazide (HYDRODIURIL) tablet 6.25 mg  6.25 mg Oral Daily Gordon Jones MD   6.25 mg at 04/18/22 0854    enoxaparin (LOVENOX) injection 40 mg  40 mg SubCUTAneous Q24H Gordon Jones MD   40 mg at 04/16/22 1501    anidulafungin (ERAXIS) 100 mg in dextrose 5 % 130 mL IVPB  100 mg IntraVENous Q24H Kole Riley MD   Stopped at 04/18/22 1030    sodium bicarbonate tablet 650 mg  650 mg Oral 4x Daily Gordon Jones MD   650 mg at 04/18/22 1235  promethazine (PHENERGAN) injection 12.5 mg  12.5 mg IntraMUSCular Q4H PRN Christopher Linares MD   12.5 mg at 04/14/22 1831    lactobacillus (CULTURELLE) capsule 1 capsule  1 capsule Oral Daily Christopher Linares MD   1 capsule at 04/18/22 0854    pantoprazole (PROTONIX) 40 mg in sodium chloride (PF) 10 mL injection  40 mg IntraVENous BID Christopher Linares MD   40 mg at 04/18/22 0855    cyclobenzaprine (FLEXERIL) tablet 5 mg  5 mg Oral BID PRN Christopher Linares MD   5 mg at 04/18/22 1138    sodium chloride flush 0.9 % injection 5-40 mL  5-40 mL IntraVENous 2 times per day OLGA Lanza - CNP   10 mL at 04/18/22 0855    sodium chloride flush 0.9 % injection 5-40 mL  5-40 mL IntraVENous PRN Ari Barnard APRN - CNP        0.9 % sodium chloride infusion   IntraVENous PRN Ari Barnard APRN - CNP        acetaminophen (TYLENOL) tablet 650 mg  650 mg Oral Q6H PRN Ari Barnard, APRN - CNP   650 mg at 04/18/22 4853    Or    acetaminophen (TYLENOL) suppository 650 mg  650 mg Rectal Q6H PRN Ari Barnard, APRN - CNP        DULoxetine (CYMBALTA) extended release capsule 30 mg  30 mg Oral Daily Ari Barnard, APRN - CNP   30 mg at 04/18/22 0854    traMADol (ULTRAM) tablet 25 mg  25 mg Oral Q6H PRN Ari Barnard, APRN - CNP   25 mg at 04/18/22 1138    calcium carbonate (TUMS) chewable tablet 500 mg  500 mg Oral TID PRN Nii Miguel, APRN - CNP        ondansetron [facility]) injection 4 mg  4 mg IntraVENous Q6H PRN Nii Daub, APRN - CNP   4 mg at 04/18/22 0153         Intake/Output Summary (Last 24 hours) at 4/18/2022 1612  Last data filed at 4/18/2022 1400  Gross per 24 hour   Intake 2100 ml   Output 1330 ml   Net 770 ml     CBC:   Recent Labs     04/16/22  0429 04/17/22  0512 04/18/22  0343   WBC 8.5 7.9 8.0   HGB 10.0* 10.0* 9.6*    312 305     BMP:  Recent Labs     04/16/22  0429 04/17/22  0512 04/18/22  0343    139 137   K 3.7 3.2* 3.6   CL 104 101 101   CO2 18* 23 25   BUN 18 12 9   CREATININE 1.0 0.9 0.9   GLUCOSE 69* 111* 106         Objective:   Vitals: BP (!) 145/97   Pulse 78   Temp 97.7 °F (36.5 °C) (Temporal)   Resp 18   Ht 5' 5\" (1.651 m)   Wt 170 lb (77.1 kg)   SpO2 93%   BMI 28.29 kg/m²   Physical exam     General: no acute distress  Psych: Calm and cooperative  HEENT: NC/AT, no scleral icterus  Cardiovascular: Normal rate, regular rhythm  Respiratory: No intercostal retractions, no wheezes  Abdomen: Nontender, bowel sounds present, ostomy present  Neurologic: Alert, follows commands, normal speech  Musculoskeletal: No clubbing or cyanosis  Skin: Warm and dry      Assessment & Plan:    Principal Problem:    Sepsis secondary to UTI [facility])  Active Problems:    Lung nodules    Metastasis from colon cancer (HCC)    Acute kidney injury superimposed on CKD ([address]  Resolved Problems:    * No resolved hospital problems. *      81 y.o. male with recent right nephrectomy, recent ureteral stents, metastatic urothelial carcinoma, history of colorectal cancer with ostomy in place, presented with fever and nausea decreased urine output. On the admission denied problems with ostomy output. He was found to have UTI with culture speciated Candida glabrata and Achromobacter, ID consulted, treated with Eraxis and Levaquin. CT Chest consistent with progressive metastatic disease to the lungs. CT abdomen irregular soft tissue mass with some calcification within the pelvis. Followed by oncology and urology during hospital course. He was scheduled for pulmonary nodule biopsy by radiology but radiologist was concerned with small bowel distention per KUB, repeat CT abdomen showed ileus, pt did not have any ostomy output, he developed N/V/abd pain- NGT was placed. Evaluated by general surgery. Ileus subsequently resolved with removal of NG tube 4/16. Removed Mcgregor per urology and planning surveillance cystoscopy and stent change at some point.   Patient worked with PT and approved for [facility] inpatient rehab pending medical stability with plan for ureteral stent change by urology and CT-guided biopsy of pulmonary nodule.         Metastatic colorectal adenocarcinoma  Urothelial bladder cancer, s/p recent ureteral stents  -Left ureteral stricture with chronic indwelling stent  --Await timing of ureteral stent change per urology  Pulmonary nodules  --- Tentative plan for CT-guided biopsy, delayed until tomorrow    UTI with Achromobacter and Candida glabrata from culture, immunocompromise status, previous ureteral stents  -Antibiotics per ID, Eraxis and Levaquin    Bladder spasms, unable to take anticholinergics due to effect on his gut motility  --Start Myrbetriq per urology    Ileus-resolved  Constipation-bowel regimen  appreciate general surgery assistance     COLLEEN--improved  Nephrology following, avoid nephrotoxic agents    Monitor and replete electrolytes as needed      Disposition: Discharge to [facility] inpatient rehab pending CT-guided biopsy and ureteral stent change        Tony De La Cruz MD

## 2022-04-18 NOTE — PROGRESS NOTES
04/18/22 1118   Restrictions/Precautions   Restrictions/Precautions Fall Risk   Position Activity Restriction   Other position/activity restrictions has a colostomy   Subjective   Subjective Patient in bed agrees to walk   Pain Screening   Patient Currently in Pain No   Intervention List Patient able to continue with treatment;Nurse/physician notified   Oxygen Therapy   O2 Device None (Room air)   Bed Mobility   Supine to Sit Modified independent   Transfers   Sit to Stand Stand by assistance   Stand to sit Contact guard assistance   Ambulation   Ambulation?  Yes   Ambulation 1   Device [facility] guard assistance   Gait Deviations Decreased step length;Decreased step height   Distance 100'   Comments Patient in chair post gait   Activity Tolerance   Activity Tolerance Patient Tolerated treatment well   Safety Devices   Type of devices Left in chair;Call light within reach   Physical Therapy    Electronically signed by Jory Santos PTA on 4/18/2022 at 11:24 AM

## 2022-04-18 NOTE — PROGRESS NOTES
Subjective:  No acute events or changes. Tolerating current diet and having ostomy output, abdominal symptoms with pain, distention, nausea resolved. Objective:  BP (!) 145/97   Pulse 78   Temp 97.7 °F (36.5 °C) (Temporal)   Resp 18   Ht 5' 5\" (1.651 m)   Wt 170 lb (77.1 kg)   SpO2 93%   BMI 28.29 kg/m²   Date 04/18/22 0000 - 04/18/22 2359   Shift 6542-1166 2406-4383 4629-3611 24 Hour Total   INTAKE   P.O.(mL/kg/hr)  1320  1320   I. V.(mL/kg)  250(3.2)  250(3.2)   IV Piggyback(mL/kg)  530(6.9)  530(6.9)   Shift Total(mL/kg)  1477(77.5)  7433(89.1)   OUTPUT   Urine(mL/kg/hr) 555(0.9) 25  580   Stool(mL/kg) 200(2.6) 350(4.5)  550(7.1)   Shift Total(mL/kg) 755(9.8) 375(4.9)  1130(14.7)   Weight (kg) 77.1 77.1 77.1 77.1     General: NAD, AAO  Abdomen: Ostomy in place with output    CBC:   Lab Results   Component Value Date    WBC 8.0 04/18/2022    RBC 3.35 04/18/2022    HGB 9.6 04/18/2022    HCT 29.5 04/18/2022    MCV 88.1 04/18/2022    MCH 28.7 04/18/2022    MCHC 32.5 04/18/2022    RDW 13.7 04/18/2022     04/18/2022    MPV 9.7 04/18/2022     BMP:    Lab Results   Component Value Date     04/18/2022    K 3.6 04/18/2022     04/18/2022    CO2 25 04/18/2022    BUN 9 04/18/2022    LABALBU 3.1 04/12/2022    CREATININE 0.9 04/18/2022    CALCIUM 8.3 04/18/2022    GFRAA >59 04/18/2022    LABGLOM >60 04/18/2022    GLUCOSE 106 04/18/2022     Assessment and plan:  81year old male with SBO vs obstipation  1) Okay to continue advancing diet. Make sure he is taking plenty of fluids. Will change miralax and senna to PRN. Would recommend easing back in to his imodium regimen.  With resolution and now ostomy output, will follow peripherally and be available as needed  2) Continue other cares per medicine teams and urology

## 2022-04-18 NOTE — PROGRESS NOTES
Patient was scheduled this am for a ct lung biopsy. I called and talked with Massimo RENE with Dr Fredi Otero, and informed her of mechanical issues with one of the CT machines and we are awaiting a part. Due to that reason, the biopsy will have to be completed in a different area of radiology the PET lab which Dr Gordon Serrano will not perform the biopsy in that area. I asked her to have Dr Fredi Otero call and talk with the radiologist himself to work out a plan to have the biopsy completed and Dr Fredi Otero is out of town this am, but she stated she will ask him to call and talk with Dr Gordon Serrano this afternoon. She also asked me to inform the Nurse that is taking care of Mr Garrett Sousa that he can resume his diet and have him npo after midnight, but to keep the Lovenox on hold due to it being a 24 hour hold pre biopsy. I have called and talked to [facility] and informed her of the plan and also notified [id number].

## 2022-04-18 NOTE — PROGRESS NOTES
Nephrology ([facility]) Progress Note    Patient:  Samy Beth  YOB: 1950  Date of Service: 4/18/2022  MRN: [id number]   Acct: [de-identified]   Primary Care Physician: Kai Saavedra MD  Advance Directive: Full Code  Admit Date: 4/10/2022       Hospital Day: 8  Referring Provider: Rosa Isela Low MD    Patient Seen, Chart, Consults notes, Labs, Radiology studies reviewed. Subjective:  Samy Beth is a 81 y.o. male for whom we were consulted for evaluation and treatment of acute kidney injury. Patient recalls no prior nephrologic evaluations. He has recently had stent placement with urology. Later started on Bactrim for outpatient UTI. He denied current chest pain, shortness of air, nausea or vomiting. Had several days of decreased output, fatigue and malaise, fever with nausea as outpatient. Denied current chest pain, shortness of breath at rest, nausea vomiting. Now having better output from ostomy and notes continued resolution of abdominal pain. No new overnight events.      Allergies:  Morphine    Medicines:  Current Facility-Administered Medications   Medication Dose Route Frequency Provider Last Rate Last Admin    mirabegron (MYRBETRIQ) extended release tablet 25 mg  25 mg Oral Daily Liza Miller MD        lactated ringers infusion   IntraVENous Continuous Dusty Mcduffie MD 30 mL/hr at 04/17/22 1111 New Bag at 04/17/22 1111    dextrose bolus (hypoglycemia) 10% 125 mL  125 mL IntraVENous PRN Patience Holly, DO        Or    dextrose bolus (hypoglycemia) 10% 250 mL  250 mL IntraVENous PRN Patience Holly, DO        metoprolol succinate (TOPROL XL) extended release tablet 25 mg  25 mg Oral Daily Lyn Lopez MD   25 mg at 04/18/22 0855    And    hydroCHLOROthiazide (HYDRODIURIL) tablet 6.25 mg  6.25 mg Oral Daily Lyn Lopez MD   6.25 mg at 04/18/22 0854    enoxaparin (LOVENOX) injection 40 mg  40 mg SubCUTAneous Q24H Cammy Atkinson MD at [facility] 86      times 2... all levels    SPINE SURGERY      yesterday    TUNNELED VENOUS PORT PLACEMENT         Family History  Family History   Problem Relation Age of Onset    High Blood Pressure Mother     High Blood Pressure Father     Colon Cancer Father     Diabetes Father        Social History  Social History     Socioeconomic History    Marital status:      Spouse name: Not on file    Number of children: Not on file    Years of education: Not on file    Highest education level: Not on file   Occupational History    Not on file   Tobacco Use    Smoking status: Former Smoker     Packs/day: 2.00     Years: 15.00     Pack years: 30.00     Types: Cigarettes     Quit date: 1986     Years since quittin.9    Smokeless tobacco: Never Used   Vaping Use    Vaping Use: Never used   Substance and Sexual Activity    Alcohol use: No    Drug use: No    Sexual activity: Yes     Partners: Female   Other Topics Concern    Not on file   Social History Narrative    Not on file     Social Determinants of Health     Financial Resource Strain: Low Risk     Difficulty of Paying Living Expenses: Not hard at all   Food Insecurity: No Food Insecurity    Worried About [address] in the Last Year: Never true    [facility] in the Last Year: Never true   Transportation Needs:     Lack of Transportation (Medical): Not on file    Lack of Transportation (Non-Medical):  Not on file   Physical Activity:     Days of Exercise per Week: Not on file    Minutes of Exercise per Session: Not on file   Stress:     Feeling of Stress : Not on file   Social Connections:     Frequency of Communication with Friends and Family: Not on file    Frequency of Social Gatherings with Friends and Family: Not on file    Attends Christianity Services: Not on file    Active Member of Clubs or Organizations: Not on file    Attends Club or Organization Meetings: Not on file   [facility] Marital Status: Not on file   Intimate Partner Violence:     Fear of Current or Ex-Partner: Not on file    Emotionally Abused: Not on file    Physically Abused: Not on file    Sexually Abused: Not on file   Housing Stability:     Unable to Pay for Housing in the Last Year: Not on file    Number of [place] in the Last Year: Not on file    Unstable Housing in the Last Year: Not on file         Review of Systems:  History obtained from chart review and the patient  General ROS: No fever or chills  Respiratory ROS: No cough, shortness of breath, wheezing  Cardiovascular ROS: no chest pain or dyspnea on exertion  Gastrointestinal ROS: No abdominal pain or melena  Genito-Urinary ROS: + dysuria, no hematuria  Musculoskeletal ROS: No joint pain or swelling         Objective:  Blood pressure (!) 145/97, pulse 78, temperature 97.7 °F (36.5 °C), temperature source Temporal, resp. rate 18, height 5' 5\" (1.651 m), weight 170 lb (77.1 kg), SpO2 93 %. Intake/Output Summary (Last 24 hours) at 4/18/2022 1158  Last data filed at 4/18/2022 1118  Gross per 24 hour   Intake 1920 ml   Output 980 ml   Net 940 ml     General: alert and oriented x3   Chest:  clear to auscultation bilaterally without respiratory distress  CVS: regular rate and rhythm  Abdominal: soft, nontender, normal bowel sounds  Extremities: no cyanosis or edema  Skin: warm and dry without rash    Labs:  BMP:   Recent Labs     04/16/22 0429 04/17/22  0512 04/18/22  0343    139 137   K 3.7 3.2* 3.6    101 101   CO2 18* 23 25   BUN 18 12 9   CREATININE 1.0 0.9 0.9   CALCIUM 9.0 8.3* 8.3*     CBC:   Recent Labs     04/16/22 0429 04/17/22  0512 04/18/22  0343   WBC 8.5 7.9 8.0   HGB 10.0* 10.0* 9.6*   HCT 31.0* 30.3* 29.5*   MCV 88.8 88.3 88.1    312 305     LIVER PROFILE: No results for input(s): AST, ALT, LIPASE, BILIDIR, BILITOT, ALKPHOS in the last 72 hours. Invalid input(s):   AMYLASE,  ALB  PT/INR:   Recent Labs     04/18/22  9494 PROTIME 13.3   INR 1.02     APTT: No results for input(s): APTT in the last 72 hours. BNP:  No results for input(s): BNP in the last 72 hours. Ionized Calcium:No results for input(s): IONCA in the last 72 hours. Magnesium:  Recent Labs     04/16/22  0429 04/17/22  0512 04/18/22  0343   MG 1.6 1.2* 1.6     Phosphorus:No results for input(s): PHOS in the last 72 hours. HgbA1C: No results for input(s): LABA1C in the last 72 hours. Hepatic: No results for input(s): ALKPHOS, ALT, AST, PROT, BILITOT, BILIDIR, LABALBU in the last 72 hours. Lactic Acid: No results for input(s): LACTA in the last 72 hours. Troponin: No results for input(s): CKTOTAL, CKMB, TROPONINT in the last 72 hours. ABGs: No results for input(s): PH, PCO2, PO2, HCO3, O2SAT in the last 72 hours. CRP:  No results for input(s): CRP in the last 72 hours. Sed Rate:  No results for input(s): SEDRATE in the last 72 hours. Cultures:   No results for input(s): CULTURE in the last 72 hours. Radiology reports as per the Radiologist  Radiology: CT ABDOMEN PELVIS WO CONTRAST Additional Contrast? Oral    Result Date: 4/10/2022  CT ABDOMEN PELVIS WO CONTRAST 4/10/2022 11:23 AM HISTORY: Question sepsis. COMPARISON: 11/9/2021 DLP: 1163 mGy cm. All CT scans are performed using dose optimization techniques as appropriate to the performed exam and include at least one of the following: Automated exposure control, adjustment of the mA and/or kV according to size, and the use of the iterative reconstruction technique. TECHNIQUE: Noncontrast enhanced images of the abdomen and pelvis obtained with oral contrast. FINDINGS: Multiple pulmonary nodules are noted within the lung bases. The largest is within the medial right lung base measuring 16 mm in long axis. Findings worrisome for metastatic disease to the lungs. . A moderate size hiatal hernia is present. This contains contrast suggesting gastroesophageal reflux. LIVER: No focal liver lesion.  BILIARY SYSTEM: The gallbladder is surgically absent. No biliary dilatation is present. [place] PANCREAS: No focal pancreatic lesion. SPLEEN: Splenic granulomas are present. No evidence of splenomegaly. [place] KIDNEYS AND ADRENALS: The adrenals are unremarkable. The patient's undergone right nephrectomy. A left-sided double-J intraureteral stent is in place with the stent well-positioned. There is mild dilatation of the left ureter and upper tracts of the left kidney with mild urothelial thickening. Upper tract distention is improved from the previous exam of November of last year. No evidence of discrete renal mass or perinephric fluid collection. .  No discrete left-sided ureteral calculus is identified. [place] RETROPERITONEUM: An IVC filter is in place within the inferior vena cava. There is no evidence of retroperitoneal lymphadenopathy. There is mild thickening within the inferior right paracolic gutter and right pelvic sidewall. GI TRACT: A left lower quadrant ostomy is present. There has been at least partial colon resection. . The appendix is not visualized and is likely surgically absent. . OTHER: There is no evidence of mass or adenopathy within the small bowel mesentery. Postoperative changes are noted of the lower lumbar spine. . No suspicious bony lesions are identified. There is An accentuated lumbar lordosis with previous kyphoplasty at T12 and L1 with a moderate compression deformity at L1. A wedge compression deformity of L2 is also present. The patient's undergone fusion at the L5-S1 level with grade 1 anterolisthesis of L5 on S1. There is an accentuated lumbar lordosis. . No free fluid is present. PELVIS: A partially calcified presacral mass with associated induration and thickening is again demonstrated. I feel this demonstrates some interval increase in size with potential involvement of the right posterior lateral urinary bladder wall. The urinary bladder is evacuated with a Mcgregor catheter in place.  The mass measures approximately 8.0 cm in anterior to posterior dimension by 2.9 cm in transverse dimension. Involvement of the right posterior lateral urinary bladder wall is not excluded. . The urinary bladder cannot be fully assessed as it is decompressed with a Mcgregor catheter. .    1. Metastatic disease to the lung bases showing worsening from the previous study. 2. Moderate size hiatal hernia with gastroesophageal reflux. 3. The patient is status post at least partial colectomy. Left lower quadrant ostomy is present. There is no evidence of obstruction. 4. There is an irregular soft tissue mass with some calcification within the pelvis. This extends to and is inseparable from the right posterior lateral urinary bladder wall with potential secondary involvement. This extends into the presacral space. When compared to the previous exam I feel this is increased in size. The urinary bladder cannot be fully assessed as it is decompressed with a Mcgregor catheter. 5. Postoperative changes of the mid lower lumbar spine. There is an accentuated lumbar lordosis with grade 1-2 anterolisthesis of L5 on S1. Compression deformities at T12, L1 and L2 are present with previous kyphoplasty T12 and L1. . 6. Status post right nephrectomy. There is mild dilatation of the upper tracts of the left kidney and left ureter with a double-J intraureteral stent well-positioned. The degree of distention of the upper tracts of the left kidney is improved from the previous exam of November of last year. There is mild urothelial thickening. Signed by Dr Nataliya Aguilar    Result Date: 4/10/2022  CT CHEST WO CONTRAST 4/10/2022 11:23 AM HISTORY: Question sepsis. Multiple areas of previous cancer. COMPARISON: 9/3/2021 DLP: 779 mGy cm.  All CT scans are performed using dose optimization techniques as appropriate to the performed exam and include at least one of the following: Automated exposure control, adjustment of the mA and/or kV according to size, and the use of the iterative reconstruction technique. TECHNIQUE: Serial helical tomographic images of the chest were acquired. Bone and soft tissue algorithms were provided. Coronal reformatted images were also provided for review. FINDINGS: Neck base: The imaged portion of the neck and thyroid gland is unremarkable. A tunneled infusion catheter is in place with the distal aspect of the catheter extending into the right ventricle. Lungs: Extensive metastatic disease is noted to the lungs showing progression from the previous examination with multiple new nodules present as well as interval increase in size of previously documented nodules. Reference nodule within the superior segment of the right lower lobe previously measured at 5 mm in size now measures 13 mm in size. A lesion within the medial right lower lobe now measuring 1.6 cm in long axis previously measured 1.1 cm. There is no evidence of acute consolidative pneumonia. . No pleural effusion is present. The trachea and bronchial tree are patent. Heart: There is mild cardiomegaly. Moderate coronary calcifications are present. . There is no pericardial effusion. Great vessels: The proximal great vessels are remarkable for mild calcific plaquing involving the innominate and left subclavian artery without rate limiting stenosis. The proximal great vessels are tortuous. . Lymph nodes: No significant hilar, mediastinal or axillary lymphadenopathy is appreciated. Skeletal and soft tissues: The patient's undergone previous ACDF of the lower cervical spine. The patient's undergone kyphoplasty for compression deformities in the lower thoracic and upper lumbar spine. These findings are stable from the previous exam. 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 Adelina Palomo Upper abdomen: A moderate size hiatal hernia is present. A left-sided double-J ureteral stent is in place within the upper tracts of the left kidney.  An IVC filter is in place. The patient is status post right nephrectomy. . No free fluid is noted within the upper abdomen. 1. Progressive metastatic disease to the lungs. There are too numerous to count pulmonary nodules the majority of which have increased in size or which are new from the previous study. No evidence of acute consolidative pneumonia. 2. 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 Patient's undergone previous kyphoplasty at the thoracolumbar juncture for compression deformities. There is an accentuated thoracic kyphosis. . Signed by Dr Anna Mccullough RENAL COMPLETE    Result Date: 4/11/2022  EXAMINATION:  US RENAL COMPLETE  4/11/2022 12:04 PM HISTORY: Acute renal insufficiency. COMPARISON: No comparison study. TECHNIQUE: Grayscale and color flow imaging were performed. FINDINGS: There has been prior right nephrectomy. The left kidney measures 13.2 cm. The cortical thickness and echogenicity are normal. There is mild to moderate dilatation of the lower pole collecting system. The urinary bladder is decompressed. The patient reportedly has a left ureteral stent that is not well seen on this exam.    1. Prior right nephrectomy. 2. The cortical thickness and echogenicity of the left kidney are normal. The left kidney is normal in size. 3. There is mild to moderate dilatation of the left renal collecting system predominantly involving the lower pole. The patient reportedly has a double-J ureteral stent in place. The stent is not well seen on ultrasound. Signed by Dr Breann Minor    XR CHEST PORTABLE    Result Date: 4/10/2022  EXAMINATION: Chest one view 4/10/2022 HISTORY: Fever and fatigue. FINDINGS: Today's exam is compared to previous study of 4/30/2020. The patient's undergone previous fusion of the mid and lower cervical spine. A tunneled infusion catheter is in place via right-sided approach with the tip in the right atrium.  There are suspected pulmonary nodules within the lung bases. . Bibasilar atelectasis is present. No evidence of consolidative pneumonia or effusion. There are what appear to be some endovascular coils projecting over the lateral right heart border. These were not present on previous chest radiograph of 4/30/2020 and should be correlated clinically. 1.. Question developing nodules within the lower lobes. Metastatic disease should be considered and follow-up with outpatient CT imaging of the chest could BE obtained. There is bibasilar atelectasis. No evidence of lobar pneumonia. 2. There are what appear to be some metallic coils injecting over the lateral right heart border. These were not present on previous exams and should be correlated clinically. An infusion catheter is in place via right-sided approach with the tip in the right atrium. Signed by Dr David Sidhu kidney injury- resolved  -Hyponatremia  -Metabolic acidosis  -Acute cystitis with hematuria  -Colon cancer with possible metastases    Plan:  Renal function and electrolytes at baseline. Renal service will sign off for now. Recall as needed.

## 2022-04-18 NOTE — PROGRESS NOTES
Infectious Diseases Progress Note    Patient:  Dwain Beavers  YOB: 1950  MRN: [id number]   Admit date: 4/10/2022   Admitting Physician: Brayan Lucero MD  Primary Care Physician: Rafa Yee MD    Chief Complaint/Interval History:  He is feeling better. Had a 2-hour past for Easter yesterday. Indicates she had a good time. Ostomy continues to function. Possible trial with Mcgregor catheter out today. Hopefully stent change soon. In/Out    Intake/Output Summary (Last 24 hours) at 4/18/2022 0659  Last data filed at 4/18/2022 0512  Gross per 24 hour   Intake 1190 ml   Output 875 ml   Net 315 ml     Allergies:    Allergies   Allergen Reactions    Morphine Anxiety     Current Meds: trospium (SANCTURA) tablet 20 mg, BID AC  lactated ringers infusion, Continuous  dextrose bolus (hypoglycemia) 10% 125 mL, PRN   Or  dextrose bolus (hypoglycemia) 10% 250 mL, PRN  metoprolol succinate (TOPROL XL) extended release tablet 25 mg, Daily   And  hydroCHLOROthiazide (HYDRODIURIL) tablet 6.25 mg, Daily  enoxaparin (LOVENOX) injection 40 mg, Q24H  anidulafungin (ERAXIS) 100 mg in dextrose 5 % 130 mL IVPB, Q24H  levoFLOXacin (LEVAQUIN) tablet 500 mg, Daily  sodium bicarbonate tablet 650 mg, 4x Daily  sennosides-docusate sodium (SENOKOT-S) 8.6-50 MG tablet 2 tablet, BID  polyethylene glycol (GLYCOLAX) packet 17 g, BID  promethazine (PHENERGAN) injection 12.5 mg, Q4H PRN  lactobacillus (CULTURELLE) capsule 1 capsule, Daily  pantoprazole (PROTONIX) 40 mg in sodium chloride (PF) 10 mL injection, BID  cyclobenzaprine (FLEXERIL) tablet 5 mg, BID PRN  sodium chloride flush 0.9 % injection 5-40 mL, 2 times per day  sodium chloride flush 0.9 % injection 5-40 mL, PRN  0.9 % sodium chloride infusion, PRN  acetaminophen (TYLENOL) tablet 650 mg, Q6H PRN   Or  acetaminophen (TYLENOL) suppository 650 mg, Q6H PRN  DULoxetine (CYMBALTA) extended release capsule 30 mg, Daily  traMADol (ULTRAM) tablet 25 mg, Q6H PRN  calcium carbonate (TUMS) chewable tablet 500 mg, TID PRN  ondansetron (ZOFRAN) injection 4 mg, Q6H PRN      Review of Systems See HPI    VitalSigns:  BP (!) 153/91   Pulse 118   Temp 97.9 °F (36.6 °C) (Temporal)   Resp 16   Ht 5' 5\" (1.651 m)   Wt 170 lb (77.1 kg)   SpO2 96%   BMI 28.29 kg/m²      Physical Exam  Line/IV site: No erythema, warmth, induration, or tenderness. Ostomy functioning  Abdomen without guarding rebound    Lab Results:  CBC:   Recent Labs     04/16/22 0429 04/17/22  0512 04/18/22  0343   WBC 8.5 7.9 8.0   HGB 10.0* 10.0* 9.6*    312 305     BMP:  Recent Labs     04/16/22 0429 04/17/22  0512 04/18/22  0343    139 137   K 3.7 3.2* 3.6    101 101   CO2 18* 23 25   BUN 18 12 9   CREATININE 1.0 0.9 0.9   GLUCOSE 69* 111* 106     CultureResults:  Urine culture April 10-Candida glabrata and Achromobacter    Radiology: None    Additional Studies Reviewed:  None    Impression:  1. Fever remains resolved  2. Catheter related/stent related UTI-Candida glabrata and Achromobacter-stable/responding to current treatment  3. Ileus-remains resolved  4. History of colorectal cancer  5.   History urothelial cancer    Recommendations:  Continue anidulafungin  Continue levofloxacin  Trial without Mcgregor catheter  Possible stent change soon  Repeat urinalysis and urine culture at time of stent change  Continue to follow    Bran Beatty MD

## 2022-04-18 NOTE — PROGRESS NOTES
Urology Progress Note      SUBJECTIVE:  Some bladder spasms    OBJECTIVE:      REVIEW OF SYSTEMS:   Review of Systems      Physical  VITALS:  BP (!) 145/97   Pulse 78   Temp 97.7 °F (36.5 °C) (Temporal)   Resp 18   Ht 5' 5\" (1.651 m)   Wt 170 lb (77.1 kg)   SpO2 93%   BMI 28.29 kg/m²   TEMPERATURE:  Current - Temp: 97.7 °F (36.5 °C); Max - Temp  Av.7 °F (36.5 °C)  Min: 97.3 °F (36.3 °C)  Max: 97.9 °F (36.6 °C)   24 HR I&O   Intake/Output Summary (Last 24 hours) at 2022 0738  Last data filed at 2022 0512  Gross per 24 hour   Intake 1190 ml   Output 875 ml   Net 315 ml     BACK: no tenderness in spine or flanks  ABDOMEN:  soft, non-distended and non-tender  HEART:  normal rate and regular rhythm  CHEST:  Normal effort  GENITAL/URINARY:  normal    Data       CBC:   Recent Labs     22  0429 22  0512 22  0343   WBC 8.5 7.9 8.0   HGB 10.0* 10.0* 9.6*   HCT 31.0* 30.3* 29.5*    312 305     BMP:    Recent Labs     22  0429 22  0512 22  0343    139 137   K 3.7 3.2* 3.6    101 101   CO2 18* 23 25   BUN 18 12 9   CREATININE 1.0 0.9 0.9   GLUCOSE 69* 111* 106       No results for input(s): LABURIN in the last 72 hours. No results for input(s): BC in the last 72 hours. No results for input(s): Ronita [place] in the last 72 hours.       ASSESSMENT AND PLAN    Patient Active Problem List   Diagnosis    Thoracic facet joint syndrome    Primary osteoarthritis of left hip    Leg swelling    Abnormal nuclear cardiac imaging test    Abnormal nuclear cardiac imaging test    S/p bare metal coronary artery stent    Coronary artery disease involving native coronary artery of native heart without angina pectoris    Complex regional pain syndrome type 1 of right lower extremity    Hydronephrosis, bilateral    Extrinsic ureteral obstruction, bilateral    History of rectal cancer    Anemia of chronic disease    Pelvic mass    Lung nodules    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    Colorectal cancer ([address]    Metastasis from colon cancer ([address]    Proteinuria    Chemotherapy management, encounter for    Anemia associated with chemotherapy    Other fatigue    Thrush    Dehydration    Chemotherapy-induced peripheral neuropathy (HCC)    Chemotherapy-induced nausea    SBO (small bowel obstruction) (HCC)    Burning with urination    History of small bowel obstruction    Encounter for central line care    Iron deficiency    History of bladder cancer    Hydronephrosis of left kidney    Hydronephrosis of right kidney    Low back pain    Urothelial carcinoma of right distal ureter ([address]    Sepsis secondary to UTI ([address]    Acute kidney injury superimposed on CKD ([address]       1. Mcgregor cath out today will start myrbetrq for bladder spasms. Can  Not take anticholinergics because of decreases gut motility side effect. 2. Left ureteral stricture with chronic indwelling stent will plan for stent change. 3. History of bladder cancer plan surveillance cysto possible  biopsy / TURBT   At same time as stent change.     Kerri Trevino MD
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
## 2022-04-19 NOTE — PROGRESS NOTES
Two hours post rt lung biopsy stable CXR with no evidence of pneumothorax. Pt's vitals remain stable.

## 2022-04-19 NOTE — PROGRESS NOTES
1           Progress Note    Patient name: Félix Stephens  Patient : 1950  MR #[id number]  Room: 5    Portions of this note have been copied forward, however, changed to reflect the most current clinical status of this patient. Subjective: Anticipating CT-guided biopsy lung today. Continues to have right knee pain. HISTORY OF PRESENT ILLNESS:  Mackenzie Keyes is a very pleasant 81years old male who has a diagnosis of stage IV colonic adenocarcinoma. He was receiving palliative chemotherapy after 2021. He was found to have high-grade urothelial carcinoma involving the ureter. He underwent surgery, nephroureterectomy at [facility].  He had a complicated postoperative course. He eventually recovered and his current receiving home care needs at home. He has also several other comorbidities include CAD, hypertension, hyperlipidemia and CKD stage III. The patient presented ER department with complaints of generalized malaise for the last several days, low-grade fever, nausea. Decreased urine output. Patient has a ostomy in place. Work-up in the emergent showed moderate hyponatremia sodium 127, mild elevated lactic acid, elevated creatinine 2.4 (baseline's 1.5-1.7). He also had neutrophil leukocytosis and positive nitrates and large blood on the urine analysis. Chest x-ray was abnormal and therefore CT chest was performed that showed evidence of metastatic disease to the lungs. Patient received IV fluid resuscitation the emergency. He was started on broad-spectrum antibiotics. He was admitted with consultation from me and also ID.  4/10/2022-CT abdomen/pelvis without contrast showed evidence of metastatic disease to the lung bases. Moderate size hiatal hernia with gastroesophageal reflux. Status post partial colectomy. Left lower quadrant ostomy is present. No evidence obstruction. Irregular soft tissue mass with some calcification the pelvis.   This demonstrated interval increase was potential involvement with the right posterior lateral urinary bladder wall. The mass measures 8 x 2.9 cm. Left-sided double J intraureteral stent is in place with stent well positioned. 4/10/2022-CT chest without contrast showed extensive metastatic disease is noted to the lungs showing progression from the previous examination with multiple new nodules present as well as interval increase in size of previously documented nodules. Reference nodule within the superior segment of the right lower lobe previously measured at 5 mm in size now measures 13 mm in size. A lesion within the medial right lower lobe now measuring 1.6 cm in long axis previously measured 1.1 cm. There is no evidence of acute consolidative pneumonia. Essentially, findings consistent with progressive metastatic disease to the lungs. There are too numerous to count pulmonary nodules the majority of which have increased in size or which are new from the previous study. 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 Prior oncological history  ONCOLOGIC HISTORY:   Diagnosis:  Moderately differentiated rectal carcinoma, T3N0Mx, diagnosed in 3/9/2009  Noninvasive high-grade papillary urethral carcinoma. Negative for evidence of detrusor muscle invasion, pTa, pNx on 8/18/2019. Metastatic colorectal carcinoma, 9/3/2019  MSI stable and mutations for BRAF, NRAS, KRAS were not detected. Recurrent bladder cancer- superficial   Urothelial carcinoma of the ureter     TREATMENT SUMMARIES:  4/9/2009-5/27/2009-received neoadjuvant chemotherapy with 5-FU CIV along with radiation therapy for a total of 5400 cG  7/15/2009-rectum resection revealed no residual malignancy, complete pathological response.   8/18/2019- transurethral resection of bladder tumor (TURBT)   9/18/2019-12/26/2019 palliative chemotherapy with modified FOLFOX 7  (Oxaliplatin 85 mg/m² IV day 1, leucovorin 400 mg/m² IV day 1 and 5-FU 2400 mg/m² IV continuous infusion over 46 to 48 hours for a total of 7 cycles. 1/28/2020 -palliative maintenance therapy with leucovorin 400 mg/m² IV over 2 hours on day 1, followed by 5-FU bolus 400 mg/m² and then 1200 mg/m²/day x2 days (total 2400 mg meter squared over 46 to 48 hours) continuous infusion. Repeat every 2 weeks. ONCOLOGIC HISTORY # NMIBC_Bladder cancer. Pankaj Betancourt was diagnosed with noninvasive urethral carcinoma, pTa, pNx on 8/18/2019. Ta tumors are papillary lesions that tend to recur but are relatively benign and generally do not invade the bladder. Adjuvant treatment is not warranted at this time and will be monitored closely. Biopsy and transurethral resection of bladder tumor (TURBT) on 8/18/2019 by Dr. Yolanda Pinto with pathology revealing noninvasive high-grade papillary urethral carcinoma. Negative for evidence of detrusor muscle invasion, pTa, pNx.   12/3/2019- cystoscopy with removal and replacement of double-J urethral stent. Pathology from dome of the bladder/tumor revealed high-grade papillary urethral carcinoma, noninvasive. No muscularis propria present. 2/26/2020 - cystoscopy and bilateral ureteral stent removal and replacement. The operative note by Dr. Fely Cherry documented bilateral hydronephrosis and obtained biopsy of the bladder in the mid dome and left anterior lateral wall x2. Pathology documented high-grade papillary urethral carcinoma, noninvasive, stage pTaNx.  5/28/2020-the patient underwent a cystoscopy and resection of bladder tumor on 05/28/2020 with findings consistent with noninvasive, high-grade papillary urothelial carcinoma. Muscularis propria was not identified. The patient will receive intravesical BCG therapy. 7/6/2020-CT Abdomen/ Pelvis-Moderate severe right and mild left hydronephrosis with bilateral ureteral stents, which have an adequate radiographic position.  Right kidney with cortical thickening and somewhat asymmetrically decreased enhancement which can be seen with obstructive uropathy. Postoperative changes of colectomy. Left lower quadrant ostomy. Slightly decreased size of presacral low density compared to4/15/2020. Similar intrahepatic and extrahepatic bile duct dilation compared to 4/15/2020. Correlate with clinical symptoms and laboratory studies if clinically indicated. Similar chronic bony findings. 3/25/2021-CT abdomen showed severe hydronephrosis. Cystoscopy was performed by urology. 4/1/21 Bladder neck tumor, biopsies: High-grade papillary urothelial carcinoma, noninvasive. Muscularis propria is not present. AJCC pathologic stage:  pTa Nx   4/14/2021-reviewed results of pathology. No evidence of invasive disease. Continue surveillance cystoscopy with urology. 9/13/2021-he was seen by urology. Findings of ureteral high-grade urothelial carcinoma. Noninvasive. The patient is being referred to [facility] in [address] [place] [address]. 10/11/2021-he was seen by [facility] and recommended cystoscopy with biopsy and possible laser ablation and bilateral leg stent exchange at that time. 4/10/2022-CT abdomen/pelvis without contrast showed evidence of metastatic disease to the lung bases. Moderate size hiatal hernia with gastroesophageal reflux. Status post partial colectomy. Left lower quadrant ostomy is present. No evidence obstruction. Irregular soft tissue mass with some calcification the pelvis. This demonstrated interval increase was potential involvement with the right posterior lateral urinary bladder wall. The mass measures 8 x 2.9 cm. Left-sided double J intraureteral stent is in place with stent well positioned. 4/10/2022-CT chest without contrast showed extensive metastatic disease is noted to the lungs showing progression from the previous examination with multiple new nodules present as well as interval increase in size of previously documented nodules.  Reference nodule within the superior segment of the right lower lobe previously measured at 5 mm in size now measures 13 mm in size. A lesion within the medial right lower lobe now measuring 1.6 cm in long axis previously measured 1.1 cm. There is no evidence of acute consolidative pneumonia. Essentially, findings consistent with progressive metastatic disease to the lungs. There are too numerous to count pulmonary nodules the majority of which have increased in size or which are new from the previous study. 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 ONCOLOGIC HISTORY #3  Sina Bobby was seen in initial oncology consultation on 8/19/2019 during his hospitalization at [address] [place] [address] after a large pelvic mass was identified which raised concern for recurrent disease. Further pathology consistent with recurrent rectal cancer  8/17/2019- CEA 18.1  8/17/2019- CT scan of the kidney with contrast documented moderate to severe right hydronephrosis with dilation of the right ureter into the lower pelvis the site of the parasacral soft tissue changes. Partially calcified soft tissue changes within the janes-sacral region likely representing sequelae of pelvic radiation. Increasing scarring/fibrosis versus tumor recurrence within the presacral changes, likely represents a site of right distal ureter obstruction. No left-sided hydronephrosis. 8/18/2019 -Double-J ureter stent placement for right hydronephrosis secondary to extrinsic compression by pelvic mass. 8/27/2019-CT scan of the chest with contrast documented numerous pulmonary nodules that appear new compared to 11/12/2017, RUL nodule measuring 7 mm and LLL nodule measuring 5 mm. Soft tissue nodule at the left ventral abdominal wall. Slight increased size of a probable lymph node anterior to the aorta measuring 0.9 cm compared to 0.7 cm. Similar presacral, right pelvic sidewall and right abductor muscular nodular soft tissue density.   8/27/2019 CT scan of the abdomen and pelvis with contrast identified new moderate left hydronephrosis with moderate right hydronephrosis. Mild stranding around the urinary bladder and thickening of the bilateral ureteral wall. Numerous pulmonary nodules. Soft tissue of the left ventral abdominal wall. Slightly increased size of probable lymph node anterior to the aorta measuring 0.9 cm compared to 0.7 cm.  8/27/2019-PET scan did not identify any FDG avid pulmonary nodules or airspace opacities. Abnormal increased metabolic activity within the right pelvic wall soft tissue showing SUV of 5.4. Abnormal soft tissue metabolic activity in the right abductor muscle with SUV of 6.4. Focally increased activity to the right of the inferior L5 vertebrae body posterior with SUV of 7.9 with associated sclerotic changes. 8/29/2019-  Dr. Armani Elliott completed a cystoscopy with double-J ureter stent in the left ureter for left hydronephrosis  9/3/2019- CT-guided right abductor muscle biopsy on 9/3/2019 with pathology identifying metastatic adenocarcinoma consistent with colorectal origin. Molecular panel from biopsy tissue revealed MSI stable and mutations for BRAF, NRAS, KRAS were not detected. 9/18/2019 - Palliative chemotherapy with modified FOLFOX 7  (Oxaliplatin 85 mg/m² IV day 1, leucovorin 400 mg/m² IV day 1 and 5-FU 2400 mg/m² IV continuous infusion over 46 to 48 hours) with the anticipation of adding Avastin 5 mg/kg day 1 every 14 days  10/15/2019- 24-hour urine for protein with a total protein of 1785 mg per 24-hour. Harsha Nascimento has been evaluated by Dr. Charles Stokes and he reports no significant concerns related to the protein. 11/6/19 CEA 5.6 significantly improved compared to CEA of 14.0 on 8/30/2019.  11/15/2019 -CT scan of the abdomen and pelvis documented no evidence of disease progression with significant decrease in the size of enhancing nodules in the right pelvic abductor musculature, a previous 1.8 cm nodule now measures 5 mm.   No new or enlarging retroperitoneal, mesenteric, pelvic or inguinal lymph nodes. Calcified presacral mass unchanged measuring 5 x 3.7 cm.  11/15/2019 -CT scan of the chest documented multiple small pulmonary nodules reidentified, largest nodule in the RUL measures 5 mm compared to 7.5 mm, RLL nodule measures 3.4 mm compared to 5.9 mm, VIC nodule measures 4 mm compared to 6 mm. A cluster of small nodules in the RUL anteriorly are barely visible on this study. There is a decrease in size of mediastinal lymph nodes compared to previous exam, right distal paratracheal lymph node measuring 4.5 mm compared to 8.3 mm and lower right peritracheal node measuring 4.5 mm compared to 8.6 mm.    1/13/2020- CT scan of the abdomen and pelvis with contrast indicated improvement in the right-sided hydronephrosis with a chronic inflammatory process of the ureters suspected due to the moderate thickening, also present on previous study. The small poorly enhancing nodules in the right abductor muscles have decreased in the partially calcified presacral mass and right lateral pelvic wall nodules are stable compared to previous study. Resolution of the subcutaneous abdominal wall nodules. A prominent retroperitoneal lymph node adjacent and anterior to the left common artery is redefined and measures 6 mm, no change from previous exam.   1/13/2020 - CT scan of the chest documented a right lower lobe nodule measures 4.3 cm and is unchanged. A right lower lobe nodule measures 2.8 mm compared to 3.4 mm. Nodule in the right upper lobe is barely visible and measures 2.4 mm. Nodule in the left lower lobe measures 4.8 mm and is unchanged. Nodule in left lower lobe posterior measures 2.8 mm and previously measured 4.7 mm. A right lower lobe posterior medially nodule is barely visible measuring 0.2 mm and previously. measured 4.5 mm. No new nodules identified. No change in the size of the mediastinal lymph nodes.   1/28/2020 -palliative maintenance therapy with leucovorin 400 mg/m² IV over 2 hours on day 1, followed by 5-FU bolus 400 mg/m² and then 1200 mg/m²/day x2 days (total 2400 mg meter squared over 46 to 48 hours) continuous infusion. Repeat every 2 weeks. Only received 1 cycle, further treatment held due to small bowel obstruction. 1/30/2020 - CT scan of the abdomen and pelvis indicated high-grade small bowel obstruction with transition point in the midline posterior pelvis where a small bowel loop is tethered to a partially calcified presacral soft tissue mass. 2/11/2020-CEA 1.4  3/5/2020-  Exploratory laparotomy, removal of adhesions, small bowel resection with primary anastomosis and partial thickness small bowel repair by Dr. Jade Desai at [facility]. In the operative note Dr. Sherrell Craft reported no evidence of carcinomatosis within the abdomen and the liver was unable to be examined due to extent of right upper quadrant adhesions. Pathology from small intestine documented no evidence of malignancy. 4/15/2020 Ct Chest W Contrast Minimal interval increase in size of subcentimeter pulmonary nodules. The largest now measures 6 mm in the medial right lower lobe on axial image 80. There is a new, unstable, horizontal fracture through the T6 vertebral body. Additionally, there are new fractures through the posterior 11th and 12th right ribs. The bones are moderately osteopenic. The finding of an unstable fracture through the T6 vertebral body was discussed with Ana Mercedes at 10:45 AM on 4/15/2020.   4/15/2020 Ct Abdomen Pelvis W Iv Contrast  Patient has undergone interval resection of the distal small bowel, and there is a 2.8 cm fluid collection in the presacral operative bed. This contains a tiny focus of air. This may postoperative or due to infection. Please correlate with the patient's clinical symptoms and laboratory markers. Improved hydronephrosis and hydroureter. Diffuse osteoporosis.  Findings in the lower chest are described in a separate dictation. 4/22/2020-CEA 0.9  6/2/2020-resumed chemotherapy with 5-FU/leucovorin and Panitumumab. Okay to do 1 today then CMP CEA   8/19/20 CEA-1.1  10/21/2020- CEA 2.0  11/11/2020- Ct Chest W Contrast Multiple, too numerous to count, small noncalcified lung nodules bilaterally. The referenced nodules appears to have decreased in size the previous study. No new nodules. 11/11/2020- Ct Abdomen Pelvis W Contrast Unchanged bilateral hydronephrosis, more on the right side. Bilateral ureteral stents in place. Moderate asymmetrical thickening of the incompletely distended urinary bladder. This may partly be due to incomplete distention. Possibility of chronic cystitis and or chronic partial outlet obstruction may not be excluded. A functioning left lower abdominal ostomy. A small parastomal small bowel herniation without obstruction. A partially calcified presacral mass. The soft tissue component have increased in size in the previous study. The osseous changes are described above. Any superimposed metastatic disease is not excluded and would be hard to evaluate due to extensive postsurgical changes. 11/18/2020-essentially, overall stable disease. Improvement of the lung nodules with decreased in the size of the target lesions. The pelvic lesion is is likely worse by 25%. However, CEA is is still within the normal limits. Therefore likely mixed response. We will continue current treatment and repeat CT scans in about 3 months. 12/16/2020-discontinue 5-FU bolus from his regimen. 2/9/2021- Ct Chest W Contrast No evidence of disease progression. Stable pulmonary nodules. Stable intrahepatic and extrahepatic bile duct dilation compared to prior 11/11/2020. Postoperative, posttraumatic and degenerative changes in the spine as described above. Old right-sided rib fractures.    2/9/2021- Ct Abdomen Pelvis W Contrast showed evidence of response to therapy including decreased presacral mass/thickening now measuring 1.1 cm, previously 1.9 cm on 11/11/2020. Stable intrahepatic and extra hepatic bile duct dilation with cholecystectomy clips. See separately dictated CT chest of the same day regarding pulmonary nodules. Bilateral ureteral stents. Decreased bilateral hydronephrosis. Urinary bladder wall is thickened, which could be seen with post treatment changes or cystitis. Correlate with symptoms. Chronic bony findings as above  2/17/2021-reviewed CT chest abdomen pelvis. Essentially consistent with disease response to therapy. Continue current therapy. 2/17/21 CEA 1.0  3/25/21 Ct Abdomen Pelvis W Iv Contrast  The stomach is distended, however no small bowel dilatation identified. Contrast identified within the left ileostomy bag. The distal stomach is under distended which may be secondary to peristalsis. Gastroparesis considered. Bilateral ureteral stents remain appropriate in position, however there is new moderate to severe right-sided hydronephrosis and mild left-sided hydronephrosis when compared to the 2/9/2021 exam. Mild increase considered within the partially calcified presacral pelvic mass. Stable partially calcified right pelvic soft tissue. Similar abnormal wall thickening of the bladder most notable superiorly. Similar prominence of the intra and extra hepatic bile ducts down to the level of the ampulla. Findings may represent a reservoir effect, however correlation with liver function tests recommended. Therefore. Stable noncalcified left greater than right pulmonary nodules with asymmetrical interstitial changes of the right lung base concerning for pneumonitis. Osteopenia with postoperative changes of the lumbar spine. Chronic compression deformities of the thoracolumbar vertebra as described above. 4/14/2021-I reviewed notes from urology and also CT abdomen/pelvis that showed mild interval increase in the size of the soft tissue nodule in the pelvis.   However, this is still very small and therefore will have a short follow-up CT scan and of May 2021. I personally reviewed the CT scans. Really hard to state that there is clear-cut disease progression. Again, short follow-up recommended. Continue current treatment. 5/12/21 CEA 0.8  5/26/2001-CT chest abdomen pelvis showed Partially calcified presacral soft tissue mass appears unchanged. Previously measured soft tissue noncalcified component is unchanged measuring 2.2 x 3.2 cm on axial image 60. However, this is questionable. CT chest showed a stable pulmonary nodules. Subcentimeter nodules. Largest 7.5 mm.  6/1/21 CEA 0.9  06/01/23- reviewed scans. Stable disease. Continue treatment every 3 weeks as per patient request. Continue infusional 5-FU, Panitumumab and leucovorin. No 5-FU bolus due to severe mucositis. 8/11/2021 CEA .9  9/03/2021 CT Chest w/Contrast Slight interval increase in the size of pulmonary nodules, the largest in the medial right lung base. Findings are concerning for metastatic disease. Atherosclerosis of the aorta and coronary arteries. Sclerotic rib lesions favored for osseous metastases. Old rib fractures also evident. 9/03/2021 CT Abd/Pelvis w/ IV Contrast (Oral) A persistent partially calcified mass in the presacral region with encasement of the distal ureters bilaterally and resultant bilateral hydronephrosis. Bilateral ureteral stents in place. There is increasing right-sided hydronephrosis since the previous study. Right renal atrophy similar to the previous study. Moderate asymmetrical thickening of the incompletely distended urinary bladder is similar to the previous study. This may partly be due to incomplete distention. An inflammatory process or a neoplastic process is not excluded. Moderate intrahepatic biliary dilatation is similar to the previous study and probably due to a prior cholecystectomy.  Moderate dilatation of the contrast filled small bowel loops may represent an ileus or partial distal small bowel obstruction. There is left lower abdominal ileostomy which appears patent. The stable compression fractures of the lower thoracic and proximal lumbar vertebrae and hardware fusion similar to the previous study. 9/22/21- Decrease Vectibix to every other treatment. He is currently receiving chemotherapy every 3 weeks as per patient request      ONCOLOGIC HISTORY # Rectal carcinoma:  Lizett Gomes has a history of moderately differentiated rectal carcinoma, T3N0Mx, diagnosed in 3/9/2009. He received neoadjuvant chemotherapy with radiation and resection with J-pouch. He has been routinely followed at [facility] and was last seen by Dr. Karly Marino on 1/10/2019. The following are pertinent findings related to his diagnosis and treatment. 3/9/2009- Esophagogastroduodenoscopy with biopsy by Dr. Pasquale Hummel that revealed rectal mass at 8 cm with pathology being consistent with moderately differentiated adenocarcinoma. 3/18/2019-Dr.Elizabeth Willie Suarez at [facility] performed a flexible sigmoidoscopy and rectal endoscopic ultrasound that revealed the tumor extending 6-7 mm deep through the muscularis propria layer and into the serosa, T3 lesion. 4/9/2009-5/27/2009-received neoadjuvant chemotherapy with 5-FU CIV along with radiation therapy for a total of 5400 cGy under the direction of Dr. Nick Morillo. 7/15/2009-rectum resection revealed no residual malignancy. 22 regional nodes were negative for malignancy. The rectum distal doughnut was negative for malignancy. He was documented to have a complete pathological response. 9/9/2009- Ileostomy excision pathology revealed small bowel wall with changes consistent with ileostomy site.   Pathology negative for malignancy    Objective   PHYSICAL EXAM:  CONSTITUTIONAL: Alert, appropriate, no acute distress, appears chronically ill  EYES: Non icteric, EOM intact, pupils equal round   ENT: Mucus membranes moist, external inspection of ears and nose are normal  NECK: Supple, no masses. No JVD  CHEST/LUNGS: CTA bilaterally, normal respiratory effort   CARDIOVASCULAR: RRR. No lower extremity edema  ABDOMEN: soft non-tender, active bowel sounds. Left abdominal ileostomy with great output  EXTREMITIES: warm, moves extremities. Gait not observed. SKIN: warm, dry with no rashes or lesions  LYMPH: No cervical, clavicular, or axillary lymphadenopathy  NEUROLOGIC: follows commands, non focal   PSYCH: mood and affect appropriate. Alert and oriented to time, place, person    Vital Signs  BP (!) 144/94   Pulse 104   Temp 98.1 °F (36.7 °C) (Temporal)   Resp 18   Ht 5' 5\" (1.651 m)   Wt 170 lb (77.1 kg)   SpO2 96%   BMI 28.29 kg/m²     Intake/Output Summary (Last 24 hours) at 4/19/2022 0640  Last data filed at 4/19/2022 3871  Gross per 24 hour   Intake 2770 ml   Output 1005 ml   Net 1765 ml     Labs:  CBC:   Recent Labs     04/17/22  0512 04/18/22  0343 04/19/22  0152   WBC 7.9 8.0 7.6   HGB 10.0* 9.6* 9.5*    305 319     CMP:   Recent Labs     04/17/22  0512 04/18/22  0343 04/19/22  0152   GLUCOSE 111* 106 96   BUN 12 9 7*   CREATININE 0.9 0.9 1.1   CO2 23 25 27   CALCIUM 8.3* 8.3* 8.2*       Blood Culture Recent:   Recent Labs     04/10/22  0958   BC No growth after 5 days of incubation. 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 MSI stable and negative mutations for BRAF, NRAS, KRAS wild type.     09/22/21- CEA 0.6  Continue palliative intent 5-FU/leucovorin every 3 weeks as per patient request. Panitumumab on hold as per patient request (significant toxicity with the skin toxicity and mouth sores). Not a good candidate for Avastin due to frequent exchange of ureteral stents and hematuria. 4/10/2022-CT abdomen/pelvis without contrast showed evidence of metastatic disease to the lung bases. Moderate size hiatal hernia with gastroesophageal reflux. Status post partial colectomy.   Left lower quadrant ostomy is present. No evidence obstruction. Irregular soft tissue mass with some calcification the pelvis. This demonstrated interval increase was potential involvement with the right posterior lateral urinary bladder wall. The mass measures 8 x 2.9 cm. Left-sided double J intraureteral stent is in place with stent well positioned. 4/10/2022-CT chest without contrast showed extensive metastatic disease is noted to the lungs showing progression from the previous examination with multiple new nodules present as well as interval increase in size of previously documented nodules. Reference nodule within the superior segment of the right lower lobe previously measured at 5 mm in size now measures 13 mm in size. A lesion within the medial right lower lobe now measuring 1.6 cm in long axis previously measured 1.1 cm. There is no evidence of acute consolidative pneumonia. Essentially, findings consistent with progressive metastatic disease to the lungs. There are too numerous to count pulmonary nodules the majority of which have increased in size or which are new from the previous study. 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 -CEA repeated 4/11/2022: 2.8  -CT-guided FNA lung nodule biopsy originally scheduled 4/13/2022, post-poned     Non invasive Urothelial Bladder Cancer (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 The operative note by Dr. Paul Tracy documented bilateral hydronephrosis and obtained biopsy of the bladder in the mid dome and left anterior lateral wall x2. Pathology documented high-grade papillary urethral carcinoma, noninvasive, stage pTaNx.   As per Urology    5/28/2020-the patient underwent a cystoscopy and resection of bladder tumor on 05/28/2020 with findings consistent with noninvasive, high-grade papillary urothelial carcinoma. Muscularis propria was not identified. Currently receiving intravesical BCG.  4/1/2021-repeat cystoscopy showed a small bladder lesion. Tumor resection of bladder tumor consistent with noninvasive high-grade urothelial carcinoma. 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 Referred to [place]  10/14/2021-urology at [facility]. Urine cytology consistent with atypical urothelial cells. Since the patient was last seen by Dr. Melani Oleary November 2021 he underwent a major resection of the high-grade urothelial carcinoma of the ureter at [facility].  His postoperative course was complicated and he was hospitalized for many weeks. He eventually was discharged and is currently receiving home care at home. He is still very debilitated. -CT findings from 4/10/2022 concerning for metastatic disease, as noted above    CT guided FNA of pulmonary nodule today  Last dose prophylactic Lovenox was 4/16/2022-held yesterday for biopsy today  -Urology, Dr. Delvis Siddiqi following    Ileus: Resolved  4/13/2022-CT abdomen/pelvis showed  When compared to the previous exam of 3 days earlier there is now  noted to be moderate small bowel distention. I would favor a adynamic ileus. A distal obstruction at the site of the patient's ileostomy is also in the differential but felt less likely. There is mild distention of the stomach. A moderate size hiatal hernia is present. Mild to moderate dilatation of the upper tracts of the left kidney. A left-sided double-J ureteral stent is in place. There is mild urothelial thickening. I do not see evidence of a discrete ureteral stone. The stent is well-positioned. Partially calcified pelvic mass. This is inseparable from the right posterior lateral wall of the urinary bladder along its lower margin. A Mcgregor catheter is in place within the bladder.    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 There is a small amount of free fluid within the subhepatic space and Morison space. A small right-sided effusion is present with multiple pulmonary nodules within the lower lobes consistent with metastatic disease to the lungs. There is a moderate size hiatal hernia present. There is a small volume of retained contrast within stomach and dilated proximal small bowel loops. There are dilated mid to distal small bowel loops without contrast.   Followed and managed by general surgery    -NG removed 4/16/2022  -Tolerating diet  -N.p.o. today for CT-guided needle biopsy    Normocytic anemia-stable  Serology 4/12/2022:  Iron: 77, TIBC 233, iron saturation 33%, ferritin 589  Vitamin B12: 308  Folate: 13.89          Osteoporosis-Osteoporosis BMD -3.6 April 2020.     -Continue Vit D, Boniva and Calcium. Acute kidney injury/CKD stage III  Cr 1.1  Nephrology assisting    Hyponatremia-Resolved  Sodium 135, today 4/19/2022    Candida glabrata/AchrmobacterUTI  -Currently on Anidulafungin/Levaquin by mouth    PLAN:  Continue current supportive care  UTI - anidulafungin and Levofloxacin therapy as per ID  CT guided FNA of pulmonary nodule today    (Please note that portions of this note were completed with a voice recognition program. Efforts were made to edit the dictations but occasionally words are mis-transcribed.)    Alonzo Wild, OLGA  04/19/22  6:40 AM    Physician's attestation/substantial contribution:  I, Dr Lorenda Lennox, independently performed an evaluation on Koki Vargas. I have reviewed relevant medical information/data to include but not limited to medication list, relevant appropriate labs and imaging when applicable. I reviewed other physician's notes, ancillary services and nurses assessment. I have reviewed the above documentation completed by the Nurse Practitioner or Physician Assistant. Please see my additional contributions to the history of present illness, physical examination, and assessment/medical decision-making that reflect my findings and impressions. I have seen and examined the patient and the key elements of all parts of the encounter have been performed by me. I agree with the assessment and plan as outlined by the ARNP/PA. Subjective-complains of right knee pain. Discussed about CT-guided biopsy today  Objective-as above  Assessment/plan:  Pulmonary nogemwi-BD-drcxto biopsy today. Discussed radiology. Discussed with nursing staff for arrangements. Lovenox on hold. Right knee pain-secondary to arthritis. Increase tramadol to 50 mg every 6 hours as needed.     Cesilia Joyce MD

## 2022-04-19 NOTE — PROGRESS NOTES
Urology Progress Note      SUBJECTIVE: Patient states he feels better after catheter out he is having some incontinence and urgency to void but is having periods of dryness in between. OBJECTIVE: Levaquin was discontinued yesterday. Patient to have lung biopsy today    REVIEW OF SYSTEMS:   Review of Systems   Constitutional: Positive for appetite change. Negative for fever. HENT: Negative. Respiratory: Negative. Cardiovascular: Negative. Gastrointestinal: Positive for nausea. Negative for abdominal pain and vomiting. Genitourinary: Positive for urgency. Neurological: Positive for weakness. All other systems reviewed and are negative. Physical  VITALS:  BP (!) 131/92   Pulse 116   Temp 97.7 °F (36.5 °C) (Temporal)   Resp 16   Ht 5' 5\" (1.651 m)   Wt 170 lb (77.1 kg)   SpO2 96%   BMI 28.29 kg/m²   TEMPERATURE:  Current - Temp: 97.7 °F (36.5 °C); Max - Temp  Av.9 °F (36.6 °C)  Min: 97.5 °F (36.4 °C)  Max: 98.2 °F (36.8 °C)   24 HR I&O   Intake/Output Summary (Last 24 hours) at 2022 0736  Last data filed at 2022 4745  Gross per 24 hour   Intake 2770 ml   Output 1005 ml   Net 1765 ml     BACK: no tenderness in spine or flanks  ABDOMEN:  soft, non-distended and non-tender the ostomy producing  HEART:  normal rate and abnormal rate-tacky 100- 110  CHEST: Normal respiratory effort  GENITAL/URINARY: Normal external genitalia normal testes    Data       CBC:   Recent Labs     22  0512 22  0343 22   WBC 7.9 8.0 7.6   HGB 10.0* 9.6* 9.5*   HCT 30.3* 29.5* 29.0*    305 319     BMP:    Recent Labs     22  0522  0343 22    137 135*   K 3.2* 3.6 3.7    101 96*   CO2 23 25 27   BUN 12 9 7*   CREATININE 0.9 0.9 1.1   GLUCOSE 111* 106 96       No results for input(s): LABURIN in the last 72 hours. No results for input(s): BC in the last 72 hours.   No results for input(s): Leopoldo Maradiaga in the last 72 hours.    ASSESSMENT AND PLAN    Patient Active Problem List   Diagnosis    Thoracic facet joint syndrome    Primary osteoarthritis of left hip    Leg swelling    Abnormal nuclear cardiac imaging test    Abnormal nuclear cardiac imaging test    S/p bare metal coronary artery stent    Coronary artery disease involving native coronary artery of native heart without angina pectoris    Complex regional pain syndrome type 1 of right lower extremity    Hydronephrosis, bilateral    Extrinsic ureteral obstruction, bilateral    History of rectal cancer    Anemia of chronic disease    Pelvic mass    Lung nodules    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    Colorectal cancer ([address]    Metastasis from colon cancer (HCC)    Proteinuria    Chemotherapy management, encounter for    Anemia associated with chemotherapy    Other fatigue    Thrush    Dehydration    Chemotherapy-induced peripheral neuropathy (HCC)    Chemotherapy-induced nausea    SBO (small bowel obstruction) (HCC)    Burning with urination    History of small bowel obstruction    Encounter for central line care    Iron deficiency    History of bladder cancer    Hydronephrosis of left kidney    Hydronephrosis of right kidney    Low back pain    Urothelial carcinoma of right distal ureter ([address]    Sepsis secondary to UTI ([address]    Acute kidney injury superimposed on CKD ([address]       1. Cath acquired UTI. His last dose of Levaquin was yesterday. In anticipation of doing stent change will make sure he gets a dose today and is covered with perioperative antibiotic. We will plan for cystoscopy left stent change tomorrow. 2.  LUTS. Some bladder spasms and urgency with urge incontinence after Mcgregor catheter removed. Patient was started on Myrbetriq. We will check residuals today by bladder scan    3. Left ureteral stricture with chronic indwelling left ureteral stent plan for left stent change tomorrow.     4. History of bladder cancer surveillance cystoscopy at the time of stent change tomorrow possible biopsy fulguration/TURBT if any bladder tumors are identified.     5.  Pulmonary nodules biopsy to be done today per oncology    Bryant Brochure MD

## 2022-04-19 NOTE — PROGRESS NOTES
Physical Therapy  Name: Priscila Holley  MRN:  [id number]  Date of service:  4/19/2022 04/19/22 1316   Subjective   Subjective Pt has bedrest order at this time after biopsy. Will continue to follow.        Electronically signed by Stepan Núñez PTA on 4/19/2022 at 1:19 PM

## 2022-04-19 NOTE — PROGRESS NOTES
HOSPITAL MEDICINE  - PROGRESS NOTE    Admit Date: 4/10/2022         CC: fever,nausea,decreased urine output       Subjective: Symptoms improved, still with good output from ostomy. Overall feels better with Mcgregor catheter removed. No fevers or chills. Tolerating diet without any nausea or vomiting. Going for CT-guided biopsy today.         ROS  14 point review of systems completed and is negative except as otherwise noted      Data:   Scheduled Meds: Reviewed  Current Facility-Administered Medications   Medication Dose Route Frequency Provider Last Rate Last Admin    traMADol (ULTRAM) tablet 50 mg  50 mg Oral Q6H PRN Tim Lundberg MD   50 mg at 04/19/22 0735    [START ON 4/20/2022] cefTRIAXone (ROCEPHIN) 1,000 mg in sterile water 10 mL IV syringe  1,000 mg IntraVENous On Call to Hi Stephenson MD        levoFLOXacin [facility]) tablet 500 mg  500 mg Oral Daily Ritchie Mao MD        midazolam (VERSED) 2 MG/2ML injection             fentaNYL (SUBLIMAZE) 100 MCG/2ML injection             mirabegron (MYRBETRIQ) extended release tablet 25 mg  25 mg Oral Daily Ritchie Mao MD        polyethylene glycol [facility]) packet 17 g  17 g Oral Daily PRN Malorie Alejo MD        sennosides-docusate sodium (SENOKOT-S) 8.6-50 MG tablet 2 tablet  2 tablet Oral Daily PRN Malorie Alejo MD        lactated ringers infusion   IntraVENous Continuous Uma Hickey MD 30 mL/hr at 04/17/22 1111 New Bag at 04/17/22 1111    dextrose bolus (hypoglycemia) 10% 125 mL  125 mL IntraVENous PRN Patience Holly, DO        Or    dextrose bolus (hypoglycemia) 10% 250 mL  250 mL IntraVENous PRN Patience Holly, DO        metoprolol succinate (TOPROL XL) extended release tablet 25 mg  25 mg Oral Daily Jimmy Lujan MD   25 mg at 04/18/22 0855    And    hydroCHLOROthiazide (HYDRODIURIL) tablet 6.25 mg  6.25 mg Oral Daily Bubba Cuff Nenita Gallego MD   6.25 mg at 04/18/22 0854    [Held by provider] enoxaparin (LOVENOX) injection 40 mg  40 mg SubCUTAneous Q24H kD Dawson MD   40 mg at 04/16/22 1501    anidulafungin (ERAXIS) 100 mg in dextrose 5 % 130 mL IVPB  100 mg IntraVENous Q24H Yue Abebe MD   Stopped at 04/18/22 1030    sodium bicarbonate tablet 650 mg  650 mg Oral 4x Daily Dk Dawson MD   650 mg at 04/18/22 1938    promethazine (PHENERGAN) injection 12.5 mg  12.5 mg IntraMUSCular Q4H PRN Dk Dawson MD   12.5 mg at 04/14/22 1831    lactobacillus (CULTURELLE) capsule 1 capsule  1 capsule Oral Daily Dk Dawson MD   1 capsule at 04/18/22 0854    pantoprazole (PROTONIX) 40 mg in sodium chloride (PF) 10 mL injection  40 mg IntraVENous BID Dk Dawson MD   40 mg at 04/18/22 1938    cyclobenzaprine (FLEXERIL) tablet 5 mg  5 mg Oral BID PRN Dk Dawson MD   5 mg at 04/18/22 1939    sodium chloride flush 0.9 % injection 5-40 mL  5-40 mL IntraVENous 2 times per day OLGA Platt - CNP   10 mL at 04/18/22 1947    sodium chloride flush 0.9 % injection 5-40 mL  5-40 mL IntraVENous PRN Glen Magallanes APRN - CNP        0.9 % sodium chloride infusion   IntraVENous PRN Glen Magallanes APRN - ALIS        acetaminophen (TYLENOL) tablet 650 mg  650 mg Oral Q6H PRN Glen Magallanes APRN - CNP   650 mg at 04/18/22 2204    Or    acetaminophen (TYLENOL) suppository 650 mg  650 mg Rectal Q6H PRN Glen Magallanes APRN - CNP        DULoxetine (CYMBALTA) extended release capsule 30 mg  30 mg Oral Daily Glen Magallanes APRN - CNP   30 mg at 04/18/22 0854    calcium carbonate (TUMS) chewable tablet 500 mg  500 mg Oral TID PRN Kiana Sosa APRN - CNP   500 mg at 04/19/22 0841    ondansetron (ZOFRAN) injection 4 mg  4 mg IntraVENous Q6H PRN OLGA Mayorga CNP   4 mg at 04/19/22 0253         Intake/Output Summary (Last 24 hours) at 4/19/2022 1354  Last data filed at 4/19/2022 6256  Gross per 24 hour   Intake 730 ml   Output 900 ml   Net -170 ml     CBC:   Recent Labs     04/17/22  0512 04/18/22  0343 04/19/22  0152   WBC 7.9 8.0 7.6   HGB 10.0* 9.6* 9.5*    305 319     BMP:  Recent Labs     04/17/22  0512 04/18/22  0343 04/19/22  0152    137 135*   K 3.2* 3.6 3.7    101 96*   CO2 23 25 27   BUN 12 9 7*   CREATININE 0.9 0.9 1.1   GLUCOSE 111* 106 96         Objective:   Vitals: BP (!) 151/92   Pulse 125   Temp 97.7 °F (36.5 °C) (Temporal)   Resp 18   Ht 5' 5\" (1.651 m)   Wt 170 lb (77.1 kg)   SpO2 97%   BMI 28.29 kg/m²   Physical exam     General: no acute distress  Psych: Calm and cooperative  HEENT: NC/AT, no scleral icterus  Cardiovascular: Normal rate, regular rhythm  Respiratory: No intercostal retractions, no wheezes  Abdomen: Nontender, bowel sounds present, ostomy present  Neurologic: Alert, follows commands, normal speech  Musculoskeletal: No clubbing or cyanosis  Skin: Warm and dry          Labs, diagnostics, chart reviewed. Assessment & Plan:    Principal Problem:    Sepsis secondary to UTI [facility])  Active Problems:    Ureteral stricture, left    Lung nodules    Metastasis from colon cancer ([facility] [address]    Acute kidney injury superimposed on CKD (HCC)    Urinary tract infection associated with [facility]elling [facility]    Retained ureteral stent    Lower urinary tract symptoms  Resolved Problems:    * No resolved hospital problems. *      81 y.o. male with recent right nephrectomy, recent ureteral stents, metastatic urothelial carcinoma, history of colorectal cancer with ostomy in place, presented with fever and nausea decreased urine output. On the admission denied problems with ostomy output. He was found to have UTI with culture speciated Candida glabrata and Achromobacter, ID consulted, treated with Eraxis and Levaquin. CT Chest consistent with progressive metastatic disease to the lungs.  CT abdomen irregular soft tissue mass with some calcification within the pelvis. Followed by oncology and urology during hospital course. He was scheduled for pulmonary nodule biopsy by radiology but radiologist was concerned with small bowel distention per KUB, repeat CT abdomen showed ileus, pt did not have any ostomy output, he developed N/V/abd pain- NGT was placed. Evaluated by general surgery. Ileus subsequently resolved with removal of NG tube 4/16. Removed Mcgregor per urology and planning surveillance cystoscopy and stent change at some point. Patient worked with PT and approved for [facility] inpatient rehab pending medical stability with plan for ureteral stent change by urology and CT-guided biopsy of pulmonary nodule.         Metastatic colorectal adenocarcinoma  Urothelial bladder cancer, s/p recent ureteral stents  -Left ureteral stricture with chronic indwelling stent  -- Urology planning ureteral stent change tomorrow  Pulmonary nodules  --- CT-guided biopsy today, follow-up results    UTI with Achromobacter and Candida glabrata from culture, immunocompromise status, previous ureteral stents  -Antibiotics per ID, Eraxis and Levaquin    Bladder spasms, unable to take anticholinergics due to effect on his gut motility  --Myrbetriq per urology    Ileus-resolved  Constipation-bowel regimen  appreciate general surgery assistance     COLLEEN--improved  Nephrology following, avoid nephrotoxic agents    Knee pain  - As needed analgesics        Disposition: Anticipate discharge to [facility] inpatient rehab in 1-2 days depending on timing of ureteral stent change        Rosa Isela Low MD

## 2022-04-19 NOTE — PROGRESS NOTES
Called report to 5th floor RN, Lisbeth Middleton. Pt remains stable. Reported that pt is experiencing low back pain and is requesting that his pain pump be applied upon return to the floor.

## 2022-04-19 NOTE — PROGRESS NOTES
Date of the Proposed Procedure:   4/19/2022     Proposed Procedure: biopsy of right lower lobe. Radiologist:  Tong Garcia MD  ASA 3  [place] 2  Indications:  Abnormal radiology scan    American Society of Anesthesiologists (ASA) Classification:  ASA 2 - Patient with mild systemic disease with no functional limitations    Plan of Sedation:  Moderate Sedation    Mallampati Classification: II (soft palate, uvula, fauces visible)    Prior to procedure:  I have reviewed the recent H&P from the referring physician, or other provider, related to ordered procedure. No interval changes were found upon examination/review since the original History and Physical / Consult Note. I have performed a pre-procedure assessment of this patient on 4/19/2022, in the CT procedure room, in the presence of a rADIOLOGY NURSE AND [facility] AND patient  in detail,  the risks, benefits, and alternatives to this procedure. hEMOPTASIS AND PNEUMOTHORAX WERE DISCUSSED. Patient/ is aware there are risks associated with moderate sedation which includes transient drop in blood pressure and need for assisted ventilation. Blood loss less than 5 cc. Plan for discharge:  Discharge patient after post procedure ordered recovery time. After monitoring in dept patient will be transferred to floor.       Tong Garcia MD  4/19/202210:11 AM

## 2022-04-19 NOTE — PROGRESS NOTES
Pt to Radiology, Prep and Holding post procedure to monitor for 4 hrs before returning to  540. Pt tolerated procedure well. CXR was done before bringing pt to [address] [place] [address]. Assessment complete. Pt is doing well. No c/o pain or shortness of breath at this time. Report has been called to pt's 5th floor nurse, Margy. Dr. Benjie Blackburn who was on standby  has been notified that procedure went well.

## 2022-04-19 NOTE — PROGRESS NOTES
Inpatient scheduled for a CT lung biopsy. All medications have been reviewed and updated. Lovenox has been on hold for greater than 24 hrs. H&P is on the chart and dated 4/10/22. Allergies have been verified. Will notify Dr. Karishma Moreno when the pt is in the room so that he can be on standby.

## 2022-04-19 NOTE — PROGRESS NOTES
Physical Therapy  Name: Koki Perdomo  MRN:  [id number]  Date of service:  4/19/2022 04/19/22 0945   Subjective   Subjective Attempt: Pt is out of room at this time, will check back later.        Electronically signed by Tonio Ortega PTA on 4/19/2022 at 9:46 AM

## 2022-04-19 NOTE — PROGRESS NOTES
Comprehensive Nutrition Assessment    Type and Reason for Visit:  Reassess    Nutrition Assessment:    Pt has been eating well. Pt is currently NPO. Will continue to monitor nutritional status.     Nutrition Related Findings:    Colostomy, right nephrectomy Wound Type: Surgical Incision       Current Nutrition Intake & Therapies:    Average Meal Intake: NPO    Nutrition Diagnosis:   · Increased nutrient needs related to catabolic illness,altered GI structure as evidenced by diarrhea,weight loss,GI abnormality    Nutrition Interventions:   Food and/or Nutrient Delivery: Continue NPO  Coordination of Nutrition Care: Continue to monitor while inpatient    Nutrition Monitoring and Evaluation:   Food/Nutrient Intake Outcomes: Diet Advancement/Tolerance  Physical Signs/Symptoms Outcomes: Biochemical Data,Diarrhea,GI Status,Nausea or Vomiting,Fluid Status or Edema,Hemodynamic Status,Nutrition Focused Physical Findings,Weight    Heidi Christensen MS, RD, LD  Contact: [phone]

## 2022-04-20 PROBLEM — K56.7 ILEUS (HCC): Status: ACTIVE | Noted: 2022-01-01

## 2022-04-20 PROBLEM — A41.9 SEPSIS (HCC): Status: ACTIVE | Noted: 2022-01-01
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
## 2022-04-20 NOTE — PROGRESS NOTES
When transport arrived to get patient RN attempted to pull iv abx which was ordered to be given oncall-medication is out of stock in [facility]. [facility] called to let them know he had not yet received preop abx.

## 2022-04-20 NOTE — PROGRESS NOTES
Progress Note    Patient name: Ally Harvey  Patient : 1950  MR #[id number]  Room: 5    Portions of this note have been copied forward, however, changed to reflect the most current clinical status of this patient. Subjective: CT-guided lung biopsy completed yesterday, tolerated without incident. Anticipating cystoscopy and left stent change today. Continues to have right knee pain. HISTORY OF PRESENT ILLNESS:  Nehemias Astudillo is a very pleasant 81years old male who has a diagnosis of stage IV colonic adenocarcinoma. He was receiving palliative chemotherapy after 2021. He was found to have high-grade urothelial carcinoma involving the ureter. He underwent surgery, nephroureterectomy at [facility].  He had a complicated postoperative course. He eventually recovered and his current receiving home care needs at home. He has also several other comorbidities include CAD, hypertension, hyperlipidemia and CKD stage III. The patient presented ER department with complaints of generalized malaise for the last several days, low-grade fever, nausea. Decreased urine output. Patient has a ostomy in place. Work-up in the emergent showed moderate hyponatremia sodium 127, mild elevated lactic acid, elevated creatinine 2.4 (baseline's 1.5-1.7). He also had neutrophil leukocytosis and positive nitrates and large blood on the urine analysis. Chest x-ray was abnormal and therefore CT chest was performed that showed evidence of metastatic disease to the lungs. Patient received IV fluid resuscitation the emergency. He was started on broad-spectrum antibiotics. He was admitted with consultation from me and also ID.  4/10/2022-CT abdomen/pelvis without contrast showed evidence of metastatic disease to the lung bases. Moderate size hiatal hernia with gastroesophageal reflux. Status post partial colectomy. Left lower quadrant ostomy is present. No evidence obstruction. Irregular soft tissue mass with some calcification the pelvis. This demonstrated interval increase was potential involvement with the right posterior lateral urinary bladder wall. The mass measures 8 x 2.9 cm. Left-sided double J intraureteral stent is in place with stent well positioned. 4/10/2022-CT chest without contrast showed extensive metastatic disease is noted to the lungs showing progression from the previous examination with multiple new nodules present as well as interval increase in size of previously documented nodules. Reference nodule within the superior segment of the right lower lobe previously measured at 5 mm in size now measures 13 mm in size. A lesion within the medial right lower lobe now measuring 1.6 cm in long axis previously measured 1.1 cm. There is no evidence of acute consolidative pneumonia. Essentially, findings consistent with progressive metastatic disease to the lungs. There are too numerous to count pulmonary nodules the majority of which have increased in size or which are new from the previous study. 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 Prior oncological history  ONCOLOGIC HISTORY:   Diagnosis:  · Moderately differentiated rectal carcinoma, T3N0Mx, diagnosed in 3/9/2009  · Noninvasive high-grade papillary urethral carcinoma. Negative for evidence of detrusor muscle invasion, pTa, pNx on 8/18/2019. · Metastatic colorectal carcinoma, 9/3/2019  · MSI stable and mutations for BRAF, NRAS, KRAS were not detected. · Recurrent bladder cancer- superficial   · Urothelial carcinoma of the ureter     TREATMENT SUMMARIES:  · 4/9/2009-5/27/2009-received neoadjuvant chemotherapy with 5-FU CIV along with radiation therapy for a total of 5400 cG  · 7/15/2009-rectum resection revealed no residual malignancy, complete pathological response.   · 8/18/2019- transurethral resection of bladder tumor (TURBT)   · 9/18/2019-12/26/2019 palliative chemotherapy with modified FOLFOX 7  (Oxaliplatin 85 mg/m² IV day 1, leucovorin 400 mg/m² IV day 1 and 5-FU 2400 mg/m² IV continuous infusion over 46 to 48 hours for a total of 7 cycles. · 1/28/2020 -palliative maintenance therapy with leucovorin 400 mg/m² IV over 2 hours on day 1, followed by 5-FU bolus 400 mg/m² and then 1200 mg/m²/day x2 days (total 2400 mg meter squared over 46 to 48 hours) continuous infusion. Repeat every 2 weeks. ONCOLOGIC HISTORY # NMIBC_Bladder cancer. Sharron Fowler was diagnosed with noninvasive urethral carcinoma, pTa, pNx on 8/18/2019. Ta tumors are papillary lesions that tend to recur but are relatively benign and generally do not invade the bladder. Adjuvant treatment is not warranted at this time and will be monitored closely. Biopsy and transurethral resection of bladder tumor (TURBT) on 8/18/2019 by Dr. Peyton Rosas with pathology revealing noninvasive high-grade papillary urethral carcinoma. Negative for evidence of detrusor muscle invasion, pTa, pNx. · 12/3/2019- cystoscopy with removal and replacement of double-J urethral stent. Pathology from dome of the bladder/tumor revealed high-grade papillary urethral carcinoma, noninvasive. No muscularis propria present. · 2/26/2020 - cystoscopy and bilateral ureteral stent removal and replacement. The operative note by Dr. Beryle Dresser documented bilateral hydronephrosis and obtained biopsy of the bladder in the mid dome and left anterior lateral wall x2. Pathology documented high-grade papillary urethral carcinoma, noninvasive, stage pTaNx. · 5/28/2020-the patient underwent a cystoscopy and resection of bladder tumor on 05/28/2020 with findings consistent with noninvasive, high-grade papillary urothelial carcinoma. Muscularis propria was not identified. The patient will receive intravesical BCG therapy.   · 7/6/2020-CT Abdomen/ Pelvis-Moderate severe right and mild left hydronephrosis with bilateral ureteral stents, which have an adequate radiographic position. Right kidney with cortical thickening and somewhat asymmetrically decreased enhancement which can be seen with obstructive uropathy. Postoperative changes of colectomy. Left lower quadrant ostomy. Slightly decreased size of presacral low density compared to4/15/2020. Similar intrahepatic and extrahepatic bile duct dilation compared to 4/15/2020. Correlate with clinical symptoms and laboratory studies if clinically indicated. Similar chronic bony findings. · 3/25/2021-CT abdomen showed severe hydronephrosis. Cystoscopy was performed by urology. · 4/1/21 Bladder neck tumor, biopsies: High-grade papillary urothelial carcinoma, noninvasive. Muscularis propria is not present. AJCC pathologic stage:  pTa Nx   · 4/14/2021-reviewed results of pathology. No evidence of invasive disease. Continue surveillance cystoscopy with urology. · 9/13/2021-he was seen by urology. Findings of ureteral high-grade urothelial carcinoma. Noninvasive. The patient is being referred to [facility] in [place]. · 10/11/2021-he was seen by [facility] and recommended cystoscopy with biopsy and possible laser ablation and bilateral leg stent exchange at that time. · 4/10/2022-CT abdomen/pelvis without contrast showed evidence of metastatic disease to the lung bases. Moderate size hiatal hernia with gastroesophageal reflux. Status post partial colectomy. Left lower quadrant ostomy is present. No evidence obstruction. Irregular soft tissue mass with some calcification the pelvis. This demonstrated interval increase was potential involvement with the right posterior lateral urinary bladder wall. The mass measures 8 x 2.9 cm. Left-sided double J intraureteral stent is in place with stent well positioned.   · 4/10/2022-CT chest without contrast showed extensive metastatic disease is noted to the lungs showing progression from the previous examination with multiple new nodules present as well as interval increase in size of previously documented nodules. Reference nodule within the superior segment of the right lower lobe previously measured at 5 mm in size now measures 13 mm in size. A lesion within the medial right lower lobe now measuring 1.6 cm in long axis previously measured 1.1 cm. There is no evidence of acute consolidative pneumonia. Essentially, findings consistent with progressive metastatic disease to the lungs. There are too numerous to count pulmonary nodules the majority of which have increased in size or which are new from the previous study. 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 ONCOLOGIC HISTORY #3  Pankaj Betancourt was seen in initial oncology consultation on 8/19/2019 during his hospitalization at [facility] after a large pelvic mass was identified which raised concern for recurrent disease. Further pathology consistent with recurrent rectal cancer  · 8/17/2019- CEA 18.1  · 8/17/2019- CT scan of the kidney with contrast documented moderate to severe right hydronephrosis with dilation of the right ureter into the lower pelvis the site of the parasacral soft tissue changes. Partially calcified soft tissue changes within the janes-sacral region likely representing sequelae of pelvic radiation. Increasing scarring/fibrosis versus tumor recurrence within the presacral changes, likely represents a site of right distal ureter obstruction. No left-sided hydronephrosis. · 8/18/2019 -Double-J ureter stent placement for right hydronephrosis secondary to extrinsic compression by pelvic mass. · 8/27/2019-CT scan of the chest with contrast documented numerous pulmonary nodules that appear new compared to 11/12/2017, RUL nodule measuring 7 mm and LLL nodule measuring 5 mm. Soft tissue nodule at the left ventral abdominal wall.   Slight increased size of a probable lymph node anterior to the aorta measuring 0.9 cm compared to 0.7 cm.  Similar presacral, right pelvic sidewall and right abductor muscular nodular soft tissue density. · 8/27/2019 CT scan of the abdomen and pelvis with contrast identified new moderate left hydronephrosis with moderate right hydronephrosis. Mild stranding around the urinary bladder and thickening of the bilateral ureteral wall. Numerous pulmonary nodules. Soft tissue of the left ventral abdominal wall. Slightly increased size of probable lymph node anterior to the aorta measuring 0.9 cm compared to 0.7 cm. · 8/27/2019-PET scan did not identify any FDG avid pulmonary nodules or airspace opacities. Abnormal increased metabolic activity within the right pelvic wall soft tissue showing SUV of 5.4. Abnormal soft tissue metabolic activity in the right abductor muscle with SUV of 6.4. Focally increased activity to the right of the inferior L5 vertebrae body posterior with SUV of 7.9 with associated sclerotic changes. · 8/29/2019-  Dr. Penny Mercedes completed a cystoscopy with double-J ureter stent in the left ureter for left hydronephrosis  · 9/3/2019- CT-guided right abductor muscle biopsy on 9/3/2019 with pathology identifying metastatic adenocarcinoma consistent with colorectal origin. Molecular panel from biopsy tissue revealed MSI stable and mutations for BRAF, NRAS, KRAS were not detected. · 9/18/2019 - Palliative chemotherapy with modified FOLFOX 7  (Oxaliplatin 85 mg/m² IV day 1, leucovorin 400 mg/m² IV day 1 and 5-FU 2400 mg/m² IV continuous infusion over 46 to 48 hours) with the anticipation of adding Avastin 5 mg/kg day 1 every 14 days  · 10/15/2019- 24-hour urine for protein with a total protein of 1785 mg per 24-hour. Gtankita Link has been evaluated by Dr. Laura Schulte and he reports no significant concerns related to the protein. · 11/6/19 CEA 5.6 significantly improved compared to CEA of 14.0 on 8/30/2019.   · 11/15/2019 -CT scan of the abdomen and pelvis documented no evidence of disease progression with significant decrease in the size of enhancing nodules in the right pelvic abductor musculature, a previous 1.8 cm nodule now measures 5 mm. No new or enlarging retroperitoneal, mesenteric, pelvic or inguinal lymph nodes. Calcified presacral mass unchanged measuring 5 x 3.7 cm. · 11/15/2019 -CT scan of the chest documented multiple small pulmonary nodules reidentified, largest nodule in the RUL measures 5 mm compared to 7.5 mm, RLL nodule measures 3.4 mm compared to 5.9 mm, VIC nodule measures 4 mm compared to 6 mm. A cluster of small nodules in the RUL anteriorly are barely visible on this study. There is a decrease in size of mediastinal lymph nodes compared to previous exam, right distal paratracheal lymph node measuring 4.5 mm compared to 8.3 mm and lower right peritracheal node measuring 4.5 mm compared to 8.6 mm. · 1/13/2020- CT scan of the abdomen and pelvis with contrast indicated improvement in the right-sided hydronephrosis with a chronic inflammatory process of the ureters suspected due to the moderate thickening, also present on previous study. The small poorly enhancing nodules in the right abductor muscles have decreased in the partially calcified presacral mass and right lateral pelvic wall nodules are stable compared to previous study. Resolution of the subcutaneous abdominal wall nodules. A prominent retroperitoneal lymph node adjacent and anterior to the left common artery is redefined and measures 6 mm, no change from previous exam.   · 1/13/2020 - CT scan of the chest documented a right lower lobe nodule measures 4.3 cm and is unchanged. A right lower lobe nodule measures 2.8 mm compared to 3.4 mm. Nodule in the right upper lobe is barely visible and measures 2.4 mm. Nodule in the left lower lobe measures 4.8 mm and is unchanged. Nodule in left lower lobe posterior measures 2.8 mm and previously measured 4.7 mm.   A right lower lobe posterior medially nodule is barely visible measuring 0.2 mm and previously. measured 4.5 mm. No new nodules identified. No change in the size of the mediastinal lymph nodes. · 1/28/2020 -palliative maintenance therapy with leucovorin 400 mg/m² IV over 2 hours on day 1, followed by 5-FU bolus 400 mg/m² and then 1200 mg/m²/day x2 days (total 2400 mg meter squared over 46 to 48 hours) continuous infusion. Repeat every 2 weeks. Only received 1 cycle, further treatment held due to small bowel obstruction. · 1/30/2020 - CT scan of the abdomen and pelvis indicated high-grade small bowel obstruction with transition point in the midline posterior pelvis where a small bowel loop is tethered to a partially calcified presacral soft tissue mass. · 2/11/2020-CEA 1.4  · 3/5/2020-  Exploratory laparotomy, removal of adhesions, small bowel resection with primary anastomosis and partial thickness small bowel repair by Dr. Tj Reyes at [facility]. In the operative note Dr. Domo Gold reported no evidence of carcinomatosis within the abdomen and the liver was unable to be examined due to extent of right upper quadrant adhesions. Pathology from small intestine documented no evidence of malignancy. · 4/15/2020 Ct Chest W Contrast Minimal interval increase in size of subcentimeter pulmonary nodules. The largest now measures 6 mm in the medial right lower lobe on axial image 80. There is a new, unstable, horizontal fracture through the T6 vertebral body. Additionally, there are new fractures through the posterior 11th and 12th right ribs. The bones are moderately osteopenic. The finding of an unstable fracture through the T6 vertebral body was discussed with Ana Mercedes at 10:45 AM on 4/15/2020. · 4/15/2020 Ct Abdomen Pelvis W Iv Contrast  Patient has undergone interval resection of the distal small bowel, and there is a 2.8 cm fluid collection in the presacral operative bed. This contains a tiny focus of air. This may postoperative or due to infection.  Please correlate with the patient's clinical symptoms and laboratory markers. Improved hydronephrosis and hydroureter. Diffuse osteoporosis. Findings in the lower chest are described in a separate dictation. · 4/22/2020-CEA 0.9  · 6/2/2020-resumed chemotherapy with 5-FU/leucovorin and Panitumumab. Okay to do 1 today then CMP CEA   · 8/19/20 CEA-1.1  · 10/21/2020- CEA 2.0  · 11/11/2020- Ct Chest W Contrast Multiple, too numerous to count, small noncalcified lung nodules bilaterally. The referenced nodules appears to have decreased in size the previous study. No new nodules. · 11/11/2020- Ct Abdomen Pelvis W Contrast Unchanged bilateral hydronephrosis, more on the right side. Bilateral ureteral stents in place. Moderate asymmetrical thickening of the incompletely distended urinary bladder. This may partly be due to incomplete distention. Possibility of chronic cystitis and or chronic partial outlet obstruction may not be excluded. A functioning left lower abdominal ostomy. A small parastomal small bowel herniation without obstruction. A partially calcified presacral mass. The soft tissue component have increased in size in the previous study. The osseous changes are described above. Any superimposed metastatic disease is not excluded and would be hard to evaluate due to extensive postsurgical changes. · 11/18/2020-essentially, overall stable disease. Improvement of the lung nodules with decreased in the size of the target lesions. The pelvic lesion is is likely worse by 25%. However, CEA is is still within the normal limits. Therefore likely mixed response. We will continue current treatment and repeat CT scans in about 3 months. · 12/16/2020-discontinue 5-FU bolus from his regimen. · 2/9/2021- Ct Chest W Contrast No evidence of disease progression. Stable pulmonary nodules. Stable intrahepatic and extrahepatic bile duct dilation compared to prior 11/11/2020.  Postoperative, posttraumatic and degenerative changes in the spine as described above. Old right-sided rib fractures. · 2/9/2021- Ct Abdomen Pelvis W Contrast showed evidence of response to therapy including decreased presacral mass/thickening now measuring 1.1 cm, previously 1.9 cm on 11/11/2020. Stable intrahepatic and extra hepatic bile duct dilation with cholecystectomy clips. See separately dictated CT chest of the same day regarding pulmonary nodules. Bilateral ureteral stents. Decreased bilateral hydronephrosis. Urinary bladder wall is thickened, which could be seen with post treatment changes or cystitis. Correlate with symptoms. Chronic bony findings as above  · 2/17/2021-reviewed CT chest abdomen pelvis. Essentially consistent with disease response to therapy. Continue current therapy. · 2/17/21 CEA 1.0  · 3/25/21 Ct Abdomen Pelvis W Iv Contrast  The stomach is distended, however no small bowel dilatation identified. Contrast identified within the left ileostomy bag. The distal stomach is under distended which may be secondary to peristalsis. Gastroparesis considered. Bilateral ureteral stents remain appropriate in position, however there is new moderate to severe right-sided hydronephrosis and mild left-sided hydronephrosis when compared to the 2/9/2021 exam. Mild increase considered within the partially calcified presacral pelvic mass. Stable partially calcified right pelvic soft tissue. Similar abnormal wall thickening of the bladder most notable superiorly. Similar prominence of the intra and extra hepatic bile ducts down to the level of the ampulla. Findings may represent a reservoir effect, however correlation with liver function tests recommended. Therefore. Stable noncalcified left greater than right pulmonary nodules with asymmetrical interstitial changes of the right lung base concerning for pneumonitis. Osteopenia with postoperative changes of the lumbar spine. Chronic compression deformities of the thoracolumbar vertebra as described above. · 4/14/2021-I reviewed notes from urology and also CT abdomen/pelvis that showed mild interval increase in the size of the soft tissue nodule in the pelvis. However, this is still very small and therefore will have a short follow-up CT scan and of May 2021. I personally reviewed the CT scans. Really hard to state that there is clear-cut disease progression. Again, short follow-up recommended. Continue current treatment. · 5/12/21 CEA 0.8  · 5/26/2001-CT chest abdomen pelvis showed Partially calcified presacral soft tissue mass appears unchanged. Previously measured soft tissue noncalcified component is unchanged measuring 2.2 x 3.2 cm on axial image 60. However, this is questionable. CT chest showed a stable pulmonary nodules. Subcentimeter nodules. Largest 7.5 mm. · 6/1/21 CEA 0.9  · 06/01/23- reviewed scans. Stable disease. Continue treatment every 3 weeks as per patient request. Continue infusional 5-FU, Panitumumab and leucovorin. No 5-FU bolus due to severe mucositis. · 8/11/2021 CEA . 9  · 9/03/2021 CT Chest w/Contrast Slight interval increase in the size of pulmonary nodules, the largest in the medial right lung base. Findings are concerning for metastatic disease. Atherosclerosis of the aorta and coronary arteries. Sclerotic rib lesions favored for osseous metastases. Old rib fractures also evident. · 9/03/2021 CT Abd/Pelvis w/ IV Contrast (Oral) A persistent partially calcified mass in the presacral region with encasement of the distal ureters bilaterally and resultant bilateral hydronephrosis. Bilateral ureteral stents in place. There is increasing right-sided hydronephrosis since the previous study. Right renal atrophy similar to the previous study. Moderate asymmetrical thickening of the incompletely distended urinary bladder is similar to the previous study. This may partly be due to incomplete distention. An inflammatory process or a neoplastic process is not excluded. Moderate intrahepatic biliary dilatation is similar to the previous study and probably due to a prior cholecystectomy. Moderate dilatation of the contrast filled small bowel loops may represent an ileus or partial distal small bowel obstruction. There is left lower abdominal ileostomy which appears patent. The stable compression fractures of the lower thoracic and proximal lumbar vertebrae and hardware fusion similar to the previous study. · 9/22/21- Decrease Vectibix to every other treatment. He is currently receiving chemotherapy every 3 weeks as per patient request      ONCOLOGIC HISTORY # Rectal carcinoma:  Haydee Henson has a history of moderately differentiated rectal carcinoma, T3N0Mx, diagnosed in 3/9/2009. He received neoadjuvant chemotherapy with radiation and resection with J-pouch. He has been routinely followed at [facility] and was last seen by Dr. Geena Hargrove on 1/10/2019. The following are pertinent findings related to his diagnosis and treatment. · 3/9/2009- Esophagogastroduodenoscopy with biopsy by Dr. David Hardin that revealed rectal mass at 8 cm with pathology being consistent with moderately differentiated adenocarcinoma. · 3/18/2019-Dr.Elizabeth Dwain Luo at [address] [place] [address] performed a flexible sigmoidoscopy and rectal endoscopic ultrasound that revealed the tumor extending 6-7 mm deep through the muscularis propria layer and into the serosa, T3 lesion. · 4/9/2009-5/27/2009-received neoadjuvant chemotherapy with 5-FU CIV along with radiation therapy for a total of 5400 cGy under the direction of Dr. Liza Mills. · 7/15/2009-rectum resection revealed no residual malignancy. 22 regional nodes were negative for malignancy. The rectum distal doughnut was negative for malignancy. He was documented to have a complete pathological response. · 9/9/2009- Ileostomy excision pathology revealed small bowel wall with changes consistent with ileostomy site.   Pathology negative for malignancy    Objective   PHYSICAL EXAM:  CONSTITUTIONAL: Alert, appropriate, no acute distress, appears chronically ill  EYES: Non icteric, EOM intact, pupils equal round   ENT: Mucus membranes moist, external inspection of ears and nose are normal  NECK: Supple, no masses. No JVD  CHEST/LUNGS: CTA bilaterally, normal respiratory effort   CARDIOVASCULAR: RRR. No lower extremity edema  ABDOMEN: soft non-tender, active bowel sounds. Left abdominal ileostomy with great output  EXTREMITIES: warm, moves extremities. Gait not observed. SKIN: warm, dry with no rashes or lesions  LYMPH: No cervical, clavicular, or axillary lymphadenopathy  NEUROLOGIC: follows commands, non focal   PSYCH: mood and affect appropriate. Alert and oriented to time, place, person    Vital Signs  BP (!) 140/87   Pulse 103   Temp 96.3 °F (35.7 °C) (Temporal)   Resp 16   Ht 5' 5\" (1.651 m)   Wt 170 lb (77.1 kg)   SpO2 93%   BMI 28.29 kg/m²     Intake/Output Summary (Last 24 hours) at 4/20/2022 5450  Last data filed at 4/20/2022 0503  Gross per 24 hour   Intake 550 ml   Output 420 ml   Net 130 ml     Labs:  CBC:   Recent Labs     04/18/22  0343 04/19/22  0152 04/20/22  0242   WBC 8.0 7.6 7.7   HGB 9.6* 9.5* 10.3*    319 344     CMP:   Recent Labs     04/18/22  0343 04/19/22  0152 04/20/22  0242   GLUCOSE 106 96 99   BUN 9 7* 7*   CREATININE 0.9 1.1 1.1   CO2 25 27 26   CALCIUM 8.3* 8.2* 8.5*       Blood Culture Recent:   Recent Labs     04/10/22  0958   BC No growth after 5 days of incubation. 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 MSI stable and negative mutations for BRAF, NRAS, KRAS wild type.     09/22/21- CEA 0.6  Continue palliative intent 5-FU/leucovorin every 3 weeks as per patient request. Panitumumab on hold as per patient request (significant toxicity with the skin toxicity and mouth sores).   Not a good candidate for Avastin due to frequent exchange of ureteral stents and hematuria. 4/10/2022-CT abdomen/pelvis without contrast showed evidence of metastatic disease to the lung bases. Moderate size hiatal hernia with gastroesophageal reflux. Status post partial colectomy. Left lower quadrant ostomy is present. No evidence obstruction. Irregular soft tissue mass with some calcification the pelvis. This demonstrated interval increase was potential involvement with the right posterior lateral urinary bladder wall. The mass measures 8 x 2.9 cm. Left-sided double J intraureteral stent is in place with stent well positioned. 4/10/2022-CT chest without contrast showed extensive metastatic disease is noted to the lungs showing progression from the previous examination with multiple new nodules present as well as interval increase in size of previously documented nodules. Reference nodule within the superior segment of the right lower lobe previously measured at 5 mm in size now measures 13 mm in size. A lesion within the medial right lower lobe now measuring 1.6 cm in long axis previously measured 1.1 cm. There is no evidence of acute consolidative pneumonia. Essentially, findings consistent with progressive metastatic disease to the lungs. There are too numerous to count pulmonary nodules the majority of which have increased in size or which are new from the previous study. 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 The operative note by Dr. Isabel Cee documented bilateral hydronephrosis and obtained biopsy of the bladder in the mid dome and left anterior lateral wall x2.  Pathology documented high-grade papillary urethral carcinoma, noninvasive, stage pTaNx. As per Urology    5/28/2020-the patient underwent a cystoscopy and resection of bladder tumor on 05/28/2020 with findings consistent with noninvasive, high-grade papillary urothelial carcinoma. Muscularis propria was not identified. Currently receiving intravesical BCG.  4/1/2021-repeat cystoscopy showed a small bladder lesion. Tumor resection of bladder tumor consistent with noninvasive high-grade urothelial carcinoma. 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 Referred to [facility]  10/14/2021-urology at [facility]. Urine cytology consistent with atypical urothelial cells. Since the patient was last seen by Dr. Leah Bartlett November 2021 he underwent a major resection of the high-grade urothelial carcinoma of the ureter at [facility].  His postoperative course was complicated and he was hospitalized for many weeks. He eventually was discharged and is currently receiving home care at home. He is still very debilitated. -CT findings from 4/10/2022 concerning for metastatic disease, as noted above    -CT guided FNA of pulmonary nodule on 4/19/2022: Path pending    -Urology, Dr. Bear Elliott following: Anticipating cystoscopy and left stent change today. Ileus: Resolved  4/13/2022-CT abdomen/pelvis showed  · When compared to the previous exam of 3 days earlier there is now  noted to be moderate small bowel distention. I would favor a adynamic ileus. A distal obstruction at the site of the patient's ileostomy is also in the differential but felt less likely. There is mild distention of the stomach. A moderate size hiatal hernia is present. · Mild to moderate dilatation of the upper tracts of the left kidney. A left-sided double-J ureteral stent is in place. There is mild urothelial thickening. I do not see evidence of a discrete ureteral stone. The stent is well-positioned. · Partially calcified pelvic mass. This is inseparable from the right posterior lateral wall of the urinary bladder along its lower margin. A Mcgregor catheter is in place within the bladder. 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 · There is a small amount of free fluid within the subhepatic space and Morison space. A small right-sided effusion is present with multiple pulmonary nodules within the lower lobes consistent with metastatic disease to the lungs. There is a moderate size hiatal hernia present. · There is a small volume of retained contrast within stomach and dilated proximal small bowel loops. There are dilated mid to distal small bowel loops without contrast.   Followed and managed by general surgery    -NG removed 4/16/2022  -Tolerating diet  -N.p.o. today for cystoscopy and possible stent placement    Normocytic anemia-stable  Serology 4/12/2022:  · Iron: 77, TIBC 233, iron saturation 33%, ferritin 589  · Vitamin B12: 308  · Folate: 13.89          Osteoporosis-Osteoporosis BMD -3.6 April 2020.     -Continue Vit D, Boniva and Calcium. Acute kidney injury/CKD stage III  Cr 1.1  Nephrology assisting    Hyponatremia-Resolved  Sodium 136, today 4/20/2022    Candida glabrata/AchrmobacterUTI  -Currently on Anidulafungin    PLAN:  Continue current supportive care  Anticipating cystoscopy and stent placement today  Follow-up on lung biopsy results from 4/19/2022    (Please note that portions of this note were completed with a voice recognition program. Efforts were made to edit the dictations but occasionally words are mis-transcribed.)    OLGA Chong  04/20/22  6:27 AM    Physician's attestation/substantial contribution:  I, Dr Hiren Benedict, independently performed an evaluation on Xenia Palomo.  I have reviewed relevant medical information/data to include but not limited to medication list, relevant appropriate labs and imaging when applicable. I reviewed other physician's notes, ancillary services and nurses assessment. I have reviewed the above documentation completed by the Nurse Practitioner or Physician Assistant. Please see my additional contributions to the history of present illness, physical examination, and assessment/medical decision-making that reflect my findings and impressions. I have seen and examined the patient and the key elements of all parts of the encounter have been performed by me. I agree with the assessment and plan as outlined by the ARNP/PA. Subjective-no new complaints morning. Right knee pain still bothering him but slightly better. Increase tramadol to 50 mg every 6 hours as needed yesterday. He is going for cystoscopy today and remove the stent  Objective- as above  Assessment/plan:  Pulmonary nodules-status post biopsy of pulmonary nodule. No immediate complication. Patient proceeding with cystoscopy today/removal or exchange of left ureteral stent  Continue current supportive care. We will follow-up results of pathology from pulmonary nodule.     Janina Collado MD

## 2022-04-20 NOTE — OP NOTE
Brief operative Note      Patient: Madhav Mendoza  YOB: 1950  MRN: [id number]    Date of Procedure: 4/20/2022    Pre-Op Diagnosis: 1. Left ureteral stricture, retained left ureteral stent 2. History of bladder cancer    Post-Op Diagnosis: Same       Procedure(s):  CYSTOSCOPY LEFT STENT REMOVAL  LEFT URETERAL STENT REPLACEMENT 6 FR x 20cm  CYSTOSCOPY BLADDER BIOPSY, fulguration less than 0.5 cm    Surgeon(s):  Philippe Jack MD    Assistant:   * No surgical staff found *    Anesthesia: General    Estimated Blood Loss (mL): 0    Complications: None    Specimens:   ID Type Source Tests Collected by Time Destination   A : right lateral bladder lesion Tissue Bladder SURGICAL PATHOLOGY Philippe Jack MD 4/20/2022 6186        Implants:  Implant Name Type Inv. Item Serial No.  Lot No. LRB No. Used Action   STENT URET L20CM DIA6FR HYDR+ PERCFLX TAPR TIP DBL PGTL - [id number]  [employer] L20CM DIA6FR HYDR+ PERCFLX TAPR TIP DBL PGTL  [facility] UROLOGY- [id number] Left 1 Implanted         Drains:   Ileostomy LLQ (Active)   Stomal Appliance 1 piece 04/19/22 0727   Stoma  Assessment Pink 04/19/22 0727   Peristomal Assessment Pink; Intact 04/19/22 0727   Treatment Bag change 04/18/22 1400   Stool Appearance Loose; Soft 04/20/22 0715   Stool Color Green 04/20/22 0715   Stool Amount Large 04/19/22 0727   Output (mL) 200 ml 04/18/22 2139       [REMOVED] NG/OG/NJ/NE Tube Nasogastric 18 fr Left nostril (Removed)   Surrounding Skin Intact 04/14/22 1628   Securement device Yes 04/14/22 1628   Status Suction-low intermittent 04/14/22 1628   Placement Verified by X-Ray (Initial) 04/14/22 1628   NG/OG/NJ/NE External Measurement (cm) 75 cm 04/14/22 1628   Drainage Appearance Green 04/14/22 1641   Output (mL) 250 ml 04/16/22 0324       [REMOVED] Urethral Catheter (Removed)   Catheter Indications Urinary retention (acute or chronic), continuous bladder irrigation or bladder outlet obstruction; Need for fluid

## 2022-04-20 NOTE — PROGRESS NOTES
Priscila Holley arrived to room # 917 [id number]. Presented with: sepsis  Mental Status: Patient is oriented, alert and coherent. There were no vitals filed for this visit. Patient safety contract and falls prevention contract reviewed with patient Yes. Oriented Patient to room. Call light within reach. Yes.   Needs, issues or concerns expressed at this time: no.      Electronically signed by Anne Liz RN on 4/20/2022 at 5:44 PM

## 2022-04-20 NOTE — OP NOTE
[facility] [address] [facility]                                OPERATIVE REPORT    PATIENT NAME: Anil Aguilar                   :        1950  MED REC NO:   [id number]                              ROOM:       [facility]  ACCOUNT NO:   [de-identified]                           ADMIT DATE: 04/10/2022  PROVIDER:     Clemente Perez MD    DATE OF PROCEDURE:  2022    TITLE OF OPERATION:  1. Cystoscopy, left ureteral stent removal.  2.  Cystoscopy, left ureteral stent placement, 6-Burundian x 20 cm. 3.  Cystoscopy, biopsy, fulguration bladder lesion less than 0.5 cm. PREOPERATIVE DIAGNOSES:  1. Left ureteral stricture with retained left ureteral stent. 2.  History of bladder cancer. POSTOPERATIVE DIAGNOSES:  1. Left ureteral stricture with retained left ureteral stent. 2.  History of bladder cancer. ANESTHETIC:  General anesthetic. ATTENDING SURGEON:  Clemente Perez MD    HISTORY:  The patient is a 60-year-old gentleman who has had pelvic  radiation for colon cancer. This has resulted in ureteral stricture  secondary to radiation that is managed with chronic indwelling left  ureteral stent. He recently has had a catheter acquired, stent acquired  infection. He is due a stent change. His last change, I believe, was  in 2021. Therefore, he now presents to undergo cystoscopy, left  ureteral stent removal, and left ureteral stent replacement. He also  has a history of non-muscle invasive bladder cancer and also has had  upper tract urothelial carcinoma, status post right nephroureterectomy  done back in January. Therefore, he is due bladder cancer surveillance  and we will plan to examine his bladder if we find any other lesions  that need to be biopsied or resected, we will send this at the same  time. The other risks and complications were discussed with him and he  agrees to proceed.     DESCRIPTION OF PROCEDURE:  The patient was brought to the operating  room, underwent general anesthetic. He was placed in lithotomy  position. His genitalia was prepped and draped in routine sterile  fashion. He received preoperative antibiotic consisting of Rocephin. A  22-Greenlandic cystoscope was inserted into the meatus. This was advanced  under direct vision. The penile and bulbar urethra appeared to be  normal.  Entering the prostatic urethra, he had some mild-to-moderate  lateral lobe prostatic enlargement. There was little bit of pseudopolyp  right on the veru. This did not look typical papillary, just looked  like maybe some irritation from having the catheter in place. I entered  into the patient's bladder and the bladder was then inspected with a 30  and a 70-degree lens. There were changes in the right hemitrigone area  where he had his distal ureterectomy from his right nephroureterectomy  and there was some sort of necrotic-appearing tissue in this area, but  this was healing. The rest of the bladder was somewhat contracted. He  had some scars from prior tumor resections, but the mucosa itself did  not show any obvious papillary tumors, ulcerations, or areas concerning  for CIS, just some mild _____, but he had had a catheter in for some  time and has had this infection. There was an area on the right lateral  wall the mucosa was little bit raised and irregular, but it almost  looked like bullous-type edema, almost inflammatory appearance, not  papillary like a urothelial tumor and I suspect this is probably from  catheter irritation since he has had a catheter. I then turned my attention to the left ureteral stent. The stent was  seen protruding from the left ureteral orifice. There was minimal  reaction in the trigone area from the stent. The stent was grasped with  graspers and extracted.   I then placed a guidewire through the stent and  the guidewire was then advanced under fluoroscopic guidance up into the  left kidney. The stent was then removed. The guidewire was then  backloaded through the cystoscope. Cystoscope was then advanced over  the guidewire with the catheter into the patient's bladder. The  catheter was then advanced under fluoroscopic and cystoscopic guidance  over the guidewire up into the left kidney. The wire was removed,  contrast was injected to opacify the left kidney. There was  mild-to-moderate left pelviectasis. The measurements were taken for the  stent. The catheter was removed leaving the guidewire in place. Over  the guidewire, then a 6-Botswanan x 20 cm left double-J ureteral stent was  advanced using cystoscopic and fluoroscopic guidance coiled in the renal  pelvis and coiled in the bladder. The area of irregular mucosa on the right lateral wall, I elected to go  ahead and biopsy this and fulgurate it just to make certain and using  the cold cup biopsies, one single biopsy was taken. This completely  removed it. I then used the Bovie to fulgurate that. Also, the  irregular pseudopolyp I saw on the veru was also fulgurated. At this  point, then the procedure was concluded. The bladder was emptied. Scope was removed. This procedure was terminated. Estimated blood loss  was 0 mL. He was awakened from his anesthetic and taken to the recovery  room in stable condition.         Saint Litten, MD    D: 04/20/2022 10:04:27      T: 04/20/2022 11:20:35     [email or web address]  Job#: [id number]     Doc#: [id number]    CC:

## 2022-04-20 NOTE — PROGRESS NOTES
Occupational Therapy  Pt going to 8th floor rehab this date.  Electronically signed by CAT Quintana on 4/20/2022 at 1:11 PM

## 2022-04-20 NOTE — PLAN OF CARE
[address] TREATMENT PLAN      Jose Friedman    : 1950  Acct #: [de-identified]  MRN: [id number]   PHYSICIAN:  Patricia Diallo MD  Primary Problem    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    Colorectal cancer ([address]    Metastasis from colon cancer ([address]    Proteinuria    Chemotherapy management, encounter for    Anemia associated with chemotherapy    Other fatigue    Thrush    Dehydration    Chemotherapy-induced peripheral neuropathy (HCC)    Chemotherapy-induced nausea    SBO (small bowel obstruction) (HCC)    Burning with urination    History of small bowel obstruction    Encounter for central line care    Iron deficiency    History of bladder cancer    Hydronephrosis of left kidney    Hydronephrosis of right kidney    Low back pain    Urothelial carcinoma of right distal ureter ([address]    Sepsis secondary to UTI ([address]    Acute kidney injury superimposed on CKD ([address]    Urinary tract infection associated with indwelling urethral catheter ([address]    Retained ureteral stent    Lower urinary tract symptoms    Ileus ([address]    Sepsis ([address]       Rehabilitation Diagnosis:     Sepsis ([address] [A41.9]       ADMIT DATE:2022   CARE PLAN     NURSING:  Jose Friedman while on this unit will:     [] Be continent of bowel and bladder      [] Have an adequate number of bowel movements   [] Urinate with no urinary retention >300ml in bladder   [] Complete bladder protocol with edgar removal   [] Maintain O2 SATs at ___%   [x] Have pain managed while on ARU        [] Be pain free by discharge    [x] Have no skin breakdown while on ARU   [] Have improved skin integrity via wound measurements   [] Have no signs/symptoms of infection at the wound site  [x] Be free from injury during hospitalization   [x] Complete education with patient/family with understanding demonstrated for:  [x] Adjustment   [x] Other:   Nursing interventions may include bowel/bladder training, education for medical assistive devices, medication education, O2 saturation management, energy conservation, stress management techniques, fall prevention, alarms protocol, seating and positioning, skin/wound care, pressure relief instruction,dressing changes,  infection protection, DVT prophylaxis, and/or assistance with in room safety with transfers to bed, toilet, wheelchair, shower as well as bathroom activities and hygiene. Patient/caregiver education for:   [x] Disease/sustained injury/management      [x] Medication Use   [] Surgical intervention   [x] Safety   [] Body mechanics and or joint protection   [x] Health maintenance       IRF-JAMAL  Bladder and Bowel Continence  Bladder Continence: Incontinent daily  Bowel Continence: Not rated (colostomy)       PHYSICAL THERAPY:  Goals:                  Short Term Goals  Time Frame for Short term goals: 1 week  Short term goal 1: Supervision with bed mobility  Short term goal 2: Supervision with transfers  Short term goal 3: SBA with ambulating 100' using RW  Short term goal 4: CGA completing 2 stairs using hand rail  Short term goal 5: Supervision with w/c proplusion 100'            Long Term Goals  Time Frame for Long term goals : 2 weeks  Long term goal 1: Independent with bed mobility  Long term goal 2:  Independent with transfers  Long term goal 3: Modified Independent with ambulating 150'  Long term goal 4: Independent with w/c proplusion 150'  Long term goal 5: Modified Independent with competing 4 stairs using handrail  These goals were reviewed with this patient at the time of assessment and Jose Friedman is in agreement.      Plan of Care: Frequency:  [x] 5 days per week, 90 minutes per day                             []  5 days per week, 60 minutes per day               Current Treatment Recommendations: Strengthening,ROM,Balance training,Transfer training,Endurance training,Wheelchair mobility training,Gait training,Stair training,Safety education & training,Patient/Caregiver education & training,Equipment evaluation, education, & procurement    IRF-JAMAL  Roll Left and Right  Assistance Needed: Supervision or touching assistance  CARE Score: 4  Discharge Goal: Independent  Sit to Lying  Assistance Needed: Partial/moderate assistance  CARE Score: 3  Discharge Goal: Independent  Lying to Sitting on Side of Bed  Assistance Needed: Supervision or touching assistance  CARE Score: 4  Discharge Goal: Independent  Sit to Stand  Assistance Needed: Supervision or touching assistance  CARE Score: 4  Discharge Goal: Independent  Chair/Bed-to-Chair Transfer  Assistance Needed: Supervision or touching assistance  CARE Score: 4  Discharge Goal: Independent  Car Transfer  Reason if not Attempted: Not attempted due to medical condition or safety concerns  CARE Score: 88  Discharge Goal: Independent  Walk 10 Feet  Assistance Needed: Supervision or touching assistance  CARE Score: 4  Discharge Goal: Independent  Walk 50 Feet with Two Turns  Assistance Needed: Supervision or touching assistance  CARE Score: 4  Discharge Goal: Independent  Walk 150 Feet  Reason if not Attempted: Not attempted due to medical condition or safety concerns  CARE Score: 88  Discharge Goal: Independent  Walking 10 Feet on Uneven Surfaces  Reason if not Attempted: Not attempted due to medical condition or safety concerns  CARE Score: 88  Discharge Goal: Not Attempted  1 Step (Curb)  Reason if not Attempted: Not attempted due to medical condition or safety concerns  CARE Score: 88  Discharge Goal: Independent  4 Steps  Reason if not Attempted: Not attempted due to medical condition or safety concerns  CARE Score: 88  Discharge Goal: Independent  12 Steps  Reason if not Attempted: Not attempted due to medical condition or safety concerns  CARE Score: 88  Discharge Goal: Not Attempted  Wheel 50 Feet with Two Turns  Assistance Needed: Supervision or touching assistance  CARE Score: 4  Discharge Goal: Independent  Wheel 150 Feet  Assistance Needed: Supervision or touching assistance  CARE Score: 4  Discharge Goal: Independent    OCCUPATIONAL THERAPY:  Goals:             Short Term Goals  Time Frame for Short term goals: 1 week  Short Term Goal 1: complete LB dressing with AE prn with supervision  Short Term Goal 2: complete overall toileting with supervision  Short Term Goal 3: complete simple ambulatory home making task with supervision  Short Term Goal 4: complete 1-2 handed standing level task for 3 mins with supervision  Short Term Goal 5: complete overall bathing with supervision :  Long Term Goals  Time Frame for Long term goals : 2 weeks  Long Term Goal 1: complete overall dressing with modified independence  Long Term Goal 2: complete overall bathing with modified independence  Long Term Goal 3: complete overall toileting with modified independence  Long Term Goal 4: complete HEP with independence  Long Term Goal 5: complete simple ambulatory home making task with modified independence  Additional Goals?: Yes  Long Term Goal 6: pt/family verbalize DME for home :    These goals were reviewed with this patient at the time of assessment and Justina Betts is in agreement    Plan of Care: Frequency:   [x] 5 days per week, 90 minutes per day     [] 5 days per week, 60 minutes per day                Plan  Current Treatment Recommendations: Strengthening,Balance training,Self-Care / ADL,Home management training,Safety education & training,Functional mobility training,Endurance training,Patient/Caregiver education & training,Equipment evaluation, education, & procurement            IRF-JAMAL  Eating  Assistance Needed: Setup or clean-up assistance  CARE Score: 5  Discharge Goal: Independent  Oral Hygiene  Assistance Needed: Setup or clean-up assistance  CARE Score: 5  Discharge Goal: [address] needed: Supervision or touching assistance  Comment: CGA for balance--able to empty and change colostomy bag with setup only  CARE Score: 4  Discharge Goal: Independent  Shower/Bathe Self  Assistance Needed: Partial/moderate assistance  Comment: min A  CARE Score: 3  Discharge Goal: Independent  Upper Body Dressing  Assistance Needed: Setup or clean-up assistance  CARE Score: 5  Discharge Goal: Independent  Lower Body Dressing  Assistance Needed: Partial/moderate assistance  Comment: min A with R LE pant leg, vc for tech  CARE Score: 3  Discharge Goal: Independent  Putting On/Taking Off Footwear  Assistance Needed: Partial/moderate assistance  Comment: due to edema  CARE Score: 3  Discharge Goal: Independent  Toilet Transfer  Assistance needed: Partial/moderate assistance  Comment: min A, RW  CARE Score: 3  Discharge Goal: Independent  Picking Up Object  Reason if not Attempted: Not attempted due to medical condition or safety concerns  CARE Score: 88  Discharge Goal: Supervision or touching assistance  Treatments may include IADL retraining, strengthening, safety education and training, patient/caregiver education and training, equipment evaluation/ training/procurement, neuromuscular reeducation, wheelchair mobility training.     SPEECH THERAPY:   Plan of Care and Goals:   LTG    FIM Goals: Comprehension:    Expression:    Social:    Problem Solving:    Memory:                                                  IRF-JAMAL  Hearing, Speech, and Vision  Expression of Ideas and Wants: Without difficulty  Understanding Verbal and Non-Verbal Content: Understands  Cognitive Patterns  Cognitive Pattern Assessment Used: BIMS  Repetition of Three Words (First Attempt): 3  Temporal Orientation: Year: Correct  Temporal Orientation: Month: Accurate within 5 days  Temporal Orientation: Day: Correct  Able to recall \"sock: Yes, no cue required  Able to recall \"blue\": Yes, no cue required  Able to recall \"bed\": No, could not recall  BIMS Summary Score: 13          Plan of Care:  Frequency:   [] 5 days per week, 60 minutes per day                            [x] Not appropriate for Speech Therapy  Treatments may include speech/language/communication therapy, cognitive training, group therapy, education, and/or dysphagia therapy based on the above goals. These goals were reviewed with this patient at the time of assessment and Reilly Briceno is in agreement. CASE MANAGEMENT:  Goals:   Assist patient/family with discharge planning, patient/family counseling,  and coordination with insurance during ARU stay. Patient Goals: regain some strength, endurance and mobility               Activities Prior to Admit:                         Reilly Briceno will be seen a minimum of 3 hours of therapy per day/a minimum of 5 out of 7 days per week. [] In this rare instance due to the nature of this patient's medical involvement, this patient will be seen 15 hours per week (900 minutes within a 7 day period). Treatments may include therapeutic exercises, gait training, neuromuscular re-ed, transfer training, community reintegration, bed mobility, w/c mobility and training, self care, home mgmt, cognitive training, energy conservation,dysphagia tx, speech/language/communication therapy, group therapy, and patient/family education.  In addition, dietician/nutritionist may monitor calorie count as well as intake and collaboratively work with SLP on dietary upgrades. Neuropsychology/Psychology may evaluate and provide necessary support. Medical issues being managed closely and that require 24 hour availability of a physician:   [] Swallowing Precautions  [] Bowel/Bladder Fx  [] Weight bearing precautions   [] Wound Care    [] Pain Mgmt   [] Infection Protection   [] DVT Prophylaxis   [] Fall Precautions  [] Fluid/Electrolyte/Nutrition Balance   [] Voice Protection   [] Respiratory  [] Other:    Medical Prognosis: [] Good  [] Fair    [] Guarded   Total expected IRF days:  13  Anticipated discharge destination:    [] Home Independently   [] Home with supervision    []SNF     [] Other                                           Physician anticipated functional outcomes: Independent household ambulation with assistive device  IPOC brief synthesis: Acute inpatient rehabilitation with occupational   physical therapy, 180 minutes, 5 every 7   days will address basic and advancing mobility with self-care   instruction and adaptive equipment training. Caregiver education will   be offered. Expected length of stay prior to the supervised level of   functional discharge to home with home health services is 13 days. Assessment and Plan:  1. Sepsis from UTI. Infectious disease recommends continuation of antifungal and Levaquin through 4/24. For generalized weakness and deconditioning-PT/OT  2. Urinary retention with recent Mcgregor removal  3. History of urothelial cancer and colon cancer status post colostomy and nephrectomy with lung mets  For. Acute on chronic CKD-continue to monitor  5. Recent ileus  6. DVT prophylaxis-SCDs  7. Essential hypertension-continue current medications and monitoring  8.   Chronic right lower extremity weakness             Case Mgmt: Electronically signed by LORENA Cruz on 4/21/2022 at 2:36 PM    OT: Electronically signed by Alnozo Jones OT on 4/21/2022 at 3:58 PM    PT:Electronically signed by Shagufta Shaver PT on 4/21/22 at 4:08 PM CDT    RN:Electronically signed by Sulma Orantes RN on 4/20/22 at 5:47 PM CDT    ST:  Electronically Signed By:  Telly Gomez M.S., CCC-JLUIS  4/21/2022,11:22 AM.

## 2022-04-20 NOTE — DISCHARGE SUMMARY
Discharge Summary    NAME: Koki Vargas  :  1950  MRN:  [id number]    Admit date:  4/10/2022  Discharge date:  22      Advance Directive: Full Code    Consults: ID  Urology  Hematology/oncology  General surgery  Orthopedic surgery  Nephrology    Primary Care Physician:  Fabiano Hughes MD    Discharge Diagnoses:  Principal Problem:    Sepsis secondary to UTI [facility])  Active Problems:    Ileus ([facility] [address]    Ureteral stricture, left    Lung nodules    Metastasis from colon cancer ([facility] [address]    Acute kidney injury superimposed on CKD ([address]    Urinary tract infection associated with indwelling urethral catheter ([facility] [address]    Retained ureteral stent    Lower urinary tract symptoms  Resolved Problems:    * No resolved hospital problems. *      Significant Diagnostic Studies:   CT ABDOMEN PELVIS WO CONTRAST Additional Contrast? Oral    Result Date: 4/10/2022  CT ABDOMEN PELVIS WO CONTRAST 4/10/2022 11:23 AM HISTORY: Question sepsis. COMPARISON: 2021 DLP: 1163 mGy cm. All CT scans are performed using dose optimization techniques as appropriate to the performed exam and include at least one of the following: Automated exposure control, adjustment of the mA and/or kV according to size, and the use of the iterative reconstruction technique. TECHNIQUE: Noncontrast enhanced images of the abdomen and pelvis obtained with oral contrast. FINDINGS: Multiple pulmonary nodules are noted within the lung bases. The largest is within the medial right lung base measuring 16 mm in long axis. Findings worrisome for metastatic disease to the lungs. . A moderate size hiatal hernia is present. This contains contrast suggesting gastroesophageal reflux. LIVER: No focal liver lesion. BILIARY SYSTEM: The gallbladder is surgically absent. No biliary dilatation is present. Kathryn Dye PANCREAS: No focal pancreatic lesion. SPLEEN: Splenic granulomas are present. No evidence of splenomegaly. Kathryn Dye KIDNEYS AND ADRENALS: The adrenals are unremarkable.  The patient's undergone right nephrectomy. A left-sided double-J intraureteral stent is in place with the stent well-positioned. There is mild dilatation of the left ureter and upper tracts of the left kidney with mild urothelial thickening. Upper tract distention is improved from the previous exam of November of last year. No evidence of discrete renal mass or perinephric fluid collection. .  No discrete left-sided ureteral calculus is identified. [facility] RET[facility]: An IVC filter is in place within the inferior vena cava. There is no evidence of retroperitoneal lymphadenopathy. There is mild thickening within the inferior right paracolic gutter and right pelvic sidewall. GI TRACT: A left lower quadrant ostomy is present. There has been at least partial colon resection. . The appendix is not visualized and is likely surgically absent. . OTHER: There is no evidence of mass or adenopathy within the small bowel mesentery. Postoperative changes are noted of the lower lumbar spine. . No suspicious bony lesions are identified. There is An accentuated lumbar lordosis with previous kyphoplasty at T12 and L1 with a moderate compression deformity at L1. A wedge compression deformity of L2 is also present. The patient's undergone fusion at the L5-S1 level with grade 1 anterolisthesis of L5 on S1. There is an accentuated lumbar lordosis. . No free fluid is present. PELVIS: A partially calcified presacral mass with associated induration and thickening is again demonstrated. I feel this demonstrates some interval increase in size with potential involvement of the right posterior lateral urinary bladder wall. The urinary bladder is evacuated with a Mcgregor catheter in place. The mass measures approximately 8.0 cm in anterior to posterior dimension by 2.9 cm in transverse dimension. Involvement of the right posterior lateral urinary bladder wall is not excluded. . The urinary bladder cannot be fully assessed as it is decompressed with a Mcgregor catheter. .    1. Metastatic disease to the lung bases showing worsening from the previous study. 2. Moderate size hiatal hernia with gastroesophageal reflux. 3. The patient is status post at least partial colectomy. Left lower quadrant ostomy is present. There is no evidence of obstruction. 4. There is an irregular soft tissue mass with some calcification within the pelvis. This extends to and is inseparable from the right posterior lateral urinary bladder wall with potential secondary involvement. This extends into the presacral space. When compared to the previous exam I feel this is increased in size. The urinary bladder cannot be fully assessed as it is decompressed with a Mcgregor catheter. 5. Postoperative changes of the mid lower lumbar spine. There is an accentuated lumbar lordosis with grade 1-2 anterolisthesis of L5 on S1. Compression deformities at T12, L1 and L2 are present with previous kyphoplasty T12 and L1. . 6. Status post right nephrectomy. There is mild dilatation of the upper tracts of the left kidney and left ureter with a double-J intraureteral stent well-positioned. The degree of distention of the upper tracts of the left kidney is improved from the previous exam of November of last year. There is mild urothelial thickening. Signed by Dr Orlando Flores    Result Date: 4/10/2022  CT CHEST WO CONTRAST 4/10/2022 11:23 AM HISTORY: Question sepsis. Multiple areas of previous cancer. COMPARISON: 9/3/2021 DLP: 779 mGy cm. All CT scans are performed using dose optimization techniques as appropriate to the performed exam and include at least one of the following: Automated exposure control, adjustment of the mA and/or kV according to size, and the use of the iterative reconstruction technique. TECHNIQUE: Serial helical tomographic images of the chest were acquired. Bone and soft tissue algorithms were provided. Coronal reformatted images were also provided for review.  FINDINGS: Neck base: The imaged portion of the neck and thyroid gland is unremarkable. A tunneled infusion catheter is in place with the distal aspect of the catheter extending into the right ventricle. Lungs: Extensive metastatic disease is noted to the lungs showing progression from the previous examination with multiple new nodules present as well as interval increase in size of previously documented nodules. Reference nodule within the superior segment of the right lower lobe previously measured at 5 mm in size now measures 13 mm in size. A lesion within the medial right lower lobe now measuring 1.6 cm in long axis previously measured 1.1 cm. There is no evidence of acute consolidative pneumonia. . No pleural effusion is present. The trachea and bronchial tree are patent. Heart: There is mild cardiomegaly. Moderate coronary calcifications are present. . There is no pericardial effusion. Great vessels: The proximal great vessels are remarkable for mild calcific plaquing involving the innominate and left subclavian artery without rate limiting stenosis. The proximal great vessels are tortuous. . Lymph nodes: No significant hilar, mediastinal or axillary lymphadenopathy is appreciated. Skeletal and soft tissues: The patient's undergone previous ACDF of the lower cervical spine. The patient's undergone kyphoplasty for compression deformities in the lower thoracic and upper lumbar spine. These findings are stable from the previous exam. 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 Cleophus Brogue Upper abdomen: A moderate size hiatal hernia is present. A left-sided double-J ureteral stent is in place within the upper tracts of the left kidney. An IVC filter is in place. The patient is status post right nephrectomy. . No free fluid is noted within the upper abdomen. 1. Progressive metastatic disease to the lungs.  There are too numerous to count pulmonary nodules the majority of which have increased in size or which are new from the previous study. No evidence of acute consolidative pneumonia. 2. 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 Patient's undergone previous kyphoplasty at the thoracolumbar juncture for compression deformities. There is an accentuated thoracic kyphosis. . Signed by Dr Chandler Alfonso RENAL COMPLETE    Result Date: 4/11/2022  EXAMINATION:  US RENAL COMPLETE  4/11/2022 12:04 PM HISTORY: Acute renal insufficiency. COMPARISON: No comparison study. TECHNIQUE: Grayscale and color flow imaging were performed. FINDINGS: There has been prior right nephrectomy. The left kidney measures 13.2 cm. The cortical thickness and echogenicity are normal. There is mild to moderate dilatation of the lower pole collecting system. The urinary bladder is decompressed. The patient reportedly has a left ureteral stent that is not well seen on this exam.    1. Prior right nephrectomy. 2. The cortical thickness and echogenicity of the left kidney are normal. The left kidney is normal in size. 3. There is mild to moderate dilatation of the left renal collecting system predominantly involving the lower pole. The patient reportedly has a double-J ureteral stent in place. The stent is not well seen on ultrasound. Signed by Dr Juarez Cho    XR CHEST PORTABLE    Result Date: 4/10/2022  EXAMINATION: Chest one view 4/10/2022 HISTORY: Fever and fatigue. FINDINGS: Today's exam is compared to previous study of 4/30/2020. The patient's undergone previous fusion of the mid and lower cervical spine. A tunneled infusion catheter is in place via right-sided approach with the tip in the right atrium. There are suspected pulmonary nodules within the lung bases. . Bibasilar atelectasis is present. No evidence of consolidative pneumonia or effusion. There are what appear to be some endovascular coils projecting over the lateral right heart border.  These were not present on previous chest radiograph of 4/30/2020 and should be correlated clinically. 1.. Question developing nodules within the lower lobes. Metastatic disease should be considered and follow-up with outpatient CT imaging of the chest could BE obtained. There is bibasilar atelectasis. No evidence of lobar pneumonia. 2. There are what appear to be some metallic coils injecting over the lateral right heart border. These were not present on previous exams and should be correlated clinically. An infusion catheter is in place via right-sided approach with the tip in the right atrium. Signed by Dr Casanova Goes:   CBC:   Recent Labs     04/18/22 0343 04/19/22 0152 04/20/22 0242   WBC 8.0 7.6 7.7   HGB 9.6* 9.5* 10.3*    319 344     BMP:    Recent Labs     04/18/22 0343 04/19/22 0152 04/20/22 0242    135* 136   K 3.6 3.7 3.7    96* 97*   CO2 25 27 26   BUN 9 7* 7*   CREATININE 0.9 1.1 1.1   GLUCOSE 106 96 99     INR:   Recent Labs     04/18/22 0343   INR 1.02       Brief operative Note        Patient: Jp Dumont  YOB: 1950  MRN: [id number]     Date of Procedure: 4/20/2022     Pre-Op Diagnosis: 1. Left ureteral stricture, retained left ureteral stent 2. History of bladder cancer     Post-Op Diagnosis: Same       Procedure(s):  CYSTOSCOPY LEFT STENT REMOVAL  LEFT URETERAL STENT REPLACEMENT 6 FR x 20cm  CYSTOSCOPY BLADDER BIOPSY, fulguration less than 0.5 cm     Surgeon(s):  Maranda Pinedo MD     Assistant:   * No surgical staff found *     Anesthesia: General     Estimated Blood Loss (mL): 0     Complications: None     Specimens:   ID Type Source Tests Collected by Time Destination   A : right lateral bladder lesion Tissue Bladder SURGICAL PATHOLOGY Maranda Pinedo MD 4/20/2022 7316           Implants:  Implant Name Type Inv.  Item Serial No.  Lot No. LRB No. Used Action   STENT URET L20CM [id number] HYDR+ PERCFLX TAPR TIP DBL PGTL - [id number]   STENT URET L20CM DIA6FR HYDR+ PERCFLX TAPR TIP DBL PGTL   [facility] UROLOGY-WD [id number] Left 1 Implanted          Drains:        Ileostomy LLQ (Active)   Stomal Appliance 1 piece 04/19/22 0727   Stoma  Assessment Pink 04/19/22 0727   Peristomal Assessment Pink; Intact 04/19/22 0727   Treatment Bag change 04/18/22 1400   Stool Appearance Loose; Soft 04/20/22 0715   Stool Color Green 04/20/22 0715   Stool Amount Large 04/19/22 0727   Output (mL) 200 ml 04/18/22 2139       [REMOVED] NG/OG/NJ/NE Tube Nasogastric 18 fr Left nostril (Removed)   Surrounding Skin Intact 04/14/22 1628   Securement device Yes 04/14/22 1628   Status Suction-low intermittent 04/14/22 1628   Placement Verified by X-Ray (Initial) 04/14/22 1628   NG/OG/NJ/NE External Measurement (cm) 75 cm 04/14/22 1628   Drainage Appearance Green 04/14/22 1641   Output (mL) 250 ml 04/16/22 0324       [REMOVED] Urethral Catheter (Removed)   Catheter Indications Urinary retention (acute or chronic), continuous bladder irrigation or bladder outlet obstruction; Need for fluid volume management of the critically ill patient in a critical care setting 04/1950   Urine Color Karine 04/1950   Urine Appearance Cloudy 04/17/22 0515   Output (mL) 5 mL 04/18/22 0747         Findings: Left ureteral stent removed and replaced without difficulty. Moderate left pelviectasis hydronephrosis. Postop change right trigone from prior right distal ureterectomy/nephro ureterectomy. Nonspecific mucosal irregularity right lateral lesion biopsied and fulgurated otherwise no obvious papillary tumors seen or signs of CIS.    Detailed Description of Procedure:   See dictated report: [id number]     Disposition to PACU then back to fifth floor. Continue current management     Electronically signed by Sharda Rizzo MD on 4/20/2022 at 8:53 AM                        Hospital Course:   81 y. o. male with recent right nephrectomy, recent ureteral stents, metastatic urothelial carcinoma, history of colorectal cancer with ostomy in place, presented with fever and nausea decreased urine output. On the admission denied problems with ostomy output. He was found to have UTI with culture speciated Candida glabrata and Achromobacter, ID consulted, treated with Eraxis and Levaquin. CT Chest consistent with progressive metastatic disease to the lungs. CT abdomen irregular soft tissue mass with some calcification within the pelvis. Followed by oncology and urology during hospital course. Hospital course was complicated after patient developed an ileus with nausea and vomiting, however this resolved with Evaluated by general surgery. Ileus subsequently resolved with removal of NG tube 4/16. Removed Mcgregor. Other issues included ongoing knee pain with osteoarthritis, advised by orthopedics and had corticosteroid injection and analgesic regimen adjusted. Patient worked with PT and approved for [facility] inpatient rehab pending medical stability with plan for ureteral stent change by urology and CT-guided biopsy of pulmonary nodule. CT-guided biopsy performed of nodule in right lower lung without complications, will need to follow-up pathology results. Urology took patient for ureteral stent exchange on left side without complications. Patient stable postop, otherwise medically stable for discharge with readmission to [facility] inpatient rehab service. Completed adequate antibiotic course on, however continuing on Eraxis for Candida glabrata infection per ID.     Upon admission to rehab service consult ID, hematology/oncology, urology to follow.           Physical Exam:  Vital Signs: BP (!) 141/106   Pulse 115   Temp 97.5 °F (36.4 °C) (Temporal)   Resp 16   Ht 5' 5\" (1.651 m)   Wt 170 lb (77.1 kg)   SpO2 96%   BMI 28.29 kg/m²   General: no acute distress  Psych: Calm and cooperative  HEENT: NC/AT, no scleral icterus  Cardiovascular: Normal rate, regular rhythm  Respiratory: No intercostal retractions, no wheezes  Abdomen: Nontender, bowel sounds present, ostomy present  Neurologic: Alert, follows commands, normal speech  Musculoskeletal: No clubbing or cyanosis  Skin: Warm and dry         Discharge Medications:         Medication List      STOP taking these medications    methadone 10 MG tablet  Commonly known as: DOLOPHINE        ASK your doctor about these medications    bisoprolol 5 MG tablet  Commonly known as: ZEBETA  TAKE 1 TABLET BY MOUTH DAILY     CALCIUM 600 + D PO     clindamycin 1 % gel  Commonly known as: CLINDAGEL  APPLY EXTERNALLY TO THE AFFECTED AREA TWICE DAILY     cyclobenzaprine 10 MG tablet  Commonly known as: FLEXERIL  Take 1 tablet by mouth 3 times daily as needed for Muscle spasms     DULoxetine 30 MG extended release capsule  Commonly known as: CYMBALTA  TAKE 1 CAPSULE BY MOUTH DAILY  Ask about: Which instructions should I use? FeroSul 325 (65 Fe) MG tablet  Generic drug: ferrous sulfate  TAKE 1 TABLET BY MOUTH TWICE DAILY     * furosemide 20 MG tablet  Commonly known as: Lasix  Take 1 tablet by mouth daily     * furosemide 20 MG tablet  Commonly known as: LASIX  Take 1 tablet by mouth daily as needed (fluid retention)     gabapentin 800 MG tablet  Commonly known as: NEURONTIN  TAKE 1 TABLET BY MOUTH FOUR TIMES DAILY     hydrocortisone 2.5 % cream  APPLY EXTERNALLY TO THE AFFECTED AREA TWICE DAILY     ibandronate 150 MG tablet  Commonly known as: Boniva  Take 1 tablet by mouth every 30 days Take one (1) tablet once per month in the morning with a full glass of water, on an empty stomach, and do not take anything else by mouth or lie down for the next 30 minutes.      ibuprofen 800 MG tablet  Commonly known as: ADVIL;MOTRIN     loperamide 2 MG tablet  Commonly known as: IMODIUM A-D     Magic Mouthwash  Commonly known as: Miracle Mouthwash  Swish and spit 5 mLs 4 times daily as needed for Irritation     nystatin 573135 UNIT/GM powder  Commonly known as: MYCOSTATIN  Apply topically 2 times daily. AAA (dispense enough for 14 days)     omeprazole 20 MG delayed release capsule  Commonly known as: PRILOSEC  Take 1 capsule by mouth Daily     ondansetron 4 MG tablet  Commonly known as: ZOFRAN  TAKE 2 TABLETS BY MOUTH EVERY 8 HOURS AS NEEDED FOR NAUSEA OR VOMITING     promethazine 6.25 MG/5ML syrup  Commonly known as: PHENERGAN  5 mLs by Per G Tube route 4 times daily as needed for Nausea  Ask about: Should I take this medication?     sulfamethoxazole-trimethoprim 800-160 MG per tablet  Commonly known as: BACTRIM DS;SEPTRA DS  Take 1 tablet by mouth 2 times daily for 10 days  Ask about: Should I take this medication? Tylenol 8 Hour Arthritis Pain 650 MG extended release tablet  Generic drug: acetaminophen         * This list has 2 medication(s) that are the same as other medications prescribed for you. Read the directions carefully, and ask your doctor or other care provider to review them with you. Diet: ADULT DIET; Regular  ADULT ORAL NUTRITION SUPPLEMENT; Breakfast, Lunch, Dinner, HS Snack; Standard High Calorie/High Protein Oral Supplement     Disposition: Patient is medically stable and will be discharged with readmission to inpatient rehab service. Time spent on discharge 40 minutes.     Signed:  Rosa Isela Low MD  4/20/2022 1:29 PM
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
## 2022-04-20 NOTE — PROGRESS NOTES
Attempted to complete consult that was placed for ENT. There is not an ENT on call for the remainder of the month. Dr. Lamine Monet paged to be informed and for further orders.

## 2022-04-20 NOTE — ADDENDUM NOTE
Addendum  created 04/20/22 0909 by OLGA Patterson CRNA    Intraprocedure Meds edited, Orders acknowledged in Narrator

## 2022-04-20 NOTE — CARE COORDINATION
The [address] at [facility]  Notification of Admission Decision      [x] Patient has been accepted for admit to [facility] on : 4/20/2022 Room 827      Please write discharge orders and summary prior to discharge. [] Patient acceptance to Rehab pending the following :    [] Eval in progress       [] Patient determined to be ineligible for services at [facility] because : We recommend you consider        Thank you for your referral, we appreciate you. If you have any questions, please feel   free to contact me at [phone].     Electronically Signed by Melinda Bennett, Admissions Coordinator 4/20/2022 12:08 PM

## 2022-04-21 NOTE — PROGRESS NOTES
Urology Progress Note      SUBJECTIVE: Patient states to feel like I need to pee but nothing comes out he is having some bladder spasms he then void small amount and is incontinent small amounts. OBJECTIVE: Post void residuals documented were 0 and 10 mL. Blood pressure diastolic has been in the 67H    REVIEW OF SYSTEMS:   Review of Systems      Physical  VITALS:  BP (!) 137/93   Pulse 91   Temp 96.8 °F (36 °C) (Temporal)   Resp 18   Ht 5' 5\" (1.651 m)   Wt 163 lb (73.9 kg)   SpO2 97%   BMI 27.12 kg/m²   TEMPERATURE:  Current - Temp: 96.8 °F (36 °C); Max - Temp  Av °F (36.1 °C)  Min: 96.8 °F (36 °C)  Max: 97.2 °F (36.2 °C)   24 HR I&O   Intake/Output Summary (Last 24 hours) at 2022 1535  Last data filed at 2022 1332  Gross per 24 hour   Intake 610 ml   Output 150 ml   Net 460 ml     BACK: no tenderness in spine or flanks  ABDOMEN:  scars noted , firm, non-distended and non-tender  HEART:  normal rate and regular rhythm  CHEST: Normal respiratory effort  GENITAL/URINARY: Normal external genitalia skin integrity okay he is incontinent    Data       CBC:   Recent Labs     22  0242 22  0330   WBC 7.7 7.7 7.7   HGB 10.3* 11.1* 9.9*   HCT 30.3* 34.6* 30.6*    321 334     BMP:    Recent Labs     22  0152 22  0242 22  0330   * 136 130*   K 3.7 3.7 3.9   CL 96* 97* 91*   CO2 27 26 27   BUN 7* 7* 11   CREATININE 1.1 1.1 1.0   GLUCOSE 96 99 129*       Recent Labs     22  1130   LABURIN No growth     No results for input(s): BC in the last 72 hours. No results for input(s): Mark Dunk in the last 72 hours. Urinalysis findings expected showing microhematuria micro pyuria with stent in place.   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 ASSESSMENT AND PLAN    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    Colorectal cancer ([address]    Metastasis from colon cancer (HCC)    Proteinuria    Chemotherapy management, encounter for    Anemia associated with chemotherapy    Other fatigue    Thrush    Dehydration    Chemotherapy-induced peripheral neuropathy (HCC)    Chemotherapy-induced nausea    SBO (small bowel obstruction) (HCC)    Burning with urination    History of small bowel obstruction    Encounter for central line care    Iron deficiency    History of bladder cancer    Hydronephrosis of left kidney    Hydronephrosis of right kidney    Low back pain    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 Cath acquired UTI present on admission continue antibiotics till complete as per infectious disease orders. Further  recommendations. 2 LUTS. Her spasms urgency and incontinence after Mcgregor removed. His residuals were low but he is feeling like he is not emptying. We will allow nurses to BladderScan and I&O.   To check residual or if residuals greater than 300. I did start him on Myrbetriq. His blood pressure has been up we will have to watch this if it continues to show elevated blood pressure Myrbetriq will have to be discontinued. 3.  Left ureteral stricture with chronic indwelling left ureteral stent. Stent was changed yesterday 4/20/2022. He will be due his next change in 3 to 4 months. 4.  History of bladder cancer. Surveillance cystoscopy done on 4/20/2022 showed no obvious recurrent bladder cancer. Biopsy of irregular appearing mucosa as noted above shows chronic inflammation no malignancy. No be scheduled for his next surveillance cystoscopy in 3 months at the time of his stent change. 5.  Pulmonary nodules. Biopsy as noted positive for positive adenocarcinoma GI primary metastasis. Patient to follow-up with oncology.       Tashia Mcmahon MD

## 2022-04-21 NOTE — PROGRESS NOTES
4 Eyes Skin Assessment    Abena Kelley is being assessed upon: Admission    I agree that Refugio Velázquez RN, along with Denis Constantino LPN have performed a thorough Head to Toe Skin Assessment on the patient. ALL assessment sites listed below have been assessed. Areas assessed by both nurses:     [x]   Head, Face, and Ears   [x]   Shoulders, Back, and Chest  [x]   Arms, Elbows, and Hands   [x]   Coccyx, Sacrum, and Ischium  [x]   Legs, Feet, and Heels    Does the Patient have Skin Breakdown?  No    Nhan Prevention initiated:   Yes  Wound Care Orders initiated:  No    [facility] nurse consulted for Pressure Injury (Stage 3,4, Unstageable, DTI, NWPT, and Complex wounds) and New or Established Ostomies:  Yes        Primary Nurse eSignature: Clint Ellis RN on 4/21/2022 at 2:31 AM      Co-Signer eSignature: Electronically signed by Denis Constantino LPN on 7/81/41 at 1:65 AM CDT

## 2022-04-21 NOTE — PLAN OF CARE
Problem: Discharge Planning  Goal: Discharge to home or other facility with appropriate resources  Outcome: Progressing     Problem: Safety - Adult  Goal: Free from fall injury  Outcome: Progressing     Problem: Skin/Tissue Integrity  Goal: Absence of new skin breakdown  Description: 1. Monitor for areas of redness and/or skin breakdown  2. Assess vascular access sites hourly  3. Every 4-6 hours minimum:  Change oxygen saturation probe site  4. Every 4-6 hours:  If on nasal continuous positive airway pressure, respiratory therapy assess nares and determine need for appliance change or resting period.   Outcome: Progressing     Problem: Pain  Goal: Verbalizes/displays adequate comfort level or baseline comfort level  Outcome: Progressing
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
## 2022-04-21 NOTE — PROGRESS NOTES
Physical Therapy EVALUATION Note  DATE:  2022  NAME:  Ally Harvey  :  1950  (81 y.o.,male)  MRN:  [id number]    HEIGHT:  Height: 5' 5\" (165.1 cm)  WEIGHT:  Weight: 163 lb (73.9 kg)    PATIENT DIAGNOSIS(ES):    Diagnosis: Sepsis secondary to UTI    Additional Pertinent Hx: CKD, CAD, HTN, hyperlipidemia, metastatic colon cancer s/p colostomy placement , OA right knee, chronic back pain, pain pump by Dr. 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, and edgar catheter in place since 22 for urinary retention  RESTRICTIONS/PRECAUTIONS:    Restrictions/Precautions  Restrictions/Precautions: Contact Precautions,Other (comment),Fall Risk  Position Activity Restriction  Other position/activity restrictions: Colostomy, pain pump  OVERALL  ORIENTATION STATUS:  Overall Orientation Status: Within Normal Limits  PAIN:  Pain Level: 0  Pain Type: Acute pain    Pain Location: Knee     Pain Orientation: Right        POSTURE/BALANCE  Balance  Posture: Fair  Sitting - Static: Fair  Sitting - Dynamic: Fair  Standing - Static: Fair  Standing - Dynamic: Fair       ACTIVITY TOLERANCE  Activity Tolerance  Activity Tolerance: Patient tolerated evaluation without incident,Patient limited by pain,Patient limited by endurance      BED MOBILITY  Bed Mobility  Rolling: Stand by assistance  Supine to Sit: Stand by assistance  Sit to Supine: Minimal assistance (assist with R LE with final lift in bed d/t fatigue)  Scooting: Stand by assistance  Comment: Cues for safety and technique  TRANSFERS  Transfers  Sit to Stand: Contact guard assistance  Stand to sit: Contact guard assistance  Bed to Chair: Contact guard assistance (towards pts L, w/c to bed)  Stand Pivot Transfers: Contact guard assistance (towards pts L, w/c to bed)  Car Transfer: Unable to assess  Comment: Able to complete with little to no touching assistance       WHEELCHAIR PROPULSION 1  Propulsion 1  Propulsion: Manual  Level: Level Tile  Method: HUDSON MOLINA  Level of Assistance: Contact guard assistance  Description/ Details: 150'  Distance: 2 turns completed     AMBULATION 1  Ambulation  Surface: level tile  Device: Rolling Walker  Assistance: Contact guard assistance  Quality of Gait: FLEXED POSTURE THAT ACTUALLY PROVIDES STABLILITY TO THE KNEE TO DECREASE  BU[place]. NO LOB NOTED OR PATH DEVIATION  Gait Deviations: Slow Farnaz,Decreased step length,Decreased step height  Distance: 48'  Comments: 4 turns completed    GOALS:  Short Term Goals  Time Frame for Short term goals: 1 week  Short term goal 1: Supervision with bed mobility  Short term goal 2: Supervision with transfers  Short term goal 3: SBA with ambulating 100' using RW  Short term goal 4: CGA completing 2 stairs using hand rail  Short term goal 5: Supervision with w/c proplusion 100'    Long Term Goals  Time Frame for Long term goals : 2 weeks  Long term goal 1: Independent with bed mobility  Long term goal 2: Independent with transfers  Long term goal 3: Modified Independent with ambulating 150'  Long term goal 4: Independent with w/c proplusion 150'  Long term goal 5: Modified Independent with competing 4 stairs using handrail  HOME LIVING:     Type of Home: House     Home Access: Ramped entrance        ASSESSMENT (IMPAIRMENTS/BARRIERS): Body Structures, Functions, Activity Limitations Requiring Skilled Therapeutic Intervention: Decreased functional mobility ,Decreased ROM,Decreased body mechanics,Decreased strength,Decreased endurance,Decreased balance,Decreased posture  Assessment: Pt able to tolerate mobility with little to no touching assistance. Pt gets fatigued quickly and needs frequent rest breaks. Pt having trouble with muscle activation in RLE but is able to advance when ambulating.   Activity Tolerance: Patient tolerated evaluation without incident,Patient limited by pain,Patient limited by endurance     PLAN:  Plan  Plan: 5-7 times per week  Plan weeks: 2  Current Treatment Recommendations: Strengthening,ROM,Balance training,Transfer training,Endurance training,Wheelchair mobility training,Gait training,Stair training,Safety education & training,Patient/Caregiver education & training,Equipment evaluation, education, & procurement  Discharge Recommendations: Continue to assess pending progress,Home with Home health PT  PATIENT REQUIRES AND IS REASONABLY EXPECTED TO ACTIVELY PARTICIPATE IN AT LEAST 3 HOURS OF INTENSIVE THERAPY PER DAY AT LEAST 5 DAYS PER WEEK, AND BE EXPECTED TO MAKE MEASURABLE IMPROVEMENT THAT WILL BE OF PRACTICAL VALUE TO IMPROVE THE PATIENT'S FUNCTIONAL CAPACITY OR ADAPTATION TO IMPAIRMENTS.    PATIENT GOAL FOR REHAB:  RETURN TO PRIOR LEVEL OF FUNCTION       IRF/JAMAL  Roll Left and Right  Assistance Needed: Supervision or touching assistance  CARE Score: 4  Discharge Goal: Independent    Sit to Lying  Assistance Needed: Partial/moderate assistance  CARE Score: 3  Discharge Goal: Independent    Lying to Sitting on Side of Bed  Assistance Needed: Supervision or touching assistance  CARE Score: 4  Discharge Goal: Independent    Sit to Stand  Assistance Needed: Supervision or touching assistance  CARE Score: 4  Discharge Goal: Independent    Chair/Bed-to-Chair Transfer  Assistance Needed: Supervision or touching assistance  CARE Score: 4  Discharge Goal: Independent    Car Transfer  Reason if not Attempted: Not attempted due to medical condition or safety concerns  CARE Score: 88  Discharge Goal: Independent    Walk 10 Feet  Assistance Needed: Supervision or touching assistance  CARE Score: 4  Discharge Goal: Independent    Walk 50 Feet with Two Turns  Assistance Needed: Supervision or touching assistance  CARE Score: 4  Discharge Goal: Independent    Walk 150 Feet  Reason if not Attempted: Not attempted due to medical condition or safety concerns  CARE Score: 88  Discharge Goal: Independent    Walking 10 Feet on Uneven Surfaces  Reason if not Attempted: Not attempted due to medical condition or safety concerns  CARE Score: 88  Discharge Goal: Not Attempted    1 Step (Curb)  Reason if not Attempted: Not attempted due to medical condition or safety concerns  CARE Score: 88  Discharge Goal: Independent    4 Steps  Reason if not Attempted: Not attempted due to medical condition or safety concerns  CARE Score: 88  Discharge Goal: Independent    12 Steps  Reason if not Attempted: Not attempted due to medical condition or safety concerns  CARE Score: 88  Discharge Goal: Not Attempted    Wheel 50 Feet with Two Turns  Assistance Needed: Supervision or touching assistance  CARE Score: 4  Discharge Goal: Independent    Wheel 150 Feet  Assistance Needed: Supervision or touching assistance  CARE Score: 4  Discharge Goal: Independent      LAST TREATMENT TIME  PT Individual Minutes  Time In: 1100  Time Out: Fab Huang 79  Minutes: 105      Electronically signed by Manoj Bansal, Student PT on 4/21/2022 at 4:09 PM

## 2022-04-21 NOTE — PROGRESS NOTES
Comprehensive Nutrition Assessment    Type and Reason for Visit:  Initial,Positive Nutrition Screen    Nutrition Recommendations/Plan:   1. Modify ONS from QID to BID per pt request     Malnutrition Assessment:  Malnutrition Status: At risk for malnutrition (Comment) (04/21/22 5156)    Context:  Chronic Illness     Findings of the 6 clinical characteristics of malnutrition:  Energy Intake:  No significant decrease in energy intake  Weight Loss:  Greater than 10% over 6 months     Body Fat Loss:  No significant body fat loss     Muscle Mass Loss:  No significant muscle mass loss    Fluid Accumulation:  No significant fluid accumulation     Strength:  Not Performed    Nutrition Assessment:    Following for new admission to inpatient rehab. Pt at risk for nutrition compromise AEB wt loss of 11%, 22 lbs, X 6 months and increased needs r/t colon CA. Po intakes adequate since stay on acute floor (most meals >50%). Pt requesting ONS changed from QID to BID. Will update and continue to monitor nutrition status. Nutrition Related Findings:    Ostomy, BLE edema Wound Type: Surgical Incision       Current Nutrition Intake & Therapies:    Average Meal Intake: 51-75%,%  Average Supplements Intake: 26-50%  ADULT DIET; Regular  ADULT ORAL NUTRITION SUPPLEMENT; Breakfast, Lunch, Dinner, HS Snack; Standard High Calorie/High Protein Oral Supplement    Anthropometric Measures:  Height: 5' 5\" (165.1 cm)  Ideal Body Weight (IBW): 136 lbs (62 kg)    Admission Body Weight: 163 lb (73.9 kg) (Bed scale)  Current Body Weight: 163 lb (73.9 kg), 119.9 % IBW. Weight Source: Bed Scale  Current BMI (kg/m2): 27.1  Usual Body Weight: 185 lb (83.9 kg) (10/2021)  % Weight Change (Calculated): -11.9  BMI Categories: Overweight (BMI 25.0-29. 9)    Estimated Daily Nutrient Needs:  Energy Requirements Based On: Kcal/kg  Weight Used for Energy Requirements: Current (20-25 kcals/kg)  Energy (kcal/day): 5336-4401 kcals/day  Weight Used for Protein Requirements: Ideal (1.4-1.8 g/kg)  Protein (g/day): 104-133 g/pro/day  Method Used for Fluid Requirements: 1 ml/kcal  Fluid (ml/day): 0334-4040 mL/fluid/day    Nutrition Diagnosis:   · Increased nutrient needs related to catabolic illness as evidenced by weight loss greater than or equal to 10% in 6 months    Nutrition Interventions:   Food and/or Nutrient Delivery: Continue Current Diet,Modify Oral Nutrition Supplement  Nutrition Education/Counseling: Education not indicated  Coordination of Nutrition Care: Continue to monitor while inpatient  Plan of Care discussed with: Patient    Goals:  Goals: PO intake 75% or greater,by next RD assessment    Nutrition Monitoring and Evaluation:   Behavioral-Environmental Outcomes: None Identified  Food/Nutrient Intake Outcomes: Food and Nutrient Intake,Supplement Intake  Physical Signs/Symptoms Outcomes: Biochemical Data,Nutrition Focused Physical Findings,Weight,Skin,GI Status,Fluid Status or Edema    Discharge Planning:    Continue Oral Nutrition Supplement     Ronda Corado, MS, RD, LD  Contact: [phone]

## 2022-04-21 NOTE — PROGRESS NOTES
Occupational Therapy  Facility/Department: [facility] 8 REHAB UNIT  Daily Treatment Note  NAME: Jose Friedman  : 1950  MRN: [id number]    Date of Service: 2022    Discharge Recommendations:  Home with Home health OT,Home with assist PRN         Patient Diagnosis(es): There were no encounter diagnoses. Assessment    Discharge Recommendations: Home with Home health OT; Home with assist PRN      Plan   Plan  Current Treatment Recommendations: Strengthening;Balance training;Self-Care / ADL; Home management training; Safety education & training;Functional mobility training; Endurance training;Patient/Caregiver education & training;Equipment evaluation, education, & procurement     Subjective      Cognition  Overall Cognitive Status: WNL        Objective     22 1310   Vision   Vision Impaired   Vision Exceptions Wears glasses for reading   Hearing   Hearing [facility]      22 1310   [address] needed Supervision or touching assistance   Comment CGA for balance--able to empty and change colostomy bag with setup only   CARE Score 4   Toilet Transfer   Assistance needed Partial/moderate assistance   Comment min A, RW   CARE Score 3      22 1310   Transfers   Sit to stand Contact guard assistance   Stand to sit Contact guard assistance      22 1310   LUE AROM (degrees)   LUE AROM  WFL   RUE AROM (degrees)   RUE AROM  [facility]      22 1310   LUE Strength   Gross LUE Strength WFL   RUE Strength   Gross RUE Strength [facility]      22 1310   Cognitive Patterns   Cognitive Pattern Assessment Used BIMS   Repetition of Three Words (First Attempt) 3   Temporal Orientation: Year Correct   Temporal Orientation: Month Accurate within 5 days   Temporal Orientation: Day Correct   Able to recall \"sock Yes, no cue required   Able to recall \"blue\" Yes, no cue required   Able to recall \"bed\" No, could not recall   BIMS Summary Score 13     Goals  Short Term Goals  Time Frame for Short term goals: 1 week  Short Term Goal 1: complete LB dressing with AE prn with supervision  Short Term Goal 2: complete overall toileting with supervision  Short Term Goal 3: complete simple ambulatory home making task with supervision  Short Term Goal 4: complete 1-2 handed standing level task for 3 mins with supervision  Short Term Goal 5: complete overall bathing with supervision  Long Term Goals  Time Frame for Long term goals : 2 weeks  Long Term Goal 1: complete overall dressing with modified independence  Long Term Goal 2: complete overall bathing with modified independence  Long Term Goal 3: complete overall toileting with modified independence  Long Term Goal 4: complete HEP with independence  Long Term Goal 5: complete simple ambulatory home making task with modified independence  Additional Goals?: Yes  Long Term Goal 6: pt/family verbalize DME for home       Therapy Time   Individual Concurrent Group Co-treatment   Time In 1310         Time Out 1345         Minutes 35         Timed Code Treatment Minutes: 35 Minutes       Camryn Sinclair, OT

## 2022-04-21 NOTE — PLAN OF CARE
Problem: Discharge Planning  Goal: Discharge to home or other facility with appropriate resources  4/21/2022 1214 by Anabell Becker LPN  Outcome: Progressing  4/21/2022 0222 by Shabbir Anthony RN  Outcome: Progressing     Problem: Safety - Adult  Goal: Free from fall injury  4/21/2022 1214 by Anabell Becker LPN  Outcome: Progressing  4/21/2022 0222 by Shabbir Anthony RN  Outcome: Progressing     Problem: Skin/Tissue Integrity  Goal: Absence of new skin breakdown  Description: 1. Monitor for areas of redness and/or skin breakdown  2. Assess vascular access sites hourly  3. Every 4-6 hours minimum:  Change oxygen saturation probe site  4. Every 4-6 hours:  If on nasal continuous positive airway pressure, respiratory therapy assess nares and determine need for appliance change or resting period.   4/21/2022 1214 by Anabell Becker LPN  Outcome: Progressing  4/21/2022 0222 by Shabbir Anthony RN  Outcome: Progressing     Problem: Pain  Goal: Verbalizes/displays adequate comfort level or baseline comfort level  4/21/2022 1214 by Anabell Becker LPN  Outcome: Progressing  4/21/2022 0222 by Shabbir Anthony RN  Outcome: Progressing     Problem: ABCDS Injury Assessment  Goal: Absence of physical injury  Outcome: Progressing     Problem: Musculoskeletal - Adult  Goal: Return mobility to safest level of function  Outcome: Progressing  Goal: Maintain proper alignment of affected body part  Outcome: Progressing  Goal: Return ADL status to a safe level of function  Outcome: Progressing     Problem: Infection - Adult  Goal: Absence of infection at discharge  Outcome: Progressing  Goal: Absence of infection during hospitalization  Outcome: Progressing  Goal: Absence of fever/infection during anticipated neutropenic period  Outcome: Progressing     Problem: Hematologic - Adult  Goal: Maintains hematologic stability  Outcome: Progressing     Problem: Metabolic/Fluid and Electrolytes - Adult  Goal: Electrolytes maintained within normal limits  Outcome: Progressing  Goal: Hemodynamic stability and optimal renal function maintained  Outcome: Progressing  Goal: Glucose maintained within prescribed range  Outcome: Progressing     Problem: Skin/Tissue Integrity - Adult  Goal: Oral mucous membranes remain intact  Outcome: Progressing     Problem: Gastrointestinal - Adult  Goal: Minimal or absence of nausea and vomiting  Outcome: Progressing  Goal: Establish and maintain optimal ostomy function  Outcome: Progressing

## 2022-04-21 NOTE — CONSULTS
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 induration and thickening is again demonstrated. I feel this demonstrates some interval increase in size with potential involvement of the right posterior lateral urinary bladder wall. The urinary bladder is evacuated with a Mcgregor catheter in place. The mass measures approximately 8.0 cm in anterior to posterior dimension by 2.9 cm in transverse dimension. Involvement of the right posterior lateral urinary bladder wall is not excluded. . The urinary bladder cannot be fully assessed as it is decompressed with a Mcgregor catheter. .    1. Metastatic disease to the lung bases showing worsening from the previous study. 2. Moderate size hiatal hernia with gastroesophageal reflux. 3. The patient is status post at least partial colectomy. Left lower quadrant ostomy is present. There is no evidence of obstruction. 4. There is an irregular soft tissue mass with some calcification within the pelvis.  This extends to and is inseparable from the right posterior lateral urinary bladder wall with potential secondary involvement. This extends into the presacral space. When compared to the previous exam I feel this is increased in size. The urinary bladder cannot be fully assessed as it is decompressed with a Mcgregor catheter. 5. Postoperative changes of the mid lower lumbar spine. There is an accentuated lumbar lordosis with grade 1-2 anterolisthesis of L5 on S1. Compression deformities at T12, L1 and L2 are present with previous kyphoplasty T12 and L1. . 6. Status post right nephrectomy. There is mild dilatation of the upper tracts of the left kidney and left ureter with a double-J intraureteral stent well-positioned. The degree of distention of the upper tracts of the left kidney is improved from the previous exam of November of last year. There is mild urothelial thickening. Signed by Dr Liv Best    XR ABDOMEN (KUB) (SINGLE AP VIEW)    Result Date: 4/15/2022  EXAMINATION: XR ABDOMEN (KUB) (SINGLE AP VIEW) 4/15/2022 12:43 PM HISTORY: Ileus versus small bowel obstruction. Abdominal pain. Report: Supine views of the abdomen were obtained. COMPARISON: KUB from 4/14/2022. The NG tube appears slightly pulled back, with a loop in the hiatal hernia and the tip in the proximal cardia region. There are persistent distended gas-filled bowel loops mostly in the left upper quadrant in a pattern suggestive persistent ileus, versus less likely mechanical obstruction. No free air seen on the supine images. An IVC filter is present. There is a left-sided ureteral stent in good position. The Gastrografin contrast symmetrically yesterday is too dilute to adequately evaluate transit within the bowel. There coarse calcifications in the pelvis which was shown on recent CT to be within the presacral fat region, probably treatment related. Slight retraction of the NG tube, the tip appears to be in the proximal stomach, with a loop in the hiatal hernia.  Continued bowel dilation and probable adynamic ileus, versus mechanical small bowel obstruction. No free air is seen on the supine images. No other change. Signed by Dr Huyen Davey. Westonhocarlos alberto    XR ABDOMEN (KUB) (SINGLE AP VIEW)    Result Date: 4/14/2022  History: Ileus versus obstipation; Gastrografin administered approximately 2 to 3 hours prior to the KUB. Reported emesis following contrast administration on the floor KUB: Frontal view the abdomen obtained. There is a small volume of retained contrast within the stomach. There is dilute contrast within the dilated proximal small bowel loops. There are distended mid and distal small bowel loops without contrast. There is a left-sided ileostomy. IVC filter in place. Left ureteral stent. Hyperdense material at the pelvis within the presacral region related to previous cancer treatment. IVC filter tip at the L2 level. Kyphoplasty changes at T12 and L1.    1. There is a small volume of retained contrast within stomach and dilated proximal small bowel loops. There are dilated mid to distal small bowel loops without contrast. Signed by Dr Amber Townsend    XR ABDOMEN (KUB) (SINGLE AP VIEW)    Result Date: 4/12/2022  EXAMINATION: KUB 4/12/2022 HISTORY: Abdominal pain FINDINGS: KUB radiograph reveals the patient's undergone previous fusion at the L5-S1 level. An IVC filter is in place. Previous kyphoplasty is noted at the T12-L1 level. A left-sided double-J intraureteral stent is in place and appears to be well-positioned. There is asymmetric small bowel distention within the left upper quadrant which may represent either a developing obstruction or localized ileus. 1.. Asymmetric small bowel distention within the left upper quadrant. Differential is as above. A left-sided double-J ureteral stent remains in place. 2. Postoperative changes within the lumbar spine and thoracolumbar juncture. An IVC filter is in place.  Signed by Dr Klaus Villarreal    Result Date: 4/10/2022  CT CHEST WO CONTRAST 4/10/2022 11:23 AM HISTORY: Question sepsis. Multiple areas of previous cancer. COMPARISON: 9/3/2021 DLP: 779 mGy cm. All CT scans are performed using dose optimization techniques as appropriate to the performed exam and include at least one of the following: Automated exposure control, adjustment of the mA and/or kV according to size, and the use of the iterative reconstruction technique. TECHNIQUE: Serial helical tomographic images of the chest were acquired. Bone and soft tissue algorithms were provided. Coronal reformatted images were also provided for review. FINDINGS: Neck base: The imaged portion of the neck and thyroid gland is unremarkable. A tunneled infusion catheter is in place with the distal aspect of the catheter extending into the right ventricle. Lungs: Extensive metastatic disease is noted to the lungs showing progression from the previous examination with multiple new nodules present as well as interval increase in size of previously documented nodules. Reference nodule within the superior segment of the right lower lobe previously measured at 5 mm in size now measures 13 mm in size. A lesion within the medial right lower lobe now measuring 1.6 cm in long axis previously measured 1.1 cm. There is no evidence of acute consolidative pneumonia. . No pleural effusion is present. The trachea and bronchial tree are patent. Heart: There is mild cardiomegaly. Moderate coronary calcifications are present. . There is no pericardial effusion. Great vessels: The proximal great vessels are remarkable for mild calcific plaquing involving the innominate and left subclavian artery without rate limiting stenosis. The proximal great vessels are tortuous. . Lymph nodes: No significant hilar, mediastinal or axillary lymphadenopathy is appreciated. Skeletal and soft tissues: The patient's undergone previous ACDF of the lower cervical spine. The patient's undergone kyphoplasty for compression deformities in the lower thoracic and upper lumbar spine. These findings are stable from the previous exam. 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 Crystal Laura Upper abdomen: A moderate size hiatal hernia is present. A left-sided double-J ureteral stent is in place within the upper tracts of the left kidney. An IVC filter is in place. The patient is status post right nephrectomy. . No free fluid is noted within the upper abdomen. 1. Progressive metastatic disease to the lungs. There are too numerous to count pulmonary nodules the majority of which have increased in size or which are new from the previous study. No evidence of acute consolidative pneumonia. 2. 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 Patient's undergone previous kyphoplasty at the thoracolumbar juncture for compression deformities. There is an accentuated thoracic kyphosis. . Signed by Dr Lisa Combs RENAL COMPLETE    Result Date: 4/11/2022  EXAMINATION:  US RENAL COMPLETE  4/11/2022 12:04 PM HISTORY: Acute renal insufficiency. COMPARISON: No comparison study. TECHNIQUE: Grayscale and color flow imaging were performed. FINDINGS: There has been prior right nephrectomy. The left kidney measures 13.2 cm. The cortical thickness and echogenicity are normal. There is mild to moderate dilatation of the lower pole collecting system. The urinary bladder is decompressed. The patient reportedly has a left ureteral stent that is not well seen on this exam.    1. Prior right nephrectomy. 2. The cortical thickness and echogenicity of the left kidney are normal. The left kidney is normal in size. 3. There is mild to moderate dilatation of the left renal collecting system predominantly involving the lower pole. The patient reportedly has a double-J ureteral stent in place. The stent is not well seen on ultrasound.  Signed by Dr Heriberto Alfonso    XR CHEST PORTABLE    Result Date: 4/19/2022  EXAMINATION: XR CHEST PORTABLE 4/19/2022 2:37 PM HISTORY: 4 hours following CT-guided lung biopsy. Single view the chest obtained. Scattered pulmonary nodules are present in the right and left lung. However the right lung is expanded to the chest wall. 4 hours following CT-guided biopsy there is no evidence of pneumothorax. Trace of right lateral pleural fluid is present. Cardiac silhouette is normal. Right internal jugular central venous catheter is present with the distal tip in the right atrium. Postsurgical change from anterior cervical fusion is noted. 4 hours following CT-guided biopsy the right lung is expanded to the chest wall with no evidence of pneumothorax. Signed by Dr Rayne Guerrero    XR CHEST PORTABLE    Result Date: 4/19/2022  EXAMINATION: XR CHEST PORTABLE 4/19/2022 1:20 PM HISTORY: Status post right lung biopsy Single view the chest obtained 2 hours following right lung biopsy. Right lung is expanded to the chest wall. Small nodular densities are present in the right and left lung. Mild blunting the right lateral costophrenic angle is noted. Specifically, there is no evidence of pneumothorax. Right internal jugular catheter is present with the distal tip in the right atrium. Surgical plates present from previous anterior fusion of cervical spine. Impression stable chest with no evidence of pneumothorax 2 hours following CT-guided right lower lobe lung biopsy Signed by Dr Rayne Guerrero    XR CHEST PORTABLE    Result Date: 4/10/2022  EXAMINATION: Chest one view 4/10/2022 HISTORY: Fever and fatigue. FINDINGS: Today's exam is compared to previous study of 4/30/2020. The patient's undergone previous fusion of the mid and lower cervical spine. A tunneled infusion catheter is in place via right-sided approach with the tip in the right atrium. There are suspected pulmonary nodules within the lung bases. . Bibasilar atelectasis is present. No evidence of consolidative pneumonia or effusion.  There are what appear to be some endovascular coils projecting over the lateral right heart border. These were not present on previous chest radiograph of 4/30/2020 and should be correlated clinically. 1.. Question developing nodules within the lower lobes. Metastatic disease should be considered and follow-up with outpatient CT imaging of the chest could BE obtained. There is bibasilar atelectasis. No evidence of lobar pneumonia. 2. There are what appear to be some metallic coils injecting over the lateral right heart border. These were not present on previous exams and should be correlated clinically. An infusion catheter is in place via right-sided approach with the tip in the right atrium. Signed by Dr Felipe Rodriguez    XR CHEST 1 VIEW    Result Date: 4/19/2022  EXAMINATION: XR CHEST 1 VIEW 4/19/2022 10:54 AM HISTORY: Post biopsy Immediately following percutaneous biopsy of right lung nodule chest radiographs obtained. Specifically, the right lung is expanded to the chest wall. The left lung is expanded to the chest wall. Cardiac silhouettes normal. Right internal jugular Port-A-Cath is present with the distal tip in the right atrium. There is no evidence of pneumothorax on the initial film following CT-guided percutaneous biopsy of the right lower lobe Signed by Dr Drew Gasca    XR PLACE NG TUBE BY DR Zoë Casanova    Result Date: 4/14/2022  History: Ileus versus obstipation. Difficulty with NG tube placement on floor Fluoroscopic guided NG tube placement: Procedure discussed with the patient. Consent obtained to place the nasogastric tube under fluoroscopy. Fluoroscopy time 1.5 minutes, dose 2.587 mGym2; one spot image saved for the exam. The nasogastric tube tip is lubricated. The tube is advanced through the left nares into the naso and oropharynx without significant difficulty. NGT advanced into the esophagus. Tube is contained within the distal esophagus within a small hiatal hernia.  Contrast is injected via the tube to allow for improved visualization and directional positioning. Nasogastric tube advanced then into the stomach. Tube is curled within the fundus. 1. Nasogastric tube placement under fluoroscopy. Tip present within the stomach. Signed by Dr Shi Hernandez    CT GUIDED FNA    Result Date: 4/19/2022  EXAMINATION: CT GUIDED FNA 4/19/2022 10:59 AM HISTORY: EXAM: CT GUIDED FNA -- 4/13/2022 8:41 AM HISTORY: 81 years, Male, , lung nodules. Biopsy to determine if these are from metastatic bladder or metastatic colon COMPARISON: CT scan April 13, 2022 DOSE:  mGy-cm. Automated exposure control was utilized to minimize patient radiation dose. TECHNIQUE/FINDINGS: Risks, benefits and alternatives to the procedure were discussed with the patient. Hemoptysis, pneumothorax were discussed. Written informed consent obtained. Time Out: Immediately prior to the procedure, a time out was performed. The patient's identity (using at least 2 patient identifiers) and procedure to be performed (including correct side/site) was verified. The entry site was marked with an X. Site was prepped in the usual sterile fashion. Approximately 8 mL of lidocaine was infused in the subcutaneous tissues for local anesthesia. Using CT guidance. 19-gauge 10 cm guiding needle was advanced into the right lung. Subsequently 6 core biopsies were obtained with a 20-gauge Bard gun. 3 were obtained with a 1 cm throw and 3 were obtained with a 2 cm throw. Samples were obtained from the right lower lobe from posterior approach. The samples were sent to surgical pathology for analysis. 25 mcg Fentanyl and 1 mg Versed were administered as moderate conscious sedation for patient comfort. Under my direct supervision, intravenous moderate sedation was administered during the course of this procedure, with continuous monitoring of hemodynamic parameters. The patient tolerated the procedure well, and was transferred to the holding area  in stable condition for post procedure monitoring.  There were no apparent periprocedural complications. 1. Technically successful CT-guided right lower lung biopsy. 2. Moderate conscious sedation for 31 minutes between 9:00 AM and 9:31 AM. 3. No evidence of periprocedural complications.  Signed by Dr Rafia Joe      Assessment/Plan:     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    Colorectal cancer ([address]    Metastasis from colon cancer ([address]    Proteinuria    Chemotherapy management, encounter for    Anemia associated with chemotherapy    Other fatigue    Thrush    Dehydration    Chemotherapy-induced peripheral neuropathy (HCC)    Chemotherapy-induced nausea    SBO (small bowel obstruction) (HCC)    Burning with urination    History of small bowel obstruction    Encounter for central line care    Iron deficiency    History of bladder cancer    Hydronephrosis of left kidney    Hydronephrosis of right kidney    Low back pain    Urothelial carcinoma of right distal ureter ([address]    Sepsis secondary to UTI ([address]    Acute kidney injury superimposed on CKD ([address]    Urinary tract infection associated with indwelling urethral catheter ([address]    Retained ureteral stent    Lower urinary tract symptoms    Ileus ([address]    Sepsis ([address]             Metastatic colorectal adenocarcinoma  Urothelial bladder cancer, s/p recent ureteral stents  -Left ureteral stricture with chronic indwelling stent s/p ureteral stent change by Urology 4/20  Pulmonary nodules  --- Pathology from biopsy:  pulmonary metastasis compatible with metastatic colonic adenocarcinoma  Outpatient follow up with Hem/Onc in 3 weeks to start chemo regimen        UTI with Achromobacter and Candida glabrata from culture, immunocompromise status, previous ureteral stents  -Antibiotics per ID  --Levaquin and Eraxis through 4/24     Bladder spasms, unable to take anticholinergics due to effect on his gut motility  --Myrbetriq per urology     Ileus-resolved  Constipation-bowel regimen    HTN  Monitor BP, continue metoprolol, HCTZ, further adjustment as needed    Depression  -Cymbalta     Knee pain  - As needed analgesics    Fluid retention with lower extremity edema  - Give dose of oral Lasix (normally takes Lasix as needed at home)     Continue rehab    Thank you for allowing me to participate in the care of your patient. Will follow with you.     Darlin Ly MD  4/21/2022 8:51 AM

## 2022-04-21 NOTE — PROGRESS NOTES
Occupational Therapy  Facility/Department: [address] Initial Assessment    Name: Teddy De La Cruz  : 1950  MRN: [id number]  Date of Service: 2022    Discharge Recommendations:  Home with [address] [place] [address] with assist PRN          Patient Diagnosis(es): There were no encounter diagnoses. Past Medical History:  has a past medical history of COLLEEN (acute kidney injury) ([address] Arthritis, Burn, Cancer ([address] Chronic back pain, Complex regional pain syndrome type 1 of right lower extremity, Coronary artery disease involving native coronary artery of native heart without angina pectoris, Drop foot gait, History of blood transfusion, Hypertension, Immunization counseling, Malignant neoplasm of overlapping sites of bladder ([address] Mixed hyperlipidemia, Pain management, and Ureteral tumor. Past Surgical History:  has a past surgical history that includes Neck surgery; Abdomen surgery; hc inject other perphrl nerv (Left, 10/28/2016); back surgery; ileostomy or jejunostomy; colectomy; Abdominal exploration surgery; eye surgery (Bilateral); CYSTOSCOPY INSERTION / REMOVAL STENT / STONE (Right, 2019); INSERTION / REMOVAL / REPLACEMENT VENOUS ACCESS CATHETER (Right, 2019); Cystoscopy (Left, 2019); Cystoscopy (Left, 2019); Tunneled venous port placement; Cystoscopy (Bilateral, 12/3/2019); cystoscopy w biopsy of bladder (N/A, 12/3/2019); Cystoscopy (Bilateral, 2020); lumbar fusion (N/A, 2020); Cystoscopy (Bilateral, 2020); cystoscopy w biopsy of bladder (N/A, 2020); Spine surgery; Cystoscopy (Bilateral, 10/15/2020); Cystoscopy (N/A, 10/15/2020); CYSTOSCOPY INSERTION / REMOVAL STENT / STONE (Bilateral, 2021); Cystoscopy (Bilateral, 2021); Coronary angioplasty with stent; baclofen pump implantation; Bladder surgery (N/A, 2021); Bladder surgery (Left, 2022); Cystoscopy (Left, 2022); and Cystoscopy (N/A, 2022).     Treatment Diagnosis: Sepsis,recent right nephrectomy and ureter removal d/t right ureter tumor and placement of stents,4/20/22 for left ureteral stent change and cystoscopy bladder biopsy, recent lung biopsy      Assessment      04/21/22 0900   Assessment   Performance deficits / Impairments Decreased functional mobility ; Decreased ADL status; Decreased strength;Decreased endurance;Decreased balance;Decreased high-level IADLs   Assessment Patient benefits from further skilled therapy to address ADLs, functional mobility, strength, and endurance for ADLs. Patient has the potential to make gains with continued skilled therapy. Prognosis Good   Decision Making Low Complexity   Discharge Recommendations Home with Home health OT; Home with assist PRN       Plan   Plan  Current Treatment Recommendations: Strengthening,Balance training,Self-Care / ADL,Home management training,Safety education & training,Functional mobility training,Endurance training,Patient/Caregiver education & training,Equipment evaluation, education, & procurement     Restrictions  Restrictions/Precautions  Restrictions/Precautions: Contact Precautions,Other (comment),Fall Risk  Position Activity Restriction  Other position/activity restrictions: Colostomy, pain pump    Subjective   General  Chart Reviewed: Yes,Orders  Patient assessed for rehabilitation services?: Yes  Additional Pertinent Hx: CKD, CAD, HTN, hyperlipidemia, metastatic colon cancer s/p colostomy placement , OA right knee, chronic back pain, pain pump by Dr. Laura Chris, GERD, mixed hyperlipidemia  Family / Caregiver Present: No  Diagnosis: Sepsis,recent right nephrectomy and ureter removal d/t right ureter tumor and placement of stents,4/20/22 for left ureteral stent change and cystoscopy bladder biopsy, recent lung biopsy,recent bladder biopsy  Pain Assessment  Pain Level: 6  Patient's Stated Pain Goal: 0 - No pain  Pain Location: Knee  Pain Orientation: Right  Pain Descriptors: Dull; Sharp  Functional Pain Assessment: Prevents or interferes some active activities and ADLs    Objective   Eating  Assistance Needed: Setup or clean-up assistance  CARE Score: 5  Discharge Goal: Independent    Oral Hygiene  Assistance Needed: Setup or clean-up assistance  CARE Score: 5  Discharge Goal: [address] needed: Supervision or touching assistance  CARE Score: 4  Discharge Goal: Independent     Shower/Bathe Self  Assistance Needed: Partial/moderate assistance  Comment: min A  CARE Score: 3  Discharge Goal: Independent     Upper Body Dressing  Assistance Needed: Setup or clean-up assistance  CARE Score: 5  Discharge Goal: Independent     Lower Body Dressing  Assistance Needed: Partial/moderate assistance  Comment: min A with R LE pant leg, vc for tech  CARE Score: 3  Discharge Goal: Independent     Putting On/Taking Off Footwear  Assistance Needed: Partial/moderate assistance  Comment: due to edema  CARE Score: 3  Discharge Goal: Independent     Toilet Transfer  Assistance needed: Partial/moderate assistance  Comment: min A  CARE Score: 3  Discharge Goal: Independent     Picking Up Object  Reason if not Attempted: Not attempted due to medical condition or safety concerns  CARE Score: 88  Discharge Goal: Supervision or touching assistance         Goals  Short Term Goals  Time Frame for Short term goals: 1 week  Short Term Goal 1: complete LB dressing with AE prn with supervision  Short Term Goal 2: complete overall toileting with supervision  Short Term Goal 3: complete simple ambulatory home making task with supervision  Short Term Goal 4: complete 1-2 handed standing level task for 3 mins with supervision  Short Term Goal 5: complete overall bathing with supervision  Long Term Goals  Time Frame for Long term goals : 2 weeks  Long Term Goal 1: complete overall dressing with modified independence  Long Term Goal 2: complete overall bathing with modified independence  Long Term Goal 3: complete overall toileting with modified independence  Long Term Goal 4: complete HEP with independence  Long Term Goal 5: complete simple ambulatory home making task with modified independence  Additional Goals?: Yes  Long Term Goal 6: pt/family verbalize DME for home       Therapy Time   Individual Concurrent Group Co-treatment   Time In 0900         Time Out 1000         Minutes 60         Timed Code Treatment Minutes: 39 Minutes       Camryn Sinclair, OT

## 2022-04-22 PROBLEM — C18.9 COLON CARCINOMA METASTATIC TO LUNG (HCC): Status: ACTIVE | Noted: 2022-01-01

## 2022-04-22 PROBLEM — C78.00 COLON CARCINOMA METASTATIC TO LUNG (HCC): Status: ACTIVE | Noted: 2022-01-01

## 2022-04-22 PROBLEM — C79.9 METASTATIC ADENOCARCINOMA (HCC): Status: ACTIVE | Noted: 2022-01-01

## 2022-04-22 NOTE — PROGRESS NOTES
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 IHC positive for CDX2. Negative for CK7, GATA3, TTF-1. This is compatible with metastatic colonic adenocarcinoma. Recommended outpatient regimen: (Dose reduction due to the patient frailty)  -Panitumumab 6 mg/kg day 1 and 15  -Irinotecan 150 mg/m2 day 1 and 15  -Leucovorin 400 mg/m2 day 1 and 15  -5-FU infusional 2000mg/m2 (over 46 hours) square day 1 and 15  Cycle length q. 28 days  No G-CSF    To be started in 3 weeks. MD visit same day.

## 2022-04-22 NOTE — PROGRESS NOTES
04/22/22 1444 04/22/22 1512 04/22/22 1513   Pre Treatment Pain Screening   Intervention List Nurse/physician notified  --   --    Pain Assessment   Pain Assessment 0-10  --   --    Pain Level 5  --   --    Patient's Stated Pain Goal 0 - No pain  --   --    Pain Location Knee  --   --    Pain Orientation Right  --   --    Non-Pharmaceutical Pain Intervention(s) Distraction; Rest  --   --    Conditions Requiring Skilled Therapeutic Intervention   Assessment  --  Pt. performed supine LE ex at bedside this session per his request due to R knee pain.   --    Activity Tolerance   Activity Tolerance  --  Patient limited by pain  --    Safety Devices   Type of Devices  --   --  Call light within reach   Electronically signed by Jf Morales PTA on 4/22/2022 at 3:14 PM

## 2022-04-22 NOTE — CONSULTS
MEDICAL ONCOLOGY CONSULTATION    Pt Name: Pravin Millan  MRN: [id number]  YOB: 1950  Date of evaluation: 4/22/2022    REASON FOR CONSULTATION: High-grade retear carcinoma of the ureter, colon cancer  REQUESTING PHYSICIAN: Rehab    History Obtained From:    patient, electronic medical record    HISTORY OF PRESENT ILLNESS:  The patient is well-known to our practice. He was being followed on the floors at [facility].  Further details of his medical history as below. We are consulted at the rehab for continued of care. Essentially, history of colonic adenocarcinoma and more recently high-grade urothelial carcinoma of the bladder/.  There PT 2 N0. Patient has developed lung nodules bilaterally. He underwent a CT-guided biopsy this week. Pathology consistent with metastatic adenocarcinoma consistent with gastrointestinal primary. Prior medical history   Janet Mars is a very pleasant 81years old male who has a diagnosis of stage IV colonic adenocarcinoma.  He was receiving palliative chemotherapy after September 2021. Christiano Garcia was found to have high-grade urothelial carcinoma involving the ureter.  He underwent surgery, [facility] had a complicated postoperative course. Christiano Garcia eventually recovered and his current receiving home care needs at home. Christiano Garcia has also several other comorbidities include CAD, hypertension, hyperlipidemia and CKD stage III. The patient presented ER department with complaints of generalized malaise for the last several days, low-grade fever, nausea.  Decreased urine output.  Patient has a ostomy in place.  Work-up in the emergent showed moderate hyponatremia sodium 127, mild elevated lactic acid, elevated creatinine 2.4 (baseline's 1.5-1.7).   He also had neutrophil leukocytosis and positive nitrates and large blood on the urine analysis.  Chest x-ray was abnormal and therefore CT chest was performed that showed evidence of metastatic disease to the lungs.  Patient received IV fluid resuscitation the emergency. [facility] was started on broad-spectrum antibiotics. [facility] was admitted with consultation from me and also ID.  4/10/2022-CT abdomen/pelvis without contrast showed evidence of metastatic disease to the lung bases.  Moderate size hiatal hernia with gastroesophageal reflux.  Status post partial colectomy.  Left lower quadrant ostomy is present.  No evidence obstruction.  Irregular soft tissue mass with some calcification the pelvis.  This demonstrated interval increase was potential involvement with the right posterior lateral urinary bladder wall.  The mass measures 8 x 2.9 cm.  Left-sided double J intraureteral stent is in place with stent well positioned. 4/10/2022-CT chest without contrast showed extensive metastatic disease is noted to the lungs showing progression from the previous examination with multiple new nodules present as well as interval increase in size of previously documented nodules. Reference nodule within the superior segment of the right lower lobe previously measured at 5 mm in size now measures 13 mm in size. A lesion within the medial right lower lobe now measuring 1.6 cm in long axis previously measured 1.1 cm. There is no evidence of acute consolidative pneumonia. Essentially, findings consistent with progressive metastatic disease to the lungs. There are too numerous to count pulmonary nodules the majority of which have increased in size or which are new from the previous study.  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      Prior oncological history  ONCOLOGIC HISTORY:   Diagnosis:  · Moderately differentiated rectal carcinoma, T3N0Mx, diagnosed in 3/9/2009  · Noninvasive high-grade papillary urethral carcinoma.  Negative for evidence of detrusor muscle invasion, pTa, pNx on 8/18/2019.    · Metastatic colorectal carcinoma, 9/3/2019  · MSI stable and mutations for BRAF, NRAS, KRAS were not detected.    · Recurrent bladder cancer- superficial   · Urothelial carcinoma of the ureter  · Progressive lung metastasis     TREATMENT SUMMARIES:  · 4/9/2009-5/27/2009-received neoadjuvant chemotherapy with 5-FU CIV along with radiation therapy for a total of 5400 cG  · 7/15/2009-rectum resection revealed no residual malignancy, complete pathological response. · 8/18/2019- transurethral resection of bladder tumor (TURBT)   · 9/18/2019-12/26/2019 palliative chemotherapy with modified FOLFOX 7  (Oxaliplatin 85 mg/m² IV day 1, leucovorin 400 mg/m² IV day 1 and 5-FU 2400 mg/m² IV continuous infusion over 46 to 48 hours for a total of 7 cycles. · 1/28/2020 -palliative maintenance therapy with leucovorin 400 mg/m² IV over 2 hours on day 1, followed by 5-FU bolus 400 mg/m² and then 1200 mg/m²/day x2 days (total 2400 mg meter squared over 46 to 48 hours) continuous infusion.  Repeat every 2 weeks.     ONCOLOGIC HISTORY # NMIBC_Bladder cancer. Elise Gamez was diagnosed with noninvasive urethral carcinoma, pTa, pNx on 8/18/2019.  Ta tumors are papillary lesions that tend to recur but are relatively benign and generally do not invade the bladder.  Adjuvant treatment is not warranted at this time and will be monitored closely. Biopsy and transurethral resection of bladder tumor (TURBT) on 8/18/2019 by Dr. Yany Pearson with pathology revealing noninvasive high-grade papillary urethral carcinoma.  Negative for evidence of detrusor muscle invasion, pTa, pNx.   · 12/3/2019- cystoscopy with removal and replacement of double-J urethral stent.  Pathology from dome of the bladder/tumor revealed high-grade papillary urethral carcinoma, noninvasive.  No muscularis propria present.    · 2/26/2020 - cystoscopy and bilateral ureteral stent removal and replacement.  The operative note by Dr. Sushant Julien documented bilateral hydronephrosis and obtained biopsy of the bladder in the mid dome and left anterior lateral wall x2.  Pathology documented high-grade papillary urethral carcinoma, noninvasive, stage pTaNx. · 5/28/2020-the patient underwent a cystoscopy and resection of bladder tumor on 05/28/2020 with findings consistent with noninvasive, high-grade papillary urothelial carcinoma.  Muscularis propria was not identified.  The patient will receive intravesical BCG therapy. · 7/6/2020-CT Abdomen/ Pelvis-Moderate severe right and mild left hydronephrosis with bilateral ureteral stents, which have an adequate radiographic position. Right kidney with cortical thickening and somewhat asymmetrically decreased enhancement which can be seen with obstructive uropathy. Postoperative changes of colectomy. Left lower quadrant ostomy. Slightly decreased size of presacral low density compared to4/15/2020. Similar intrahepatic and extrahepatic bile duct dilation compared to 4/15/2020. Correlate with clinical symptoms and laboratory studies if clinically indicated. Similar chronic bony findings. · 3/25/2021-CT abdomen showed severe hydronephrosis.  Cystoscopy was performed by urology. · 4/1/21 Bladder neck tumor, biopsies: High-grade papillary urothelial carcinoma, noninvasive. Muscularis propria is not present. AJCC pathologic stage:  pTa Nx   · 4/14/2021-reviewed results of pathology.  No evidence of invasive disease.  Continue surveillance cystoscopy with urology. · 9/13/2021-he was seen by urology.  Findings of ureteral high-grade urothelial carcinoma.  Noninvasive.  The patient is being referred to [facility] in [address] [place] [address]. · 10/11/2021-he was seen by [facility] and recommended cystoscopy with biopsy and possible laser ablation and bilateral leg stent exchange at that time. · 4/10/2022-CT abdomen/pelvis without contrast showed evidence of metastatic disease to the lung bases.  Moderate size hiatal hernia with gastroesophageal reflux.  Status post partial colectomy.  Left lower quadrant ostomy is present.  No evidence obstruction.  Irregular soft tissue mass with some calcification the pelvis.  This demonstrated interval increase was potential involvement with the right posterior lateral urinary bladder wall.  The mass measures 8 x 2.9 cm.  Left-sided double J intraureteral stent is in place with stent well positioned. · 4/10/2022-CT chest without contrast showed extensive metastatic disease is noted to the lungs showing progression from the previous examination with multiple new nodules present as well as interval increase in size of previously documented nodules. Reference nodule within the superior segment of the right lower lobe previously measured at 5 mm in size now measures 13 mm in size. A lesion within the medial right lower lobe now measuring 1.6 cm in long axis previously measured 1.1 cm. There is no evidence of acute consolidative pneumonia. Essentially, findings consistent with progressive metastatic disease to the lungs. There are too numerous to count pulmonary nodules the majority of which have increased in size or which are new from the previous study.  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   · 4/19/2020- CT-guided FNA biopsy of lung nodule consistent metastatic colonic adenocarcinoma.        ONCOLOGIC HISTORY #3  Lambert Wood was seen in initial oncology consultation on 8/19/2019 during his hospitalization at [address] [place] [address] after a large pelvic mass was identified which raised concern for recurrent disease.  Further pathology consistent with recurrent rectal cancer  · 8/17/2019- CEA 18.1  · 8/17/2019- CT scan of the kidney with contrast documented moderate to severe right hydronephrosis with dilation of the right ureter into the lower pelvis the site of the parasacral soft tissue changes.  Partially calcified soft tissue changes within the janes-sacral region likely representing sequelae of pelvic radiation.  Increasing scarring/fibrosis versus tumor recurrence within the presacral changes, likely represents a site of right distal ureter obstruction.  No left-sided hydronephrosis. · 8/18/2019 -Double-J ureter stent placement for right hydronephrosis secondary to extrinsic compression by pelvic mass.    · 8/27/2019-CT scan of the chest with contrast documented numerous pulmonary nodules that appear new compared to 11/12/2017, RUL nodule measuring 7 mm and LLL nodule measuring 5 mm.  Soft tissue nodule at the left ventral abdominal wall.  Slight increased size of a probable lymph node anterior to the aorta measuring 0.9 cm compared to 0.7 cm.  Similar presacral, right pelvic sidewall and right abductor muscular nodular soft tissue density. · 8/27/2019 CT scan of the abdomen and pelvis with contrast identified new moderate left hydronephrosis with moderate right hydronephrosis.  Mild stranding around the urinary bladder and thickening of the bilateral ureteral wall.  Numerous pulmonary nodules.  Soft tissue of the left ventral abdominal wall.  Slightly increased size of probable lymph node anterior to the aorta measuring 0.9 cm compared to 0.7 cm. · 8/27/2019-PET scan did not identify any FDG avid pulmonary nodules or airspace opacities.  Abnormal increased metabolic activity within the right pelvic wall soft tissue showing SUV of 5.4.  Abnormal soft tissue metabolic activity in the right abductor muscle with SUV of 6.4.  Focally increased activity to the right of the inferior L5 vertebrae body posterior with SUV of 7.9 with associated sclerotic changes.   · 8/29/2019-  Dr. Sushant Julien completed a cystoscopy with double-J ureter stent in the left ureter for left hydronephrosis  · 9/3/2019- CT-guided right abductor muscle biopsy on 9/3/2019 with pathology identifying metastatic adenocarcinoma consistent with colorectal origin.  Molecular panel from biopsy tissue revealed MSI stable and mutations for BRAF, NRAS, KRAS were not detected.    · 9/18/2019 - Palliative chemotherapy with modified FOLFOX 7  (Oxaliplatin 85 mg/m² IV day 1, leucovorin 400 mg/m² IV day 1 and 5-FU 2400 mg/m² IV continuous infusion over 46 to 48 hours) with the anticipation of adding Avastin 5 mg/kg day 1 every 14 days  · 10/15/2019- 24-hour urine for protein with a total protein of 1785 mg per 24-hour.  Zurdo has been evaluated by Dr. Higinio Rosario and he reports no significant concerns related to the protein. · 11/6/19 CEA 5.6 significantly improved compared to CEA of 14.0 on 8/30/2019. · 11/15/2019 -CT scan of the abdomen and pelvis documented no evidence of disease progression with significant decrease in the size of enhancing nodules in the right pelvic abductor musculature, a previous 1.8 cm nodule now measures 5 mm.  No new or enlarging retroperitoneal, mesenteric, pelvic or inguinal lymph nodes.  Calcified presacral mass unchanged measuring 5 x 3.7 cm.   · 11/15/2019 -CT scan of the chest documented multiple small pulmonary nodules reidentified, largest nodule in the RUL measures 5 mm compared to 7.5 mm, RLL nodule measures 3.4 mm compared to 5.9 mm, VIC nodule measures 4 mm compared to 6 mm.  A cluster of small nodules in the RUL anteriorly are barely visible on this study.  There is a decrease in size of mediastinal lymph nodes compared to previous exam, right distal paratracheal lymph node measuring 4.5 mm compared to 8.3 mm and lower right peritracheal node measuring 4.5 mm compared to 8.6 mm.    · 1/13/2020- CT scan of the abdomen and pelvis with contrast indicated improvement in the right-sided hydronephrosis with a chronic inflammatory process of the ureters suspected due to the moderate thickening, also present on previous study.  The small poorly enhancing nodules in the right abductor muscles have decreased in the partially calcified presacral mass and right lateral pelvic wall nodules are stable compared to previous study.  Resolution of the subcutaneous abdominal wall nodules.  A prominent retroperitoneal lymph node adjacent and anterior to the left common artery is redefined and measures 6 mm, no change from previous exam.   · 1/13/2020 - CT scan of the chest documented a right lower lobe nodule measures 4.3 cm and is unchanged.  A right lower lobe nodule measures 2.8 mm compared to 3.4 mm.  Nodule in the right upper lobe is barely visible and measures 2.4 mm.  Nodule in the left lower lobe measures 4.8 mm and is unchanged.  Nodule in left lower lobe posterior measures 2.8 mm and previously measured 4.7 mm.  A right lower lobe posterior medially nodule is barely visible measuring 0.2 mm and previously. measured 4.5 mm.  No new nodules identified.  No change in the size of the mediastinal lymph nodes. · 1/28/2020 -palliative maintenance therapy with leucovorin 400 mg/m² IV over 2 hours on day 1, followed by 5-FU bolus 400 mg/m² and then 1200 mg/m²/day x2 days (total 2400 mg meter squared over 46 to 48 hours) continuous infusion.  Repeat every 2 weeks.  Only received 1 cycle, further treatment held due to small bowel obstruction. · 1/30/2020 - CT scan of the abdomen and pelvis indicated high-grade small bowel obstruction with transition point in the midline posterior pelvis where a small bowel loop is tethered to a partially calcified presacral soft tissue mass. · 2/11/2020-CEA 1.4  · 3/5/2020-  Exploratory laparotomy, removal of adhesions, small bowel resection with primary anastomosis and partial thickness small bowel repair by Dr. Hakeem Mix the operative note Dr. Alonzo Lorenz reported no evidence of carcinomatosis within the abdomen and the liver was unable to be examined due to extent of right upper quadrant adhesions.  Pathology from small intestine documented no evidence of malignancy. · 4/15/2020 Ct Chest W Contrast Minimal interval increase in size of subcentimeter pulmonary nodules. The largest now measures 6 mm in the medial right lower lobe on axial image 80.  There is a new, unstable, horizontal fracture through the T6 vertebral body. Additionally, there are new fractures through the posterior 11th and 12th right ribs. The bones are moderately osteopenic. The finding of an unstable fracture through the T6 vertebral body was discussed with Ana Mercedes at 10:45 AM on 4/15/2020. · 4/15/2020 Ct Abdomen Pelvis W Iv Contrast  Patient has undergone interval resection of the distal small bowel, and there is a 2.8 cm fluid collection in the presacral operative bed. This contains a tiny focus of air. This may postoperative or due to infection. Please correlate with the patient's clinical symptoms and laboratory markers. Improved hydronephrosis and hydroureter. Diffuse osteoporosis. Findings in the lower chest are described in a separate dictation. · 4/22/2020-CEA 0.9  · 6/2/2020-resumed chemotherapy with 5-FU/leucovorin and Panitumumab.  Okay to do 1 today then CMP CEA   · 8/19/20 CEA-1.1  · 10/21/2020- CEA 2.0  · 11/11/2020- Ct Chest W Contrast Multiple, too numerous to count, small noncalcified lung nodules bilaterally. The referenced nodules appears to have decreased in size the previous study. No new nodules. · 11/11/2020- Ct Abdomen Pelvis W Contrast Unchanged bilateral hydronephrosis, more on the right side. Bilateral ureteral stents in place. Moderate asymmetrical thickening of the incompletely distended urinary bladder. This may partly be due to incomplete distention. Possibility of chronic cystitis and or chronic partial outlet obstruction may not be excluded. A functioning left lower abdominal ostomy. A small parastomal small bowel herniation without obstruction. A partially calcified presacral mass. The soft tissue component have increased in size in the previous study. The osseous changes are described above. Any superimposed metastatic disease is not excluded and would be hard to evaluate due to extensive postsurgical changes.    · 11/18/2020-essentially, overall stable disease.  Improvement of the lung nodules with decreased in the size of the target lesions.  The pelvic lesion is is likely worse by 25%.  However, CEA is is still within the normal limits.  Therefore likely mixed response.  We will continue current treatment and repeat CT scans in about 3 months. · 12/16/2020-discontinue 5-FU bolus from his regimen. · 2/9/2021- Ct Chest W Contrast No evidence of disease progression. Stable pulmonary nodules. Stable intrahepatic and extrahepatic bile duct dilation compared to prior 11/11/2020. Postoperative, posttraumatic and degenerative changes in the spine as described above. Old right-sided rib fractures. · 2/9/2021- Ct Abdomen Pelvis W Contrast showed evidence of response to therapy including decreased presacral mass/thickening now measuring 1.1 cm, previously 1.9 cm on 11/11/2020. Stable intrahepatic and extra hepatic bile duct dilation with cholecystectomy clips. See separately dictated CT chest of the same day regarding pulmonary nodules.  Bilateral ureteral stents. Decreased bilateral hydronephrosis. Urinary bladder wall is thickened, which could be seen with post treatment changes or cystitis. Correlate with symptoms.  Chronic bony findings as above  · 2/17/2021-reviewed CT chest abdomen pelvis.  Essentially consistent with disease response to therapy.  Continue current therapy.    · 2/17/21 CEA 1.0  · 3/25/21 Ct Abdomen Pelvis W Iv Contrast  The stomach is distended, however no small bowel dilatation identified. Contrast identified within the left ileostomy bag. The distal stomach is under distended which may be secondary to peristalsis. Gastroparesis considered. Bilateral ureteral stents remain appropriate in position, however there is new moderate to severe right-sided hydronephrosis and mild left-sided hydronephrosis when compared to the 2/9/2021 exam. Mild increase considered within the partially calcified presacral pelvic mass. Stable partially calcified right pelvic soft tissue.  Similar abnormal wall thickening of the bladder most notable superiorly. Similar prominence of the intra and extra hepatic bile ducts down to the level of the ampulla. Findings may represent a reservoir effect, however correlation with liver function tests recommended. Therefore. Stable noncalcified left greater than right pulmonary nodules with asymmetrical interstitial changes of the right lung base concerning for pneumonitis. Osteopenia with postoperative changes of the lumbar spine. Chronic compression deformities of the thoracolumbar vertebra as described above. · 4/14/2021-I reviewed notes from urology and also CT abdomen/pelvis that showed mild interval increase in the size of the soft tissue nodule in the pelvis.  However, this is still very small and therefore will have a short follow-up CT scan and of May 2021.  I personally reviewed the CT scans.  Really hard to state that there is clear-cut disease progression.  Again, short follow-up recommended.  Continue current treatment. · 5/12/21 CEA 0.8  · 5/26/2001-CT chest abdomen pelvis showed Partially calcified presacral soft tissue mass appears unchanged. Previously measured soft tissue noncalcified component is unchanged measuring 2.2 x 3.2 cm on axial image 60. However, this is questionable.  CT chest showed a stable pulmonary nodules.  Subcentimeter nodules.  Largest 7.5 mm. · 6/1/21 CEA 0.9  · 06/01/23- reviewed scans. Stable disease.  Continue treatment every 3 weeks as per patient request. Continue infusional 5-FU, Panitumumab and leucovorin. No 5-FU bolus due to severe mucositis. · 8/11/2021 CEA . 9  · 9/03/2021 CT Chest w/Contrast Slight interval increase in the size of pulmonary nodules, the largest in the medial right lung base. Findings are concerning for metastatic disease. Atherosclerosis of the aorta and coronary arteries. Sclerotic rib lesions favored for osseous metastases. Old rib fractures also evident.   · 9/03/2021 CT Abd/Pelvis w/ IV Contrast (Oral) A persistent partially calcified mass in the presacral region with encasement of the distal ureters bilaterally and resultant bilateral hydronephrosis. Bilateral ureteral stents in place. There is increasing right-sided hydronephrosis since the previous study. Right renal atrophy similar to the previous study. Moderate asymmetrical thickening of the incompletely distended urinary bladder is similar to the previous study. This may partly be due to incomplete distention. An inflammatory process or a neoplastic process is not excluded. Moderate intrahepatic biliary dilatation is similar to the previous study and probably due to a prior cholecystectomy. Moderate dilatation of the contrast filled small bowel loops may represent an ileus or partial distal small bowel obstruction. There is left lower abdominal ileostomy which appears patent. The stable compression fractures of the lower thoracic and proximal lumbar vertebrae and hardware fusion similar to the previous study. · 9/22/21- Decrease Vectibix to every other treatment.  He is currently receiving chemotherapy every 3 weeks as per patient request  · The patient's continue treatment in October 2021 due to concurrent diagnosis of high-grade urothelial carcinoma of the ureter. He underwent surgery at [facility] in [place]. Therefore, treatment has been on hold since October 2021. · 4/10/2022-CT abdomen/pelvis without contrast showed evidence of metastatic disease to the lung bases.  Moderate size hiatal hernia with gastroesophageal reflux.  Status post partial colectomy.  Left lower quadrant ostomy is present.  No evidence obstruction.  Irregular soft tissue mass with some calcification the pelvis.  This demonstrated interval increase was potential involvement with the right posterior lateral urinary bladder wall.  The mass measures 8 x 2.9 cm.  Left-sided double J intraureteral stent is in place with stent well positioned.   · 4/10/2022-CT chest without contrast showed extensive metastatic disease is noted to the lungs showing progression from the previous examination with multiple new nodules present as well as interval increase in size of previously documented nodules. Reference nodule within the superior segment of the right lower lobe previously measured at 5 mm in size now measures 13 mm in size. A lesion within the medial right lower lobe now measuring 1.6 cm in long axis previously measured 1.1 cm. There is no evidence of acute consolidative pneumonia. Essentially, findings consistent with progressive metastatic disease to the lungs. There are too numerous to

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
## 2022-04-22 NOTE — PLAN OF CARE
Problem: Discharge Planning  Goal: Discharge to home or other facility with appropriate resources  4/21/2022 2323 by Charleen Dasilva RN  Outcome: Progressing  4/21/2022 1214 by Sancho Browne LPN  Outcome: Progressing     Problem: Safety - Adult  Goal: Free from fall injury  4/21/2022 2323 by Charleen Dasilva RN  Outcome: Progressing  4/21/2022 1214 by Sancho Browne LPN  Outcome: Progressing     Problem: Skin/Tissue Integrity  Goal: Absence of new skin breakdown  Description: 1. Monitor for areas of redness and/or skin breakdown  2. Assess vascular access sites hourly  3. Every 4-6 hours minimum:  Change oxygen saturation probe site  4. Every 4-6 hours:  If on nasal continuous positive airway pressure, respiratory therapy assess nares and determine need for appliance change or resting period.   4/21/2022 2323 by Charleen Dasilva RN  Outcome: Progressing  4/21/2022 1214 by Sancho Browne LPN  Outcome: Progressing     Problem: Pain  Goal: Verbalizes/displays adequate comfort level or baseline comfort level  4/21/2022 2323 by Charleen Dasilva RN  Outcome: Progressing  4/21/2022 1214 by Sancho Browne LPN  Outcome: Progressing     Problem: ABCDS Injury Assessment  Goal: Absence of physical injury  4/21/2022 2323 by Charleen Dasilva RN  Outcome: Progressing  4/21/2022 1214 by Sancho Browne LPN  Outcome: Progressing     Problem: Musculoskeletal - Adult  Goal: Return mobility to safest level of function  4/21/2022 2323 by Charleen Dasilva RN  Outcome: Progressing  4/21/2022 1214 by Sancho Browne LPN  Outcome: Progressing  Goal: Maintain proper alignment of affected body part  4/21/2022 2323 by Charleen Dasilva RN  Outcome: Progressing  4/21/2022 1214 by Sancho Browne LPN  Outcome: Progressing  Goal: Return ADL status to a safe level of function  4/21/2022 2323 by Charleen Dasilva RN  Outcome: Progressing  4/21/2022 1214 by Sancho Browne LPN  Outcome: Progressing     Problem: Infection - Adult  Goal: Absence of infection at discharge  4/21/2022 2323 by Raúl Burks RN  Outcome: Progressing  4/21/2022 1214 by Olya Route, LPN  Outcome: Progressing  Goal: Absence of infection during hospitalization  4/21/2022 2323 by Raúl Burks RN  Outcome: Progressing  4/21/2022 1214 by Olya Route, LPN  Outcome: Progressing  Goal: Absence of fever/infection during anticipated neutropenic period  4/21/2022 2323 by Raúl Burks RN  Outcome: Progressing  4/21/2022 1214 by Olya Route, LPN  Outcome: Progressing     Problem: Metabolic/Fluid and Electrolytes - Adult  Goal: Electrolytes maintained within normal limits  4/21/2022 2323 by Raúl Burks RN  Outcome: Progressing  4/21/2022 1214 by Olya Route, LPN  Outcome: Progressing  Goal: Hemodynamic stability and optimal renal function maintained  4/21/2022 2323 by Raúl Burks RN  Outcome: Progressing  4/21/2022 1214 by Olya Route, LPN  Outcome: Progressing  Goal: Glucose maintained within prescribed range  4/21/2022 2323 by Raúl Burks RN  Outcome: Progressing  4/21/2022 1214 by Olya Route, LPN  Outcome: Progressing     Problem: Skin/Tissue Integrity - Adult  Goal: Oral mucous membranes remain intact  4/21/2022 2323 by Raúl Burks RN  Outcome: Progressing  4/21/2022 1214 by Olya Route, LPN  Outcome: Progressing     Problem: Gastrointestinal - Adult  Goal: Minimal or absence of nausea and vomiting  4/21/2022 2323 by Raúl Burks RN  Outcome: Progressing  4/21/2022 1214 by Olya Route, LPN  Outcome: Progressing  Goal: Establish and maintain optimal ostomy function  4/21/2022 2323 by Raúl Burks RN  Outcome: Progressing  4/21/2022 1214 by Olya Route, LPN  Outcome: Progressing

## 2022-04-22 NOTE — PROGRESS NOTES
04/22/22 1000   Restrictions/Precautions   Restrictions/Precautions Contact Precautions; Other (comment); Fall Risk   Position Activity Restriction   Other position/activity restrictions Colostomy, pain pump   General   Chart Reviewed Yes   Additional Pertinent Hx CKD, CAD, HTN, hyperlipidemia, metastatic colon cancer s/p colostomy placement , OA right knee, chronic back pain, pain pump by Dr. Brad Randolph, GERD, mixed hyperlipidemia, recent right nephrectomy and ureter removal d/t right ureter tumor and placement of stents, and edgar catheter in place since 1/21/22 for urinary retention   Subjective   Subjective Pt laying in bed upon arrival with daughter in room. Agreeable to therapy. Reports R knee pain has subsided a little since the morning but it still giving him trouble. Pain Assessment   Pain Assessment 0-10   Pain Level 5   Pain Location Knee   Pain Orientation Right   Pain Descriptors Burning;Aching; Sore   Functional Pain Assessment Prevents or interferes some active activities and ADLs   Pain Type Acute pain   Non-Pharmaceutical Pain Intervention(s) Rest;Repositioned   Bed Mobility   Supine to Sit Stand by assistance  (towards pts L using bed rail)   Sit to Supine Contact guard assistance  (uses hands to lift RLE into bed)   Scooting Stand by assistance   Transfers   Sit to Stand Contact guard assistance;Stand by assistance  (from bed and recliner, using RW)   Stand to sit Contact guard assistance;Stand by assistance   Comment Pt able to complete with slight increase in R knee pain, pain increases when seated in recliner with knee bent   Ambulation   Surface level tile   Device Rolling Walker   Assistance Contact guard assistance   Quality of Gait FF posture   Gait Deviations Slow Farnaz;Decreased step length;Decreased step height   Comments bed <> recliner   Propulsion 1   Distance 5', 5'   Exercises   Quad Sets 2x10 BLE supine in bed   Heelslides 2x10 LLE supine in bed   Gluteal Sets 2x10 supine in bed Hip Flexion x15 LLE seated in recliner   Hip Abduction x15 LLE seated in recliner w/ LE extended  (2x10 supine in bed)   Knee Long Arc Quad x15 LLE seated in recliner   Ankle Pumps 2x20 BLE supine in bed   Other exercises   Other exercises? Yes   Other exercises 1 2x20 BLE seated heel/toe raises  (in recliner)   Other exercises 2 2 STS, from bed and recliner   Conditions Requiring Skilled Therapeutic Intervention   Assessment Pt only able to tolerate LLE exercises, supine and seated in recliner, due to significant R knee pain that increases with mobility. Activity Tolerance   Activity Tolerance Patient limited by pain   Safety Devices   Type of Devices All fall risk precautions in place;Call light within reach;Gait belt;Patient at risk for falls; Left in bed     Electronically signed by Falguni Peña, Student PT on 4/22/2022 at 12:07 PM

## 2022-04-22 NOTE — PROGRESS NOTES
Urology Progress Note    CC: \" I am really just having some knee pain. Otherwise, I am doing all right. \"    SUBJECTIVE: Patient sitting up in the chair. Reports he is still having some mild incontinence, however, he is voiding better. He reports he believes this is secondary to his increase in fluid intake. OBJECTIVE:   Post void residuals continue to be monitored. Patient reports he has not had to be In-N-Out catheterized. Diastolic blood pressures remain in the low 90s, high 80s. Review of Systems     Physical  VITALS:  /89   Pulse 106   Temp 96.6 °F (35.9 °C) (Temporal)   Resp 18   Ht 5' 5\" (1.651 m)   Wt 163 lb (73.9 kg)   SpO2 97%   BMI 27.12 kg/m²   TEMPERATURE:  Current - Temp: 96.6 °F (35.9 °C);  Max - Temp  Av.9 °F (36.1 °C)  Min: 96.6 °F (35.9 °C)  Max: 97.2 °F (36.2 °C)   24 HR I&O     Intake/Output Summary (Last 24 hours) at 2022 0943  Last data filed at 2022 0800  Gross per 24 hour   Intake 720 ml   Output 1000 ml   Net -280 ml     BACK: not done and no tenderness in spine or flanks  ABDOMEN:  non-distended and non-tender  HEART:  normal rate  CHEST: No respiratory distress  GENITAL/URINARY: Some incontinence, skin integrity okay    Data  CBC:   Recent Labs     22  0242 22  0330   WBC 7.7 7.7 7.7   HGB 10.3* 11.1* 9.9*   HCT 30.3* 34.6* 30.6*    321 334     BMP:    Recent Labs     22  0242 22  0330    130*   K 3.7 3.9   CL 97* 91*   CO2 26 27   BUN 7* 11   CREATININE 1.1 1.0   GLUCOSE 99 129*       Recent Labs     22  1130   LABURIN No growth at 36-48 hours       U/A:    Lab Results   Component Value Date    NITRITE neg 10/31/2019    COLORU YELLOW 2022    PHUR 6.0 2022    LABCAST Present 2020    LABCAST Hyaline casts 2020    WBCUA TNTC 2022    RBCUA TNTC 2022    MUCUS Present 2020    YEAST Present 04/10/2022    BACTERIA None Seen 04/10/2022    CLARITYU CLOUDY 04/20/2022    SPECGRAV 1.018 04/20/2022    LEUKOCYTESUR MODERATE 04/20/2022    UROBILINOGEN 0.2 04/20/2022    BILIRUBINUR Negative 04/20/2022    BILIRUBINUR neg 10/31/2019    BLOODU LARGE 04/20/2022    GLUCOSEU Negative 04/20/2022     Urinalysis findings expected showing microhematuria micro pyuria with stent in place. Urine culture as noted above on 4/20/2022 was no growth     Pathology report right lung nodule done on 4/19/2020 metastatic adeno carcinoma consistent with gastrointestinal primary     Pathology report bladder biopsy done for 2022 disruptive urothelium severe chronic inflammation negative for malignancy.     ASSESSMENT AND PLAN    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    Colorectal cancer ([address]    Metastasis from colon cancer ([address]    Proteinuria    Chemotherapy management, encounter for    Anemia associated with chemotherapy    Other fatigue    Thrush    Dehydration    Chemotherapy-induced peripheral neuropathy (HCC)    Chemotherapy-induced nausea    SBO (small bowel obstruction) (HCC)    Burning with urination    History of small bowel obstruction    Encounter for central line care    Iron deficiency    History of bladder cancer    Hydronephrosis of left kidney    Hydronephrosis of right kidney    Low back pain    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 Catheter acquired UTI present on admission. We will continue antibiotics per ID recommendations. No further  recommendations on this issue. 2 LUTS. Continues to have some bladder spasms and incontinence after Mcgregor catheter removed. Residuals are low. Patient feels as if he is voiding better today. Continue to monitor residuals with bladder scan and utilize in and out catheterization if needed. Myrbetriq was initiated. Continue to monitor blood pressures, diastolic still somewhat elevated but stable.     3.    Left ureteral stricture with chronic indwelling left ureteral stent. Stent was changed 4/20/2020 with Dr. Fely Cherry. He will be due his next stent change in 3 to 4 months.     4.    History of bladder cancer. Surveillance cystoscopy on 4/20/2022 during surgery. No obvious recurrent bladder tumors. Biopsy of irregular appearing mucosa revealed chronic inflammation without malignancy. Next surveillance cystoscopy will need to be in 3 months at time of stent change. 5.  Pulmonary nodules. Being followed by oncology. Biopsy noted positive for adenocarcinoma metastasis from primary GI source.        MALLIKA SHEEHAN, OLGA - CNP  4/22/2022 9:43 AM

## 2022-04-23 NOTE — PLAN OF CARE
Problem: Discharge Planning  Goal: Discharge to home or other facility with appropriate resources  Outcome: Progressing     Problem: Safety - Adult  Goal: Free from fall injury  Outcome: Progressing     Problem: Skin/Tissue Integrity  Goal: Absence of new skin breakdown  Description: 1. Monitor for areas of redness and/or skin breakdown  2. Assess vascular access sites hourly  3. Every 4-6 hours minimum:  Change oxygen saturation probe site  4. Every 4-6 hours:  If on nasal continuous positive airway pressure, respiratory therapy assess nares and determine need for appliance change or resting period.   Outcome: Progressing     Problem: Pain  Goal: Verbalizes/displays adequate comfort level or baseline comfort level  Outcome: Progressing     Problem: ABCDS Injury Assessment  Goal: Absence of physical injury  Outcome: Progressing     Problem: Musculoskeletal - Adult  Goal: Return mobility to safest level of function  Outcome: Progressing  Goal: Maintain proper alignment of affected body part  Outcome: Progressing  Goal: Return ADL status to a safe level of function  Outcome: Progressing     Problem: Infection - Adult  Goal: Absence of infection at discharge  Outcome: Progressing  Goal: Absence of infection during hospitalization  Outcome: Progressing  Goal: Absence of fever/infection during anticipated neutropenic period  Outcome: Progressing     Problem: Hematologic - Adult  Goal: Maintains hematologic stability  Outcome: Progressing     Problem: Metabolic/Fluid and Electrolytes - Adult  Goal: Electrolytes maintained within normal limits  Outcome: Progressing  Goal: Hemodynamic stability and optimal renal function maintained  Outcome: Progressing  Goal: Glucose maintained within prescribed range  Outcome: Progressing     Problem: Skin/Tissue Integrity - Adult  Goal: Oral mucous membranes remain intact  Outcome: Progressing     Problem: Gastrointestinal - Adult  Goal: Minimal or absence of nausea and vomiting  Outcome: Progressing  Goal: Establish and maintain optimal ostomy function  Outcome: Progressing

## 2022-04-23 NOTE — PROGRESS NOTES
Occupational Therapy  Facility/Department: [facility] REHAB UNIT  Daily Treatment Note  NAME: Ashlyn Pro  : 1950  MRN: [id number]    Date of Service: 2022    Discharge Recommendations:  Home with Home health OT,Home with assist PRN       Assessment   Performance deficits / Impairments: Decreased functional mobility ; Decreased ADL status; Decreased strength;Decreased endurance;Decreased balance;Decreased high-level IADLs  Assessment: Patient benefits from further skilled therapy to address ADLs, functional mobility, strength, and endurance for ADLs. Patient has the potential to make gains with continued skilled therapy. Treatment Diagnosis: Sepsis,recent right nephrectomy and ureter removal d/t right ureter tumor and placement of stents,22 for left ureteral stent change and cystoscopy bladder biopsy, recent lung biopsy  Prognosis: Good  History: colon CA with mets including ureter and lungs, arthrocentesis  for right knee pain, CKD, back surgery,CKD, CAD, HTN, hyperlipidemia, metastatic colon cancer s/p colostomy placement , OA right knee, chronic back pain, pain pump by Dr. Jalen Estrada, GERD, mixed hyperlipidemia  Activity Tolerance  Activity Tolerance: Patient Tolerated treatment well         Patient Diagnosis(es): There were no encounter diagnoses. has a past medical history of COLLEEN (acute kidney injury) ([address] Arthritis, Burn, Cancer ([address] Chronic back pain, Complex regional pain syndrome type 1 of right lower extremity, Coronary artery disease involving native coronary artery of native heart without angina pectoris, Drop foot gait, History of blood transfusion, Hypertension, Immunization counseling, Malignant neoplasm of overlapping sites of bladder ([address] Mixed hyperlipidemia, Pain management, and Ureteral tumor. has a past surgical history that includes Neck surgery; Abdomen surgery; hc inject other perphrl nerv (Left, 10/28/2016); back surgery; ileostomy or jejunostomy; colectomy;  Abdominal exploration surgery; eye surgery (Bilateral); CYSTOSCOPY INSERTION / REMOVAL STENT / STONE (Right, 8/18/2019); INSERTION / REMOVAL / REPLACEMENT VENOUS ACCESS CATHETER (Right, 8/20/2019); Cystoscopy (Left, 8/29/2019); Cystoscopy (Left, 8/29/2019); Tunneled venous port placement; Cystoscopy (Bilateral, 12/3/2019); cystoscopy w biopsy of bladder (N/A, 12/3/2019); Cystoscopy (Bilateral, 2/26/2020); lumbar fusion (N/A, 5/6/2020); Cystoscopy (Bilateral, 5/28/2020); cystoscopy w biopsy of bladder (N/A, 5/28/2020); Spine surgery; Cystoscopy (Bilateral, 10/15/2020); Cystoscopy (N/A, 10/15/2020); CYSTOSCOPY INSERTION / REMOVAL STENT / STONE (Bilateral, 1/5/2021); Cystoscopy (Bilateral, 4/1/2021); Coronary angioplasty with stent; baclofen pump implantation; Bladder surgery (N/A, 9/17/2021); Bladder surgery (Left, 4/20/2022); Cystoscopy (Left, 4/20/2022); and Cystoscopy (N/A, 4/20/2022). Restrictions  Restrictions/Precautions  Restrictions/Precautions: Contact Precautions,Other (comment),Fall Risk  Position Activity Restriction  Other position/activity restrictions: Colostomy, pain pump  Subjective   General  Chart Reviewed: Yes,Orders  Patient assessed for rehabilitation services?: Yes  Additional Pertinent Hx: CKD, CAD, HTN, hyperlipidemia, metastatic colon cancer s/p colostomy placement , OA right knee, chronic back pain, pain pump by Dr. Srinivasan Asp, GERD, mixed hyperlipidemia  Response to previous treatment: Patient with no complaints from previous session  Family / Caregiver Present: No  Diagnosis: Sepsis,recent right nephrectomy and ureter removal d/t right ureter tumor and placement of stents,4/20/22 for left ureteral stent change and cystoscopy bladder biopsy, recent lung biopsy,recent bladder biopsy  Subjective  Subjective: .   Pain Assessment  Pain Level: 6  Pain Location: Knee  Pain Orientation: Right  Pain Descriptors: Sharp  Functional Pain Assessment: Prevents or interferes some active activities and ADLs  Pain Type: Acute pain  Pain Frequency: Continuous  Pain Onset: On-going  Non-Pharmaceutical Pain Intervention(s): Ambulation/Increased Activity; Shower;Repositioned;Rest;Therapeutic presence  Response to Pain Intervention: Patient satisfied  Multiple Pain Sites: No   Orientation     Objective    ADL  Grooming: Independent;Setup  UE Bathing: Independent;Setup  LE Bathing: Contact guard assistance  UE Dressing: Independent;Setup  LE Dressing: Minimal assistance  Toileting: Stand by assistance        Balance  Standing Balance: Contact guard assistance  Bed mobility  Supine to Sit: Modified independent  Sit to Supine: Modified independent  Scooting: Stand by assistance  Transfers  Stand Step Transfers: Stand by assistance  Sit to stand: Stand by assistance  Stand to sit: Stand by assistance                                                           Plan   Plan  Times per Day: Twice a day  Current Treatment Recommendations: Strengthening,Balance training,Self-Care / ADL,Home management training,Safety education & training,Functional mobility training,Endurance training,Patient/Caregiver education & training,Equipment evaluation, education, & procurement  Goals  Short Term Goals  Time Frame for Short term goals: 1 week  Short Term Goal 1: complete LB dressing with AE prn with supervision  Short Term Goal 2: complete overall toileting with supervision  Short Term Goal 3: complete simple ambulatory home making task with supervision  Short Term Goal 4: complete 1-2 handed standing level task for 3 mins with supervision  Short Term Goal 5: complete overall bathing with supervision  Long Term Goals  Time Frame for Long term goals : 2 weeks  Long Term Goal 1: complete overall dressing with modified independence  Long Term Goal 2: complete overall bathing with modified independence  Long Term Goal 3: complete overall toileting with modified independence  Long Term Goal 4: complete HEP with independence  Long Term Goal 5: complete simple ambulatory home making task with modified independence  Additional Goals?: Yes  Long Term Goal 6: pt/family verbalize DME for home       Therapy Time   Individual Concurrent Group Co-treatment   Time In 1400         Time Out 1450         Minutes 50         Timed Code Treatment Minutes: [address] [facility] [place] Ethyl Novi  Electronically signed by CAT Smith on 4/23/2022 at 3:04 PM

## 2022-04-23 NOTE — PROGRESS NOTES
Physical Therapy  Name: Priscila Holley  MRN:  [id number]  Date of service:  4/23/2022 04/23/22 1011   Position Activity Restriction   Other position/activity restrictions Colostomy, pain pump   General   Additional Pertinent Hx CKD, CAD, HTN, hyperlipidemia, metastatic colon cancer s/p colostomy placement , OA right knee, chronic back pain, pain pump by Dr. Brad Randolph, GERD, mixed hyperlipidemia, recent right nephrectomy and ureter removal d/t right ureter tumor and placement of stents, and edgar catheter in place since 1/21/22 for urinary retention   Subjective   Subjective My knee hurts.    Oxygen Therapy   O2 Device None (Room air)   Transfers   Sit to Stand Contact guard assistance;Stand by assistance   Stand to sit Contact guard assistance;Stand by assistance   Ambulation   Surface level tile   Device Rolling Walker   Assistance Contact guard assistance   Quality of Gait FF posture   Gait Deviations Slow Farnaz;Decreased step length;Decreased step height   Propulsion 1   Distance 140 feet, 150 feet   Balance   Posture Fair   Sitting - Static Fair   Sitting - Dynamic Fair   Standing - Static Fair   Standing - Dynamic Fair   Exercises   Quad Sets x 10 with 5 second hold   Heelslides seated x 2 sets of 5 reps with assist   Gluteal Sets x 10 with 5 second hold   Hip Flexion seated marching x 10 with assist to right leg   Knee Long Arc Quad x 10   Ankle Pumps x 10   Short Term Goals   Time Frame for Short term goals 1 week   Short term goal 1 Supervision with bed mobility   Short term goal 2 Supervision with transfers   Short term goal 3 SBA with ambulating 100' using RW   Short term goal 4 CGA completing 2 stairs using hand rail   Short term goal 5 Supervision with w/c proplusion 100'   Long Term Goals   Time Frame for Long term goals  2 weeks   Long term goal 1 Independent with bed mobility   Long term goal 2 Independent with transfers   Long term goal 3 Modified Independent with ambulating 150'   Long term goal 4 Independent with w/c proplusion 150'   Long term goal 5 Modified Independent with competing 4 stairs using handrail   Conditions Requiring Skilled Therapeutic Intervention   Body Structures, Functions, Activity Limitations Requiring Skilled Therapeutic Intervention Decreased functional mobility ; Decreased ROM; Decreased body mechanics; Decreased strength;Decreased endurance;Decreased balance;Decreased posture   Assessment patient tolerated session fair. he has main complaint of pain during open chained exericses. He limits himself during exericses and ambulation. Treatment Diagnosis Interference with activity   Therapy Prognosis Good   Activity Tolerance   Activity Tolerance Patient limited by pain; Patient limited by endurance   Plan   Plan 5-7 times per week   Plan weeks 2   Current Treatment Recommendations Strengthening;ROM;Balance training;Transfer training; Endurance training; Wheelchair mobility training;Gait training;Stair training; Safety education & training;Patient/Caregiver education & training;Equipment evaluation, education, & procurement   PT Plan of Care   PT Plan of Care 6 times/week   Saturday X       Electronically signed by Freddie Samano PTA on 4/23/2022 at 4:27 PM
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
## 2022-04-23 NOTE — PROGRESS NOTES
Daily Progress Note    Date:2022  Patient: Ratna Mckeon  : 1950  IEP:[id number]  CODE:Full Code No additional code details  Pati Hair MD    Admit Date: 2022  4:34 PM   LOS: 3 days       Subjective:   He feels well this morning. Knee pain has improved with adjustment to analgesics. Bladder spasms have also improved. He is voiding without any significant urinary retention. Review of Systems  14 point review of systems completed and is negative except as otherwise noted      Objective:      Vital signs in last 24 hours:  Patient Vitals for the past 24 hrs:   BP Temp Temp src Pulse Resp SpO2   22 1027 -- -- -- -- 16 --   22 0626 (!) 143/96 97.5 °F (36.4 °C) Temporal 115 16 100 %   22 0224 -- -- -- -- 16 --   22 2222 -- -- -- -- 16 --   22 1738 (!) 128/90 97.9 °F (36.6 °C) Temporal 113 20 97 %   22 1719 (!) 106/51 -- -- -- -- --       Physical exam    General: Resting in no acute distress  Psych: Calm and cooperative  Cardiovascular: Normal rate, regular rhythm, pulses 2+  Respiratory: Normal work of breathing, no wheezes or rales  Abdomen: Soft, nontender, bowel sounds present  Neurologic: Alert, follows commands, normal speech  Extremities: No clubbing or cyanosis  Skin: Warm and dry      Lab Review   No results found for this or any previous visit (from the past 24 hour(s)). I/O last 3 completed shifts: In: 840 [P.O.:840]  Out: 2900 [Urine:700; Stool:2200]  I/O this shift:   In: 5 [P.O.:720]  Out: 975 [Urine:275; Stool:700]      Current Facility-Administered Medications:     oxyCODONE (ROXICODONE) immediate release tablet 15 mg, 15 mg, Oral, Q4H PRN, Fabienne Be MD, 15 mg at 22 1436    acetaminophen (TYLENOL) tablet 650 mg, 650 mg, Oral, Q6H PRN **OR** acetaminophen (TYLENOL) suppository 650 mg, 650 mg, Rectal, Q6H PRN, Michelle Joseph MD    anidulafungin (ERAXIS) 100 mg in dextrose 5 % 130 mL IVPB, 100 mg, IntraVENous, Q24H, Tayla Dillon MD, Stopped at 04/23/22 1228    calcium carbonate (TUMS) chewable tablet 500 mg, 500 mg, Oral, TID PRN, Tayla Dillon MD, 500 mg at 04/21/22 1239    cyclobenzaprine (FLEXERIL) tablet 5 mg, 5 mg, Oral, BID PRN, Tayla Dillon MD, 5 mg at 04/23/22 0153    dextrose bolus (hypoglycemia) 10% 125 mL, 125 mL, IntraVENous, PRN **OR** dextrose bolus (hypoglycemia) 10% 250 mL, 250 mL, IntraVENous, PRN, Tayla Dillon MD    DULoxetine (CYMBALTA) extended release capsule 30 mg, 30 mg, Oral, Daily, Tayla Dillon MD, 30 mg at 04/23/22 2186    lactobacillus (CULTURELLE) capsule 1 capsule, 1 capsule, Oral, Daily, Tayla Dillon MD, 1 capsule at 04/23/22 0854    metoprolol succinate (TOPROL XL) extended release tablet 25 mg, 25 mg, Oral, Daily, 25 mg at 04/23/22 0855 **AND** hydroCHLOROthiazide (HYDRODIURIL) tablet 6.25 mg, 6.25 mg, Oral, Daily, Tayla Dillon MD, 6.25 mg at 04/23/22 0854    mirabegron (MYRBETRIQ) extended release tablet 25 mg, 25 mg, Oral, Daily, Tayla Dillon MD, 25 mg at 04/23/22 0855    ondansetron (ZOFRAN) injection 4 mg, 4 mg, IntraVENous, Q6H PRN, Tayla Dillon MD, 4 mg at 04/21/22 0932    pantoprazole (PROTONIX) tablet 40 mg, 40 mg, Oral, QAM AC, Tayla Dillon MD, 40 mg at 04/23/22 0538    polyethylene glycol (GLYCOLAX) packet 17 g, 17 g, Oral, Daily PRN, Tayla Dillon MD    promethazine (PHENERGAN) injection 12.5 mg, 12.5 mg, IntraMUSCular, Q4H PRN, Tayla Dillon MD    sennosides-docusate sodium (SENOKOT-S) 8.6-50 MG tablet 2 tablet, 2 tablet, Oral, Daily PRN, Tayla Dillon MD    sodium bicarbonate tablet 650 mg, 650 mg, Oral, 4x Daily, Tayla Dillon MD, 650 mg at 04/23/22 1234    acetaminophen (TYLENOL) tablet 650 mg, 650 mg, Oral, Q4H PRN, Gerber Rojas MD, 650 mg at 04/23/22 1233    polyethylene glycol (GLYCOLAX) packet 17 g, 17 g, Oral, Daily, Gerber Rojas MD    bisacodyl (DULCOLAX) suppository 10 mg, 10 mg, Rectal, Daily PRN, Paul Dunne Puma Lopez MD    sennosides-docusate sodium (SENOKOT-S) 8.6-50 MG tablet 1 tablet, 1 tablet, Oral, BID, Layton Guaman MD, 1 tablet at 04/20/22 2157    sodium chloride flush 0.9 % injection 5-40 mL, 5-40 mL, IntraVENous, PRN, Layton Guaman MD, 10 mL at 04/20/22 2357    sodium chloride flush 0.9 % injection 5-40 mL, 5-40 mL, IntraVENous, BID, Layton Guaman MD, 10 mL at 04/23/22 1056        Assessment/plan  Principal Problem:    Sepsis ([facility]  Active Problems:    Ureteral stricture, left    Lung nodules    Metastasis from colon cancer ([facility] [address]    Urothelial carcinoma of right distal ureter ([address]    Urinary tract infection associated with indwelling urethral catheter ([facility]    Retained ureteral stent    Lower urinary tract symptoms  Resolved Problems:    * No resolved hospital problems. *    81 y. o. male with recent right nephrectomy, recent ureteral stents, metastatic urothelial carcinoma, history of colorectal cancer with ostomy in place, presented with fever and nausea decreased urine output. On the admission denied problems with ostomy output.  He was found to have UTI with culture speciated Candida glabrata and Achromobacter, ID consulted, treated with Eraxis and Levaquin.  CT Chest consistent with progressive metastatic disease to the lungs. CT abdomen irregular soft tissue mass with some calcification within the pelvis.  Followed by oncology and urology during hospital course. [facility] was scheduled for pulmonary nodule biopsy by radiology but radiologist was concerned with small bowel distention per KUB, repeat CT abdomen showed ileus, pt did not have any ostomy output, he developed N/V/abd pain- NGT was placed.  Evaluated by general surgery. Marielena Minus subsequently resolved with removal of NG tube 4/16.  Removed Mcgregor per urology. Had CT guided biopsy of pulmonary nodule 4/19 and ureteral stent change by Urology on 4/20. Discharged and readmitted to [facility] inpatient rehab.          Metastatic colorectal adenocarcinoma  Urothelial bladder cancer, s/p recent ureteral stents  -Left ureteral stricture with chronic indwelling stent s/p ureteral stent change by Urology 4/20  Pulmonary nodules  --- Pathology from biopsy:  pulmonary metastasis compatible with metastatic colonic adenocarcinoma  Outpatient follow up with Hem/Onc in 3 weeks to start chemo regimen        UTI with Achromobacter and Candida glabrata from culture, immunocompromised status, previous ureteral stents  -Antibiotics per ID  --Levaquin and Eraxis through 4/24     Bladder spasms, urinary incontinence, unable to take anticholinergics due to effect on his gut motility  -- Continue Myrbetriq per urology     Ileus-resolved  Constipation-bowel regimen     HTN  Monitor BP, continue metoprolol/HCTZ, further adjustment only if persistently uncontrolled     Depression  -Cymbalta     Knee pain  - As needed analgesics       Continue rehab      Darlin Ly MD 4/23/2022 3:38 PM

## 2022-04-24 NOTE — PLAN OF CARE
Problem: Discharge Planning  Goal: Discharge to home or other facility with appropriate resources  Outcome: Progressing     Problem: Safety - Adult  Goal: Free from fall injury  Outcome: Progressing     Problem: Skin/Tissue Integrity  Goal: Absence of new skin breakdown  Description: 1. Monitor for areas of redness and/or skin breakdown  2. Assess vascular access sites hourly  3. Every 4-6 hours minimum:  Change oxygen saturation probe site  4. Every 4-6 hours:  If on nasal continuous positive airway pressure, respiratory therapy assess nares and determine need for appliance change or resting period.   Outcome: Progressing     Problem: Pain  Goal: Verbalizes/displays adequate comfort level or baseline comfort level  Outcome: Progressing     Problem: ABCDS Injury Assessment  Goal: Absence of physical injury  Outcome: Progressing     Problem: Musculoskeletal - Adult  Goal: Return mobility to safest level of function  Outcome: Progressing  Goal: Maintain proper alignment of affected body part  Outcome: Progressing  Goal: Return ADL status to a safe level of function  Outcome: Progressing     Problem: Infection - Adult  Goal: Absence of infection at discharge  Outcome: Progressing  Goal: Absence of infection during hospitalization  Outcome: Progressing  Goal: Absence of fever/infection during anticipated neutropenic period  Outcome: Progressing     Problem: Hematologic - Adult  Goal: Maintains hematologic stability  Outcome: Progressing     Problem: Metabolic/Fluid and Electrolytes - Adult  Goal: Electrolytes maintained within normal limits  Outcome: Progressing  Goal: Hemodynamic stability and optimal renal function maintained  Outcome: Progressing  Goal: Glucose maintained within prescribed range  Outcome: Progressing     Problem: Skin/Tissue Integrity - Adult  Goal: Oral mucous membranes remain intact  Outcome: Progressing     Problem: Gastrointestinal - Adult  Goal: Minimal or absence of nausea and vomiting  Outcome: Progressing  Goal: Establish and maintain optimal ostomy function  Outcome: Progressing     Problem: Nutrition Deficit:  Goal: Optimize nutritional status  Outcome: Progressing

## 2022-04-24 NOTE — PROGRESS NOTES
Daily Progress Note    Date:2022  Patient: Radha Hills  : 1950  FZY:[id number]  CODE:Full Code No additional code details  Layton Loya MD    Admit Date: 2022  4:34 PM   LOS: 4 days       Subjective:   He feels well this morning. Knee pain improved on current regimen. Bladder spasms improved. He complains of more bilateral lower extremity swelling but no SOB. Review of Systems  14 point review of systems completed and is negative except as otherwise noted      Objective:      Vital signs in last 24 hours:  Patient Vitals for the past 24 hrs:   BP Temp Temp src Pulse Resp SpO2 Weight   22 0634 (!) 128/94 98.2 °F (36.8 °C) Temporal 110 18 97 % --   22 1612 138/79 97.9 °F (36.6 °C) Temporal 106 18 97 % 160 lb 9 oz (72.8 kg)       Physical exam    General: Resting in no acute distress  Psych: Calm and cooperative  Cardiovascular: Normal rate, regular rhythm, pulses 2+  Respiratory: Normal work of breathing, no wheezes or rales  Abdomen: Soft, nontender, bowel sounds present  Neurologic: Alert, follows commands, normal speech  Extremities: No clubbing or cyanosis, 1+ LE pitting edema  Skin: Warm and dry      Lab Review   No results found for this or any previous visit (from the past 24 hour(s)). I/O last 3 completed shifts:   In: 1440 [P.O.:1440]  Out: 6488 [Urine:675; Stool:2200]  I/O this shift:  In: -   Out: 500 [Urine:175; Stool:325]      Current Facility-Administered Medications:     furosemide (LASIX) tablet 20 mg, 20 mg, Oral, Once, Darlin Ly MD    oxyCODONE (ROXICODONE) immediate release tablet 15 mg, 15 mg, Oral, Q4H PRN, Silvia Guerra MD, 15 mg at 22 1029    acetaminophen (TYLENOL) tablet 650 mg, 650 mg, Oral, Q6H PRN **OR** acetaminophen (TYLENOL) suppository 650 mg, 650 mg, Rectal, Q6H PRN, Darlin Ly MD    anidulafungin (ERAXIS) 100 mg in dextrose 5 % 130 mL IVPB, 100 mg, IntraVENous, Q24H, Darlin Ly MD, Stopped at 22 1309    calcium carbonate (TUMS) chewable tablet 500 mg, 500 mg, Oral, TID PRN, Mikayla So MD, 500 mg at 04/21/22 1239    cyclobenzaprine (FLEXERIL) tablet 5 mg, 5 mg, Oral, BID PRN, Mikayla So MD, 5 mg at 04/23/22 0153    dextrose bolus (hypoglycemia) 10% 125 mL, 125 mL, IntraVENous, PRN **OR** dextrose bolus (hypoglycemia) 10% 250 mL, 250 mL, IntraVENous, PRN, Mikayla So MD    DULoxetine (CYMBALTA) extended release capsule 30 mg, 30 mg, Oral, Daily, Mikayla So MD, 30 mg at 04/24/22 0804    lactobacillus (CULTURELLE) capsule 1 capsule, 1 capsule, Oral, Daily, Mikayla So MD, 1 capsule at 04/24/22 0803    metoprolol succinate (TOPROL XL) extended release tablet 25 mg, 25 mg, Oral, Daily, 25 mg at 04/24/22 0804 **AND** hydroCHLOROthiazide (HYDRODIURIL) tablet 6.25 mg, 6.25 mg, Oral, Daily, Mikayla So MD, 6.25 mg at 04/24/22 0804    mirabegron (MYRBETRIQ) extended release tablet 25 mg, 25 mg, Oral, Daily, Mikayla So MD, 25 mg at 04/24/22 0803    ondansetron (ZOFRAN) injection 4 mg, 4 mg, IntraVENous, Q6H PRN, Mikayla So MD, 4 mg at 04/23/22 2005    pantoprazole (PROTONIX) tablet 40 mg, 40 mg, Oral, QAM AC, Mikayla So MD, 40 mg at 04/24/22 2447    polyethylene glycol (GLYCOLAX) packet 17 g, 17 g, Oral, Daily PRN, Mikayla So MD    promethazine (PHENERGAN) injection 12.5 mg, 12.5 mg, IntraMUSCular, Q4H PRN, Mikayla So MD    sennosides-docusate sodium (SENOKOT-S) 8.6-50 MG tablet 2 tablet, 2 tablet, Oral, Daily PRN, Mikayla So MD    sodium bicarbonate tablet 650 mg, 650 mg, Oral, 4x Daily, Mikayla So MD, 650 mg at 04/24/22 1224    acetaminophen (TYLENOL) tablet 650 mg, 650 mg, Oral, Q4H PRN, Sky Michelle MD, 650 mg at 04/24/22 1305    polyethylene glycol (GLYCOLAX) packet 17 g, 17 g, Oral, Daily, Sky Michelle MD    bisacodyl (DULCOLAX) suppository 10 mg, 10 mg, Rectal, Daily PRN, Sky Michelle MD    sennosides-docusate sodium (SENOKOT-S) 8.6-50 MG tablet 1 tablet, 1 tablet, Oral, BID, Homero Ye MD, 1 tablet at 04/23/22 2107    sodium chloride flush 0.9 % injection 5-40 mL, 5-40 mL, IntraVENous, PRN, Homero Ye MD, 10 mL at 04/20/22 2357    sodium chloride flush 0.9 % injection 5-40 mL, 5-40 mL, IntraVENous, BID, Homero Ye MD, 10 mL at 04/24/22 0809        Assessment/plan  Principal Problem:    Sepsis [facility])  Active Problems:    Ureteral stricture, left    Lung nodules    Metastasis from colon cancer (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 [address]    Retained ureteral stent    Lower urinary tract symptoms  Resolved Problems:    * No resolved hospital problems. *    81 y. o. male with recent right nephrectomy, recent ureteral stents, metastatic urothelial carcinoma, history of colorectal cancer with ostomy in place, presented with fever and nausea decreased urine output. On the admission denied problems with ostomy output.  He was found to have UTI with culture speciated Candida glabrata and Achromobacter, ID consulted, treated with Eraxis and Levaquin.  CT Chest consistent with progressive metastatic disease to the lungs. CT abdomen irregular soft tissue mass with some calcification within the pelvis.  Followed by oncology and urology during hospital course. [facility] was scheduled for pulmonary nodule biopsy by radiology but radiologist was concerned with small bowel distention per KUB, repeat CT abdomen showed ileus, pt did not have any ostomy output, he developed N/V/abd pain- NGT was placed.  Evaluated by general surgery. Lennette Nestor subsequently resolved with removal of NG tube 4/16.  Removed Mcgregor per urology. Had CT guided biopsy of pulmonary nodule 4/19 and ureteral stent change by Urology on 4/20. Discharged and readmitted to [address] [place] [address] rehab.          Metastatic colorectal adenocarcinoma  Urothelial bladder cancer, s/p recent ureteral stents  -Left ureteral stricture with chronic indwelling stent s/p ureteral stent change by Urology 4/20  Pulmonary nodules  --- Pathology from biopsy:  pulmonary metastasis compatible with metastatic colonic adenocarcinoma  Outpatient follow up with Hem/Onc to start chemo regimen        UTI with Achromobacter and Candida glabrata from culture, immunocompromised status, previous ureteral stents  -Antibiotics per ID  --complete course of Levaquin and Eraxis     Bladder spasms, urinary incontinence, unable to take anticholinergics due to effect on his gut motility  -- Continue Myrbetriq per urology     Ileus-resolved  Constipation-bowel regimen     HTN  Monitor BP, continue metoprolol/HCTZ, further adjustment only if persistently uncontrolled     Depression  -Cymbalta     Knee pain  - improved with adjustment to meds, continue oxycodone prn     LE edema  -dose of po Lasix today (normally takes Lasix at home prn)       Continue rehab      Rosa Isela Low MD 4/24/2022 2:18 PM

## 2022-04-24 NOTE — PROGRESS NOTES
Urology Progress Note    SUBJECTIVE: Patient sitting on side of the bed visiting with guest.  He reports he is doing well and has no  complaints. Does report the Myrbetriq has been helpful, but he need to notice a slight increase from his baseline blood pressure. Also reports a mild headache over the last couple of days which may or may not be secondary to blood pressure issues. OBJECTIVE:   BP fluctuates. Continues to be slightly elevated from baseline per patient. Review of Systems     Physical  VITALS:  /86   Pulse 99   Temp 97.2 °F (36.2 °C)   Resp 18   Ht 5' 5\" (1.651 m)   Wt 160 lb 9 oz (72.8 kg)   SpO2 97%   BMI 26.72 kg/m²   TEMPERATURE:  Current - Temp: 97.2 °F (36.2 °C); Max - Temp  Av.7 °F (36.5 °C)  Min: 97.2 °F (36.2 °C)  Max: 98.2 °F (36.8 °C)   24 HR I&O     Intake/Output Summary (Last 24 hours) at 2022 1807  Last data filed at 2022 1804  Gross per 24 hour   Intake 480 ml   Output 1800 ml   Net -1320 ml     BACK: no tenderness in spine or flanks  ABDOMEN:  non-distended  HEART:  normal rate  CHEST: Normal respiratory effort  GENITAL/URINARY: Mild incontinence, but improving with Myrbetriq    Data  CBC: No results for input(s): WBC, HGB, HCT, PLT in the last 72 hours. BMP:  No results for input(s): NA, K, CL, CO2, BUN, CREATININE, GLUCOSE in the last 72 hours.     U/A:    Lab Results   Component Value Date    NITRITE neg 10/31/2019    COLORU YELLOW 2022    PHUR 6.0 2022    LABCAST Present 2020    LABCAST Hyaline casts 2020    WBCUA TNTC 2022    RBCUA TNTC 2022    MUCUS Present 2020    YEAST Present 04/10/2022    BACTERIA None Seen 04/10/2022    CLARITYU CLOUDY 2022    SPECGRAV 1.018 2022    LEUKOCYTESUR MODERATE 2022    UROBILINOGEN 0.2 2022    BILIRUBINUR Negative 2022    BILIRUBINUR neg 10/31/2019    BLOODU LARGE 2022    GLUCOSEU Negative 2022         ASSESSMENT AND PLAN    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    Colorectal cancer ([address]    Metastasis from colon cancer (HCC)    Proteinuria    Chemotherapy management, encounter for    Anemia associated with chemotherapy    Other fatigue    Thrush    Dehydration    Chemotherapy-induced peripheral neuropathy (HCC)    Chemotherapy-induced nausea    SBO (small bowel obstruction) (HCC)    Burning with urination    History of small bowel obstruction    Encounter for central line care    Iron deficiency    History of bladder cancer    Hydronephrosis of left kidney    Hydronephrosis of right kidney    Low back pain    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      Catheter acquired UTI present on admission. Continue antibiotics per ID recommendations. No further  recommendations on this issue.     2 LUTS.     Mild bladder spasms and incontinence continue after Mcgregor catheter removed. Residuals low, symptoms improved with Myrbetriq.   Although the patient is pleased with his symptoms, he continues to have a rise in his blood pressure from baseline and also complains of mild headache. We will go ahead and discontinue this medication. He is not a very good candidate for anticholinergics. Fely Magyar may be a suitable option, however, would allow blood pressure to normalize prior to initiating another medication.     3.      Ureteral stricture with chronic indwelling left ureteral stent. Stent change 4/20/2022 Dr. Gil Howe. Due his next stent change in 3 to 4 months. 4.      History of bladder cancer. Surveillance cystoscopy on 4/20/2022 during surgery. No obvious recurrence of bladder tumors. Biopsy at that time was negative. Next surveillance scope will need to be in 3 months at time of stent change     5.  Pulmonary nodules. Being followed by oncology.   Biopsy noted positive for adenocarcinoma metastasis from primary GI source.     OLGA ORTEGA - CNP  4/24/2022 6:07 PM

## 2022-04-25 NOTE — PLAN OF CARE
Problem: Discharge Planning  Goal: Discharge to home or other facility with appropriate resources  Outcome: Progressing     Problem: Safety - Adult  Goal: Free from fall injury  Outcome: Progressing     Problem: Skin/Tissue Integrity  Goal: Absence of new skin breakdown  Outcome: Progressing     Problem: Pain  Goal: Verbalizes/displays adequate comfort level or baseline comfort level  Outcome: Progressing     Problem: ABCDS Injury Assessment  Goal: Absence of physical injury  Outcome: Progressing     Problem: Musculoskeletal - Adult  Goal: Return mobility to safest level of function  Outcome: Progressing  Goal: Maintain proper alignment of affected body part  Outcome: Progressing  Goal: Return ADL status to a safe level of function  Outcome: Progressing     Problem: Infection - Adult  Goal: Absence of infection at discharge  Outcome: Progressing  Goal: Absence of infection during hospitalization  Outcome: Progressing  Goal: Absence of fever/infection during anticipated neutropenic period  Outcome: Progressing     Problem: Hematologic - Adult  Goal: Maintains hematologic stability  Outcome: Progressing     Problem: Metabolic/Fluid and Electrolytes - Adult  Goal: Electrolytes maintained within normal limits  Outcome: Progressing  Goal: Hemodynamic stability and optimal renal function maintained  Outcome: Progressing  Goal: Glucose maintained within prescribed range  Outcome: Progressing     Problem: Skin/Tissue Integrity - Adult  Goal: Oral mucous membranes remain intact  Outcome: Progressing     Problem: Gastrointestinal - Adult  Goal: Minimal or absence of nausea and vomiting  Outcome: Progressing  Goal: Establish and maintain optimal ostomy function  Outcome: Progressing     Problem: Nutrition Deficit:  Goal: Optimize nutritional status  Outcome: Progressing

## 2022-04-25 NOTE — PROGRESS NOTES
Urology Progress Note    SUBJECTIVE:  Patient resting in bed. No  complaints overnight. OBJECTIVE:   Review of Systems     Physical  VITALS:  /86   Pulse 92   Temp 97.2 °F (36.2 °C) (Temporal)   Resp 18   Ht 5' 5\" (1.651 m)   Wt 160 lb 9 oz (72.8 kg)   SpO2 97%   BMI 26.72 kg/m²   TEMPERATURE:  Current - Temp: 97.2 °F (36.2 °C);  Max - Temp  Av.2 °F (36.2 °C)  Min: 97.2 °F (36.2 °C)  Max: 97.2 °F (36.2 °C)   24 HR I&O     Intake/Output Summary (Last 24 hours) at 2022 0845  Last data filed at 2022 0251  Gross per 24 hour   Intake 1450 ml   Output 1700 ml   Net -250 ml     BACK: no tenderness in spine or flanks  ABDOMEN:  non-distended and non-tender  HEART:  normal rate  CHEST:  Normal respiratory effort  GENITAL/URINARY:  Brief in place, mild incontinence continues    Data  CBC:   Recent Labs     224   WBC 11.5*   HGB 10.1*   HCT 31.3*        BMP:    Recent Labs     22  0414   *   K 3.9   CL 91*   CO2 26   BUN 16   CREATININE 1.2   GLUCOSE 126*       U/A:    Lab Results   Component Value Date    NITRITE neg 10/31/2019    COLORU YELLOW 2022    PHUR 6.0 2022    LABCAST Present 2020    LABCAST Hyaline casts 2020    WBCUA TNTC 2022    RBCUA TNTC 2022    MUCUS Present 2020    YEAST Present 04/10/2022    BACTERIA None Seen 04/10/2022    CLARITYU CLOUDY 2022    SPECGRAV 1.018 2022    LEUKOCYTESUR MODERATE 2022    UROBILINOGEN 0.2 2022    BILIRUBINUR Negative 2022    BILIRUBINUR neg 10/31/2019    BLOODU LARGE 2022    GLUCOSEU Negative 2022       ASSESSMENT AND PLAN    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    Colorectal cancer ([address]    Metastasis from colon cancer ([address]    Proteinuria    Chemotherapy management, encounter for    Anemia associated with chemotherapy    Other fatigue    Thrush    Dehydration    Chemotherapy-induced peripheral neuropathy (HCC)    Chemotherapy-induced nausea    SBO (small bowel obstruction) (HCC)    Burning with urination    History of small bowel obstruction    Encounter for central line care    Iron deficiency    History of bladder cancer    Hydronephrosis of left kidney    Hydronephrosis of right kidney    Low back pain    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 Catheter acquired UTI present on admission. Last dose of antibiotics yesterday per infectious disease. Again, no further  recommendations.           2 LUTS.  Continues to have bladder spasms and incontinence after Mcgregor removal.  Myrbetriq discontinued secondary to increase in blood pressure and headache. Again, not a good candidate for anticholinergics. Would allow 1 to 2 weeks to observe if blood pressure returns to baseline before initiating new medication.     3.      Ureteral stricture with chronic indwelling left ureteral stent. Stent change 4/20/2022 Dr. Virginia Haji.   Due his next stent change in 3 to 4 months.     4.      History of bladder cancer. Surveillance cystoscopy on 4/20/2022 during surgery. No obvious recurrence of bladder tumors. Biopsy at that time was negative.   Next surveillance scope will need to be in 3 months at time of stent change     5.  Pulmonary nodules.  Being followed by oncology. Jennifer Lynn noted positive for adenocarcinoma metastasis from primary GI source.     MALLIKA SHEEHAN, OLGA - CNP  4/25/2022 8:45 AM

## 2022-04-25 NOTE — PROGRESS NOTES
Infectious Diseases Progress Note    Patient:  Sheila Delgado  YOB: 1950  MRN: [id number]   Admit date: 4/20/2022   Admitting Physician: Gerber Rojas MD  Primary Care Physician: Dakota Perkins MD    Chief Complaint/Interval History:  He indicates he is doing well. He indicates, \"improving every day. \"  He is urinating on his own. He reports some occasional incontinence. He does not have dysuria. He does not have suprapubic or flank pain. He indicates he is eating well. Ostomy functioning. In/Out    Intake/Output Summary (Last 24 hours) at 4/24/2022 2044  Last data filed at 4/24/2022 1804  Gross per 24 hour   Intake 1170 ml   Output 1350 ml   Net -180 ml     Allergies:    Allergies   Allergen Reactions    Morphine Anxiety     Current Meds: oxyCODONE (ROXICODONE) immediate release tablet 15 mg, Q4H PRN  acetaminophen (TYLENOL) tablet 650 mg, Q6H PRN   Or  acetaminophen (TYLENOL) suppository 650 mg, Q6H PRN  anidulafungin (ERAXIS) 100 mg in dextrose 5 % 130 mL IVPB, Q24H  calcium carbonate (TUMS) chewable tablet 500 mg, TID PRN  cyclobenzaprine (FLEXERIL) tablet 5 mg, BID PRN  dextrose bolus (hypoglycemia) 10% 125 mL, PRN   Or  dextrose bolus (hypoglycemia) 10% 250 mL, PRN  DULoxetine (CYMBALTA) extended release capsule 30 mg, Daily  lactobacillus (CULTURELLE) capsule 1 capsule, Daily  metoprolol succinate (TOPROL XL) extended release tablet 25 mg, Daily   And  hydroCHLOROthiazide (HYDRODIURIL) tablet 6.25 mg, Daily  ondansetron (ZOFRAN) injection 4 mg, Q6H PRN  pantoprazole (PROTONIX) tablet 40 mg, QAM AC  polyethylene glycol (GLYCOLAX) packet 17 g, Daily PRN  promethazine (PHENERGAN) injection 12.5 mg, Q4H PRN  sennosides-docusate sodium (SENOKOT-S) 8.6-50 MG tablet 2 tablet, Daily PRN  sodium bicarbonate tablet 650 mg, 4x Daily  acetaminophen (TYLENOL) tablet 650 mg, Q4H PRN  polyethylene glycol (GLYCOLAX) packet 17 g, Daily  bisacodyl (DULCOLAX) suppository 10 mg, Daily PRN  sennosides-docusate sodium (SENOKOT-S) 8.6-50 MG tablet 1 tablet, BID  sodium chloride flush 0.9 % injection 5-40 mL, PRN  sodium chloride flush 0.9 % injection 5-40 mL, BID      Review of Systems See HPI    VitalSigns:  /86   Pulse 99   Temp 97.2 °F (36.2 °C) (Temporal)   Resp 18   Ht 5' 5\" (1.651 m)   Wt 160 lb 9 oz (72.8 kg)   SpO2 97%   BMI 26.72 kg/m²      Physical Exam  Line/IV site: No erythema, warmth, induration, or tenderness. Ostomy pink and functioning  Abdomen soft and nontender  No flank tenderness    Radiology: None    Additional Studies Reviewed:  None    Impression:  1. Catheter/stent related urinary tract infection with Candida glabrata and Achromobacter  2. Previous ileus on acute care-remains resolved  3. History colorectal cancer  4.   History urothelial cancer    Recommendations:  He has completed a reasonable course of antimicrobial treatment  Will discontinue anidulafungin and levofloxacin after today's doses  Plan clean-catch midstream urinalysis for urinalysis with reflex to culture in next few days  Follow-up antimicrobial treatment    Izaiah Floyd MD

## 2022-04-25 NOTE — PROGRESS NOTES
04/25/22 1500   Position Activity Restriction   Other position/activity restrictions Colostomy, pain pump   General   Chart Reviewed Yes   General Comment   Comments Patient sat IND x 15 minutes EOB changing colostomy bag with NSG assist.   Subjective   Subjective Patient sitting EOB agrees to therapy   Subjective   Pain yes  (6/10 R knee)   Transfers   Sit to Stand Stand by assistance   Stand to sit Stand by assistance   Ambulation   Surface level tile   Device Rolling Walker   Assistance Contact guard assistance   Quality of Gait Steady no LOB   Gait Deviations Slow Farnaz   Distance 225'   PT Exercises   PROM Exercises 2 sets x 10 Reps  RR LE   LAQ, Hip Flex   Resistive Exercises L LE x 2 sets 10 reps  LAQ, Hip Flex. Activity Tolerance   Activity Tolerance Patient tolerated treatment well   Safety Devices   Type of Devices Left in bed;Call light within reach; Bed alarm in place     Electronically signed by Piotr Olivas PTA on 4/25/2022 at 4:05 PM

## 2022-04-25 NOTE — PROGRESS NOTES
Infectious Diseases Progress Note    Patient:  Sheila Delgado  YOB: 1950  MRN: [id number]   Admit date: 4/20/2022   Admitting Physician: Gerber Rojas MD  Primary Care Physician: Dakota Perkins MD    Chief Complaint/Interval History: He feels okay. No new complaints. Some urinary incontinence. No dysuria. No abdominal or flank pain. Remains without fever. In/Out    Intake/Output Summary (Last 24 hours) at 4/25/2022 1213  Last data filed at 4/25/2022 0251  Gross per 24 hour   Intake 1210 ml   Output 1250 ml   Net -40 ml     Allergies:    Allergies   Allergen Reactions    Morphine Anxiety     Current Meds: oxyCODONE (ROXICODONE) immediate release tablet 15 mg, Q4H PRN  acetaminophen (TYLENOL) tablet 650 mg, Q6H PRN   Or  acetaminophen (TYLENOL) suppository 650 mg, Q6H PRN  calcium carbonate (TUMS) chewable tablet 500 mg, TID PRN  cyclobenzaprine (FLEXERIL) tablet 5 mg, BID PRN  dextrose bolus (hypoglycemia) 10% 125 mL, PRN   Or  dextrose bolus (hypoglycemia) 10% 250 mL, PRN  DULoxetine (CYMBALTA) extended release capsule 30 mg, Daily  lactobacillus (CULTURELLE) capsule 1 capsule, Daily  metoprolol succinate (TOPROL XL) extended release tablet 25 mg, Daily   And  hydroCHLOROthiazide (HYDRODIURIL) tablet 6.25 mg, Daily  ondansetron (ZOFRAN) injection 4 mg, Q6H PRN  pantoprazole (PROTONIX) tablet 40 mg, QAM AC  polyethylene glycol (GLYCOLAX) packet 17 g, Daily PRN  promethazine (PHENERGAN) injection 12.5 mg, Q4H PRN  sennosides-docusate sodium (SENOKOT-S) 8.6-50 MG tablet 2 tablet, Daily PRN  sodium bicarbonate tablet 650 mg, 4x Daily  acetaminophen (TYLENOL) tablet 650 mg, Q4H PRN  polyethylene glycol (GLYCOLAX) packet 17 g, Daily  bisacodyl (DULCOLAX) suppository 10 mg, Daily PRN  sennosides-docusate sodium (SENOKOT-S) 8.6-50 MG tablet 1 tablet, BID  sodium chloride flush 0.9 % injection 5-40 mL, PRN  sodium chloride flush 0.9 % injection 5-40 mL, BID      Review of Systems no cardiopulmonary symptoms    VitalSigns:  /86   Pulse 92   Temp 97.2 °F (36.2 °C) (Temporal)   Resp 18   Ht 5' 5\" (1.651 m)   Wt 160 lb 9 oz (72.8 kg)   SpO2 97%   BMI 26.72 kg/m²      Physical Exam  Line/IV site: No erythema, warmth, induration, or tenderness. Exam without change    Lab Results:  CBC:   Recent Labs     04/25/22  0414   WBC 11.5*   HGB 10.1*        BMP:  Recent Labs     04/25/22  0414   *   K 3.9   CL 91*   CO2 26   BUN 16   CREATININE 1.2   GLUCOSE 126*     Radiology: None    Additional Studies Reviewed:  None    Impression:  1. Previous catheter/stent related urinary tract infection with Candida glabrata and Achromobacter-completed a course of antibiotic treatment with anidulafungin and levofloxacin. Mcgregor catheter has been removed. He seems to be voiding completely. Stent change last week. 2.  Previous ileus-remains resolved  3. History colorectal cancer  4.   History urothelial cancer    Recommendations:  Going to check clean-catch midstream urinalysis with reflex to culture  Continue off antimicrobial treatment    Meghan Becker MD

## 2022-04-25 NOTE — PROGRESS NOTES
Occupational Therapy     04/25/22 1000   Pre Treatment Pain Screening   Pain at present 4   Scale Used Numeric Score   Intervention List Patient able to continue with treatment   General   Additional Pertinent Hx CKD, CAD, HTN, hyperlipidemia, metastatic colon cancer s/p colostomy placement , OA right knee, chronic back pain, pain pump by Dr. Judson Bernheim, GERD, mixed hyperlipidemia   Diagnosis Sepsis,recent right nephrectomy and ureter removal d/t right ureter tumor and placement of stents,4/20/22 for left ureteral stent change and cystoscopy bladder biopsy, recent lung biopsy,recent bladder biopsy   Pain Assessment   Pain Assessment 0-10   Pain Location Knee   Pain Orientation Right   Pain Descriptors Aching; Sore   Functional Pain Assessment Activities are not prevented   Pain Type Chronic pain   Pain Frequency Continuous   Pain Onset On-going   Non-Pharmaceutical Pain Intervention(s) Shower;Repositioned; Rest   Balance   Sitting Balance Independent   Standing Balance Stand by assistance   Functional Mobility   Functional - Mobility Device Rolling Walker   Activity To/from bathroom   Assist Level Contact guard assistance   Transfers   Sit to stand Stand by assistance   Stand to sit Stand by assistance   Assessment   Performance deficits / Impairments Decreased functional mobility ; Decreased ADL status; Decreased strength;Decreased endurance;Decreased balance;Decreased high-level IADLs   Treatment Diagnosis Sepsis,recent right nephrectomy and ureter removal d/t right ureter tumor and placement of stents,4/20/22 for left ureteral stent change and cystoscopy bladder biopsy, recent lung biopsy   Prognosis Good   History colon CA with mets including ureter and lungs, arthrocentesis 4/12 for right knee pain, CKD, back surgery,CKD, CAD, HTN, hyperlipidemia, metastatic colon cancer s/p colostomy placement , OA right knee, chronic back pain, pain pump by Dr. Judson Bernheim, GERD, mixed hyperlipidemia   Timed Code Treatment Minutes 60 Minutes Activity Tolerance   Activity Tolerance Patient Tolerated treatment well   Plan   Times per Day Twice a day   Current Treatment Recommendations Strengthening;Balance training;Self-Care / ADL; Home management training; Safety education & training;Functional mobility training; Endurance training;Patient/Caregiver education & training;Equipment evaluation, education, & procurement      04/25/22 1000   Eating   Assistance Needed Independent   CARE Score 6   Oral Hygiene   Assistance Needed Independent   CARE Score 6   Shower/Bathe Self   Assistance Needed Partial/moderate assistance   Comment Shower, assistance with RLE. CARE Score 3   Upper Body Dressing   Assistance Needed Setup or clean-up assistance   CARE Score 5   Lower Body Dressing   Assistance Needed Partial/moderate assistance   Comment Assistance starting over RLE. CARE Score 3   Putting On/Taking Off Footwear   Assistance Needed Partial/moderate assistance   Comment Assistance with R foot. CARE Score 3      04/25/22 [address] needed Partial/moderate assistance   Comment Min A starting undergarment over RLE, changed colostomy bag with setup assistance, incontinence of bladder x2 occasions. CARE Score 3   Toilet Transfer   Assistance needed Supervision or touching assistance   Comment SBA.    CARE Score 4

## 2022-04-25 NOTE — PROGRESS NOTES
Patient:   Ethan Bliss  MR#:    [id number]   Room:    0827/827-01   YOB: 1950  Date of Progress Note: 4/25/2022  Time of Note                           8:51 AM  Consulting Physician:   Inga Snyder M.D. Attending Physician:  Inga Snyder MD     CHIEF COMPLAINT: Generalized weakness and deconditioning        Subjective  This 81 y.o. male  with history of CKD, CAD, HTN, hyperlipidemia, metastatic colon cancer s/p colostomy placement , OA right knee, chronic back pain, pain pump by Dr. Paulina Scott, GERD, mixed hyperlipidemia, recent right nephrectomy and ureter removal d/t right ureter tumor and placement of stents, and edgar catheter in place since 1/21/22 for urinary retention. He presented to [facility] ER on 4/10/22 with c/o fever, general malaise and nausea for the past few days. He reported recently having blood in his urine and was placed on Bactrim for presumed recurrent UTI. He also reported decreased urine output and decreased appetite for the past few days. Work up in [place] [id number] revealed Na 127, CO2 14, BUN 44, Cr 2.4, GFR 27, Lactic 2.0, WBC 16.8, Hgb 13.7, Large Leukocytes, positive nitrites, and Large blood noted on urinalysis, Abnormal CXR. CT Chest consistent with progressive metastatic disease to the lungs. CT abdomen showed moderate size hiatal hernia with gastroesophageal reflux, irregular soft tissue mass with some calcification within the pelvis, extends to and is inseparable from the right posterior lateral urinary bladder wall with potential secondary involvement, increased in size from previous study, status post right nephrectomy. He was admitted to the Hospitalist service with  sepsis with secondary to urinary tract infection. Consults made for oncology, nephrology and infectious disease. He was seen by ARGELIA Thapa, who recommended continuing ceftriaxone and fluconazole until urine cultures come back.  Urology was consulted to follow d/t patient coming from home with edgar catheter in place for retention and leaking. He was seen by Dr. Tess Dela Cruz. He was seen by his oncologist Dr. Kristal Henderson who reviewed his imaging results and felt his lung nodules were mostly metastatic disease. During his stay he continued to have c/o of right knee pain. Orthopedics was consulted. He was by SHELBY Bello and on 4/12/22 had a arthrocentesis  with injection of Betamethasone. Patient reports improvement in pain, now rating at 2/10. His renal function has improved, creatinine currently 1.1. Urine cultures came back positive for  Candida glabrata and Achromobacter species, antibiotics changed to Levaquin and anidulafungin. On 4/13/22 patient had c/o of nausea, emesis and decreased output from his colostomy. CT that showed distention with ileus vs obstruction. General surgery was consulted. He was seen by Dr. Guadalupe Garcia, who didn't feel any immediate surgical intervention was needed. NG tube was placed on 4/15/22. Patient was able to have NG removed and is now having good output from his ostomy. Mcgregor catheter was removed on 4/18/22 and he was started on myrbetrq for bladder spasms. He is having some incontinence and urgency to void but is having periods of dryness in between. CT-guided lung biopsy was done on 4/19/22 to confirm diagnosis, results pending. Patient went to OR on 4/20/22 for left ureteral stent change and cystoscopy bladder biopsy by Dr. Tess Dela Cruz. He tolerated the procedure well. Patient wife unexpectedly passed away on 4/13/22. Patient in a depressed mood, he is on cymbalta. He is participating in both PT/OT. No complaints of the weekend. Now voiding on his own some. No complaints today.   REVIEW OF SYSTEMS:  Constitutional: No fevers No chills  Neck:No stiffness  Respiratory: No shortness of breath  Cardiovascular: No chest pain No palpitations  Gastrointestinal: No abdominal pain    Genitourinary: No Dysuria  Neurological: No headache, no confusion      PHYSICAL EXAM:  /86   Pulse 92   Temp 97.2 °F (36.2 °C) (Temporal)   Resp 18   Ht 5' 5\" (1.651 m)   Wt 160 lb 9 oz (72.8 kg)   SpO2 97%   BMI 26.72 kg/m²     Constitutional: he appears well-developed and well-nourished. Eyes - conjunctiva normal.  Pupils react to light  Ear, nose, throat -hearing intact to voice. No scars, masses, or lesions over external nose or ears, no atrophy of tongue  Neck-symmetric, no masses noted, no jugular vein distension  Respiration- chest wall appears symmetric, good expansion,   normal effort without use of accessory muscles  Cardiovascular- RRR  Musculoskeletal - no significant wasting of muscles noted, no bony deformities, gait no gross ataxia  Extremities-no clubbing, cyanosis or edema  Skin - warm, dry, and intact. No rash, erythema, or pallor. Psychiatric - mood, affect, and behavior appear normal.      Neurology  NEUROLOGICAL EXAM:      Mental status   Awake, alert, fluent oriented x 3 appropriate affect  Attention and concentration appear appropriate  Recent and remote memory appears unremarkable  Speech normal without dysarthria  No clear issues with language       Cranial Nerves   CN II- Visual fields grossly unremarkable  CN III, IV,VI-EOMI, No nystagmus, conjugate eye movements, no ptosis  CN VII-no facial asymmetry  CN VIII-Hearing intact      Motor function  Antigravity x 4.  3+/5 weakness right hip             Tremor None present     Gait                  Not tested           Nursing/pcp notes, imaging,labs and vitals reviewed.      PT,OT and/or speech notes reviewed    Lab Results   Component Value Date    WBC 11.5 (H) 04/25/2022    HGB 10.1 (L) 04/25/2022    HCT 31.3 (L) 04/25/2022    MCV 91.0 04/25/2022     04/25/2022     Lab Results   Component Value Date     (L) 04/25/2022    K 3.9 04/25/2022    CL 91 (L) 04/25/2022    CO2 26 04/25/2022    BUN 16 04/25/2022    CREATININE 1.2 04/25/2022    GLUCOSE 126 (H) 04/25/2022    CALCIUM 8.5 (L) 04/25/2022    PROT 6.9 04/12/2022    LABALBU 3.1 (L) 04/12/2022    BILITOT <0.2 04/12/2022    ALKPHOS 123 04/12/2022    AST 14 04/12/2022    ALT 10 04/12/2022    LABGLOM 60 (A) 04/25/2022    GFRAA >59 04/25/2022    AGRATIO 1.9 11/24/2021    GLOB 2.2 11/24/2021     Lab Results   Component Value Date    INR 1.02 04/18/2022    INR 1.06 04/13/2022    INR 1.04 11/09/2021     Lab Results   Component Value Date    CRP 3.28 (H) 04/12/2022 04/23/22 1011   Position Activity Restriction   Other position/activity restrictions Colostomy, pain pump   General   Additional Pertinent Hx CKD, CAD, HTN, hyperlipidemia, metastatic colon cancer s/p colostomy placement , OA right knee, chronic back pain, pain pump by Dr. Fariba Sutton, GERD, mixed hyperlipidemia, recent right nephrectomy and ureter removal d/t right ureter tumor and placement of stents, and edgar catheter in place since 1/21/22 for urinary retention   Subjective   Subjective My knee hurts.    Oxygen Therapy   O2 Device None (Room air)   Transfers   Sit to Stand Contact guard assistance;Stand by assistance   Stand to sit Contact guard assistance;Stand by assistance   Ambulation   Surface level tile   Device Rolling Walker   Assistance Contact guard assistance   Quality of Gait FF posture   Gait Deviations Slow Farnaz;Decreased step length;Decreased step height   Propulsion 1   Distance 140 feet, 150 feet   Balance   Posture Fair   Sitting - Static Fair   Sitting - Dynamic Fair   Standing - Static Fair   Standing - Dynamic Fair   Exercises   Quad Sets x 10 with 5 second hold   Heelslides seated x 2 sets of 5 reps with assist   Gluteal Sets x 10 with 5 second hold   Hip Flexion seated marching x 10 with assist to right leg   Knee Long Arc Quad x 10   Ankle Pumps x 10   Short Term Goals   Time Frame for Short term goals 1 week   Short term goal 1 Supervision with bed mobility   Short term goal 2 Supervision with transfers   Short term goal 3 SBA with ambulating 100' using RW   Short term goal 4 CGA completing 2 stairs using hand rail   Short term goal 5 Supervision with w/c proplusion 100'   Long Term Goals   Time Frame for Long term goals  2 weeks   Long term goal 1 Independent with bed mobility   Long term goal 2 Independent with transfers   Long term goal 3 Modified Independent with ambulating 150'   Long term goal 4 Independent with w/c proplusion 150'   Long term goal 5 Modified Independent with competing 4 stairs using handrail   Conditions Requiring Skilled Therapeutic Intervention   Body Structures, Functions, Activity Limitations Requiring Skilled Therapeutic Intervention Decreased functional mobility ; Decreased ROM; Decreased body mechanics; Decreased strength;Decreased endurance;Decreased balance;Decreased posture   Assessment patient tolerated session fair. he has main complaint of pain during open chained exericses. He limits himself during exericses and ambulation. Treatment Diagnosis Interference with activity   Therapy Prognosis Good   Activity Tolerance   Activity Tolerance Patient limited by pain; Patient limited by endurance   Plan   Plan 5-7 times per week   Plan weeks 2   Current Treatment Recommendations Strengthening;ROM;Balance training;Transfer training; Endurance training; Wheelchair mobility training;Gait training;Stair training; Safety education & training;Patient/Caregiver education & training;Equipment evaluation, education, & procurement   PT Plan of Care   PT Plan of Care 6 times/week   Saturday X         Electronically signed by Criss Calvillo PTA on 4/23/2022 at 4:27 PM                 Objective    ADL  Grooming: Independent;Setup  UE Bathing: Independent;Setup  LE Bathing: Contact guard assistance  UE Dressing: Independent;Setup  LE Dressing: Minimal assistance  Toileting: Stand by assistance  Balance  Standing Balance: Contact guard assistance  Bed mobility  Supine to Sit: Modified independent  Sit to Supine: Modified independent  Scooting: Stand by assistance  Transfers  Stand Step Transfers: Stand by assistance  Sit to stand: Stand by assistance  Stand to sit: Stand by assistance          RECORD REVIEW: Previous medical records, medications were reviewed at today's visit    IMPRESSION:     1. Sepsis from UTI. Antifungal and Levaquin have been completed per infectious disease recommendations. For generalized weakness and deconditioning-PT/OT  2. Urinary retention with recent Mcgregor removal.  Now voiding on own. 3.  History of urothelial cancer and now metastatic colon cancer status post colostomy and nephrectomy with lung mets  For. Acute on chronic CKD-continue to monitor  5. Recent ileus. Resolved  6. DVT prophylaxis-SCDs  7. Essential hypertension-continue current medications and monitoring  8. Chronic right lower extremity weakness     Appreciate infectious disease, hospitalist, urology and oncology consults. their notes over the weekend are reviewed today. ELOS: Staff on Wednesday  More than 35 minutes were spent reviewing the patient's records, examination and and counseling and coordination of care.   More than 50% spent on the latter

## 2022-04-25 NOTE — PATIENT CARE CONFERENCE
[facility] ACUTE INPATIENT REHABILITATION  TEAM CONFERENCE NOTE    Date: 2022  Patient Name: Philippe Gilbert        MRN: [id number]    : 1950  (81 y.o.)  Gender: male      Diagnosis: Sepsis secondary to UTI      PHYSICAL THERAPY  GROSS ASSESSMENT       BED MOBILITY  Bed Mobility  Rolling: Stand by assistance  Supine to Sit: Stand by assistance (towards pts L using bed rail)  Sit to Supine: Contact guard assistance (uses hands to lift RLE into bed)  Scooting: Stand by assistance  Comment: Cues for safety and technique  TRANSFERS  Transfers  Sit to Stand: Stand by assistance  Stand to sit: Stand by assistance  Bed to Chair: Stand by assistance (with RW)  Stand Pivot Transfers: Contact guard assistance (towards pts L, w/c to bed)  Car Transfer: Unable to assess  Comment: Pt able to complete with slight increase in R knee pain, pain increases when seated in recliner with knee bent  WHEELCHAIR PROPULSION  Propulsion 1  Propulsion: Manual  Level: Level Tile  Method: HUDSON MOLINA  Level of Assistance: Contact guard assistance  Description/ Details: Veers left  Distance: 200 with turns  AMBULATION  Ambulation  Surface: level tile  Device: Rolling Walker  Assistance: Stand by assistance  Quality of Gait: fwd flexed posture due to chronic back pain; steady, no lob  Gait Deviations: Slow Farnaz  Distance: 250', 175'  Comments: no WC follow; no rest break  STAIRS  Stairs  # Steps : 3  Stairs Height: 6\"  Rails: Bilateral  Device: No Device  Other Apparatus:  (R knee support)  Assistance: Contact guard assistance  Comment: greater difficulty with descending stairs but good technique and seems confident; R knee chronic issues are limiting factor  GOALS:  Short Term Goals  Time Frame for Short term goals: 1 week  Short term goal 1: Supervision with bed mobility  Short term goal 2: Supervision with transfers  Short term goal 3: SBA with ambulating 100' using RW  Short term goal 4: CGA completing 2 stairs using hand rail  Short term goal 5: Supervision with w/c proplusion 100'    Long Term Goals  Time Frame for Long term goals : 2 weeks  Long term goal 1: Independent with bed mobility  Long term goal 2: Independent with transfers  Long term goal 3: Modified Independent with ambulating 150'  Long term goal 4: Independent with w/c proplusion 150'  Long term goal 5: Modified Independent with competing 4 stairs using handrail    ASSESSMENT:  Assessment: pt demonstrated stairs with good technique, though R knee does impair (he indicates no different that prior to admission); pt improving with quality/decrease need for assist with overall mobility    SPEECH THERAPY: N/A      OCCUPATIONAL THERAPY    CURRENT IRF-JAMAL SCORES  Eating: CARE Score: 6       Oral Hygiene: CARE Score: 6        Toileting: CARE Score: 3  Comment: Min A starting undergarment over RLE, changed colostomy bag with setup assistance, incontinence of bladder x2 occasions. Shower/Bathe: CARE Score: 3  Comment: Shower, assistance with RLE. Upper Body Dressing: CARE Score: 5         Lower Body Dressing: CARE Score: 4  Comment: SBA for standing aspect, utilized AE to don/doff over R foot. Footwear: CARE Score: 3  Comment: Socks only d/t edema, pt. able to don/doff using AE PRN. Toilet Transfers: CARE Score: 4  Comment: SBA.        Picking Up Object:  CARE Score: 88          UE Functioning:  WFLs    Pain Assessment:  Pain Level: 7  Pain Location: Knee    STGs:  Short Term Goals  Time Frame for Short term goals: 1 week  Short Term Goal 1: complete LB dressing with AE prn with supervision  Short Term Goal 2: complete overall toileting with supervision  Short Term Goal 3: complete simple ambulatory home making task with supervision  Short Term Goal 4: complete 1-2 handed standing level task for 3 mins with supervision  Short Term Goal 5: complete overall bathing with supervision    LTGs:  Long Term Goals  Time Frame for Long term goals : 2 weeks  Long Term Goal 1: complete overall dressing with modified independence  Long Term Goal 2: complete overall bathing with modified independence  Long Term Goal 3: complete overall toileting with modified independence  Long Term Goal 4: complete HEP with independence  Long Term Goal 5: complete simple ambulatory home making task with modified independence  Long Term Goal 6: pt/family verbalize DME for home    Assessment:  Performance deficits / Impairments: Decreased functional mobility ,Decreased ADL status,Decreased strength,Decreased endurance,Decreased balance,Decreased high-level IADLs                  NUTRITION  Current Wt: Weight: 160 lb 9 oz (72.8 kg) / Body mass index is 26.72 kg/m². Admission Wt: Admission Body Weight: 163 lb (73.9 kg) (Bed scale)  Oral Diet Orders: Regular  Oral Nutrition Supplement (ONS) Orders: Ensure Enlive daily    Pt continues with adequate po intake of meals, >75% most meals. Wt stable X 1 week. Pt requesting decreased in ONS frequency. Modify ONS from BID to daily. Please see nutrition note for details. NURSING    Wounds/Incisions/Ulcers: No skin issues identified     Nhan Scale Score: 18    Pain: Patient's pain is currently controlled with Oxycontin extended release 10mg scheduled q12 hrs and Roxicodone immediate release 15 q4 hrs prn. Consultations/Labs/X-rays:     Family Education: No family available for education    Fall Risk:  [place] Total Score: 61    Fall in the last week? None      Other Nursing Issues: A&O x4; Regular diet; Has ileostomy; Occasionally incontinent of bladder, Wears briefs; Up with one assist with walker; Takes meds whole with water; Patient also has pain pump in lower right abdomen. SOCIAL WORK/CASE MANAGEMENT  Assessment: Has good family support- has been staying with daughter and son-in-law; he wants to return to his own home; wife  unexpectedly while he was hospitalized- he wants to return home to [facility].   States he has brother and sister who will be able to assist.    Discharge Plan   Estimated Length of Stay: He requests discharge Friday 4/29/22  Destination: home health    Pass: No    Services at Discharge: [facility], Occupational Therapy and Nursing Other per evaluations    Equipment at Discharge: to be determined    Progress made in the prior week:  1. SBA for standing aspects for LB clothing management  2.  3.  4.  5.      Goals for following week:  1. SBA for toilet t/fs  2.   3.   4.   5.     Factors facilitating achievement of predicted outcomes: Cooperative    Barriers to the achievement of predicted: right knee pain    Team Members Present at Conference:  : Nusrat Mendoza 23   Occupational Therapist: [place], OTR/L  Physical Therapist: MARY Rodriguez  Speech Therapist: N/A  Nurse: Sendy Santiago RN,BSN   Nurse Manager:  Sendy Santiago RN, BSN  Dietitian:  Adalberto Friedman MS, RD, LD  Rehab Director:        I approve the established interdisciplinary plan of care as documented within the medical record of Reilly Briceno.

## 2022-04-25 NOTE — PROGRESS NOTES
Occupational Therapy     04/25/22 1300   Pre Treatment Pain Screening   Pain at present 5   Scale Used Numeric Score   Intervention List Patient able to continue with treatment   General   Additional Pertinent Hx CKD, CAD, HTN, hyperlipidemia, metastatic colon cancer s/p colostomy placement , OA right knee, chronic back pain, pain pump by Dr. Melissa Giles, GERD, mixed hyperlipidemia   Diagnosis Sepsis,recent right nephrectomy and ureter removal d/t right ureter tumor and placement of stents,4/20/22 for left ureteral stent change and cystoscopy bladder biopsy, recent lung biopsy,recent bladder biopsy   Subjective   Subjective Pt. states that he would like to d/c home Friday, if possible. States his sister, brother, and daughter will take turns staying with him to assist him as needed. Pain Assessment   Pain Location Knee   Pain Orientation Right   Pain Descriptors Aching;Discomfort   Functional Pain Assessment Activities are not prevented   Pain Type Chronic pain   Pain Frequency Continuous   Pain Onset On-going   Non-Pharmaceutical Pain Intervention(s) Rest;Repositioned   Response to Pain Intervention None (pain unchanged or no improvement)   Balance   Sitting Balance Independent   Standing Balance Stand by assistance   Functional Mobility   Functional - Mobility Device Rolling Walker   Activity Other   Assist Level Stand by assistance   Transfers   Sit to stand Stand by assistance   Stand to sit Stand by assistance   Type of ROM/Therapeutic Exercise   Type of ROM/Therapeutic Exercise Sangeeta Sharp BUE 12.5# 4 sets x 15 reps. Assessment   Performance deficits / Impairments Decreased functional mobility ; Decreased ADL status; Decreased strength;Decreased endurance;Decreased balance;Decreased high-level IADLs   Treatment Diagnosis Sepsis,recent right nephrectomy and ureter removal d/t right ureter tumor and placement of stents,4/20/22 for left ureteral stent change and cystoscopy bladder biopsy, recent lung biopsy Prognosis Good   History colon CA with mets including ureter and lungs, arthrocentesis 4/12 for right knee pain, CKD, back surgery,CKD, CAD, HTN, hyperlipidemia, metastatic colon cancer s/p colostomy placement , OA right knee, chronic back pain, pain pump by Dr. Donna Gates, GERD, mixed hyperlipidemia   Timed Code Treatment Minutes 60 Minutes   Activity Tolerance   Activity Tolerance Patient Tolerated treatment well   Plan   Times per Week 3-5   Current Treatment Recommendations Strengthening;Balance training;Self-Care / ADL; Home management training; Safety education & training;Functional mobility training; Endurance training;Patient/Caregiver education & training;Equipment evaluation, education, & procurement      04/25/22 1300   Lower Body Dressing   Assistance Needed Supervision or touching assistance   Comment SBA for standing aspect, utilized AE to don/doff over R foot. CARE Score 4   Putting On/Taking Off Footwear   Assistance Needed Partial/moderate assistance   Comment Socks only d/t edema, pt. able to don/doff using AE PRN.    CARE Score 3
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
## 2022-04-25 NOTE — PROGRESS NOTES
MEDICAL ONCOLOGY PROGRESS NOTE     Pt Name: Jose Friedman  MRN: [id number]  YOB: 1950  Date of evaluation: 4/25/2022    Subjective: No new complaints this morning. Overall right knee pain is better controlled. HISTORY OF PRESENT ILLNESS:  The patient is well-known to our practice. He was being followed on the floors at [facility].  Further details of his medical history as below. We are consulted at the rehab for continued of care. Essentially, history of colonic adenocarcinoma and more recently high-grade urothelial carcinoma of the bladder/.  There PT 2 N0. Patient has developed lung nodules bilaterally. He underwent a CT-guided biopsy this week. Pathology consistent with metastatic adenocarcinoma consistent with gastrointestinal primary. Prior medical history   Teo Daniels is a very pleasant 81years old male who has a diagnosis of stage IV colonic adenocarcinoma.  He was receiving palliative chemotherapy after September 2021. Lauraine Holter was found to have high-grade urothelial carcinoma involving the ureter.  He underwent surgery, nephroureterectomy at [facility] had a complicated postoperative course. Lauraine Holter eventually recovered and his current receiving home care needs at home. Lauraine Holter has also several other comorbidities include CAD, hypertension, hyperlipidemia and CKD stage III. The patient presented ER department with complaints of generalized malaise for the last several days, low-grade fever, nausea.  Decreased urine output.  Patient has a ostomy in place.  Work-up in the emergent showed moderate hyponatremia sodium 127, mild elevated lactic acid, elevated creatinine 2.4 (baseline's 1.5-1.7).   He also had neutrophil leukocytosis and positive nitrates and large blood on the urine analysis.  Chest x-ray was abnormal and therefore CT chest was performed that showed evidence of metastatic disease to the lungs.  Patient received IV fluid resuscitation the emergency. Lauraine Holter was started on broad-spectrum antibiotics. Lluvia Ames was admitted with consultation from me and also ID.  4/10/2022-CT abdomen/pelvis without contrast showed evidence of metastatic disease to the lung bases.  Moderate size hiatal hernia with gastroesophageal reflux.  Status post partial colectomy.  Left lower quadrant ostomy is present.  No evidence obstruction.  Irregular soft tissue mass with some calcification the pelvis.  This demonstrated interval increase was potential involvement with the right posterior lateral urinary bladder wall.  The mass measures 8 x 2.9 cm.  Left-sided double J intraureteral stent is in place with stent well positioned. 4/10/2022-CT chest without contrast showed extensive metastatic disease is noted to the lungs showing progression from the previous examination with multiple new nodules present as well as interval increase in size of previously documented nodules. Reference nodule within the superior segment of the right lower lobe previously measured at 5 mm in size now measures 13 mm in size. A lesion within the medial right lower lobe now measuring 1.6 cm in long axis previously measured 1.1 cm. There is no evidence of acute consolidative pneumonia. Essentially, findings consistent with progressive metastatic disease to the lungs. There are too numerous to count pulmonary nodules the majority of which have increased in size or which are new from the previous study.  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      Prior oncological history  ONCOLOGIC HISTORY:   Diagnosis:  · Moderately differentiated rectal carcinoma, T3N0Mx, diagnosed in 3/9/2009  · Noninvasive high-grade papillary urethral carcinoma.  Negative for evidence of detrusor muscle invasion, pTa, pNx on 8/18/2019.    · Metastatic colorectal carcinoma, 9/3/2019  · MSI stable and mutations for BRAF, NRAS, KRAS were not detected.    · Recurrent bladder cancer- superficial   · Urothelial carcinoma of the ureter  · Progressive lung metastasis     TREATMENT SUMMARIES:  · 4/9/2009-5/27/2009-received neoadjuvant chemotherapy with 5-FU CIV along with radiation therapy for a total of 5400 cG  · 7/15/2009-rectum resection revealed no residual malignancy, complete pathological response. · 8/18/2019- transurethral resection of bladder tumor (TURBT)   · 9/18/2019-12/26/2019 palliative chemotherapy with modified FOLFOX 7  (Oxaliplatin 85 mg/m² IV day 1, leucovorin 400 mg/m² IV day 1 and 5-FU 2400 mg/m² IV continuous infusion over 46 to 48 hours for a total of 7 cycles. · 1/28/2020 -palliative maintenance therapy with leucovorin 400 mg/m² IV over 2 hours on day 1, followed by 5-FU bolus 400 mg/m² and then 1200 mg/m²/day x2 days (total 2400 mg meter squared over 46 to 48 hours) continuous infusion.  Repeat every 2 weeks.     ONCOLOGIC HISTORY # NMIBC_Bladder cancer. Duong Hwang was diagnosed with noninvasive urethral carcinoma, pTa, pNx on 8/18/2019.  Ta tumors are papillary lesions that tend to recur but are relatively benign and generally do not invade the bladder.  Adjuvant treatment is not warranted at this time and will be monitored closely.   Biopsy and transurethral resection of bladder tumor (TURBT) on 8/18/2019 by Dr. Tashia Mcmahon with pathology revealing noninvasive high-grade papillary urethral carcinoma.  Negative for evidence of detrusor muscle invasion, pTa, pNx.   · 12/3/2019- cystoscopy with removal and replacement of double-J urethral stent.  Pathology from dome of the bladder/tumor revealed high-grade papillary urethral carcinoma, noninvasive.  No muscularis propria present.    · 2/26/2020 - cystoscopy and bilateral ureteral stent removal and replacement.  The operative note by Dr. Jana Sprague documented bilateral hydronephrosis and obtained biopsy of the bladder in the mid dome and left anterior lateral wall x2.  Pathology documented high-grade papillary urethral carcinoma, noninvasive, stage pTaNx.  · 5/28/2020-the patient underwent a cystoscopy and resection of bladder tumor on 05/28/2020 with findings consistent with noninvasive, high-grade papillary urothelial carcinoma.  Muscularis propria was not identified.  The patient will receive intravesical BCG therapy. · 7/6/2020-CT Abdomen/ Pelvis-Moderate severe right and mild left hydronephrosis with bilateral ureteral stents, which have an adequate radiographic position. Right kidney with cortical thickening and somewhat asymmetrically decreased enhancement which can be seen with obstructive uropathy. Postoperative changes of colectomy. Left lower quadrant ostomy. Slightly decreased size of presacral low density compared to4/15/2020. Similar intrahepatic and extrahepatic bile duct dilation compared to 4/15/2020. Correlate with clinical symptoms and laboratory studies if clinically indicated. Similar chronic bony findings. · 3/25/2021-CT abdomen showed severe hydronephrosis.  Cystoscopy was performed by urology. · 4/1/21 Bladder neck tumor, biopsies: High-grade papillary urothelial carcinoma, noninvasive. Muscularis propria is not present. AJCC pathologic stage:  pTa Nx   · 4/14/2021-reviewed results of pathology.  No evidence of invasive disease.  Continue surveillance cystoscopy with urology. · 9/13/2021-he was seen by urology.  Findings of ureteral high-grade urothelial carcinoma.  Noninvasive.  The patient is being referred to [facility] in [address]. · 10/11/2021-he was seen by [facility] and recommended cystoscopy with biopsy and possible laser ablation and bilateral leg stent exchange at that time.   · 4/10/2022-CT abdomen/pelvis without contrast showed evidence of metastatic disease to the lung bases.  Moderate size hiatal hernia with gastroesophageal reflux.  Status post partial colectomy.  Left lower quadrant ostomy is present.  No evidence obstruction.  Irregular soft tissue mass with some calcification the pelvis.  This demonstrated interval increase was potential involvement with the right posterior lateral urinary bladder wall.  The mass measures 8 x 2.9 cm.  Left-sided double J intraureteral stent is in place with stent well positioned. · 4/10/2022-CT chest without contrast showed extensive metastatic disease is noted to the lungs showing progression from the previous examination with multiple new nodules present as well as interval increase in size of previously documented nodules. Reference nodule within the superior segment of the right lower lobe previously measured at 5 mm in size now measures 13 mm in size. A lesion within the medial right lower lobe now measuring 1.6 cm in long axis previously measured 1.1 cm. There is no evidence of acute consolidative pneumonia. Essentially, findings consistent with progressive metastatic disease to the lungs. There are too numerous to count pulmonary nodules the majority of which have increased in size or which are new from the previous study.  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   · 4/19/2020- CT-guided FNA biopsy of lung nodule consistent metastatic colonic adenocarcinoma.        ONCOLOGIC HISTORY #3  Des Denney was seen in initial oncology consultation on 8/19/2019 during his hospitalization at [facility] after a large pelvic mass was identified which raised concern for recurrent disease.  Further pathology consistent with recurrent rectal cancer  · 8/17/2019- CEA 18.1  · 8/17/2019- CT scan of the kidney with contrast documented moderate to severe right hydronephrosis with dilation of the right ureter into the lower pelvis the site of the parasacral soft tissue changes.  Partially calcified soft tissue changes within the janes-sacral region likely representing sequelae of pelvic radiation.  Increasing scarring/fibrosis versus tumor recurrence within the presacral changes, likely represents a site of right distal ureter obstruction.  No left-sided hydronephrosis. · 8/18/2019 -Double-J ureter stent placement for right hydronephrosis secondary to extrinsic compression by pelvic mass.    · 8/27/2019-CT scan of the chest with contrast documented numerous pulmonary nodules that appear new compared to 11/12/2017, RUL nodule measuring 7 mm and LLL nodule measuring 5 mm.  Soft tissue nodule at the left ventral abdominal wall.  Slight increased size of a probable lymph node anterior to the aorta measuring 0.9 cm compared to 0.7 cm.  Similar presacral, right pelvic sidewall and right abductor muscular nodular soft tissue density. · 8/27/2019 CT scan of the abdomen and pelvis with contrast identified new moderate left hydronephrosis with moderate right hydronephrosis.  Mild stranding around the urinary bladder and thickening of the bilateral ureteral wall.  Numerous pulmonary nodules.  Soft tissue of the left ventral abdominal wall.  Slightly increased size of probable lymph node anterior to the aorta measuring 0.9 cm compared to 0.7 cm. · 8/27/2019-PET scan did not identify any FDG avid pulmonary nodules or airspace opacities.  Abnormal increased metabolic activity within the right pelvic wall soft tissue showing SUV of 5.4.  Abnormal soft tissue metabolic activity in the right abductor muscle with SUV of 6.4.  Focally increased activity to the right of the inferior L5 vertebrae body posterior with SUV of 7.9 with associated sclerotic changes.   · 8/29/2019-  Dr. Cynthia Soria completed a cystoscopy with double-J ureter stent in the left ureter for left hydronephrosis  · 9/3/2019- CT-guided right abductor muscle biopsy on 9/3/2019 with pathology identifying metastatic adenocarcinoma consistent with colorectal origin.  Molecular panel from biopsy tissue revealed MSI stable and mutations for BRAF, NRAS, KRAS were not detected.    · 9/18/2019 - Palliative chemotherapy with modified FOLFOX 7  (Oxaliplatin 85 mg/m² IV day 1, leucovorin 400 mg/m² IV day 1 and 5-FU 2400 mg/m² IV continuous infusion over 46 to 48 hours) with the anticipation of adding Avastin 5 mg/kg day 1 every 14 days  · 10/15/2019- 24-hour urine for protein with a total protein of 1785 mg per 24-hour.  Zurdo has been evaluated by Dr. Dayna Reaves and he reports no significant concerns related to the protein. · 11/6/19 CEA 5.6 significantly improved compared to CEA of 14.0 on 8/30/2019. · 11/15/2019 -CT scan of the abdomen and pelvis documented no evidence of disease progression with significant decrease in the size of enhancing nodules in the right pelvic abductor musculature, a previous 1.8 cm nodule now measures 5 mm.  No new or enlarging retroperitoneal, mesenteric, pelvic or inguinal lymph nodes.  Calcified presacral mass unchanged measuring 5 x 3.7 cm.   · 11/15/2019 -CT scan of the chest documented multiple small pulmonary nodules reidentified, largest nodule in the RUL measures 5 mm compared to 7.5 mm, RLL nodule measures 3.4 mm compared to 5.9 mm, VIC nodule measures 4 mm compared to 6 mm.  A cluster of small nodules in the RUL anteriorly are barely visible on this study.  There is a decrease in size of mediastinal lymph nodes compared to previous exam, right distal paratracheal lymph node measuring 4.5 mm compared to 8.3 mm and lower right peritracheal node measuring 4.5 mm compared to 8.6 mm.    · 1/13/2020- CT scan of the abdomen and pelvis with contrast indicated improvement in the right-sided hydronephrosis with a chronic inflammatory process of the ureters suspected due to the moderate thickening, also present on previous study.  The small poorly enhancing nodules in the right abductor muscles have decreased in the partially calcified presacral mass and right lateral pelvic wall nodules are stable compared to previous study.  Resolution of the subcutaneous abdominal wall nodules.  A prominent retroperitoneal lymph node adjacent and anterior to the left common artery is redefined and measures 6 mm, no change from previous exam.   · 1/13/2020 - CT scan of the chest documented a right lower lobe nodule measures 4.3 cm and is unchanged.  A right lower lobe nodule measures 2.8 mm compared to 3.4 mm.  Nodule in the right upper lobe is barely visible and measures 2.4 mm.  Nodule in the left lower lobe measures 4.8 mm and is unchanged.  Nodule in left lower lobe posterior measures 2.8 mm and previously measured 4.7 mm.  A right lower lobe posterior medially nodule is barely visible measuring 0.2 mm and previously. measured 4.5 mm.  No new nodules identified.  No change in the size of the mediastinal lymph nodes. · 1/28/2020 -palliative maintenance therapy with leucovorin 400 mg/m² IV over 2 hours on day 1, followed by 5-FU bolus 400 mg/m² and then 1200 mg/m²/day x2 days (total 2400 mg meter squared over 46 to 48 hours) continuous infusion.  Repeat every 2 weeks.  Only received 1 cycle, further treatment held due to small bowel obstruction. · 1/30/2020 - CT scan of the abdomen and pelvis indicated high-grade small bowel obstruction with transition point in the midline posterior pelvis where a small bowel loop is tethered to a partially calcified presacral soft tissue mass. · 2/11/2020-CEA 1.4  · 3/5/2020-  Exploratory laparotomy, removal of adhesions, small bowel resection with primary anastomosis and partial thickness small bowel repair by Dr. Zandra Shannon the operative note Dr. Dutch Renee reported no evidence of carcinomatosis within the abdomen and the liver was unable to be examined due to extent of right upper quadrant adhesions.  Pathology from small intestine documented no evidence of malignancy. · 4/15/2020 Ct Chest W Contrast Minimal interval increase in size of subcentimeter pulmonary nodules. The largest now measures 6 mm in the medial right lower lobe on axial image 80.  There is a new, unstable, horizontal fracture through the T6 vertebral body. Additionally, there are new fractures through the posterior 11th and 12th right ribs. The bones are moderately osteopenic. The finding of an unstable fracture through the T6 vertebral body was discussed with Ana Mercedes at 10:45 AM on 4/15/2020. · 4/15/2020 Ct Abdomen Pelvis W Iv Contrast  Patient has undergone interval resection of the distal small bowel, and there is a 2.8 cm fluid collection in the presacral operative bed. This contains a tiny focus of air. This may postoperative or due to infection. Please correlate with the patient's clinical symptoms and laboratory markers. Improved hydronephrosis and hydroureter. Diffuse osteoporosis. Findings in the lower chest are described in a separate dictation. · 4/22/2020-CEA 0.9  · 6/2/2020-resumed chemotherapy with 5-FU/leucovorin and Panitumumab.  Okay to do 1 today then CMP CEA   · 8/19/20 CEA-1.1  · 10/21/2020- CEA 2.0  · 11/11/2020- Ct Chest W Contrast Multiple, too numerous to count, small noncalcified lung nodules bilaterally. The referenced nodules appears to have decreased in size the previous study. No new nodules. · 11/11/2020- Ct Abdomen Pelvis W Contrast Unchanged bilateral hydronephrosis, more on the right side. Bilateral ureteral stents in place. Moderate asymmetrical thickening of the incompletely distended urinary bladder. This may partly be due to incomplete distention. Possibility of chronic cystitis and or chronic partial outlet obstruction may not be excluded. A functioning left lower abdominal ostomy. A small parastomal small bowel herniation without obstruction. A partially calcified presacral mass. The soft tissue component have increased in size in the previous study. The osseous changes are described above. Any superimposed metastatic disease is not excluded and would be hard to evaluate due to extensive postsurgical changes.    · 11/18/2020-essentially, overall stable disease.  Improvement of the lung nodules with decreased in the size of the target lesions.  The pelvic lesion is is likely worse by 25%.  However, CEA is is still within the normal limits.  Therefore likely mixed response.  We will continue current treatment and repeat CT scans in about 3 months. · 12/16/2020-discontinue 5-FU bolus from his regimen. · 2/9/2021- Ct Chest W Contrast No evidence of disease progression. Stable pulmonary nodules. Stable intrahepatic and extrahepatic bile duct dilation compared to prior 11/11/2020. Postoperative, posttraumatic and degenerative changes in the spine as described above. Old right-sided rib fractures. · 2/9/2021- Ct Abdomen Pelvis W Contrast showed evidence of response to therapy including decreased presacral mass/thickening now measuring 1.1 cm, previously 1.9 cm on 11/11/2020. Stable intrahepatic and extra hepatic bile duct dilation with cholecystectomy clips. See separately dictated CT chest of the same day regarding pulmonary nodules.  Bilateral ureteral stents. Decreased bilateral hydronephrosis. Urinary bladder wall is thickened, which could be seen with post treatment changes or cystitis. Correlate with symptoms.  Chronic bony findings as above  · 2/17/2021-reviewed CT chest abdomen pelvis.  Essentially consistent with disease response to therapy.  Continue current therapy.    · 2/17/21 CEA 1.0  · 3/25/21 Ct Abdomen Pelvis W Iv Contrast  The stomach is distended, however no small bowel dilatation identified. Contrast identified within the left ileostomy bag. The distal stomach is under distended which may be secondary to peristalsis. Gastroparesis considered. Bilateral ureteral stents remain appropriate in position, however there is new moderate to severe right-sided hydronephrosis and mild left-sided hydronephrosis when compared to the 2/9/2021 exam. Mild increase considered within the partially calcified presacral pelvic mass. Stable partially calcified right pelvic soft tissue. Similar abnormal wall thickening of the bladder most notable superiorly.  Similar prominence of the intra and extra hepatic bile ducts down to the level of the ampulla. Findings may represent a reservoir effect, however correlation with liver function tests recommended. Therefore. Stable noncalcified left greater than right pulmonary nodules with asymmetrical interstitial changes of the right lung base concerning for pneumonitis. Osteopenia with postoperative changes of the lumbar spine. Chronic compression deformities of the thoracolumbar vertebra as described above. · 4/14/2021-I reviewed notes from urology and also CT abdomen/pelvis that showed mild interval increase in the size of the soft tissue nodule in the pelvis.  However, this is still very small and therefore will have a short follow-up CT scan and of May 2021.  I personally reviewed the CT scans.  Really hard to state that there is clear-cut disease progression.  Again, short follow-up recommended.  Continue current treatment. · 5/12/21 CEA 0.8  · 5/26/2001-CT chest abdomen pelvis showed Partially calcified presacral soft tissue mass appears unchanged. Previously measured soft tissue noncalcified component is unchanged measuring 2.2 x 3.2 cm on axial image 60. However, this is questionable.  CT chest showed a stable pulmonary nodules.  Subcentimeter nodules.  Largest 7.5 mm. · 6/1/21 CEA 0.9  · 06/01/23- reviewed scans. Stable disease.  Continue treatment every 3 weeks as per patient request. Continue infusional 5-FU, Panitumumab and leucovorin. No 5-FU bolus due to severe mucositis. · 8/11/2021 CEA . 9  · 9/03/2021 CT Chest w/Contrast Slight interval increase in the size of pulmonary nodules, the largest in the medial right lung base. Findings are concerning for metastatic disease. Atherosclerosis of the aorta and coronary arteries. Sclerotic rib lesions favored for osseous metastases. Old rib fractures also evident.   · 9/03/2021 CT Abd/Pelvis w/ IV Contrast (Oral) A persistent partially calcified mass in the presacral region with encasement of the distal ureters bilaterally and resultant bilateral hydronephrosis. Bilateral ureteral stents in place. There is increasing right-sided hydronephrosis since the previous study. Right renal atrophy similar to the previous study. Moderate asymmetrical thickening of the incompletely distended urinary bladder is similar to the previous study. This may partly be due to incomplete distention. An inflammatory process or a neoplastic process is not excluded. Moderate intrahepatic biliary dilatation is similar to the previous study and probably due to a prior cholecystectomy. Moderate dilatation of the contrast filled small bowel loops may represent an ileus or partial distal small bowel obstruction. There is left lower abdominal ileostomy which appears patent. The stable compression fractures of the lower thoracic and proximal lumbar vertebrae and hardware fusion similar to the previous study. · 9/22/21- Decrease Vectibix to every other treatment.  He is currently receiving chemotherapy every 3 weeks as per patient request  · The patient's continue treatment in October 2021 due to concurrent diagnosis of high-grade urothelial carcinoma of the ureter. He underwent surgery at [facility] in [place]. Therefore, treatment has been on hold since October 2021. · 4/10/2022-CT abdomen/pelvis without contrast showed evidence of metastatic disease to the lung bases.  Moderate size hiatal hernia with gastroesophageal reflux.  Status post partial colectomy.  Left lower quadrant ostomy is present.  No evidence obstruction.  Irregular soft tissue mass with some calcification the pelvis.  This demonstrated interval increase was potential involvement with the right posterior lateral urinary bladder wall.  The mass measures 8 x 2.9 cm.  Left-sided double J intraureteral stent is in place with stent well positioned.   · 4/10/2022-CT chest without contrast showed extensive metastatic disease is noted to the lungs showing progression from the previous examination with multiple new nodules present as well as interval increase in size of previously documented nodules. Reference nodule within the superior segment of the right lower lobe previously measured at 5 mm in size now measures 13 mm in size. A lesion within the medial right lower lobe now measuring 1.6 cm in long axis previously measured 1.1 cm. There is no evidence of acute consolidative pneumonia. Essentially, findings consistent with progressive metastatic disease to the lungs. There are too numerous to count pulmonary nodules the majority of which have increased in size or which are new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

## 2022-04-25 NOTE — PROGRESS NOTES
04/25/22 0900   General   Chart Reviewed Yes   Subjective   Subjective Patient in chair agrees to walk   Subjective   Subjective Patient in recliner  agrees to therapy   Pain yes   Transfers   Sit to Stand Contact guard assistance   Stand to sit Contact guard assistance   Ambulation   Surface level tile   Device Rolling Walker   Assistance Contact guard assistance   Gait Deviations Slow Farnaz   Distance 250'  + 100'   Comments Brief rest break with gait   Propulsion 1   Propulsion Manual   Level Level Tile   Method RUE;LUE   Level of Assistance Contact guard assistance   Description/ Details Veers left   Distance 200 with turns   PT Exercises   PROM Exercises 15 reps L LE x2 sets   Resistive Exercises L LE x 15 Reps x 2 sets   Activity Tolerance   Activity Tolerance Patient tolerated treatment well;Patient limited by fatigue   PT Plan of Care   Monday X   Safety Devices   Type of Devices Call light within reach; Left in chair   Recommendation   Requires PT Follow-Up Yes   Occupational Therapy    Electronically signed by Stacy Contreras PTA on 4/25/2022 at 10:15 AM

## 2022-04-25 NOTE — PROGRESS NOTES
Daily Progress Note    Date:2022  Patient: Ethan Bliss  : 1950  EY  CODE:Full Code No additional code details  Sigrid Ibarra MD    Admit Date: 2022  4:34 PM   LOS: 5 days       Subjective:   He feels well with no new complaints. Urinary incontinence however bladder spasms improved. No dysuria. No abdominal pain. No fevers or chills. Knee pain well controlled on current regimen.          Review of Systems  14 point review of systems completed and is negative except as otherwise noted      Objective:      Vital signs in last 24 hours:  Patient Vitals for the past 24 hrs:   BP Temp Temp src Pulse Resp SpO2   22 1254 -- -- -- -- 18 --   22 1946 -- -- -- 92 -- --   22 1751 123/86 97.2 °F (36.2 °C) Temporal 99 18 97 %       Physical exam    General: Resting in no acute distress  Psych: Calm and cooperative  Cardiovascular: Normal rate, regular rhythm, pulses 2+  Respiratory: Normal work of breathing, no wheezes or rales  Abdomen: Soft, nontender, bowel sounds present  Neurologic: Alert, follows commands, normal speech  Extremities: No clubbing or cyanosis, trace LE edema  Skin: Warm and dry      Lab Review   Recent Results (from the past 24 hour(s))   Basic Metabolic Panel w/ Reflex to MG    Collection Time: 22  4:14 AM   Result Value Ref Range    Sodium 131 (L) 136 - 145 mmol/L    Potassium reflex Magnesium 3.9 3.5 - 5.0 mmol/L    Chloride 91 (L) 98 - 111 mmol/L    CO2 26 22 - 29 mmol/L    Anion Gap 14 7 - 19 mmol/L    Glucose 126 (H) 74 - 109 mg/dL    BUN 16 8 - 23 mg/dL    CREATININE 1.2 0.5 - 1.2 mg/dL    GFR Non-African American 60 (A) >60    GFR African American >59 >59    Calcium 8.5 (L) 8.8 - 10.2 mg/dL   CBC auto differential Mo thur    Collection Time: 22  4:14 AM   Result Value Ref Range    WBC 11.5 (H) 4.8 - 10.8 K/uL    RBC 3.44 (L) 4.70 - 6.10 M/uL    Hemoglobin 10.1 (L) 14.0 - 18.0 g/dL    Hematocrit 31.3 (L) 42.0 - 52.0 %    MCV 91.0 80.0 - 94.0 fL    MCH 29.4 27.0 - 31.0 pg    MCHC 32.3 (L) 33.0 - 37.0 g/dL    RDW 14.4 11.5 - 14.5 %    Platelets 333 006 - 681 K/uL    MPV 10.3 9.4 - 12.4 fL    Neutrophils % 76.2 (H) 50.0 - 65.0 %    Lymphocytes % 11.9 (L) 20.0 - 40.0 %    Monocytes % 8.2 0.0 - 10.0 %    Eosinophils % 2.0 0.0 - 5.0 %    Basophils % 0.4 0.0 - 1.0 %    Neutrophils Absolute 8.7 (H) 1.5 - 7.5 K/uL    Immature Granulocytes # 0.2 K/uL    Lymphocytes Absolute 1.4 1.1 - 4.5 K/uL    Monocytes Absolute 0.90 0.00 - 0.90 K/uL    Eosinophils Absolute 0.20 0.00 - 0.60 K/uL    Basophils Absolute 0.10 0.00 - 0.20 K/uL       I/O last 3 completed shifts: In: 1900 [P.O.:1890;  I.V.:10]  Out: 2500 [Urine:675; Stool:1825]  I/O this shift:  In: 360 [P.O.:360]  Out: -       Current Facility-Administered Medications:     oxyCODONE (ROXICODONE) immediate release tablet 15 mg, 15 mg, Oral, Q4H PRN, Momo Galloway MD, 15 mg at 04/25/22 1224    acetaminophen (TYLENOL) tablet 650 mg, 650 mg, Oral, Q6H PRN **OR** acetaminophen (TYLENOL) suppository 650 mg, 650 mg, Rectal, Q6H PRN, Gabriel Ramachandran MD    calcium carbonate (TUMS) chewable tablet 500 mg, 500 mg, Oral, TID PRN, Gabriel Rmaachandran MD, 500 mg at 04/21/22 1239    cyclobenzaprine (FLEXERIL) tablet 5 mg, 5 mg, Oral, BID PRN, Gabriel Ramachandran MD, 5 mg at 04/25/22 0409    dextrose bolus (hypoglycemia) 10% 125 mL, 125 mL, IntraVENous, PRN **OR** dextrose bolus (hypoglycemia) 10% 250 mL, 250 mL, IntraVENous, PRN, Gabriel Ramachandran MD    DULoxetine (CYMBALTA) extended release capsule 30 mg, 30 mg, Oral, Daily, Gabriel Ramachandran MD, 30 mg at 04/25/22 2213    lactobacillus (CULTURELLE) capsule 1 capsule, 1 capsule, Oral, Daily, Gabriel Ramachandran MD, 1 capsule at 04/25/22 3063    metoprolol succinate (TOPROL XL) extended release tablet 25 mg, 25 mg, Oral, Daily, 25 mg at 04/25/22 0018 **AND** hydroCHLOROthiazide (HYDRODIURIL) tablet 6.25 mg, 6.25 mg, Oral, Daily, Gabriel Ramachandran MD, 6.25 mg at 04/25/22 3712   ondansetron (ZOFRAN) injection 4 mg, 4 mg, IntraVENous, Q6H PRN, Mara Montesinos MD, 4 mg at 04/25/22 0329    pantoprazole (PROTONIX) tablet 40 mg, 40 mg, Oral, QAM AC, Mara Montesinos MD, 40 mg at 04/25/22 5351    polyethylene glycol (GLYCOLAX) packet 17 g, 17 g, Oral, Daily PRN, Mara Montesinos MD    promethazine (PHENERGAN) injection 12.5 mg, 12.5 mg, IntraMUSCular, Q4H PRN, Mara Montesinos MD    sennosides-docusate sodium (SENOKOT-S) 8.6-50 MG tablet 2 tablet, 2 tablet, Oral, Daily PRN, Mara Montesinos MD    sodium bicarbonate tablet 650 mg, 650 mg, Oral, 4x Daily, Mara Montesinos MD, 650 mg at 04/25/22 1244    acetaminophen (TYLENOL) tablet 650 mg, 650 mg, Oral, Q4H PRN, Ankush Byrne MD, 650 mg at 04/25/22 0405    polyethylene glycol (GLYCOLAX) packet 17 g, 17 g, Oral, Daily, Ankush Byrne MD    bisacodyl (DULCOLAX) suppository 10 mg, 10 mg, Rectal, Daily PRN, Ankush Byrne MD    sennosides-docusate sodium (SENOKOT-S) 8.6-50 MG tablet 1 tablet, 1 tablet, Oral, BID, Ankush Byrne MD, 1 tablet at 04/23/22 2107    sodium chloride flush 0.9 % injection 5-40 mL, 5-40 mL, IntraVENous, PRN, Ankush Byrne MD, 10 mL at 04/20/22 2057    sodium chloride flush 0.9 % injection 5-40 mL, 5-40 mL, IntraVENous, BID, Ankush Byrne MD, 10 mL at 04/24/22 2312        Assessment/plan  Principal Problem:    Sepsis ([facility]  Active Problems:    Ureteral stricture, left    Lung nodules    Metastasis from colon cancer ([facility]    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 *    81 y. o. male with recent right nephrectomy, recent ureteral stents, metastatic urothelial carcinoma, history of colorectal cancer with ostomy in place, presented with fever and nausea decreased urine output.  On the admission denied problems with ostomy Narinder Andre was found to have UTI with culture speciated Candida glabrata and Achromobacter, ID consulted, treated with Eraxis and Levaquin.  CT Chest consistent with progressive metastatic disease to the lungs. CT abdomen irregular soft tissue mass with some calcification within the pelvis.  Followed by oncology and urology during hospital course. [place] was scheduled for pulmonary nodule biopsy by radiology but radiologist was concerned with small bowel distention per KUB, repeat CT abdomen showed ileus, pt did not have any ostomy output, he developed N/V/abd pain- NGT was placed.  Evaluated by general surgery. [place] subsequently resolved with removal of NG tube 4/16.  Removed [place] per urology. Had CT guided biopsy of pulmonary nodule 4/19 and ureteral stent change by Urology on 4/20. Discharged and readmitted to [facility] inpatient rehab.          Metastatic colorectal adenocarcinoma  Urothelial bladder cancer, s/p recent ureteral stents  -Left ureteral stricture with chronic indwelling stent s/p ureteral stent change by Urology 4/20  Pulmonary nodules  --- Pathology from biopsy:  pulmonary metastasis compatible with metastatic colonic adenocarcinoma  Outpatient follow up with Hem/Onc to start chemo regimen        UTI with Achromobacter and Candida glabrata from culture, immunocompromised status, previous ureteral stents  -ID following  -- Completed course of Levaquin and Eraxis  --Obtain repeat urinalysis     Bladder spasms, urinary incontinence, unable to take anticholinergics due to effect on his gut motility  -- Myrbetriq stopped per urology due to headaches and elevated blood pressure     Ileus-resolved  Constipation-bowel regimen     HTN  Monitor BP, continue metoprolol/HCTZ, further adjustment only if persistently uncontrolled     Depression  -Cymbalta     Knee pain  - continue current regimen, analgesics as needed       Continue rehab      Molly Degroot MD 4/25/2022 2:56 PM

## 2022-04-26 NOTE — PROGRESS NOTES
Occupational Therapy     04/26/22 0815   Pre Treatment Pain Screening   Pain at present 7   Scale Used Numeric Score   Intervention List Patient able to continue with treatment   General   Additional Pertinent Hx CKD, CAD, HTN, hyperlipidemia, metastatic colon cancer s/p colostomy placement , OA right knee, chronic back pain, pain pump by Dr. Shireen Madrid, GERD, mixed hyperlipidemia   Diagnosis Sepsis,recent right nephrectomy and ureter removal d/t right ureter tumor and placement of stents,4/20/22 for left ureteral stent change and cystoscopy bladder biopsy, recent lung biopsy,recent bladder biopsy   Pain Assessment   Pain Assessment 0-10   Pain Level 7   Pain Location Knee   Pain Orientation Right   Pain Descriptors Aching; Sore   Functional Pain Assessment Activities are not prevented   Pain Type Chronic pain   Pain Frequency Continuous   Pain Onset On-going   Non-Pharmaceutical Pain Intervention(s) Repositioned;Distraction   Response to Pain Intervention Patient satisfied   Balance   Sitting Balance Independent   Standing Balance Supervision   Functional Mobility   Functional - Mobility Device Rolling Walker   Activity Retrieve items;Transport items; Other   Assist Level Stand by assistance   Functional Mobility Comments Light homemaking task in OT kitchen. Transfers   Sit to stand Supervision   Stand to sit Supervision   Assessment   Performance deficits / Impairments Decreased functional mobility ; Decreased ADL status; Decreased strength;Decreased endurance;Decreased balance;Decreased high-level IADLs   Treatment Diagnosis Sepsis,recent right nephrectomy and ureter removal d/t right ureter tumor and placement of stents,4/20/22 for left ureteral stent change and cystoscopy bladder biopsy, recent lung biopsy   Prognosis Good   History colon CA with mets including ureter and lungs, arthrocentesis 4/12 for right knee pain, CKD, back surgery,CKD, CAD, HTN, hyperlipidemia, metastatic colon cancer s/p colostomy placement , OA right knee, chronic back pain, pain pump by Dr. Carmen Olmedo, GERD, mixed hyperlipidemia   Timed Code Treatment Minutes 45 Minutes   Activity Tolerance   Activity Tolerance Patient Tolerated treatment well   Plan   Times per Week 3-5   Current Treatment Recommendations Strengthening;Balance training;Self-Care / ADL; Home management training; Safety education & training;Functional mobility training; Endurance training;Patient/Caregiver education & training;Equipment evaluation, education, & procurement      04/26/22 0815   Oral Hygiene   Assistance Needed Independent   CARE Score 6

## 2022-04-26 NOTE — PROGRESS NOTES
Urology Progress Note      SUBJECTIVE: He voices no specific  complaints. He has had headaches since the Myrbetriq was stopped. He does not feel like he can tell any difference from when he was on or off the medication. He does report maybe the urgency is better    OBJECTIVE:      REVIEW OF SYSTEMS:   Review of Systems      Physical  VITALS:  BP (!) 144/84   Pulse 102   Temp 96.6 °F (35.9 °C) (Temporal)   Resp 16   Ht 5' 5\" (1.651 m)   Wt 160 lb 9 oz (72.8 kg)   SpO2 98%   BMI 26.72 kg/m²   TEMPERATURE:  Current - Temp: 96.6 °F (35.9 °C); Max - Temp  Av.6 °F (35.9 °C)  Min: 96.6 °F (35.9 °C)  Max: 96.6 °F (35.9 °C)   24 HR I&O   Intake/Output Summary (Last 24 hours) at 2022 1704  Last data filed at 2022 1409  Gross per 24 hour   Intake 480 ml   Output 1700 ml   Net -1220 ml     BACK: no tenderness in spine or flanks  ABDOMEN:  scars noted , soft, non-distended and ileostomy  HEART:  normal rate and regular rhythm  CHEST: Normal Rester effort  GENITAL/URINARY: Normal external genitalia    Data       CBC:   Recent Labs     22  0414   WBC 11.5*   HGB 10.1*   HCT 31.3*        BMP:    Recent Labs     22  0414   *   K 3.9   CL 91*   CO2 26   BUN 16   CREATININE 1.2   GLUCOSE 126*       No results for input(s): LABURIN in the last 72 hours. No results for input(s): BC in the last 72 hours. No results for input(s): [place] in the last 72 hours.         ASSESSMENT AND PLAN    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    Colorectal cancer ([address]    Metastasis from colon cancer (HCC)    Proteinuria    Chemotherapy management, encounter for    Anemia associated with chemotherapy    Other fatigue    Thrush    Dehydration    Chemotherapy-induced peripheral neuropathy (HCC)    Chemotherapy-induced nausea    SBO (small bowel obstruction) (HCC)    Burning with urination    History of small bowel obstruction    Encounter for central line care    Iron deficiency    History of bladder cancer    Hydronephrosis of left kidney    Hydronephrosis of right kidney    Low back pain    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 Catheter acquired UTI present on admission he is complete his therapy per infectious disease no further  recommendations. 2.  Lower urinary tract symptoms. No real difference on or off Myrbetriq. He still having occasional headache. Still has some diastolic blood pressure up in the 90s that blood pressure may be a little better. Since he has not had any real significant benefit agree with discontinuing this at the present time monitoring symptoms consider reinnervation this later on. Can follow-up as an outpatient    3. Left ureteral stricture with chronic indwelling left ureteral stent solitary left kidney. Stent was changed on 4/20/2022. We will have him follow-up to see us in 3 months for his next stent change. 4.  History of bladder cancer and upper tract urothelial carcinoma.   Surveillance cystoscopy on 4/20/2022 no recurrent tumor seen biopsy of bladder lesion was negative for malignancy. We will arrange for his next surveillance cystoscopy in approximate 3 months at the time of his stent changes. No further  recommendations at this time. We will sign off reconsult if needed.       Kerri Trevino MD

## 2022-04-26 NOTE — PROGRESS NOTES
Patient:   Teddy De La Cruz  MR#:    [id number]   Room:    0827/827-01   YOB: 1950  Date of Progress Note: 4/26/2022  Time of Note                           8:09 AM  Consulting Physician:   Haim Dean M.D. Attending Physician:  Haim Dean MD     CHIEF COMPLAINT: Generalized weakness and deconditioning        Subjective  This 81 y.o. male  with history of CKD, CAD, HTN, hyperlipidemia, metastatic colon cancer s/p colostomy placement , OA right knee, chronic back pain, pain pump by Dr. Urbano Nava, GERD, mixed hyperlipidemia, recent right nephrectomy and ureter removal d/t right ureter tumor and placement of stents, and edgar catheter in place since 1/21/22 for urinary retention. He presented to [facility] ER on 4/10/22 with c/o fever, general malaise and nausea for the past few days. He reported recently having blood in his urine and was placed on Bactrim for presumed recurrent UTI. He also reported decreased urine output and decreased appetite for the past few days. Work up in [place] [address] revealed Na 127, CO2 14, BUN 44, Cr 2.4, GFR 27, Lactic 2.0, WBC 16.8, Hgb 13.7, Large Leukocytes, positive nitrites, and Large blood noted on urinalysis, Abnormal CXR. CT Chest consistent with progressive metastatic disease to the lungs. CT abdomen showed moderate size hiatal hernia with gastroesophageal reflux, irregular soft tissue mass with some calcification within the pelvis, extends to and is inseparable from the right posterior lateral urinary bladder wall with potential secondary involvement, increased in size from previous study, status post right nephrectomy. He was admitted to the Hospitalist service with  sepsis with secondary to urinary tract infection. Consults made for oncology, nephrology and infectious disease. He was seen by ID Dr. Jeimy Arzate, who recommended continuing ceftriaxone and fluconazole until urine cultures come back.  Urology was consulted to follow d/t patient coming from home with edgar catheter in place for retention and leaking. He was seen by Dr. Shirlyn Burkitt. He was seen by his oncologist Dr. Roman Wyatt who reviewed his imaging results and felt his lung nodules were mostly metastatic disease. During his stay he continued to have c/o of right knee pain. Orthopedics was consulted. He was by SHELBY Ely and on 4/12/22 had a arthrocentesis  with injection of Betamethasone. Patient reports improvement in pain, now rating at 2/10. His renal function has improved, creatinine currently 1.1. Urine cultures came back positive for  Candida glabrata and Achromobacter species, antibiotics changed to Levaquin and anidulafungin. On 4/13/22 patient had c/o of nausea, emesis and decreased output from his colostomy. CT that showed distention with ileus vs obstruction. General surgery was consulted. He was seen by Dr. Maximo Reynolds, who didn't feel any immediate surgical intervention was needed. NG tube was placed on 4/15/22. Patient was able to have NG removed and is now having good output from his ostomy. Mcgregor catheter was removed on 4/18/22 and he was started on myrbetrq for bladder spasms. He is having some incontinence and urgency to void but is having periods of dryness in between. CT-guided lung biopsy was done on 4/19/22 to confirm diagnosis, results pending. Patient went to OR on 4/20/22 for left ureteral stent change and cystoscopy bladder biopsy by Dr. Shirlyn Burkitt. He tolerated the procedure well. Patient wife unexpectedly passed away on 4/13/22. Patient in a depressed mood, he is on cymbalta. He is participating in both PT/OT.    Pain medication not quite strong enough  REVIEW OF SYSTEMS:  Constitutional: No fevers No chills  Neck:No stiffness  Respiratory: No shortness of breath  Cardiovascular: No chest pain No palpitations  Gastrointestinal: No abdominal pain    Genitourinary: No Dysuria  Neurological: No headache, no confusion      PHYSICAL EXAM:  BP (!) 144/84   Pulse 102   Temp 96.6 °F (35.9 °C) (Temporal)   Resp 16   Ht 5' 5\" (1.651 m)   Wt 160 lb 9 oz (72.8 kg)   SpO2 98%   BMI 26.72 kg/m²     Constitutional: he appears well-developed and well-nourished. Eyes - conjunctiva normal.  Pupils react to light  Ear, nose, throat -hearing intact to voice. No scars, masses, or lesions over external nose or ears, no atrophy of tongue  Neck-symmetric, no masses noted, no jugular vein distension  Respiration- chest wall appears symmetric, good expansion,   normal effort without use of accessory muscles  Cardiovascular- RRR  Musculoskeletal - no significant wasting of muscles noted, no bony deformities, gait no gross ataxia  Extremities-no clubbing, cyanosis or edema  Skin - warm, dry, and intact. No rash, erythema, or pallor. Psychiatric - mood, affect, and behavior appear normal.      Neurology  NEUROLOGICAL EXAM:      Mental status   Awake, alert, fluent oriented x 3 appropriate affect  Attention and concentration appear appropriate  Recent and remote memory appears unremarkable  Speech normal without dysarthria  No clear issues with language       Cranial Nerves   CN II- Visual fields grossly unremarkable  CN III, IV,VI-EOMI, No nystagmus, conjugate eye movements, no ptosis  CN VII-no facial asymmetry  CN VIII-Hearing intact      Motor function  Antigravity x 4.  3+/5 weakness right hip             Tremor None present     Gait                  Not tested           Nursing/pcp notes, imaging,labs and vitals reviewed.      PT,OT and/or speech notes reviewed    Lab Results   Component Value Date    WBC 11.5 (H) 04/25/2022    HGB 10.1 (L) 04/25/2022    HCT 31.3 (L) 04/25/2022    MCV 91.0 04/25/2022     04/25/2022     Lab Results   Component Value Date     (L) 04/25/2022    K 3.9 04/25/2022    CL 91 (L) 04/25/2022    CO2 26 04/25/2022    BUN 16 04/25/2022    CREATININE 1.2 04/25/2022    GLUCOSE 126 (H) 04/25/2022    CALCIUM 8.5 (L) 04/25/2022    PROT 6.9 04/12/2022    LABALBU 3.1 (L) 04/12/2022    BILITOT <0.2 04/12/2022 ALKPHOS 123 04/12/2022    AST 14 04/12/2022    ALT 10 04/12/2022    LABGLOM 60 (A) 04/25/2022    GFRAA >59 04/25/2022    AGRATIO 1.9 11/24/2021    GLOB 2.2 11/24/2021     Lab Results   Component Value Date    INR 1.02 04/18/2022    INR 1.06 04/13/2022    INR 1.04 11/09/2021     Lab Results   Component Value Date    CRP 3.28 (H) 04/12/2022 04/25/22 1500   Position Activity Restriction   Other position/activity restrictions Colostomy, pain pump   General   Chart Reviewed Yes   General Comment   Comments Patient sat IND x 15 minutes EOB changing colostomy bag with NSG assist.   Subjective   Subjective Patient sitting EOB agrees to therapy   Subjective   Pain yes  (6/10 R knee)   Transfers   Sit to Stand Stand by assistance   Stand to sit Stand by assistance   Ambulation   Surface level tile   Device Rolling Walker   Assistance Contact guard assistance   Quality of Gait Steady no LOB   Gait Deviations Slow Farnaz   Distance 225'   PT Exercises   PROM Exercises 2 sets x 10 Reps  RR LE   LAQ, Hip Flex   Resistive Exercises L LE x 2 sets 10 reps  LAQ, Hip Flex. Activity Tolerance   Activity Tolerance Patient tolerated treatment well   Safety Devices   Type of Devices Left in bed;Call light within reach; Bed alarm in place      Electronically signed by Frank Bergeron PTA on 4/25/2022 at 4:05 PM                Occupational Therapy       04/25/22 1300   Pre Treatment Pain Screening   Pain at present 5   Scale Used Numeric Score   Intervention List Patient able to continue with treatment   General   Additional Pertinent Hx CKD, CAD, HTN, hyperlipidemia, metastatic colon cancer s/p colostomy placement , OA right knee, chronic back pain, pain pump by Dr. Des Ibarra, GERD, mixed hyperlipidemia   Diagnosis Sepsis,recent right nephrectomy and ureter removal d/t right ureter tumor and placement of stents,4/20/22 for left ureteral stent change and cystoscopy bladder biopsy, recent lung biopsy,recent bladder biopsy   Subjective Subjective Pt. states that he would like to d/c home Friday, if possible. States his sister, brother, and daughter will take turns staying with him to assist him as needed. Pain Assessment   Pain Location Knee   Pain Orientation Right   Pain Descriptors Aching;Discomfort   Functional Pain Assessment Activities are not prevented   Pain Type Chronic pain   Pain Frequency Continuous   Pain Onset On-going   Non-Pharmaceutical Pain Intervention(s) Rest;Repositioned   Response to Pain Intervention None (pain unchanged or no improvement)   Balance   Sitting Balance Independent   Standing Balance Stand by assistance   Functional Mobility   Functional - Mobility Device Rolling Walker   Activity Other   Assist Level Stand by assistance   Transfers   Sit to stand Stand by assistance   Stand to sit Stand by assistance   Type of ROM/Therapeutic Exercise   Type of ROM/Therapeutic Exercise Sangeeta Sahrp BUE 12.5# 4 sets x 15 reps. Assessment   Performance deficits / Impairments Decreased functional mobility ; Decreased ADL status; Decreased strength;Decreased endurance;Decreased balance;Decreased high-level IADLs   Treatment Diagnosis Sepsis,recent right nephrectomy and ureter removal d/t right ureter tumor and placement of stents,4/20/22 for left ureteral stent change and cystoscopy bladder biopsy, recent lung biopsy   Prognosis Good   History colon CA with mets including ureter and lungs, arthrocentesis 4/12 for right knee pain, CKD, back surgery,CKD, CAD, HTN, hyperlipidemia, metastatic colon cancer s/p colostomy placement , OA right knee, chronic back pain, pain pump by Dr. Shoshana Flanagan, GERD, mixed hyperlipidemia   Timed Code Treatment Minutes 60 Minutes   Activity Tolerance   Activity Tolerance Patient Tolerated treatment well   Plan   Times per Week 3-5   Current Treatment Recommendations Strengthening;Balance training;Self-Care / ADL; Home management training; Safety education & training;Functional mobility training; Endurance training;Patient/Caregiver education & training;Equipment evaluation, education, & procurement        04/25/22 1300   Lower Body Dressing   Assistance Needed Supervision or touching assistance   Comment SBA for standing aspect, utilized AE to don/doff over R foot. CARE Score 4   Putting On/Taking Off Footwear   Assistance Needed Partial/moderate assistance   Comment Socks only d/t edema, pt. able to don/doff using AE PRN. CARE Score 3                              RECORD REVIEW: Previous medical records, medications were reviewed at today's visit    IMPRESSION:     1. Sepsis from UTI. Antifungal and Levaquin have been completed per infectious disease recommendations. For generalized weakness and deconditioning-PT/OT  2. Urinary retention with recent Mcgregor removal.  Now voiding on own. 3.  History of urothelial cancer and now metastatic colon cancer status post colostomy and nephrectomy with lung mets  For. Acute on chronic CKD-continue to monitor. Stable  5. Recent ileus. Resolved  6. DVT prophylaxis-SCDs  7. Essential hypertension- stable  8. Chronic right lower extremity weakness     Appreciate infectious disease, hospitalist, urology and oncology consults. their notes over the weekend are reviewed today. Pain medications increased today  ELOS: Staff in a.m.

## 2022-04-26 NOTE — PROGRESS NOTES
04/26/22 1345   Bed mobility   Rolling to Left Modified independent   Rolling to Right Modified independent   Supine to Sit Modified independent   Sit to Supine Modified independent   Transfers   Sit to Stand Modified independent   Stand to sit Modified independent   Bed to Chair Modified independent   Ambulation   Device Rolling Walker   Assistance Stand by assistance;Modified Independent   Quality of Gait FLEXED FORWARD POSTURE WITH RIGHT KNEE FLEXION THROUGHOUT GAIT. GRADUALLY INC   Distance 250 FT   Propulsion 1   Level Level Tile   Method RUE;LUE   Level of Assistance Modified independent   Distance 250 FT   Assessment   Assessment PATIENT OK FOR UP AD BRIGHT IN ROOM [facility].

## 2022-04-26 NOTE — PROGRESS NOTES
Physical Therapy  Name: Manisha Haddad  MRN:  [id number]  Date of service:  4/26/2022 04/26/22 1000   Restrictions/Precautions   Restrictions/Precautions Contact Precautions; Other (comment); Fall Risk   Position Activity Restriction   Other position/activity restrictions Colostomy, pain pump   Subjective   Subjective Stomach upset (suspected from breakfast); Pt pleased that he got good rest last night; woke up this morning realizing he slept through night w/o pain meds. Back hurting some today   Subjective   Pain pain in back and R knee 5/10   Bed mobility   Supine to Sit Modified independent   Sit to Supine Modified independent  (uses leg  to assist R LE)   Comment in/out of L side of bed without rails; bed flat   Transfers   Sit to Stand Stand by assistance   Stand to sit Stand by assistance   Bed to Chair Stand by assistance  (with RW)   Ambulation   Surface level tile   Device Rolling Walker   Assistance Stand by assistance   Quality of Gait fwd flexed posture due to chronic back pain; steady, no lob   Distance 250', 175'   Comments no WC follow; no rest break   Stairs/Curb   Stairs?  Yes   Stairs   # Steps  3   Stairs Height 6\"   Rails Bilateral   Device No Device   Other Apparatus   (R knee support)   Assistance Contact guard assistance   Comment greater difficulty with descending stairs but good technique and seems confident; R knee chronic issues are limiting factor   PT Exercises   Exercise Treatment ex peformed supine and sitting x 5-20 reps  (greater need for assist on R LE)   A/AROM Exercises R LE x 15  (AA SAQ, HS curl from SAQ with manual resist, hip flex AA)   Resistive Exercises L LE x 20  (HS curl from SAQ manual resist, saq/hip flex 1 1/2#)   Assessment   Assessment pt demonstrated stairs with good technique, though R knee does impair (he indicates no different that prior to admission); pt improving with quality/decrease need for assist with overall mobility   Safety Devices   Type of Devices Call light within reach; Left in bed       Electronically signed by Zia Banerjee PTA on 4/26/2022 at 12:19 PM

## 2022-04-26 NOTE — PROGRESS NOTES
Occupational Therapy     04/26/22 1300   Pre Treatment Pain Screening   Scale Used Numeric Score   Intervention List Patient able to continue with treatment   General   Additional Pertinent Hx CKD, CAD, HTN, hyperlipidemia, metastatic colon cancer s/p colostomy placement , OA right knee, chronic back pain, pain pump by Dr. Sheridan Fu, GERD, mixed hyperlipidemia   Diagnosis Sepsis,recent right nephrectomy and ureter removal d/t right ureter tumor and placement of stents,4/20/22 for left ureteral stent change and cystoscopy bladder biopsy, recent lung biopsy,recent bladder biopsy   Pain Assessment   Pain Assessment 0-10   Pain Location Knee   Pain Orientation Right   Pain Descriptors Aching; Sore   Functional Pain Assessment Activities are not prevented   Pain Type Chronic pain   Pain Frequency Continuous   Pain Onset On-going   Non-Pharmaceutical Pain Intervention(s) Rest;Repositioned   Response to Pain Intervention Patient satisfied   Balance   Sitting Balance Independent   Standing Balance Supervision   Functional Mobility   Functional - Mobility Device Rolling Walker   Activity Other; To/from bathroom   Assist Level Supervision   Transfers   Sit to stand Supervision   Stand to sit Supervision   Toilet Transfers   Toilet - Technique Ambulating   Equipment Used Standard bedside commode   Toilet Transfer Supervision   Tub Transfers   Tub - Transfer From Rolling walker   Tub - Transfer Type To and From   Tub - Transfer To Shower seat with back   Tub - Technique Ambulating   Tub Transfers Supervision   Assessment   Performance deficits / Impairments Decreased functional mobility ; Decreased ADL status; Decreased strength;Decreased endurance;Decreased balance;Decreased high-level IADLs   Treatment Diagnosis Sepsis,recent right nephrectomy and ureter removal d/t right ureter tumor and placement of stents,4/20/22 for left ureteral stent change and cystoscopy bladder biopsy, recent lung biopsy   Prognosis Good   History colon CA with mets including ureter and lungs, arthrocentesis 4/12 for right knee pain, CKD, back surgery,CKD, CAD, HTN, hyperlipidemia, metastatic colon cancer s/p colostomy placement , OA right knee, chronic back pain, pain pump by Dr. Livier Delgado, GERD, mixed hyperlipidemia   Timed Code Treatment Minutes 45 Minutes   Activity Tolerance   Activity Tolerance Patient Tolerated treatment well   Plan   Times per Week 3-5   Times per Day Twice a day   Current Treatment Recommendations Strengthening;Balance training;Self-Care / ADL; Home management training; Safety education & training;Functional mobility training; Endurance training;Patient/Caregiver education & training;Equipment evaluation, education, & procurement

## 2022-04-26 NOTE — PROGRESS NOTES
Nutrition Assessment     Type and Reason for Visit: Reassess    Nutrition Recommendations/Plan:   1. Modify ONS from BID to daily. Malnutrition Assessment:  Malnutrition Status: At risk for malnutrition (Comment)    Nutrition Assessment:  Pt continues with adequate po intake of meals, >75% most meals. Wt stable X 1 week. Pt requesting decreased in ONS frequency. Modify ONS from BID to daily. Nutrition Related Findings:   ileostomy, non-pitting BLE edema Wound Type: Surgical Incision    Current Nutrition Therapies:    ADULT DIET;  Regular  ADULT ORAL NUTRITION SUPPLEMENT; Lunch, Dinner; Standard High Calorie/High Protein Oral Supplement    Anthropometric Measures:  · Height: 5' 5\" (165.1 cm)  · Current Body Wt: 160 lb (72.6 kg)   · BMI: 26.6    Nutrition Diagnosis:   · Increased nutrient needs related to catabolic illness as evidenced by weight loss greater than or equal to 10% in 6 months    Nutrition Interventions:   Food and/or Nutrient Delivery: Continue Current Diet,Modify Oral Nutrition Supplement  Nutrition Education/Counseling: Education not indicated  Coordination of Nutrition Care: Continue to monitor while inpatient  Plan of Care discussed with: Patient    Goals:  Previous Goal Met: Progressing toward Goal(s)  Goals: PO intake 75% or greater,by next RD assessment    Nutrition Monitoring and Evaluation:   Food/Nutrient Intake Outcomes: Food and Nutrient Intake,Supplement Intake  Physical Signs/Symptoms Outcomes: Biochemical Data,Nutrition Focused Physical Findings,Weight,Skin,GI Status,Fluid Status or Edema    Discharge Planning:    Continue Oral Nutrition Supplement     Gayla Zavala MS, RD, LD  Contact: [phone]

## 2022-04-26 NOTE — PROGRESS NOTES
Daily Progress Note    Date:2022  Patient: Reilly Briceno  : 1950  MUZ:[id number]  CODE:Full Code No additional code details  Erika Guerra MD    Admit Date: 2022  4:34 PM   LOS: 6 days       Subjective: He is resting comfortably, feels well. Voiding without any significant issues, no recurrent urinary retention or spasms at present. No fevers or chills. Pain controlled.         Review of Systems  14 point review of systems completed and is negative except as otherwise noted      Objective:      Vital signs in last 24 hours:  Patient Vitals for the past 24 hrs:   BP Temp Temp src Pulse Resp SpO2   22 1243 -- -- -- -- 18 --   22 1051 -- -- -- -- 18 --   22 0825 -- -- -- -- 16 --   22 0821 -- -- -- -- 16 --   22 0553 (!) 144/84 96.6 °F (35.9 °C) Temporal 102 16 98 %   22 -- -- -- 104 -- --   22 1658 (!) 133/93 98.1 °F (36.7 °C) Temporal 108 18 96 %   22 1654 -- -- -- -- 16 --       Physical exam    General: Resting in no acute distress  Psych: Calm and cooperative  Cardiovascular: Normal rate, regular rhythm, pulses 2+  Respiratory: Normal work of breathing, no wheezes or rales  Abdomen: Soft, nontender, bowel sounds present  Neurologic: Alert, follows commands, normal speech  Extremities: No clubbing or cyanosis, trace LE edema  Skin: Warm and dry      Lab Review   Recent Results (from the past 24 hour(s))   Urinalysis with Reflex to Culture    Collection Time: 22  8:30 AM    Specimen: Urine   Result Value Ref Range    Color, UA YELLOW Straw/Yellow    Clarity, UA Clear Clear    Glucose, Ur Negative Negative mg/dL    Bilirubin Urine Negative Negative    Ketones, Urine Negative Negative mg/dL    Specific Gravity, UA 1.017 1.005 - 1.030    Blood, Urine Negative Negative    pH, UA 8.0 5.0 - 8.0    Protein, UA 30 (A) Negative mg/dL    Urobilinogen, Urine 0.2 <2.0 E.U./dL    Nitrite, Urine Negative Negative    Leukocyte Esterase, Urine SMALL (A) Negative   Microscopic Urinalysis    Collection Time: 22  8:30 AM   Result Value Ref Range    Bacteria, UA NEGATIVE (A) None Seen /HPF    Crystals, UA NEG (A) None Seen /HPF    Hyaline Casts, UA 1 0 - 8 /HPF    WBC, UA 20 (H) 0 - 5 /HPF    RBC, UA 4 0 - 4 /HPF    Epithelial Cells, UA 3 0 - 5 /HPF       I/O last 3 completed shifts: In: 1210 [P.O.:1200;  I.V.:10]  Out: 2400 [Urine:1050; KEHR]  I/O this shift:  In: 360 [P.O.:360]  Out: 450 [Stool:450]      Current Facility-Administered Medications:     oxyCODONE (OXYCONTIN) extended release tablet 10 mg, 10 mg, Oral, 2 times per day, Art Duffy MD, 10 mg at 22 1021    oxyCODONE (ROXICODONE) immediate release tablet 15 mg, 15 mg, Oral, Q4H PRN, Silvia Guerra MD, 15 mg at 22 1213    acetaminophen (TYLENOL) tablet 650 mg, 650 mg, Oral, Q6H PRN **OR** acetaminophen (TYLENOL) suppository 650 mg, 650 mg, Rectal, Q6H PRN, Darlin Ly MD    calcium carbonate (TUMS) chewable tablet 500 mg, 500 mg, Oral, TID PRN, Darlin Ly MD, 500 mg at 22 1239    cyclobenzaprine (FLEXERIL) tablet 5 mg, 5 mg, Oral, BID PRN, Darlin Ly MD, 5 mg at 22 0032    dextrose bolus (hypoglycemia) 10% 125 mL, 125 mL, IntraVENous, PRN **OR** dextrose bolus (hypoglycemia) 10% 250 mL, 250 mL, IntraVENous, PRN, Darlin Ly MD    DULoxetine (CYMBALTA) extended release capsule 30 mg, 30 mg, Oral, Daily, Darlin Ly MD, 30 mg at 22 0754    lactobacillus (CULTURELLE) capsule 1 capsule, 1 capsule, Oral, Daily, Darlin Ly MD, 1 capsule at 22 0754    metoprolol succinate (TOPROL XL) extended release tablet 25 mg, 25 mg, Oral, Daily, 25 mg at 22 0755 **AND** hydroCHLOROthiazide (HYDRODIURIL) tablet 6.25 mg, 6.25 mg, Oral, Daily, Darlin Ly MD, 6.25 mg at 22 0755    ondansetron (ZOFRAN) injection 4 mg, 4 mg, IntraVENous, Q6H PRN, Darlin Ly MD, 4 mg at 22 0924    pantoprazole (PROTONIX) tablet 40 mg, 40 mg, Oral, QAM AC, Felicita Cruz MD, 40 mg at 04/26/22 0525    polyethylene glycol (GLYCOLAX) packet 17 g, 17 g, Oral, Daily PRN, Felicita Cruz MD    promethazine (PHENERGAN) injection 12.5 mg, 12.5 mg, IntraMUSCular, Q4H PRN, Felicita Cruz MD    sennosides-docusate sodium (SENOKOT-S) 8.6-50 MG tablet 2 tablet, 2 tablet, Oral, Daily PRN, Felicita Cruz MD    sodium bicarbonate tablet 650 mg, 650 mg, Oral, 4x Daily, Felicita Cruz MD, 650 mg at 04/26/22 0755    acetaminophen (TYLENOL) tablet 650 mg, 650 mg, Oral, Q4H PRN, Angelica Hernandez MD, 650 mg at 04/26/22 0032    bisacodyl (DULCOLAX) suppository 10 mg, 10 mg, Rectal, Daily PRN, Angelica Hernandez MD    sodium chloride flush 0.9 % injection 5-40 mL, 5-40 mL, IntraVENous, PRN, Angelica Hernandez MD, 10 mL at 04/20/22 2357    sodium chloride flush 0.9 % injection 5-40 mL, 5-40 mL, IntraVENous, BID, Angelica Hernandez MD, 10 mL at 04/26/22 0930        Assessment/plan  Principal Problem:    Sepsis ([facility] [address]  Active Problems:    Ureteral stricture, left    Lung nodules    Metastasis from colon cancer (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 [address]    Retained ureteral stent    Lower urinary tract symptoms  Resolved Problems:    * No resolved hospital problems. *    81 y. o. male with recent right nephrectomy, recent ureteral stents, metastatic urothelial carcinoma, history of colorectal cancer with ostomy in place, presented with fever and nausea decreased urine output. On the admission denied problems with ostomy output.  He was found to have UTI with culture speciated Candida glabrata and Achromobacter, ID consulted, treated with Eraxis and Levaquin.  CT Chest consistent with progressive metastatic disease to the lungs.  CT abdomen irregular soft tissue mass with some calcification within the pelvis.  Followed by oncology and urology during hospital course. Ratna Cuello was scheduled for pulmonary nodule biopsy by radiology but radiologist was concerned with small bowel distention per KUB, repeat CT abdomen showed ileus, pt did not have any ostomy output, he developed N/V/abd pain- NGT was placed.  Evaluated by general surgery. Ardeen Crick subsequently resolved with removal of NG tube 4/16.  Removed Cmgregor per urology. Had CT guided biopsy of pulmonary nodule 4/19 and ureteral stent change by Urology on 4/20. Discharged and readmitted to [facility] inpatient rehab.          Metastatic colorectal adenocarcinoma  Urothelial bladder cancer, s/p recent ureteral stents  -Left ureteral stricture with chronic indwelling stent s/p ureteral stent change by Urology 4/20  Pulmonary nodules  --- Pathology from biopsy:  pulmonary metastasis compatible with metastatic colonic adenocarcinoma  Outpatient follow up with Hem/Onc to start chemo regimen        UTI with Achromobacter and Candida glabrata from culture, immunocompromised status, previous ureteral stents  -ID following  -- Completed course of Levaquin and Eraxis  -- Repeat urinalysis with pyuria, no bacteriuria     Bladder spasms, urinary incontinence, unable to take anticholinergics due to effect on his gut motility  ---Improved  -- Myrbetriq stopped per urology due to headaches and elevated blood pressure     Ileus-resolved  Constipation-bowel regimen     HTN  Monitor BP, continue metoprolol/HCTZ, can further adjust regimen if persistently uncontrolled     Depression  -Cymbalta     Knee pain  - continue current regimen, analgesics as needed       Continue rehab      Amrik Garcia MD 4/26/2022 3:24 PM

## 2022-04-27 NOTE — PROGRESS NOTES
[address]  Test for Patient Butler in the Areas of Transfers/Ambulation    Ambulation/Transfers   · Independent ambulation in room with assistive device:  Yes     -Device Type:  RW  · Independent transfers from wheelchair to surface in room:  Yes     Bathroom Transfers  · Independent transfers in patient bathroom:  Yes         Inpatient Rehabilitation Nursing and Therapies feel as though the patient qualifies for an acute rehabilitation test for independence in the areas of transfers and ambulation prior to discharging from Inpatient Rehabilitation Unit at [facility].  This test for independence in the areas of transfers and ambulation must be agreed upon by the patient's physician, nurse, and therapists.         Nurse Approval:  ***    Physical Therapist Approval:  Electronically signed by Lisset Russo PT on 4/27/2022 at 8:51 AM      Occupational Therapist Approval: ***

## 2022-04-27 NOTE — PROGRESS NOTES
Occupational Therapy     04/27/22 1100   Pre Treatment Pain Screening   Pain at present 4   Scale Used Numeric Score   Intervention List Patient able to continue with treatment   Restrictions/Precautions   Restrictions/Precautions Up Ad Naomy; Contact Precautions   Position Activity Restriction   Other position/activity restrictions Colostomy, pain pump   General   Additional Pertinent Hx CKD, CAD, HTN, hyperlipidemia, metastatic colon cancer s/p colostomy placement , OA right knee, chronic back pain, pain pump by Dr. Shoshana Flanagan, GERD, mixed hyperlipidemia   Diagnosis Sepsis,recent right nephrectomy and ureter removal d/t right ureter tumor and placement of stents,4/20/22 for left ureteral stent change and cystoscopy bladder biopsy, recent lung biopsy,recent bladder biopsy   Pain Assessment   Pain Assessment 0-10   Pain Level 4   Pain Location Knee   Pain Orientation Right   Pain Descriptors Aching; Sore   Functional Pain Assessment Activities are not prevented   Pain Type Chronic pain   Pain Frequency Continuous   Pain Onset On-going   Non-Pharmaceutical Pain Intervention(s) Repositioned; Rest   Response to Pain Intervention Patient satisfied   ADL   Grooming Independent   Balance   Sitting Balance Independent   Standing Balance Independent   Functional Mobility   Functional - Mobility Device 4-Wheeled Walker   Activity Retrieve items;Transport items; To/from bathroom   Assist Level Modified independent    Functional Mobility Comments Light homemaking task in ADL apartment. Transfers   Sit to stand Modified independent   Stand to sit Modified independent   Short Term Goals   Short Term Goal 1 MET   Short Term Goal 2 MET   Short Term Goal 3 MET   Short Term Goal 5 MET   Long Term Goals   Long Term Goal 1 MET   Long Term Goal 2 MET   Long Term Goal 3 MET   Long Term Goal 5 MET   Long Term Goal 6 MET   Assessment   Performance deficits / Impairments Decreased functional mobility ; Decreased strength;Decreased endurance;Decreased high-level IADLs   Treatment Diagnosis Sepsis,recent right nephrectomy and ureter removal d/t right ureter tumor and placement of stents,4/20/22 for left ureteral stent change and cystoscopy bladder biopsy, recent lung biopsy   Prognosis Good   History colon CA with mets including ureter and lungs, arthrocentesis 4/12 for right knee pain, CKD, back surgery,CKD, CAD, HTN, hyperlipidemia, metastatic colon cancer s/p colostomy placement , OA right knee, chronic back pain, pain pump by Dr. Yolie Story, GERD, mixed hyperlipidemia   Timed Code Treatment Minutes 60 Minutes   Activity Tolerance   Activity Tolerance Patient Tolerated treatment well   Plan   Times per Week 3-5   Current Treatment Recommendations Strengthening;Home management training;Functional mobility training; Endurance training      04/27/22 1100   Shower/Bathe Self   Assistance Needed Independent   CARE Score 6   Upper Body Dressing   Assistance Needed Independent   CARE Score 6   Lower Body Dressing   Assistance Needed Independent   CARE Score 6   Putting On/Taking Off Footwear   Assistance Needed Independent   CARE Score 6   [address] needed Independent   CARE Score 6   Toilet Transfer   Assistance needed Independent   CARE Score 6

## 2022-04-27 NOTE — PROGRESS NOTES
Physical Therapy  Name: Pravin Millan  MRN:  [id number]  Date of service:  4/27/2022 04/27/22 0900   Restrictions/Precautions   Restrictions/Precautions Contact Precautions; Other (comment); Fall Risk   Position Activity Restriction   Other position/activity restrictions Colostomy, pain pump   Subjective   Subjective States R knee painful; hip was sore but feeling better now.      Subjective   Pain pain in R knee 5-6/10    Bed mobility   Rolling to Left Modified independent   Rolling to Right Modified independent   Comment in/out L side of bed with leg lifterr   Transfers   Sit to Stand Modified independent   Stand to sit Modified independent   Bed to Chair Modified independent   Car Transfer Stand by assistance   Comment ckd for up ad lety in room with rollator and cleared from PT standpoint   Ambulation   WB Status wbat   Ambulation   Surface level tile   Device Rollator  (forearm style)   Assistance Stand by assistance   Gait Deviations Decreased step length;Decreased step height   Distance 200' x 2   PT Exercises   Exercise Treatment seated ex (HS curls, laq, hip flex, hip ext, AP, hip ab/add) 15-20 reps for all with RROM manually on L and AA on R except resisted AB/AD and HS curls   Assessment   Assessment pt showed good use of new rollator; pt cleared for up ad lety from PT standpoint   PT Individual Minutes   Time In 0905   Time Out 1003   Minutes 58       Electronically signed by Loyda Anders PTA on 4/27/2022 at 10:04 AM

## 2022-04-27 NOTE — PROGRESS NOTES
Physical Therapy  Name: Ethan Bliss  MRN:  [id number]  Date of service:  4/27/2022 04/27/22 1345   Restrictions/Precautions   Restrictions/Precautions Up Ad Naomy; Contact Precautions   Subjective   Subjective Pt wanting pn meds soon; would like to do therapy in room   Subjective   Pain 7-8/10 R knee   Transfers   Sit to Stand Modified independent   Stand to sit Modified independent   Bed to Chair Modified independent   Comment uses rollator for mobility   Ambulation   WB Status wbat   Ambulation   Surface level tile   Device Rollator  (forearm style)   Assistance Stand by assistance   Quality of Gait fwd flexed posture, downward gaze   Gait Deviations Decreased step length;Decreased step height   Distance 300'   Comments 180* turn at 1/2 way point; cues to correct posture, though bends at waist due to back issues   PT Exercises   Exercise Treatment seated P-AA LAQ, RROM L LAQ and HS curls; reclined QS, HS, SAQ, hip abd/add (manual resist B leg press, L saq, L HS)   Assessment   Assessment pt inc distance for gt; good effort with all despite knee pain   Recommendation   Requires PT Follow-Up No       Electronically signed by Celestine Villa PTA on 4/27/2022 at 2:29 PM

## 2022-04-27 NOTE — TELEPHONE ENCOUNTER
Awilda from the [facility] called requesting appointment for cystoscopy, she states patient will not be released until April 29th so if you could please call [phone].     Thank you,

## 2022-04-27 NOTE — PROGRESS NOTES
Daily Progress Note    Date:2022  Patient: Brigid Piedra  : 1950  GRS:[id number]  CODE:Full Code No additional code details  Sherwin Kerns MD    Admit Date: 2022  4:34 PM   LOS: 7 days       Subjective:    No acute events overnight. He is participating well with rehab and feels he is improved. Pain improved. No new issues with voiding. No fevers or chills. Review of Systems  14 point review of systems completed and is negative except as otherwise noted      Objective:      Vital signs in last 24 hours:  Patient Vitals for the past 24 hrs:   BP Temp Temp src Pulse Resp SpO2   22 1453 -- -- -- -- 18 --   22 1037 -- -- -- -- 18 --   22 0844 -- -- -- -- 16 --   22 0559 134/86 96.8 °F (36 °C) Temporal 115 16 96 %   22 1935 -- -- -- 124 -- --   22 1752 129/85 96.8 °F (36 °C) Temporal 95 17 98 %       Physical exam    General: Resting in no acute distress  Psych: Calm and cooperative  Cardiovascular: Normal rate, regular rhythm, pulses 2+  Respiratory: Normal work of breathing, no wheezes or rales  Abdomen: Soft, nontender, bowel sounds present  Neurologic: Alert, follows commands, normal speech  Extremities: No clubbing or cyanosis, trace LE edema  Skin: Warm and dry      Lab Review   No results found for this or any previous visit (from the past 24 hour(s)). I/O last 3 completed shifts:   In: 720 [P.O.:720]  Out: 2800 [Urine:850; Stool:1950]  I/O this shift:  In: 600 [P.O.:600]  Out: -       Current Facility-Administered Medications:     ondansetron (ZOFRAN) tablet 4 mg, 4 mg, Oral, Q8H PRN, Nita Holden MD    oxyCODONE (OXYCONTIN) extended release tablet 20 mg, 20 mg, Oral, 2 times per day, Nita Holden MD    oxyCODONE (ROXICODONE) immediate release tablet 15 mg, 15 mg, Oral, Q4H PRN, Domo Daily MD, 15 mg at 22 1423    calcium carbonate (TUMS) chewable tablet 500 mg, 500 mg, Oral, TID PRN, Malena Mendez MD, 500 mg at 22 1239   cyclobenzaprine (FLEXERIL) tablet 5 mg, 5 mg, Oral, BID PRN, Torey Castro MD, 5 mg at 04/27/22 0020    dextrose bolus (hypoglycemia) 10% 125 mL, 125 mL, IntraVENous, PRN **OR** dextrose bolus (hypoglycemia) 10% 250 mL, 250 mL, IntraVENous, PRN, Torey Castro MD    DULoxetine (CYMBALTA) extended release capsule 30 mg, 30 mg, Oral, Daily, Torey Castro MD, 30 mg at 04/27/22 0813    lactobacillus (CULTURELLE) capsule 1 capsule, 1 capsule, Oral, Daily, Torey Castro MD, 1 capsule at 04/27/22 1498    metoprolol succinate (TOPROL XL) extended release tablet 25 mg, 25 mg, Oral, Daily, 25 mg at 04/27/22 0814 **AND** hydroCHLOROthiazide (HYDRODIURIL) tablet 6.25 mg, 6.25 mg, Oral, Daily, Torey Castro MD, 6.25 mg at 04/27/22 9530    pantoprazole (PROTONIX) tablet 40 mg, 40 mg, Oral, QAM AC, Torey Castro MD, 40 mg at 04/27/22 0535    polyethylene glycol (GLYCOLAX) packet 17 g, 17 g, Oral, Daily PRN, Torey Castro MD    promethazine (PHENERGAN) injection 12.5 mg, 12.5 mg, IntraMUSCular, Q4H PRN, Torey Castro MD    sennosides-docusate sodium (SENOKOT-S) 8.6-50 MG tablet 2 tablet, 2 tablet, Oral, Daily PRN, Torey Castro MD    sodium bicarbonate tablet 650 mg, 650 mg, Oral, 4x Daily, Torey Castro MD, 650 mg at 04/27/22 1347    acetaminophen (TYLENOL) tablet 650 mg, 650 mg, Oral, Q4H PRN, Jade Mariscal MD, 650 mg at 04/27/22 0022    bisacodyl (DULCOLAX) suppository 10 mg, 10 mg, Rectal, Daily PRN, Jade Mariscal MD    sodium chloride flush 0.9 % injection 5-40 mL, 5-40 mL, IntraVENous, PRN, Jade Mariscal MD, 10 mL at 04/27/22 3106    sodium chloride flush 0.9 % injection 5-40 mL, 5-40 mL, IntraVENous, BID, Jade Mariscal MD, 10 mL at 04/26/22 2016        Assessment/plan  Principal Problem:    Sepsis [facility])  Active Problems:    Ureteral stricture, left    Lung nodules    Metastasis from colon cancer [facility])    Urothelial carcinoma of right distal ureter ([facility] [address]    Urinary tract infection associated with indwelling urethral catheter ([facility] [address]    Retained ureteral stent    Lower urinary tract symptoms  Resolved Problems:    * No resolved hospital problems. *    81 y. o. male with recent right nephrectomy, recent ureteral stents, metastatic urothelial carcinoma, history of colorectal cancer with ostomy in place, presented with fever and nausea decreased urine output. On the admission denied problems with ostomy output.  He was found to have UTI with culture speciated Candida glabrata and Achromobacter, ID consulted, treated with Eraxis and Levaquin.  CT Chest consistent with progressive metastatic disease to the lungs. CT abdomen irregular soft tissue mass with some calcification within the pelvis.  Followed by oncology and urology during hospital course. Misty Louise was scheduled for pulmonary nodule biopsy by radiology but radiologist was concerned with small bowel distention per KUB, repeat CT abdomen showed ileus, pt did not have any ostomy output, he developed N/V/abd pain- NGT was placed.  Evaluated by general surgery. Ardeen Crick subsequently resolved with removal of NG tube 4/16.  Removed Mcgregor per urology. Had CT guided biopsy of pulmonary nodule 4/19 and ureteral stent change by Urology on 4/20. Discharged and readmitted to [facility] inpatient rehab.          Metastatic colorectal adenocarcinoma  Urothelial bladder cancer, s/p recent ureteral stents  -Left ureteral stricture with chronic indwelling stent s/p ureteral stent change by Urology 4/20  Pulmonary nodules  --- Pathology from biopsy:  pulmonary metastasis compatible with metastatic colonic adenocarcinoma  Outpatient follow up with Hem/Onc to start chemo regimen  Outpatient follow-up with urology in 3 months        UTI with Achromobacter and Candida glabrata from culture, immunocompromised status, previous ureteral stents  -ID following  -- Completed course of Levaquin and Eraxis  -- Repeat urinalysis with pyuria, no bacteriuria, no growth from culture     Bladder spasms, urinary incontinence, unable to take anticholinergics due to effect on his gut motility  ---Improved     Ileus-resolved  Constipation-bowel regimen     HTN  Monitor BP, continue metoprolol/HCTZ, fairly well controlled without need for adjustment     Depression  -Cymbalta     Knee pain  - continue current regimen, analgesics as needed       Continue rehab      Caitlin Chase MD 4/27/2022 4:47 PM

## 2022-04-27 NOTE — CARE COORDINATION
[address]  Test for Patient Kern in the Areas of Transfers/Ambulation    Ambulation/Transfers   · Independent ambulation in room with assistive device:  Yes     -Device Type:  rollator  · Independent transfers from wheelchair to surface in room:  Yes     Bathroom Transfers  · Independent transfers in patient bathroom:  Yes         Inpatient Rehabilitation Nursing and Therapies feel as though the patient qualifies for an acute rehabilitation test for independence in the areas of transfers and ambulation prior to discharging from Inpatient Rehabilitation Unit at [facility].  This test for independence in the areas of transfers and ambulation must be agreed upon by the patient's physician, nurse, and therapists.         Nurse Approval: Electronically signed by Brittney Lee RN on 4/27/22 at 11:24 AM CDT       Physical Therapist Approval:  Electronically signed by Rigo Rios PTA on 4/27/2022 at 10:05 AM      Occupational Therapist Approval: Electronically signed by CAT Chance on 4/27/2022 at 11:17 AM

## 2022-04-27 NOTE — PROGRESS NOTES
Occupational Therapy     04/27/22 1430   Pre Treatment Pain Screening   Pain at present 4   Scale Used Numeric Score   Intervention List Patient able to continue with treatment   General   Additional Pertinent Hx CKD, CAD, HTN, hyperlipidemia, metastatic colon cancer s/p colostomy placement , OA right knee, chronic back pain, pain pump by Dr. Sunita Booth, GERD, mixed hyperlipidemia   Diagnosis Sepsis,recent right nephrectomy and ureter removal d/t right ureter tumor and placement of stents,4/20/22 for left ureteral stent change and cystoscopy bladder biopsy, recent lung biopsy,recent bladder biopsy   Pain Assessment   Pain Assessment 0-10   Pain Level 4   Pain Location Knee   Pain Descriptors Aching;Discomfort   Functional Pain Assessment Activities are not prevented   Pain Type Chronic pain   Pain Frequency Continuous   Pain Onset On-going   Non-Pharmaceutical Pain Intervention(s) Rest   Response to Pain Intervention Patient satisfied   Balance   Sitting Balance Independent   Standing Balance Independent   Functional Mobility   Functional - Mobility Device 4-Wheeled Walker   Activity To/from bathroom; Other   Assist Level Modified independent    Transfers   Sit to stand Modified independent   Stand to sit Modified independent   Type of ROM/Therapeutic Exercise   Comment Cane/wand: BUE 2# 2 sets x 10 reps. Assessment   Performance deficits / Impairments Decreased functional mobility ; Decreased strength;Decreased endurance;Decreased high-level IADLs   Treatment Diagnosis Sepsis,recent right nephrectomy and ureter removal d/t right ureter tumor and placement of stents,4/20/22 for left ureteral stent change and cystoscopy bladder biopsy, recent lung biopsy   Prognosis Good   History colon CA with mets including ureter and lungs, arthrocentesis 4/12 for right knee pain, CKD, back surgery,CKD, CAD, HTN, hyperlipidemia, metastatic colon cancer s/p colostomy placement , OA right knee, chronic back pain, pain pump by Dr. Sunita Booth, GERD, mixed hyperlipidemia   Timed Code Treatment Minutes 45 Minutes   Activity Tolerance   Activity Tolerance Patient Tolerated treatment well   Plan   Times per Week 3-5   Current Treatment Recommendations Strengthening;Home management training;Functional mobility training; Endurance training      04/27/22 1430   [address] needed Independent   CARE Score 6   Toilet Transfer   Assistance needed Independent   CARE Score 6

## 2022-04-27 NOTE — PROGRESS NOTES
Occupational Therapy     04/27/22 1430   Pre Treatment Pain Screening   Pain at present 4   Scale Used Numeric Score   Intervention List Patient able to continue with treatment   General   Additional Pertinent Hx CKD, CAD, HTN, hyperlipidemia, metastatic colon cancer s/p colostomy placement , OA right knee, chronic back pain, pain pump by Dr. Carmen Olmedo, GERD, mixed hyperlipidemia   Diagnosis Sepsis,recent right nephrectomy and ureter removal d/t right ureter tumor and placement of stents,4/20/22 for left ureteral stent change and cystoscopy bladder biopsy, recent lung biopsy,recent bladder biopsy   Pain Assessment   Pain Assessment 0-10   Pain Level 4   Pain Location Knee   Pain Descriptors Aching;Discomfort   Functional Pain Assessment Activities are not prevented   Pain Type Chronic pain   Pain Frequency Continuous   Pain Onset On-going   Non-Pharmaceutical Pain Intervention(s) Rest   Response to Pain Intervention Patient satisfied   Balance   Sitting Balance Independent   Standing Balance Independent   Functional Mobility   Functional - Mobility Device 4-Wheeled Walker   Activity To/from bathroom; Other   Assist Level Modified independent    Transfers   Sit to stand Modified independent   Stand to sit Modified independent   Type of ROM/Therapeutic Exercise   Type of ROM/Therapeutic Exercise Pulley   Comment 2 sets x 2 minutes. Assessment   Performance deficits / Impairments Decreased functional mobility ; Decreased strength;Decreased endurance;Decreased high-level IADLs   Treatment Diagnosis Sepsis,recent right nephrectomy and ureter removal d/t right ureter tumor and placement of stents,4/20/22 for left ureteral stent change and cystoscopy bladder biopsy, recent lung biopsy   Prognosis Good   History colon CA with mets including ureter and lungs, arthrocentesis 4/12 for right knee pain, CKD, back surgery,CKD, CAD, HTN, hyperlipidemia, metastatic colon cancer s/p colostomy placement , OA right knee, chronic back pain, pain pump by Dr. Shankar Caldera, GERD, mixed hyperlipidemia   Timed Code Treatment Minutes 45 Minutes   Activity Tolerance   Activity Tolerance Patient Tolerated treatment well   Plan   Times per Week 3-5   Current Treatment Recommendations Strengthening;Home management training;Functional mobility training; Endurance training

## 2022-04-27 NOTE — PROGRESS NOTES
Patient:   Madhav Mendoza  MR#:    [id number]   Room:    0827/827-01   YOB: 1950  Date of Progress Note: 4/27/2022  Time of Note                           8:22 AM  Consulting Physician:   Ettie Simmonds, M.D. Attending Physician:  Ettie Simmonds, MD     CHIEF COMPLAINT: Generalized weakness and deconditioning        Subjective  This 81 y.o. male  with history of CKD, CAD, HTN, hyperlipidemia, metastatic colon cancer s/p colostomy placement , OA right knee, chronic back pain, pain pump by Dr. Sunita Booth, GERD, mixed hyperlipidemia, recent right nephrectomy and ureter removal d/t right ureter tumor and placement of stents, and edgar catheter in place since 1/21/22 for urinary retention. He presented to [facility] ER on 4/10/22 with c/o fever, general malaise and nausea for the past few days. He reported recently having blood in his urine and was placed on Bactrim for presumed recurrent UTI. He also reported decreased urine output and decreased appetite for the past few days. Work up in [place] [address] revealed Na 127, CO2 14, BUN 44, Cr 2.4, GFR 27, Lactic 2.0, WBC 16.8, Hgb 13.7, Large Leukocytes, positive nitrites, and Large blood noted on urinalysis, Abnormal CXR. CT Chest consistent with progressive metastatic disease to the lungs. CT abdomen showed moderate size hiatal hernia with gastroesophageal reflux, irregular soft tissue mass with some calcification within the pelvis, extends to and is inseparable from the right posterior lateral urinary bladder wall with potential secondary involvement, increased in size from previous study, status post right nephrectomy. He was admitted to the Hospitalist service with  sepsis with secondary to urinary tract infection. Consults made for oncology, nephrology and infectious disease. He was seen by ARGELIA Wellington, who recommended continuing ceftriaxone and fluconazole until urine cultures come back.  Urology was consulted to follow d/t patient coming from home with edgar catheter in place for retention and leaking. He was seen by Dr. Penny Mercedes. He was seen by his oncologist Dr. Jonathon Fletcher who reviewed his imaging results and felt his lung nodules were mostly metastatic disease. During his stay he continued to have c/o of right knee pain. Orthopedics was consulted. He was by SHELBY Suggs and on 4/12/22 had a arthrocentesis  with injection of Betamethasone. Patient reports improvement in pain, now rating at 2/10. His renal function has improved, creatinine currently 1.1. Urine cultures came back positive for  Candida glabrata and Achromobacter species, antibiotics changed to Levaquin and anidulafungin. On 4/13/22 patient had c/o of nausea, emesis and decreased output from his colostomy. CT that showed distention with ileus vs obstruction. General surgery was consulted. He was seen by Dr. Isabel Brice, who didn't feel any immediate surgical intervention was needed. NG tube was placed on 4/15/22. Patient was able to have NG removed and is now having good output from his ostomy. Mcgregor catheter was removed on 4/18/22 and he was started on myrbetrq for bladder spasms. He is having some incontinence and urgency to void but is having periods of dryness in between. CT-guided lung biopsy was done on 4/19/22 to confirm diagnosis, results pending. Patient went to OR on 4/20/22 for left ureteral stent change and cystoscopy bladder biopsy by Dr. Penny Mercedes. He tolerated the procedure well. Patient wife unexpectedly passed away on 4/13/22. Patient in a depressed mood, he is on cymbalta. He is participating in both PT/OT.    Still c/o Pain medication not quite strong enough  REVIEW OF SYSTEMS:  Constitutional: No fevers No chills  Neck:No stiffness  Respiratory: No shortness of breath  Cardiovascular: No chest pain No palpitations  Gastrointestinal: No abdominal pain    Genitourinary: No Dysuria  Neurological: No headache, no confusion      PHYSICAL EXAM:  /86   Pulse 115   Temp 96.8 °F (36 °C) (Temporal)   Resp 16 Ht 5' 5\" (1.651 m)   Wt 160 lb 9 oz (72.8 kg)   SpO2 96%   BMI 26.72 kg/m²     Constitutional: he appears well-developed and well-nourished. Eyes - conjunctiva normal.  Pupils react to light  Ear, nose, throat -hearing intact to voice. No scars, masses, or lesions over external nose or ears, no atrophy of tongue  Neck-symmetric, no masses noted, no jugular vein distension  Respiration- chest wall appears symmetric, good expansion,   normal effort without use of accessory muscles  Cardiovascular- RRR  Musculoskeletal - no significant wasting of muscles noted, no bony deformities, gait no gross ataxia  Extremities-no clubbing, cyanosis or edema  Skin - warm, dry, and intact. No rash, erythema, or pallor. Psychiatric - mood, affect, and behavior appear normal.      Neurology  NEUROLOGICAL EXAM:      Mental status   Awake, alert, fluent oriented x 3 appropriate affect  Attention and concentration appear appropriate  Recent and remote memory appears unremarkable  Speech normal without dysarthria  No clear issues with language       Cranial Nerves   CN II- Visual fields grossly unremarkable  CN III, IV,VI-EOMI, No nystagmus, conjugate eye movements, no ptosis  CN VII-no facial asymmetry  CN VIII-Hearing intact      Motor function  Antigravity x 4.  3+/5 weakness right hip             Tremor None present     Gait                  Not tested           Nursing/pcp notes, imaging,labs and vitals reviewed.      PT,OT and/or speech notes reviewed    Lab Results   Component Value Date    WBC 11.5 (H) 04/25/2022    HGB 10.1 (L) 04/25/2022    HCT 31.3 (L) 04/25/2022    MCV 91.0 04/25/2022     04/25/2022     Lab Results   Component Value Date     (L) 04/25/2022    K 3.9 04/25/2022    CL 91 (L) 04/25/2022    CO2 26 04/25/2022    BUN 16 04/25/2022    CREATININE 1.2 04/25/2022    GLUCOSE 126 (H) 04/25/2022    CALCIUM 8.5 (L) 04/25/2022    PROT 6.9 04/12/2022    LABALBU 3.1 (L) 04/12/2022    BILITOT <0.2 04/12/2022 ALKPHOS 123 04/12/2022    AST 14 04/12/2022    ALT 10 04/12/2022    LABGLOM 60 (A) 04/25/2022    GFRAA >59 04/25/2022    AGRATIO 1.9 11/24/2021    GLOB 2.2 11/24/2021     Lab Results   Component Value Date    INR 1.02 04/18/2022    INR 1.06 04/13/2022    INR 1.04 11/09/2021     Lab Results   Component Value Date    CRP 3.28 (H) 04/12/2022       PHYSICAL THERAPY  GROSS ASSESSMENT       BED MOBILITY  Bed Mobility  Rolling: Stand by assistance  Supine to Sit: Stand by assistance (towards pts L using bed rail)  Sit to Supine: Contact guard assistance (uses hands to lift RLE into bed)  Scooting: Stand by assistance  Comment: Cues for safety and technique  TRANSFERS  Transfers  Sit to Stand: Stand by assistance  Stand to sit: Stand by assistance  Bed to Chair: Stand by assistance (with RW)  Stand Pivot Transfers: Contact guard assistance (towards pts L, w/c to bed)  Car Transfer: Unable to assess  Comment: Pt able to complete with slight increase in R knee pain, pain increases when seated in recliner with knee bent  WHEELCHAIR PROPULSION  Propulsion 1  Propulsion: Manual  Level: Level Tile  Method: HUDSON MOLINA  Level of Assistance: Contact guard assistance  Description/ Details: Veers left  Distance: 200 with turns  AMBULATION  Ambulation  Surface: level tile  Device: Rolling Walker  Assistance: Stand by assistance  Quality of Gait: fwd flexed posture due to chronic back pain; steady, no lob  Gait Deviations: Slow Farnaz  Distance: 250', 175'  Comments: no WC follow; no rest break  STAIRS  Stairs  # Steps : 3  Stairs Height: 6\"  Rails: Bilateral  Device: No Device  Other Apparatus:  (R knee support)  Assistance: Contact guard assistance  Comment: greater difficulty with descending stairs but good technique and seems confident; R knee chronic issues are limiting factor  GOALS:  Short Term Goals  Time Frame for Short term goals: 1 week  Short term goal 1: Supervision with bed mobility  Short term goal 2: Supervision with transfers  Short term goal 3: SBA with ambulating 100' using RW  Short term goal 4: CGA completing 2 stairs using hand rail  Short term goal 5: Supervision with w/c proplusion 100'     Long Term Goals  Time Frame for Long term goals : 2 weeks  Long term goal 1: Independent with bed mobility  Long term goal 2: Independent with transfers  Long term goal 3: Modified Independent with ambulating 150'  Long term goal 4: Independent with w/c proplusion 150'  Long term goal 5: Modified Independent with competing 4 stairs using handrail     ASSESSMENT:  Assessment: pt demonstrated stairs with good technique, though R knee does impair (he indicates no different that prior to admission); pt improving with quality/decrease need for assist with overall mobility     SPEECH THERAPY        OCCUPATIONAL THERAPY     CURRENT IRF-JAMAL SCORES  Eating: CARE Score: 6     Oral Hygiene: CARE Score: 6         Toileting: CARE Score: 3  Comment: Min A starting undergarment over RLE, changed colostomy bag with setup assistance, incontinence of bladder x2 occasions. Shower/Bathe: CARE Score: 3  Comment: Shower, assistance with RLE.         Upper Body Dressing: CARE Score: 5          Lower Body Dressing: CARE Score: 4  Comment: SBA for standing aspect, utilized AE to don/doff over R foot.        Footwear: CARE Score: 3  Comment: Socks only d/t edema, pt. able to don/doff using AE PRN.        Toilet Transfers: CARE Score: 4  Comment: SBA.        Picking Up Object:  CARE Score: 88           UE Functioning:  WFLs     Pain Assessment:  Pain Level: 7  Pain Location: Knee     STGs:  Short Term Goals  Time Frame for Short term goals: 1 week  Short Term Goal 1: complete LB dressing with AE prn with supervision  Short Term Goal 2: complete overall toileting with supervision  Short Term Goal 3: complete simple ambulatory home making task with supervision  Short Term Goal 4: complete 1-2 handed standing level task for 3 mins with supervision  Short Term Goal 5: complete overall bathing with supervision     LTGs:  Long Term Goals  Time Frame for Long term goals : 2 weeks  Long Term Goal 1: complete overall dressing with modified independence  Long Term Goal 2: complete overall bathing with modified independence  Long Term Goal 3: complete overall toileting with modified independence  Long Term Goal 4: complete HEP with independence  Long Term Goal 5: complete simple ambulatory home making task with modified independence  Long Term Goal 6: pt/family verbalize DME for home     Assessment:  Performance deficits / Impairments: Decreased functional mobility ,Decreased ADL status,Decreased strength,Decreased endurance,Decreased balance,Decreased high-level IADLs                        NUTRITION  Current Wt: Weight: 160 lb 9 oz (72.8 kg) / Body mass index is 26.72 kg/m². Admission Wt: Admission Body Weight: 163 lb (73.9 kg) (Bed scale)  Oral Diet Orders: Regular  Oral Nutrition Supplement (ONS) Orders: Ensure Enlive daily     Pt continues with adequate po intake of meals, >75% most meals. Wt stable X 1 week. Pt requesting decreased in ONS frequency. Modify ONS from BID to daily. Please see nutrition note for details.                RECORD REVIEW: Previous medical records, medications were reviewed at today's visit    IMPRESSION:     1. Sepsis from UTI. Antifungal and Levaquin have been completed per infectious disease recommendations. For generalized weakness and deconditioning-PT/OT  2. Urinary retention with recent Mcgregor removal.  Now voiding on own. 3.  History of urothelial cancer and now metastatic colon cancer status post colostomy and nephrectomy with lung mets  For. Acute on chronic CKD-continue to monitor. Stable  5. Recent ileus. Resolved  6. DVT prophylaxis-SCDs  7. Essential hypertension- stable  8. Chronic right lower extremity weakness     Appreciate infectious disease, hospitalist, urology and oncology consults.     Pain medications increased again today    Staffing this date , mutlidisciplinary with entire team with complex decision making and planning for discharge. More than 35 minutes spent today in total on this patient, with greater than 50% of the time on counseling and coordination of care  ELOS:4/29, Friday expected.

## 2022-04-28 NOTE — PROGRESS NOTES
MEDICAL ONCOLOGY PROGRESS NOTE     Pt Name: Ally Harvey  MRN: [id number]  YOB: 1950  Date of evaluation: 4/28/2022  ROOM: 827    Subjective: Seems to be slowly improving. HISTORY OF PRESENT ILLNESS:  The patient is well-known to our practice. He was being followed on the floors at [facility].  Further details of his medical history as below. We are consulted at the rehab for continued of care. Essentially, history of colonic adenocarcinoma and more recently high-grade urothelial carcinoma of the bladder/.  There PT 2 N0. Patient has developed lung nodules bilaterally. He underwent a CT-guided biopsy this week. Pathology consistent with metastatic adenocarcinoma consistent with gastrointestinal primary. Prior medical history   Nehemias Astudillo is a very pleasant 81years old male who has a diagnosis of stage IV colonic adenocarcinoma.  He was receiving palliative chemotherapy after September 2021. Jovani Rainey was found to have high-grade urothelial carcinoma involving the ureter.  He underwent surgery, nephroureterectomy at [facility] had a complicated postoperative course. Jovani Rainey eventually recovered and his current receiving home care needs at home. Jovani Rainey has also several other comorbidities include CAD, hypertension, hyperlipidemia and CKD stage III. The patient presented ER department with complaints of generalized malaise for the last several days, low-grade fever, nausea.  Decreased urine output.  Patient has a ostomy in place.  Work-up in the emergent showed moderate hyponatremia sodium 127, mild elevated lactic acid, elevated creatinine 2.4 (baseline's 1.5-1.7).   He also had neutrophil leukocytosis and positive nitrates and large blood on the urine analysis.  Chest x-ray was abnormal and therefore CT chest was performed that showed evidence of metastatic disease to the lungs.  Patient received IV fluid resuscitation the emergency. Jovani Rainey was started on broad-spectrum antibiotics. Christiano Garcia was admitted with consultation from me and also ID.  4/10/2022-CT abdomen/pelvis without contrast showed evidence of metastatic disease to the lung bases.  Moderate size hiatal hernia with gastroesophageal reflux.  Status post partial colectomy.  Left lower quadrant ostomy is present.  No evidence obstruction.  Irregular soft tissue mass with some calcification the pelvis.  This demonstrated interval increase was potential involvement with the right posterior lateral urinary bladder wall.  The mass measures 8 x 2.9 cm.  Left-sided double J intraureteral stent is in place with stent well positioned. 4/10/2022-CT chest without contrast showed extensive metastatic disease is noted to the lungs showing progression from the previous examination with multiple new nodules present as well as interval increase in size of previously documented nodules. Reference nodule within the superior segment of the right lower lobe previously measured at 5 mm in size now measures 13 mm in size. A lesion within the medial right lower lobe now measuring 1.6 cm in long axis previously measured 1.1 cm. There is no evidence of acute consolidative pneumonia. Essentially, findings consistent with progressive metastatic disease to the lungs. There are too numerous to count pulmonary nodules the majority of which have increased in size or which are new from the previous study.  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      Prior oncological history  ONCOLOGIC HISTORY:   Diagnosis:  · Moderately differentiated rectal carcinoma, T3N0Mx, diagnosed in 3/9/2009  · Noninvasive high-grade papillary urethral carcinoma.  Negative for evidence of detrusor muscle invasion, pTa, pNx on 8/18/2019.    · Metastatic colorectal carcinoma, 9/3/2019  · MSI stable and mutations for BRAF, NRAS, KRAS were not detected.    · Recurrent bladder cancer- superficial   · Urothelial carcinoma of the ureter  · Progressive lung metastasis     TREATMENT SUMMARIES:  · 4/9/2009-5/27/2009-received neoadjuvant chemotherapy with 5-FU CIV along with radiation therapy for a total of 5400 cG  · 7/15/2009-rectum resection revealed no residual malignancy, complete pathological response. · 8/18/2019- transurethral resection of bladder tumor (TURBT)   · 9/18/2019-12/26/2019 palliative chemotherapy with modified FOLFOX 7  (Oxaliplatin 85 mg/m² IV day 1, leucovorin 400 mg/m² IV day 1 and 5-FU 2400 mg/m² IV continuous infusion over 46 to 48 hours for a total of 7 cycles. · 1/28/2020 -palliative maintenance therapy with leucovorin 400 mg/m² IV over 2 hours on day 1, followed by 5-FU bolus 400 mg/m² and then 1200 mg/m²/day x2 days (total 2400 mg meter squared over 46 to 48 hours) continuous infusion.  Repeat every 2 weeks.     ONCOLOGIC HISTORY # NMIBC_Bladder cancer. Loyda Louis was diagnosed with noninvasive urethral carcinoma, pTa, pNx on 8/18/2019.  Ta tumors are papillary lesions that tend to recur but are relatively benign and generally do not invade the bladder.  Adjuvant treatment is not warranted at this time and will be monitored closely.   Biopsy and transurethral resection of bladder tumor (TURBT) on 8/18/2019 by Dr. Ene Crespo with pathology revealing noninvasive high-grade papillary urethral carcinoma.  Negative for evidence of detrusor muscle invasion, pTa, pNx.   · 12/3/2019- cystoscopy with removal and replacement of double-J urethral stent.  Pathology from dome of the bladder/tumor revealed high-grade papillary urethral carcinoma, noninvasive.  No muscularis propria present.    · 2/26/2020 - cystoscopy and bilateral ureteral stent removal and replacement.  The operative note by Dr. Clary Campbell documented bilateral hydronephrosis and obtained biopsy of the bladder in the mid dome and left anterior lateral wall x2.  Pathology documented high-grade papillary urethral carcinoma, noninvasive, stage pTaNx.  · 5/28/2020-the patient underwent a cystoscopy and resection of bladder tumor on 05/28/2020 with findings consistent with noninvasive, high-grade papillary urothelial carcinoma.  Muscularis propria was not identified.  The patient will receive intravesical BCG therapy. · 7/6/2020-CT Abdomen/ Pelvis-Moderate severe right and mild left hydronephrosis with bilateral ureteral stents, which have an adequate radiographic position. Right kidney with cortical thickening and somewhat asymmetrically decreased enhancement which can be seen with obstructive uropathy. Postoperative changes of colectomy. Left lower quadrant ostomy. Slightly decreased size of presacral low density compared to4/15/2020. Similar intrahepatic and extrahepatic bile duct dilation compared to 4/15/2020. Correlate with clinical symptoms and laboratory studies if clinically indicated. Similar chronic bony findings. · 3/25/2021-CT abdomen showed severe hydronephrosis.  Cystoscopy was performed by urology. · 4/1/21 Bladder neck tumor, biopsies: High-grade papillary urothelial carcinoma, noninvasive. Muscularis propria is not present. AJCC pathologic stage:  pTa Nx   · 4/14/2021-reviewed results of pathology.  No evidence of invasive disease.  Continue surveillance cystoscopy with urology. · 9/13/2021-he was seen by urology.  Findings of ureteral high-grade urothelial carcinoma.  Noninvasive.  The patient is being referred to [place] [facility] in [address]. · 10/11/2021-he was seen by [place] [facility] and recommended cystoscopy with biopsy and possible laser ablation and bilateral leg stent exchange at that time.   · 4/10/2022-CT abdomen/pelvis without contrast showed evidence of metastatic disease to the lung bases.  Moderate size hiatal hernia with gastroesophageal reflux.  Status post partial colectomy.  Left lower quadrant ostomy is present.  No evidence obstruction.  Irregular soft tissue mass with some calcification the pelvis.  This demonstrated interval increase was potential involvement with the right posterior lateral urinary bladder wall.  The mass measures 8 x 2.9 cm.  Left-sided double J intraureteral stent is in place with stent well positioned. · 4/10/2022-CT chest without contrast showed extensive metastatic disease is noted to the lungs showing progression from the previous examination with multiple new nodules present as well as interval increase in size of previously documented nodules. Reference nodule within the superior segment of the right lower lobe previously measured at 5 mm in size now measures 13 mm in size. A lesion within the medial right lower lobe now measuring 1.6 cm in long axis previously measured 1.1 cm. There is no evidence of acute consolidative pneumonia. Essentially, findings consistent with progressive metastatic disease to the lungs. There are too numerous to count pulmonary nodules the majority of which have increased in size or which are new from the previous study.  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   · 4/19/2020- CT-guided FNA biopsy of lung nodule consistent metastatic colonic adenocarcinoma.        ONCOLOGIC HISTORY #3  Harsha Nascimento was seen in initial oncology consultation on 8/19/2019 during his hospitalization at [address] [place] [address] after a large pelvic mass was identified which raised concern for recurrent disease.  Further pathology consistent with recurrent rectal cancer  · 8/17/2019- CEA 18.1  · 8/17/2019- CT scan of the kidney with contrast documented moderate to severe right hydronephrosis with dilation of the right ureter into the lower pelvis the site of the parasacral soft tissue changes.  Partially calcified soft tissue changes within the janes-sacral region likely representing sequelae of pelvic radiation.  Increasing scarring/fibrosis versus tumor recurrence within the presacral changes, likely represents a site of right distal ureter obstruction.  No left-sided hydronephrosis. · 8/18/2019 -Double-J ureter stent placement for right hydronephrosis secondary to extrinsic compression by pelvic mass.    · 8/27/2019-CT scan of the chest with contrast documented numerous pulmonary nodules that appear new compared to 11/12/2017, RUL nodule measuring 7 mm and LLL nodule measuring 5 mm.  Soft tissue nodule at the left ventral abdominal wall.  Slight increased size of a probable lymph node anterior to the aorta measuring 0.9 cm compared to 0.7 cm.  Similar presacral, right pelvic sidewall and right abductor muscular nodular soft tissue density. · 8/27/2019 CT scan of the abdomen and pelvis with contrast identified new moderate left hydronephrosis with moderate right hydronephrosis.  Mild stranding around the urinary bladder and thickening of the bilateral ureteral wall.  Numerous pulmonary nodules.  Soft tissue of the left ventral abdominal wall.  Slightly increased size of probable lymph node anterior to the aorta measuring 0.9 cm compared to 0.7 cm. · 8/27/2019-PET scan did not identify any FDG avid pulmonary nodules or airspace opacities.  Abnormal increased metabolic activity within the right pelvic wall soft tissue showing SUV of 5.4.  Abnormal soft tissue metabolic activity in the right abductor muscle with SUV of 6.4.  Focally increased activity to the right of the inferior L5 vertebrae body posterior with SUV of 7.9 with associated sclerotic changes.   · 8/29/2019-  Dr. Kirk Rivero completed a cystoscopy with double-J ureter stent in the left ureter for left hydronephrosis  · 9/3/2019- CT-guided right abductor muscle biopsy on 9/3/2019 with pathology identifying metastatic adenocarcinoma consistent with colorectal origin.  Molecular panel from biopsy tissue revealed MSI stable and mutations for BRAF, NRAS, KRAS were not detected.    · 9/18/2019 - Palliative chemotherapy with modified FOLFOX 7  (Oxaliplatin 85 mg/m² IV day 1, leucovorin 400 mg/m² IV day 1 and 5-FU 2400 mg/m² IV continuous infusion over 46 to 48 hours) with the anticipation of adding Avastin 5 mg/kg day 1 every 14 days  · 10/15/2019- 24-hour urine for protein with a total protein of 1785 mg per 24-hour.  Zurdo has been evaluated by Dr. Danilo Chen and he reports no significant concerns related to the protein. · 11/6/19 CEA 5.6 significantly improved compared to CEA of 14.0 on 8/30/2019. · 11/15/2019 -CT scan of the abdomen and pelvis documented no evidence of disease progression with significant decrease in the size of enhancing nodules in the right pelvic abductor musculature, a previous 1.8 cm nodule now measures 5 mm.  No new or enlarging retroperitoneal, mesenteric, pelvic or inguinal lymph nodes.  Calcified presacral mass unchanged measuring 5 x 3.7 cm.   · 11/15/2019 -CT scan of the chest documented multiple small pulmonary nodules reidentified, largest nodule in the RUL measures 5 mm compared to 7.5 mm, RLL nodule measures 3.4 mm compared to 5.9 mm, VIC nodule measures 4 mm compared to 6 mm.  A cluster of small nodules in the RUL anteriorly are barely visible on this study.  There is a decrease in size of mediastinal lymph nodes compared to previous exam, right distal paratracheal lymph node measuring 4.5 mm compared to 8.3 mm and lower right peritracheal node measuring 4.5 mm compared to 8.6 mm.    · 1/13/2020- CT scan of the abdomen and pelvis with contrast indicated improvement in the right-sided hydronephrosis with a chronic inflammatory process of the ureters suspected due to the moderate thickening, also present on previous study.  The small poorly enhancing nodules in the right abductor muscles have decreased in the partially calcified presacral mass and right lateral pelvic wall nodules are stable compared to previous study.  Resolution of the subcutaneous abdominal wall nodules.  A prominent retroperitoneal lymph node adjacent and anterior to the left common artery is redefined and measures 6 mm, no change from previous exam.   · 1/13/2020 - CT scan of the chest documented a right lower lobe nodule measures 4.3 cm and is unchanged.  A right lower lobe nodule measures 2.8 mm compared to 3.4 mm.  Nodule in the right upper lobe is barely visible and measures 2.4 mm.  Nodule in the left lower lobe measures 4.8 mm and is unchanged.  Nodule in left lower lobe posterior measures 2.8 mm and previously measured 4.7 mm.  A right lower lobe posterior medially nodule is barely visible measuring 0.2 mm and previously. measured 4.5 mm.  No new nodules identified.  No change in the size of the mediastinal lymph nodes. · 1/28/2020 -palliative maintenance therapy with leucovorin 400 mg/m² IV over 2 hours on day 1, followed by 5-FU bolus 400 mg/m² and then 1200 mg/m²/day x2 days (total 2400 mg meter squared over 46 to 48 hours) continuous infusion.  Repeat every 2 weeks.  Only received 1 cycle, further treatment held due to small bowel obstruction. · 1/30/2020 - CT scan of the abdomen and pelvis indicated high-grade small bowel obstruction with transition point in the midline posterior pelvis where a small bowel loop is tethered to a partially calcified presacral soft tissue mass. · 2/11/2020-CEA 1.4  · 3/5/2020-  Exploratory laparotomy, removal of adhesions, small bowel resection with primary anastomosis and partial thickness small bowel repair by Dr. Kiara Gonzalez the operative note Dr. Papi Desouza reported no evidence of carcinomatosis within the abdomen and the liver was unable to be examined due to extent of right upper quadrant adhesions.  Pathology from small intestine documented no evidence of malignancy. · 4/15/2020 Ct Chest W Contrast Minimal interval increase in size of subcentimeter pulmonary nodules. The largest now measures 6 mm in the medial right lower lobe on axial image 80.  There is a new, unstable, horizontal fracture through the T6 vertebral body. Additionally, there are new fractures through the posterior 11th and 12th right ribs. The bones are moderately osteopenic. The finding of an unstable fracture through the T6 vertebral body was discussed with Ana Mercedes at 10:45 AM on 4/15/2020. · 4/15/2020 Ct Abdomen Pelvis W Iv Contrast  Patient has undergone interval resection of the distal small bowel, and there is a 2.8 cm fluid collection in the presacral operative bed. This contains a tiny focus of air. This may postoperative or due to infection. Please correlate with the patient's clinical symptoms and laboratory markers. Improved hydronephrosis and hydroureter. Diffuse osteoporosis. Findings in the lower chest are described in a separate dictation. · 4/22/2020-CEA 0.9  · 6/2/2020-resumed chemotherapy with 5-FU/leucovorin and Panitumumab.  Okay to do 1 today then CMP CEA   · 8/19/20 CEA-1.1  · 10/21/2020- CEA 2.0  · 11/11/2020- Ct Chest W Contrast Multiple, too numerous to count, small noncalcified lung nodules bilaterally. The referenced nodules appears to have decreased in size the previous study. No new nodules. · 11/11/2020- Ct Abdomen Pelvis W Contrast Unchanged bilateral hydronephrosis, more on the right side. Bilateral ureteral stents in place. Moderate asymmetrical thickening of the incompletely distended urinary bladder. This may partly be due to incomplete distention. Possibility of chronic cystitis and or chronic partial outlet obstruction may not be excluded. A functioning left lower abdominal ostomy. A small parastomal small bowel herniation without obstruction. A partially calcified presacral mass. The soft tissue component have increased in size in the previous study. The osseous changes are described above. Any superimposed metastatic disease is not excluded and would be hard to evaluate due to extensive postsurgical changes.    · 11/18/2020-essentially, overall stable disease.  Improvement of the lung nodules with decreased in the size of the target lesions.  The pelvic lesion is is likely worse by 25%.  However, CEA is is still within the normal limits.  Therefore likely mixed response.  We will continue current treatment and repeat CT scans in about 3 months. · 12/16/2020-discontinue 5-FU bolus from his regimen. · 2/9/2021- Ct Chest W Contrast No evidence of disease progression. Stable pulmonary nodules. Stable intrahepatic and extrahepatic bile duct dilation compared to prior 11/11/2020. Postoperative, posttraumatic and degenerative changes in the spine as described above. Old right-sided rib fractures. · 2/9/2021- Ct Abdomen Pelvis W Contrast showed evidence of response to therapy including decreased presacral mass/thickening now measuring 1.1 cm, previously 1.9 cm on 11/11/2020. Stable intrahepatic and extra hepatic bile duct dilation with cholecystectomy clips. See separately dictated CT chest of the same day regarding pulmonary nodules.  Bilateral ureteral stents. Decreased bilateral hydronephrosis. Urinary bladder wall is thickened, which could be seen with post treatment changes or cystitis. Correlate with symptoms.  Chronic bony findings as above  · 2/17/2021-reviewed CT chest abdomen pelvis.  Essentially consistent with disease response to therapy.  Continue current therapy.    · 2/17/21 CEA 1.0  · 3/25/21 Ct Abdomen Pelvis W Iv Contrast  The stomach is distended, however no small bowel dilatation identified. Contrast identified within the left ileostomy bag. The distal stomach is under distended which may be secondary to peristalsis. Gastroparesis considered. Bilateral ureteral stents remain appropriate in position, however there is new moderate to severe right-sided hydronephrosis and mild left-sided hydronephrosis when compared to the 2/9/2021 exam. Mild increase considered within the partially calcified presacral pelvic mass. Stable partially calcified right pelvic soft tissue. Similar abnormal wall thickening of the bladder most notable superiorly.  Similar prominence of the intra and extra hepatic bile ducts down to the level of the ampulla. Findings may represent a reservoir effect, however correlation with liver function tests recommended. Therefore. Stable noncalcified left greater than right pulmonary nodules with asymmetrical interstitial changes of the right lung base concerning for pneumonitis. Osteopenia with postoperative changes of the lumbar spine. Chronic compression deformities of the thoracolumbar vertebra as described above. · 4/14/2021-I reviewed notes from urology and also CT abdomen/pelvis that showed mild interval increase in the size of the soft tissue nodule in the pelvis.  However, this is still very small and therefore will have a short follow-up CT scan and of May 2021.  I personally reviewed the CT scans.  Really hard to state that there is clear-cut disease progression.  Again, short follow-up recommended.  Continue current treatment. · 5/12/21 CEA 0.8  · 5/26/2001-CT chest abdomen pelvis showed Partially calcified presacral soft tissue mass appears unchanged. Previously measured soft tissue noncalcified component is unchanged measuring 2.2 x 3.2 cm on axial image 60. However, this is questionable.  CT chest showed a stable pulmonary nodules.  Subcentimeter nodules.  Largest 7.5 mm. · 6/1/21 CEA 0.9  · 06/01/23- reviewed scans. Stable disease.  Continue treatment every 3 weeks as per patient request. Continue infusional 5-FU, Panitumumab and leucovorin. No 5-FU bolus due to severe mucositis. · 8/11/2021 CEA . 9  · 9/03/2021 CT Chest w/Contrast Slight interval increase in the size of pulmonary nodules, the largest in the medial right lung base. Findings are concerning for metastatic disease. Atherosclerosis of the aorta and coronary arteries. Sclerotic rib lesions favored for osseous metastases. Old rib fractures also evident.   · 9/03/2021 CT Abd/Pelvis w/ IV Contrast (Oral) A persistent partially calcified mass in the presacral region with encasement of the distal ureters bilaterally and resultant bilateral hydronephrosis. Bilateral ureteral stents in place. There is increasing right-sided hydronephrosis since the previous study. Right renal atrophy similar to the previous study. Moderate asymmetrical thickening of the incompletely distended urinary bladder is similar to the previous study. This may partly be due to incomplete distention. An inflammatory process or a neoplastic process is not excluded. Moderate intrahepatic biliary dilatation is similar to the previous study and probably due to a prior cholecystectomy. Moderate dilatation of the contrast filled small bowel loops may represent an ileus or partial distal small bowel obstruction. There is left lower abdominal ileostomy which appears patent. The stable compression fractures of the lower thoracic and proximal lumbar vertebrae and hardware fusion similar to the previous study. · 9/22/21- Decrease Vectibix to every other treatment.  He is currently receiving chemotherapy every 3 weeks as per patient request  · The patient's continue treatment in October 2021 due to concurrent diagnosis of high-grade urothelial carcinoma of the ureter. He underwent surgery at [facility] in [place]. Therefore, treatment has been on hold since October 2021. · 4/10/2022-CT abdomen/pelvis without contrast showed evidence of metastatic disease to the lung bases.  Moderate size hiatal hernia with gastroesophageal reflux.  Status post partial colectomy.  Left lower quadrant ostomy is present.  No evidence obstruction.  Irregular soft tissue mass with some calcification the pelvis.  This demonstrated interval increase was potential involvement with the right posterior lateral urinary bladder wall.  The mass measures 8 x 2.9 cm.  Left-sided double J intraureteral stent is in place with stent well positioned.   · 4/10/2022-CT chest without contrast showed extensive metastatic disease is noted to the lungs showing progression from the previous examination with multiple new nodules present as well as interval increase in size of previously documented nodules. Reference nodule within the superior segment of the right lower lobe previously measured at 5 mm in size now measures 13 mm in size. A lesion within the medial right lower lobe now measuring 1.6 cm in long axis previously measured 1.1 cm. There is no evidence of acute consolidative pneumonia. Essentially, findings consistent with progressive metastatic disease to the lungs. There are too numerous to count pulmonary nodules the majority of which have increased in size or which are new from the previous study. 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   · 4/19/2020- CT-guided FNA biopsy of lung nodule consistent metastatic colonic adenocarcinoma.      ONCOLOGIC HISTORY # Rectal carcinoma:  Bailey Sabillon has a history of moderately differentiated rectal carcinoma, T3N0Mx, diagnosed in 3/9/2009.  He received neoadjuvant chemotherapy with radiation and resection with  Quirk has been routinely followed at [facility] and was last seen by Dr. Jeannine Morris on 1/10/2019. Mary Jane David following are pertinent findings related to his diagnosis and treatment. · 3/9/2009- Esophagogastroduodenoscopy with biopsy by Dr. Rebeca Parisi that revealed rectal mass at 8 cm with pathology being consistent with moderately differentiated adenocarcinoma. · 3/18/2019-Dr.Elizabeth Hair Nava at [facility] performed a flexible sigmoidoscopy and rectal endoscopic ultrasound that revealed the tumor extending 6-7 mm deep through the muscularis propria layer and into the serosa, T3 lesion.   · 4/9/2009-5/27/2009-received neoadjuvant chemotherapy with 5-FU CIV along with radiation therapy for a total of 5400 cGy under the direction of Dr. Kelsy Batista.    · 7/15/2009-rectum resection revealed no residual malignancy.  22 regional nodes were negative for malignancy.  The rectum distal doughnut was negative for malignancy.  He was documented to have a complete pathological response.   · 9/9/2009- Ileostomy excision pathology revealed small bowel wall with changes consistent with ileostomy site.  Pathology negative for malignancy       Current Facility-Administered Medications   Medication Dose Route Frequency Provider Last Rate Last Admin    ondansetron (ZOFRAN) tablet 4 mg  4 mg Oral Q8H PRN Jackie Angeles MD        oxyCODONE (OXYCONTIN) extended release tablet 20 mg  20 mg Oral 2 times per day Jackie Angeles MD   20 mg at 04/27/22 2058    oxyCODONE (ROXICODONE) immediate release tablet 15 mg  15 mg Oral Q4H PRN Terra Apple MD   15 mg at 04/28/22 0256    calcium carbonate (TUMS) chewable tablet 500 mg  500 mg Oral TID PRN Josue Burns MD   500 mg at 04/21/22 1239    cyclobenzaprine (FLEXERIL) tablet 5 mg  5 mg Oral BID PRN Josue Burns MD   5 mg at 04/27/22 2254    dextrose bolus (hypoglycemia) 10% 125 mL  125 mL IntraVENous PRN Josue Burns MD        Or    dextrose bolus (hypoglycemia) 10% 250 mL  250 mL IntraVENous PRN Josue Burns MD        DULoxetine (CYMBALTA) extended release capsule 30 mg  30 mg Oral Daily Josue Burns MD   30 mg at 04/27/22 0813    lactobacillus (CULTURELLE) capsule 1 capsule  1 capsule Oral Daily Josue Burns MD   1 capsule at 04/27/22 8017    metoprolol succinate (TOPROL XL) extended release tablet 25 mg  25 mg Oral Daily Josue Burns MD   25 mg at 04/27/22 0969    And    hydroCHLOROthiazide (HYDRODIURIL) tablet 6.25 mg  6.25 mg Oral Daily Josue Burns MD   6.25 mg at 04/27/22 0814    pantoprazole (PROTONIX) tablet 40 mg  40 mg Oral QAM AC Josue Burns MD   40 mg at 04/28/22 0510    polyethylene glycol (GLYCOLAX) packet 17 g  17 g Oral Daily PRN Josue Burns MD        promethazine (PHENERGAN) injection 12.5 mg  12.5 mg IntraMUSCular Q4H PRN Josue Burns MD        sennosides-docusate sodium (SENOKOT-S) 8.6-50 MG tablet 2 tablet  2 tablet Oral Daily PRN Tammy Pool MD        sodium bicarbonate tablet 650 mg  650 mg Oral 4x Daily Tammy Pool MD   650 mg at 04/27/22 2058    acetaminophen (TYLENOL) tablet 650 mg  650 mg Oral Q4H PRN Layton Guaman MD   650 mg at 04/28/22 0510    bisacodyl (DULCOLAX) suppository 10 mg  10 mg Rectal Daily PRN Layton Guaman MD        sodium chloride flush 0.9 % injection 5-40 mL  5-40 mL IntraVENous PRN Layton Guaman MD   10 mL at 04/27/22 8139    sodium chloride flush 0.9 % injection 5-40 mL  5-40 mL IntraVENous BID Layton Guaman MD   10 mL at 04/27/22 2100       Allergies: Allergies   Allergen Reactions    Morphine Anxiety           Objective   BP (!) 141/99   Pulse 124   Temp 98.1 °F (36.7 °C) (Temporal)   Resp 16   Ht 5' 5\" (1.651 m)   Wt 160 lb 9 oz (72.8 kg)   SpO2 97%   BMI 26.72 kg/m²     PHYSICAL EXAM:  CONSTITUTIONAL: Alert, appropriate, no acute distress  EYES: Non icteric, EOM intact, pupils equal round   ENT: Mucus membranes moist,external inspection of ears and nose are normal  NECK: Supple, no masses. No palpable thyroid mass  CHEST/LUNGS: CTA bilaterally, normal respiratory effort   CARDIOVASCULAR: RRR, no murmurs. No lower extremity edema  ABDOMEN: Colostomy in place, soft non-tender, active bowel sounds, no HSM. No palpable masses  EXTREMITIES: warm, full ROM in all 4 extremities, no focal weakness. SKIN: warm, dry with no rashes or lesions  LYMPH: No cervical, clavicular, axillary, or inguinal lymphadenopathy  NEUROLOGIC: follows commands, non focal   PSYCH: mood and affect appropriate.  Alert and oriented to time, place, person        LABORATORY RESULTS REVIEWED/ANALYZED BY ME:  Recent Labs     04/28/22  0253 04/25/22  0414 04/21/22  0330   WBC 8.7 11.5* 7.7   HGB 10.3* 10.1* 9.9*   HCT 32.2* 31.3* 30.6*   MCV 91.7 91.0 87.9    367 334       Lab Results   Component Value Date     04/28/2022    K 4.2 04/28/2022    CL 96 (L) 04/28/2022    CO2 23 04/28/2022    BUN 23 04/28/2022    CREATININE 1.3 (H) 04/28/2022    GLUCOSE 116 (H) 04/28/2022    CALCIUM 8.5 (L) 04/28/2022    PROT 6.9 04/12/2022    LABALBU 3.1 (L) 04/12/2022    BILITOT <0.2 04/12/2022    ALKPHOS 123 04/12/2022    AST 14 04/12/2022    ALT 10 04/12/2022    LABGLOM 55 (A) 04/28/2022    GFRAA >59 04/28/2022    AGRATIO 1.9 11/24/2021    GLOB 2.2 11/24/2021       Lab Results   Component Value Date    INR 1.02 04/18/2022    INR 1.06 04/13/2022    INR 1.04 11/09/2021    PROTIME 13.3 04/18/2022    PROTIME 13.7 04/13/2022    PROTIME 13.8 11/09/2021       RADIOLOGY STUDIES REPORT/REVIEWED AND INTERPRETED BY ME:  4/10/2022-CT abdomen/pelvis without contrast showed evidence of metastatic disease to the lung bases.  Moderate size hiatal hernia with gastroesophageal reflux.  Status post partial colectomy.  Left lower quadrant ostomy is present.  No evidence obstruction.  Irregular soft tissue mass with some calcification the pelvis.  This demonstrated interval increase was potential involvement with the right posterior lateral urinary bladder wall.  The mass measures 8 x 2.9 cm.  Left-sided double J intraureteral stent is in place with stent well positioned. 4/10/2022-CT chest without contrast showed extensive metastatic disease is noted to the lungs showing progression from the previous examination with multiple new nodules present as well as interval increase in size of previously documented nodules. Reference nodule within the superior segment of the right lower lobe previously measured at 5 mm in size now measures 13 mm in size. A lesion within the medial right lower lobe now measuring 1.6 cm in long axis previously measured 1.1 cm. There is no evidence of acute consolidative pneumonia. Essentially, findings consistent with progressive metastatic disease to the lungs.  There are too numerous to count pulmonary nodules the majority of which have increased in size or which are new from the previous study. 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 ASSESSMENT:  #Recurrent colonic adenocarcinoma (progressive pulmonary metastasis)  -MSI stable and mutations for BRAF, NRAS, KRAS were not detected.    -Not a candidate for bevacizumab  -Prior treatment. Patient has received modified FOLFOX in the past followed by maintenance with 5-FU/leucovorin and Vectibix. Treatment has been on hold since October 2021 due to concurrent diagnosis of bladder/ureteral cancer.  -Unfortunately, now with progressive metastatic disease in the lungs compatible with colonic adenocarcinoma.  -We will discuss outpatient chemotherapy when he is done with rehab. Recommended outpatient regimen: (Dose reduction due to the patient frailty)  -Panitumumab 6 mg/kg day 1 and 15  -Irinotecan 150 mg/m2 day 1 and 15  -Leucovorin 400 mg/m2 day 1 and 15  -5-FU infusional 2000mg/m2 (over 46 hours) square day 1 and 15  Cycle length q. 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  The operative note by Dr. Dee Dee Forrest documented bilateral hydronephrosis and obtained biopsy of the bladder in the mid dome and left anterior lateral wall x2. Pathology documented high-grade papillary urethral carcinoma, noninvasive, stage pTaNx.  As per Urology    5/28/2020-the patient underwent a cystoscopy and resection of bladder tumor on 05/28/2020 with findings consistent with noninvasive, high-grade papillary urothelial carcinoma.  Muscularis propria was not identified. Currently receiving intravesical BCG.  4/1/2021-repeat cystoscopy showed a small bladder lesion.  Tumor resection of bladder tumor consistent with noninvasive high-grade urothelial carcinoma.   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  Referred to [email or web address]  10/14/2021-urology at [address] [place] [address] cytology consistent with atypical urothelial cells.   Since the patient was last seen by Dr. Evelyn Rios November 2021 he underwent a major resection of the high-grade urothelial carcinoma of the ureter at [facility] postoperative course was complicated and he was hospitalized for many weeks. He eventually was discharged and is currently receiving home care at home. He is still very debilitated. -CT findings from 4/10/2022 concerning for metastatic disease, as noted above   -CT guided FNA of pulmonary nodule on 4/19/2022: Path consistent with metastatic colonic adenocarcinoma.  -Urology, Dr. Fely Cherry following: S/p cystoscopy and left stent change    Normocytic anemia-anemia of chronic disease  Serology 4/12/2022:  · Iron: 77, TIBC 233, iron saturation 33%, ferritin 589  · Vitamin B12: 308  · Folate: 13.89       Candida glabrata/AchrmobacterUTI-related to indwelling catheter  -S/p Anidulafungin    PLAN:  No oncologic intervention in the hospital  Anticipate FOLFIRI/panitumumab as outpatient next week  Not a candidate for Avastin. Continue monitor CBC twice a week as per Rehab          Sj Thakkar PA-C    04/28/22  6:59 AM   Physician's attestation/substantial contribution:  I, Dr Hiren Benedict, independently performed an evaluation on Xenia Palomo. I have reviewed relevant medical information/data to include but not limited to medication list, relevant appropriate labs and imaging when applicable. I reviewed other physician's notes, ancillary services and nurses assessment. I have reviewed the above documentation completed by the Nurse Practitioner or Physician Assistant.  Please see my additional contributions to the history of present illness, physical examination, and assessment/medical decision-making that reflect my findings and impressions. I have seen and examined the patient and the key elements of all parts of the encounter have been performed by me. I agree with the assessment and plan as outlined by the ARNP/PA. Subjective-no new complaints. Feels stronger. Objective- as above  Assessment/plan:  Progressive metastatic colonic adenocarcinoma. Patient has developed lung metastasis. We will plan for chemotherapy as outpatient. We will make arrangements. Anemia has improved. Disposition-okay to discharge from my standpoint. We will arrange follow-up in clinic.       Rosalia Nixon MD

## 2022-04-28 NOTE — DISCHARGE SUMMARY
Neurology Discharge Summary     Patient Identification:  Pravin Millan is a 81 y.o. male. :  1950  Admit Date:  2022  Discharge date : 22   Attending Provider: Vaughn Moise MD     Account Number: [de-identified]                                   Admission Diagnoses:   Sepsis [facility]) [A41.9]    Discharge Diagnoses:  Principal Problem:    Sepsis [facility])  Active Problems:    Ureteral stricture, left    Lung nodules    Metastasis from colon cancer ([facility] [address]    Urothelial carcinoma of right distal ureter ([facility] [address]    [facility]inary [facility]    Retained ureteral stent    Lower urinary tract symptoms  Resolved Problems:    * No resolved hospital problems. *      Discharge Medications:    Current Discharge Medication List           Details   oxyCODONE (OXYCONTIN) 20 MG extended release tablet Take 1 tablet by mouth every 12 hours for 14 days. Qty: 28 each, Refills: 0    Comments: Reduce doses taken as pain becomes manageable  Associated Diagnoses: Sepsis, due to unspecified organism, unspecified whether acute organ dysfunction present (HCC)      oxyCODONE (OXY-IR) 15 MG immediate release tablet Take 1 tablet by mouth every 6 hours as needed for Pain for up to 14 days.   Qty: 56 tablet, Refills: 0    Comments: Reduce doses taken as pain becomes manageable  Associated Diagnoses: Sepsis, due to unspecified organism, unspecified whether acute organ dysfunction present (HCC)      sodium bicarbonate 650 MG tablet Take 1 tablet by mouth 4 times daily  Qty: 120 tablet, Refills: 0      lactobacillus (CULTURELLE) CAPS capsule Take 1 capsule by mouth daily  Qty: 30 capsule, Refills: 0              Details   bisoprolol (ZEBETA) 5 MG tablet Take 1 tablet by mouth daily  Qty: 90 tablet, Refills: 0    Associated Diagnoses: Coronary artery disease involving native coronary artery of native heart without angina pectoris      omeprazole (PRILOSEC) 20 MG delayed release capsule Take 1 capsule by mouth Daily  Qty: 30 capsule, Refills: 0              Details   acetaminophen (TYLENOL 8 HOUR ARTHRITIS PAIN) 650 MG extended release tablet Take 650 mg by mouth every 8 hours as needed for Pain      ibandronate (BONIVA) 150 MG tablet Take 1 tablet by mouth every 30 days Take one (1) tablet once per month in the morning with a full glass of water, on an empty stomach, and do not take anything else by mouth or lie down for the next 30 minutes. Qty: 1 tablet, Refills: 6    Associated Diagnoses: Metastasis from colon cancer (HCC)      ondansetron (ZOFRAN) 4 MG tablet TAKE 2 TABLETS BY MOUTH EVERY 8 HOURS AS NEEDED FOR NAUSEA OR VOMITING  Qty: 30 tablet, Refills: 2    Associated Diagnoses: Colorectal cancer ([address]  Chemotherapy-induced nausea      DULoxetine (CYMBALTA) 30 MG extended release capsule TAKE 1 CAPSULE BY MOUTH DAILY  Qty: 30 capsule, Refills: 3    Associated Diagnoses: Chemotherapy-induced peripheral neuropathy (HCC)      furosemide (LASIX) 20 MG tablet Take 1 tablet by mouth daily as needed (fluid retention)  Qty: 30 tablet, Refills: 5    Associated Diagnoses: Fluid retention in legs      FEROSUL 325 (65 Fe) MG tablet TAKE 1 TABLET BY MOUTH TWICE DAILY  Qty: 180 tablet, Refills: 1    Associated Diagnoses: Anemia of chronic disease      Magic Mouthwash (MIRACLE MOUTHWASH) Swish and spit 5 mLs 4 times daily as needed for Irritation  Qty: 240 mL, Refills: 5      Calcium Carb-Cholecalciferol (CALCIUM 600 + D PO) Take 800 mg by mouth 2 times daily       cyclobenzaprine (FLEXERIL) 10 MG tablet Take 1 tablet by mouth 3 times daily as needed for Muscle spasms  Qty: 90 tablet, Refills: 2           Current Discharge Medication List      STOP taking these medications       ibuprofen (ADVIL;MOTRIN) 800 MG tablet Comments:   Reason for Stopping:         clindamycin (CLINDAGEL) 1 % gel Comments:   Reason for Stopping:         hydrocortisone 2.5 % cream Comments:   Reason for Stopping:         nystatin (MYCOSTATIN) 192615 UNIT/GM powder Comments:   Reason for Stopping:         gabapentin (NEURONTIN) 800 MG tablet Comments:   Reason for Stopping:         loperamide (IMODIUM A-D) 2 MG tablet Comments:   Reason for Stopping:                 Consults:   Infectious disease, oncology, urology, hospitalist    Hospital Course: The patient did well during his stay in the rehab unit. Mcgregor catheter was removed and he is voiding on his own. Laboratory studies remained stable. No for further difficulty with his prior ileus. Blood pressure stable. OxyContin was added to his oxycodone to help with pain management. He also has a pain pump. The patient had no medical complications. Disposition upon discharge-stable          Discharge Instructions     Patient Instructions:   Home  Therapy orders: PT and OT   Discharge lab work: none  Code status: Full Code   Activity: activity as tolerated  Diet: ADULT DIET;  Regular  ADULT ORAL NUTRITION SUPPLEMENT; Lunch; Standard High Calorie/High Protein Oral Supplement    Wound Care: as directed  Equipment: as per therapy      Anita Villavicencio MD, MD    At least 35 minutes were spent in discharging the patient

## 2022-04-28 NOTE — PROGRESS NOTES
Infectious Diseases Progress Note    Patient:  Dwain Beavers  YOB: 1950  MRN: [id number]   Admit date: 4/20/2022   Admitting Physician: Linda Waite MD  Primary Care Physician: Rafa Yee MD    Chief Complaint/Interval History:  He continues to gain strength. He looks a little bit better each day. He is not having fever. No suprapubic or flank pain. No dysuria. In/Out    Intake/Output Summary (Last 24 hours) at 4/28/2022 0818  Last data filed at 4/27/2022 1804  Gross per 24 hour   Intake 960 ml   Output --   Net 960 ml     Allergies:    Allergies   Allergen Reactions    Morphine Anxiety     Current Meds: ondansetron (ZOFRAN) tablet 4 mg, Q8H PRN  oxyCODONE (OXYCONTIN) extended release tablet 20 mg, 2 times per day  oxyCODONE (ROXICODONE) immediate release tablet 15 mg, Q4H PRN  calcium carbonate (TUMS) chewable tablet 500 mg, TID PRN  cyclobenzaprine (FLEXERIL) tablet 5 mg, BID PRN  dextrose bolus (hypoglycemia) 10% 125 mL, PRN   Or  dextrose bolus (hypoglycemia) 10% 250 mL, PRN  DULoxetine (CYMBALTA) extended release capsule 30 mg, Daily  lactobacillus (CULTURELLE) capsule 1 capsule, Daily  metoprolol succinate (TOPROL XL) extended release tablet 25 mg, Daily   And  hydroCHLOROthiazide (HYDRODIURIL) tablet 6.25 mg, Daily  pantoprazole (PROTONIX) tablet 40 mg, QAM AC  polyethylene glycol (GLYCOLAX) packet 17 g, Daily PRN  promethazine (PHENERGAN) injection 12.5 mg, Q4H PRN  sennosides-docusate sodium (SENOKOT-S) 8.6-50 MG tablet 2 tablet, Daily PRN  sodium bicarbonate tablet 650 mg, 4x Daily  acetaminophen (TYLENOL) tablet 650 mg, Q4H PRN  bisacodyl (DULCOLAX) suppository 10 mg, Daily PRN  sodium chloride flush 0.9 % injection 5-40 mL, PRN  sodium chloride flush 0.9 % injection 5-40 mL, BID      Review of Systems See HPI    VitalSigns:  BP (!) 141/99   Pulse 124   Temp 98.1 °F (36.7 °C) (Temporal)   Resp 16   Ht 5' 5\" (1.651 m)   Wt 160 lb 9 oz (72.8 kg)   SpO2 97%   BMI 26.72 kg/m² Physical Exam  Line/IV site: No erythema, warmth, induration, or tenderness. Abdomen soft and nontender. No suprapubic tenderness. No flank tenderness. Lungs clear without crackles    Lab Results:  CBC:   Recent Labs     04/28/22  0253   WBC 8.7   HGB 10.3*        BMP:  Recent Labs     04/28/22  0253      K 4.2   CL 96*   CO2 23   BUN 23   CREATININE 1.3*   GLUCOSE 116*     Urinalysis done April 26, 2022:  20 white blood cells per high-power field  4 red blood cells per high-power field    CultureResults: Urine culture April 26, 2022-no growth    Radiology: None    Additional Studies Reviewed:  None    Impression:  Previous catheter related urinary tract infection with Candida glabrata and Achromobacter. He completed treatment with anidulafungin and levofloxacin. Mcgregor catheter has been successfully removed and he is voiding on his own. His left double-J stent was changed while he was on antimicrobial therapy and the therapy was extended for a few days post stent exchange. Suspect the minimal pyuria he has currently is reflective of some minimal irritation from the stent. He is without fever or significant urinary symptoms. His urine culture after he had been off antimicrobial treatment is no growth.     Recommendations:  Does not appear to have any ongoing infection at present  Appears to be stable/improving off antimicrobial treatment  Continue off antibiotic therapy  I would be happy to reassess if any recurrent symptoms or problems  Will sign off for now  He can follow-up with infectious diseases as needed    Usha Petty MD

## 2022-04-28 NOTE — PROGRESS NOTES
04/28/22 0835 04/28/22 0837 04/28/22 0852   Pain Assessment   Pain Level 5  --   --    Patient's Stated Pain Goal 0 - No pain  --   --    Pain Location Knee  --   --    Pain Orientation Right  --   --    Non-Pharmaceutical Pain Intervention(s) Distraction  --   --    Transfers   Sit to Stand  --  Independent  --    Stand to sit  --  Independent  --    Ambulation   WB Status  --  wbat  --    Ambulation   Surface  --  level tile  --    Device  --  Rollator  (Pt's personal PF Rollator)  --    Assistance  --  Modified Independent  --    Quality of Gait  --  fwd flexed posture, downward gaze  --    Propulsion 1   Distance  --  180' x2  --    Conditions Requiring Skilled Therapeutic Intervention   Assessment  --   --  Pt. aguila session well.    Activity Tolerance   Activity Tolerance  --   --  Patient tolerated treatment well   Electronically signed by Brina James PTA on 4/28/2022 at 9:05 AM

## 2022-04-28 NOTE — PROGRESS NOTES
04/28/22 1345   Bed mobility   Rolling to Left Modified independent   Rolling to Right Modified independent   Supine to Sit Modified independent   Transfers   Sit to Stand Modified independent   Stand to sit Modified independent   Bed to Chair Modified independent   Stand Pivot Transfers   (.)   Car Transfer Modified independent   Ambulation   Device Rollator   Assistance Modified Independent   Distance 200 ft   Stairs   # Steps  8   Rails Bilateral   Assistance Contact guard assistance;Stand by assistance   Propulsion 1   Method RUE;LUE   Level of Assistance Modified independent   Distance 250 ft   Assessment   Assessment INDEPENDENT HEP. NO BARRIERS TO D/C. MUCH IMPROVED STRENGTH, BALANCE, ENDURANCE.

## 2022-04-28 NOTE — PROGRESS NOTES
Daily Progress Note    Date:2022  Patient: Pravin Millan  : 1950  SCHUYLER:[id number]  CODE:Full Code No additional code details  Lane Oden MD    Admit Date: 2022  4:34 PM   LOS: 8 days       Subjective:    No acute events overnight. He is participating well with rehab and feels better overall. Pain overall improved and controlled on current analgesic regimen. No new issues with voiding. No fevers or chills.         Review of Systems  14 point review of systems completed and is negative except as otherwise noted      Objective:      Vital signs in last 24 hours:  Patient Vitals for the past 24 hrs:   BP Temp Temp src Pulse Resp SpO2   22 0512 (!) 141/99 98.1 °F (36.7 °C) Temporal 124 16 97 %   22 1656 112/82 98.1 °F (36.7 °C) Temporal 116 20 96 %   22 1453 -- -- -- -- 18 --       Physical exam    General: Resting in no acute distress  Psych: Calm and cooperative  Cardiovascular: Normal rate, regular rhythm, pulses 2+  Respiratory: Normal work of breathing, no wheezes or rales  Abdomen: Soft, nontender, bowel sounds present  Neurologic: Alert, oriented, normal speech  Extremities: No clubbing or cyanosis, trace LE edema  Skin: Warm and dry      Lab Review   Recent Results (from the past 24 hour(s))   Basic Metabolic Panel w/ Reflex to MG    Collection Time: 22  2:53 AM   Result Value Ref Range    Sodium 136 136 - 145 mmol/L    Potassium reflex Magnesium 4.2 3.5 - 5.0 mmol/L    Chloride 96 (L) 98 - 111 mmol/L    CO2 23 22 - 29 mmol/L    Anion Gap 17 7 - 19 mmol/L    Glucose 116 (H) 74 - 109 mg/dL    BUN 23 8 - 23 mg/dL    CREATININE 1.3 (H) 0.5 - 1.2 mg/dL    GFR Non-African American 55 (A) >60    GFR African American >59 >59    Calcium 8.5 (L) 8.8 - 10.2 mg/dL   CBC auto differential Mo, thur    Collection Time: 22  2:53 AM   Result Value Ref Range    WBC 8.7 4.8 - 10.8 K/uL    RBC 3.51 (L) 4.70 - 6.10 M/uL    Hemoglobin 10.3 (L) 14.0 - 18.0 g/dL    Hematocrit 32.2 (L) 42.0 - 52.0 %    MCV 91.7 80.0 - 94.0 fL    MCH 29.3 27.0 - 31.0 pg    MCHC 32.0 (L) 33.0 - 37.0 g/dL    RDW 14.4 11.5 - 14.5 %    Platelets 655 701 - 129 K/uL    MPV 10.4 9.4 - 12.4 fL    Neutrophils % 68.0 (H) 50.0 - 65.0 %    Lymphocytes % 17.1 (L) 20.0 - 40.0 %    Monocytes % 10.8 (H) 0.0 - 10.0 %    Eosinophils % 2.5 0.0 - 5.0 %    Basophils % 0.8 0.0 - 1.0 %    Neutrophils Absolute 5.9 1.5 - 7.5 K/uL    Immature Granulocytes # 0.1 K/uL    Lymphocytes Absolute 1.5 1.1 - 4.5 K/uL    Monocytes Absolute 0.90 0.00 - 0.90 K/uL    Eosinophils Absolute 0.20 0.00 - 0.60 K/uL    Basophils Absolute 0.10 0.00 - 0.20 K/uL       I/O last 3 completed shifts: In: 960 [P.O.:960]  Out: 1100 [Urine:400; Stool:700]  I/O this shift:   In: 720 [P.O.:720]  Out: -       Current Facility-Administered Medications:     ondansetron (ZOFRAN) tablet 4 mg, 4 mg, Oral, Q8H PRN, Vaughn Moise MD    oxyCODONE (OXYCONTIN) extended release tablet 20 mg, 20 mg, Oral, 2 times per day, Vaughn Moise MD, 20 mg at 04/28/22 6444    oxyCODONE (ROXICODONE) immediate release tablet 15 mg, 15 mg, Oral, Q4H PRN, Tess Mahoney MD, 15 mg at 04/28/22 1148    calcium carbonate (TUMS) chewable tablet 500 mg, 500 mg, Oral, TID PRN, Maddy Jovel MD, 500 mg at 04/21/22 1239    cyclobenzaprine (FLEXERIL) tablet 5 mg, 5 mg, Oral, BID PRN, Maddy Jovel MD, 5 mg at 04/27/22 2254    dextrose bolus (hypoglycemia) 10% 125 mL, 125 mL, IntraVENous, PRN **OR** dextrose bolus (hypoglycemia) 10% 250 mL, 250 mL, IntraVENous, PRN, Maddy Jovel MD    DULoxetine (CYMBALTA) extended release capsule 30 mg, 30 mg, Oral, Daily, Maddy Jovel MD, 30 mg at 04/28/22 1599    lactobacillus (CULTURELLE) capsule 1 capsule, 1 capsule, Oral, Daily, Maddy Jovel MD, 1 capsule at 04/28/22 1880    metoprolol succinate (TOPROL XL) extended release tablet 25 mg, 25 mg, Oral, Daily, 25 mg at 04/28/22 0937 **AND** hydroCHLOROthiazide (HYDRODIURIL) tablet 6.25 mg, 6.25 mg, Oral, Daily, Toro Huitron MD, 6.25 mg at 04/28/22 9921    pantoprazole (PROTONIX) tablet 40 mg, 40 mg, Oral, QAM AC, Toro Huitron MD, 40 mg at 04/28/22 0510    polyethylene glycol (GLYCOLAX) packet 17 g, 17 g, Oral, Daily PRN, Toro Huitron MD    promethazine (PHENERGAN) injection 12.5 mg, 12.5 mg, IntraMUSCular, Q4H PRN, Toro Huitron MD    sennosides-docusate sodium (SENOKOT-S) 8.6-50 MG tablet 2 tablet, 2 tablet, Oral, Daily PRN, Toro Huitron MD    sodium bicarbonate tablet 650 mg, 650 mg, Oral, 4x Daily, Toro Huitron MD, 650 mg at 04/28/22 1240    acetaminophen (TYLENOL) tablet 650 mg, 650 mg, Oral, Q4H PRN, Manas Broussard MD, 650 mg at 04/28/22 0510    bisacodyl (DULCOLAX) suppository 10 mg, 10 mg, Rectal, Daily PRN, Manas Broussard MD    sodium chloride flush 0.9 % injection 5-40 mL, 5-40 mL, IntraVENous, PRN, Manas Broussard MD, 10 mL at 04/27/22 5274    sodium chloride flush 0.9 % injection 5-40 mL, 5-40 mL, IntraVENous, BID, Manas Broussard MD, 10 mL at 04/28/22 8651        Assessment/plan  Principal Problem:    Sepsis [facility])  Active Problems:    Ureteral stricture, left    Lung nodules    Metastasis from colon cancer ([address]    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)    Urinary tract infection associated with indwelling urethral catheter ([address]    Retained ureteral stent    Lower urinary tract symptoms  Resolved Problems:    * No resolved hospital problems. *    81 y. o. male with recent right nephrectomy, recent ureteral stents, metastatic urothelial carcinoma, history of colorectal cancer with ostomy in place, presented with fever and nausea decreased urine output. On the admission denied problems with ostomy output.  He was found to have UTI with culture speciated Candida glabrata and Achromobacter, ID consulted, treated with Eraxis and Levaquin.  CT Chest consistent with progressive metastatic disease to the lungs.  CT abdomen irregular soft tissue mass with some calcification within the pelvis.  Followed by oncology and urology during hospital course. Courtney Fletcher was scheduled for pulmonary nodule biopsy by radiology but radiologist was concerned with small bowel distention per KUB, repeat CT abdomen showed ileus, pt did not have any ostomy output, he developed N/V/abd pain- NGT was placed.  Evaluated by general surgery. Claudine Hermane subsequently resolved with removal of NG tube 4/16.  Removed Mcgregor per urology. Had CT guided biopsy of pulmonary nodule 4/19 and ureteral stent change by Urology on 4/20. Discharged and readmitted to [facility] inpatient rehab. Metastatic colorectal adenocarcinoma  Urothelial bladder cancer, s/p recent ureteral stents  -Left ureteral stricture with chronic indwelling stent s/p ureteral stent change by Urology 4/20  Pulmonary nodules  --- Pathology from biopsy:  pulmonary metastasis compatible with metastatic colonic adenocarcinoma  Outpatient follow up with Hem/Onc to start chemo regimen  Outpatient follow-up with urology in 3 months        UTI with Achromobacter and Candida glabrata from culture, immunocompromised status, previous ureteral stents  ---Resolved  -- Completed course of Levaquin and Eraxis  -- Repeat urine culture with no growth     Bladder spasms, urinary incontinence, unable to take anticholinergics due to effect on his gut motility  ---Improved     Ileus-resolved     HTN  Fairly well controlled on metoprolol/HCTZ, no need for adjustment     Depression  -Cymbalta     Knee pain  - continue current regimen, analgesics as needed       Continue rehab      Hospital medicine will sign off.         Tayla Dillon MD 4/28/2022 2:13 PM

## 2022-04-28 NOTE — PLAN OF CARE
Problem: Discharge Planning  Goal: Discharge to home or other facility with appropriate resources  4/27/2022 2359 by Francie Kennedy RN  Outcome: Progressing  4/27/2022 1027 by Iva Pappas RN  Outcome: Progressing     Problem: Safety - Adult  Goal: Free from fall injury  4/27/2022 2359 by Francie Kennedy RN  Outcome: Progressing  4/27/2022 1027 by Iva Pappas RN  Outcome: Progressing     Problem: Pain  Goal: Verbalizes/displays adequate comfort level or baseline comfort level  4/27/2022 2359 by Francie Kennedy RN  Outcome: Progressing  4/27/2022 1027 by Iva Pappas RN  Outcome: Progressing

## 2022-04-28 NOTE — PROGRESS NOTES
Occupational Therapy     04/28/22 0900   Restrictions/Precautions   Restrictions/Precautions Up Ad Naomy; Contact Precautions   Position Activity Restriction   Other position/activity restrictions Colostomy, pain pump   ADL   Additional Comments See CARE scores. Balance   Sitting Balance Independent   Standing Balance Independent   Standing Balance   Time 1 min   Activity Clothing retrieval task at closet   Comment With use of upright walker. Functional Mobility   Functional - Mobility Device 4-Wheeled Walker   Activity To/from bathroom; Other  (Short distance in room.)   Assist Level Modified independent    Toilet Transfers   Toilet Transfers Comments See CARE scores. Shower Transfers   Shower - Transfer From [employer] - Transfer Type To and From   [employer] - Transfer To Transfer tub bench   Shower - Technique Ambulating   Shower Transfers Modified independence   Shower Transfers Comments With use of upright walker and GB. Type of ROM/Therapeutic Exercise   Type of ROM/Therapeutic Exercise Cane/Wand   Comment BUE 2#, 2 sets of 15 reps. Short Term Goals   Short Term Goal 4 MET   Long Term Goals   Long Term Goal 4 MET   Assessment   Performance deficits / Impairments Decreased functional mobility ; Decreased strength;Decreased endurance;Decreased high-level IADLs   Assessment Pt improving with all ADLs/IADLs with continued skilled OT services. Pt completed exercises in room, sitting in recliner. Pt intending on discharge tomorrow, expresses no needs. Treatment Diagnosis Sepsis,recent right nephrectomy and ureter removal d/t right ureter tumor and placement of stents,4/20/22 for left ureteral stent change and cystoscopy bladder biopsy, recent lung biopsy   Prognosis Good   Timed Code Treatment Minutes 90 Minutes   Activity Tolerance   Activity Tolerance Patient Tolerated treatment well   Safety Devices   Safety Devices in place Yes   Type of devices Call light within reach; Left in chair   OT Whiteboard Notes   Therapy Whiteboard Up ad lety.       04/28/22 0900   Shower/Bathe Self   Assistance Needed Independent   CARE Score 6   Upper Body Dressing   Assistance Needed Independent   Comment Completed on EOB. CARE Score 6   Lower Body Dressing   Assistance Needed Independent   Comment Completed on EOB. CARE Score 6   Putting On/Taking Off Footwear   Assistance Needed Independent   Comment Completed on EOB, donned socks only. CARE Score 6   [address] needed Independent   Comment I with changing colostomy. CARE Score 6   Toilet Transfer   Assistance needed Independent   Comment Utilized GB. CARE Score 6   Picking Up Object   Assistance Needed Independent   Comment With LHR.    CARE Score 6     Electronically signed by CAT Kim student on 4/28/2022 at 2:20 PM

## 2022-04-28 NOTE — TELEPHONE ENCOUNTER
[facility] has a a referral from Iva Courtney      Will Dr Rodrigo Michelle follow pt for Quentin Rivera  ?

## 2022-04-28 NOTE — PROGRESS NOTES
Patient:   Ashlyn Pro  MR#:    [id number]   Room:    27/827-01   YOB: 1950  Date of Progress Note: 4/28/2022  Time of Note                           7:53 AM  Consulting Physician:   Brennan Palomo M.D. Attending Physician:  Brennan Palomo MD     CHIEF COMPLAINT: Generalized weakness and deconditioning        Subjective  This 81 y.o. male  with history of CKD, CAD, HTN, hyperlipidemia, metastatic colon cancer s/p colostomy placement , OA right knee, chronic back pain, pain pump by Dr. Jalen Estrada, GERD, mixed hyperlipidemia, recent right nephrectomy and ureter removal d/t right ureter tumor and placement of stents, and edgar catheter in place since 1/21/22 for urinary retention. He presented to [facility] ER on 4/10/22 with c/o fever, general malaise and nausea for the past few days. He reported recently having blood in his urine and was placed on Bactrim for presumed recurrent UTI. He also reported decreased urine output and decreased appetite for the past few days. Work up in [place] [address] revealed Na 127, CO2 14, BUN 44, Cr 2.4, GFR 27, Lactic 2.0, WBC 16.8, Hgb 13.7, Large Leukocytes, positive nitrites, and Large blood noted on urinalysis, Abnormal CXR. CT Chest consistent with progressive metastatic disease to the lungs. CT abdomen showed moderate size hiatal hernia with gastroesophageal reflux, irregular soft tissue mass with some calcification within the pelvis, extends to and is inseparable from the right posterior lateral urinary bladder wall with potential secondary involvement, increased in size from previous study, status post right nephrectomy. He was admitted to the Hospitalist service with  sepsis with secondary to urinary tract infection. Consults made for oncology, nephrology and infectious disease. He was seen by ARGELIA Phillips, who recommended continuing ceftriaxone and fluconazole until urine cultures come back.  Urology was consulted to follow d/t patient coming from home with edgar catheter in place for retention and leaking. He was seen by Dr. Delvis Siddiqi. He was seen by his oncologist Dr. Melani Oleary who reviewed his imaging results and felt his lung nodules were mostly metastatic disease. During his stay he continued to have c/o of right knee pain. Orthopedics was consulted. He was by SHELBY Taylor and on 4/12/22 had a arthrocentesis  with injection of Betamethasone. Patient reports improvement in pain, now rating at 2/10. His renal function has improved, creatinine currently 1.1. Urine cultures came back positive for  Candida glabrata and Achromobacter species, antibiotics changed to Levaquin and anidulafungin. On 4/13/22 patient had c/o of nausea, emesis and decreased output from his colostomy. CT that showed distention with ileus vs obstruction. General surgery was consulted. He was seen by Dr. Lelia Robin, who didn't feel any immediate surgical intervention was needed. NG tube was placed on 4/15/22. Patient was able to have NG removed and is now having good output from his ostomy. Mcgregor catheter was removed on 4/18/22 and he was started on myrbetrq for bladder spasms. He is having some incontinence and urgency to void but is having periods of dryness in between. CT-guided lung biopsy was done on 4/19/22 to confirm diagnosis, results pending. Patient went to OR on 4/20/22 for left ureteral stent change and cystoscopy bladder biopsy by Dr. Delvis Siddiqi. He tolerated the procedure well. Patient wife unexpectedly passed away on 4/13/22. Patient in a depressed mood, he is on cymbalta. He is participating in both PT/OT. No complaints this morning. Pain better controlled.   REVIEW OF SYSTEMS:  Constitutional: No fevers No chills  Neck:No stiffness  Respiratory: No shortness of breath  Cardiovascular: No chest pain No palpitations  Gastrointestinal: No abdominal pain    Genitourinary: No Dysuria  Neurological: No headache, no confusion      PHYSICAL EXAM:  BP (!) 141/99   Pulse 124   Temp 98.1 °F (36.7 °C) (Temporal) Resp 16   Ht 5' 5\" (1.651 m)   Wt 160 lb 9 oz (72.8 kg)   SpO2 97%   BMI 26.72 kg/m²     Constitutional: he appears well-developed and well-nourished. Eyes - conjunctiva normal.  Pupils react to light  Ear, nose, throat -hearing intact to voice. No scars, masses, or lesions over external nose or ears, no atrophy of tongue  Neck-symmetric, no masses noted, no jugular vein distension  Respiration- chest wall appears symmetric, good expansion,   normal effort without use of accessory muscles  Cardiovascular- RRR  Musculoskeletal - no significant wasting of muscles noted, no bony deformities, gait no gross ataxia  Extremities-no clubbing, cyanosis or edema  Skin - warm, dry, and intact. No rash, erythema, or pallor. Psychiatric - mood, affect, and behavior appear normal.      Neurology  NEUROLOGICAL EXAM:      Mental status   Awake, alert, fluent oriented x 3 appropriate affect  Attention and concentration appear appropriate  Recent and remote memory appears unremarkable  Speech normal without dysarthria  No clear issues with language       Cranial Nerves   CN II- Visual fields grossly unremarkable  CN III, IV,VI-EOMI, No nystagmus, conjugate eye movements, no ptosis  CN VII-no facial asymmetry  CN VIII-Hearing intact      Motor function  Antigravity x 4.  3+/5 weakness right hip             Tremor None present     Gait                  Not tested           Nursing/pcp notes, imaging,labs and vitals reviewed.          Lab Results   Component Value Date    WBC 8.7 04/28/2022    HGB 10.3 (L) 04/28/2022    HCT 32.2 (L) 04/28/2022    MCV 91.7 04/28/2022     04/28/2022     Lab Results   Component Value Date     04/28/2022    K 4.2 04/28/2022    CL 96 (L) 04/28/2022    CO2 23 04/28/2022    BUN 23 04/28/2022    CREATININE 1.3 (H) 04/28/2022    GLUCOSE 116 (H) 04/28/2022    CALCIUM 8.5 (L) 04/28/2022    PROT 6.9 04/12/2022    LABALBU 3.1 (L) 04/12/2022    BILITOT <0.2 04/12/2022    ALKPHOS 123 04/12/2022 AST 14 04/12/2022    ALT 10 04/12/2022    LABGLOM 55 (A) 04/28/2022    GFRAA >59 04/28/2022    AGRATIO 1.9 11/24/2021    GLOB 2.2 11/24/2021     Lab Results   Component Value Date    INR 1.02 04/18/2022    INR 1.06 04/13/2022    INR 1.04 11/09/2021     Lab Results   Component Value Date    CRP 3.28 (H) 04/12/2022     PT,OT and/or speech notes reviewed:  Occupational Therapy       04/27/22 1430   Pre Treatment Pain Screening   Pain at present 4   Scale Used Numeric Score   Intervention List Patient able to continue with treatment   General   Additional Pertinent Hx CKD, CAD, HTN, hyperlipidemia, metastatic colon cancer s/p colostomy placement , OA right knee, chronic back pain, pain pump by Dr. Marti Morrison, GERD, mixed hyperlipidemia   Diagnosis Sepsis,recent right nephrectomy and ureter removal d/t right ureter tumor and placement of stents,4/20/22 for left ureteral stent change and cystoscopy bladder biopsy, recent lung biopsy,recent bladder biopsy   Pain Assessment   Pain Assessment 0-10   Pain Level 4   Pain Location Knee   Pain Descriptors Aching;Discomfort   Functional Pain Assessment Activities are not prevented   Pain Type Chronic pain   Pain Frequency Continuous   Pain Onset On-going   Non-Pharmaceutical Pain Intervention(s) Rest   Response to Pain Intervention Patient satisfied   Balance   Sitting Balance Independent   Standing Balance Independent   Functional Mobility   Functional - Mobility Device 4-Wheeled Walker   Activity To/from bathroom; Other   Assist Level Modified independent    Transfers   Sit to stand Modified independent   Stand to sit Modified independent   Type of ROM/Therapeutic Exercise   Comment Cane/wand: BUE 2# 2 sets x 10 reps. Assessment   Performance deficits / Impairments Decreased functional mobility ; Decreased strength;Decreased endurance;Decreased high-level IADLs   Treatment Diagnosis Sepsis,recent right nephrectomy and ureter removal d/t right ureter tumor and placement of stents,4/20/22 for left ureteral stent change and cystoscopy bladder biopsy, recent lung biopsy   Prognosis Good   History colon CA with mets including ureter and lungs, arthrocentesis 4/12 for right knee pain, CKD, back surgery,CKD, CAD, HTN, hyperlipidemia, metastatic colon cancer s/p colostomy placement , OA right knee, chronic back pain, pain pump by Dr. Lm Leahy, GERD, mixed hyperlipidemia   Timed Code Treatment Minutes 45 Minutes   Activity Tolerance   Activity Tolerance Patient Tolerated treatment well   Plan   Times per Week 3-5   Current Treatment Recommendations Strengthening;Home management training;Functional mobility training; Endurance training        04/27/22 [address] needed [address] Score 6   Toilet Transfer   Assistance needed Independent   CARE Score 6                Cosigned by: Ronnie Jackson OT at 4/28/2022  7:15 AM     Physical Therapy  Name: Lacy Perez  MRN:  [id number]  Date of service:  4/27/2022 04/27/22 1345   Restrictions/Precautions   Restrictions/Precautions Up Ad Naomy; Contact Precautions   Subjective   Subjective Pt wanting pn meds soon; would like to do therapy in room   Subjective   Pain 7-8/10 R knee   Transfers   Sit to Stand Modified independent   Stand to sit Modified independent   Bed to Chair Modified independent   Comment uses rollator for mobility   Ambulation   WB Status wbat   Ambulation   Surface level tile   Device Rollator  (forearm style)   Assistance Stand by assistance   Quality of Gait fwd flexed posture, downward gaze   Gait Deviations Decreased step length;Decreased step height   Distance 300'   Comments 180* turn at 1/2 way point; cues to correct posture, though bends at waist due to back issues   PT Exercises   Exercise Treatment seated P-AA LAQ, RROM L LAQ and HS curls; reclined QS, HS, SAQ, hip abd/add (manual resist B leg press, L saq, L HS)   Assessment   Assessment pt inc distance for gt; good effort with all despite knee pain   Recommendation   Requires PT Follow-Up No         Electronically signed by Alexey Manriquez PTA on 4/27/2022 at 2:29 PM               Cosigned by: Nieves Romero PT at 4/28/2022  7:47 AM             RECORD REVIEW: Previous medical records, medications were reviewed at today's visit    IMPRESSION:     1. Sepsis from UTI. Antifungal and Levaquin have been completed per infectious disease recommendations. For generalized weakness and deconditioning-PT/OT  2. Urinary retention with recent Mcgregor removal.  Now voiding on own. 3.  History of urothelial cancer and now metastatic colon cancer status post colostomy and nephrectomy with lung mets  For. Acute on chronic CKD-continue to monitor. Stable  5. Recent ileus. Resolved  6. DVT prophylaxis-SCDs  7. Essential hypertension- stable  8. Chronic right lower extremity weakness. Unchanged     Appreciate infectious disease, hospitalist, urology and oncology consults. More than 35 minutes were spent today reviewing the patient's records, examination, discharge summary with medication reconciliation and with counseling and coordination of care. More than 50% spent on the latter  ELOS:4/29, Friday expected.

## 2022-04-29 NOTE — DISCHARGE SUMMARY
Occupational Therapy Discharge Summary         Date: 2022  Patient Name: Teddy De La Cruz        MRN: [id number]    : 1950  (81 y.o.)  Gender: male      Diagnosis: Sepsis secondary to UTI  Restrictions/Precautions  Restrictions/Precautions: Up Ad Naomy,Contact Precautions      Discharge Date: 2022    UE Functioning:  BUE AROM WFL     Home Management:  Functional Mobility  Functional - Mobility Device: 4-Wheeled Walker  Activity: To/from bathroom,Other (Short distance in room.)  Assist Level: Modified independent   Functional Mobility Comments: Light homemaking task in ADL apartment. Adaptive Equipment/DME Status:  Bathroom Equipment: Grab bars in [facility]  Home Equipment: Walker, rolling,Wheelchair-manual,Grab bars      Pain Assessment:  Pain Level: 4  Pain Location: Knee    Remaining Problems:  Decreased functional mobility ;  Decreased strength; Decreased endurance; Decreased high-level IADLs    STGs:  Short Term Goals  Time Frame for Short term goals: 1 week  Short Term Goal 1: complete LB dressing with AE prn with supervision  Short Term Goal 2: complete overall toileting with supervision  Short Term Goal 3: complete simple ambulatory home making task with supervision  Short Term Goal 4: complete 1-2 handed standing level task for 3 mins with supervision  Short Term Goal 5: complete overall bathing with supervision  MET 5/5 Short Term Goals    LTGs:  Long Term Goals  Time Frame for Long term goals : 2 weeks  Long Term Goal 1: complete overall dressing with modified independence  Long Term Goal 2: complete overall bathing with modified independence  Long Term Goal 3: complete overall toileting with modified independence  Long Term Goal 4: complete HEP with independence  Long Term Goal 5: complete simple ambulatory home making task with modified independence  Additional Goals?: Yes  Long Term Goal 6: pt/family verbalize DME for home  MET 6/6 Long Term Goals     Discharge Setting and Recommendations:  Home with Home Health OT     Discharge Care Scores  Eating: CARE Score: 6  Oral Hygiene: CARE Score: 6  Toileting: CARE Score: 6  Shower/Bathe: CARE Score: 6  Upper Body Dressing: CARE Score: 6  Lower Body Dressing: CARE Score: 6  Footwear: CARE Score: 6  Toilet Transfers: CARE Score: 6  Picking Up Object:  CARE Score: 6    Electronically signed by Dayday Orellana OT on 4/29/22 at 3:13 PM CDT

## 2022-04-30 NOTE — DISCHARGE SUMMARY
Physical Therapy DISCHARGE Note  DATE:  2022  NAME:  Carmen Madison  :  1950  (81 y.o.,male)  MRN:  [id number]    HEIGHT:  Height: 5' 5\" (165.1 cm)  WEIGHT:  Weight: 160 lb 9 oz (72.8 kg)    PATIENT DIAGNOSIS(ES):    Diagnosis: Sepsis secondary to UTI    Additional Pertinent Hx: CKD, CAD, HTN, hyperlipidemia, metastatic colon cancer s/p colostomy placement , OA right knee, chronic back pain, pain pump by Dr. Skyler Gan, GERD, mixed hyperlipidemia, recent right nephrectomy and ureter removal d/t right ureter tumor and placement of stents, and edgar catheter in place since 22 for urinary retention  RESTRICTIONS/PRECAUTIONS:    Restrictions/Precautions  Restrictions/Precautions: Up Ad Naomy,Contact Precautions  Position Activity Restriction  Other position/activity restrictions: Colostomy, pain pump  OVERALL  ORIENTATION STATUS:  Overall Orientation Status: Within Normal Limits  PAIN:  Pain Level: 4  Pain Type: Chronic pain    Pain Location: Knee     Pain Orientation: Right      GROSS ASSESSMENT        POSTURE/BALANCE  Balance  Posture: Fair  Sitting - Static: Fair  Sitting - Dynamic: Fair  Standing - Static: Fair  Standing - Dynamic: Fair       ACTIVITY TOLERANCE  Activity Tolerance  Activity Tolerance: Patient tolerated treatment well      BED MOBILITY  Bed mobility  Rolling to Left: Modified independent  Rolling to Right: Modified independent  Supine to Sit: Modified independent  Sit to Supine: Modified independent  Scooting: Stand by assistance  Comment: in/out L side of bed with leg lifterr        TRANSFERS  Transfers  Sit to Stand: Modified independent  Stand to sit: Modified independent  Bed to Chair: Modified independent  Stand Pivot Transfers:  (.)  Car Transfer: Modified independent  Comment: uses rollator for mobility       WHEELCHAIR PROPULSION 1  Propulsion 1  Propulsion: Manual  Level: Level Tile  Method: HUDSON MOLINA  Level of Assistance: Modified independent  Description/ Details: Alaina left  Distance: 250 ft  WHEELCHAIR PROPULSION 2     AMBULATION 1  Ambulation  Surface: level tile  Device: Rollator  Assistance: Modified Independent  Quality of Gait: fwd flexed posture, downward gaze  Gait Deviations: Decreased step length,Decreased step height  Distance: 200 ft  Comments: 180* turn at 1/2 way point; cues to correct posture, though bends at waist due to back issues  AMBULATION 2     STAIRS  Stairs  # Steps : 8  Stairs Height: 6\"  Rails: Bilateral  Device: No Device  Other Apparatus:  (R knee support)  Assistance: Contact guard assistance,Stand by assistance  Comment: greater difficulty with descending stairs but good technique and seems confident; R knee chronic issues are limiting factor    GOALS:  Short Term Goals  Time Frame for Short term goals: 1 week  Short term goal 1: Supervision with bed mobility  Short term goal 2: Supervision with transfers  Short term goal 3: SBA with ambulating 100' using RW  Short term goal 4: CGA completing 2 stairs using hand rail  Short term goal 5: Supervision with w/c proplusion 100'    Long Term Goals  Time Frame for Long term goals : 2 weeks  Long term goal 1: Independent with bed mobility  Long term goal 2: Independent with transfers  Long term goal 3: Modified Independent with ambulating 150'  Long term goal 4: Independent with w/c proplusion 150'  Long term goal 5: Modified Independent with competing 4 stairs using handrail  HOME LIVING:     Type of Home: House     Home Access: Ramped entrance        ASSESSMENT (IMPAIRMENTS/BARRIERS): Body Structures, Functions, Activity Limitations Requiring Skilled Therapeutic Intervention: Decreased functional mobility ,Decreased ROM,Decreased body mechanics,Decreased strength,Decreased endurance,Decreased balance,Decreased posture  Assessment: INDEPENDENT HEP. NO BARRIERS TO D/C. MUCH IMPROVED STRENGTH, BALANCE, ENDURANCE.   Activity Tolerance: Patient tolerated treatment well     PLAN:  Plan  Plan: 5-7 times per week  Plan weeks: 2  Current Treatment Recommendations: Strengthening,ROM,Balance training,Transfer training,Endurance training,Wheelchair mobility training,Gait training,Stair training,Safety education & training,Patient/Caregiver education & training,Equipment evaluation, education, & procurement  Discharge Recommendations: Continue to assess pending progress,Home with Home health PT  PATIENT REQUIRES AND IS REASONABLY EXPECTED TO ACTIVELY PARTICIPATE IN AT LEAST 3 HOURS OF INTENSIVE THERAPY PER DAY AT LEAST 5 DAYS PER WEEK, AND BE EXPECTED TO MAKE MEASURABLE IMPROVEMENT THAT WILL BE OF PRACTICAL VALUE TO IMPROVE THE PATIENT'S FUNCTIONAL CAPACITY OR ADAPTATION TO IMPAIRMENTS.    PATIENT GOAL FOR REHAB:  RETURN TO PRIOR LEVEL OF FUNCTION       IRF/JAMAL  Roll Left and Right  Assistance Needed: Independent  CARE Score: 6  Discharge Goal: Independent    Sit to Lying  Assistance Needed: Independent  CARE Score: 6  Discharge Goal: Independent    Lying to Sitting on Side of Bed  Assistance Needed: Independent  CARE Score: 6  Discharge Goal: Independent    Sit to Stand  Assistance Needed: Independent  CARE Score: 6  Discharge Goal: Independent    Chair/Bed-to-Chair Transfer  Assistance Needed: Independent  CARE Score: 6  Discharge Goal: Independent    Car Transfer  Assistance Needed: Independent  Reason if not Attempted: Not attempted due to medical condition or safety concerns  CARE Score: 6  Discharge Goal: Independent    Walk 10 Feet  Assistance Needed: Independent  CARE Score: 6  Discharge Goal: Independent    Walk 50 Feet with Two Turns  Assistance Needed: Independent  CARE Score: 6  Discharge Goal: Independent    Walk 150 Feet  Assistance Needed: Independent  Reason if not Attempted: Not attempted due to medical condition or safety concerns  CARE Score: 6  Discharge Goal: Independent    Walking 10 Feet on Uneven Surfaces  Assistance Needed: Supervision or touching assistance  Reason if not Attempted: Not attempted due to medical condition or safety concerns  CARE Score: 4  Discharge Goal: Not Attempted    1 Step (Curb)  Assistance Needed: Supervision or touching assistance  Reason if not Attempted: Not attempted due to medical condition or safety concerns  CARE Score: 4  Discharge Goal: Independent    4 Steps  Assistance Needed: Supervision or touching assistance  Reason if not Attempted: Not attempted due to medical condition or safety concerns  CARE Score: 4  Discharge Goal: Independent    12 Steps  Reason if not Attempted: Not attempted due to medical condition or safety concerns  CARE Score: 88  Discharge Goal: Not Attempted    Wheel 50 Feet with Two Turns  Assistance Needed: Independent  CARE Score: 6  Discharge Goal: Independent    Wheel 150 Feet  Assistance Needed: Independent  CARE Score: 6  Discharge Goal: Independent      LAST TREATMENT TIME  PT Individual Minutes  Time In: 0261  Time Out: 1430  Minutes: 39

## 2022-05-02 NOTE — TELEPHONE ENCOUNTER
[facility] 9 admitted pt 4/30 and will follow up with 1 more visit this week - pt dgt is able to assist pt with needs    - PT will also eval today pt needs are mostly therapy at this time      Please advise if additional nursing visit approved

## 2022-05-03 NOTE — TELEPHONE ENCOUNTER
TRIED TO REACH PT TO SCHEDULE 6 WEEK FU AFTER MEDS HAVE BEEN STARTED.  LEFT A VM WITH STATING REASON FOR CALL AND THAT WE HAD SCHEDULE PTS APPT ALREADY FOR 6/17/22  WITH APRN AND IF THEY HAVE ANY QUESTIONS OR CONFLICTS WITH APPT TO CALL OFFICE BACK

## 2022-05-03 NOTE — TELEPHONE ENCOUNTER
Maurice 45 Transitions Initial Follow Up Call    Outreach made within 2 business days of discharge: Yes    Patient: Juan Pablo Leung   Patient : 1950   MRN: [id number]    Reason for Admission: Sepsis [facility])    Ureteral stricture, left    Lung nodules    Metastasis from colon cancer [facility])    Urothelial carcinoma of right distal ureter ([address]    Urinary tract infection associated with indwelling urethral catheter [facility])  Discharge Date: 22       Spoke with: Unable to reach, [place] for daughter Douglas Long first attempt.     Follow Up  Future Appointments   Date Time Provider Pepe Hanson   2022  8:30 AM SCHEDULE, L MED ONC MA [facility] MED ONC [facility]   2022  8:45 AM Tera Baker MD N PAD HEMONC [facility]   2022  9:00 AM SCHEDULE, [facility] OP INFUSION L OP INF [facility]   2022 10:30 AM OLGA Robledo N LPS Cardio [facility]   2022 10:00 AM Ryan Naqvi MD LPS [facility]   2022  9:30 AM OLGA Roger - CNP N LPS URO [facility]   2022  9:45 AM Rissa Christy MD N [facility] URO [facility]       Lilli Mustafa MA

## 2022-05-03 NOTE — TELEPHONE ENCOUNTER
Maurice 45 Transitions Initial Follow Up Call    Outreach made within 2 business days of discharge: Yes    Patient: Elio Leyva   Patient : 1950   MRN: [id number]    Reason for Admission: Sepsis [address] [address]    Ureteral stricture, left    Lung nodules    Metastasis from colon cancer [facility])    Urothelial carcinoma of right distal ureter ([facility] [address]    Urinary tract infection associated with indwelling urethral catheter [facility])  Discharge Date: 22       Spoke with: Ang Frank    Discharge department/facility: [facility]    TCM Interactive Patient Contact:  Was patient able to fill all prescriptions: Yes  Was patient instructed to bring all medications to the follow-up visit: Yes  Is patient taking all medications as directed in the discharge summary? Yes  Does patient understand their discharge instructions: Yes  Does patient have questions or concerns that need addressed prior to 7-14 day follow up office visit: no    Daughter states he is doing very well since returning to her home. His appetite is good. He is having normal bladder and bowel function. Taking all meds as prescribed. Denies any fever, chills or signs of infection. He will see Dr. Yuan Rodriguez 5/10/22.      Scheduled appointment with PCP within 7-14 days    Follow Up  Future Appointments   Date Time Provider Pepe Hanson   2022  8:30 AM SCHEDULE, L MED ONC MA L MED ONC [facility]   2022  8:45 AM Leopold Georgis, MD N PAD HEMONC [facility]   2022  9:00 AM SCHEDULE, L OP INFUSION [facility] OP INF [facility]   2022 10:30 AM OLGA Dodd N LPS Cardio [facility]   2022 10:00 AM [facility] Anderson [facility]   2022  9:30 AM OLGA Chang CNP N LPS [place] [facility]   2022  9:45 AM Radha Díaz MD N LPS [facility]       Pratima Ridley MA

## 2022-05-03 NOTE — TELEPHONE ENCOUNTER
[address] [id number] OT eval completed and per OT Pt doing well with self care routines with supervision/setup from family as needed    OT evaluation only, no further visits needed at this time   [facility]

## 2022-05-09 NOTE — TELEPHONE ENCOUNTER
Brigid Piedra called to request a refill on his medication. Last office visit : 6/1/2021   Next office visit : 5/10/2022     Last Lorri Sherrill: 05-  Medication Contract: not found   Last Fill: 11-      Last UDS:   Amphetamines   Date Value Ref Range Status   05/01/2013 NEGATIVE  Final     Barbiturates   Date Value Ref Range Status   05/01/2013 NEGATIVE  Final     Benzodiazepines   Date Value Ref Range Status   05/01/2013 NEGATIVE  Final     Opiate Scrn, Ur   Date Value Ref Range Status   04/06/2015 See Final Results Deqany=329 ng/mL Final     Comment:     Opiate test includes Codeine, Morphine, Hydromorphone, Hydrocodone. Requested Prescriptions     Pending Prescriptions Disp Refills    ALPRAZolam (XANAX) 0.25 MG tablet [Pharmacy Med Name: ALPRAZOLAM 0.25MG TABLETS] 30 tablet      Sig: TAKE 1 TABLET BY MOUTH EVERY NIGHT AS NEEDED FOR ANXIETY         Please approve or refuse this medication.    Megan Pepe MA

## 2022-05-10 NOTE — PROGRESS NOTES
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 In addition, he has a history of urothelial carcinoma stage II. He had progressive disease in the lungs compatible with colonic adenocarcinoma. His last treatment with chemotherapy was done in September/October 2021. He had significant postoperative complications after surgery for his ureteric carcinoma. He is here today to resume treatment with FOLFIRI/panitumumab. He has been to rehab recently. He is overall doing much better. ONCOLOGIC HISTORY:   Diagnosis:  · Moderately differentiated rectal carcinoma, T3N0Mx, diagnosed in 3/9/2009  · Noninvasive high-grade papillary urethral carcinoma.  Negative for evidence of detrusor muscle invasion, pTa, pNx on 8/18/2019. · Metastatic colorectal carcinoma, 9/3/2019  · MSI stable and mutations for BRAF, NRAS, KRAS were not detected.    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 · 8/18/2019- transurethral resection of bladder tumor (TURBT)   · 9/18/2019-12/26/2019 palliative chemotherapy with modified FOLFOX 7  (Oxaliplatin 85 mg/m² IV day 1, leucovorin 400 mg/m² IV day 1 and 5-FU 2400 mg/m² IV continuous infusion over 46 to 48 hours for a total of 7 cycles.    · 1/28/2020 -palliative maintenance therapy with leucovorin 400 mg/m² IV over 2 hours on day 1, followed by 5-FU bolus 400 mg/m² and then 1200 mg/m²/day x2 days (total 2400 mg meter squared over 46 to 48 hours) continuous infusion.  Repeat every 2 weeks. · 05/11/22- Resuming FOLFIRI/panitumumab chemotherapy for progressive lung metastasis           ONCOLOGIC HISTORY # NMIBC_Bladder cancer. Lambert Wood was diagnosed with noninvasive urethral carcinoma, pTa, pNx on 8/18/2019.  Ta tumors are papillary lesions that tend to recur but are relatively benign and generally do not invade the bladder.  Adjuvant treatment is not warranted at this time and will be monitored closely. Biopsy and transurethral resection of bladder tumor (TURBT) on 8/18/2019 by Dr. Kiara Muhammad with pathology revealing noninvasive high-grade papillary urethral carcinoma.  Negative for evidence of detrusor muscle invasion, pTa, pNx.   · 12/3/2019- cystoscopy with removal and replacement of double-J urethral stent.  Pathology from dome of the bladder/tumor revealed high-grade papillary urethral carcinoma, noninvasive.  No muscularis propria present.    · 2/26/2020 - cystoscopy and bilateral ureteral stent removal and replacement. The operative note by Dr. Wolfgang Mobley documented bilateral hydronephrosis and obtained biopsy of the bladder in the mid dome and left anterior lateral wall x2. Pathology documented high-grade papillary urethral carcinoma, noninvasive, stage pTaNx. · 5/28/2020-the patient underwent a cystoscopy and resection of bladder tumor on 05/28/2020 with findings consistent with noninvasive, high-grade papillary urothelial carcinoma. Muscularis propria was not identified. The patient will receive intravesical BCG therapy. · 7/6/2020-CT Abdomen/ Pelvis-Moderate severe right and mild left hydronephrosis with bilateral ureteral stents, which have an adequate radiographic position. Right kidney with cortical thickening and somewhat asymmetrically decreased enhancement which can be seen with obstructive uropathy. Postoperative changes of colectomy.  Left lower quadrant ostomy. Slightly decreased size of presacral low density compared to4/15/2020. Similar intrahepatic and extrahepatic bile duct dilation compared to 4/15/2020. Correlate with clinical symptoms and laboratory studies if clinically indicated. Similar chronic bony findings. · 3/25/2021-CT abdomen showed severe hydronephrosis. Cystoscopy was performed by urology. · 4/1/21 Bladder neck tumor, biopsies: High-grade papillary urothelial carcinoma, noninvasive. Muscularis propria is not present. AJCC pathologic stage:  pTa Nx   · 4/14/2021-reviewed results of pathology. No evidence of invasive disease. Continue surveillance cystoscopy with urology. · 9/13/2021-he was seen by urology. Findings of ureteral high-grade urothelial carcinoma. Noninvasive. The patient is being referred to [facility] in [place]. · 10/11/2021-he was seen by [facility] and recommended cystoscopy with biopsy and possible laser ablation and bilateral leg stent exchange at that time. · 4/20/2022 Urinary bladder, biopsy of right lateral bladder lesion: Disrupted urothelial mucosa with severe chronic inflammation, negative for evidence   of malignancy. ONCOLOGIC HISTORY #3  Joaquin Perla was seen in initial oncology consultation on 8/19/2019 during his hospitalization at [facility] after a large pelvic mass was identified which raised concern for recurrent disease.   Further pathology consistent with recurrent rectal cancer  · 8/17/2019- CEA 18.1  · 8/17/2019- CT scan of the kidney with contrast documented moderate to severe right hydronephrosis with dilation of the right ureter into the lower pelvis the site of the parasacral soft tissue changes.  Partially calcified soft tissue changes within the janes-sacral region likely representing sequelae of pelvic radiation.  Increasing scarring/fibrosis versus tumor recurrence within the presacral changes, likely represents a site of right distal ureter obstruction.  No left-sided hydronephrosis. · 8/18/2019 -Double-J ureter stent placement for right hydronephrosis secondary to extrinsic compression by pelvic mass.    · 8/27/2019-CT scan of the chest with contrast documented numerous pulmonary nodules that appear new compared to 11/12/2017, RUL nodule measuring 7 mm and LLL nodule measuring 5 mm.  Soft tissue nodule at the left ventral abdominal wall.  Slight increased size of a probable lymph node anterior to the aorta measuring 0.9 cm compared to 0.7 cm.  Similar presacral, right pelvic sidewall and right abductor muscular nodular soft tissue density. · 8/27/2019 CT scan of the abdomen and pelvis with contrast identified new moderate left hydronephrosis with moderate right hydronephrosis.  Mild stranding around the urinary bladder and thickening of the bilateral ureteral wall.  Numerous pulmonary nodules.  Soft tissue of the left ventral abdominal wall.  Slightly increased size of probable lymph node anterior to the aorta measuring 0.9 cm compared to 0.7 cm. · 8/27/2019-PET scan did not identify any FDG avid pulmonary nodules or airspace opacities.  Abnormal increased metabolic activity within the right pelvic wall soft tissue showing SUV of 5.4.  Abnormal soft tissue metabolic activity in the right abductor muscle with SUV of 6.4.  Focally increased activity to the right of the inferior L5 vertebrae body posterior with SUV of 7.9 with associated sclerotic changes.   · 8/29/2019-  Dr. Shirlyn Burkitt completed a cystoscopy with double-J ureter stent in the left ureter for left hydronephrosis  · 9/3/2019- CT-guided right abductor muscle biopsy on 9/3/2019 with pathology identifying metastatic adenocarcinoma consistent with colorectal origin.  Molecular panel from biopsy tissue revealed MSI stable and mutations for BRAF, NRAS, KRAS were not detected.    · 9/18/2019 - Palliative chemotherapy with modified FOLFOX 7  (Oxaliplatin 85 mg/m² IV day 1, leucovorin 400 mg/m² IV day 1 and 5-FU 2400 mg/m² IV continuous infusion over 46 to 48 hours) with the anticipation of adding Avastin 5 mg/kg day 1 every 14 days  · 10/15/2019- 24-hour urine for protein with a total protein of 1785 mg per 24-hour.  Zurdo has been evaluated by Dr. Enrique Wilder and he reports no significant concerns related to the protein. · 11/6/19 CEA 5.6 significantly improved compared to CEA of 14.0 on 8/30/2019. · 11/15/2019 -CT scan of the abdomen and pelvis documented no evidence of disease progression with significant decrease in the size of enhancing nodules in the right pelvic abductor musculature, a previous 1.8 cm nodule now measures 5 mm.  No new or enlarging retroperitoneal, mesenteric, pelvic or inguinal lymph nodes.  Calcified presacral mass unchanged measuring 5 x 3.7 cm.   · 11/15/2019 -CT scan of the chest documented multiple small pulmonary nodules reidentified, largest nodule in the RUL measures 5 mm compared to 7.5 mm, RLL nodule measures 3.4 mm compared to 5.9 mm, VIC nodule measures 4 mm compared to 6 mm.  A cluster of small nodules in the RUL anteriorly are barely visible on this study.  There is a decrease in size of mediastinal lymph nodes compared to previous exam, right distal paratracheal lymph node measuring 4.5 mm compared to 8.3 mm and lower right peritracheal node measuring 4.5 mm compared to 8.6 mm.    · 1/13/2020- CT scan of the abdomen and pelvis with contrast indicated improvement in the right-sided hydronephrosis with a chronic inflammatory process of the ureters suspected due to the moderate thickening, also present on previous study.  The small poorly enhancing nodules in the right abductor muscles have decreased in the partially calcified presacral mass and right lateral pelvic wall nodules are stable compared to previous study.  Resolution of the subcutaneous abdominal wall nodules.  A prominent retroperitoneal lymph node adjacent and anterior to the left common artery is redefined and measures 6 mm, no change from previous exam.   · 1/13/2020 - CT scan of the chest documented a right lower lobe nodule measures 4.3 cm and is unchanged.  A right lower lobe nodule measures 2.8 mm compared to 3.4 mm.  Nodule in the right upper lobe is barely visible and measures 2.4 mm.  Nodule in the left lower lobe measures 4.8 mm and is unchanged.  Nodule in left lower lobe posterior measures 2.8 mm and previously measured 4.7 mm.  A right lower lobe posterior medially nodule is barely visible measuring 0.2 mm and previously. measured 4.5 mm.  No new nodules identified.  No change in the size of the mediastinal lymph nodes. · 1/28/2020 -palliative maintenance therapy with leucovorin 400 mg/m² IV over 2 hours on day 1, followed by 5-FU bolus 400 mg/m² and then 1200 mg/m²/day x2 days (total 2400 mg meter squared over 46 to 48 hours) continuous infusion.  Repeat every 2 weeks. Only received 1 cycle, further treatment held due to small bowel obstruction. · 1/30/2020 - CT scan of the abdomen and pelvis indicated high-grade small bowel obstruction with transition point in the midline posterior pelvis where a small bowel loop is tethered to a partially calcified presacral soft tissue mass. · 2/11/2020-CEA 1.4  · 3/5/2020-  Exploratory laparotomy, removal of adhesions, small bowel resection with primary anastomosis and partial thickness small bowel repair by Dr. Apollo Foss at [facility]. In the operative note Dr. Patti Mullins reported no evidence of carcinomatosis within the abdomen and the liver was unable to be examined due to extent of right upper quadrant adhesions. Pathology from small intestine documented no evidence of malignancy. · 4/15/2020 Ct Chest W Contrast Minimal interval increase in size of subcentimeter pulmonary nodules. The largest now measures 6 mm in the medial right lower lobe on axial image 80. There is a new, unstable, horizontal fracture through the T6 vertebral body.  Additionally, there are new fractures through the posterior 11th and 12th right ribs. The bones are moderately osteopenic. The finding of an unstable fracture through the T6 vertebral body was discussed with Ana Mercedes at 10:45 AM on 4/15/2020. · 4/15/2020 Ct Abdomen Pelvis W Iv Contrast  Patient has undergone interval resection of the distal small bowel, and there is a 2.8 cm fluid collection in the presacral operative bed. This contains a tiny focus of air. This may postoperative or due to infection. Please correlate with the patient's clinical symptoms and laboratory markers. Improved hydronephrosis and hydroureter. Diffuse osteoporosis. Findings in the lower chest are described in a separate dictation. · 4/22/2020-CEA 0.9  · 6/2/2020-resumed chemotherapy with 5-FU/leucovorin and Panitumumab. Okay to do 1 today then CMP CEA   · 8/19/20 CEA-1.1  · 10/21/2020- CEA 2.0  · 11/11/2020- Ct Chest W Contrast Multiple, too numerous to count, small noncalcified lung nodules bilaterally. The referenced nodules appears to have decreased in size the previous study. No new nodules. · 11/11/2020- Ct Abdomen Pelvis W Contrast Unchanged bilateral hydronephrosis, more on the right side. Bilateral ureteral stents in place. Moderate asymmetrical thickening of the incompletely distended urinary bladder. This may partly be due to incomplete distention. Possibility of chronic cystitis and or chronic partial outlet obstruction may not be excluded. A functioning left lower abdominal ostomy. A small parastomal small bowel herniation without obstruction. A partially calcified presacral mass. The soft tissue component have increased in size in the previous study. The osseous changes are described above. Any superimposed metastatic disease is not excluded and would be hard to evaluate due to extensive postsurgical changes. · 11/18/2020-essentially, overall stable disease. Improvement of the lung nodules with decreased in the size of the target lesions.   The pelvic lesion is is likely worse by 25%. However, CEA is is still within the normal limits. Therefore likely mixed response. We will continue current treatment and repeat CT scans in about 3 months. · 12/16/2020-discontinue 5-FU bolus from his regimen. · 2/9/2021- Ct Chest W Contrast No evidence of disease progression. Stable pulmonary nodules. Stable intrahepatic and extrahepatic bile duct dilation compared to prior 11/11/2020. Postoperative, posttraumatic and degenerative changes in the spine as described above. Old right-sided rib fractures. · 2/9/2021- Ct Abdomen Pelvis W Contrast showed evidence of response to therapy including decreased presacral mass/thickening now measuring 1.1 cm, previously 1.9 cm on 11/11/2020. Stable intrahepatic and extra hepatic bile duct dilation with cholecystectomy clips. See separately dictated CT chest of the same day regarding pulmonary nodules. Bilateral ureteral stents. Decreased bilateral hydronephrosis. Urinary bladder wall is thickened, which could be seen with post treatment changes or cystitis. Correlate with symptoms. Chronic bony findings as above  · 2/17/2021-reviewed CT chest abdomen pelvis. Essentially consistent with disease response to therapy. Continue current therapy.    · 2/17/21 CEA 1.0  · 3/25/21 Ct Abdomen Pelvis W Iv Contrast  The stomach is distended, however no small bowel dilatation identified. Contrast identified within the left ileostomy bag. The distal stomach is under distended which may be secondary to peristalsis. Gastroparesis considered. Bilateral ureteral stents remain appropriate in position, however there is new moderate to severe right-sided hydronephrosis and mild left-sided hydronephrosis when compared to the 2/9/2021 exam. Mild increase considered within the partially calcified presacral pelvic mass. Stable partially calcified right pelvic soft tissue. Similar abnormal wall thickening of the bladder most notable superiorly.  Similar prominence of the intra and extra hepatic bile ducts down to the level of the ampulla. Findings may represent a reservoir effect, however correlation with liver function tests recommended. Therefore. Stable noncalcified left greater than right pulmonary nodules with asymmetrical interstitial changes of the right lung base concerning for pneumonitis. Osteopenia with postoperative changes of the lumbar spine. Chronic compression deformities of the thoracolumbar vertebra as described above. · 4/14/2021-I reviewed notes from urology and also CT abdomen/pelvis that showed mild interval increase in the size of the soft tissue nodule in the pelvis. However, this is still very small and therefore will have a short follow-up CT scan and of May 2021. I personally reviewed the CT scans. Really hard to state that there is clear-cut disease progression. Again, short follow-up recommended. Continue current treatment. · 5/12/21 CEA 0.8  · 5/26/2001-CT chest abdomen pelvis showed Partially calcified presacral soft tissue mass appears unchanged. Previously measured soft tissue noncalcified component is unchanged measuring 2.2 x 3.2 cm on axial image 60. However, this is questionable. CT chest showed a stable pulmonary nodules. Subcentimeter nodules. Largest 7.5 mm. · 6/1/21 CEA 0.9  · 06/01/23- reviewed scans. Stable disease. Continue treatment every 3 weeks as per patient request. Continue infusional 5-FU, Panitumumab and leucovorin. No 5-FU bolus due to severe mucositis. · 8/11/2021 CEA . 9  · 9/03/2021 CT Chest w/Contrast Slight interval increase in the size of pulmonary nodules, the largest in the medial right lung base. Findings are concerning for metastatic disease. Atherosclerosis of the aorta and coronary arteries. Sclerotic rib lesions favored for osseous metastases. Old rib fractures also evident.   · 9/03/2021 CT Abd/Pelvis w/ IV Contrast (Oral) A persistent partially calcified mass in the presacral region with encasement of the distal ureters bilaterally and resultant bilateral hydronephrosis. Bilateral ureteral stents in place. There is increasing right-sided hydronephrosis since the previous study. Right renal atrophy similar to the previous study. Moderate asymmetrical thickening of the incompletely distended urinary bladder is similar to the previous study. This may partly be due to incomplete distention. An inflammatory process or a neoplastic process is not excluded. Moderate intrahepatic biliary dilatation is similar to the previous study and probably due to a prior cholecystectomy. Moderate dilatation of the contrast filled small bowel loops may represent an ileus or partial distal small bowel obstruction. There is left lower abdominal ileostomy which appears patent. The stable compression fractures of the lower thoracic and proximal lumbar vertebrae and hardware fusion similar to the previous study. · 9/22/21- Decrease Vectibix to every other treatment. He is currently receiving chemotherapy every 3 weeks as per patient request  · 11/9/2021 CT Abd/Pelvis WO Contrast No acute posttraumatic findings. Known metastatic disease to the lungs. Posttreatment changes in overall chronic findings as above. · 12/27/2021 NM Bone Scan Multiple foci of abnormal increased activity in the ribs bilaterally correspond with previously seen areas of bony sclerosis and old healed fractures. Abnormal activity in the lumbar spine correspond with compression fractures and kyphoplasty of these vertebrae. Probable right hydronephrosis. · 2/24/2022 US LE Left  Limited([facility]) No residual fluid in the left lateral thigh. · 2/27/2022 CT Abd/Pelvis W Contrast [facility]) Overall unchanged appearance of the right lateral abdominal collection with a percutaneous drain in place and small residual fluid.  Multiloculated fluid and gas collection in the pelvis along the right aspect of bladder dome measuring approximately 8 x 4 cm, not significantly changed in size.Postsurgical changes of prior colectomy and small bowel resection.  The distal ileum abuts the right flank collection and anterior abdomen in midline prior to ostomy. Unchanged partially calcified presacral and right pelvic sidewall   soft tissue. Minimally improved mild left hydroureteronephrosis. Bilateral pulmonary nodules in the visualized lungs, not significantly changed. Small right pleural effusion with associated atelectasis. Scattered areas of hypoattenuation within the right lower lobe could   represent developing pneumonia. · 2/27/2022 CT Entire LE Left W Contrast ([facility])Interval open incision and drainage of anterolateral left thigh subcutaneous abscess with residual fluid and packing material.  Persistent, slightly improved diffuse subcutaneous edema   throughout the left lower extremity with no new or organizing fluid   collections. · 4/10/2022 CT Chest WO Contrast Progressive metastatic disease to the lungs. There are too numerous to count pulmonary nodules the majority of which have increased in size or which are new from the previous study. No evidence of acute consolidative pneumonia. 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 Patient's undergone previous kyphoplasty at the thoracolumbar juncture for compression deformities. There is an accentuated thoracic kyphosis. .  · 4/10/2022 CT Abd/Pelvis WO Contrast Metastatic disease to the lung bases showing worsening from the previous study. Moderate size hiatal hernia with gastroesophageal reflux. The patient is status post at least partial colectomy. Left lower quadrant ostomy is present. There is no evidence of obstruction. There is an irregular soft tissue mass with some calcification within the pelvis. This extends to and is inseparable from the right posterior lateral urinary bladder wall with potential secondary involvement. This extends into the presacral space.  When compared to the previous exam I feel this is increased in size. The urinary bladder cannot be fully assessed as it is decompressed with a Mcgregor catheter. Postoperative changes of the mid lower lumbar spine. There is an accentuated lumbar lordosis with grade 1-2 anterolisthesis of L5 on S1. Compression deformities at T12, L1 and L2 are present with previous kyphoplasty T12 and L1. . 6. Status post right nephrectomy. There is mild dilatation of the upper tracts of the left kidney and left ureter with a double-J intraureteral stent well-positioned. The degree of distention of the upper tracts of the left kidney is improved from the previous exam of November of last year. There is mild urothelial thickening. · 4/13/2022 CT Abd/Pelvis WO Contrast When compared to the previous exam of 3 days earlier there is now noted to be moderate small bowel distention. I would favor a adynamic ileus. A distal obstruction at the site of the patient's ileostomy is also in the differential but felt less likely. There is mild distention of the stomach. A moderate size hiatal hernia is present. Mild to moderate dilatation of the upper tracts of the left kidney. A left-sided double-J ureteral stent is in place. There is mild urothelial thickening. I do not see evidence of a discrete ureteral stone. The stent is well-positioned. Partially calcified pelvic mass. This is inseparable from the right posterior lateral wall of the urinary bladder along its lower margin. A Mcgregor catheter is in place within the bladder. Chronic-appearing fractures within the thoracic and lumbar spine with a gibbus deformity at the thoracolumbar juncture and previous kyphoplasty at T12-L1. There is a small amount of free fluid within the subhepatic space and Morison space. A small right-sided effusion is present with multiple pulmonary nodules within the lower lobes consistent with metastatic disease to the lungs. There is a moderate size hiatal hernia present. .   · 04/19/22- CT guided biopsy consistent with colon cancer metastasis. · 5/2/2022- resuming chemotherapy with FOLFIRI/panitumumab for progressive colonic adenocarcinoma pulmonary metastasis        ONCOLOGIC HISTORY # Rectal carcinoma:  Chad Shea has a history of moderately differentiated rectal carcinoma, T3N0Mx, diagnosed in 3/9/2009.  He received neoadjuvant chemotherapy with radiation and resection with Annamary Palin has been routinely followed at [facility] and was last seen by Dr. Rae Choudhary on 1/10/2019. Abigail Malloy following are pertinent findings related to his diagnosis and treatment. · 3/9/2009- Esophagogastroduodenoscopy with biopsy by Dr. Anna De La O that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

## 2022-05-10 NOTE — PROGRESS NOTES
Post-Discharge Transitional Care Follow Up      Teddy De La Cruz   YOB: 1950    Date of Office Visit:  5/10/2022  Date of Hospital Admission: 4/20/22  Date of Hospital Discharge: 4/29/22  Readmission Risk Score (high >=14%. Medium >=10%):Readmission Risk Score: 21.2 ( )      Care management risk score Rising risk (score 2-5) and Complex Care (Scores >=6): 0     Non face to face  following discharge, date last encounter closed (first attempt may have been earlier): 5/3/2022  4:10 PM     Call initiated 2 business days of discharge: Yes     Hospital discharge follow-up  -     MA DISCHARGE MEDS RECONCILED W/ CURRENT OUTPATIENT MED LIST  Colon carcinoma metastatic to lung ([address]  -Followed by Dr. Carlos Ureña, starting chemotherapy soon    Chemotherapy-induced peripheral neuropathy ([address]    Increased urinary frequency  Urothelial carcinoma of right distal ureter ([address]  -On gemtesa, discussed may take some time to  effectiveness    Chronic pain syndrome  -Per Dr. Nesbitt Zion: high complexity  Return in about 6 months (around 11/10/2022). Subjective:   HPI    Inpatient course: Discharge summary reviewed- see chart. Hospital Course:   81 y. o. male with recent right nephrectomy, recent ureteral stents, metastatic urothelial carcinoma, history of colorectal cancer with ostomy in place, presented with fever and nausea decreased urine output. On the admission denied problems with ostomy output.  He was found to have UTI with culture speciated Candida glabrata and Achromobacter, ID consulted, treated with Eraxis and Levaquin.  CT Chest consistent with progressive metastatic disease to the lungs.  CT abdomen irregular soft tissue mass with some calcification within the pelvis.  Followed by oncology and urology during hospital course. [facility] course was complicated after patient developed an ileus with nausea and vomiting, however this resolved with Evaluated by general surgery. Jared Black subsequently resolved with removal of NG tube 4/16.  Removed Mcgregor. Other issues included ongoing knee pain with osteoarthritis, advised by orthopedics and had corticosteroid injection and analgesic regimen adjusted.      Patient worked with PT and approved for [facility] inpatient rehab pending medical stability with plan for ureteral stent change by urology and CT-guided biopsy of pulmonary nodule. CT-guided biopsy performed of nodule in right lower lung without complications, will need to follow-up pathology results. Urology took patient for ureteral stent exchange on left side without complications. Patient stable postop, otherwise medically stable for discharge with readmission to [facility] inpatient rehab service. Completed adequate antibiotic course on, however continuing on Eraxis for Candida glabrata infection per ID.     Interval history/Current status:   Pt reports improvement since hospital discharge. No fever, chills, n/v. Sadly wife fell and ended up passing away while pt was in hospital.  C/o urinary frequency, was started on gemtesa recently. Following up with hematology/oncology this next week to start chemotherapy. Will be following up with urology this week as well. He is working on keeping nutrition and energy up.  PT is coming to house 2x/week.      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    [facility]    Metastasis from colon cancer ([address]    Proteinuria    Chemotherapy management, encounter for    Anemia associated with chemotherapy    Other fatigue    Thrush    Dehydration    Chemotherapy-induced peripheral neuropathy (HCC)    Chemotherapy-induced nausea    SBO (small bowel obstruction) (HCC)    Burning with urination    History of small bowel obstruction    Encounter for central line care    Iron deficiency    History of bladder cancer    Hydronephrosis of left kidney    Hydronephrosis of right kidney    Low back pain    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)    Sepsis secondary to UTI ([address]    Acute kidney injury superimposed on CKD ([address]    Urinary tract infection associated with indwelling urethral catheter ([address]    Retained ureteral stent    Lower urinary tract symptoms    Ileus ([address]    Sepsis ([address]    Colon carcinoma metastatic to lung ([facility]    Metastatic adenocarcinoma ([facility]       Medications listed as ordered at the time of discharge from hospital     Medication List          Accurate as of May 10, 2022  4:24 PM. If you have any questions, ask your nurse or doctor. CHANGE how you take these medications    cyclobenzaprine 10 MG tablet  Commonly known as: FLEXERIL  Take 1 tablet by mouth 3 times daily as needed for Muscle spasms  What changed:   · how much to take  · when to take this  · additional instructions     FeroSul 325 (65 Fe) MG tablet  Generic drug: ferrous sulfate  TAKE 1 TABLET BY MOUTH TWICE DAILY  What changed: See the new instructions.         CONTINUE taking these medications    ALPRAZolam 0.25 MG tablet  Commonly known as: XANAX  TAKE 1 TABLET BY MOUTH EVERY NIGHT AS NEEDED FOR ANXIETY     bisoprolol 5 MG tablet  Commonly known as: ZEBETA  Take 1 tablet by mouth daily     CALCIUM 600 + D PO     DULoxetine 30 MG extended release capsule  Commonly known as: CYMBALTA  TAKE 1 CAPSULE BY MOUTH DAILY     furosemide 20 MG tablet  Commonly known as: LASIX  Take 1 tablet by mouth daily as needed (fluid retention)     ibandronate 150 MG tablet  Commonly known as: Boniva  Take 1 tablet by mouth every 30 days Take one (1) tablet once per month in the morning with a full glass of water, on an empty stomach, and do not take anything else by mouth or lie down for the next 30 minutes. lactobacillus Caps capsule  Take 1 capsule by mouth daily     Magic Mouthwash  Commonly known as: Miracle Mouthwash  Swish and spit 5 mLs 4 times daily as needed for Irritation     omeprazole 20 MG delayed release capsule  Commonly known as: PRILOSEC  Take 1 capsule by mouth Daily     ondansetron 4 MG tablet  Commonly known as: ZOFRAN  TAKE 2 TABLETS BY MOUTH EVERY 8 HOURS AS NEEDED FOR NAUSEA OR VOMITING     * oxyCODONE 20 MG extended release tablet  Commonly known as: OXYCONTIN  Take 1 tablet by mouth every 12 hours for 14 days. * oxyCODONE 15 MG immediate release tablet  Commonly known as: OXY-IR  Take 1 tablet by mouth every 6 hours as needed for Pain for up to 14 days. sodium bicarbonate 650 MG tablet  Take 1 tablet by mouth 4 times daily     Tylenol 8 Hour Arthritis Pain 650 MG extended release tablet  Generic drug: acetaminophen     Vibegron 75 MG Tabs  Take 75 mg by mouth daily         * This list has 2 medication(s) that are the same as other medications prescribed for you. Read the directions carefully, and ask your doctor or other care provider to review them with you. Medications marked \"taking\" at this time  Outpatient Medications Marked as Taking for the 5/10/22 encounter (Office Visit) with Sai Castaneda MD   Medication Sig Dispense Refill    ALPRAZolam (XANAX) 0.25 MG tablet TAKE 1 TABLET BY MOUTH EVERY NIGHT AS NEEDED FOR ANXIETY 30 tablet 0    Vibegron 75 MG TABS Take 75 mg by mouth daily 30 tablet 5    oxyCODONE (OXYCONTIN) 20 MG extended release tablet Take 1 tablet by mouth every 12 hours for 14 days.  28 each 0  oxyCODONE (OXY-IR) 15 MG immediate release tablet Take 1 tablet by mouth every 6 hours as needed for Pain for up to 14 days. 56 tablet 0    sodium bicarbonate 650 MG tablet Take 1 tablet by mouth 4 times daily 120 tablet 0    lactobacillus (CULTURELLE) CAPS capsule Take 1 capsule by mouth daily 30 capsule 0    bisoprolol (ZEBETA) 5 MG tablet Take 1 tablet by mouth daily 90 tablet 0    omeprazole (PRILOSEC) 20 MG delayed release capsule Take 1 capsule by mouth Daily 30 capsule 0    acetaminophen (TYLENOL 8 HOUR ARTHRITIS PAIN) 650 MG extended release tablet Take 650 mg by mouth every 8 hours as needed for Pain      ibandronate (BONIVA) 150 MG tablet Take 1 tablet by mouth every 30 days Take one (1) tablet once per month in the morning with a full glass of water, on an empty stomach, and do not take anything else by mouth or lie down for the next 30 minutes.  1 tablet 6    ondansetron (ZOFRAN) 4 MG tablet TAKE 2 TABLETS BY MOUTH EVERY 8 HOURS AS NEEDED FOR NAUSEA OR VOMITING 30 tablet 2    DULoxetine (CYMBALTA) 30 MG extended release capsule TAKE 1 CAPSULE BY MOUTH DAILY 30 capsule 3    furosemide (LASIX) 20 MG tablet Take 1 tablet by mouth daily as needed (fluid retention) 30 tablet 5    FEROSUL 325 (65 Fe) MG tablet TAKE 1 TABLET BY MOUTH TWICE DAILY (Patient taking differently: 325 mg 3 times daily (with meals) ) 180 tablet 1    Magic Mouthwash (MIRACLE MOUTHWASH) Swish and spit 5 mLs 4 times daily as needed for Irritation 240 mL 5    Calcium Carb-Cholecalciferol (CALCIUM 600 + D PO) Take 800 mg by mouth 2 times daily       cyclobenzaprine (FLEXERIL) 10 MG tablet Take 1 tablet by mouth 3 times daily as needed for Muscle spasms (Patient taking differently: Take 20 mg by mouth nightly Nightly) 90 tablet 2        Medications patient taking as of now reconciled against medications ordered at time of hospital discharge: Yes    Review of Systems  Reviewed with patient and noted to be negative except that listed in HPI    Objective:    /86   Pulse 86   Temp 98.4 °F (36.9 °C) (Temporal)   Resp 18   Ht 5' 5\" (1.651 m)   Wt 160 lb (72.6 kg)   SpO2 97%   BMI 26.63 kg/m²   Physical Exam  Constitutional:       General: He is not in acute distress. Appearance: Normal appearance. HENT:      Head: Normocephalic. Nose: Nose normal.      Mouth/Throat:      Mouth: Mucous membranes are moist.      Pharynx: Oropharynx is clear. No oropharyngeal exudate or posterior oropharyngeal erythema. Eyes:      General: No scleral icterus. Extraocular Movements: Extraocular movements intact. Conjunctiva/sclera: Conjunctivae normal.   Cardiovascular:      Rate and Rhythm: Normal rate and regular rhythm. Pulses: Normal pulses. Heart sounds: No murmur heard. Pulmonary:      Effort: Pulmonary effort is normal.      Breath sounds: Normal breath sounds. Musculoskeletal:         General: Normal range of motion. Cervical back: Normal range of motion. Skin:     General: Skin is warm and dry. Capillary Refill: Capillary refill takes less than 2 seconds. Neurological:      General: No focal deficit present. Mental Status: He is alert and oriented to person, place, and time. Psychiatric:         Mood and Affect: Mood normal.         Behavior: Behavior normal.         Thought Content: Thought content normal.         An electronic signature was used to authenticate this note.   --Escobar Duong MD

## 2022-05-11 PROBLEM — E83.42 HYPOMAGNESEMIA: Status: ACTIVE | Noted: 2022-01-01

## 2022-05-11 NOTE — PROGRESS NOTES
PT HERE FOR CHEMO TODAY, PORT WITH BLOOD RETURN BUT UNABLE TO OBTAIN CMP. PT HAD PERIPHERAL STICK FOR CMP X3 AND SENT TO LAB. CALLED LAB AT 1300 FOR RESULTS OF CMP AND THEY INSTRUCTED ME TUBE HAD HEMOLYZED AND NO ONE CALLED US. LAB REDREW SPECIMEN FOR CMP AND MAG AND SENT TO LAB. PT WANTS TO GO HOME AND RETURN ON Monday FOR CHEMO TREATMENT. CALLED [facility] AT DR ALEXANDRE OFFICE AND STATED THAT WOULD BE FINE.  PT RESCHEDULED FOR Monday AT 8AM.
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
## 2022-05-12 NOTE — PROGRESS NOTES
Patient received IV hydration therapy and Magnesium Sulfate 4000mg IV for Magnesium level of 0.7. Tolerated well.

## 2022-05-17 NOTE — TELEPHONE ENCOUNTER
Spoke with patient's daughter Galenapril Carson notify patient getting IVF the same day as his pump off at [facility] 5/18/2022. Patient and daughter are aware.

## 2022-05-26 NOTE — TELEPHONE ENCOUNTER
[address] [address] PT reporting that pt has met 2 of 3 goals and is appropriate for DC. He is independent with transfers from multiple surfaces in and outside of the home, and is able to ambulate in and outside of the home with SC with improved balance and safety. He has a slower naty with rounded shoulders, but good foot clearance and step through gait pattern. Still uses rollator in the home when needing to carry items around the house. Moderate fall risk with a tinetti score of 20/28 as of 5/24/2022. No falls since starting homecare services. Pt. has been given and instructed in a HEP and is compliant with it. Discussed option of going to outpatient PT, but he declined and wants to try exercising on his own. Instructed on how to start outpatient PT services at a later date if needed. Pt is  Discharged from [facility]  at this time.     bren

## 2022-06-03 NOTE — PROGRESS NOTES
Patient refused to receive the Magnesium sulfate that was ordered for today. Aurora Meza was notified by telephone.

## 2022-06-13 NOTE — PROGRESS NOTES
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 In addition, he has a history of urothelial carcinoma stage II. He had progressive disease in the lungs compatible with colonic adenocarcinoma. He has resumed treatment with FOLFIRI/panitumumab. He has received 2 cycles. He complains of significant fatigue and nausea from the last treatment. He is still sick after this day today. ONCOLOGIC HISTORY:   Diagnosis:  · Moderately differentiated rectal carcinoma, T3N0Mx, diagnosed in 3/9/2009  · Noninvasive high-grade papillary urethral carcinoma.  Negative for evidence of detrusor muscle invasion, pTa, pNx on 8/18/2019. · Metastatic colorectal carcinoma, 9/3/2019  · MSI stable and mutations for BRAF, NRAS, KRAS were not detected.    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 · 8/18/2019- transurethral resection of bladder tumor (TURBT)   · 9/18/2019-12/26/2019 palliative chemotherapy with modified FOLFOX 7  (Oxaliplatin 85 mg/m² IV day 1, leucovorin 400 mg/m² IV day 1 and 5-FU 2400 mg/m² IV continuous infusion over 46 to 48 hours for a total of 7 cycles.    · 1/28/2020 -palliative maintenance therapy with leucovorin 400 mg/m² IV over 2 hours on day 1, followed by 5-FU bolus 400 mg/m² and then 1200 mg/m²/day x2 days (total 2400 mg meter squared over 46 to 48 hours) continuous infusion.  Repeat every 2 weeks. · 05/11/22- Resuming FOLFIRI/panitumumab chemotherapy for progressive lung metastasis    ONCOLOGIC HISTORY # NMIBC_Bladder cancer. Melony Cooks was diagnosed with noninvasive urethral carcinoma, pTa, pNx on 8/18/2019.  Ta tumors are papillary lesions that tend to recur but are relatively benign and generally do not invade the bladder.  Adjuvant treatment is not warranted at this time and will be monitored closely. Biopsy and transurethral resection of bladder tumor (TURBT) on 8/18/2019 by Dr. Opal Whitlock with pathology revealing noninvasive high-grade papillary urethral carcinoma.  Negative for evidence of detrusor muscle invasion, pTa, pNx.   · 12/3/2019- cystoscopy with removal and replacement of double-J urethral stent.  Pathology from dome of the bladder/tumor revealed high-grade papillary urethral carcinoma, noninvasive.  No muscularis propria present.    · 2/26/2020 - cystoscopy and bilateral ureteral stent removal and replacement. The operative note by Dr. May Zavala documented bilateral hydronephrosis and obtained biopsy of the bladder in the mid dome and left anterior lateral wall x2. Pathology documented high-grade papillary urethral carcinoma, noninvasive, stage pTaNx. · 5/28/2020-the patient underwent a cystoscopy and resection of bladder tumor on 05/28/2020 with findings consistent with noninvasive, high-grade papillary urothelial carcinoma. Muscularis propria was not identified. The patient will receive intravesical BCG therapy. · 7/6/2020-CT Abdomen/ Pelvis-Moderate severe right and mild left hydronephrosis with bilateral ureteral stents, which have an adequate radiographic position. Right kidney with cortical thickening and somewhat asymmetrically decreased enhancement which can be seen with obstructive uropathy. Postoperative changes of colectomy. Left lower quadrant ostomy. Slightly decreased size of presacral low density compared to4/15/2020.  Similar intrahepatic and extrahepatic bile duct dilation compared to 4/15/2020. Correlate with clinical symptoms and laboratory studies if clinically indicated. Similar chronic bony findings. · 3/25/2021-CT abdomen showed severe hydronephrosis. Cystoscopy was performed by urology. · 4/1/21 Bladder neck tumor, biopsies: High-grade papillary urothelial carcinoma, noninvasive. Muscularis propria is not present. AJCC pathologic stage:  pTa Nx   · 4/14/2021-reviewed results of pathology. No evidence of invasive disease. Continue surveillance cystoscopy with urology. · 9/13/2021-he was seen by urology. Findings of ureteral high-grade urothelial carcinoma. Noninvasive. The patient is being [facility] in [place]. · 10/11/2021-he was seen by [facility] and recommended cystoscopy with biopsy and possible laser ablation and bilateral leg stent exchange at that time. · 4/20/2022 Urinary bladder, biopsy of right lateral bladder lesion: Disrupted urothelial mucosa with severe chronic inflammation, negative for evidence   of malignancy. ONCOLOGIC HISTORY #3  Medhat Link was seen in initial oncology consultation on 8/19/2019 during his hospitalization at [facility] after a large pelvic mass was identified which raised concern for recurrent disease. Further pathology consistent with recurrent rectal cancer  · 8/17/2019- CEA 18.1  · 8/17/2019- CT scan of the kidney with contrast documented moderate to severe right hydronephrosis with dilation of the right ureter into the lower pelvis the site of the parasacral soft tissue changes.  Partially calcified soft tissue changes within the janes-sacral region likely representing sequelae of pelvic radiation.  Increasing scarring/fibrosis versus tumor recurrence within the presacral changes, likely represents a site of right distal ureter obstruction.  No left-sided hydronephrosis.    · 8/18/2019 -Double-J ureter stent placement for right hydronephrosis secondary to extrinsic compression by pelvic mass.    · 8/27/2019-CT scan of the chest with contrast documented numerous pulmonary nodules that appear new compared to 11/12/2017, RUL nodule measuring 7 mm and LLL nodule measuring 5 mm.  Soft tissue nodule at the left ventral abdominal wall.  Slight increased size of a probable lymph node anterior to the aorta measuring 0.9 cm compared to 0.7 cm.  Similar presacral, right pelvic sidewall and right abductor muscular nodular soft tissue density. · 8/27/2019 CT scan of the abdomen and pelvis with contrast identified new moderate left hydronephrosis with moderate right hydronephrosis.  Mild stranding around the urinary bladder and thickening of the bilateral ureteral wall.  Numerous pulmonary nodules.  Soft tissue of the left ventral abdominal wall.  Slightly increased size of probable lymph node anterior to the aorta measuring 0.9 cm compared to 0.7 cm. · 8/27/2019-PET scan did not identify any FDG avid pulmonary nodules or airspace opacities.  Abnormal increased metabolic activity within the right pelvic wall soft tissue showing SUV of 5.4.  Abnormal soft tissue metabolic activity in the right abductor muscle with SUV of 6.4.  Focally increased activity to the right of the inferior L5 vertebrae body posterior with SUV of 7.9 with associated sclerotic changes.   · 8/29/2019-  Dr. Jana Sprague completed a cystoscopy with double-J ureter stent in the left ureter for left hydronephrosis  · 9/3/2019- CT-guided right abductor muscle biopsy on 9/3/2019 with pathology identifying metastatic adenocarcinoma consistent with colorectal origin.  Molecular panel from biopsy tissue revealed MSI stable and mutations for BRAF, NRAS, KRAS were not detected.    · 9/18/2019 - Palliative chemotherapy with modified FOLFOX 7  (Oxaliplatin 85 mg/m² IV day 1, leucovorin 400 mg/m² IV day 1 and 5-FU 2400 mg/m² IV continuous infusion over 46 to 48 hours) with the anticipation of adding Avastin 5 mg/kg day 1 every 14 days  · 10/15/2019- 24-hour urine for protein with a total protein of 1785 mg per 24-hour.  Zurdo has been evaluated by Dr. Charles Stokes and he reports no significant concerns related to the protein. · 11/6/19 CEA 5.6 significantly improved compared to CEA of 14.0 on 8/30/2019. · 11/15/2019 -CT scan of the abdomen and pelvis documented no evidence of disease progression with significant decrease in the size of enhancing nodules in the right pelvic abductor musculature, a previous 1.8 cm nodule now measures 5 mm.  No new or enlarging retroperitoneal, mesenteric, pelvic or inguinal lymph nodes.  Calcified presacral mass unchanged measuring 5 x 3.7 cm.   · 11/15/2019 -CT scan of the chest documented multiple small pulmonary nodules reidentified, largest nodule in the RUL measures 5 mm compared to 7.5 mm, RLL nodule measures 3.4 mm compared to 5.9 mm, VIC nodule measures 4 mm compared to 6 mm.  A cluster of small nodules in the RUL anteriorly are barely visible on this study.  There is a decrease in size of mediastinal lymph nodes compared to previous exam, right distal paratracheal lymph node measuring 4.5 mm compared to 8.3 mm and lower right peritracheal node measuring 4.5 mm compared to 8.6 mm.    · 1/13/2020- CT scan of the abdomen and pelvis with contrast indicated improvement in the right-sided hydronephrosis with a chronic inflammatory process of the ureters suspected due to the moderate thickening, also present on previous study.  The small poorly enhancing nodules in the right abductor muscles have decreased in the partially calcified presacral mass and right lateral pelvic wall nodules are stable compared to previous study.  Resolution of the subcutaneous abdominal wall nodules.  A prominent retroperitoneal lymph node adjacent and anterior to the left common artery is redefined and measures 6 mm, no change from previous exam.   · 1/13/2020 - CT scan of the chest documented a right lower lobe nodule measures 4.3 cm and is unchanged.  A right lower lobe nodule measures 2.8 mm compared to 3.4 mm.  Nodule in the right upper lobe is barely visible and measures 2.4 mm.  Nodule in the left lower lobe measures 4.8 mm and is unchanged.  Nodule in left lower lobe posterior measures 2.8 mm and previously measured 4.7 mm.  A right lower lobe posterior medially nodule is barely visible measuring 0.2 mm and previously. measured 4.5 mm.  No new nodules identified.  No change in the size of the mediastinal lymph nodes. · 1/28/2020 -palliative maintenance therapy with leucovorin 400 mg/m² IV over 2 hours on day 1, followed by 5-FU bolus 400 mg/m² and then 1200 mg/m²/day x2 days (total 2400 mg meter squared over 46 to 48 hours) continuous infusion.  Repeat every 2 weeks. Only received 1 cycle, further treatment held due to small bowel obstruction. · 1/30/2020 - CT scan of the abdomen and pelvis indicated high-grade small bowel obstruction with transition point in the midline posterior pelvis where a small bowel loop is tethered to a partially calcified presacral soft tissue mass. · 2/11/2020-CEA 1.4  · 3/5/2020-  Exploratory laparotomy, removal of adhesions, small bowel resection with primary anastomosis and partial thickness small bowel repair by Dr. Kyle Contreras at [facility]. In the operative note Dr. Bernie Norwood reported no evidence of carcinomatosis within the abdomen and the liver was unable to be examined due to extent of right upper quadrant adhesions. Pathology from small intestine documented no evidence of malignancy. · 4/15/2020 Ct Chest W Contrast Minimal interval increase in size of subcentimeter pulmonary nodules. The largest now measures 6 mm in the medial right lower lobe on axial image 80. There is a new, unstable, horizontal fracture through the T6 vertebral body. Additionally, there are new fractures through the posterior 11th and 12th right ribs. The bones are moderately osteopenic.  The finding of an unstable fracture through the T6 vertebral body was discussed with Mady Mercado at 10:45 AM on 4/15/2020. · 4/15/2020 Ct Abdomen Pelvis W Iv Contrast  Patient has undergone interval resection of the distal small bowel, and there is a 2.8 cm fluid collection in the presacral operative bed. This contains a tiny focus of air. This may postoperative or due to infection. Please correlate with the patient's clinical symptoms and laboratory markers. Improved hydronephrosis and hydroureter. Diffuse osteoporosis. Findings in the lower chest are described in a separate dictation. · 4/22/2020-CEA 0.9  · 6/2/2020-resumed chemotherapy with 5-FU/leucovorin and Panitumumab. Okay to do 1 today then CMP CEA   · 8/19/20 CEA-1.1  · 10/21/2020- CEA 2.0  · 11/11/2020- Ct Chest W Contrast Multiple, too numerous to count, small noncalcified lung nodules bilaterally. The referenced nodules appears to have decreased in size the previous study. No new nodules. · 11/11/2020- Ct Abdomen Pelvis W Contrast Unchanged bilateral hydronephrosis, more on the right side. Bilateral ureteral stents in place. Moderate asymmetrical thickening of the incompletely distended urinary bladder. This may partly be due to incomplete distention. Possibility of chronic cystitis and or chronic partial outlet obstruction may not be excluded. A functioning left lower abdominal ostomy. A small parastomal small bowel herniation without obstruction. A partially calcified presacral mass. The soft tissue component have increased in size in the previous study. The osseous changes are described above. Any superimposed metastatic disease is not excluded and would be hard to evaluate due to extensive postsurgical changes. · 11/18/2020-essentially, overall stable disease. Improvement of the lung nodules with decreased in the size of the target lesions. The pelvic lesion is is likely worse by 25%. However, CEA is is still within the normal limits.   Therefore likely mixed response. We will continue current treatment and repeat CT scans in about 3 months. · 12/16/2020-discontinue 5-FU bolus from his regimen. · 2/9/2021- Ct Chest W Contrast No evidence of disease progression. Stable pulmonary nodules. Stable intrahepatic and extrahepatic bile duct dilation compared to prior 11/11/2020. Postoperative, posttraumatic and degenerative changes in the spine as described above. Old right-sided rib fractures. · 2/9/2021- Ct Abdomen Pelvis W Contrast showed evidence of response to therapy including decreased presacral mass/thickening now measuring 1.1 cm, previously 1.9 cm on 11/11/2020. Stable intrahepatic and extra hepatic bile duct dilation with cholecystectomy clips. See separately dictated CT chest of the same day regarding pulmonary nodules. Bilateral ureteral stents. Decreased bilateral hydronephrosis. Urinary bladder wall is thickened, which could be seen with post treatment changes or cystitis. Correlate with symptoms. Chronic bony findings as above  · 2/17/2021-reviewed CT chest abdomen pelvis. Essentially consistent with disease response to therapy. Continue current therapy.    · 2/17/21 CEA 1.0  · 3/25/21 Ct Abdomen Pelvis W Iv Contrast  The stomach is distended, however no small bowel dilatation identified. Contrast identified within the left ileostomy bag. The distal stomach is under distended which may be secondary to peristalsis. Gastroparesis considered. Bilateral ureteral stents remain appropriate in position, however there is new moderate to severe right-sided hydronephrosis and mild left-sided hydronephrosis when compared to the 2/9/2021 exam. Mild increase considered within the partially calcified presacral pelvic mass. Stable partially calcified right pelvic soft tissue. Similar abnormal wall thickening of the bladder most notable superiorly. Similar prominence of the intra and extra hepatic bile ducts down to the level of the ampulla.  Findings may represent a reservoir effect, however correlation with liver function tests recommended. Therefore. Stable noncalcified left greater than right pulmonary nodules with asymmetrical interstitial changes of the right lung base concerning for pneumonitis. Osteopenia with postoperative changes of the lumbar spine. Chronic compression deformities of the thoracolumbar vertebra as described above. · 4/14/2021-I reviewed notes from urology and also CT abdomen/pelvis that showed mild interval increase in the size of the soft tissue nodule in the pelvis. However, this is still very small and therefore will have a short follow-up CT scan and of May 2021. I personally reviewed the CT scans. Really hard to state that there is clear-cut disease progression. Again, short follow-up recommended. Continue current treatment. · 5/12/21 CEA 0.8  · 5/26/2001-CT chest abdomen pelvis showed Partially calcified presacral soft tissue mass appears unchanged. Previously measured soft tissue noncalcified component is unchanged measuring 2.2 x 3.2 cm on axial image 60. However, this is questionable. CT chest showed a stable pulmonary nodules. Subcentimeter nodules. Largest 7.5 mm. · 6/1/21 CEA 0.9  · 06/01/23- reviewed scans. Stable disease. Continue treatment every 3 weeks as per patient request. Continue infusional 5-FU, Panitumumab and leucovorin. No 5-FU bolus due to severe mucositis. · 8/11/2021 CEA . 9  · 9/03/2021 CT Chest w/Contrast Slight interval increase in the size of pulmonary nodules, the largest in the medial right lung base. Findings are concerning for metastatic disease. Atherosclerosis of the aorta and coronary arteries. Sclerotic rib lesions favored for osseous metastases. Old rib fractures also evident. · 9/03/2021 CT Abd/Pelvis w/ IV Contrast (Oral) A persistent partially calcified mass in the presacral region with encasement of the distal ureters bilaterally and resultant bilateral hydronephrosis.  Bilateral ureteral stents in place. There is increasing right-sided hydronephrosis since the previous study. Right renal atrophy similar to the previous study. Moderate asymmetrical thickening of the incompletely distended urinary bladder is similar to the previous study. This may partly be due to incomplete distention. An inflammatory process or a neoplastic process is not excluded. Moderate intrahepatic biliary dilatation is similar to the previous study and probably due to a prior cholecystectomy. Moderate dilatation of the contrast filled small bowel loops may represent an ileus or partial distal small bowel obstruction. There is left lower abdominal ileostomy which appears patent. The stable compression fractures of the lower thoracic and proximal lumbar vertebrae and hardware fusion similar to the previous study. · 9/22/21- Decrease Vectibix to every other treatment. He is currently receiving chemotherapy every 3 weeks as per patient request  · 11/9/2021 CT Abd/Pelvis WO Contrast No acute posttraumatic findings. Known metastatic disease to the lungs. Posttreatment changes in overall chronic findings as above. · 12/27/2021 NM Bone Scan Multiple foci of abnormal increased activity in the ribs bilaterally correspond with previously seen areas of bony sclerosis and old healed fractures. Abnormal activity in the lumbar spine correspond with compression fractures and kyphoplasty of these vertebrae. Probable right hydronephrosis. · 2/24/2022 US LE Left  Limited([facility]) No residual fluid in the left lateral thigh. · 2/27/2022 CT Abd/Pelvis W Contrast [facility]) Overall unchanged appearance of the right lateral abdominal collection with a percutaneous drain in place and small residual fluid. Multiloculated fluid and gas collection in the pelvis along the right aspect of bladder dome measuring approximately 8 x 4 cm, not significantly changed in size. Postsurgical changes of prior colectomy and small bowel resection.  The distal ileum abuts the right flank collection and anterior abdomen in midline prior to ostomy. Unchanged partially calcified presacral and right pelvic sidewall soft tissue. Minimally improved mild left hydroureteronephrosis. Bilateral pulmonary nodules in the visualized lungs, not significantly changed. Small right pleural effusion with associated atelectasis. Scattered areas of hypoattenuation within the right lower lobe could represent developing pneumonia. · 2/27/2022 CT Entire LE Left W Contrast ([facility])Interval open incision and drainage of anterolateral left thigh subcutaneous abscess with residual fluid and packing material.  Persistent, slightly improved diffuse subcutaneous edema   throughout the left lower extremity with no new or organizing fluid   collections. · 4/10/2022 CT Chest WO Contrast Progressive metastatic disease to the lungs. There are too numerous to count pulmonary nodules the majority of which have increased in size or which are new from the previous study. No evidence of acute consolidative pneumonia. 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 Patient's undergone previous kyphoplasty at the thoracolumbar juncture for compression deformities. There is an accentuated thoracic kyphosis. .  · 4/10/2022 CT Abd/Pelvis WO Contrast Metastatic disease to the lung bases showing worsening from the previous study. Moderate size hiatal hernia with gastroesophageal reflux. The patient is status post at least partial colectomy. Left lower quadrant ostomy is present. There is no evidence of obstruction. There is an irregular soft tissue mass with some calcification within the pelvis. This extends to and is inseparable from the right posterior lateral urinary bladder wall with potential secondary involvement. This extends into the presacral space. When compared to the previous exam I feel this is increased in size.  The urinary bladder cannot be fully assessed as it is decompressed with a Mcgregor catheter. Postoperative changes of the mid lower lumbar spine. There is an accentuated lumbar lordosis with grade 1-2 anterolisthesis of L5 on S1. Compression deformities at T12, L1 and L2 are present with previous kyphoplasty T12 and L1. . 6. Status post right nephrectomy. There is mild dilatation of the upper tracts of the left kidney and left ureter with a double-J intraureteral stent well-positioned. The degree of distention of the upper tracts of the left kidney is improved from the previous exam of November of last year. There is mild urothelial thickening. · 4/13/2022 CT Abd/Pelvis WO Contrast When compared to the previous exam of 3 days earlier there is now noted to be moderate small bowel distention. I would favor a adynamic ileus. A distal obstruction at the site of the patient's ileostomy is also in the differential but felt less likely. There is mild distention of the stomach. A moderate size hiatal hernia is present. Mild to moderate dilatation of the upper tracts of the left kidney. A left-sided double-J ureteral stent is in place. There is mild urothelial thickening. I do not see evidence of a discrete ureteral stone. The stent is well-positioned. Partially calcified pelvic mass. This is inseparable from the right posterior lateral wall of the urinary bladder along its lower margin. A Mcgregor catheter is in place within the bladder. Chronic-appearing fractures within the thoracic and lumbar spine with a gibbus deformity at the thoracolumbar juncture and previous kyphoplasty at T12-L1. There is a small amount of free fluid within the subhepatic space and Morison space. A small right-sided effusion is present with multiple pulmonary nodules within the lower lobes consistent with metastatic disease to the lungs. There is a moderate size hiatal hernia present. · 04/19/22- CT guided biopsy consistent with colon cancer metastasis.   · 5/2/2022- resuming chemotherapy with FOLFIRI/panitumumab for progressive colonic adenocarcinoma pulmonary metastasis. · 5/25/2022 CXR (2VW) Chronic lung changes with mild right basilar infiltrate or atelectasis.        ONCOLOGIC HISTORY # Rectal carcinoma:  Medhat Link has a history of moderately differentiated rectal carcinoma, T3N0Mx, diagnosed in 3/9/2009.  He received neoadjuvant chemotherapy with radiation and resection with Laura Shirley has been routinely followed at [facility] and was last seen by Dr. Denny Thomas on 1/10/2019. Shwetha Daniel following are pertinent findings related to his diagnosis and treatment. · 3/9/2009- Esophagogastroduodenoscopy with biopsy by Dr. Mark Garcia that revealed rectal mass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

## 2022-06-21 PROBLEM — E83.51 HYPOCALCEMIA: Status: ACTIVE | Noted: 2022-01-01

## 2022-06-21 NOTE — PROGRESS NOTES
Received call from Lab related to patients critical lab results, patient K 6.2, mag 1.1 cre 2.8. New orders received from Dr. Doreen Cervantes. Lokelma 10 g TID x 48 hours then 10 mg daily. Patient called and notified of new orders. Patient notified that we will not do treatment tomorrow as planned however patient will receive fluids, magnesium run, and a run of calcium gluconate. Patient will also be referred to Nephrology Dr. Eber Guillory.  Electronically signed by Sally Brown RN on 6/21/2022 at 4:25 PM

## 2022-06-22 NOTE — TELEPHONE ENCOUNTER
Patient is to take 10g daily x 7 days. Medication called in to [facility].  Electronically signed by Jayna Washington RN on 6/22/2022 at 9:02 AM

## 2022-06-29 PROBLEM — N30.01 ACUTE CYSTITIS WITH HEMATURIA: Status: ACTIVE | Noted: 2022-01-01

## 2022-06-29 PROBLEM — K56.609 SMALL BOWEL OBSTRUCTION (HCC): Status: ACTIVE | Noted: 2022-01-01

## 2022-06-29 NOTE — PROGRESS NOTES
Jaiden Flaherty arrived to room # 326. Presented with: Small Bowel Obstruction  Mental Status: Patient is oriented, alert, coherent, logical, thought processes intact and able to concentrate and follow conversation. Vitals:    06/29/22 1300   BP: (!) 147/88   Pulse: (!) 108   Resp: 16   Temp: (!) 96.7 °F (35.9 °C)   SpO2: 96%     Patient safety contract and falls prevention contract reviewed with patient Yes. Oriented Patient and Family to room. Call light within reach. Yes.   Needs, issues or concerns expressed at this time: no.      Electronically signed by Gustavo Storey RN on 6/29/2022 at 5:22 PM
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
## 2022-06-29 NOTE — TELEPHONE ENCOUNTER
[address]  (Est Pt)      Pt: Mark Philip    : [birth year]    MRN: [id number]    Room: 18    Dx: Metastatic colon ca w/small bowel obstruction    Ref: Dr Carol Ann Rodríguez    Nurse: Georgia Stanley    Tel: [phone]
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
## 2022-06-30 PROBLEM — Z51.5 PALLIATIVE CARE PATIENT: Status: ACTIVE | Noted: 2022-01-01

## 2022-06-30 NOTE — PROGRESS NOTES
Subjective:  No acute events or changes. Having a lot of pain from the bed, not able to get comfortable. Fortunately had a large amount of his ostomy overnight and decreased NGT output. Denies abdominal pain this morning. Objective:  BP (!) 140/98   Pulse (!) 126   Temp 96.8 °F (36 °C) (Temporal)   Resp 18   Wt 143 lb 12.8 oz (65.2 kg)   SpO2 95%   BMI 23.93 kg/m²   Date 06/30/22 0000 - 06/30/22 2359   Shift 4604-7891 9548-8558 0497-8410 24 Hour Total   INTAKE   I.V.(mL/kg) 738(11.3)   738(11.3)   Shift Total(mL/kg) 738(11.3)   738(11.3)   OUTPUT   Urine(mL/kg/hr) 700(1.3)   700   Emesis/NG output(mL/kg) 500(7.7)   500(7.7)   Shift Total(mL/kg) 4990(74.7)   1200(18.4)   Weight (kg) 65.2 65.2 65.2 65.2     General: NAD, AAO  Abdomen: Soft, NT, ND, ostomy with output    CBC:   Lab Results   Component Value Date/Time    WBC 20.6 06/30/2022 06:17 AM    RBC 3.73 06/30/2022 06:17 AM    HGB 10.2 06/30/2022 06:17 AM    HCT 33.3 06/30/2022 06:17 AM    MCV 89.3 06/30/2022 06:17 AM    MCH 27.3 06/30/2022 06:17 AM    MCHC 30.6 06/30/2022 06:17 AM    RDW 14.0 06/30/2022 06:17 AM     06/30/2022 06:17 AM    MPV 11.0 06/30/2022 06:17 AM     BMP:    Lab Results   Component Value Date/Time     06/30/2022 06:17 AM    K 3.6 06/30/2022 06:17 AM    CL 94 06/30/2022 06:17 AM    CO2 29 06/30/2022 06:17 AM    BUN 45 06/30/2022 06:17 AM    LABALBU 3.8 06/30/2022 06:17 AM    CREATININE 2.1 06/30/2022 06:17 AM    CALCIUM 9.5 06/30/2022 06:17 AM    GFRAA 38 06/30/2022 06:17 AM    LABGLOM 31 06/30/2022 06:17 AM    GLUCOSE 108 06/30/2022 06:17 AM     Assessment and plan:  81year old male with worsening metastatic cancer, SBO  1) SBO appears to have improved. Okay for dc home from general surgery perspective.  Discussed gradually increasing diet, and making sure when out to eat to really chew better since he's probably visiting and talking  2) Mcgregor and stent per Dr. Bebo Enriquez  3) Other cares per the hospitalist team  4) No

## 2022-06-30 NOTE — TELEPHONE ENCOUNTER
Called and left vm for pt to call back to confirm his sx that Dr. Tess Dela Cruz wants to do 7/8/22. Left name and call back number for pt to call and confirm pt received.

## 2022-06-30 NOTE — PROGRESS NOTES
Urology Progress Note      SUBJECTIVE: Patient frustrated this morning from being stuck for blood draws refusing any more attempts. OBJECTIVE:      REVIEW OF SYSTEMS:   Review of Systems      Physical  VITALS:  BP (!) 140/98   Pulse (!) 126   Temp 96.8 °F (36 °C) (Temporal)   Resp 18   Wt 143 lb 12.8 oz (65.2 kg)   SpO2 95%   BMI 23.93 kg/m²   TEMPERATURE:  Current - Temp: 96.8 °F (36 °C); Max - Temp  Av.2 °F (36.2 °C)  Min: 96.7 °F (35.9 °C)  Max: 98.1 °F (36.7 °C)   24 HR I&O   Intake/Output Summary (Last 24 hours) at 2022 0805  Last data filed at 2022 0658  Gross per 24 hour   Intake 737.98 ml   Output 2200 ml   Net -1462.02 ml     BACK:  sacral tenderness no CVA tenderness on the left  ABDOMEN:  distended, ileostomy  HEART:  normal rate and regular rhythm  CHEST: Normal respiratory effort effort  GENITAL/URINARY: Dark urine urine output 700 cc overnight    Data       CBC:   Recent Labs     22  0734 22  0617   WBC 22.5* 20.6*   HGB 11.4* 10.2*   HCT 36.1* 33.3*   * 499*     BMP:    Recent Labs     22  0758 22  0617    141   K 3.1* 3.6   CL 90* 94*   CO2 31* 29   BUN 43* 45*   CREATININE 2.5* 2.1*   GLUCOSE 136* 108       No results for input(s): LABURIN in the last 72 hours. No results for input(s): BC in the last 72 hours. No results for input(s): Ivania Idler in the last 72 hours.     Urine culture pending    ASSESSMENT AND PLAN    Patient Active Problem List   Diagnosis    Thoracic facet joint syndrome    Primary osteoarthritis of left hip    Leg swelling    Abnormal nuclear cardiac imaging test    Abnormal nuclear cardiac imaging test    S/p bare metal coronary artery stent    Coronary artery disease involving native coronary artery of native heart without angina pectoris    [facility]    Complex regional pain syndrome type 1 of right lower extremity    Hydronephrosis, bilateral    Ureteral stricture, left    History of rectal cancer    Anemia of chronic disease    Pelvic mass    Lung nodules    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    Colorectal cancer ([address]    Metastasis from colon cancer ([address]    Proteinuria    Chemotherapy management, encounter for    Anemia associated with chemotherapy    Other fatigue    Thrush    Dehydration    Chemotherapy-induced peripheral neuropathy (HCC)    Chemotherapy-induced nausea    SBO (small bowel obstruction) (HCC)    Burning with urination    History of small bowel obstruction    Encounter for central line care    Iron deficiency    History of bladder cancer    Hydronephrosis of left kidney    Hydronephrosis of right kidney    Low back pain    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)    Sepsis secondary to UTI ([address]    Acute kidney injury superimposed on CKD ([address]    Urinary tract infection associated with indwelling urethral catheter ([address]    Retained ureteral stent    Lower urinary tract symptoms    Ileus ([address]    Sepsis ([address]    Colon carcinoma metastatic to lung ([address]    Metastatic adenocarcinoma ([address]    Hypomagnesemia    Hypocalcemia    Small bowel obstruction (HCC)    Acute cystitis with hematuria    Urothelial carcinoma of kidney, right ([address]    Palliative care patient     1. Abnormal urinalysis. These findings would be expected with chronic indwelling ureteral stent there is no bacteria positive for yeast this most likely represents colonization cultures are pending. 2.  Chronic left hydronephrosis secondary to radiation stricture with solitary left kidney managed with chronic indwelling ureteral stent last changed on April 20, 2022. He has associated COLLEEN on top of CKD. After hydration his creatinine is improved down to 2.1 more towards his baseline is about 1.8. CT showed creased hydronephrosis.   May be partial stent malfunction he needs his stent changed when clinically appropriate it would be due on or about July 20, 2022. Ioana Doe Since his creatinine has improved after IV fluid hydration and Mcgregor catheter decompression stent does not have to be changed in the urgently but we will plan to do this soon per his clinical course    3. 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 Complicated postoperative course. He has a right-sided pelvic postoperative change and collection that has been present since his surgery in February this may have slightly increased in size may be more hypodense but this could just be evolving postoperative change. no air in the collection does not appear to be abscess at this time. I do not see any need for drainage or intervention and certainly if this was necessary this would have to be done at tertiary care center by interventional radiology. He is having new  sacral pain associated with this. Really have nothing to offer except recommend adequate pain control. 4.  History of nonmuscle invasive bladder cancer. He is due surveillance cystoscopy on or about July 20 typically  I'll do this at the time of his stent changes.     5.  Small bowel obstruction followed by general surgery plan is for conservative treatment as patient desires no surgical intervention.   Management and plans per Dr. Kaleigh Vega.     4.  Colon cancer metastatic with progression of disease on chemotherapy followed by Dr. Yokasta Garcia MD

## 2022-06-30 NOTE — DISCHARGE SUMMARY
Marylee Marines  :  1950  MRN:  [id number]    Admit date:  2022  Discharge date:  2022    Discharging Physician:  Dr. Yessy Bonner    Advance Directive: Full Code    Consults: IP CONSULT TO GENERAL SURGERY  IP CONSULT TO GENERAL SURGERY  IP CONSULT TO ONCOLOGY  IP CONSULT TO UROLOGY  IP CONSULT TO PALLIATIVE CARE     Primary Care Physician:  Pinkie Lesches, MD    Discharge Diagnoses:  Principal Problem:    Small bowel obstruction ([address]  Active Problems:    Acute cystitis with hematuria    Urothelial carcinoma of kidney, right ([address]    Palliative care patient    COLLEEN (acute kidney injury) ([address]    Metastasis from colon cancer [address]    Dehydration    Hydronephrosis of left kidney    Acute kidney injury superimposed on CKD ([address]  Resolved Problems:    * No resolved hospital problems. *      Portions of this note have been copied forward, however, changed to reflect the most current clinical status of this patient. Hospital Course: The patient is a 81 y.o. male with a PMH of metastatic colon cancer to the lungs pelvis and kidney with nephrectomy, bladder cancer, HTN, HLD, and CAD who presented to [facility] ED on 2022 complaining of intractable nausea and vomiting. He states that he went out for supper last night for the first time in a very long time eating a steak fillet, lobster, baked potato and salad. He states that he went and laid down at home and woke up with abdominal pain and vomiting. He states the vomiting began around 5 PM today prior to admit. He also began to have no output from his ileostomy and therefore he came to the ED for further evaluation. He denies any fevers. Denies any chest pain, shortness of breath, and chills. He has been receiving chemotherapy per Dr. Dillan Dc with most recent on 6/10/2022. He denies any dysuria or polyuria.   He does complain of right leg pain and ongoing back pain since receiving some IV fluids last week.     An NG tube was placed in the ED with a large amount of green output. He did immediately began to have output from his ileostomy. Further ED work-up revealed evidence of a small bowel obstruction possibly at the level of the ileal anastomosis. CT additionally showed presacral fluid and left hydroureteronephrosis and right pleural effusion with multiple lung metastatic lesions. , K3.1, CL 90, CO2 31 BUN 43 and creatinine 2.5. Mag 1.8 LA 1.6. Flat mildly elevated troponin at 0.04. WBC 22.5 H&H 11.4/36.1 and platelet count 829. UA showed moderate blood, large leukocytes, WBC TNTC, yeast present. He will be admitted to hospital medicine for SBO and hydronephrosis with general surgery, oncology, and urology consult. Overnight, he has had a considerable amount of pain in his sacral area, however, he has been relieved of his nausea and has began to produce stool within his ileostomy. He has been cleared by general surgery to return home. His NGT has been discontinued and he denies nausea. To patient is adamant that he is ready to go home and is very anxious. He has requested to go home with a edgar leg back so that he can have ongoing po hydration. Family will be following up on urine cultures and schedule follow-up appointments. He will have 3 days of po pain medications provided for his sacral and knee discomfort. He will be discharged home in stable, but guarded condition. Significant Diagnostic Studies:   CT ABDOMEN PELVIS WO CONTRAST Additional Contrast? None    Result Date: 6/29/2022  NO PRIOR REPORT AVAILABLE Exam: CT OF THE ABDOMEN/PELVIS WITHOUT CONTRAST Clinical data: History of colon resection ileostomy in place probable obstruction. Technique: Direct contiguous axial CT images were acquired through the abdomen and pelvis without contrast using soft tissue and bone algorithms. Reformatted/MPR images were performed. Radiation dose: CTDIvol =24.93 mGy, DLP =1193 mGy x cm.  Limitations: Lack of intravenous contrast limits evaluation of solid viscera. Lack of oral contrast limits evaluation of the bowel loops. Prior Studies: Radiograph of the abdomen dated 04/15/22 image. Findings: Lung bases:Right mild to moderate pleural effusion, numerous nodule with various size possible metastatic largest 1.5 cm. Small hiatal hernia. Liver:Unremarkable size, contour, and density. No evidence of mass. No evidence of dilated ducts. Gallbladder fossa:Not visualized Spleen: Grossly unremarkable. Pancreas:  Grossly unremarkable. Adrenal glands: Grossly unremarkable size, contour and density. Kidneys:Right kidney not visualized. Left kidney moderate hydroureteronephrosis and double-J catheter in place. No evidence of stone. Retroperitoneum: No retroperitoneal lymphadenopathy. Unremarkable abdominal aorta. The IVC is grossly unremarkable. Peritoneal cavity: No evidence of free air or ascites. Gastrointestinal tract: Stomach, duodenum, jejunal loops and ileal loops dilated with air-fluid level up to possible resection/anastomosis site. No evidence of obvious mass however there is a mild Wall thickening at the endpoint site of ileal loops. Appendix: Unremarkable. Pelvis: Diffuse bladder wall thickening with associating 4.5 x 3 centimeters soft tissue at the superior wall of bladder extends extraluminally. Malignancy cannot be excluded. Left double-J catheter in place. There is a fluid collection with associating coarse soft tissue calcification in presacral area measuring 7.5 x 4 x 3 cm. Please correlate clinically for postop collection/abscess. Osseous structures:Significance spondylosis, osteoporosis and multiple level compression fracture at upper lumbar levels. Mild anterior listhesis at L5-S1. Increase thoracolumbar kyphosis. 1.  Evidence of small bowl obstruction possible at the level of ileal anastomosis as described. No intra-abdominal free fluid. 2.  Presacral fluid collection as described. Left hydroureteronephrosis.  3.  Right pleural effusion and multiple lungs metastatic lesion as described. Recommendation: Follow up as clinically indicated. All CT scans at this facility utilize dose modulation, iterative reconstruction, and/or weight based dosing when appropriate to reduce radiation dose to as low as reasonably achievable. Electronically Signed by Giselle Espinosa MD at 29-Jun-2022 08:55:49 AM             XR CHEST PORTABLE    Result Date: 6/29/2022  NO PRIOR REPORT AVAILABLE Exam: X-RAY OF [facility] Clinical data:NG tube placement. Technique:Single portable upright view of the chest. Prior studies: Radiograph of the chest dated 5/25/2022 image. Findings: The lungs show scattered interstitial nodular densities throughout both lungs. In the right costophrenic sulcus there is pleural effusion  suspected. A 4-level cervical fusion device appears in good repair. NG tube appears to have its tip in the gastric cardia. Right access transjugular catheter noted with tip in the right atrium. Two contiguous kyphoplasty injections seen in the lower dorsal spine. Cardiac silhouette is within normal limits. No acute osseous abnormality is detected. Scattered interstitial nodular densities throughout both lungs and coarse right infrahilar markings centrally. 4 level cervical fusion device in good repair NG tube with tip in the upper stomach Right access transjugular catheter with tip in the right atrium Two contiguous kyphoplasty injections lower dorsal spine. Question small right pleural effusion. Recommendation: Follow up as clinically indicated.  Electronically Signed by Merlinda Sabal MD at 29-Jun-2022 10:40:16 AM               Pertinent Labs:   CBC:   Recent Labs     06/29/22  0734 06/30/22  0617   WBC 22.5* 20.6*   HGB 11.4* 10.2*   * 499*     BMP:    Recent Labs     06/29/22  0758 06/30/22  0617    141   K 3.1* 3.6   CL 90* 94*   CO2 31* 29   BUN 43* 45*   CREATININE 2.5* 2.1*   GLUCOSE 136* 108     INR: No results for input(s): INR in the last 72 hours. Physical Exam:  Vital Signs: BP (!) 140/98   Pulse (!) 126   Temp 96.8 °F (36 °C) (Temporal)   Resp 18   Wt 143 lb 12.8 oz (65.2 kg)   SpO2 95%   BMI 23.93 kg/m²   Physical Exam  Constitutional:       General: He is not in acute distress. Appearance: Normal appearance. He is ill-appearing. HENT:      Head: Normocephalic and atraumatic. Right Ear: External ear normal.      Left Ear: External ear normal.      Nose: Nose normal.      Mouth/Throat:      Mouth: Mucous membranes are moist.   Eyes:      Extraocular Movements: Extraocular movements intact. Conjunctiva/sclera: Conjunctivae normal.      Pupils: Pupils are equal, round, and reactive to light. Cardiovascular:      Rate and Rhythm: Normal rate and regular rhythm. Pulses: Normal pulses. Heart sounds: Normal heart sounds. Pulmonary:      Effort: Pulmonary effort is normal. No respiratory distress. Breath sounds: Normal breath sounds. No wheezing, rhonchi or rales. Abdominal:      General: Bowel sounds are normal. There is no distension. Palpations: Abdomen is soft. Tenderness: There is no abdominal tenderness. Musculoskeletal:         General: No swelling, tenderness or deformity. Normal range of motion. Cervical back: Normal range of motion and neck supple. No muscular tenderness. Right lower leg: No edema. Left lower leg: No edema. Skin:     General: Skin is warm and dry. Findings: No bruising or lesion. Neurological:      Mental Status: He is alert and oriented to person, place, and time. Mental status is at baseline. Psychiatric:         Mood and Affect: Mood is anxious. Behavior: Behavior normal.         Thought Content:  Thought content normal.         Discharge Medications:         Medication List      START taking these medications    oxyCODONE-acetaminophen  MG per tablet  Commonly known as: Percocet  Take 1 tablet by mouth every 6 hours as needed for Pain for up to 3 days. CHANGE how you take these medications    cyclobenzaprine 10 MG tablet  Commonly known as: FLEXERIL  Take 1 tablet by mouth 3 times daily as needed for Muscle spasms  What changed:   · how much to take  · when to take this  · additional instructions     FeroSul 325 (65 Fe) MG tablet  Generic drug: ferrous sulfate  TAKE 1 TABLET BY MOUTH TWICE DAILY  What changed: See the new instructions. CONTINUE taking these medications    ALPRAZolam 0.25 MG tablet  Commonly known as: XANAX     bisoprolol 5 MG tablet  Commonly known as: ZEBETA  Take 1 tablet by mouth daily     CALCIUM 600 + D PO     DULoxetine 30 MG extended release capsule  Commonly known as: CYMBALTA  TAKE 1 CAPSULE BY MOUTH DAILY     ibandronate 150 MG tablet  Commonly known as: BONIVA  TAKE 1 TABLET IN MORNING ONCE MONTHLY ON AN EMPTY STOMACH WITH FULL GLASS OF WATER.  DONT TAKE ANYTHING ELSE BY MOUTH OR LIE DOWN FOR 30 MINUTES     lactobacillus Caps capsule  Take 1 capsule by mouth daily     Magic Mouthwash  Commonly known as: Miracle Mouthwash  Swish and spit 5 mLs 4 times daily as needed for Irritation     magnesium oxide 400 MG tablet  Commonly known as: MAG-OX  Take 1 tablet by mouth daily     mirabegron 50 MG Tb24  Commonly known as: MYRBETRIQ  Take 50 mg by mouth daily     omeprazole 20 MG delayed release capsule  Commonly known as: PRILOSEC  Take 1 capsule by mouth Daily     ondansetron 4 MG tablet  Commonly known as: ZOFRAN  Take 2 tablets by mouth every 8 hours as needed for Nausea or Vomiting     prochlorperazine 5 MG tablet  Commonly known as: COMPAZINE  Take 1 tablet by mouth every 6 hours as needed for Nausea     promethazine 12.5 MG tablet  Commonly known as: PHENERGAN  Take 1 tablet by mouth 3 times daily as needed for Nausea     sodium bicarbonate 650 MG tablet  Take 1 tablet by mouth 4 times daily     Tylenol 8 Hour Arthritis Pain 650 MG extended release tablet  Generic drug: acetaminophen        STOP taking these medications    furosemide 20 MG tablet  Commonly known as: LASIX           Where to Get Your Medications      These medications were sent to [facility] [address] [place] [address], [facility]  [address], [address] [facility]    Phone: [phone]   · oxyCODONE-acetaminophen  MG per tablet         Discharge Instructions: Follow up with Silvina Sparrow MD within 1 week for hospital follow-up  Make or keep scheduled with Dr. Ester Adam for follow-up for hydronephrosis   Take medications as directed. Resume activity as tolerated. Diet: ADULT DIET; Regular     Disposition: Patient is Stableand will be discharged to Home. Time spent on discharge 37 minutes spent in assessing patient, reviewing medications, discussion with nursing, confirming safe discharge plan and preparation of discharge summary.     Signed:  OLGA Chicas, 6/30/2022 10:06 AM

## 2022-06-30 NOTE — CONSULTS
MEDICAL ONCOLOGY CONSULTATION    Pt Name: Pam Rice  MRN: [id number]  YOB: 1950  Date of evaluation: 6/30/2022    REASON FOR CONSULTATION: Colon cancer, urothelial cancer, continuity of care  REQUESTING PHYSICIAN: Hospitalist    History Obtained From:    patient, electronic medical record    HISTORY OF PRESENT ILLNESS:  The patient is well-established in my clinic. He has a diagnosis of colon cancer and recent diagnosis of invasive urothelial carcinoma, high-grade of the renal pelvis status surgery. In addition, history of hypertension, hyperlipidemia, CAD, CKD stage IV. Patient presented ER department on 6/29/2022 with complaints of nausea vomiting, abdominal pain. He had decreased output from his ileostomy as well. A NG tube was placed in the ED. Surgery was consulted. Labs showed worsening kidney function with creatinine 2.5, hyperkalemia potassium 3.1, elevated white blood cell count. UA showed moderate blood, large leukocyte, WBC too numerous to count. 6/29/2022-CT abdomen pelvis showed evidence of small bowel obstruction possible to lower the ileal anastomosis. No intra-abdominal free fluid. Presacral fluid collection. Left hydroureteronephrosis, right pleural effusion and multiple lung metastatic lesions. Diffuse bladder wall thickening with associating 4.5 x 3 centimeters soft tissue at the superior wall of bladder extends extraluminally. Malignancy cannot be excluded. Left double-J catheter in place. He had an NG tube placed yesterday. He feels better this morning and feels that his colostomy is working again. Prior oncological history  Reason for MD visit: Toxicity/disease management  The patient has stage IV colonic adenocarcinoma with progressive lung metastasis consistent with colonic adenocarcinoma. In addition, he has a history of urothelial carcinoma stage II. He had progressive disease in the lungs compatible with colonic adenocarcinoma.   He has resumed treatment with FOLFIRI/panitumumab. He has received 2 cycles. He complains of significant fatigue and nausea from the last treatment. He is still sick after this day today.     ONCOLOGIC HISTORY:   Diagnosis:  · Moderately differentiated rectal carcinoma, [id number], diagnosed in 3/9/2009  · Noninvasive high-grade papillary urethral carcinoma.  Negative for evidence of detrusor muscle invasion, pTa, pNx on 8/18/2019. · Metastatic colorectal carcinoma, 9/3/2019  · MSI stable and mutations for BRAF, NRAS, KRAS were not detected.    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 · 8/18/2019- transurethral resection of bladder tumor (TURBT)   · 9/18/2019-12/26/2019 palliative chemotherapy with modified FOLFOX 7  (Oxaliplatin 85 mg/m² IV day 1, leucovorin 400 mg/m² IV day 1 and 5-FU 2400 mg/m² IV continuous infusion over 46 to 48 hours for a total of 7 cycles. · 1/28/2020 -palliative maintenance therapy with leucovorin 400 mg/m² IV over 2 hours on day 1, followed by 5-FU bolus 400 mg/m² and then 1200 mg/m²/day x2 days (total 2400 mg meter squared over 46 to 48 hours) continuous infusion.  Repeat every 2 weeks. · 05/11/22- Resuming FOLFIRI/panitumumab chemotherapy for progressive lung metastasis     ONCOLOGIC HISTORY # NMIBC_Bladder cancer. Tianna Hernandez was diagnosed with noninvasive urethral carcinoma, pTa, pNx on 8/18/2019.  Ta tumors are papillary lesions that tend to recur but are relatively benign and generally do not invade the bladder.  Adjuvant treatment is not warranted at this time and will be monitored closely.   Biopsy and transurethral resection of bladder tumor (TURBT) on 8/18/2019 by Dr. Orquidea Diaz with pathology revealing noninvasive high-grade papillary urethral carcinoma.  Negative for evidence of detrusor muscle invasion, pTa, pNx.   · 12/3/2019- cystoscopy with removal and replacement of double-J urethral stent.  Pathology from dome of the bladder/tumor revealed high-grade papillary urethral carcinoma, noninvasive.  No muscularis propria present.    · 2/26/2020 - cystoscopy and bilateral ureteral stent removal and replacement.  The operative note by Dr. Vera Hall documented bilateral hydronephrosis and obtained biopsy of the bladder in the mid dome and left anterior lateral wall x2.  Pathology documented high-grade papillary urethral carcinoma, noninvasive, stage pTaNx. · 5/28/2020-the patient underwent a cystoscopy and resection of bladder tumor on 05/28/2020 with findings consistent with noninvasive, high-grade papillary urothelial carcinoma. Muscularis propria was not identified. The patient will receive intravesical BCG therapy. · 7/6/2020-CT Abdomen/ Pelvis-Moderate severe right and mild left hydronephrosis with bilateral ureteral stents, which have an adequate radiographic position. Right kidney with cortical thickening and somewhat asymmetrically decreased enhancement which can be seen with obstructive uropathy. Postoperative changes of colectomy. Left lower quadrant ostomy. Slightly decreased size of presacral low density compared to4/15/2020. Similar intrahepatic and extrahepatic bile duct dilation compared to 4/15/2020. Correlate with clinical symptoms and laboratory studies if clinically indicated. Similar chronic bony findings. · 3/25/2021-CT abdomen showed severe hydronephrosis. Cystoscopy was performed by urology. · 4/1/21 Bladder neck tumor, biopsies: High-grade papillary urothelial carcinoma, noninvasive. Muscularis propria is not present. AJCC pathologic stage:  pTa Nx   · 4/14/2021-reviewed results of pathology. No evidence of invasive disease.   Continue surveillance cystoscopy with urology. · 9/13/2021-he was seen by urology. Findings of ureteral high-grade urothelial carcinoma. Noninvasive. The patient is being referred to [facility] in [place]. · [facility]11[facility]2021-[facility] and recommended cystoscopy with biopsy and possible laser ablation and bilateral leg stent exchange at that time. · 4/20/2022 Urinary bladder, biopsy of right lateral bladder lesion: Disrupted urothelial mucosa with severe chronic inflammation, negative for evidence   of malignancy.         ONCOLOGIC HISTORY #3  Hilda Edgar was seen in initial oncology consultation on 8/19/2019 during his hospitalization at [address] [place] [address] after a large pelvic mass was identified which raised concern for recurrent disease. Further pathology consistent with recurrent rectal cancer  · 8/17/2019- CEA 18.1  · 8/17/2019- CT scan of the kidney with contrast documented moderate to severe right hydronephrosis with dilation of the right ureter into the lower pelvis the site of the parasacral soft tissue changes.  Partially calcified soft tissue changes within the janes-sacral region likely representing sequelae of pelvic radiation.  Increasing scarring/fibrosis versus tumor recurrence within the presacral changes, likely represents a site of right distal ureter obstruction.  No left-sided hydronephrosis. · 8/18/2019 -Double-J ureter stent placement for right hydronephrosis secondary to extrinsic compression by pelvic mass.    · 8/27/2019-CT scan of the chest with contrast documented numerous pulmonary nodules that appear new compared to 11/12/2017, RUL nodule measuring 7 mm and LLL nodule measuring 5 mm.  Soft tissue nodule at the left ventral abdominal wall.  Slight increased size of a probable lymph node anterior to the aorta measuring 0.9 cm compared to 0.7 cm.  Similar presacral, right pelvic sidewall and right abductor muscular nodular soft tissue density.   · 8/27/2019 CT scan of the abdomen and pelvis with contrast identified new moderate left hydronephrosis with moderate right hydronephrosis.  Mild stranding around the urinary bladder and thickening of the bilateral ureteral wall.  Numerous pulmonary nodules.  Soft tissue of the left ventral abdominal wall.  Slightly increased size of probable lymph node anterior to the aorta measuring 0.9 cm compared to 0.7 cm. · 8/27/2019-PET scan did not identify any FDG avid pulmonary nodules or airspace opacities.  Abnormal increased metabolic activity within the right pelvic wall soft tissue showing SUV of 5.4.  Abnormal soft tissue metabolic activity in the right abductor muscle with SUV of 6.4.  Focally increased activity to the right of the inferior L5 vertebrae body posterior with SUV of 7.9 with associated sclerotic changes. · 8/29/2019-  Dr. Bernard Varner completed a cystoscopy with double-J ureter stent in the left ureter for left hydronephrosis  · 9/3/2019- CT-guided right abductor muscle biopsy on 9/3/2019 with pathology identifying metastatic adenocarcinoma consistent with colorectal origin.  Molecular panel from biopsy tissue revealed MSI stable and mutations for BRAF, NRAS, KRAS were not detected.    · 9/18/2019 - Palliative chemotherapy with modified FOLFOX 7  (Oxaliplatin 85 mg/m² IV day 1, leucovorin 400 mg/m² IV day 1 and 5-FU 2400 mg/m² IV continuous infusion over 46 to 48 hours) with the anticipation of adding Avastin 5 mg/kg day 1 every 14 days  · 10/15/2019- 24-hour urine for protein with a total protein of 1785 mg per 24-hour.  Zurdo has been evaluated by Dr. Neftali Hidalgo and he reports no significant concerns related to the protein. · 11/6/19 CEA 5.6 significantly improved compared to CEA of 14.0 on 8/30/2019.   · 11/15/2019 -CT scan of the abdomen and pelvis documented no evidence of disease progression with significant decrease in the size of enhancing nodules in the right pelvic abductor musculature, a previous 1.8 cm nodule now measures 5 mm.  No new or enlarging retroperitoneal, mesenteric, pelvic or inguinal lymph nodes.  Calcified presacral mass unchanged measuring 5 x 3.7 cm. · 11/15/2019 -CT scan of the chest documented multiple small pulmonary nodules reidentified, largest nodule in the RUL measures 5 mm compared to 7.5 mm, RLL nodule measures 3.4 mm compared to 5.9 mm, VIC nodule measures 4 mm compared to 6 mm.  A cluster of small nodules in the RUL anteriorly are barely visible on this study.  There is a decrease in size of mediastinal lymph nodes compared to previous exam, right distal paratracheal lymph node measuring 4.5 mm compared to 8.3 mm and lower right peritracheal node measuring 4.5 mm compared to 8.6 mm.    · 1/13/2020- CT scan of the abdomen and pelvis with contrast indicated improvement in the right-sided hydronephrosis with a chronic inflammatory process of the ureters suspected due to the moderate thickening, also present on previous study.  The small poorly enhancing nodules in the right abductor muscles have decreased in the partially calcified presacral mass and right lateral pelvic wall nodules are stable compared to previous study.  Resolution of the subcutaneous abdominal wall nodules.  A prominent retroperitoneal lymph node adjacent and anterior to the left common artery is redefined and measures 6 mm, no change from previous exam.   · 1/13/2020 - CT scan of the chest documented a right lower lobe nodule measures 4.3 cm and is unchanged.  A right lower lobe nodule measures 2.8 mm compared to 3.4 mm.  Nodule in the right upper lobe is barely visible and measures 2.4 mm.  Nodule in the left lower lobe measures 4.8 mm and is unchanged.  Nodule in left lower lobe posterior measures 2.8 mm and previously measured 4.7 mm.  A right lower lobe posterior medially nodule is barely visible measuring 0.2 mm and previously. measured 4.5 mm.  No new nodules identified.  No change in the size of the mediastinal lymph nodes.   · 1/28/2020 -palliative maintenance therapy with leucovorin 400 mg/m² IV over 2 hours on day 1, followed by 5-FU bolus 400 mg/m² and then 1200 mg/m²/day x2 days (total 2400 mg meter squared over 46 to 48 hours) continuous infusion.  Repeat every 2 weeks.  Only received 1 cycle, further treatment held due to small bowel obstruction. · 1/30/2020 - CT scan of the abdomen and pelvis indicated high-grade small bowel obstruction with transition point in the midline posterior pelvis where a small bowel loop is tethered to a partially calcified presacral soft tissue mass. · 2/11/2020-CEA 1.4  · 3/5/2020-  Exploratory laparotomy, removal of adhesions, small bowel resection with primary anastomosis and partial thickness small bowel repair by Dr. Harika Brumfield the operative note Dr. Alonzo Nice reported no evidence of carcinomatosis within the abdomen and the liver was unable to be examined due to extent of right upper quadrant adhesions.  Pathology from small intestine documented no evidence of malignancy. · 4/15/2020 Ct Chest W Contrast Minimal interval increase in size of subcentimeter pulmonary nodules. The largest now measures 6 mm in the medial right lower lobe on axial image 80. There is a new, unstable, horizontal fracture through the T6 vertebral body. Additionally, there are new fractures through the posterior 11th and 12th right ribs. The bones are moderately osteopenic. The finding of an unstable fracture through the T6 vertebral body was discussed with Ana Mercedes at 10:45 AM on 4/15/2020. · 4/15/2020 Ct Abdomen Pelvis W Iv Contrast  Patient has undergone interval resection of the distal small bowel, and there is a 2.8 cm fluid collection in the presacral operative bed. This contains a tiny focus of air. This may postoperative or due to infection. Please correlate with the patient's clinical symptoms and laboratory markers. Improved hydronephrosis and hydroureter. Diffuse osteoporosis.  Findings in the lower chest are described in a separate dictation. · 4/22/2020-CEA 0.9  · 6/2/2020-resumed chemotherapy with 5-FU/leucovorin and Panitumumab. Okay to do 1 today then CMP CEA   · 8/19/20 CEA-1.1  · 10/21/2020- CEA 2.0  · 11/11/2020- Ct Chest W Contrast Multiple, too numerous to count, small noncalcified lung nodules bilaterally. The referenced nodules appears to have decreased in size the previous study. No new nodules. · 11/11/2020- Ct Abdomen Pelvis W Contrast Unchanged bilateral hydronephrosis, more on the right side. Bilateral ureteral stents in place. Moderate asymmetrical thickening of the incompletely distended urinary bladder. This may partly be due to incomplete distention. Possibility of chronic cystitis and or chronic partial outlet obstruction may not be excluded. A functioning left lower abdominal ostomy. A small parastomal small bowel herniation without obstruction. A partially calcified presacral mass. The soft tissue component have increased in size in the previous study. The osseous changes are described above. Any superimposed metastatic disease is not excluded and would be hard to evaluate due to extensive postsurgical changes. · 11/18/2020-essentially, overall stable disease. Improvement of the lung nodules with decreased in the size of the target lesions. The pelvic lesion is is likely worse by 25%. However, CEA is is still within the normal limits. Therefore likely mixed response. We will continue current treatment and repeat CT scans in about 3 months. · 12/16/2020-discontinue 5-FU bolus from his regimen. · 2/9/2021- Ct Chest W Contrast No evidence of disease progression. Stable pulmonary nodules. Stable intrahepatic and extrahepatic bile duct dilation compared to prior 11/11/2020. Postoperative, posttraumatic and degenerative changes in the spine as described above. Old right-sided rib fractures.    · 2/9/2021- Ct Abdomen Pelvis W Contrast showed evidence of response to therapy including decreased presacral mass/thickening now measuring 1.1 cm, previously 1.9 cm on 11/11/2020. Stable intrahepatic and extra hepatic bile duct dilation with cholecystectomy clips. See separately dictated CT chest of the same day regarding pulmonary nodules. Bilateral ureteral stents. Decreased bilateral hydronephrosis. Urinary bladder wall is thickened, which could be seen with post treatment changes or cystitis. Correlate with symptoms. Chronic bony findings as above  · 2/17/2021-reviewed CT chest abdomen pelvis. Essentially consistent with disease response to therapy. Continue current therapy.    · 2/17/21 CEA 1.0  · 3/25/21 Ct Abdomen Pelvis W Iv Contrast  The stomach is distended, however no small bowel dilatation identified. Contrast identified within the left ileostomy bag. The distal stomach is under distended which may be secondary to peristalsis. Gastroparesis considered. Bilateral ureteral stents remain appropriate in position, however there is new moderate to severe right-sided hydronephrosis and mild left-sided hydronephrosis when compared to the 2/9/2021 exam. Mild increase considered within the partially calcified presacral pelvic mass. Stable partially calcified right pelvic soft tissue. Similar abnormal wall thickening of the bladder most notable superiorly. Similar prominence of the intra and extra hepatic bile ducts down to the level of the ampulla. Findings may represent a reservoir effect, however correlation with liver function tests recommended. Therefore. Stable noncalcified left greater than right pulmonary nodules with asymmetrical interstitial changes of the right lung base concerning for pneumonitis. Osteopenia with postoperative changes of the lumbar spine. Chronic compression deformities of the thoracolumbar vertebra as described above. · 4/14/2021-I reviewed notes from urology and also CT abdomen/pelvis that showed mild interval increase in the size of the soft tissue nodule in the pelvis.   However, this is still very small and therefore will have a short follow-up CT scan and of May 2021. I personally reviewed the CT scans. Really hard to state that there is clear-cut disease progression. Again, short follow-up recommended. Continue current treatment. · 5/12/21 CEA 0.8  · 5/26/2001-CT chest abdomen pelvis showed Partially calcified presacral soft tissue mass appears unchanged. Previously measured soft tissue noncalcified component is unchanged measuring 2.2 x 3.2 cm on axial image 60. However, this is questionable. CT chest showed a stable pulmonary nodules. Subcentimeter nodules. Largest 7.5 mm. · 6/1/21 CEA 0.9  · 06/01/23- reviewed scans. Stable disease. Continue treatment every 3 weeks as per patient request. Continue infusional 5-FU, Panitumumab and leucovorin. No 5-FU bolus due to severe mucositis. · 8/11/2021 CEA . 9  · 9/03/2021 CT Chest w/Contrast Slight interval increase in the size of pulmonary nodules, the largest in the medial right lung base. Findings are concerning for metastatic disease. Atherosclerosis of the aorta and coronary arteries. Sclerotic rib lesions favored for osseous metastases. Old rib fractures also evident. · 9/03/2021 CT Abd/Pelvis w/ IV Contrast (Oral) A persistent partially calcified mass in the presacral region with encasement of the distal ureters bilaterally and resultant bilateral hydronephrosis. Bilateral ureteral stents in place. There is increasing right-sided hydronephrosis since the previous study. Right renal atrophy similar to the previous study. Moderate asymmetrical thickening of the incompletely distended urinary bladder is similar to the previous study. This may partly be due to incomplete distention. An inflammatory process or a neoplastic process is not excluded. Moderate intrahepatic biliary dilatation is similar to the previous study and probably due to a prior cholecystectomy.  Moderate dilatation of the contrast filled small bowel loops may represent an ileus or partial distal small bowel obstruction. There is left lower abdominal ileostomy which appears patent. The stable compression fractures of the lower thoracic and proximal lumbar vertebrae and hardware fusion similar to the previous study. · 9/22/21- Decrease Vectibix to every other treatment. He is currently receiving chemotherapy every 3 weeks as per patient request  · 11/9/2021 CT Abd/Pelvis WO Contrast No acute posttraumatic findings. Known metastatic disease to the lungs. Posttreatment changes in overall chronic findings as above. · 12/27/2021 NM Bone Scan Multiple foci of abnormal increased activity in the ribs bilaterally correspond with previously seen areas of bony sclerosis and old healed fractures. Abnormal activity in the lumbar spine correspond with compression fractures and kyphoplasty of these vertebrae. Probable right hydronephrosis. · 2/24/2022 US LE Left  Limited([facility]) No residual fluid in the left lateral thigh. · 2/27/2022 CT Abd/Pelvis W Contrast [facility]) Overall unchanged appearance of the right lateral abdominal collection with a percutaneous drain in place and small residual fluid. Multiloculated fluid and gas collection in the pelvis along the right aspect of bladder dome measuring approximately 8 x 4 cm, not significantly changed in size. Postsurgical changes of prior colectomy and small bowel resection.  The distal ileum abuts the right flank collection and anterior abdomen in midline prior to ostomy. Unchanged partially calcified presacral and right pelvic sidewall soft tissue. Minimally improved mild left hydroureteronephrosis. Bilateral pulmonary nodules in the visualized lungs, not significantly changed. Small right pleural effusion with associated atelectasis. Scattered areas of hypoattenuation within the right lower lobe could represent developing pneumonia.    · 2/27/2022 CT Entire LE Left W Contrast ([facility])Interval open incision and drainage of anterolateral left thigh subcutaneous abscess with residual fluid and packing material.  Persistent, slightly improved diffuse subcutaneous edema   throughout the left lower extremity with no new or organizing fluid   collections. · 4/10/2022 CT Chest WO Contrast Progressive metastatic disease to the lungs. There are too numerous to count pulmonary nodules the majority of which have increased in size or which are new from the previous study. No evidence of acute consolidative pneumonia. 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 Patient's undergone previous kyphoplasty at the thoracolumbar juncture for compression deformities. There is an accentuated thoracic kyphosis. .  · 4/10/2022 CT Abd/Pelvis WO Contrast Metastatic disease to the lung bases showing worsening from the previous study. Moderate size hiatal hernia with gastroesophageal reflux. The patient is status post at least partial colectomy. Left lower quadrant ostomy is present. There is no evidence of obstruction. There is an irregular soft tissue mass with some calcification within the pelvis. This extends to and is inseparable from the right posterior lateral urinary bladder wall with potential secondary involvement. This extends into the presacral space. When compared to the previous exam I feel this is increased in size. The urinary bladder cannot be fully assessed as it is decompressed with a Mcgregor catheter. Postoperative changes of the mid lower lumbar spine. There is an accentuated lumbar lordosis with grade 1-2 anterolisthesis of L5 on S1. Compression deformities at T12, L1 and L2 are present with previous kyphoplasty T12 and L1. . 6. Status post right nephrectomy. There is mild dilatation of the upper tracts of the left kidney and left ureter with a double-J intraureteral stent well-positioned. The degree of distention of the upper tracts of the left kidney is improved from the previous exam of November of last year.  There is mild urothelial thickening. · 4/13/2022 CT Abd/Pelvis WO Contrast When compared to the previous exam of 3 days earlier there is now noted to be moderate small bowel distention. I would favor a adynamic ileus. A distal obstruction at the site of the patient's ileostomy is also in the differential but felt less likely. There is mild distention of the stomach. A moderate size hiatal hernia is present. Mild to moderate dilatation of the upper tracts of the left kidney. A left-sided double-J ureteral stent is in place. There is mild urothelial thickening. I do not see evidence of a discrete ureteral stone. The stent is well-positioned. Partially calcified pelvic mass. This is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

## 2022-07-01 NOTE — TELEPHONE ENCOUNTER
[facility]    Outreach made within 2 business days of discharge: Yes    Patient: Lanny Bravo   Patient : 1950 MRN: [id number]    Reason for Admission: Nausea and vomiting    Discharge Date: 22      Discharge Diagnoses:  Principal Problem:    Small bowel obstruction ([facility] [address]  Active Problems:    Acute cystitis with hematuria    Urothelial carcinoma of kidney, right ([facility] [address]    Palliative care patient    COLLEEN (acute kidney injury) ([facility] [address]    Metastasis from colon cancer ([facility] [address]    Dehydration    Hydronephrosis of left kidney    Acute kidney injury superimposed on CKD ([facility] [address]  Resolved Problems:    * No resolved hospital problems. *      Spoke with: Monika    Discharge department/facility: [facility] Interactive Patient Contact:  Was patient able to fill all prescriptions: Yes  Was patient instructed to bring all medications to the follow-up visit: Yes  Is patient taking all medications as directed in the discharge summary? Yes  Does patient understand their discharge instructions: Yes  Does patient have questions or concerns that need addressed prior to 7-14 day follow up office visit: no    Spoke to natanael Pandya daughter she said he did have a rough night last night. He is having more pain in his abdomen area. She had said that the cancer had spread. He will be going next week for more chemo. I did try to schedule a hospital follow up to which she declined at this time. I asked her to let us know if there is anything we can do for the pt and she VU.     Scheduled appointment with PCP within 7-14 days    Follow Up  Future Appointments   Date Time Provider Pepe Hanson   2022  9:00 AM SCHEDULE, [facility]   2022  1:00 PM SCHEDULE, [facility]   2022 10:45 AM MD BOB Slade HEMONC P-[place]   2022 11:00 AM SCHEDULE, L MED ONC MA L MED ONC Cecy AMAYA   2022  9:45 AM Patricio Joshi Bebo Enriquez, [address] [facility], [address] [place] [address]

## 2022-07-05 PROBLEM — B37.41 CANDIDA CYSTITIS: Status: ACTIVE | Noted: 2022-01-01

## 2022-07-05 PROBLEM — T83.511A: Status: ACTIVE | Noted: 2022-01-01
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
## 2022-07-05 NOTE — LETTER
Keri Denton,  at [facility]  [phone] phone  [phone] fax  [phone] CELL ([id number])        Keri Denton  at [facility]  [phone] phone  [phone] fax  [phone] CELL ([address])
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
## 2022-07-05 NOTE — PROGRESS NOTES
Patient started c/o nausea, antibiotic stopped. PRN zofran 8 mg started per MD orders. See vitals in epic BP dropped Hr elevated. Patient states, ' I should have listened to Dr Kwok Fuel I feel so bad but I didn't want to be admitted\". Patient given 7- up and crackers.  Dr Sim Zhang arrived and patient taken to ER per request.
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
## 2022-07-06 NOTE — PROGRESS NOTES
Ulices Harris arrived to room # 144. Presented with: Sepsis  Mental Status: Patient is oriented, alert, coherent, logical, thought processes intact and able to concentrate and follow conversation. Vitals:    07/05/22 1831   BP:    Pulse:    Resp: 28   Temp:    SpO2:      Patient safety contract and falls prevention contract reviewed with patient Yes. Oriented Patient and Family to room. Call light within reach. Yes.   Needs, issues or concerns expressed at this time: no.      Electronically signed by Hans Corey RN on 7/5/2022 at 7:07 PM

## 2022-07-06 NOTE — PROGRESS NOTES
Physician Progress Note      Leigh Nuñez  [facility] #:                  [id number]  :                       1950  ADMIT DATE:       2022 6:30 AM  Lisa Acosta DATE:        2022 11:33 AM  RESPONDING  PROVIDER #:        Jose Sanders MD          QUERY TEXT:    Pt admitted with small bowel obstruction. Pt noted to have hx of colon cancer   s/p surgery with ileal anastomosis. If possible, please document in progress   notes and discharge summary the relationship, if any, between SBO and post   operative complication for ileal anastomosis. .    The medical record reflects the following:  Risk Factors:  Colon cancer, recent surgery  Clinical Indicators:  recent surgery for colon cancer. Evidence of small bowl   obstruction possible at the level of ileal anastomosis  Treatment:  NG tube, Surgery consult,  500 ml bolus and 1 liter bolus IVF, IVF   150 ml/hr, CT scan, labs,    Thank you    Padmini Thompson RN, BSN, [facility]  [phone]  Options provided:  -- SBO  due to complication of ileal anastomosis surgery  -- SBO  unrelated to complication of ileal anastomosis surgery  -- Other - I will add my own diagnosis  -- Disagree - Not applicable / Not valid  -- Disagree - Clinically unable to determine / Unknown  -- Refer to Clinical Documentation Reviewer    PROVIDER RESPONSE TEXT:    This patient has SBO due to complication of ileal anastomosis surgery .     Query created by: Zandra Lloyd on 2022 9:00 AM      Electronically signed by:  Jose Sanders MD 2022 12:03 PM

## 2022-07-06 NOTE — PLAN OF CARE
Problem: Discharge Planning  Goal: Discharge to home or other facility with appropriate resources  Outcome: Not Progressing  Flowsheets (Taken 7/5/2022 1843 by Valeri Gann RN)  Discharge to home or other facility with appropriate resources:   Identify discharge learning needs (meds, wound care, etc)   Arrange for needed discharge resources and transportation as appropriate   Identify barriers to discharge with patient and caregiver     Problem: Pain  Goal: Verbalizes/displays adequate comfort level or baseline comfort level  Outcome: Not Progressing

## 2022-07-06 NOTE — PROGRESS NOTES
4 Eyes Skin Assessment    Angel Doan is being assessed upon: Admission    I agree that I, Willie Carballo RN, along with Grace Sampson RN (either 2 RN's or 1 LPN and 1 RN) have performed a thorough Head to Toe Skin Assessment on the patient. ALL assessment sites listed below have been assessed. Areas assessed by both nurses:     [x]   Head, Face, and Ears   [x]   Shoulders, Back, and Chest  [x]   Arms, Elbows, and Hands   [x]   Coccyx, Sacrum, and Ischium  [x]   Legs, Feet, and Heels    Does the Patient have Skin Breakdown?  No    Nhan Prevention initiated: NA  Wound Care Orders initiated: NA    [facility] nurse consulted for Pressure Injury (Stage 3,4, Unstageable, DTI, NWPT, and Complex wounds) and New or Established Ostomies: NA        Primary Nurse eSignature: Willie Carballo RN on 7/5/2022 at 7:07 PM      Co-Signer eSignature: Electronically signed by Grace Sampson RN on 7/5/22 at 7:07 PM CDT
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
## 2022-07-06 NOTE — PROCEDURES
[facility] Vascular Access Team:  Midline Insertion Procedure Note    Gillian Washington  [id number]   Admitted - 7/5/2022  4:46 PM  Admission Diagnosis -   Septicemia ([address] [A41.9]  Hypotension, unspecified hypotension type [I95.9]  Primary colon cancer without distant metastasis (M0) ([facility] [C18.9]  Chronic kidney disease, unspecified CKD stage [N18.9]  Sepsis ([address] [A41.9]    Attending Physician - Lakisha Bacon DO  Ordering Physician - Lakisha Bacon DO    PROCEDURE: Insertion of 4.5 [place] Single Lumen Power Midline    INDICATION(S): Long Term IV therapy, Poor Access, Pt./Dr. Preference    PROCEDURE DETAILS:  Informed consent was obtained for the procedure, including sedation. Risks of hemorrhage and adverse drug reaction were discussed. 4.5 [place] Single Lumen Power Midline inserted to the Right Brachial per hospital protocol. Blood return: yes    FINDINGS:  The Right Brachial vein was visualized using the ultrasound and deemed a suitable vessel. The area was prepped with Chlorhexidine and draped in sterile fashion using full max barrier draping. The area was anesthetized with 3 cc's of 1% Lidocaine. The vein was accessed using US guidance. The guidewire was advanced through the needle to secure the vein. The needle was removed and a peel-a-way Sheath was placed over the guidewire. Guidewire was removed with tip intact. The 4.5 [place] Single Lumen Power Midline was left untrimmed at 15 cm and inserted into the Sheath. The peel-a-way sheath was removed. Approximately 2 cm exposed. All lumens had brisk blood return and was flushed with 10 cc of NS. The Midline was secured using a securement device and a Biopatch was placed over the insertion site. A Tegaderm was placed over the securement device and insertion site. Dressing was dated and initialed with external measurement marked. Patient did tolerate procedure well. IMPRESSION/PLAN:  1. Successful insertion of 4.5 [place] Single Lumen Power Midline  2. Midline is ready to be used. Please change tubing prior to using the new Midline. Make sure to label, date and use alcohol caps on new tubing and alcohol caps on unused ports.            Electronically Signed By: Ernesto Fischer RN, [facility]-BC on 7/6/2022 at 3:55 PM

## 2022-07-06 NOTE — ACP (ADVANCE CARE PLANNING)
Advance Care Planning     Advance Care Planning Activator (Inpatient)  Conversation Note      Date of ACP Conversation: 7/6/2022     Conversation Conducted with: Shannan Forrester    ACP Activator: Meliton Guevara RN        Health Care Decision Maker:     Current Designated Health Care Decision Maker:     Primary Decision Maker: Joselyn Wallis Child - [phone]    Secondary Decision Maker: Dayron [address] - Child - [phone]      Care Preferences    Ventilation: \"If you were in your present state of health and suddenly became very ill and were unable to breathe on your own, what would your preference be about the use of a ventilator (breathing machine) if it were available to you? \"      Would the patient desire the use of ventilator (breathing machine)?: YES          Resuscitation  \"CPR works best to restart the heart when there is a sudden event, like a heart attack, in someone who is otherwise healthy. Unfortunately, CPR does not typically restart the heart for people who have serious health conditions or who are very sick. \"    \"In the event your heart stopped as a result of an underlying serious health condition, would you want attempts to be made to restart your heart (answer \"yes\" for attempt to resuscitate) or would you prefer a natural death (answer \"no\" for do not attempt to resuscitate)? \" Illene Chau

## 2022-07-06 NOTE — PROGRESS NOTES
Pharmacy Renal Adjustment    Margot Herrera is a 81 y.o. male. Pharmacy has renally adjusted medications per protocol. Recent Labs     07/05/22  0956 07/06/22  0159   BUN 28* 30*       Recent Labs     07/05/22  0956 07/06/22  0159   CREATININE 2.4* 2.7*       Estimated Creatinine Clearance: 24 mL/min (A) (based on SCr of 2.7 mg/dL (H)). Height:   Ht Readings from Last 1 Encounters:   07/05/22 5' 5\" (1.651 m)     Weight:  Wt Readings from Last 1 Encounters:   07/06/22 170 lb 6.4 oz (77.3 kg)       Baseline SCr: 1.6    Plan: Adjust the following medications based on renal function:           Avycaz to 940mg IV q12 hours.     Electronically signed by Jerry Mack [facility] on 7/6/2022 at 8:34 AM

## 2022-07-06 NOTE — PROGRESS NOTES
Infectious Diseases Progress Note    Patient:  Herber Melo  YOB: 1950  MRN: [id number]   Admit date: 7/5/2022   Admitting Physician: Marla Velasquez DO  Primary Care Physician: Octaviano Pedraza MD    Chief Complaint/Interval History: He is feeling better. Blood pressure seems to be showing some improvement. He was on higher dose of Levophed overnight than he is currently. He is starting to have some urine output. He denies any pain or discomfort at present. He denies any shortness of breath. He is alert, pleasant, and interactive. He indicates he had a little bit of rest, but not a lot of rest last night. He feels a little bit stronger and less washed out today than he did yesterday. Blood cultures for gram-negative rods were called overnight. Additional molecular testing suggesting Serratia and the possibility of Carbapenem resistance. In/Out    Intake/Output Summary (Last 24 hours) at 7/6/2022 0725  Last data filed at 7/6/2022 0641  Gross per 24 hour   Intake 1762.69 ml   Output 740 ml   Net 1022.69 ml     Allergies:    Allergies   Allergen Reactions    Morphine Anxiety     Current Meds: meropenem (MERREM) 500 mg in sodium chloride 0.9% 50 mL (duplex), Q12H  0.9 % sodium chloride bolus, Once  meropenem (MERREM) 1000 mg in sodium chloride 0.9% 50 mL (duplex), Once  sodium chloride flush 0.9 % injection 5-40 mL, 2 times per day  sodium chloride flush 0.9 % injection 5-40 mL, PRN  0.9 % sodium chloride infusion, PRN  enoxaparin Sodium (LOVENOX) injection 30 mg, Daily  acetaminophen (TYLENOL) tablet 650 mg, Q6H PRN   Or  acetaminophen (TYLENOL) suppository 650 mg, Q6H PRN  lactated ringers bolus, Once  0.9 % sodium chloride infusion, Continuous  norepinephrine (LEVOPHED) 16 mg in sodium chloride 0.9 % 250 mL infusion, Continuous  oxyCODONE-acetaminophen (PERCOCET) 7.5-325 MG per tablet 1 tablet, Q4H PRN  pantoprazole (PROTONIX) tablet 40 mg, QAM AC    sodium chloride flush 0.9 % injection 5-40 mL, PRN  heparin flush 100 UNIT/ML injection 500 Units, PRN  dextrose 5 % solution, Once      Review of Systems see HPI    VitalSigns:  BP (!) 90/54   Pulse 83   Temp 97.5 °F (36.4 °C) (Temporal)   Resp 14   Ht 5' 5\" (1.651 m)   Wt 170 lb 6.4 oz (77.3 kg)   SpO2 96%   BMI 28.36 kg/m²      Physical Exam  Line/IV site: No erythema, warmth, induration, or tenderness. Lungs clear without crackles  Abdomen is soft. No abdominal tenderness. No guarding or rebound. No flank tenderness  Port site without erythema  Skin without rash  Extremities trace bilateral lower extremity edema    Lab Results:  CBC:   Recent Labs     07/05/22  0956 07/06/22  0159   WBC 40.7* 55.6*   HGB 9.4* 9.7*   * 315     BMP:  Recent Labs     07/05/22  0956 07/06/22  0159    136   K 4.5 4.5   CL 95* 99   CO2 25 18*   BUN 28* 30*   CREATININE 2.4* 2.7*   GLUCOSE 110* 110*     CultureResults:  Blood cultures drawn the morning of July 5, 2022-gram-negative rods  (Molecular identification-Serratia by M.dot testing also suggest the possibility of Carbapenem resistance)    Radiology:   Chest x-ray done yesterday personally reviewed:  Impression   Bilateral infiltrate and right pleural effusion as described. Recommendation: Follow up as clinically indicated. Electronically Signed by Giselle Espinosa MD at 05-Jul-2022 06:53:38 PM              Additional Studies Reviewed:  None    Impression:  1. Gram-negative sepsis (Serratia-possibly Carbapenem resistant)-definite source uncertain at present. Possibilities would include urine, abdomen, and port. Feel pulmonary source is unlikely. 2.  Urinary tract infection with Candida [id number] urine cultures currently pending  3. Metastatic colorectal cancer  4. History urothelial cancer with right nephro ureterectomy  5. Ileostomy  6. Marked leukocytosis secondary to #1. Some element of leukemoid reaction also possible.     Recommendations:  Continue IV fluids  Try

## 2022-07-06 NOTE — CONSULTS
MEDICAL ONCOLOGY CONSULTATION    Pt Name: Rosalinda Greco  MRN: [id number]  YOB: 1950  Date of evaluation: 7/6/2022    REASON FOR CONSULTATION: Sepsis, urinary sepsis  REQUESTING PHYSICIAN: Hospitalist    History Obtained From:    patient, electronic medical record    HISTORY OF PRESENT ILLNESS:  Mr. Stacy Patel is well-known to my clinic. He has a history of metastatic colonic adenocarcinoma with lung metastasis and is currently on palliative chemotherapy. 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 He had a very complicated postoperative course. He had prior admissions for urinary tract infection. He was admitted recently and discharged a few days ago. He presented again to the emergency department with episode of hypotension. Apparently, he had fever and chills as well. The patient is of been seen by urology with plans for change of his ureteral stents. He is currently being treated for possible urinary tract infection. Port infection is a possibility as well. His blood cultures is positive for gram-negative rods. He is also on anidulafungin for prior yeast infection in his urine. He was started on ceftazidime-avibactam and ciprofloxacin. He is on pressors in the ICU. I was consulted for continued care. His chemotherapy has been on hold. Last chemotherapy was delivered on 6/1/2022. Prior oncological history    HISTORY OF PRESENT ILLNESS:  The patient is well-established in my clinic. He has a diagnosis of colon cancer and recent diagnosis of invasive urothelial carcinoma, high-grade of the renal pelvis status surgery. In addition, history of hypertension, hyperlipidemia, CAD, CKD stage IV. Patient presented ER department on 6/29/2022 with complaints of nausea vomiting, abdominal pain. He had decreased output from his ileostomy as well. A NG tube was placed in the ED. Surgery was consulted. Labs showed worsening kidney function with creatinine 2.5, hyperkalemia potassium 3.1, elevated white blood cell count. UA showed moderate blood, large leukocyte, WBC too numerous to count. 6/29/2022-CT abdomen pelvis showed evidence of small bowel obstruction possible to lower the ileal anastomosis. No intra-abdominal free fluid. Presacral fluid collection. Left hydroureteronephrosis, right pleural effusion and multiple lung metastatic lesions. Diffuse bladder wall thickening with associating 4.5 x 3 centimeters soft tissue at the superior wall of bladder extends extraluminally.  Malignancy cannot be excluded.  Left double-J catheter in place. He had an NG tube placed yesterday. He feels better this morning and feels that his colostomy is working again.     Prior oncological history  Reason for MD visit: Toxicity/disease management  The patient has stage IV colonic adenocarcinoma with progressive lung metastasis consistent with colonic adenocarcinoma.  In addition, he has a history of urothelial carcinoma stage II.  He had progressive disease in the lungs compatible with colonic adenocarcinoma.  He has resumed treatment with FOLFIRI/panitumumab.  He has received 2 cycles.  He complains of significant fatigue and nausea from the last treatment. Zadie Bernheim is still sick after this day today.     ONCOLOGIC HISTORY:   Diagnosis:  · Moderately differentiated rectal carcinoma, T3N0Mx, diagnosed in 3/9/2009  · Noninvasive high-grade papillary urethral carcinoma.  Negative for evidence of detrusor muscle invasion, pTa, pNx on 8/18/2019.    · Metastatic colorectal carcinoma, 9/3/2019  · MSI stable and mutations for BRAF, NRAS, KRAS were not detected.    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  Partially calcified soft tissue changes within the janes-sacral region likely representing sequelae of pelvic radiation.  Increasing scarring/fibrosis versus tumor recurrence within the presacral changes, likely represents a site of right distal ureter obstruction.  No left-sided hydronephrosis. · 8/18/2019 -Double-J ureter stent placement for right hydronephrosis secondary to extrinsic compression by pelvic mass.    · 8/27/2019-CT scan of the chest with contrast documented numerous pulmonary nodules that appear new compared to 11/12/2017, RUL nodule measuring 7 mm and LLL nodule measuring 5 mm.  Soft tissue nodule at the left ventral abdominal wall.  Slight increased size of a probable lymph node anterior to the aorta measuring 0.9 cm compared to 0.7 cm.  Similar presacral, right pelvic sidewall and right abductor muscular nodular soft tissue density. · 8/27/2019 CT scan of the abdomen and pelvis with contrast identified new moderate left hydronephrosis with moderate right hydronephrosis.  Mild stranding around the urinary bladder and thickening of the bilateral ureteral wall.  Numerous pulmonary nodules.  Soft tissue of the left ventral abdominal wall.  Slightly increased size of probable lymph node anterior to the aorta measuring 0.9 cm compared to 0.7 cm. · 8/27/2019-PET scan did not identify any FDG avid pulmonary nodules or airspace opacities.  Abnormal increased metabolic activity within the right pelvic wall soft tissue showing SUV of 5.4.  Abnormal soft tissue metabolic activity in the right abductor muscle with SUV of 6.4.  Focally increased activity to the right of the inferior L5 vertebrae body posterior with SUV of 7.9 with associated sclerotic changes.   · 8/29/2019-  Dr. Payton Guzman completed a cystoscopy with double-J ureter stent in the left ureter for left hydronephrosis  · 9/3/2019- CT-guided right abductor muscle biopsy on 9/3/2019 with pathology identifying metastatic adenocarcinoma consistent with colorectal origin.  Molecular panel from biopsy tissue revealed MSI stable and mutations for BRAF, NRAS, KRAS were not detected.    · 9/18/2019 - Palliative chemotherapy with modified FOLFOX 7  (Oxaliplatin 85 mg/m² IV day 1, leucovorin 400 mg/m² IV day 1 and 5-FU 2400 mg/m² IV continuous infusion over 46 to 48 hours) with the anticipation of adding Avastin 5 mg/kg day 1 every 14 days  · 10/15/2019- 24-hour urine for protein with a total protein of 1785 mg per 24-hour.  Zurdo has been evaluated by Dr. Cindy Ontiveros and he reports no significant concerns related to the protein. · 11/6/19 CEA 5.6 significantly improved compared to CEA of 14.0 on 8/30/2019. · 11/15/2019 -CT scan of the abdomen and pelvis documented no evidence of disease progression with significant decrease in the size of enhancing nodules in the right pelvic abductor musculature, a previous 1.8 cm nodule now measures 5 mm.  No new or enlarging retroperitoneal, mesenteric, pelvic or inguinal lymph nodes.  Calcified presacral mass unchanged measuring 5 x 3.7 cm.   · 11/15/2019 -CT scan of the chest documented multiple small pulmonary nodules reidentified, largest nodule in the RUL measures 5 mm compared to 7.5 mm, RLL nodule measures 3.4 mm compared to 5.9 mm, VIC nodule measures 4 mm compared to 6 mm.  A cluster of small nodules in the RUL anteriorly are barely visible on this study.  There is a decrease in size of mediastinal lymph nodes compared to previous exam, right distal paratracheal lymph node measuring 4.5 mm compared to 8.3 mm and lower right peritracheal node measuring 4.5 mm compared to 8.6 mm.    · 1/13/2020- CT scan of the abdomen and pelvis with contrast indicated improvement in the right-sided hydronephrosis with a chronic inflammatory process of the ureters suspected due to the moderate thickening, also present on previous study.  The small poorly enhancing nodules in the right abductor muscles have decreased in the partially calcified presacral mass and right lateral pelvic wall nodules are stable compared to previous study.  Resolution of the subcutaneous abdominal wall nodules.  A prominent retroperitoneal lymph node adjacent and anterior to the left common artery is redefined and measures 6 mm, no change from previous exam.   · 1/13/2020 - CT scan of the chest documented a right lower lobe nodule measures 4.3 cm and is unchanged.  A right lower lobe nodule measures 2.8 mm compared to 3.4 mm.  Nodule in the right upper lobe is barely visible and measures 2.4 mm.  Nodule in the left lower lobe measures 4.8 mm and is unchanged.  Nodule in left lower lobe posterior measures 2.8 mm and previously measured 4.7 mm.  A right lower lobe posterior medially nodule is barely visible measuring 0.2 mm and previously. measured 4.5 mm.  No new nodules identified.  No change in the size of the mediastinal lymph nodes. · 1/28/2020 -palliative maintenance therapy with leucovorin 400 mg/m² IV over 2 hours on day 1, followed by 5-FU bolus 400 mg/m² and then 1200 mg/m²/day x2 days (total 2400 mg meter squared over 46 to 48 hours) continuous infusion.  Repeat every 2 weeks.  Only received 1 cycle, further treatment held due to small bowel obstruction. · 1/30/2020 - CT scan of the abdomen and pelvis indicated high-grade small bowel obstruction with transition point in the midline posterior pelvis where a small bowel loop is tethered to a partially calcified presacral soft tissue mass.   · 2/11/2020-CEA 1.4  · 3/5/2020-  Exploratory laparotomy, removal of adhesions, small bowel resection with primary anastomosis and partial thickness small bowel repair by Dr. Melva Curry the operative note Dr. Aman Candelaria reported no evidence of carcinomatosis within the abdomen and the liver was unable to be examined due to extent of right upper quadrant adhesions.  Pathology from small intestine documented no evidence of malignancy. · 4/15/2020 Ct Chest W Contrast Minimal interval increase in size of subcentimeter pulmonary nodules. The largest now measures 6 mm in the medial right lower lobe on axial image 80. There is a new, unstable, horizontal fracture through the T6 vertebral body. Additionally, there are new fractures through the posterior 11th and 12th right ribs. The bones are moderately osteopenic. The finding of an unstable fracture through the T6 vertebral body was discussed with Ana Mercedes at 10:45 AM on 4/15/2020. · 4/15/2020 Ct Abdomen Pelvis W Iv Contrast  Patient has undergone interval resection of the distal small bowel, and there is a 2.8 cm fluid collection in the presacral operative bed. This contains a tiny focus of air. This may postoperative or due to infection. Please correlate with the patient's clinical symptoms and laboratory markers. Improved hydronephrosis and hydroureter. Diffuse osteoporosis. Findings in the lower chest are described in a separate dictation. · 4/22/2020-CEA 0.9  · 6/2/2020-resumed chemotherapy with 5-FU/leucovorin and Panitumumab.  Okay to do 1 today then CMP CEA   · 8/19/20 CEA-1.1  · 10/21/2020- CEA 2.0  · 11/11/2020- Ct Chest W Contrast Multiple, too numerous to count, small noncalcified lung nodules bilaterally. The referenced nodules appears to have decreased in size the previous study. No new nodules. · 11/11/2020- Ct Abdomen Pelvis W Contrast Unchanged bilateral hydronephrosis, more on the right side. Bilateral ureteral stents in place. Moderate asymmetrical thickening of the incompletely distended urinary bladder. This may partly be due to incomplete distention. Possibility of chronic cystitis and or chronic partial outlet obstruction may not be excluded.  A functioning left lower abdominal ostomy. A small parastomal small bowel herniation without obstruction. A partially calcified presacral mass. The soft tissue component have increased in size in the previous study. The osseous changes are described above. Any superimposed metastatic disease is not excluded and would be hard to evaluate due to extensive postsurgical changes. · 11/18/2020-essentially, overall stable disease.  Improvement of the lung nodules with decreased in the size of the target lesions.  The pelvic lesion is is likely worse by 25%.  However, CEA is is still within the normal limits.  Therefore likely mixed response.  We will continue current treatment and repeat CT scans in about 3 months. · 12/16/2020-discontinue 5-FU bolus from his regimen. · 2/9/2021- Ct Chest W Contrast No evidence of disease progression. Stable pulmonary nodules. Stable intrahepatic and extrahepatic bile duct dilation compared to prior 11/11/2020. Postoperative, posttraumatic and degenerative changes in the spine as described above. Old right-sided rib fractures. · 2/9/2021- Ct Abdomen Pelvis W Contrast showed evidence of response to therapy including decreased presacral mass/thickening now measuring 1.1 cm, previously 1.9 cm on 11/11/2020. Stable intrahepatic and extra hepatic bile duct dilation with cholecystectomy clips. See separately dictated CT chest of the same day regarding pulmonary nodules.  Bilateral ureteral stents. Decreased bilateral hydronephrosis. Urinary bladder wall is thickened, which could be seen with post treatment changes or cystitis. Correlate with symptoms.  Chronic bony findings as above  · 2/17/2021-reviewed CT chest abdomen pelvis.  Essentially consistent with disease response to therapy.  Continue current therapy.    · 2/17/21 CEA 1.0  · 3/25/21 Ct Abdomen Pelvis W Iv Contrast  The stomach is distended, however no small bowel dilatation identified. Contrast identified within the left ileostomy bag.  The distal stomach is under distended which may be secondary to peristalsis. Gastroparesis considered. Bilateral ureteral stents remain appropriate in position, however there is new moderate to severe right-sided hydronephrosis and mild left-sided hydronephrosis when compared to the 2/9/2021 exam. Mild increase considered within the partially calcified presacral pelvic mass. Stable partially calcified right pelvic soft tissue. Similar abnormal wall thickening of the bladder most notable superiorly. Similar prominence of the intra and extra hepatic bile ducts down to the level of the ampulla. Findings may represent a reservoir effect, however correlation with liver function tests recommended. Therefore. Stable noncalcified left greater than right pulmonary nodules with asymmetrical interstitial changes of the right lung base concerning for pneumonitis. Osteopenia with postoperative changes of the lumbar spine. Chronic compression deformities of the thoracolumbar vertebra as described above. · 4/14/2021-I reviewed notes from urology and also CT abdomen/pelvis that showed mild interval increase in the size of the soft tissue nodule in the pelvis.  However, this is still very small and therefore will have a short follow-up CT scan and of May 2021.  I personally reviewed the CT scans.  Really hard to state that there is clear-cut disease progression.  Again, short follow-up recommended.  Continue current treatment. · 5/12/21 CEA 0.8  · 5/26/2001-CT chest abdomen pelvis showed Partially calcified presacral soft tissue mass appears unchanged. Previously measured soft tissue noncalcified component is unchanged measuring 2.2 x 3.2 cm on axial image 60. However, this is questionable.  CT chest showed a stable pulmonary nodules.  Subcentimeter nodules.  Largest 7.5 mm. · 6/1/21 CEA 0.9  · 06/01/23- reviewed scans.  Stable disease.  Continue treatment every 3 weeks as per patient request. Continue infusional 5-FU, Panitumumab and leucovorin. No 5-FU bolus due to severe mucositis. · 8/11/2021 CEA . 9  · 9/03/2021 CT Chest w/Contrast Slight interval increase in the size of pulmonary nodules, the largest in the medial right lung base. Findings are concerning for metastatic disease. Atherosclerosis of the aorta and coronary arteries. Sclerotic rib lesions favored for osseous metastases. Old rib fractures also evident. · 9/03/2021 CT Abd/Pelvis w/ IV Contrast (Oral) A persistent partially calcified mass in the presacral region with encasement of the distal ureters bilaterally and resultant bilateral hydronephrosis. Bilateral ureteral stents in place. There is increasing right-sided hydronephrosis since the previous study. Right renal atrophy similar to the previous study. Moderate asymmetrical thickening of the incompletely distended urinary bladder is similar to the previous study. This may partly be due to incomplete distention. An inflammatory process or a neoplastic process is not excluded. Moderate intrahepatic biliary dilatation is similar to the previous study and probably due to a prior cholecystectomy. Moderate dilatation of the contrast filled small bowel loops may represent an ileus or partial distal small bowel obstruction. There is left lower abdominal ileostomy which appears patent. The stable compression fractures of the lower thoracic and proximal lumbar vertebrae and hardware fusion similar to the previous study. · 9/22/21- Decrease Vectibix to every other treatment. Priti Morales is currently receiving chemotherapy every 3 weeks as per patient request  · 11/9/2021 CT Abd/Pelvis WO Contrast No acute posttraumatic findings. Known metastatic disease to the lungs. Posttreatment changes in overall chronic findings as above. · 12/27/2021 NM Bone Scan Multiple foci of abnormal increased activity in the ribs bilaterally correspond with previously seen areas of bony sclerosis and old healed fractures. Abnormal activity in the lumbar spine correspond with compression fractures and kyphoplasty of these vertebrae. Probable right hydronephrosis. · 2/24/2022 US LE Left  Limited([facility]) No residual fluid in the left lateral thigh. · 2/27/2022 CT Abd/Pelvis W Contrast [facility]) Overall unchanged appearance of the right lateral abdominal collection with a percutaneous drain in place and small residual fluid. Multiloculated fluid and gas collection in the pelvis along the right aspect of bladder dome measuring approximately 8 x 4 cm, not significantly changed in size. Postsurgical changes of prior colectomy and small bowel resection.  The distal ileum abuts the right flank collection and anterior abdomen in midline prior to ostomy.  Unchanged partially calcified presacral and right pelvic sidewall soft tissue. Minimally improved mild left hydroureteronephrosis. Bilateral pulmonary nodules in the visualized lungs, not significantly changed. Small right pleural effusion with associated atelectasis. Scattered areas of hypoattenuation within the right lower lobe could represent developing pneumonia. · 2/27/2022 CT Entire LE Left W Contrast ([facility])Interval open incision and drainage of anterolateral left thigh subcutaneous abscess with residual fluid and packing material.  Persistent, slightly improved diffuse subcutaneous edema   throughout the left lower extremity with no new or organizing fluid   collections. · 4/10/2022 CT Chest WO Contrast Progressive metastatic disease to the lungs. There are too numerous to count pulmonary nodules the majority of which have increased in size or which are new from the previous study. No evidence of acute consolidative pneumonia. 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 Patient's undergone previous kyphoplasty at the thoracolumbar juncture for compression deformities. There is an accentuated thoracic kyphosis. .  · 4/10/2022 CT Abd/Pelvis WO Contrast Metastatic disease to the lung bases showing worsening from the previous study. Moderate size hiatal hernia with gastroesophageal reflux. The patient is status post at least partial colectomy. Left lower quadrant ostomy is present. There is no evidence of obstruction. There is an irregular soft tissue mass with some calcification within the pelvis. This extends to and is inseparable from the right posterior lateral urinary bladder wall with potential secondary involvement. This extends into the presacral space. When compared to the previous exam I feel this is increased in size. The urinary bladder cannot be fully assessed as it is decompressed with a Mcgregor catheter. Postoperative changes of the mid lower lumbar spine. There is an accentuated lumbar lordosis with grade 1-2 anterolisthesis of L5 on S1. Compression deformities at T12, L1 and L2 are present with previous kyphoplasty T12 and L1. . 6. Status post right nephrectomy. There is mild dilatation of the upper tracts of the left kidney and left ureter with a double-J intraureteral stent well-positioned. The degree of distention of the upper tracts of the left kidney is improved from the previous exam of November of last year. There is mild urothelial thickening. · 4/13/2022 CT Abd/Pelvis WO Contrast When compared to the previous exam of 3 days earlier there is now noted to be moderate small bowel distention. I would favor a adynamic ileus. A distal obstruction at the site of the patient's ileostomy is also in the differential but felt less likely. There is mild distention of the stomach. A moderate size hiatal hernia is present. Mild to moderate dilatation of the upper tracts of the left kidney. A left-sided double-J ureteral stent is in place. There is mild urothelial thickening. I do not see evidence of a discrete ureteral stone. The stent is well-positioned. Partially calcified pelvic mass. This is inseparable from the right posterior lateral wall of the urinary bladder along its lower margin. A Mcgregor catheter is in place within the bladder. Chronic-appearing fractures within the thoracic and lumbar spine with a gibbus deformity at the thoracolumbar juncture and previous kyphoplasty at T12-L1. There is a small amount of free fluid within the subhepatic space and Morison space. A small right-sided effusion is present with multiple pulmonary nodules within the lower lobes consistent with metastatic disease to the lungs. There is a moderate size hiatal hernia present. · 04/19/22- CT guided biopsy consistent with colon cancer metastasis. · 5/2/2022- resuming chemotherapy with FOLFIRI/panitumumab for progressive colonic adenocarcinoma pulmonary metastasis. · 5/25/2022 CXR (2VW) Chronic lung changes with mild right basilar infiltrate or atelectasis.        ONCOLOGIC HISTORY # Rectal carcinoma:  Selena Cervantes has a history of moderately differentiated rectal carcinoma, T3N0Mx, diagnosed in 3/9/2009.  He received neoadjuvant chemotherapy with radiation and resection with Kaleta Neat has been routinely followed at [facility] and was last seen by Dr. Santa Colvin on 1/10/2019. Yolanda Constantino following are pertinent findings related to his diagnosis and treatment. · 3/9/2009- Esophagogastroduodenoscopy with biopsy by Dr. Uday Louis that revealed rectal mass at 8 cm with pathology being consistent with moderately differentiated adenocarcinoma. · 3/18/2019-Dr.Elizabeth Rosalia Manzanares at [facility] performed a flexible sigmoidoscopy and rectal endoscopic ultrasound that revealed the tumor extending 6-7 mm deep through the muscularis propria layer and into the serosa, T3 lesion.   · 4/9/2009-5/27/2009-received neoadjuvant chemotherapy with 5-FU CIV along with radiation therapy for a total of 5400 cGy under the direction of Dr. Yanely Mirza.    · 7/15/2009-rectum resection revealed no residual malignancy.  22 regional nodes were negative for malignancy.  The rectum distal doughnut was negative for malignancy.  He was documented to have a complete pathological response. · 9/9/2009- Ileostomy excision pathology revealed small bowel wall with changes consistent with ileostomy site.  Pathology negative for malignancy  · 4/11/2022 Renal Complete Prior right nephrectomy. The cortical thickness and echogenicity of the left kidney are normal. The left kidney is normal in size. There is mild to moderate dilatation of the left renal collecting system predominantly involving the lower pole. The patient reportedly has a double-J ureteral stent in place. The stent is not well seen on ultrasound.       Past Medical History:    Past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

## 2022-07-07 NOTE — PROGRESS NOTES
Urology Progress Note      SUBJECTIVE: Patient resting in bed he denies any flank pain or abdominal pain. OBJECTIVE: Patient was started on low-dose shirley for some hypotension. When his systolic blood pressure drops below 90 he does have some decreased urine output    REVIEW OF SYSTEMS:   Review of Systems   Constitutional: Positive for chills, fatigue and fever. HENT: Negative. Eyes: Negative. Respiratory: Negative. Cardiovascular: Negative. Gastrointestinal: Negative for abdominal pain, nausea and vomiting. Genitourinary: Positive for decreased urine volume. Negative for difficulty urinating, dysuria, flank pain, hematuria, penile swelling, scrotal swelling and testicular pain. Musculoskeletal: Positive for neck stiffness (Chronic prior neck surgery). Skin: Negative. Neurological: Positive for weakness. Negative for light-headedness. Psychiatric/Behavioral: The patient is nervous/anxious (At times). Physical  VITALS:  /68   Pulse (!) 102   Temp 97.8 °F (36.6 °C) (Tympanic)   Resp 25   Ht 5' 5\" (1.651 m)   Wt 170 lb 6.4 oz (77.3 kg)   SpO2 95%   BMI 28.36 kg/m²   TEMPERATURE:  Current - Temp: 97.8 °F (36.6 °C);  Max - Temp  Av.5 °F (36.4 °C)  Min: 97.1 °F (36.2 °C)  Max: 97.8 °F (36.6 °C)   24 HR I&O   Intake/Output Summary (Last 24 hours) at 2022 1439  Last data filed at 2022 1400  Gross per 24 hour   Intake 5056.7 ml   Output 2020 ml   Net 3036.7 ml     BACK: no tenderness in spine or flanks  ABDOMEN:  soft, non-distended and non-tender, ileostomy  HEART:  normal rate and regular rhythm  CHEST: Normal respiratory effort no distress  GENITAL/URINARY: Normal external genitalia normal phallus normal testis Mcgregor catheter in place draining clear urine    Data       CBC:   Recent Labs     22  0956 22  0159 22  0017   WBC 40.7* 55.6* 19.5*   HGB 9.4* 9.7* 7.9*   HCT 30.3* 29.3* 25.4*   * 315 256     BMP:    Recent Labs 07/05/22  0956 07/06/22  0159 07/07/22  0017    136 133*   K 4.5 4.5 3.6   CL 95* 99 99   CO2 25 18* 22   BUN 28* 30* 34*   CREATININE 2.4* 2.7* 2.3*   GLUCOSE 110* 110* 99       Recent Labs     07/05/22  1717   LABURIN >50,000 CFU/ml*  Light growth  No further workup       Recent Labs     07/05/22  1700   BC Gram stain Aerobic bottle: Bottle volume = >10 ml  Gram negative rods  Culture in progress  Please notify Physician  Gram stain Anaerobic bottle: Bottle volume = 9 ml  Gram negative rods  Culture in progress  Please notify Physician  *  Isolated from Aerobic and Anaerobic bottles  No further workup  Refer to Blood culture drawn on 7/5/22 at10:05 for sensitivity results       Recent Labs     07/06/22  1605   BLOODCULT2  Bottle volume = 8 ml  Gram stain Anaerobic bottle:  Gram negative rods  Culture in progress  Please notify Physician  *       Radiology:      Imaging studies: I personally reviewed the CT scan of abdomen pelvis done last evening 7/6/2022. Regarding the urinary tract solitary left kidney absent right kidney. There is a left ureteral stent in place appears to be in good position. The left kidney is now decompressed compared with CT scan from 6/29/2022. There are still some moderate left ureteral thickening and dilation which is unchanged. Mcgregor catheter is in a decompressed bladder. No perinephric fluids or evidence of stones along the course of the stent I see no retroperitoneal or pelvic adenopathy or abdominal metastasis. The presacral and right pelvic postop collection/consolidation with some hypodensity appears unchanged from prior CT scan no air no evidence of abscess.   He has a large right-sided pleural effusion which appears increased to me radiologist read it is stable he has multiple pulmonary nodules and there is some  consolidation of the right base    ASSESSMENT AND PLAN    Patient Active Problem List   Diagnosis    Thoracic facet joint syndrome    Primary osteoarthritis of left hip    Leg swelling    Abnormal nuclear cardiac imaging test    Abnormal nuclear cardiac imaging test    S/p bare metal coronary artery stent    Coronary artery disease involving native coronary artery of native heart without angina pectoris    COLLEEN (acute kidney injury) ([address]    Complex regional pain syndrome type 1 of right lower extremity    Hydronephrosis, bilateral    Ureteral stricture, left    History of rectal cancer    Anemia of chronic disease    Pelvic mass    Lung nodules    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    Colorectal cancer ([address]    Metastasis from colon cancer ([address]    Proteinuria    Chemotherapy management, encounter for    Anemia associated with chemotherapy    Other fatigue    Thrush    Dehydration    Chemotherapy-induced peripheral neuropathy (HCC)    Chemotherapy-induced nausea    SBO (small bowel obstruction) (HCC)    Burning with urination    History of small bowel obstruction    Encounter for central line care    Iron deficiency    History of bladder cancer    Hydronephrosis of left kidney    Hydronephrosis of right kidney    Low back pain    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)    Sepsis secondary to UTI ([address]    Acute kidney injury superimposed on CKD ([address]    Urinary tract infection associated with indwelling urethral catheter ([address]    Retained ureteral stent    Lower urinary tract symptoms    Ileus ([address]    Sepsis ([address]    Colon carcinoma metastatic to lung ([address]    Metastatic adenocarcinoma ([address]    Hypomagnesemia    Hypocalcemia    Small bowel obstruction (HCC)    Acute cystitis with hematuria    Urothelial carcinoma of kidney, right ([address]    Palliative care patient    Infection associated with indwelling urinary catheter ([address]    Candida cystitis       1. Gram-negative sepsis.   Patient being followed by infectious disease current antibiotics include Cipro ceftazidime - avibactam.  Leukocytosis is improved. He still is requiring low-dose neosynephrine. Continue supportive care no further  recommendations. 2.  Chronic left-sided hydronephrosis with chronic indwelling retained left ureteral stent. CT scan done last evening shows the hydronephrosis seen on CT scan from 6/29/2022 is resolved probably as result of bladder decompression with Mcgregor catheter therefore I feel like we can proceed with routine stent changes. The scheduled tomorrow which will allow him further resuscitation and being on antibiotics a little longer. For plan for cystoscopy left ureteral stent removal and left ureteral stent replacement tomorrow afternoon. 3.  Candiduria. Candida glabrata fluconazole resistant on anidulafungin per infectious disease. I suspect this represents chronic colonization    4. History of NMIBC at the time of stent change we will plan cystoscopy and surveillance of bladder and indicated procedure should he need biopsy fulguration etc.    5.  COLLEEN on CKD. Slightly improve creatinine urine output okay likely multifactorial but is encouraging that CT shows decompression of the left kidney with catheter in the bladder I do not think obstruction is a significant component at this point. Nephrology on board    6. 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 Right pelvic postoperative collection/consolidation appears unchanged on CT scan no other recommendations or treatment indicated at this time.     7.  Metastatic colon cancer followed by oncology chemotherapy when able per his   Clinical course and comorbidities    Clover Leyva MD

## 2022-07-07 NOTE — CARE COORDINATION
.    Patient Contact Information:    [address] [facility] [phone] (home)   Telephone Information:   Mobile [phone]     Above information verified? [x]   Yes  []   No      Emergency Contacts:    Extended Emergency Contact Information  Primary Emergency Contact: Monika Kennedy  Address: [address] [place] [address], [facility] [address] [facility]. [address] Phone: [phone]  Relation: Child  Preferred language: English   needed? No  Secondary Emergency Contact: [address]ron [address]   [address] Phone: [phone]  Relation: Child      Have you been vaccinated for COVID-19 (SARS-CoV-2)? [x]   Yes  []   No                   If so, when? Which :         [x]   Pfizer-BioNTTaptera  [x]   Moderna  []   Juan C Holguin  []   Other:         Pharmacy:    [facility]  [address] [place] [address], [address] [place] [address]  Phone: [phone] Fax: Molly Mackenzie #[id number] [address] [phone]  [address] [facility] [address]  Phone: [phone] Fax: [phone]          Patient Deficits:    []   Yes   [x]   No    If yes:    []   Confusion/Memory  []   Visual  []   Motor/Sensory         []   Right arm         []   Right leg         []   Left arm         []   Left leg  []   Language/Speech         []   Aphasia         []   Dysarthria         []   Swallow         [employer] Coma Scale  Eye Opening: Spontaneous  Best Verbal Response: Oriented  Best Motor Response: Obeys commands  Ryan Coma Scale Score: 15    Patient Deficit Notes:         Pt lives at home alone, plans same upon dc. Has all dme at home he requires. Has good family support system. Would like HH upon dc.   Electronically signed by Gorge Kelley RN on 7/7/2022 at 2:36 PM

## 2022-07-07 NOTE — CARE COORDINATION
07/07/22 1432   Service Assessment   Patient Orientation Alert and Oriented;Person;Place;Situation   Cognition Alert   History Provided By Patient   Primary Caregiver Self   Support Systems Family Members   PCP Verified by CM Yes   Last Visit to PCP Within last 3 months   Prior Functional Level Independent in ADLs/IADLs   Current Functional Level Independent in ADLs/IADLs   Can patient return to prior living arrangement Yes   Ability to make needs known: Good   Family able to assist with home care needs: Yes   Would you like for me to discuss the discharge plan with any other family members/significant others, and if so, who? No   Social/Functional History   Lives With Alone   Type of [facility] One level   Home Access Stairs to enter with rails   Entrance Stairs - Rails Both   Bathroom Shower/Tub Tub/Shower unit   [facility];Cane;Wheelchair-manual   Receives Help From Family;Friend(s)   ADL Assistance Independent   Homemaking Assistance Independent   Ambulation Assistance Independent   Transfer Assistance Independent   Active  Yes   Mode of Transportation Car   Occupation Retired   [address]   DME Ordered?  No   Potential Assistance Purchasing Medications No
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
## 2022-07-07 NOTE — PLAN OF CARE
Problem: Safety - Adult  Goal: Free from fall injury  7/7/2022 0043 by Suzanne Mercer RN  Outcome: Not Progressing     Problem: ABCDS Injury Assessment  Goal: Absence of physical injury  7/7/2022 0043 by Suzanne Mercer RN  Outcome: Not Progressing

## 2022-07-07 NOTE — PROGRESS NOTES
Infectious Diseases Progress Note    Patient:  Jessica Jaime  YOB: 1950  MRN: [id number]   Admit date: 7/5/2022   Admitting Physician: Shelbi Lisa DO  Primary Care Physician: Kristy Morfin MD    Chief Complaint/Interval History: He is feeling better. He is without fever. He has had no chills. He has been placed on low-dose Zaid-Synephrine. He seems to require this after he is sleeping. Per discussion with nursing his urine output drops off someone systolic less than 266. He had some mild tachycardia yesterday. He indicates some occasional mild dysuria. He does not report abdominal pain, nausea, or vomiting. He does not report cough or shortness of breath. He indicates he is feeling better. He is breathing comfortably. Interval notes reviewed. Renal stent change today or tomorrow. In/Out    Intake/Output Summary (Last 24 hours) at 7/7/2022 0520  Last data filed at 7/7/2022 0400  Gross per 24 hour   Intake 5000.35 ml   Output 1765 ml   Net 3235.35 ml     Allergies:    Allergies   Allergen Reactions    Morphine Anxiety     Current Meds: ciprofloxacin (CIPRO) IVPB 400 mg, Q24H  anidulafungin (ERAXIS) 100 mg in dextrose 5 % 130 mL IVPB, Q24H  ceftazidime-avibactam (AVYCAZ) 0.94 g in sodium chloride 0.9 % 100 mL IVPB, Q12H  sodium bicarbonate 75 mEq in sodium chloride 0.45 % 1,000 mL infusion, Continuous  ALPRAZolam (XANAX) tablet 0.5 mg, BID PRN  lidocaine PF 1 % injection 5 mL, Once  sodium chloride flush 0.9 % injection 5-40 mL, 2 times per day  sodium chloride flush 0.9 % injection 5-40 mL, PRN  0.9 % sodium chloride infusion, PRN  bismuth subsalicylate (PEPTO BISMOL) 262 MG/15ML suspension 30 mL, Q6H PRN  phenylephrine (ZAID-SYNEPHRINE) 50 mg in dextrose 5 % 250 mL infusion, Continuous  0.9 % sodium chloride bolus, Once  enoxaparin Sodium (LOVENOX) injection 30 mg, Daily  acetaminophen (TYLENOL) tablet 650 mg, Q6H PRN   Or  acetaminophen (TYLENOL) suppository 650 mg, Q6H PRN  lactated ringers bolus, Once  norepinephrine (LEVOPHED) 16 mg in sodium chloride 0.9 % 250 mL infusion, Continuous  oxyCODONE-acetaminophen (PERCOCET) 7.5-325 MG per tablet 1 tablet, Q4H PRN  pantoprazole (PROTONIX) tablet 40 mg, QAM AC      Review of Systems see HPI    VitalSigns:  BP (!) 104/58   Pulse 81   Temp 97.2 °F (36.2 °C) (Temporal)   Resp 11   Ht 5' 5\" (1.651 m)   Wt 170 lb 6.4 oz (77.3 kg)   SpO2 95%   BMI 28.36 kg/m²      Physical Exam  Line/IV site: No erythema, warmth, induration, or tenderness. Lungs without crackles  Heart without murmur  Abdomen nontender  Ileostomy functioning  No suprapubic or flank tenderness  Port site without erythema    Lab Results:  CBC:   Recent Labs     07/05/22  0956 07/06/22  0159 07/07/22  0017   WBC 40.7* 55.6* 19.5*   HGB 9.4* 9.7* 7.9*   * 315 256     BMP:  Recent Labs     07/05/22  0956 07/06/22  0159 07/07/22  0017    136 133*   K 4.5 4.5 3.6   CL 95* 99 99   CO2 25 18* 22   BUN 28* 30* 34*   CREATININE 2.4* 2.7* 2.3*   GLUCOSE 110* 110* 99     CultureResults:  Urine culture June 29, 2022-Candida glabrata  Blood cultures July 5, 2022-Serratia marcescens-final susceptibility pending  Urine culture July 5, 2022-no growth  Blood cultures July 6, 2022-no growth to date    Radiology:   CT scan abdomen and pelvis without contrast done yesterday:  Impression   1. Right-sided pleural effusion, stable and unchanged   2. Interval development of right lower lobe consolidation   3. Innumerable pulmonary nodules consistent with metastatic disease   4. The gallbladder is not visualized   5. The right kidney is not visualized   6. Postop changes status post colostomy   7. Postop and degenerative changes of the lumbar spine, stable and unchanged   8. Status post left double J ureteral stent placement, stable and unchanged   9. Bladder will thickening consistent with chronic inflammatory disease i.e. cystitis. Please correlate   10.  The appendix is not visualized   Recommendation: Follow up as clinically indicated. All CT scans at this facility utilize dose modulation, iterative reconstruction, and/or weight based dosing when appropriate to reduce radiation dose to as low as reasonably achievable. Electronically Signed by Jossie Duke at 07-Jul-2022 02:37:25 AM        Additional Studies Reviewed:  None    Impression:  1. Gram-negative sepsis secondary to Serratia marcescens. Possible Carbapenem resistant pathogen. Awaiting final susceptibility. Potential sources remain urine, abdomen, and port. Although some consolidation read on CT of the abdomen and pelvis in the right lower lobe area, he has had a paucity of pulmonary symptoms and lean against pulmonary source. Continuing Avycaz and ciprofloxacin pending final susceptibility results. 2.  Previous positive urine culture for Candida glabrata-fluconazole resistant. Under treatment with anidulafungin. 3.  Metastatic colorectal cancer  4. History urothelial cancer with right nephro ureterectomy  5. Ileostomy  6. Marked leukocytosis primarily related to his gram-negative sepsis-improving. White blood cell count is gone from 57,000 yesterday to 19,000 today.     Recommendations:  Continue Avycaz  Continue ciprofloxacin  Continue anidulafungin  Await final culture and susceptibility test  Not planning on port removal as long as continued improvement and follow-up blood cultures from yesterday remain no growth  Agree with renal stent change today or tomorrow  Continue IV fluids    Nathen Lazar MD

## 2022-07-07 NOTE — PLAN OF CARE
Problem: Discharge Planning  Goal: Discharge to home or other facility with appropriate resources  Outcome: Not Progressing     Problem: Pain  Goal: Verbalizes/displays adequate comfort level or baseline comfort level  Outcome: Not Progressing     Problem: Skin/Tissue Integrity  Goal: Absence of new skin breakdown  Description: 1. Monitor for areas of redness and/or skin breakdown  2. Assess vascular access sites hourly  3. Every 4-6 hours minimum:  Change oxygen saturation probe site  4. Every 4-6 hours:  If on nasal continuous positive airway pressure, respiratory therapy assess nares and determine need for appliance change or resting period.   Outcome: Not Progressing     Problem: Safety - Adult  Goal: Free from fall injury  Outcome: Not Progressing     Problem: ABCDS Injury Assessment  Goal: Absence of physical injury  Outcome: Not Progressing

## 2022-07-07 NOTE — PROGRESS NOTES
Palliative care follow up note. Palliative following for support. Report from pt RN, Dwight Chen. Pt is currently followed by Dr. Lacey Perla, rec chemotherapy. On Zaid for BP support. Lactated ringers infusing. Port to be de accessed today. Stent change tomorrow. Pt up ad lety. Possible move to floor tomorrow.     Electronically signed by Jesús Monk RN on 7/7/2022 at 10:37 AM

## 2022-07-07 NOTE — PROGRESS NOTES
MEDICAL ONCOLOGY PROGRESS NOTE    Pt Name: Odalys Royal  MRN: [id number]  YOB: 1950  Date of evaluation: 7/7/2022    Subjective: Overall, he feels better. He is still on low-dose pressors. Discussed bedside RN. Reviewed ID and urology, nephrology notes    HISTORY OF PRESENT ILLNESS:  Mr. Su Romero is well-known to my clinic. He has a history of metastatic colonic adenocarcinoma with lung metastasis and is currently on palliative chemotherapy. 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 He had a very complicated postoperative course. He had prior admissions for urinary tract infection. He was admitted recently and discharged a few days ago. He presented again to the emergency department with episode of hypotension. Apparently, he had fever and chills as well. The patient is of been seen by urology with plans for change of his ureteral stents. He is currently being treated for possible urinary tract infection. Port infection is a possibility as well. His blood cultures is positive for gram-negative rods. He is also on anidulafungin for prior yeast infection in his urine. He was started on ceftazidime-avibactam and ciprofloxacin. He is on pressors in the ICU. I was consulted for continued care. His chemotherapy has been on hold. Last chemotherapy was delivered on 6/1/2022. Prior oncological history    HISTORY OF PRESENT ILLNESS:  The patient is well-established in my clinic. He has a diagnosis of colon cancer and recent diagnosis of invasive urothelial carcinoma, high-grade of the renal pelvis status surgery. In addition, history of hypertension, hyperlipidemia, CAD, CKD stage IV. Patient presented ER department on 6/29/2022 with complaints of nausea vomiting, abdominal pain. He had decreased output from his ileostomy as well. A NG tube was placed in the ED. Surgery was consulted. Labs showed worsening kidney function with creatinine 2.5, hyperkalemia potassium 3.1, elevated white blood cell count. UA showed moderate blood, large leukocyte, WBC too numerous to count. 6/29/2022-CT abdomen pelvis showed evidence of small bowel obstruction possible to lower the ileal anastomosis. No intra-abdominal free fluid. Presacral fluid collection. Left hydroureteronephrosis, right pleural effusion and multiple lung metastatic lesions. Diffuse bladder wall thickening with associating 4.5 x 3 centimeters soft tissue at the superior wall of bladder extends extraluminally. Malignancy cannot be excluded. Left double-J catheter in place. He had an NG tube placed yesterday. He feels better this morning and feels that his colostomy is working again. Prior oncological history  Reason for MD visit: Toxicity/disease management  The patient has stage IV colonic adenocarcinoma with progressive lung metastasis consistent with colonic adenocarcinoma. In addition, he has a history of urothelial carcinoma stage II. He had progressive disease in the lungs compatible with colonic adenocarcinoma. He has resumed treatment with FOLFIRI/panitumumab. He has received 2 cycles. He complains of significant fatigue and nausea from the last treatment. He is still sick after this day today. ONCOLOGIC HISTORY:   Diagnosis:  Moderately differentiated rectal carcinoma, [id number], diagnosed in 3/9/2009  Noninvasive high-grade papillary urethral carcinoma. Negative for evidence of detrusor muscle invasion, pTa, pNx on 8/18/2019. Metastatic colorectal carcinoma, 9/3/2019  MSI stable and mutations for BRAF, NRAS, KRAS were not detected.     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 8/18/2019- transurethral resection of bladder tumor (TURBT)   9/18/2019-12/26/2019 palliative chemotherapy with modified FOLFOX 7  (Oxaliplatin 85 mg/m² IV day 1, leucovorin 400 mg/m² IV day 1 and 5-FU 2400 mg/m² IV continuous infusion over 46 to 48 hours for a total of 7 cycles. 1/28/2020 -palliative maintenance therapy with leucovorin 400 mg/m² IV over 2 hours on day 1, followed by 5-FU bolus 400 mg/m² and then 1200 mg/m²/day x2 days (total 2400 mg meter squared over 46 to 48 hours) continuous infusion. Repeat every 2 weeks. 05/11/22- Resuming FOLFIRI/panitumumab chemotherapy for progressive lung metastasis     ONCOLOGIC HISTORY # NMIBC_Bladder cancer. Tianna Hernandez was diagnosed with noninvasive urethral carcinoma, pTa, pNx on 8/18/2019. Ta tumors are papillary lesions that tend to recur but are relatively benign and generally do not invade the bladder. Adjuvant treatment is not warranted at this time and will be monitored closely. Biopsy and transurethral resection of bladder tumor (TURBT) on 8/18/2019 by Dr. Orquidea Diaz with pathology revealing noninvasive high-grade papillary urethral carcinoma. Negative for evidence of detrusor muscle invasion, pTa, pNx.   12/3/2019- cystoscopy with removal and replacement of double-J urethral stent. Pathology from dome of the bladder/tumor revealed high-grade papillary urethral carcinoma, noninvasive. No muscularis propria present. 2/26/2020 - cystoscopy and bilateral ureteral stent removal and replacement. The operative note by Dr. Luna Jo documented bilateral hydronephrosis and obtained biopsy of the bladder in the mid dome and left anterior lateral wall x2.   Pathology documented high-grade papillary urethral carcinoma, noninvasive, stage pTaNx.  5/28/2020-the patient underwent a cystoscopy and resection of bladder tumor on 05/28/2020 with findings consistent with noninvasive, high-grade papillary urothelial carcinoma. Muscularis propria was not identified. The patient will receive intravesical BCG therapy. 7/6/2020-CT Abdomen/ Pelvis-Moderate severe right and mild left hydronephrosis with bilateral ureteral stents, which have an adequate radiographic position. Right kidney with cortical thickening and somewhat asymmetrically decreased enhancement which can be seen with obstructive uropathy. Postoperative changes of colectomy. Left lower quadrant ostomy. Slightly decreased size of presacral low density compared to4/15/2020. Similar intrahepatic and extrahepatic bile duct dilation compared to 4/15/2020. Correlate with clinical symptoms and laboratory studies if clinically indicated. Similar chronic bony findings. 3/25/2021-CT abdomen showed severe hydronephrosis. Cystoscopy was performed by urology. 4/1/21 Bladder neck tumor, biopsies: High-grade papillary urothelial carcinoma, noninvasive. Muscularis propria is not present. AJCC pathologic stage:  pTa Nx   4/14/2021-reviewed results of pathology. No evidence of invasive disease. Continue surveillance cystoscopy with urology. 9/13/2021-he was seen by urology. Findings of ureteral high-grade urothelial carcinoma. Noninvasive. The patient is being referred to [facility] in [place]. 10/11/2021-he was seen by [facility] and recommended cystoscopy with biopsy and possible laser ablation and bilateral leg stent exchange at that time. 4/20/2022 Urinary bladder, biopsy of right lateral bladder lesion: Disrupted urothelial mucosa with severe chronic inflammation, negative for evidence   of malignancy. ONCOLOGIC HISTORY #3  Buzz Sultana was seen in initial oncology consultation on 8/19/2019 during his hospitalization at [facility] after a large pelvic mass was identified which raised concern for recurrent disease.   Further pathology consistent with recurrent rectal cancer  8/17/2019- CEA 18.1  8/17/2019- CT scan of the kidney with contrast documented moderate to severe right hydronephrosis with dilation of the right ureter into the lower pelvis the site of the parasacral soft tissue changes. Partially calcified soft tissue changes within the janes-sacral region likely representing sequelae of pelvic radiation. Increasing scarring/fibrosis versus tumor recurrence within the presacral changes, likely represents a site of right distal ureter obstruction. No left-sided hydronephrosis. 8/18/2019 -Double-J ureter stent placement for right hydronephrosis secondary to extrinsic compression by pelvic mass. 8/27/2019-CT scan of the chest with contrast documented numerous pulmonary nodules that appear new compared to 11/12/2017, RUL nodule measuring 7 mm and LLL nodule measuring 5 mm. Soft tissue nodule at the left ventral abdominal wall. Slight increased size of a probable lymph node anterior to the aorta measuring 0.9 cm compared to 0.7 cm. Similar presacral, right pelvic sidewall and right abductor muscular nodular soft tissue density. 8/27/2019 CT scan of the abdomen and pelvis with contrast identified new moderate left hydronephrosis with moderate right hydronephrosis. Mild stranding around the urinary bladder and thickening of the bilateral ureteral wall. Numerous pulmonary nodules. Soft tissue of the left ventral abdominal wall. Slightly increased size of probable lymph node anterior to the aorta measuring 0.9 cm compared to 0.7 cm.  8/27/2019-PET scan did not identify any FDG avid pulmonary nodules or airspace opacities. Abnormal increased metabolic activity within the right pelvic wall soft tissue showing SUV of 5.4. Abnormal soft tissue metabolic activity in the right abductor muscle with SUV of 6.4. Focally increased activity to the right of the inferior L5 vertebrae body posterior with SUV of 7.9 with associated sclerotic changes.   8/29/2019-  Dr. Bernard Varner completed a cystoscopy with double-J ureter stent in the left ureter for left hydronephrosis  9/3/2019- CT-guided right abductor muscle biopsy on 9/3/2019 with pathology identifying metastatic adenocarcinoma consistent with colorectal origin. Molecular panel from biopsy tissue revealed MSI stable and mutations for BRAF, NRAS, KRAS were not detected. 9/18/2019 - Palliative chemotherapy with modified FOLFOX 7  (Oxaliplatin 85 mg/m² IV day 1, leucovorin 400 mg/m² IV day 1 and 5-FU 2400 mg/m² IV continuous infusion over 46 to 48 hours) with the anticipation of adding Avastin 5 mg/kg day 1 every 14 days  10/15/2019- 24-hour urine for protein with a total protein of 1785 mg per 24-hour. Judith Cline has been evaluated by Dr. Monique Eckert and he reports no significant concerns related to the protein. 11/6/19 CEA 5.6 significantly improved compared to CEA of 14.0 on 8/30/2019.  11/15/2019 -CT scan of the abdomen and pelvis documented no evidence of disease progression with significant decrease in the size of enhancing nodules in the right pelvic abductor musculature, a previous 1.8 cm nodule now measures 5 mm. No new or enlarging retroperitoneal, mesenteric, pelvic or inguinal lymph nodes. Calcified presacral mass unchanged measuring 5 x 3.7 cm.  11/15/2019 -CT scan of the chest documented multiple small pulmonary nodules reidentified, largest nodule in the RUL measures 5 mm compared to 7.5 mm, RLL nodule measures 3.4 mm compared to 5.9 mm, VIC nodule measures 4 mm compared to 6 mm. A cluster of small nodules in the RUL anteriorly are barely visible on this study.   There is a decrease in size of mediastinal lymph nodes compared to previous exam, right distal paratracheal lymph node measuring 4.5 mm compared to 8.3 mm and lower right peritracheal node measuring 4.5 mm compared to 8.6 mm.    1/13/2020- CT scan of the abdomen and pelvis with contrast indicated improvement in the right-sided hydronephrosis with a chronic inflammatory process of the ureters suspected due to the moderate thickening, also present on previous study. The small poorly enhancing nodules in the right abductor muscles have decreased in the partially calcified presacral mass and right lateral pelvic wall nodules are stable compared to previous study. Resolution of the subcutaneous abdominal wall nodules. A prominent retroperitoneal lymph node adjacent and anterior to the left common artery is redefined and measures 6 mm, no change from previous exam.   1/13/2020 - CT scan of the chest documented a right lower lobe nodule measures 4.3 cm and is unchanged. A right lower lobe nodule measures 2.8 mm compared to 3.4 mm. Nodule in the right upper lobe is barely visible and measures 2.4 mm. Nodule in the left lower lobe measures 4.8 mm and is unchanged. Nodule in left lower lobe posterior measures 2.8 mm and previously measured 4.7 mm. A right lower lobe posterior medially nodule is barely visible measuring 0.2 mm and previously. measured 4.5 mm. No new nodules identified. No change in the size of the mediastinal lymph nodes. 1/28/2020 -palliative maintenance therapy with leucovorin 400 mg/m² IV over 2 hours on day 1, followed by 5-FU bolus 400 mg/m² and then 1200 mg/m²/day x2 days (total 2400 mg meter squared over 46 to 48 hours) continuous infusion. Repeat every 2 weeks. Only received 1 cycle, further treatment held due to small bowel obstruction. 1/30/2020 - CT scan of the abdomen and pelvis indicated high-grade small bowel obstruction with transition point in the midline posterior pelvis where a small bowel loop is tethered to a partially calcified presacral soft tissue mass. 2/11/2020-CEA 1.4  3/5/2020-  Exploratory laparotomy, removal of adhesions, small bowel resection with primary anastomosis and partial thickness small bowel repair by Dr. Hafsa Friedman at [facility].   In the operative note Dr. Rupert Briceno reported no evidence of carcinomatosis within the abdomen and the liver was unable to be examined due to extent of right upper quadrant adhesions. Pathology from small intestine documented no evidence of malignancy. 4/15/2020 Ct Chest W Contrast Minimal interval increase in size of subcentimeter pulmonary nodules. The largest now measures 6 mm in the medial right lower lobe on axial image 80. There is a new, unstable, horizontal fracture through the T6 vertebral body. Additionally, there are new fractures through the posterior 11th and 12th right ribs. The bones are moderately osteopenic. The finding of an unstable fracture through the T6 vertebral body was discussed with nAa Mercedes at 10:45 AM on 4/15/2020.   4/15/2020 Ct Abdomen Pelvis W Iv Contrast  Patient has undergone interval resection of the distal small bowel, and there is a 2.8 cm fluid collection in the presacral operative bed. This contains a tiny focus of air. This may postoperative or due to infection. Please correlate with the patient's clinical symptoms and laboratory markers. Improved hydronephrosis and hydroureter. Diffuse osteoporosis. Findings in the lower chest are described in a separate dictation. 4/22/2020-CEA 0.9  6/2/2020-resumed chemotherapy with 5-FU/leucovorin and Panitumumab. Okay to do 1 today then CMP CEA   8/19/20 CEA-1.1  10/21/2020- CEA 2.0  11/11/2020- Ct Chest W Contrast Multiple, too numerous to count, small noncalcified lung nodules bilaterally. The referenced nodules appears to have decreased in size the previous study. No new nodules. 11/11/2020- Ct Abdomen Pelvis W Contrast Unchanged bilateral hydronephrosis, more on the right side. Bilateral ureteral stents in place. Moderate asymmetrical thickening of the incompletely distended urinary bladder. This may partly be due to incomplete distention. Possibility of chronic cystitis and or chronic partial outlet obstruction may not be excluded. A functioning left lower abdominal ostomy. A small parastomal small bowel herniation without obstruction.  A partially calcified presacral mass. The soft tissue component have increased in size in the previous study. The osseous changes are described above. Any superimposed metastatic disease is not excluded and would be hard to evaluate due to extensive postsurgical changes. 11/18/2020-essentially, overall stable disease. Improvement of the lung nodules with decreased in the size of the target lesions. The pelvic lesion is is likely worse by 25%. However, CEA is is still within the normal limits. Therefore likely mixed response. We will continue current treatment and repeat CT scans in about 3 months. 12/16/2020-discontinue 5-FU bolus from his regimen. 2/9/2021- Ct Chest W Contrast No evidence of disease progression. Stable pulmonary nodules. Stable intrahepatic and extrahepatic bile duct dilation compared to prior 11/11/2020. Postoperative, posttraumatic and degenerative changes in the spine as described above. Old right-sided rib fractures. 2/9/2021- Ct Abdomen Pelvis W Contrast showed evidence of response to therapy including decreased presacral mass/thickening now measuring 1.1 cm, previously 1.9 cm on 11/11/2020. Stable intrahepatic and extra hepatic bile duct dilation with cholecystectomy clips. See separately dictated CT chest of the same day regarding pulmonary nodules. Bilateral ureteral stents. Decreased bilateral hydronephrosis. Urinary bladder wall is thickened, which could be seen with post treatment changes or cystitis. Correlate with symptoms. Chronic bony findings as above  2/17/2021-reviewed CT chest abdomen pelvis. Essentially consistent with disease response to therapy. Continue current therapy. 2/17/21 CEA 1.0  3/25/21 Ct Abdomen Pelvis W Iv Contrast  The stomach is distended, however no small bowel dilatation identified. Contrast identified within the left ileostomy bag. The distal stomach is under distended which may be secondary to peristalsis. Gastroparesis considered.  Bilateral ureteral stents remain appropriate in position, however there is new moderate to severe right-sided hydronephrosis and mild left-sided hydronephrosis when compared to the 2/9/2021 exam. Mild increase considered within the partially calcified presacral pelvic mass. Stable partially calcified right pelvic soft tissue. Similar abnormal wall thickening of the bladder most notable superiorly. Similar prominence of the intra and extra hepatic bile ducts down to the level of the ampulla. Findings may represent a reservoir effect, however correlation with liver function tests recommended. Therefore. Stable noncalcified left greater than right pulmonary nodules with asymmetrical interstitial changes of the right lung base concerning for pneumonitis. Osteopenia with postoperative changes of the lumbar spine. Chronic compression deformities of the thoracolumbar vertebra as described above. 4/14/2021-I reviewed notes from urology and also CT abdomen/pelvis that showed mild interval increase in the size of the soft tissue nodule in the pelvis. However, this is still very small and therefore will have a short follow-up CT scan and of May 2021. I personally reviewed the CT scans. Really hard to state that there is clear-cut disease progression. Again, short follow-up recommended. Continue current treatment. 5/12/21 CEA 0.8  5/26/2001-CT chest abdomen pelvis showed Partially calcified presacral soft tissue mass appears unchanged. Previously measured soft tissue noncalcified component is unchanged measuring 2.2 x 3.2 cm on axial image 60. However, this is questionable. CT chest showed a stable pulmonary nodules. Subcentimeter nodules. Largest 7.5 mm.  6/1/21 CEA 0.9  06/01/23- reviewed scans. Stable disease. Continue treatment every 3 weeks as per patient request. Continue infusional 5-FU, Panitumumab and leucovorin. No 5-FU bolus due to severe mucositis.   8/11/2021 CEA .9  9/03/2021 CT Chest w/Contrast Slight interval increase in the size of pulmonary nodules, the largest in the medial right lung base. Findings are concerning for metastatic disease. Atherosclerosis of the aorta and coronary arteries. Sclerotic rib lesions favored for osseous metastases. Old rib fractures also evident. 9/03/2021 CT Abd/Pelvis w/ IV Contrast (Oral) A persistent partially calcified mass in the presacral region with encasement of the distal ureters bilaterally and resultant bilateral hydronephrosis. Bilateral ureteral stents in place. There is increasing right-sided hydronephrosis since the previous study. Right renal atrophy similar to the previous study. Moderate asymmetrical thickening of the incompletely distended urinary bladder is similar to the previous study. This may partly be due to incomplete distention. An inflammatory process or a neoplastic process is not excluded. Moderate intrahepatic biliary dilatation is similar to the previous study and probably due to a prior cholecystectomy. Moderate dilatation of the contrast filled small bowel loops may represent an ileus or partial distal small bowel obstruction. There is left lower abdominal ileostomy which appears patent. The stable compression fractures of the lower thoracic and proximal lumbar vertebrae and hardware fusion similar to the previous study. 9/22/21- Decrease Vectibix to every other treatment. He is currently receiving chemotherapy every 3 weeks as per patient request  11/9/2021 CT Abd/Pelvis WO Contrast No acute posttraumatic findings. Known metastatic disease to the lungs. Posttreatment changes in overall chronic findings as above. 12/27/2021 NM Bone Scan Multiple foci of abnormal increased activity in the ribs bilaterally correspond with previously seen areas of bony sclerosis and old healed fractures. Abnormal activity in the lumbar spine correspond with compression fractures and kyphoplasty of these vertebrae. Probable right hydronephrosis.   2/24/2022 US LE Left  Limited([facility]) No residual fluid in the is present. There is no evidence of obstruction. There is an irregular soft tissue mass with some calcification within the pelvis. This extends to and is inseparable from the right posterior lateral urinary bladder wall with potential secondary involvement. This extends into the presacral space. When compared to the previous exam I feel this is increased in size. The urinary bladder cannot be fully assessed as it is decompressed with a Mcgregor catheter. Postoperative changes of the mid lower lumbar spine. There is an accentuated lumbar lordosis with grade 1-2 anterolisthesis of L5 on S1. Compression deformities at T12, L1 and L2 are present with previous kyphoplasty T12 and L1. . 6. Status post right nephrectomy. There is mild dilatation of the upper tracts of the left kidney and left ureter with a double-J intraureteral stent well-positioned. The degree of distention of the upper tracts of the left kidney is improved from the previous exam of November of last year. There is mild urothelial thickening. 4/13/2022 CT Abd/Pelvis WO Contrast When compared to the previous exam of 3 days earlier there is now noted to be moderate small bowel distention. I would favor a adynamic ileus. A distal obstruction at the site of the patient's ileostomy is also in the differential but felt less likely. There is mild distention of the stomach. A moderate size hiatal hernia is present. Mild to moderate dilatation of the upper tracts of the left kidney. A left-sided double-J ureteral stent is in place. There is mild urothelial thickening. I do not see evidence of a discrete ureteral stone. The stent is well-positioned. Partially calcified pelvic mass. This is inseparable from the right posterior lateral wall of the urinary bladder along its lower margin. A Mcgregor catheter is in place within the bladder. Chronic-appearing fractures within the thoracic and lumbar spine with a gibbus deformity at the thoracolumbar juncture and previous kyphoplasty at T12-L1. There is a small amount of free fluid within the subhepatic space and Morison space. A small right-sided effusion is present with multiple pulmonary nodules within the lower lobes consistent with metastatic disease to the lungs. There is a moderate size hiatal hernia present. 04/19/22- CT guided biopsy consistent with colon cancer metastasis. 5/2/2022- resuming chemotherapy with FOLFIRI/panitumumab for progressive colonic adenocarcinoma pulmonary metastasis. 5/25/2022 CXR (2VW) Chronic lung changes with mild right basilar infiltrate or atelectasis. ONCOLOGIC HISTORY # Rectal carcinoma:  [facility] Rousseau has a history of moderately differentiated rectal carcinoma, T3N0Mx, diagnosed in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

## 2022-07-08 NOTE — PROGRESS NOTES
Comprehensive Nutrition Assessment    Type and Reason for Visit:  Initial    Nutrition Recommendations/Plan:   1. Recommend resuming PO diet as tolerated. Malnutrition Assessment:  Malnutrition Status: At risk for malnutrition (Comment) (07/08/22 1042)    Context:  Acute Illness     Findings of the 6 clinical characteristics of malnutrition:  Energy Intake:  Mild decrease in energy intake (Comment)  Weight Loss:  No significant weight loss     Body Fat Loss:  Unable to assess     Muscle Mass Loss:  Unable to assess    Fluid Accumulation:  Mild Extremities   Strength:  Not Performed    Nutrition Assessment:    Following pt for NPO status LOS day 3. Recent at risk for nutritional compromise AEB NPO status for CT scan of abdomen. Recent wt gain and last two days of PO intakes >50% noted per chart. Recommend resuming PO diet whenever possible. Will f/u with pt for additional needs. Nutrition Related Findings:    Trace BLE Wound Type: None       Current Nutrition Intake & Therapies:    Average Meal Intake: NPO  Average Supplements Intake: None Ordered  Diet NPO    Anthropometric Measures:  Height: 5' 5\" (165.1 cm)  Ideal Body Weight (IBW): 136 lbs (62 kg)       Current Body Weight: 176 lb 5.9 oz (80 kg),   IBW. Weight Source: Bed Scale  Current BMI (kg/m2): 29.3  BMI Categories: Overweight (BMI 25.0-29. 9)    Estimated Daily Nutrient Needs:  Energy Requirements Based On: Kcal/kg     Energy (kcal/day): 0438-2492 kcals/day  Weight Used for Protein Requirements: Ideal  Protein (g/day):  g/pro/day  Method Used for Fluid Requirements: 1 ml/kcal  Fluid (ml/day): 2140-3165 mL/day    Nutrition Diagnosis:   · Inadequate oral intake related to acute injury/trauma as evidenced by NPO or clear liquid status due to medical condition      Nutrition Interventions:   Food and/or Nutrient Delivery: Start Oral Diet  Nutrition Education/Counseling: Education not indicated  Coordination of Nutrition Care: Continue to monitor while inpatient       Goals:     Goals: PO intake 50% or greater,by next RD assessment       Nutrition Monitoring and Evaluation:   Behavioral-Environmental Outcomes: None Identified  Food/Nutrient Intake Outcomes: Food and Nutrient Intake  Physical Signs/Symptoms Outcomes: Biochemical Data,Fluid Status or Edema,Nutrition Focused Physical Findings,Weight    Discharge Planning:     Too soon to determine     Francesco Apgar, Dietetic Intern  Lidia Homans, MS, RD, LD  Contact: [phone]

## 2022-07-08 NOTE — PROGRESS NOTES
Patient c/o nightmares. Is able to wake himself up but is concerned bc they are about his wife who recently passed. Talked with the patient and turned the television on for him. Discussed distractions that may help. Patient willing to try. Will check in on him to assure he is resting.
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
## 2022-07-08 NOTE — PROGRESS NOTES
Urology Progress Note      SUBJECTIVE: Patient spiked fever today with some associated shortness of breath has any flank pain. He became tachycardic, mildly hypotensive therefore his procedure for cystoscopy stent change was canceled. Discussed with Dr. Lorin Jacinto and Dr. Ozzie Mark. OBJECTIVE: Temperature 101.4 heart rate up to 140s. He 110s over 60s blood cultures obtained. Vascular surgery consulted for port removal discussed with Dr. Ortega Lab:   Review of Systems   Constitutional: Positive for chills, fatigue and fever. HENT: Negative. Eyes: Negative. Respiratory: Negative. Cardiovascular: Negative. Gastrointestinal: Negative. Genitourinary: Negative for flank pain. All other systems reviewed and are negative. Physical  VITALS:  /63   Pulse (!) 119   Temp 97.5 °F (36.4 °C) (Tympanic)   Resp (!) 32   Ht 5' 5\" (1.651 m)   Wt 176 lb 1.6 oz (79.9 kg)   SpO2 96%   BMI 29.30 kg/m²   TEMPERATURE:  Current - Temp: 97.5 °F (36.4 °C);  Max - Temp  Av.7 °F (37.1 °C)  Min: 97.3 °F (36.3 °C)  Max: 101.4 °F (38.6 °C)   24 HR I&O   Intake/Output Summary (Last 24 hours) at 2022 1702  Last data filed at 2022 1600  Gross per 24 hour   Intake 1009.68 ml   Output 1840 ml   Net -830.32 ml     Urine output 1885 overnight    BACK: no tenderness in spine or flanks  ABDOMEN:  firm, non-distended and non-tender  HEART:  normal rate and abnormal rate-tachycardic heart rate 140s  CHEST: Currently now with normal respiratory effort  GENITAL/URINARY: Mcgregor catheter draining clear urine    Data       CBC:   Recent Labs     22  0159 22  0017 22  0420   WBC 55.6* 19.5* 12.7*   HGB 9.7* 7.9* 7.8*   HCT 29.3* 25.4* 25.8*    256 257     BMP:    Recent Labs     22  0017 22  1805 22  0420   * 135* 136   K 3.6 3.8 3.8   CL 99 100 104   CO2 22 20* 21*   BUN 34* 32* 30*   CREATININE 2.3* 2.3* 2.1*   GLUCOSE 99 125* 88 Recent Labs     07/05/22  1717   LABURIN >50,000 CFU/ml*  Light growth  No further workup       Recent Labs     07/06/22  1600   BC No Growth to date. Any change in status will be called.      Recent Labs     07/06/22  1605   BLOODCULT2  Bottle volume = 8 ml*  Isolated from Anaerobic bottle  Refer to (blood culture collected on [email or web address]) for sensitivity  results         Radiology:      Imaging studies:      ASSESSMENT AND PLAN    Patient Active Problem List   Diagnosis    Thoracic facet joint syndrome    Primary osteoarthritis of left hip    Leg swelling    Abnormal nuclear cardiac imaging test    Abnormal nuclear cardiac imaging test    S/p bare metal coronary artery stent    Coronary artery disease involving native coronary artery of native heart without angina pectoris    [address]LEEN (acute kidney injury) ([address]    Complex regional pain syndrome type 1 of right lower extremity    Hydronephrosis, bilateral    Ureteral stricture, left    History of rectal cancer    Anemia of chronic disease    Pelvic mass    Lung nodules    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    Colorectal cancer ([address]    Metastasis from colon cancer ([address]    Proteinuria    Chemotherapy management, encounter for    Anemia associated with chemotherapy    Other fatigue    Thrush    Dehydration    Chemotherapy-induced peripheral neuropathy (HCC)    Chemotherapy-induced nausea    SBO (small bowel obstruction) (HCC)    Burning with urination    History of small bowel obstruction    Encounter for central line care    Iron deficiency    History of bladder cancer    Hydronephrosis of left kidney    Hydronephrosis of right kidney    Low back pain    Urothelial carcinoma of right distal ureter ([address]    Sepsis secondary to UTI ([address]    Acute kidney injury superimposed on CKD ([address]    Urinary tract infection associated with indwelling urethral catheter ([address]    Retained ureteral stent    Lower urinary tract symptoms    Ileus (HCC)    Sepsis ([address]    Colon carcinoma metastatic to lung ([address]    Metastatic adenocarcinoma (HCC)    Hypomagnesemia    Hypocalcemia    Small bowel obstruction (HCC)    Acute cystitis with hematuria    Urothelial carcinoma of kidney, right ([address]    Palliative care patient    Infection associated with indwelling urinary catheter ([facility]    Candida cystitis    Chronic kidney disease       1. Gram-negative sepsis with Serratia. Patient had another fever spike today therefore we will cancel his stent removal.  Discussed with Dr. Elvira Garcia it was felt that he should have his port removed and this could be the source. Continue current antibiotics. He becomes tachycardic with fever but not hypotensive no longer requiring shirley-. Continue antibiotics and supportive care no other  recommendations. Thank  tract as a source of Serratia is less likely since this is never grown out in the urine. 2.  Chronic left-sided hydronephrosis secondary to radiation stricture of the ureter with chronic indwelling retained left ureteral stent. Recent CT scan done 7/6/2022 showed decompression of the left renal pelvis with no hydronephrosis. He is due stent change however this was postponed today because of spiking fever. He continues to have improvement in his creatinine down to 2.1 and good urine output and since it is felt less likely the stent is a source of infection we will postpone stent change in the setting of fever and tachycardia. We will try to get this on sometime next week. 3.  Candiduria. Candida glabrata light growth fluconazole resistant on anidulafungin per infectious disease. I suspect this represents chronic colonization but 3 with infectious disease continue antifungal therapy until we get his stent change.   And probably for a few days after stent change.     4.  History of NMIBC at the time of stent change we will plan cystoscopy and surveillance of bladder
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
## 2022-07-08 NOTE — PROCEDURES
7/8/2022    Time: 17:30    Preop DX:  Possible Mediport infection    Post op DX:  Possible Mediport infection    Procedure: Removal of mediport    Anesthesia:  Versed 0.5mg, Lidocaine 1% W/ epi- 15cc     Procedure:  Consent & time out done with nursing staff. R chest wall /port area cleansed with chloro prep & injected with 15 cc of Lidocaine. Transverse incision made over port  Which was then bluntly dissected from SQ tissue. The port & line was removed intact and the tip was cultured. Pressure was held over the IJ insertion site for approx 10mins. The incision was packed lightly with iodoform dressing and  Several  interrupted sutures placed in the skin.      Pt tolerated the procedure well, there was no complication

## 2022-07-08 NOTE — PROGRESS NOTES
Per Dr. Anastasia Lehman patient can go home with midline for IV antibiotic infusion instead of using port. Midline is to be removed after last dose on 7/15/22.

## 2022-07-08 NOTE — PROGRESS NOTES
Infectious Diseases Progress Note    Patient:  Prudence Pearson  YOB: 1950  MRN: [id number]   Admit date: 7/5/2022   Admitting Physician: Ayla Grace DO  Primary Care Physician: Travis Sinclair MD    Chief Complaint/Interval History: He indicates he is feeling better. He indicates he is feeling quite rough when he came to the hospital.  He realizes he was pretty sick at that time. He had a mild twinge of some left flank area discomfort yesterday but nothing severe. He is comfortable currently without pain or discomfort. He has no suprapubic or flank pain at present. He has no cough or shortness of breath. He has had good urine output over the past shift at greater than 700 mL. IV fluids been discontinued as he is tolerating adequate oral intake. He will go for stent change today. He is tolerating his current antibiotic treatment without rash or skin itching. He is not having abdominal pain or nausea. Ostomy continues to function. In/Out    Intake/Output Summary (Last 24 hours) at 7/8/2022 0554  Last data filed at 7/8/2022 0300  Gross per 24 hour   Intake 3008.24 ml   Output 2110 ml   Net 898.24 ml     Allergies:    Allergies   Allergen Reactions    Morphine Anxiety     Current Meds: calcitRIOL (ROCALTROL) capsule 0.25 mcg, Daily  ALPRAZolam (XANAX) tablet 0.5 mg, Q4H PRN  meropenem (MERREM) 1000 mg in sodium chloride 0.9% 50 mL (duplex), On Call to OR  ciprofloxacin (CIPRO) IVPB 400 mg, Q24H  anidulafungin (ERAXIS) 100 mg in dextrose 5 % 130 mL IVPB, Q24H  ceftazidime-avibactam (AVYCAZ) 0.94 g in sodium chloride 0.9 % 100 mL IVPB, Q12H  lidocaine PF 1 % injection 5 mL, Once  sodium chloride flush 0.9 % injection 5-40 mL, 2 times per day  sodium chloride flush 0.9 % injection 5-40 mL, PRN  0.9 % sodium chloride infusion, PRN  bismuth subsalicylate (PEPTO BISMOL) 262 MG/15ML suspension 30 mL, Q6H PRN  phenylephrine (ALVERTO-SYNEPHRINE) 50 mg in dextrose 5 % 250 mL infusion, Continuous  0.9 % trimethoprim-sulfamethoxazole Resistant >=320 mcg/mL BACTERIAL SUSCEPTIBILITY PANEL BY ANA LUISA       Radiology:   CT scan of the abdomen and pelvis done yesterday personally reviewed:  Impression   1. Right-sided pleural effusion, stable and unchanged   2. Interval development of right lower lobe consolidation   3. Innumerable pulmonary nodules consistent with metastatic disease   4. The gallbladder is not visualized   5. The right kidney is not visualized   6. Postop changes status post colostomy   7. Postop and degenerative changes of the lumbar spine, stable and unchanged   8. Status post left double J ureteral stent placement, stable and unchanged   9. Bladder will thickening consistent with chronic inflammatory disease i.e. cystitis. Please correlate   10. The appendix is not visualized   Recommendation: Follow up as clinically indicated. All CT scans at this facility utilize dose modulation, iterative reconstruction, and/or weight based dosing when appropriate to reduce radiation dose to as low as reasonably achievable. Electronically Signed by Christine Shin at 07-Jul-2022 02:37:25 AM                  Additional Studies Reviewed:  None    Impression:  1. Gram-negative sepsis secondary to Serratia marcescens-definite source uncertain. He does appear to have improved rapidly with IV fluids and antibiotic treatment. Although blood cultures on July 5 and July 6 were both positive, he was on Merrem empirically on the fifth and his organism has been shown to be Carbapenem resistant. His improvement rapidly with fluids and antibiotic treatment would be evidence against port infection, although port infection remains in the differential as he seemed to drop his blood pressure initially when getting IV antifungal treatment via his port.   2.  Positive urine culture for Candida glabrata-feel as if we should treat for chronic cystitis with yeast, although may be colonizer-he has grown it repeatedly and has had some pyuria. Keeping him on antifungal therapy around the time of his stent change also likely beneficial.  3.  Metastatic colorectal cancer  4. History urothelial cancer with right nephro ureterectomy  5. Ileostomy  6. Ongoing improvement in marked leukocytosis-White blood cell count approaching normal today    Recommendations:  Repeat blood cultures today  Stent change today  Okay to begin using [id number]  Watch for any change in condition/fever/vital sign changes as port utilized  Discontinue Avycaz after last dose today  Discontinue Cipro intravenously after last dose today  Begin levofloxacin 500 mg orally daily for 7 days starting July 9, 2022  Continue anidulafungin 100 mg IV daily through July 15, 2022  Will start process for home anidulafungin treatment  See antibiotic treatment plan outlined below    Home antibiotic orders:  1. 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 Gram-negative bacteremia secondary to Serratia marcescens (Carbapenem resistant). Renal dysfunction with creatinine of 2.1. Ileostomy. Metastatic colon cancer. 2.  IV access [id number]. 3.  Antibiotic order:  Anidulafungin 100 mg IV daily through July 15, 2022  Levofloxacin 500 mg orally daily through July 15, 2022  4. Laboratory order:  CMP and CBC on Monday, July 11, 2022  5.   Follow-up appointment 2 weeks after hospital discharge    Daysi Krishnan MD

## 2022-07-08 NOTE — CARE COORDINATION
Patient requesting chicken salad sandwich prior to procedure in the [facility] able to make one for him and bring it up.
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
## 2022-07-09 NOTE — PROGRESS NOTES
Urology Progress Note      SUBJECTIVE: Patient voices no  complaints. I asked him about the Mcgregor catheter he prefers to keep it for now    OBJECTIVE: No fever overnight. He status post port removal.  Blood cultures growing staph species methicillin-resistant he did receive 1 dose of vancomycin otherwise continues on oral Levaquin for now and    REVIEW OF SYSTEMS:   Review of Systems   HENT: Negative. Eyes: Negative. Respiratory: Negative. Cardiovascular: Negative. Gastrointestinal: Negative. Genitourinary: Negative for flank pain and hematuria. All other systems reviewed and are negative. Physical  VITALS:  /72   Pulse 88   Temp (!) 96.3 °F (35.7 °C) (Temporal)   Resp 18   Ht 5' 5\" (1.651 m)   Wt 173 lb 11.2 oz (78.8 kg)   SpO2 99%   BMI 28.91 kg/m²   TEMPERATURE:  Current - Temp: (!) 96.3 °F (35.7 °C); Max - Temp  Av.3 °F (36.3 °C)  Min: 96.3 °F (35.7 °C)  Max: 98.1 °F (36.7 °C)   24 HR I&O     Intake/Output Summary (Last 24 hours) at 2022 180  Last data filed at 2022 1525  Gross per 24 hour   Intake 980 ml   Output 2300 ml   Net -1320 ml     BACK: no tenderness in spine or flanks  ABDOMEN:  soft and non-distended, ileostomy  HEART:  normal rate and regular rhythm  CHEST: Normal respiratory effort  GENITAL/URINARY: Normal Mcgregor catheter urine slightly cloudy but otherwise grossly normal    Data       CBC:   Recent Labs     22  0017 22  0420 22  0430   WBC 19.5* 12.7* 11.3*   HGB 7.9* 7.8* 7.1*   HCT 25.4* 25.8* 23.3*    257 185     BMP:    Recent Labs     22  1805 22  0420 22  0430   * 136 134*   K 3.8 3.8 3.8    104 103   CO2 20* 21* 22   BUN 32* 30* 25*   CREATININE 2.3* 2.1* 1.7*   GLUCOSE 125* 88 94       No results for input(s): LABURIN in the last 72 hours.   Recent Labs     22  1313   BC Gram stain Aerobic bottle:  Gram positive cocci in clusters  resembling Staphylococcus  Culture in progress  Please notify Physician  *     Recent Labs     07/08/22  1317   BLOODCULT2 No Growth to date. Any change in status will be called.        ASSESSMENT AND PLAN    Patient Active Problem List   Diagnosis    Thoracic facet joint syndrome    Primary osteoarthritis of left hip    Leg swelling    Abnormal nuclear cardiac imaging test    Abnormal nuclear cardiac imaging test    S/p bare metal coronary artery stent    Coronary artery disease involving native coronary artery of native heart without angina pectoris    COLLEEN (acute kidney injury) ([address]    Complex regional pain syndrome type 1 of right lower extremity    Hydronephrosis, bilateral    Ureteral stricture, left    History of rectal cancer    Anemia of chronic disease    Pelvic mass    Lung nodules    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    Colorectal cancer ([address]    Metastasis from colon cancer ([address]    Proteinuria    Chemotherapy management, encounter for    Anemia associated with chemotherapy    Other fatigue    Thrush    Dehydration    Chemotherapy-induced peripheral neuropathy (HCC)    Chemotherapy-induced nausea    SBO (small bowel obstruction) (HCC)    Burning with urination    History of small bowel obstruction    Encounter for central line care    Iron deficiency    History of bladder cancer    Hydronephrosis of left kidney    Hydronephrosis of right kidney    Low back pain    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)    Sepsis secondary to UTI ([address]    Acute kidney injury superimposed on CKD ([address]    Urinary tract infection associated with indwelling urethral catheter ([address]    Retained ureteral stent    Lower urinary tract symptoms    Ileus ([address]    Septicemia ([address]    Colon carcinoma metastatic to lung ([address]    Metastatic adenocarcinoma ([address]    Hypomagnesemia    Hypocalcemia    Small bowel obstruction (HCC)    Acute cystitis with hematuria    Urothelial carcinoma of kidney, right ([facility]    Palliative care patient    Infection associated with indwelling urinary catheter ([facility]    Candida cystitis    Chronic kidney disease    Port or reservoir infection     1. Gram-negative sepsis with Serratia. Continues with Levaquin per ID    2. Chronic left hydronephrosis secondary to radiation stricture of the ureter with chronic indwelling ureteral stent. I plan to get him back on for stent removal and replacement on this Tuesday, July 12 and is to have good urine output no flank pain creatinine 1.7 which is improved    3.  Candiduria.  Candida glabrata light growth fluconazole resistant on anidulafungin per infectious disease.  I suspect this represents chronic colonization but agree with infectious disease continue antifungal therapy until we get his stent change.   And probably for a few days after stent change.     4.  History of NMIBC at the time of stent change we will plan cystoscopy and surveillance of bladder and indicated procedure should he need biopsy fulguration etc.     5.  COLLEEN on CKD.  Slightly improve creatinine urine output okay likely multifactorial but is encouraging that CT shows decompression of the left kidney with catheter in the bladder I do not think obstruction is a significant component at this point.  Nephrology on board     6.  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  Right pelvic postoperative collection/consolidation appears unchanged on CT scan no other recommendations or treatment indicated at this time.     7.  Metastatic colon cancer followed by oncology chemotherapy when able per his   Clinical course and comorbidities             Carlos Alberto Montalvo MD

## 2022-07-09 NOTE — CARE COORDINATION
IV antibiotic order and other needed documentation was faxed to [facility].     [facility]   P   [phone] main fax for [facility] option care weekend coverage  Electronically signed by Sharda Butts on 7/9/2022 at 4:51 PM

## 2022-07-09 NOTE — PROGRESS NOTES
Pharmacy Renal Dose Adjustment      Recent Labs     07/08/22  0420 07/09/22  0430   BUN 30* 25*       Recent Labs     07/08/22  0420 07/09/22  0430   CREATININE 2.1* 1.7*       Estimated Creatinine Clearance: 39 mL/min (A) (based on SCr of 1.7 mg/dL (H)). Plan: Changed Lovenox to 40 mg SC q24h due to patients improved renal function. Pharmacy will continue to follow and make adjustments as needed.     Electronically signed by JOEL Álvarez [facility] on 7/9/2022 at 10:28 AM

## 2022-07-09 NOTE — PROGRESS NOTES
MEDICAL ONCOLOGY PROGRESS NOTE    Pt Name: Odalys Royal  MRN: [id number]  YOB: 1950  Date of evaluation: 7/9/2022  ROOM:     Subjective: Right chest wall port removed last night. He is doing much better. Off of pressors. Sitting up in a chair. HISTORY OF PRESENT ILLNESS:  Mr. Su Romero is well-known to my clinic. He has a history of metastatic colonic adenocarcinoma with lung metastasis and is currently on palliative chemotherapy. 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 He had a very complicated postoperative course. He had prior admissions for urinary tract infection. He was admitted recently and discharged a few days ago. He presented again to the emergency department with episode of hypotension. Apparently, he had fever and chills as well. The patient is of been seen by urology with plans for change of his ureteral stents. He is currently being treated for possible urinary tract infection. Port infection is a possibility as well. His blood cultures is positive for gram-negative rods. He is also on anidulafungin for prior yeast infection in his urine. He was started on ceftazidime-avibactam and ciprofloxacin. He is on pressors in the ICU. I was consulted for continued care. His chemotherapy has been on hold. Last chemotherapy was delivered on 6/1/2022. Prior oncological history    HISTORY OF PRESENT ILLNESS:  The patient is well-established in my clinic. He has a diagnosis of colon cancer and recent diagnosis of invasive urothelial carcinoma, high-grade of the renal pelvis status surgery. In addition, history of hypertension, hyperlipidemia, CAD, CKD stage IV. Patient presented ER department on 6/29/2022 with complaints of nausea vomiting, abdominal pain. He had decreased output from his ileostomy as well. A NG tube was placed in the ED.   Surgery was consulted. Labs showed worsening kidney function with creatinine 2.5, hyperkalemia potassium 3.1, elevated white blood cell count. UA showed moderate blood, large leukocyte, WBC too numerous to count. 6/29/2022-CT abdomen pelvis showed evidence of small bowel obstruction possible to lower the ileal anastomosis. No intra-abdominal free fluid. Presacral fluid collection. Left hydroureteronephrosis, right pleural effusion and multiple lung metastatic lesions. Diffuse bladder wall thickening with associating 4.5 x 3 centimeters soft tissue at the superior wall of bladder extends extraluminally. Malignancy cannot be excluded. Left double-J catheter in place. He had an NG tube placed yesterday. He feels better this morning and feels that his colostomy is working again. Prior oncological history  Reason for MD visit: Toxicity/disease management  The patient has stage IV colonic adenocarcinoma with progressive lung metastasis consistent with colonic adenocarcinoma. In addition, he has a history of urothelial carcinoma stage II. He had progressive disease in the lungs compatible with colonic adenocarcinoma. He has resumed treatment with FOLFIRI/panitumumab. He has received 2 cycles. He complains of significant fatigue and nausea from the last treatment. He is still sick after this day today. ONCOLOGIC HISTORY:   Diagnosis:  · Moderately differentiated rectal carcinoma, [id number], diagnosed in 3/9/2009  · Noninvasive high-grade papillary urethral carcinoma. Negative for evidence of detrusor muscle invasion, pTa, pNx on 8/18/2019. · Metastatic colorectal carcinoma, 9/3/2019  · MSI stable and mutations for BRAF, NRAS, KRAS were not detected.     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 · 8/18/2019- transurethral resection of bladder tumor (TURBT)   · 9/18/2019-12/26/2019 palliative chemotherapy with modified FOLFOX 7  (Oxaliplatin 85 mg/m² IV day 1, leucovorin 400 mg/m² IV day 1 and 5-FU 2400 mg/m² IV continuous infusion over 46 to 48 hours for a total of 7 cycles. · 1/28/2020 -palliative maintenance therapy with leucovorin 400 mg/m² IV over 2 hours on day 1, followed by 5-FU bolus 400 mg/m² and then 1200 mg/m²/day x2 days (total 2400 mg meter squared over 46 to 48 hours) continuous infusion. Repeat every 2 weeks. · 05/11/22- Resuming FOLFIRI/panitumumab chemotherapy for progressive lung metastasis     ONCOLOGIC HISTORY # NMIBC_Bladder cancer. Raul Ceja was diagnosed with noninvasive urethral carcinoma, pTa, pNx on 8/18/2019. Ta tumors are papillary lesions that tend to recur but are relatively benign and generally do not invade the bladder. Adjuvant treatment is not warranted at this time and will be monitored closely. Biopsy and transurethral resection of bladder tumor (TURBT) on 8/18/2019 by Dr. Akhil Agustin with pathology revealing noninvasive high-grade papillary urethral carcinoma. Negative for evidence of detrusor muscle invasion, pTa, pNx. · 12/3/2019- cystoscopy with removal and replacement of double-J urethral stent. Pathology from dome of the bladder/tumor revealed high-grade papillary urethral carcinoma, noninvasive. No muscularis propria present. · 2/26/2020 - cystoscopy and bilateral ureteral stent removal and replacement. The operative note by Dr. Queen Yee documented bilateral hydronephrosis and obtained biopsy of the bladder in the mid dome and left anterior lateral wall x2. Pathology documented high-grade papillary urethral carcinoma, noninvasive, stage pTaNx.   · 5/28/2020-the patient underwent a cystoscopy and resection of bladder tumor on 05/28/2020 with findings consistent with noninvasive, high-grade papillary urothelial carcinoma. Muscularis propria was not identified. The patient will receive intravesical BCG therapy. · 7/6/2020-CT Abdomen/ Pelvis-Moderate severe right and mild left hydronephrosis with bilateral ureteral stents, which have an adequate radiographic position. Right kidney with cortical thickening and somewhat asymmetrically decreased enhancement which can be seen with obstructive uropathy. Postoperative changes of colectomy. Left lower quadrant ostomy. Slightly decreased size of presacral low density compared to4/15/2020. Similar intrahepatic and extrahepatic bile duct dilation compared to 4/15/2020. Correlate with clinical symptoms and laboratory studies if clinically indicated. Similar chronic bony findings. · 3/25/2021-CT abdomen showed severe hydronephrosis. Cystoscopy was performed by urology. · 4/1/21 Bladder neck tumor, biopsies: High-grade papillary urothelial carcinoma, noninvasive. Muscularis propria is not present. AJCC pathologic stage:  pTa Nx   · 4/14/2021-reviewed results of pathology. No evidence of invasive disease. Continue surveillance cystoscopy with urology. · 9/13/2021-he was seen by urology. Findings of ureteral high-grade urothelial carcinoma. Noninvasive. The patient is being referred to [facility] in [place]. · 10/11/2021-he was seen by [facility] and recommended cystoscopy with biopsy and possible laser ablation and bilateral leg stent exchange at that time. · 4/20/2022 Urinary bladder, biopsy of right lateral bladder lesion: Disrupted urothelial mucosa with severe chronic inflammation, negative for evidence   of malignancy. ONCOLOGIC HISTORY #3  Judith Cline was seen in initial oncology consultation on 8/19/2019 during his hospitalization at [facility] after a large pelvic mass was identified which raised concern for recurrent disease.   Further pathology consistent with recurrent rectal cancer  · 8/17/2019- CEA 18.1  · 8/17/2019- CT scan of the kidney with contrast documented moderate to severe right hydronephrosis with dilation of the right ureter into the lower pelvis the site of the parasacral soft tissue changes. Partially calcified soft tissue changes within the janes-sacral region likely representing sequelae of pelvic radiation. Increasing scarring/fibrosis versus tumor recurrence within the presacral changes, likely represents a site of right distal ureter obstruction. No left-sided hydronephrosis. · 8/18/2019 -Double-J ureter stent placement for right hydronephrosis secondary to extrinsic compression by pelvic mass. · 8/27/2019-CT scan of the chest with contrast documented numerous pulmonary nodules that appear new compared to 11/12/2017, RUL nodule measuring 7 mm and LLL nodule measuring 5 mm. Soft tissue nodule at the left ventral abdominal wall. Slight increased size of a probable lymph node anterior to the aorta measuring 0.9 cm compared to 0.7 cm. Similar presacral, right pelvic sidewall and right abductor muscular nodular soft tissue density. · 8/27/2019 CT scan of the abdomen and pelvis with contrast identified new moderate left hydronephrosis with moderate right hydronephrosis. Mild stranding around the urinary bladder and thickening of the bilateral ureteral wall. Numerous pulmonary nodules. Soft tissue of the left ventral abdominal wall. Slightly increased size of probable lymph node anterior to the aorta measuring 0.9 cm compared to 0.7 cm. · 8/27/2019-PET scan did not identify any FDG avid pulmonary nodules or airspace opacities. Abnormal increased metabolic activity within the right pelvic wall soft tissue showing SUV of 5.4. Abnormal soft tissue metabolic activity in the right abductor muscle with SUV of 6.4. Focally increased activity to the right of the inferior L5 vertebrae body posterior with SUV of 7.9 with associated sclerotic changes.   · 8/29/2019-  Dr. Queen Yee completed a cystoscopy with double-J ureter stent in the left ureter for left hydronephrosis  · 9/3/2019- CT-guided right abductor muscle biopsy on 9/3/2019 with pathology identifying metastatic adenocarcinoma consistent with colorectal origin. Molecular panel from biopsy tissue revealed MSI stable and mutations for BRAF, NRAS, KRAS were not detected. · 9/18/2019 - Palliative chemotherapy with modified FOLFOX 7  (Oxaliplatin 85 mg/m² IV day 1, leucovorin 400 mg/m² IV day 1 and 5-FU 2400 mg/m² IV continuous infusion over 46 to 48 hours) with the anticipation of adding Avastin 5 mg/kg day 1 every 14 days  · 10/15/2019- 24-hour urine for protein with a total protein of 1785 mg per 24-hour. Zuleika Abreu has been evaluated by Dr. Miranda Jones and he reports no significant concerns related to the protein. · 11/6/19 CEA 5.6 significantly improved compared to CEA of 14.0 on 8/30/2019. · 11/15/2019 -CT scan of the abdomen and pelvis documented no evidence of disease progression with significant decrease in the size of enhancing nodules in the right pelvic abductor musculature, a previous 1.8 cm nodule now measures 5 mm. No new or enlarging retroperitoneal, mesenteric, pelvic or inguinal lymph nodes. Calcified presacral mass unchanged measuring 5 x 3.7 cm. · 11/15/2019 -CT scan of the chest documented multiple small pulmonary nodules reidentified, largest nodule in the RUL measures 5 mm compared to 7.5 mm, RLL nodule measures 3.4 mm compared to 5.9 mm, VIC nodule measures 4 mm compared to 6 mm. A cluster of small nodules in the RUL anteriorly are barely visible on this study. There is a decrease in size of mediastinal lymph nodes compared to previous exam, right distal paratracheal lymph node measuring 4.5 mm compared to 8.3 mm and lower right peritracheal node measuring 4.5 mm compared to 8.6 mm.     · 1/13/2020- CT scan of the abdomen and pelvis with contrast indicated improvement in the right-sided hydronephrosis with a chronic inflammatory process of the ureters suspected due to the moderate thickening, also present on previous study. The small poorly enhancing nodules in the right abductor muscles have decreased in the partially calcified presacral mass and right lateral pelvic wall nodules are stable compared to previous study. Resolution of the subcutaneous abdominal wall nodules. A prominent retroperitoneal lymph node adjacent and anterior to the left common artery is redefined and measures 6 mm, no change from previous exam.   · 1/13/2020 - CT scan of the chest documented a right lower lobe nodule measures 4.3 cm and is unchanged. A right lower lobe nodule measures 2.8 mm compared to 3.4 mm. Nodule in the right upper lobe is barely visible and measures 2.4 mm. Nodule in the left lower lobe measures 4.8 mm and is unchanged. Nodule in left lower lobe posterior measures 2.8 mm and previously measured 4.7 mm. A right lower lobe posterior medially nodule is barely visible measuring 0.2 mm and previously. measured 4.5 mm. No new nodules identified. No change in the size of the mediastinal lymph nodes. · 1/28/2020 -palliative maintenance therapy with leucovorin 400 mg/m² IV over 2 hours on day 1, followed by 5-FU bolus 400 mg/m² and then 1200 mg/m²/day x2 days (total 2400 mg meter squared over 46 to 48 hours) continuous infusion. Repeat every 2 weeks. Only received 1 cycle, further treatment held due to small bowel obstruction. · 1/30/2020 - CT scan of the abdomen and pelvis indicated high-grade small bowel obstruction with transition point in the midline posterior pelvis where a small bowel loop is tethered to a partially calcified presacral soft tissue mass. · 2/11/2020-CEA 1.4  · 3/5/2020-  Exploratory laparotomy, removal of adhesions, small bowel resection with primary anastomosis and partial thickness small bowel repair by Dr. Kalie Helm at [address] [place] [address].   In the operative note Dr. Lauri Phelps reported no evidence of carcinomatosis within the abdomen and the liver was unable to be examined due to extent of right upper quadrant adhesions. Pathology from small intestine documented no evidence of malignancy. · 4/15/2020 Ct Chest W Contrast Minimal interval increase in size of subcentimeter pulmonary nodules. The largest now measures 6 mm in the medial right lower lobe on axial image 80. There is a new, unstable, horizontal fracture through the T6 vertebral body. Additionally, there are new fractures through the posterior 11th and 12th right ribs. The bones are moderately osteopenic. The finding of an unstable fracture through the T6 vertebral body was discussed with Ana Mercedes at 10:45 AM on 4/15/2020. · 4/15/2020 Ct Abdomen Pelvis W Iv Contrast  Patient has undergone interval resection of the distal small bowel, and there is a 2.8 cm fluid collection in the presacral operative bed. This contains a tiny focus of air. This may postoperative or due to infection. Please correlate with the patient's clinical symptoms and laboratory markers. Improved hydronephrosis and hydroureter. Diffuse osteoporosis. Findings in the lower chest are described in a separate dictation. · 4/22/2020-CEA 0.9  · 6/2/2020-resumed chemotherapy with 5-FU/leucovorin and Panitumumab. Okay to do 1 today then CMP CEA   · 8/19/20 CEA-1.1  · 10/21/2020- CEA 2.0  · 11/11/2020- Ct Chest W Contrast Multiple, too numerous to count, small noncalcified lung nodules bilaterally. The referenced nodules appears to have decreased in size the previous study. No new nodules. · 11/11/2020- Ct Abdomen Pelvis W Contrast Unchanged bilateral hydronephrosis, more on the right side. Bilateral ureteral stents in place. Moderate asymmetrical thickening of the incompletely distended urinary bladder. This may partly be due to incomplete distention. Possibility of chronic cystitis and or chronic partial outlet obstruction may not be excluded. A functioning left lower abdominal ostomy.  A small parastomal small bowel herniation without obstruction. A partially calcified presacral mass. The soft tissue component have increased in size in the previous study. The osseous changes are described above. Any superimposed metastatic disease is not excluded and would be hard to evaluate due to extensive postsurgical changes. · 11/18/2020-essentially, overall stable disease. Improvement of the lung nodules with decreased in the size of the target lesions. The pelvic lesion is is likely worse by 25%. However, CEA is is still within the normal limits. Therefore likely mixed response. We will continue current treatment and repeat CT scans in about 3 months. · 12/16/2020-discontinue 5-FU bolus from his regimen. · 2/9/2021- Ct Chest W Contrast No evidence of disease progression. Stable pulmonary nodules. Stable intrahepatic and extrahepatic bile duct dilation compared to prior 11/11/2020. Postoperative, posttraumatic and degenerative changes in the spine as described above. Old right-sided rib fractures. · 2/9/2021- Ct Abdomen Pelvis W Contrast showed evidence of response to therapy including decreased presacral mass/thickening now measuring 1.1 cm, previously 1.9 cm on 11/11/2020. Stable intrahepatic and extra hepatic bile duct dilation with cholecystectomy clips. See separately dictated CT chest of the same day regarding pulmonary nodules. Bilateral ureteral stents. Decreased bilateral hydronephrosis. Urinary bladder wall is thickened, which could be seen with post treatment changes or cystitis. Correlate with symptoms. Chronic bony findings as above  · 2/17/2021-reviewed CT chest abdomen pelvis. Essentially consistent with disease response to therapy. Continue current therapy. · 2/17/21 CEA 1.0  · 3/25/21 Ct Abdomen Pelvis W Iv Contrast  The stomach is distended, however no small bowel dilatation identified. Contrast identified within the left ileostomy bag.  The distal stomach is under distended which may be secondary to peristalsis. Gastroparesis considered. Bilateral ureteral stents remain appropriate in position, however there is new moderate to severe right-sided hydronephrosis and mild left-sided hydronephrosis when compared to the 2/9/2021 exam. Mild increase considered within the partially calcified presacral pelvic mass. Stable partially calcified right pelvic soft tissue. Similar abnormal wall thickening of the bladder most notable superiorly. Similar prominence of the intra and extra hepatic bile ducts down to the level of the ampulla. Findings may represent a reservoir effect, however correlation with liver function tests recommended. Therefore. Stable noncalcified left greater than right pulmonary nodules with asymmetrical interstitial changes of the right lung base concerning for pneumonitis. Osteopenia with postoperative changes of the lumbar spine. Chronic compression deformities of the thoracolumbar vertebra as described above. · 4/14/2021-I reviewed notes from urology and also CT abdomen/pelvis that showed mild interval increase in the size of the soft tissue nodule in the pelvis. However, this is still very small and therefore will have a short follow-up CT scan and of May 2021. I personally reviewed the CT scans. Really hard to state that there is clear-cut disease progression. Again, short follow-up recommended. Continue current treatment. · 5/12/21 CEA 0.8  · 5/26/2001-CT chest abdomen pelvis showed Partially calcified presacral soft tissue mass appears unchanged. Previously measured soft tissue noncalcified component is unchanged measuring 2.2 x 3.2 cm on axial image 60. However, this is questionable. CT chest showed a stable pulmonary nodules. Subcentimeter nodules. Largest 7.5 mm. · 6/1/21 CEA 0.9  · 06/01/23- reviewed scans. Stable disease. Continue treatment every 3 weeks as per patient request. Continue infusional 5-FU, Panitumumab and leucovorin.  No 5-FU bolus due to severe mucositis. · 8/11/2021 CEA . 9  · 9/03/2021 CT Chest w/Contrast Slight interval increase in the size of pulmonary nodules, the largest in the medial right lung base. Findings are concerning for metastatic disease. Atherosclerosis of the aorta and coronary arteries. Sclerotic rib lesions favored for osseous metastases. Old rib fractures also evident. · 9/03/2021 CT Abd/Pelvis w/ IV Contrast (Oral) A persistent partially calcified mass in the presacral region with encasement of the distal ureters bilaterally and resultant bilateral hydronephrosis. Bilateral ureteral stents in place. There is increasing right-sided hydronephrosis since the previous study. Right renal atrophy similar to the previous study. Moderate asymmetrical thickening of the incompletely distended urinary bladder is similar to the previous study. This may partly be due to incomplete distention. An inflammatory process or a neoplastic process is not excluded. Moderate intrahepatic biliary dilatation is similar to the previous study and probably due to a prior cholecystectomy. Moderate dilatation of the contrast filled small bowel loops may represent an ileus or partial distal small bowel obstruction. There is left lower abdominal ileostomy which appears patent. The stable compression fractures of the lower thoracic and proximal lumbar vertebrae and hardware fusion similar to the previous study. · 9/22/21- Decrease Vectibix to every other treatment. He is currently receiving chemotherapy every 3 weeks as per patient request  · 11/9/2021 CT Abd/Pelvis WO Contrast No acute posttraumatic findings. Known metastatic disease to the lungs. Posttreatment changes in overall chronic findings as above. · 12/27/2021 NM Bone Scan Multiple foci of abnormal increased activity in the ribs bilaterally correspond with previously seen areas of bony sclerosis and old healed fractures. Abnormal activity in the lumbar spine correspond with compression fractures and kyphoplasty of these vertebrae. Probable right hydronephrosis. · 2/24/2022 US LE Left  Limited([facility]) No residual fluid in the left lateral thigh. · 2/27/2022 CT Abd/Pelvis W Contrast [facility]) Overall unchanged appearance of the right lateral abdominal collection with a percutaneous drain in place and small residual fluid. Multiloculated fluid and gas collection in the pelvis along the right aspect of bladder dome measuring approximately 8 x 4 cm, not significantly changed in size. Postsurgical changes of prior colectomy and small bowel resection. The distal ileum abuts the right flank collection and anterior abdomen in midline prior to ostomy. Unchanged partially calcified presacral and right pelvic sidewall soft tissue. Minimally improved mild left hydroureteronephrosis. Bilateral pulmonary nodules in the visualized lungs, not significantly changed. Small right pleural effusion with associated atelectasis. Scattered areas of hypoattenuation within the right lower lobe could represent developing pneumonia. · 2/27/2022 CT Entire LE Left W Contrast ([facility])Interval open incision and drainage of anterolateral left thigh subcutaneous abscess with residual fluid and packing material.  Persistent, slightly improved diffuse subcutaneous edema   throughout the left lower extremity with no new or organizing fluid   collections. · 4/10/2022 CT Chest WO Contrast Progressive metastatic disease to the lungs. There are too numerous to count pulmonary nodules the majority of which have increased in size or which are new from the previous study. No evidence of acute consolidative pneumonia. 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 Patient's undergone previous kyphoplasty at the thoracolumbar juncture for compression deformities. There is an accentuated thoracic kyphosis. .  · 4/10/2022 CT Abd/Pelvis WO Contrast Metastatic disease to the lung bases showing worsening from the previous study.Moderate size hiatal hernia with gastroesophageal reflux. The patient is status post at least partial colectomy. Left lower quadrant ostomy is present. There is no evidence of obstruction. There is an irregular soft tissue mass with some calcification within the pelvis. This extends to and is inseparable from the right posterior lateral urinary bladder wall with potential secondary involvement. This extends into the presacral space. When compared to the previous exam I feel this is increased in size. The urinary bladder cannot be fully assessed as it is decompressed with a Mcgregor catheter. Postoperative changes of the mid lower lumbar spine. There is an accentuated lumbar lordosis with grade 1-2 anterolisthesis of L5 on S1. Compression deformities at T12, L1 and L2 are present with previous kyphoplasty T12 and L1. . 6. Status post right nephrectomy. There is mild dilatation of the upper tracts of the left kidney and left ureter with a double-J intraureteral stent well-positioned. The degree of distention of the upper tracts of the left kidney is improved from the previous exam of November of last year. There is mild urothelial thickening. · 4/13/2022 CT Abd/Pelvis WO Contrast When compared to the previous exam of 3 days earlier there is now noted to be moderate small bowel distention. I would favor a adynamic ileus. A distal obstruction at the site of the patient's ileostomy is also in the differential but felt less likely. There is mild distention of the stomach. A moderate size hiatal hernia is present. Mild to moderate dilatation of the upper tracts of the left kidney. A left-sided double-J ureteral stent is in place. There is mild urothelial thickening. I do not see evidence of a discrete ureteral stone. The stent is well-positioned. Partially calcified pelvic mass. This is inseparable from the right posterior lateral wall of the urinary bladder along its lower margin. A Mcrgegor catheter is in place within the bladder. Chronic-appearing fractures within the thoracic and lumbar spine with a gibbus deformity at the thoracolumbar juncture and previous kyphoplasty at T12-L1. There is a small amount of free fluid within the subhepatic space and Morison space. A small right-sided effusion is present with multiple pulmonary nodules within the lower lobes consistent with metastatic disease to the lungs. There is a moderate size hiatal hernia present. · 04/19/22- CT guided biopsy consistent with colon cancer metastasis. · 5/2/2022- resuming chemotherapy with FOLFIRI/panitumumab for progressive colonic adenocarcinoma pulmonary metastasis. · 5/25/2022 CXR (2VW) Chronic lung changes with mild right basilar infiltrate or atelectasis. ONCOLOGIC HISTORY # Rectal carcinoma:  Maria Del Carmen Mistry has a history of moderately differentiated rectal carcinoma, T3N0Mx, diagnosed in 3/9/2009. He received neoadjuvant chemotherapy with radiation and resection with J-pouch. He has been routinely followed at [facility] and was last seen by Dr. Celi Chavez on 1/10/2019. The following are pertinent findings related to his diagnosis and treatment. · 3/9/2009- Esophagogastroduodenoscopy with biopsy by Dr. Lara Forbes that revealed rectal mass at 8 cm with pathology being consistent with moderately differentiated adenocarcinoma. · 3/18/2019-Dr.Elizabeth Connie Pedraza at [place] performed a flexible sigmoidoscopy and rectal endoscopic ultrasound that revealed the tumor extending 6-7 mm deep through the muscularis propria layer and into the serosa, T3 lesion. · 4/9/2009-5/27/2009-received neoadjuvant chemotherapy with 5-FU CIV along with radiation therapy for a total of 5400 cGy under the direction of Dr. Sherly Cantu. · 7/15/2009-rectum resection revealed no residual malignancy. 22 regional nodes were negative for malignancy. The rectum distal doughnut was negative for malignancy.   He was documented to have a complete pathological response. · 9/9/2009- Ileostomy excision pathology revealed small bowel wall with changes consistent with ileostomy site. Pathology negative for malignancy  · 4/11/2022 Renal Complete Prior right nephrectomy. The cortical thickness and echogenicity of the left kidney are normal. The left kidney is normal in size. There is mild to moderate dilatation of the left renal collecting system predominantly involving the lower pole. The patient reportedly has a double-J ureteral stent in place. The stent is not well seen on ultrasound.     Current Facility-Administered Medications   Medication Dose Route Frequency Provider Last Rate Last Admin    [START ON 7/10/2022] enoxaparin (LOVENOX) injection 40 mg  40 mg SubCUTAneous Daily Marla Pink, DO        levoFLOXacin [facility]) tablet 500 mg  500 mg Oral Daily Marla Pink, DO   500 mg at 07/09/22 0754    carvedilol (COREG) tablet 6.25 mg  6.25 mg Oral BID WC Marla Pink, DO   6.25 mg at 07/09/22 1128    furosemide (LASIX) tablet 20 mg  20 mg Oral BID Marla Pink, DO   20 mg at 07/09/22 3668    calcitRIOL (ROCALTROL) capsule 0.25 mcg  0.25 mcg Oral Daily Marla Pink, DO   0.25 mcg at 07/09/22 5710    ALPRAZolam (XANAX) tablet 0.5 mg  0.5 mg Oral Q4H PRN Marla Pink, DO   0.5 mg at 07/09/22 0755    anidulafungin (ERAXIS) 100 mg in dextrose 5 % 130 mL IVPB  100 mg IntraVENous Q24H Marla Pink, DO   Stopped at 07/08/22 1137    lidocaine PF 1 % injection 5 mL  5 mL IntraDERmal Once Marla Pink, DO        sodium chloride flush 0.9 % injection 5-40 mL  5-40 mL IntraVENous 2 times per day Marla Pink, DO   10 mL at 07/09/22 6799    sodium chloride flush 0.9 % injection 5-40 mL  5-40 mL IntraVENous PRN Marla Pink, DO        0.9 % sodium chloride infusion  25 mL IntraVENous PRN Marla Pink, DO        bismuth subsalicylate (PEPTO BISMOL) 262 MG/15ML suspension 30 mL  30 mL Oral Q6H PRN Leveda Toni, DO   30 mL at 07/06/22 1900    0.9 % sodium chloride bolus  1,000 mL IntraVENous Once Leveda Toni, DO        acetaminophen (TYLENOL) tablet 650 mg  650 mg Oral Q6H PRN Leveda Toni, DO   650 mg at 07/08/22 1328    Or    acetaminophen (TYLENOL) suppository 650 mg  650 mg Rectal Q6H PRN Leveda Toni, DO        lactated ringers bolus  30 mL/kg IntraVENous Once Leveda Toni, DO        oxyCODONE-acetaminophen (PERCOCET) 7.5-325 MG per tablet 1 tablet  1 tablet Oral Q4H PRN Leveda Toni, DO   1 tablet at 07/09/22 0759    pantoprazole (PROTONIX) tablet 40 mg  40 mg Oral QAM AC Leveda Toni, DO   40 mg at 07/09/22 5003       Allergies: Allergies   Allergen Reactions    Morphine Anxiety         Objective   /63   Pulse 98   Temp 97.3 °F (36.3 °C) (Temporal)   Resp 23   Ht 5' 5\" (1.651 m)   Wt 173 lb 11.2 oz (78.8 kg)   SpO2 96%   BMI 28.91 kg/m²     PHYSICAL EXAM:  CONSTITUTIONAL: Alert, appropriate, no acute distress, sitting up in chair  EYES: Non icteric, EOM intact, pupils equal round   ENT: Mucus membranes moist,external inspection of ears and nose are normal  NECK: Supple, no masses. No palpable thyroid mass  CHEST/LUNGS: CTA bilaterally, normal respiratory effort   CARDIOVASCULAR: RRR, no murmurs. No lower extremity edema  ABDOMEN: soft non-tender, active bowel sounds, no HSM. No palpable masses  EXTREMITIES: warm, full ROM in all 4 extremities, no focal weakness.   SKIN: Right chest wall-port site bandaged  LYMPH: No cervical, clavicular, axillary, or inguinal lymphadenopathy  NEUROLOGIC: follows commands, non focal       LABORATORY RESULTS REVIEWED/ANALYZED BY ME:  Recent Labs     07/09/22  0430 07/08/22  0420 07/07/22  0017   WBC 11.3* 12.7* 19.5*   HGB 7.1* 7.8* 7.9*   HCT 23.3* 25.8* 25.4*   MCV 90.3 92.1 89.1    257 256       Lab Results   Component Value Date     (L) 07/09/2022    K 3.8 07/09/2022     07/09/2022    CO2 22 07/09/2022    BUN 25 (H) 07/09/2022    CREATININE 1.7 (H) 07/09/2022    GLUCOSE 94 07/09/2022    CALCIUM 7.4 (L) 07/09/2022    PROT 5.5 (L) 07/07/2022    LABALBU 2.6 (L) 07/07/2022    BILITOT <0.2 07/07/2022    ALKPHOS 150 (H) 07/07/2022    AST 15 07/07/2022    ALT 8 07/07/2022    LABGLOM 40 (A) 07/09/2022    GFRAA 48 (L) 07/09/2022    AGRATIO 1.9 11/24/2021    GLOB 2.2 11/24/2021       Lab Results   Component Value Date    INR 1.02 04/18/2022    INR 1.06 04/13/2022    INR 1.04 11/09/2021    PROTIME 13.3 04/18/2022    PROTIME 13.7 04/13/2022    PROTIME 13.8 11/09/2021       RADIOLOGY STUDIES REPORT/REVIEWED AND INTERPRETED BY ME:  CT ABDOMEN PELVIS WO CONTRAST Additional Contrast? None    Result Date: 7/7/2022  1. Right-sided pleural effusion, stable and unchanged 2. Interval development of right lower lobe consolidation 3. Innumerable pulmonary nodules consistent with metastatic disease 4. The gallbladder is not visualized 5. The right kidney is not visualized 6. Postop changes status post colostomy 7. Postop and degenerative changes of the lumbar spine, stable and unchanged 8. Status post left double J ureteral stent placement, stable and unchanged 9. Bladder will thickening consistent with chronic inflammatory disease i.e. cystitis. Please correlate 10. The appendix is not visualized Recommendation: Follow up as clinically indicated. All CT scans at this facility utilize dose modulation, iterative reconstruction, and/or weight based dosing when appropriate to reduce radiation dose to as low as reasonably achievable. Electronically Signed by Adria Armstrong at 07-Jul-2022 02:37:25 AM             CT ABDOMEN PELVIS WO CONTRAST Additional Contrast? None    Result Date: 6/29/2022  1. Evidence of small bowl obstruction possible at the level of ileal anastomosis as described. No intra-abdominal free fluid. 2.  Presacral fluid collection as described. Left hydroureteronephrosis.  3.  Right pleural effusion and multiple lungs metastatic lesion as described. Recommendation: Follow up as clinically indicated. All CT scans at this facility utilize dose modulation, iterative reconstruction, and/or weight based dosing when appropriate to reduce radiation dose to as low as reasonably achievable. Electronically Signed by Stella Pinzon MD at 29-Jun-2022 08:55:49 AM             XR CHEST PORTABLE    Result Date: 7/5/2022  Bilateral infiltrate and right pleural effusion as described. Recommendation: Follow up as clinically indicated. Electronically Signed by Stella Pinzon MD at 05-Jul-2022 06:53:38 PM             XR CHEST PORTABLE    Result Date: 6/29/2022  Scattered interstitial nodular densities throughout both lungs and coarse right infrahilar markings centrally. 4 level cervical fusion device in good repair NG tube with tip in the upper stomach Right access transjugular catheter with tip in the right atrium Two contiguous kyphoplasty injections lower dorsal spine. Question small right pleural effusion. Recommendation: Follow up as clinically indicated. Electronically Signed by Tim Dejesus MD at 29-Jun-2022 10:40:16 AM                       ASSESSMENT:  #Fungal cystitis - Candida glabrata/Serratia sensitivities-gram-negative bacteremia        Dr. Oliver Salas note reviewed    Anidulafungin 100 mg IV daily to continue through 7/15/2022  Levaquin 500 p.o. daily to continue through 7/15/2022    S/p right chest wall port removal by Dr. Luevenia Epley 7/8/2022  Evaluation of ureteral stent exchange per Dr. Landy Marcano    -WBC continue to trend down-normalizing    #Septic shock-currently on pressors. #Colon cancer stage IV (lung metastasis )-chemotherapy on hold. Last chemotherapy delivered 6/1/2022. Patient has had recent hospitalizations. Chemotherapy has been on hold due to frequent hospitalizations and poor performance status. Unfortunately, we have not been able to keep a consistence on his treatment schedule. Certainly with the current infection we will continue to hold this. Palliative care also following. #Multifactorial anemia-secondary to chemotherapy, malignancy, frequent hospitalizations and sepsis          #COLLEEN/CKD-nephrology following    -Baseline 1.8          -Nephrology also following    #Hypomagnesemia-as per hospitalist    PLAN:  Continue current supportive care and antibiotic as per ID  No oncologic intervention at this time  Continue to monitor CBC      Gregory Forman PA-C    07/09/22  9:44 AM  Physician's attestation/substantial contribution:  I, Dr Asha Gallo, independently performed an evaluation on Dion Leo. I have reviewed relevant medical information/data to include but not limited to medication list, relevant appropriate labs and imaging when applicable. I reviewed other physician's notes, ancillary services and nurses assessment. I have reviewed the above documentation completed by the Nurse Practitioner or Physician Assistant. Please see my additional contributions to the history of present illness, physical examination, and assessment/medical decision-making that reflect my findings and impressions. I have seen and examined the patient and the key elements of all parts of the encounter have been performed by me. I agree with the assessment and plan as outlined by the ARNP/PA. Subjective-feels much better. Sitting up in the chair. Denies any new complaints this morning. Mediport removed yesterday. Objective- as above  Assessment/plan:  Continue current supportive care. No oncologic events at this time. Creatinine improving. Hemoglobin 7.1 today.     Asha Gallo MD

## 2022-07-10 NOTE — PROGRESS NOTES
[facility]    Patient:  Ramila Noriega  YOB: 1950  Date of Service: 7/10/2022  MRN: [id number]   Acct: [de-identified]   Primary Care Physician: Dash Park MD  Advance Directive: Full Code  Admit Date: 7/5/2022       Hospital Day: 5        CHIEF COMPLAINT:     Chief Complaint   Patient presents with    Hypotension     Pt presents to ED with c/o hypotension, at outpatient infusion and was unable to tolerate treatment        7/10/2022 11:45 AM  Subjective / Interval History:   07/10/2022  Patient seen and examined. Doing well. No new complaints. Continues to endorse some bilateral lower extremity pain and swelling. Denies any acute complaints or distress at this time. Blood pressure stable. Review of Systems:   Review of Systems  ROS: 14 point review of systems is negative except as specifically addressed above. ADULT DIET;  Regular; Low Fiber    Intake/Output Summary (Last 24 hours) at 7/10/2022 1145  Last data filed at 7/10/2022 0940  Gross per 24 hour   Intake 570 ml   Output 1900 ml   Net -1330 ml       Medications:   sodium chloride       Current Facility-Administered Medications   Medication Dose Route Frequency Provider Last Rate Last Admin    furosemide (LASIX) tablet 40 mg  40 mg Oral Daily Tyrell Chavis MD        enoxaparin (LOVENOX) injection 40 mg  40 mg SubCUTAneous Daily [place] Quale, DO   40 mg at 07/10/22 0802    sodium phosphate 10 mmol in sodium chloride 0.9 % 250 mL IVPB  10 mmol IntraVENous PRN Tyrell Chavis MD        Or    sodium phosphate 15 mmol in sodium chloride 0.9 % 250 mL IVPB  15 mmol IntraVENous PRN Tyrell Chavis MD        Or    sodium phosphate 20 mmol in sodium chloride 0.9 % 500 mL IVPB  20 mmol IntraVENous PRN Tyrell Chavis MD        levoFLOXacin [facility]) tablet 500 mg  500 mg Oral Daily [place] Quale, DO   500 mg at 07/10/22 0802    carvedilol (COREG) tablet 6.25 mg  6.25 mg Oral BID WC Cyndra Jordi, DO   6.25 mg at 07/10/22 6191    calcitRIOL (ROCALTROL) capsule 0.25 mcg  0.25 mcg Oral Daily Cyndra Jordi, DO   0.25 mcg at 07/10/22 5826    ALPRAZolam [facility]) tablet 0.5 mg  0.5 mg Oral Q4H PRN Cyndra Jordi, DO   0.5 mg at 07/10/22 0445    anidulafungin (ERAXIS) 100 mg in dextrose 5 % 130 mL IVPB  100 mg IntraVENous Q24H Cyndra [place], DO 84 mL/hr at 07/10/22 1024 100 mg at 07/10/22 1024    lidocaine PF 1 % injection 5 mL  5 mL IntraDERmal Once Cyndra Jordi, DO        sodium chloride flush 0.9 % injection 5-40 mL  5-40 mL IntraVENous 2 times per day Cyndra [place], DO   10 mL at 07/10/22 5873    sodium chloride flush 0.9 % injection 5-40 mL  5-40 mL IntraVENous PRN Cyndra Jordi, DO        0.9 % sodium chloride infusion  25 mL IntraVENous PRN Cyndra Jordi, DO        bismuth subsalicylate (PEPTO BISMOL) 262 MG/15ML suspension 30 mL  30 mL Oral Q6H PRN Cyndra [place], DO   30 mL at 07/06/22 1900    0.9 % sodium chloride bolus  1,000 mL IntraVENous Once Cyndra [place], DO        acetaminophen (TYLENOL) tablet 650 mg  650 mg Oral Q6H PRN Cyndra [place], DO   650 mg at 07/08/22 1328    Or    acetaminophen (TYLENOL) suppository 650 mg  650 mg Rectal Q6H PRN Cyndra [place], DO        lactated ringers bolus  30 mL/kg IntraVENous Once Cyndra [place], DO        oxyCODONE-acetaminophen (PERCOCET) 7.5-325 MG per tablet 1 tablet  1 tablet Oral Q4H PRN Cyndra [place], DO   1 tablet at 07/10/22 1023    pantoprazole (PROTONIX) tablet 40 mg  40 mg Oral QAM AC Cyndra [place], DO   40 mg at 07/10/22 0433         sodium chloride        furosemide  40 mg Oral Daily    enoxaparin  40 mg SubCUTAneous Daily    levoFLOXacin  500 mg Oral Daily    carvedilol  6.25 mg Oral BID WC    calcitRIOL  0.25 mcg Oral Daily    anidulafungin  100 mg IntraVENous Q24H    lidocaine 1 % injection  5 mL IntraDERmal Once    sodium chloride flush  5-40 mL IntraVENous 2 times per day    sodium chloride  1,000 mL IntraVENous Once    lactated ringers bolus  30 mL/kg IntraVENous Once    pantoprazole  40 mg Oral QAM AC     sodium phosphate IVPB **OR** sodium phosphate IVPB **OR** sodium phosphate IVPB, ALPRAZolam, sodium chloride flush, sodium chloride, bismuth subsalicylate, acetaminophen **OR** acetaminophen, oxyCODONE-acetaminophen  ADULT DIET; Regular; Low Fiber       Labs:   CBC with DIFF:  Recent Labs     07/08/22  0420 07/09/22  0430 07/10/22  0342   WBC 12.7* 11.3* 10.0   RBC 2.80* 2.58* 2.92*   HGB 7.8* 7.1* 8.0*   HCT 25.8* 23.3* 26.4*   MCV 92.1 90.3 90.4   MCH 27.9 27.5 27.4   MCHC 30.2* 30.5* 30.3*   RDW 15.2* 15.5* 15.1*    185 226   MPV 11.5 11.5 11.5   NEUTOPHILPCT 81.9* 78.2* 71.0*   LYMPHOPCT 7.6* 8.8* 10.9*   MONOPCT 6.5 8.9 10.6*   EOSRELPCT 2.4 2.4 4.0   BASOPCT 0.6 0.4 0.5   NEUTROABS 10.4* 8.8* 7.1   LYMPHSABS 1.0* 1.0* 1.1   MONOSABS 0.80 1.00* 1.10*   EOSABS 0.30 0.30 0.40   BASOSABS 0.10 0.10 0.10       CMP/BMP:  Recent Labs     07/07/22  1805 07/07/22  1805 07/08/22  0420 07/09/22  0430 07/10/22  0342   *   < > 136 134* 133*   K 3.8   < > 3.8 3.8 4.1      < > 104 103 100   CO2 20*   < > 21* 22 21*   ANIONGAP 15   < > 11 9 12   GLUCOSE 125*   < > 88 94 91   BUN 32*   < > 30* 25* 25*   CREATININE 2.3*   < > 2.1* 1.7* 1.8*   LABGLOM 28*   < > 31* 40* 37*   CALCIUM 6.7*   < > 6.6* 7.4* 8.0*   PROT 5.5*  --   --   --   --    LABALBU 2.6*  --   --   --   --    BILITOT <0.2  --   --   --   --    ALKPHOS 150*  --   --   --   --    ALT 8  --   --   --   --    AST 15  --   --   --   --     < > = values in this interval not displayed. CRP:  No results for input(s): CRP in the last 72 hours. Sed Rate:  No results for input(s): SEDRATE in the last 72 hours.       HgBA1c:  No components found for: HGBA1C  FLP:    Lab Results   Component Value Date/Time    TRIG 145 10/09/2018 06:49 AM    HDL 48 10/09/2018 06:49 AM    LDLCALC 114 10/09/2018 06:49 AM     TSH:    Lab Results   Component Value Date/Time    TSH 1.600 04/12/2022 02:10 PM     Troponin T: No results for input(s): TROPONINI in the last 72 hours. Pro-BNP: No results for input(s): BNP in the last 72 hours. INR: No results for input(s): INR in the last 72 hours. ABGs: No results found for: PHART, PO2ART, ZAK0GRW  UA:No results for input(s): NITRITE, COLORU, PHUR, LABCAST, WBCUA, RBCUA, MUCUS, TRICHOMONAS, YEAST, BACTERIA, CLARITYU, SPECGRAV, LEUKOCYTESUR, UROBILINOGEN, BILIRUBINUR, BLOODU, GLUCOSEU, AMORPHOUS in the last 72 hours. Invalid input(s): [place]      Culture Results:    No results for input(s): CXSURG in the last 720 hours. Blood Culture Recent:   Recent Labs     07/08/22  1313 07/06/22  1600 07/05/22  1700 07/05/22  1005 06/29/22  1736   BC Gram stain Anaerobic bottle: Bottle volume = <5 ml  Quality of results might be  affected with low volume. Gram stain Aerobic bottle:  Gram positive cocci in clusters  resembling Staphylococcus  Culture in progress  Please notify Physician  * Gram stain Anaerobic bottle:  Gram negative rods  Culture in progress  Please notify Physician  *  Bottle volume = >10 ml   Bottle volume = 9 ml  *  Isolated from Aerobic and Anaerobic bottles  No further workup  Refer to Blood culture drawn on 7/5/22 at10:05 for sensitivity results    Bottle volume = 6 ml  Gram stain Aerobic bottle:  Gram negative rods  Culture in progress  Please notify Physician  Gram stain Anaerobic bottle: Bottle volume = <5 ml  Quality of results might be  affected with low volume. 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   The clinical efficacy of the carbapenems has not been  established for treating infections caused by  Enterobacteriaceae that test carbapenems susceptible but  demonstrate carbapenemase production in vitro. CONTACT PRECAUTIONS INDICATED  Sending to [facility] for confirmation  * No growth after 5 days of incubation. Recent Labs     07/08/22  1313 07/08/22  1317   BC Gram stain Anaerobic bottle: Bottle volume = <5 ml  Quality of results might be  affected with low volume. Gram stain Aerobic bottle:  Gram positive cocci in clusters  resembling Staphylococcus  Culture in progress  Please notify Physician  *  --    BLOODCULT2  --  No Growth to date. Any change in status will be called. Cultures:   No results for input(s): CULTURE in the last 72 hours. Recent Labs     07/08/22  1313 07/08/22  1317   BC Gram stain Anaerobic bottle: Bottle volume = <5 ml  Quality of results might be  affected with low volume. Gram stain Aerobic bottle:  Gram positive cocci in clusters  resembling Staphylococcus  Culture in progress  Please notify Physician  *  --    BLOODCULT2  --  No Growth to date. Any change in status will be called. No results for input(s): CXSURG in the last 72 hours. Recent Labs     07/08/22  0420   MG 2.4   PHOS 3.0     Recent Labs     07/07/22  1805   AST 15   ALT 8   BILITOT <0.2   ALKPHOS 150*         RAD:   CT ABDOMEN PELVIS WO CONTRAST Additional Contrast? None    Result Date: 7/7/2022  . NO PRIOR REPORT AVAILABLE The Exam: CT OF THE ABDOMEN/PELVIS WITHOUT CONTRAST Clinical data: Patient now with gram-negative sepsis. Follow-up for worsening left hydronephrosis with chronic indwelling stent, possible stent malfunction and right postoperative pelvic collection. Technique: Direct contiguous axial CT images were acquired through the abdomen and pelvis without contrast using soft tissue and bone algorithms. Reformatted/MPR images were performed. Radiation dose: CTDIvol =26.44 mGy, DLP =1237 mGy x cm. Limitations: Lack of intravenous contrast limits evaluation of solid viscera.  Lack of oral contrast limits evaluation of the bowel loops. Prior Studies: CT scan of abdomen and pelvis dated 06/29/22. Radiographs of the abdomen/KUB dated 04/15/22 images. Findings: Lung bases:large right-sided pleural effusion is again noted, stable and unchanged as the previous study 6/2922. Innumerable pulmonary nodules are noted measuring up to one point 3 cm. Findings consistent with pulmonary metastatic involvement. In addition, there is consolidation at the right base, newly noted since the previous exam. Liver:Unremarkable size, contour, and density. No evidence of mass. No evidence of dilated ducts. Gallbladder Fossa:not visualized Spleen: Grossly unremarkable. Pancreas/adrenal glands: Grossly unremarkable size, contour and density. Haskel Hollering right kidney is not visualized. A left-sided double J ureteral stent is noted in satisfactory position. There is mild fullness of the left collecting system Perinephric space is unremarkable. Retroperitoneum: No retroperitoneal lymphadenopathy. Unremarkable abdominal aorta. The IVC is grossly unremarkable. Peritoneal cavity:no ascites or free air. Gastrointestinal tract: Nondistended small bowel and colon. No evidence of obstruction. Postoperative changes status post colostomy, stable and unchanged Appendix:not visualized. Pelvis: Solid and hollow viscera grossly unremarkable. The balloon tip fully catheters noted in the bladder. There is diffuse bladder wall thickening which may reflect chronic inflammatory disease i.e. cystitis. Abdominal wall: there is extensive subcutaneous edema/anasarca Osseous structures:severe degenerative disc disease similar to the previous study postop changes noted in the lower lumbar spine. In addition, compression fractures deformities are noted at L1 and L2 vertebral bodies, stable and unchanged as the previous  study. 1. Right-sided pleural effusion, stable and unchanged 2. Interval development of right lower lobe consolidation 3.  Innumerable and double-J catheter in place. No evidence of stone. Retroperitoneum: No retroperitoneal lymphadenopathy. Unremarkable abdominal aorta. The IVC is grossly unremarkable. Peritoneal cavity: No evidence of free air or ascites. Gastrointestinal tract: Stomach, duodenum, jejunal loops and ileal loops dilated with air-fluid level up to possible resection/anastomosis site. No evidence of obvious mass however there is a mild Wall thickening at the endpoint site of ileal loops. Appendix: Unremarkable. Pelvis: Diffuse bladder wall thickening with associating 4.5 x 3 centimeters soft tissue at the superior wall of bladder extends extraluminally. Malignancy cannot be excluded. Left double-J catheter in place. There is a fluid collection with associating coarse soft tissue calcification in presacral area measuring 7.5 x 4 x 3 cm. Please correlate clinically for postop collection/abscess. Osseous structures:Significance spondylosis, osteoporosis and multiple level compression fracture at upper lumbar levels. Mild anterior listhesis at L5-S1. Increase thoracolumbar kyphosis. 1.  Evidence of small bowl obstruction possible at the level of ileal anastomosis as described. No intra-abdominal free fluid. 2.  Presacral fluid collection as described. Left hydroureteronephrosis. 3.  Right pleural effusion and multiple lungs metastatic lesion as described. Recommendation: Follow up as clinically indicated. All CT scans at this facility utilize dose modulation, iterative reconstruction, and/or weight based dosing when appropriate to reduce radiation dose to as low as reasonably achievable. Electronically Signed by Danial Nance MD at 29-Jun-2022 08:55:49 AM             XR CHEST PORTABLE    Result Date: 7/5/2022  NO PRIOR REPORT AVAILABLE Exam: X-RAY OF THE CHEST Clinical data: Cough. Technique: Single view of the chest. Prior studies: Radiograph of the chest dated 06/29/2022.  Findings: Right lower zone moderate parenchymal airspace infiltrate with mild effusion. Left lower zone mild parenchymal peribronchial infiltrate. No consolidation. Mild cardiomegaly. Right PICC line at right atrium. Bilateral infiltrate and right pleural effusion as described. Recommendation: Follow up as clinically indicated. Electronically Signed by Paige Ruano MD at 05-Jul-2022 06:53:38 PM             XR CHEST PORTABLE    Result Date: 6/29/2022  NO PRIOR REPORT AVAILABLE Exam: X-RAY OF [facility] Clinical data:NG tube placement. Technique:Single portable upright view of the chest. Prior studies: Radiograph of the chest dated 5/25/2022 image. Findings: The lungs show scattered interstitial nodular densities throughout both lungs. In the right costophrenic sulcus there is pleural effusion  suspected. A 4-level cervical fusion device appears in good repair. NG tube appears to have its tip in the gastric cardia. Right access transjugular catheter noted with tip in the right atrium. Two contiguous kyphoplasty injections seen in the lower dorsal spine. Cardiac silhouette is within normal limits. No acute osseous abnormality is detected. Scattered interstitial nodular densities throughout both lungs and coarse right infrahilar markings centrally. 4 level cervical fusion device in good repair NG tube with tip in the upper stomach Right access transjugular catheter with tip in the right atrium Two contiguous kyphoplasty injections lower dorsal spine. Question small right pleural effusion. Recommendation: Follow up as clinically indicated.  Electronically Signed by Marisol Cullen MD at 29-Jun-2022 10:40:16 AM                 Objective:   Vitals:   /83   Pulse 97   Temp 96.8 °F (36 °C) (Temporal)   Resp 18   Ht 5' 5\" (1.651 m)   Wt 173 lb 5 oz (78.6 kg)   SpO2 96%   BMI 28.84 kg/m²       Patient Vitals for the past 24 hrs:   BP Temp Temp src Pulse Resp SpO2 Weight   07/10/22 0534 131/83 96.8 °F (36 °C) Temporal 97 18 96 % 173 lb 5 oz (78.6 kg)   07/10/22 0515 -- -- -- -- 18 -- --   07/10/22 0108 -- -- -- -- 18 -- --   07/09/22 2311 116/77 96.8 °F (36 °C) Temporal 96 18 95 % --   07/09/22 2112 -- -- -- -- 16 -- --   07/09/22 1720 132/72 (!) 96.3 °F (35.7 °C) Temporal 88 18 99 % --   07/09/22 1601 110/63 (!) 96.6 °F (35.9 °C) Temporal 87 20 98 % --   07/09/22 1542 -- -- -- -- 18 -- --   07/09/22 1530 -- -- -- 94 25 -- --   07/09/22 1500 129/72 -- -- 93 25 97 % --   07/09/22 1415 135/75 -- -- 98 26 96 % --   07/09/22 1400 130/73 -- -- 93 20 96 % --   07/09/22 1330 130/86 -- -- 97 25 96 % --   07/09/22 1300 120/68 -- -- 90 20 96 % --   07/09/22 1200 127/65 97 °F (36.1 °C) Temporal 95 25 96 % --       24HR INTAKE/OUTPUT:      Intake/Output Summary (Last 24 hours) at 7/10/2022 1145  Last data filed at 7/10/2022 0940  Gross per 24 hour   Intake 570 ml   Output 1900 ml   Net -1330 ml       Physical Exam  Vitals and nursing note reviewed. Constitutional:       General: He is not in acute distress. Appearance: Normal appearance. He is not ill-appearing, toxic-appearing or diaphoretic. HENT:      Head: Normocephalic and atraumatic. Right Ear: External ear normal.      Left Ear: External ear normal.      Nose: Nose normal. No congestion or rhinorrhea. Mouth/Throat:      Mouth: Mucous membranes are moist.      Pharynx: Oropharynx is clear. No oropharyngeal exudate or posterior oropharyngeal erythema. Eyes:      General: No scleral icterus. Right eye: No discharge. Left eye: No discharge. Extraocular Movements: Extraocular movements intact. Conjunctiva/sclera: Conjunctivae normal.   Cardiovascular:      Rate and Rhythm: Normal rate and regular rhythm. Pulses: Normal pulses. Heart sounds: Normal heart sounds. No murmur heard. No friction rub. No gallop. Pulmonary:      Effort: Pulmonary effort is normal. No respiratory distress. Breath sounds: Normal breath sounds. No stridor.  No wheezing, rhonchi or rales. Chest:      Chest wall: No tenderness. Abdominal:      General: Bowel sounds are normal. There is no distension. Palpations: Abdomen is soft. Tenderness: There is no abdominal tenderness. There is no guarding or rebound. Musculoskeletal:         General: No swelling, tenderness, deformity or signs of injury. Normal range of motion. Cervical back: Normal range of motion and neck supple. No rigidity. No muscular tenderness. Right lower leg: Edema present. Left lower leg: Edema present. Skin:     General: Skin is warm and dry. Capillary Refill: Capillary refill takes less than 2 seconds. Coloration: Skin is not jaundiced or pale. Findings: No bruising, erythema, lesion or rash. Neurological:      General: No focal deficit present. Mental Status: He is alert and oriented to person, place, and time. Cranial Nerves: No cranial nerve deficit. Sensory: No sensory deficit. Motor: No weakness. Coordination: Coordination normal.   Psychiatric:         Mood and Affect: Mood normal.         Behavior: Behavior normal.         Thought Content: Thought content normal.         Judgment: Judgment normal.         Assessment/plan:         Hospital Problems           Last Modified POA    * (Principal) Septicemia ([address] 7/8/2022 Yes    Chronic kidney disease 7/7/2022 Yes    Port or reservoir infection 7/8/2022 Yes    Leg swelling 7/10/2022 Yes    Hydronephrosis of right kidney 7/10/2022 Yes          Principal Problem:    Septicemia ([address]  Active Problems:    Chronic kidney disease    Port or reservoir infection    Leg swelling    Hydronephrosis of right kidney  Resolved Problems:    * No resolved hospital problems.  *        Brief Summary  Mr. Davion Bradley, 59-year-old male, history of HTN, CAD, HLD, admitted to [facility] ICU initially (07/05/2022), after patient was noted to be hypotensive at an outpatient infusion center, while receiving antifungal medication. Patient admitted to [facility] ICU, for management of septic shock. Of note  Patient with a complex medical and surgical history. Approximately 10 years ago he was diagnosed with metastatic colon carcinoma. He underwent treatment and had remission. In 2019 he had a recurrence with metastatic disease. He has mets in his pelvis and and is followed by urology for scheduled stent exchanges because of compression of his ureter. 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 This was complicated by bowel perforation and he underwent exploratory laparotomy with washout and his wound had to close by secondary intention. He does have a port placed, and he was followed by infectious disease. Patient now stabilized and transferred to [address] 07/09/2022  Writer assumes care of patient 07/10/2022    Further hospital course as per problem list below;    Gram-negative sepsis  Bacteremia  · ID on board  · Continue antibiotic as per ID recommendation    COLLEEN on CKD  · Monitor creatinine  · Nephrology on board  · Further management as per nephrology recommendation. 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 Metastatic colon cancer  · Oncology on board  · Further work-up and management as per oncology recommendation. Continue management of other chronic medical conditions - see above and orders. Advance Directive: Full Code    ADULT DIET;  Regular; Low Fiber         Consults Made:   IP CONSULT TO INFECTIOUS DISEASES  IP CONSULT TO NEPHROLOGY  PALLIATIVE CARE EVAL  IP CONSULT TO NEPHROLOGY  IP CONSULT TO HEM/ONC  IP CONSULT TO SOCIAL WORK  IP CONSULT TO VASCULAR SURGERY    DVT prophylaxis: Enoxaparin      Discharge planning: tbd    Time Spent is 35 mins in the examination, evaluation, counseling and review of medications, assessment and plan.      Electronically signed by   Candice Rodriguez MD, MPH, MD,   Internal Medicine Hospitalist   7/10/2022 11:45 AM

## 2022-07-10 NOTE — PROGRESS NOTES
Urology Progress Note      SUBJECTIVE: No new complaints anxious to go home    OBJECTIVE: Antibiotic:  Levaquin. Antifungal:  anidulafungin  No further fever spikes since his port removed    REVIEW OF SYSTEMS:   Review of Systems      Physical  VITALS:  BP (!) 150/94   Pulse (!) 106   Temp 96.8 °F (36 °C) (Temporal)   Resp 18   Ht 5' 5\" (1.651 m)   Wt 173 lb 5 oz (78.6 kg)   SpO2 95%   BMI 28.84 kg/m²   TEMPERATURE:  Current - Temp: 96.8 °F (36 °C); Max - Temp  Av.6 °F (35.9 °C)  Min: 96.3 °F (35.7 °C)  Max: 96.8 °F (36 °C)   24 HR I&O   Intake/Output Summary (Last 24 hours) at 7/10/2022 1344  Last data filed at 7/10/2022 0940  Gross per 24 hour   Intake 570 ml   Output 1900 ml   Net -1330 ml     BACK: not done  ABDOMEN: Ileostomy  HEART:  normal rate and regular rhythm  CHEST: Normal respiratory effort  GENITAL/URINARY: Mcgregor catheter draining clear urine    Data       CBC:   Recent Labs     22  0420 22  0430 07/10/22  0342   WBC 12.7* 11.3* 10.0   HGB 7.8* 7.1* 8.0*   HCT 25.8* 23.3* 26.4*    185 226     BMP:    Recent Labs     22  0420 22  0430 07/10/22  0342    134* 133*   K 3.8 3.8 4.1    103 100   CO2 21* 22 21*   BUN 30* 25* 25*   CREATININE 2.1* 1.7* 1.8*   GLUCOSE 88 94 91       No results for input(s): Morgan Moat in the last 72 hours. Recent Labs     22  1313   BC Gram stain Anaerobic bottle: Bottle volume = <5 ml  Quality of results might be  affected with low volume. Gram stain Aerobic bottle:  Gram positive cocci in clusters  resembling Staphylococcus  Culture in progress  Please notify Physician  *  Isolated from Aerobic bottle  No further workup  This organism was isolated from one set. Susceptibility testing is not routinely done as this  organism frequently represents skin contamination. Additional testing can be ordered by calling the  Microbiology Department. Recent Labs     22  1317   BLOODCULT2 No Growth to date. Any change in status will be called.        ASSESSMENT AND PLAN    Patient Active Problem List   Diagnosis    Thoracic facet joint syndrome    Primary osteoarthritis of left hip    Leg swelling    Abnormal nuclear cardiac imaging test    Abnormal nuclear cardiac imaging test    S/p bare metal coronary artery stent    Coronary artery disease involving native coronary artery of native heart without angina pectoris    COLLEEN (acute kidney injury) ([address]    Complex regional pain syndrome type 1 of right lower extremity    Hydronephrosis, bilateral    Ureteral stricture, left    History of rectal cancer    Anemia of chronic disease    Pelvic mass    Lung nodules    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    Colorectal cancer ([address]    Metastasis from colon cancer ([address]    Proteinuria    Chemotherapy management, encounter for    Anemia associated with chemotherapy    Other fatigue    Thrush    Dehydration    Chemotherapy-induced peripheral neuropathy (HCC)    Chemotherapy-induced nausea    SBO (small bowel obstruction) (HCC)    Burning with urination    History of small bowel obstruction    Encounter for central line care    Iron deficiency    History of bladder cancer    Hydronephrosis of left kidney    Hydronephrosis of right kidney    Low back pain    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)    Sepsis secondary to UTI ([address]    Acute kidney injury superimposed on CKD ([address]    Urinary tract infection associated with indwelling urethral catheter ([address]    Retained ureteral stent    Lower urinary tract symptoms    Ileus ([address]    Septicemia ([address]    Colon carcinoma metastatic to lung ([address]    Metastatic adenocarcinoma ([address]    Hypomagnesemia    Hypocalcemia    Small bowel obstruction (HCC)    Acute cystitis with hematuria    Urothelial carcinoma of kidney, right ([address]    Palliative care patient    Infection associated with indwelling urinary catheter [facility]    Candida cystitis    Chronic kidney disease    Port or reservoir infection       No new  recommendations      1. Gram-negative sepsis with Serratia. Continues with Levaquin per ID     2. Chronic left hydronephrosis secondary to radiation stricture of the ureter with chronic indwelling ureteral stent.   I plan to get him back on for stent removal and replacement on this Tuesday, July 12 and is to have good urine output no flank pain creatinine 1.7 which is improved     3.  Candiduria.  Candida glabrata light growth fluconazole resistant on anidulafungin per infectious disease.  I suspect this represents chronic colonization but agree with infectious disease continue antifungal therapy until we get his stent change.  And probably for a few days after stent change.     4.  History of NMIBC at the time of stent change we will plan cystoscopy and surveillance of bladder and indicated procedure should he need biopsy fulguration etc.     5.  COLLEEN on CKD.   creatinine stable urine output okay likely multifactorial but is encouraging that CT shows decompression of the left kidney with catheter in the bladder I do not think obstruction is a significant component at this point.  Nephrology on board     6.  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  Right pelvic postoperative collection/consolidation appears unchanged on CT scan no other recommendations or treatment indicated at this time.     7.  Metastatic colon cancer followed by oncology chemotherapy when able per his   Clinical course and comorbidities            Nay Dick MD
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
## 2022-07-10 NOTE — PROGRESS NOTES
Infectious Diseases Progress Note    Patient:  Gillian Washington  YOB: 1950  MRN: [id number]   Admit date: 7/5/2022   Admitting Physician: Valencia Haynes MD  Primary Care Physician: Stephany Purcell MD    Chief Complaint/Interval History:  Seems to be doing better. No recurrent fever. Hemodynamically stable. Remains on room air. Transferred out of ICU. Likely ureteral stent change on Tuesday. Port has been removed. Additional blood culture called yesterday for gram-positive cocci in clusters. Blood culture repeated and so far negative. Vancomycin started empirically pending blood culture result. Suspecting contaminant as blood culture was recovered from 1 of 2 sets and was Staph epidermidis. In/Out    Intake/Output Summary (Last 24 hours) at 7/10/2022 0919  Last data filed at 7/10/2022 0552  Gross per 24 hour   Intake 240 ml   Output 2700 ml   Net -2460 ml     Allergies:    Allergies   Allergen Reactions    Morphine Anxiety     Current Meds: enoxaparin (LOVENOX) injection 40 mg, Daily  sodium phosphate 10 mmol in sodium chloride 0.9 % 250 mL IVPB, PRN   Or  sodium phosphate 15 mmol in sodium chloride 0.9 % 250 mL IVPB, PRN   Or  sodium phosphate 20 mmol in sodium chloride 0.9 % 500 mL IVPB, PRN  levoFLOXacin (LEVAQUIN) tablet 500 mg, Daily  carvedilol (COREG) tablet 6.25 mg, BID WC  calcitRIOL (ROCALTROL) capsule 0.25 mcg, Daily  ALPRAZolam (XANAX) tablet 0.5 mg, Q4H PRN  anidulafungin (ERAXIS) 100 mg in dextrose 5 % 130 mL IVPB, Q24H  lidocaine PF 1 % injection 5 mL, Once  sodium chloride flush 0.9 % injection 5-40 mL, 2 times per day  sodium chloride flush 0.9 % injection 5-40 mL, PRN  0.9 % sodium chloride infusion, PRN  bismuth subsalicylate (PEPTO BISMOL) 262 MG/15ML suspension 30 mL, Q6H PRN  0.9 % sodium chloride bolus, Once  acetaminophen (TYLENOL) tablet 650 mg, Q6H PRN   Or  acetaminophen (TYLENOL) suppository 650 mg, Q6H PRN  lactated ringers bolus, Once  oxyCODONE-acetaminophen (PERCOCET) 7.5-325 MG per tablet 1 tablet, Q4H PRN  pantoprazole (PROTONIX) tablet 40 mg, QAM AC      Review of Systems See HPI. No abdominal pain. No flank pain. No dyspnea. No nausea. Ostomy functioning. VitalSigns:  /83   Pulse 97   Temp 96.8 °F (36 °C) (Temporal)   Resp 18   Ht 5' 5\" (1.651 m)   Wt 173 lb 5 oz (78.6 kg)   SpO2 96%   BMI 28.84 kg/m²      Physical Exam  Prior port site without drainage  Midline catheter site without signs of infection  Lungs without crackles  Heart without murmur  Ostomy functioning with air and liquid stool in bag  Extremities trace edema    Lab Results:  CBC:   Recent Labs     07/08/22 0420 07/09/22  0430 07/10/22  0342   WBC 12.7* 11.3* 10.0   HGB 7.8* 7.1* 8.0*    185 226     BMP:  Recent Labs     07/08/22 0420 07/09/22  0430 07/10/22  0342    134* 133*   K 3.8 3.8 4.1    103 100   CO2 21* 22 21*   BUN 30* 25* 25*   CREATININE 2.1* 1.7* 1.8*   GLUCOSE 88 94 91     CultureResults:  Blood cultures July 9, 2022-no growth to date  Blood cultures July 8, 2022-1 set no growth  Blood cultures July 8, 2022-1 set Staph epidermidis  Catheter tip culture July 8, 2022-pending  Blood cultures July 6, 2022-Serratia marcescens  Blood cultures July 5, 2022-Serratia marcescens  Urine cultures July 5, 2022-Candida glabrata    Radiology: None    Additional Studies Reviewed:  None    Impression:  1. Gram-negative sepsis secondary to Serratia marcescens-may have been port source. Improving. Port now removed. 2.  Positive blood culture for Staph epidermidis-contaminant-do not plan to redose vancomycin at this point unless recurrent fevers or additional positive blood cultures. 3.  Positive urine culture for Candida glabrata-treating for the possibility of cystitis  4. History urothelial cancer with right nephro ureterectomy  5. Ileostomy  6.   Resolved leukocytosis    Recommendations:  Continue levofloxacin orally  Do not plan to redose vancomycin  Continue anidulafungin 100 mg IV daily through July 15, 2022  Probable ureteral stent change July 12  Continue supportive care  Continue to follow    Nathen Lazar MD
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
## 2022-07-11 NOTE — PLAN OF CARE
Problem: Discharge Planning  Goal: Discharge to home or other facility with appropriate resources  7/11/2022 1256 by Joaquin Mendoza RN  Outcome: Completed  7/11/2022 1105 by Joaquin Mendoza RN  Outcome: Progressing  Flowsheets (Taken 7/11/2022 0834)  Discharge to home or other facility with appropriate resources: Identify barriers to discharge with patient and caregiver  7/10/2022 2352 by Courtney Ingram RN  Outcome: Not Progressing  Flowsheets  Taken 7/10/2022 2352  Discharge to home or other facility with appropriate resources:   Identify barriers to discharge with patient and caregiver   Arrange for needed discharge resources and transportation as appropriate   Refer to discharge planning if patient needs post-hospital services based on physician order or complex needs related to functional status, cognitive ability or social support system  Taken 7/10/2022 2248  Discharge to home or other facility with appropriate resources:   Identify barriers to discharge with patient and caregiver   Arrange for needed discharge resources and transportation as appropriate   Refer to discharge planning if patient needs post-hospital services based on physician order or complex needs related to functional status, cognitive ability or social support system  Note: Pt blood cultures continue to come back positive at this time. Continuing with ABX, pt has Midline in Upper R arm for ABX treatment. Plan to have urology stent exchange prior to discharge. Problem: Pain  Goal: Verbalizes/displays adequate comfort level or baseline comfort level  7/11/2022 1256 by Joaquin Mendoza RN  Outcome: Completed  7/11/2022 1105 by Joaquin Mendoza RN  Outcome: Progressing     Problem: Skin/Tissue Integrity  Goal: Absence of new skin breakdown  Description: 1. Monitor for areas of redness and/or skin breakdown  2. Assess vascular access sites hourly  3. Every 4-6 hours minimum:  Change oxygen saturation probe site  4.   Every 4-6 hours: If on nasal continuous positive airway pressure, respiratory therapy assess nares and determine need for appliance change or resting period.   7/11/2022 1256 by Krystyna Maza RN  Outcome: Completed  7/11/2022 1105 by Krystyna Maza RN  Outcome: Progressing     Problem: Safety - Adult  Goal: Free from fall injury  7/11/2022 1256 by Krystyna Maza RN  Outcome: Completed  7/11/2022 1105 by Krystyna Maza RN  Outcome: Progressing  Flowsheets  Taken 7/11/2022 0834 by Krystyna Maza RN  Free From Fall Injury: Instruct family/caregiver on patient safety  Taken 7/10/2022 2350 by Mayi Cadena RN  Free From Fall Injury: Instruct family/caregiver on patient safety     Problem: ABCDS Injury Assessment  Goal: Absence of physical injury  7/11/2022 1256 by Krystyna Maza RN  Outcome: Completed  7/11/2022 1105 by Krystyna Maza RN  Outcome: Progressing  Flowsheets (Taken 7/11/2022 0834)  Absence of Physical Injury: Implement safety measures based on patient assessment     Problem: Nutrition Deficit:  Goal: Optimize nutritional status  7/11/2022 1256 by Krystyna Maza RN  Outcome: Completed  7/11/2022 1105 by Krystyna Maza RN  Outcome: Progressing

## 2022-07-11 NOTE — DISCHARGE SUMMARY
Teri Lugo jens, [facility] MEDICINE    DISCHARGE SUMMARY:        Chelita Mcadams  :  1950  MRN:  [id number]    Admit date:  2022  Discharge date: 2022      Admitting Physician:  No admitting provider for patient encounter. Advance Directive: Full Code    Consults Made:   IP CONSULT TO INFECTIOUS DISEASES  IP CONSULT TO NEPHROLOGY  PALLIATIVE CARE EVAL  IP CONSULT TO NEPHROLOGY  IP CONSULT TO HEM/ONC  IP CONSULT TO SOCIAL WORK  IP CONSULT TO VASCULAR SURGERY  IP CONSULT TO HOME CARE NEEDS      Primary Care Physician:  Juan Valdez MD    Discharge Diagnoses:  Principal Problem:    Septicemia [facility])  Active Problems:    Chronic kidney disease    Port or reservoir infection    Leg swelling    Hydronephrosis of right kidney  Resolved Problems:    * No resolved hospital problems. *          Pertinent Labs:  CBC with DIFF:  Recent Labs     07/09/22  0430 07/10/22  0342   WBC 11.3* 10.0   RBC 2.58* 2.92*   HGB 7.1* 8.0*   HCT 23.3* 26.4*   MCV 90.3 90.4   MCH 27.5 27.4   MCHC 30.5* 30.3*   RDW 15.5* 15.1*    226   MPV 11.5 11.5   NEUTOPHILPCT 78.2* 71.0*   LYMPHOPCT 8.8* 10.9*   MONOPCT 8.9 10.6*   EOSRELPCT 2.4 4.0   BASOPCT 0.4 0.5   NEUTROABS 8.8* 7.1   LYMPHSABS 1.0* 1.1   MONOSABS 1.00* 1.10*   EOSABS 0.30 0.40   BASOSABS 0.10 0.10       CMP/BMP:  Recent Labs     07/09/22  0430 07/10/22  0342 07/11/22  0545   * 133* 138   K 3.8 4.1 4.7    100 103   CO2 22 21* 23   ANIONGAP 9 12 12   GLUCOSE 94 91 102   BUN 25* 25* 23   CREATININE 1.7* 1.8* 1.7*   LABGLOM 40* 37* 40*   CALCIUM 7.4* 8.0* 8.9         CRP:  No results for input(s): CRP in the last 72 hours. Sed Rate:  No results for input(s): SEDRATE in the last 72 hours.       HgBA1c:  No components found for: HGBA1C  FLP:    Lab Results   Component Value Date/Time    TRIG 145 10/09/2018 06:49 AM    HDL 48 10/09/2018 06:49 AM    LDLCALC 114 10/09/2018 06:49 AM     TSH:    Lab Results   Component Value Date/Time    TSH 1.600 04/12/2022 02:10 PM     Troponin T: No results for input(s): TROPONINI in the last 72 hours. Pro-BNP: No results for input(s): BNP in the last 72 hours. INR: No results for input(s): INR in the last 72 hours. ABGs: No results found for: PHART, PO2ART, MSJ4EEW  UA:No results for input(s): NITRITE, COLORU, PHUR, LABCAST, WBCUA, RBCUA, MUCUS, TRICHOMONAS, YEAST, BACTERIA, CLARITYU, SPECGRAV, LEUKOCYTESUR, UROBILINOGEN, BILIRUBINUR, BLOODU, GLUCOSEU, AMORPHOUS in the last 72 hours. Invalid input(s): Erika Melvin      Culture Results:    No results for input(s): CXSURG in the last 720 hours. Blood Culture Recent:   Recent Labs     07/09/22  1317 07/08/22  1313 07/06/22  1600 07/05/22  1700 07/05/22  1005 06/29/22  1736   BC No Growth to date. Any change in status will be called. Bottle volume = <5 ml  Quality of results might be  affected with low volume. *  Isolated from Aerobic bottle  No further workup  This organism was isolated from one set. Susceptibility testing is not routinely done as this  organism frequently represents skin contamination. Additional testing can be ordered by calling the  Microbiology Department. Isolated from Anaerobic bottle  Bottle volume = >10 ml   Bottle volume = 9 ml  *  Isolated from Aerobic and Anaerobic bottles  No further workup  Refer to Blood culture drawn on 7/5/22 at10:05 for sensitivity results    Bottle volume = 6 ml   Bottle volume = <5 ml  Quality of results might be  affected with low volume. *  Isolated from Aerobic and Anaerobic bottles  This isolate is a Carbapenem Resistant Enterobacteriaceae (CRE)  It demonstrates carbapenemase production. The clinical efficacy of the carbapenems has not been  established for treating infections caused by  Enterobacteriaceae that test carbapenems susceptible but  demonstrate carbapenemase production in vitro. CONTACT PRECAUTIONS INDICATED  * No growth after 5 days of incubation. Cultures:   No results for input(s): CULTURE in the last 72 hours. Recent Labs     07/08/22  1313 07/08/22  1317 07/09/22  1317   BC   < >  --  No Growth to date. Any change in status will be called. BLOODCULT2  --  No Growth to date. Any change in status will be called. --     < > = values in this interval not displayed. No results for input(s): CXSURG in the last 72 hours. No results for input(s): MG, PHOS in the last 72 hours. No results for input(s): AST, ALT, ALB, BILITOT, ALKPHOS, ALB in the last 72 hours. Significant Diagnostic Studies:   CT ABDOMEN PELVIS WO CONTRAST Additional Contrast? None    Result Date: 7/7/2022  . NO PRIOR REPORT AVAILABLE The Exam: CT OF THE ABDOMEN/PELVIS WITHOUT CONTRAST Clinical data: Patient now with gram-negative sepsis. Follow-up for worsening left hydronephrosis with chronic indwelling stent, possible stent malfunction and right postoperative pelvic collection. Technique: Direct contiguous axial CT images were acquired through the abdomen and pelvis without contrast using soft tissue and bone algorithms. Reformatted/MPR images were performed. Radiation dose: CTDIvol =26.44 mGy, DLP =1237 mGy x cm. Limitations: Lack of intravenous contrast limits evaluation of solid viscera. Lack of oral contrast limits evaluation of the bowel loops. Prior Studies: CT scan of abdomen and pelvis dated 06/29/22. Radiographs of the abdomen/KUB dated 04/15/22 images. Findings: Lung bases:large right-sided pleural effusion is again noted, stable and unchanged as the previous study 6/2922. Innumerable pulmonary nodules are noted measuring up to one point 3 cm. Findings consistent with pulmonary metastatic involvement. In addition, there is consolidation at the right base, newly noted since the previous exam. Liver:Unremarkable size, contour, and density. No evidence of mass. No evidence of dilated ducts. Gallbladder Fossa:not visualized Spleen: Grossly unremarkable. Pancreas/adrenal glands: Grossly unremarkable size, contour and density. Davian Mellow right kidney is not visualized. A left-sided double J ureteral stent is noted in satisfactory position. There is mild fullness of the left collecting system Perinephric space is unremarkable. Retroperitoneum: No retroperitoneal lymphadenopathy. Unremarkable abdominal aorta. The IVC is grossly unremarkable. Peritoneal cavity:no ascites or free air. Gastrointestinal tract: Nondistended small bowel and colon. No evidence of obstruction. Postoperative changes status post colostomy, stable and unchanged Appendix:not visualized. Pelvis: Solid and hollow viscera grossly unremarkable. The balloon tip fully catheters noted in the bladder. There is diffuse bladder wall thickening which may reflect chronic inflammatory disease i.e. cystitis. Abdominal wall: there is extensive subcutaneous edema/anasarca Osseous structures:severe degenerative disc disease similar to the previous study postop changes noted in the lower lumbar spine. In addition, compression fractures deformities are noted at L1 and L2 vertebral bodies, stable and unchanged as the previous  study. 1. Right-sided pleural effusion, stable and unchanged 2. Interval development of right lower lobe consolidation 3. Innumerable pulmonary nodules consistent with metastatic disease 4. The gallbladder is not visualized 5. The right kidney is not visualized 6. Postop changes status post colostomy 7. Postop and degenerative changes of the lumbar spine, stable and unchanged 8. Status post left double J ureteral stent placement, stable and unchanged 9. Bladder will thickening consistent with chronic inflammatory disease i.e. cystitis. Please correlate 10. The appendix is not visualized Recommendation: Follow up as clinically indicated.  All CT scans at this facility utilize dose modulation, iterative reconstruction, and/or weight based dosing when appropriate to reduce radiation dose to as low as reasonably achievable. Electronically Signed by Sheridan Ormond at 07-Jul-2022 02:37:25 AM             XR CHEST PORTABLE    Result Date: 7/5/2022  NO PRIOR REPORT AVAILABLE Exam: X-RAY OF THE CHEST Clinical data: Cough. Technique: Single view of the chest. Prior studies: Radiograph of the chest dated 06/29/2022. Findings: Right lower zone moderate parenchymal airspace infiltrate with mild effusion. Left lower zone mild parenchymal peribronchial infiltrate. No consolidation. Mild cardiomegaly. Right PICC line at right atrium. Bilateral infiltrate and right pleural effusion as described. Recommendation: Follow up as clinically indicated.  Electronically Signed by Joseph Cedeno MD at 05-Jul-2022 06:53:38 PM                   Hospital Course:   Mr. Amrit Ellis, 74-year-old male, history of HTN, CAD, HLD, admitted to [facility] ICU initially (07/05/2022), after patient was noted to be hypotensive at an outpatient infusion center, while receiving antifungal medication.     Patient admitted to [facility] ICU, for management of septic shock.     Of note  Patient with a complex medical and surgical history.    Approximately 10 years ago he was diagnosed with metastatic colon carcinoma.    He underwent treatment and had remission.       In 2019 he had a recurrence with metastatic disease.  He has mets in his pelvis and and is followed by urology for scheduled stent exchanges because of compression of his ureter.       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  This was complicated by bowel perforation and he underwent exploratory laparotomy with washout and his wound had to close by secondary intention. [facility] does have a port placed, and he was followed by infectious disease.       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 daily through July 15, 2022  · Okay for discharge from ID standpoint  · Okay for discharge from urology standpoint  · Patient scheduled for ureteral stent exchange, with urology outpatient, 07/12/2022    COLLEEN on CKD  · Monitored creatinine  · Followed by Nephrology   · Okay for discharge from nephrology standpoint  · Further management as per nephrology recommendation. Metastatic colon cancer  · Oncology on board  · Further work-up and management as per oncology recommendation.        Continue management of other chronic medical conditions             Physical Exam:  Vital Signs: BP (!) 144/78   Pulse (!) 114   Temp 96.8 °F (36 °C) (Temporal)   Resp 18   Ht 5' 5\" (1.651 m)   Wt 172 lb (78 kg)   SpO2 95%   BMI 28.62 kg/m²   Physical Exam  Vitals and nursing note reviewed. Constitutional:       General: He is not in acute distress. Appearance: Normal appearance. He is not ill-appearing, toxic-appearing or diaphoretic. HENT:      Head: Normocephalic and atraumatic. Right Ear: External ear normal.      Left Ear: External ear normal.      Nose: Nose normal. No congestion or rhinorrhea. Mouth/Throat:      Mouth: Mucous membranes are moist.      Pharynx: Oropharynx is clear. No oropharyngeal exudate or posterior oropharyngeal erythema. Eyes:      General: No scleral icterus. Right eye: No discharge. Left eye: No discharge. Extraocular Movements: Extraocular movements intact.       Conjunctiva/sclera: Conjunctivae normal. Pupils: Pupils are equal, round, and reactive to light. Cardiovascular:      Rate and Rhythm: Normal rate and regular rhythm. Pulses: Normal pulses. Heart sounds: Normal heart sounds. No murmur heard. No friction rub. No gallop. Pulmonary:      Effort: Pulmonary effort is normal. No respiratory distress. Breath sounds: Normal breath sounds. No stridor. No wheezing, rhonchi or rales. Chest:      Chest wall: No tenderness. Abdominal:      General: Bowel sounds are normal. There is no distension. Palpations: Abdomen is soft. Tenderness: There is no abdominal tenderness. There is no guarding or rebound. Musculoskeletal:         General: No swelling, tenderness, deformity or signs of injury. Normal range of motion. Cervical back: Normal range of motion and neck supple. No rigidity. No muscular tenderness. Right lower leg: Edema present. Left lower leg: Edema present. Skin:     General: Skin is warm and dry. Capillary Refill: Capillary refill takes less than 2 seconds. Coloration: Skin is not jaundiced or pale. Findings: No bruising, erythema, lesion or rash. Neurological:      General: No focal deficit present. Mental Status: He is alert and oriented to person, place, and time. Cranial Nerves: No cranial nerve deficit. Sensory: No sensory deficit. Motor: No weakness. Coordination: Coordination normal.   Psychiatric:         Mood and Affect: Mood normal.         Behavior: Behavior normal.         Thought Content:  Thought content normal.         Judgment: Judgment normal.         Discharge Medications:        Medication List      START taking these medications    calcitRIOL 0.25 MCG capsule  Commonly known as: ROCALTROL  Take 1 capsule by mouth daily  Start taking on: July 12, 2022     furosemide 40 MG tablet  Commonly known as: LASIX  Take 1 tablet by mouth daily  Start taking on: July 12, 2022     levoFLOXacin 500 MG tablet  Commonly known as: LEVAQUIN  Take 1 tablet by mouth daily for 4 doses  Start taking on: July 12, 2022        CHANGE how you take these medications    cyclobenzaprine 10 MG tablet  Commonly known as: FLEXERIL  Take 1 tablet by mouth 3 times daily as needed for Muscle spasms  What changed:   · how much to take  · when to take this  · additional instructions     FeroSul 325 (65 Fe) MG tablet  Generic drug: ferrous sulfate  TAKE 1 TABLET BY MOUTH TWICE DAILY  What changed: See the new instructions. omeprazole 20 MG delayed release capsule  Commonly known as: PRILOSEC  Take 1 capsule by mouth Daily  What changed: when to take this        CONTINUE taking these medications    ALPRAZolam 0.25 MG tablet  Commonly known as: XANAX     bisoprolol 5 MG tablet  Commonly known as: ZEBETA  Take 1 tablet by mouth daily     CALCIUM 600 + D PO     DULoxetine 30 MG extended release capsule  Commonly known as: CYMBALTA  TAKE 1 CAPSULE BY MOUTH DAILY     ibandronate 150 MG tablet  Commonly known as: BONIVA  TAKE 1 TABLET IN MORNING ONCE MONTHLY ON AN EMPTY STOMACH WITH FULL GLASS OF WATER.  DONT TAKE ANYTHING ELSE BY MOUTH OR LIE DOWN FOR 30 MINUTES     lactobacillus Caps capsule  Take 1 capsule by mouth daily     loperamide 2 MG capsule  Commonly known as: IMODIUM     Magic Mouthwash  Commonly known as: Miracle Mouthwash  Swish and spit 5 mLs 4 times daily as needed for Irritation     magnesium oxide 400 MG tablet  Commonly known as: MAG-OX  Take 1 tablet by mouth daily     mirabegron 50 MG Tb24  Commonly known as: MYRBETRIQ  Take 50 mg by mouth daily     ondansetron 4 MG tablet  Commonly known as: ZOFRAN  Take 2 tablets by mouth every 8 hours as needed for Nausea or Vomiting     prochlorperazine 5 MG tablet  Commonly known as: COMPAZINE  Take 1 tablet by mouth every 6 hours as needed for Nausea     promethazine 12.5 MG tablet  Commonly known as: PHENERGAN  Take 1 tablet by mouth 3 times daily as needed for Nausea     sodium bicarbonate 650 MG tablet  Take 1 tablet by mouth 4 times daily     Tylenol 8 Hour Arthritis Pain 650 MG extended release tablet  Generic drug: acetaminophen           Where to Get Your Medications      These medications were sent to [place] [address] [address] [facility], [facility]  [address], [address] [facility]    Phone: [phone]   · calcitRIOL 0.25 MCG capsule  · furosemide 40 MG tablet  · levoFLOXacin 500 MG tablet           Discharge Instructions: Follow up with Dash Park MD in 7 days. Take medications as directed. Resume activity as tolerated. Recommended Follow Up:  Pat Nichols MD  [facility]  [address] [facility]  [phone]    On 7/21/2022  Hospital follow up on 7/21/22 @ 11:10 a.m. Shira Cervantes MD  [address] [place] [address] Dr Casiano [address] [phone]    On 7/15/2022  Hospital follow up on 7/15/22 @ 10:00 a.m. Dunia Moreno DO  [address]  [phone]    On 7/27/2022 7/27/22 at 8:30 to discuss placement of port      Followup Appointments Scheduled at Time of Discharge:  Future Appointments   Date Time Provider [place]   7/12/2022 10:45 AM Macario Melendez MD N [facility] HEMONC [facility]   7/12/2022 11:00 AM SCHEDULE, [facility] MED ONC MA [facility] ONC [facility]   7/13/2022  9:00 AM SCHEDULE, [facility] OP INFUSION [facility]   7/15/2022 10:00 AM Shira Cervantes MD LPS [facility]   7/15/2022  1:00 PM SCHEDULE, [facility] OP INFUSION [facility] INF [facility]   7/27/2022  8:30 AM OLGA Romano Vasc Spec [facility]          Diet: ADULT DIET;  Regular; Low Fiber        DISCHARGE STATUS:    Condition on Discharge: stable    Disposition: Patient is medically stable and will be discharged Home with [facility]      Extended Emergency Contact Information  Primary Emergency Contact: Monika Kennedy  Address: [facility],[address] [facility], [address] Sohail De La Cruz of [address] [place] [address] Phone: [phone]  Relation: Child  Preferred language: English   needed? No  Secondary Emergency Contact: Dayron [address] Dr of [address] [place] [address] Phone: [phone]  Relation: Child         Time Spent on discharge is  36 mins in the examination, evaluation, counseling and review of medications and discharge plan. Electronically signed by   Audra Ayala MD, MPH, MD,   Internal Medicine Hospitalist   7/11/2022 12:51 PM      Thank you Varghese Lisa MD for the opportunity to be involved in this patient's care. If you have any questions or concerns please feel free to contact me at [phone]        EMR Dragon/Transcription disclaimer:   Much of this encounter note is an electronic transcription/translation of spoken language to printed text.  The electronic translation of spoken language may permit erroneous, or at times, nonsensical words or phrases to be inadvertently transcribed; although attempts have made to review the note for such errors, some may still exist.

## 2022-07-11 NOTE — PLAN OF CARE
Problem: Discharge Planning  Goal: Discharge to home or other facility with appropriate resources  7/11/2022 1105 by Tiffanie Loya RN  Outcome: Progressing  Flowsheets (Taken 7/11/2022 0834)  Discharge to home or other facility with appropriate resources: Identify barriers to discharge with patient and caregiver  7/10/2022 2352 by Perico Gold RN  Outcome: Not Progressing  Flowsheets  Taken 7/10/2022 2352  Discharge to home or other facility with appropriate resources:   Identify barriers to discharge with patient and caregiver   Arrange for needed discharge resources and transportation as appropriate   Refer to discharge planning if patient needs post-hospital services based on physician order or complex needs related to functional status, cognitive ability or social support system  Taken 7/10/2022 2248  Discharge to home or other facility with appropriate resources:   Identify barriers to discharge with patient and caregiver   Arrange for needed discharge resources and transportation as appropriate   Refer to discharge planning if patient needs post-hospital services based on physician order or complex needs related to functional status, cognitive ability or social support system  Note: Pt blood cultures continue to come back positive at this time. Continuing with ABX, pt has Midline in Upper R arm for ABX treatment. Plan to have urology stent exchange prior to discharge. Problem: Pain  Goal: Verbalizes/displays adequate comfort level or baseline comfort level  Outcome: Progressing     Problem: Skin/Tissue Integrity  Goal: Absence of new skin breakdown  Description: 1. Monitor for areas of redness and/or skin breakdown  2. Assess vascular access sites hourly  3. Every 4-6 hours minimum:  Change oxygen saturation probe site  4. Every 4-6 hours:  If on nasal continuous positive airway pressure, respiratory therapy assess nares and determine need for appliance change or resting period.   Outcome: Progressing     Problem: Safety - Adult  Goal: Free from fall injury  Outcome: Progressing  Flowsheets  Taken 7/11/2022 0834 by Mirella Moran RN  Free From Fall Injury: Instruct family/caregiver on patient safety  Taken 7/10/2022 2350 by Sal Michaud RN  Free From Fall Injury: Instruct family/caregiver on patient safety     Problem: ABCDS Injury Assessment  Goal: Absence of physical injury  Outcome: Progressing  Flowsheets (Taken 7/11/2022 0834)  Absence of Physical Injury: Implement safety measures based on patient assessment     Problem: Nutrition Deficit:  Goal: Optimize nutritional status  Outcome: Progressing

## 2022-07-11 NOTE — CARE COORDINATION
Spoke with patient regarding MD orders for [facility] services. Patient agreeable and has chosen [facility] 9. Referral Faxed. [address] [place] [address] [phone]. [phone]. Please notify [facility] when patient discharges and fax DC Summary,  DC med list and any new [facility] orders. The Patient and/or patient representative was provided with a choice of provider and agrees   with the discharge plan. [x] Yes [] No    Freedom of choice list was provided with basic dialogue that supports the patient's individualized plan of care/goals, treatment preferences and shares the quality data associated with the providers.  [x] Yes [] No  Electronically signed by Radha Valentine on 7/11/2022 at 1:12 PM

## 2022-07-11 NOTE — CARE COORDINATION
Per Barbara Gibson at [facility] cost for meds and supplies through stop date will be: 184.06; she will contact Pt's daughter to verify and set-up. [facility]  [phone]  [phone]  Electronically signed by LORENA Glaser on 7/11/2022 at 10:51 AM      Per Barbara Gibson, family is agreeable to cost; delivery to home scheduled for tonight; Pt received dose today prior to d/c; Dr. Laine Lagos is aware; [facility];    Electronically signed by LORENA Glaser on 7/11/2022 at 12:51 PM

## 2022-07-11 NOTE — CARE COORDINATION
Home IV ABX orders faxed to [facility]; awaiting pricing/coverage. [facility]  [phone]  [phone]    Electronically signed by LORENA Remy on 7/11/2022 at 9:50 AM          07/08/22 0800  Inpatient consult to Social Work  ONE TIME     Complete  Discontinue     Comments: Home antibiotic orders:   1.  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  Gram-negative bacteremia secondary to Serratia marcescens (Carbapenem resistant).  Renal dysfunction with creatinine of 2.1.  Ileostomy.  Metastatic colon cancer. 2.  IV access [id number]. 3.  Antibiotic order:   Anidulafungin 100 mg IV daily through July 15, 2022   Levofloxacin 500 mg orally daily through July 15, 2022   4.  Laboratory order:   CMP and CBC on Monday, July 11, 2022   5.  Follow-up appointment 2 weeks after hospital discharge     Arlene Salazar MD   Provider: (Not yet assigned)   Question: Reason for Consult?  Answer: Please start process for IV and oral antimicrobial treatment

## 2022-07-11 NOTE — PROGRESS NOTES
Infectious Diseases Progress Note    Patient:  Rosalinda Greco  YOB: 1950  MRN: [id number]   Admit date: 7/5/2022   Admitting Physician: Flor Nicholas MD  Primary Care Physician: Therese Prader, MD    Chief Complaint/Interval History: He is feeling better. He is up to chair this morning. He has no new localizing symptoms. He indicates no indigestion like he had had previously. He is not coughing. He is not dyspneic. Mcgregor catheter remains in place. He indicates there are plans for removal today. He hopes to go home today. He could return tomorrow for outpatient stent change. He is tolerating oral antibiotic treatment. No rash or skin itching. No drainage from prior port site. No pain or discomfort at midline catheter site. In/Out    Intake/Output Summary (Last 24 hours) at 7/11/2022 0706  Last data filed at 7/11/2022 0129  Gross per 24 hour   Intake 570 ml   Output 2550 ml   Net -1980 ml     Allergies:    Allergies   Allergen Reactions    Morphine Anxiety     Current Meds: furosemide (LASIX) tablet 40 mg, Daily  enoxaparin (LOVENOX) injection 40 mg, Daily  sodium phosphate 10 mmol in sodium chloride 0.9 % 250 mL IVPB, PRN   Or  sodium phosphate 15 mmol in sodium chloride 0.9 % 250 mL IVPB, PRN   Or  sodium phosphate 20 mmol in sodium chloride 0.9 % 500 mL IVPB, PRN  levoFLOXacin (LEVAQUIN) tablet 500 mg, Daily  carvedilol (COREG) tablet 6.25 mg, BID WC  calcitRIOL (ROCALTROL) capsule 0.25 mcg, Daily  ALPRAZolam (XANAX) tablet 0.5 mg, Q4H PRN  anidulafungin (ERAXIS) 100 mg in dextrose 5 % 130 mL IVPB, Q24H  lidocaine PF 1 % injection 5 mL, Once  sodium chloride flush 0.9 % injection 5-40 mL, 2 times per day  sodium chloride flush 0.9 % injection 5-40 mL, PRN  0.9 % sodium chloride infusion, PRN  bismuth subsalicylate (PEPTO BISMOL) 262 MG/15ML suspension 30 mL, Q6H PRN  0.9 % sodium chloride bolus, Once  acetaminophen (TYLENOL) tablet 650 mg, Q6H PRN   Or  acetaminophen (TYLENOL) suppository 650 mg, Q6H PRN  lactated ringers bolus, Once  oxyCODONE-acetaminophen (PERCOCET) 7.5-325 MG per tablet 1 tablet, Q4H PRN  pantoprazole (PROTONIX) tablet 40 mg, QAM AC      Review of Systems see HPI    VitalSigns:  BP (!) 144/78   Pulse (!) 114   Temp 96.8 °F (36 °C) (Temporal)   Resp 20   Ht 5' 5\" (1.651 m)   Wt 173 lb 5 oz (78.6 kg)   SpO2 95%   BMI 28.84 kg/m²      Physical Exam  Line/IV (right upper extremity midline) site: No erythema, warmth, induration, or tenderness. Prior port site without surrounding erythema  Lungs clear to auscultation without crackles or wheezes  Heart without murmur  Abdomen is soft. Nontender. Ostomy functioning. Extremities trace edema    Lab Results:  CBC:   Recent Labs     07/09/22  0430 07/10/22  0342   WBC 11.3* 10.0   HGB 7.1* 8.0*    226     BMP:  Recent Labs     07/09/22  0430 07/10/22  0342 07/11/22  0545   * 133* 138   K 3.8 4.1 4.7    100 103   CO2 22 21* 23   BUN 25* 25* 23   CREATININE 1.7* 1.8* 1.7*   GLUCOSE 94 91 102     CultureResults:  Blood cultures July 9, 2022-no growth to date  Blood cultures July 8, 2022-1 set no growth  Blood cultures July 8, 2022-1 set Staph epidermidis  Catheter tip culture July 8, 2022-Serratia marcescens  Blood cultures July 6, 2022-Serratia marcescens  Blood cultures July 5, 2022-Serratia marcescens  Urine cultures July 5, 2022-Candida glabrata    Radiology: None    Additional Studies Reviewed:  None    Impression:  1. Gram-negative sepsis secondary to Serratia marcescens-appears to have had port as the source. Blood cultures from July 5 and July 6 grew Serratia. His catheter tip/port tip culture grew Serratia at time of removal on July 8. He seems to be doing very well post catheter removal and with his antimicrobial treatment. 2.  Positive blood culture for Staph epidermidis-that involved 1 of 2 sets on July 8-contaminant  3.   Positive urine culture for Candida glabrata-completing treatment for cystitis. He has urinary stent in place. He will undergo change tomorrow. 4.  Leukocytosis-resolved  5. Ileostomy  6. History urothelial cancer with right nephro ureterectomy    Recommendations:  Okay with me for discharge home today if ready for release per others  He is to return tomorrow to have ureteral stent changed unless urology changes scheduling  Recommend Mcgregor catheter removal this morning and make sure he is voiding adequately prior to discharge  See orders below for home IV and oral antibiotic treatment    Home antibiotic orders:  1. 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 Gram-negative bacteremia secondary to Serratia marcescens (Carbapenem resistant). Renal dysfunction with creatinine of 2.1. Ileostomy. Metastatic colon cancer. 2.  IV access [id number]. 3.  Antibiotic order:  Anidulafungin 100 mg IV daily through July 15, 2022  Levofloxacin 500 mg orally daily through July 15, 2022  4. Laboratory order:  CMP and CBC on Monday, July 11, 2022  5. Follow-up appointment 2 weeks after hospital discharge  6.   Remove midline catheter after completion of anidulafungin on July 15, 2022    Alger Lesch, MD

## 2022-07-11 NOTE — PLAN OF CARE
Problem: Discharge Planning  Goal: Discharge to home or other facility with appropriate resources  Outcome: Not Progressing  Flowsheets  Taken 7/10/2022 2352  Discharge to home or other facility with appropriate resources:   Identify barriers to discharge with patient and caregiver   Arrange for needed discharge resources and transportation as appropriate   Refer to discharge planning if patient needs post-hospital services based on physician order or complex needs related to functional status, cognitive ability or social support system  Taken 7/10/2022 2248  Discharge to home or other facility with appropriate resources:   Identify barriers to discharge with patient and caregiver   Arrange for needed discharge resources and transportation as appropriate   Refer to discharge planning if patient needs post-hospital services based on physician order or complex needs related to functional status, cognitive ability or social support system  Note: Pt blood cultures continue to come back positive at this time. Continuing with ABX, pt has Midline in Upper R arm for ABX treatment. Plan to have urology stent exchange prior to discharge.      Problem: Safety - Adult  Goal: Free from fall injury  Recent Flowsheet Documentation  Taken 7/10/2022 2350 by Igor Ojeda RN  Free From Fall Injury: Instruct family/caregiver on patient safety

## 2022-07-12 PROBLEM — N13.30 HYDRONEPHROSIS OF RIGHT KIDNEY: Status: RESOLVED | Noted: 2020-05-01 | Resolved: 2022-01-01

## 2022-07-12 PROBLEM — C67.0 MALIGNANT NEOPLASM OF TRIGONE OF URINARY BLADDER (HCC): Status: ACTIVE | Noted: 2022-01-01

## 2022-07-12 NOTE — OP NOTE
[facility] [employer] [facility] [address] [facility]                                OPERATIVE REPORT    PATIENT NAME: Lev Chun                   :        1950  MED REC NO:   [id number]                              ROOM:  ACCOUNT NO:   [de-identified]                           ADMIT DATE: 2022  PROVIDER:     Harvey Boast, MD    DATE OF PROCEDURE:  2022    RADIOGRAPHIC INTERPRETATION OF LEFT RETROGRADE PYELOGRAM    Left retrograde pyelogram.    INTERPRETATION:  The left ureter was intubated with ureteral catheter. Contrast was injected through the catheter to opacify the collecting  system. This shows moderate left hydronephrosis down to the level of  the sacrum where there was abrupt change in caliber of the ureter to a  small strictured area from the mid to distal ureter. There were no  filling defects. IMPRESSION:  Moderate left hydronephrosis and hydroureter with stricture  at the mid ureter overlying the sacrum. There were no filling defects.         Sylvia Maloney MD    D: 2022 11:37:10      T: 2022 12:16:55     [email or web address]  Job#: [id number]     Doc#: [id number]    CC:
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
## 2022-07-12 NOTE — PROGRESS NOTES
Pt's midline will not draw or flush. Second nurse attempt by Arian Bacon RN also without success. Spoke to pt's daughter who is a  CRNA about future needs for this line. It was agreed that he needs to keep a line when he is discharged for medications. Spoke to Dr. Yoko Stephens about the situation. He agrees that the patient needs to keep a secure line. VAISHALI Gabriel who inserted this midline was contacted by daughter who knows him personally. Dr. Yoko Stephens is in agreement with Harvey to evaluate the current line and if necessary, insert a new one. Waiting to hear from Harvey. Pt. Is updated. 10:10   Harvey Guajardo RN at bedside to evaluate midline. He feels it is definitely occluded and will attempt to clear it using Cathflo. 10:52  Cathflo treatment started by VAISHALI Gabriel  11:20  Cathflo treatment not successful. 11:28 VAISHALI Gabriel preparing to insert an 8cm extended dwell IV in left arm. Pt gave verbal permission to VAISHALI Gabriel and VAISHALI Dean as witness.     11:43  IV successfully completed without complication

## 2022-07-12 NOTE — ANESTHESIA PRE PROCEDURE
Department of Anesthesiology  Preprocedure Note       Name:  Lanny Bravo   Age:  81 y.o.  :  1950                                          MRN:  [id number]         Date:  2022      Surgeon: Marck Layton):  Ngoc Gunn MD    Procedure: Procedure(s):  CYSTOSCOPY LEFT STENT REMOVAL  CYSTOSCOPY LEFT URETERAL STENT REPLACEMENT  CYSTOSCOPY BLADDER BIOPSY & FULGURATION    Medications prior to admission:   Prior to Admission medications    Medication Sig Start Date End Date Taking? Authorizing Provider   furosemide (LASIX) 40 MG tablet Take 1 tablet by mouth daily 22  Keyon Jacob MD   levoFLOXacin (LEVAQUIN) 500 MG tablet Take 1 tablet by mouth daily for 4 doses 22  Keyon Jacob MD   calcitRIOL (ROCALTROL) 0.25 MCG capsule Take 1 capsule by mouth daily 22   Keyon Jacob MD   loperamide (IMODIUM) 2 MG capsule Take 4 mg by mouth in the morning, at noon, and at bedtime    Historical Provider, MD   ALPRAZolam Rosaleen Kidney) 0.25 MG tablet Take 0.5 mg by mouth nightly as needed for Sleep. Historical Provider, MD   ondansetron (ZOFRAN) 4 MG tablet Take 2 tablets by mouth every 8 hours as needed for Nausea or Vomiting 6/15/22   Ravindra Dejesus MD   prochlorperazine (COMPAZINE) 5 MG tablet Take 1 tablet by mouth every 6 hours as needed for Nausea 6/15/22 7/15/22  Ravindra Dejesus MD   promethazine (PHENERGAN) 12.5 MG tablet Take 1 tablet by mouth 3 times daily as needed for Nausea 6/15/22 7/15/22  Ravindra Dejesus MD   magnesium oxide (MAG-OX) 400 MG tablet Take 1 tablet by mouth daily 22   Ravindra Dejesus MD   sodium bicarbonate 650 MG tablet Take 1 tablet by mouth 4 times daily 22   Ravindra Dejesus MD   lactobacillus (CULTURELLE) CAPS capsule Take 1 capsule by mouth daily 22   Ravindra Dejesus MD   ibandronate (BONIVA) 150 MG tablet TAKE 1 TABLET IN MORNING ONCE MONTHLY ON AN EMPTY STOMACH WITH FULL GLASS OF WATER.  DONT TAKE ANYTHING ELSE BY MOUTH OR LIE DOWN FOR 30 MINUTES 5/31/22   Lisa Mercado MD   DULoxetine (CYMBALTA) 30 MG extended release capsule TAKE 1 CAPSULE BY MOUTH DAILY 5/15/22   Yoel Dumont MD   mirabegron [facility]) 50 MG TB24 Take 50 mg by mouth daily 5/12/22   Daniel Blocker, APRN - CNP   bisoprolol (ZEBETA) 5 MG tablet Take 1 tablet by mouth daily 4/28/22   Nataliya Gonzalez MD   omeprazole (PRILOSEC) 20 MG delayed release capsule Take 1 capsule by mouth Daily  Patient taking differently: Take 20 mg by mouth 2 times daily  4/28/22   Nataliya Gonzalez MD   acetaminophen (TYLENOL 8 HOUR ARTHRITIS PAIN) 650 MG extended release tablet Take 650 mg by mouth every 8 hours as needed for Pain    Historical Provider, MD   FEROSISAEL 325 (65 Fe) MG tablet TAKE 1 TABLET BY MOUTH TWICE DAILY  Patient taking differently: 325 mg 3 times daily (with meals)  10/4/21   Lisa Mercado MD   Magic Mouthwash (MIRACLE MOUTHWASH) Swish and spit 5 mLs 4 times daily as needed for Irritation 6/1/21   Lisa Mercado MD   Calcium Carb-Cholecalciferol (CALCIUM 600 + D PO) Take 800 mg by mouth 2 times daily     Historical Provider, MD   cyclobenzaprine (FLEXERIL) 10 MG tablet Take 1 tablet by mouth 3 times daily as needed for Muscle spasms  Patient taking differently: Take 20 mg by mouth nightly Nightly 10/27/20   Yoel Dumont MD       Current medications:    No current outpatient medications on file. No current facility-administered medications for this visit. Allergies:     Allergies   Allergen Reactions    Morphine Anxiety       Problem List:    Patient Active Problem List   Diagnosis Code    Thoracic facet joint syndrome M47.894    Primary osteoarthritis of left hip M16.12    Leg swelling M79.89    Abnormal nuclear cardiac imaging test R93.1    Abnormal nuclear cardiac imaging test R93.1    S/p bare metal coronary artery stent Z95.5    Coronary artery disease involving native coronary artery of native heart without angina pectoris I25.10    COLLEEN (acute kidney injury) ([facility] [address] N17.9    Complex regional pain syndrome type 1 of right lower extremity G90.521    Hydronephrosis, bilateral N13.30    Ureteral stricture, left N13.5    History of rectal cancer Z85.048    Anemia of chronic disease D63.8    Pelvic mass R19.00    Lung nodules R91.8    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.9, G55    Colorectal cancer ([facility] [address] C19    Metastasis from colon cancer (HCC) C79.9, C18.9    Proteinuria R80.9    Chemotherapy management, encounter for Z51.11    Anemia associated with chemotherapy D64.81, T45.1X5A    Other fatigue R53.83    Thrush B37.0    Dehydration E86.0    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, 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.1X5A    Chemotherapy-induced nausea R11.0, T45.1X5A    SBO (small bowel obstruction) ([facility] [address] K56.609    Burning with urination R30.0    History of small bowel obstruction Z87.19    Encounter for central line care Z45.2    Iron deficiency E61.1    History of bladder cancer Z85.51    Hydronephrosis of left kidney N13.30    Hydronephrosis of right kidney N13.30    Low back pain M54.50    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    Sepsis secondary to UTI (HCC) A41.9, N39.0    Acute kidney injury superimposed on CKD (HCC) N17.9, N18.9    Urinary tract infection associated with indwelling urethral catheter (HCC) T83.511A, N39.0    Retained ureteral stent Z96.0    Lower urinary tract symptoms R39.9    Ileus (HCC) K56.7    Septicemia (HCC) A41.9    Colon carcinoma metastatic to lung (HCC) C18.9, C78.00    Metastatic adenocarcinoma (HCC) C79.9    Hypomagnesemia E83.42    Hypocalcemia E83.51    Small bowel obstruction (HCC) K56.609    Acute cystitis with hematuria N30.01    Urothelial carcinoma of kidney, right ([address] C64.1    Palliative care patient Z51.5    Infection associated with indwelling urinary catheter ([facility] [address] T83.511A    Candida cystitis Jessica Etienne MD at [address] Bilateral 2/26/2020    CYSTOSCOPY BILATERAL URETERAL STENT CHANGES INDICATED PROCEDURE performed by Paulina Bowens MD at [address] Bilateral 5/28/2020    CYSTOSCOPY, BILATERAL RETROGRADE PYELOGRAMS, BILATERAL URETERAL STENT CHANGES performed by Paulina Bowens MD at [address] Bilateral 10/15/2020    CYSTOSCOPY, BILATERAL URETERAL STENT CHANGES performed by Paulina Bowens MD at [address] N/A 10/15/2020    POSSIBLE BIOPSY FULGURATION/ TURBT  BLADDER TUMOR performed by Paulina Bowens MD at [address] Bilateral 4/1/2021    CYSTOSCOPY, BILATERAL URETERAL STENT REMOVAL AND REPLACEMENT AND FULGERATION OF BLADDER TUMOR AND BLADDER BIOPSY performed by Paulina Bowens MD at [address] Left 4/20/2022    LEFT URETERAL STENT REPLACEMENT performed by Paulina Bowens MD at [address] N/A 4/20/2022    BLADDER BIOPSY performed by Paulina Bowens MD at [address] [employer] / Kasie Etienne Right 8/18/2019    CYSTOSCOPY RETROGRADE PYELOGRAM RIGHT URETERAL  STENT INSERTION FULGERATION OF BLADDER TUMOR performed by Paulina Bowens MD at [address] [employer] / [employer] / Kasie Etienne Bilateral 1/5/2021    CYSTOSCOPY  BILARTERAL URETERAL STENT REMOVAL AND REPLACEMENT BILATERAL BILATERAL URETERAL CATHERIZATION BILATERAL RETROGRADE PYLEOGRAM performed by Paulina Bowens MD at [facility] N/A 12/3/2019    BLADDER BIOPSY AND FULGURATION performed by Paulina Bowens MD at [facility] N/A 5/28/2020    BIOPSIES WITH FULGURATION OF BLADDER TUMORS performed by Paulina Bowens MD at [facility] Bilateral     cataract or    HC INJECT OTHER PERPHRL NERV Left 10/28/2016    FLURO GUIDED HIP INJECITON performed by Mireya Berrios MD at [address] [place] [address] / Binnie Litten / [facility] VENOUS ACCESS CATHETER Right 2019    INSERTION OF RIGHT INTERNAL JUGULAR SINGLE LUMEN POWER PORT performed by Billy Mills DO at [facility] [address] N/A 2020    REMOVAL OF INSTRUMENTATION, EXPLORATION OF FUSION L1-3, REVISION UNINSTRUMENTED POSTERIOR SPINAL FUSION L1-3 performed by Bony Mao MD at [facility] 86      times 2... all levels    SPINE SURGERY      yesterday    TUNNELED VENOUS PORT PLACEMENT         Social History:    Social History     Tobacco Use    Smoking status: Former Smoker     Packs/day: 2.00     Years: 15.00     Pack years: 30.00     Types: Cigarettes     Quit date: 1986     Years since quittin.2    Smokeless tobacco: Never Used   Substance Use Topics    Alcohol use: No                                Counseling given: Not Answered      Vital Signs (Current): There were no vitals filed for this visit.                                            BP Readings from Last 3 Encounters:   22 (!) 141/90   22 (!) 89/52   22 (!) 140/98       NPO Status:                                                                                 BMI:   Wt Readings from Last 3 Encounters:   22 172 lb (78 kg)   22 143 lb 12.8 oz (65.2 kg)   06/15/22 162 lb (73.5 kg)     There is no height or weight on file to calculate BMI.    CBC:   Lab Results   Component Value Date/Time    WBC 10.0 07/10/2022 03:42 AM    RBC 2.92 07/10/2022 03:42 AM    HGB 8.0 07/10/2022 03:42 AM    HCT 26.4 07/10/2022 03:42 AM    MCV 90.4 07/10/2022 03:42 AM    RDW 15.1 07/10/2022 03:42 AM     07/10/2022 03:42 AM       CMP:   Lab Results   Component Value Date/Time     2022 05:45 AM    K 4.7 2022 05:45 AM     2022 05:45 AM    CO2 23 2022 05:45 AM    BUN 23 2022 05:45 AM    CREATININE 1.7 2022 05:45 AM    GFRAA 48 2022 05:45 AM    AGRATIO 1.9 2021 03:27 PM    LABGLOM 40 2022 05:45 AM    GLUCOSE 102 07/11/2022 05:45 AM    PROT 5.5 07/07/2022 06:05 PM    CALCIUM 8.9 07/11/2022 05:45 AM    BILITOT <0.2 07/07/2022 06:05 PM    ALKPHOS 150 07/07/2022 06:05 PM    AST 15 07/07/2022 06:05 PM    ALT 8 07/07/2022 06:05 PM       POC Tests:   No results for input(s): POCGLU, POCNA, POCK, POCCL, POCBUN, POCHEMO, POCHCT in the last 72 hours. Coags:   Lab Results   Component Value Date/Time    PROTIME 13.3 04/18/2022 03:43 AM    INR 1.02 04/18/2022 03:43 AM    APTT 33.2 04/13/2022 03:31 AM       HCG (If Applicable): No results found for: PREGTESTUR, PREGSERUM, HCG, HCGQUANT     ABGs: No results found for: PHART, PO2ART, TKC3HXC, MEJ2CIC, BEART, O3XZNVKJ     Type & Screen (If Applicable):  No results found for: LABABO, LABRH    Drug/Infectious Status (If Applicable):  No results found for: HIV, HEPCAB    COVID-19 Screening (If Applicable):   Lab Results   Component Value Date/Time    COVID19 Not Detected 06/29/2022 08:40 AM    COVID19 Not Detected 04/10/2022 09:58 AM         Anesthesia Evaluation  Patient summary reviewed and Nursing notes reviewed no history of anesthetic complications:   Airway: Mallampati: III  TM distance: >3 FB   Neck ROM: limited  Comment: Cervical fusion in the past, limited neck extension/flexion  Mouth opening: > = 3 FB   Dental: normal exam         Pulmonary:normal exam  breath sounds clear to auscultation  (+) shortness of breath: new,      (-) asthma, recent URI, sleep apnea and not a current smoker          Patient did not smoke on day of surgery. ROS comment: CXR:  Bilateral infiltrate and right pleural effusion as described. Recommendation: Follow up as clinically indicated.         Lung mets from colon cancer on right lung   Cardiovascular:  Exercise tolerance: poor (<4 METS),   (+) hypertension:, CAD:, CABG/stent (Stent 3 yrs ago):,     (-) pacemaker, past MI, dysrhythmias and  angina    ECG reviewed  Rhythm: regular  Rate: normal  Echocardiogram reviewed         Beta Blocker:  Dose within 24 Hrs         Neuro/Psych:   (+) neuromuscular disease (CRPS, right leg, no longer symptomatic):, depression/anxiety    (-) seizures, TIA and CVA           GI/Hepatic/Renal:   (+) GERD: well controlled, renal disease (s/p right nephrectomy january 2022): CRI,      (-) liver disease      ROS comment: Recently admitted for urospepsis requiring icu stay with vasopressor support. Endo/Other:    (+) blood dyscrasia (anemia)::., malignancy/cancer (Bladder, Colorectal). (-) diabetes mellitus, hypothyroidism, hyperthyroidism                ROS comment: Ctx for colorectal cancer  Abdominal:             Vascular:   + PE.  - DVT. ROS comment: IVC filter in place. Other Findings: Port on right upper chest            Anesthesia Plan      general     ASA 4     (Pt is fluid overloaded from recent hospital stay. Please be judicious with fluids. Pt has hx of neck fusion and will require glidescope.)  Induction: intravenous. MIPS: Postoperative opioids intended and Prophylactic antiemetics administered. Anesthetic plan and risks discussed with patient. Use of blood products discussed with patient whom consented to blood products. Plan discussed with CRNA.     Attending anesthesiologist reviewed and agrees with Pre Eval content                Kirby De La Torre DO   7/12/2022

## 2022-07-12 NOTE — PROCEDURES
[facility] Vascular Access Team:  Extended Dwell Peripheral IV Catheter   Insertion Procedure Note    Galindo Velasquez  [facility] OR Pool/NONE   Admitted - 7/12/2022  6:36 AM  Admission Diagnosis -   Other hydronephrosis [N13.39]    Attending Physician - Dieter Jamison MD  Ordering Physician - Dieter Jamison MD    PROCEDURE: Insertion of 20 gauge Single Lumen Extended Dwell IV Catheter    INDICATIONS: Long Term IV therapy, Home IV therapy    PROCEDURE DETAILS:  Informed consent was obtained for the procedure, including sedation. Risks of hemorrhage and adverse drug reaction were discussed. 20 gauge Single Lumen Extended Dwell IV Catheter inserted to the Left Forearm per hospital protocol. Blood return: yes    FINDINGS:  The Left Forearm vein was visualized using the ultrasound and deemed a suitable vessel. The area was prepped with Chlorhexidine and draped in sterile fashion using partial barrier draping. The vein was accessed using US guidance. The 20 gauge Single Lumen Extended Dwell IV Catheter advanced into the vein using ANTT. Approximately 1 cm exposed. All lumens had brisk blood return and was flushed with 10 cc of NS. The 20 gauge Single Lumen Extended Dwell IV Catheter was secured using a securement device and a Biopatch was placed over the insertion site. A Tegaderm was placed over the securement device and insertion site. Dressing was dated and initialed with external measurement marked. Patient did tolerate procedure well. IMPRESSION/PLAN:  1. Successful insertion of 20 gauge Single Lumen Extended Dwell IV Catheter   2. Line is ready to be used. 3. Please change tubing prior to using. Make sure to label, date and use alcohol caps on new tubing and alcohol caps on unused ports.          Electronically Signed By: Osmar Rodriguez RN, [facility] on 7/12/2022 at 12:02 PM

## 2022-07-12 NOTE — ANESTHESIA POSTPROCEDURE EVALUATION
Department of Anesthesiology  Postprocedure Note    Patient: Jaiden Flaherty  MRN: [id number]  YOB: 1950  Date of evaluation: 7/12/2022      Procedure Summary     Date: 07/12/22 Room / Location: [facility]    Anesthesia Start: 8781 Anesthesia Stop:     Procedures:       CYSTOSCOPY LEFT STENT REMOVAL (Left Ureter)      CYSTOSCOPY LEFT URETERAL STENT REPLACEMENT (Left Ureter)      CYSTOSCOPY BLADDER BIOPSY & FULGURATION GREATER THAN 5CM (N/A Bladder) Diagnosis:       Other hydronephrosis      (Other hydronephrosis [N13.39])    Surgeons: Ester Colvin MD Responsible Provider: OLGA Chan CRNA    Anesthesia Type: general ASA Status: 4          Anesthesia Type: No value filed.     Abad Phase I: Abad Score: 10    Abad Phase II:        Anesthesia Post Evaluation    Patient location during evaluation: PACU  Patient participation: complete - patient participated  Level of consciousness: sleepy but conscious  Pain score: 0  Airway patency: patent  Nausea & Vomiting: no nausea and no vomiting  Complications: no  Cardiovascular status: hemodynamically stable  Respiratory status: spontaneous ventilation and room air  Hydration status: euvolemic  Multimodal analgesia pain management approach

## 2022-07-12 NOTE — INTERVAL H&P NOTE
Update History & Physical    The patient's History and Physical of June 29, 2022 was reviewed with the patient and I examined the patient. There was patient discharged from the hospital after having a port infection with bacteremia. He is recovered. He also had candiduria treated with antifungal.  He has a solitary left kidney with a stent in place he is due his stent change he now presents to undergo routine stent change. He also has a history of nonmuscle invasive bladder cancer and he will undergo bladder cancer surveillance with cystoscopy. If this shows any lesions or tumors he may require biopsy fulguration etc.  This was planned based on his discharge from the hospital yesterday. . The surgical site was confirmed by the patient and me. Plan: The risks, benefits, expected outcome, and alternative to the recommended procedure have been discussed with the patient. Patient understands and wants to proceed with the procedure.      Electronically signed by Sherman Villa MD on 7/12/2022 at 8:15 AM

## 2022-07-12 NOTE — OP NOTE
Brief operative Note      Patient: Kailyn Bernal  YOB: 1950  MRN: [id number]    Date of Procedure: 7/12/2022    Pre-Op Diagnosis: Other hydronephrosis [N13.39] 1. Hydronephrosis left kidney. 2.  Left ureteral stricture. 3.  Retained ureteral stent 4. Bladder cancer right trigone    Post-Op Diagnosis: Same       Procedure(s):  CYSTOSCOPY LEFT STENT REMOVAL  CYSTOSCOPY LEFT URETERAL STENT REPLACEMENT  CYSTOSCOPY BLADDER BIOPSY & FULGURATION GREATER THAN 5CM  Cystoscopy left ureteral catheterization, left retrograde pyelogram    Surgeon(s):  Rosalinda Dorman MD    Assistant:   * No surgical staff found *    Anesthesia: General    Estimated Blood Loss (mL): 0    Complications: None    Specimens:   ID Type Source Tests Collected by Time Destination   A : Right Side Bladder Tumor Tissue Bladder SURGICAL PATHOLOGY Rosalinda Dorman MD 7/12/2022 6565        Implants:  Implant Name Type Inv. Item Serial No.  Lot No. LRB No. Used Action   tria Clickability UROLOGY- [id number] Left 1 Implanted         Drains:   Ileostomy/Jejunostomy LLQ (Active)   Stomal Appliance 2 piece;Clean, dry & intact 07/11/22 0834   Stoma  Assessment Pink;Red 07/10/22 2248   Peristomal Assessment Clean, dry & intact 07/10/22 2248   Treatment Bag change;Site care; Liquid skin barrier 07/09/22 2025   Stool Appearance Loose; Watery 07/11/22 0834   Stool Color Brown 07/11/22 0834   Stool Amount Medium 07/11/22 0834   Output (mL) 200 ml 07/11/22 0834       Urinary Catheter 3 Way (Active)   Urine Color Colorless 07/12/22 0953   Urine Appearance Clear 07/12/22 0953   Collection Container Standard 07/12/22 0932   Status Continuous bladder irrigation 07/12/22 0932       [REMOVED] NG/OG/NJ/NE Tube Nasogastric 14 fr Right nostril (Removed)   Surrounding Skin Clean, dry & intact 06/30/22 0000   Securement device Tape 06/30/22 0000   Status Suction-low continuous 06/30/22 0000   Placement Verified X-Ray (Initial); Gastric Contents 06/30/22 0000   Drainage Appearance Green 06/30/22 0000   Output (mL) 1000 ml 06/29/22 0931       [REMOVED] Urinary Catheter Mcgregor (Removed)   Catheter Indications Urinary retention (acute or chronic), continuous bladder irrigation or bladder outlet obstruction 06/30/22 0830   Urine Color Bloody 06/30/22 0830   Urine Appearance Cloudy 06/30/22 0830   Collection Container Standard 06/30/22 0830   Securement Method Securing device (Describe) 06/30/22 0830   Output (mL) 300 mL 06/30/22 0600       [REMOVED] Urinary Catheter (Removed)   $ Urethral catheter insertion $ Not inserted for procedure 07/10/22 0158   Catheter Indications Urinary retention (acute or chronic), continuous bladder irrigation or bladder outlet obstruction 07/10/22 2248   Site Assessment Urethral drainage 07/10/22 2248   Urine Color Yellow 07/10/22 2248   Urine Appearance Cloudy 07/10/22 2248   Urine Odor Malodorous 07/10/22 2248   Collection Container Standard 07/10/22 2248   Securement Method Securing device (Describe) 07/10/22 1659   Catheter Care Completed Yes 07/10/22 2248   Catheter Best Practices  Drainage bag less than half full;Drainage tube clipped to bed; Tamper seal intact; Bag below bladder;Bag not on floor; Lack of dependent loop in tubing 07/10/22 2248   Status Draining 07/10/22 2248   Manual Irrigation Volume Input (mL) 265 mL 07/08/22 1400   Output (mL) 700 mL 07/11/22 0855   Discontinuation Reason Per provider order 07/11/22 0855       Findings: Left hydronephrosis down to mid left ureteral stricture no other filling defects. Left stent removed and exchanged for a 6 Slovenian by 20 cm Tria double-J ureteral stent. We will plan to leave the stent in place for 6 months. Recurrent sessile papillary bladder tumor right trigone right lateral wall for ureter was resected at time of nephro ureterectomy. There is appears to be invasive into muscle. Aggressive resection however there was 1 area where it.   Cancer extended beyond the bladder. No bleeding post resection. Three-way catheter 22 Bangladeshi 10 cc balloon to CBI. Detailed Description of Procedure:   See dictated report: [id number]    Disposition to PACU then to op care. Will maintain on CBI if remains clear can go home with Mcgregor catheter which she will keep in place for 1 week. We will contact patient and family with path report. Otherwise he will be due his next stent change in approximately 6 months. He will follow-up with me in 1 month to discuss further surveillance strategies for control of his local bladder cancer.     Electronically signed by Sylvia Maloney MD on 7/12/2022 at 10:15 AM

## 2022-07-13 NOTE — TELEPHONE ENCOUNTER
[facility] Initial Follow Up Call    Outreach made within 2 business days of discharge: Yes    Patient: Tee Vitale   Patient : 1950 MRN: [id number]    Reason for Admission: PREOPERATIVE DIAGNOSES:  1. Left hydronephrosis. 2.  Left radiation ureteral stricture. 3.  Retained left ureteral stent. 4.  Bladder cancer, history of right upper tract urothelial carcinoma. Discharge Date: 22      Discharge Diagnosis:  POSTOPERATIVE DIAGNOSES:  1. Left hydronephrosis. 2.  Left radiation ureteral stricture. 3.  Retained left ureteral stent. 4.  Bladder cancer, history of right upper tract urothelial carcinoma. Spoke with: Monika pts daughter    Discharge department/facility: [facility] Interactive Patient Contact:  Was patient able to fill all prescriptions: Yes  Was patient instructed to bring all medications to the follow-up visit: Yes  Is patient taking all medications as directed in the discharge summary? Yes  Does patient understand their discharge instructions: Yes  Does patient have questions or concerns that need addressed prior to 7-14 day follow up office visit: no    Spoke with patients daughter Mavis Prakash, she states pt came home yesterday and is doing well. His pain is controlled well. She said that home health will be there today to eval him. She will let us know if she needs anything.     Scheduled appointment with PCP within 7-14 days    Follow Up  Future Appointments   Date Time Provider Pepe Hanson   3/94/9005  3:45 AM DO BOB Batista Gen SG [facility]   2022  8:30 AM OLGA Scott Vasc Spec [facility]   2022 10:45 AM MD BOB Pickering LPS URO [facility]       Nick Arora MA

## 2022-07-13 NOTE — OP NOTE
MARLYSNCLANNY [facility] CHRISTIAN [address], [address] [facility]                                OPERATIVE REPORT    PATIENT NAME: Aram Vazquez                   :        1950  MED REC NO:   [id number]                              ROOM:  ACCOUNT NO:   [de-identified]                           ADMIT DATE: 2022  PROVIDER:     Loyda Burciaga MD    DATE OF PROCEDURE:  2022    TITLE OF OPERATION:  1. Cystoscopy, left ureteral stent removal.  2.  Left ureteral catheterization; left retrograde pyelogram.  3.  Left ureteral stent placement, Tria 6-Persian x 20 cm. 4.  Transurethral resection of bladder tumor greater than 5 cm. PREOPERATIVE DIAGNOSES:  1. Left hydronephrosis. 2.  Left radiation ureteral stricture. 3.  Retained left ureteral stent. 4.  Bladder cancer, history of right upper tract urothelial carcinoma. POSTOPERATIVE DIAGNOSES:  1. Left hydronephrosis. 2.  Left radiation ureteral stricture. 3.  Retained left ureteral stent. 4.  Bladder cancer, history of right upper tract urothelial carcinoma. ANESTHETIC:  General anesthetic. ATTENDING SURGEON:  Loyda Burciaga MD    EBL = 0     HISTORY:  The patient is a 29-year-old gentleman who has a history of  colon cancers and had radiation. He has developed a radiation stricture  which is managed with chronic indwelling left ureteral stent for his  left hydronephrosis. He is now due to have his stent changed. His CKD  and creatinine have ranged about 1.6 to 1.9, has been fairly stable. Most recent x-ray study shows chronic hydronephrosis, but decompressed  with stent. He is now to present for left ureteral stent change. He  also is due surveillance cystoscopy regarding his bladder cancer. 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    Therefore, we will plan for surveillance and if we do find bladder  cancer, we will plan for resection. This was discussed with him and the  family and they are agreeable to proceed. DESCRIPTION OF PROCEDURE:  The patient was brought to the operating  room, underwent general anesthetic. He was placed in the lithotomy  position. His genitalia was prepped and draped in routine sterile  fashion. He received preoperative antibiotic consisting of Levaquin and  time-out was performed. A 22-Bulgarian cystoscope was inserted into the meatus and this was  advanced under direct vision. The penile and bulbar urethra appeared to  be normal.  Entering the prostatic urethra, he has a normal prostate  without obstruction. I then entered the patient's bladder. Immediately, I could see papillary mucosal changes consistent with  bladder cancer involving the right hemitrigone and extending up to the  right lateral wall posteriorly on the bladder. This was sessile. It  was a large area about 5 cm or greater. It should be mentioned this was  in the area where he had had his prior nephroureterectomy and right  ureter resection involving the right trigone and lateral wall of his  bladder. There was another spot on the left anterolateral wall which  looked a little abnormal or unusual which could just be stent  irritation. The rest of the bladder, however, was inspected with a 30-  and 70-degree lens and I saw no other tumors except for described above. The stent was seen protruding from the left ureteral orifice. Using alligator graspers, the stent protruding from the left ureteral  orifice was then grasped and then extracted. A 0.035 sensor tip  guidewire was then inserted through the stent and this was then advanced  under fluoroscopic guidance and placed in the left kidney. I then, at  this point, went ahead and excluded the guidewire.   I looked back in  with the resectoscope and then performed transurethral resection of this  bladder tumor involving the right trigone. This was resected down to  visible muscle. There appeared to be tumor involving the deep muscle  grossly and there was one area lateral wall cephalad, but posterior  above the trigone where it appeared to extend out, maybe beyond the  bladder and I felt that trying to resect this any deeper would be not  advisable, but there was active cancer here. I just cauterized this the  best I could, I did not want to perforate his bladder or resect into  adjacent structures outside the bladder. I then fulgurated the  resection site best I could, but it appears that I had done aggressive  resection down to muscle layer. The tissue was collected as it washed  out through the cystoscope and sent for pathologic examination. I then turned my attention back to placing the stent. The guidewire was  backloaded through the cystoscope which was advanced into the patient's  bladder. A 5-Belarusian catheter was then advanced up into the left renal  pelvis using cystoscopic and fluoroscopic guidance over the guidewire. I then removed the guidewire and did a pullback retrograde. There was  moderate left hydronephrosis and dilated ureter down to the sacrum where  there was abrupt change consistent with radiation stricture in the mid  ureter and the ureter below this was very narrowed. There were no  filling defects or irregularity of the left ureter, primarily the  proximal, but distal ureter is hard to assess for that because it is so  narrowed. The guidewire was replaced. I had measured for the stent and  the catheter was removed leaving the guidewire in place. Then, over the  guidewire, a Tria 6-Belarusian x 20 cm left double-J ureteral stent was  advanced using fluoroscopic guidance and cystoscopic control over the  guidewire. The proximal end was coiled in the renal pelvis and the  distal end was coiled in the bladder.   The resection site was then again  examined, there appeared to be good hemostasis and I removed the  cystoscope. A 22-Romanian three-way Mcgregor catheter was inserted without  difficulty, 10 mL was placed in the balloon. I went ahead and injected  some contrast to make sure there was not extravasation from the right  side of the bladder where I had resected this deeply, there was none. There was no extravasation of contrast and the balloon was in good  position. This was placed to continuous bladder irrigation with normal  saline. The estimated blood loss was 0 mL. He was awakened from his  anesthetic and taken to the recovery room in stable condition. We will  run CBI in the postoperative period for about 60 minutes and if this  remains clear, we will try to get him home with the Mcgregor catheter in  place. We will instruct home health or the patient's family to remove  the Mcgregor catheter in 1 week. I will contact the family with the  pathology report. Otherwise, his next stent change will be due in  approximately 6 months. I will have him to follow up with me in 1 month  to discuss further surveillance strategies for control of his local  recurrent bladder cancer. He will also follow up with Oncology pending  the pathology report for further management possibilities.         Roz Martinez MD    D: 07/12/2022 11:34:49      T: 07/12/2022 13:29:51     [id number]  Job#: [id number]     Doc#: [id number]    CC:

## 2022-07-13 NOTE — TELEPHONE ENCOUNTER
[address] [address] needs the ok to accept orders from Urology, Vascular,  Dr. Sumaya Guzman and Surgery office  - is that ok ?

## 2022-07-14 NOTE — PROGRESS NOTES
Physician Progress Note      Chris Franco  CSN #:                  [id number]  :                       1950  ADMIT DATE:       2022 4:46 PM  [address] DATE:        2022 1:19 PM  RESPONDING  PROVIDER #:        Ludin Bauman MD          QUERY TEXT:    Pt admitted with Sepsis. Pt noted to have port. If possible, please document   in the progress notes and discharge summary if you are evaluating and / or   treating any of the following: The medical record reflects the following:  Risk Factors: Sepsis, port, leukocytosis  Clinical Indicators: WBC 40.9, 55.6  peripheral & port BC + serratia   marcescens  Treatment: Port removed on  after receiving IV Daptomycin, IV Cipro, IV   Merrem  Thanks, Meek Bourgeois, BSN  [phone]  Options provided:  -- Sepsis probably related to infected port  -- Sepsis unrelated to infected port  -- Other - I will add my own diagnosis  -- Disagree - Not applicable / Not valid  -- Disagree - Clinically unable to determine / Unknown  -- Refer to Clinical Documentation Reviewer    PROVIDER RESPONSE TEXT:    This patient has sepsis probably related to infected port.     Query created by: Ruth Pablo on 2022 11:21 AM      Electronically signed by:  Ludin Bauman MD 2022 6:34 PM

## 2022-07-14 NOTE — TELEPHONE ENCOUNTER
[address] [address] OT eval complete and per OT Pt doing well with adl and simple iadl routines with previously established techniques and good support from family Recommendation :  OT evaluation only      Also PT eval completed and per PT He has weakness, pain, edema, decreased endurance, and high fall risk.   Recommendation :  PT intervention 2x weekly x3 weeks    Please advise if PT visits are appproved
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
## 2022-07-15 NOTE — RESULT ENCOUNTER NOTE
I called and spoke to the patient's daughter and son-in-law and told him results of the pathology report

## 2022-07-22 NOTE — TELEPHONE ENCOUNTER
[address] [facility] 9 PT re eval completed and per PT pt is making slow progress. Has potential to continue to progress  Recommendation :  Continue with PT intervention 2x weekly.     Please advise if approved

## 2022-07-27 PROBLEM — R52 INTRACTABLE PAIN: Status: ACTIVE | Noted: 2022-01-01

## 2022-07-27 PROBLEM — G89.4 CHRONIC PAIN DISORDER: Status: ACTIVE | Noted: 2021-04-22

## 2022-07-27 PROBLEM — M54.9 INTRACTABLE BACK PAIN: Status: ACTIVE | Noted: 2022-01-01

## 2022-07-27 NOTE — PROGRESS NOTES
4 Eyes Skin Assessment    Kyra Brush is being assessed upon: Admission    I agree that I, Markus Phoenix, RN, along with Nani Mancera RN have performed a thorough Head to Toe Skin Assessment on the patient. ALL assessment sites listed below have been assessed. Areas assessed by both nurses:     [x]   Head, Face, and Ears   [x]   Shoulders, Back, and Chest  [x]   Arms, Elbows, and Hands   [x]   Coccyx, Sacrum, and Ischium  [x]   Legs, Feet, and Heels    Does the Patient have Skin Breakdown?  No    Nhan Prevention initiated: No  Wound Care Orders initiated: No    [facility] nurse consulted for Pressure Injury (Stage 3,4, Unstageable, DTI, NWPT, and Complex wounds) and New or Established Ostomies: No        Primary Nurse eSignature: Markus Phoenix, RN on 7/27/2022 at 12:19 PM      Co-Signer eSignature: Electronically signed by Thiago Gasca RN on 7/27/22 at 2:18 PM CDT

## 2022-07-27 NOTE — CARE COORDINATION
Patient is current with [facility] for Skilled Nursing, PT Services. Will follow. Please advise when patient discharges. WILL NEED RESUMPTION OF CARE ORDERS TO RESUME HH SERVICES WHEN PATIENT DISCHARGES. Thank you. [address] [place] [address] [phone]. [phone].     Radha Valentine RN, Home Care Liaison  [phone] P  Electronically signed by Radha Valentine on 7/27/2022 at 8:46 AM
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
## 2022-07-27 NOTE — PROGRESS NOTES
INTRATHECAL PAIN PUMP INFORMATION    Pump was refilled 7/14/22.    Pump capacity: 20 mL    Settings: dilaudid 1.946 mg/day + bupivacaine 3.892 mg/day  Pump will reach low reservoir alarm at 9/23/33    Electronically signed by Dyllan Dsouza RN on 7/27/22 at 2:17 PM CDT

## 2022-07-27 NOTE — CONSULTS
MEDICAL ONCOLOGY CONSULTATION    Pt Name: Lanny Bravo  MRN: [id number]  YOB: 1950  Date of evaluation: 7/27/2022    REASON FOR CONSULTATION: Continuity of care  REQUESTING PHYSICIAN:Hospitalist    History Obtained From:patient, electronic medical record    HISTORY OF PRESENT ILLNESS:  The patient is well-known to me. He has a diagnosis of metastatic carcinoma with lung metastasis. 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 He has had several hospitalizations for urinary tract infection, deconditioning and postoperative complications. Presented ER department with complaints of severe mid back pain. He has a history of chronic lower back pain  CT thoracic spine showed T9 lytic lesion. I discussed this with the emergency department. Neurosurgery was consulted. 07/27/22- CT Abdomen/pelvis no features of small bowel obstruction as compared to previous scan dated 06/29/2022. No intraabdnominal fluid. Presacral fluid collection as described. Left hydroureteronephrosis. Right pleural effusion and multiple lungs metastatic lesion as described. 07/27/22-CT chest w/o contrast multiple small (1 mm - 16 mm) sized scattered innumerable nodular infiltrates are studded throughout bilateral lung parenchyma, predominantly involving both lower lobes. Possibility of neoplastic lesions. Moderate right side pleural effusion with passive sub segmental collapse of right middle and lower lobe. Mild effusion is noted involving right oblique fissure. Ill-defined osteolytic lesion is noted involving T10 vertebral body-appears metastasis   07/27/22- CT Thoracic spine significant spondylosis, osteoporosis and multiple level compression fracture at upper lumbar levels. Increase thoracolumbar kyphosis.  Lytic lesion with soft tissue component and mild posterior bulge noted in body of T9 vertebrae concerning for metastasis. Pulmonary metastasis. Pleural effusions. 07/27/22- CT Lumbar spine multilevel spondylosis with spondylolisthesis as described. Spinal stabilization device at L5-S1 with no periprosthetic loosening or implant fracture. Anterior wedge compressions of L1 and L2. I discussed with the ER physician. I recommended neurosurgery consultation. He was seen by Dr. Laureen Fisher. Dr. Honorio Franklin recommended MRI thoracic/lumbar spine      Prior Oncology History  Mr. Chanelle Carranza is well-known to my clinic. He has a history of metastatic colonic adenocarcinoma with lung metastasis and is currently on palliative chemotherapy. In addition, he has a significant history of high-grade urothelial carcinoma of the right renal pelvis status post right nephrectomy at [facility], [address] [place] [address]. He had a very complicated postoperative course. He had prior admissions for urinary tract infection. He was admitted recently and discharged a few days ago. He presented again to the emergency department with episode of hypotension. Apparently, he had fever and chills as well. The patient is of been seen by urology with plans for change of his ureteral stents. He is currently being treated for possible urinary tract infection. Port infection is a possibility as well. His blood cultures is positive for gram-negative rods. He is also on anidulafungin for prior yeast infection in his urine. He was started on ceftazidime-avibactam and ciprofloxacin. He is on pressors in the ICU. I was consulted for continued care. His chemotherapy has been on hold. Last chemotherapy was delivered on 6/1/2022. Prior oncological history     HISTORY OF PRESENT ILLNESS:  The patient is well-established in my clinic. He has a diagnosis of colon cancer and recent diagnosis of invasive urothelial carcinoma, high-grade of the renal pelvis status surgery.   In addition, history of hypertension, hyperlipidemia, CAD, CKD stage IV. Patient presented ER department on 6/29/2022 with complaints of nausea vomiting, abdominal pain. He had decreased output from his ileostomy as well. A NG tube was placed in the ED. Surgery was consulted. Labs showed worsening kidney function with creatinine 2.5, hyperkalemia potassium 3.1, elevated white blood cell count. UA showed moderate blood, large leukocyte, WBC too numerous to count. 6/29/2022-CT abdomen pelvis showed evidence of small bowel obstruction possible to lower the ileal anastomosis. No intra-abdominal free fluid. Presacral fluid collection. Left hydroureteronephrosis, right pleural effusion and multiple lung metastatic lesions. Diffuse bladder wall thickening with associating 4.5 x 3 centimeters soft tissue at the superior wall of bladder extends extraluminally. Malignancy cannot be excluded. Left double-J catheter in place. He had an NG tube placed yesterday. He feels better this morning and feels that his colostomy is working again. Prior oncological history  Reason for MD visit: Toxicity/disease management  The patient has stage IV colonic adenocarcinoma with progressive lung metastasis consistent with colonic adenocarcinoma. In addition, he has a history of urothelial carcinoma stage II. He had progressive disease in the lungs compatible with colonic adenocarcinoma. He has resumed treatment with FOLFIRI/panitumumab. He has received 2 cycles. He complains of significant fatigue and nausea from the last treatment. He is still sick after this day today. ONCOLOGIC HISTORY:   Diagnosis:  Moderately differentiated rectal carcinoma, T3N0Mx, diagnosed in 3/9/2009  Noninvasive high-grade papillary urethral carcinoma. Negative for evidence of detrusor muscle invasion, pTa, pNx on 8/18/2019. Metastatic colorectal carcinoma, 9/3/2019  MSI stable and mutations for BRAF, NRAS, KRAS were not detected.     Recurrent bladder cancer- superficial  Urothelial papillary urethral carcinoma, noninvasive, stage pTaNx.  5/28/2020-the patient underwent a cystoscopy and resection of bladder tumor on 05/28/2020 with findings consistent with noninvasive, high-grade papillary urothelial carcinoma. Muscularis propria was not identified. The patient will receive intravesical BCG therapy. 7/6/2020-CT Abdomen/ Pelvis-Moderate severe right and mild left hydronephrosis with bilateral ureteral stents, which have an adequate radiographic position. Right kidney with cortical thickening and somewhat asymmetrically decreased enhancement which can be seen with obstructive uropathy. Postoperative changes of colectomy. Left lower quadrant ostomy. Slightly decreased size of presacral low density compared to4/15/2020. Similar intrahepatic and extrahepatic bile duct dilation compared to 4/15/2020. Correlate with clinical symptoms and laboratory studies if clinically indicated. Similar chronic bony findings. 3/25/2021-CT abdomen showed severe hydronephrosis. Cystoscopy was performed by urology. 4/1/21 Bladder neck tumor, biopsies: High-grade papillary urothelial carcinoma, noninvasive. Muscularis propria is not present. AJCC pathologic stage:  pTa Nx  4/14/2021-reviewed results of pathology. No evidence of invasive disease. Continue surveillance cystoscopy with urology. 9/13/2021-he was seen by urology. Findings of ureteral high-grade urothelial carcinoma. Noninvasive. The patient is being referred to [facility]. 10/11/2021-he was seen by [facility] and recommended cystoscopy with biopsy and possible laser ablation and bilateral leg stent exchange at that time. 4/20/2022 Urinary bladder, biopsy of right lateral bladder lesion: Disrupted urothelial mucosa with severe chronic inflammation, negative for evidence   of malignancy.         ONCOLOGIC HISTORY #3  Raul Ceja was seen in initial oncology consultation on 8/19/2019 during his hospitalization at [facility]) of [place] after a large pelvic mass was identified which raised concern for recurrent disease. Further pathology consistent with recurrent rectal cancer  8/17/2019- CEA 18.1  8/17/2019- CT scan of the kidney with contrast documented moderate to severe right hydronephrosis with dilation of the right ureter into the lower pelvis the site of the parasacral soft tissue changes. Partially calcified soft tissue changes within the janes-sacral region likely representing sequelae of pelvic radiation. Increasing scarring/fibrosis versus tumor recurrence within the presacral changes, likely represents a site of right distal ureter obstruction. No left-sided hydronephrosis. 8/18/2019 -Double-J ureter stent placement for right hydronephrosis secondary to extrinsic compression by pelvic mass. 8/27/2019-CT scan of the chest with contrast documented numerous pulmonary nodules that appear new compared to 11/12/2017, RUL nodule measuring 7 mm and LLL nodule measuring 5 mm. Soft tissue nodule at the left ventral abdominal wall. Slight increased size of a probable lymph node anterior to the aorta measuring 0.9 cm compared to 0.7 cm. Similar presacral, right pelvic sidewall and right abductor muscular nodular soft tissue density. 8/27/2019 CT scan of the abdomen and pelvis with contrast identified new moderate left hydronephrosis with moderate right hydronephrosis. Mild stranding around the urinary bladder and thickening of the bilateral ureteral wall. Numerous pulmonary nodules. Soft tissue of the left ventral abdominal wall. Slightly increased size of probable lymph node anterior to the aorta measuring 0.9 cm compared to 0.7 cm.  8/27/2019-PET scan did not identify any FDG avid pulmonary nodules or airspace opacities. Abnormal increased metabolic activity within the right pelvic wall soft tissue showing SUV of 5.4. Abnormal soft tissue metabolic activity in the right abductor muscle with SUV of 6.4.   Focally increased thickness small bowel repair by Dr. Santos Dumont at [facility]. In the operative note Dr. Gilbert Liz reported no evidence of carcinomatosis within the abdomen and the liver was unable to be examined due to extent of right upper quadrant adhesions. Pathology from small intestine documented no evidence of malignancy. 4/15/2020 Ct Chest W Contrast Minimal interval increase in size of subcentimeter pulmonary nodules. The largest now measures 6 mm in the medial right lower lobe on axial image 80. There is a new, unstable, horizontal fracture through the T6 vertebral body. Additionally, there are new fractures through the posterior 11th and 12th right ribs. The bones are moderately osteopenic. The finding of an unstable fracture through the T6 vertebral body was discussed with Ana Mercedes at 10:45 AM on 4/15/2020.  4/15/2020 Ct Abdomen Pelvis W Iv Contrast  Patient has undergone interval resection of the distal small bowel, and there is a 2.8 cm fluid collection in the presacral operative bed. This contains a tiny focus of air. This may postoperative or due to infection. Please correlate with the patient's clinical symptoms and laboratory markers. Improved hydronephrosis and hydroureter. Diffuse osteoporosis. Findings in the lower chest are described in a separate dictation. 4/22/2020-CEA 0.9  6/2/2020-resumed chemotherapy with 5-FU/leucovorin and Panitumumab. Okay to do 1 today then CMP CEA  8/19/20 CEA-1.1  10/21/2020- CEA 2.0  11/11/2020- Ct Chest W Contrast Multiple, too numerous to count, small noncalcified lung nodules bilaterally. The referenced nodules appears to have decreased in size the previous study. No new nodules. 11/11/2020- Ct Abdomen Pelvis W Contrast Unchanged bilateral hydronephrosis, more on the right side. Bilateral ureteral stents in place. Moderate asymmetrical thickening of the incompletely distended urinary bladder. This may partly be due to incomplete distention.  Possibility of chronic cystitis and or chronic partial outlet obstruction may not be excluded. A functioning left lower abdominal ostomy. A small parastomal small bowel herniation without obstruction. A partially calcified presacral mass. The soft tissue component have increased in size in the previous study. The osseous changes are described above. Any superimposed metastatic disease is not excluded and would be hard to evaluate due to extensive postsurgical changes. 11/18/2020-essentially, overall stable disease. Improvement of the lung nodules with decreased in the size of the target lesions. The pelvic lesion is is likely worse by 25%. However, CEA is is still within the normal limits. Therefore likely mixed response. We will continue current treatment and repeat CT scans in about 3 months. 12/16/2020-discontinue 5-FU bolus from his regimen. 2/9/2021- Ct Chest W Contrast No evidence of disease progression. Stable pulmonary nodules. Stable intrahepatic and extrahepatic bile duct dilation compared to prior 11/11/2020. Postoperative, posttraumatic and degenerative changes in the spine as described above. Old right-sided rib fractures. 2/9/2021- Ct Abdomen Pelvis W Contrast showed evidence of response to therapy including decreased presacral mass/thickening now measuring 1.1 cm, previously 1.9 cm on 11/11/2020. Stable intrahepatic and extra hepatic bile duct dilation with cholecystectomy clips. See separately dictated CT chest of the same day regarding pulmonary nodules. Bilateral ureteral stents. Decreased bilateral hydronephrosis. Urinary bladder wall is thickened, which could be seen with post treatment changes or cystitis. Correlate with symptoms. Chronic bony findings as above  2/17/2021-reviewed CT chest abdomen pelvis. Essentially consistent with disease response to therapy. Continue current therapy. 2/17/21 CEA 1.0  3/25/21 Ct Abdomen Pelvis W Iv Contrast  The stomach is distended, however no small bowel dilatation identified. Contrast identified within the left ileostomy bag. The distal stomach is under distended which may be secondary to peristalsis. Gastroparesis considered. Bilateral ureteral stents remain appropriate in position, however there is new moderate to severe right-sided hydronephrosis and mild left-sided hydronephrosis when compared to the 2/9/2021 exam. Mild increase considered within the partially calcified presacral pelvic mass. Stable partially calcified right pelvic soft tissue. Similar abnormal wall thickening of the bladder most notable superiorly. Similar prominence of the intra and extra hepatic bile ducts down to the level of the ampulla. Findings may represent a reservoir effect, however correlation with liver function tests recommended. Therefore. Stable noncalcified left greater than right pulmonary nodules with asymmetrical interstitial changes of the right lung base concerning for pneumonitis. Osteopenia with postoperative changes of the lumbar spine. Chronic compression deformities of the thoracolumbar vertebra as described above. 4/14/2021-I reviewed notes from urology and also CT abdomen/pelvis that showed mild interval increase in the size of the soft tissue nodule in the pelvis. However, this is still very small and therefore will have a short follow-up CT scan and of May 2021. I personally reviewed the CT scans. Really hard to state that there is clear-cut disease progression. Again, short follow-up recommended. Continue current treatment. 5/12/21 CEA 0.8  5/26/2001-CT chest abdomen pelvis showed Partially calcified presacral soft tissue mass appears unchanged. Previously measured soft tissue noncalcified component is unchanged measuring 2.2 x 3.2 cm on axial image 60. However, this is questionable. CT chest showed a stable pulmonary nodules. Subcentimeter nodules. Largest 7.5 mm.  6/1/21 CEA 0.9  06/01/23- reviewed scans. Stable disease.   Continue treatment every 3 weeks as per patient request. Continue infusional 5-FU, Panitumumab and leucovorin. No 5-FU bolus due to severe mucositis. 8/11/2021 CEA .9  9/03/2021 CT Chest w/Contrast Slight interval increase in the size of pulmonary nodules, the largest in the medial right lung base. Findings are concerning for metastatic disease. Atherosclerosis of the aorta and coronary arteries. Sclerotic rib lesions favored for osseous metastases. Old rib fractures also evident. 9/03/2021 CT Abd/Pelvis w/ IV Contrast (Oral) A persistent partially calcified mass in the presacral region with encasement of the distal ureters bilaterally and resultant bilateral hydronephrosis. Bilateral ureteral stents in place. There is increasing right-sided hydronephrosis since the previous study. Right renal atrophy similar to the previous study. Moderate asymmetrical thickening of the incompletely distended urinary bladder is similar to the previous study. This may partly be due to incomplete distention. An inflammatory process or a neoplastic process is not excluded. Moderate intrahepatic biliary dilatation is similar to the previous study and probably due to a prior cholecystectomy. Moderate dilatation of the contrast filled small bowel loops may represent an ileus or partial distal small bowel obstruction. There is left lower abdominal ileostomy which appears patent. The stable compression fractures of the lower thoracic and proximal lumbar vertebrae and hardware fusion similar to the previous study. 9/22/21- Decrease Vectibix to every other treatment. He is currently receiving chemotherapy every 3 weeks as per patient request  11/9/2021 CT Abd/Pelvis WO Contrast No acute posttraumatic findings. Known metastatic disease to the lungs. Posttreatment changes in overall chronic findings as above. 12/27/2021 NM Bone Scan Multiple foci of abnormal increased activity in the ribs bilaterally correspond with previously seen areas of bony sclerosis and old healed fractures. Abnormal activity in the lumbar spine correspond with compression fractures and kyphoplasty of these vertebrae. Probable right hydronephrosis. 2/24/2022 US LE Left  Limited(BJ) No residual fluid in the left lateral thigh. 2/27/2022 CT Abd/Pelvis W Contrast [facility]) Overall unchanged appearance of the right lateral abdominal collection with a percutaneous drain in place and small residual fluid. Multiloculated fluid and gas collection in the pelvis along the right aspect of bladder dome measuring approximately 8 x 4 cm, not significantly changed in size. Postsurgical changes of prior colectomy and small bowel resection. The distal ileum abuts the right flank collection and anterior abdomen in midline prior to ostomy. Unchanged partially calcified presacral and right pelvic sidewall soft tissue. Minimally improved mild left hydroureteronephrosis. Bilateral pulmonary nodules in the visualized lungs, not significantly changed. Small right pleural effusion with associated atelectasis. Scattered areas of hypoattenuation within the right lower lobe could represent developing pneumonia. 2/27/2022 CT Entire LE Left W Contrast ([facility])Interval open incision and drainage of anterolateral left thigh subcutaneous abscess with residual fluid and packing material.  Persistent, slightly improved diffuse subcutaneous edema   throughout the left lower extremity with no new or organizing fluid   collections. 4/10/2022 CT Chest WO Contrast Progressive metastatic disease to the lungs. There are too numerous to count pulmonary nodules the majority of which have increased in size or which are new from the previous study. No evidence of acute consolidative pneumonia. 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 Patient's undergone previous kyphoplasty at the thoracolumbar juncture for compression deformities. There is an accentuated thoracic kyphosis. .  4/10/2022 CT Abd/Pelvis WO Contrast Metastatic disease to the lung bases showing worsening from the previous study. Moderate size hiatal hernia with gastroesophageal reflux. The patient is status post at least partial colectomy. Left lower quadrant ostomy is present. There is no evidence of obstruction. There is an irregular soft tissue mass with some calcification within the pelvis. This extends to and is inseparable from the right posterior lateral urinary bladder wall with potential secondary involvement. This extends into the presacral space. When compared to the previous exam I feel this is increased in size. The urinary bladder cannot be fully assessed as it is decompressed with a Mcgregor catheter. Postoperative changes of the mid lower lumbar spine. There is an accentuated lumbar lordosis with grade 1-2 anterolisthesis of L5 on S1. Compression deformities at T12, L1 and L2 are present with previous kyphoplasty T12 and L1. . 6. Status post right nephrectomy. There is mild dilatation of the upper tracts of the left kidney and left ureter with a double-J intraureteral stent well-positioned. The degree of distention of the upper tracts of the left kidney is improved from the previous exam of November of last year. There is mild urothelial thickening. 4/13/2022 CT Abd/Pelvis WO Contrast When compared to the previous exam of 3 days earlier there is now noted to be moderate small bowel distention. I would favor a adynamic ileus. A distal obstruction at the site of the patient's ileostomy is also in the differential but felt less likely. There is mild distention of the stomach. A moderate size hiatal hernia is present. Mild to moderate dilatation of the upper tracts of the left kidney. A left-sided double-J ureteral stent is in place. There is mild urothelial thickening. I do not see evidence of a discrete ureteral stone. The stent is well-positioned. Partially calcified pelvic mass. This is inseparable from the right posterior lateral wall of the urinary bladder along its lower margin. A Mcgregor catheter is in place within the bladder. Chronic-appearing fractures within the thoracic and lumbar spine with a gibbus deformity at the thoracolumbar juncture and previous kyphoplasty at T12-L1. There is a small amount of free fluid within the subhepatic space and Morison space. A small right-sided effusion is present with multiple pulmonary nodules within the lower lobes consistent with metastatic disease to the lungs. There is a moderate size hiatal hernia present. 04/19/22- CT guided biopsy consistent with colon cancer metastasis. 5/2/2022- resuming chemotherapy with FOLFIRI/panitumumab for progressive colonic adenocarcinoma pulmonary metastasis. 5/25/2022 CXR (2VW) Chronic lung changes with mild right basilar infiltrate or atelectasis. ONCOLOGIC HISTORY # Rectal carcinoma:  Mily Schulte has a history of moderately differentiated rectal carcinoma, T3N0Mx, diagnosed in 3/9/2009. He received neoadjuvant chemotherapy with radiation and resection with J-pouch. He has been routinely followed at [facility] and was last seen by Dr. Usman Parrish on 1/10/2019. The following are pertinent findings related to his diagnosis and treatment. 3/9/2009- Esophagogastroduodenoscopy with biopsy by Dr. Emiliano Ayala that revealed rectal mass at 8 cm with pathology being consistent with moderately differentiated adenocarcinoma. 3/18/2019-Dr.Elizabeth Melanie Emerson at [facility] performed a flexible sigmoidoscopy and rectal endoscopic ultrasound that revealed the tumor extending 6-7 mm deep through the muscularis propria layer and into the serosa, T3 lesion. 4/9/2009-5/27/2009-received neoadjuvant chemotherapy with 5-FU CIV along with radiation therapy for a total of 5400 cGy under the direction of Dr. Karen Flores. 7/15/2009-rectum resection revealed no residual malignancy. 22 regional nodes were negative for malignancy. The rectum distal doughnut was negative for malignancy.   He was documented to have a complete pathological response. 9/9/2009- Ileostomy excision pathology revealed small bowel wall with changes consistent with ileostomy site. Pathology negative for malignancy  4/11/2022 Renal Complete Prior right nephrectomy. The cortical thickness and echogenicity of the left kidney are normal. The left kidney is normal in size. There is mild to moderate dilatation of the left renal collecting system predominantly involving the lower pole. The patient reportedly has a double-J ureteral stent in place. The stent is not well seen on ultrasound.        Past Medical History:    Past Medical History:   Diagnosis Date    COLLEEN (acute kidney injury) ([address] 08/15/2019    Arthritis     Burn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

## 2022-07-27 NOTE — PROGRESS NOTES
I have called the pain management center twice to obtain records on patient's implanted pain pump. They have sent the pump setting but I have not received any information on a model or serial number to find out if the pump is MRI compatible. Office is now closed. Nurse tomorrow will need to call and follow up to see if the correct information can be obtained when office re-opens.     Electronically signed by Katheryn Frost RN on 7/27/22 at 5:19 PM CDT

## 2022-07-27 NOTE — PROGRESS NOTES
Called office of Dr. Gaby Stallings to obtain records, no answer. Left voicemail with details including information needed and fax number. Will attempt to call back at later time.     Electronically signed by Venus Villarreal RN on 7/27/22 at 11:53 AM CDT
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
## 2022-07-27 NOTE — ACP (ADVANCE CARE PLANNING)
Advance Care Planning     Advance Care Planning (ACP) Physician/NP/PA (Provider) Georgina Nguyen      Date of ACP Conversation: 7/27/2022    Conversation Conducted with:   Patient with Decision Making [address]  [facility] Decision Maker:    Current Designated Health Care Decision Maker:    Primary Decision Maker: Orquidea Raman - [phone]    Secondary Decision Maker: [address] - Child - [phone]    Care Preferences:    Ventilation: \"If you were in your present state of health and suddenly became very ill and were unable to breathe on your own, what would your preference be about the use of a ventilator (breathing machine) if it was available to you? \"    Yes    \"If your health worsens and it becomes clear that your chance of recovery is unlikely, what would your preference be about the use of a ventilator (breathing machine) if it was available to you? \"   No    Resuscitation:  \"CPR works best to restart the heart when there is a sudden event, like a heart attack, in someone who is otherwise healthy. Unfortunately, CPR does not typically restart the heart for people who have serious health conditions or who are very sick. \"    \"In the event your heart stopped as a result of an underlying serious health condition, would you want attempts to be made to restart your heart (answer \"yes\" for attempt to resuscitate) or would you prefer a natural death (answer \"no\" for do not attempt to resuscitate)? \"   Yes    Length of Voluntary ACP Conversation in minutes:  25 minutes included w/ initial consult time     Franchesca Hummel PA-C

## 2022-07-27 NOTE — ED PROVIDER NOTES
[facility] EMERGENCY DEPT  eMERGENCY dEPARTMENT eNCOUnter      Pt Name: Jessica Jaime  MRN: [id number]  [place] 1950  Date of evaluation: 7/27/2022  Provider: Zelda Haskins MD    [address] [place] [address]       Chief Complaint   Patient presents with    Back Pain     X 6-7 days, PT states has been gradually increasing to the point where he is unable to get himself out of his bed      Assumed care at end of shift pending ct imaging. Intractable pain. New t10 lesion  D/w Dr Pramod Nicholas for admission  D/w Dr Walter Blankenship for admission. Wanted [facility]IA consult  Dw Dr Roselia Klein for consult      PHYSICAL EXAM    (up to 7 for level 4, 8 or more for level 5)     ED Triage Vitals [07/27/22 0530]   BP Temp Temp Source Heart Rate Resp SpO2 Height Weight   (!) 141/91 97.5 °F (36.4 °C) CORE 85 20 95 % 5' 5\" (1.651 m) 170 lb (77.1 kg)       Physical Exam    DIAGNOSTIC RESULTS     EKG: All EKG's are interpreted by theEmergency Department Physician who either signs or Co-signs this chart in the absence of a cardiologist.        RADIOLOGY:   Non-plain film images such as CT, Ultrasound and MRI are read by the radiologist. Plain radiographic images are visualized and preliminarily interpreted by the emergencyphysician with the below findings:        Interpretation per the Radiologist below, if available at the time of thisnote:    CT CHEST WO CONTRAST   Final Result   1. Multiple small (1 mm - 16 mm) sized scattered innumerable nodular infiltrates are studded throughout bilateral lung parenchyma, predominantly involving both lower lobes.-Possibility of neoplastic lesions   2. Moderate right side pleural effusion with passive sub segmental collapse of right middle and lower lobe. 3. Mild effusion is noted involving right oblique fissure. 4. Ill-defined osteolytic lesion is noted involving T10 vertebral body-appears metastasis   Recommendation:   Follow up as clinically indicated.    All CT scans at this facility utilize dose modulation, iterative reconstruction, and/or weight based dosing when appropriate to reduce radiation dose to as low as reasonably achievable. Electronically Signed by Felix López MD at 27-Jul-2022 08:58:15 AM               CT ABDOMEN PELVIS WO CONTRAST Additional Contrast? None   Final Result   1. No features of small bowel obstruction as compared to previous scan dated 06/29/2022. No intraabdnominal fluid. 2. Presacral fluid collection as described. Left hydroureteronephrosis. 3. Right pleural effusion and multiple lungs metastatic lesion as described   4. Bladder wall thickening as described - Suggested Cystoscopy correlation   Recommendation: Follow up as clinically indicated. All CT scans at this facility utilize dose modulation, iterative reconstruction, and/or weight based dosing when appropriate to reduce radiation dose to as low as reasonably achievable. Electronically Signed by Felix López MD at 27-Jul-2022 08:46:22 AM               CT LUMBAR SPINE WO CONTRAST   Final Result   1. Multilevel spondylosis with spondylolisthesis as described. 2. Spinal stabilization device at L5-S1 with no periprosthetic loosening or implant fracture. 3. Anterior wedge compressions of L1 and L2    4. Soft tissue structure pelvis is uterus with calcifications or mass. Recommendation: Follow up as clinically indicated. All CT scans at this facility utilize dose modulation, iterative reconstruction, and/or weight based dosing when appropriate to reduce radiation dose to as low as reasonably achievable. Electronically Signed by Felix López MD at 27-Jul-2022 08:41:54 AM               CT THORACIC SPINE WO CONTRAST   Final Result   1. Significance spondylosis, osteoporosis and multiple level compression fracture at upper lumbar levels. Increase thoracolumbar kyphosis   2. Lytic lesion with soft tissue component and mild posterior bulge noted in body of T10 vertebrae  -Metastasis. 3. Pulmonary metastasis. 4. Pleural effusions. Abnormal CT of thoracic spine          DISPOSITION/PLAN   DISPOSITION Admitted    PATIENT REFERRED TO:  No follow-up provider specified.     DISCHARGE MEDICATIONS:  New Prescriptions    No medications on file          (Please note that portions of this note were completed with a voice recognition program.  Efforts were made to edit the dictations but occasionally words aremis-transcribed.)    Monie Sharp MD (electronically signed)  Attending Emergency Physician            Monie Sharp MD  07/27/22 8010

## 2022-07-27 NOTE — CONSULTS
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 Sunshine identifies his daughter as his healthcare surrogate. He states she and her spouse assist in his care. He is reflective of cancer diagnosis, surgeries and time spent in recovery from those procedures. He becomes tearful and discusses the passing of his spouse whom he was  to for 45 years. He has been depressed with an increase in anxiety since her passing. We did also discuss his code status. He confirms he would want an attempt at resuscitation but would not want his life prolonged for a great deal of time by a ventilator. He tells me his family is aware of his wishes. Currently, plans are to proceed with work up and pursue treatment to alleviate pain. Opportunity for questions and emotional support provided.  Called and left voicemail with return number to patient's daughter for any further questions or concerns that should be addressed this hospital stay. Feel Mr. Kirk Pisano may benefit from outpatient Palliative care ([facility]) referral. I briefly discussed this with him but will re-address later in his hospital course. Will follow. Recommendations:     Palliative Care-GOC-prolong life, pain control Code status: Full code ACP completed   Intractable back pain w/ new T10 lesion concerning for metastasis-Neurosurgery/Oncology consulted   Colonic adenocarcinoma w/ lung metastasis on palliative chemotherapy-last chemotherapy 06/01/2022, followed by Dr. Alise Astorga, s/p ileostomy, resolved bowel obstruction 06/2022  Chronic pain/cancer related pain-Obtain records from Dr. Leisa Gutierrez office, d/w RN, has pain pump in place, add oral Dilaudid w/ hold parameters and increase as needed based on patient response, could also consider increase in Cymbalta, continue IV Dilaudid for now until pain is improved. *D/w patient to monitor ileostomy output given increase in pain medication/decrease in mobility Strict I/O's requested   Anxiety-states he uses Xanax during the day at times, will have available q 8 prn w/ hold parameters  Invasive urothelial carcinoma s/p right nephroureterectomy -followed by Dr. Jana Russ   CKD III-noted, monitor closely     Thank you for consulting palliative care and allowing us to participate in the care of the patient.       CounselingTopics: Goals of care, Code Status, Disease process education, pt/family support    Time Spent Counseling > 50%:  YES                                   Total Time Spent with patient counseling, workup/treatment review, discussion w/ RN, placement of orders/preparation of this note: 74 minutes    Electronically signed by Tatiana Mendez PA-C on 7/27/2022 at 1:12 PM    (Please note that portions of this note were completed with a voice recognition program.  Efforts were made to edit the dictations but occasionally words are mis-transcribed.)

## 2022-07-27 NOTE — ED PROVIDER NOTES
[facility] EMERGENCY DEPT  EMERGENCY DEPARTMENT ENCOUNTER      Pt Name: Terell Arnold  MRN: [id number]  [place] 1950  Date of evaluation: 7/27/2022  Provider: Lelia Grace MD    CHIEF COMPLAINT       Chief Complaint   Patient presents with    Back Pain     X 6-7 days, PT states has been gradually increasing to the point where he is unable to get himself out of his bed          HISTORY OF PRESENT ILLNESS   (Location/Symptom, Timing/Onset,Context/Setting, Quality, Duration, Modifying Factors, Severity)  Note limiting factors. Terell Arnold is a 81 y.o. male who presents to the emergency department with complaint of right mid back pain. Has been present for about a week. Initially had an injury where he was trying to lift his right leg and suddenly felt pain which he felt was consistent with a pulled muscle. Pain is gradually worsened since then. Gerson Cabezas says that he was having such severe spasms he was not even able to roll over in bed. Says that he has had some weakness in his right leg has been ongoing for a while which is not acutely worse recently. Patient was recently hospitalized here for bacteremia from infected port. Port was removed on 7/8/2022. Has had several issues recently with malignant bladder cancer and ureteral obstructions. Has had several ureteral stents and followed by urology here. Denies any recent fevers, chills, or other symptoms. Has had prior hydronephrosis of the right kidney but states this feels different than he is experienced with kidney pain in the past.  Most recently had a left ureteral stent removed by urology on 7/12/2022 which was initially placed for ureteral stricture due to radiation therapy. Back pain is located to the right of the midline around the inferior thorax/right flank. Worsens with certain movements    HPI    NursingNotes were reviewed.     REVIEW OF SYSTEMS    (2-9 systems for level 4, 10 or more for level 5)     Review of Systems RETROGRADE PYELOGRAM ; RIIGHT URTEROSCOPY; BILATERAL UTERTAL STENT INSERTION REPLACEMENT performed by Bebeto Moreno MD at [address] Left 4/20/2022    CYSTOSCOPY LEFT STENT REMOVAL performed by Bebeto Moreno MD at [address] Left 7/12/2022    CYSTOSCOPY LEFT STENT REMOVAL performed by Bebeto Moreno MD at [facility]      x 2    [facility]      per dr. Nina Sage Left 8/29/2019    CYSTOSCOPY LEFT  RETROGRADE PYELOGRAM performed by Bebeto Moreno MD at [address] [place] [address] Left 8/29/2019    LEFT URETERAL STENT PLACEMENT performed by Bebeto Moreno MD at [address] [place] [address] Bilateral 12/3/2019    CYSTOSCOPY BILATERAL URETERAL STENT CHANGES performed by Bebeto Moreno MD at [address] [place] [address] Bilateral 2/26/2020    CYSTOSCOPY BILATERAL URETERAL STENT CHANGES INDICATED PROCEDURE performed by Bebeto Moreno MD at [address] [place] [address] Bilateral 5/28/2020    CYSTOSCOPY, BILATERAL RETROGRADE PYELOGRAMS, BILATERAL URETERAL STENT CHANGES performed by Bebeto Moreno MD at [address] [place] [address] Bilateral 10/15/2020    CYSTOSCOPY, BILATERAL URETERAL STENT CHANGES performed by Bebeto Moreno MD at [address] [place] [address] N/A 10/15/2020    POSSIBLE BIOPSY FULGURATION/ TURBT  BLADDER TUMOR performed by Bebeto Moreno MD at [address] [place] [address] Bilateral 4/1/2021    CYSTOSCOPY, BILATERAL URETERAL STENT REMOVAL AND REPLACEMENT AND FULGERATION OF BLADDER TUMOR AND BLADDER BIOPSY performed by Beebto Moreno MD at [address] [place] [address] Left 4/20/2022    LEFT URETERAL STENT REPLACEMENT performed by Bebeto Moreno MD at [address] [place] [address] N/A 4/20/2022    BLADDER BIOPSY performed by Bebeto Moreno MD at [address] [place] [address] Left 7/12/2022    CYSTOSCOPY LEFT URETERAL STENT REPLACEMENT performed by Bebeto Moreno MD at [address] [place] [address] N/A 7/12/2022    CYSTOSCOPY BLADDER BIOPSY & FULGURATION GREATER THAN 5CM performed by Alberto Pacheco MD at [address] / [address] / Ayla Pillow Right 8/18/2019    CYSTOSCOPY RETROGRADE PYELOGRAM RIGHT URETERAL  STENT INSERTION FULGERATION OF BLADDER TUMOR performed by Alberto Pacheco MD at [address] / [address] / [place]yla [place] Bilateral 1/5/2021    CYSTOSCOPY  BILARTERAL URETERAL STENT REMOVAL AND REPLACEMENT BILATERAL BILATERAL URETERAL CATHERIZATION BILATERAL RETROGRADE PYLEOGRAM performed by Alberto Pacheco MD at [address] N/A 12/3/2019    BLADDER BIOPSY AND FULGURATION performed by Alberto Pacheco MD at [address] N/A 5/28/2020    BIOPSIES WITH FULGURATION OF BLADDER TUMORS performed by Alberto Pacheco MD at [facility] Bilateral     cataract or    HC INJECT OTHER PERPHRL NERV Left 10/28/2016    FLURO GUIDED HIP INJECITON performed by Leticia Wills MD at [address] / REMOVAL / REPLACEMENT VENOUS ACCESS CATHETER Right 8/20/2019    INSERTION OF RIGHT INTERNAL JUGULAR SINGLE LUMEN POWER PORT performed by Skylar Fonseca DO at [facility] N/A 5/6/2020    REMOVAL OF INSTRUMENTATION, EXPLORATION OF FUSION L1-3, REVISION UNINSTRUMENTED POSTERIOR SPINAL FUSION L1-3 performed by Irma Cheema MD at [facility]      times 2... all levels    SPINE SURGERY      yesterday    TUNNELED VENOUS PORT PLACEMENT           CURRENT MEDICATIONS       Previous Medications    ACETAMINOPHEN (TYLENOL 8 HOUR ARTHRITIS PAIN) 650 MG EXTENDED RELEASE TABLET    Take 650 mg by mouth every 8 hours as needed for Pain    ALPRAZOLAM (XANAX) 0.25 MG TABLET    Take 0.5 mg by mouth nightly as needed for Sleep.      BISOPROLOL (ZEBETA) 5 MG TABLET    Take 1 tablet by mouth daily    CALCITRIOL (ROCALTROL) 0.25 MCG CAPSULE    Take 1 capsule by mouth daily    CALCIUM CARB-CHOLECALCIFEROL (CALCIUM 600 + D PO)    Take 800 mg by mouth 2 times daily     CYCLOBENZAPRINE (FLEXERIL) 10 MG TABLET    Take 1 tablet by mouth 3 times daily as needed for Muscle spasms    DULOXETINE (CYMBALTA) 30 MG EXTENDED RELEASE CAPSULE    TAKE 1 CAPSULE BY MOUTH DAILY    FEROSUL 325 (65 FE) MG TABLET    TAKE 1 TABLET BY MOUTH TWICE DAILY    FUROSEMIDE (LASIX) 40 MG TABLET    Take 1 tablet by mouth daily    IBANDRONATE (BONIVA) 150 MG TABLET    TAKE 1 TABLET IN MORNING ONCE MONTHLY ON AN EMPTY STOMACH WITH FULL GLASS OF WATER. DONT TAKE ANYTHING ELSE BY MOUTH OR LIE DOWN FOR 30 MINUTES    LACTOBACILLUS (CULTURELLE) CAPS CAPSULE    Take 1 capsule by mouth daily    LOPERAMIDE (IMODIUM) 2 MG CAPSULE    Take 4 mg by mouth in the morning, at noon, and at bedtime    MAGIC MOUTHWASH (MIRACLE MOUTHWASH)    Swish and spit 5 mLs 4 times daily as needed for Irritation    MAGNESIUM OXIDE (MAG-OX) 400 MG TABLET    Take 1 tablet by mouth daily    MIRABEGRON (MYRBETRIQ) 50 MG TB24    Take 50 mg by mouth daily    OMEPRAZOLE (PRILOSEC) 20 MG DELAYED RELEASE CAPSULE    Take 1 capsule by mouth Daily    ONDANSETRON (ZOFRAN) 4 MG TABLET    Take 2 tablets by mouth every 8 hours as needed for Nausea or Vomiting    PROCHLORPERAZINE (COMPAZINE) 5 MG TABLET    Take 1 tablet by mouth every 6 hours as needed for Nausea    SODIUM BICARBONATE 650 MG TABLET    Take 1 tablet by mouth 4 times daily       ALLERGIES     Morphine    FAMILY HISTORY       Family History   Problem Relation Age of Onset    High Blood Pressure Mother     High Blood Pressure Father     Colon Cancer Father     Diabetes Father           SOCIAL HISTORY       Social History     Socioeconomic History    Marital status:       Spouse name: None    Number of children: None    Years of education: None    Highest education level: None   Tobacco Use    Smoking status: Former     Packs/day: 2.00     Years: 15.00     Pack years: 30.00     Types: Cigarettes     Quit date: 1986     Years since quittin.2    Smokeless tobacco: Never   Vaping Use    Vaping Use: Never used   Substance and Sexual Activity    Alcohol use: No    Drug use: No    Sexual activity: Yes     Partners: Female       SCREENINGS    [place] Coma Scale  Eye Opening: Spontaneous  Best Verbal Response: Oriented  Best Motor Response: Obeys commands  [place] Coma Scale Score: 15        PHYSICAL EXAM    (up to 7 for level 4, 8 or more for level 5)     ED Triage Vitals [07/27/22 0530]   BP Temp Temp Source Heart Rate Resp SpO2 Height Weight   (!) 141/91 97.5 °F (36.4 °C) CORE 85 20 95 % 5' 5\" (1.651 m) 170 lb (77.1 kg)       Physical Exam  Vitals and nursing note reviewed. Constitutional:       General: He is not in acute distress. Appearance: He is well-developed. He is not toxic-appearing or diaphoretic. HENT:      Head: Normocephalic and atraumatic. Mouth/Throat:      Mouth: Mucous membranes are moist.      Pharynx: Oropharynx is clear. Eyes:      General: No scleral icterus. Right eye: No discharge. Left eye: No discharge. Pupils: Pupils are equal, round, and reactive to light. Cardiovascular:      Rate and Rhythm: Normal rate and regular rhythm. Pulmonary:      Effort: Pulmonary effort is normal. No respiratory distress. Breath sounds: No stridor. Chest:      Comments: Recent port removal site in the right upper chest wall. No erythema, warmth, induration, drainage, or signs of infection  Abdominal:      General: There is no distension. Musculoskeletal:         General: No deformity. Normal range of motion. Cervical back: Normal range of motion. Back:    Skin:     General: Skin is warm and dry. Neurological:      General: No focal deficit present. Mental Status: He is alert and oriented to person, place, and time. GCS: GCS eye subscore is 4. GCS verbal subscore is 5. GCS motor subscore is 6. Cranial Nerves: No cranial nerve deficit. Motor: No abnormal muscle tone.    Psychiatric: Behavior: Behavior normal.         Thought Content: Thought content normal.         Judgment: Judgment normal.       DIAGNOSTIC RESULTS     EKG: All EKG's are interpreted by the Emergency Department Physician who either signs or Co-signs this chart in the absence of a cardiologist.        RADIOLOGY:   Non-plain film images such as CT, Ultrasound and MRI are read by the radiologist. Vj Swanson images are visualized and preliminarily interpreted by the emergency physician with the below findings:        Interpretation per the Radiologist below, if available at the time of this note:    [address]    (Results Pending)   CT LUMBAR SPINE WO CONTRAST    (Results Pending)   CT ABDOMEN PELVIS WO CONTRAST Additional Contrast? None    (Results Pending)   CT CHEST WO CONTRAST    (Results Pending)         ED BEDSIDE ULTRASOUND:   Performed by ED Physician - none    LABS:  Labs Reviewed   CBC WITH AUTO DIFFERENTIAL - Abnormal; Notable for the following components:       Result Value    RBC 3.32 (*)     Hemoglobin 9.0 (*)     Hematocrit 29.0 (*)     MCHC 31.0 (*)     RDW 15.0 (*)     Neutrophils % 77.0 (*)     Lymphocytes % 13.2 (*)     All other components within normal limits   URINALYSIS WITH REFLEX TO CULTURE   SPECIMEN REJECTION       All other labs were within normal range or not returned as of this dictation.     Medications   HYDROmorphone HCl PF (DILAUDID) injection 0.5 mg (0.5 mg IntraVENous Given 7/27/22 0610)   orphenadrine (NORFLEX) injection 30 mg (30 mg IntraVENous Given 7/27/22 0610)   ondansetron (ZOFRAN) injection 4 mg (4 mg IntraVENous Given 7/27/22 0644)       EMERGENCY DEPARTMENT COURSE and DIFFERENTIALDIAGNOSIS/MDM:   Vitals:    Vitals:    07/27/22 0530   BP: (!) 141/91   Pulse: 85   Resp: 20   Temp: 97.5 °F (36.4 °C)   TempSrc: Core   SpO2: 95%   Weight: 170 lb (77.1 kg)   Height: 5' 5\" (1.651 m)       MDM      ED Course as of 07/27/22 0702 Wed Jul 27, 2022 0630 No midline spinal tenderness. Patient did have recent bacteremia which could put him at risk for discitis or osteomyelitis of the spine, however by physical exam and lack of other symptoms such as fever or chills, feel this is less likely. Pain does seem to be musculoskeletal in nature though with patient's recent history of ureteral obstructions and hydronephrosis, concern for renal etiology. [PATRICIA]      ED Course User Index  [PATRICIA] Myranda Nowak MD     Pt checked out to oncoming provider pending results of labs and imaging workup as well as re-evaluation of pain. CONSULTS:  None    PROCEDURES:  Unless otherwise notedbelow, none     Procedures      FINAL IMPRESSION   No diagnosis found. DISPOSITION/PLAN   DISPOSITION        No notes of EC Admission Criteria type on file. PATIENT REFERRED TO:  No follow-up provider specified.     DISCHARGE MEDICATIONS:  New Prescriptions    No medications on file          (Please note that portions of this note were completed with a voice recognition program.  Efforts were made to edit the dictations butoccasionally words are mis-transcribed.)    Myranda Marcelo MD (electronically signed)  AttendingEmergency Physician         Myranda Nowak MD  07/27/22 [id number]

## 2022-07-27 NOTE — PLAN OF CARE
Re-evaluated patient at this time. He reports back pain remains uncontrolled and he experiences severe pain with little movement. He denies dyspnea or chest pain. Will give an additional dose of Dilaudid 0.5 mg IV x 1 now and will increase oral Dilaudid to 2 mg q 4 prn with IV Dilaudid at 1 mg IV q 3 prn for breakthrough pain. Discussed above w/ RN with order to monitor blood pressure closely while titrating pain medication.      Alma Lopez PA-C

## 2022-07-27 NOTE — ED NOTES
Beronica Herrera (Neurology) for Dr. Sorin Ashby needed Neurosurgery rather than Neurology.      [facility]  07/27/22 Bj Tellez       [facility]  07/27/22 1538

## 2022-07-28 PROBLEM — C78.00 COLON CANCER METASTASIZED TO LUNG (HCC): Status: ACTIVE | Noted: 2022-01-01

## 2022-07-28 PROBLEM — C18.9 COLON CANCER METASTASIZED TO LUNG (HCC): Status: ACTIVE | Noted: 2022-01-01

## 2022-07-28 PROBLEM — G89.3 CANCER RELATED PAIN: Status: ACTIVE | Noted: 2022-01-01
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
## 2022-07-28 NOTE — PROGRESS NOTES
involving native coronary artery of native heart without angina pectoris    Colorectal cancer ([facility] [address]    Metastasis from colon cancer ([facility] [address]    History of small bowel obstruction  Resolved Problems:    * No resolved hospital problems. *        A/P:  []Colonic adenocarcinoma w/ lung metastasis on palliative chemotherapy, severe midthoracic back pain  -Neurosurgery, palliative, Hem/onc following  Titrate pain medications as needed to improve pain control  Pt clearly states is FULL code understands opiates may cause respiratory depression, will continue to titrate up as needed for pain control per patient's wishes. []Invasive urothelial carcinoma of the bladder  Last cystoscopy performed 7/17/2022 with biopsies showed evidence of bladder lesion. Biopsy consistent with invasive high-grade urothelial carcinoma.   Hem onc following    []Mild UTI, dysuria  Continue abs    []Goals of care  Considering hospice but remain FULL code at this time        Millie Yu M.D.,  7/28/2022

## 2022-07-28 NOTE — CONSULTS
[facility] Vascular Access Team:  Extended Dwell Peripheral IV Catheter   Insertion Procedure Note    Madelyn Ritter  [id number]   Admitted - 7/27/2022  5:34 AM  Admission Diagnosis -   Intractable pain [R52]  Abnormal CT of thoracic spine [R93.7]  Intractable back pain [M54.9]  [facility] [C79.9]    Attending Physician - Andre Raymond MD  Ordering Physician - Andre Raymond MD    PROCEDURE: Insertion of 20 gauge Single Lumen Extended Dwell IV Catheter    INDICATIONS: Poor Access    PROCEDURE DETAILS:  Informed consent was obtained for the procedure, including sedation. Risks of hemorrhage and adverse drug reaction were discussed. 20 gauge Single Lumen Extended Dwell IV Catheter inserted to the Left Forearm per hospital protocol. Blood return: yes    FINDINGS:  The Left Forearm vein was visualized using the ultrasound and deemed a suitable vessel. The area was prepped with Chlorhexidine and draped in sterile fashion using partial barrier draping. The vein was accessed using US guidance. The 20 gauge Single Lumen Extended Dwell IV Catheter advanced into the vein using ANTT. Approximately 1 cm exposed. All lumens had brisk blood return and was flushed with 10 cc of NS. The 20 gauge Single Lumen Extended Dwell IV Catheter was secured using a securement device and a Biopatch was placed over the insertion site. A Tegaderm was placed over the securement device and insertion site. Dressing was dated and initialed with external measurement marked. Patient did tolerate procedure well. IMPRESSION/PLAN:  Successful insertion of 20 gauge Single Lumen Extended Dwell IV Catheter   Line is ready to be used. Please change tubing prior to using. Make sure to label, date and use alcohol caps on new tubing and alcohol caps on unused ports. RN/LPN may draw blood from line.      Electronically Signed By: Abdirizak Thomas RN, [place]-BC on 7/28/2022 at 9:39 AM

## 2022-07-28 NOTE — PROGRESS NOTES
MEDICAL ONCOLOGY PROGRESS NOTE     Pt Name: Pam Rice  MRN: [id number]  YOB: 1950  Date of evaluation: 7/28/2022    Subjective: Still complain of significant mid back pain. We discussed palliative care. Awaiting MRI. Neurosurgery following. Reviewed interval notes. HISTORY OF PRESENT ILLNESS:  The patient is well-known to me. He has a diagnosis of metastatic carcinoma with lung metastasis. 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 He has had several hospitalizations for urinary tract infection, deconditioning and postoperative complications. Presented ER department with complaints of severe mid back pain. He has a history of chronic lower back pain  CT thoracic spine showed T9 lytic lesion. I discussed this with the emergency department. Neurosurgery was consulted. 07/27/22- CT Abdomen/pelvis no features of small bowel obstruction as compared to previous scan dated 06/29/2022. No intraabdnominal fluid. Presacral fluid collection as described. Left hydroureteronephrosis. Right pleural effusion and multiple lungs metastatic lesion as described. 07/27/22-CT chest w/o contrast multiple small (1 mm - 16 mm) sized scattered innumerable nodular infiltrates are studded throughout bilateral lung parenchyma, predominantly involving both lower lobes. Possibility of neoplastic lesions. Moderate right side pleural effusion with passive sub segmental collapse of right middle and lower lobe. Mild effusion is noted involving right oblique fissure. Ill-defined osteolytic lesion is noted involving T10 vertebral body-appears metastasis   07/27/22- CT Thoracic spine significant spondylosis, osteoporosis and multiple level compression fracture at upper lumbar levels. Increase thoracolumbar kyphosis.  Lytic lesion with soft tissue component and mild posterior bulge noted in body of T9 vertebrae concerning for metastasis. Pulmonary metastasis. Pleural effusions. 07/27/22- CT Lumbar spine multilevel spondylosis with spondylolisthesis as described. Spinal stabilization device at L5-S1 with no periprosthetic loosening or implant fracture. Anterior wedge compressions of L1 and L2. I discussed with the ER physician. I recommended neurosurgery consultation. He was seen by Dr. Laureen Louis. Dr. Carl Chacon recommended MRI thoracic/lumbar spine      Prior Oncology History  Mr. Alexey Camara is well-known to my clinic. He has a history of metastatic colonic adenocarcinoma with lung metastasis and is currently on palliative chemotherapy. In addition, he has a significant history of high-grade urothelial carcinoma of the right renal pelvis status post right nephrectomy at [facility], [address] [place] [address]. He had a very complicated postoperative course. He had prior admissions for urinary tract infection. He was admitted recently and discharged a few days ago. He presented again to the emergency department with episode of hypotension. Apparently, he had fever and chills as well. The patient is of been seen by urology with plans for change of his ureteral stents. He is currently being treated for possible urinary tract infection. Port infection is a possibility as well. His blood cultures is positive for gram-negative rods. He is also on anidulafungin for prior yeast infection in his urine. He was started on ceftazidime-avibactam and ciprofloxacin. He is on pressors in the ICU. I was consulted for continued care. His chemotherapy has been on hold. Last chemotherapy was delivered on 6/1/2022. Prior oncological history     HISTORY OF PRESENT ILLNESS:  The patient is well-established in my clinic. He has a diagnosis of colon cancer and recent diagnosis of invasive urothelial carcinoma, high-grade of the renal pelvis status surgery.   In addition, history of hypertension, hyperlipidemia, CAD, CKD stage IV. Patient presented ER department on 6/29/2022 with complaints of nausea vomiting, abdominal pain. He had decreased output from his ileostomy as well. A NG tube was placed in the ED. Surgery was consulted. Labs showed worsening kidney function with creatinine 2.5, hyperkalemia potassium 3.1, elevated white blood cell count. UA showed moderate blood, large leukocyte, WBC too numerous to count. 6/29/2022-CT abdomen pelvis showed evidence of small bowel obstruction possible to lower the ileal anastomosis. No intra-abdominal free fluid. Presacral fluid collection. Left hydroureteronephrosis, right pleural effusion and multiple lung metastatic lesions. Diffuse bladder wall thickening with associating 4.5 x 3 centimeters soft tissue at the superior wall of bladder extends extraluminally. Malignancy cannot be excluded. Left double-J catheter in place. He had an NG tube placed yesterday. He feels better this morning and feels that his colostomy is working again. Prior oncological history  Reason for MD visit: Toxicity/disease management  The patient has stage IV colonic adenocarcinoma with progressive lung metastasis consistent with colonic adenocarcinoma. In addition, he has a history of urothelial carcinoma stage II. He had progressive disease in the lungs compatible with colonic adenocarcinoma. He has resumed treatment with FOLFIRI/panitumumab. He has received 2 cycles. He complains of significant fatigue and nausea from the last treatment. He is still sick after this day today. ONCOLOGIC HISTORY:   Diagnosis:  Moderately differentiated rectal carcinoma, [id number], diagnosed in 3/9/2009  Noninvasive high-grade papillary urethral carcinoma. Negative for evidence of detrusor muscle invasion, pTa, pNx on 8/18/2019. Metastatic colorectal carcinoma, 9/3/2019  MSI stable and mutations for BRAF, NRAS, KRAS were not detected.     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 8/18/2019- transurethral resection of bladder tumor (TURBT)  9/18/2019-12/26/2019 palliative chemotherapy with modified FOLFOX 7  (Oxaliplatin 85 mg/m² IV day 1, leucovorin 400 mg/m² IV day 1 and 5-FU 2400 mg/m² IV continuous infusion over 46 to 48 hours for a total of 7 cycles. 1/28/2020 -palliative maintenance therapy with leucovorin 400 mg/m² IV over 2 hours on day 1, followed by 5-FU bolus 400 mg/m² and then 1200 mg/m²/day x2 days (total 2400 mg meter squared over 46 to 48 hours) continuous infusion. Repeat every 2 weeks. 05/11/22- Resuming FOLFIRI/panitumumab chemotherapy for progressive lung metastasis     ONCOLOGIC HISTORY # NMIBC_Bladder cancer. Hilda Edgar was diagnosed with noninvasive urethral carcinoma, pTa, pNx on 8/18/2019. Ta tumors are papillary lesions that tend to recur but are relatively benign and generally do not invade the bladder. Adjuvant treatment is not warranted at this time and will be monitored closely. Biopsy and transurethral resection of bladder tumor (TURBT) on 8/18/2019 by Dr. Tanika Pitts with pathology revealing noninvasive high-grade papillary urethral carcinoma. Negative for evidence of detrusor muscle invasion, pTa, pNx.   12/3/2019- cystoscopy with removal and replacement of double-J urethral stent. Pathology from dome of the bladder/tumor revealed high-grade papillary urethral carcinoma, noninvasive. No muscularis propria present. 2/26/2020 - cystoscopy and bilateral ureteral stent removal and replacement. The operative note by Dr. Minoo Chinchilla documented bilateral hydronephrosis and obtained biopsy of the bladder in the mid dome and left anterior lateral wall x2.   Pathology documented high-grade papillary urethral carcinoma, noninvasive, stage pTaNx.  5/28/2020-the patient underwent a cystoscopy and resection of bladder tumor on 05/28/2020 with findings consistent with noninvasive, high-grade papillary urothelial carcinoma. Muscularis propria was not identified. The patient will receive intravesical BCG therapy. 7/6/2020-CT Abdomen/ Pelvis-Moderate severe right and mild left hydronephrosis with bilateral ureteral stents, which have an adequate radiographic position. Right kidney with cortical thickening and somewhat asymmetrically decreased enhancement which can be seen with obstructive uropathy. Postoperative changes of colectomy. Left lower quadrant ostomy. Slightly decreased size of presacral low density compared to4/15/2020. Similar intrahepatic and extrahepatic bile duct dilation compared to 4/15/2020. Correlate with clinical symptoms and laboratory studies if clinically indicated. Similar chronic bony findings. 3/25/2021-CT abdomen showed severe hydronephrosis. Cystoscopy was performed by urology. 4/1/21 Bladder neck tumor, biopsies: High-grade papillary urothelial carcinoma, noninvasive. Muscularis propria is not present. AJCC pathologic stage:  pTa Nx  4/14/2021-reviewed results of pathology. No evidence of invasive disease. Continue surveillance cystoscopy with urology. 9/13/2021-he was seen by urology. Findings of ureteral high-grade urothelial carcinoma. Noninvasive. The patient is being referred to [facility] in [place]. 10/11/2021-he was seen by "[employer] and recommended cystoscopy with biopsy and possible laser ablation and bilateral leg stent exchange at that time. 4/20/2022 Urinary bladder, biopsy of right lateral bladder lesion: Disrupted urothelial mucosa with severe chronic inflammation, negative for evidence   of malignancy.         ONCOLOGIC HISTORY #3  Ijeoma Fariasena was seen in initial oncology consultation on 8/19/2019 during [facility] after a large pelvic mass was identified which raised concern for recurrent disease. Further pathology consistent with recurrent rectal cancer  8/17/2019- CEA 18.1  8/17/2019- CT scan of the kidney with contrast documented moderate to severe right hydronephrosis with dilation of the right ureter into the lower pelvis the site of the parasacral soft tissue changes. Partially calcified soft tissue changes within the janes-sacral region likely representing sequelae of pelvic radiation. Increasing scarring/fibrosis versus tumor recurrence within the presacral changes, likely represents a site of right distal ureter obstruction. No left-sided hydronephrosis. 8/18/2019 -Double-J ureter stent placement for right hydronephrosis secondary to extrinsic compression by pelvic mass. 8/27/2019-CT scan of the chest with contrast documented numerous pulmonary nodules that appear new compared to 11/12/2017, RUL nodule measuring 7 mm and LLL nodule measuring 5 mm. Soft tissue nodule at the left ventral abdominal wall. Slight increased size of a probable lymph node anterior to the aorta measuring 0.9 cm compared to 0.7 cm. Similar presacral, right pelvic sidewall and right abductor muscular nodular soft tissue density. 8/27/2019 CT scan of the abdomen and pelvis with contrast identified new moderate left hydronephrosis with moderate right hydronephrosis. Mild stranding around the urinary bladder and thickening of the bilateral ureteral wall. Numerous pulmonary nodules. Soft tissue of the left ventral abdominal wall. Slightly increased size of probable lymph node anterior to the aorta measuring 0.9 cm compared to 0.7 cm.  8/27/2019-PET scan did not identify any FDG avid pulmonary nodules or airspace opacities. Abnormal increased metabolic activity within the right pelvic wall soft tissue showing SUV of 5.4. Abnormal soft tissue metabolic activity in the right abductor muscle with SUV of 6.4. mm compared to 8.6 mm.    1/13/2020- CT scan of the abdomen and pelvis with contrast indicated improvement in the right-sided hydronephrosis with a chronic inflammatory process of the ureters suspected due to the moderate thickening, also present on previous study. The small poorly enhancing nodules in the right abductor muscles have decreased in the partially calcified presacral mass and right lateral pelvic wall nodules are stable compared to previous study. Resolution of the subcutaneous abdominal wall nodules. A prominent retroperitoneal lymph node adjacent and anterior to the left common artery is redefined and measures 6 mm, no change from previous exam.  1/13/2020 - CT scan of the chest documented a right lower lobe nodule measures 4.3 cm and is unchanged. A right lower lobe nodule measures 2.8 mm compared to 3.4 mm. Nodule in the right upper lobe is barely visible and measures 2.4 mm. Nodule in the left lower lobe measures 4.8 mm and is unchanged. Nodule in left lower lobe posterior measures 2.8 mm and previously measured 4.7 mm. A right lower lobe posterior medially nodule is barely visible measuring 0.2 mm and previously. measured 4.5 mm. No new nodules identified. No change in the size of the mediastinal lymph nodes. 1/28/2020 -palliative maintenance therapy with leucovorin 400 mg/m² IV over 2 hours on day 1, followed by 5-FU bolus 400 mg/m² and then 1200 mg/m²/day x2 days (total 2400 mg meter squared over 46 to 48 hours) continuous infusion. Repeat every 2 weeks. Only received 1 cycle, further treatment held due to small bowel obstruction. 1/30/2020 - CT scan of the abdomen and pelvis indicated high-grade small bowel obstruction with transition point in the midline posterior pelvis where a small bowel loop is tethered to a partially calcified presacral soft tissue mass.   2/11/2020-CEA 1.4  3/5/2020-  Exploratory laparotomy, removal of adhesions, small bowel resection with primary of chronic cystitis and or chronic partial outlet obstruction may not be excluded. A functioning left lower abdominal ostomy. A small parastomal small bowel herniation without obstruction. A partially calcified presacral mass. The soft tissue component have increased in size in the previous study. The osseous changes are described above. Any superimposed metastatic disease is not excluded and would be hard to evaluate due to extensive postsurgical changes. 11/18/2020-essentially, overall stable disease. Improvement of the lung nodules with decreased in the size of the target lesions. The pelvic lesion is is likely worse by 25%. However, CEA is is still within the normal limits. Therefore likely mixed response. We will continue current treatment and repeat CT scans in about 3 months. 12/16/2020-discontinue 5-FU bolus from his regimen. 2/9/2021- Ct Chest W Contrast No evidence of disease progression. Stable pulmonary nodules. Stable intrahepatic and extrahepatic bile duct dilation compared to prior 11/11/2020. Postoperative, posttraumatic and degenerative changes in the spine as described above. Old right-sided rib fractures. 2/9/2021- Ct Abdomen Pelvis W Contrast showed evidence of response to therapy including decreased presacral mass/thickening now measuring 1.1 cm, previously 1.9 cm on 11/11/2020. Stable intrahepatic and extra hepatic bile duct dilation with cholecystectomy clips. See separately dictated CT chest of the same day regarding pulmonary nodules. Bilateral ureteral stents. Decreased bilateral hydronephrosis. Urinary bladder wall is thickened, which could be seen with post treatment changes or cystitis. Correlate with symptoms. Chronic bony findings as above  2/17/2021-reviewed CT chest abdomen pelvis. Essentially consistent with disease response to therapy. Continue current therapy.     2/17/21 CEA 1.0  3/25/21 Ct Abdomen Pelvis W Iv Contrast  The stomach is distended, however no small bowel dilatation identified. Contrast identified within the left ileostomy bag. The distal stomach is under distended which may be secondary to peristalsis. Gastroparesis considered. Bilateral ureteral stents remain appropriate in position, however there is new moderate to severe right-sided hydronephrosis and mild left-sided hydronephrosis when compared to the 2/9/2021 exam. Mild increase considered within the partially calcified presacral pelvic mass. Stable partially calcified right pelvic soft tissue. Similar abnormal wall thickening of the bladder most notable superiorly. Similar prominence of the intra and extra hepatic bile ducts down to the level of the ampulla. Findings may represent a reservoir effect, however correlation with liver function tests recommended. Therefore. Stable noncalcified left greater than right pulmonary nodules with asymmetrical interstitial changes of the right lung base concerning for pneumonitis. Osteopenia with postoperative changes of the lumbar spine. Chronic compression deformities of the thoracolumbar vertebra as described above. 4/14/2021-I reviewed notes from urology and also CT abdomen/pelvis that showed mild interval increase in the size of the soft tissue nodule in the pelvis. However, this is still very small and therefore will have a short follow-up CT scan and of May 2021. I personally reviewed the CT scans. Really hard to state that there is clear-cut disease progression. Again, short follow-up recommended. Continue current treatment. 5/12/21 CEA 0.8  5/26/2001-CT chest abdomen pelvis showed Partially calcified presacral soft tissue mass appears unchanged. Previously measured soft tissue noncalcified component is unchanged measuring 2.2 x 3.2 cm on axial image 60. However, this is questionable. CT chest showed a stable pulmonary nodules. Subcentimeter nodules. Largest 7.5 mm.  6/1/21 CEA 0.9  06/01/23- reviewed scans. Stable disease.   Continue treatment every 3 weeks as per patient request. Continue infusional 5-FU, Panitumumab and leucovorin. No 5-FU bolus due to severe mucositis. 8/11/2021 CEA .9  9/03/2021 CT Chest w/Contrast Slight interval increase in the size of pulmonary nodules, the largest in the medial right lung base. Findings are concerning for metastatic disease. Atherosclerosis of the aorta and coronary arteries. Sclerotic rib lesions favored for osseous metastases. Old rib fractures also evident. 9/03/2021 CT Abd/Pelvis w/ IV Contrast (Oral) A persistent partially calcified mass in the presacral region with encasement of the distal ureters bilaterally and resultant bilateral hydronephrosis. Bilateral ureteral stents in place. There is increasing right-sided hydronephrosis since the previous study. Right renal atrophy similar to the previous study. Moderate asymmetrical thickening of the incompletely distended urinary bladder is similar to the previous study. This may partly be due to incomplete distention. An inflammatory process or a neoplastic process is not excluded. Moderate intrahepatic biliary dilatation is similar to the previous study and probably due to a prior cholecystectomy. Moderate dilatation of the contrast filled small bowel loops may represent an ileus or partial distal small bowel obstruction. There is left lower abdominal ileostomy which appears patent. The stable compression fractures of the lower thoracic and proximal lumbar vertebrae and hardware fusion similar to the previous study. 9/22/21- Decrease Vectibix to every other treatment. He is currently receiving chemotherapy every 3 weeks as per patient request  11/9/2021 CT Abd/Pelvis WO Contrast No acute posttraumatic findings. Known metastatic disease to the lungs. Posttreatment changes in overall chronic findings as above.   12/27/2021 NM Bone Scan Multiple foci of abnormal increased activity in the ribs bilaterally correspond with previously seen areas of bony sclerosis and old healed fractures. Abnormal activity in the lumbar spine correspond with compression fractures and kyphoplasty of these vertebrae. Probable right hydronephrosis. 2/24/2022 US LE Left  Limited(BJ) No residual fluid in the left lateral thigh. 2/27/2022 CT Abd/Pelvis W Contrast [facility]) Overall unchanged appearance of the right lateral abdominal collection with a percutaneous drain in place and small residual fluid. Multiloculated fluid and gas collection in the pelvis along the right aspect of bladder dome measuring approximately 8 x 4 cm, not significantly changed in size. Postsurgical changes of prior colectomy and small bowel resection. The distal ileum abuts the right flank collection and anterior abdomen in midline prior to ostomy. Unchanged partially calcified presacral and right pelvic sidewall soft tissue. Minimally improved mild left hydroureteronephrosis. Bilateral pulmonary nodules in the visualized lungs, not significantly changed. Small right pleural effusion with associated atelectasis. Scattered areas of hypoattenuation within the right lower lobe could represent developing pneumonia. 2/27/2022 CT Entire LE Left W Contrast ([facility])Interval open incision and drainage of anterolateral left thigh subcutaneous abscess with residual fluid and packing material.  Persistent, slightly improved diffuse subcutaneous edema   throughout the left lower extremity with no new or organizing fluid   collections. 4/10/2022 CT Chest WO Contrast Progressive metastatic disease to the lungs. There are too numerous to count pulmonary nodules the majority of which have increased in size or which are new from the previous study. No evidence of acute consolidative pneumonia. 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 Patient's undergone previous kyphoplasty at the thoracolumbar juncture for compression deformities. There is an accentuated thoracic kyphosis. .  4/10/2022 CT Abd/Pelvis WO Contrast Metastatic disease to the lung bases showing worsening from the previous study. Moderate size hiatal hernia with gastroesophageal reflux. The patient is status post at least partial colectomy. Left lower quadrant ostomy is present. There is no evidence of obstruction. There is an irregular soft tissue mass with some calcification within the pelvis. This extends to and is inseparable from the right posterior lateral urinary bladder wall with potential secondary involvement. This extends into the presacral space. When compared to the previous exam I feel this is increased in size. The urinary bladder cannot be fully assessed as it is decompressed with a Mcgregor catheter. Postoperative changes of the mid lower lumbar spine. There is an accentuated lumbar lordosis with grade 1-2 anterolisthesis of L5 on S1. Compression deformities at T12, L1 and L2 are present with previous kyphoplasty T12 and L1. . 6. Status post right nephrectomy. There is mild dilatation of the upper tracts of the left kidney and left ureter with a double-J intraureteral stent well-positioned. The degree of distention of the upper tracts of the left kidney is improved from the previous exam of November of last year. There is mild urothelial thickening. 4/13/2022 CT Abd/Pelvis WO Contrast When compared to the previous exam of 3 days earlier there is now noted to be moderate small bowel distention. I would favor a adynamic ileus. A distal obstruction at the site of the patient's ileostomy is also in the differential but felt less likely. There is mild distention of the stomach. A moderate size hiatal hernia is present. Mild to moderate dilatation of the upper tracts of the left kidney. A left-sided double-J ureteral stent is in place. There is mild urothelial thickening. I do not see evidence of a discrete ureteral stone. The stent is well-positioned. Partially calcified pelvic mass.  This is inseparable from the right posterior lateral wall of the urinary bladder along its lower margin. A Mcgregor catheter is in place within the bladder. Chronic-appearing fractures within the thoracic and lumbar spine with a gibbus deformity at the thoracolumbar juncture and previous kyphoplasty at T12-L1. There is a small amount of free fluid within the subhepatic space and Morison space. A small right-sided effusion is present with multiple pulmonary nodules within the lower lobes consistent with metastatic disease to the lungs. There is a moderate size hiatal hernia present. 04/19/22- CT guided biopsy consistent with colon cancer metastasis. 5/2/2022- resuming chemotherapy with FOLFIRI/panitumumab for progressive colonic adenocarcinoma pulmonary metastasis. 5/25/2022 CXR (2VW) Chronic lung changes with mild right basilar infiltrate or atelectasis. ONCOLOGIC HISTORY # Rectal carcinoma:  Edmar Delgado has a history of moderately differentiated rectal carcinoma, T3N0Mx, diagnosed in 3/9/2009. He received neoadjuvant chemotherapy with radiation and resection with J-pouch. He has been routinely followed at [facility] and was last seen by Dr. Susan Mason on 1/10/2019. The following are pertinent findings related to his diagnosis and treatment. 3/9/2009- Esophagogastroduodenoscopy with biopsy by Dr. Brandon Gordon that revealed rectal mass at 8 cm with pathology being consistent with moderately differentiated adenocarcinoma. 3/18/2019-Dr.Elizabeth Casi Ames at [facility] performed a flexible sigmoidoscopy and rectal endoscopic ultrasound that revealed the tumor extending 6-7 mm deep through the muscularis propria layer and into the serosa, T3 lesion. 4/9/2009-5/27/2009-received neoadjuvant chemotherapy with 5-FU CIV along with radiation therapy for a total of 5400 cGy under the direction of Dr. Ruel Campos. 7/15/2009-rectum resection revealed no residual malignancy. 22 regional nodes were negative for malignancy. The rectum distal doughnut was negative for malignancy.   He was STENT REMOVAL performed by Allison Yao MD at [address] Left 7/12/2022    CYSTOSCOPY LEFT STENT REMOVAL performed by Allison Yao MD at [facility] 70      x 2    [address] [facility]      per dr. Ava Sharma Left 8/29/2019    CYSTOSCOPY LEFT  RETROGRADE PYELOGRAM performed by Allison Yao MD at [address] [place] [address] Left 8/29/2019    LEFT URETERAL STENT PLACEMENT performed by Allison Yao MD at [address] [place] [address] Bilateral 12/3/2019    CYSTOSCOPY BILATERAL URETERAL STENT CHANGES performed by Allison Yao MD at [address] [place] [address] Bilateral 2/26/2020    CYSTOSCOPY BILATERAL URETERAL STENT CHANGES INDICATED PROCEDURE performed by Allison Yao MD at [address] [place] [address] Bilateral 5/28/2020    CYSTOSCOPY, BILATERAL RETROGRADE PYELOGRAMS, BILATERAL URETERAL STENT CHANGES performed by Allison Yao MD at [address] [place] [address] Bilateral 10/15/2020    CYSTOSCOPY, BILATERAL URETERAL STENT CHANGES performed by Allison Yao MD at [address] [place] [address] N/A 10/15/2020    POSSIBLE BIOPSY FULGURATION/ TURBT  BLADDER TUMOR performed by Allison Yao MD at [address] [place] [address] Bilateral 4/1/2021    CYSTOSCOPY, BILATERAL URETERAL STENT REMOVAL AND REPLACEMENT AND FULGERATION OF BLADDER TUMOR AND BLADDER BIOPSY performed by Allison Yao MD at [address] [place] [address] Left 4/20/2022    LEFT URETERAL STENT REPLACEMENT performed by Allison Yao MD at [address] [place] [address] N/A 4/20/2022    BLADDER BIOPSY performed by Allison Yao MD at [address] [place] [address] Left 7/12/2022    CYSTOSCOPY LEFT URETERAL STENT REPLACEMENT performed by Allison Yao MD at [address] [place] [address] N/A 7/12/2022    CYSTOSCOPY BLADDER BIOPSY & FULGURATION GREATER THAN 5CM performed by Allison Yao MD at [address] / [address] / STONE Right 8/18/2019    CYSTOSCOPY RETROGRADE PYELOGRAM RIGHT URETERAL  STENT INSERTION FULGERATION OF BLADDER TUMOR performed by Rosalinda Dorman MD at [address] / [address] / Mei Barboza Bilateral 1/5/2021    CYSTOSCOPY  BILARTERAL URETERAL STENT REMOVAL AND REPLACEMENT BILATERAL BILATERAL URETERAL CATHERIZATION BILATERAL RETROGRADE PYLEOGRAM performed by Rosalinda Dorman MD at [address] N/A 12/3/2019    BLADDER BIOPSY AND FULGURATION performed by Rosalinda Dorman MD at [address] N/A 5/28/2020    BIOPSIES WITH FULGURATION OF BLADDER TUMORS performed by Rosalinda Dorman MD at [facility] Bilateral     cataract or    HC INJECT OTHER PERPHRL NERV Left 10/28/2016    FLURO GUIDED HIP INJECITON performed by Dayne Esparza MD at [address] / REMOVAL / REPLACEMENT VENOUS ACCESS CATHETER Right 8/20/2019    INSERTION OF RIGHT INTERNAL JUGULAR SINGLE LUMEN POWER PORT performed by Daryn Miranda DO at [facility] N/A 5/6/2020    REMOVAL OF INSTRUMENTATION, EXPLORATION OF FUSION L1-3, REVISION UNINSTRUMENTED POSTERIOR SPINAL FUSION L1-3 performed by Bradley Jones MD at [address]      times 2... all levels    SPINE SURGERY      yesterday    TUNNELED VENOUS PORT PLACEMENT         Social History:    Marital status:   Smoking status:No  Resides:[place]    Family History:   Family History   Problem Relation Age of Onset    High Blood Pressure Mother     High Blood Pressure Father     Colon Cancer Father     Diabetes Father        Current Hospital Medications:    Current Facility-Administered Medications   Medication Dose Route Frequency Provider Last Rate Last Admin    HYDROmorphone HCl PF (DILAUDID) injection 1 mg  1 mg IntraVENous Q3H PRN Eduin Weiner MD   1 mg at 07/28/22 0507    HYDROmorphone (DILAUDID) tablet 2 mg  2 mg Oral Q4H PRN Eduin Weiner MD        melatonin disintegrating tablet 5 mg  5 mg Oral Nightly PRN Eduin Weiner MD        metoprolol succinate (TOPROL XL) extended release tablet 50 mg  50 mg Oral Daily Jami Ro, APRN   50 mg at 07/27/22 1142    calcitRIOL (ROCALTROL) capsule 0.25 mcg  0.25 mcg Oral Daily Jami Ro, APRN   0.25 mcg at 07/27/22 1142    calcium-cholecalciferol 500-200 MG-UNIT per tablet 1 tablet  1 tablet Oral BID Jami Ro, APRN   1 tablet at 07/27/22 2331    DULoxetine (CYMBALTA) extended release capsule 30 mg  30 mg Oral Daily Jami Ro, APRN   30 mg at 07/27/22 1142    ferrous sulfate (IRON 325) tablet 325 mg  325 mg Oral TID  Jami Ro, APRN        [Held by provider] furosemide (LASIX) tablet 40 mg  40 mg Oral Daily Jami Ro, APRN        [Held by provider] magnesium oxide (MAG-OX) tablet 400 mg  400 mg Oral Daily Janet Ro, APRN        lactobacillus (CULTURELLE) capsule 1 capsule  1 capsule Oral Daily Janet Ro, APRN   1 capsule at 07/27/22 1142    trospium (SANCTURA) tablet 20 mg  20 mg Oral Nightly Jami Ro, APRN   20 mg at 07/27/22 2331    pantoprazole (PROTONIX) tablet 40 mg  40 mg Oral BID AC Jami Ro, APRN   40 mg at 07/28/22 0641    sodium bicarbonate tablet 650 mg  650 mg Oral 4x Daily Jami Ro, APRN   650 mg at 07/27/22 2331    sodium chloride flush 0.9 % injection 5-40 mL  5-40 mL IntraVENous 2 times per day Jami Ro, APRN   10 mL at 07/27/22 2008    sodium chloride flush 0.9 % injection 5-40 mL  5-40 mL IntraVENous PRN Jami Ro, APRN        0.9 % sodium chloride infusion   IntraVENous PRN Jami Ro, APRN        enoxaparin (LOVENOX) injection 40 mg  40 mg SubCUTAneous Daily Jami Ro, APRN   40 mg at 07/27/22 0936    ondansetron (ZOFRAN-ODT) disintegrating tablet 4 mg  4 mg Oral Q8H PRN Jami Ro, APRN        Or    ondansetron [facility]) injection 4 mg  4 mg IntraVENous Q6H PRN Jami Ro, APRN        polyethylene glycol (GLYCOLAX) packet 17 g  17 g Oral Daily PRN Jaida Batters, APRN        acetaminophen (TYLENOL) tablet 650 mg  650 mg Oral Q6H PRN Jaida Batters, APRN        Or    acetaminophen (TYLENOL) suppository 650 mg  650 mg Rectal Q6H PRN Jaida Batters, APRN        naloxone [facility]) injection 0.4 mg  0.4 mg IntraVENous PRN Bony Ridley PA-C        ALPRAZolam [facility]) tablet 0.5 mg  0.5 mg Oral Q8H PRN Bony Ridley, PA-C   0.5 mg at 07/28/22 0641    cyclobenzaprine (FLEXERIL) tablet 10 mg  10 mg Oral TID PRN Jaida Batters, APRN   10 mg at 07/27/22 2211    HYDROmorphone (DILAUDID) tablet 2 mg  2 mg Oral Q4H PRN Bony Ridley, PA-C   2 mg at 07/28/22 0666       Allergies: Allergies   Allergen Reactions    Morphine Anxiety         Objective   BP (!) 144/84   Pulse (!) 106   Temp 97.7 °F (36.5 °C) (Temporal)   Resp 18   Ht 5' 5\" (1.651 m)   Wt 170 lb (77.1 kg)   SpO2 94%   BMI 28.29 kg/m²     PHYSICAL EXAM:  CONSTITUTIONAL: Alert, appropriate, no acute distress  EYES: Non icteric, EOM intact, pupils equal round   ENT: Mucus membranes moist,external inspection of ears and nose are normal  NECK: Supple, no masses. No palpable thyroid mass  CHEST/LUNGS: CTA bilaterally, normal respiratory effort   CARDIOVASCULAR: Tachycardic, no murmurs. No lower extremity edema  ABDOMEN: soft non-tender, active bowel sounds, no HSM. No palpable masses  EXTREMITIES: warm, full ROM in all 4 extremities, no focal weakness.   SKIN: warm, dry with no rashes or lesions  LYMPH: No cervical, clavicular, axillary, or inguinal lymphadenopathy  NEUROLOGIC: follows commands, non focal     LABORATORY RESULTS REVIEWED/ANALYZED BY ME:  Recent Labs     07/28/22  0420 07/27/22  0550 07/25/22  1444   WBC 8.6 7.9 9.4   HGB 8.6* 9.0* 8.6*   HCT 27.8* 29.0* 28.7*   MCV 89.7 87.3 92.0    390 427*       Lab Results   Component Value Date     (L) 07/28/2022    K 4.1 07/28/2022     07/28/2022    CO2 25 07/28/2022    BUN 15 07/28/2022    CREATININE 1.4 (H) 07/28/2022    GLUCOSE 101 07/28/2022    CALCIUM 8.9 07/28/2022    PROT 7.4 07/27/2022    LABALBU 3.2 (L) 07/27/2022    BILITOT <0.2 07/27/2022    ALKPHOS 159 (H) 07/27/2022    AST 24 07/27/2022    ALT 8 07/27/2022    LABGLOM 50 (A) 07/28/2022    GFRAA >59 07/28/2022    AGRATIO 1.9 11/24/2021    GLOB 2.2 11/24/2021       Lab Results   Component Value Date    INR 1.02 04/18/2022    INR 1.06 04/13/2022    INR 1.04 11/09/2021    PROTIME 13.3 04/18/2022    PROTIME 13.7 04/13/2022    PROTIME 13.8 11/09/2021       RADIOLOGY STUDIES REPORT/REVIEWED AND INTERPRETED BY ME:  CT ABDOMEN PELVIS WO CONTRAST Additional Contrast? None    Result Date: 7/27/2022  1. No features of small bowel obstruction as compared to previous scan dated 06/29/2022. No intraabdnominal fluid. 2. Presacral fluid collection as described. Left hydroureteronephrosis. 3. Right pleural effusion and multiple lungs metastatic lesion as described 4. Bladder wall thickening as described - Suggested Cystoscopy correlation Recommendation: Follow up as clinically indicated. All CT scans at this facility utilize dose modulation, iterative reconstruction, and/or weight based dosing when appropriate to reduce radiation dose to as low as reasonably achievable. Electronically Signed by Prudence Erickson MD at 27-Jul-2022 08:46:22 AM             CT ABDOMEN PELVIS WO CONTRAST Additional Contrast? None    Result Date: 7/7/2022  1. Right-sided pleural effusion, stable and unchanged 2. Interval development of right lower lobe consolidation 3. Innumerable pulmonary nodules consistent with metastatic disease 4. The gallbladder is not visualized 5. The right kidney is not visualized 6. Postop changes status post colostomy 7. Postop and degenerative changes of the lumbar spine, stable and unchanged 8. Status post left double J ureteral stent placement, stable and unchanged 9. Bladder will thickening consistent with chronic inflammatory disease i.e. cystitis.  Please correlate 10. The appendix is not visualized Recommendation: Follow up as clinically indicated. All CT scans at this facility utilize dose modulation, iterative reconstruction, and/or weight based dosing when appropriate to reduce radiation dose to as low as reasonably achievable. Electronically Signed by Deneen Rojas at 07-Jul-2022 02:37:25 AM             CT ABDOMEN PELVIS WO CONTRAST Additional Contrast? None    Result Date: 6/29/2022  1. Evidence of small bowl obstruction possible at the level of ileal anastomosis as described. No intra-abdominal free fluid. 2.  Presacral fluid collection as described. Left hydroureteronephrosis. 3.  Right pleural effusion and multiple lungs metastatic lesion as described. Recommendation: Follow up as clinically indicated. All CT scans at this facility utilize dose modulation, iterative reconstruction, and/or weight based dosing when appropriate to reduce radiation dose to as low as reasonably achievable. Electronically Signed by Tawny Rose MD at 29-Jun-2022 08:55:49 AM             CT CHEST WO CONTRAST    Result Date: 7/27/2022  1. Multiple small (1 mm - 16 mm) sized scattered innumerable nodular infiltrates are studded throughout bilateral lung parenchyma, predominantly involving both lower lobes.-Possibility of neoplastic lesions 2. Moderate right side pleural effusion with passive sub segmental collapse of right middle and lower lobe. 3. Mild effusion is noted involving right oblique fissure. 4. Ill-defined osteolytic lesion is noted involving T10 vertebral body-appears metastasis Recommendation: Follow up as clinically indicated. All CT scans at this facility utilize dose modulation, iterative reconstruction, and/or weight based dosing when appropriate to reduce radiation dose to as low as reasonably achievable. Electronically Signed by Mannie Wu MD at 27-Jul-2022 08:58:15 AM             CT THORACIC SPINE WO CONTRAST    Result Date: 7/27/2022  1.  Significance spondylosis, osteoporosis and multiple level compression fracture at upper lumbar levels. Increase thoracolumbar kyphosis 2. Lytic lesion with soft tissue component and mild posterior bulge noted in body of T10 vertebrae  -Metastasis. 3. Pulmonary metastasis. 4. Pleural effusions. Recommendation: Follow up as clinically indicated. All CT scans at this facility utilize dose modulation, iterative reconstruction, and/or weight based dosing when appropriate to reduce radiation dose to as low as reasonably achievable. Electronically Signed by Izzy Aguirre MD at 27-Jul-2022 08:38:32 AM             CT LUMBAR SPINE WO CONTRAST    Result Date: 7/27/2022  1. Multilevel spondylosis with spondylolisthesis as described. 2. Spinal stabilization device at L5-S1 with no periprosthetic loosening or implant fracture. 3. Anterior wedge compressions of L1 and L2 4. Soft tissue structure pelvis is uterus with calcifications or mass. Recommendation: Follow up as clinically indicated. All CT scans at this facility utilize dose modulation, iterative reconstruction, and/or weight based dosing when appropriate to reduce radiation dose to as low as reasonably achievable. Electronically Signed by Izzy Aguirre MD at 27-Jul-2022 08:41:54 AM             XR CHEST PORTABLE    Result Date: 7/5/2022  Bilateral infiltrate and right pleural effusion as described. Recommendation: Follow up as clinically indicated. Electronically Signed by Bisi Hunt MD at 05-Jul-2022 06:53:38 PM             XR CHEST PORTABLE    Result Date: 6/29/2022  Scattered interstitial nodular densities throughout both lungs and coarse right infrahilar markings centrally. 4 level cervical fusion device in good repair NG tube with tip in the upper stomach Right access transjugular catheter with tip in the right atrium Two contiguous kyphoplasty injections lower dorsal spine. Question small right pleural effusion. Recommendation: Follow up as clinically indicated.  Electronically Signed by Marisol Cullen MD at 29-Jun-2022 10:40:16 AM                My interpretation:lytic lesion T9      ASSESSMENT:  #Lytic lesion T9  This is certainly a metastatic bone lesion. The patient has recently been diagnosed with invasive bladder cancer. The differential diagnosis includes metastatic colonic adenocarcinoma metastatic high-grade urothelial carcinoma the bladder. 07/27/22- CT Abdomen/pelvis no features of small bowel obstruction as compared to previous scan dated 06/29/2022. No intraabdnominal fluid. Presacral fluid collection as described. Left hydroureteronephrosis. Right pleural effusion and multiple lungs metastatic lesion as described. 07/27/22-CT chest w/o contrast multiple small (1 mm - 16 mm) sized scattered innumerable nodular infiltrates are studded throughout bilateral lung parenchyma, predominantly involving both lower lobes. Possibility of neoplastic lesions. Moderate right side pleural effusion with passive sub segmental collapse of right middle and lower lobe. Mild effusion is noted involving right oblique fissure. Ill-defined osteolytic lesion is noted involving T10 vertebral body-appears metastasis   07/27/22- CT Thoracic spine significant spondylosis, osteoporosis and multiple level compression fracture at upper lumbar levels. Increase thoracolumbar kyphosis. Lytic lesion with soft tissue component and mild posterior bulge noted in body of T9 vertebrae concerning for metastasis. Pulmonary metastasis. Pleural effusions. 07/27/22- CT Lumbar spine multilevel spondylosis with spondylolisthesis as described. Spinal stabilization device at L5-S1 with no periprosthetic loosening or implant fracture. Anterior wedge compressions of L1 and L2.     -Neurosurgery evaluating  -Anticipate MRI T/L spine    #Colon cancer stage IV (lung metastasis )-chemotherapy on hold. Last chemotherapy delivered 6/1/2022. Patient has had recent hospitalizations.   Chemotherapy has been on hold due to frequent hospitalizations and poor performance status. Unfortunately, we have not been able to keep a consistence on his treatment schedule. Certainly with the current infection we will continue to hold this. Palliative care also following. Multifactorial anemia  Hemoglobin 8.6/MCV 89        Cancer related pain  -Hydromorphone 1.5 mg prn IV  -Hydromorphone 4 mg p.o. every 4 hours as needed  -Fentanyl 25 mcg every 72 hours  -Appreciated palliative care recommendations  -Patient has a pain pump    Renal impairment, CKD stage III  -Cr 1.4    Invasive urothelial carcinoma of the bladder  Last cystoscopy performed 7/17/2022 with biopsies showed evidence of bladder lesion. Biopsy consistent with invasive high-grade urothelial carcinoma.     DVT prophylaxis-Lovenox    PLAN:  MRI T/L spine  Continue pain meds  Discussed with palliative care  Reviewed interval notes  Further treatment recommendations after MRI      (Please note that portions of this note were completed with a voice recognition program. Efforts were made to edit the dictations but occasionally words are mis-transcribed.)    Armani Mcfadden MD    07/28/22  6:59 AM

## 2022-07-28 NOTE — PROGRESS NOTES
Palliative Care Progress Note  7/28/2022 9:43 AM    Patient:  Mckayla Morin  YOB: 1950  Primary Care Physician: Kevin Dejesus MD  Advance Directive: Full Code  Admit Date: 7/27/2022       Hospital Day: 1  Portions of this note have been copied forward, however, changed to reflect the most current clinical status of this patient. CHIEF COMPLAINT/REASON FOR CONSULTATION Goals of care, code status, family support    SUBJECTIVE:  Mr. Carisa Mcintyre complains of pain this morning. It remains uncontrolled. He has not been able to sleep. He describes muscle spasms and mid-thoracic back pain that does not radiate to his legs but is worsened with slight movement or deep breaths. He was able to turn slightly to his right side which was a little more comfortable. Review of Systems:   14 point review of systems is negative except as specifically addressed above. Objective:   VITALS:  BP (!) 150/94   Pulse (!) 104   Temp 97.7 °F (36.5 °C) (Temporal)   Resp 18   Ht 5' 5\" (1.651 m)   Wt 170 lb (77.1 kg)   SpO2 95%   BMI 28.29 kg/m²   24HR INTAKE/OUTPUT:    Intake/Output Summary (Last 24 hours) at 7/28/2022 0943  Last data filed at 7/28/2022 0358  Gross per 24 hour   Intake 730 ml   Output 675 ml   Net 55 ml     General appearance: alert, appears stated age, cooperative and no distress  Head: Normocephalic, without obvious abnormality, atraumatic  Eyes: conjunctivae/corneas clear. PERRL, EOM's intact. Ears: normal external ears and nose  Neck: no JVD, supple, symmetrical, trachea midline   Lungs: coarse air entry, respirations even and unlabored    Heart: tachycardic regular rhythm, S1, S2 normal, no murmur  Abdomen:soft, non-tender; non-distended, bowel sounds present    Extremities:No lower extremity edema,  No erythema, no tenderness to palpation  Skin: warm, dry  Neurologic: Alert and oriented X 3, generalized weakness, abnormal tone.  No focal deficits  Psychiatric: Anxious     Medications: up and treatment plan as well as factors that lead to hospitalization. I saw Mr. Higinio Hampton at his bedside. Pain remains uncontrolled this morning. Discussed current pain medications with Dr. Juanita Katz. Mr. Higinio Hampton has a goal of pain control that will also be beneficial to him at discharge. Will start Fentanyl 25 mcg patch with low threshold to increase to 50 mcg. Increase Dilaudid to 4 mg q 4 prn, continue IV Dilaudid 1 mg q 2 hrs prn with a goal of making pain tolerable. He also has Flexeril 10 mg TID prn muscle spasms. Discussed the above with RN and encouraged frequent assessments to monitor patient's improvement or need for increase in medication. Mr. Higinio Hampton will require higher doses of opioids to gain pain control. He does have a pain pump and has been dependent on opioids for years. Dilaudid PCA is also an option but will continue to work toward a regimen that he can discharge on once work up complete and treatment plan is in place. He does want to discuss radiation therapy for pain control if MRI confirms this is a metastatic lesion. Discussed the above with Hospitalist and Neurosurgery. Will continue to follow closely     Recommendations:   Palliative care: [facility] pain control Code status: Full code ACP completed   Intractable back pain w/ new T9 lesion (radiology report incorrect per NSG)/Cancer related pain-Pain pump in place, Oncology following, awaiting MRI. Add Fentanyl 25 mcg w/ low threshold to increase to 50 mcg, Increase Dilaudid to 4 mg q 4 prn, Change IV Dilaudid to 1 mg q 2 prn. Discussed w/ RN that patient will need higher doses due to opioid dependence for long standing history of chronic pain.  Dexamethasone on hold for now-will defer to Neurosurgery timing of that addition if it is felt it will be beneficial   Colonic adenocarcinoma w/ lung metastasis on palliative chemotherapy-followed closely by Dr. Mariaelena Monreal, s/p ileostomy, resolved bowel obstruction 06/2022-monitor ileostomy output closely with increase in opioid therapy  Anxiety-continue Xanax, Valium to be given prior to MRI  Invasive urothelial carcinoma s/p right nephroureterectomy-followed as OP by Dr. Josh Talbert  CKD III-noted, monitor     Addendum: Followed up with patient this afternoon after he returned from MRI. He reports that prior to his MRI he was feeling some relief from pain but transfers to/from radiology have no exacerbated it. Oral Dilaudid and Flexeril are both available to be given. Discussed w/ RN to provide both medications due to increase in pain upon returning from MRI. Thank you for consulting Palliative Care and allowing us to participate in the care of this patient.    Time Spent Counseling > 50%:  YES                                   Total Time Spent with patient/family counseling, workup/treatment review, counseling and placement of orders/preparation of this note: 45 minutes    Electronically signed by Ksenia Harrington PA-C on 7/28/2022 at 9:43 AM    (Please note that portions of this note were completed with a voice recognition program.  Roddy Mems made to edit the dictations but occasionally words are mis-transcribed.)

## 2022-07-29 PROBLEM — G89.3 CANCER RELATED PAIN: Status: ACTIVE | Noted: 2022-01-01

## 2022-07-29 NOTE — PROGRESS NOTES
MEDICAL ONCOLOGY PROGRESS NOTE     Pt Name: Ligia Atwood  MRN: [id number]  YOB: 1950  Date of evaluation: 7/29/2022  ROOM: 527    Subjective: Pain control in process. Awaiting MRI. Neurosurgery following. HISTORY OF PRESENT ILLNESS:  The patient is well-known to me. He has a diagnosis of metastatic carcinoma with lung metastasis. 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 He has had several hospitalizations for urinary tract infection, deconditioning and postoperative complications. Presented ER department with complaints of severe mid back pain. He has a history of chronic lower back pain  CT thoracic spine showed T9 lytic lesion. I discussed this with the emergency department. Neurosurgery was consulted. 07/27/22- CT Abdomen/pelvis no features of small bowel obstruction as compared to previous scan dated 06/29/2022. No intraabdnominal fluid. Presacral fluid collection as described. Left hydroureteronephrosis. Right pleural effusion and multiple lungs metastatic lesion as described. 07/27/22-CT chest w/o contrast multiple small (1 mm - 16 mm) sized scattered innumerable nodular infiltrates are studded throughout bilateral lung parenchyma, predominantly involving both lower lobes. Possibility of neoplastic lesions. Moderate right side pleural effusion with passive sub segmental collapse of right middle and lower lobe. Mild effusion is noted involving right oblique fissure. Ill-defined osteolytic lesion is noted involving T10 vertebral body-appears metastasis   07/27/22- CT Thoracic spine significant spondylosis, osteoporosis and multiple level compression fracture at upper lumbar levels. Increase thoracolumbar kyphosis. Lytic lesion with soft tissue component and mild posterior bulge noted in body of T9 vertebrae concerning for metastasis. Pulmonary metastasis. Pleural effusions. 07/27/22- CT Lumbar spine multilevel spondylosis with spondylolisthesis as described. Spinal stabilization device at L5-S1 with no periprosthetic loosening or implant fracture. Anterior wedge compressions of L1 and L2. I discussed with the ER physician. I recommended neurosurgery consultation. He was seen by Dr. Donel Closs. Dr. Berenice Dueñas recommended MRI thoracic/lumbar spine          Prior Oncology History  Mr. Isidro Beth is well-known to my clinic. He has a history of metastatic colonic adenocarcinoma with lung metastasis and is currently on palliative chemotherapy. In addition, he has a significant history of high-grade urothelial carcinoma of the right renal pelvis status post right nephrectomy at [facility], [address] [place] [address]. He had a very complicated postoperative course. He had prior admissions for urinary tract infection. He was admitted recently and discharged a few days ago. He presented again to the emergency department with episode of hypotension. Apparently, he had fever and chills as well. The patient is of been seen by urology with plans for change of his ureteral stents. He is currently being treated for possible urinary tract infection. Port infection is a possibility as well. His blood cultures is positive for gram-negative rods. He is also on anidulafungin for prior yeast infection in his urine. He was started on ceftazidime-avibactam and ciprofloxacin. He is on pressors in the ICU. I was consulted for continued care. His chemotherapy has been on hold. Last chemotherapy was delivered on 6/1/2022. Prior oncological history     HISTORY OF PRESENT ILLNESS:  The patient is well-established in my clinic. He has a diagnosis of colon cancer and recent diagnosis of invasive urothelial carcinoma, high-grade of the renal pelvis status surgery. In addition, history of hypertension, hyperlipidemia, CAD, CKD stage IV.   Patient presented ER department on 6/29/2022 with complaints of nausea vomiting, abdominal pain. He had decreased output from his ileostomy as well. A NG tube was placed in the ED. Surgery was consulted. Labs showed worsening kidney function with creatinine 2.5, hyperkalemia potassium 3.1, elevated white blood cell count. UA showed moderate blood, large leukocyte, WBC too numerous to count. 6/29/2022-CT abdomen pelvis showed evidence of small bowel obstruction possible to lower the ileal anastomosis. No intra-abdominal free fluid. Presacral fluid collection. Left hydroureteronephrosis, right pleural effusion and multiple lung metastatic lesions. Diffuse bladder wall thickening with associating 4.5 x 3 centimeters soft tissue at the superior wall of bladder extends extraluminally. Malignancy cannot be excluded. Left double-J catheter in place. He had an NG tube placed yesterday. He feels better this morning and feels that his colostomy is working again. Prior oncological history  Reason for MD visit: Toxicity/disease management  The patient has stage IV colonic adenocarcinoma with progressive lung metastasis consistent with colonic adenocarcinoma. In addition, he has a history of urothelial carcinoma stage II. He had progressive disease in the lungs compatible with colonic adenocarcinoma. He has resumed treatment with FOLFIRI/panitumumab. He has received 2 cycles. He complains of significant fatigue and nausea from the last treatment. He is still sick after this day today. ONCOLOGIC HISTORY:   Diagnosis:  Moderately differentiated rectal carcinoma, [id number], diagnosed in 3/9/2009  Noninvasive high-grade papillary urethral carcinoma. Negative for evidence of detrusor muscle invasion, pTa, pNx on 8/18/2019. Metastatic colorectal carcinoma, 9/3/2019  MSI stable and mutations for BRAF, NRAS, KRAS were not detected.     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 8/18/2019- transurethral resection of bladder tumor (TURBT)  9/18/2019-12/26/2019 palliative chemotherapy with modified FOLFOX 7  (Oxaliplatin 85 mg/m² IV day 1, leucovorin 400 mg/m² IV day 1 and 5-FU 2400 mg/m² IV continuous infusion over 46 to 48 hours for a total of 7 cycles. 1/28/2020 -palliative maintenance therapy with leucovorin 400 mg/m² IV over 2 hours on day 1, followed by 5-FU bolus 400 mg/m² and then 1200 mg/m²/day x2 days (total 2400 mg meter squared over 46 to 48 hours) continuous infusion. Repeat every 2 weeks. 05/11/22- Resuming FOLFIRI/panitumumab chemotherapy for progressive lung metastasis     ONCOLOGIC HISTORY # NMIBC_Bladder cancer. Tianna Hernandez was diagnosed with noninvasive urethral carcinoma, pTa, pNx on 8/18/2019. Ta tumors are papillary lesions that tend to recur but are relatively benign and generally do not invade the bladder. Adjuvant treatment is not warranted at this time and will be monitored closely. Biopsy and transurethral resection of bladder tumor (TURBT) on 8/18/2019 by Dr. Orquidea Diaz with pathology revealing noninvasive high-grade papillary urethral carcinoma. Negative for evidence of detrusor muscle invasion, pTa, pNx.   12/3/2019- cystoscopy with removal and replacement of double-J urethral stent. Pathology from dome of the bladder/tumor revealed high-grade papillary urethral carcinoma, noninvasive. No muscularis propria present. 2/26/2020 - cystoscopy and bilateral ureteral stent removal and replacement. The operative note by Dr. Luna Jo documented bilateral hydronephrosis and obtained biopsy of the bladder in the mid dome and left anterior lateral wall x2.   Pathology documented high-grade papillary urethral carcinoma, noninvasive, stage pTaNx.  5/28/2020-the patient underwent a cystoscopy and resection of bladder tumor on 05/28/2020 with findings consistent with noninvasive, high-grade papillary urothelial carcinoma. Muscularis propria was not identified. The patient will receive intravesical BCG therapy. 7/6/2020-CT Abdomen/ Pelvis-Moderate severe right and mild left hydronephrosis with bilateral ureteral stents, which have an adequate radiographic position. Right kidney with cortical thickening and somewhat asymmetrically decreased enhancement which can be seen with obstructive uropathy. Postoperative changes of colectomy. Left lower quadrant ostomy. Slightly decreased size of presacral low density compared to4/15/2020. Similar intrahepatic and extrahepatic bile duct dilation compared to 4/15/2020. Correlate with clinical symptoms and laboratory studies if clinically indicated. Similar chronic bony findings. 3/25/2021-CT abdomen showed severe hydronephrosis. Cystoscopy was performed by urology. 4/1/21 Bladder neck tumor, biopsies: High-grade papillary urothelial carcinoma, noninvasive. Muscularis propria is not present. AJCC pathologic stage:  pTa Nx  4/14/2021-reviewed results of pathology. No evidence of invasive disease. Continue surveillance cystoscopy with urology. 9/13/2021-he was seen by urology. Findings of ureteral high-grade urothelial carcinoma. Noninvasive. The patient is being referred to [facility] in [place]. 10/11/2021-he was seen by [facility] and recommended cystoscopy with biopsy and possible laser ablation and bilateral leg stent exchange at that time. 4/20/2022 Urinary bladder, biopsy of right lateral bladder lesion: Disrupted urothelial mucosa with severe chronic inflammation, negative for evidence   of malignancy.         ONCOLOGIC HISTORY #3  Zuleika Abreu was seen in initial oncology consultation on 8/19/2019 during his hospitalization at [facility] after a large pelvic mass was identified which raised concern for recurrent disease. Further pathology consistent with recurrent rectal cancer  8/17/2019- CEA 18.1  8/17/2019- CT scan of the kidney with contrast documented moderate to severe right hydronephrosis with dilation of the right ureter into the lower pelvis the site of the parasacral soft tissue changes. Partially calcified soft tissue changes within the janes-sacral region likely representing sequelae of pelvic radiation. Increasing scarring/fibrosis versus tumor recurrence within the presacral changes, likely represents a site of right distal ureter obstruction. No left-sided hydronephrosis. 8/18/2019 -Double-J ureter stent placement for right hydronephrosis secondary to extrinsic compression by pelvic mass. 8/27/2019-CT scan of the chest with contrast documented numerous pulmonary nodules that appear new compared to 11/12/2017, RUL nodule measuring 7 mm and LLL nodule measuring 5 mm. Soft tissue nodule at the left ventral abdominal wall. Slight increased size of a probable lymph node anterior to the aorta measuring 0.9 cm compared to 0.7 cm. Similar presacral, right pelvic sidewall and right abductor muscular nodular soft tissue density. 8/27/2019 CT scan of the abdomen and pelvis with contrast identified new moderate left hydronephrosis with moderate right hydronephrosis. Mild stranding around the urinary bladder and thickening of the bilateral ureteral wall. Numerous pulmonary nodules. Soft tissue of the left ventral abdominal wall. Slightly increased size of probable lymph node anterior to the aorta measuring 0.9 cm compared to 0.7 cm.  8/27/2019-PET scan did not identify any FDG avid pulmonary nodules or airspace opacities. Abnormal increased metabolic activity within the right pelvic wall soft tissue showing SUV of 5.4. Abnormal soft tissue metabolic activity in the right abductor muscle with SUV of 6.4.   Focally increased activity to the right of the inferior L5 vertebrae body posterior with SUV of 7.9 with associated sclerotic changes. 8/29/2019-  Dr. Dayanara Gomez completed a cystoscopy with double-J ureter stent in the left ureter for left hydronephrosis  9/3/2019- CT-guided right abductor muscle biopsy on 9/3/2019 with pathology identifying metastatic adenocarcinoma consistent with colorectal origin. Molecular panel from biopsy tissue revealed MSI stable and mutations for BRAF, NRAS, KRAS were not detected. 9/18/2019 - Palliative chemotherapy with modified FOLFOX 7  (Oxaliplatin 85 mg/m² IV day 1, leucovorin 400 mg/m² IV day 1 and 5-FU 2400 mg/m² IV continuous infusion over 46 to 48 hours) with the anticipation of adding Avastin 5 mg/kg day 1 every 14 days  10/15/2019- 24-hour urine for protein with a total protein of 1785 mg per 24-hour. Ijeoma Glass has been evaluated by Dr. Alice Orellana and he reports no significant concerns related to the protein. 11/6/19 CEA 5.6 significantly improved compared to CEA of 14.0 on 8/30/2019.  11/15/2019 -CT scan of the abdomen and pelvis documented no evidence of disease progression with significant decrease in the size of enhancing nodules in the right pelvic abductor musculature, a previous 1.8 cm nodule now measures 5 mm. No new or enlarging retroperitoneal, mesenteric, pelvic or inguinal lymph nodes. Calcified presacral mass unchanged measuring 5 x 3.7 cm.  11/15/2019 -CT scan of the chest documented multiple small pulmonary nodules reidentified, largest nodule in the RUL measures 5 mm compared to 7.5 mm, RLL nodule measures 3.4 mm compared to 5.9 mm, IVC nodule measures 4 mm compared to 6 mm. A cluster of small nodules in the RUL anteriorly are barely visible on this study.   There is a decrease in size of mediastinal lymph nodes compared to previous exam, right distal paratracheal lymph node measuring 4.5 mm compared to 8.3 mm and lower right peritracheal node measuring 4.5 mm compared to 8.6 mm.    1/13/2020- CT scan of the abdomen and pelvis with contrast indicated improvement in the right-sided hydronephrosis with a chronic inflammatory process of the ureters suspected due to the moderate thickening, also present on previous study. The small poorly enhancing nodules in the right abductor muscles have decreased in the partially calcified presacral mass and right lateral pelvic wall nodules are stable compared to previous study. Resolution of the subcutaneous abdominal wall nodules. A prominent retroperitoneal lymph node adjacent and anterior to the left common artery is redefined and measures 6 mm, no change from previous exam.  1/13/2020 - CT scan of the chest documented a right lower lobe nodule measures 4.3 cm and is unchanged. A right lower lobe nodule measures 2.8 mm compared to 3.4 mm. Nodule in the right upper lobe is barely visible and measures 2.4 mm. Nodule in the left lower lobe measures 4.8 mm and is unchanged. Nodule in left lower lobe posterior measures 2.8 mm and previously measured 4.7 mm. A right lower lobe posterior medially nodule is barely visible measuring 0.2 mm and previously. measured 4.5 mm. No new nodules identified. No change in the size of the mediastinal lymph nodes. 1/28/2020 -palliative maintenance therapy with leucovorin 400 mg/m² IV over 2 hours on day 1, followed by 5-FU bolus 400 mg/m² and then 1200 mg/m²/day x2 days (total 2400 mg meter squared over 46 to 48 hours) continuous infusion. Repeat every 2 weeks. Only received 1 cycle, further treatment held due to small bowel obstruction. 1/30/2020 - CT scan of the abdomen and pelvis indicated high-grade small bowel obstruction with transition point in the midline posterior pelvis where a small bowel loop is tethered to a partially calcified presacral soft tissue mass.   2/11/2020-CEA 1.4  3/5/2020-  Exploratory laparotomy, removal of adhesions, small bowel resection with primary anastomosis and partial thickness small bowel repair by Dr. Mik Rollins at [facility]. In the operative note Dr. Stormy Benítez reported no evidence of carcinomatosis within the abdomen and the liver was unable to be examined due to extent of right upper quadrant adhesions. Pathology from small intestine documented no evidence of malignancy. 4/15/2020 Ct Chest W Contrast Minimal interval increase in size of subcentimeter pulmonary nodules. The largest now measures 6 mm in the medial right lower lobe on axial image 80. There is a new, unstable, horizontal fracture through the T6 vertebral body. Additionally, there are new fractures through the posterior 11th and 12th right ribs. The bones are moderately osteopenic. The finding of an unstable fracture through the T6 vertebral body was discussed with Ana Mercedes at 10:45 AM on 4/15/2020.  4/15/2020 Ct Abdomen Pelvis W Iv Contrast  Patient has undergone interval resection of the distal small bowel, and there is a 2.8 cm fluid collection in the presacral operative bed. This contains a tiny focus of air. This may postoperative or due to infection. Please correlate with the patient's clinical symptoms and laboratory markers. Improved hydronephrosis and hydroureter. Diffuse osteoporosis. Findings in the lower chest are described in a separate dictation. 4/22/2020-CEA 0.9  6/2/2020-resumed chemotherapy with 5-FU/leucovorin and Panitumumab. Okay to do 1 today then CMP CEA  8/19/20 CEA-1.1  10/21/2020- CEA 2.0  11/11/2020- Ct Chest W Contrast Multiple, too numerous to count, small noncalcified lung nodules bilaterally. The referenced nodules appears to have decreased in size the previous study. No new nodules. 11/11/2020- Ct Abdomen Pelvis W Contrast Unchanged bilateral hydronephrosis, more on the right side. Bilateral ureteral stents in place. Moderate asymmetrical thickening of the incompletely distended urinary bladder. This may partly be due to incomplete distention.  Possibility of chronic cystitis and or chronic partial outlet the left ileostomy bag. The distal stomach is under distended which may be secondary to peristalsis. Gastroparesis considered. Bilateral ureteral stents remain appropriate in position, however there is new moderate to severe right-sided hydronephrosis and mild left-sided hydronephrosis when compared to the 2/9/2021 exam. Mild increase considered within the partially calcified presacral pelvic mass. Stable partially calcified right pelvic soft tissue. Similar abnormal wall thickening of the bladder most notable superiorly. Similar prominence of the intra and extra hepatic bile ducts down to the level of the ampulla. Findings may represent a reservoir effect, however correlation with liver function tests recommended. Therefore. Stable noncalcified left greater than right pulmonary nodules with asymmetrical interstitial changes of the right lung base concerning for pneumonitis. Osteopenia with postoperative changes of the lumbar spine. Chronic compression deformities of the thoracolumbar vertebra as described above. 4/14/2021-I reviewed notes from urology and also CT abdomen/pelvis that showed mild interval increase in the size of the soft tissue nodule in the pelvis. However, this is still very small and therefore will have a short follow-up CT scan and of May 2021. I personally reviewed the CT scans. Really hard to state that there is clear-cut disease progression. Again, short follow-up recommended. Continue current treatment. 5/12/21 CEA 0.8  5/26/2001-CT chest abdomen pelvis showed Partially calcified presacral soft tissue mass appears unchanged. Previously measured soft tissue noncalcified component is unchanged measuring 2.2 x 3.2 cm on axial image 60. However, this is questionable. CT chest showed a stable pulmonary nodules. Subcentimeter nodules. Largest 7.5 mm.  6/1/21 CEA 0.9  06/01/23- reviewed scans. Stable disease.   Continue treatment every 3 weeks as per patient request. Continue infusional 5-FU, Panitumumab and leucovorin. No 5-FU bolus due to severe mucositis. 8/11/2021 CEA .9  9/03/2021 CT Chest w/Contrast Slight interval increase in the size of pulmonary nodules, the largest in the medial right lung base. Findings are concerning for metastatic disease. Atherosclerosis of the aorta and coronary arteries. Sclerotic rib lesions favored for osseous metastases. Old rib fractures also evident. 9/03/2021 CT Abd/Pelvis w/ IV Contrast (Oral) A persistent partially calcified mass in the presacral region with encasement of the distal ureters bilaterally and resultant bilateral hydronephrosis. Bilateral ureteral stents in place. There is increasing right-sided hydronephrosis since the previous study. Right renal atrophy similar to the previous study. Moderate asymmetrical thickening of the incompletely distended urinary bladder is similar to the previous study. This may partly be due to incomplete distention. An inflammatory process or a neoplastic process is not excluded. Moderate intrahepatic biliary dilatation is similar to the previous study and probably due to a prior cholecystectomy. Moderate dilatation of the contrast filled small bowel loops may represent an ileus or partial distal small bowel obstruction. There is left lower abdominal ileostomy which appears patent. The stable compression fractures of the lower thoracic and proximal lumbar vertebrae and hardware fusion similar to the previous study. 9/22/21- Decrease Vectibix to every other treatment. He is currently receiving chemotherapy every 3 weeks as per patient request  11/9/2021 CT Abd/Pelvis WO Contrast No acute posttraumatic findings. Known metastatic disease to the lungs. Posttreatment changes in overall chronic findings as above. 12/27/2021 NM Bone Scan Multiple foci of abnormal increased activity in the ribs bilaterally correspond with previously seen areas of bony sclerosis and old healed fractures. Abnormal activity in the lumbar spine correspond with compression fractures and kyphoplasty of these vertebrae. Probable right hydronephrosis. 2/24/2022 US LE Left  Limited([facility]) No residual fluid in the left lateral thigh. 2/27/2022 CT Abd/Pelvis W Contrast [facility]) Overall unchanged appearance of the right lateral abdominal collection with a percutaneous drain in place and small residual fluid. Multiloculated fluid and gas collection in the pelvis along the right aspect of bladder dome measuring approximately 8 x 4 cm, not significantly changed in size. Postsurgical changes of prior colectomy and small bowel resection. The distal ileum abuts the right flank collection and anterior abdomen in midline prior to ostomy. Unchanged partially calcified presacral and right pelvic sidewall soft tissue. Minimally improved mild left hydroureteronephrosis. Bilateral pulmonary nodules in the visualized lungs, not significantly changed. Small right pleural effusion with associated atelectasis. Scattered areas of hypoattenuation within the right lower lobe could represent developing pneumonia. 2/27/2022 CT Entire LE Left W Contrast ([facility])Interval open incision and drainage of anterolateral left thigh subcutaneous abscess with residual fluid and packing material.  Persistent, slightly improved diffuse subcutaneous edema   throughout the left lower extremity with no new or organizing fluid   collections. 4/10/2022 CT Chest WO Contrast Progressive metastatic disease to the lungs. There are too numerous to count pulmonary nodules the majority of which have increased in size or which are new from the previous study. No evidence of acute consolidative pneumonia. 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 Patient's undergone previous kyphoplasty at the thoracolumbar juncture for compression deformities. There is an accentuated thoracic kyphosis. .  4/10/2022 CT Abd/Pelvis WO Contrast Metastatic disease to the lung bases showing worsening from the previous study. Moderate size hiatal hernia with gastroesophageal reflux. The patient is status post at least partial colectomy. Left lower quadrant ostomy is present. There is no evidence of obstruction. There is an irregular soft tissue mass with some calcification within the pelvis. This extends to and is inseparable from the right posterior lateral urinary bladder wall with potential secondary involvement. This extends into the presacral space. When compared to the previous exam I feel this is increased in size. The urinary bladder cannot be fully assessed as it is decompressed with a Mcgregor catheter. Postoperative changes of the mid lower lumbar spine. There is an accentuated lumbar lordosis with grade 1-2 anterolisthesis of L5 on S1. Compression deformities at T12, L1 and L2 are present with previous kyphoplasty T12 and L1. . 6. Status post right nephrectomy. There is mild dilatation of the upper tracts of the left kidney and left ureter with a double-J intraureteral stent well-positioned. The degree of distention of the upper tracts of the left kidney is improved from the previous exam of November of last year. There is mild urothelial thickening. 4/13/2022 CT Abd/Pelvis WO Contrast When compared to the previous exam of 3 days earlier there is now noted to be moderate small bowel distention. I would favor a adynamic ileus. A distal obstruction at the site of the patient's ileostomy is also in the differential but felt less likely. There is mild distention of the stomach. A moderate size hiatal hernia is present. Mild to moderate dilatation of the upper tracts of the left kidney. A left-sided double-J ureteral stent is in place. There is mild urothelial thickening. I do not see evidence of a discrete ureteral stone. The stent is well-positioned. Partially calcified pelvic mass. This is inseparable from the right posterior lateral wall of the urinary bladder along its lower margin. A Mcgregor catheter is in place within the bladder. Chronic-appearing fractures within the thoracic and lumbar spine with a gibbus deformity at the thoracolumbar juncture and previous kyphoplasty at T12-L1. There is a small amount of free fluid within the subhepatic space and Morison space. A small right-sided effusion is present with multiple pulmonary nodules within the lower lobes consistent with metastatic disease to the lungs. There is a moderate size hiatal hernia present. 04/19/22- CT guided biopsy consistent with colon cancer metastasis. 5/2/2022- resuming chemotherapy with FOLFIRI/panitumumab for progressive colonic adenocarcinoma pulmonary metastasis. 5/25/2022 CXR (2VW) Chronic lung changes with mild right basilar infiltrate or atelectasis. ONCOLOGIC HISTORY # Rectal carcinoma:  Judith Cline has a history of moderately differentiated rectal carcinoma, T3N0Mx, diagnosed in 3/9/2009. He received neoadjuvant chemotherapy with radiation and resection with J-pouch. He has been routinely followed at [facility] and was last seen by Dr. Reatha Kanner on 1/10/2019. The following are pertinent findings related to his diagnosis and treatment. 3/9/2009- Esophagogastroduodenoscopy with biopsy by Dr. Eduardo Hanks that revealed rectal mass at 8 cm with pathology being consistent with moderately differentiated adenocarcinoma. 3/18/2019-Dr.Elizabeth William Calixto at [address] [place] [address] performed a flexible sigmoidoscopy and rectal endoscopic ultrasound that revealed the tumor extending 6-7 mm deep through the muscularis propria layer and into the serosa, T3 lesion. 4/9/2009-5/27/2009-received neoadjuvant chemotherapy with 5-FU CIV along with radiation therapy for a total of 5400 cGy under the direction of Dr. Wade Wheeler. 7/15/2009-rectum resection revealed no residual malignancy. 22 regional nodes were negative for malignancy. The rectum distal doughnut was negative for malignancy.   He was documented to have a complete pathological response. 9/9/2009- Ileostomy excision pathology revealed small bowel wall with changes consistent with ileostomy site. Pathology negative for malignancy  4/11/2022 Renal Complete Prior right nephrectomy. The cortical thickness and echogenicity of the left kidney are normal. The left kidney is normal in size. There is mild to moderate dilatation of the left renal collecting system predominantly involving the lower pole. The patient reportedly has a double-J ureteral stent in place. The stent is not well seen on ultrasound.            Current Facility-Administered Medications   Medication Dose Route Frequency Provider Last Rate Last Admin    melatonin disintegrating tablet 5 mg  5 mg Oral Nightly PRN Carmelo Motta MD   5 mg at 07/28/22 1957    fentaNYL (DURAGESIC) 25 MCG/HR 1 patch  1 patch TransDERmal Q72H Franchesca Hummel PA-C   1 patch at 07/28/22 0859    HYDROmorphone HCl PF (DILAUDID) injection 1 mg  1 mg IntraVENous Q2H PRN Franchesca Hummel PA-C   1 mg at 07/29/22 0330    piperacillin-tazobactam (ZOSYN) 3,375 mg in dextrose 5 % 50 mL IVPB extended infusion (mini-bag)  3,375 mg IntraVENous Q8H Dinorah Rich MD 12.5 mL/hr at 07/29/22 0543 3,375 mg at 07/29/22 0543    HYDROmorphone (DILAUDID) tablet 4 mg  4 mg Oral Q3H PRN Dinorah Rich MD   4 mg at 07/29/22 0544    metoprolol succinate (TOPROL XL) extended release tablet 50 mg  50 mg Oral Daily OLGA Segovia   50 mg at 07/28/22 0815    calcitRIOL (ROCALTROL) capsule 0.25 mcg  0.25 mcg Oral Daily Clint Hutton APRN   0.25 mcg at 07/28/22 0815    calcium-cholecalciferol 500-200 MG-UNIT per tablet 1 tablet  1 tablet Oral BID OLGA Segovia   1 tablet at 07/28/22 1957    DULoxetine (CYMBALTA) extended release capsule 30 mg  30 mg Oral Daily Clint Hutotn APRN   30 mg at 07/28/22 0815    ferrous sulfate (IRON 325) tablet 325 mg  325 mg Oral TID  OLGA Segovia        [Held by provider] furosemide (LASIX) tablet 40 mg  40 mg Oral Daily Genaro Cram, APRN        [Held by provider] magnesium oxide (MAG-OX) tablet 400 mg  400 mg Oral Daily [place] Cram, APRN        lactobacillus (CULTURELLE) capsule 1 capsule  1 capsule Oral Daily [place] Cram, APRN   1 capsule at 07/28/22 0815    trospium (SANCTURA) tablet 20 mg  20 mg Oral Nightly Genaro Cram, APRN   20 mg at 07/28/22 1958    pantoprazole (PROTONIX) tablet 40 mg  40 mg Oral BID AC Genaro Cram, APRN   40 mg at 07/29/22 0544    sodium bicarbonate tablet 650 mg  650 mg Oral 4x Daily [place] Cram, APRN   650 mg at 07/28/22 1957    sodium chloride flush 0.9 % injection 5-40 mL  5-40 mL IntraVENous 2 times per day [place] Cram, APRN   10 mL at 07/28/22 0818    sodium chloride flush 0.9 % injection 5-40 mL  5-40 mL IntraVENous PRN Genaro Cram, APRN        0.9 % sodium chloride infusion   IntraVENous PRN [place] Cram, APRN        enoxaparin (LOVENOX) injection 40 mg  40 mg SubCUTAneous Daily Genaro Cram, APRN   40 mg at 07/28/22 0814    ondansetron (ZOFRAN-ODT) disintegrating tablet 4 mg  4 mg Oral Q8H PRN [place] Cram, APRN        Or    ondansetron [facility]) injection 4 mg  4 mg IntraVENous Q6H PRN [place] Cram, APRN        polyethylene glycol (GLYCOLAX) packet 17 g  17 g Oral Daily PRN Genaro Cram, APRN        acetaminophen (TYLENOL) tablet 650 mg  650 mg Oral Q6H PRN [place] Cram, APRN   650 mg at 07/28/22 1957    Or    acetaminophen (TYLENOL) suppository 650 mg  650 mg Rectal Q6H PRN [place] Cram, APRN        naloxone [facility]) injection 0.4 mg  0.4 mg IntraVENous PRN Pauline Hudson PA-C        ALPRAZolam Jennett Dubin) tablet 0.5 mg  0.5 mg Oral Q8H PRN Pauline Hudson PA-C   0.5 mg at 07/29/22 0544    cyclobenzaprine (FLEXERIL) tablet 10 mg  10 mg Oral TID PRN [place] Cram, APRN   10 mg at 07/29/22 0539       Allergies:    Allergies   Allergen Reactions    Morphine Anxiety         Objective   BP (!) 102/58 Pulse 88   Temp 97.7 °F (36.5 °C) (Temporal)   Resp 18   Ht 5' 5\" (1.651 m)   Wt 170 lb (77.1 kg)   SpO2 94%   BMI 28.29 kg/m²     PHYSICAL EXAM:  CONSTITUTIONAL: Alert, appropriate, uncomfortable  EYES: Non icteric  ENT: Mucus membranes moist  NECK: Supple, no masses. CHEST/LUNGS: CTA bilaterally, normal respiratory effort   CARDIOVASCULAR: RRR  ABDOMEN: soft non-tender, active bowel sounds, no HSM. No palpable masses  EXTREMITIES: warm,  no focal weakness. SKIN: warm, dry with no rashes or lesions  LYMPH: No cervical, clavicular, axillary, or inguinal lymphadenopathy  NEUROLOGIC: follows commands, non focal     LABORATORY RESULTS REVIEWED/ANALYZED BY ME:  Recent Labs     07/28/22  0420 07/27/22  0550 07/25/22  1444   WBC 8.6 7.9 9.4   HGB 8.6* 9.0* 8.6*   HCT 27.8* 29.0* 28.7*   MCV 89.7 87.3 92.0    390 427*       Lab Results   Component Value Date     (L) 07/28/2022    K 4.1 07/28/2022     07/28/2022    CO2 25 07/28/2022    BUN 15 07/28/2022    CREATININE 1.4 (H) 07/28/2022    GLUCOSE 101 07/28/2022    CALCIUM 8.9 07/28/2022    PROT 7.4 07/27/2022    LABALBU 3.2 (L) 07/27/2022    BILITOT <0.2 07/27/2022    ALKPHOS 159 (H) 07/27/2022    AST 24 07/27/2022    ALT 8 07/27/2022    LABGLOM 50 (A) 07/28/2022    GFRAA >59 07/28/2022    AGRATIO 1.9 11/24/2021    GLOB 2.2 11/24/2021       Lab Results   Component Value Date    INR 1.02 04/18/2022    INR 1.06 04/13/2022    INR 1.04 11/09/2021    PROTIME 13.3 04/18/2022    PROTIME 13.7 04/13/2022    PROTIME 13.8 11/09/2021       RADIOLOGY STUDIES REPORT/REVIEWED AND INTERPRETED BY ME:  CT ABDOMEN PELVIS WO CONTRAST Additional Contrast? None    Result Date: 7/27/2022  1. No features of small bowel obstruction as compared to previous scan dated 06/29/2022. No intraabdnominal fluid. 2. Presacral fluid collection as described. Left hydroureteronephrosis. 3. Right pleural effusion and multiple lungs metastatic lesion as described 4.  Bladder wall thickening as described - Suggested Cystoscopy correlation Recommendation: Follow up as clinically indicated. All CT scans at this facility utilize dose modulation, iterative reconstruction, and/or weight based dosing when appropriate to reduce radiation dose to as low as reasonably achievable. Electronically Signed by Mattie Bonilla MD at 27-Jul-2022 08:46:22 AM             CT ABDOMEN PELVIS WO CONTRAST Additional Contrast? None    Result Date: 7/7/2022  1. Right-sided pleural effusion, stable and unchanged 2. Interval development of right lower lobe consolidation 3. Innumerable pulmonary nodules consistent with metastatic disease 4. The gallbladder is not visualized 5. The right kidney is not visualized 6. Postop changes status post colostomy 7. Postop and degenerative changes of the lumbar spine, stable and unchanged 8. Status post left double J ureteral stent placement, stable and unchanged 9. Bladder will thickening consistent with chronic inflammatory disease i.e. cystitis. Please correlate 10. The appendix is not visualized Recommendation: Follow up as clinically indicated. All CT scans at this facility utilize dose modulation, iterative reconstruction, and/or weight based dosing when appropriate to reduce radiation dose to as low as reasonably achievable. Electronically Signed by Adria Armstrong at 07-Jul-2022 02:37:25 AM             CT ABDOMEN PELVIS WO CONTRAST Additional Contrast? None    Result Date: 6/29/2022  1. Evidence of small bowl obstruction possible at the level of ileal anastomosis as described. No intra-abdominal free fluid. 2.  Presacral fluid collection as described. Left hydroureteronephrosis. 3.  Right pleural effusion and multiple lungs metastatic lesion as described. Recommendation: Follow up as clinically indicated.  All CT scans at this facility utilize dose modulation, iterative reconstruction, and/or weight based dosing when appropriate to reduce radiation dose to as low as reasonably Follow up as clinically indicated. All CT scans at this facility utilize dose modulation, iterative reconstruction, and/or weight based dosing when appropriate to reduce radiation dose to as low as reasonably achievable. Electronically Signed by Orly Oden MD at 27-Jul-2022 08:41:54 AM             XR CHEST PORTABLE    Result Date: 7/5/2022  Bilateral infiltrate and right pleural effusion as described. Recommendation: Follow up as clinically indicated. Electronically Signed by Shavon Torres MD at 05-Jul-2022 06:53:38 PM             XR CHEST PORTABLE    Result Date: 6/29/2022  Scattered interstitial nodular densities throughout both lungs and coarse right infrahilar markings centrally. 4 level cervical fusion device in good repair NG tube with tip in the upper stomach Right access transjugular catheter with tip in the right atrium Two contiguous kyphoplasty injections lower dorsal spine. Question small right pleural effusion. Recommendation: Follow up as clinically indicated. Electronically Signed by Tyrone Ramos MD at 29-Jun-2022 10:40:16 AM                My interpretation:lytic lesion T9      ASSESSMENT:  #Lytic lesion T9  This is certainly a metastatic bone lesion. The patient has recently been diagnosed with invasive bladder cancer. The differential diagnosis includes metastatic colonic adenocarcinoma metastatic high-grade urothelial carcinoma the bladder. 07/27/22- CT Abdomen/pelvis no features of small bowel obstruction as compared to previous scan dated 06/29/2022. No intraabdnominal fluid. Presacral fluid collection as described. Left hydroureteronephrosis. Right pleural effusion and multiple lungs metastatic lesion as described. 07/27/22-CT chest w/o contrast multiple small (1 mm - 16 mm) sized scattered innumerable nodular infiltrates are studded throughout bilateral lung parenchyma, predominantly involving both lower lobes. Possibility of neoplastic lesions.  Moderate right side pleural effusion with passive sub segmental collapse of right middle and lower lobe. Mild effusion is noted involving right oblique fissure. Ill-defined osteolytic lesion is noted involving T10 vertebral body-appears metastasis   07/27/22- CT Thoracic spine significant spondylosis, osteoporosis and multiple level compression fracture at upper lumbar levels. Increase thoracolumbar kyphosis. Lytic lesion with soft tissue component and mild posterior bulge noted in body of T9 vertebrae concerning for metastasis. Pulmonary metastasis. Pleural effusions. 07/27/22- CT Lumbar spine multilevel spondylosis with spondylolisthesis as described. Spinal stabilization device at L5-S1 with no periprosthetic loosening or implant fracture. Anterior wedge compressions of L1 and L2. MRI T/L-spine 7/28/2022  Exam: MRI OF THE LUMBAR SPINE WITHOUT AND WITH CONTRAST   Clinical data: Exclude osseous metastasis. History of colon cancer, history of cervical spine and lumbar spine surgery. Patient has pain pump. Technique: Multiplanar multisequence MRI of the lumbar spine without and with contrast. Axial imaging was performed through the L1 through S1 disc levels. Contrast used: MultiHance. Amount: 15 mL. Prior studies: CT scan of the lumbar spine dated 7/27/2022. Findings: For the purposes of this examination it was assumed that there are five non rib bearing lumbar type vertebrae with the inferior labeled L5. Lumbarized S1 with hydrated S1-2 disc. Prior L5-S1 fusion with hardware noted. Normal lordotic   curvature. Moderate L1 and mild L2 compression deformities with mild retropulsion of posterior superior L1 cortex. Prior T12 and L1 kyphoplasties. 4 mm anterolisthesis of L3 with respect to L2. Normal vertebral body and intervertebral disc heights. Normal marrow signal of the vertebrae. Conus medullaris in normal anatomic position. No abnormal extra-axial masses. Soft tissues are unremarkable.    Level by leveldisease is present as follows:   T12-L1: There is no abnormally positioned disc material. There is no significant spinal canal, lateral recess, neural foramina compromise, or nerve impingement. L1-L2: There is no abnormally positioned disc material. There is no significant spinal canal, lateral recess, neural foramina compromise, or nerve impingement. L2-L3: Moderate spondylosis, facet hypertrophy and listhesis, results in moderate bilateral foraminal stenosis. L3-L4 L4-L5: 1 mm disc bulges with mild facet hypertrophy. There is no significant spinal canal, lateral recess, neural foramina compromise, or nerve impingement. L5-S1: Prior fusion. Mild residual spondylosis. There is no significant spinal canal, lateral recess, neural foramina compromise, or nerve impingement. Limited assessment of the right foramen at this level. Post contrast imaging demonstrates no abnormal cord, leptomeningeal or soft tissue enhancement. Impression:     1. Moderate L1 and mild L2 compression deformities with mild retropulsion of the posterior superior L1 cortex, as well as prior T12, L1 kyphoplasties. 2.  L2-3: Moderate spondylosis, facet hypertrophy and listhesis, resulting in moderate bilateral foraminal stenosis. 3.  Additional findings: L3-4, L4-5: Mild spondylosis with facet hypertrophy. L5-S1: Prior fusion with mild residual spondylosis. Limited assessment of the right foramen at this level. Lumbarized S1. MRI thoracic spine  EXAM: MRI OF THE THORACIC SPINE WITHOUT AND WITH CONTRAST   CLINICAL DATA: Osseous metastasis. TECHNIQUE: Multiplanar multi-sequence MRI of the thoracic spine without and with gadolinium. A counting  radiograph is provided. Contrast: Applied. PRIOR STUDIES:CT scan of the thoracic spine dated 7/27/2022. FINDINGS: Prior multilevel cervical fusion to the T1 level.   Replacement of T9 vertebral body with abnormal heterogeneous diminished T1 and increased T2 and inversion recovery signal with compromise, or nerve impingement. T9-T10: There is no abnormally positioned disc material. There is no significant spinal canal, lateral recess, neural foramina compromise, or nerve impingement. T10-T11: There is no abnormally positioned disc material. There is no significant spinal canal, lateral recess, neural foramina compromise, or nerve impingement. T11-T12: There is no abnormally positioned disc material. There is no significant spinal canal, lateral recess, neural foramina compromise, or nerve impingement. T12-L1: There is no abnormally positioned disc material. There is no significant spinal canal, lateral recess, neural foramina compromise, or nerve impingement. No other abnormal leptomeningeal, cord or epidural enhancement. Impression:     1. Findings consistent with pathologic infiltration such as can be seen in metastatic disease involving T9 with rounded expansion of the posterior cortex into the canal.   2.  Mild multilevel spondylosis with convex right upper thoracic scoliosis. 3.  Prior L1 compression deformity with mild retropulsion of posterior superior cortex. Prior vertebroplasty involving T12 and L1. Partial fusion involving T10-T12. #Colon cancer stage IV (lung metastasis )-chemotherapy on hold. Last chemotherapy delivered 6/1/2022. Patient has had recent hospitalizations. Chemotherapy has been on hold due to frequent hospitalizations and poor performance status. Unfortunately, we have not been able to keep a consistence on his treatment schedule. Certainly with the current infection we will continue to hold this. Palliative care also following.     Multifactorial anemia  Hemoglobin 8.6/MCV 89              Cancer related pain  -Hydromorphone 1.5 mg prn IV  -Hydromorphone 4 mg p.o. every 4 hours as needed  -Fentanyl 50 mcg every 72 hours  -Appreciated palliative care recommendations  -Patient has a pain pump    Renal impairment, CKD stage III  -Cr 1.4    Invasive urothelial carcinoma of the bladder  Last cystoscopy performed 7/17/2022 with biopsies showed evidence of bladder lesion. Biopsy consistent with invasive high-grade urothelial carcinoma. DVT prophylaxis-Lovenox    T9 lytic lesion  MRI thoracic/lumbar spine showed:  Replacement of T9 vertebral body with abnormal heterogeneous diminished T1 and increased T2 and inversion recovery signal with enhancement, demonstrating rounded posterior expansion into the canal.  -Discussed with neurosurgery, Dr. Raina John on 7/29/2022. Patient is not a candidate for surgery intervention.    -Discussed with Dr. Prashanth Be, [facility] radiation oncology. Patient is a candidate for palliative RT for pain control.  -Discussed with patient daughter/Dr. Drew Bauman. Their priority is pain control at this time. We discussed about possible biopsy as this could represent metastatic bladder cancer, cancer. I believe that metastatic bladder cancer is more likely given the frequent Parden of bone metastasis and bladder cancer. However, they are not interested in biopsy at this time until his pain is under control. We will rediscuss later goals/expectations regarding his cancer care. PLAN:  MRI T/L spine -formal reports are pending  Further treatment recommendations after MRI results, Dr. Veronica Velasquez evaluation  Continue pain meds  Palliative care following  Plan is for outpatient referral to radiation, hopefully next Monday      (Please note that portions of this note were completed with a voice recognition program. Efforts were made to edit the dictations but occasionally words are mis-transcribed.)    Ming Oshea PA-C    07/29/22  7:06 AM   Physician's attestation/substantial contribution:  I, Dr Roseanne Spears, independently performed an evaluation on [facility]. I have reviewed relevant medical information/data to include but not limited to medication list, relevant appropriate labs and imaging when applicable.  I reviewed other physician's notes, ancillary services and nurses assessment. I have reviewed the above documentation completed by the Nurse Practitioner or Physician Assistant. Please see my additional contributions to the history of present illness, physical examination, and assessment/medical decision-making that reflect my findings and impressions. I have seen and examined the patient and the key elements of all parts of the encounter have been performed by me. I agree with the assessment and plan as outlined by the ARNP/PA. Subjective-continues to complain of significant mid back pain. Discussed with neurosurgeon, radiation oncology, Dr. Mckayla Bahena and patient's daughter. Objective-chronically appearing  Assessment/plan:  T9 lytic lesion  -Discussed with neurosurgery, Dr. Tanika Sandoval on 7/29/2022. Patient is not a candidate for surgery intervention.    -Discussed with Dr. Ese Ogden, [facility] radiation oncology. Patient is a candidate for palliative RT for pain control.  -Discussed with patient daughter/Dr. Roya Andrade. Their priority is pain control at this time. We discussed about possible biopsy as this could represent metastatic bladder cancer, cancer. I believe that metastatic bladder cancer is more likely given the frequent Parden of bone metastasis and bladder cancer. However, they are not interested in biopsy at this time until his pain is under control. We will rediscuss later goals/expectations regarding his cancer care.       Gi Link MD
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
## 2022-07-29 NOTE — PROGRESS NOTES
Palliative Care Progress Note  7/29/2022 3:51 PM    Patient:  Kanchan Valdovinos  YOB: 1950  Primary Care Physician: Silva Joseph MD  Advance Directive: Full Code  Admit Date: 7/27/2022       Hospital Day: 2  Portions of this note have been copied forward, however, changed to reflect the most current clinical status of this patient. CHIEF COMPLAINT/REASON FOR CONSULTATION Goals of care, code status, family support    SUBJECTIVE:  Mr. Laura Thakkar has been able to get some rest intermittently. He is still utilizing Dilaudid frequently. Flexeril has also helped w/ sleep. He complains of increase in gas w/ loose contents in his ileostomy pouch and states he takes 9 imodiums daily at home for this. He was seen again this afternoon and reports uncontrolled pain. He states \"I can't live like this\" He wants to sit on the side of the bed with a goal of being able to ambulate a short distance to the bedside chair but reports crying out in pain with minimal movement. Review of Systems:   14 point review of systems is negative except as specifically addressed above. Objective:   VITALS:  BP (!) 147/83   Pulse (!) 117   Temp 97.2 °F (36.2 °C) (Temporal)   Resp 14   Ht 5' 5\" (1.651 m)   Wt 170 lb (77.1 kg)   SpO2 94%   BMI 28.29 kg/m²   24HR INTAKE/OUTPUT:    Intake/Output Summary (Last 24 hours) at 7/29/2022 1551  Last data filed at 7/29/2022 1453  Gross per 24 hour   Intake 340 ml   Output 950 ml   Net -610 ml     General appearance: 82 yo male, no acute distress, resting on right side  Head: Normocephalic, without obvious abnormality, atraumatic  Eyes: conjunctivae/corneas clear. PERRL, EOM's intact.    Ears: normal external ears and nose  Neck: no JVD, supple, symmetrical, trachea midline   Lungs: coarse air entry, respirations even and unlabored    Heart: tachycardic, regular rhythm, S1, S2 normal, no murmur  Abdomen:soft, non-tender; non-distended, bowel sounds present, ileostomy present  Extremities:No lower extremity edema,  No erythema, no tenderness to palpation  Skin: warm, dry  Neurologic: Alert and oriented X 3, generalized weakness, abnormal tone. No focal deficits  Psychiatric: Calm, appropriate mood/affect     Medications:      sodium chloride        fentaNYL  1 patch TransDERmal Q72H    piperacillin-tazobactam  3,375 mg IntraVENous Q8H    metoprolol succinate  50 mg Oral Daily    calcitRIOL  0.25 mcg Oral Daily    calcium-cholecalciferol  1 tablet Oral BID    DULoxetine  30 mg Oral Daily    ferrous sulfate  325 mg Oral TID WC    [Held by provider] furosemide  40 mg Oral Daily    magnesium oxide  400 mg Oral Daily    lactobacillus  1 capsule Oral Daily    trospium  20 mg Oral Nightly    pantoprazole  40 mg Oral BID AC    sodium bicarbonate  650 mg Oral 4x Daily    sodium chloride flush  5-40 mL IntraVENous 2 times per day    enoxaparin  40 mg SubCUTAneous Daily     melatonin, HYDROmorphone, HYDROmorphone, sodium chloride flush, sodium chloride, ondansetron **OR** ondansetron, polyethylene glycol, acetaminophen **OR** acetaminophen, naloxone, ALPRAZolam, cyclobenzaprine  ADULT DIET;  Regular     Lab and other Data:     Recent Labs     07/27/22  0550 07/28/22  0420 07/29/22  0725   WBC 7.9 8.6 8.1   HGB 9.0* 8.6* 8.4*    369 376     Recent Labs     07/27/22  1113 07/28/22  0420 07/29/22  0725   * 133* 135*   K 4.6 4.1 4.1    100 99   CO2 22 25 26   BUN 20 15 14   CREATININE 1.5* 1.4* 1.5*   GLUCOSE 99 101 99     Recent Labs     07/27/22  1113 07/29/22  0725   AST 24 11   ALT 8 <5*   BILITOT <0.2 <0.2   ALKPHOS 159* 125     Assessment/Plan   Principal Problem:    Intractable back pain  Active Problems:    Metastatic adenocarcinoma (HCC)    Urothelial carcinoma of kidney, right (HCC)    Palliative care patient    Chronic kidney disease    Chronic pain disorder    Intractable pain    Colon cancer metastasized to lung [facility])    Cancer related pain    Coronary artery disease involving native coronary artery of native heart without angina pectoris    Colorectal cancer ([facility] [address]    Metastasis from colon cancer ([facility] [address]    History of small bowel obstruction  Resolved Problems:    * No resolved hospital problems. *    Visit Summary:  Mr. Ryan Ruvalcaba was seen at bedside throughout the morning. On my first visit he was resting comfortably on his right side and was asleep. I returned when he awoke and he reports he was able to get about 6 hours of sleep last night. He does continue to need prn Dilaudid when it is available. I do feel an increase in his Duragesic patch to 50 mcg would be beneficial and he is agreeable to this. He understands risks/benefits of high-dose opiate therapy and feels benefits >risks at this time. He has tolerated high dose opiates for several years due to chronic pain. Currently, he feels he is unable to work with PT/OT due to severity of pain with movement. His oral intake is diminished as he has been unable to sit up to eat. I raised and tilted the bed to allow for ease of consuming breakfast. His brother is at bedside to assist. I notified RN as well so she can help adjust the bed down to minimal height when he is done. He is hopeful he will be a candidate for radiation therapy after speaking with Oncology and Neurosurgery. Family did inquire about radiofrequency ablation. D/w Oncology and this may not be an option given the size of patient's lesion. Nelson Jin also states he's had loose output with increase in gas to his ileostomy pouch. He takes imodium x 9 at home for this. Will request RN to evaluate and would recommend resumption of Imodium if this continues. Addendum: Patient's pain is uncontrolled this afternoon. He has a goal to receive radiation therapy if offered. We want to facilitate comfort and radiation if possible. I discussed the above with Dr. Naty Blackmon and reviewed medications.  We feel that Mr. Ryan Ruvalcaba will benefit from and tolerate Fentanyl 100 mcg at this time. This is based on the conversation from the amount of PO/IV Dilaudid received in the last 24 hours. If pain remains uncontrolled in 24 hours could increase Fentanyl to 125 mcg and increase PO Dilaudid to 6 mg or 8 mg q 3 hrs prn. Mr. Jailyn Ramires has been on high dose Methadone in the past for pain and he will continue to require high dose opioid treatment for relief. If pain remains resistant to aggressive increases in long and short acting medication, another approach to consider would be subcutaneous Dilaudid via CADD pump as patient's goal is to go home, though this may better be facilitated with hospice. If there are any needs or questions over the weekend, Dr. Grabiel Long will be available to take a phone call on this patient. Recommendations:   Palliative care: GOC unchanged-pain control, considering radiation therapy at this time. Code status: Full code-feel additional conversation is warranted once pain is improved as this remains his focus at this time ACP completed   Intractable back pain w/ new T9 lesion (radiology report incorrect per NSG)/Cancer related pain-Pain pump in place, d/w Oncology-feel Duragesic needs to be increased 100 mcg given frequent need for Dilaudid, MRI images reviewed. Continue prn Dilaudid and utilize PO before IV, IV for breakthrough only. Discussed w/ RN that patient will need higher doses due to opioid dependence for long standing history of chronic pain. Dexamethasone on hold for now-will defer to Neurosurgery timing of that addition if it is felt it will be beneficial   Colonic adenocarcinoma w/ lung metastasis on palliative chemotherapy-followed closely by Dr. Dillan Dc, s/p ileostomy, resolved bowel obstruction 06/2022-monitor ileostomy output closely with increase in opioid therapy, concern that w/ patient's friable bowel he may not tolerate radiation therapy.  Consider need to resume his Imodium if output increases to avoid dehydration  Anxiety-continue Xanax as needed  Invasive urothelial carcinoma s/p right nephroureterectomy-followed as OP by Dr. Tolentino Height  CKD III-noted, monitor, stable     Thank you for consulting Palliative Care and allowing us to participate in the care of this patient.    Time Spent Counseling > 50%:  YES                                   Total Time Spent with patient/family counseling, workup/treatment review, counseling and placement of orders/preparation of this note: 65 minutes    Electronically signed by Dave Shankar PA-C on 7/29/2022 at 3:51 PM    (Please note that portions of this note were completed with a voice recognition program.  Nasima Lo made to edit the dictations but occasionally words are mis-transcribed.)

## 2022-07-29 NOTE — PROGRESS NOTES
[facility]      Patient:  Wilder Burnham  YOB: 1950  Date of Service: 7/29/2022  MRN: [id number]   Acct: [de-identified]   Primary Care Physician: Silvina Sparrow MD  Advance Directive: Full Code  Admit Date: 7/27/2022       Hospital Day: 2  Portions of this note have been copied forward, however, changed to reflect the most current clinical status of this patient. CHIEF COMPLAINT intractable back pain    SUBJECTIVE: States that he feels much better than he did but continues to have pain. Awaiting MRI results upon assessment. Labs and vitals remained relatively stable. He had requested something for his ileostomy output-however discussed concern for bowel obstruction due to titration of opiates. He verbalized understanding. CUMULATIVE HOSPITAL STAY:  The patient is a 81 y.o. male with a PMH with a PMH of metastatic colorectal cancer with bowel resection and ileostomy formation, bladder cancer, CKD,HTN, chronic pain, CAD, and small bowel obstruction who presented to [address] [place] [address] ED on 7/27/2022 complaining of intractable back pain. He stated that the pain is sharp and shooting to the middle of his back. He has tried ibuprofen and going to the chiropractor however has had little to no relief of his pain. He states that it started approximately 5 days ago and has worsened over the last 3, prior to his admission. He stated that he was at the point he was unable to ambulate to the toilet or to perform his ADLs, therefore he called EMS for further evaluation. He denied fever, chills, nausea or vomiting. Denied chest pain, cough or shortness of breath. He denies any recent trauma. He denied numbness or weakness to his extremities he states that it is just difficult to ambulate due to the pain in his middle back. Further ED work-up revealed CT of the thoracic spine without contrast showed significant spondylosis, osteoporosis and multilevel compression fracture of the upper lumbar levels increased thoracolumbar kyphosis. Lytic lesion with soft tissue component and mild posterior bulge noted in the body of the T10 vertebrae-Metastasis with multiple pulmonary metatasis and pleural effusions. CT of the L-spine showed multilevel spondylosis and spondylolisthesis with spinal stable at station device at L5-S1. Anterior wedge compressions of L1 and L2.  CT of the abdomen and pelvis showed presacral fluid collection with a left hydroureteronephrosis and bladder wall thickening. Chemistries-, K4.6, , CO2 22, BUN 20 and creatinine 1.5. WBC 7.9, H&H 9.0/29.0 with platelet count of 919. He was admitted to hospital medicine for intractable back pain likely due to metastasis with neurosurgery consultation, oncology consultation, and palliative care consultation for pain management and further work-up. Palliative and oncology slowly titrating pain medications to achieve comfort. Currently on Duragesic patch, as needed IV Dilaudid, as needed Flexeril and Xanax for anxiety. MRI of the thoracic spine to further characterize lesion in progress. Mag this a.m. 1.4-Home mag restarted. Review of Systems:   Review of Systems   Constitutional:  Positive for activity change and fatigue. Negative for chills, diaphoresis and fever. HENT:  Negative for congestion and ear pain. Eyes:  Negative for visual disturbance. Respiratory:  Negative for cough, shortness of breath and wheezing. Cardiovascular:  Negative for chest pain, palpitations and leg swelling. Gastrointestinal:  Negative for abdominal distention, abdominal pain, blood in stool, constipation, diarrhea, nausea and vomiting. Endocrine: Negative for cold intolerance and heat intolerance. Genitourinary:  Negative for difficulty urinating, flank pain, frequency and urgency. Musculoskeletal:  Positive for back pain and gait problem. Negative for arthralgias and myalgias. Skin:  Negative for color change and wound.    Neurological:  Negative for dizziness, syncope, weakness, light-headedness, numbness and headaches. Hematological:  Does not bruise/bleed easily. Psychiatric/Behavioral:  Negative for agitation, confusion and dysphoric mood. 14 point review of systems is negative except as specifically addressed above. Objective:   VITALS:  BP (!) 133/91   Pulse (!) 117   Temp 97.2 °F (36.2 °C) (Temporal)   Resp 16   Ht 5' 5\" (1.651 m)   Wt 170 lb (77.1 kg)   SpO2 94%   BMI 28.29 kg/m²   24HR INTAKE/OUTPUT:    Intake/Output Summary (Last 24 hours) at 7/29/2022 1749  Last data filed at 7/29/2022 1453  Gross per 24 hour   Intake 340 ml   Output 850 ml   Net -510 ml       Physical Exam  Constitutional:       General: He is not in acute distress. Appearance: Normal appearance. He is obese. He is ill-appearing. HENT:      Head: Normocephalic and atraumatic. Right Ear: External ear normal.      Left Ear: External ear normal.      Nose: Nose normal.      Mouth/Throat:      Mouth: Mucous membranes are moist.   Eyes:      Extraocular Movements: Extraocular movements intact. Conjunctiva/sclera: Conjunctivae normal.      Pupils: Pupils are equal, round, and reactive to light. Cardiovascular:      Rate and Rhythm: Normal rate and regular rhythm. Pulses: Normal pulses. Heart sounds: Normal heart sounds. Pulmonary:      Effort: Pulmonary effort is normal. No respiratory distress. Breath sounds: Rhonchi present. No wheezing or rales. Abdominal:      General: Bowel sounds are normal. There is no distension. Palpations: Abdomen is soft. Tenderness: There is no abdominal tenderness. Musculoskeletal:         General: No swelling, tenderness or deformity. Normal range of motion. Cervical back: Normal range of motion and neck supple. No muscular tenderness. Right lower leg: No edema. Left lower leg: No edema. Skin:     General: Skin is warm and dry. Findings: No bruising or lesion. Neurological:      Mental Status: He is alert and oriented to person, place, and time. Motor: Weakness (Diffuse) present. Psychiatric:         Mood and Affect: Mood is anxious. Behavior: Behavior normal.         Thought Content: Thought content normal.           Medications:      sodium chloride        fentaNYL  1 patch TransDERmal Q72H    piperacillin-tazobactam  3,375 mg IntraVENous Q8H    metoprolol succinate  50 mg Oral Daily    calcitRIOL  0.25 mcg Oral Daily    calcium-cholecalciferol  1 tablet Oral BID    DULoxetine  30 mg Oral Daily    ferrous sulfate  325 mg Oral TID WC    [Held by provider] furosemide  40 mg Oral Daily    magnesium oxide  400 mg Oral Daily    lactobacillus  1 capsule Oral Daily    trospium  20 mg Oral Nightly    pantoprazole  40 mg Oral BID AC    sodium bicarbonate  650 mg Oral 4x Daily    sodium chloride flush  5-40 mL IntraVENous 2 times per day    enoxaparin  40 mg SubCUTAneous Daily     melatonin, HYDROmorphone, HYDROmorphone, sodium chloride flush, sodium chloride, ondansetron **OR** ondansetron, polyethylene glycol, acetaminophen **OR** acetaminophen, naloxone, ALPRAZolam, cyclobenzaprine  ADULT DIET; Regular     Lab and other Data:     Recent Labs     07/27/22  0550 07/28/22  0420 07/29/22  0725   WBC 7.9 8.6 8.1   HGB 9.0* 8.6* 8.4*    369 376     Recent Labs     07/27/22  1113 07/28/22  0420 07/29/22  0725   * 133* 135*   K 4.6 4.1 4.1    100 99   CO2 22 25 26   BUN 20 15 14   CREATININE 1.5* 1.4* 1.5*   GLUCOSE 99 101 99     Recent Labs     07/27/22  1113 07/29/22  0725   AST 24 11   ALT 8 <5*   BILITOT <0.2 <0.2   ALKPHOS 159* 125     Troponin T: No results for input(s): TROPONINI in the last 72 hours. Pro-BNP: No results for input(s): BNP in the last 72 hours. INR: No results for input(s): INR in the last 72 hours.   UA:  Recent Labs     07/27/22  0800   COLORU YELLOW   PHUR 6.0   WBCUA TNTC*   RBCUA TNTC*   BACTERIA None Seen*   CLARITYU TURBID*   SPECGRAV 1.020   LEUKOCYTESUR LARGE*   UROBILINOGEN 0.2   BILIRUBINUR Negative   BLOODU LARGE*   GLUCOSEU Negative     A1C: No results for input(s): LABA1C in the last 72 hours. ABG:No results for input(s): PHART, RLS0GFC, PO2ART, IIJ9VGQ, BEART, HGBAE, V6DSVXJG, CARBOXHGBART in the last 72 hours. RAD:   CT ABDOMEN PELVIS WO CONTRAST Additional Contrast? None    Result Date: 7/27/2022  1. No features of small bowel obstruction as compared to previous scan dated 06/29/2022. No intraabdnominal fluid. 2. Presacral fluid collection as described. Left hydroureteronephrosis. 3. Right pleural effusion and multiple lungs metastatic lesion as described 4. Bladder wall thickening as described - Suggested Cystoscopy correlation Recommendation: Follow up as clinically indicated. All CT scans at this facility utilize dose modulation, iterative reconstruction, and/or weight based dosing when appropriate to reduce radiation dose to as low as reasonably achievable. Electronically Signed by Margoth Garcia MD at 27-Jul-2022 08:46:22 AM             CT CHEST WO CONTRAST    Result Date: 7/27/2022  1. Multiple small (1 mm - 16 mm) sized scattered innumerable nodular infiltrates are studded throughout bilateral lung parenchyma, predominantly involving both lower lobes.-Possibility of neoplastic lesions 2. Moderate right side pleural effusion with passive sub segmental collapse of right middle and lower lobe. 3. Mild effusion is noted involving right oblique fissure. 4. Ill-defined osteolytic lesion is noted involving T10 vertebral body-appears metastasis Recommendation: Follow up as clinically indicated. All CT scans at this facility utilize dose modulation, iterative reconstruction, and/or weight based dosing when appropriate to reduce radiation dose to as low as reasonably achievable. Electronically Signed by Margoth Garcia MD at 27-Jul-2022 08:58:15 AM             CT THORACIC SPINE WO CONTRAST    Result Date: 7/27/2022  1. compression deformities with mild retropulsion of the posterior superior L1 cortex, as well as prior T12, L1 kyphoplasties. 2.  L2-3: Moderate spondylosis, facet hypertrophy and listhesis, resulting in moderate bilateral foraminal stenosis. 3.  Additional findings: L3-4, L4-5: Mild spondylosis with facet hypertrophy. L5-S1: Prior fusion with mild residual spondylosis. Limited assessment of the right foramen at this level. Lumbarized S1. Recommendation: Follow up as clinically indicated. Electronically Signed by Cliff Powers MD at 29-Jul-2022 01:19:40 PM                  Assessment/Plan   Principal Problem:    Intractable back pain   -Palliative care assisting with pain-titrating fentanyl patch   -MRI confirms T9 metastatic lesion   -Neurosurgery following-not felt to be a surgical candidate. Recommend Radiation   -Flexeril   -PRN Dilaudid 4 mg Q3 hours   -Neursurgery to follow-up outpatient when d/c'd   -? ?TLSO brace if okay with neurosurgery    Active Problems:    Metastatic adenocarcinoma (HCC)/Colon cancer metastasized to lung (HCC)/ Urothelial carcinoma of kidney, right (HCC)/Colorectal cancer (HCC)/Metastasis from colon cancer [facility])    -Oncology following-recommendations appreciated   -Treatment plan ongoing       Palliative care patient   -Palliative care following      Chronic kidney disease   -Avoid nephrotoxins       Cancer related pain   -Oncology and palliative working to improve pain      Coronary artery disease involving native coronary artery of native heart without angina pectoris      History of small bowel obstruction   -Monitor ileostomy output    Resolved Problems:    * No resolved hospital problems.  *      Antibiotic: Zosyn     DVT Prophylaxis:Lovenox    Disposition: OLGA Segovia, 7/29/2022 5:49 PM

## 2022-07-30 PROBLEM — Z51.5 ENCOUNTER FOR PALLIATIVE CARE: Status: ACTIVE | Noted: 2022-01-01

## 2022-07-30 NOTE — PROGRESS NOTES
MEDICAL ONCOLOGY PROGRESS NOTE     Pt Name: Tee Vitale  MRN: [id number]  YOB: 1950  Date of evaluation: 7/30/2022  ROOM: 527    Subjective: Continues to have significant pain, unable to sit up in the bed and states \" hurts just to breath\"    HISTORY OF PRESENT ILLNESS:  The patient is well-known to me. He has a diagnosis of metastatic carcinoma with lung metastasis. 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 He has had several hospitalizations for urinary tract infection, deconditioning and postoperative complications. Presented ER department with complaints of severe mid back pain. He has a history of chronic lower back pain  CT thoracic spine showed T9 lytic lesion. I discussed this with the emergency department. Neurosurgery was consulted. 07/27/22- CT Abdomen/pelvis no features of small bowel obstruction as compared to previous scan dated 06/29/2022. No intraabdnominal fluid. Presacral fluid collection as described. Left hydroureteronephrosis. Right pleural effusion and multiple lungs metastatic lesion as described. 07/27/22-CT chest w/o contrast multiple small (1 mm - 16 mm) sized scattered innumerable nodular infiltrates are studded throughout bilateral lung parenchyma, predominantly involving both lower lobes. Possibility of neoplastic lesions. Moderate right side pleural effusion with passive sub segmental collapse of right middle and lower lobe. Mild effusion is noted involving right oblique fissure. Ill-defined osteolytic lesion is noted involving T10 vertebral body-appears metastasis   07/27/22- CT Thoracic spine significant spondylosis, osteoporosis and multiple level compression fracture at upper lumbar levels. Increase thoracolumbar kyphosis.  Lytic lesion with soft tissue component and mild posterior bulge noted in body of T9 vertebrae concerning for metastasis. Pulmonary metastasis. Pleural effusions. 07/27/22- CT Lumbar spine multilevel spondylosis with spondylolisthesis as described. Spinal stabilization device at L5-S1 with no periprosthetic loosening or implant fracture. Anterior wedge compressions of L1 and L2. I discussed with the ER physician. I recommended neurosurgery consultation. He was seen by Dr. Tanika Sandoval. Dr. Mala Tinsley recommended MRI thoracic/lumbar spine          Prior Oncology History  Mr. Laura Thakkar is well-known to my clinic. He has a history of metastatic colonic adenocarcinoma with lung metastasis and is currently on palliative chemotherapy. In addition, he has a significant history of high-grade urothelial carcinoma of the right renal pelvis status post right nephrectomy at [facility], [address] [place] [address]. He had a very complicated postoperative course. He had prior admissions for urinary tract infection. He was admitted recently and discharged a few days ago. He presented again to the emergency department with episode of hypotension. Apparently, he had fever and chills as well. The patient is of been seen by urology with plans for change of his ureteral stents. He is currently being treated for possible urinary tract infection. Port infection is a possibility as well. His blood cultures is positive for gram-negative rods. He is also on anidulafungin for prior yeast infection in his urine. He was started on ceftazidime-avibactam and ciprofloxacin. He is on pressors in the ICU. I was consulted for continued care. His chemotherapy has been on hold. Last chemotherapy was delivered on 6/1/2022. Prior oncological history     HISTORY OF PRESENT ILLNESS:  The patient is well-established in my clinic. He has a diagnosis of colon cancer and recent diagnosis of invasive urothelial carcinoma, high-grade of the renal pelvis status surgery.   In addition, history of hypertension, hyperlipidemia, CAD, CKD stage IV. Patient presented ER department on 6/29/2022 with complaints of nausea vomiting, abdominal pain. He had decreased output from his ileostomy as well. A NG tube was placed in the ED. Surgery was consulted. Labs showed worsening kidney function with creatinine 2.5, hyperkalemia potassium 3.1, elevated white blood cell count. UA showed moderate blood, large leukocyte, WBC too numerous to count. 6/29/2022-CT abdomen pelvis showed evidence of small bowel obstruction possible to lower the ileal anastomosis. No intra-abdominal free fluid. Presacral fluid collection. Left hydroureteronephrosis, right pleural effusion and multiple lung metastatic lesions. Diffuse bladder wall thickening with associating 4.5 x 3 centimeters soft tissue at the superior wall of bladder extends extraluminally. Malignancy cannot be excluded. Left double-J catheter in place. He had an NG tube placed yesterday. He feels better this morning and feels that his colostomy is working again. Prior oncological history  Reason for MD visit: Toxicity/disease management  The patient has stage IV colonic adenocarcinoma with progressive lung metastasis consistent with colonic adenocarcinoma. In addition, he has a history of urothelial carcinoma stage II. He had progressive disease in the lungs compatible with colonic adenocarcinoma. He has resumed treatment with FOLFIRI/panitumumab. He has received 2 cycles. He complains of significant fatigue and nausea from the last treatment. He is still sick after this day today. ONCOLOGIC HISTORY:   Diagnosis:  Moderately differentiated rectal carcinoma, [id number], diagnosed in 3/9/2009  Noninvasive high-grade papillary urethral carcinoma. Negative for evidence of detrusor muscle invasion, pTa, pNx on 8/18/2019. Metastatic colorectal carcinoma, 9/3/2019  MSI stable and mutations for BRAF, NRAS, KRAS were not detected.     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 8/18/2019- transurethral resection of bladder tumor (TURBT)  9/18/2019-12/26/2019 palliative chemotherapy with modified FOLFOX 7  (Oxaliplatin 85 mg/m² IV day 1, leucovorin 400 mg/m² IV day 1 and 5-FU 2400 mg/m² IV continuous infusion over 46 to 48 hours for a total of 7 cycles. 1/28/2020 -palliative maintenance therapy with leucovorin 400 mg/m² IV over 2 hours on day 1, followed by 5-FU bolus 400 mg/m² and then 1200 mg/m²/day x2 days (total 2400 mg meter squared over 46 to 48 hours) continuous infusion. Repeat every 2 weeks. 05/11/22- Resuming FOLFIRI/panitumumab chemotherapy for progressive lung metastasis     ONCOLOGIC HISTORY # NMIBC_Bladder cancer. Lance Camacho was diagnosed with noninvasive urethral carcinoma, pTa, pNx on 8/18/2019. Ta tumors are papillary lesions that tend to recur but are relatively benign and generally do not invade the bladder. Adjuvant treatment is not warranted at this time and will be monitored closely. Biopsy and transurethral resection of bladder tumor (TURBT) on 8/18/2019 by Dr. Rosemary Lopez with pathology revealing noninvasive high-grade papillary urethral carcinoma. Negative for evidence of detrusor muscle invasion, pTa, pNx.   12/3/2019- cystoscopy with removal and replacement of double-J urethral stent. Pathology from dome of the bladder/tumor revealed high-grade papillary urethral carcinoma, noninvasive. No muscularis propria present. 2/26/2020 - cystoscopy and bilateral ureteral stent removal and replacement. The operative note by Dr. Faisal Ball documented bilateral hydronephrosis and obtained biopsy of the bladder in the mid dome and left anterior lateral wall x2.   Pathology documented high-grade papillary urethral carcinoma, noninvasive, stage pTaNx.  5/28/2020-the patient underwent a cystoscopy and resection of bladder tumor on 05/28/2020 with findings consistent with noninvasive, high-grade papillary urothelial carcinoma. Muscularis propria was not identified. The patient will receive intravesical BCG therapy. 7/6/2020-CT Abdomen/ Pelvis-Moderate severe right and mild left hydronephrosis with bilateral ureteral stents, which have an adequate radiographic position. Right kidney with cortical thickening and somewhat asymmetrically decreased enhancement which can be seen with obstructive uropathy. Postoperative changes of colectomy. Left lower quadrant ostomy. Slightly decreased size of presacral low density compared to4/15/2020. Similar intrahepatic and extrahepatic bile duct dilation compared to 4/15/2020. Correlate with clinical symptoms and laboratory studies if clinically indicated. Similar chronic bony findings. 3/25/2021-CT abdomen showed severe hydronephrosis. Cystoscopy was performed by urology. 4/1/21 Bladder neck tumor, biopsies: High-grade papillary urothelial carcinoma, noninvasive. Muscularis propria is not present. AJCC pathologic stage:  pTa Nx  4/14/2021-reviewed results of pathology. No evidence of invasive disease. Continue surveillance cystoscopy with urology. 9/13/2021-he was seen by urology. Findings of ureteral high-grade urothelial carcinoma. Noninvasive. The patient is being referred to [facility] in [place]. 10/11/2021-he was seen by [facility] and recommended cystoscopy with biopsy and possible laser ablation and bilateral leg stent exchange at that time. 4/20/2022 Urinary bladder, biopsy of right lateral bladder lesion: Disrupted urothelial mucosa with severe chronic inflammation, negative for evidence   of malignancy.         ONCOLOGIC HISTORY #3  Chau Mcbride was seen in initial oncology consultation on 8/19/2019 during his hospitalization at [facility]) of [place] after a large pelvic mass was identified which raised concern for recurrent disease. Further pathology consistent with recurrent rectal cancer  8/17/2019- CEA 18.1  8/17/2019- CT scan of the kidney with contrast documented moderate to severe right hydronephrosis with dilation of the right ureter into the lower pelvis the site of the parasacral soft tissue changes. Partially calcified soft tissue changes within the janes-sacral region likely representing sequelae of pelvic radiation. Increasing scarring/fibrosis versus tumor recurrence within the presacral changes, likely represents a site of right distal ureter obstruction. No left-sided hydronephrosis. 8/18/2019 -Double-J ureter stent placement for right hydronephrosis secondary to extrinsic compression by pelvic mass. 8/27/2019-CT scan of the chest with contrast documented numerous pulmonary nodules that appear new compared to 11/12/2017, RUL nodule measuring 7 mm and LLL nodule measuring 5 mm. Soft tissue nodule at the left ventral abdominal wall. Slight increased size of a probable lymph node anterior to the aorta measuring 0.9 cm compared to 0.7 cm. Similar presacral, right pelvic sidewall and right abductor muscular nodular soft tissue density. 8/27/2019 CT scan of the abdomen and pelvis with contrast identified new moderate left hydronephrosis with moderate right hydronephrosis. Mild stranding around the urinary bladder and thickening of the bilateral ureteral wall. Numerous pulmonary nodules. Soft tissue of the left ventral abdominal wall. Slightly increased size of probable lymph node anterior to the aorta measuring 0.9 cm compared to 0.7 cm.  8/27/2019-PET scan did not identify any FDG avid pulmonary nodules or airspace opacities. Abnormal increased metabolic activity within the right pelvic wall soft tissue showing SUV of 5.4. Abnormal soft tissue metabolic activity in the right abductor muscle with SUV of 6.4.   Focally increased activity to the right of the inferior L5 vertebrae body posterior with SUV of 7.9 with associated sclerotic changes. 8/29/2019-  Dr. Rhianna Shine completed a cystoscopy with double-J ureter stent in the left ureter for left hydronephrosis  9/3/2019- CT-guided right abductor muscle biopsy on 9/3/2019 with pathology identifying metastatic adenocarcinoma consistent with colorectal origin. Molecular panel from biopsy tissue revealed MSI stable and mutations for BRAF, NRAS, KRAS were not detected. 9/18/2019 - Palliative chemotherapy with modified FOLFOX 7  (Oxaliplatin 85 mg/m² IV day 1, leucovorin 400 mg/m² IV day 1 and 5-FU 2400 mg/m² IV continuous infusion over 46 to 48 hours) with the anticipation of adding Avastin 5 mg/kg day 1 every 14 days  10/15/2019- 24-hour urine for protein with a total protein of 1785 mg per 24-hour. Santos Rudolph has been evaluated by Dr. Angie Jackson and he reports no significant concerns related to the protein. 11/6/19 CEA 5.6 significantly improved compared to CEA of 14.0 on 8/30/2019.  11/15/2019 -CT scan of the abdomen and pelvis documented no evidence of disease progression with significant decrease in the size of enhancing nodules in the right pelvic abductor musculature, a previous 1.8 cm nodule now measures 5 mm. No new or enlarging retroperitoneal, mesenteric, pelvic or inguinal lymph nodes. Calcified presacral mass unchanged measuring 5 x 3.7 cm.  11/15/2019 -CT scan of the chest documented multiple small pulmonary nodules reidentified, largest nodule in the RUL measures 5 mm compared to 7.5 mm, RLL nodule measures 3.4 mm compared to 5.9 mm, VIC nodule measures 4 mm compared to 6 mm. A cluster of small nodules in the RUL anteriorly are barely visible on this study.   There is a decrease in size of mediastinal lymph nodes compared to previous exam, right distal paratracheal lymph node measuring 4.5 mm compared to 8.3 mm and lower right peritracheal node measuring 4.5 mm compared to 8.6 mm.    1/13/2020- CT scan of the abdomen and pelvis with contrast indicated improvement in the right-sided hydronephrosis with a chronic inflammatory process of the ureters suspected due to the moderate thickening, also present on previous study. The small poorly enhancing nodules in the right abductor muscles have decreased in the partially calcified presacral mass and right lateral pelvic wall nodules are stable compared to previous study. Resolution of the subcutaneous abdominal wall nodules. A prominent retroperitoneal lymph node adjacent and anterior to the left common artery is redefined and measures 6 mm, no change from previous exam.  1/13/2020 - CT scan of the chest documented a right lower lobe nodule measures 4.3 cm and is unchanged. A right lower lobe nodule measures 2.8 mm compared to 3.4 mm. Nodule in the right upper lobe is barely visible and measures 2.4 mm. Nodule in the left lower lobe measures 4.8 mm and is unchanged. Nodule in left lower lobe posterior measures 2.8 mm and previously measured 4.7 mm. A right lower lobe posterior medially nodule is barely visible measuring 0.2 mm and previously. measured 4.5 mm. No new nodules identified. No change in the size of the mediastinal lymph nodes. 1/28/2020 -palliative maintenance therapy with leucovorin 400 mg/m² IV over 2 hours on day 1, followed by 5-FU bolus 400 mg/m² and then 1200 mg/m²/day x2 days (total 2400 mg meter squared over 46 to 48 hours) continuous infusion. Repeat every 2 weeks. Only received 1 cycle, further treatment held due to small bowel obstruction. 1/30/2020 - CT scan of the abdomen and pelvis indicated high-grade small bowel obstruction with transition point in the midline posterior pelvis where a small bowel loop is tethered to a partially calcified presacral soft tissue mass.   2/11/2020-CEA 1.4  3/5/2020-  Exploratory laparotomy, removal of adhesions, small bowel resection with primary anastomosis and partial thickness small bowel repair by Dr. Ludivina Hinson at [facility]. In the operative note Dr. Nereyda Barrientos reported no evidence of carcinomatosis within the abdomen and the liver was unable to be examined due to extent of right upper quadrant adhesions. Pathology from small intestine documented no evidence of malignancy. 4/15/2020 Ct Chest W Contrast Minimal interval increase in size of subcentimeter pulmonary nodules. The largest now measures 6 mm in the medial right lower lobe on axial image 80. There is a new, unstable, horizontal fracture through the T6 vertebral body. Additionally, there are new fractures through the posterior 11th and 12th right ribs. The bones are moderately osteopenic. The finding of an unstable fracture through the T6 vertebral body was discussed with Ana Mercedes at 10:45 AM on 4/15/2020.  4/15/2020 Ct Abdomen Pelvis W Iv Contrast  Patient has undergone interval resection of the distal small bowel, and there is a 2.8 cm fluid collection in the presacral operative bed. This contains a tiny focus of air. This may postoperative or due to infection. Please correlate with the patient's clinical symptoms and laboratory markers. Improved hydronephrosis and hydroureter. Diffuse osteoporosis. Findings in the lower chest are described in a separate dictation. 4/22/2020-CEA 0.9  6/2/2020-resumed chemotherapy with 5-FU/leucovorin and Panitumumab. Okay to do 1 today then CMP CEA  8/19/20 CEA-1.1  10/21/2020- CEA 2.0  11/11/2020- Ct Chest W Contrast Multiple, too numerous to count, small noncalcified lung nodules bilaterally. The referenced nodules appears to have decreased in size the previous study. No new nodules. 11/11/2020- Ct Abdomen Pelvis W Contrast Unchanged bilateral hydronephrosis, more on the right side. Bilateral ureteral stents in place. Moderate asymmetrical thickening of the incompletely distended urinary bladder. This may partly be due to incomplete distention.  Possibility of chronic cystitis and or chronic partial outlet obstruction may not be excluded. A functioning left lower abdominal ostomy. A small parastomal small bowel herniation without obstruction. A partially calcified presacral mass. The soft tissue component have increased in size in the previous study. The osseous changes are described above. Any superimposed metastatic disease is not excluded and would be hard to evaluate due to extensive postsurgical changes. 11/18/2020-essentially, overall stable disease. Improvement of the lung nodules with decreased in the size of the target lesions. The pelvic lesion is is likely worse by 25%. However, CEA is is still within the normal limits. Therefore likely mixed response. We will continue current treatment and repeat CT scans in about 3 months. 12/16/2020-discontinue 5-FU bolus from his regimen. 2/9/2021- Ct Chest W Contrast No evidence of disease progression. Stable pulmonary nodules. Stable intrahepatic and extrahepatic bile duct dilation compared to prior 11/11/2020. Postoperative, posttraumatic and degenerative changes in the spine as described above. Old right-sided rib fractures. 2/9/2021- Ct Abdomen Pelvis W Contrast showed evidence of response to therapy including decreased presacral mass/thickening now measuring 1.1 cm, previously 1.9 cm on 11/11/2020. Stable intrahepatic and extra hepatic bile duct dilation with cholecystectomy clips. See separately dictated CT chest of the same day regarding pulmonary nodules. Bilateral ureteral stents. Decreased bilateral hydronephrosis. Urinary bladder wall is thickened, which could be seen with post treatment changes or cystitis. Correlate with symptoms. Chronic bony findings as above  2/17/2021-reviewed CT chest abdomen pelvis. Essentially consistent with disease response to therapy. Continue current therapy. 2/17/21 CEA 1.0  3/25/21 Ct Abdomen Pelvis W Iv Contrast  The stomach is distended, however no small bowel dilatation identified. Contrast identified within the left ileostomy bag. The distal stomach is under distended which may be secondary to peristalsis. Gastroparesis considered. Bilateral ureteral stents remain appropriate in position, however there is new moderate to severe right-sided hydronephrosis and mild left-sided hydronephrosis when compared to the 2/9/2021 exam. Mild increase considered within the partially calcified presacral pelvic mass. Stable partially calcified right pelvic soft tissue. Similar abnormal wall thickening of the bladder most notable superiorly. Similar prominence of the intra and extra hepatic bile ducts down to the level of the ampulla. Findings may represent a reservoir effect, however correlation with liver function tests recommended. Therefore. Stable noncalcified left greater than right pulmonary nodules with asymmetrical interstitial changes of the right lung base concerning for pneumonitis. Osteopenia with postoperative changes of the lumbar spine. Chronic compression deformities of the thoracolumbar vertebra as described above. 4/14/2021-I reviewed notes from urology and also CT abdomen/pelvis that showed mild interval increase in the size of the soft tissue nodule in the pelvis. However, this is still very small and therefore will have a short follow-up CT scan and of May 2021. I personally reviewed the CT scans. Really hard to state that there is clear-cut disease progression. Again, short follow-up recommended. Continue current treatment. 5/12/21 CEA 0.8  5/26/2001-CT chest abdomen pelvis showed Partially calcified presacral soft tissue mass appears unchanged. Previously measured soft tissue noncalcified component is unchanged measuring 2.2 x 3.2 cm on axial image 60. However, this is questionable. CT chest showed a stable pulmonary nodules. Subcentimeter nodules. Largest 7.5 mm.  6/1/21 CEA 0.9  06/01/23- reviewed scans. Stable disease.   Continue treatment every 3 weeks as per patient request. Continue infusional 5-FU, Panitumumab and leucovorin. No 5-FU bolus due to severe mucositis. 8/11/2021 CEA .9  9/03/2021 CT Chest w/Contrast Slight interval increase in the size of pulmonary nodules, the largest in the medial right lung base. Findings are concerning for metastatic disease. Atherosclerosis of the aorta and coronary arteries. Sclerotic rib lesions favored for osseous metastases. Old rib fractures also evident. 9/03/2021 CT Abd/Pelvis w/ IV Contrast (Oral) A persistent partially calcified mass in the presacral region with encasement of the distal ureters bilaterally and resultant bilateral hydronephrosis. Bilateral ureteral stents in place. There is increasing right-sided hydronephrosis since the previous study. Right renal atrophy similar to the previous study. Moderate asymmetrical thickening of the incompletely distended urinary bladder is similar to the previous study. This may partly be due to incomplete distention. An inflammatory process or a neoplastic process is not excluded. Moderate intrahepatic biliary dilatation is similar to the previous study and probably due to a prior cholecystectomy. Moderate dilatation of the contrast filled small bowel loops may represent an ileus or partial distal small bowel obstruction. There is left lower abdominal ileostomy which appears patent. The stable compression fractures of the lower thoracic and proximal lumbar vertebrae and hardware fusion similar to the previous study. 9/22/21- Decrease Vectibix to every other treatment. He is currently receiving chemotherapy every 3 weeks as per patient request  11/9/2021 CT Abd/Pelvis WO Contrast No acute posttraumatic findings. Known metastatic disease to the lungs. Posttreatment changes in overall chronic findings as above. 12/27/2021 NM Bone Scan Multiple foci of abnormal increased activity in the ribs bilaterally correspond with previously seen areas of bony sclerosis and old healed fractures. Abnormal lung bases showing worsening from the previous study. Moderate size hiatal hernia with gastroesophageal reflux. The patient is status post at least partial colectomy. Left lower quadrant ostomy is present. There is no evidence of obstruction. There is an irregular soft tissue mass with some calcification within the pelvis. This extends to and is inseparable from the right posterior lateral urinary bladder wall with potential secondary involvement. This extends into the presacral space. When compared to the previous exam I feel this is increased in size. The urinary bladder cannot be fully assessed as it is decompressed with a Mcgregor catheter. Postoperative changes of the mid lower lumbar spine. There is an accentuated lumbar lordosis with grade 1-2 anterolisthesis of L5 on S1. Compression deformities at T12, L1 and L2 are present with previous kyphoplasty T12 and L1. . 6. Status post right nephrectomy. There is mild dilatation of the upper tracts of the left kidney and left ureter with a double-J intraureteral stent well-positioned. The degree of distention of the upper tracts of the left kidney is improved from the previous exam of November of last year. There is mild urothelial thickening. 4/13/2022 CT Abd/Pelvis WO Contrast When compared to the previous exam of 3 days earlier there is now noted to be moderate small bowel distention. I would favor a adynamic ileus. A distal obstruction at the site of the patient's ileostomy is also in the differential but felt less likely. There is mild distention of the stomach. A moderate size hiatal hernia is present. Mild to moderate dilatation of the upper tracts of the left kidney. A left-sided double-J ureteral stent is in place. There is mild urothelial thickening. I do not see evidence of a discrete ureteral stone. The stent is well-positioned. Partially calcified pelvic mass. This is inseparable from the right posterior lateral wall of the urinary bladder along its lower margin. A Mcgregor catheter is in place within the bladder. Chronic-appearing fractures within the thoracic and lumbar spine with a gibbus deformity at the thoracolumbar juncture and previous kyphoplasty at T12-L1. There is a small amount of free fluid within the subhepatic space and Morison space. A small right-sided effusion is present with multiple pulmonary nodules within the lower lobes consistent with metastatic disease to the lungs. There is a moderate size hiatal hernia present. 04/19/22- CT guided biopsy consistent with colon cancer metastasis. 5/2/2022- resuming chemotherapy with FOLFIRI/panitumumab for progressive colonic adenocarcinoma pulmonary metastasis. 5/25/2022 CXR (2VW) Chronic lung changes with mild right basilar infiltrate or atelectasis. ONCOLOGIC HISTORY # Rectal carcinoma:  Yolande Trevino has a history of moderately differentiated rectal carcinoma, T3N0Mx, diagnosed in 3/9/2009. He received neoadjuvant chemotherapy with radiation and resection with J-pouch. He has been routinely followed at [facility] and was last seen by Dr. Reji Sands on 1/10/2019. The following are pertinent findings related to his diagnosis and treatment. 3/9/2009- Esophagogastroduodenoscopy with biopsy by Dr. Burak Haney that revealed rectal mass at 8 cm with pathology being consistent with moderately differentiated adenocarcinoma. 3/18/2019-Dr.Elizabeth Yuan Dejesus at [facility] performed a flexible sigmoidoscopy and rectal endoscopic ultrasound that revealed the tumor extending 6-7 mm deep through the muscularis propria layer and into the serosa, T3 lesion. 4/9/2009-5/27/2009-received neoadjuvant chemotherapy with 5-FU CIV along with radiation therapy for a total of 5400 cGy under the direction of Dr. Yarely Louis. 7/15/2009-rectum resection revealed no residual malignancy. 22 regional nodes were negative for malignancy. The rectum distal doughnut was negative for malignancy.   He was documented to have a complete pathological response. 9/9/2009- Ileostomy excision pathology revealed small bowel wall with changes consistent with ileostomy site. Pathology negative for malignancy  4/11/2022 Renal Complete Prior right nephrectomy. The cortical thickness and echogenicity of the left kidney are normal. The left kidney is normal in size. There is mild to moderate dilatation of the left renal collecting system predominantly involving the lower pole. The patient reportedly has a double-J ureteral stent in place. The stent is not well seen on ultrasound.            Current Facility-Administered Medications   Medication Dose Route Frequency Provider Last Rate Last Admin    diphenhydrAMINE (BENADRYL) tablet 25 mg  25 mg Oral BID PRN Anish Bull MD   25 mg at 07/30/22 0434    Calamine 8-8 % lotion   Topical PRN Anish Bull MD   Given at 07/30/22 3563    fentaNYL (DURAGESIC) 100 MCG/HR 1 patch  1 patch TransDERmal Q72H Tim Farias PA-C   1 patch at 07/29/22 2120    cyclobenzaprine (FLEXERIL) tablet 10 mg  10 mg Oral 4x Daily PRN OLGA Maharaj   10 mg at 07/30/22 1031    melatonin disintegrating tablet 5 mg  5 mg Oral Nightly PRN Anish Bull MD   5 mg at 07/29/22 2120    HYDROmorphone HCl PF (DILAUDID) injection 1 mg  1 mg IntraVENous Q2H PRN Tim Farias PA-C   1 mg at 07/29/22 1730    piperacillin-tazobactam (ZOSYN) 3,375 mg in dextrose 5 % 50 mL IVPB extended infusion (mini-bag)  3,375 mg IntraVENous Q8H Earle Wilson MD 12.5 mL/hr at 07/30/22 0632 3,375 mg at 07/30/22 9421    HYDROmorphone (DILAUDID) tablet 4 mg  4 mg Oral Q3H PRN Earle Wilson MD   4 mg at 07/30/22 0804    metoprolol succinate (TOPROL XL) extended release tablet 50 mg  50 mg Oral Daily Kulwinder Moore APRN   50 mg at 07/30/22 8262    calcitRIOL (ROCALTROL) capsule 0.25 mcg  0.25 mcg Oral Daily OLGA Maharaj   0.25 mcg at 07/30/22 8464    calcium-cholecalciferol 500-200 MG-UNIT per tablet 1 tablet  1 tablet Oral BID Elizabeth Maddox Renetta Davenport, APRN   1 tablet at 07/30/22 0804    DULoxetine (CYMBALTA) extended release capsule 30 mg  30 mg Oral Daily Omi Cat APRN   30 mg at 07/30/22 0804    ferrous sulfate (IRON 325) tablet 325 mg  325 mg Oral TID WC Omi Cat, APRN   325 mg at 07/29/22 1730    [Held by provider] furosemide (LASIX) tablet 40 mg  40 mg Oral Daily Omi Cat APRN        magnesium oxide (MAG-OX) tablet 400 mg  400 mg Oral Daily Omi Cat, APRN   400 mg at 07/30/22 0805    lactobacillus (CULTURELLE) capsule 1 capsule  1 capsule Oral Daily Omi Cat APRN   1 capsule at 07/30/22 0805    trospium (SANCTURA) tablet 20 mg  20 mg Oral Nightly Omi Cat, APRN   20 mg at 07/29/22 2120    pantoprazole (PROTONIX) tablet 40 mg  40 mg Oral BID AC Omi Cat APRN   40 mg at 07/30/22 0518    sodium bicarbonate tablet 650 mg  650 mg Oral 4x Daily Omi Cat, APRN   650 mg at 07/30/22 7173    sodium chloride flush 0.9 % injection 5-40 mL  5-40 mL IntraVENous 2 times per day Omi Cat APRN   10 mL at 07/29/22 0857    sodium chloride flush 0.9 % injection 5-40 mL  5-40 mL IntraVENous PRN Omi Cat APRN        0.9 % sodium chloride infusion   IntraVENous PRN Omi Cat APRBOB        enoxaparin (LOVENOX) injection 40 mg  40 mg SubCUTAneous Daily Omi Cat, APRN   40 mg at 07/30/22 0809    ondansetron (ZOFRAN-ODT) disintegrating tablet 4 mg  4 mg Oral Q8H PRN Omi Cat APRBOB        Or    ondansetron [facility]) injection 4 mg  4 mg IntraVENous Q6H PRN Omi Cat, APRN   4 mg at 07/29/22 0848    polyethylene glycol (GLYCOLAX) packet 17 g  17 g Oral Daily PRN Omi Cat APRN        acetaminophen (TYLENOL) tablet 650 mg  650 mg Oral Q6H PRN OLGA Shah   650 mg at 07/30/22 0254    Or    acetaminophen (TYLENOL) suppository 650 mg  650 mg Rectal Q6H PRN OLGA Shah        naloxone [facility]) injection 0.4 mg  0.4 mg IntraVENous PRN Sunil Adams PA-C        ALPRAZolam Madan Rouleau) tablet 0.5 mg  0.5 mg Oral Q8H PRN Sunil Adams PA-C   0.5 mg at 07/30/22 0250       Allergies: Allergies   Allergen Reactions    Morphine Anxiety         Objective   BP (!) 147/86   Pulse (!) 108   Temp 98.2 °F (36.8 °C) (Temporal)   Resp 16   Ht 5' 5\" (1.651 m)   Wt 170 lb (77.1 kg)   SpO2 91%   BMI 28.29 kg/m²     PHYSICAL EXAM:  CONSTITUTIONAL: Alert, appropriate, uncomfortable  EYES: Non icteric  ENT: Mucus membranes moist  NECK: Supple, no masses. CHEST/LUNGS: CTA bilaterally, normal respiratory effort   CARDIOVASCULAR: RRR  ABDOMEN: soft non-tender, active bowel sounds, no HSM. No palpable masses  EXTREMITIES: warm,  no focal weakness. SKIN: warm, dry with no rashes or lesions  LYMPH: No cervical, clavicular, axillary, or inguinal lymphadenopathy  NEUROLOGIC: follows commands, non focal     LABORATORY RESULTS REVIEWED/ANALYZED BY ME:  Recent Labs     07/30/22  0444 07/29/22  0725 07/28/22  0420   WBC 7.8 8.1 8.6   HGB 8.2* 8.4* 8.6*   HCT 27.1* 27.7* 27.8*   MCV 90.3 89.6 89.7    376 369         Lab Results   Component Value Date     07/30/2022    K 4.6 07/30/2022     07/30/2022    CO2 26 07/30/2022    BUN 16 07/30/2022    CREATININE 1.6 (H) 07/30/2022    GLUCOSE 96 07/30/2022    CALCIUM 8.8 07/30/2022    PROT 6.0 (L) 07/30/2022    LABALBU 3.0 (L) 07/30/2022    BILITOT 0.3 07/30/2022    ALKPHOS 134 (H) 07/30/2022    AST 11 07/30/2022    ALT <5 (A) 07/30/2022    LABGLOM 43 (A) 07/30/2022    GFRAA 52 (L) 07/30/2022    AGRATIO 1.9 11/24/2021    GLOB 2.2 11/24/2021       Lab Results   Component Value Date    INR 1.02 04/18/2022    INR 1.06 04/13/2022    INR 1.04 11/09/2021    PROTIME 13.3 04/18/2022    PROTIME 13.7 04/13/2022    PROTIME 13.8 11/09/2021       RADIOLOGY STUDIES REPORT/REVIEWED AND INTERPRETED BY ME:  CT ABDOMEN PELVIS WO CONTRAST Additional Contrast? None    Result Date: 7/27/2022  1.  No features of lungs metastatic lesion as described. Recommendation: Follow up as clinically indicated. All CT scans at this facility utilize dose modulation, iterative reconstruction, and/or weight based dosing when appropriate to reduce radiation dose to as low as reasonably achievable. Electronically Signed by Ruth Mccullough MD at 29-Jun-2022 08:55:49 AM             CT CHEST WO CONTRAST    Result Date: 7/27/2022  1. Multiple small (1 mm - 16 mm) sized scattered innumerable nodular infiltrates are studded throughout bilateral lung parenchyma, predominantly involving both lower lobes.-Possibility of neoplastic lesions 2. Moderate right side pleural effusion with passive sub segmental collapse of right middle and lower lobe. 3. Mild effusion is noted involving right oblique fissure. 4. Ill-defined osteolytic lesion is noted involving T10 vertebral body-appears metastasis Recommendation: Follow up as clinically indicated. All CT scans at this facility utilize dose modulation, iterative reconstruction, and/or weight based dosing when appropriate to reduce radiation dose to as low as reasonably achievable. Electronically Signed by Loraine Falcon MD at 27-Jul-2022 08:58:15 AM             CT THORACIC SPINE WO CONTRAST    Result Date: 7/27/2022  1. Significance spondylosis, osteoporosis and multiple level compression fracture at upper lumbar levels. Increase thoracolumbar kyphosis 2. Lytic lesion with soft tissue component and mild posterior bulge noted in body of T10 vertebrae  -Metastasis. 3. Pulmonary metastasis. 4. Pleural effusions. Recommendation: Follow up as clinically indicated. All CT scans at this facility utilize dose modulation, iterative reconstruction, and/or weight based dosing when appropriate to reduce radiation dose to as low as reasonably achievable. Electronically Signed by Loraine Falcon MD at 27-Jul-2022 08:38:32 AM             CT LUMBAR SPINE WO CONTRAST    Result Date: 7/27/2022  1.  Multilevel spondylosis with spondylolisthesis as described. 2. Spinal stabilization device at L5-S1 with no periprosthetic loosening or implant fracture. 3. Anterior wedge compressions of L1 and L2 4. Soft tissue structure pelvis is uterus with calcifications or mass. Recommendation: Follow up as clinically indicated. All CT scans at this facility utilize dose modulation, iterative reconstruction, and/or weight based dosing when appropriate to reduce radiation dose to as low as reasonably achievable. Electronically Signed by Loraine Falcon MD at 27-Jul-2022 08:41:54 AM             XR CHEST PORTABLE    Result Date: 7/5/2022  Bilateral infiltrate and right pleural effusion as described. Recommendation: Follow up as clinically indicated. Electronically Signed by Ruth Mccullough MD at 05-Jul-2022 06:53:38 PM             XR CHEST PORTABLE    Result Date: 6/29/2022  Scattered interstitial nodular densities throughout both lungs and coarse right infrahilar markings centrally. 4 level cervical fusion device in good repair NG tube with tip in the upper stomach Right access transjugular catheter with tip in the right atrium Two contiguous kyphoplasty injections lower dorsal spine. Question small right pleural effusion. Recommendation: Follow up as clinically indicated. Electronically Signed by David Hooks MD at 29-Jun-2022 10:40:16 AM                My interpretation:lytic lesion T9      ASSESSMENT:  #Lytic lesion T9  This is certainly a metastatic bone lesion. The patient has recently been diagnosed with invasive bladder cancer. The differential diagnosis includes metastatic colonic adenocarcinoma metastatic high-grade urothelial carcinoma the bladder. 07/27/22- CT Abdomen/pelvis no features of small bowel obstruction as compared to previous scan dated 06/29/2022. No intraabdnominal fluid. Presacral fluid collection as described. Left hydroureteronephrosis. Right pleural effusion and multiple lungs metastatic lesion as described.   07/27/22-CT chest w/o contrast multiple small (1 mm - 16 mm) sized scattered innumerable nodular infiltrates are studded throughout bilateral lung parenchyma, predominantly involving both lower lobes. Possibility of neoplastic lesions. Moderate right side pleural effusion with passive sub segmental collapse of right middle and lower lobe. Mild effusion is noted involving right oblique fissure. Ill-defined osteolytic lesion is noted involving T10 vertebral body-appears metastasis   07/27/22- CT Thoracic spine significant spondylosis, osteoporosis and multiple level compression fracture at upper lumbar levels. Increase thoracolumbar kyphosis. Lytic lesion with soft tissue component and mild posterior bulge noted in body of T9 vertebrae concerning for metastasis. Pulmonary metastasis. Pleural effusions. 07/27/22- CT Lumbar spine multilevel spondylosis with spondylolisthesis as described. Spinal stabilization device at L5-S1 with no periprosthetic loosening or implant fracture. Anterior wedge compressions of L1 and L2. MRI T/L-spine 7/28/2022  Exam: MRI OF THE LUMBAR SPINE WITHOUT AND WITH CONTRAST   Clinical data: Exclude osseous metastasis. History of colon cancer, history of cervical spine and lumbar spine surgery. Patient has pain pump. Technique: Multiplanar multisequence MRI of the lumbar spine without and with contrast. Axial imaging was performed through the L1 through S1 disc levels. Contrast used: MultiHance. Amount: 15 mL. Prior studies: CT scan of the lumbar spine dated 7/27/2022. Findings: For the purposes of this examination it was assumed that there are five non rib bearing lumbar type vertebrae with the inferior labeled L5. Lumbarized S1 with hydrated S1-2 disc. Prior L5-S1 fusion with hardware noted. Normal lordotic   curvature. Moderate L1 and mild L2 compression deformities with mild retropulsion of posterior superior L1 cortex. Prior T12 and L1 kyphoplasties.   4 mm anterolisthesis of L3 with respect to L2. Normal vertebral body and intervertebral disc heights. Normal marrow signal of the vertebrae. Conus medullaris in normal anatomic position. No abnormal extra-axial masses. Soft tissues are unremarkable. Level by leveldisease is present as follows:   T12-L1: There is no abnormally positioned disc material. There is no significant spinal canal, lateral recess, neural foramina compromise, or nerve impingement. L1-L2: There is no abnormally positioned disc material. There is no significant spinal canal, lateral recess, neural foramina compromise, or nerve impingement. L2-L3: Moderate spondylosis, facet hypertrophy and listhesis, results in moderate bilateral foraminal stenosis. L3-L4 L4-L5: 1 mm disc bulges with mild facet hypertrophy. There is no significant spinal canal, lateral recess, neural foramina compromise, or nerve impingement. L5-S1: Prior fusion. Mild residual spondylosis. There is no significant spinal canal, lateral recess, neural foramina compromise, or nerve impingement. Limited assessment of the right foramen at this level. Post contrast imaging demonstrates no abnormal cord, leptomeningeal or soft tissue enhancement. Impression:     1. Moderate L1 and mild L2 compression deformities with mild retropulsion of the posterior superior L1 cortex, as well as prior T12, L1 kyphoplasties. 2.  L2-3: Moderate spondylosis, facet hypertrophy and listhesis, resulting in moderate bilateral foraminal stenosis. 3.  Additional findings: L3-4, L4-5: Mild spondylosis with facet hypertrophy. L5-S1: Prior fusion with mild residual spondylosis. Limited assessment of the right foramen at this level. Lumbarized S1. MRI thoracic spine  EXAM: MRI OF THE THORACIC SPINE WITHOUT AND WITH CONTRAST   CLINICAL DATA: Osseous metastasis. TECHNIQUE: Multiplanar multi-sequence MRI of the thoracic spine without and with gadolinium. A counting  radiograph is provided. Contrast: Applied. PRIOR STUDIES:CT scan of the thoracic spine dated 7/27/2022. FINDINGS: Prior multilevel cervical fusion to the T1 level. Replacement of T9 vertebral body with abnormal heterogeneous diminished T1 and increased T2 and inversion recovery signal with enhancement, demonstrating rounded posterior expansion into the   canal.  Partial fusion at T10-T12. Moderate-severe L1 compression deformity appearing nonacute with mild retropulsion of posterior superior cortex. Prior T12 and L1 kyphoplasties. The thoracic cord is in anatomic location without abnormal signal. No   abnormal extra-axial masses are present. Convex right upper thoracic scoliosis. The prevertebral and paravertebral soft tissues are within normal limits. Level by levelfindings are as follows:   C7-T1: There is no abnormally positioned disc material. There is no significant spinal canal, lateral recess, neural foramina compromise, or nerve impingement. T1-T2: There is no abnormally positioned disc material. There is no significant spinal canal, lateral recess, neural foramina compromise, or nerve impingement. T2-T3:There is no abnormally positioned disc material. There is no significant spinal canal, lateral recess, neural foramina compromise, or nerve impingement. T3-T4: There is no abnormally positioned disc material. There is no significant spinal canal, lateral recess, neural foramina compromise, or nerve impingement. T4-T5: There is no abnormally positioned disc material. There is no significant spinal canal, lateral recess, neural foramina compromise, or nerve impingement. T5-T6: There is no abnormally positioned disc material. There is no significant spinal canal, lateral recess, neural foramina compromise, or nerve impingement. T6-T7: There is no abnormally positioned disc material. There is no significant spinal canal, lateral recess, neural foramina compromise, or nerve impingement.    T7-T8:There is no abnormally positioned disc material. There is no significant spinal canal, lateral recess, neural foramina compromise, or nerve impingement. T8-T9: There is no abnormally positioned disc material. There is no significant spinal canal, lateral recess, neural foramina compromise, or nerve impingement. T9-T10: There is no abnormally positioned disc material. There is no significant spinal canal, lateral recess, neural foramina compromise, or nerve impingement. T10-T11: There is no abnormally positioned disc material. There is no significant spinal canal, lateral recess, neural foramina compromise, or nerve impingement. T11-T12: There is no abnormally positioned disc material. There is no significant spinal canal, lateral recess, neural foramina compromise, or nerve impingement. T12-L1: There is no abnormally positioned disc material. There is no significant spinal canal, lateral recess, neural foramina compromise, or nerve impingement. No other abnormal leptomeningeal, cord or epidural enhancement. Impression:     1. Findings consistent with pathologic infiltration such as can be seen in metastatic disease involving T9 with rounded expansion of the posterior cortex into the canal.   2.  Mild multilevel spondylosis with convex right upper thoracic scoliosis. 3.  Prior L1 compression deformity with mild retropulsion of posterior superior cortex. Prior vertebroplasty involving T12 and L1. Partial fusion involving T10-T12. #Colon cancer stage IV (lung metastasis )-chemotherapy on hold. Last chemotherapy delivered 6/1/2022. Patient has had recent hospitalizations. Chemotherapy has been on hold due to frequent hospitalizations and poor performance status. Unfortunately, we have not been able to keep a consistence on his treatment schedule. Certainly with the current infection we will continue to hold this. Palliative care also following.     Multifactorial anemia  Hemoglobin 8.6/MCV 89              Cancer related pain  -Hydromorphone 1 mg IV as needed 2 hours  -Hydromorphone 4 mg p.o. every 3 hours as needed  -Fentanyl 100 mcg every 72 hours  -Appreciated palliative care recommendations  -Patient has a pain pump    Renal impairment, CKD stage III  -Cr 1.6 today, 7/30/2022    Invasive urothelial carcinoma of the bladder  Last cystoscopy performed 7/17/2022 with biopsies showed evidence of bladder lesion. Biopsy consistent with invasive high-grade urothelial carcinoma. DVT prophylaxis-Lovenox    T9 lytic lesion  MRI thoracic/lumbar spine on 7/28/2022 showed:  Replacement of T9 vertebral body with abnormal heterogeneous diminished T1 and increased T2 and inversion recovery signal with enhancement, demonstrating rounded posterior expansion into the canal.     discussed with neurosurgery, Dr. Karin Patel on 7/29/2022. Patient is not a candidate for surgery intervention.  discussed with Dr. Terell Madsen, [facility] radiation oncology on 7/29/2022. Patient is a candidate for palliative RT for pain control.  discussed with patient daughter/Dr. Tanna Edwards. Their priority is pain control at this time. We discussed about possible biopsy as this could represent metastatic bladder cancer, cancer. I believe that metastatic bladder cancer is more likely given the frequent Parden of bone metastasis and bladder cancer. However, they are not interested in biopsy at this time until his pain is under control.       PLAN:  Continue pain meds  Palliative care following  Plan is for outpatient referral to radiation, hopefully next Monday  Continue with supportive care    (Please note that portions of this note were completed with a voice recognition program. Efforts were made to edit the dictations but occasionally words are mis-transcribed.)    Sandra Castorena, OLGA    07/30/22  10:36 AM     Physician's attestation and contribution:  I, Dr Amy Orozco, personally and independently performed an evaluation on  Noel Silverman        I have reviewed relevant medical information/data to include but not limited to the medication list, relevant appropriate lab work and imaging when applicable. I reviewed other physician's notes, ancillary services and nurses assessments. I have reviewed the above documentation completed by Beatriz ESPINOZA   Please see my additional addended and/or modified contributions to the history of present illness, physical examination, and assessment/medical decision-making and plan that reflects my findings and impressions. I discussed essential elements of the care plan with Ana ESPINOZA and the patient. I have encouraged and answered all the questions raised to the patient's understanding and satisfaction. I concur with the above stated. Subjective-continues to be in significant amount of pain, positional and simply with breathing. Analgesics with suboptimal pain control    Objective-exam without any real change. He does not have acute cardiopulmonary symptoms or findings, primarily just issues associated with his pain    Assessment/plan:  Long conference and discussion undertaken this morning. Pros and cons of attempting XRT. He is leery of getting any more XRT, worried about side effects and possible complications. Also worried about all the difficulty of going through the process with the likelihood of it not completely controlling his pain. Hospice also discussed and at the time that I was visiting with him he was still not completely decided in that direction. This topic had previously been discussed during this hospitalization. After leaving the floor, we were called and told that he had made a decision for inpatient hospice. I believe that is quite reasonable and we will respect him and support him in every way we can in this new phase of his management.     Electronically signed by Abel Leyden, MD on 7/30/22 at 11:08 AM CDT
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
## 2022-07-30 NOTE — DISCHARGE SUMMARY
Marylee Marines  :  1950  MRN:  [id number]    Admit date:  2022  Discharge date:  2022    Discharging Physician:  Dr. Nguyen Chamberlain Directive: DNR    Consults: IP CONSULT TO ONCOLOGY  IP CONSULT TO NEUROSURGERY  IP CONSULT TO PALLIATIVE CARE  IP CONSULT TO IV TEAM  PALLIATIVE CARE EVAL  IP CONSULT TO HOSPICE  IP CONSULT TO [facility]     Primary Care Physician:  Pinkie Lesches, MD    Discharge Diagnoses:  Principal Problem:    Intractable back pain  Active Problems:    Metastatic adenocarcinoma ([facility]    Urothelial carcinoma of kidney, right ([facility]    Palliative care patient    Chronic kidney disease    Chronic pain disorder    Intractable pain    Colon cancer metastasized to lung [facility])    Cancer related pain    Coronary artery disease involving native coronary artery of native heart without angina pectoris    Colorectal cancer ([facility]    [facility]ast[facility]    History of small bowel obstruction  Resolved Problems:    * No resolved hospital problems. *      Portions of this note have been copied forward, however, changed to reflect the most current clinical status of this patient. Hospital Course: The patient is a 81 y.o. male with a PMH with a PMH of metastatic colorectal cancer with bowel resection and ileostomy formation, bladder cancer, CKD,HTN, chronic pain, CAD, and small bowel obstruction who presented to [facility] ED on 2022 complaining of intractable back pain. He stated that the pain is sharp and shooting to the middle of his back. He has tried ibuprofen and going to the chiropractor however has had little to no relief of his pain. He states that it started approximately 5 days ago and has worsened over the last 3, prior to his admission. He stated that he was at the point he was unable to ambulate to the toilet or to perform his ADLs, therefore he called EMS for further evaluation. He denied fever, chills, nausea or vomiting.   Denied chest pain, cough or shortness of breath. He denies any recent trauma. He denied numbness or weakness to his extremities he states that it is just difficult to ambulate due to the pain in his middle back. Further ED work-up revealed CT of the thoracic spine without contrast showed significant spondylosis, osteoporosis and multilevel compression fracture of the upper lumbar levels increased thoracolumbar kyphosis. Lytic lesion with soft tissue component and mild posterior bulge noted in the body of the T10 vertebrae-Metastasis with multiple pulmonary metatasis and pleural effusions. CT of the L-spine showed multilevel spondylosis and spondylolisthesis with spinal stable at station device at L5-S1. Anterior wedge compressions of L1 and L2.  CT of the abdomen and pelvis showed presacral fluid collection with a left hydroureteronephrosis and bladder wall thickening. Chemistries-, K4.6, , CO2 22, BUN 20 and creatinine 1.5. WBC 7.9, H&H 9.0/29.0 with platelet count of 966. He was admitted to hospital medicine for intractable back pain likely due to metastasis with neurosurgery consultation, oncology consultation, and palliative care consultation for pain management and further work-up. Palliative and oncology have been slowly titrating pain medications to achieve comfort. Currently on Duragesic patch, as needed IV Dilaudid, as needed Flexeril-which  and Xanax for anxiety. MRI consistent with pathological infiltration of metastatic disease involving the T9 with rounded expansion of the posterior cortex into the canal.  Neurosurgery recommends radiation, which the patient has not tolerated well in the past.  His labs and vital signs have remained stable. He continues to have intractable pain and is having difficulty moving his lower extremities. His daughter Mumtaz is at bedside. They have requested to go to inpatient hospice for an of life care. Inpatient hospice was consulted and they have accepted him to the [facility]   He and his family request a Mcgregor catheter for comfort. RPP on 7/30/2022 was negative for respiratory infections. He will be discharged in stable condition to the hospice care center for further care. Significant Diagnostic Studies:   CT ABDOMEN PELVIS WO CONTRAST Additional Contrast? None    Result Date: 7/27/2022  Exam: CT OF THE ABDOMEN/PELVIS WITHOUT CONTRAST Clinical data: Right mid back pain, history of ureteral obstruction, metastatic cancer. Technique: Direct contiguous axial CT images were acquired through the abdomen and pelvis without contrast using soft tissue and bone algorithms. Reformatted/MPR images were performed. Radiation dose: CTDIvol =118.11 mGy, DLP =4342 mGy x cm. Limitations: Lack of intravenous contrast limits evaluation of solid viscera. Lack of oral contrast limits evaluation of the bowel loops. Prior Studies: CT scan of abdomen and pelvis dated 07/06/2022. Radiograph of abdomen dated 04/15/2022 images. Findings: Lung bases: Moderate right-sided pleural effusion is noted, decreased as the previous study 06/29/2022. Innumerable pulmonary nodules are noted measuring up to one point 1.8 cm. Findings consistent with pulmonary metastatic involvement. In addition, there is collapse / consolidation at the right base, consistent with the previous exam. Liver:Unremarkable size, contour, and density. No evidence of mass. No evidence of dilated ducts. Gallbladder Fossa : Not visualized Spleen: Grossly unremarkable. Calcified granulomata. Pancreas/adrenal glands: Grossly unremarkable size, contour and density. Kidneys:Right kidney not visualized. Left kidney moderate hydroureteronephrosis and double-J catheter in place. No evidence of stone. Retroperitoneum: No retroperitoneal lymphadenopathy. Unremarkable abdominal aorta. IVC Filter noted at the level of L3 and L4 vertebrae. Rest of The IVC is grossly unremarkable. Peritoneal cavity: No evidence of free air or ascites.  Gastrointestinal tract: Non distended stomach. Visualised Jejunal loops and ileal loops are normal in caliber with air-fluid levels up to possible resection/anastomosis site measuring/R/ 2.4-2.6 cm in maximal caliber. No evidence of obvious mass. Colon  noted visualized - Post operative status. No features of small bowel obstruction as compared to previous scan dated 06/29/2022 Appendix: Unremarkable. Pelvis: Diffuse bladder wall thickening with associating 4.5 x 3 centimeters soft tissue at the superior wall of bladder extends extraluminally. Malignancy cannot be excluded. Left double-J catheter in place. There is a fluid collection with associating coarse soft tissue calcification in presacral area measuring 5.5 x 4 x 3 cm. Compared to previous scan dated 06/29/2022 there is mild reduction in volume of collection. Please correlate clinically for postop collection/abscess. Osseous structures: Significance spondylosis, osteoporosis and multiple level compression fracture at upper lumbar levels. Mild anterolisthesis at L5-S1. Orthopedic fixation screws noted at L5 and S1   Increase thoracolumbar kyphosis. Implant noted at L2-L3 disc level. Bone Cement noted at L1 and L2 vertebrae. Multiple bilateral remote rib cage fractures. 1. No features of small bowel obstruction as compared to previous scan dated 06/29/2022. No intraabdnominal fluid. 2. Presacral fluid collection as described. Left hydroureteronephrosis. 3. Right pleural effusion and multiple lungs metastatic lesion as described 4. Bladder wall thickening as described - Suggested Cystoscopy correlation Recommendation: Follow up as clinically indicated. All CT scans at this facility utilize dose modulation, iterative reconstruction, and/or weight based dosing when appropriate to reduce radiation dose to as low as reasonably achievable.  Electronically Signed by Edie Gupta MD at 27-Jul-2022 08:46:22 AM             CT CHEST WO CONTRAST    Result Date: 7/27/2022  Exam: CT OF THE CHEST WITHOUT CONTRAST Clinical data: Right posterior thoracic/mid back pain. History of metastatic cancer and pleural effusion. Technique: Axial CT images through the lungs were acquired without contrast and imaged using soft tissue and lung algorithms. Reformatted/MPR images were performed. Radiation Dose: CTDIvol = 118.11 mGy, DLP = 4342 mGy x cm. Limitations: Lack of intravenous contrast limits evaluation of the soft tissues and vascularity. Prior studies: Radiograph of the chest dated 07/05/2022. Findings: Lungs: Evidence of multiple small (1 mm - 16 mm) sized scattered innumerable nodular infiltrates are studded throughout bilateral lung parenchyma, predominantly involving both lower lobes. Moderate right side pleural effusion with passive sub segmental collapse of right middle and lower lobe. Mild effusion is noted involving right oblique fissure. Soft Tissues: No mediastinal, axillary or supraclavicular adenopathy identified. Vascular: Unremarkable aorta and pulmonary vascularity. Grossly unremarkable sized heart. Bony structures: visualised spine shows diffuse osteopenia with degenerative changes. Ill defined osteolytic lesion is noted involving T10 vertebral body Upper Abdomen:  See report for same-day CT abdomen/pelvis dated 07/27/2022. 1. Multiple small (1 mm - 16 mm) sized scattered innumerable nodular infiltrates are studded throughout bilateral lung parenchyma, predominantly involving both lower lobes.-Possibility of neoplastic lesions 2. Moderate right side pleural effusion with passive sub segmental collapse of right middle and lower lobe. 3. Mild effusion is noted involving right oblique fissure. 4. Ill-defined osteolytic lesion is noted involving T10 vertebral body-appears metastasis Recommendation: Follow up as clinically indicated.  All CT scans at this facility utilize dose modulation, iterative reconstruction, and/or weight based dosing when appropriate to reduce radiation dose to as low as reasonably achievable. Electronically Signed by Cyn Wilson MD at 27-Jul-2022 08:58:15 AM             CT THORACIC SPINE WO CONTRAST    Result Date: 7/27/2022  Exam: CT OF THE THORACIC SPINE WITHOUT CONTRAST Clinical data: Right mid back pain, recent bacteremia. History of metastatic cancer. Technique: Spiral axial CT images through the thoracic spine were acquired without contrast, reconstructed in coronal and sagittal projections and imaged using soft tissue and bone algorithms. Reformatted/MPR images were performed. Radiation Dose: CTDIvol = 118.11 mGy, DLP = 4342 mGy x cm. Limitations: None. Prior studies: No prior studies submitted. Findings: Significance spondylosis, osteoporosis and multiple level compression fracture at upper lumbar levels. Increase thoracolumbar kyphosis Lytic lesion with soft tissue component and mild posterior bulge noted in body of T10 vertebrae. Orthopedic fixation screws noted in visualized C7 and T1 vertebrae. Cement noted in body of T12 and L1 vertebrae. Ankylosis of T10, T11, T12 and L1 vertebrae. Inter-vertebral disc spaces: Implant noted at L2-L3 disc level. Schmorl's node with slight vertebral body height loss at T5 and inferior loss at T7. Moderate right pleural effusion. Small left pleural effusion. Suspicion for multiple bilateral lung nodules of various sizes. 1. Significance spondylosis, osteoporosis and multiple level compression fracture at upper lumbar levels. Increase thoracolumbar kyphosis 2. Lytic lesion with soft tissue component and mild posterior bulge noted in body of T10 vertebrae  -Metastasis. 3. Pulmonary metastasis. 4. Pleural effusions. Recommendation: Follow up as clinically indicated. All CT scans at this facility utilize dose modulation, iterative reconstruction, and/or weight based dosing when appropriate to reduce radiation dose to as low as reasonably achievable.  Electronically Signed by Cyn Wilson MD at 27-Jul-2022 08:38:32 AM             CT LUMBAR SPINE WO CONTRAST    Result Date: 7/27/2022  Exam: CT OF THE LUMBAR SPINE WITHOUT INTRAVENOUS CONTRAST Clinical data: Right mid back pain, recent bacteremia. History of metastatic cancer. Technique: Spiral axial CT images through the lumbar spine were acquired without contrast, reconstructed in axial and sagittal projections and imaged using soft tissue and bone algorithms. Reformatted/MPR images were performed. Radiation dose: CTDIvol =44.97 mGy, DLP =1165 mGy x cm. Limitations: None. Prior studies: CT scan of the lumbar spine dated 03/13/2020 images. Findings: IVC filter from L3-L5 level. Spinal stabilization device at L5-S1 with no periprosthetic loosening or implant fracture. Evidence of kyphoplasty at T12 and L1. Likely spinal epidural patch with catheter entering the spinal canal at L3-L4. 0.36 cm anterolisthesis of L5 on S1. 0.5 cm retrolisthesis of L2 on L3. Mild mid lumbar dextroscoliosis centred at L3. Anterior wedge compressions of L1 and L2. Superior endplate compression of L4 with sclerotic margins. Multilevel spondylosis number of facet arthrosis, endplate sclerosis and anterior spurring. Intervertebral disc gas seen at L2-L3. Generalized osteopenia. Posterior elements are intact. Soft tissue with calcifications upper pelvis, mass versus uterus 6.4 cm. Inflammation right pelvis. Inter-vertebral disc spaces: Intervertebral disc spacer at L2-L3. No CT evidence of bony spinal canal or neural foramen stenosis. Soft tissues are grossly unremarkable. 1. Multilevel spondylosis with spondylolisthesis as described. 2. Spinal stabilization device at L5-S1 with no periprosthetic loosening or implant fracture. 3. Anterior wedge compressions of L1 and L2 4. Soft tissue structure pelvis is uterus with calcifications or mass. Recommendation: Follow up as clinically indicated.  All CT scans at this facility utilize dose modulation, iterative reconstruction, and/or weight based dosing when appropriate to reduce radiation dose to as low as reasonably achievable. Electronically Signed by Haily Gates MD at 27-Jul-2022 08:41:54 AM             MRI THORACIC SPINE W WO CONTRAST    Result Date: 7/29/2022  EXAM: MRI OF THE THORACIC SPINE WITHOUT AND WITH CONTRAST CLINICAL DATA: Osseous metastasis. TECHNIQUE: Multiplanar multi-sequence MRI of the thoracic spine without and with gadolinium. A counting  radiograph is provided. Contrast: Applied. PRIOR STUDIES:CT scan of the thoracic spine dated 7/27/2022. FINDINGS: Prior multilevel cervical fusion to the T1 level. Replacement of T9 vertebral body with abnormal heterogeneous diminished T1 and increased T2 and inversion recovery signal with enhancement, demonstrating rounded posterior expansion into the canal.  Partial fusion at T10-T12. Moderate-severe L1 compression deformity appearing nonacute with mild retropulsion of posterior superior cortex. Prior T12 and L1 kyphoplasties. The thoracic cord is in anatomic location without abnormal signal. No abnormal extra-axial masses are present. Convex right upper thoracic scoliosis. The prevertebral and paravertebral soft tissues are within normal limits. Level by levelfindings are as follows: C7-T1: There is no abnormally positioned disc material. There is no significant spinal canal, lateral recess, neural foramina compromise, or nerve impingement. T1-T2: There is no abnormally positioned disc material. There is no significant spinal canal, lateral recess, neural foramina compromise, or nerve impingement. T2-T3:There is no abnormally positioned disc material. There is no significant spinal canal, lateral recess, neural foramina compromise, or nerve impingement. T3-T4: There is no abnormally positioned disc material. There is no significant spinal canal, lateral recess, neural foramina compromise, or nerve impingement.  T4-T5: There is no abnormally positioned disc material. There is no significant spinal canal, lateral recess, neural foramina compromise, or nerve impingement. T5-T6: There is no abnormally positioned disc material. There is no significant spinal canal, lateral recess, neural foramina compromise, or nerve impingement. T6-T7: There is no abnormally positioned disc material. There is no significant spinal canal, lateral recess, neural foramina compromise, or nerve impingement. T7-T8:There is no abnormally positioned disc material. There is no significant spinal canal, lateral recess, neural foramina compromise, or nerve impingement. T8-T9: There is no abnormally positioned disc material. There is no significant spinal canal, lateral recess, neural foramina compromise, or nerve impingement. T9-T10: There is no abnormally positioned disc material. There is no significant spinal canal, lateral recess, neural foramina compromise, or nerve impingement. T10-T11: There is no abnormally positioned disc material. There is no significant spinal canal, lateral recess, neural foramina compromise, or nerve impingement. T11-T12: There is no abnormally positioned disc material. There is no significant spinal canal, lateral recess, neural foramina compromise, or nerve impingement. T12-L1: There is no abnormally positioned disc material. There is no significant spinal canal, lateral recess, neural foramina compromise, or nerve impingement. No other abnormal leptomeningeal, cord or epidural enhancement. 1.  Findings consistent with pathologic infiltration such as can be seen in metastatic disease involving T9 with rounded expansion of the posterior cortex into the canal. 2.  Mild multilevel spondylosis with convex right upper thoracic scoliosis. 3.  Prior L1 compression deformity with mild retropulsion of posterior superior cortex. Prior vertebroplasty involving T12 and L1. Partial fusion involving T10-T12. RECOMMENDATION: Follow up as clinically indicated.  Electronically Signed by Bo Ramires MD at 29-Jul-2022 01:10:47 PM MRI Lumbar Spine W WO Contrast    Result Date: 7/29/2022  Exam: MRI OF THE LUMBAR SPINE WITHOUT AND WITH CONTRAST Clinical data: Exclude osseous metastasis. History of colon cancer, history of cervical spine and lumbar spine surgery. Patient has pain pump. Technique: Multiplanar multisequence MRI of the lumbar spine without and with contrast. Axial imaging was performed through the L1 through S1 disc levels. Contrast used: MultiHance. Amount: 15 mL. Prior studies: CT scan of the lumbar spine dated 7/27/2022. Findings: For the purposes of this examination it was assumed that there are five non rib bearing lumbar type vertebrae with the inferior labeled L5. Lumbarized S1 with hydrated S1-2 disc. Prior L5-S1 fusion with hardware noted. Normal lordotic curvature. Moderate L1 and mild L2 compression deformities with mild retropulsion of posterior superior L1 cortex. Prior T12 and L1 kyphoplasties. 4 mm anterolisthesis of L3 with respect to L2. Normal vertebral body and intervertebral disc heights. Normal marrow signal of the vertebrae. Conus medullaris in normal anatomic position. No abnormal extra-axial masses. Soft tissues are unremarkable. Level by leveldisease is present as follows: T12-L1: There is no abnormally positioned disc material. There is no significant spinal canal, lateral recess, neural foramina compromise, or nerve impingement. L1-L2: There is no abnormally positioned disc material. There is no significant spinal canal, lateral recess, neural foramina compromise, or nerve impingement. L2-L3: Moderate spondylosis, facet hypertrophy and listhesis, results in moderate bilateral foraminal stenosis. L3-L4 L4-L5: 1 mm disc bulges with mild facet hypertrophy. There is no significant spinal canal, lateral recess, neural foramina compromise, or nerve impingement. L5-S1: Prior fusion. Mild residual spondylosis.  There is no significant spinal canal, lateral recess, neural foramina compromise, or nerve impingement. Limited assessment of the right foramen at this level. Post contrast imaging demonstrates no abnormal cord, leptomeningeal or soft tissue enhancement. 1.  Moderate L1 and mild L2 compression deformities with mild retropulsion of the posterior superior L1 cortex, as well as prior T12, L1 kyphoplasties. 2.  L2-3: Moderate spondylosis, facet hypertrophy and listhesis, resulting in moderate bilateral foraminal stenosis. 3.  Additional findings: L3-4, L4-5: Mild spondylosis with facet hypertrophy. L5-S1: Prior fusion with mild residual spondylosis. Limited assessment of the right foramen at this level. Lumbarized S1. Recommendation: Follow up as clinically indicated. Electronically Signed by Jovani Olmos MD at 29-Jul-2022 01:19:40 PM               Pertinent Labs:   CBC:   Recent Labs     07/28/22 0420 07/29/22 0725 07/30/22  0444   WBC 8.6 8.1 7.8   HGB 8.6* 8.4* 8.2*    376 387     BMP:    Recent Labs     07/28/22 0420 07/29/22 0725 07/30/22  0444   * 135* 137   K 4.1 4.1 4.6    99 101   CO2 25 26 26   BUN 15 14 16   CREATININE 1.4* 1.5* 1.6*   GLUCOSE 101 99 96     INR: No results for input(s): INR in the last 72 hours. Physical Exam:  Vital Signs: BP (!) 147/86   Pulse (!) 108   Temp 98.2 °F (36.8 °C) (Temporal)   Resp 16   Ht 5' 5\" (1.651 m)   Wt 170 lb (77.1 kg)   SpO2 91%   BMI 28.29 kg/m²   Physical Exam  Constitutional:       General: He is not in acute distress. Appearance: Appears very uncomfortable. He is obese. He is ill-appearing. HENT:     Head: Normocephalic and atraumatic. Right Ear: External ear normal.     Left Ear: External ear normal.     Nose: Nose normal.     Mouth/Throat:     Mouth: Mucous membranes are moist.  Eyes:     Extraocular Movements: Extraocular movements intact. Conjunctiva/sclera: Conjunctivae normal.     Pupils: Pupils are equal, round, and reactive to light.   Cardiovascular:     Rate and Rhythm: Normal rate and regular rhythm. Pulses: Normal pulses. Heart sounds: Normal heart sounds. Pulmonary:     Effort: Pulmonary effort is normal. No respiratory distress. Breath sounds: Rhonchi present. No wheezing or rales. Abdominal:     General: Bowel sounds are normal. There is no distension. Palpations: Abdomen is soft. Tenderness: There is no abdominal tenderness. Musculoskeletal:         General: No swelling, tenderness or deformity. Normal range of motion. Cervical back: Normal range of motion and neck supple. No muscular tenderness. Right lower leg: No edema. Left lower leg: No edema. Skin:     General: Skin is warm and dry. Findings: No bruising or lesion. Neurological:     Mental Status: He is alert and oriented to person, place, and time. Motor: Weakness (Diffuse) present. Psychiatric:         Mood and Affect: Mood is anxious. Behavior: Behavior normal.         Thought Content: Thought content normal.       Discharge Medications:         Medication List        ASK your doctor about these medications      ALPRAZolam 0.25 MG tablet  Commonly known as: XANAX     bisoprolol 5 MG tablet  Commonly known as: ZEBETA  Take 1 tablet by mouth daily     calcitRIOL 0.25 MCG capsule  Commonly known as: ROCALTROL  Take 1 capsule by mouth daily     CALCIUM 600 + D PO     cyclobenzaprine 10 MG tablet  Commonly known as: FLEXERIL  Take 1 tablet by mouth 3 times daily as needed for Muscle spasms     DULoxetine 30 MG extended release capsule  Commonly known as: CYMBALTA  TAKE 1 CAPSULE BY MOUTH DAILY     FeroSul 325 (65 Fe) MG tablet  Generic drug: ferrous sulfate  TAKE 1 TABLET BY MOUTH TWICE DAILY     furosemide 40 MG tablet  Commonly known as: LASIX  Take 1 tablet by mouth daily     ibandronate 150 MG tablet  Commonly known as: BONIVA  TAKE 1 TABLET IN MORNING ONCE MONTHLY ON AN EMPTY STOMACH WITH FULL GLASS OF WATER.  DONT TAKE ANYTHING ELSE BY MOUTH OR LIE DOWN FOR 30 MINUTES lactobacillus Caps capsule  Take 1 capsule by mouth daily     loperamide 2 MG capsule  Commonly known as: IMODIUM     Magic Mouthwash  Commonly known as: Miracle Mouthwash  Swish and spit 5 mLs 4 times daily as needed for Irritation     magnesium oxide 400 MG tablet  Commonly known as: MAG-OX  Take 1 tablet by mouth daily     mirabegron 50 MG Tb24  Commonly known as: MYRBETRIQ  Take 50 mg by mouth daily     omeprazole 20 MG delayed release capsule  Commonly known as: PRILOSEC  Take 1 capsule by mouth Daily     ondansetron 4 MG tablet  Commonly known as: ZOFRAN  Take 2 tablets by mouth every 8 hours as needed for Nausea or Vomiting     prochlorperazine 5 MG tablet  Commonly known as: COMPAZINE  Take 1 tablet by mouth every 6 hours as needed for Nausea     sodium bicarbonate 650 MG tablet  Take 1 tablet by mouth 4 times daily     Tylenol 8 Hour Arthritis Pain 650 MG extended release tablet  Generic drug: acetaminophen              Discharge Instructions: Take medications as directed. Resume activity as tolerated. Diet: ADULT DIET; Regular     Disposition: Patient is Stableand [facility] be [facility]. Time spent on discharge 38 minutes spent in assessing patient, reviewing medications, discussion with nursing, confirming safe discharge plan and preparation of discharge summary.     Signed:  OLGA Jeff, 7/30/2022 2:19 PM

## 2022-07-30 NOTE — PROGRESS NOTES
The patients edgar has been inserted. Incontinence care was performed before insertion. Sterile procedure was utilized when inserting the catheter. Catheter care was performed after insertion. The patient has been pulled up in bed and is lying in bed with his call light in reach and family at bedside.

## 2022-08-01 PROBLEM — N18.31 STAGE 3A CHRONIC KIDNEY DISEASE (HCC): Status: ACTIVE | Noted: 2022-01-01

## 2022-08-01 PROBLEM — R53.83 OTHER FATIGUE: Status: RESOLVED | Noted: 2019-11-22 | Resolved: 2022-01-01

## 2022-08-01 PROBLEM — K56.7 ILEUS (HCC): Status: RESOLVED | Noted: 2022-01-01 | Resolved: 2022-01-01

## 2022-08-01 PROBLEM — C80.1 MALIGNANT NEOPLASM METASTATIC TO THORACIC VERTEBRAL COLUMN WITH UNKNOWN PRIMARY SITE (HCC): Status: ACTIVE | Noted: 2022-01-01

## 2022-08-01 PROBLEM — M48.061 SPINAL STENOSIS OF LUMBAR REGION: Status: ACTIVE | Noted: 2018-08-07

## 2022-08-01 PROBLEM — M79.89 LEG SWELLING: Status: RESOLVED | Noted: 2018-09-15 | Resolved: 2022-01-01

## 2022-08-01 PROBLEM — R11.0 CHEMOTHERAPY-INDUCED NAUSEA: Status: RESOLVED | Noted: 2020-01-27 | Resolved: 2022-01-01

## 2022-08-01 PROBLEM — B37.0 THRUSH: Status: RESOLVED | Noted: 2019-12-28 | Resolved: 2022-01-01

## 2022-08-01 PROBLEM — T45.1X5A CHEMOTHERAPY-INDUCED NAUSEA: Status: RESOLVED | Noted: 2020-01-27 | Resolved: 2022-01-01

## 2022-08-01 PROBLEM — R19.00 PELVIC MASS: Status: RESOLVED | Noted: 2019-09-09 | Resolved: 2022-01-01

## 2022-08-01 PROBLEM — N39.0 SEPSIS SECONDARY TO UTI (HCC): Status: RESOLVED | Noted: 2022-01-01 | Resolved: 2022-01-01

## 2022-08-01 PROBLEM — C67.9 UROTHELIAL CARCINOMA OF BLADDER (HCC): Status: ACTIVE | Noted: 2022-01-01

## 2022-08-01 PROBLEM — M17.11 OSTEOARTHRITIS OF RIGHT KNEE: Status: ACTIVE | Noted: 2018-04-18

## 2022-08-01 PROBLEM — A41.9 SEPTICEMIA (HCC): Status: RESOLVED | Noted: 2022-01-01 | Resolved: 2022-01-01

## 2022-08-01 PROBLEM — R30.0 BURNING WITH URINATION: Status: RESOLVED | Noted: 2020-02-13 | Resolved: 2022-01-01

## 2022-08-01 PROBLEM — C79.51 MALIGNANT NEOPLASM METASTATIC TO THORACIC VERTEBRAL COLUMN WITH UNKNOWN PRIMARY SITE (HCC): Status: ACTIVE | Noted: 2022-01-01

## 2022-08-01 PROBLEM — A41.9 SEPSIS SECONDARY TO UTI (HCC): Status: RESOLVED | Noted: 2022-01-01 | Resolved: 2022-01-01

## 2022-08-01 PROBLEM — K56.609 SBO (SMALL BOWEL OBSTRUCTION) (HCC): Status: RESOLVED | Noted: 2020-01-30 | Resolved: 2022-01-01

## 2022-08-11 LAB
BLOOD CULTURE, ROUTINE: ABNORMAL
ORGANISM: ABNORMAL

## 2023-10-03 NOTE — PROGRESS NOTES
Vascular Surgery  Dr. Laura Quezada   Daily Progress Note    Pt Name: Ilana Galvez Record Number: [id number]  Date of Birth 1950   Today's Date: 7/11/2022    SUBJECTIVE:     Patient was seen and examined, sitting up in recliner at bedside. Pain is controlled, stating right chest port removal site just a little tender  has not had nausea/vomiting    OBJECTIVE:     Patient Vitals for the past 24 hrs:   BP Temp Temp src Pulse Resp SpO2 Weight   07/11/22 0924 -- -- -- -- 18 -- --   07/11/22 0834 (!) 144/78 -- -- (!) 114 -- -- --   07/11/22 0534 (!) 144/78 96.8 °F (36 °C) Temporal (!) 114 20 95 % 172 lb (78 kg)   07/11/22 0053 (!) 150/77 97 °F (36.1 °C) Temporal (!) 101 18 98 % --   07/10/22 2318 -- -- -- -- 18 -- --   07/10/22 1736 127/87 (!) 96.3 °F (35.7 °C) Temporal (!) 111 16 98 % --   07/10/22 1314 (!) 150/94 -- -- (!) 106 18 95 % --         Intake/Output Summary (Last 24 hours) at 7/11/2022 1009  Last data filed at 7/11/2022 0855  Gross per 24 hour   Intake 570 ml   Output 3250 ml   Net -2680 ml       In: 900 [P.O.:900]  Out: 3250 [Urine:3100]    I/O last 3 completed shifts: In: 690 [P.O.:690]  Out: 4100 [Urine:3950; Stool:150]     Date 07/11/22 0000 - 07/11/22 2359   Shift 6360-0976 8649-5973 0594-5084 24 Hour Total   INTAKE   P.O.(mL/kg/hr)  330  330   Shift Total(mL/kg)  330(4.2)  330(4.2)   OUTPUT   Urine(mL/kg/hr) 850(1.4) 700  1550   Stool(mL/kg) 150(1.9)   150(1.9)   Shift Total(mL/kg) 1000(12.8) 700(9)  1700(21.8)   Weight (kg) 78 78 78 78       Wt Readings from Last 3 Encounters:   07/11/22 172 lb (78 kg)   06/30/22 143 lb 12.8 oz (65.2 kg)   06/15/22 162 lb (73.5 kg)      Body mass index is 28.62 kg/m². Diet: ADULT DIET;  Regular; Low Fiber    MEDS:     Scheduled Meds:   furosemide  40 mg Oral Daily    enoxaparin  40 mg SubCUTAneous Daily    levoFLOXacin  500 mg Oral Daily    carvedilol  6.25 mg Oral BID WC    calcitRIOL  0.25 mcg Oral Daily    anidulafungin  100 mg IntraVENous Q24H    lidocaine 1 % injection  5 mL IntraDERmal Once    sodium chloride flush  5-40 mL IntraVENous 2 times per day    sodium chloride  1,000 mL IntraVENous Once    lactated ringers bolus  30 mL/kg IntraVENous Once    pantoprazole  40 mg Oral QAM AC     Continuous Infusions:   sodium chloride 25 mL (07/11/22 0927)     PRN Meds:sodium phosphate IVPB, 10 mmol, PRN   Or  sodium phosphate IVPB, 15 mmol, PRN   Or  sodium phosphate IVPB, 20 mmol, PRN  ALPRAZolam, 0.5 mg, Q4H PRN  sodium chloride flush, 5-40 mL, PRN  sodium chloride, 25 mL, PRN  bismuth subsalicylate, 30 mL, D7W PRN  acetaminophen, 650 mg, Q6H PRN   Or  acetaminophen, 650 mg, Q6H PRN  oxyCODONE-acetaminophen, 1 tablet, Q4H PRN      PHYSICAL EXAM:     CONSTITUTIONAL: awake, alert, cooperative, ill appearing,  LUNGS: Clear bilaterally anteriorly, diminished lower lobe breath sounds  CARDIOVASCULAR: Regular rhythm with tachycardia at 120  ABDOMEN: soft, nontender, ostomy appliance intact  NEUROLOGIC: Awake, alert, oriented to name, place and time. WOUND/INCISION:  Right chest port removal site with sutures intact.  Incision edges well approximated, no erythema, no drainage  EXTREMITY: Bilateral LE's with 3+ pitting edema (sitting up in recliner with feet on floor)    LABS:     CBC:   Recent Labs     07/09/22  0430 07/10/22  0342   WBC 11.3* 10.0   RBC 2.58* 2.92*   HGB 7.1* 8.0*   HCT 23.3* 26.4*   MCV 90.3 90.4   MCH 27.5 27.4   MCHC 30.5* 30.3*   RDW 15.5* 15.1*    226   MPV 11.5 11.5      Last 3 CMP:   Recent Labs     07/09/22  0430 07/10/22  0342 07/11/22  0545   * 133* 138   K 3.8 4.1 4.7    100 103   CO2 22 21* 23   BUN 25* 25* 23   CREATININE 1.7* 1.8* 1.7*   GLUCOSE 94 91 102   CALCIUM 7.4* 8.0* 8.9      Calcium:   Lab Results   Component Value Date/Time    CALCIUM 8.9 07/11/2022 05:45 AM    CALCIUM 8.0 07/10/2022 03:42 AM    CALCIUM 7.4 07/09/2022 04:30 AM      Lactic Acid:   Lab Results   Component Value Date/Time    LACTA 5.6 07/05/2022 06:35 PM    LACTA 1.6 06/29/2022 07:34 AM    LACTA 2.7 04/12/2022 02:10 PM        DVT prophylaxis: [x] Lovenox                                    ASSESSMENT:     1. Removal of Mediport on 7/8/22 per Mara Schmid  2. HD # 6  Active Hospital Problems    Diagnosis Date Noted   [facility] or reservoir infection [T80.212A]      Priority: Medium    Chronic kidney disease [N18.9]      Priority: Medium    Septicemia ([address] [A41.9] 04/20/2022     Priority: Medium    Hydronephrosis of right kidney [N13.30] 05/01/2020    Leg swelling [M79.89] 09/15/2018   3. Chief Complaint:  Chief Complaint   Patient presents with    Hypotension     Pt presents to ED with c/o hypotension, at outpatient infusion and was unable to tolerate treatment      PLAN:     1. Daily dressing changes to right port removal site. Sutures can dc on 7/15/22. Okay for Home Health to dc   2. Follow up in [address] [place] [address] office after he sees  (2 week ID follow up) and Vascular can schedule a new port placement if cleared by ID  3.    Incision care instructions in AVS Him/He

## 2024-08-01 NOTE — PROGRESS NOTES
Detail Level: Detailed Progress Note      Pt Name: Ruth Hunter  YOB: 1950  MRN: [id number]    Date of evaluation: 4/7/2020  History Obtained From:  patient, electronic medical record    CHIEF COMPLAINT:    Chief Complaint   Patient presents with    Colon Cancer     Metastasis from colon cancer    Fatigue    Follow-up    Discuss Labs    Treatment    Anemia    Pain     HISTORY OF PRESENT ILLNESS:    Ruth Hunter is a 78 y.o.  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 [facility] completed 7 cycles of palliative chemotherapy with modified FOLFOX 7  (Oxaliplatin 85 mg/m² IV day 1, leucovorin 400 mg/m² IV day 1 and 5-FU 2400 mg/m² IV continuous infusion over 46 to 48 hours) as of 12/26/2019. Avastin was initially anticipated although due to multiple/frequency of invasive interventions and increased risk for hemorrhage with Avastin and proteinuria, Avastin was not given. Restaging CT scans on 1/13/2020 indicated a positive response to treatment with no new findings. [facility] was placed on palliative maintenance therapy with 5-FU and leucovorin every 2 weeks, he received cycle #1 on 1/28/2020. [facility] was hospitalized on 1/30/2020 for small bowel obstruction and treatment was placed on hold for surgical intervention. [facility] returns today in scheduled follow-up for evaluation, lab monitoring and consideration to resume treatment. He presents today indicating that he is recovering well from surgery and is ready to move forward with treatment. He reports his appetite is well and he is having regular output from his ileostomy. [facility] underwent exploratory laparotomy, removal of adhesions, small bowel resection with primary anastomosis and partial thickness small bowel repair on 3/5/2020 by Dr. Trinity Tejeda at [facility].   In the Quality [facility]: Preventive Care And Screening: Tobacco Use: Screening And Cessation Intervention: Patient screened for tobacco use and is an ex/non-smoker · 9/18/2019-12/26/2019 palliative chemotherapy with modified FOLFOX 7  (Oxaliplatin 85 mg/m² IV day 1, leucovorin 400 mg/m² IV day 1 and 5-FU 2400 mg/m² IV continuous infusion over 46 to 48 hours for a total of 7 cycles. · 1/28/2020 -palliative maintenance therapy with leucovorin 400 mg/m² IV over 2 hours on day 1, followed by 5-FU bolus 400 mg/m² and then 1200 mg/m²/day x2 days (total 2400 mg meter squared over 46 to 48 hours) continuous infusion.  Repeat every 2 weeks.     ONCOLOGIC HISTORY #3  Hue Miller was seen in initial oncology consultation on 8/19/2019 during his hospitalization at [facility] after a large pelvic mass was identified which raised concern for recurrent disease.     · 8/17/2019- CEA 18.1  · 8/17/2019- CT scan of the kidney with contrast documented moderate to severe right hydronephrosis with dilation of the right ureter into the lower pelvis the site of the parasacral soft tissue changes.  Partially calcified soft tissue changes within the janes-sacral region likely representing sequelae of pelvic radiation.  Increasing scarring/fibrosis versus tumor recurrence within the presacral changes, likely represents a site of right distal ureter obstruction.  No left-sided hydronephrosis. · 8/18/2019 -Double-J ureter stent placement for right hydronephrosis secondary to extrinsic compression by pelvic mass.    · 8/27/2019-CT scan of the chest with contrast documented numerous pulmonary nodules that appear new compared to 11/12/2017, RUL nodule measuring 7 mm and LLL nodule measuring 5 mm.  Soft tissue nodule at the left ventral abdominal wall.  Slight increased size of a probable lymph node anterior to the aorta measuring 0.9 cm compared to 0.7 cm.  Similar presacral, right pelvic sidewall and right abductor muscular nodular soft tissue density. · 8/27/2019 CT scan of the abdomen and pelvis with contrast identified new moderate left hydronephrosis with moderate right hydronephrosis. TUNNELED VENOUS PORT PLACEMENT         Current Medications:    Current Outpatient Medications   Medication Sig Dispense Refill    tamsulosin (FLOMAX) 0.4 MG capsule Take 1 capsule by mouth 2 times daily (Patient taking differently: Take 0.4 mg by mouth daily ) 180 capsule 3    DULoxetine (CYMBALTA) 30 MG extended release capsule TK 1 C PO QD      loperamide (IMODIUM A-D) 2 MG tablet Take 4 mg by mouth      ondansetron (ZOFRAN) 4 MG tablet Take 2 tablets by mouth every 8 hours as needed for Nausea or Vomiting 30 tablet 2    atorvastatin (LIPITOR) 40 MG tablet TAKE 1 TABLET BY MOUTH EVERY NIGHT (Patient taking differently: Take 40 mg by mouth nightly ) 30 tablet 0    omeprazole (PRILOSEC) 20 MG delayed release capsule Take 20 mg by mouth 2 times daily       bisoprolol (ZEBETA) 5 MG tablet Take 5 mg by mouth daily      methadone (DOLOPHINE) 10 MG tablet Take 10 mg by mouth every 6 hours as needed for Pain. q 5 hours      gabapentin (NEURONTIN) 800 MG tablet Take 800 mg by mouth 3 times daily.  metaxalone (SKELAXIN) 800 MG tablet Take 800 mg by mouth 3 times daily   1     No current facility-administered medications for this visit. Facility-Administered Medications Ordered in Other Visits   Medication Dose Route Frequency Provider Last Rate Last Dose    sodium chloride flush 0.9 % injection 10 mL  10 mL Intravenous PRN OLGA Landis   10 mL at 04/07/20 1102    heparin flush 100 UNIT/ML injection 500 Units  500 Units Intracatheter PRN OLGA Walsh   500 Units at 04/07/20 1102        Allergies:    Allergies   Allergen Reactions    Morphine Anxiety       Social History:    Social History     Tobacco Use    Smoking status: Former Smoker    Smokeless tobacco: Never Used   Substance Use Topics    Alcohol use: No    Drug use: No       Family History:   Family History   Problem Relation Age of Onset    High Blood Pressure Mother     High Blood Pressure Father     Colon Cancer Father     Diabetes Father        Vitals:  Vitals:    04/07/20 1019   BP: 122/64   Pulse: 74   Temp: 97.7 °F (36.5 °C)   SpO2: 96%   Weight: 173 lb 1.6 oz (78.5 kg)   Height: 5' 5\" (1.651 m)        Subjective   REVIEW OF SYSTEMS:   Review of Systems   Constitutional: Positive for fatigue. Negative for chills, diaphoresis and fever. HENT: Negative. Negative for congestion, ear pain, hearing loss, nosebleeds, sore throat and tinnitus. Eyes: Negative. Negative for pain, discharge and redness. Respiratory: Negative. Negative for cough, shortness of breath and wheezing. Cardiovascular: Positive for leg swelling (Right lower extremity, chronic). Negative for chest pain and palpitations. Gastrointestinal: Negative. Negative for abdominal pain, blood in stool, constipation, diarrhea, nausea and vomiting. Endocrine: Negative for polydipsia. Genitourinary: Negative for dysuria, flank pain, frequency, hematuria and urgency. Musculoskeletal: Positive for arthralgias and back pain. Negative for myalgias and neck pain. Skin: Negative. Negative for rash. Neurological: Positive for weakness (Right lower extremity). Negative for dizziness, tremors, seizures and headaches. Hematological: Does not bruise/bleed easily. Psychiatric/Behavioral: Negative. The patient is not nervous/anxious. Objective   PHYSICAL EXAM:  Physical Exam  Vitals signs reviewed. Constitutional:       General: He is not in acute distress. Appearance: He is well-developed. He is not diaphoretic. HENT:      Head: Normocephalic and atraumatic. Mouth/Throat:      Pharynx: Uvula midline. Tonsils: No tonsillar exudate. Eyes:      General: Lids are normal. No scleral icterus. Right eye: No discharge. Left eye: No discharge. Conjunctiva/sclera: Conjunctivae normal.      Pupils: Pupils are equal, round, and reactive to light. Neck:      Musculoskeletal: Normal range of motion and neck supple. Thyroid: No thyroid mass or thyromegaly. Vascular: No JVD. Trachea: Trachea normal. No tracheal deviation. Cardiovascular:      Rate and Rhythm: Normal rate and regular rhythm. Heart sounds: Normal heart sounds. No murmur. No friction rub. No gallop. Pulmonary:      Effort: Pulmonary effort is normal. No respiratory distress. Breath sounds: Normal breath sounds. No wheezing or rales. Chest:      Chest wall: No tenderness. Abdominal:      General: Bowel sounds are normal. There is no distension. Palpations: Abdomen is soft. There is no mass. Tenderness: There is no abdominal tenderness. There is no guarding or rebound. Hernia: No hernia is present. Comments: Well-healed mid abdominal incision  Ileostomy appliance intact   Musculoskeletal:         General: No tenderness or deformity. Right lower leg: Edema (Chronic) present. Comments: Range of motion within normal limits x4 extremities   Skin:     General: Skin is warm. Coloration: Skin is not pale. Findings: No erythema or rash. Neurological:      Mental Status: He is alert and oriented to person, place, and time. Cranial Nerves: No cranial nerve deficit. Coordination: Coordination normal.   Psychiatric:         Behavior: Behavior normal.         Thought Content: Thought content normal.         Labs: WBC 8.09, ANC 5.49, hemoglobin 9.8, MCV 88.7 and platelet count of [phone]. CBC: No results for input(s): WBC, HGB, PLT in the last 72 hours. CMP: No results for input(s): GLUCOSE, BUN, CREATININE, BCR, CO2, CALCIUM, ALBUMIN, LABIL2, ALKPHOS, AST, ALT in the last 72 hours. Invalid input(s): EGFRIFNONA, EGFRIFAFRI, SODIUM, POTASSIUM, CHLORIDE, PROTENTOTREF, GLOBULIN, BILIRUBIN  Hepatic: No results for input(s): AST, ALT, ALB, BILITOT, ALKPHOS in the last 72 hours. Troponin: No results for input(s): TROPONINI in the last 72 hours.   BNP: No results for input(s): BNP in the last 72

## 2024-11-25 NOTE — TELEPHONE ENCOUNTER
Date: 10-14-20    Cardiologist: Dr. Pascual Rossi    Procedure: T12, L1 Kyphoplasty    Surgeon: Dr. Sandhya Champion    Last Office Visit: 8-24-20    Reason for office visit and medical concerns addressed at this office visit: CAD, Hyperlipidemia, Cancer    Testing Performed and Date of Service:  Bare metal stent \ 2 years ago  4-30-20 EKG    RCRI = 1 pt, low, 0.9%   METs 3    Current Medications: megace, boniva, desowen, flexeril, cymbalta, zofran, Fe, prilosec, zebeta neurontin    Is the patient currently taking an anticoagulant? If so, what is the diagnosis the patient has been given to warrant the need for the anticoagulant?  no    Additional Notes: Cardiac Risk Request
Ok per Dr. Nyla Arriaza, pt does need to start a baby ASA after procedure.
none

## (undated) DEVICE — SEAL ENDO INSTR SELF SEAL UROLOGY

## (undated) DEVICE — Z INACTIVE USE 2660664 SOLUTION IRRIG 3000ML 0.9% SOD CHL USP UROMATIC PLAS CONT

## (undated) DEVICE — GLOVE SURG SZ 75 CRM LTX FREE POLYISOPRENE POLYMER BEAD ANTI

## (undated) DEVICE — SURGICAL PROCEDURE PACK CYTOSCOPY

## (undated) DEVICE — DISPOSABLE IRRIGATION BIPOLAR CORD, M1000 TYPE: Brand: KIRWAN

## (undated) DEVICE — TOWEL,OR,DSP,ST,BLUE,DLX,4/PK,20PK/CS: Brand: MEDLINE

## (undated) DEVICE — URETERAL STENT
Type: IMPLANTABLE DEVICE | Status: NON-FUNCTIONAL
Brand: POLARIS™ ULTRA

## (undated) DEVICE — 3M™ STERI-DRAPE™ INSTRUMENT POUCH 1018: Brand: STERI-DRAPE™

## (undated) DEVICE — CATHETER URET 5FR L70CM OPN END SGL LUMN INJ HUB FLEXIMA

## (undated) DEVICE — TUBE ET 8MM NSL ORAL BASIC CUF INTMED MURPHY EYE RADPQ MRK

## (undated) DEVICE — STERILE LATEX POWDER FREE SURGICAL GLOVES WITH HYDROGEL COATING: Brand: PROTEXIS

## (undated) DEVICE — SYR LUERLOK 20CC BX/50

## (undated) DEVICE — BAG DRNGE COMB PK

## (undated) DEVICE — NEURO CDS

## (undated) DEVICE — SOLUTION IV IRRIG POUR BRL 0.9% SODIUM CHL 2F7124

## (undated) DEVICE — GUIDEWIRE ENDOSCP L150CM DIA0.035IN TIP 3CM PTFE NIT

## (undated) DEVICE — BAG URIN DRNAGE 2000ML TB L48IN NDL SAMP ANTIREFLX CHMBR DRN

## (undated) DEVICE — SYRINGE,PISTON,IRRIGATION,60ML,STERILE: Brand: MEDLINE

## (undated) DEVICE — PAD,EYE,1-5/8X2 5/8,STERILE,LF,1/PK: Brand: MEDLINE

## (undated) DEVICE — SURGICAL SUCTION CONNECTING TUBE WITH MALE CONNECTOR AND SUCTION CLAMP, 2 FT. LONG (.6 M), 5 MM I.D.: Brand: CONMED

## (undated) DEVICE — ADHS LIQ MASTISOL 2/3ML

## (undated) DEVICE — BONE TAMP KIT KPX203NB AF X2 20/3

## (undated) DEVICE — MEDIA CONTRAST ISOVUE  300 10X50ML

## (undated) DEVICE — RADIFOCUS GLIDEWIRE: Brand: GLIDEWIRE

## (undated) DEVICE — PAD MINOR UNIVERSAL: Brand: MEDLINE INDUSTRIES, INC.

## (undated) DEVICE — 3M™ STERI-STRIP™ REINFORCED ADHESIVE SKIN CLOSURES, R1547, 1/2 IN X 4 IN (12 MM X 100 MM), 6 STRIPS/ENVELOPE: Brand: 3M™ STERI-STRIP™

## (undated) DEVICE — Z INACTIVE USE 2635503 SOLUTION IRRIG 3000ML ST H2O USP UROMATIC PLAS CONT

## (undated) DEVICE — CATHETER 8591-38 PASSER,38CM

## (undated) DEVICE — FORCEP BPLR IRIS

## (undated) DEVICE — GLOVE SURG SZ 7 L12IN FNGR THK94MIL TRNSLUC YEL LTX HYDRGEL

## (undated) DEVICE — DOVER HYDROGEL COATED LATEX FOLEY CATHETER, 5 ML, 3-WAY 22 FR/CH (7.3 MM): Brand: DOVER

## (undated) DEVICE — GLV SURG BIOGEL LTX PF 8

## (undated) DEVICE — NG KIT, 21 GA, 10 PACK: Brand: SITE-RITE

## (undated) DEVICE — ELECTROSURGICAL PENCIL BUTTON SWITCH NON COATED BLADE ELECTRODE 10 FT (3 M) CORD HOLSTER: Brand: MEGADYNE

## (undated) DEVICE — INTENDED FOR TISSUE SEPARATION, AND OTHER PROCEDURES THAT REQUIRE A SHARP SURGICAL BLADE TO PUNCTURE OR CUT.: Brand: BARD-PARKER ® STAINLESS STEEL BLADES

## (undated) DEVICE — TUBE ET 7.5MM NSL ORAL BASIC CUF INTMED MURPHY EYE RADPQ

## (undated) DEVICE — Z INVALID PART USE 2392642 LARYNGOSCOPE VID TI LO PROF S3 BLDE SPECTRM GLIDESCOPE GO

## (undated) DEVICE — EVACUATOR URO BLDR W/ ADPT UROVAC

## (undated) DEVICE — TOWEL,OR,DSP,ST,BLUE,STD,4/PK,20PK/CS: Brand: MEDLINE

## (undated) DEVICE — ELECTRODE,CUTTING,STERILE.24FR: Brand: N.A.

## (undated) DEVICE — LARYNGOSCOPE VID MILLER 2 MTL BLADE M HNDL CURAPLEX

## (undated) DEVICE — DEV CUT BIOP BONE KYPHX

## (undated) DEVICE — NEEDLE NRV STIM BVL TIP INSUL PEDCL ACCS SYS FOR EMG MON

## (undated) DEVICE — CONTRAST IOTHALAMATE MEGLUMINE 60% 50 ML INJ CONRAY 60

## (undated) DEVICE — DRSNG TELFA PAD NONADH STR 1S 3X8IN

## (undated) DEVICE — BONE TAMP KIT KPX203PB FF X2 20/3 1 STP: Brand: KYPHOPAK™ TRAY

## (undated) DEVICE — IRRIGATOR BULB ASEPTO 60CC STRL

## (undated) DEVICE — PK KYPHOPLASTY 30

## (undated) DEVICE — DRAINBAG,ANTI-REFLUX TOWER,L/F,2000ML,LL: Brand: MEDLINE

## (undated) DEVICE — SUTURE MCRYL SZ 4-0 L18IN ABSRB UD L19MM PS-2 3/8 CIR PRIM Y496G

## (undated) DEVICE — CYSTOSCOPY PACK: Brand: CONVERTORS

## (undated) DEVICE — SYR SLP TP 10ML DISP

## (undated) DEVICE — SUT MNCRYL 4/0 PS2 27IN UD MCP426H

## (undated) DEVICE — 3M™ IOBAN™ 2 ANTIMICROBIAL INCISE DRAPE 6650EZ: Brand: IOBAN™ 2

## (undated) DEVICE — GLV SURG BIOGEL LTX PF 6 1/2

## (undated) DEVICE — BNDR ABD 4PANEL 12IN 46 TO 62IN

## (undated) DEVICE — GLOVE SURG SZ 75 L12IN FNGR THK94MIL TRNSLUC YEL LTX

## (undated) DEVICE — SUT ETHIB 2/0 RB1 DA 30IN GRN MX553

## (undated) DEVICE — GLV SURG DERMASSURE GRN LF PF 7.0

## (undated) DEVICE — Z DISCONTINUED BY MEDLINE USE 2733855 TRAY SKIN SCRB VAG PVP-I

## (undated) DEVICE — GOWN,PREVENTION PLUS,2XL,ST,22/CS: Brand: MEDLINE

## (undated) DEVICE — VAGINAL PREP TRAY: Brand: MEDLINE INDUSTRIES, INC.

## (undated) DEVICE — BIPOLAR SEALER 23-113-1 AQM 2.3 OM NEURO: Brand: AQUAMANTYS ®

## (undated) DEVICE — CATHETERIZATION TRAY PEDIATRIC 16 FR 5 CC INDWL DOVER

## (undated) DEVICE — YANKAUER SUCTION INSTRUMENT WITHOUT CONTROL VENT, OPEN TIP, CLEAR: Brand: YANKAUER

## (undated) DEVICE — CONTAINER,SPECIMEN,OR STERILE,4OZ: Brand: MEDLINE

## (undated) DEVICE — 1010 S-DRAPE TOWEL DRAPE 10/BX: Brand: STERI-DRAPE™

## (undated) DEVICE — Device: Brand: OLYMPUS

## (undated) DEVICE — AGENT CNTRST IOTHALAMATE MEGLUMINE 60% 30 ML INJ CONRAY 60

## (undated) DEVICE — PATIENT RETURN ELECTRODE, SINGLE-USE, CONTACT QUALITY MONITORING, ADULT, WITH 9FT CORD, FOR PATIENTS WEIGING OVER 33LBS. (15KG): Brand: MEGADYNE

## (undated) DEVICE — PK TURNOVER RM ADV

## (undated) DEVICE — AMBU AURA-I U SIZE 4, DISPOSABLE LARYNGEAL MASK: Brand: AURA-I

## (undated) DEVICE — NEEDLE HYPO 25GA L1.5IN BVL ORIENTED ECLIPSE

## (undated) DEVICE — PLATE ES AD W 9FT CRD 2

## (undated) DEVICE — CATHETER,FOLEY,SILI-ELAST,LTX,16FR,10ML: Brand: MEDLINE

## (undated) DEVICE — BLADE LARYNSCP SZ 4 TI DISP SPECTRM LOPRO GLIDESCOPE

## (undated) DEVICE — SYRINGE A08E KIS INFLATION HP: Brand: KYPHON®  INFLATION SYRINGE

## (undated) DEVICE — SUTURE ABSORBABLE MONOFILAMENT 3-0 SH 27 IN UD PDS + PDP416H

## (undated) DEVICE — ELECTRD BLD EZ CLN MOD XLNG 2.75IN

## (undated) DEVICE — BLADE SURG NO11 C STL RETRCT DISPOSABLE

## (undated) DEVICE — SOLUTION IV 100ML 0.9% SOD CHL PLAS CONT USP VIAFLX 1 PER

## (undated) DEVICE — 3M™ STERI-STRIP™ REINFORCED ADHESIVE SKIN CLOSURES, R1549, 1/2 IN X 2 IN (12 MM X 50 MM), 6 STRIPS/ENVELOPE: Brand: 3M™ STERI-STRIP™

## (undated) DEVICE — SPK10277 JACKSON/PRO-AXIS KIT: Brand: SPK10277 JACKSON/PRO-AXIS KIT

## (undated) DEVICE — CVR UNIV C/ARM

## (undated) DEVICE — 3.0MM PRECISION NEURO (MATCH HEAD)

## (undated) DEVICE — PAD,ARMBOARD,CONV,FOAM,2X8X20",12PR/CS: Brand: MEDLINE

## (undated) DEVICE — MEDI-VAC YANK SUCT HNDL W/TPRD BULBOUS TIP: Brand: CARDINAL HEALTH

## (undated) DEVICE — GAUZE,SPONGE,4"X4",8PLY,STRL,LF,10/TRAY: Brand: MEDLINE

## (undated) DEVICE — COVER LT HNDL CAM BLU DISP W/ SURG CTRL

## (undated) DEVICE — ADHESIVE SKIN CLSR 0.7ML TOP DERMBND ADV

## (undated) DEVICE — MASTISOL ADHESIVE LIQ 2/3ML

## (undated) DEVICE — Z DUPLICATE USE 2392649 LARYNGOSCOPE VID TI SPECTRM LOPRO S4 GLIDESCOPE

## (undated) DEVICE — KIT POS FOAM ARM CRADL BILAT CHST PD CVR HIP HNG CVR L

## (undated) DEVICE — SUTURE PROL SZ 2-0 L36IN NONABSORBABLE BLU V-7 L26MM 1/2 8977H

## (undated) DEVICE — SPONGE LAP W12XL12IN WHT STRUNG RADPQ PREWASHED ST

## (undated) DEVICE — ADAPTER CATHETER PLAS FOR 4 6FR URET CATHETER

## (undated) DEVICE — CONN FLX BREATHE CIRCT

## (undated) DEVICE — JP 3-SPRING RES W/10FR PVC DRAIN/TR: Brand: CARDINAL HEALTH

## (undated) DEVICE — DRP C/ARMOR

## (undated) DEVICE — NDL HYPO PRECISIONGLIDE REG 22G 1 1/2

## (undated) DEVICE — PROGRAMMER PUMP EXT FOR SYNCHROMEDII TH90T01

## (undated) DEVICE — PACK,UNIVERSAL,NO GOWNS: Brand: MEDLINE

## (undated) DEVICE — E-Z CLEAN, NON-STICK, PTFE COATED, ELECTROSURGICAL BLADE ELECTRODE, MODIFIED EXTENDED INSULATION, 2.5 INCH (6.35 CM): Brand: MEGADYNE

## (undated) DEVICE — MACINTOSH LARYNGOSCOPE BLADE, 3, CL: Brand: N.A.

## (undated) DEVICE — SUTURE VCRL SZ 0 L27IN ABSRB UD L36MM CT-1 1/2 CIR J260H

## (undated) DEVICE — SEALANT FIBRIN 10 CC FRZN PRE FILLED SYR TISSEEL

## (undated) DEVICE — SOLUTION IRRIGATION STRL H2O 3000 ML 4/CA

## (undated) DEVICE — URETERAL STENT
Type: IMPLANTABLE DEVICE | Site: URETER | Status: NON-FUNCTIONAL
Brand: POLARIS™ ULTRA

## (undated) DEVICE — GLOVE SURG SZ 8 L12IN FNGR THK79MIL GRN LTX FREE

## (undated) DEVICE — HYDROGEL COATED LATEX FOLEY CATHETER, 5 CC, 3-WAY, 20 FR (6.7 MM): Brand: DOVER

## (undated) DEVICE — SYRINGE MED 10ML POLYPR LUERSLIP TIP FLAT TOP W/O SFTY DISP

## (undated) DEVICE — SOLUTION IRRIG 3000ML 0.9% SOD CHL USP UROMATIC PLAS CONT

## (undated) DEVICE — PROXIMATE RH ROTATING HEAD SKIN STAPLERS (35 WIDE) CONTAINS 35 STAINLESS STEEL STAPLES: Brand: PROXIMATE

## (undated) DEVICE — CATHETER KIT 5 FR 21 GAX7 CM MICROINTRODUCER GUIDEWIRE STIFF

## (undated) DEVICE — C-ARM: Brand: UNBRANDED

## (undated) DEVICE — AMBU AURA-I U SIZE 3, DISPOSABLE LARYNGEAL MASK: Brand: AURA-I

## (undated) DEVICE — COVER,MAYO STAND,STERILE: Brand: MEDLINE

## (undated) DEVICE — CVR HNDL LIGHT RIGID

## (undated) DEVICE — GLOVE SURG SZ 8 L12IN FNGR THK94MIL TRNSLUC YEL LTX HYDRGEL

## (undated) DEVICE — SHEET,DRAPE,53X77,STERILE: Brand: MEDLINE

## (undated) DEVICE — MINOR CDS: Brand: MEDLINE INDUSTRIES, INC.

## (undated) DEVICE — ANTIBACTERIAL VIOLET BRAIDED (POLYGLACTIN 910), SYNTHETIC ABSORBABLE SUTURE: Brand: COATED VICRYL

## (undated) DEVICE — Device

## (undated) DEVICE — C-ARMOR C-ARM EQUIPMENT COVERS CLEAR STERILE UNIVERSAL FIT 12 PER CASE: Brand: C-ARMOR

## (undated) DEVICE — SUTURE VCRL SZ 2-0 L36IN ABSRB UD L36MM CT-1 1/2 CIR J945H